# Patient Record
Sex: FEMALE | Race: WHITE | NOT HISPANIC OR LATINO | Employment: OTHER | URBAN - METROPOLITAN AREA
[De-identification: names, ages, dates, MRNs, and addresses within clinical notes are randomized per-mention and may not be internally consistent; named-entity substitution may affect disease eponyms.]

---

## 2020-09-14 ENCOUNTER — OFFICE VISIT (OUTPATIENT)
Dept: FAMILY MEDICINE | Facility: CLINIC | Age: 50
End: 2020-09-14
Payer: COMMERCIAL

## 2020-09-14 VITALS
HEIGHT: 65 IN | BODY MASS INDEX: 29.34 KG/M2 | DIASTOLIC BLOOD PRESSURE: 84 MMHG | WEIGHT: 176.1 LBS | HEART RATE: 111 BPM | OXYGEN SATURATION: 94 % | SYSTOLIC BLOOD PRESSURE: 117 MMHG | TEMPERATURE: 97.5 F

## 2020-09-14 DIAGNOSIS — Z12.11 SPECIAL SCREENING FOR MALIGNANT NEOPLASMS, COLON: ICD-10-CM

## 2020-09-14 DIAGNOSIS — R14.0 ABDOMINAL BLOATING: ICD-10-CM

## 2020-09-14 DIAGNOSIS — Z00.00 ROUTINE GENERAL MEDICAL EXAMINATION AT A HEALTH CARE FACILITY: Primary | ICD-10-CM

## 2020-09-14 DIAGNOSIS — Z12.4 SCREENING FOR CERVICAL CANCER: ICD-10-CM

## 2020-09-14 DIAGNOSIS — Z12.31 ENCOUNTER FOR SCREENING MAMMOGRAM FOR BREAST CANCER: ICD-10-CM

## 2020-09-14 DIAGNOSIS — Z80.0 FAMILY HISTORY OF PANCREATIC CANCER: ICD-10-CM

## 2020-09-14 DIAGNOSIS — B97.7 HIGH RISK HPV INFECTION: ICD-10-CM

## 2020-09-14 PROCEDURE — G0145 SCR C/V CYTO,THINLAYER,RESCR: HCPCS | Performed by: INTERNAL MEDICINE

## 2020-09-14 PROCEDURE — 99386 PREV VISIT NEW AGE 40-64: CPT | Performed by: INTERNAL MEDICINE

## 2020-09-14 PROCEDURE — 99213 OFFICE O/P EST LOW 20 MIN: CPT | Mod: 25 | Performed by: INTERNAL MEDICINE

## 2020-09-14 PROCEDURE — 87624 HPV HI-RISK TYP POOLED RSLT: CPT | Performed by: INTERNAL MEDICINE

## 2020-09-14 ASSESSMENT — MIFFLIN-ST. JEOR: SCORE: 1427.78

## 2020-09-14 NOTE — PATIENT INSTRUCTIONS
(Z00.00) Routine general medical examination at a health care facility  (primary encounter diagnosis)  Comment: For routine exam, we will draw labs as ordered, cholesterol, diabetes mellitus check, liver function, renal function, pap smear and refer for colonoscopy.  We will also update vaccination history.   [facility] (also performs diagnostic mammogram, ultrasound and biopsy) [phone].   Plan: Lipid panel reflex to direct LDL Fasting,         Comprehensive metabolic panel (BMP + Alb, Alk         Phos, ALT, AST, Total. Bili, TP), CBC with         platelets, TSH with free T4 reflex            (R14.0) Abdominal bloating  Comment: [facility] phone #[phone] to schedule  Plan: CT Abdomen Pelvis w Contrast            (Z80.0) Family history of pancreatic cancer  Comment: Referral to genetics placed  Plan: CT Abdomen Pelvis w Contrast, CANCER RISK         MGMT/CANCER GENETIC COUNSELING REFERRAL            (Z12.4) Screening for cervical cancer  Comment:   Plan: Pap imaged thin layer screen with HPV -         recommended age 30 - 65 years (select HPV order        below)            (Z12.31) Encounter for screening mammogram for breast cancer  Comment:  [facility] (also performs diagnostic mammogram, ultrasound and biopsy) [phone].   Plan: *MA Screening Digital Bilateral

## 2020-09-14 NOTE — PROGRESS NOTES
SUBJECTIVE:   CC: Melita Cortes is an 50 year old woman who presents for preventive health visit.     Healthy Habits:     Getting at least 3 servings of Calcium per day:  NO    Bi-annual eye exam:  Yes    Dental care twice a year:  Yes    Sleep apnea or symptoms of sleep apnea:  None    Diet:  Other    Frequency of exercise:  None    Duration of exercise:  N/A    Taking medications regularly:  No    Barriers to taking medications:  None    Medication side effects:  None    PHQ-2 Total Score: 0    Additional concerns today:  Yes (GI issues )        Today's PHQ-2 Score:   PHQ-2 ( 1999 Pf[employer]) 9/14/2020   Q1: Little interest or pleasure in doing things 0   Q2: Feeling down, depressed or hopeless 0   PHQ-2 Score 0       Abuse: Current or Past (Physical, Sexual or Emotional) - No  Do you feel safe in your environment? Yes        Social History     Tobacco Use     Smoking status: Never Smoker     Smokeless tobacco: Never Used   Substance Use Topics     Alcohol use: Yes     If you drink alcohol do you typically have >3 drinks per day or >7 drinks per week? No    No flowsheet data found.    Reviewed orders with patient.  Reviewed health maintenance and updated orders accordingly - Yes       Abdominal bloating  Family history of pancreatic cancer  Screening for cervical cancer  Encounter for screening mammogram for breast cancer  Routine general medical examination at a health care facility  Special screening for malignant neoplasms, colon   Spoke with Fidelina today for establishment of primary care.  Main concern is that of family history of pancreatic cancer and recent symptoms of abdominal bloating.  She says it is the past month or 2 associated with bloating and abdominal distention.  She does not have any specific sites of pain, but given her family history of pancreatic cancer she is concerned about this.  She also has not had a menstrual cycle since May 2020.  She believes she may be entering into menopause.    Lab  "work is in process  Labs reviewed in [facility]    Mammogram Screening: Patient under age 50, mutual decision reflected in health maintenance.      Pertinent mammograms are reviewed under the imaging tab.  History of abnormal Pap smear: YES - updated in Problem List and Health Maintenance accordingly     Reviewed and updated as needed this visit by clinical staff  Tobacco  Allergies  Meds  Soc Hx        Reviewed and updated as needed this visit by Provider        No past medical history on file.     Review of Systems  CONSTITUTIONAL: NEGATIVE for fever, chills, change in weight  INTEGUMENTARU/SKIN: NEGATIVE for worrisome rashes, moles or lesions  EYES: NEGATIVE for vision changes or irritation  ENT: NEGATIVE for ear, mouth and throat problems  RESP: NEGATIVE for significant cough or SOB  BREAST: NEGATIVE for masses, tenderness or discharge  CV: NEGATIVE for chest pain, palpitations or peripheral edema  GI: as above   : NEGATIVE for unusual urinary or vaginal symptoms. Periods are regular.  MUSCULOSKELETAL: NEGATIVE for significant arthralgias or myalgia  NEURO: NEGATIVE for weakness, dizziness or paresthesias  PSYCHIATRIC: NEGATIVE for changes in mood or affect     OBJECTIVE:   /84   Pulse 111   Temp 97.5  F (36.4  C) (Temporal)   Ht 1.656 m (5' 5.2\")   Wt 79.9 kg (176 lb 1.6 oz)   LMP 05/15/2020 (Exact Date)   SpO2 94%   Breastfeeding No   BMI 29.13 kg/m    Physical Exam  GENERAL: healthy, alert and no distress  EYES: Eyes grossly normal to inspection, PERRL and conjunctivae and sclerae normal  HENT: ear canals and TM's normal, nose and mouth without ulcers or lesions  NECK: no adenopathy, no asymmetry, masses, or scars and thyroid normal to palpation  RESP: lungs clear to auscultation - no rales, rhonchi or wheezes  BREAST: normal without masses, tenderness or nipple discharge and no palpable axillary masses or adenopathy  CV: regular rate and rhythm, normal S1 S2, no S3 or S4, no murmur, click or " rub, no peripheral edema and peripheral pulses strong  ABDOMEN: soft, nontender, no hepatosplenomegaly, no masses and bowel sounds normal   (female): normal female external genitalia, normal urethral meatus, vaginal mucosa pink, moist, well rugated, and normal cervix/adnexa/uterus without masses or discharge  MS: no gross musculoskeletal defects noted, no edema  SKIN: no suspicious lesions or rashes  NEURO: Normal strength and tone, mentation intact and speech normal  PSYCH: mentation appears normal, affect normal/bright    Diagnostic Test Results:  Labs reviewed in Epic    ASSESSMENT/PLAN:     Patient Instructions   (Z00.00) Routine general medical examination at a health care facility  (primary encounter diagnosis)  Comment: For routine exam, we will draw labs as ordered, cholesterol, diabetes mellitus check, liver function, renal function, pap smear and refer for colonoscopy.  We will also update vaccination history.   [facility] (also performs diagnostic mammogram, ultrasound and biopsy) [phone].   Plan: Lipid panel reflex to direct LDL Fasting,         Comprehensive metabolic panel (BMP + Alb, Alk         Phos, ALT, AST, Total. Bili, TP), CBC with         platelets, TSH with free T4 reflex            (R14.0) Abdominal bloating  Comment: [facility] phone #[phone] to schedule  Plan: CT Abdomen Pelvis w Contrast            (Z80.0) Family history of pancreatic cancer  Comment: Referral to genetics placed  Plan: CT Abdomen Pelvis w Contrast, CANCER RISK         MGMT/CANCER GENETIC COUNSELING REFERRAL            (Z12.4) Screening for cervical cancer  Comment:   Plan: Pap imaged thin layer screen with HPV -         recommended age 30 - 65 years (select HPV order        below)            (Z12.31) Encounter for screening mammogram for breast cancer  Comment:  [facility] (also performs diagnostic mammogram, ultrasound and biopsy) [phone].   Plan: *MA  "Screening Digital Bilateral                  COUNSELING:  Reviewed preventive health counseling, as reflected in patient instructions    Estimated body mass index is 29.13 kg/m  as calculated from the following:    Height as of this encounter: 1.656 m (5' 5.2\").    Weight as of this encounter: 79.9 kg (176 lb 1.6 oz).        She reports that she has never smoked. She has never used smokeless tobacco.      Counseling Resources:  ATP IV Guidelines  Pooled Cohorts Equation Calculator  Breast Cancer Risk Calculator  BRCA-Related Cancer Risk Assessment: FHS-7 Tool  FRAX Risk Assessment  ICSI Preventive Guidelines  Dietary Guidelines for Americans, 2010  USDA's MyPlate  ASA Prophylaxis  Lung CA Screening    Issa Monk MD, MD  [facility]  "

## 2020-09-16 ENCOUNTER — TELEPHONE (OUTPATIENT)
Dept: FAMILY MEDICINE | Facility: CLINIC | Age: 50
End: 2020-09-16

## 2020-09-16 ENCOUNTER — DOCUMENTATION ONLY (OUTPATIENT)
Dept: FAMILY MEDICINE | Facility: CLINIC | Age: 50
End: 2020-09-16

## 2020-09-16 DIAGNOSIS — Z00.00 ROUTINE GENERAL MEDICAL EXAMINATION AT A HEALTH CARE FACILITY: Primary | ICD-10-CM

## 2020-09-16 LAB
COPATH REPORT: NORMAL
PAP: NORMAL

## 2020-09-16 NOTE — TELEPHONE ENCOUNTER
Oncology/Surgical Oncology Referral Request:     Specialty Requested: Medical Oncology     Referring Provider: Issa Monk MD    Referring Clinic/Organization: [facility]    Records location: [facility]     Requested Provider (if specified): Not Specified

## 2020-09-16 NOTE — LETTER
"    September 16, 2020       TO: Melita Cortes  [id number] [address] [place] [id number]         Dear Ms. Cortes,    Our records indicate that you have not scheduled an appointment for a consult, as recommended by Dr. Issa Monk. If you wish to schedule within [facility]alth, we have several options to help you schedule your appointment:      Call [phone]    Visit our website at [email or web address] and click \"I Want To\" (in upper right) and select Request an Appointment    Request an appointment via [facility] at [email or web address]     If you have chosen to schedule elsewhere or if you have already made an appointment, please disregard this letter.    If you have any questions or concerns regarding the information above, please contact the [facility] at [phone].      Sincerely,      [facility]                  "

## 2020-09-18 DIAGNOSIS — Z00.00 ROUTINE GENERAL MEDICAL EXAMINATION AT A HEALTH CARE FACILITY: ICD-10-CM

## 2020-09-18 LAB
ALBUMIN SERPL-MCNC: 2.8 G/DL (ref 3.4–5)
ALP SERPL-CCNC: 261 U/L (ref 40–150)
ALT SERPL W P-5'-P-CCNC: 57 U/L (ref 0–50)
ANION GAP SERPL CALCULATED.3IONS-SCNC: 8 MMOL/L (ref 3–14)
AST SERPL W P-5'-P-CCNC: 221 U/L (ref 0–45)
BILIRUB SERPL-MCNC: 1.1 MG/DL (ref 0.2–1.3)
BUN SERPL-MCNC: 2 MG/DL (ref 7–30)
CALCIUM SERPL-MCNC: 8.6 MG/DL (ref 8.5–10.1)
CHLORIDE SERPL-SCNC: 106 MMOL/L (ref 94–109)
CHOLEST SERPL-MCNC: 143 MG/DL
CO2 SERPL-SCNC: 25 MMOL/L (ref 20–32)
CREAT SERPL-MCNC: 0.56 MG/DL (ref 0.52–1.04)
ERYTHROCYTE [DISTWIDTH] IN BLOOD BY AUTOMATED COUNT: 16.3 % (ref 10–15)
FINAL DIAGNOSIS: ABNORMAL
GFR SERPL CREATININE-BSD FRML MDRD: >90 ML/MIN/{1.73_M2}
GLUCOSE SERPL-MCNC: 106 MG/DL (ref 70–99)
HCT VFR BLD AUTO: 36.4 % (ref 35–47)
HDLC SERPL-MCNC: 20 MG/DL
HGB BLD-MCNC: 12.3 G/DL (ref 11.7–15.7)
HPV HR 12 DNA CVX QL NAA+PROBE: NEGATIVE
HPV16 DNA SPEC QL NAA+PROBE: NEGATIVE
HPV18 DNA SPEC QL NAA+PROBE: POSITIVE
LDLC SERPL CALC-MCNC: 83 MG/DL
MCH RBC QN AUTO: 35.7 PG (ref 26.5–33)
MCHC RBC AUTO-ENTMCNC: 33.8 G/DL (ref 31.5–36.5)
MCV RBC AUTO: 106 FL (ref 78–100)
NONHDLC SERPL-MCNC: 123 MG/DL
PLATELET # BLD AUTO: 189 10E9/L (ref 150–450)
POTASSIUM SERPL-SCNC: 3.1 MMOL/L (ref 3.4–5.3)
PROT SERPL-MCNC: 7.2 G/DL (ref 6.8–8.8)
RBC # BLD AUTO: 3.45 10E12/L (ref 3.8–5.2)
SODIUM SERPL-SCNC: 139 MMOL/L (ref 133–144)
SPECIMEN DESCRIPTION: ABNORMAL
SPECIMEN SOURCE CVX/VAG CYTO: ABNORMAL
TRIGL SERPL-MCNC: 201 MG/DL
WBC # BLD AUTO: 12.8 10E9/L (ref 4–11)

## 2020-09-18 PROCEDURE — 85027 COMPLETE CBC AUTOMATED: CPT | Performed by: INTERNAL MEDICINE

## 2020-09-18 PROCEDURE — 80053 COMPREHEN METABOLIC PANEL: CPT | Performed by: INTERNAL MEDICINE

## 2020-09-18 PROCEDURE — 80061 LIPID PANEL: CPT | Performed by: INTERNAL MEDICINE

## 2020-09-18 PROCEDURE — 36415 COLL VENOUS BLD VENIPUNCTURE: CPT | Performed by: INTERNAL MEDICINE

## 2020-09-20 ENCOUNTER — TELEPHONE (OUTPATIENT)
Dept: FAMILY MEDICINE | Facility: CLINIC | Age: 50
End: 2020-09-20

## 2020-09-20 DIAGNOSIS — R74.01 TRANSAMINITIS: ICD-10-CM

## 2020-09-20 DIAGNOSIS — D75.89 MACROCYTOSIS: Primary | ICD-10-CM

## 2020-09-20 NOTE — TELEPHONE ENCOUNTER
Can we call Melita Sebastian and let her know that     I had the opportunity to review your recent labs and a summary of your labs reads as follows:    Your complete blood counts show no a finding of elevated white blood cell count and an elevated MCV level with stable hemoglobin and platelets - We should follow up to recheck your vitamin b12 and folate levels  Your fasting lipid panel show  - low HDL (good) cholesterol -as your goal is greater than 40  - low LDL (bad) cholesterol as your goal is less than 130  - elevated triglyceride levels    The 10-year ASCVD risk score (Fiorella SANTANA Jr., et al., 2013) is: 2.2%    Values used to calculate the score:      Age: 50 years      Sex: Female      Is Non- : No      Diabetic: No      Tobacco smoker: No      Systolic Blood Pressure: 117 mmHg      Is BP treated: No      HDL Cholesterol: 20 mg/dL      Total Cholesterol: 143 mg/dL     Notably your liver function tests are quite elevated.  Looking back at your labs from 2018 and 2019, it appears that this is a long standing problem.  I would recommend we schedule a virtual visit on Friday to discuss this issue and consider further workup with a liver specialist

## 2020-09-21 ENCOUNTER — PATIENT OUTREACH (OUTPATIENT)
Dept: FAMILY MEDICINE | Facility: CLINIC | Age: 50
End: 2020-09-21

## 2020-09-21 NOTE — TELEPHONE ENCOUNTER
1/2019 NIL, +HPV 18  3/2019 [place]- Neg  9/14/20 NIL, +HPV 18. Plan [place] bef 12/14/20

## 2020-09-21 NOTE — TELEPHONE ENCOUNTER
Pt called back   Discussed with her   Scheduled video visit follow up for Friday     Pt had been drinking alcohol so thinks liver enzymes could be related to that, since has discontinued drinking     Lupe BARRAZA RN

## 2020-09-22 NOTE — TELEPHONE ENCOUNTER
"Spoke with Pt:     She is concerned about her labs- specifically her elevated WBCs-   Discussed that there are many reasons WBCs are elevated, viral infection, r/t liver enzymes, etc. Her levels do not warrant immediate attention or treatment (she inquired about antibiotics or \"if I am dying\")- PCP recommends to f/u on Friday for further evaluation of her abnormal (\"long standing problem\") labs and discuss a plan and therefore no emergent actions are necessary    Pt is tearful and notes that she drank more in the last year since her mom's health started to decline, but has since stopped drinking and quite smoking     Advised to avoid Tylenol and alcohol to help protect her liver.     Routing as FYI to PCP to notify of Pt's concerns/anxiety over her lab values for upcoming visit on Friday       "

## 2020-09-22 NOTE — TELEPHONE ENCOUNTER
Pt called stating she had additional questions about her labs. Please reach out to Pt    Pt ph: [phone]  ok

## 2020-09-25 ENCOUNTER — HOSPITAL ENCOUNTER (OUTPATIENT)
Dept: CT IMAGING | Facility: CLINIC | Age: 50
Discharge: HOME | End: 2020-09-25
Attending: INTERNAL MEDICINE | Admitting: INTERNAL MEDICINE
Payer: COMMERCIAL

## 2020-09-25 ENCOUNTER — VIRTUAL VISIT (OUTPATIENT)
Dept: FAMILY MEDICINE | Facility: CLINIC | Age: 50
End: 2020-09-25
Payer: COMMERCIAL

## 2020-09-25 DIAGNOSIS — Z80.0 FAMILY HISTORY OF PANCREATIC CANCER: ICD-10-CM

## 2020-09-25 DIAGNOSIS — R14.0 ABDOMINAL BLOATING: ICD-10-CM

## 2020-09-25 DIAGNOSIS — K70.9 LIVER DISEASE, CHRONIC, DUE TO ALCOHOL (H): Primary | ICD-10-CM

## 2020-09-25 PROCEDURE — 25000125 ZZHC RX 250: Performed by: INTERNAL MEDICINE

## 2020-09-25 PROCEDURE — 25000128 H RX IP 250 OP 636: Performed by: INTERNAL MEDICINE

## 2020-09-25 PROCEDURE — 99213 OFFICE O/P EST LOW 20 MIN: CPT | Mod: 95 | Performed by: INTERNAL MEDICINE

## 2020-09-25 PROCEDURE — 74177 CT ABD & PELVIS W/CONTRAST: CPT

## 2020-09-25 RX ORDER — IOPAMIDOL 755 MG/ML
86 INJECTION, SOLUTION INTRAVASCULAR ONCE
Status: COMPLETED | OUTPATIENT
Start: 2020-09-25 | End: 2020-09-25

## 2020-09-25 RX ADMIN — SODIUM CHLORIDE 65 ML: 9 INJECTION, SOLUTION INTRAVENOUS at 13:38

## 2020-09-25 RX ADMIN — IOPAMIDOL 86 ML: 755 INJECTION, SOLUTION INTRAVENOUS at 13:37

## 2020-09-25 NOTE — PROGRESS NOTES
"Melita Cortes is a 50 year old female who is being evaluated via a billable telephone visit.      The patient has been notified of following:     \"This video visit will be conducted via a call between you and your physician/provider. We have found that certain health care needs can be provided without the need for an in-person physical exam.  This service lets us provide the care you need with a video conversation.  If a prescription is necessary we can send it directly to your pharmacy.  If lab work is needed we can place an order for that and you can then stop by our lab to have the test done at a later time.    Video visits are billed at different rates depending on your insurance coverage.  Please reach out to your insurance provider with any questions.    If during the course of the call the physician/provider feels a video visit is not appropriate, you will not be charged for this service.\"    Patient has given verbal consent for Telephone visit? Yes  How would you like to obtain your AVS? MyChart  If you are dropped from the video visit, the video invite should be resent to: Text to cell phone: [phone]  Will anyone else be joining your video visit? No    Subjective     Melita Cortes is a 50 year old female who presents today via video visit for the following health issues:    HPI    Follow up on labs   I spoke to Melita over the phone today with regards to her recent lab review.  We did discuss the fact that her liver function tests are quite elevated and her albumin was low.  Her CT scan did show evidence of ascites.  On her own accord, prior to making this video appointment she did stop drinking alcohol about a week ago.  She already feels a bit better.  She notes that she does have still abdominal bloating, but she is not having any decreased appetite and her diet is stable.  She admits to drinking significant amounts of alcohol over the past few years.  This is been an ongoing problem for " her, and she feels that she is able to manage this on her own.  She was informed that her CT scan showed normal abdominal organs other than liver showing the increased echogenicity concerning for alcoholic liver disease.  She also is notified of her positive Pap smear with a positive HPV and she is referred for colposcopy.    Video Start Time: 3:59 PM        Review of Systems   Constitutional, HEENT, cardiovascular, pulmonary, GI, , musculoskeletal, neuro, skin, endocrine and psych systems are negative, except as otherwise noted.      Objective           Vitals:  No vitals were obtained today due to virtual visit.    Physical Exam     GENERAL: Healthy, alert and no distress  RESP: No audible wheeze, cough, or visible cyanosis.  No visible retractions or increased work of breathing.    NEURO: Cranial nerves grossly intact.  Mentation and speech appropriate for age.  PSYCH: Mentation appears normal, affect normal/bright, judgement and insight intact, normal speech and appearance well-groomed.      Results for orders placed or performed during the hospital encounter of 09/25/20 (from the past 24 hour(s))   CT Abdomen Pelvis w Contrast    Narrative    CT ABDOMEN AND PELVIS WITH CONTRAST 9/25/2020 1:49 PM    CLINICAL HISTORY: Abdominal bloating. Family history of pancreatic  cancer.    TECHNIQUE: CT scan of the abdomen and pelvis was performed following  injection of IV contrast. Multiplanar reformats were obtained. Dose  reduction techniques were used.    CONTRAST: 86 mL Isovue-370    COMPARISON: None.    FINDINGS:   LOWER CHEST: No infiltrates or effusions.    HEPATOBILIARY: Heterogeneous appearance of the liver possibly related  to heterogeneous fatty infiltration. Ultimately this is indeterminant.  No biliary dilatation. Gallbladder unremarkable.    PANCREAS: No significant mass, duct dilatation, or inflammatory  change.    SPLEEN: Normal size.    ADRENAL GLANDS: No significant nodules.    KIDNEYS/BLADDER: No  significant mass, stones, or hydronephrosis.    BOWEL: No obstruction or inflammatory change.    PELVIC ORGANS: No pelvic masses.    ADDITIONAL FINDINGS: Small amount of ascites. No free air. No  adenopathy.    MUSCULOSKELETAL: Unremarkable.      Impression    IMPRESSION:   1.  Heterogeneous appearance of the liver, possibly related to  heterogeneous fatty infiltration. Clinical correlation with liver  function tests recommended for further evaluation.  2.  Small amount of pelvic free fluid which is nonspecific. This could  be related to ovarian cyst rupture, but the etiology is unclear.    PRIMITIVO GARIBAY MD           Assessment & Plan     Liver disease, chronic, due to alcohol (H)  I recommend we recheck labs in about 2 to 3 weeks.  I also recommended she schedule with a hepatologist.  She is agreeable to this plan.  She also has abstained from alcohol and avoid any hepatotoxic medication such as acetaminophen.  - **CBC with platelets FUTURE anytime; Future  - **Hepatic panel FUTURE 2mo; Future  - GASTROENTEROLOGY ADULT REF CONSULT ONLY; Future           No follow-ups on file.    Issa Monk MD, MD  [facility]      Telephone -Visit Details    Type of service:  Video Visit    Telephone End Time:4:12 PM    Originating Location (pt. Location): Home    Distant Location (provider location):  [facility]     Platform used for Video Visit: [employer]

## 2020-09-25 NOTE — RESULT ENCOUNTER NOTE
Julio Hua,    I have had the opportunity to review your recent results and an interpretation is as follows:  As we discussed, there was evidence of heterogeneity within the liver related to alcohol liver disease.  Also there is small amount of ascites which is likely the cause of some of the bloating of experiencing.  We will plan to continue to abstain from alcohol and recheck your labs in about 2 to 3 weeks.  Follow up also in hepatology with [facility] [phone]     Sincerely,  Issa Monk MD

## 2020-09-30 ENCOUNTER — HOSPITAL ENCOUNTER (OUTPATIENT)
Dept: MAMMOGRAPHY | Facility: CLINIC | Age: 50
Discharge: HOME | End: 2020-09-30
Attending: INTERNAL MEDICINE | Admitting: INTERNAL MEDICINE
Payer: COMMERCIAL

## 2020-09-30 DIAGNOSIS — Z12.31 ENCOUNTER FOR SCREENING MAMMOGRAM FOR BREAST CANCER: ICD-10-CM

## 2020-09-30 PROCEDURE — 77063 BREAST TOMOSYNTHESIS BI: CPT

## 2020-10-02 ENCOUNTER — HOSPITAL ENCOUNTER (OUTPATIENT)
Facility: CLINIC | Age: 50
End: 2020-10-02
Attending: COLON & RECTAL SURGERY | Admitting: COLON & RECTAL SURGERY
Payer: COMMERCIAL

## 2020-10-06 DIAGNOSIS — Z11.59 ENCOUNTER FOR SCREENING FOR OTHER VIRAL DISEASES: Primary | ICD-10-CM

## 2020-10-09 DIAGNOSIS — Z11.59 ENCOUNTER FOR SCREENING FOR OTHER VIRAL DISEASES: Primary | ICD-10-CM

## 2020-11-13 ENCOUNTER — PATIENT OUTREACH (OUTPATIENT)
Dept: FAMILY MEDICINE | Facility: CLINIC | Age: 50
End: 2020-11-13
Payer: COMMERCIAL

## 2020-11-13 DIAGNOSIS — R87.810 CERVICAL HIGH RISK HPV (HUMAN PAPILLOMAVIRUS) TEST POSITIVE: ICD-10-CM

## 2020-12-14 NOTE — TELEPHONE ENCOUNTER
1/2019 NIL, +HPV 18  3/2019 [place]- Neg  9/14/20 NIL, +HPV 18. Plan [place] bef 12/14/20 9/21/20 Pt informed  11/13/20 MyChart reminder sent and read by patient  12/29/20 [place]

## 2020-12-29 ENCOUNTER — OFFICE VISIT (OUTPATIENT)
Dept: OBGYN | Facility: CLINIC | Age: 50
End: 2020-12-29
Payer: COMMERCIAL

## 2020-12-29 DIAGNOSIS — B97.7 HIGH RISK HPV INFECTION: Primary | ICD-10-CM

## 2020-12-29 PROCEDURE — 57452 EXAM OF CERVIX W/SCOPE: CPT | Performed by: OBSTETRICS & GYNECOLOGY

## 2020-12-29 NOTE — PROGRESS NOTES
INDICATIONS:                                                      Melita Cortes, is a 50 year old female, who had a recent negative pap, HPV 18 positive Patient has prior  history of abnormal pap. Here today for colposcopy. Discussed indication, risks of infection and bleeding.    Her last pap was   Lab Results   Component Value Date    PAP NIL, +HR-HPV 09/14/2020     Overview:   1/2019 NIL, +HPV 18  3/2019 [place]- Neg  9/14/20 NIL, +HPV 18    Is a pregnancy test required: No.  Was a consent obtained?  Yes      PROCEDURE:                                                      Cervix is stained with acetic acid and viewed colposcopically. Squamocolumnar junction is visualized in it's entirety. no visible lesions . Biopsy done No. Endocervical curretage Not Done         POST PROCEDURE:                                                      IMPRESSION: Patient tolerated procedure well    PLAN : Await the results of the biopsies.  She tolerated the procedure well. There were no complications. Patient was discharged in stable condition.    Patient advised to call the clinic if excessive bleeding, pelvic pain, or fever.     Follow-up with repeat co-testing in 12 months.    Colposcopy was normal today.  Discussed pap smear in a year.  Could consider LEEP if persistent HPV exists.  If she tests positive again would certainly do an ECC.  She had biopsy last colposcopy that was normal.  Kavita Browne MD

## 2021-01-04 ENCOUNTER — HEALTH MAINTENANCE LETTER (OUTPATIENT)
Age: 51
End: 2021-01-04

## 2021-07-20 ENCOUNTER — TELEPHONE (OUTPATIENT)
Dept: GASTROENTEROLOGY | Facility: OUTPATIENT CENTER | Age: 51
End: 2021-07-20

## 2021-07-20 ENCOUNTER — HOSPITAL ENCOUNTER (OUTPATIENT)
Facility: CLINIC | Age: 51
End: 2021-07-20
Attending: COLON & RECTAL SURGERY | Admitting: COLON & RECTAL SURGERY

## 2021-07-20 ENCOUNTER — TELEPHONE (OUTPATIENT)
Dept: GASTROENTEROLOGY | Facility: CLINIC | Age: 51
End: 2021-07-20

## 2021-07-20 DIAGNOSIS — Z11.59 ENCOUNTER FOR SCREENING FOR OTHER VIRAL DISEASES: ICD-10-CM

## 2021-07-20 NOTE — TELEPHONE ENCOUNTER
Caller:PATIENT    Procedure: COLON    Date of Procedure Cancelled: 8/9    Ordering Provider:    Reason for cancel: Scheduled incorrectly; dates given had conflict with her work schedule; pt calling back to confirm diff date    Rescheduled: y     If rescheduled    Date:8/23/2021    Location:[facility]    Note any change or update to original order/sedation: pt prefers [facility]

## 2021-07-20 NOTE — TELEPHONE ENCOUNTER
Screening Questions  1. Are you active on mychart? Yes    2. What insurance is in the chart? On chart     2.  Ordering/Referring Provider: Issa Monk MD in  F[facility]ILY [facility]/IM      3. BMI 26.6    4. Are you on daily home oxygen? no    5. Do you have a history of difficult airway? no    6. Have you had a heart, lung, or liver transplant? no    7. Are you currently on dialysis? no    8. Have you had a stroke or Transient ischemic atttack (TIA) within 6 months? no    9. In the past 6 months, have you had any heart related issues including cardiomyopathy or heart attack?         If yes, did it require cardiac stenting or other implantable device?no    10. Do you have any implantable devices in your body (pacemaker, defib, LVAD)? no    11. Do you take nitroglycerin? If yes, how often? no    12. Are you currently taking any blood thinners?no    13. Are you a diabetic? no    14. (Females) Are you currently pregnant? no  If yes, how many weeks?    15. Have you had a procedure in the past that was difficult to tolerate with conscious sedation? Any allergies to Fentanyl or Versed no    16. Are you taking any scheduled prescription narcotics more than once daily? no    17. Do you have any chemical dependencies such as alcohol, street drugs, or methadone? no    18. Do you have any history of post-traumatic stress syndrome or mental health issues? no    19. Do you transfer independently? yes    20.  Do you have any issues with constipation? no    21. Preferred Pharmacy for Pre Prescription On chart    Scheduling Details    Colonoscopy Prep Sent?: Miralax prep   Procedure Scheduled: Colonoscopy  Provider/Surgeon: Seema  Date of Procedure: 8/11/21  Location: [facility]  Caller (Please ask for phone number if not scheduled by patient): Fidelina      Sedation Type: Order didn't specify so patient was scheduled under MAC  Conscious Sedation- Needs  for 6 hours after the procedure  MAC/General-Needs  for 24  hours after procedure    Pre-op Required at [facility], [place], [place] and OR for MAC sedation:   (if yes advise patient they will need a pre-op prior to procedure)      Is patient on blood thinners? -no (If yes- inform patient to follow up with PCP or provider for follow up instructions)     Informed patient they will need an adult  yes  Cannot take any type of public or medical transportation alone    Informed Patient of COVID Test Requirement yes    Confirmed Nurse will call to complete assessment yes    Additional comments: n/a

## 2021-07-20 NOTE — TELEPHONE ENCOUNTER
Caller: Fidelina    Procedure: Colonoscopy    Date of Procedure Cancelled: 8/11/21    Ordering Provider:Issa Monk MD in  FAMILY [facility]    Reason for cancel: Schedule conflict    Rescheduled: Yes     If rescheduled    Date: 8/9/21    Location: [facility]    Note any change or update to original order/sedation: N/A

## 2021-09-24 ENCOUNTER — APPOINTMENT (OUTPATIENT)
Dept: ULTRASOUND IMAGING | Facility: CLINIC | Age: 51
DRG: 441 | End: 2021-09-24
Attending: EMERGENCY MEDICINE
Payer: COMMERCIAL

## 2021-09-24 ENCOUNTER — HOSPITAL ENCOUNTER (INPATIENT)
Facility: CLINIC | Age: 51
LOS: 7 days | Discharge: HOME | DRG: 441 | End: 2021-10-01
Attending: EMERGENCY MEDICINE | Admitting: INTERNAL MEDICINE
Payer: COMMERCIAL

## 2021-09-24 ENCOUNTER — NURSE TRIAGE (OUTPATIENT)
Dept: FAMILY MEDICINE | Facility: CLINIC | Age: 51
End: 2021-09-24

## 2021-09-24 DIAGNOSIS — K70.9 ALCOHOLIC LIVER DISEASE (H): Primary | ICD-10-CM

## 2021-09-24 DIAGNOSIS — D64.9 ANEMIA, UNSPECIFIED TYPE: ICD-10-CM

## 2021-09-24 DIAGNOSIS — K72.00 ACUTE LIVER FAILURE WITHOUT HEPATIC COMA: ICD-10-CM

## 2021-09-24 DIAGNOSIS — R18.8 OTHER ASCITES: ICD-10-CM

## 2021-09-24 LAB
% LINING CELLS, BODY FLUID: 22 %
ABO/RH(D): NORMAL
ALBUMIN FLD-MCNC: 0.4 G/DL
ALBUMIN SERPL-MCNC: 1.8 G/DL (ref 3.4–5)
ALP SERPL-CCNC: 58 U/L (ref 40–150)
ALT SERPL W P-5'-P-CCNC: 26 U/L (ref 0–50)
AMMONIA PLAS-SCNC: 78 UMOL/L (ref 10–50)
ANION GAP SERPL CALCULATED.3IONS-SCNC: 20 MMOL/L (ref 3–14)
ANTIBODY SCREEN: NEGATIVE
APPEARANCE FLD: ABNORMAL
AST SERPL W P-5'-P-CCNC: 44 U/L (ref 0–45)
BASOPHILS # BLD AUTO: 0.1 10E3/UL (ref 0–0.2)
BASOPHILS # BLD AUTO: 0.1 10E3/UL (ref 0–0.2)
BASOPHILS NFR BLD AUTO: 1 %
BASOPHILS NFR BLD AUTO: 1 %
BILIRUB DIRECT SERPL-MCNC: 23.2 MG/DL (ref 0–0.2)
BILIRUB SERPL-MCNC: 30.2 MG/DL (ref 0.2–1.3)
BILIRUB SERPL-MCNC: 33.6 MG/DL (ref 0.2–1.3)
BLD PROD TYP BPU: NORMAL
BLD PROD TYP BPU: NORMAL
BLOOD COMPONENT TYPE: NORMAL
BLOOD COMPONENT TYPE: NORMAL
BUN SERPL-MCNC: 60 MG/DL (ref 7–30)
CALCIUM SERPL-MCNC: 8.1 MG/DL (ref 8.5–10.1)
CHLORIDE BLD-SCNC: 98 MMOL/L (ref 94–109)
CO2 SERPL-SCNC: 14 MMOL/L (ref 20–32)
CODING SYSTEM: NORMAL
CODING SYSTEM: NORMAL
COLOR FLD: ABNORMAL
CREAT SERPL-MCNC: 4.1 MG/DL (ref 0.52–1.04)
CROSSMATCH: NORMAL
CROSSMATCH: NORMAL
EOSINOPHIL # BLD AUTO: 0.2 10E3/UL (ref 0–0.7)
EOSINOPHIL # BLD AUTO: 0.3 10E3/UL (ref 0–0.7)
EOSINOPHIL NFR BLD AUTO: 1 %
EOSINOPHIL NFR BLD AUTO: 2 %
ERYTHROCYTE [DISTWIDTH] IN BLOOD BY AUTOMATED COUNT: 19.2 % (ref 10–15)
ERYTHROCYTE [DISTWIDTH] IN BLOOD BY AUTOMATED COUNT: 19.2 % (ref 10–15)
GFR SERPL CREATININE-BSD FRML MDRD: 12 ML/MIN/1.73M2
GLUCOSE BLD-MCNC: 121 MG/DL (ref 70–99)
GRAM STAIN RESULT: NORMAL
GRAM STAIN RESULT: NORMAL
HCT VFR BLD AUTO: 14.7 % (ref 35–47)
HCT VFR BLD AUTO: 16.5 % (ref 35–47)
HGB BLD-MCNC: 4.7 G/DL (ref 11.7–15.7)
HGB BLD-MCNC: 5.2 G/DL (ref 11.7–15.7)
IMM GRANULOCYTES # BLD: 0.6 10E3/UL
IMM GRANULOCYTES # BLD: 0.6 10E3/UL
IMM GRANULOCYTES NFR BLD: 3 %
IMM GRANULOCYTES NFR BLD: 4 %
INR PPP: 3.3 (ref 0.85–1.15)
ISSUE DATE AND TIME: NORMAL
ISSUE DATE AND TIME: NORMAL
LDH SERPL L TO P-CCNC: 337 U/L (ref 81–234)
LYMPHOCYTES # BLD AUTO: 1.4 10E3/UL (ref 0.8–5.3)
LYMPHOCYTES # BLD AUTO: 1.8 10E3/UL (ref 0.8–5.3)
LYMPHOCYTES NFR BLD AUTO: 13 %
LYMPHOCYTES NFR BLD AUTO: 8 %
LYMPHOCYTES NFR FLD MANUAL: 17 %
MCH RBC QN AUTO: 43 PG (ref 26.5–33)
MCH RBC QN AUTO: 44.3 PG (ref 26.5–33)
MCHC RBC AUTO-ENTMCNC: 31.5 G/DL (ref 31.5–36.5)
MCHC RBC AUTO-ENTMCNC: 32 G/DL (ref 31.5–36.5)
MCV RBC AUTO: 136 FL (ref 78–100)
MCV RBC AUTO: 139 FL (ref 78–100)
MONOCYTES # BLD AUTO: 1.7 10E3/UL (ref 0–1.3)
MONOCYTES # BLD AUTO: 2.5 10E3/UL (ref 0–1.3)
MONOCYTES NFR BLD AUTO: 12 %
MONOCYTES NFR BLD AUTO: 15 %
MONOS+MACROS NFR FLD MANUAL: 39 %
NEUTROPHILS # BLD AUTO: 12.1 10E3/UL (ref 1.6–8.3)
NEUTROPHILS # BLD AUTO: 9.8 10E3/UL (ref 1.6–8.3)
NEUTROPHILS NFR BLD AUTO: 68 %
NEUTROPHILS NFR BLD AUTO: 72 %
NEUTS BAND NFR FLD MANUAL: 22 %
NRBC # BLD AUTO: 0.1 10E3/UL
NRBC # BLD AUTO: 0.1 10E3/UL
NRBC BLD AUTO-RTO: 0 /100
NRBC BLD AUTO-RTO: 1 /100
PLATELET # BLD AUTO: 102 10E3/UL (ref 150–450)
PLATELET # BLD AUTO: 143 10E3/UL (ref 150–450)
POTASSIUM BLD-SCNC: 3.6 MMOL/L (ref 3.4–5.3)
PROT FLD-MCNC: 1.7 G/DL
PROT SERPL-MCNC: 6.5 G/DL (ref 6.8–8.8)
RBC # BLD AUTO: 1.06 10E6/UL (ref 3.8–5.2)
RBC # BLD AUTO: 1.21 10E6/UL (ref 3.8–5.2)
RETICS # AUTO: 0.26 10E6/UL (ref 0.03–0.1)
RETICS/RBC NFR AUTO: 24.2 % (ref 0.5–2)
SARS-COV-2 RNA RESP QL NAA+PROBE: NEGATIVE
SODIUM SERPL-SCNC: 132 MMOL/L (ref 133–144)
SPECIMEN EXPIRATION DATE: NORMAL
UNIT ABO/RH: NORMAL
UNIT ABO/RH: NORMAL
UNIT NUMBER: NORMAL
UNIT NUMBER: NORMAL
UNIT STATUS: NORMAL
UNIT STATUS: NORMAL
UNIT TYPE ISBT: 9500
UNIT TYPE ISBT: 9500
WBC # BLD AUTO: 14.2 10E3/UL (ref 4–11)
WBC # BLD AUTO: 16.8 10E3/UL (ref 4–11)
WBC # FLD AUTO: 196 /UL

## 2021-09-24 PROCEDURE — 36415 COLL VENOUS BLD VENIPUNCTURE: CPT | Performed by: EMERGENCY MEDICINE

## 2021-09-24 PROCEDURE — 85045 AUTOMATED RETICULOCYTE COUNT: CPT | Performed by: INTERNAL MEDICINE

## 2021-09-24 PROCEDURE — 210N000002 HC R&B HEART CARE

## 2021-09-24 PROCEDURE — 86901 BLOOD TYPING SEROLOGIC RH(D): CPT | Performed by: EMERGENCY MEDICINE

## 2021-09-24 PROCEDURE — 93975 VASCULAR STUDY: CPT

## 2021-09-24 PROCEDURE — 999N000154 HC STATISTIC RADIOLOGY XRAY, US, CT, MAR, NM

## 2021-09-24 PROCEDURE — 82784 ASSAY IGA/IGD/IGG/IGM EACH: CPT | Performed by: INTERNAL MEDICINE

## 2021-09-24 PROCEDURE — 86803 HEPATITIS C AB TEST: CPT | Performed by: INTERNAL MEDICINE

## 2021-09-24 PROCEDURE — C9803 HOPD COVID-19 SPEC COLLECT: HCPCS

## 2021-09-24 PROCEDURE — 86709 HEPATITIS A IGM ANTIBODY: CPT | Performed by: INTERNAL MEDICINE

## 2021-09-24 PROCEDURE — 0W9G3ZX DRAINAGE OF PERITONEAL CAVITY, PERCUTANEOUS APPROACH, DIAGNOSTIC: ICD-10-PCS | Performed by: RADIOLOGY

## 2021-09-24 PROCEDURE — 36430 TRANSFUSION BLD/BLD COMPNT: CPT

## 2021-09-24 PROCEDURE — 258N000003 HC RX IP 258 OP 636: Performed by: INTERNAL MEDICINE

## 2021-09-24 PROCEDURE — 250N000013 HC RX MED GY IP 250 OP 250 PS 637: Performed by: INTERNAL MEDICINE

## 2021-09-24 PROCEDURE — 82390 ASSAY OF CERULOPLASMIN: CPT | Performed by: INTERNAL MEDICINE

## 2021-09-24 PROCEDURE — 87635 SARS-COV-2 COVID-19 AMP PRB: CPT | Performed by: EMERGENCY MEDICINE

## 2021-09-24 PROCEDURE — 83615 LACTATE (LD) (LDH) ENZYME: CPT | Performed by: INTERNAL MEDICINE

## 2021-09-24 PROCEDURE — P9016 RBC LEUKOCYTES REDUCED: HCPCS | Performed by: INTERNAL MEDICINE

## 2021-09-24 PROCEDURE — 87070 CULTURE OTHR SPECIMN AEROBIC: CPT | Performed by: INTERNAL MEDICINE

## 2021-09-24 PROCEDURE — P9016 RBC LEUKOCYTES REDUCED: HCPCS | Performed by: EMERGENCY MEDICINE

## 2021-09-24 PROCEDURE — 87205 SMEAR GRAM STAIN: CPT | Performed by: INTERNAL MEDICINE

## 2021-09-24 PROCEDURE — 87340 HEPATITIS B SURFACE AG IA: CPT | Performed by: INTERNAL MEDICINE

## 2021-09-24 PROCEDURE — 49083 ABD PARACENTESIS W/IMAGING: CPT

## 2021-09-24 PROCEDURE — 82248 BILIRUBIN DIRECT: CPT | Performed by: INTERNAL MEDICINE

## 2021-09-24 PROCEDURE — 85025 COMPLETE CBC W/AUTO DIFF WBC: CPT | Performed by: INTERNAL MEDICINE

## 2021-09-24 PROCEDURE — 36415 COLL VENOUS BLD VENIPUNCTURE: CPT | Performed by: INTERNAL MEDICINE

## 2021-09-24 PROCEDURE — 89050 BODY FLUID CELL COUNT: CPT | Performed by: EMERGENCY MEDICINE

## 2021-09-24 PROCEDURE — 88305 TISSUE EXAM BY PATHOLOGIST: CPT | Mod: TC | Performed by: INTERNAL MEDICINE

## 2021-09-24 PROCEDURE — 83010 ASSAY OF HAPTOGLOBIN QUANT: CPT | Performed by: INTERNAL MEDICINE

## 2021-09-24 PROCEDURE — 82040 ASSAY OF SERUM ALBUMIN: CPT | Performed by: EMERGENCY MEDICINE

## 2021-09-24 PROCEDURE — 82746 ASSAY OF FOLIC ACID SERUM: CPT | Performed by: INTERNAL MEDICINE

## 2021-09-24 PROCEDURE — 80143 DRUG ASSAY ACETAMINOPHEN: CPT | Performed by: INTERNAL MEDICINE

## 2021-09-24 PROCEDURE — 250N000011 HC RX IP 250 OP 636: Performed by: INTERNAL MEDICINE

## 2021-09-24 PROCEDURE — 86923 COMPATIBILITY TEST ELECTRIC: CPT | Performed by: EMERGENCY MEDICINE

## 2021-09-24 PROCEDURE — 99285 EMERGENCY DEPT VISIT HI MDM: CPT | Mod: 25

## 2021-09-24 PROCEDURE — 82607 VITAMIN B-12: CPT | Performed by: INTERNAL MEDICINE

## 2021-09-24 PROCEDURE — 85610 PROTHROMBIN TIME: CPT | Performed by: EMERGENCY MEDICINE

## 2021-09-24 PROCEDURE — 82140 ASSAY OF AMMONIA: CPT | Performed by: INTERNAL MEDICINE

## 2021-09-24 PROCEDURE — 84157 ASSAY OF PROTEIN OTHER: CPT | Performed by: EMERGENCY MEDICINE

## 2021-09-24 PROCEDURE — 99223 1ST HOSP IP/OBS HIGH 75: CPT | Mod: AI | Performed by: INTERNAL MEDICINE

## 2021-09-24 PROCEDURE — 85025 COMPLETE CBC W/AUTO DIFF WBC: CPT | Performed by: EMERGENCY MEDICINE

## 2021-09-24 PROCEDURE — 82042 OTHER SOURCE ALBUMIN QUAN EA: CPT | Performed by: EMERGENCY MEDICINE

## 2021-09-24 PROCEDURE — 96374 THER/PROPH/DIAG INJ IV PUSH: CPT

## 2021-09-24 PROCEDURE — 86706 HEP B SURFACE ANTIBODY: CPT | Performed by: INTERNAL MEDICINE

## 2021-09-24 PROCEDURE — 89051 BODY FLUID CELL COUNT: CPT | Performed by: EMERGENCY MEDICINE

## 2021-09-24 PROCEDURE — 83516 IMMUNOASSAY NONANTIBODY: CPT | Performed by: INTERNAL MEDICINE

## 2021-09-24 PROCEDURE — 86923 COMPATIBILITY TEST ELECTRIC: CPT | Performed by: INTERNAL MEDICINE

## 2021-09-24 RX ORDER — ALBUMIN (HUMAN) 12.5 G/50ML
12.5 SOLUTION INTRAVENOUS EVERY 8 HOURS
Status: DISCONTINUED | OUTPATIENT
Start: 2021-09-24 | End: 2021-09-26

## 2021-09-24 RX ORDER — ONDANSETRON 4 MG/1
4 TABLET, ORALLY DISINTEGRATING ORAL EVERY 6 HOURS PRN
Status: DISCONTINUED | OUTPATIENT
Start: 2021-09-24 | End: 2021-10-01 | Stop reason: HOSPADM

## 2021-09-24 RX ORDER — LIDOCAINE HYDROCHLORIDE 10 MG/ML
10 INJECTION, SOLUTION EPIDURAL; INFILTRATION; INTRACAUDAL; PERINEURAL ONCE
Status: COMPLETED | OUTPATIENT
Start: 2021-09-24 | End: 2021-09-24

## 2021-09-24 RX ORDER — LACTULOSE 10 G/15ML
20 SOLUTION ORAL 3 TIMES DAILY
Status: DISCONTINUED | OUTPATIENT
Start: 2021-09-24 | End: 2021-09-28

## 2021-09-24 RX ORDER — SODIUM CHLORIDE 9 MG/ML
INJECTION, SOLUTION INTRAVENOUS CONTINUOUS
Status: ACTIVE | OUTPATIENT
Start: 2021-09-24 | End: 2021-09-25

## 2021-09-24 RX ORDER — DIPHENHYDRAMINE HYDROCHLORIDE 50 MG/ML
50 INJECTION INTRAMUSCULAR; INTRAVENOUS
Status: ACTIVE | OUTPATIENT
Start: 2021-09-24 | End: 2021-09-25

## 2021-09-24 RX ORDER — NITROGLYCERIN 0.4 MG/1
0.4 TABLET SUBLINGUAL EVERY 5 MIN PRN
Status: DISCONTINUED | OUTPATIENT
Start: 2021-09-24 | End: 2021-09-29

## 2021-09-24 RX ORDER — SODIUM CHLORIDE 9 MG/ML
INJECTION, SOLUTION INTRAVENOUS CONTINUOUS
Status: DISCONTINUED | OUTPATIENT
Start: 2021-09-24 | End: 2021-09-24 | Stop reason: DRUGHIGH

## 2021-09-24 RX ORDER — LIDOCAINE 40 MG/G
CREAM TOPICAL
Status: DISCONTINUED | OUTPATIENT
Start: 2021-09-24 | End: 2021-10-01 | Stop reason: HOSPADM

## 2021-09-24 RX ORDER — ONDANSETRON 2 MG/ML
4 INJECTION INTRAMUSCULAR; INTRAVENOUS EVERY 6 HOURS PRN
Status: DISCONTINUED | OUTPATIENT
Start: 2021-09-24 | End: 2021-10-01 | Stop reason: HOSPADM

## 2021-09-24 RX ORDER — METHYLPREDNISOLONE SODIUM SUCCINATE 125 MG/2ML
125 INJECTION, POWDER, LYOPHILIZED, FOR SOLUTION INTRAMUSCULAR; INTRAVENOUS
Status: ACTIVE | OUTPATIENT
Start: 2021-09-24 | End: 2021-09-25

## 2021-09-24 RX ORDER — LIDOCAINE 40 MG/G
CREAM TOPICAL
Status: DISCONTINUED | OUTPATIENT
Start: 2021-09-24 | End: 2021-09-25

## 2021-09-24 RX ADMIN — PHYTONADIONE 2 MG: 2 INJECTION, EMULSION INTRAMUSCULAR; INTRAVENOUS; SUBCUTANEOUS at 17:46

## 2021-09-24 RX ADMIN — SODIUM CHLORIDE: 9 INJECTION, SOLUTION INTRAVENOUS at 21:24

## 2021-09-24 RX ADMIN — LIDOCAINE HYDROCHLORIDE 10 ML: 10 INJECTION, SOLUTION EPIDURAL; INFILTRATION; INTRACAUDAL; PERINEURAL at 16:19

## 2021-09-24 RX ADMIN — LACTULOSE 20 G: 20 SOLUTION ORAL at 18:18

## 2021-09-24 ASSESSMENT — ENCOUNTER SYMPTOMS
BLOOD IN STOOL: 0
ABDOMINAL DISTENTION: 1
WEAKNESS: 1
COLOR CHANGE: 1
SHORTNESS OF BREATH: 1

## 2021-09-24 ASSESSMENT — MIFFLIN-ST. JEOR: SCORE: 1353.43

## 2021-09-24 NOTE — ED PROVIDER NOTES
"  History   Chief Complaint:  Abdominal Pain and Shortness of Breath       The history is provided by the patient.      Melita Cortes is a 51 year old female with history of liver disease who presents for evaluation of abdominal distension and jaundice. Melita has had abdominal distension gradually worsening for the past year with associated shortness of breath and weakness. She developed jaundice one month ago that has been worsening. She also reports bilateral leg swelling for the past few months that is worse today. She was diagnosed with liver disease one year ago but does not recall this diagnosis. She cut back significantly on drinking one year ago and has not had any alcohol in the past month. No dark or bloody stool.     Review of Systems   Respiratory: Positive for shortness of breath.    Cardiovascular: Positive for leg swelling.   Gastrointestinal: Positive for abdominal distention. Negative for blood in stool.   Skin: Positive for color change.   Neurological: Positive for weakness.   All other systems reviewed and are negative.        Allergies:  No Known Allergies    Medications:  Prozac    Past Medical History:     Anxiety   Asthma   Esophageal reflux   GERD   High risk HPV infection  Hyperlipidemia   Liver disease   Macrocytosis  Obesity    Transaminitis     Past Surgical History:    Appendectomy  Breast biopsy    Family History:    Coronary artery disease   Dementia   Pancreatic cancer    Social History:  Presents with her Doug  PCP: Issa Monk MD   Alcohol use: Positive, decreased in the past year    Physical Exam     Patient Vitals for the past 24 hrs:   BP Temp Temp src Pulse Resp SpO2 Height Weight   09/24/21 1138 (!) 99/30 97.5  F (36.4  C) Oral 86 20 98 % 1.676 m (5' 6\") 71.7 kg (158 lb)       Physical Exam  Vitals: reviewed by me  General: Pt seen on [facility], pleasant, cooperative, and alert to conversation.  She has however very sickly appearing, and " severely jaundiced.  Eyes: Tracking well, icteric  ENT: MMM, midline trachea.   Lungs: Slightly tachypneic, slight accessory muscle use.  Speaking in full sentences.  CV: Rate as above  Abd: Soft, appears to be fluid-filled, nontender, no guarding.  Distended.  Rectal exam done with an MD chaperone.  Soft brown stool, no bleeding noted.  MSK: Significant swelling noted throughout her lower and upper extremities.  Anasarca noted.  Skin: Severely jaundiced  Neuro: Clear speech and no facial droop.  Moving her extremities normally.  Psych: Not RIS, no e/o AH/VH      Emergency Department Course     Imaging:  US Abd Hepatic & Portal Vasculature Cmplt:  p.  Per radiology.     US Paracentesis:  p.  Per radiology.      Laboratory:   CBC: WBC 16.8(H), HGB 5.2(L), (L)   CMP: Sodium 132(L), Carbon Dioxide 14(L), Anion Gap 20(H), Urea Nitrogen 60(H), Calcium 8.1(L), Glucose 121(H), Protein Total 6.5(L), Albumin 1.8(L), Bilirubin total 30.2(H), GFR 12(L), o/w WNL (Creatinine 4.10(H))     INR: 3.30(H)    ABO/Rh type and screen: O neg, Antibody Screen: Negative     Occult blood stool: Told by lab the sample is inadequate since the card that was used in the room had already .    Asymptomatic COVID19 Virus PCR by nasopharyngeal swab: Negative       Emergency Department Course:    Reviewed:  I reviewed nursing notes, vitals, past medical history and care everywhere    Assessments:  1150 I obtained history and examined the patient as noted above.   1500 I rechecked the patient and explained findings.     Consults:   1432 I consulted with Dr. Flores and Dr. Finney, [facility], regarding the patient's history and presentation here in the emergency department. They politely and intelligently decline to accept the patient for transfer.   1441 I consulted with Dr. Fatima of the hospitalist services who is in agreement to accept the patient for admission.     Interventions:  Pending Transfuse red blood cells, 1 unit,  IV    Disposition:  The patient was admitted to the hospital under the care of Dr. Fatima.     MELD-Na score: 45 at 9/24/2021 12:47 PM  MELD score: 46 at 9/24/2021 12:47 PM  Calculated from:  Serum Creatinine: 4.10 mg/dL (Using max of 4 mg/dL) at 9/24/2021 12:47 PM  Serum Sodium: 132 mmol/L at 9/24/2021 12:47 PM  Total Bilirubin: 30.2 mg/dL at 9/24/2021 12:47 PM  INR(ratio): 3.30 at 9/24/2021 12:47 PM  Age: 51 years        Impression & Plan     Medical Decision Making:  This is a 51-year-old female who presents emergency room with appears to be fulminant liver failure.  She has had some degree of alcoholic liver disease for at least a year, and has been intermittently drinking alcohol until about a month ago when she noticed significant jaundice and worsening swelling that culminated in her presentation here.  On my exam, she looks to be extremely unwell.  She is very jaundiced, has significant anasarca and ascites, and her labs are very worrisome for liver failure.  She has no vomiting or dark or maroon stools, and my rectal exam shows soft brown and yellow stool only.  I appreciated hemoglobin of 5, this was transfused, it is macrocytic.  I think her shortness of breath is from her ascites.  I did my best to transfer her to the [facility] as I think that she would absolutely benefit from liver transplant, though I was told by Maycol Clement and Reg over the phone, that there was a 0.0% chance of her receiving a liver since she was advised to stop drinking alcohol a year ago, and this is documented in the medical record.  Therefore we will be admitting her here to the care of Dr Fatima who has kindly agreed accept care of the patient.  The patient is chronically ill appearing, although I cannot think of any immediate therapies that would be beneficial apart from a transfusion, and ordering a paracentesis to be done.  I do appreciate her soft blood pressure, but she is been watched for significant a time  and I see no evidence of GI hemorrhage.  Her abdomen is nontender.  She has a very high meld score, and we will do our best here in the ER, and admit her once there is a bed available upstairs.    Critical Care Time: was 35 minutes for this patient excluding procedures    Covid-19  Melita Cortes was evaluated during a global COVID-19 pandemic, which necessitated consideration that the patient might be at risk for infection with the SARS-CoV-2 virus that causes COVID-19.   Applicable protocols for evaluation were followed during the patient's care.   COVID-19 was considered as part of the patient's evaluation. The plan for testing is:  a test was obtained during this visit.    Diagnosis:    ICD-10-CM    1. Acute liver failure without hepatic coma  K72.00    2. Other ascites  R18.8    3. Anemia, unspecified type  D64.9        Scribe Disclosure:  Ena FORD, am serving as a scribe at 11:48 AM on 9/24/2021 to document services personally performed by Fabian Oliver MD based on my observations and the provider's statements to me.            Fabian Oliver MD  09/24/21 7222

## 2021-09-24 NOTE — H&P
[facility]    History and Physical  Hospitalist       Date of Admission:  9/24/2021    Assessment & Plan   Melita Cortes is a 51 year old female who presents with worsening jaundice for the past 2 weeks, worsening abdominal distention, lower extremity edema and increased dyspnea.    Severe alcoholic liver disease  Ascites most likely due to #1  Suspected hepatic encephalopathy  -Patient presents with progressive symptoms which has been ongoing for about a year and appears like got worse in the past month or so  -Her constellation of symptoms and lab findings suggestive of severe alcoholic liver disease  -Meld score is 40, Dr. Oliver from the emergency room attempted transfer to [facility] for potential transplant however she was turned down due to her history of ongoing alcohol usage  -We will admit for further evaluation of first alcoholic liver disease  -Get liver ultrasound with Doppler of hepatic and portal vein  -Diagnostic and therapeutic paracentesis  -[facility] consult  -Await ammonia level patient likely has some element of hepatic encephalopathy, will start lactulose  -If concern for GI bleed, antibiotic coverage with Rocephin    Severe anemia-etiology unclear at this time  Mild thrombocytopenia  -Hemoglobin at presentation 5.2, last hemoglobin in the system is from September 2020 and it was 12.3 at that time  -Patient denies hematochezia or melena  -Await stool for occult blood  -Transfuse 2 units of PRBC, monitor hemoglobin every 6 hours  -As above [facility] consulted, likely will need endoscopy at some point, she is hemodynamically stable for now.  -Admit to [facility] for the next 24 hours out of caution given the severe liver disease and severe anemia.    Acute kidney injury, prerenal versus hepatorenal syndrome  -Creatinine at presentation today is 4.10, last creatinine in the chart is from September 2020 when it was normal at 0.56  -This is most  likely prerenal versus hepatorenal syndrome  -Along with 2 units of PRBC will give her albumin 12.5 g every 8 hours  -Gentle volume with normal saline at 50 mL/h  -Check renal ultrasound and FeNa  -Nephrology consult    Coagulopathy due to alcoholic liver disease  -INR is more than 3, coagulopathy most likely due to severe alcoholic liver disease.  Will give vitamin K 2 mg IV x1 dose    Mild hyponatremia  -This again is most likely due to severe alcoholic liver disease, recheck levels tomorrow morning.    Melita is a LOW SUSPICION PUI.  Follow these instructions:    If COVID test positive -> continue isolation precautions    If COVID test negative -> discontinue COVID-specific isolation precautions     DVT Prophylaxis: Pneumatic Compression Devices  Code Status: Full Code    Disposition: Expected discharge in 2+ days    Dayron Fatima MD    Primary Care Physician   Issa Monk MD    Chief Complaint   Worsening abdominal distention, jaundice, dyspnea    History is obtained from the patient    History of Present Illness   Melita Cortes is a 51 year old female who was initially diagnosed with possible alcoholic liver disease back in September 2020 who has not had any follow-up since then now presents to the emergency room for evaluation of worsening lower extremity edema, abdominal distention, progressive shortness of breath and jaundice.  History was obtained both with her and her  Doug at the bedside.  Patient has had some lower extremity edema for the past year or so which is progressively gotten worse.  She also started having abdominal distention dating back few months ago.  The  also noticed that her abdomen was getting distended but her muscle mass was decreasing.  For the past month or so she has been having progressive dyspnea and jaundice which is continued to get worse with severe discoloration of her entire body.  The  today was really worried about the extent  of her jaundice and brought her to the emergency room.  She denies any abdominal pain.  No black tarry stool.  No nausea or vomiting but her appetite has been poor.  No hematemesis.  She is constipated for the most part.  She has mild dry cough.  No reported fever or chills.  Apparently her urine is very dark and concentrated.  Patient was evaluated by Dr. Monk, her primary care provider last September when she had abnormal LFTs and had a CT abdomen on 9/25/2020 which revealed heterogenous appearance of the liver.  She was recommended a follow-up with hepatologist but apparently that never happened.  It appears like she has not followed up closely since then.    As far as other pertinent review of system is concerned, patient denies chest pain.  Denies dysuria, urinary urgency or frequency.  No headache or visual changes.  No hematochezia or melena or bleeding from any source.  The  does report that for the past 1 week he has seen subtle evidence of confusion/disorientation.    In the emergency room the patient was seen by Dr. Oliver.  Patient was found to have severe alcoholic liver disease with bilirubin of 30 and a meld score of 40.  He initially discussed the case with Dr. Flores and Dr. Finney at [facility] regarding liver transplant and was apparently refused given her patient history of ongoing alcohol use is until a month ago.  Patient is admitted for further evaluation and treatment of her severe anemia, alcoholic liver disease.    Past Medical History    I have reviewed this patient's medical history and updated it with pertinent information if needed.   Past Medical History:   Diagnosis Date     Cervical high risk HPV (human papillomavirus) test positive 2019, 2020    See problem list       Past Surgical History   I have reviewed this patient's surgical history and updated it with pertinent information if needed.  No past surgical history on file.    Prior to Admission Medications   Prior to  Admission Medications   Prescriptions Last Dose Informant Patient Reported? Taking?   FLUoxetine (PROZAC) 20 MG capsule   Yes No      Facility-Administered Medications: None     Allergies   No Known Allergies    Social History   I have reviewed this patient's social history and updated it with pertinent information if needed. Melita Cortes  reports that she has never smoked. She has never used smokeless tobacco. She reports current alcohol use. She reports that she does not use drugs.    Family History   I have reviewed this patient's family history and updated it with pertinent information if needed.   No family history on file.    Review of Systems   The 10 point Review of Systems is negative other than noted in the HPI or here.     Physical Exam   Temp: 97.5  F (36.4  C) Temp src: Oral BP: (!) 99/30 Pulse: 86   Resp: 20 SpO2: 98 %      Vital Signs with Ranges  Temp:  [97.5  F (36.4  C)] 97.5  F (36.4  C)  Pulse:  [86] 86  Resp:  [20] 20  BP: (99)/(30) 99/30  SpO2:  [98 %] 98 %  158 lbs 0 oz    Gen: AAOX3, NAD, comfortable  HEENT: Dry oral mucosa, severe scleral icterus  Neck: Supple neck, good ROM, no stridor  Resp: CTA B/L, normal WOB; no crackles; no wheezes  CVS- RRR, no murmur, no edema  Abd/GI: Soft, distended with what appears like obvious ascites, no focal tenderness  Skin-severe yellowish discoloration throughout the entire skin  MSK: Asterexis+/-, 2-3+ bilateral pedal edema  Neuro- CN- intact. Moving X 4; No focal deficits.     Data   Data reviewed today:  I personally reviewed no images or EKG's today.  Recent Labs   Lab 09/24/21  1247   WBC 16.8*   HGB 5.2*   *   *   INR 3.30*   *   POTASSIUM 3.6   CHLORIDE 98   CO2 14*   BUN 60*   CR 4.10*   ANIONGAP 20*   SHAI 8.1*   *   ALBUMIN 1.8*   PROTTOTAL 6.5*   BILITOTAL 30.2*   ALKPHOS 58   ALT 26   AST 44       No results found for this or any previous visit (from the past 24 hour(s)).

## 2021-09-24 NOTE — CONSULTS
GASTROENTEROLOGY CONSULTATION      Melita Martinez Sebastian  [address]  [place] [address]  51 year old female     Admission Date/Time: 9/24/2021  Primary Care Provider: Issa Monk     We were asked to see the patient in consultation by Dr. Fatima for evaluation of acute liver failure.    ASSESSMENT:    1. Acute liver failure - severe MELD/MELD Na 40, possibly alcohol - acute on chronic alcoholic liver disease. However, patient endorses decreased intake over this last year, still 1-2 drinks - though patient states not daily and last drink was 30 days ago - unclear if history accurate, given some asterixis and hepatic encephalopathy. She did report significant drinking the last few years at her 9/2020 appt. She did have evidence of alcohol-induced liver disease - mild (TB 1.1, ,  and ALT 57) at that appointment and advised to stop drinking. The ED did review case with [facility] hepatology per notes and discussion with Dr. Fatima and she is not a candidate currently due to active drinking a month ago.   -- DDx - acute on chronic alcoholic liver disease, autoimmune hepatitis, viral hepatitis (A/B/C, less likely D/E/CMV/EBV - given normal AST/ALT), Budd-chiari, Fly disease, patient denies drugs or toxin exposure or acetaminophen use, etc.   - One unusual aspect is that her AST/ALT/AP are normal now. This is unusual for alcoholic liver disease and most etiologies of acute liver failure, discussed with Dr. Fatima - will consider hemolysis syndromes. Though, if she truly quit alcohol 1 month ago, her AST/ALT may have normalized by now.  - DF is 150, quite high, hold on steroids to make sure there is no active infection, but also question the benefit if AST/ALT are normal.  Complications:  A. Ascites - agree with paracentesis, await cell count and diff  B. Varices - no reports of overt GI bleeding and brown stool in rectal vault on ED rectal exam.  C. Hepatic  encephalopathy - positive asterixis, will start lactulose.  2. Anemia - no overt GI bleeding, Dr. Fatima will pursue hemolysis work-up. If melena, start octreotide, given Abx (ceftriaxone) and call GI. [facility] - may ETOH, will check B12/folate  3. Renal failure - mgmt per primary service, low threshold to consult nephrology.    RECOMMENDATIONS:  - Hepatitis A IgM, HBsAg, HBsAb, HBcAb, HCV  - CARTER, ASMA, IgG  - Agree with ultrasound and doppler, currently pending  - Ceruloplasmin  - Acetaminophen level - if positive start NAC protocol  - Await direct bilirubin  - Monitor MELD labs  - Monitor for withdrawal  - Monitor for GI bleeding   - Agree with transfusions and monitor hemoglobin, transfuse as necessary  - Agree with plan to evaluate for hemolysis  - Lactulose TID      Loyd Ardon MD   [facility]  [phone]      ________________________________________________________________________        HPI:  Melita Cortes is a 51 year old female who was diagnosed with alcoholic liver disease in 9/2020, advise to stop drinking and follow-up with hepatologist (it does not appear that this happened). TB 1.1 9/2020 and now 30. She reports that she decreased her alcohol use and was now 1-2 drinks not even daily. Uncertain if history accurate, given some hepatic encephalopathy. She quit alcohol 30 days ago. She reports progressive jaundice and shortness of breath over the last month. Also, abdominal distention and lower extremity edema. Patient denies nausea and vomiting. No melena or hematochezia. Denies toxins or drugs. Patient denies any acetaminophen. No fevers or chills.    A/P CT with IV contrast 9/25/20   IMPRESSION:   1.  Heterogeneous appearance of the liver, possibly related to  heterogeneous fatty infiltration. Clinical correlation with liver  function tests recommended for further evaluation.  2.  Small amount of pelvic free fluid which is nonspecific. This could  be related to ovarian cyst  "rupture, but the etiology is unclear.    Note, patient was evaluated in ultrasound, since she was not in the ED when I went to see her, just prior to ultrasound.     ROS: A comprehensive ten point review of systems was negative aside from those in mentioned in the HPI. Though question reliability due to hepatic encephalopathy.     PAST MEDICAL HISTORY:  Past Medical History:   Diagnosis Date     Cervical high risk HPV (human papillomavirus) test positive 2019, 2020    See problem list     SOCIAL HISTORY:  Social History     Tobacco Use     Smoking status: Never Smoker     Smokeless tobacco: Never Used   Substance Use Topics     Alcohol use: Yes     Drug use: Never     FAMILY HISTORY:  No family history on file.  ALLERGIES: No Known Allergies  MEDICATIONS:   Prior to Admission medications    Not on File     PHYSICAL EXAM:   BP 90/51   Pulse 88   Temp 97.5  F (36.4  C) (Oral)   Resp 20   Ht 1.676 m (5' 6\")   Wt 71.7 kg (158 lb)   SpO2 91%   BMI 25.50 kg/m     GEN: Awake, ill-appearing.  SOFIA: AT, icteric, OP without erythema, exudate, or ulcers.    NECK: Supple.    LYMPH: No LAD noted.  HRT: RRR, no JP  LUNGS: CTA  ABD: +BS, non-tender, distended, no rebound or guarding.  SKIN: No rash, positive jaundice   NEURO: ? Mild hepatic encephalopathy, Positive asterixis       ADDITIONAL DATA:   I reviewed the patient's new clinical lab test results.   Recent Labs   Lab Test 09/24/21  1247 09/18/20  0837   WBC 16.8* 12.8*   HGB 5.2* 12.3   * 106*   * 189   INR 3.30*  --      Recent Labs   Lab Test 09/24/21  1247 09/18/20  0837   * 139   POTASSIUM 3.6 3.1*   CHLORIDE 98 106   CO2 14* 25   BUN 60* 2*   CR 4.10* 0.56   ANIONGAP 20* 8   SHAI 8.1* 8.6   * 106*     Recent Labs   Lab Test 09/24/21  1247 09/18/20  0837   ALBUMIN 1.8* 2.8*   BILITOTAL 30.2* 1.1   ALT 26 57*   AST 44 221*        I reviewed the patient's new imaging results - abdominal ultrasound pending.                  "

## 2021-09-24 NOTE — TELEPHONE ENCOUNTER
"FYI PCP: (they wanted to make sure an FYI was sent to you)     Doug called with patient sitting next to him     They are very concerned about her    Not walking straight - wobbly   Pt's eyes and skin are very jaundiced  Has been sick for a while but getting worse   He brought her into work today and very ill  Jaundice ongoing for several weeks   Breathing is \"different\" - short, not able to deep breath easily   Not thinking straight - seems confused   Stomach is very distended over several months   Losing weight on arms and legs   Usually would gain weight in arms and legs but is reversed   No hx of liver problem   Abdominal pain? No   Drinking water, but can't much. No alcohol is past month. No APAP recently     Recommended go to ED now. FYI PCP  is driving pt to [facility] ED now.     Reason for Disposition    Patient sounds very sick or weak to the triager    Additional Information    Negative: Unconscious or difficult to awaken    Negative: Acting confused (e.g., disoriented, slurred speech)    Negative: Seizure has occurred    Negative: Has fainted (passed out)    Negative: Very weak (e.g., can't stand)    Negative: Acetaminophen overdose or poisoning suspected    Negative: Sounds like a life-threatening emergency to the triager    Negative: [1] Abdominal pain AND [2] severe    Negative: [1] Constant abdominal pain AND [2] present > 2 hours    Negative: [1] Vomiting AND [2] contains red blood or black (\"coffee ground\") material    Negative: [1] Vomiting AND [2] signs of dehydration (e.g., very dry mouth, lightheaded)    Negative: Fever    Negative: Shaking chills    Negative: [1] Drinking very little AND [2] dehydration suspected (e.g., no urine > 12 hours, very dry mouth, very lightheaded)    Protocols used: [id number]      "

## 2021-09-24 NOTE — PROGRESS NOTES
Paracentesis  3950 mL [place] fluid removed from abdominal space  Patient tolerated procedure well.  Dressing CDI, no swelling or hematoma. Dermabond used.

## 2021-09-24 NOTE — ED NOTES
"[facility]  ED Nurse Handoff Report    ED Chief complaint: Abdominal Pain and Shortness of Breath      ED Diagnosis:   Final diagnoses:   None       Code Status: Full    Allergies: No Known Allergies    Patient Story: Abdominal pain and shortness of breath. Has a history of liver disease, started getting jaundice about a month ago  Focused Assessment: Vital signs, Iv insertion 20 gauge on left anti cubital, Covid swab , blood to lab  Labs INR 3.3 Hemoglobin 5.2 Bilirubin 30.2 . Type and screen sent to lab, waiting for results.  Orders to give blood transfusion  Treatments and/or interventions provided:    Patient's response to treatments and/or interventions:Responded well    To be done/followed up on inpatient unit:    Does this patient have any cognitive concerns?: None    Activity level - Baseline/Home:  Independent  Activity Level - Current:   Stand with Assist    Patient's Preferred language: English   Needed?: No    Isolation: None  Infection: Not Applicable  Patient tested for COVID 19 prior to admission: YES  Bariatric?: No    Vital Signs:   Vitals:    09/24/21 1138   BP: (!) 99/30   Pulse: 86   Resp: 20   Temp: 97.5  F (36.4  C)   TempSrc: Oral   SpO2: 98%   Weight: 71.7 kg (158 lb)   Height: 1.676 m (5' 6\")       Cardiac Rhythm:     Was the PSS-3 completed:   Yes  What interventions are required if any?               Family Comments: is here  OBS brochure/video discussed/provided to patient/family: No              Name of person given brochure if not patient:               Relationship to patient    For the majority of the shift this patient's behavior was Green  Behavioral interventions performed were None    ED NURSE PHONE NUMBER:[phone]       "

## 2021-09-24 NOTE — PROGRESS NOTES
RADIOLOGY PROCEDURE NOTE  Patient name: Melita Cortes  MRN: [id number]  : 1970    Pre-procedure diagnosis: Ascites  Post-procedure diagnosis: Same    Procedure Date/Time: 2021  4:22 PM  Procedure: Paracentesis  Estimated blood loss: None  Specimen(s) collected with description: Ascites.  The patient tolerated the procedure well with no immediate complications.    See imaging dictation for procedural details and findings.    Provider name: Miguel Ángel Pollock MD  Assistant(s):None

## 2021-09-24 NOTE — PHARMACY-ADMISSION MEDICATION HISTORY
Pharmacy Medication History  Admission medication history interview status for the 9/24/2021  admission is complete. See EPIC admission navigator for prior to admission medications     Location of Interview: Patient room  Medication history sources: Patient and Patient's family/friend (spouse at bedside), [employer]    Significant changes made to the medication list:  Removed fluoxetine - patient verified not currently taking, no fill history in last 12 months    In the past week, patient estimated taking medication this percent of the time: n/a     Additional medication history information:   Patient/spouse states taking some vitamins occasionally, but did not specify which ones. Patient verified not taking any medications/supplements in the past 3 days.     Medication reconciliation completed by provider prior to medication history? No    Time spent in this activity: 10 minutes    Prior to Admission medications    Not on File       The information provided in this note is only as accurate as the sources available at the time of update(s)   Leti Castellano, ShakaD

## 2021-09-24 NOTE — ED TRIAGE NOTES
abd distention for the past year, no vomiting, pt noticed jaundice for past 2wks, increased sob and swollen legs

## 2021-09-25 LAB
ALBUMIN SERPL-MCNC: 1.7 G/DL (ref 3.4–5)
ALBUMIN UR-MCNC: NEGATIVE MG/DL
ALP SERPL-CCNC: 47 U/L (ref 40–150)
ALT SERPL W P-5'-P-CCNC: 22 U/L (ref 0–50)
ANION GAP SERPL CALCULATED.3IONS-SCNC: 15 MMOL/L (ref 3–14)
APPEARANCE UR: ABNORMAL
AST SERPL W P-5'-P-CCNC: 38 U/L (ref 0–45)
BACTERIA #/AREA URNS HPF: ABNORMAL /HPF
BILIRUB SERPL-MCNC: 28.3 MG/DL (ref 0.2–1.3)
BILIRUB UR QL STRIP: ABNORMAL
BLD PROD TYP BPU: NORMAL
BLOOD COMPONENT TYPE: NORMAL
BUN SERPL-MCNC: 60 MG/DL (ref 7–30)
CALCIUM SERPL-MCNC: 7.8 MG/DL (ref 8.5–10.1)
CHLORIDE BLD-SCNC: 103 MMOL/L (ref 94–109)
CO2 SERPL-SCNC: 18 MMOL/L (ref 20–32)
CODING SYSTEM: NORMAL
COLOR UR AUTO: ABNORMAL
CREAT SERPL-MCNC: 3.73 MG/DL (ref 0.52–1.04)
CREAT SERPL-MCNC: 3.73 MG/DL (ref 0.52–1.04)
CREAT UR-MCNC: 135 MG/DL
CROSSMATCH: NORMAL
ERYTHROCYTE [DISTWIDTH] IN BLOOD BY AUTOMATED COUNT: 29.2 % (ref 10–15)
FOLATE SERPL-MCNC: 4.6 NG/ML
FRACT EXCRET NA UR+SERPL-RTO: 0.3 %
GFR SERPL CREATININE-BSD FRML MDRD: 13 ML/MIN/1.73M2
GLUCOSE BLD-MCNC: 91 MG/DL (ref 70–99)
GLUCOSE UR STRIP-MCNC: NEGATIVE MG/DL
GRANULAR CAST: 7 /LPF
HCT VFR BLD AUTO: 25.8 % (ref 35–47)
HEMOCCULT STL QL: POSITIVE
HGB BLD-MCNC: 6.6 G/DL (ref 11.7–15.7)
HGB BLD-MCNC: 8.1 G/DL (ref 11.7–15.7)
HGB BLD-MCNC: 8.5 G/DL (ref 11.7–15.7)
HGB BLD-MCNC: 8.9 G/DL (ref 11.7–15.7)
HGB UR QL STRIP: NEGATIVE
HYALINE CASTS: 1 /LPF
INR PPP: 2.91 (ref 0.85–1.15)
INR PPP: 2.96 (ref 0.85–1.15)
ISSUE DATE AND TIME: NORMAL
KETONES UR STRIP-MCNC: NEGATIVE MG/DL
LEUKOCYTE ESTERASE UR QL STRIP: NEGATIVE
MCH RBC QN AUTO: 36.2 PG (ref 26.5–33)
MCHC RBC AUTO-ENTMCNC: 34.5 G/DL (ref 31.5–36.5)
MCV RBC AUTO: 105 FL (ref 78–100)
MUCOUS THREADS #/AREA URNS LPF: PRESENT /LPF
NITRATE UR QL: NEGATIVE
PH UR STRIP: 5.5 [PH] (ref 5–7)
PHOSPHATE SERPL-MCNC: 5.5 MG/DL (ref 2.5–4.5)
PLATELET # BLD AUTO: 92 10E3/UL (ref 150–450)
POTASSIUM BLD-SCNC: 3.3 MMOL/L (ref 3.4–5.3)
PROT SERPL-MCNC: 5.9 G/DL (ref 6.8–8.8)
RBC # BLD AUTO: 2.46 10E6/UL (ref 3.8–5.2)
RBC URINE: 1 /HPF
SODIUM SERPL-SCNC: 136 MMOL/L (ref 133–144)
SODIUM SERPL-SCNC: 137 MMOL/L (ref 133–144)
SODIUM UR-SCNC: 14 MMOL/L
SP GR UR STRIP: 1.01 (ref 1–1.03)
SQUAMOUS EPITHELIAL: 14 /HPF
UNIT ABO/RH: NORMAL
UNIT NUMBER: NORMAL
UNIT STATUS: NORMAL
UNIT TYPE ISBT: 9500
UROBILINOGEN UR STRIP-MCNC: 2 MG/DL
VIT B12 SERPL-MCNC: 2027 PG/ML (ref 193–986)
WBC # BLD AUTO: 12.1 10E3/UL (ref 4–11)
WBC URINE: 2 /HPF

## 2021-09-25 PROCEDURE — 36415 COLL VENOUS BLD VENIPUNCTURE: CPT | Performed by: INTERNAL MEDICINE

## 2021-09-25 PROCEDURE — 250N000011 HC RX IP 250 OP 636: Performed by: INTERNAL MEDICINE

## 2021-09-25 PROCEDURE — 81001 URINALYSIS AUTO W/SCOPE: CPT | Performed by: INTERNAL MEDICINE

## 2021-09-25 PROCEDURE — 84295 ASSAY OF SERUM SODIUM: CPT | Performed by: INTERNAL MEDICINE

## 2021-09-25 PROCEDURE — 250N000013 HC RX MED GY IP 250 OP 250 PS 637: Performed by: INTERNAL MEDICINE

## 2021-09-25 PROCEDURE — 85610 PROTHROMBIN TIME: CPT | Performed by: INTERNAL MEDICINE

## 2021-09-25 PROCEDURE — 86038 ANTINUCLEAR ANTIBODIES: CPT | Performed by: INTERNAL MEDICINE

## 2021-09-25 PROCEDURE — 210N000002 HC R&B HEART CARE

## 2021-09-25 PROCEDURE — 86923 COMPATIBILITY TEST ELECTRIC: CPT | Performed by: INTERNAL MEDICINE

## 2021-09-25 PROCEDURE — P9016 RBC LEUKOCYTES REDUCED: HCPCS | Performed by: INTERNAL MEDICINE

## 2021-09-25 PROCEDURE — 99223 1ST HOSP IP/OBS HIGH 75: CPT | Performed by: INTERNAL MEDICINE

## 2021-09-25 PROCEDURE — 82570 ASSAY OF URINE CREATININE: CPT | Performed by: INTERNAL MEDICINE

## 2021-09-25 PROCEDURE — 82565 ASSAY OF CREATININE: CPT | Performed by: INTERNAL MEDICINE

## 2021-09-25 PROCEDURE — 258N000003 HC RX IP 258 OP 636: Performed by: INTERNAL MEDICINE

## 2021-09-25 PROCEDURE — 85018 HEMOGLOBIN: CPT | Performed by: INTERNAL MEDICINE

## 2021-09-25 PROCEDURE — P9047 ALBUMIN (HUMAN), 25%, 50ML: HCPCS | Performed by: INTERNAL MEDICINE

## 2021-09-25 PROCEDURE — 84300 ASSAY OF URINE SODIUM: CPT | Performed by: INTERNAL MEDICINE

## 2021-09-25 PROCEDURE — 82040 ASSAY OF SERUM ALBUMIN: CPT | Performed by: INTERNAL MEDICINE

## 2021-09-25 PROCEDURE — 82272 OCCULT BLD FECES 1-3 TESTS: CPT | Performed by: INTERNAL MEDICINE

## 2021-09-25 PROCEDURE — 85027 COMPLETE CBC AUTOMATED: CPT | Performed by: INTERNAL MEDICINE

## 2021-09-25 PROCEDURE — C9113 INJ PANTOPRAZOLE SODIUM, VIA: HCPCS | Performed by: INTERNAL MEDICINE

## 2021-09-25 PROCEDURE — 99233 SBSQ HOSP IP/OBS HIGH 50: CPT | Performed by: INTERNAL MEDICINE

## 2021-09-25 PROCEDURE — 84100 ASSAY OF PHOSPHORUS: CPT | Performed by: INTERNAL MEDICINE

## 2021-09-25 RX ORDER — POTASSIUM CHLORIDE 1500 MG/1
40 TABLET, EXTENDED RELEASE ORAL ONCE
Status: COMPLETED | OUTPATIENT
Start: 2021-09-25 | End: 2021-09-25

## 2021-09-25 RX ORDER — SODIUM CHLORIDE 9 MG/ML
INJECTION, SOLUTION INTRAVENOUS CONTINUOUS
Status: DISCONTINUED | OUTPATIENT
Start: 2021-09-25 | End: 2021-09-25

## 2021-09-25 RX ORDER — SODIUM CHLORIDE 9 MG/ML
INJECTION, SOLUTION INTRAVENOUS CONTINUOUS
Status: DISCONTINUED | OUTPATIENT
Start: 2021-09-25 | End: 2021-09-26

## 2021-09-25 RX ORDER — CEFTRIAXONE 2 G/1
2 INJECTION, POWDER, FOR SOLUTION INTRAMUSCULAR; INTRAVENOUS EVERY 24 HOURS
Status: DISCONTINUED | OUTPATIENT
Start: 2021-09-25 | End: 2021-09-26

## 2021-09-25 RX ORDER — SODIUM CHLORIDE 9 MG/ML
INJECTION, SOLUTION INTRAVENOUS CONTINUOUS
Status: DISCONTINUED | OUTPATIENT
Start: 2021-09-26 | End: 2021-09-25

## 2021-09-25 RX ORDER — POTASSIUM CHLORIDE 7.45 MG/ML
10 INJECTION INTRAVENOUS
Status: COMPLETED | OUTPATIENT
Start: 2021-09-25 | End: 2021-09-25

## 2021-09-25 RX ORDER — POTASSIUM CHLORIDE 29.8 MG/ML
20 INJECTION INTRAVENOUS ONCE
Status: DISCONTINUED | OUTPATIENT
Start: 2021-09-25 | End: 2021-09-25

## 2021-09-25 RX ADMIN — ALBUMIN HUMAN 12.5 G: 0.25 SOLUTION INTRAVENOUS at 00:01

## 2021-09-25 RX ADMIN — LACTULOSE 20 G: 20 SOLUTION ORAL at 15:56

## 2021-09-25 RX ADMIN — SODIUM CHLORIDE: 9 INJECTION, SOLUTION INTRAVENOUS at 16:11

## 2021-09-25 RX ADMIN — CEFTRIAXONE SODIUM 2 G: 2 INJECTION, POWDER, FOR SOLUTION INTRAMUSCULAR; INTRAVENOUS at 11:14

## 2021-09-25 RX ADMIN — ALBUMIN HUMAN 12.5 G: 0.25 SOLUTION INTRAVENOUS at 09:33

## 2021-09-25 RX ADMIN — LACTULOSE 20 G: 20 SOLUTION ORAL at 21:38

## 2021-09-25 RX ADMIN — ALBUMIN HUMAN 12.5 G: 0.25 SOLUTION INTRAVENOUS at 18:03

## 2021-09-25 RX ADMIN — PANTOPRAZOLE SODIUM 40 MG: 40 INJECTION, POWDER, FOR SOLUTION INTRAVENOUS at 11:14

## 2021-09-25 RX ADMIN — IRON SUCROSE 200 MG: 20 INJECTION, SOLUTION INTRAVENOUS at 00:56

## 2021-09-25 RX ADMIN — POTASSIUM CHLORIDE 10 MEQ: 7.46 INJECTION, SOLUTION INTRAVENOUS at 14:54

## 2021-09-25 RX ADMIN — POTASSIUM CHLORIDE 40 MEQ: 1500 TABLET, EXTENDED RELEASE ORAL at 11:12

## 2021-09-25 RX ADMIN — POTASSIUM CHLORIDE 10 MEQ: 7.46 INJECTION, SOLUTION INTRAVENOUS at 15:57

## 2021-09-25 RX ADMIN — LACTULOSE 20 G: 20 SOLUTION ORAL at 09:37

## 2021-09-25 ASSESSMENT — MIFFLIN-ST. JEOR: SCORE: 1307.17

## 2021-09-25 NOTE — CONSULTS
[facility]    Nephrology Consultation     Date of Admission:  9/24/2021    Assessment & Plan     Melita Cortes is a 51 year old female who was admitted on 9/24/2021.     1) Baseline normal kidney function: Cr 0.56 as of 9/182020.      2) Advanced Liver Disease with portal HTN and ascites. All acute ? Considering ETOH liver disease but other causes as well.  Studies in process.     3) ROSSY: Low FENa and some better with restoration of volume.  This is at least partly prerenal.  Some HRS type 2 still could be present.  She might even have some ATN given severity of anemia.     4) Hepatic encephalopathy    5) Severe anemia: Some blood loss by history.     6) Hypokalemia - replacement ordered.      Plan:    Same Rx now - 25% albumin + saline  Following.      Srinivas Carrero MD  [facility] - Nephrology  [phone]    Reason for Consult     I was asked to see the patient for ROSSY.      Primary Care Physician     Issa Monk MD    Chief Complaint     ROSSY    History is obtained from the patient and chart review.      History of Present Illness     Melita Cortes is a 51 year old female who presents with ROSSY.    She was admitted 9/24/21 via ED after presenting with abdominal pain and SOB.    She also had jaundice, LE edema, and weakness.  She had some rectal bleeding as well.      Findings included:    HGB 5.2  Cr 4.1  BUN 60  CO2 14  Bili 30.2  INR 3.3    Abd US:    IMPRESSION:  1.  Evidence for hepatic cirrhosis with portal venous hypertension.  2.  Sludge in gallbladder neck with wall thickening. Wall thickening nonspecific in the setting of portal venous hypertension.  3.  Retrograde flow within main portal vein, left and right portal veins.  4.  Bilateral pleural effusions. Small volume ascites.    Normal kidneys.    Paracentesis of 3.95 L fluid.      Hospital course notable for:    Transfusion to HGB of 8.9  FENA 0.3%    Cr to 3.73 with RBCs, 25% albumin and  normal saline.     She still feels weak but much better than yesterday.  Some N/V.      Past Medical History   I have reviewed this patient's medical history and updated it with pertinent information if needed.   Past Medical History:   Diagnosis Date     Cervical high risk HPV (human papillomavirus) test positive 2019, 2020    See problem list     Medical History Date Comments   Asthma ([facility])   Severe as a child.   GERD (gastroesophageal reflux disease)       UTI (lower urinary tract infection)   Pyelonephritis once, otherwise no known kidney disease.   Varicella       Hypertrophy of breast   lt breast   Anxiety ([facility]) 9/12/2014 start Zoloft 9/2014   Hyperlipidemia ([facility])       Elevated liver function tests       Macrocytosis       Obesity (BMI 30-39.9) ([facility])       Cervical high risk HPV (human papillomavirus) test positive 02/2019 COLPOSCOPY   STD (sexually transmitted disease) ([facility])   HPV + 18         Past Surgical History   I have reviewed this patient's surgical history and updated it with pertinent information if needed.  No past surgical history on file.  HX APPENDECTOMY     LW Problem: Appendectomy S/p LW Modifier: April 2010     BREAST BIOPSY     lt 2013         Prior to Admission Medications   None     Allergies   No Known Allergies    Social History   I have reviewed this patient's social history and updated it with pertinent information if needed. Melita Cortes  reports that she has never smoked. She has never used smokeless tobacco. She reports current alcohol use. She reports that she does not use drugs.    Family History   I have reviewed this patient's family history and updated it with pertinent information if needed.   Coronary artery disease   Dementia   Pancreatic cancer    Review of Systems   The 10 point Review of Systems is negative other than noted in the HPI.       Physical Exam   Temp: 98.5  F (36.9  C) Temp src: Oral BP: 108/59 Pulse: 79   Resp: 20 SpO2: 93 % O2 Device: None (Room air)     Vital Signs with Ranges  Temp:  [97.5  F (36.4  C)-98.7  F (37.1  C)] 98.5  F (36.9  C)  Pulse:  [73-94] 79  Resp:  [12-36] 20  BP: ()/(30-77) 108/59  SpO2:  [90 %-98 %] 93 %  147 lbs 12.8 oz    GENERAL: jaundiced, alert, NAD  HEENT:  Normocephalic. No gross abnormalities.  Pupils equal.  MMM.  Dentition is ok.  CV: RRR, systole flow murmur, no clicks, gallops, or rubs, 1-2+ BLE edema, no carotid bruits  RESP: Clear bilaterally with good efforts  GI: Abdomen soft/nt. Mild distension.  BS normal. No masses, organomegaly  MUSCULOSKELETAL: extremities nl - no gross deformities noted  SKIN: no suspicious lesions seen.    NEURO:  Strength normal and symmetric.   PSYCH: mood & affect appropriate  LYMPH: No palpable ant/post cervical and supraclavicular adenopathy    Data   BMP  Recent Labs   Lab 09/25/21  0829 09/24/21  1247    132*   POTASSIUM 3.3* 3.6   CHLORIDE 103 98   SHAI 7.8* 8.1*   CO2 18* 14*   BUN 60* 60*   CR 3.73* 4.10*   GLC 91 121*     Phos@LABRCNTIPR(phos:4)  CBC)  Recent Labs   Lab 09/25/21  0829 09/25/21  0150 09/24/21  1800 09/24/21  1247   WBC 12.1*  --  14.2* 16.8*   HGB 8.9* 6.6* 4.7* 5.2*   HCT 25.8*  --  14.7* 16.5*   *  --  139* 136*   PLT 92*  --  102* 143*     Recent Labs   Lab 09/25/21  0829 09/24/21  1759 09/24/21  1247   AST 38  --    < >   ALT 22  --    < >   ALKPHOS 47  --    < >   BILITOTAL 28.3*  --    < >   JENNIFFER  --  78*  --     < > = values in this interval not displayed.     Recent Labs   Lab 09/25/21  0829   INR 2.96*

## 2021-09-25 NOTE — PROGRESS NOTES
Gastroenterology Progress Note     Summary: 51-year-old female admitted with alcoholic hepatitis and concern for cirrhosis.  On presentation she had severe anemia, thrombocytopenia, acute kidney injury, and significant ascites.     Impression & Plan:   1. Alcoholic hepatitis. .7 (called lab, PT value 29.7 from this am).  She qualifies for steroids, but would wait until tomorrow to look for infection in culture of ascites.    If no evidence of infection by tomorrow, start steroids for alcoholic hepatitis.  2. Cirrhosis MELD 43 (90 day mortality 65%).  Likely due to alcohol, labs ordered yesterday to look for any other cause.    We will follow up labs to look for other causes of cirrhosis.  3. Alcohol use disorder.  She has never undergone chem dep.     Importance of complete alcohol cessation discussed.  4. Anemia.  Likely multifactorial.  No evidence of overt bleeding.    Monitor for evidence of overt bleeding.    She will eventually need EGD to screen for varices and colonoscopy for screening.  5. Elevated INR.    Vitamin K 5 mg x 3 doses IV  6. Hepatic encephalopathy.    Lactulose, titrate to 3-4 soft bowel movements per day.    Discussed importance of not driving  7. Acute kidney injury.  Prerenal?  Albumin quite low. IV albumin may be helpful if does not improve.   8. Ascites. SAAG 1.3 consistent with portal hypertension as cause.  No evidence of infection on cell count.  Culture negative to date.  9. Prognosis.  By meld, 90-day mortality is 65%.  She will need to establish with hepatologist as an outpatient with plans for establishing care with transplant hepatologist.  10. Surveillance.  Eventually will need EGD, ultrasounds every 6 months.      Yulia Wells MD MS....................  9/25/2021   2:37 PM   Cell [phone]  After 5 PM call [phone]    [facility]          Subjective/24 hour events:    Feels slightly better today.  Feels weak.     Objective:    /58   Pulse 75    "Temp 98.5  F (36.9  C) (Oral)   Resp 17   Ht 1.676 m (5' 6\")   Wt 67 kg (147 lb 12.8 oz)   SpO2 93%   BMI 23.86 kg/m    Temp (24hrs), Av.2  F (36.8  C), Min:97.5  F (36.4  C), Max:98.7  F (37.1  C)    Patient Vitals for the past 72 hrs:   Weight   21 0512 67 kg (147 lb 12.8 oz)   21 1138 71.7 kg (158 lb)       Medications (Scheduled)    albumin human  12.5 g Intravenous Q8H     cefTRIAXone  2 g Intravenous Q24H     lactulose  20 g Oral TID     pantoprazole (PROTONIX) IV  40 mg Intravenous Daily with breakfast     potassium chloride  10 mEq Intravenous Q1H     sodium chloride (PF)  3 mL Intracatheter Q8H       PRN Medications  diphenhydrAMINE, EPINEPHrine, famotidine, lidocaine 4%, lidocaine (buffered or not buffered), - MEDICATION INSTRUCTIONS -, methylPREDNISolone, nitroGLYcerin, ondansetron **OR** ondansetron, sodium chloride (PF)    Physical exam:  General Appearance: Alert, conversant  Eyes: scleral icterus  Respiratory: bilateral  CV: red  Gastrointestinal: ascites  Jaundiced     Data:  All relevant data has been reviewed. Pertinent information:     Labs:   Recent Labs   Lab Test 21  0821  0150 21  1800 21  1247 21  1247   WBC 12.1*  --  14.2*  --  16.8*   HGB 8.9* 6.6* 4.7*   < > 5.2*   *  --  139*  --  136*   PLT 92*  --  102*  --  143*   INR 2.96*  --   --   --  3.30*    < > = values in this interval not displayed.     Recent Labs   Lab Test 21  0821  1247 20  0837   POTASSIUM 3.3* 3.6 3.1*   CHLORIDE 103 98 106   CO2 18* 14* 25   BUN 60* 60* 2*   ANIONGAP 15* 20* 8     Recent Labs   Lab Test 21  0829 21  0427 21  1247 20  0837   ALBUMIN 1.7*  --  1.8* 2.8*   BILITOTAL 28.3*  --  33.6*  30.2* 1.1   ALT 22  --  26 57*   AST 38  --  44 221*   PROTEIN  --  Negative  --   --               "

## 2021-09-25 NOTE — PLAN OF CARE
Tele: SR. BP soft but stable (90s/50s). Reporting some SOB which is unchanged this shift. Lung sounds diminished. Afebrile. Abdomen distented, nontenter. No bowel movement this shift. Pt has received one unit of PRBCs, second unit infusing.

## 2021-09-25 NOTE — PLAN OF CARE
VSS on RA.  Tele: SR.  Up with SBA.  Denies pain or shortness of breath.  Hemoglobin stable.  Tolerating low salt diet.  NS at 75.  , Doug, present at bedside and updated.  Call light within reach.  Will continue to monitor.   continue diana meds  add ACEI / ARB as BP allows

## 2021-09-25 NOTE — PLAN OF CARE
Pt stable over night with no change in her current condition.  She received 1 unit of PRBC for a HGB of 6.6.  up with assist to the BR twice.  Tele: SR.  BP's have been soft in the 90/'s.  No c/o pain or SOB.  Next HGB at 0800.

## 2021-09-25 NOTE — PROGRESS NOTES
[facility]    Medicine Progress Note - Hospitalist Service        Date of Admission:  9/24/2021 11:41 AM    Assessment & Plan:   Melita Cortes is a 51 year old female who presents with worsening jaundice for the past 2 weeks, worsening abdominal distention, lower extremity edema and increased dyspnea.     Acute Liver failure-most likely due to acute on chronic alcoholic liver disease  Severe alcoholic liver disease  Ascites most likely due to #1  Hepatic encephalopathy  -Patient presents with progressive symptoms which has been ongoing for about a year and appears to have gotten worse in the past month or so  -Her constellation of symptoms and lab findings suggestive of severe alcoholic liver disease.  Bilirubin extremely elevated at 30.2.  -Meld score is 40, Dr. Oliver from the emergency room attempted transfer to [facility] for potential transplant however she was turned down due to her history of ongoing alcohol usage  -Ultrasonogram of the abdomen showed evidence of hepatic cirrhosis with portal hypertension.  -She is s/p diagnostic and therapeutic paracentesis with removal of 3.95 L of ascites fluid on 9/24  -Preliminary results from ascites fluid consistent with portal hypertension related ascites most likely due to alcoholic liver disease  -Continue lactulose 20 g 3 times daily, and for 2-3 bowel movements daily  -Given positive occult blood, will start ceftriaxone 2 g IV daily.  -[facility] following    Severe anemia-likely multifactorial  Thrombocytopenia  -Hemoglobin at presentation 5.2, with a sarah of 4.7.  Last hemoglobin in the system is from September 2020 and it was 12.3 at that time  -Patient denies hematochezia or melena  -She is s/p 3 units of PRBC overnight, hemoglobin is now stable at 8.9  -Stool occult blood is positive but no overt evidence of GI bleed  -We will discuss with [facility] if EGD would be indicated  -Transfuse 2 units of PRBC, monitor  hemoglobin every 6 hours  -Continue to monitor hemoglobin closely     Acute kidney injury, prerenal versus hepatorenal syndrome  -Creatinine at presentation today is 4.10, last creatinine in the chart is from September 2020 when it was normal at 0.56  -This is most likely prerenal versus hepatorenal syndrome  -FENA of 0.3  -Albumin 12.5 g every 8 hour for now along with normal saline at 75 mL/h  -Creatinine slightly better with multiple blood transfusion and gentle hydration  -Await nephrology opinion this morning     Coagulopathy due to alcoholic liver disease  -INR more than 3 at presentation, coagulopathy most likely due to severe alcoholic liver disease.    -Received 1 dose of vitamin K 2 mg at presentation     Mild hyponatremia  -This again is most likely due to severe alcoholic liver disease, -improved with hydration    Diet: NPO for Medical/Clinical Reasons Except for: Meds, Ice Chips     DVT Prophylaxis: Pneumatic Compression Devices   Gunter Catheter: Not present  Code Status: Full Code     Disposition Plan    Expected discharge:2-4 days, TBD  Entered: Dayron Fatima MD 09/25/2021, 8:40 AM        The patient's care was discussed with the Bedside Nurse and Patient.    Dayron Fatima MD  Hospitalist Service  [facility]  Text Page 7AM-6PM  Securely message with the Vocera Web Console (learn more here)  Text page via [employer] Paging/Directory    ______________________________________________________________________    Interval History   Hemoglobin now stable at 8.9 after 3 units of packed red blood cell.  Patient denies hematochezia or melena.  She is very anxious and nervous about the diagnosis and outcome.  Denies any nausea or vomiting.  Underwent paracentesis yesterday with removal of almost 4 L ascites fluid.    Data reviewed today: I reviewed all medications, new labs and imaging results over the last 24 hours. I personally reviewed the EKG tracing showing Normal sinus  "rhythm..    Physical Exam   Vital signs:  Temp: 97.7  F (36.5  C) Temp src: Axillary BP: 92/65 Pulse: 78   Resp: 15 SpO2: 94 % O2 Device: None (Room air)   Height: 167.6 cm (5' 6\") Weight: 67 kg (147 lb 12.8 oz)  Estimated body mass index is 23.86 kg/m  as calculated from the following:    Height as of this encounter: 1.676 m (5' 6\").    Weight as of this encounter: 67 kg (147 lb 12.8 oz).      Wt Readings from Last 2 Encounters:   09/25/21 67 kg (147 lb 12.8 oz)   09/14/20 79.9 kg (176 lb 1.6 oz)       Gen: AAOX3, NAD  HEENT: Severe scleral icterus, raw area on the border of soft/hard palate in the oral cavity no active bleeding.  Resp: Diminished air entry at both the bases, normal effort of breathing  CVS: RRR, no murmur  Abd/GI: Soft, mildly distended, nontender  Skin: Warm, dry no rashes  MSK: 2-3+ pedal edema  Neuro- CN- intact. No focal deficits.  Spontaneously moving all 4 extremities      Data   Recent Labs   Lab 09/25/21  0150 09/24/21  1800 09/24/21  1247   WBC  --  14.2* 16.8*   HGB 6.6* 4.7* 5.2*   MCV  --  139* 136*   PLT  --  102* 143*   INR  --   --  3.30*   NA  --   --  132*   POTASSIUM  --   --  3.6   CHLORIDE  --   --  98   CO2  --   --  14*   BUN  --   --  60*   CR  --   --  4.10*   ANIONGAP  --   --  20*   SHAI  --   --  8.1*   GLC  --   --  121*   ALBUMIN  --   --  1.8*   PROTTOTAL  --   --  6.5*   BILITOTAL  --   --  33.6*  30.2*   ALKPHOS  --   --  58   ALT  --   --  26   AST  --   --  44       Recent Results (from the past 24 hour(s))   US Paracentesis    Narrative    US PARACENTESIS 9/24/2021 4:53 PM     HISTORY: HIGH VOLUME paracentesis with or without diagnostic fluid  analysis with labs to be drawn if ordered. Total paracentesis volume  as much as possible.    FINDINGS: Ultrasound was used to evaluate for the presence and best  approach for paracentesis. Written and oral informed consent was  obtained. A pause for the cause procedure to verify the correct  patient and correct procedure. " The skin overlying the left lower  quadrant was prepped and draped in the usual sterile fashion. The  subcutaneous tissues were anesthetized with 10 mL of 1% lidocaine. A  catheter was advanced into the peritoneal space and 3.95 L of  straw  colored fluid was drained. There were no immediate complications.  Ultrasound images were permanently stored.  Patient left the  ultrasound suite in satisfactory condition.      Impression    IMPRESSION: Technically successful paracentesis without immediate  complications.    PRIMITIVO GARIBAY MD         SYSTEM ID:  [id number]   US Abdomen Complete w Doppler Complete    Narrative    EXAM: US ABDOMEN COMPLETE WITH DOPPLER COMPLETE  LOCATION: [facility]  DATE/TIME: 9/24/2021 3:40 PM    INDICATION: Liver disease, abdominal distention, jaundice  COMPARISON: CT 09/25/2020  TECHNIQUE: Complete abdominal ultrasound. Color flow with spectral Doppler and waveform analysis performed.     FINDINGS:    GALLBLADDER: Sludge in gallbladder neck. No stones. Wall thickened to 8 mm. Negative sonographic Ricci's.     BILE DUCTS: No biliary dilatation. The common duct measures 3 mm.    LIVER: Coarsened echotexture, suggesting underlying fibrosis. Nodular contour. No focal mass.     RIGHT KIDNEY: Normal size. Normal echogenicity with no hydronephrosis or mass.     LEFT KIDNEY: Not visualized possibly due to bowel gas.    SPLEEN: Mildly prominent 13.4 cm in length.    PANCREAS: The visualized portions are normal.    AORTA: Normal in caliber.    IVC: Normal where visualized.    Small volume ascites. Bilateral pleural effusions.    ABDOMINAL DUPLEX: The hepatic artery, hepatic veins, IVC, and splenic vein are patent with flow in the normal direction. Retrograde flow noted in main portal vein, left and right portal vein.      Impression    IMPRESSION:  1.  Evidence for hepatic cirrhosis with portal venous hypertension.  2.  Sludge in gallbladder neck with wall thickening. Wall  thickening nonspecific in the setting of portal venous hypertension.  3.  Retrograde flow within main portal vein, left and right portal veins.  4.  Bilateral pleural effusions. Small volume ascites.         Medications     - MEDICATION INSTRUCTIONS -         albumin human  12.5 g Intravenous Q8H     lactulose  20 g Oral TID     sodium chloride (PF)  3 mL Intracatheter Q8H     sodium chloride (PF)  3 mL Intracatheter Q8H

## 2021-09-26 ENCOUNTER — APPOINTMENT (OUTPATIENT)
Dept: PHYSICAL THERAPY | Facility: CLINIC | Age: 51
DRG: 441 | End: 2021-09-26
Attending: INTERNAL MEDICINE
Payer: COMMERCIAL

## 2021-09-26 ENCOUNTER — APPOINTMENT (OUTPATIENT)
Dept: GENERAL RADIOLOGY | Facility: CLINIC | Age: 51
DRG: 441 | End: 2021-09-26
Attending: HOSPITALIST
Payer: COMMERCIAL

## 2021-09-26 ENCOUNTER — APPOINTMENT (OUTPATIENT)
Dept: GENERAL RADIOLOGY | Facility: CLINIC | Age: 51
DRG: 441 | End: 2021-09-26
Attending: INTERNAL MEDICINE
Payer: COMMERCIAL

## 2021-09-26 LAB
ALBUMIN SERPL-MCNC: 2 G/DL (ref 3.4–5)
ALP SERPL-CCNC: 44 U/L (ref 40–150)
ALT SERPL W P-5'-P-CCNC: 20 U/L (ref 0–50)
ANION GAP SERPL CALCULATED.3IONS-SCNC: 12 MMOL/L (ref 3–14)
APAP SERPL-MCNC: <5 UG/ML
AST SERPL W P-5'-P-CCNC: 34 U/L (ref 0–45)
BILIRUB DIRECT SERPL-MCNC: 23.3 MG/DL (ref 0–0.2)
BILIRUB SERPL-MCNC: 27.4 MG/DL (ref 0.2–1.3)
BUN SERPL-MCNC: 58 MG/DL (ref 7–30)
CALCIUM SERPL-MCNC: 8.1 MG/DL (ref 8.5–10.1)
CHLORIDE BLD-SCNC: 108 MMOL/L (ref 94–109)
CO2 SERPL-SCNC: 17 MMOL/L (ref 20–32)
CREAT SERPL-MCNC: 3.55 MG/DL (ref 0.52–1.04)
ERYTHROCYTE [DISTWIDTH] IN BLOOD BY AUTOMATED COUNT: ABNORMAL %
GFR SERPL CREATININE-BSD FRML MDRD: 14 ML/MIN/1.73M2
GLUCOSE BLD-MCNC: 100 MG/DL (ref 70–99)
HAV IGM SERPL QL IA: NONREACTIVE
HBV SURFACE AB SERPL IA-ACNC: 0 M[IU]/ML
HBV SURFACE AG SERPL QL IA: NONREACTIVE
HCT VFR BLD AUTO: 24.7 % (ref 35–47)
HCV AB SERPL QL IA: NONREACTIVE
HGB BLD-MCNC: 8.2 G/DL (ref 11.7–15.7)
MCH RBC QN AUTO: 35.7 PG (ref 26.5–33)
MCHC RBC AUTO-ENTMCNC: 33.2 G/DL (ref 31.5–36.5)
MCV RBC AUTO: 107 FL (ref 78–100)
PLATELET # BLD AUTO: 85 10E3/UL (ref 150–450)
POTASSIUM BLD-SCNC: 3.4 MMOL/L (ref 3.4–5.3)
PROT SERPL-MCNC: 5.7 G/DL (ref 6.8–8.8)
RBC # BLD AUTO: 2.3 10E6/UL (ref 3.8–5.2)
SMA IGG SER-ACNC: 12 UNITS
SODIUM SERPL-SCNC: 137 MMOL/L (ref 133–144)
WBC # BLD AUTO: 13.2 10E3/UL (ref 4–11)

## 2021-09-26 PROCEDURE — 250N000013 HC RX MED GY IP 250 OP 250 PS 637: Performed by: INTERNAL MEDICINE

## 2021-09-26 PROCEDURE — 99233 SBSQ HOSP IP/OBS HIGH 50: CPT | Performed by: INTERNAL MEDICINE

## 2021-09-26 PROCEDURE — 999N000065 XR CHEST PORT 1 VIEW

## 2021-09-26 PROCEDURE — 80053 COMPREHEN METABOLIC PANEL: CPT | Performed by: INTERNAL MEDICINE

## 2021-09-26 PROCEDURE — 250N000009 HC RX 250: Performed by: HOSPITALIST

## 2021-09-26 PROCEDURE — 86376 MICROSOMAL ANTIBODY EACH: CPT | Performed by: INTERNAL MEDICINE

## 2021-09-26 PROCEDURE — 210N000002 HC R&B HEART CARE

## 2021-09-26 PROCEDURE — 250N000011 HC RX IP 250 OP 636: Mod: JA | Performed by: INTERNAL MEDICINE

## 2021-09-26 PROCEDURE — 258N000003 HC RX IP 258 OP 636: Performed by: INTERNAL MEDICINE

## 2021-09-26 PROCEDURE — 82248 BILIRUBIN DIRECT: CPT | Performed by: INTERNAL MEDICINE

## 2021-09-26 PROCEDURE — 272N000451 HC KIT SHRLOCK 5FR POWER PICC DOUBLE LUMEN

## 2021-09-26 PROCEDURE — 36415 COLL VENOUS BLD VENIPUNCTURE: CPT | Performed by: INTERNAL MEDICINE

## 2021-09-26 PROCEDURE — 999N000040 HC STATISTIC CONSULT NO CHARGE VASC ACCESS

## 2021-09-26 PROCEDURE — 83010 ASSAY OF HAPTOGLOBIN QUANT: CPT | Performed by: INTERNAL MEDICINE

## 2021-09-26 PROCEDURE — 97530 THERAPEUTIC ACTIVITIES: CPT | Mod: GP | Performed by: PHYSICAL THERAPIST

## 2021-09-26 PROCEDURE — 97116 GAIT TRAINING THERAPY: CPT | Mod: GP | Performed by: PHYSICAL THERAPIST

## 2021-09-26 PROCEDURE — 82390 ASSAY OF CERULOPLASMIN: CPT | Performed by: INTERNAL MEDICINE

## 2021-09-26 PROCEDURE — 83516 IMMUNOASSAY NONANTIBODY: CPT | Performed by: INTERNAL MEDICINE

## 2021-09-26 PROCEDURE — P9047 ALBUMIN (HUMAN), 25%, 50ML: HCPCS | Performed by: INTERNAL MEDICINE

## 2021-09-26 PROCEDURE — 82103 ALPHA-1-ANTITRYPSIN TOTAL: CPT | Performed by: INTERNAL MEDICINE

## 2021-09-26 PROCEDURE — 86038 ANTINUCLEAR ANTIBODIES: CPT | Performed by: INTERNAL MEDICINE

## 2021-09-26 PROCEDURE — 82787 IGG 1 2 3 OR 4 EACH: CPT | Performed by: INTERNAL MEDICINE

## 2021-09-26 PROCEDURE — 250N000011 HC RX IP 250 OP 636: Performed by: INTERNAL MEDICINE

## 2021-09-26 PROCEDURE — 250N000012 HC RX MED GY IP 250 OP 636 PS 637: Performed by: INTERNAL MEDICINE

## 2021-09-26 PROCEDURE — 71046 X-RAY EXAM CHEST 2 VIEWS: CPT

## 2021-09-26 PROCEDURE — 87340 HEPATITIS B SURFACE AG IA: CPT | Performed by: INTERNAL MEDICINE

## 2021-09-26 PROCEDURE — 86709 HEPATITIS A IGM ANTIBODY: CPT | Performed by: INTERNAL MEDICINE

## 2021-09-26 PROCEDURE — 82784 ASSAY IGA/IGD/IGG/IGM EACH: CPT | Performed by: INTERNAL MEDICINE

## 2021-09-26 PROCEDURE — 97161 PT EVAL LOW COMPLEX 20 MIN: CPT | Mod: GP | Performed by: PHYSICAL THERAPIST

## 2021-09-26 PROCEDURE — 86706 HEP B SURFACE ANTIBODY: CPT | Performed by: INTERNAL MEDICINE

## 2021-09-26 PROCEDURE — 86803 HEPATITIS C AB TEST: CPT | Performed by: INTERNAL MEDICINE

## 2021-09-26 PROCEDURE — 85027 COMPLETE CBC AUTOMATED: CPT | Performed by: INTERNAL MEDICINE

## 2021-09-26 PROCEDURE — C9113 INJ PANTOPRAZOLE SODIUM, VIA: HCPCS | Performed by: INTERNAL MEDICINE

## 2021-09-26 RX ORDER — PREDNISOLONE SODIUM PHOSPHATE 10 MG/1
40 TABLET, ORALLY DISINTEGRATING ORAL DAILY
Status: DISCONTINUED | OUTPATIENT
Start: 2021-09-26 | End: 2021-09-26 | Stop reason: CLARIF

## 2021-09-26 RX ORDER — MIDODRINE HYDROCHLORIDE 5 MG/1
10 TABLET ORAL
Status: DISCONTINUED | OUTPATIENT
Start: 2021-09-26 | End: 2021-10-01 | Stop reason: HOSPADM

## 2021-09-26 RX ORDER — ALBUMIN (HUMAN) 12.5 G/50ML
25 SOLUTION INTRAVENOUS EVERY 8 HOURS
Status: DISCONTINUED | OUTPATIENT
Start: 2021-09-26 | End: 2021-09-29

## 2021-09-26 RX ORDER — PREDNISOLONE SODIUM PHOSPHATE 15 MG/5ML
40 SOLUTION ORAL DAILY
Status: DISCONTINUED | OUTPATIENT
Start: 2021-09-26 | End: 2021-10-01 | Stop reason: HOSPADM

## 2021-09-26 RX ADMIN — ALBUMIN HUMAN 12.5 G: 0.25 SOLUTION INTRAVENOUS at 10:29

## 2021-09-26 RX ADMIN — MIDODRINE HYDROCHLORIDE 10 MG: 5 TABLET ORAL at 18:19

## 2021-09-26 RX ADMIN — OCTREOTIDE ACETATE 50 MCG/HR: 200 INJECTION, SOLUTION INTRAVENOUS; SUBCUTANEOUS at 14:06

## 2021-09-26 RX ADMIN — ALBUMIN HUMAN 25 G: 0.25 SOLUTION INTRAVENOUS at 18:20

## 2021-09-26 RX ADMIN — ALBUMIN HUMAN 12.5 G: 0.25 SOLUTION INTRAVENOUS at 02:32

## 2021-09-26 RX ADMIN — SODIUM CHLORIDE: 9 INJECTION, SOLUTION INTRAVENOUS at 05:13

## 2021-09-26 RX ADMIN — LACTULOSE 20 G: 20 SOLUTION ORAL at 13:33

## 2021-09-26 RX ADMIN — LIDOCAINE HYDROCHLORIDE ANHYDROUS 2 ML: 10 INJECTION, SOLUTION INFILTRATION at 21:58

## 2021-09-26 RX ADMIN — PHYTONADIONE 5 MG: 10 INJECTION, EMULSION INTRAMUSCULAR; INTRAVENOUS; SUBCUTANEOUS at 13:24

## 2021-09-26 RX ADMIN — PANTOPRAZOLE SODIUM 40 MG: 40 INJECTION, POWDER, FOR SOLUTION INTRAVENOUS at 09:26

## 2021-09-26 RX ADMIN — MIDODRINE HYDROCHLORIDE 10 MG: 5 TABLET ORAL at 12:35

## 2021-09-26 RX ADMIN — CEFTRIAXONE SODIUM 2 G: 2 INJECTION, POWDER, FOR SOLUTION INTRAMUSCULAR; INTRAVENOUS at 09:26

## 2021-09-26 RX ADMIN — LACTULOSE 20 G: 20 SOLUTION ORAL at 09:24

## 2021-09-26 RX ADMIN — PREDNISOLONE SODIUM PHOSPHATE 40 MG: 15 SOLUTION ORAL at 13:27

## 2021-09-26 NOTE — PROGRESS NOTES
[facility]    Nephrology Progress Note     Assessment & Plan       Melita Cortes is a 51 year old female who was admitted on 9/24/2021.      1) Baseline normal kidney function: Cr 0.56 as of 9/182020.       2) Advanced Liver Disease with portal HTN and ascites. All acute ? Considering ETOH liver disease but other causes as well.  Studies in process.      3) ROSSY: Low FENa and some better with restoration of volume.  This is at least partly prerenal.  Some HRS type 2 still could be present.  She might even have some ATN given severity of anemia. Response to volume has been sluggish at best.  I wonder if she may hgave some HRS.       4) Hepatic encephalopathy - on lactulose     5) Severe anemia: Some blood loss by history.      6) Hypokalemia - replacement ordered.       Plan:     Stop saline  Continue 25% albumin  Add midodrine and octreotide.       Srinivas Carrero MD  [facility] - Nephrology  [phone]    Interval History     Feels OK.  Cr down slightly again.      Physical Exam   Temp: 98.6  F (37  C) Temp src: Oral BP: 91/65 Pulse: 83   Resp: 18 SpO2: 94 % O2 Device: None (Room air)    Vitals:    09/24/21 1138 09/25/21 0512   Weight: 71.7 kg (158 lb) 67 kg (147 lb 12.8 oz)     Vital Signs with Ranges  Temp:  [98  F (36.7  C)-98.7  F (37.1  C)] 98.6  F (37  C)  Pulse:  [75-86] 83  Resp:  [16-19] 18  BP: ()/(57-72) 91/65  SpO2:  [93 %-94 %] 94 %  I/O last 3 completed shifts:  In: 2867.5 [P.O.:720; I.V.:1777.5]  Out: -     GENERAL APPEARANCE: pleasant, NAD, a & o  HEENT:  Eyes/ears/nose/neck grossly normal  RESP: lungs cta b c good efforts, no crackles, rhonchi or wheezes  CV: RRR, nl S1/S2, no m/r/g   ABDOMEN: o/s/nt/nd, bs present  EXTREMITIES/SKIN: no rashes/lesions; 2+ ble edema    Medications     - MEDICATION INSTRUCTIONS -       octreotide (sandoSTATIN) infusion ADULT         albumin human  25 g Intravenous Q8H     cefTRIAXone  2 g Intravenous Q24H      lactulose  20 g Oral TID     midodrine  10 mg Oral TID w/meals     pantoprazole (PROTONIX) IV  40 mg Intravenous Daily with breakfast     phytonadione  5 mg Intravenous Once     prednisoLONE  40 mg Oral Daily     sodium chloride (PF)  3 mL Intracatheter Q8H       Data   BMP  Recent Labs   Lab 09/26/21 0611 09/25/21  1912 09/25/21  0829 09/24/21  1247    137 136 132*   POTASSIUM 3.4  --  3.3* 3.6   CHLORIDE 108  --  103 98   SHAI 8.1*  --  7.8* 8.1*   CO2 17*  --  18* 14*   BUN 58*  --  60* 60*   CR 3.55* 3.73* 3.73* 4.10*   *  --  91 121*     [email or web address]  CBC)  Recent Labs   Lab 09/26/21 0611 09/25/21  1901 09/25/21  1435 09/25/21  0829 09/25/21  0150 09/24/21  1800 09/24/21  1247 09/24/21  1247   WBC 13.2*  --   --  12.1*  --  14.2*  --  16.8*   HGB 8.2* 8.5* 8.1* 8.9*   < > 4.7*   < > 5.2*   HCT 24.7*  --   --  25.8*  --  14.7*  --  16.5*   *  --   --  105*  --  139*  --  136*   PLT 85*  --   --  92*  --  102*  --  143*    < > = values in this interval not displayed.     Recent Labs   Lab 09/26/21 0611 09/25/21  0829 09/24/21  1759   AST 34   < >  --    ALT 20   < >  --    ALKPHOS 44   < >  --    BILITOTAL 27.4*   < >  --    JENNIFFER  --   --  78*    < > = values in this interval not displayed.     Recent Labs   Lab 09/25/21 1901   INR 2.91*       Attestation:   I have reviewed today's relevant vital signs, notes, medications, labs and imaging.

## 2021-09-26 NOTE — PLAN OF CARE
VSS on RA.  Tele: SR.  Up SBA.  Denies pain.  Slight shortness of breath- IVF discontinued.  Octreotide started today.   present at bedside and updated.  Call light within reach.  Will continue to monitor.

## 2021-09-26 NOTE — PROGRESS NOTES
Gastroenterology Progress Note     Summary: 51-year-old female admitted with alcoholic hepatitis and concern for cirrhosis.  On presentation she had severe anemia, thrombocytopenia, acute kidney injury, and significant ascites.  No evidence of infection at this point.     Impression & Plan:   1. Alcoholic hepatitis. MDF greater than 32, no evidence of infection or bleed.     Started prednisolone 40 mg daily po, check lille score on day 7 to see if qualifies for 28 days of steroids.   2. Cirrhosis MELD 43 (90 day mortality 65%).  Likely due to alcohol, labs ordered to look for any other cause.    Ordered hav, hbv, hcv, ferritin, ceruloplasmin, alpha 1 antitripsin, autoimmune markers,  3. Alcohol use disorder.  She has never undergone chem dep.     Importance of complete alcohol cessation discussed.  4. Anemia.  Likely multifactorial.  No evidence of overt bleeding. Elevated lactate.    Ordered haptoglobin, direct bili    Monitor for evidence of overt bleeding.    She will eventually need EGD to screen for varices and colonoscopy for screening.  5. Elevated INR.    Vitamin K 5 mg x 3 doses IV ordered  6. Hepatic encephalopathy.    Lactulose, titrate to 3-4 soft bowel movements per day.    Discussed importance of not driving  7. Acute kidney injury.  Prerenal?  Albumin quite low.     IV albumin 25 g q 8 hour   8. Ascites. SAAG 1.3 consistent with portal hypertension as cause.  No evidence of infection on cell count.  Culture negative to date.  9. Prognosis.  By meld, 90-day mortality is 65%.  She will need to establish with hepatologist as an outpatient with plans for establishing care with transplant hepatologist.  10. Surveillance.  Eventually will need EGD, ultrasounds every 6 months.  11. Shortness of breath.  Likely due to pleural effusions on x-ray.     Monitor volume status.  Diuretics will need to be instituted at some point    Stopping antibiotics       Yulia Wells MD MS....................  9/26/2021    "11:11 AM   Cell [phone]  After 5 PM call [phone]    [facility]          Subjective/24 hour events: Eating, doing physical therapy.  Is feeling short of breath.    Objective:    /72   Pulse 86   Temp 98.7  F (37.1  C) (Oral)   Resp 18   Ht 1.676 m (5' 6\")   Wt 67 kg (147 lb 12.8 oz)   SpO2 93%   BMI 23.86 kg/m    Temp (24hrs), Av.3  F (36.8  C), Min:98  F (36.7  C), Max:98.7  F (37.1  C)    Patient Vitals for the past 72 hrs:   Weight   21 0512 67 kg (147 lb 12.8 oz)   21 1138 71.7 kg (158 lb)       Medications (Scheduled)    albumin human  25 g Intravenous Q8H     cefTRIAXone  2 g Intravenous Q24H     lactulose  20 g Oral TID     pantoprazole (PROTONIX) IV  40 mg Intravenous Daily with breakfast     prednisoLONE  40 mg Oral Daily     sodium chloride (PF)  3 mL Intracatheter Q8H       PRN Medications  lidocaine 4%, lidocaine (buffered or not buffered), - MEDICATION INSTRUCTIONS -, nitroGLYcerin, ondansetron **OR** ondansetron, sodium chloride (PF)    Physical exam:  General Appearance: Alert  Eyes: Scleral icterus  Respiratory: Bilateral, congested  CV: Regular  Gastrointestinal: Ascites    Data:  All relevant data has been reviewed. Pertinent information:     Labs:   Recent Labs   Lab Test 21  0611 21  1901 21  1435 21  0829 21  0829 21  0150 21  1800 21  1247 21  1247   WBC 13.2*  --   --   --  12.1*  --  14.2*   < > 16.8*   HGB 8.2* 8.5* 8.1*   < > 8.9*   < > 4.7*   < > 5.2*   *  --   --   --  105*  --  139*   < > 136*   PLT 85*  --   --   --  92*  --  102*   < > 143*   INR  --  2.91*  --   --  2.96*  --   --   --  3.30*    < > = values in this interval not displayed.     Recent Labs   Lab Test 21  0611 21  0829 21  1247   POTASSIUM 3.4 3.3* 3.6   CHLORIDE 108 103 98   CO2 17* 18* 14*   BUN 58* 60* 60*   ANIONGAP 12 15* 20*     Recent Labs   Lab Test 21  0611 21  0829 " 09/25/21  0427 09/24/21  1247   ALBUMIN 2.0* 1.7*  --  1.8*   BILITOTAL 27.4* 28.3*  --  33.6*  30.2*   ALT 20 22  --  26   AST 34 38  --  44   PROTEIN  --   --  Negative  --

## 2021-09-26 NOTE — CONSULTS
"CLINICAL NUTRITION SERVICES  -  ASSESSMENT NOTE      RECOMMENDATIONS FOR MD/PROVIDER TO ORDER:   Recommend consider MVI-M, Thiamine, Folic acid supplementation.  (Pt did not want me to order oral MVI-M at this time as unsure how she will tolerate it).   Recommendations Ordered by Registered Dietitian (RD):   Ensure Enlive BID btw meals (Chocolate and Strawberry).   Malnutrition:   % Weight Loss:  Up to 10% in 6 months (non-severe malnutrition)  % Intake:  </= 50% for >/= 5 days (severe malnutrition)  Subcutaneous Fat Loss:  Orbital region mild depletion and Upper arm region mild depletion  Muscle Loss:  Temporal region moderate depletion, Clavicle bone region moderate depletion and Acromion bone region moderate depletion  Fluid Retention:  Moderate     Malnutrition Diagnosis: Severe malnutrition  In Context of:  Acute illness or injury  Chronic illness or disease  Environmental or social circumstances        REASON FOR ASSESSMENT  Melita Cortes is a 51 year old female seen by Registered Dietitian for Provider Order - severe alcoholic liver disease    DX:   Severe alcoholic liver disease   Ascites    Suspected hepatic encephalopathy   Severe anemia   ROSSY, prerenal versus hepatorenal syndrome     NUTRITION HISTORY  - Information obtained from patient and Epic records.  - Patient is on a Regular diet at home.  - Typical food/fluid intake is poor.  - Diet history:  Pt has had struggles with eating over the past year due to abdominal distension and more so over the past month.  She doesn't eat breakfast in the morning.  Later in the day she might have some fruit such as pineapple chunks or an apple.  Her appetite has been poor and she fills up quickly.  She doesn't restrict sodium at home.    - Supplements:  None, but she is receptive to trying them while here.   - No food allergies noted.  - Per Provider note \"Pt endorses decreased (ETOH) intake over this last year, still 1-2 drinks - though patient states not " "daily and last drink was 30 days ago.\"  Pt is weak and deconditioned.  Patient's best friend Tatiana at bedside and stated patient appears much thinner than usual with both fat and muscle loss.    CURRENT NUTRITION ORDERS  Diet Order:     2000 mg Sodium     Current Intake/Tolerance:  Pt consumed 50% dinner last night and 75% breakfast this morning.  She was going to order a turkey sandwich for lunch.    9/24:  Paracentesis = 3950 mL     NUTRITION FOCUSED PHYSICAL ASSESSMENT FOR DIAGNOSING MALNUTRITION)  Yes         Observed:    Muscle wasting (refer to documentation in Malnutrition section), Subcutaneous fat loss (refer to documentation in Malnutrition section) and Jaundiced    Obtained from Chart/Interdisciplinary Team:  Ascites   Edema - 2+ generalized, 3+ BLE  Jaundiced  Chronically ill appearing    ANTHROPOMETRICS  Height: 5' 6\"  Weight: 67 kg (147 lbs 12.8 oz)  Body mass index is 23.86 kg/m .  Weight Status:  Normal BMI  IBW:  59.1 kg  % IBW: 113%  Weight History:  Weight loss 13 kg (16%) over the past year.    Wt Readings from Last 10 Encounters:   09/25/21 67 kg (147 lb 12.8 oz)   09/14/20 79.9 kg (176 lb 1.6 oz)       LABS  Labs reviewed  NH3 yesterday 78 (H)   BUN 58 (H), Cr 3.55 (H)  Total Bili 27.4 (H)    MEDICATIONS  Medications reviewed  IV albumin  Lactulose  Octreotide    ASSESSED NUTRITION NEEDS PER APPROVED PRACTICE GUIDELINES:    Dosing Weight:  67 kg (current wt)  Estimated Energy Needs:  4503-1080 kcals (30-35 Kcal/Kg)  Justification: repletion  Estimated Protein Needs:   grams protein (1.2-1.5 g pro/Kg)  Justification: Repletion and hypercatabolism with acute illness  Estimated Fluid Needs:  2815-0143 mL (20-25 mL/kg)  Justification: or per provider pending fluid status    MALNUTRITION:  % Weight Loss:  Up to 10% in 6 months (non-severe malnutrition)  % Intake:  </= 50% for >/= 5 days (severe malnutrition)  Subcutaneous Fat Loss:  Orbital region mild depletion and Upper arm region mild " depletion  Muscle Loss:  Temporal region moderate depletion, Clavicle bone region moderate depletion and Acromion bone region moderate depletion  Fluid Retention:  Moderate     Malnutrition Diagnosis: Severe malnutrition  In Context of:  Acute illness or injury  Chronic illness or disease  Environmental or social circumstances    NUTRITION DIAGNOSIS:  Inadequate oral intake related to decreased appetite and early sateity as evidenced by poor oral intake over past year (and 16% weight loss) and especially over past month with loss of subcutaneous fat and muscle mass.    NUTRITION INTERVENTIONS  Recommendations / Nutrition Prescription  Ensure Enlive BID btw meals (Chocolate and Strawberry).    Recommend consider MVI-M, Thiamine, Folic acid supplementation.  (Pt did not want me to order oral MVI-M at this time as unsure how she will tolerate it).    Implementation  Nutrition education: Provided education on introduction to 2 gm Na, the use of nutritional supplements to optimize intake, and strategy of small frequent meals.  Medical Food Supplement - Ordered Ensure in Epic as above      Nutrition Goals  Pt will consume > 75% meals and supplements in 3-5 days    MONITORING AND EVALUATION:  Progress towards goals will be monitored and evaluated per protocol and Practice Guidelines    Tanya Ramos, URMILA, LD, [facility]

## 2021-09-26 NOTE — PLAN OF CARE
"IMC status. A/Ox3-4, forgetful, slow to respond, slow speech, pleasant. VSS ex. T max 98.0 Ax, SBP 90s-110s. CMS ex +2 generalized edema, +3 edema to BLEs. Tele SR. Abd distended, nontender, pt  stated \" her stomach looks like it's getting larger again\". Denies pain.  Ambulating SBA to BR, adequate UOP. Tolerated 2g sodium diet, pt stated \" I didn't want to over do it. I'm not sure what I can have/tolerate\", appetite fair. IVF NS 75, Albumin given x1. Morning labs pending. Plan for possible Delio Feed today. Discharge pending.   "

## 2021-09-26 NOTE — PROGRESS NOTES
09/26/21 1628   Quick Adds   Type of Visit Initial PT Evaluation   Living Environment   People in home spouse   Current Living Arrangements house   Home Accessibility stairs to enter home;stairs within home   Number of Stairs, Main Entrance 2   Stair Railings, Main Entrance none   Number of Stairs, Within Home, Primary 7   Stair Railings, Within Home, Primary railings safe and in good condition   Transportation Anticipated family or friend will provide   Living Environment Comments lives in 2 story home with bedroom on upper level, kitchen and living area on main level   Self-Care   Usual Activity Tolerance good   Current Activity Tolerance fair   Regular Exercise No   Equipment Currently Used at Home none   Activity/Exercise/Self-Care Comment Independent with all ADL's and independent community ambulator.  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    Disability/Function   Change in Functional Status Since Onset of Current Illness/Injury yes   General Information   Onset of Illness/Injury or Date of Surgery 09/24/21   Referring Physician Dayron Fatima MD   Patient/Family Therapy Goals Statement (PT) Wants to get stronger   Pertinent History of Current Problem (include personal factors and/or comorbidities that impact the POC) Melita Cortes is a 51 year old female who presents with worsening jaundice for the past 2 weeks, worsening abdominal distention, lower extremity edema and increased dyspnea. Found to have severe alcholic liver disease; acute on chronic.   Existing Precautions/Restrictions fall   General Observations Lying in bed in CICU; very jaundice looking.   present.    Cognition   Orientation Status (Cognition) oriented x 4   Affect/Mental Status (Cognition) WFL   Follows Commands (Cognition) WNL   Pain Assessment   Patient Currently in Pain No   Integumentary/Edema   Integumentary/Edema Comments Mildly noted edema in L/E's and abdomen notably distended.    Posture     Posture Not impaired   Range of Motion (ROM)   ROM Quick Adds ROM WFL   Strength   Strength Comments L/E's generally 4/5   Bed Mobility   Comment (Bed Mobility) Sit to and from supine with SBA without use of rails and bed flat.    Transfers   Transfer Safety Comments Sit to and from stand with CGA.   Gait/Stairs (Locomotion)   Comment (Gait/Stairs) Patient ambulated 25' for eval with IV half of time and without half of the time. See daily doc for further mobility; able to decrease IV throughout session.    Balance   Balance Comments Mildly widened TUCKER and occasionally wanting to use IV pole but decreased throughout session.    Sensory Examination   Sensory Perception patient reports no sensory changes   Coordination   Coordination no deficits were identified   Muscle Tone   Muscle Tone no deficits were identified   Clinical Impression   Criteria for Skilled Therapeutic Intervention yes, treatment indicated   PT Diagnosis (PT) impaired functional mobility   Influenced by the following impairments generalized weakness and decondtioning, being SOB   Functional limitations due to impairments decreased independence in functional mobility   Clinical Presentation Evolving/Changing   Clinical Presentation Rationale per clinical judgment   Clinical Decision Making (Complexity) low complexity   Therapy Frequency (PT) Daily   Predicted Duration of Therapy Intervention (days/wks) 5 days   Planned Therapy Interventions (PT) balance training;gait training;home exercise program;patient/family education;stair training;strengthening;transfer training;progressive activity/exercise;home program guidelines   Risk & Benefits of therapy have been explained evaluation/treatment results reviewed;care plan/treatment goals reviewed;risks/benefits reviewed;current/potential barriers reviewed;participants voiced agreement with care plan;participants included;patient;spouse/significant other   PT Discharge Planning    PT Discharge  Recommendation (DC Rec) home with assist;home with outpatient physical therapy   PT Rationale for DC Rec Patient is mobilizing with CGA for the most part.  Is having some SOB with O2 sats at 93% on RA.  Anticipate patient will be able to manage at home but recommend someone with her at this time secondary to fatigue.   and patient are not sure who could stay with her but will be thinking through that scenerio (while  works during the day).    Total Evaluation Time   Total Evaluation Time (Minutes) 10

## 2021-09-26 NOTE — PROGRESS NOTES
[facility]    Medicine Progress Note - Hospitalist Service        Date of Admission:  9/24/2021 11:41 AM    Assessment & Plan:   Melita Cortes is a 51 year old female who presents with worsening jaundice for the past 2 weeks, worsening abdominal distention, lower extremity edema and increased dyspnea.     Acute Liver failure-most likely due to acute on chronic alcoholic liver disease  Severe alcoholic liver disease with portal hypertension  Ascites  ?  Alcoholic hepatitis  Hepatic encephalopathy  -Patient presents with progressive symptoms which has been ongoing for about a year and appears to have gotten worse in the past month or so  -Her constellation of symptoms and lab findings suggestive of severe alcoholic liver disease.  Bilirubin extremely elevated at 30.2.  -Meld score is 40, Dr. Oliver from the emergency room attempted transfer to [facility] for potential transplant however she was turned down due to her history of ongoing alcohol usage  -Ultrasonogram of the abdomen showed evidence of hepatic cirrhosis with portal hypertension.  -She is s/p diagnostic and therapeutic paracentesis with removal of 3.95 L of ascites fluid on 9/24  -Preliminary results from ascites fluid consistent with portal hypertension related ascites most likely due to alcoholic liver disease  -Continue lactulose 20 g 3 times daily, aim for 2-3 bowel movements daily  -Given positive occult blood, started on ceftriaxone 2 g IV daily.  No plans for immediate endoscopy at this time, will discuss with GI if we need to continue antibiotics.  -Started on prednisolone today for alcoholic hepatitis  -[facility] following    Severe anemia-likely multifactorial  Thrombocytopenia  -Hemoglobin at presentation 5.2, with a sarah of 4.7.  Last hemoglobin in the system is from September 2020 and it was 12.3 at that time  -Patient denies hematochezia or melena  -She is s/p 3 units of PRBC on 9/24, hemoglobin is  now stable around ~8  -Stool occult blood is positive but no overt evidence of GI bleed  -[facility] following, eventual endoscopy, timing per GI  -Platelets lower now at 85, but stable recheck CBC tomorrow morning     Acute kidney injury, prerenal versus hepatorenal syndrome  -Creatinine at presentation today is 4.10, last creatinine in the chart is from September 2020 when it was normal at 0.56  -This is most likely prerenal versus hepatorenal syndrome  -FENA of 0.3  -Creatinine slightly better at 3.55 with multiple blood transfusion, albumin and gentle hydration with normal saline  -Nephrology following, likely this is a combination of prerenal cause and hepatorenal syndrome  -Off normal saline  -Continue albumin per nephrology  -Midodrine and octreotide added today.     Coagulopathy due to alcoholic liver disease  -INR more than 3 at presentation, coagulopathy most likely due to severe alcoholic liver disease.    -Has received few doses of vitamin K, recheck INR tomorrow morning     Mild hyponatremia  -This again is most likely due to severe alcoholic liver disease,   -improved with hydration    Diet: 2 Gram Sodium Diet     DVT Prophylaxis: Pneumatic Compression Devices   Gunter Catheter: Not present  Code Status: Full Code     Disposition Plan    Expected discharge:2-4 days, TBD  Entered: Dayron Fatima MD 09/26/2021, 10:22 AM        The patient's care was discussed with the Bedside Nurse and Patient.    Dayron Fatima MD  Hospitalist Service  [facility]  Text Page 7AM-6PM  Securely message with the Vocera Web Console (learn more here)  Text page via haystagg Paging/Directory    ______________________________________________________________________    Interval History   Hemoglobin is stable.  No clinical evidence of GI bleed.  Overall feels slightly better.  Abdomen slightly more distended today.  Renal function marginally better.  Started on octreotide today.  No nausea or  "vomiting.    Data reviewed today: I reviewed all medications, new labs and imaging results over the last 24 hours. I personally reviewed the EKG tracing showing Normal sinus rhythm..    Physical Exam   Vital signs:  Temp: 98.7  F (37.1  C) Temp src: Oral BP: 99/62 Pulse: 83   Resp: 18 SpO2: 93 % O2 Device: None (Room air)   Height: 167.6 cm (5' 6\") Weight: 67 kg (147 lb 12.8 oz)  Estimated body mass index is 23.86 kg/m  as calculated from the following:    Height as of this encounter: 1.676 m (5' 6\").    Weight as of this encounter: 67 kg (147 lb 12.8 oz).      Wt Readings from Last 2 Encounters:   09/25/21 67 kg (147 lb 12.8 oz)   09/14/20 79.9 kg (176 lb 1.6 oz)       Gen: AAOX3, NAD  HEENT: Severe scleral icterus, raw area on the border of soft/hard palate in the oral cavity no active bleeding.  Resp: Diminished air entry at both the bases, normal effort of breathing  CVS: RRR, no murmur  Abd/GI: Soft, mildly distended, nontender  Skin: Warm, dry no rashes  MSK: 2-3+ pedal edema  Neuro- CN- intact. No focal deficits.  Spontaneously moving all 4 extremities      Data   Recent Labs   Lab 09/26/21  0611 09/25/21  1912 09/25/21  1901 09/25/21  1435 09/25/21  0829 09/25/21  0829 09/25/21  0150 09/24/21  1800 09/24/21  1247 09/24/21  1247   WBC 13.2*  --   --   --   --  12.1*  --  14.2*   < > 16.8*   HGB 8.2*  --  8.5* 8.1*   < > 8.9*   < > 4.7*   < > 5.2*   *  --   --   --   --  105*  --  139*   < > 136*   PLT 85*  --   --   --   --  92*  --  102*   < > 143*   INR  --   --  2.91*  --   --  2.96*  --   --   --  3.30*    137  --   --   --  136  --   --    < > 132*   POTASSIUM 3.4  --   --   --   --  3.3*  --   --   --  3.6   CHLORIDE 108  --   --   --   --  103  --   --   --  98   CO2 17*  --   --   --   --  18*  --   --   --  14*   BUN 58*  --   --   --   --  60*  --   --   --  60*   CR 3.55* 3.73*  --   --   --  3.73*  --   --    < > 4.10*   ANIONGAP 12  --   --   --   --  15*  --   --   --  20*   SHAI " 8.1*  --   --   --   --  7.8*  --   --   --  8.1*   *  --   --   --   --  91  --   --   --  121*   ALBUMIN 2.0*  --   --   --   --  1.7*  --   --    < > 1.8*   PROTTOTAL 5.7*  --   --   --   --  5.9*  --   --    < > 6.5*   BILITOTAL 27.4*  --   --   --   --  28.3*  --   --    < > 33.6*  30.2*   ALKPHOS 44  --   --   --   --  47  --   --    < > 58   ALT 20  --   --   --   --  22  --   --    < > 26   AST 34  --   --   --   --  38  --   --    < > 44    < > = values in this interval not displayed.       No results found for this or any previous visit (from the past 24 hour(s)).  Medications     - MEDICATION INSTRUCTIONS -       sodium chloride 75 mL/hr at 09/26/21 0810       albumin human  12.5 g Intravenous Q8H     cefTRIAXone  2 g Intravenous Q24H     lactulose  20 g Oral TID     pantoprazole (PROTONIX) IV  40 mg Intravenous Daily with breakfast     sodium chloride (PF)  3 mL Intracatheter Q8H

## 2021-09-27 ENCOUNTER — APPOINTMENT (OUTPATIENT)
Dept: PHYSICAL THERAPY | Facility: CLINIC | Age: 51
DRG: 441 | End: 2021-09-27
Payer: COMMERCIAL

## 2021-09-27 LAB
ALBUMIN SERPL-MCNC: 2.3 G/DL (ref 3.4–5)
ALP SERPL-CCNC: 43 U/L (ref 40–150)
ALT SERPL W P-5'-P-CCNC: 16 U/L (ref 0–50)
ANA PAT SER IF-IMP: ABNORMAL
ANA SER QL IF: ABNORMAL
ANA TITR SER IF: ABNORMAL {TITER}
ANION GAP SERPL CALCULATED.3IONS-SCNC: 12 MMOL/L (ref 3–14)
AST SERPL W P-5'-P-CCNC: 23 U/L (ref 0–45)
BILIRUB SERPL-MCNC: 25.4 MG/DL (ref 0.2–1.3)
BLD PROD TYP BPU: NORMAL
BLOOD COMPONENT TYPE: NORMAL
BUN SERPL-MCNC: 56 MG/DL (ref 7–30)
CALCIUM SERPL-MCNC: 8.1 MG/DL (ref 8.5–10.1)
CERULOPLASMIN SERPL-MCNC: 14 MG/DL (ref 20–60)
CERULOPLASMIN SERPL-MCNC: 17 MG/DL (ref 20–60)
CHLORIDE BLD-SCNC: 109 MMOL/L (ref 94–109)
CO2 SERPL-SCNC: 18 MMOL/L (ref 20–32)
CODING SYSTEM: NORMAL
CREAT SERPL-MCNC: 3.27 MG/DL (ref 0.52–1.04)
CROSSMATCH: NORMAL
ERYTHROCYTE [DISTWIDTH] IN BLOOD BY AUTOMATED COUNT: ABNORMAL %
GFR SERPL CREATININE-BSD FRML MDRD: 16 ML/MIN/1.73M2
GLUCOSE BLD-MCNC: 153 MG/DL (ref 70–99)
HAPTOGLOB SERPL-MCNC: <3 MG/DL (ref 32–197)
HAPTOGLOB SERPL-MCNC: <3 MG/DL (ref 32–197)
HAV IGM SERPL QL IA: NONREACTIVE
HBV SURFACE AB SERPL IA-ACNC: 0 M[IU]/ML
HBV SURFACE AG SERPL QL IA: NONREACTIVE
HCT VFR BLD AUTO: 20.5 % (ref 35–47)
HCV AB SERPL QL IA: NONREACTIVE
HGB BLD-MCNC: 7 G/DL (ref 11.7–15.7)
HGB BLD-MCNC: 8.6 G/DL (ref 11.7–15.7)
IGG SERPL-MCNC: 1973 MG/DL (ref 610–1616)
IGG SERPL-MCNC: 2255 MG/DL (ref 610–1616)
INR PPP: 3.54 (ref 0.85–1.15)
ISSUE DATE AND TIME: NORMAL
MCH RBC QN AUTO: 37.4 PG (ref 26.5–33)
MCHC RBC AUTO-ENTMCNC: 34.1 G/DL (ref 31.5–36.5)
MCV RBC AUTO: 110 FL (ref 78–100)
PLATELET # BLD AUTO: 86 10E3/UL (ref 150–450)
POTASSIUM BLD-SCNC: 3.7 MMOL/L (ref 3.4–5.3)
PROT SERPL-MCNC: 5.4 G/DL (ref 6.8–8.8)
RBC # BLD AUTO: 1.87 10E6/UL (ref 3.8–5.2)
SMA IGG SER-ACNC: 11 UNITS
SODIUM SERPL-SCNC: 139 MMOL/L (ref 133–144)
UNIT ABO/RH: NORMAL
UNIT NUMBER: NORMAL
UNIT STATUS: NORMAL
UNIT TYPE ISBT: 9500
WBC # BLD AUTO: 13.3 10E3/UL (ref 4–11)

## 2021-09-27 PROCEDURE — 210N000002 HC R&B HEART CARE

## 2021-09-27 PROCEDURE — 250N000011 HC RX IP 250 OP 636: Performed by: INTERNAL MEDICINE

## 2021-09-27 PROCEDURE — 97530 THERAPEUTIC ACTIVITIES: CPT | Mod: GP | Performed by: PHYSICAL THERAPIST

## 2021-09-27 PROCEDURE — 85027 COMPLETE CBC AUTOMATED: CPT | Performed by: INTERNAL MEDICINE

## 2021-09-27 PROCEDURE — 250N000013 HC RX MED GY IP 250 OP 250 PS 637: Performed by: INTERNAL MEDICINE

## 2021-09-27 PROCEDURE — 99233 SBSQ HOSP IP/OBS HIGH 50: CPT | Performed by: INTERNAL MEDICINE

## 2021-09-27 PROCEDURE — 99233 SBSQ HOSP IP/OBS HIGH 50: CPT | Performed by: HOSPITALIST

## 2021-09-27 PROCEDURE — 250N000011 HC RX IP 250 OP 636: Mod: JA | Performed by: INTERNAL MEDICINE

## 2021-09-27 PROCEDURE — 82040 ASSAY OF SERUM ALBUMIN: CPT | Performed by: INTERNAL MEDICINE

## 2021-09-27 PROCEDURE — P9047 ALBUMIN (HUMAN), 25%, 50ML: HCPCS | Performed by: INTERNAL MEDICINE

## 2021-09-27 PROCEDURE — C9113 INJ PANTOPRAZOLE SODIUM, VIA: HCPCS | Performed by: INTERNAL MEDICINE

## 2021-09-27 PROCEDURE — P9016 RBC LEUKOCYTES REDUCED: HCPCS | Performed by: INTERNAL MEDICINE

## 2021-09-27 PROCEDURE — 97116 GAIT TRAINING THERAPY: CPT | Mod: GP | Performed by: PHYSICAL THERAPIST

## 2021-09-27 PROCEDURE — 258N000003 HC RX IP 258 OP 636: Performed by: INTERNAL MEDICINE

## 2021-09-27 PROCEDURE — 85018 HEMOGLOBIN: CPT | Performed by: HOSPITALIST

## 2021-09-27 PROCEDURE — 86923 COMPATIBILITY TEST ELECTRIC: CPT | Performed by: INTERNAL MEDICINE

## 2021-09-27 PROCEDURE — 250N000012 HC RX MED GY IP 250 OP 636 PS 637: Performed by: INTERNAL MEDICINE

## 2021-09-27 PROCEDURE — 85610 PROTHROMBIN TIME: CPT | Performed by: INTERNAL MEDICINE

## 2021-09-27 PROCEDURE — 99207 PR CDG-MDM COMPONENT: MEETS HIGH - UP CODED: CPT | Performed by: HOSPITALIST

## 2021-09-27 RX ADMIN — PANTOPRAZOLE SODIUM 40 MG: 40 INJECTION, POWDER, FOR SOLUTION INTRAVENOUS at 09:04

## 2021-09-27 RX ADMIN — PREDNISOLONE SODIUM PHOSPHATE 40 MG: 15 SOLUTION ORAL at 09:05

## 2021-09-27 RX ADMIN — OCTREOTIDE ACETATE 50 MCG/HR: 200 INJECTION, SOLUTION INTRAVENOUS; SUBCUTANEOUS at 18:37

## 2021-09-27 RX ADMIN — ALBUMIN HUMAN 25 G: 0.25 SOLUTION INTRAVENOUS at 14:25

## 2021-09-27 RX ADMIN — LACTULOSE 20 G: 20 SOLUTION ORAL at 09:05

## 2021-09-27 RX ADMIN — ALBUMIN HUMAN 25 G: 0.25 SOLUTION INTRAVENOUS at 01:08

## 2021-09-27 RX ADMIN — MIDODRINE HYDROCHLORIDE 10 MG: 5 TABLET ORAL at 09:05

## 2021-09-27 RX ADMIN — ALBUMIN HUMAN 25 G: 0.25 SOLUTION INTRAVENOUS at 21:16

## 2021-09-27 RX ADMIN — MIDODRINE HYDROCHLORIDE 10 MG: 5 TABLET ORAL at 18:36

## 2021-09-27 ASSESSMENT — MIFFLIN-ST. JEOR: SCORE: 1327.12

## 2021-09-27 NOTE — PROGRESS NOTES
Discuss PICC position with radiologist, Dr. Juan Heck, radiologist states if pull back 2cm, likely well-placed in lower SVC.  To pull back 2cm, redress, and have additional pcxr taken.

## 2021-09-27 NOTE — PROGRESS NOTES
Cross cover note     Page for vascular access order addend.    Vascular access consult for midline requested.

## 2021-09-27 NOTE — PROGRESS NOTES
Gastroenterology Progress Note     Summary: 51-year-old female admitted with alcoholic hepatitis and concern for cirrhosis.  On presentation she had severe anemia, thrombocytopenia, acute kidney injury, and significant ascites.  No evidence of infection.    Impression & Plan:   1. Alcoholic hepatitis. MDF greater than 32, no evidence of infection or bleed. Bilirubin trending down today. INR trending up 2.91-->3.54.    Prednisolone 40 mg daily po (day 2/7), check lille score on day 7 (10/2) to see if qualifies for 28 days of steroids.   2. Cirrhosis MELD 43 (90 day mortality 65%).  Likely due to alcohol, labs ordered to look for any other cause.    Ordered hav, hbv, hcv, ferritin,  alpha 1 antitripsin, autoimmune markers - pending.     Ceruloplasmin low but no overt Kayser-Flascher rings but would need slit lamp testing. Can be low without evidence of Fly's disease.      Follow LFTs/INR.    Complete ETOH cessation discussed. Recommend chem dep consult when appropriate.    Outpatient hepatology follow up. Will need screening EGD and US every 6 months.    3. Anemia.  Likely multifactorial.  HGB did decline to 7  From 8.2 yesterday. No overt bleeding. Direct bili elevated. Haptoglobin pending . Getting one unit of blood now.     Monitor for evidence of overt bleeding. No need for EGD at this time.     Monitor HGB and transfuse prn.     She will eventually need EGD to screen for varices and colonoscopy for screening.  4. Hepatic encephalopathy. Improving.    Lactulose, titrate to 3-4 soft bowel movements per day.    Discussed importance of not driving  5. Acute kidney injury.   Nephrology following. Pre-renal vs HRS. Diuretics on hold. Getting IV albumin, midodrine and octreotide added yesterday.    6. Ascites. SAAG 1.3 consistent with portal hypertension as cause.  No evidence of infection on cell count.  Culture negative to date.                   --US paracentesis prn. No urgent need at this time.     Ruchi  "Syd, KAMAR  [facility] ([facility])               Subjective/24 hour events: Has had 3 stools today, watery. No blood or melena. Mild abdominal distension. Mentally clear for me today. Nurse reports intermittent confusion.    Objective:    /79   Pulse 81   Temp 98  F (36.7  C) (Oral)   Resp 16   Ht 1.676 m (5' 6\")   Wt 69 kg (152 lb 3.2 oz)   SpO2 93%   BMI 24.57 kg/m    Temp (24hrs), Av.3  F (36.8  C), Min:98  F (36.7  C), Max:98.7  F (37.1  C)    Patient Vitals for the past 72 hrs:   Weight   21 0548 69 kg (152 lb 3.2 oz)   21 0512 67 kg (147 lb 12.8 oz)   21 1138 71.7 kg (158 lb)       Medications (Scheduled)    albumin human  25 g Intravenous Q8H     lactulose  20 g Oral TID     midodrine  10 mg Oral TID w/meals     pantoprazole (PROTONIX) IV  40 mg Intravenous Daily with breakfast     prednisoLONE  40 mg Oral Daily     sodium chloride (PF)  10-40 mL Intracatheter Q7 Days     sodium chloride (PF)  3 mL Intracatheter Q8H       PRN Medications  sodium chloride 0.9%, lidocaine 4%, lidocaine (buffered or not buffered), lidocaine (buffered or not buffered), - MEDICATION INSTRUCTIONS -, nitroGLYcerin, ondansetron **OR** ondansetron, sodium chloride (PF), sodium chloride (PF), sodium chloride (PF), sodium chloride (PF)    Physical exam:  General Appearance: Alert  Eyes: Scleral icterus  CV: Regular  Gastrointestinal: mild abd distension, NT, +BS    Data:  All relevant data has been reviewed. Pertinent information:     Labs:   Recent Labs   Lab Test 21  0537 21  0536 21  0611 21  1901 21  1435 21  0829   WBC 13.3*  --  13.2*  --   --  12.1*   HGB 7.0*  --  8.2* 8.5*   < > 8.9*   *  --  107*  --   --  105*   PLT 86*  --  85*  --   --  92*   INR  --  3.54*  --  2.91*  --  2.96*    < > = values in this interval not displayed.     Recent Labs   Lab Test 21  0537 21  0611 21  0829   POTASSIUM 3.7 3.4 3.3*   CHLORIDE " 109 108 103   CO2 18* 17* 18*   BUN 56* 58* 60*   ANIONGAP 12 12 15*     Recent Labs   Lab Test 09/27/21  0537 09/26/21  0611 09/25/21  0829 09/25/21  0427 09/24/21  1247   ALBUMIN 2.3* 2.0* 1.7*  --    < >   BILITOTAL 25.4* 27.4* 28.3*  --    < >   ALT 16 20 22  --    < >   AST 23 34 38  --    < >   PROTEIN  --   --   --  Negative  --     < > = values in this interval not displayed.

## 2021-09-27 NOTE — PROGRESS NOTES
Primary RN states ordering physician stated PICC, but appears ordered midline.  PICC necessary over midline, patient has multiple medications and lab draws with poorly visualized vasculature.  Primary RN to have MD update order.

## 2021-09-27 NOTE — PLAN OF CARE
VSS on RA. BP soft but stable (90s- low 100s/70's). Tele: SR. Denies pain. Mild SOB. PICC line placed last evening. Up SBA, gait steady. IMC status.

## 2021-09-27 NOTE — PROGRESS NOTES
[facility]     Renal Progress Note       SHORTHAND KEY FOR MY NOTES:  c = with, s = without, p = after, a = before, x = except, asx = asymptomatic, tx = transplant or treatment, sx = symptoms or symptomatic, cx = canceled or culture, rxn = reaction, yday = yesterday, nl = normal, abx = antibiotics, fxn = function, dx = diagnosis, dz = disease, m/h = melena/hematochezia, c/d/l/ha = cramping/dizziness/lightheadedness/headache, d/c = discharge or diarrhea/constipation, f/c/n/v = fevers/chills/nausea/vomiting, cp/sob = chest pain/shortness of breath, tbv = total body volume, rxn = reaction, tdc = tunneled dialysis catheter, pta = prior to admission, hd = hemodialysis, pd = peritoneal dialysis, hhd = home hemodialysis, edw = estimated dry wt         Assessment/Plan:     1.  ROSSY.  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  Cr is slowly trending lower.  Her baseline cr is 0.5.  No uremic sx.  A.  Follow labs, uo, sx daily.  B.  Avoid nephrotoxics.  C.  Continue IV alb/midodrine/octreotide combo for now.  Once cr has stabilized, we will stop and see how she does.    2.  Alcoholic hepatitis c encephalopathy.  Pt is being followed by GI.  She is on lactulose and prednisolone.  A.  Further plans per GI.    3.  Anemia.  Pt's hb was down to 7 this AM, so she was transfused.  No overt GIB sx at this time.  A.  Follow hb and transfuse prn.    4.  FEN.  Electrolytes are ok.  She is on a low salt diet.        Interval History:     Pt feels ok today.  No f/c/n/v.  Good uo per pt, but not recorded.  She is not having any cp/sob/abd pain.  She is eating ok.          Medications and Allergies:       albumin human  25 g Intravenous Q8H     lactulose  20 g Oral TID     midodrine  10 mg Oral TID w/meals     pantoprazole (PROTONIX) IV  40 mg Intravenous Daily with breakfast     prednisoLONE  40 mg Oral Daily     sodium chloride (PF)  10-40 mL Intracatheter Q7 Days     sodium chloride (PF)  3 mL Intracatheter Q8H  "    No Known Allergies       Physical Exam:     Vitals were reviewed     , Blood pressure 114/79, pulse 81, temperature 98  F (36.7  C), temperature source Oral, resp. rate 16, height 1.676 m (5' 6\"), weight 69 kg (152 lb 3.2 oz), SpO2 93 %, not currently breastfeeding.  Wt Readings from Last 3 Encounters:   09/27/21 69 kg (152 lb 3.2 oz)   09/14/20 79.9 kg (176 lb 1.6 oz)     Intake/Output Summary (Last 24 hours) at 9/27/2021 1458  Last data filed at 9/27/2021 1330  Gross per 24 hour   Intake 570 ml   Output --   Net 570 ml     GENERAL APPEARANCE: pleasant, NAD, alert  HEENT:  eyes/ears/nose/neck grossly nl  RESP: clear anteriorly, diminished post bases; no crackles  CV: RRR, nl S1/S2   ABDOMEN: s/nt, + distended  EXTREMITIES/SKIN: + jaundice; 2+ ble edema         Data:     CBC RESULTS:     Recent Labs   Lab 09/27/21  0537 09/26/21  0611 09/25/21  1901 09/25/21  1435 09/25/21  0829 09/25/21  0150 09/24/21  1800 09/24/21  1800 09/24/21  1247 09/24/21  1247   WBC 13.3* 13.2*  --   --  12.1*  --   --  14.2*  --  16.8*   RBC 1.87* 2.30*  --   --  2.46*  --   --  1.06*  --  1.21*   HGB 7.0* 8.2* 8.5* 8.1* 8.9* 6.6*   < > 4.7*   < > 5.2*   HCT 20.5* 24.7*  --   --  25.8*  --   --  14.7*  --  16.5*   PLT 86* 85*  --   --  92*  --   --  102*  --  143*    < > = values in this interval not displayed.     Basic Metabolic Panel:  Recent Labs   Lab 09/27/21  0537 09/26/21  0611 09/25/21  1912 09/25/21  0829 09/24/21  1247    137 137 136 132*   POTASSIUM 3.7 3.4  --  3.3* 3.6   CHLORIDE 109 108  --  103 98   CO2 18* 17*  --  18* 14*   BUN 56* 58*  --  60* 60*   CR 3.27* 3.55* 3.73* 3.73* 4.10*   * 100*  --  91 121*   SHAI 8.1* 8.1*  --  7.8* 8.1*     INR  Recent Labs   Lab 09/27/21  0536 09/25/21  1901 09/25/21  0829 09/24/21  1247   INR 3.54* 2.91* 2.96* 3.30*      Attestation:   I have reviewed today's relevant vital signs, notes, medications, labs and imaging.    Guero Hammond MD  [facility] - " Nephrology  [phone]

## 2021-09-27 NOTE — PROVIDER NOTIFICATION
Paged Dr. Temple 09/27/21 6:48 AM FYI hemoglobin down to 7.0 this am (was 8.2 yesterday).     Received callback and conditional transfusion orders

## 2021-09-27 NOTE — PROCEDURES
[facility]    Double Lumen PICC Placement    Date/Time: 9/26/2021 10:08 PM  Performed by: Dayron Fatima MD  Authorized by: Dayron Fatima MD   Indications: vascular access    UNIVERSAL PROTOCOL   Site Marked: Yes  Prior Images Obtained and Reviewed:  Yes  Required items: Required blood products, implants, devices and special equipment available    Patient identity confirmed:  Verbally with patient and arm band  NA - No sedation, light sedation, or local anesthesia  Confirmation Checklist:  Patient's identity using two indicators, relevant allergies, procedure was appropriate and matched the consent or emergent situation and correct equipment/implants were available  Time out: Immediately prior to the procedure a time out was called    Universal Protocol: the Joint Commission Universal Protocol was followed    Preparation: Patient was prepped and draped in usual sterile fashion           ANESTHESIA    Anesthesia: Local infiltration  Local Anesthetic:  Lidocaine 1% without epinephrine  Anesthetic Total (mL):  2      SEDATION    Patient Sedated: No        Preparation: skin prepped with ChloraPrep  Skin prep agent: skin prep agent completely dried prior to procedure  Sterile barriers: maximum sterile barriers were used: cap, mask, sterile gown, sterile gloves, and large sterile sheet  Hand hygiene: hand hygiene performed prior to central venous catheter insertion  Type of line used: PICC  Catheter type: double lumen  Lumen type: valved  Catheter size: 5 Fr  Brand: Bard  Lot number: [id number]  Placement method: MST and tip confirmation system (3CG)  Number of attempts: 1  Orientation: right  Location: basilic vein  Arm circumference: adults 10 cm  Extremity circumference: 22.5  Visible catheter length: 3  Internal length: 42 cm  Total catheter length: 45  Dressing and securement: securement device and sterile dressing applied  Post procedure assessment: blood return through all ports and  placement verified by x-ray  PROCEDURE   Patient Tolerance:  Patient tolerated the procedure well with no immediate complications

## 2021-09-27 NOTE — PLAN OF CARE
Pt received 1 unit of blood, up independently, has had 4 loose stools today and lactulose was held. /90 and midodrine held, Tele SR

## 2021-09-27 NOTE — PROVIDER NOTIFICATION
Brief update:    Paged regarding hemoglobin 7.0, prior 8.2    Patient with cirrhosis, portal hypertension    Conditional orders placed for transfusion for hemoglobin 7 or less.  Discussed with nursing staff.  No overt bleeding or melenic stool has been noted thus far.    Ermias Temple MD  6:58 AM

## 2021-09-27 NOTE — PROGRESS NOTES
[facility]    Medicine Progress Note - Hospitalist Service       Date of Admission:  9/24/2021    Assessment & Plan         Melita Cortes is a 51 year old female who presents with worsening jaundice for the past 2 weeks, worsening abdominal distention, lower extremity edema and increased dyspnea.     Acute Liver failure-most likely due to acute on chronic alcoholic liver disease  Severe alcoholic liver disease with portal hypertension  Ascites  Alcoholic hepatitis  Hepatic encephalopathy  Patient presented with progressive symptoms which has been ongoing for about a year and appears to have gotten worse in the past month or so. Her constellation of symptoms and lab findings were suggestive of severe alcoholic liver disease.  Bilirubin extremely elevated at 30.2. Meld score was 40 in the ER, Dr. Oliver from the emergency room attempted transfer to [facility] for potential transplant however she was turned down due to her history of ongoing alcohol usage.    Admitted to inpatient.    [facility] consulted, appreciate their assistance.    Ultrasonogram of the abdomen showed evidence of hepatic cirrhosis with portal hypertension.    S/p diagnostic and therapeutic paracentesis with removal of 3.95 L of ascites fluid on 9/24.    Preliminary results from ascites fluid consistent with portal hypertension related ascites most likely due to alcoholic liver disease. Culture negative.    Continue lactulose 20 g 3 times daily, titrate for 3-4 bowel movements daily.    Given positive occult blood, started on ceftriaxone 2 g IV daily.  No plans for immediate endoscopy at this time. Antibiotics subsequently discontinued by GI.    Started on prednisolone for alcoholic hepatitis on 9/26/21. Plan to recheck labs on 10/2/21 to see if she qualifies for 28 day course of prednisolone.    Bilirubin and creatinine slowly trending down.     Severe anemia-likely  multifactorial  Thrombocytopenia  Hemoglobin at presentation 5.2, with a sarah of 4.7.  Last hemoglobin in the system was from September 2020 and it was 12.3 at that time. Patient denies hematochezia or melena.    S/p 3 units of PRBC on 9/24, hemoglobin then stable around 8, but then down to 7.0 on 9/27/21.    Transfusing one more unit of PRBCs today.    Recheck hemoglobin this afternoon.    Stool occult blood is positive but no overt evidence of GI bleed.    [facility] following, planning eventual endoscopy, timing per GI.    Platelets stable at 86 on 9/27/21.    Recheck CBC in AM.     Acute kidney injury, likely multifactorial with both prerenal and hepatorenal syndrome  Creatinine at presentation was 4.10. Last creatinine in the chart was from September 2020 when it was normal at 0.56. FENA of 0.3.    This is most likely multifactorial with components of both prerenal and hepatorenal syndrome.    Nephrology consulted, appreciate their assistance.    Creatinine slowly improving with gentle hydration, IV albumin, and PRBC transfusion.    Midodrine and octreotide added by nephrology on 9/26/21.    Recheck labs in AM.     Coagulopathy due to alcoholic liver disease  INR more than 3 at presentation, coagulopathy most likely due to severe alcoholic liver disease.      No improvement with vitamin K.    Recheck in AM.     Mild hyponatremia  Most likely due to severe alcoholic liver disease.    Improved with hydration.    Sodium within normal limits this morning.    COVID-19 PCR negative    Routine admission COVID-19 PCR testing was negative on 9/24/21.       Diet: 2 Gram Sodium Diet  Snacks/Supplements Adult: Ensure Enlive; Between Meals    DVT Prophylaxis: Pneumatic Compression Devices  Gunter Catheter: Not present  Central Lines: None  Code Status: Full Code      Disposition Plan   Expected discharge: pending improvement in symptoms and labs, consultant recommendations     The patient's care was discussed with the  Patient.    Samuel Lopez MD  Hospitalist Service  [facility]  Securely message with the [employer] Web Console (learn more here)  Text page via [employer] Paging/Directory      Clinically Significant Risk Factors Present on Admission           # Severe Malnutrition, POA: based on Weight loss;Reduced intake;Subcutaneous fat loss;Muscle loss;Fluid retention (09/26/21 1336)     ______________________________________________________________________    Interval History   Melita Cortes was seen today. Feels a little tired. A little unsteady when she got up with therapy earlier. Otherwise no new complaints/concerns. Denies fevers, chest pain, shortness of breath, nausea, abdominal pain.    Data reviewed today: I reviewed all medications, new labs and imaging results over the last 24 hours. I personally reviewed no images or EKG's today.    Physical Exam   Vital Signs: Temp: 98  F (36.7  C) Temp src: Oral BP: 114/79 Pulse: 81   Resp: 16 SpO2: 93 % O2 Device: None (Room air)    Weight: 152 lbs 3.2 oz  Constitutional: awake, alert, cooperative, no apparent distress, laying in the hospital bed  Respiratory: diminished at the bases  Cardiovascular: regular rate and rhythm, normal S1 and S2, no murmur noted  GI: normal bowel sounds, soft, mild distention, non-tender  Skin: warm, dry  Musculoskeletal: 2+ bilateral lower extremity pitting edema present  Neurologic: awake, alert, oriented to name, place and time, motor is 5 out of 5 bilaterally, sensory is intact    Data   Recent Labs   Lab 09/27/21  0537 09/27/21  0536 09/26/21  0611 09/25/21  1912 09/25/21  1901 09/25/21  1435 09/25/21  0829 09/25/21  0829   WBC 13.3*  --  13.2*  --   --   --   --  12.1*   HGB 7.0*  --  8.2*  --  8.5*   < >  --  8.9*   *  --  107*  --   --   --   --  105*   PLT 86*  --  85*  --   --   --   --  92*   INR  --  3.54*  --   --  2.91*  --   --  2.96*     --  137 137  --   --    < > 136   POTASSIUM 3.7  --   3.4  --   --   --   --  3.3*   CHLORIDE 109  --  108  --   --   --   --  103   CO2 18*  --  17*  --   --   --   --  18*   BUN 56*  --  58*  --   --   --   --  60*   CR 3.27*  --  3.55* 3.73*  --   --    < > 3.73*   ANIONGAP 12  --  12  --   --   --   --  15*   SHAI 8.1*  --  8.1*  --   --   --   --  7.8*   *  --  100*  --   --   --   --  91   ALBUMIN 2.3*  --  2.0*  --   --   --    < > 1.7*   PROTTOTAL 5.4*  --  5.7*  --   --   --    < > 5.9*   BILITOTAL 25.4*  --  27.4*  --   --   --    < > 28.3*   ALKPHOS 43  --  44  --   --   --    < > 47   ALT 16  --  20  --   --   --    < > 22   AST 23  --  34  --   --   --    < > 38    < > = values in this interval not displayed.     Medications     - MEDICATION INSTRUCTIONS -       octreotide (sandoSTATIN) infusion ADULT 50 mcg/hr (09/27/21 0934)       albumin human  25 g Intravenous Q8H     lactulose  20 g Oral TID     midodrine  10 mg Oral TID w/meals     pantoprazole (PROTONIX) IV  40 mg Intravenous Daily with breakfast     prednisoLONE  40 mg Oral Daily     sodium chloride (PF)  10-40 mL Intracatheter Q7 Days     sodium chloride (PF)  3 mL Intracatheter Q8H

## 2021-09-28 ENCOUNTER — APPOINTMENT (OUTPATIENT)
Dept: PHYSICAL THERAPY | Facility: CLINIC | Age: 51
DRG: 441 | End: 2021-09-28
Payer: COMMERCIAL

## 2021-09-28 LAB
A1AT PHENOTYP SERPL-IMP: NORMAL
A1AT SERPL-MCNC: 158 MG/DL
ALBUMIN SERPL-MCNC: 2.7 G/DL (ref 3.4–5)
ALP SERPL-CCNC: 63 U/L (ref 40–150)
ALT SERPL W P-5'-P-CCNC: 20 U/L (ref 0–50)
ANA SER QL IF: NEGATIVE
ANION GAP SERPL CALCULATED.3IONS-SCNC: 12 MMOL/L (ref 3–14)
AST SERPL W P-5'-P-CCNC: 32 U/L (ref 0–45)
BILIRUB SERPL-MCNC: 24.9 MG/DL (ref 0.2–1.3)
BUN SERPL-MCNC: 57 MG/DL (ref 7–30)
CALCIUM SERPL-MCNC: 8.3 MG/DL (ref 8.5–10.1)
CHLORIDE BLD-SCNC: 107 MMOL/L (ref 94–109)
CO2 SERPL-SCNC: 17 MMOL/L (ref 20–32)
CREAT SERPL-MCNC: 3.38 MG/DL (ref 0.52–1.04)
ERYTHROCYTE [DISTWIDTH] IN BLOOD BY AUTOMATED COUNT: 29.1 % (ref 10–15)
GFR SERPL CREATININE-BSD FRML MDRD: 15 ML/MIN/1.73M2
GLUCOSE BLD-MCNC: 138 MG/DL (ref 70–99)
HCT VFR BLD AUTO: 25.8 % (ref 35–47)
HGB BLD-MCNC: 8.8 G/DL (ref 11.7–15.7)
IGG SERPL-MCNC: 2005 MG/DL (ref 610–1616)
IGG1 SER-MCNC: 1233 MG/DL (ref 382–929)
IGG2 SER-MCNC: 489 MG/DL (ref 242–700)
IGG3 SER-MCNC: 78 MG/DL (ref 22–176)
IGG4 SER-MCNC: 108 MG/DL (ref 4–86)
INR PPP: 3.27 (ref 0.85–1.15)
LKM AB TITR SER IF: NORMAL {TITER}
MCH RBC QN AUTO: 35.8 PG (ref 26.5–33)
MCHC RBC AUTO-ENTMCNC: 34.1 G/DL (ref 31.5–36.5)
MCV RBC AUTO: 105 FL (ref 78–100)
PATH REPORT.COMMENTS IMP SPEC: NORMAL
PATH REPORT.FINAL DX SPEC: NORMAL
PATH REPORT.FINAL DX SPEC: NORMAL
PATH REPORT.GROSS SPEC: NORMAL
PATH REPORT.MICROSCOPIC SPEC OTHER STN: NORMAL
PATH REPORT.RELEVANT HX SPEC: NORMAL
PATH REPORT.RELEVANT HX SPEC: NORMAL
PLATELET # BLD AUTO: 95 10E3/UL (ref 150–450)
POTASSIUM BLD-SCNC: 3.5 MMOL/L (ref 3.4–5.3)
PROT SERPL-MCNC: 5.6 G/DL (ref 6.8–8.8)
RBC # BLD AUTO: 2.46 10E6/UL (ref 3.8–5.2)
SODIUM SERPL-SCNC: 136 MMOL/L (ref 133–144)
SUBCLASSES, PERCENT: 95 %
WBC # BLD AUTO: 16.2 10E3/UL (ref 4–11)

## 2021-09-28 PROCEDURE — 88112 CYTOPATH CELL ENHANCE TECH: CPT | Mod: 26 | Performed by: PATHOLOGY

## 2021-09-28 PROCEDURE — P9047 ALBUMIN (HUMAN), 25%, 50ML: HCPCS | Performed by: INTERNAL MEDICINE

## 2021-09-28 PROCEDURE — 250N000013 HC RX MED GY IP 250 OP 250 PS 637: Performed by: HOSPITALIST

## 2021-09-28 PROCEDURE — 97110 THERAPEUTIC EXERCISES: CPT | Mod: GP

## 2021-09-28 PROCEDURE — 250N000011 HC RX IP 250 OP 636: Performed by: INTERNAL MEDICINE

## 2021-09-28 PROCEDURE — 99233 SBSQ HOSP IP/OBS HIGH 50: CPT | Performed by: HOSPITALIST

## 2021-09-28 PROCEDURE — 99233 SBSQ HOSP IP/OBS HIGH 50: CPT | Performed by: INTERNAL MEDICINE

## 2021-09-28 PROCEDURE — 82040 ASSAY OF SERUM ALBUMIN: CPT | Performed by: HOSPITALIST

## 2021-09-28 PROCEDURE — 97530 THERAPEUTIC ACTIVITIES: CPT | Mod: GP

## 2021-09-28 PROCEDURE — 250N000011 HC RX IP 250 OP 636: Mod: JA | Performed by: INTERNAL MEDICINE

## 2021-09-28 PROCEDURE — 250N000013 HC RX MED GY IP 250 OP 250 PS 637: Performed by: INTERNAL MEDICINE

## 2021-09-28 PROCEDURE — 250N000012 HC RX MED GY IP 250 OP 636 PS 637: Performed by: INTERNAL MEDICINE

## 2021-09-28 PROCEDURE — 85060 BLOOD SMEAR INTERPRETATION: CPT | Performed by: PATHOLOGY

## 2021-09-28 PROCEDURE — 85610 PROTHROMBIN TIME: CPT | Performed by: HOSPITALIST

## 2021-09-28 PROCEDURE — 88305 TISSUE EXAM BY PATHOLOGIST: CPT | Mod: 26 | Performed by: PATHOLOGY

## 2021-09-28 PROCEDURE — 258N000003 HC RX IP 258 OP 636: Performed by: INTERNAL MEDICINE

## 2021-09-28 PROCEDURE — 999N000040 HC STATISTIC CONSULT NO CHARGE VASC ACCESS

## 2021-09-28 PROCEDURE — 85027 COMPLETE CBC AUTOMATED: CPT | Performed by: HOSPITALIST

## 2021-09-28 PROCEDURE — 210N000002 HC R&B HEART CARE

## 2021-09-28 PROCEDURE — C9113 INJ PANTOPRAZOLE SODIUM, VIA: HCPCS | Performed by: INTERNAL MEDICINE

## 2021-09-28 RX ORDER — LACTULOSE 10 G/15ML
10 SOLUTION ORAL 3 TIMES DAILY
Status: DISCONTINUED | OUTPATIENT
Start: 2021-09-28 | End: 2021-10-01 | Stop reason: HOSPADM

## 2021-09-28 RX ADMIN — OCTREOTIDE ACETATE 50 MCG/HR: 200 INJECTION, SOLUTION INTRAVENOUS; SUBCUTANEOUS at 19:57

## 2021-09-28 RX ADMIN — ALBUMIN HUMAN 25 G: 0.25 SOLUTION INTRAVENOUS at 05:19

## 2021-09-28 RX ADMIN — PANTOPRAZOLE SODIUM 40 MG: 40 INJECTION, POWDER, FOR SOLUTION INTRAVENOUS at 08:06

## 2021-09-28 RX ADMIN — MIDODRINE HYDROCHLORIDE 10 MG: 5 TABLET ORAL at 10:42

## 2021-09-28 RX ADMIN — LACTULOSE 20 G: 20 SOLUTION ORAL at 08:05

## 2021-09-28 RX ADMIN — PREDNISOLONE SODIUM PHOSPHATE 40 MG: 15 SOLUTION ORAL at 08:05

## 2021-09-28 RX ADMIN — ALBUMIN HUMAN 25 G: 0.25 SOLUTION INTRAVENOUS at 13:54

## 2021-09-28 RX ADMIN — MIDODRINE HYDROCHLORIDE 10 MG: 5 TABLET ORAL at 17:38

## 2021-09-28 RX ADMIN — ALBUMIN HUMAN 25 G: 0.25 SOLUTION INTRAVENOUS at 21:57

## 2021-09-28 ASSESSMENT — MIFFLIN-ST. JEOR: SCORE: 1336.2

## 2021-09-28 NOTE — PLAN OF CARE
[facility] Nursing [facility]    Patient Information  Name: Melita Cortes  Age: 51 year old    Assessment  Orientation/Neuro: A&O x 4, but forgetful at times  Cardiac/Tele: SR  Resp: dyspnea on exertion  GI/: GI - WDL,  - has had 4 stools today, held lactulose per goal  Mobility: Ind  Pain: denies  Diet: Orders Placed This Encounter      2 Gram Sodium Diet    Vital Signs  B/P: 115/81, T: 97.9, P: 70, R: 16, O2: 94% on RA    Plan  Plan/Other: Continue nephro and GI consults, continue IV albumin/midodrine/octreotide to improve renal status, titrate lactulose for a goal of 3-4 stools per day    Kasey Nur RN

## 2021-09-28 NOTE — PROGRESS NOTES
"[facility] GASTROENTEROLOGY PROGRESS NOTE    SUBJECTIVE:  Feeling ok, no pain, swelling improving.  Overall scared about her prognosis.     OBJECTIVE:    /87   Pulse 76   Temp 98  F (36.7  C) (Oral)   Resp 18   Ht 1.676 m (5' 6\")   Wt 69.9 kg (154 lb 3.2 oz)   SpO2 93%   BMI 24.89 kg/m    Temp (24hrs), Av.1  F (36.7  C), Min:97.9  F (36.6  C), Max:98.4  F (36.9  C)    Patient Vitals for the past 72 hrs:   Weight   21 0643 69.9 kg (154 lb 3.2 oz)   21 0548 69 kg (152 lb 3.2 oz)       Intake/Output Summary (Last 24 hours) at 2021 0849  Last data filed at 2021 2300  Gross per 24 hour   Intake 690 ml   Output 200 ml   Net 490 ml         PHYSICAL EXAM    Constitutional: NAD, comfortable  Cardiovascular: RRR, trace edema in legs  Respiratory: CTAB  Abdomen: soft, non-tender, nondistended        Additional Comments:  ROS, FH, SH: See initial GI consult for details.    I have reviewed the patient's new clinical lab results:    Recent Labs   Lab Test 21  0512 21  1833 21  0537 21  0536 21  0611 21  0611 21  1901 21  0829   WBC 16.2*  --  13.3*  --   --  13.2*  --    < >   HGB 8.8* 8.6* 7.0*  --    < > 8.2* 8.5*   < >   *  --  110*  --   --  107*  --    < >   PLT 95*  --  86*  --   --  85*  --    < >   INR 3.27*  --   --  3.54*  --   --  2.91*  --     < > = values in this interval not displayed.     Recent Labs   Lab Test 21  0512 21  0537 21  0611    139 137   POTASSIUM 3.5 3.7 3.4   CHLORIDE 107 109 108   CO2 17* 18* 17*   BUN 57* 56* 58*   CR 3.38* 3.27* 3.55*   ANIONGAP 12 12 12   SHAI 8.3* 8.1* 8.1*     Recent Labs   Lab Test 21  0512 21  0537 21  0611 21  0829 21  0427   ALBUMIN 2.7* 2.3* 2.0*   < >  --    BILITOTAL 24.9* 25.4* 27.4*   < >  --    ALT 20 16 20   < >  --    AST 32 23 34   < >  --    ALKPHOS 63 43 44   < >  --    PROTEIN  --   --   --   --  Negative    < > = values in " this interval not displayed.       Summary: 51-year-old female admitted with alcoholic hepatitis and concern for cirrhosis.  On presentation she had severe anemia, thrombocytopenia, acute kidney injury, and significant ascites.  No evidence of infection.     Impression & Plan:     Alcoholic hepatitis:  Assessment:  Last drink about 30 days ago.  Prednisolone started 9/26.   Bilirubin improving with prednisolone so far. INR elevated but stable today.  Plan:  - check lille score on day 7 (10/2) to see if qualifies for 28 days of steroids.     Cirrhosis  Assessment:  MELD 43 (90 day mortality 65%).  Likely due to alcohol, labs ordered to look for any other cause.  CARTER borderline. IgG up.  Normal transaminases argue against autoimmune disease.  Hep A/B/C negative.    Ceruloplasmin low but no overt Kayser-Flascher rings but would need slit lamp testing. Can be low without evidence of Fly's disease.    Paracentesis on admission with SAAG 1.3 consistent with portal hypertension as cause.  Plan:  - Outpatient hepatology follow up. Will need screening EGD and US every 6 months.  - no diuretics at this time given renal issues     Anemia  Assessment: Likely multifactorial.  Hgb low but stable.  No overt bleeding. Direct bili elevated. Haptoglobin low.   Plan:  -Monitor for evidence of overt bleeding. No need for EGD at this time but will eventually need to screen for varices   - Monitor HGB and transfuse prn.     Acute kidney injury.   Nephrology following. Pre-renal vs HRS. Diuretics on hold. Getting IV albumin, midodrine and octreotide added yesterday.         Bhumi Milton  [facility]  Office:  [phone]  Cell/pager: [phone]

## 2021-09-28 NOTE — PROGRESS NOTES
[facility]     Renal Progress Note       SHORTHAND KEY FOR MY NOTES:  c = with, s = without, p = after, a = before, x = except, asx = asymptomatic, tx = transplant or treatment, sx = symptoms or symptomatic, cx = canceled or culture, rxn = reaction, yday = yesterday, nl = normal, abx = antibiotics, fxn = function, dx = diagnosis, dz = disease, m/h = melena/hematochezia, c/d/l/ha = cramping/dizziness/lightheadedness/headache, d/c = discharge or diarrhea/constipation, f/c/n/v = fevers/chills/nausea/vomiting, cp/sob = chest pain/shortness of breath, tbv = total body volume, rxn = reaction, tdc = tunneled dialysis catheter, pta = prior to admission, hd = hemodialysis, pd = peritoneal dialysis, hhd = home hemodialysis, edw = estimated dry wt         Assessment/Plan:     1.  ROSSY.  Pt's cr is a little higher than yday.  It appears the pre-renal component has been addressed.  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  A.  Follow labs, uo, sx daily.  B.  Avoid nephrotoxics.  C.  Continue IV alb/octreotide + oral midodrine combo for at least another day.  Once cr has stabilized, we will stop and see how she does.    2.  Alcoholic hepatitis c encephalopathy.  Pt is being followed by GI.  She is on lactulose and prednisolone.  At present, she is not a liver tx candidate.  A.  Continue lactulose + prednisolone per GI.    3.  Anemia.  Pt's hb is in the 8s today p blood tranfusion yday.  No clear signs of GIB at this time.  A.  Follow hb and transfuse prn.    4.  Metabolic acidosis.  This is due to an RTA from the renal dysfxn + diarrhea.  K is ok.  A.  Follow labs closely.    5.  FEN.  Electrolytes are ok.  She is on a low salt diet.        Interval History:     Pt feels ok and is eating/drinking fine.  No issues c urination.  Denies any f/c/n/v/itching.  Breathing ok, no cp/abd pain.    She states that she wants to live and will do anything for life.  She has  "been sober \"for at least 30 days.\"          Medications and Allergies:       albumin human  25 g Intravenous Q8H     lactulose  10 g Oral TID     midodrine  10 mg Oral TID w/meals     pantoprazole (PROTONIX) IV  40 mg Intravenous Daily with breakfast     prednisoLONE  40 mg Oral Daily     sodium chloride (PF)  10-40 mL Intracatheter Q7 Days     No Known Allergies       Physical Exam:     Vitals were reviewed     , Blood pressure (!) 127/92, pulse 67, temperature 97.8  F (36.6  C), temperature source Oral, resp. rate 20, height 1.676 m (5' 6\"), weight 69.9 kg (154 lb 3.2 oz), SpO2 94 %, not currently breastfeeding.  Wt Readings from Last 3 Encounters:   09/28/21 69.9 kg (154 lb 3.2 oz)   09/14/20 79.9 kg (176 lb 1.6 oz)     Intake/Output Summary (Last 24 hours) at 9/28/2021 1247  Last data filed at 9/28/2021 1200  Gross per 24 hour   Intake 940 ml   Output 200 ml   Net 740 ml     GENERAL APPEARANCE: tearful, scared, NAD, alert  HEENT:  eyes/ears/nose/neck grossly nl x + scleral icterus  RESP: clear anteriorly, diminished post bases; no crackles  CV: RRR, nl S1/S2   ABDOMEN: s/nt, + distended  EXTREMITIES/SKIN: + jaundice; 2+ ble edema         Data:     CBC RESULTS:     Recent Labs   Lab 09/28/21  0512 09/27/21  1833 09/27/21  0537 09/26/21  0611 09/25/21  1901 09/25/21  1435 09/25/21  0829 09/25/21  0829 09/25/21  0150 09/24/21  1800 09/24/21  1247 09/24/21  1247   WBC 16.2*  --  13.3* 13.2*  --   --   --  12.1*  --  14.2*  --  16.8*   RBC 2.46*  --  1.87* 2.30*  --   --   --  2.46*  --  1.06*  --  1.21*   HGB 8.8* 8.6* 7.0* 8.2* 8.5* 8.1*   < > 8.9*   < > 4.7*   < > 5.2*   HCT 25.8*  --  20.5* 24.7*  --   --   --  25.8*  --  14.7*  --  16.5*   PLT 95*  --  86* 85*  --   --   --  92*  --  102*  --  143*    < > = values in this interval not displayed.     Basic Metabolic Panel:  Recent Labs   Lab 09/28/21  0512 09/27/21  0537 09/26/21  0611 09/25/21  1912 09/25/21  0829 09/24/21  1247    139 137 137 136 132* "   POTASSIUM 3.5 3.7 3.4  --  3.3* 3.6   CHLORIDE 107 109 108  --  103 98   CO2 17* 18* 17*  --  18* 14*   BUN 57* 56* 58*  --  60* 60*   CR 3.38* 3.27* 3.55* 3.73* 3.73* 4.10*   * 153* 100*  --  91 121*   SHAI 8.3* 8.1* 8.1*  --  7.8* 8.1*     INR  Recent Labs   Lab 09/28/21  0512 09/27/21  0536 09/25/21  1901 09/25/21  0829   INR 3.27* 3.54* 2.91* 2.96*      Attestation:   I have reviewed today's relevant vital signs, notes, medications, labs and imaging.    Guero Hammond MD  [facility] - Nephrology  [phone]

## 2021-09-28 NOTE — PROGRESS NOTES
[facility]    Medicine Progress Note - Hospitalist Service       Date of Admission:  9/24/2021    Assessment & Plan            Melita Cortes is a 51 year old female who presents with worsening jaundice for the past 2 weeks, worsening abdominal distention, lower extremity edema and increased dyspnea.     Acute Liver failure-most likely due to acute on chronic alcoholic liver disease  Severe alcoholic liver disease with portal hypertension  Ascites  Alcoholic hepatitis  Hepatic encephalopathy  Patient presented with progressive symptoms which has been ongoing for about a year and appears to have gotten worse in the past month or so. Her constellation of symptoms and lab findings were suggestive of severe alcoholic liver disease.  Bilirubin extremely elevated at 30.2. Meld score was 40 in the ER, Dr. Oliver from the emergency room attempted transfer to [facility] for potential transplant however she was turned down due to her history of ongoing alcohol usage.    Admitted to inpatient.    [facility] consulted, appreciate their assistance.    Ultrasonogram of the abdomen showed evidence of hepatic cirrhosis with portal hypertension.    S/p diagnostic and therapeutic paracentesis with removal of 3.95 L of ascites fluid on 9/24.    Preliminary results from ascites fluid consistent with portal hypertension related ascites most likely due to alcoholic liver disease. Culture negative.    Decrease lactulose to 10 g 3 times daily (has already had 3 loose stools today), titrate for 3-4 bowel movements daily.    Given positive occult blood, started on ceftriaxone 2 g IV daily.  No plans for immediate endoscopy at this time. Antibiotics subsequently discontinued by GI.    Started on prednisolone for alcoholic hepatitis on 9/26/21. Plan to recheck labs on 10/2/21 to see if she qualifies for 28 day course of prednisolone.    Bilirubin and INR slowly trending down.    Recheck labs  in AM.     Severe anemia-likely multifactorial  Thrombocytopenia  Hemoglobin at presentation 5.2, with a sarah of 4.7.  Last hemoglobin in the system was from September 2020 and it was 12.3 at that time. Patient denies hematochezia or melena.    S/p 3 units of PRBC on 9/24 and one more unit on 9/27.    Hemoglobin improved to 8.8 on 9/28/21.    Stool occult blood is positive but no overt evidence of GI bleed.    [facility] following, planning eventual endoscopy, timing per GI.    Platelets stable at 95 on 9/27/21.    Recheck CBC in AM.     Acute kidney injury, likely multifactorial with both prerenal and hepatorenal syndrome  Creatinine at presentation was 4.10. Last creatinine in the chart was from September 2020 when it was normal at 0.56. FENA of 0.3.    This is most likely multifactorial with components of both prerenal and hepatorenal syndrome.    Nephrology consulted, appreciate their assistance.    Creatinine slowly improving overall, but up slightly this morning at 3.38.    IV albumin, midodrine and octreotide added by nephrology on 9/26/21.    Recheck labs in AM.     Coagulopathy due to alcoholic liver disease  INR more than 3 at presentation, coagulopathy most likely due to severe alcoholic liver disease.      No improvement with vitamin K.    Recheck in AM.     Mild hyponatremia  Most likely due to severe alcoholic liver disease.    Improved with hydration.    Sodium within normal limits this morning.    COVID-19 PCR negative    Routine admission COVID-19 PCR testing was negative on 9/24/21.       Diet: 2 Gram Sodium Diet  Snacks/Supplements Adult: Ensure Enlive; Between Meals    DVT Prophylaxis: Pneumatic Compression Devices  Gunter Catheter: Not present  Central Lines: None  Code Status: Full Code      Disposition Plan   Expected discharge: continue inpatient care pending improvement in renal and hepatic function, consultant recommendations     The patient's care was discussed with the Bedside Nurse, Patient  and Nephrology Consultant.    Samuel Lopez MD  Hospitalist Service  [facility]  Securely message with the [employer] Web Console (learn more here)  Text page via [email or web address] Paging/Directory      Clinically Significant Risk Factors Present on Admission           # Severe Malnutrition, POA: based on Weight loss;Reduced intake;Subcutaneous fat loss;Muscle loss;Fluid retention (09/26/21 1336)     ______________________________________________________________________    Interval History   Melita Cortes was seen this morning. She feels OK. No new complaints/concerns. Denies fevers, chest pain, shortness of breath, nausea, abdominal pain. Has already had 3 loose bowel movements this morning. No melena or hematochezia.    Data reviewed today: I reviewed all medications, new labs and imaging results over the last 24 hours. I personally reviewed no images or EKG's today.    Physical Exam   Vital Signs: Temp: 98  F (36.7  C) Temp src: Oral BP: 127/87 Pulse: 70   Resp: 18 SpO2: 95 % O2 Device: None (Room air) Oxygen Delivery: 2 LPM  Weight: 154 lbs 3.2 oz  Constitutional: awake, alert, cooperative, no apparent distress, laying in the hospital bed  Respiratory: clear to auscultation bilaterally, no crackles or wheezing  Cardiovascular: regular rate and rhythm, normal S1 and S2, no murmur noted  GI: normal bowel sounds, soft, mild distention, non-tender  Skin: warm, dry, jaundiced  Musculoskeletal: 2+ bilateral lower extremity pitting edema present  Neurologic: awake, alert, oriented to name, place and time, motor is 5 out of 5 bilaterally, sensory is intact    Data   Recent Labs   Lab 09/28/21  0512 09/27/21  1833 09/27/21  0537 09/27/21  0536 09/26/21  0611 09/26/21  0611 09/25/21  1912 09/25/21  1901   WBC 16.2*  --  13.3*  --   --  13.2*  --   --    HGB 8.8* 8.6* 7.0*  --    < > 8.2*   < > 8.5*   *  --  110*  --   --  107*  --   --    PLT 95*  --  86*  --   --  85*  --   --    INR 3.27*   --   --  3.54*  --   --   --  2.91*     --  139  --   --  137   < >  --    POTASSIUM 3.5  --  3.7  --   --  3.4  --   --    CHLORIDE 107  --  109  --   --  108  --   --    CO2 17*  --  18*  --   --  17*  --   --    BUN 57*  --  56*  --   --  58*  --   --    CR 3.38*  --  3.27*  --   --  3.55*   < >  --    ANIONGAP 12  --  12  --   --  12  --   --    SHAI 8.3*  --  8.1*  --   --  8.1*  --   --    *  --  153*  --   --  100*  --   --    ALBUMIN 2.7*  --  2.3*  --    < > 2.0*  --   --    PROTTOTAL 5.6*  --  5.4*  --    < > 5.7*  --   --    BILITOTAL 24.9*  --  25.4*  --    < > 27.4*  --   --    ALKPHOS 63  --  43  --    < > 44  --   --    ALT 20  --  16  --    < > 20  --   --    AST 32  --  23  --    < > 34  --   --     < > = values in this interval not displayed.     Medications     - MEDICATION INSTRUCTIONS -       octreotide (sandoSTATIN) infusion ADULT 50 mcg/hr (09/28/21 0720)       albumin human  25 g Intravenous Q8H     lactulose  10 g Oral TID     midodrine  10 mg Oral TID w/meals     pantoprazole (PROTONIX) IV  40 mg Intravenous Daily with breakfast     prednisoLONE  40 mg Oral Daily     sodium chloride (PF)  10-40 mL Intracatheter Q7 Days

## 2021-09-28 NOTE — PLAN OF CARE
VSS on RA.  Tele:  SR.  Up ind/SBA.  Denies pain.  Some GARCIA.  Pt states that abdomen feels bloated.  PICC in place.  Octreotide running.  Call light within reach.  Will continue to monitor.

## 2021-09-28 NOTE — PLAN OF CARE
"VSS. Desat <90% while sleeping and with activity, placed on 2L O2. Denies pain. Alert and oriented x 4, able to express needs. Compliant with cares. Tele Sinus Rhythm. PICC line intact and with blood return. Will continue to monitor.    /83   Pulse 70   Temp 98.1  F (36.7  C)   Resp 18   Ht 1.676 m (5' 6\")   Wt 69 kg (152 lb 3.2 oz)   SpO2 94%   BMI 24.57 kg/m      "

## 2021-09-28 NOTE — PROGRESS NOTES
"SPIRITUAL HEALTH SERVICES  SPIRITUAL ASSESSMENT Progress Note  [facility]     PRIMARY FOCUS:     Goals of care    Emotional/spiritual/Nondenominational distress    Support for coping    REFERRAL SOURCE: Length of Stay    ILLNESS CIRCUMSTANCES:   Reviewed documentation. Reflective conversation shared with Fidelina and her , Doug, which integrated elements of illness and family narratives.     Resources for Support - , kriss    DISTRESS:     Emotional/Spiritual/Existential Distress - Fidelina shared life stressors in the last 18 months including her mother's death and supporting her father who lives in [place] and has dementia.    Anabaptist Distress - Not named    Social/Cultural/Economic Distress - Fidelina shared regarding the isolation she has felt due to Covid. \"I've had a harder time taking care of myself with the stress.\"    SPIRITUAL/L[employer] day COPING:     Congregational/Kriss - Not named    Spiritual Practice(s) - prayer - Fidelina asked me to pray for her when I left the room and \"maybe tomorrow you can pray with me.\"    Emotional/Relational/Existential Connections - Fidelina shared that she doesn't \"like change,\" but names that she needs \" to make life changes and take care of myself.\"    GOALS OF CARE:    Goals of Care - \"I want to get better, get healthier.\"    Meaning/Sense-Making - focusing on taking care and responsibility for her health      I offered spiritual and emotional support through reflective listening, grief education that affirmed emotions, experience, and meaning. Offered resources for support regarding self-awareness and self-care.    PLAN: Will follow up in 1-2 days.    Gertrudis Smith  Associate   Pager [phone]  Phone [phone]    "

## 2021-09-29 ENCOUNTER — APPOINTMENT (OUTPATIENT)
Dept: PHYSICAL THERAPY | Facility: CLINIC | Age: 51
DRG: 441 | End: 2021-09-29
Payer: COMMERCIAL

## 2021-09-29 LAB
ALBUMIN SERPL-MCNC: 3 G/DL (ref 3.4–5)
ALP SERPL-CCNC: 56 U/L (ref 40–150)
ALT SERPL W P-5'-P-CCNC: 27 U/L (ref 0–50)
ANION GAP SERPL CALCULATED.3IONS-SCNC: 10 MMOL/L (ref 3–14)
AST SERPL W P-5'-P-CCNC: 43 U/L (ref 0–45)
BACTERIA FLD CULT: NO GROWTH
BILIRUB SERPL-MCNC: 23.3 MG/DL (ref 0.2–1.3)
BUN SERPL-MCNC: 62 MG/DL (ref 7–30)
CALCIUM SERPL-MCNC: 8.1 MG/DL (ref 8.5–10.1)
CHLORIDE BLD-SCNC: 108 MMOL/L (ref 94–109)
CO2 SERPL-SCNC: 19 MMOL/L (ref 20–32)
CREAT SERPL-MCNC: 3.14 MG/DL (ref 0.52–1.04)
ERYTHROCYTE [DISTWIDTH] IN BLOOD BY AUTOMATED COUNT: 28.2 % (ref 10–15)
GFR SERPL CREATININE-BSD FRML MDRD: 17 ML/MIN/1.73M2
GLUCOSE BLD-MCNC: 131 MG/DL (ref 70–99)
HCT VFR BLD AUTO: 25.2 % (ref 35–47)
HGB BLD-MCNC: 8.5 G/DL (ref 11.7–15.7)
INR PPP: 3.65 (ref 0.85–1.15)
MCH RBC QN AUTO: 35 PG (ref 26.5–33)
MCHC RBC AUTO-ENTMCNC: 33.7 G/DL (ref 31.5–36.5)
MCV RBC AUTO: 104 FL (ref 78–100)
PLATELET # BLD AUTO: 83 10E3/UL (ref 150–450)
POTASSIUM BLD-SCNC: 3.6 MMOL/L (ref 3.4–5.3)
PROT SERPL-MCNC: 5.5 G/DL (ref 6.8–8.8)
RBC # BLD AUTO: 2.43 10E6/UL (ref 3.8–5.2)
SODIUM SERPL-SCNC: 137 MMOL/L (ref 133–144)
WBC # BLD AUTO: 13 10E3/UL (ref 4–11)

## 2021-09-29 PROCEDURE — 250N000011 HC RX IP 250 OP 636: Performed by: INTERNAL MEDICINE

## 2021-09-29 PROCEDURE — 250N000013 HC RX MED GY IP 250 OP 250 PS 637: Performed by: HOSPITALIST

## 2021-09-29 PROCEDURE — 999N000040 HC STATISTIC CONSULT NO CHARGE VASC ACCESS

## 2021-09-29 PROCEDURE — 210N000002 HC R&B HEART CARE

## 2021-09-29 PROCEDURE — 99233 SBSQ HOSP IP/OBS HIGH 50: CPT | Performed by: INTERNAL MEDICINE

## 2021-09-29 PROCEDURE — 99233 SBSQ HOSP IP/OBS HIGH 50: CPT | Performed by: HOSPITALIST

## 2021-09-29 PROCEDURE — 97530 THERAPEUTIC ACTIVITIES: CPT | Mod: GP | Performed by: PHYSICAL THERAPIST

## 2021-09-29 PROCEDURE — 85027 COMPLETE CBC AUTOMATED: CPT | Performed by: HOSPITALIST

## 2021-09-29 PROCEDURE — 80053 COMPREHEN METABOLIC PANEL: CPT | Performed by: HOSPITALIST

## 2021-09-29 PROCEDURE — 250N000012 HC RX MED GY IP 250 OP 636 PS 637: Performed by: INTERNAL MEDICINE

## 2021-09-29 PROCEDURE — 85610 PROTHROMBIN TIME: CPT | Performed by: HOSPITALIST

## 2021-09-29 PROCEDURE — C9113 INJ PANTOPRAZOLE SODIUM, VIA: HCPCS | Performed by: INTERNAL MEDICINE

## 2021-09-29 PROCEDURE — 97116 GAIT TRAINING THERAPY: CPT | Mod: GP | Performed by: PHYSICAL THERAPIST

## 2021-09-29 PROCEDURE — P9047 ALBUMIN (HUMAN), 25%, 50ML: HCPCS | Performed by: INTERNAL MEDICINE

## 2021-09-29 RX ADMIN — MIDODRINE HYDROCHLORIDE 10 MG: 5 TABLET ORAL at 12:15

## 2021-09-29 RX ADMIN — MIDODRINE HYDROCHLORIDE 10 MG: 5 TABLET ORAL at 17:25

## 2021-09-29 RX ADMIN — LACTULOSE 10 G: 10 SOLUTION ORAL at 09:20

## 2021-09-29 RX ADMIN — PREDNISOLONE SODIUM PHOSPHATE 40 MG: 15 SOLUTION ORAL at 09:13

## 2021-09-29 RX ADMIN — MIDODRINE HYDROCHLORIDE 10 MG: 5 TABLET ORAL at 09:14

## 2021-09-29 RX ADMIN — LACTULOSE 10 G: 10 SOLUTION ORAL at 14:04

## 2021-09-29 RX ADMIN — ALBUMIN HUMAN 25 G: 0.25 SOLUTION INTRAVENOUS at 05:33

## 2021-09-29 RX ADMIN — PANTOPRAZOLE SODIUM 40 MG: 40 INJECTION, POWDER, FOR SOLUTION INTRAVENOUS at 09:15

## 2021-09-29 ASSESSMENT — MIFFLIN-ST. JEOR: SCORE: 1344.36

## 2021-09-29 NOTE — PROGRESS NOTES
"[facility]     Renal Progress Note       SHORTHAND KEY FOR MY NOTES:  c = with, s = without, p = after, a = before, x = except, asx = asymptomatic, tx = transplant or treatment, sx = symptoms or symptomatic, cx = canceled or culture, rxn = reaction, yday = yesterday, nl = normal, abx = antibiotics, fxn = function, dx = diagnosis, dz = disease, m/h = melena/hematochezia, c/d/l/ha = cramping/dizziness/lightheadedness/headache, d/c = discharge or diarrhea/constipation, f/c/n/v = fevers/chills/nausea/vomiting, cp/sob = chest pain/shortness of breath, tbv = total body volume, rxn = reaction, tdc = tunneled dialysis catheter, pta = prior to admission, hd = hemodialysis, pd = peritoneal dialysis, hhd = home hemodialysis, edw = estimated dry wt         Assessment/Plan:     1.  ROSSY.  Pt's cr is a bit better today.  She has been on the IV alb/octreotide + oral midodrine combo, but given stability of renal fxn for a few days, we will stop it and see how she does s it.  No uremic sx.  Remains TBV up, in part from third spacing.  A.  Follow labs, uo, sx daily.  B.  Avoid nephrotoxics.  C.  Continue midodrine for SBP < 120.    D.  Stop IV alb/octretoide.    2.  Alcoholic hepatitis c encephalopathy.  Pt is being followed by GI.  She is on lactulose and prednisolone.  At present, she is not a liver tx candidate.  A.  Continue lactulose + prednisolone per GI.    3.  Anemia.  Pt's hb is still in the 8s.  No obv GIB signs/sx right now.    A.  Follow hb and transfuse prn.    4.  Metabolic acidosis.  This is due to an RTA from the renal dysfxn + diarrhea.  K is ok.  A.  Follow labs closely.    5.  FEN.  Electrolytes are ok.  She is on a low salt diet.        Interval History:     Pt feels fine and has no complaints x \"my belly has a pouch p the paracentesis.\"  No f/c/n/v.  Urinating ok.  Eating fine.  No cp/sob/abd pain.          Medications and Allergies:       [START ON 9/30/2021] influenza recomb " "quadrivalent PF  0.5 mL Intramuscular Prior to discharge     lactulose  10 g Oral TID     midodrine  10 mg Oral TID w/meals     pantoprazole (PROTONIX) IV  40 mg Intravenous Daily with breakfast     prednisoLONE  40 mg Oral Daily     sodium chloride (PF)  10-40 mL Intracatheter Q7 Days     No Known Allergies       Physical Exam:     Vitals were reviewed     , Blood pressure (!) 124/94, pulse 75, temperature 97.8  F (36.6  C), temperature source Oral, resp. rate 16, height 1.676 m (5' 6\"), weight 70.8 kg (156 lb), SpO2 93 %, not currently breastfeeding.  Wt Readings from Last 3 Encounters:   09/29/21 70.8 kg (156 lb)   09/14/20 79.9 kg (176 lb 1.6 oz)     Intake/Output Summary (Last 24 hours) at 9/29/2021 1556  Last data filed at 9/29/2021 1536  Gross per 24 hour   Intake 840 ml   Output --   Net 840 ml     GENERAL APPEARANCE: pleasant, NAD, alert  HEENT:  eyes/ears/nose/neck grossly nl x + scleral icterus  RESP: CTA B c good efforts; no crackles  CV: RRR, nl S1/S2   ABDOMEN: s/nt, + distended  EXTREMITIES/SKIN: + jaundice; 2+ ble edema         Data:     CBC RESULTS:     Recent Labs   Lab 09/29/21  0532 09/28/21  0512 09/27/21  1833 09/27/21  0537 09/26/21  0611 09/25/21  1901 09/25/21  1435 09/25/21  0829 09/25/21  0150 09/24/21  1800   WBC 13.0* 16.2*  --  13.3* 13.2*  --   --  12.1*  --  14.2*   RBC 2.43* 2.46*  --  1.87* 2.30*  --   --  2.46*  --  1.06*   HGB 8.5* 8.8* 8.6* 7.0* 8.2* 8.5*   < > 8.9*   < > 4.7*   HCT 25.2* 25.8*  --  20.5* 24.7*  --   --  25.8*  --  14.7*   PLT 83* 95*  --  86* 85*  --   --  92*  --  102*    < > = values in this interval not displayed.     Basic Metabolic Panel:  Recent Labs   Lab 09/29/21  0532 09/28/21  0512 09/27/21  0537 09/26/21  0611 09/25/21  1912 09/25/21  0829 09/24/21  1247 09/24/21  1247    136 139 137 137 136   < > 132*   POTASSIUM 3.6 3.5 3.7 3.4  --  3.3*  --  3.6   CHLORIDE 108 107 109 108  --  103  --  98   CO2 19* 17* 18* 17*  --  18*  --  14*   BUN 62* 57* " 56* 58*  --  60*  --  60*   CR 3.14* 3.38* 3.27* 3.55* 3.73* 3.73*   < > 4.10*   * 138* 153* 100*  --  91  --  121*   SHAI 8.1* 8.3* 8.1* 8.1*  --  7.8*  --  8.1*    < > = values in this interval not displayed.     INR  Recent Labs   Lab 09/29/21  0532 09/28/21  0512 09/27/21  0536 09/25/21  1901   INR 3.65* 3.27* 3.54* 2.91*      Attestation:   I have reviewed today's relevant vital signs, notes, medications, labs and imaging.    Guero Hammond MD  [facility] - Nephrology  [phone]

## 2021-09-29 NOTE — PLAN OF CARE
VSS on RA.  Tele: SR.  Up ind.  Octreotide and albumin stopped today.  Ambulated in halls x2.  Call light within reach.  Will continue to monitor.

## 2021-09-29 NOTE — PROGRESS NOTES
"[facility] GASTROENTEROLOGY PROGRESS NOTE    SUBJECTIVE:   Increased sensation of abdominal bloating. Noting loss of muscle mass in her arms.  Feeling down at times about her situation.     OBJECTIVE:    BP (!) 127/90 (BP Location: Left arm)   Pulse 75   Temp 97.8  F (36.6  C) (Oral)   Resp 16   Ht 1.676 m (5' 6\")   Wt 70.8 kg (156 lb)   SpO2 93%   BMI 25.18 kg/m    Temp (24hrs), Av.1  F (36.7  C), Min:97.9  F (36.6  C), Max:98.4  F (36.9  C)    Patient Vitals for the past 72 hrs:   Weight   21 0627 70.8 kg (156 lb)   21 0643 69.9 kg (154 lb 3.2 oz)   21 0548 69 kg (152 lb 3.2 oz)       Intake/Output Summary (Last 24 hours) at 2021 0849  Last data filed at 2021 2300  Gross per 24 hour   Intake 690 ml   Output 200 ml   Net 490 ml         PHYSICAL EXAM    Constitutional: NAD, comfortable  Cardiovascular: RRR, trace edema in legs  Neuro: normal, no asterixis        Additional Comments:  ROS, FH, SH: See initial GI consult for details.    I have reviewed the patient's new clinical lab results:    Recent Labs   Lab Test 21  0532 21  0512 21  1833 21  0537 21  0537 21  0536 21  0611   WBC 13.0* 16.2*  --   --  13.3*  --    < >   HGB 8.5* 8.8* 8.6*   < > 7.0*  --    < >   * 105*  --   --  110*  --    < >   PLT 83* 95*  --   --  86*  --    < >   INR 3.65* 3.27*  --   --   --  3.54*  --     < > = values in this interval not displayed.     Recent Labs   Lab Test 21  0532 21  0512 21  0537    136 139   POTASSIUM 3.6 3.5 3.7   CHLORIDE 108 107 109   CO2 19* 17* 18*   BUN 62* 57* 56*   CR 3.14* 3.38* 3.27*   ANIONGAP 10 12 12   SHAI 8.1* 8.3* 8.1*     Recent Labs   Lab Test 21  0532 21  0512 21  0537 21  0829 21  0427   ALBUMIN 3.0* 2.7* 2.3*   < >  --    BILITOTAL 23.3* 24.9* 25.4*   < >  --    ALT 27 20 16   < >  --    AST 43 32 23   < >  --    ALKPHOS 56 63 43   < >  --    PROTEIN  --   --   " --   --  Negative    < > = values in this interval not displayed.       Summary: 51-year-old female admitted with alcoholic hepatitis and concern for cirrhosis.  On presentation she had severe anemia, thrombocytopenia, acute kidney injury, and significant ascites.  No evidence of infection.     Impression & Plan:     Alcoholic hepatitis:  Assessment:  Last drink about 30 days ago.  Prednisolone started 9/26.   Bilirubin improving with prednisolone so far. INR elevated but stable today.  Plan:  - Given improvement in bilirubin would continue prednisolone through 10/23 at 40mg, then 20mg for 3 days then 10mg for 3 days then stop.     Cirrhosis   Likely due to alcohol, labs ordered to look for any other cause.  CARTER borderline. IgG up.  Normal transaminases argue against autoimmune disease.  Hep A/B/C negative.    Ceruloplasmin low but no overt Kayser-Flascher rings but would need slit lamp testing. Can be low without evidence of Fly's disease.    Paracentesis on admission with SAAG 1.3 consistent with portal hypertension as cause.  Plan:  - Outpatient hepatology follow up. Will need screening EGD and US every 6 months.  - no diuretics at this time given renal issues     Anemia  Assessment: Likely multifactorial.  Hgb low but stable.  No overt bleeding. Direct bili elevated. Haptoglobin low.   Plan:  -Monitor for evidence of overt bleeding. No need for EGD at this time but will eventually need to screen for varices       Acute kidney injury.   Nephrology following. Pre-renal vs HRS. Diuretics on hold.          Bhumi Milton  [facility]  Office:  [phone]  Cell/pager: [phone]

## 2021-09-29 NOTE — PROGRESS NOTES
[facility]    Medicine Progress Note - Hospitalist Service       Date of Admission:  9/24/2021    Assessment & Plan         Melita Cortes is a 51 year old female who presents with worsening jaundice for the past 2 weeks, worsening abdominal distention, lower extremity edema and increased dyspnea.     Acute Liver failure-most likely due to acute on chronic alcoholic liver disease  Severe alcoholic liver disease with portal hypertension  Ascites  Alcoholic hepatitis  Hepatic encephalopathy  Patient presented with progressive symptoms which has been ongoing for about a year and appears to have gotten worse in the past month or so. Her constellation of symptoms and lab findings were suggestive of severe alcoholic liver disease.  Bilirubin extremely elevated at 30.2. Meld score was 40 in the ER, Dr. Oliver from the emergency room attempted transfer to [facility] for potential transplant however she was turned down due to her history of ongoing alcohol usage.    Admitted to inpatient.    [facility] consulted, appreciate their assistance.    Ultrasonogram of the abdomen showed evidence of hepatic cirrhosis with portal hypertension.    S/p diagnostic and therapeutic paracentesis with removal of 3.95 L of ascites fluid on 9/24.    Preliminary results from ascites fluid consistent with portal hypertension related ascites most likely due to alcoholic liver disease. Culture negative.    Decreased lactulose to 10 g 3 times daily on 9/28/21, titrate as needed with goal of 3-4 bowel movements daily.    Given positive occult blood, started on ceftriaxone 2 g IV daily.  No plans for immediate endoscopy at this time. Antibiotics subsequently discontinued by GI.    Started on prednisolone for alcoholic hepatitis on 9/26/21. GI recommending to continue current dose of prednisolone through 10/23/21, then short taper off (20 mg daily for 3 days, then 10 mg daily for 3 days, then  off).    Bilirubin slowly trending down.    Recheck labs in AM.     Severe anemia-likely multifactorial  Thrombocytopenia  Hemoglobin at presentation 5.2, with a sarah of 4.7.  Last hemoglobin in the system was from September 2020 and it was 12.3 at that time. Patient denies hematochezia or melena.    S/p 3 units of PRBC on 9/24 and one more unit on 9/27.    Hemoglobin improved and stable at 8.5 on 9/29/21.    Stool occult blood is positive but no overt evidence of GI bleed.    [facility] following, planning eventual endoscopy, timing per GI.    Platelets stable at 83 on 9/29/21.    Recheck CBC in AM.     Acute kidney injury, likely multifactorial with both prerenal and hepatorenal syndrome  Creatinine at presentation was 4.10. Last creatinine in the chart was from September 2020 when it was normal at 0.56. FENA of 0.3.    This is most likely multifactorial with components of both prerenal and hepatorenal syndrome.    Nephrology consulted, appreciate their assistance.    Creatinine slowly improving, down to 3.14 this morning.    Discontinue IV albumin and octreotide today per discussion with nephrology this morning.    Continue midodrine.    Recheck labs in AM.     Coagulopathy due to alcoholic liver disease  INR more than 3 at presentation, coagulopathy is due to severe alcoholic liver disease.      No improvement with vitamin K.    Monitoring with daily labs.     Mild hyponatremia  Most likely due to severe alcoholic liver disease.    Improved with hydration.    Sodium within normal limits this morning.    COVID-19 PCR negative    Routine admission COVID-19 PCR testing was negative on 9/24/21.       Diet: 2 Gram Sodium Diet  Snacks/Supplements Adult: Ensure Enlive; Between Meals    DVT Prophylaxis: Pneumatic Compression Devices  Gunter Catheter: Not present  Central Lines: None  Code Status: Full Code      Disposition Plan   Expected discharge: possible discharge in the next few days pending continued improvement and  consultant recommendations     The patient's care was discussed with the Bedside Nurse, Patient and Nephrology Consultant.    Samuel Lopez MD  Hospitalist Service  [facility]  Securely message with the Vocera Web Console (learn more here)  Text page via [employer] Paging/Directory      Clinically Significant Risk Factors Present on Admission                ______________________________________________________________________    Interval History   Melita Cortes feels OK this morning. No new complaints/concerns. Denies fevers, chest pain, shortness of breath, nausea, abdominal pain. Mild abdominal distention/bloating. Discussed with nephrology this morning.    Data reviewed today: I reviewed all medications, new labs and imaging results over the last 24 hours. I personally reviewed no images or EKG's today.    Physical Exam   Vital Signs: Temp: 97.8  F (36.6  C) Temp src: Oral BP: (!) 127/90 Pulse: 75   Resp: 16 SpO2: 93 % O2 Device: None (Room air) Oxygen Delivery: 2 LPM  Weight: 156 lbs 0 oz  Constitutional: awake, alert, cooperative, no apparent distress, sitting up in a chair  Respiratory: clear to auscultation bilaterally, no crackles or wheezing  Cardiovascular: regular rate and rhythm, normal S1 and S2, no murmur noted  GI: normal bowel sounds, soft, mild distention, non-tender  Skin: warm, dry  Musculoskeletal: no lower extremity pitting edema present  Neurologic: awake, alert, oriented to name, place and time, motor is 5 out of 5 bilaterally, sensory is intact    Data   Recent Labs   Lab 09/29/21  0532 09/28/21  0512 09/27/21  1833 09/27/21  0537 09/27/21  0537 09/27/21  0536 09/26/21  0611   WBC 13.0* 16.2*  --   --  13.3*  --    < >   HGB 8.5* 8.8* 8.6*   < > 7.0*  --    < >   * 105*  --   --  110*  --    < >   PLT 83* 95*  --   --  86*  --    < >   INR 3.65* 3.27*  --   --   --  3.54*  --     136  --   --  139  --    < >   POTASSIUM 3.6 3.5  --   --  3.7  --     < >   CHLORIDE 108 107  --   --  109  --    < >   CO2 19* 17*  --   --  18*  --    < >   BUN 62* 57*  --   --  56*  --    < >   CR 3.14* 3.38*  --   --  3.27*  --    < >   ANIONGAP 10 12  --   --  12  --    < >   SHAI 8.1* 8.3*  --   --  8.1*  --    < >   * 138*  --   --  153*  --    < >   ALBUMIN 3.0* 2.7*  --    < > 2.3*  --    < >   PROTTOTAL 5.5* 5.6*  --    < > 5.4*  --    < >   BILITOTAL 23.3* 24.9*  --    < > 25.4*  --    < >   ALKPHOS 56 63  --    < > 43  --    < >   ALT 27 20  --    < > 16  --    < >   AST 43 32  --    < > 23  --    < >    < > = values in this interval not displayed.     Medications     - MEDICATION INSTRUCTIONS -         lactulose  10 g Oral TID     midodrine  10 mg Oral TID w/meals     pantoprazole (PROTONIX) IV  40 mg Intravenous Daily with breakfast     prednisoLONE  40 mg Oral Daily     sodium chloride (PF)  10-40 mL Intracatheter Q7 Days

## 2021-09-29 NOTE — PLAN OF CARE
"VSS on 2L O2 on overnight. Alert/ oriented x 4, able to express needs. Cooperative with cares. No complaints of pain or discomfort.Tele Sinus Rhythm. Octreotide infusing. No signs of bleeding. Will continue to monitor.     /82   Pulse 67   Temp 98.1  F (36.7  C)   Resp 18   Ht 1.676 m (5' 6\")   Wt 70.8 kg (156 lb)   SpO2 93%   BMI 25.18 kg/m      "

## 2021-09-30 LAB
ALBUMIN SERPL-MCNC: 2.7 G/DL (ref 3.4–5)
ALP SERPL-CCNC: 88 U/L (ref 40–150)
ALT SERPL W P-5'-P-CCNC: 35 U/L (ref 0–50)
ANION GAP SERPL CALCULATED.3IONS-SCNC: 12 MMOL/L (ref 3–14)
AST SERPL W P-5'-P-CCNC: 47 U/L (ref 0–45)
BILIRUB SERPL-MCNC: 22.8 MG/DL (ref 0.2–1.3)
BUN SERPL-MCNC: 70 MG/DL (ref 7–30)
CALCIUM SERPL-MCNC: 8.5 MG/DL (ref 8.5–10.1)
CHLORIDE BLD-SCNC: 108 MMOL/L (ref 94–109)
CO2 SERPL-SCNC: 19 MMOL/L (ref 20–32)
CREAT SERPL-MCNC: 2.86 MG/DL (ref 0.52–1.04)
ERYTHROCYTE [DISTWIDTH] IN BLOOD BY AUTOMATED COUNT: 28.1 % (ref 10–15)
GFR SERPL CREATININE-BSD FRML MDRD: 18 ML/MIN/1.73M2
GLUCOSE BLD-MCNC: 146 MG/DL (ref 70–99)
HCT VFR BLD AUTO: 25.3 % (ref 35–47)
HGB BLD-MCNC: 8.7 G/DL (ref 11.7–15.7)
INR PPP: 3.66 (ref 0.85–1.15)
MCH RBC QN AUTO: 35.5 PG (ref 26.5–33)
MCHC RBC AUTO-ENTMCNC: 34.4 G/DL (ref 31.5–36.5)
MCV RBC AUTO: 103 FL (ref 78–100)
PLATELET # BLD AUTO: 81 10E3/UL (ref 150–450)
POTASSIUM BLD-SCNC: 3.8 MMOL/L (ref 3.4–5.3)
PROT SERPL-MCNC: 5.5 G/DL (ref 6.8–8.8)
RBC # BLD AUTO: 2.45 10E6/UL (ref 3.8–5.2)
SODIUM SERPL-SCNC: 139 MMOL/L (ref 133–144)
WBC # BLD AUTO: 12.6 10E3/UL (ref 4–11)

## 2021-09-30 PROCEDURE — 250N000012 HC RX MED GY IP 250 OP 636 PS 637: Performed by: INTERNAL MEDICINE

## 2021-09-30 PROCEDURE — 210N000002 HC R&B HEART CARE

## 2021-09-30 PROCEDURE — 99233 SBSQ HOSP IP/OBS HIGH 50: CPT | Performed by: INTERNAL MEDICINE

## 2021-09-30 PROCEDURE — 85041 AUTOMATED RBC COUNT: CPT | Performed by: HOSPITALIST

## 2021-09-30 PROCEDURE — 250N000011 HC RX IP 250 OP 636: Performed by: INTERNAL MEDICINE

## 2021-09-30 PROCEDURE — 250N000013 HC RX MED GY IP 250 OP 250 PS 637: Performed by: HOSPITALIST

## 2021-09-30 PROCEDURE — 80053 COMPREHEN METABOLIC PANEL: CPT | Performed by: HOSPITALIST

## 2021-09-30 PROCEDURE — 82310 ASSAY OF CALCIUM: CPT | Performed by: HOSPITALIST

## 2021-09-30 PROCEDURE — C9113 INJ PANTOPRAZOLE SODIUM, VIA: HCPCS | Performed by: INTERNAL MEDICINE

## 2021-09-30 PROCEDURE — 99232 SBSQ HOSP IP/OBS MODERATE 35: CPT | Performed by: STUDENT IN AN ORGANIZED HEALTH CARE EDUCATION/TRAINING PROGRAM

## 2021-09-30 PROCEDURE — 85610 PROTHROMBIN TIME: CPT | Performed by: HOSPITALIST

## 2021-09-30 RX ADMIN — PREDNISOLONE SODIUM PHOSPHATE 40 MG: 15 SOLUTION ORAL at 08:36

## 2021-09-30 RX ADMIN — MIDODRINE HYDROCHLORIDE 10 MG: 5 TABLET ORAL at 12:14

## 2021-09-30 RX ADMIN — MIDODRINE HYDROCHLORIDE 10 MG: 5 TABLET ORAL at 17:24

## 2021-09-30 RX ADMIN — MIDODRINE HYDROCHLORIDE 10 MG: 5 TABLET ORAL at 08:35

## 2021-09-30 RX ADMIN — PANTOPRAZOLE SODIUM 40 MG: 40 INJECTION, POWDER, FOR SOLUTION INTRAVENOUS at 08:35

## 2021-09-30 RX ADMIN — LACTULOSE 10 G: 10 SOLUTION ORAL at 08:35

## 2021-09-30 ASSESSMENT — MIFFLIN-ST. JEOR: SCORE: 1343.91

## 2021-09-30 ASSESSMENT — ACTIVITIES OF DAILY LIVING (ADL)
ADLS_ACUITY_SCORE: 7

## 2021-09-30 NOTE — PROGRESS NOTES
"[facility] GASTROENTEROLOGY PROGRESS NOTE    SUBJECTIVE:  In better spirits today, hoping to discharge soon.     OBJECTIVE:    /86 (BP Location: Left arm)   Pulse 72   Temp 97.8  F (36.6  C) (Oral)   Resp 18   Ht 1.676 m (5' 6\")   Wt 70.7 kg (155 lb 14.4 oz)   SpO2 92%   BMI 25.16 kg/m    Temp (24hrs), Av.1  F (36.7  C), Min:97.9  F (36.6  C), Max:98.4  F (36.9  C)    Patient Vitals for the past 72 hrs:   Weight   21 0500 70.7 kg (155 lb 14.4 oz)   21 0627 70.8 kg (156 lb)   21 0643 69.9 kg (154 lb 3.2 oz)       Intake/Output Summary (Last 24 hours) at 2021 0849  Last data filed at 2021 2300  Gross per 24 hour   Intake 690 ml   Output 200 ml   Net 490 ml         PHYSICAL EXAM    Constitutional: NAD, comfortable  Cardiovascular: RRR, trace edema in legs  Neuro: normal, no asterixis        Additional Comments:  ROS, FH, SH: See initial GI consult for details.    I have reviewed the patient's new clinical lab results:    Recent Labs   Lab Test 2132 21  0512   WBC 12.6*  --  13.0* 16.2*   HGB 8.7*  --  8.5* 8.8*   *  --  104* 105*   PLT 81*  --  83* 95*   INR  --  3.66* 3.65* 3.27*     Recent Labs   Lab Test 21  0532 21  0512    137 136   POTASSIUM 3.8 3.6 3.5   CHLORIDE 108 108 107   CO2 19* 19* 17*   BUN 70* 62* 57*   CR 2.86* 3.14* 3.38*   ANIONGAP 12 10 12   SHAI 8.5 8.1* 8.3*     Recent Labs   Lab Test 21  0532 21  0512 21  0829 21  0427   ALBUMIN 2.7* 3.0* 2.7*   < >  --    BILITOTAL 22.8* 23.3* 24.9*   < >  --    ALT 35 27 20   < >  --    AST 47* 43 32   < >  --    ALKPHOS 88 56 63   < >  --    PROTEIN  --   --   --   --  Negative    < > = values in this interval not displayed.       Summary: 51-year-old female admitted with alcoholic hepatitis and concern for cirrhosis.  On presentation she had severe anemia, thrombocytopenia, acute kidney injury, and " significant ascites.  No evidence of infection.     Impression & Plan:     Alcoholic hepatitis:  Assessment:  Last drink around 9/1.   Prednisolone started 9/26.   Bilirubin improving with prednisolone so far. INR elevated.  Plan:  - Given improvement in bilirubin would continue prednisolone through 10/23 at 40mg, then 20mg for 3 days then 10mg for 3 days then stop.   - we will arrange liver clinic outpatient follow up  - she will need a repeat lab from liver perspective about 1 week from discharge which we can arrange    Cirrhosis   Likely due to alcohol, labs ordered to look for any other cause.  CARTER borderline. IgG up.  Normal transaminases argue against autoimmune disease.  Hep A/B/C negative.    Ceruloplasmin low but no overt Kayser-Flascher rings but would need slit lamp testing. Can be low without evidence of Fly's disease.    Paracentesis on admission with SAAG 1.3 consistent with portal hypertension as cause.  Plan:  - Outpatient hepatology follow up. Will need screening EGD and US every 6 months.  - no diuretics at this time given renal issues     Anemia  Assessment: Likely multifactorial.  Hgb low but stable.  No overt bleeding. Direct bili elevated. Haptoglobin low.   Plan:  -Monitor for evidence of overt bleeding. No need for EGD at this time but will eventually need to screen for varices       Acute kidney injury.   Nephrology following. Creatinine improving slowly. Pre-renal vs HRS. Diuretics on hold.          Bhumi Milton  [facility]  Office:  [phone]  Cell/pager: [phone]

## 2021-09-30 NOTE — PLAN OF CARE
Alert and oriented X 4, up Ind in room/hallway. Abd soft, mildly bloated. Denies nausea, abd pain. Tolerating diet. Loose stool X 4 today, lactulose held. Trace edema in BLE. PICC in place RUE. Midodrine continued. Future dietary changes reviewed with pt/ re: low sodium diet. Pt adamantly denied drinking currently at home. POC reviewed. Continue to monitor.

## 2021-09-30 NOTE — PLAN OF CARE
Transferred from CCU at 4:30am. A&Ox4. VSS. RA. On 2L NC overnight for comfort. No tele orders. GARCIA. LS diminished. BLE +2 edema. R) PICC line, double lumen, good blood return/saline locked. PCU collect. Up independently. Will continue w/plan of care.

## 2021-09-30 NOTE — PLAN OF CARE
[id number]: A&Ox4. VSS on RA. Wore 2L NC overnight for comfort. No tele orders. GARCIA. LS diminished. BLE +2 edema. R PICC line, double lumen, good blood return. PCU collect. Up independently.

## 2021-09-30 NOTE — PROGRESS NOTES
[facility]    Medicine Progress Note - Hospitalist Service       Date of Admission:  9/24/2021    Assessment & Plan         Melita Cortes is a 51 year old female who presents with worsening jaundice for the past 2 weeks, worsening abdominal distention, lower extremity edema and increased dyspnea.     Acute decompensated cirrhosis, most likely etoh induced  Alcoholic hepatitis  Patient presented with progressive symptoms which has been ongoing for about a year and appears to have gotten worse in the past month or so. Her constellation of symptoms and lab findings were suggestive of severe alcoholic liver disease.  Bilirubin extremely elevated at 30.2. Meld score was 40 in the ER, Dr. Oliver from the emergency room attempted transfer to [facility] for potential transplant however she was turned down due to her history of ongoing alcohol usage.  -S/p diagnostic and therapeutic paracentesis with removal of 3.95 L of ascites fluid on 9/24.  -Started on prednisolone for alcoholic hepatitis on 9/26/21. GI recommending to continue current dose of prednisolone through 10/23/21, then short taper off (20 mg daily for 3 days, then 10 mg daily for 3 days, then off).    >[facility] following, recommend patient has outpatient follow up with screening scheduled on discharge  >remains on lactulose and steroid taper as above  >holding diuretics due to ROSSY, defer to nephrology for initiation     Severe anemia-likely multifactorial  Thrombocytopenia  Hemoglobin at presentation 5.2, with a sarah of 4.7.  Last hemoglobin in the system was from September 2020 and it was 12.3 at that time. Patient denies hematochezia or melena.  -S/p 3 units of PRBC on 9/24 and one more unit on 9/27  -Hb stable and suspect multifactorial     Acute kidney injury, likely multifactorial with both prerenal and hepatorenal syndrome  Creatinine at presentation was 4.10. Last creatinine in the chart was from September  2020 when it was normal at 0.56. FENA of 0.3.  -This is most likely multifactorial with components of both prerenal and hepatorenal syndrome.  -Nephrology consulted and discontinue IV albumin and octreotide yesterday   -Cr improved to 2.86 today, will continue to follow tomorrow     Coagulopathy due to alcoholic liver disease  INR more than 3 at presentation, coagulopathy is due to severe alcoholic liver disease.   -stop monitoring     Mild hyponatremia-resolved  -Most likely due to severe alcoholic liver disease.    COVID-19 PCR negative  -Routine admission COVID-19 PCR testing was negative on 9/24/21.       Diet: 2 Gram Sodium Diet  Snacks/Supplements Adult: Ensure Enlive; Between Meals    DVT Prophylaxis: Pneumatic Compression Devices  Gunter Catheter: Not present  Central Lines: None  Code Status: Full Code      Disposition Plan   Expected discharge: possible discharge in the next few days pending continued improvement and consultant recommendations     The patient's care was discussed with the nurse and patient.    Andie Limon DO  Hospitalist Service  [facility]  Securely message with the Vocera Web Console (learn more here)  Text page via RichRelevance Paging/Directory      Clinically Significant Risk Factors Present on Admission                ______________________________________________________________________    Interval History   Patient feeling good this morning. She reports her abdomen is full but better than it has been. No nausea. No pain. No cough. Edema in her legs has slightly improved. She is hopeful to get another para before she leaves the hospital.    Data reviewed today: I reviewed all medications, new labs and imaging results over the last 24 hours. I personally reviewed no images or EKG's today.    Physical Exam   Vital Signs: Temp: 97.8  F (36.6  C) Temp src: Oral BP: 122/86 Pulse: 72   Resp: 18 SpO2: 92 % O2 Device: None (Room air) Oxygen Delivery: 2 LPM  Weight: 155  lbs 14.4 oz  Constitutional: awake, alert, cooperative, no apparent distress  HEENT: scleral icterus  Respiratory: clear to auscultation bilaterally, no crackles or wheezing  Cardiovascular: regular rate and rhythm, normal S1 and S2, no murmur noted, trace edema in LE  GI: normal bowel sounds, soft, mild distention, non-tender  Skin: warm, dry  Musculoskeletal: no joint swelling  Neurologic: awake, alert, oriented to name, place and time    Data   Recent Labs   Lab 09/30/21  0455 09/30/21  0454 09/29/21  0532 09/28/21  0512 09/28/21  0512   WBC 12.6*  --  13.0*  --  16.2*   HGB 8.7*  --  8.5*  --  8.8*   *  --  104*  --  105*   PLT 81*  --  83*  --  95*   INR  --  3.66* 3.65*  --  3.27*     --  137  --  136   POTASSIUM 3.8  --  3.6  --  3.5   CHLORIDE 108  --  108  --  107   CO2 19*  --  19*  --  17*   BUN 70*  --  62*  --  57*   CR 2.86*  --  3.14*  --  3.38*   ANIONGAP 12  --  10  --  12   SHAI 8.5  --  8.1*  --  8.3*   *  --  131*  --  138*   ALBUMIN 2.7*  --  3.0*   < > 2.7*   PROTTOTAL 5.5*  --  5.5*   < > 5.6*   BILITOTAL 22.8*  --  23.3*   < > 24.9*   ALKPHOS 88  --  56   < > 63   ALT 35  --  27   < > 20   AST 47*  --  43   < > 32    < > = values in this interval not displayed.     Medications     - MEDICATION INSTRUCTIONS -         influenza recomb quadrivalent PF  0.5 mL Intramuscular Prior to discharge     lactulose  10 g Oral TID     midodrine  10 mg Oral TID w/meals     pantoprazole (PROTONIX) IV  40 mg Intravenous Daily with breakfast     prednisoLONE  40 mg Oral Daily     sodium chloride (PF)  10-40 mL Intracatheter Q7 Days

## 2021-09-30 NOTE — PROGRESS NOTES
"SPIRITUAL HEALTH SERVICES  SPIRITUAL ASSESSMENT Progress Note  [facility]     REFERRAL SOURCE: Follow up visit    Reflective conversation shared with Fidelina which integrated elements of illness and family narratives.     Fidelina was tearful as she shared that she's \"scared about needing to have a liver transplant.\"  She named her intention to \"make lifestyle changes\" focusing on \"staying sober\" and finding support resources, such as AA. \"I've done it before.\"  She reflected on her desire to focus on \"gratitude\" as a form of self-care.     I offered spiritual and emotional support through reflective listening that affirmed emotions, experience, and meaning.     PLAN: [facility] remains available for support.    Gertrudis Smith  Associate   Pager [phone]  Phone [phone]    "

## 2021-09-30 NOTE — PROGRESS NOTES
" Renal Medicine Progress Note            Assessment/Plan:     # Severe acute kidney injury in the setting of decompensated liver disease and alcoholic hepatitis. Scr is trending down slowly.  # Alcoholic hepatitis: On prednisone  # Alcoholic liver cirrhosis:   # Anemia and thrombocytopenia: Stable.   # Acidosis: No need for bicarb.    Plan:  # Continue midodrine  # Add iron study        Interval History:     Afebrile. VSS. She is in good sprits and smiling. No cardiopulmonary complaints. Feel bloated but no abdominal pain. No N/V.           Medications and Allergies:       influenza recomb quadrivalent PF  0.5 mL Intramuscular Prior to discharge     lactulose  10 g Oral TID     midodrine  10 mg Oral TID w/meals     pantoprazole (PROTONIX) IV  40 mg Intravenous Daily with breakfast     prednisoLONE  40 mg Oral Daily     sodium chloride (PF)  10-40 mL Intracatheter Q7 Days      No Known Allergies         Physical Exam:   Vitals were reviewed   , Blood pressure 122/86, pulse 72, temperature 97.8  F (36.6  C), temperature source Oral, resp. rate 18, height 1.676 m (5' 6\"), weight 70.7 kg (155 lb 14.4 oz), SpO2 92 %, not currently breastfeeding.    Wt Readings from Last 3 Encounters:   09/30/21 70.7 kg (155 lb 14.4 oz)   09/14/20 79.9 kg (176 lb 1.6 oz)       Intake/Output Summary (Last 24 hours) at 9/30/2021 1252  Last data filed at 9/29/2021 2000  Gross per 24 hour   Intake 360 ml   Output --   Net 360 ml       GENERAL APPEARANCE: pleasant, NAD, alert  HEENT:  EOMI. Sclera icterus   RESP: lungs cta b c good efforts, no crackles, rhonchi or wheezes  CV: RRR, nl S1/S2  ABDOMEN: Soft, NT.   EXTREMITIES/SKIN: jaundice. No edema  NEURO: Awake and answering questions  PSYCH: pleasant.          Data:     CBC RESULTS:     Recent Labs   Lab 09/30/21  0455 09/29/21  0532 09/28/21  0512 09/27/21  1833 09/27/21  0537 09/26/21  0611 09/25/21  1435 09/25/21  0829   WBC 12.6* 13.0* 16.2*  --  13.3* 13.2*  --  12.1*   RBC 2.45* 2.43* " 2.46*  --  1.87* 2.30*  --  2.46*   HGB 8.7* 8.5* 8.8* 8.6* 7.0* 8.2*   < > 8.9*   HCT 25.3* 25.2* 25.8*  --  20.5* 24.7*  --  25.8*   PLT 81* 83* 95*  --  86* 85*  --  92*    < > = values in this interval not displayed.       Basic Metabolic Panel:  Recent Labs   Lab 09/30/21  0455 09/29/21  0532 09/28/21  0512 09/27/21  0537 09/26/21  0611 09/25/21  1912 09/25/21  0829 09/25/21  0829    137 136 139 137 137   < > 136   POTASSIUM 3.8 3.6 3.5 3.7 3.4  --   --  3.3*   CHLORIDE 108 108 107 109 108  --   --  103   CO2 19* 19* 17* 18* 17*  --   --  18*   BUN 70* 62* 57* 56* 58*  --   --  60*   CR 2.86* 3.14* 3.38* 3.27* 3.55* 3.73*   < > 3.73*   * 131* 138* 153* 100*  --   --  91   SHAI 8.5 8.1* 8.3* 8.1* 8.1*  --   --  7.8*    < > = values in this interval not displayed.       INR  Recent Labs   Lab 09/30/21 0454 09/29/21  0532 09/28/21  0512 09/27/21  0536   INR 3.66* 3.65* 3.27* 3.54*      Attestation:   I have reviewed today's relevant vital signs, notes, medications, labs and imaging.    Anmol Gallagher MD  [facility] - Nephrology  Office phone :[phone]  Pager: [phone]

## 2021-09-30 NOTE — PLAN OF CARE
"Pt here with  Acute liver failure without hepatic coma, ascites, anemia.      RN: 09/30/21 [id number].  Pt alert/oriented x4, cooperative.  VSS on RA, midrodine given as scheduled due to SBP <130. Up independently in the room, denies pain.  2gm sodium diet, very poor appetite, bites of meals. Continent of bowel/bladder, 2pm dose lactulose held as pt has already had 4-6 stools today. Labs: see Epic, Creat 2.86 improving, electrolytes OK, liver & blood counts abnormal.  PICC SL.  No tele. Skin jaundice. Plan to discharge \"in the next few days\" per hospitalist pending ability to do another paracentesis if OK'd by nephrology.     "

## 2021-10-01 ENCOUNTER — APPOINTMENT (OUTPATIENT)
Dept: ULTRASOUND IMAGING | Facility: CLINIC | Age: 51
DRG: 441 | End: 2021-10-01
Attending: STUDENT IN AN ORGANIZED HEALTH CARE EDUCATION/TRAINING PROGRAM
Payer: COMMERCIAL

## 2021-10-01 VITALS
WEIGHT: 155.2 LBS | BODY MASS INDEX: 24.94 KG/M2 | TEMPERATURE: 98.6 F | RESPIRATION RATE: 16 BRPM | HEART RATE: 68 BPM | OXYGEN SATURATION: 95 % | SYSTOLIC BLOOD PRESSURE: 126 MMHG | HEIGHT: 66 IN | DIASTOLIC BLOOD PRESSURE: 87 MMHG

## 2021-10-01 LAB
ALBUMIN SERPL-MCNC: 2 G/DL (ref 3.4–5)
ALP SERPL-CCNC: 75 U/L (ref 40–150)
ALT SERPL W P-5'-P-CCNC: 37 U/L (ref 0–50)
ANION GAP SERPL CALCULATED.3IONS-SCNC: 10 MMOL/L (ref 3–14)
AST SERPL W P-5'-P-CCNC: 47 U/L (ref 0–45)
BILIRUB DIRECT SERPL-MCNC: 10.1 MG/DL (ref 0–0.2)
BILIRUB SERPL-MCNC: 17.6 MG/DL (ref 0.2–1.3)
BUN SERPL-MCNC: 62 MG/DL (ref 7–30)
CALCIUM SERPL-MCNC: 7.2 MG/DL (ref 8.5–10.1)
CHLORIDE BLD-SCNC: 115 MMOL/L (ref 94–109)
CO2 SERPL-SCNC: 18 MMOL/L (ref 20–32)
CREAT SERPL-MCNC: 1.82 MG/DL (ref 0.52–1.04)
GFR SERPL CREATININE-BSD FRML MDRD: 32 ML/MIN/1.73M2
GLUCOSE BLD-MCNC: 114 MG/DL (ref 70–99)
POTASSIUM BLD-SCNC: 3.4 MMOL/L (ref 3.4–5.3)
PROT SERPL-MCNC: 4.7 G/DL (ref 6.8–8.8)
SODIUM SERPL-SCNC: 143 MMOL/L (ref 133–144)

## 2021-10-01 PROCEDURE — P9047 ALBUMIN (HUMAN), 25%, 50ML: HCPCS | Performed by: STUDENT IN AN ORGANIZED HEALTH CARE EDUCATION/TRAINING PROGRAM

## 2021-10-01 PROCEDURE — 49083 ABD PARACENTESIS W/IMAGING: CPT

## 2021-10-01 PROCEDURE — 99232 SBSQ HOSP IP/OBS MODERATE 35: CPT | Performed by: INTERNAL MEDICINE

## 2021-10-01 PROCEDURE — 250N000012 HC RX MED GY IP 250 OP 636 PS 637: Performed by: INTERNAL MEDICINE

## 2021-10-01 PROCEDURE — 250N000011 HC RX IP 250 OP 636: Performed by: HOSPITALIST

## 2021-10-01 PROCEDURE — 82248 BILIRUBIN DIRECT: CPT | Performed by: INTERNAL MEDICINE

## 2021-10-01 PROCEDURE — 250N000013 HC RX MED GY IP 250 OP 250 PS 637: Performed by: HOSPITALIST

## 2021-10-01 PROCEDURE — 36592 COLLECT BLOOD FROM PICC: CPT | Performed by: STUDENT IN AN ORGANIZED HEALTH CARE EDUCATION/TRAINING PROGRAM

## 2021-10-01 PROCEDURE — 250N000011 HC RX IP 250 OP 636: Performed by: INTERNAL MEDICINE

## 2021-10-01 PROCEDURE — 999N000154 HC STATISTIC RADIOLOGY XRAY, US, CT, MAR, NM

## 2021-10-01 PROCEDURE — 99239 HOSP IP/OBS DSCHRG MGMT >30: CPT | Performed by: STUDENT IN AN ORGANIZED HEALTH CARE EDUCATION/TRAINING PROGRAM

## 2021-10-01 PROCEDURE — 250N000011 HC RX IP 250 OP 636: Performed by: STUDENT IN AN ORGANIZED HEALTH CARE EDUCATION/TRAINING PROGRAM

## 2021-10-01 PROCEDURE — 80048 BASIC METABOLIC PNL TOTAL CA: CPT | Performed by: STUDENT IN AN ORGANIZED HEALTH CARE EDUCATION/TRAINING PROGRAM

## 2021-10-01 PROCEDURE — C9113 INJ PANTOPRAZOLE SODIUM, VIA: HCPCS | Performed by: INTERNAL MEDICINE

## 2021-10-01 PROCEDURE — 90682 RIV4 VACC RECOMBINANT DNA IM: CPT | Performed by: HOSPITALIST

## 2021-10-01 PROCEDURE — G0008 ADMIN INFLUENZA VIRUS VAC: HCPCS | Performed by: HOSPITALIST

## 2021-10-01 RX ORDER — PANTOPRAZOLE SODIUM 40 MG/1
40 TABLET, DELAYED RELEASE ORAL DAILY
Qty: 30 TABLET | Refills: 0 | Status: SHIPPED | OUTPATIENT
Start: 2021-10-01 | End: 2021-10-20

## 2021-10-01 RX ORDER — LIDOCAINE HYDROCHLORIDE 10 MG/ML
10 INJECTION, SOLUTION EPIDURAL; INFILTRATION; INTRACAUDAL; PERINEURAL ONCE
Status: COMPLETED | OUTPATIENT
Start: 2021-10-01 | End: 2021-10-01

## 2021-10-01 RX ORDER — PREDNISOLONE SODIUM PHOSPHATE 15 MG/5ML
40 SOLUTION ORAL DAILY
Qty: 292.6 ML | Refills: 0 | Status: SHIPPED | OUTPATIENT
Start: 2021-10-01 | End: 2021-10-19

## 2021-10-01 RX ORDER — MIDODRINE HYDROCHLORIDE 10 MG/1
10 TABLET ORAL
Qty: 90 TABLET | Refills: 0 | Status: SHIPPED | OUTPATIENT
Start: 2021-10-01 | End: 2021-10-20

## 2021-10-01 RX ORDER — ALBUMIN (HUMAN) 12.5 G/50ML
12.5 SOLUTION INTRAVENOUS ONCE
Status: COMPLETED | OUTPATIENT
Start: 2021-10-01 | End: 2021-10-01

## 2021-10-01 RX ORDER — LACTULOSE 10 G/15ML
SOLUTION ORAL
Qty: 1350 ML | Refills: 0 | Status: SHIPPED | OUTPATIENT
Start: 2021-10-01 | End: 2021-10-20

## 2021-10-01 RX ADMIN — LACTULOSE 10 G: 10 SOLUTION ORAL at 09:52

## 2021-10-01 RX ADMIN — INFLUENZA A VIRUS A/WISCONSIN/588/2019 (H1N1) RECOMBINANT HEMAGGLUTININ ANTIGEN, INFLUENZA A VIRUS A/TASMANIA/503/2020 (H3N2) RECOMBINANT HEMAGGLUTININ ANTIGEN, INFLUENZA B VIRUS B/WASHINGTON/02/2019 RECOMBINANT HEMAGGLUTININ ANTIGEN, AND INFLUENZA B VIRUS B/PHUKET/3073/2013 RECOMBINANT HEMAGGLUTININ ANTIGEN 0.5 ML: 45; 45; 45; 45 INJECTION INTRAMUSCULAR at 15:48

## 2021-10-01 RX ADMIN — LIDOCAINE HYDROCHLORIDE 10 ML: 10 INJECTION, SOLUTION EPIDURAL; INFILTRATION; INTRACAUDAL; PERINEURAL at 10:45

## 2021-10-01 RX ADMIN — PANTOPRAZOLE SODIUM 40 MG: 40 INJECTION, POWDER, FOR SOLUTION INTRAVENOUS at 09:52

## 2021-10-01 RX ADMIN — ALBUMIN HUMAN 12.5 G: 0.25 SOLUTION INTRAVENOUS at 12:56

## 2021-10-01 RX ADMIN — MIDODRINE HYDROCHLORIDE 10 MG: 5 TABLET ORAL at 09:53

## 2021-10-01 RX ADMIN — ALBUMIN HUMAN 12.5 G: 0.25 SOLUTION INTRAVENOUS at 12:20

## 2021-10-01 RX ADMIN — PREDNISOLONE SODIUM PHOSPHATE 40 MG: 15 SOLUTION ORAL at 09:53

## 2021-10-01 ASSESSMENT — ACTIVITIES OF DAILY LIVING (ADL)
ADLS_ACUITY_SCORE: 7
ADLS_ACUITY_SCORE: 9
ADLS_ACUITY_SCORE: 7
ADLS_ACUITY_SCORE: 9
ADLS_ACUITY_SCORE: 7

## 2021-10-01 ASSESSMENT — MIFFLIN-ST. JEOR: SCORE: 1340.73

## 2021-10-01 NOTE — PROGRESS NOTES
CMP and CBC scheduled for Monday, Oct. 4th and results to be faxed to Dr. Hammond's office at:  [phone].    Lorena Hadley RN  Care Coordinator  [facility]  [phone] (text or call)

## 2021-10-01 NOTE — PROGRESS NOTES
Admission Note:  Reason for admission:Paracentisis  Time of arrival: 1030 pt arrived from floor      1042 Dr Waggoner in.  Time Out done.    Paracentesis:   Patient tolerated well. VSS. 1900 cc Dark Catalina fluid removed from abdomen w/o difficulty. Dermabond and Bandaid applied to site - CDI.   Albumin given per protocol / standing orders.          Discharge Note:  Discharge criteria met and vital signs stable: Yes.  See flowsheets    Pt will be going back to inpatient room 248-1.  Pt report called to Madai HARLEY at 1100.  Transport called

## 2021-10-01 NOTE — PROGRESS NOTES
"[facility]  [facility]  Gastroenterology Progress note    Interval History:      Patient reports feeling well.  Looking forward to discharge.      Vital Signs:      /86 (BP Location: Left arm)   Pulse 69   Temp 98  F (36.7  C) (Oral)   Resp 18   Ht 1.676 m (5' 6\")   Wt 70.4 kg (155 lb 3.2 oz)   SpO2 92%   BMI 25.05 kg/m    Temp (24hrs), Av.1  F (36.7  C), Min:97.8  F (36.6  C), Max:98.4  F (36.9  C)    Patient Vitals for the past 72 hrs:   Weight   10/01/21 0627 70.4 kg (155 lb 3.2 oz)   21 0500 70.7 kg (155 lb 14.4 oz)   21 70.8 kg (156 lb)     No intake or output data in the 24 hours ending 10/01/21 0915      Constitutional: NAD, comfortable  Cardiovascular: RRR, normal S1, S2   Respiratory: CTAB  Abdomen: soft, non-tender, mildly distended    Additional Comments:  ROS, FH, SH: See initial GI consult for details.    Laboratory Data:  Recent Labs   Lab Test 21  04521  0532 21  0512   WBC 12.6*  --  13.0* 16.2*   HGB 8.7*  --  8.5* 8.8*   *  --  104* 105*   PLT 81*  --  83* 95*   INR  --  3.66* 3.65* 3.27*     Recent Labs   Lab Test 10/01/21  0557 21  0455 21  0532    139 137   POTASSIUM 3.4 3.8 3.6   CHLORIDE 115* 108 108   CO2 18* 19* 19*   BUN 62* 70* 62*   CR 1.82* 2.86* 3.14*   ANIONGAP 10 12 10   SHAI 7.2* 8.5 8.1*     Recent Labs   Lab Test 21  0455 21  0532 21  0512 21  0537 21  1214 21  0829 21  0427 21  1247   ALBUMIN 2.7* 3.0* 2.7*   < >  --    < >  --  1.8*   BILITOTAL 22.8* 23.3* 24.9*   < >  --    < >  --  33.6*  30.2*   DBIL  --   --   --   --  23.3*  --   --  23.2*   ALT 35 27 20   < >  --    < >  --  26   AST 47* 43 32   < >  --    < >  --  44   ALKPHOS 88 56 63   < >  --    < >  --  58   PROTEIN  --   --   --   --   --   --  Negative  --     < > = values in this interval not displayed.         Assessment:  52 yo female " New Prescription: ofloxacin (ofloxacin): drops: 0.3% 1 drop four times a day into right eye 12- admitted with alcoholic hepatitis and possible cirrhosis.  Patient found to have severe anemia, thrombocytopenia, acute kidney injury, and significant ascites on admission.    Last drink 9/1/21.  Prednisolone started 9/26.  Bilirubin improving with prednisolone, 22.8 today.    Serologic workup for cirrhosis revealed borderline CARTER.  IgG elevated.  Hepatitis A/B/C negative.  Ceruloplasmin low but no overt Kayser-Brenna rings.  Paracentesis on admission with SAAG 1.3 consistent with portal hypertension.  Anemia likely multifactorial.  No overt GI bleeding.    Nephrology following for acute kidney injury.  Creatinine is improving, 1.82 today.      Plan:  -  Continue prednisolone through 10/23 at 40 mg, then 20 mg x 3 days, then 10 mg x 3 days, then stop.  -  Hepatology follow up as outpatient.  [facility] will call to arrange.  Labs one week after discharge.  -  Will need hepatoma protocol with US and blood work every 6 months; will be arranged as outpatient.  -  EGD for variceal screening as outpatient.  -  Diuretics on hold for acute kidney injury.  -  Appreciate nephrology input.  -  Complete alcohol cessation.      KAMAR Neil  [facility]  Office:  [phone] call if needed after 12PM  Cell:  [phone], not available after 12PM at this number

## 2021-10-01 NOTE — PROGRESS NOTES
[facility]     Renal Progress Note       SHORTHAND KEY FOR MY NOTES:  c = with, s = without, p = after, a = before, x = except, asx = asymptomatic, tx = transplant or treatment, sx = symptoms or symptomatic, cx = canceled or culture, rxn = reaction, yday = yesterday, nl = normal, abx = antibiotics, fxn = function, dx = diagnosis, dz = disease, m/h = melena/hematochezia, c/d/l/ha = cramping/dizziness/lightheadedness/headache, d/c = discharge or diarrhea/constipation, f/c/n/v = fevers/chills/nausea/vomiting, cp/sob = chest pain/shortness of breath, tbv = total body volume, rxn = reaction, tdc = tunneled dialysis catheter, pta = prior to admission, hd = hemodialysis, pd = peritoneal dialysis, hhd = home hemodialysis, edw = estimated dry wt         Assessment/Plan:     1.  ROSSY.  Pt's cr continues to improve steadily.  She is on midodrine only right now.  No uremic sx.  A.  Check BMP on Monday c results faxed to our office - [phone].  B.  F/u c [facility] (Neph) in 2-4 wks.  We will call her to schedule an appt.  C.  Continue midodrine for SBP < 120.      2.  Alcoholic hepatitis c encephalopathy.  Pt is being followed by GI.  She is on lactulose and prednisolone.  At present, she is not a liver tx candidate.  A.  Continue lactulose + prednisolone per GI.    3.  Anemia.  Pt's hb is stable in the mid-8s.  No obv GIB signs/sx right now.    A.  Follow hb and transfuse prn.    4.  FEN.  Electrolytes are ok.  She is on a low salt diet.        Interval History:     Pt feels ok.  She just had a 2L para and rec'd some IV alb afterwards.  Breathing ok.  Good uo.  She is going to go home today.  She knows that if she has a drink, it will be the first step to potential death.          Medications and Allergies:       influenza recomb quadrivalent PF  0.5 mL Intramuscular Prior to discharge     lactulose  10 g Oral TID     midodrine  10 mg Oral TID w/meals     pantoprazole (PROTONIX) IV  40 mg  "Intravenous Daily with breakfast     prednisoLONE  40 mg Oral Daily     sodium chloride (PF)  10-40 mL Intracatheter Q7 Days     No Known Allergies       Physical Exam:     Vitals were reviewed     , Blood pressure 126/87, pulse 68, temperature 98.6  F (37  C), temperature source Oral, resp. rate 16, height 1.676 m (5' 6\"), weight 70.4 kg (155 lb 3.2 oz), SpO2 95 %, not currently breastfeeding.  Wt Readings from Last 3 Encounters:   10/01/21 70.4 kg (155 lb 3.2 oz)   09/14/20 79.9 kg (176 lb 1.6 oz)     No intake or output data in the 24 hours ending 10/01/21 1438    GENERAL APPEARANCE: pleasant, NAD, alert  HEENT:  eyes/ears/nose/neck grossly nl x + scleral icterus  RESP: CTA B c good efforts; no crackles  CV: RRR, nl S1/S2   ABDOMEN: s/nt/nd  EXTREMITIES/SKIN: + jaundice; 1+ ble edema         Data:     CBC RESULTS:     Recent Labs   Lab 09/30/21  0455 09/29/21  0532 09/28/21  0512 09/27/21  1833 09/27/21  0537 09/26/21  0611 09/25/21  1435 09/25/21  0829   WBC 12.6* 13.0* 16.2*  --  13.3* 13.2*  --  12.1*   RBC 2.45* 2.43* 2.46*  --  1.87* 2.30*  --  2.46*   HGB 8.7* 8.5* 8.8* 8.6* 7.0* 8.2*   < > 8.9*   HCT 25.3* 25.2* 25.8*  --  20.5* 24.7*  --  25.8*   PLT 81* 83* 95*  --  86* 85*  --  92*    < > = values in this interval not displayed.     Basic Metabolic Panel:  Recent Labs   Lab 10/01/21  0557 09/30/21  0455 09/29/21  0532 09/28/21  0512 09/27/21  0537 09/26/21  0611    139 137 136 139 137   POTASSIUM 3.4 3.8 3.6 3.5 3.7 3.4   CHLORIDE 115* 108 108 107 109 108   CO2 18* 19* 19* 17* 18* 17*   BUN 62* 70* 62* 57* 56* 58*   CR 1.82* 2.86* 3.14* 3.38* 3.27* 3.55*   * 146* 131* 138* 153* 100*   SHAI 7.2* 8.5 8.1* 8.3* 8.1* 8.1*     INR  Recent Labs   Lab 09/30/21  0454 09/29/21  0532 09/28/21  0512 09/27/21  0536   INR 3.66* 3.65* 3.27* 3.54*      Attestation:   I have reviewed today's relevant vital signs, notes, medications, labs and imaging.    Guero Hammond MD  [facility] - " Nephrology  [phone]

## 2021-10-01 NOTE — PLAN OF CARE
A&Ox4. VSS on RA. Up ad nabil. RUE PICC patent, needs new dressing placed. Continent of B/B. Jaundiced. Denies pain. Tolerating 2g sodium diet. [facility] and Nephrology following. Discharge pending, continue to monitor.

## 2021-10-01 NOTE — DISCHARGE SUMMARY
[facility]  Hospitalist Discharge Summary      Date of Admission:  9/24/2021  Date of Discharge:  10/1/2021  Discharging Provider: Andie Limon,       Discharge Diagnoses   See below    Follow-ups Needed After Discharge       Unresulted Labs Ordered in the Past 30 Days of this Admission     Date and Time Order Name Status Description    9/26/2021 11:25 AM Alpha 1 antitryp elisabeth reflex to pheno In process       These results will be followed up by GI    Discharge Disposition   Discharged to home  Condition at discharge: Stable    Hospital Course   Melita Cortes is a 51 year old female who presents with worsening jaundice for the past 2 weeks, worsening abdominal distention, lower extremity edema and increased dyspnea. She remained hospitalized to treat acute decompensated alcoholic liver disease.       Acute decompensated cirrhosis, most likely etoh induced  Alcoholic hepatitis  Patient presented with progressive symptoms which has been ongoing for about a year and appears to have gotten worse in the past month or so. Her constellation of symptoms and lab findings were suggestive of severe alcoholic liver disease.  Bilirubin extremely elevated at 30.2. Meld score was 40 in the ER, Dr. Oliver from the emergency room attempted transfer to [facility] for potential transplant however she was turned down due to her history of ongoing alcohol usage.  -S/p diagnostic and therapeutic paracentesis with removal of 3.95 L of ascites fluid on 9/24.  -Started on prednisolone for alcoholic hepatitis on 9/26/21. GI recommending to continue current dose of prednisolone through 10/23/21, then short taper off (20 mg daily for 3 days, then 10 mg daily for 3 days, then off).     >[facility] following, recommend patient has outpatient follow up with screening scheduled on discharge  >remains on lactulose and steroid taper as above  >holding diuretics due to ROSSY, defer to nephrology for  initiation   >will discharge on current management and OP follow up     Severe anemia-likely multifactorial  Thrombocytopenia  Hemoglobin at presentation 5.2, with a sarah of 4.7.  Last hemoglobin in the system was from September 2020 and it was 12.3 at that time. Patient denies hematochezia or melena.  -S/p 3 units of PRBC on 9/24 and one more unit on 9/27  -Hb stable and suspect multifactorial     Acute kidney injury, likely multifactorial with both prerenal and hepatorenal syndrome  Creatinine at presentation was 4.10. Last creatinine in the chart was from September 2020 when it was normal at 0.56. FENA of 0.3.  -This is most likely multifactorial with components of both prerenal and hepatorenal syndrome.  -Cr improved to 1.8 on day of discharge     Coagulopathy due to alcoholic liver disease  INR more than 3 at presentation, coagulopathy is due to severe alcoholic liver disease.   -stop monitoring     Mild hyponatremia-resolved  -Most likely due to severe alcoholic liver disease.    Consultations This Hospital Stay   GASTROENTEROLOGY IP CONSULT  NEPHROLOGY IP CONSULT  PHYSICAL THERAPY ADULT IP CONSULT  NUTRITION SERVICES ADULT IP CONSULT  VASCULAR ACCESS ADULT IP CONSULT  VASCULAR ACCESS ADULT IP CONSULT  CHEMICAL DEPENDENCY IP CONSULT    Code Status   Full Code    Time Spent on this Encounter   IAndie DO, personally saw the patient today and spent greater than 30 minutes discharging this patient.       Andie Limon DO  [place] [address], [address]  [facility] [address]  Phone: [phone]  ______________________________________________________________________    Physical Exam   Vital Signs: Temp: 98.6  F (37  C) Temp src: Oral BP: 126/87 Pulse: 68   Resp: 16 SpO2: 95 % O2 Device: None (Room air)    Weight: 155 lbs 3.2 oz     Constitutional: awake, alert, cooperative, no apparent distress  HEENT: scleral icterus  Respiratory: clear to auscultation  bilaterally, no crackles or wheezing  Cardiovascular: regular rate and rhythm, normal S1 and S2, no murmur noted, trace edema in LE  GI: normal bowel sounds, soft, mild distention, non-tender  Skin: warm, dry  Musculoskeletal: no joint swelling  Neurologic: awake, alert, oriented to name, place and time       Primary Care Physician   Issa Monk MD    Discharge Orders   No discharge procedures on file.    Significant Results and Procedures   Most Recent 3 CBC's:Recent Labs   Lab Test 09/30/21  0455 09/29/21  0532 09/28/21  0512   WBC 12.6* 13.0* 16.2*   HGB 8.7* 8.5* 8.8*   * 104* 105*   PLT 81* 83* 95*     Most Recent 3 BMP's:Recent Labs   Lab Test 10/01/21  0557 09/30/21  0455 09/29/21  0532    139 137   POTASSIUM 3.4 3.8 3.6   CHLORIDE 115* 108 108   CO2 18* 19* 19*   BUN 62* 70* 62*   CR 1.82* 2.86* 3.14*   ANIONGAP 10 12 10   SHAI 7.2* 8.5 8.1*   * 146* 131*     Most Recent 2 LFT's:Recent Labs   Lab Test 10/01/21  0557 09/30/21  0455   AST 47* 47*   ALT 37 35   ALKPHOS 75 88   BILITOTAL 17.6* 22.8*     Most Recent 3 INR's:Recent Labs   Lab Test 09/30/21 0454 09/29/21  0532 09/28/21  0512   INR 3.66* 3.65* 3.27*   ,   Results for orders placed or performed during the hospital encounter of 09/24/21   US Paracentesis    Narrative    US PARACENTESIS 9/24/2021 4:53 PM     HISTORY: HIGH VOLUME paracentesis with or without diagnostic fluid  analysis with labs to be drawn if ordered. Total paracentesis volume  as much as possible.    FINDINGS: Ultrasound was used to evaluate for the presence and best  approach for paracentesis. Written and oral informed consent was  obtained. A pause for the cause procedure to verify the correct  patient and correct procedure. The skin overlying the left lower  quadrant was prepped and draped in the usual sterile fashion. The  subcutaneous tissues were anesthetized with 10 mL of 1% lidocaine. A  catheter was advanced into the peritoneal space and 3.95 L  of  straw  colored fluid was drained. There were no immediate complications.  Ultrasound images were permanently stored.  Patient left the  ultrasound suite in satisfactory condition.      Impression    IMPRESSION: Technically successful paracentesis without immediate  complications.    PRIMITIVO GARIBAY MD         SYSTEM ID:  [id number]   US Abdomen Complete w Doppler Complete    Narrative    EXAM: US ABDOMEN COMPLETE WITH DOPPLER COMPLETE  LOCATION: [facility]  DATE/TIME: 9/24/2021 3:40 PM    INDICATION: Liver disease, abdominal distention, jaundice  COMPARISON: CT 09/25/2020  TECHNIQUE: Complete abdominal ultrasound. Color flow with spectral Doppler and waveform analysis performed.     FINDINGS:    GALLBLADDER: Sludge in gallbladder neck. No stones. Wall thickened to 8 mm. Negative sonographic Ricci's.     BILE DUCTS: No biliary dilatation. The common duct measures 3 mm.    LIVER: Coarsened echotexture, suggesting underlying fibrosis. Nodular contour. No focal mass.     RIGHT KIDNEY: Normal size. Normal echogenicity with no hydronephrosis or mass.     LEFT KIDNEY: Not visualized possibly due to bowel gas.    SPLEEN: Mildly prominent 13.4 cm in length.    PANCREAS: The visualized portions are normal.    AORTA: Normal in caliber.    IVC: Normal where visualized.    Small volume ascites. Bilateral pleural effusions.    ABDOMINAL DUPLEX: The hepatic artery, hepatic veins, IVC, and splenic vein are patent with flow in the normal direction. Retrograde flow noted in main portal vein, left and right portal vein.      Impression    IMPRESSION:  1.  Evidence for hepatic cirrhosis with portal venous hypertension.  2.  Sludge in gallbladder neck with wall thickening. Wall thickening nonspecific in the setting of portal venous hypertension.  3.  Retrograde flow within main portal vein, left and right portal veins.  4.  Bilateral pleural effusions. Small volume ascites.       XR Chest 2 Views    Narrative     EXAM: XR CHEST 2 VW  LOCATION: [facility]  DATE/TIME: 9/26/2021 4:31 PM    INDICATION: short of breath  COMPARISON: None.      Impression    IMPRESSION: Moderate bilateral pleural effusions with associated lower lobe atelectasis. No pneumothorax. Normal heart size. Aerated lungs are clear.   XR Chest Port 1 View    Narrative    EXAM: XR CHEST PORT 1 VIEW  LOCATION: [facility]  DATE/TIME: 9/26/2021 9:56 PM    INDICATION: RN placed PICC - verify tip placement  COMPARISON: 09/26/2021      Impression    IMPRESSION: The visualized cardiomediastinal contours are stable though partially obscured by adjacent airspace disease and layering bilateral pleural effusions, slightly increased on the left. New right upper extremity PICC tip in region of cavoatrial   junction. No pneumothorax.   XR Chest Port 1 View    Narrative    EXAM: XR CHEST PORT 1 VIEW  LOCATION: [facility]  DATE/TIME: 9/26/2021 10:57 PM    INDICATION: RN placed PICC - verify tip placement  COMPARISON: Radiograph from earlier same date.      Impression    IMPRESSION: Visualized cardiomediastinal contours are stable though again partially obscured by adjacent airspace infiltrates and pleural effusions which are stable. A right upper extremity PICC is in place with the tip overlying the low SVC near the   cavoatrial junction. No pneumothorax.   US Paracentesis    Narrative    US PARACENTESIS 10/1/2021 11:01 AM    CLINICAL HISTORY: HIGH VOLUME paracentesis with or without diagnostic  fluid analysis with labs to be drawn if ordered. Total paracentesis  volume as much as possible.    PROCEDURE: Informed consent obtained. Time out performed. The abdomen  was prepped and draped in a sterile fashion. 10 mL of 1% lidocaine was  infused into local soft tissues. A 5 Latvian catheter system was  introduced into the abdominal ascites under ultrasound guidance.    1.9 liters of yellow, clear  fluid were removed and sent to lab if  requested.    Patient tolerated procedure well.    Ultrasound imaging was obtained and placed in the patient's permanent  medical record.      Impression    IMPRESSION:  1.  Status post ultrasound-guided paracentesis.    ANNA HUGHES MD         SYSTEM ID:  [id number]       Discharge Medications   Current Discharge Medication List      START taking these medications    Details   lactulose (CHRONULAC) 10 GM/15ML solution Take 3 times daily. May titrate to achieve 3-4 loose stools per day.  Qty: 1350 mL, Refills: 0    Associated Diagnoses: Alcoholic liver disease (H)      midodrine (PROAMATINE) 10 MG tablet Take 1 tablet (10 mg) by mouth 3 times daily (with meals)  Qty: 90 tablet, Refills: 0    Associated Diagnoses: Alcoholic liver disease (H)      pantoprazole (PROTONIX) 40 MG EC tablet Take 1 tablet (40 mg) by mouth daily  Qty: 30 tablet, Refills: 0    Associated Diagnoses: Alcoholic liver disease (H)      prednisoLONE (ORAPRED) 15 MG/5 ML solution Take 13.3 mLs (40 mg) by mouth daily for 22 days  Qty: 292.6 mL, Refills: 0    Associated Diagnoses: Alcoholic liver disease (H)      !! prednisoLONE (PRELONE) 15 MG/5ML syrup Take 6.7 mLs (20 mg) by mouth daily for 3 days  Qty: 20.1 mL, Refills: 0    Associated Diagnoses: Alcoholic liver disease (H)      !! prednisoLONE (PRELONE) 15 MG/5ML syrup Take 3.3 mLs (9.9 mg) by mouth daily for 3 days  Qty: 9.9 mL, Refills: 0    Associated Diagnoses: Alcoholic liver disease (H)       !! - Potential duplicate medications found. Please discuss with provider.        Allergies   No Known Allergies

## 2021-10-01 NOTE — PLAN OF CARE
A/Ox4. VSS. Denies pain. C/o SOB. Paracentesis completed, site CDI. Up independently. Lung sounds are clear and diminished in bases. Flu shot given. Education given on new medications and on liver diseases. Discharge instructions discussed with patient and spouse. All questions and concerns are addressed. Patient discharged home with nursing staff via wheelchair.

## 2021-10-01 NOTE — PLAN OF CARE
Physical Therapy Discharge Summary    Reason for therapy discharge:    Discharged to home.    Progress towards therapy goal(s). See goals on Care Plan in [facility] electronic health record for goal details.  Goals met    Therapy recommendation(s):    No further therapy is recommended.

## 2021-10-01 NOTE — PLAN OF CARE
Shift [id number]    Pt A&Ox4. VSS on Ra. Denies pain. Tolerating 2g sodium diet. CMS intact. LS cler. Continent of B/B, having loose stools. PICC in RUE w/some noted dried blood. BS active. Skin jaundiced. Independent in room. [facility] and Nephrology following. Discharge pending. Continue to monitor.

## 2021-10-01 NOTE — PROGRESS NOTES
CLINICAL NUTRITION SERVICES BRIEF NOTE  Refer to RD note dated 9/26 for full assessment    New Findings  - Pt seen in room for scheduled reassessment. She is tolerating PO, this morning had taken in two Ensure but no solids. Having menu fatigue.  - She is being discharged today and feeling motivated to get started on a low sodium diet at home.     Intervention  - Provided patient with the following handouts  1) Tips for Low Na Diet  2) Low Na Foods/Drinks  3) Seasoning Your Food Without Salt  4) Low Na Recipe Booklet      Lilian Prakash RD, LD  [facility] [place], [address] 56, [place]   Pager: [phone]  Weekend Pager: [phone]

## 2021-10-01 NOTE — PROGRESS NOTES
Pt's PICC line removed and petroleum dressing applied. Dressing became saturated with blood, Pressure held for 5 minutes. Pt continues to bleed from site. Thrombix applied and pressure held. Site stopped bleeding and tegaderm applied. Pt instructed to contact RN if bleeding restarts.

## 2021-10-02 LAB
A1AT PHENOTYP SERPL-IMP: NORMAL
A1AT SERPL-MCNC: 158 MG/DL
A1AT SS SERPL-MCNC: NEGATIVE G/L
A1AT SZ SERPL-MCNC: NORMAL G/L
A1AT ZZ SERPL-MCNC: NEGATIVE G/L
SPECIMEN SOURCE: NORMAL

## 2021-10-04 ENCOUNTER — LAB (OUTPATIENT)
Dept: LAB | Facility: CLINIC | Age: 51
End: 2021-10-04
Payer: COMMERCIAL

## 2021-10-04 DIAGNOSIS — K70.9 ALCOHOLIC LIVER DISEASE (H): ICD-10-CM

## 2021-10-04 LAB
ALBUMIN SERPL-MCNC: 3.4 G/DL (ref 3.4–5)
ALP SERPL-CCNC: 149 U/L (ref 40–150)
ALT SERPL W P-5'-P-CCNC: 111 U/L (ref 0–50)
ANION GAP SERPL CALCULATED.3IONS-SCNC: 9 MMOL/L (ref 3–14)
AST SERPL W P-5'-P-CCNC: 96 U/L (ref 0–45)
BILIRUB SERPL-MCNC: 20.8 MG/DL (ref 0.2–1.3)
BUN SERPL-MCNC: 74 MG/DL (ref 7–30)
CALCIUM SERPL-MCNC: 9.6 MG/DL (ref 8.5–10.1)
CHLORIDE BLD-SCNC: 108 MMOL/L (ref 94–109)
CO2 SERPL-SCNC: 25 MMOL/L (ref 20–32)
CREAT SERPL-MCNC: 1.86 MG/DL (ref 0.52–1.04)
ERYTHROCYTE [DISTWIDTH] IN BLOOD BY AUTOMATED COUNT: 27.9 % (ref 10–15)
GFR SERPL CREATININE-BSD FRML MDRD: 31 ML/MIN/1.73M2
GLUCOSE BLD-MCNC: 121 MG/DL (ref 70–99)
HCT VFR BLD AUTO: 26.1 % (ref 35–47)
HGB BLD-MCNC: 9 G/DL (ref 11.7–15.7)
MCH RBC QN AUTO: 35.7 PG (ref 26.5–33)
MCHC RBC AUTO-ENTMCNC: 34.5 G/DL (ref 31.5–36.5)
MCV RBC AUTO: 104 FL (ref 78–100)
PLATELET # BLD AUTO: 78 10E3/UL (ref 150–450)
POTASSIUM BLD-SCNC: 4.3 MMOL/L (ref 3.4–5.3)
PROT SERPL-MCNC: 6.6 G/DL (ref 6.8–8.8)
RBC # BLD AUTO: 2.52 10E6/UL (ref 3.8–5.2)
SODIUM SERPL-SCNC: 142 MMOL/L (ref 133–144)
WBC # BLD AUTO: 17.4 10E3/UL (ref 4–11)

## 2021-10-04 PROCEDURE — 36415 COLL VENOUS BLD VENIPUNCTURE: CPT

## 2021-10-04 PROCEDURE — 85027 COMPLETE CBC AUTOMATED: CPT

## 2021-10-04 PROCEDURE — 80053 COMPREHEN METABOLIC PANEL: CPT

## 2021-10-07 LAB
ALT SERPL-CCNC: 115 IU/L (ref 0–32)
AST SERPL-CCNC: 99 IU/L (ref 0–40)
CREATININE (EXTERNAL): 1.87 MG/DL (ref 0.57–1)
GFR ESTIMATED (EXTERNAL): 31 ML/MIN/1.73M2
GFR ESTIMATED (IF AFRICAN AMERICAN) (EXTERNAL): 36 ML/MIN/1.73M2
GLUCOSE (EXTERNAL): 126 MG/DL (ref 65–99)
INR (EXTERNAL): 1.9 (ref 0.9–1.2)
POTASSIUM (EXTERNAL): 4.7 MMOL/L (ref 3.5–5.2)

## 2021-10-10 NOTE — PROGRESS NOTES
"    Assessment & Plan     Alcoholic cirrhosis of liver with ascites (H)  Recently admitted to the hospital and discharged with acute alcoholic hepatitis  She was found to be cirrhotic  Bilirubin was as high as 25 at one-point  Currently it is trending down  She has some transaminitis  However she is mentally clear  She is currently on a prednisone taper  Meld score was close to 40 in the hospital  She was initially referred for liver transplant but because of her alcohol history she has to be sober for some time  Continue current dose of lactulose  I feel she will benefit from some  Lasix and spironolactone however they are concerned about her kidney function and patient and  would like to check this with nephrology and if that is okay with nephrologist then we can start it  She is also already on midodrine  I do not know if she has any varices as she did not have an EGD and apparently this is planned for sometime in the future  She needs close follow-up with GI   She might need repeat paracentesis  She had paracentesis on 2 occasions in the hospital  She is due for repeat blood work tomorrow  I told her to fluid restrict 1.5 L daily  - Adult Gastro Ref - Consult Only; Future  - Nutrition Referral; Future    Macrocytosis  From alcohol abuse    Anemia, unspecified type  She did not have any overt GI bleeding  Because of the anemia remains unclear  She received transfusion  Hemoglobin seems to be stable around 9    Elevated serum creatinine  They thought this was from hepatorenal syndrome  It was as high as 3 at one-point  Currently it is stabilized around 1.8  Due to see a nephrologist soon        30 minutes spent on the date of the encounter doing chart review, history and exam, documentation and further activities per the note       BMI:   Estimated body mass index is 24.79 kg/m  as calculated from the following:    Height as of 9/24/21: 1.676 m (5' 6\").    Weight as of this encounter: 69.7 kg (153 lb 9 oz). "           Return in about 3 weeks (around 11/3/2021).    Navneet Johnson MD  [employer] ALMA ROSA Kitchen is a 51 year old who presents for the following health issues     History of Present Illness       CKD: She is not using over the counter pain medicine.     She eats 0-1 servings of fruits and vegetables daily.She consumes 0 sweetened beverage(s) daily.She exercises with enough effort to increase her heart rate 9 or less minutes per day.  She exercises with enough effort to increase her heart rate 3 or less days per week.   She is taking medications regularly.  She is here for a hospital follow-up  Recently was admitted to the hospital with alcohol hepatitis  Reviewed records from there          Review of Systems   Constitutional, HEENT, cardiovascular, pulmonary, gi and gu systems are negative, except as otherwise noted.      Objective    /78 (BP Location: Right arm, Patient Position: Sitting, Cuff Size: Adult Regular)   Pulse 76   Temp 97.5  F (36.4  C) (Oral)   Resp 20   Wt 69.7 kg (153 lb 9 oz)   SpO2 97%   BMI 24.79 kg/m    Body mass index is 24.79 kg/m .  Physical Exam   GENERAL: healthy, alert and no distress  EYES: Deep icterus  NECK: no adenopathy, no asymmetry, masses, or scars and thyroid normal to palpation  RESP: lungs clear to auscultation - no rales, rhonchi or wheezes  CV: regular rate and rhythm, normal S1 S2, no S3 or S4, no murmur, click or rub, no peripheral edema and peripheral pulses strong  ABDOMEN: Distended  Ascites present  Shifting dullness positive  SKIN: Deep icterus

## 2021-10-13 ENCOUNTER — OFFICE VISIT (OUTPATIENT)
Dept: FAMILY MEDICINE | Facility: CLINIC | Age: 51
End: 2021-10-13
Payer: COMMERCIAL

## 2021-10-13 VITALS
OXYGEN SATURATION: 97 % | HEART RATE: 76 BPM | TEMPERATURE: 97.5 F | DIASTOLIC BLOOD PRESSURE: 78 MMHG | BODY MASS INDEX: 24.79 KG/M2 | WEIGHT: 153.56 LBS | SYSTOLIC BLOOD PRESSURE: 121 MMHG | RESPIRATION RATE: 20 BRPM

## 2021-10-13 DIAGNOSIS — K70.31 ALCOHOLIC CIRRHOSIS OF LIVER WITH ASCITES (H): Primary | ICD-10-CM

## 2021-10-13 DIAGNOSIS — D64.9 ANEMIA, UNSPECIFIED TYPE: ICD-10-CM

## 2021-10-13 DIAGNOSIS — R79.89 ELEVATED SERUM CREATININE: ICD-10-CM

## 2021-10-13 DIAGNOSIS — D75.89 MACROCYTOSIS: ICD-10-CM

## 2021-10-13 PROCEDURE — 99214 OFFICE O/P EST MOD 30 MIN: CPT | Performed by: INTERNAL MEDICINE

## 2021-10-13 NOTE — PATIENT INSTRUCTIONS
Patient Education     Cirrhosis    The liver is found on the right side of your belly (abdomen). It's just below the rib cage. The liver has many important jobs. It removes toxins from the blood. It also helps your blood clot to stop bleeding. Cirrhosis happens when the liver is scarred or injured. This damage is permanent. It can cause your liver to stop working (liver failure).    The most common causes of cirrhosis are long-term heavy alcohol use and having hepatitis B or C. Other causes include nonalcoholic steatohepatitis (CARTWRIGHT or fatty liver disease), autoimmune disease, hemochromatosis, toxins, certain medicines, and certain viruses.   Common symptoms of cirrhosis include:    Tiredness or weakness    Loss of appetite    Nausea and vomiting    Easy bleeding and bruising    Swelling of the belly (abdomen)    Weight loss    Yellowing of the eyes or skin (jaundice)    Itching    Confusion  Treatment helps ease symptoms and prevent more liver damage. You may also get treatment to fight  or cure the hepatitis virus. Quitting alcohol will help slow down the disease getting worse. It may also prevent more complications. If cirrhosis gets worse and becomes life threatening, you may need a liver transplant.    Home care    Don't take medicines that can make liver damage worse. Your healthcare provider will tell you if any of the medicines you now take need to be changed. Talk with your provider or pharmacist before taking any medicine not prescribed. These include dietary supplements and herbs. Some of these may make liver damage worse.    Talk with your healthcare provider about medicines that have acetaminophen or NSAIDs such as ibuprofen and naproxen. These can also harm your liver.     Stop drinking alcohol. If you find it hard to stop drinking, seek professional help. Consider joining Alcoholics Anonymous or another type of treatment program for support.    If you use IV drugs, you are at high risk for hepatitis  B and C. Seek help to stop.     Be sure to ask your healthcare provider about recommended vaccines. These include vaccines for viruses that can cause liver disease.    Follow-up care  Follow up with your healthcare provider, or as advised. If you have cirrhosis, you may need more testing to look for complications, including liver cancer.   For more information and to learn about support groups for people with liver disease, contact:     [employer], www.liverfoundation.org, 216.872.8952    [employer], , www.hepatitisfoundation.org, 515.838.6286  When to seek medical advice  Call your healthcare provider right away if you have any of the following:    Rapid weight gain with increased size of your belly (abdomen) or leg swelling    Yellow color of your skin or eyes (jaundice) gets worse    Excess bleeding from cuts or injuries  Cheli last reviewed this educational content on 2/1/2020 2000-2021 [employer]. All rights reserved. This information is not intended as a substitute for professional medical care. Always follow your healthcare professional's instructions.

## 2021-10-14 ENCOUNTER — TRANSFERRED RECORDS (OUTPATIENT)
Dept: HEALTH INFORMATION MANAGEMENT | Facility: CLINIC | Age: 51
End: 2021-10-14
Payer: COMMERCIAL

## 2021-10-14 LAB
ALT SERPL-CCNC: 139 IU/L (ref 0–32)
AST SERPL-CCNC: 93 IU/L (ref 0–40)
CREATININE (EXTERNAL): 1.39 MG/DL (ref 0.57–1)
GFR ESTIMATED (EXTERNAL): 44 ML/MIN/1.73M2
GFR ESTIMATED (IF AFRICAN AMERICAN) (EXTERNAL): 51 ML/MIN/1.73M2
GLUCOSE (EXTERNAL): 102 MG/DL (ref 65–99)
POTASSIUM (EXTERNAL): 3.6 MMOL/L (ref 3.5–5.2)

## 2021-10-15 ENCOUNTER — PRE VISIT (OUTPATIENT)
Dept: GASTROENTEROLOGY | Facility: CLINIC | Age: 51
End: 2021-10-15

## 2021-10-18 ENCOUNTER — MEDICAL CORRESPONDENCE (OUTPATIENT)
Dept: HEALTH INFORMATION MANAGEMENT | Facility: CLINIC | Age: 51
End: 2021-10-18
Payer: COMMERCIAL

## 2021-10-18 DIAGNOSIS — Z11.59 ENCOUNTER FOR SCREENING FOR OTHER VIRAL DISEASES: ICD-10-CM

## 2021-10-19 ENCOUNTER — TELEPHONE (OUTPATIENT)
Dept: GASTROENTEROLOGY | Facility: CLINIC | Age: 51
End: 2021-10-19

## 2021-10-19 ENCOUNTER — HOSPITAL ENCOUNTER (OUTPATIENT)
Facility: AMBULATORY SURGERY CENTER | Age: 51
End: 2021-10-19
Attending: INTERNAL MEDICINE
Payer: COMMERCIAL

## 2021-10-19 ENCOUNTER — LAB (OUTPATIENT)
Dept: LAB | Facility: CLINIC | Age: 51
End: 2021-10-19
Payer: COMMERCIAL

## 2021-10-19 ENCOUNTER — OFFICE VISIT (OUTPATIENT)
Dept: GASTROENTEROLOGY | Facility: CLINIC | Age: 51
End: 2021-10-19
Attending: INTERNAL MEDICINE
Payer: COMMERCIAL

## 2021-10-19 VITALS
HEIGHT: 66 IN | DIASTOLIC BLOOD PRESSURE: 92 MMHG | OXYGEN SATURATION: 98 % | WEIGHT: 153.3 LBS | RESPIRATION RATE: 18 BRPM | SYSTOLIC BLOOD PRESSURE: 145 MMHG | BODY MASS INDEX: 24.64 KG/M2 | TEMPERATURE: 98 F | HEART RATE: 102 BPM

## 2021-10-19 DIAGNOSIS — Z11.59 ENCOUNTER FOR SCREENING FOR OTHER VIRAL DISEASES: ICD-10-CM

## 2021-10-19 DIAGNOSIS — K70.11 ALCOHOLIC HEPATITIS WITH ASCITES (H): ICD-10-CM

## 2021-10-19 DIAGNOSIS — K83.1 CHOLESTATIC LIVER DISEASE (H): ICD-10-CM

## 2021-10-19 DIAGNOSIS — K83.1 CHOLESTATIC LIVER DISEASE (H): Primary | ICD-10-CM

## 2021-10-19 LAB
ALBUMIN SERPL-MCNC: 2.9 G/DL (ref 3.4–5)
ALP SERPL-CCNC: 148 U/L (ref 40–150)
ALT SERPL W P-5'-P-CCNC: 146 U/L (ref 0–50)
ANION GAP SERPL CALCULATED.3IONS-SCNC: 10 MMOL/L (ref 3–14)
AST SERPL W P-5'-P-CCNC: 88 U/L (ref 0–45)
BILIRUB DIRECT SERPL-MCNC: 10.6 MG/DL (ref 0–0.2)
BILIRUB SERPL-MCNC: 22.2 MG/DL (ref 0.2–1.3)
BUN SERPL-MCNC: 38 MG/DL (ref 7–30)
CALCIUM SERPL-MCNC: 9 MG/DL (ref 8.5–10.1)
CHLORIDE BLD-SCNC: 101 MMOL/L (ref 94–109)
CO2 SERPL-SCNC: 28 MMOL/L (ref 20–32)
CREAT SERPL-MCNC: 1.32 MG/DL (ref 0.52–1.04)
ERYTHROCYTE [DISTWIDTH] IN BLOOD BY AUTOMATED COUNT: ABNORMAL %
FERRITIN SERPL-MCNC: NORMAL NG/ML
GFR SERPL CREATININE-BSD FRML MDRD: 47 ML/MIN/1.73M2
GLUCOSE BLD-MCNC: 130 MG/DL (ref 70–99)
HCT VFR BLD AUTO: 24.6 % (ref 35–47)
HGB BLD-MCNC: 8.2 G/DL (ref 11.7–15.7)
INR PPP: 2.48 (ref 0.85–1.15)
IRON SATN MFR SERPL: 82 % (ref 15–46)
IRON SERPL-MCNC: 162 UG/DL (ref 35–180)
LAB DIRECTOR COMMENTS: NORMAL
LAB DIRECTOR DISCLAIMER: NORMAL
LAB DIRECTOR INTERPRETATION: NORMAL
LAB DIRECTOR METHODOLOGY: NORMAL
LAB DIRECTOR RESULTS: NORMAL
MCH RBC QN AUTO: 38.5 PG (ref 26.5–33)
MCHC RBC AUTO-ENTMCNC: 33.3 G/DL (ref 31.5–36.5)
MCV RBC AUTO: 116 FL (ref 78–100)
PLATELET # BLD AUTO: 81 10E3/UL (ref 150–450)
POTASSIUM BLD-SCNC: 3.5 MMOL/L (ref 3.4–5.3)
PROT SERPL-MCNC: 6.3 G/DL (ref 6.8–8.8)
RBC # BLD AUTO: 2.13 10E6/UL (ref 3.8–5.2)
SODIUM SERPL-SCNC: 139 MMOL/L (ref 133–144)
SPECIMEN DESCRIPTION: NORMAL
TIBC SERPL-MCNC: 198 UG/DL (ref 240–430)
WBC # BLD AUTO: 14.1 10E3/UL (ref 4–11)

## 2021-10-19 PROCEDURE — G0452 MOLECULAR PATHOLOGY INTERPR: HCPCS | Mod: 26 | Performed by: STUDENT IN AN ORGANIZED HEALTH CARE EDUCATION/TRAINING PROGRAM

## 2021-10-19 PROCEDURE — 85027 COMPLETE CBC AUTOMATED: CPT | Performed by: PATHOLOGY

## 2021-10-19 PROCEDURE — 82248 BILIRUBIN DIRECT: CPT | Performed by: PATHOLOGY

## 2021-10-19 PROCEDURE — 82728 ASSAY OF FERRITIN: CPT | Performed by: PATHOLOGY

## 2021-10-19 PROCEDURE — 99205 OFFICE O/P NEW HI 60 MIN: CPT | Performed by: PHYSICIAN ASSISTANT

## 2021-10-19 PROCEDURE — 81256 HFE GENE: CPT | Performed by: PHYSICIAN ASSISTANT

## 2021-10-19 PROCEDURE — 85610 PROTHROMBIN TIME: CPT | Performed by: PATHOLOGY

## 2021-10-19 PROCEDURE — 80053 COMPREHEN METABOLIC PANEL: CPT | Performed by: PATHOLOGY

## 2021-10-19 PROCEDURE — 36415 COLL VENOUS BLD VENIPUNCTURE: CPT | Performed by: PATHOLOGY

## 2021-10-19 PROCEDURE — G0463 HOSPITAL OUTPT CLINIC VISIT: HCPCS

## 2021-10-19 PROCEDURE — 83550 IRON BINDING TEST: CPT | Performed by: PATHOLOGY

## 2021-10-19 RX ORDER — LANOLIN ALCOHOL/MO/W.PET/CERES
100 CREAM (GRAM) TOPICAL DAILY
Qty: 30 TABLET | Refills: 1 | Status: SHIPPED | OUTPATIENT
Start: 2021-10-19 | End: 2021-12-13

## 2021-10-19 RX ORDER — MULTIVIT WITH MINERALS/LUTEIN
1 TABLET ORAL DAILY
COMMUNITY

## 2021-10-19 RX ORDER — FOLIC ACID 1 MG/1
1 TABLET ORAL DAILY
Qty: 30 TABLET | Refills: 1 | Status: ON HOLD | OUTPATIENT
Start: 2021-10-19 | End: 2022-01-14

## 2021-10-19 RX ORDER — CIPROFLOXACIN 500 MG/1
500 TABLET, FILM COATED ORAL DAILY
Qty: 30 TABLET | Refills: 5 | Status: ON HOLD | OUTPATIENT
Start: 2021-10-19 | End: 2021-11-08

## 2021-10-19 ASSESSMENT — MIFFLIN-ST. JEOR: SCORE: 1331.86

## 2021-10-19 ASSESSMENT — PAIN SCALES - GENERAL: PAINLEVEL: NO PAIN (0)

## 2021-10-19 NOTE — LETTER
10/19/2021         RE: Melita Cortes  [id number] [facility] [address]  [place] [address]      Hepatology Clinic note  Melita Cortes   Date of Birth 1970  Date of Service 10/19/2021    REASON FOR CONSULTATION: Recent hospitalization   REFERRING PROVIDER: Navneet Johnson MD          Assessment/plan:   Melita Cortes is a 51 year old female with history of recent hospitalization for alcohol liver disease with a cholestatic picture on admission and likely hemolysis. Liver function not improved with prednisolone, so will discontinue. MELD-Na 31 (Tbili 22.2, Dbili 10.6). Interestingly, transaminases have been relatively normal.   Alcohol use would account for liver disease, but overall, pattern not consistent with alcohol hepatitis so will also be important to evaluate for any obstruction and consider liver biopsy if Bilirubin remains stable. Also discussed indications for variceal screening.     # Discontinue prednisolone     # BMP, hepatic panel, CBC, INR in one week     # Ascites:   - Will hold on diuretics due to Cr.   - Reassess ability to restart diuretics next week   - Start SBP prophylaxis with Cipro 500 mg daily     # MRI/MRCP to evaluate hepatobiliary anatomy     # Anemia, multi-factorial : (high LHD, high haptoglobin, low folate)   - Follow CBC  - Start folate   - Start Thiamin     # Low ceruloplasmiin, likely due to low proteins   - 24 hour urine     # Variceal screening:   - EGD within 1-2 months    # Follow-up in clinic in 3 weeks (okay for video visit)       Eduardo Parry PA-C   [facility]     Total time involved with patient was 65 minutes with >50% of time involved with counseling and coordination of care as noted above.     -----------------------------------------------------       HPI:   Melita Cortes is a 51 year old female  presenting for the evaluation of elevated LFT's.     Patient was recently hospitalized for acute decompensated liver disease,  thought to be likely alcohol hepatitis and ROSSY w/ prerenal/hepatorenal syndrome (peaking at 4.1). She was started on prednisolone x 30 days, through 10/23 and rapid taper. MELD-Na 40 in the ER. Discharged bilirubin was 20.8. 4 Units PRPC.     Appetite is good. Following a low sodium diet. Has had needed two paracentesis thus far over past few weeks.     Lost of a lot of muscle mass. Previously 180 lbs, now 140 lbs.  Is working out on trendmill and resistance bands. Has a paracentesis this week. Fluid in legs comes and ago.     Recent outside labs reviewed from [facility].   Tbil 22.6  Dbilirubin - not done  AST 93    Alk Phos 99  Cr 1.39  BUN 41  Sodium 142  INR 1.9    Jaundice for a few months,  states it got worse over the past few weeks.  No problems with confusion since hospitalization. Having regular bowel movements, having 3-4 bowel movements a day with lactulose.     Patient also denies melena, hematochezia or hematochezia. No recent fevers, sweats or chills.     PMH: GERD (improved), sleep disturbance, asthma (no problems)   [facility]: appendectomy     Medications:   None previously     No previous tobacco use. No alcohol in 30 days. Drinking wine every day (about a bottle +/- Fireball. Per chart review in 2020, patient was told to avoid alcohol at that time. Currently on short term disability, currently working in insurance. Live with husbands and two cats. Mother  of pancreatic cancer.     Previous work-up:  HCV antibody nonreactive  HAV Ab IgM nonreactive  HBV SAb: 0.0  HBV SAg: nonreactive  HBV CAb:   CARTER: negative   F-actin: 11   IGG - 2005  IgG4 - 108 elevated  AMA:   Iron panel:   Ferritin: pending  Iron Sats:   TTG   Alpha-1-antripsin: MM - 158  Ceruloplasmin: 14    TSH   Cholesterol Total   HDL  LDL  Triglycerides  Hemoglobin A1c    Medical hx Surgical hx   Past Medical History:   Diagnosis Date     Cervical high risk HPV (human papillomavirus) test positive 2020    No past  surgical history on file.              Medications:     Current Outpatient Medications   Medication     lactulose (CHRONULAC) 10 GM/15ML solution     midodrine (PROAMATINE) 10 MG tablet     pantoprazole (PROTONIX) 40 MG EC tablet     prednisoLONE (ORAPRED) 15 MG/5 ML solution     [START ON 10/24/2021] prednisoLONE (PRELONE) 15 MG/5ML syrup     [START ON 10/27/2021] prednisoLONE (PRELONE) 15 MG/5ML syrup     No current facility-administered medications for this visit.            Allergies:   No Known Allergies         Social History:     Social History     Socioeconomic History     Marital status:      Spouse name: Not on file     Number of children: Not on file     Years of education: Not on file     Highest education level: Not on file   Occupational History     Not on file   Tobacco Use     Smoking status: Never Smoker     Smokeless tobacco: Never Used   Substance and Sexual Activity     Alcohol use: Yes     Drug use: Never     Sexual activity: Not Currently   Other Topics Concern     Not on file   Social History Narrative     Not on file     Social Determinants of Health     Financial Resource Strain:      Difficulty of Paying Living Expenses:    Food Insecurity:      Worried About Running Out of Food in the Last Year:      Ran Out of Food in the Last Year:    Transportation Needs:      Lack of Transportation (Medical):      Lack of Transportation (Non-Medical):    Physical Activity:      Days of Exercise per Week:      Minutes of Exercise per Session:    Stress:      Feeling of Stress :    Social Connections:      Frequency of Communication with Friends and Family:      Frequency of Social Gatherings with Friends and Family:      Attends Mandaeism Services:      Active Member of Clubs or Organizations:      Attends Club or Organization Meetings:      Marital Status:    Intimate Partner Violence:      Fear of Current or Ex-Partner:      Emotionally Abused:      Physically Abused:      Sexually Abused:   "           Family History:   No family history on file.           Review of Systems:   Gen: See HPI     HEENT: No change in vision or hearing, mouth sores, dysphagia, lymph nodes  Resp: No shortness of breath, coughing, hx of asthma  CV: No chest pain, palpitations, syncope   GI: See HPI  : No dysuria, history of stones, urine color    Skin: No rash; no pruritus or psoriasis  MS: No arthralgias, myalgias, joint swelling  Neuro: No memory changes, confusion, numbness    Heme: No difficulty clotting, bruising, bleeding  Psych:  No anxiety, depression, agitation          Physical Exam:   VS: BP (!) 145/92 (BP Location: Left arm, Patient Position: Sitting, Cuff Size: Adult Small)   Pulse 102   Temp 98  F (36.7  C) (Oral)   Resp 18   Ht 1.676 m (5' 5.98\")   Wt 69.5 kg (153 lb 4.8 oz)   SpO2 98%   BMI 24.76 kg/m        Gen: A&Ox3, NAD, well developed, jaundiced   HEENT: icteric sclera, puffy face  Lung: CTA Bilatererally, no wheezing or crackles.   Lym- no palpable lymphadenopathy  Abd: soft, NT, ND, moderate ascites   Ext: ni ntact pulses. mild non pitting edema in lower extremities   Skin: no palmar erythema, telangiectasias or jaundice,   Neuro: grossly intact, no asterixis   Psych: appropriate mood and affects         Data:   Reviewed in person and significant for:    Lab Results   Component Value Date     10/04/2021     09/18/2020      Lab Results   Component Value Date    POTASSIUM 4.3 10/04/2021    POTASSIUM 3.1 09/18/2020     Lab Results   Component Value Date    CHLORIDE 108 10/04/2021    CHLORIDE 106 09/18/2020     Lab Results   Component Value Date    CO2 25 10/04/2021    CO2 25 09/18/2020     Lab Results   Component Value Date    BUN 74 10/04/2021    BUN 2 09/18/2020     Lab Results   Component Value Date    CR 1.86 10/04/2021    CR 0.56 09/18/2020       Lab Results   Component Value Date    WBC 17.4 10/04/2021    WBC 12.8 09/18/2020     Lab Results   Component Value Date    HGB 9.0 " 10/04/2021    HGB 12.3 09/18/2020     Lab Results   Component Value Date    HCT 26.1 10/04/2021    HCT 36.4 09/18/2020     Lab Results   Component Value Date     10/04/2021     09/18/2020     Lab Results   Component Value Date    PLT 78 10/04/2021     09/18/2020       Lab Results   Component Value Date    AST 96 10/04/2021     09/18/2020     Lab Results   Component Value Date     10/04/2021    ALT 57 09/18/2020     No results found for: BILICONJ   Lab Results   Component Value Date    BILITOTAL 20.8 10/04/2021    BILITOTAL 1.1 09/18/2020       Lab Results   Component Value Date    ALBUMIN 3.4 10/04/2021    ALBUMIN 2.8 09/18/2020     Lab Results   Component Value Date    PROTTOTAL 6.6 10/04/2021    PROTTOTAL 7.2 09/18/2020      Lab Results   Component Value Date    ALKPHOS 149 10/04/2021    ALKPHOS 261 09/18/2020       Lab Results   Component Value Date    INR 3.66 09/30/2021         Imaging:      US ABDOMEN COMPLETE:  9/24/2021:  FINDINGS:     GALLBLADDER: Sludge in gallbladder neck. No stones. Wall thickened to 8 mm. Negative sonographic Ricci's.      BILE DUCTS: No biliary dilatation. The common duct measures 3 mm.     LIVER: Coarsened echotexture, suggesting underlying fibrosis. Nodular contour. No focal mass.      RIGHT KIDNEY: Normal size. Normal echogenicity with no hydronephrosis or mass.      LEFT KIDNEY: Not visualized possibly due to bowel gas.     SPLEEN: Mildly prominent 13.4 cm in length.     PANCREAS: The visualized portions are normal.     AORTA: Normal in caliber.     IVC: Normal where visualized.     Small volume ascites. Bilateral pleural effusions.     ABDOMINAL DUPLEX: The hepatic artery, hepatic veins, IVC, and splenic vein are patent with flow in the normal direction. Retrograde flow noted in main portal vein, left and right portal vein.                                                                      IMPRESSION:  1.  Evidence for hepatic cirrhosis with  portal venous hypertension.  2.  Sludge in gallbladder neck with wall thickening. Wall thickening nonspecific in the setting of portal venous hypertension.  3.  Retrograde flow within main portal vein, left and right portal veins.  4.  Bilateral pleural effusions. Small volume ascites.        Eduardo Parry PA-C

## 2021-10-19 NOTE — TELEPHONE ENCOUNTER
Screening Questions  1. Are you active on mychart?Y     2. What insurance is in the chart? [facility]    2.  Ordering/Referring Provider: Eduardo Parry PA-C    3. BMI 25.6    4. Do you have any Lung issues?  N   If yes continue:   Do you use daily home oxygen?    Do you have Pulmonary Hypertension?    Do you have SEVERE asthma? **    5. Have you had a heart, lung, or liver transplant? N    6. Are you currently on dialysis or have chronic kidney disease? Y CKD    7. Have you had a stroke or Transient ischemic attack (TIA) within 6 months? N    8. In the past 6 months, have you had any heart related issues including cardiomyopathy or heart attack? N      If yes, did it require cardiac stenting or other implantable device?      9. Do you have any implantable devices in your body (pacemaker, defib, LVAD)? N    10. Do you take nitroglycerin? If yes, how often? N    11. Are you currently taking any blood thinners?N    12. Are you a diabetic? N    13. (Females) Are you currently pregnant? N  If yes, how many weeks?      15. Are you taking any prescription pain medications on a routine schedule? N If yes, MAC sedation.    16. Do you have any chemical dependencies such as alcohol, street drugs, or methadone? NIf yes, MAC sedation.    17. Do you have any history of post-traumatic stress syndrome, severe anxiety or history of psychosis? N    18. Do you transfer independently? Y    19.  Do you have any issues with constipation?     20. Preferred Pharmacy for Pre Prescription [facility]     Scheduling Details    Which Colonoscopy Prep was Sent?:   Procedure Scheduled: EGD  Provider/Surgeon: SHAQUILLE AVALOS  Date of Procedure: 11/02  Location: [facility]  Caller (Please ask for phone number if not scheduled by patient): JACKELYN      Sedation Type: CS  Conscious Sedation- Needs  for 6 hours after the procedure  MAC/General-Needs  for 24 hours after procedure    Pre-op Required at [facility] [place], [place] and OR for  MAC sedation:   (if yes advise patient they will need a pre-op prior to procedure)      Is patient on blood thinners? -N (If yes- inform patient to follow up with PCP or provider for follow up instructions)     Informed patient they will need an adult  Y  Cannot take any type of public or medical transportation alone    Pre-Procedure Covid test to be completed at [facility] or Externally: 10/26 MG    Confirmed Nurse will call to complete assessment Y    Additional comments:

## 2021-10-19 NOTE — PROGRESS NOTES
Hepatology Clinic note  Melita Cortes   Date of Birth 1970  Date of Service 10/19/2021    REASON FOR CONSULTATION: Recent hospitalization   REFERRING PROVIDER: Navneet Johnson MD          Assessment/plan:   Melita Cortes is a 51 year old female with history of recent hospitalization for alcohol liver disease with a cholestatic picture on admission and likely hemolysis. Liver function not improved with prednisolone, so will discontinue. MELD-Na 31 (Tbili 22.2, Dbili 10.6). Interestingly, transaminases have been relatively normal.   Alcohol use would account for liver disease, but overall, pattern not consistent with alcohol hepatitis so will also be important to evaluate for any obstruction and consider liver biopsy if Bilirubin remains stable. Also discussed indications for variceal screening.     # Discontinue prednisolone     # BMP, hepatic panel, CBC, INR in one week     # Ascites:   - Will hold on diuretics due to Cr.   - Reassess ability to restart diuretics next week   - Start SBP prophylaxis with Cipro 500 mg daily     # MRI/MRCP to evaluate hepatobiliary anatomy     # Anemia, multi-factorial : (high LHD, high haptoglobin, low folate)   - Follow CBC  - Start folate   - Start Thiamin     # Low ceruloplasmiin, likely due to low proteins   - 24 hour urine     # Variceal screening:   - EGD within 1-2 months    # Follow-up in clinic in 3 weeks (rosette for video visit)       Eduardo Parry PA-C   [facility] Hepatology     Total time involved with patient was 65 minutes with >50% of time involved with counseling and coordination of care as noted above.     -----------------------------------------------------       HPI:   Melita Cortes is a 51 year old female  presenting for the evaluation of elevated LFT's.     Patient was recently hospitalized for acute decompensated liver disease, thought to be likely alcohol hepatitis and ROSSY w/ prerenal/hepatorenal syndrome (peaking at 4.1).  She was started on prednisolone x 30 days, through 10/23 and rapid taper. MELD-Na 40 in the ER. Discharged bilirubin was 20.8. 4 Units PRPC.     Appetite is good. Following a low sodium diet. Has had needed two paracentesis thus far over past few weeks.     Lost of a lot of muscle mass. Previously 180 lbs, now 140 lbs.  Is working out on trendmill and resistance bands. Has a paracentesis this week. Fluid in legs comes and ago.     Recent outside labs reviewed from [facility].   Tbil 22.6  Dbilirubin - not done  AST 93    Alk Phos 99  Cr 1.39  BUN 41  Sodium 142  INR 1.9    Jaundice for a few months,  states it got worse over the past few weeks.  No problems with confusion since hospitalization. Having regular bowel movements, having 3-4 bowel movements a day with lactulose.     Patient also denies melena, hematochezia or hematochezia. No recent fevers, sweats or chills.     PMH: GERD (improved), sleep disturbance, asthma (no problems)   [facility]: appendectomy     Medications:   None previously     No previous tobacco use. No alcohol in 30 days. Drinking wine every day (about a bottle +/- Fireball. Per chart review in 2020, patient was told to avoid alcohol at that time. Currently on short term disability, currently working in insurance. Live with husbands and two cats. Mother  of pancreatic cancer.     Previous work-up:  HCV antibody nonreactive  HAV Ab IgM nonreactive  HBV SAb: 0.0  HBV SAg: nonreactive  HBV CAb:   CARTER: negative   F-actin: 11   IGG - 2005  IgG4 - 108 elevated  AMA:   Iron panel:   Ferritin: pending  Iron Sats:   TTG   Alpha-1-antripsin: MM - 158  Ceruloplasmin: 14    TSH   Cholesterol Total   HDL  LDL  Triglycerides  Hemoglobin A1c    Medical hx Surgical hx   Past Medical History:   Diagnosis Date     Cervical high risk HPV (human papillomavirus) test positive 2020    No past surgical history on file.              Medications:     Current Outpatient Medications   Medication      lactulose (CHRONULAC) 10 GM/15ML solution     midodrine (PROAMATINE) 10 MG tablet     pantoprazole (PROTONIX) 40 MG EC tablet     prednisoLONE (ORAPRED) 15 MG/5 ML solution     [START ON 10/24/2021] prednisoLONE (PRELONE) 15 MG/5ML syrup     [START ON 10/27/2021] prednisoLONE (PRELONE) 15 MG/5ML syrup     No current facility-administered medications for this visit.            Allergies:   No Known Allergies         Social History:     Social History     Socioeconomic History     Marital status:      Spouse name: Not on file     Number of children: Not on file     Years of education: Not on file     Highest education level: Not on file   Occupational History     Not on file   Tobacco Use     Smoking status: Never Smoker     Smokeless tobacco: Never Used   Substance and Sexual Activity     Alcohol use: Yes     Drug use: Never     Sexual activity: Not Currently   Other Topics Concern     Not on file   Social History Narrative     Not on file     Social Determinants of Health     Financial Resource Strain:      Difficulty of Paying Living Expenses:    Food Insecurity:      Worried About Running Out of Food in the Last Year:      Ran Out of Food in the Last Year:    Transportation Needs:      Lack of Transportation (Medical):      Lack of Transportation (Non-Medical):    Physical Activity:      Days of Exercise per Week:      Minutes of Exercise per Session:    Stress:      Feeling of Stress :    Social Connections:      Frequency of Communication with Friends and Family:      Frequency of Social Gatherings with Friends and Family:      Attends Gnosticist Services:      Active Member of Clubs or Organizations:      Attends Club or Organization Meetings:      Marital Status:    Intimate Partner Violence:      Fear of Current or Ex-Partner:      Emotionally Abused:      Physically Abused:      Sexually Abused:             Family History:   No family history on file.           Review of Systems:   Gen: See HPI  "    HEENT: No change in vision or hearing, mouth sores, dysphagia, lymph nodes  Resp: No shortness of breath, coughing, hx of asthma  CV: No chest pain, palpitations, syncope   GI: See HPI  : No dysuria, history of stones, urine color    Skin: No rash; no pruritus or psoriasis  MS: No arthralgias, myalgias, joint swelling  Neuro: No memory changes, confusion, numbness    Heme: No difficulty clotting, bruising, bleeding  Psych:  No anxiety, depression, agitation          Physical Exam:   VS: BP (!) 145/92 (BP Location: Left arm, Patient Position: Sitting, Cuff Size: Adult Small)   Pulse 102   Temp 98  F (36.7  C) (Oral)   Resp 18   Ht 1.676 m (5' 5.98\")   Wt 69.5 kg (153 lb 4.8 oz)   SpO2 98%   BMI 24.76 kg/m        Gen: A&Ox3, NAD, well developed, jaundiced   HEENT: icteric sclera, puffy face  Lung: CTA Bilatererally, no wheezing or crackles.   Lym- no palpable lymphadenopathy  Abd: soft, NT, ND, moderate ascites   Ext: ni ntact pulses. mild non pitting edema in lower extremities   Skin: no palmar erythema, telangiectasias or jaundice,   Neuro: grossly intact, no asterixis   Psych: appropriate mood and affects         Data:   Reviewed in person and significant for:    Lab Results   Component Value Date     10/04/2021     09/18/2020      Lab Results   Component Value Date    POTASSIUM 4.3 10/04/2021    POTASSIUM 3.1 09/18/2020     Lab Results   Component Value Date    CHLORIDE 108 10/04/2021    CHLORIDE 106 09/18/2020     Lab Results   Component Value Date    CO2 25 10/04/2021    CO2 25 09/18/2020     Lab Results   Component Value Date    BUN 74 10/04/2021    BUN 2 09/18/2020     Lab Results   Component Value Date    CR 1.86 10/04/2021    CR 0.56 09/18/2020       Lab Results   Component Value Date    WBC 17.4 10/04/2021    WBC 12.8 09/18/2020     Lab Results   Component Value Date    HGB 9.0 10/04/2021    HGB 12.3 09/18/2020     Lab Results   Component Value Date    HCT 26.1 10/04/2021    HCT " 36.4 09/18/2020     Lab Results   Component Value Date     10/04/2021     09/18/2020     Lab Results   Component Value Date    PLT 78 10/04/2021     09/18/2020       Lab Results   Component Value Date    AST 96 10/04/2021     09/18/2020     Lab Results   Component Value Date     10/04/2021    ALT 57 09/18/2020     No results found for: BILICONJ   Lab Results   Component Value Date    BILITOTAL 20.8 10/04/2021    BILITOTAL 1.1 09/18/2020       Lab Results   Component Value Date    ALBUMIN 3.4 10/04/2021    ALBUMIN 2.8 09/18/2020     Lab Results   Component Value Date    PROTTOTAL 6.6 10/04/2021    PROTTOTAL 7.2 09/18/2020      Lab Results   Component Value Date    ALKPHOS 149 10/04/2021    ALKPHOS 261 09/18/2020       Lab Results   Component Value Date    INR 3.66 09/30/2021         Imaging:      US ABDOMEN COMPLETE:  9/24/2021:  FINDINGS:     GALLBLADDER: Sludge in gallbladder neck. No stones. Wall thickened to 8 mm. Negative sonographic Ricci's.      BILE DUCTS: No biliary dilatation. The common duct measures 3 mm.     LIVER: Coarsened echotexture, suggesting underlying fibrosis. Nodular contour. No focal mass.      RIGHT KIDNEY: Normal size. Normal echogenicity with no hydronephrosis or mass.      LEFT KIDNEY: Not visualized possibly due to bowel gas.     SPLEEN: Mildly prominent 13.4 cm in length.     PANCREAS: The visualized portions are normal.     AORTA: Normal in caliber.     IVC: Normal where visualized.     Small volume ascites. Bilateral pleural effusions.     ABDOMINAL DUPLEX: The hepatic artery, hepatic veins, IVC, and splenic vein are patent with flow in the normal direction. Retrograde flow noted in main portal vein, left and right portal vein.                                                                      IMPRESSION:  1.  Evidence for hepatic cirrhosis with portal venous hypertension.  2.  Sludge in gallbladder neck with wall thickening. Wall thickening  nonspecific in the setting of portal venous hypertension.  3.  Retrograde flow within main portal vein, left and right portal veins.  4.  Bilateral pleural effusions. Small volume ascites.

## 2021-10-19 NOTE — TELEPHONE ENCOUNTER
Caller: JACKELYN    Procedure: EGD    Date, Location, and Surgeon of Procedure Cancelled: 10/26/2021 AT [facility] AND DR. AVALOS.    Ordering Provider:DR. CAMP    Reason for cancel (please be detailed, any staff messages or encounters to note?): PT NEEDED TO BE SEEN AT [facility] PER PREVIOUS NOTE        Rescheduled: YES     If rescheduled:    Date: 10/29/2021   Location: [facility]   Note any change or update to original order/sedation: NO

## 2021-10-19 NOTE — LETTER
10/19/2021         RE: Melita Cortes  [address]  [facility] [address]        Dear Colleague,    Thank you for referring your patient, Melita Cortes, to the [facility] [place]. Please see a copy of my visit note below.    Hepatology Clinic note  Melita Cortes   Date of Birth 1970  Date of Service 10/19/2021    REASON FOR CONSULTATION: Recent hospitalization   REFERRING PROVIDER: Navneet Johnson MD          Assessment/plan:   Melita Cortes is a 51 year old female with history of recent hospitalization for alcohol liver disease with a cholestatic picture on admission and likely hemolysis. Liver function not improved with prednisolone, so will discontinue. MELD-Na 31 (Tbili 22.2, Dbili 10.6). Interestingly, transaminases have been relatively normal.   Alcohol use would account for liver disease, but overall, pattern not consistent with alcohol hepatitis so will also be important to evaluate for any obstruction and consider liver biopsy if Bilirubin remains stable. Also discussed indications for variceal screening.     # Discontinue prednisolone     # BMP, hepatic panel, CBC, INR in one week     # Ascites:   - Will hold on diuretics due to Cr.   - Reassess ability to restart diuretics next week   - Start SBP prophylaxis with Cipro 500 mg daily     # MRI/MRCP to evaluate hepatobiliary anatomy     # Anemia, multi-factorial : (high LHD, high haptoglobin, low folate)   - Follow CBC  - Start folate   - Start Thiamin     # Low ceruloplasmiin, likely due to low proteins   - 24 hour urine     # Variceal screening:   - EGD within 1-2 months    # Follow-up in clinic in 3 weeks (rosette for video visit)       Eduardo Parry PA-C   [facility]     Total time involved with patient was 65 minutes with >50% of time involved with counseling and coordination of care as noted above.     -----------------------------------------------------        HPI:   Melita Cortes is a 51 year old female  presenting for the evaluation of elevated LFT's.     Patient was recently hospitalized for acute decompensated liver disease, thought to be likely alcohol hepatitis and ROSSY w/ prerenal/hepatorenal syndrome (peaking at 4.1). She was started on prednisolone x 30 days, through 10/23 and rapid taper. MELD-Na 40 in the ER. Discharged bilirubin was 20.8. 4 Units PRPC.     Appetite is good. Following a low sodium diet. Has had needed two paracentesis thus far over past few weeks.     Lost of a lot of muscle mass. Previously 180 lbs, now 140 lbs.  Is working out on trendmill and resistance bands. Has a paracentesis this week. Fluid in legs comes and ago.     Recent outside labs reviewed from [facility].   Tbil 22.6  Dbilirubin - not done  AST 93    Alk Phos 99  Cr 1.39  BUN 41  Sodium 142  INR 1.9    Jaundice for a few months,  states it got worse over the past few weeks.  No problems with confusion since hospitalization. Having regular bowel movements, having 3-4 bowel movements a day with lactulose.     Patient also denies melena, hematochezia or hematochezia. No recent fevers, sweats or chills.     PMH: GERD (improved), sleep disturbance, asthma (no problems)   [facility]: appendectomy     Medications:   None previously     No previous tobacco use. No alcohol in 30 days. Drinking wine every day (about a bottle +/- Fireball. Per chart review in 2020, patient was told to avoid alcohol at that time. Currently on short term disability, currently working in insurance. Live with husbands and two cats. Mother  of pancreatic cancer.     Previous work-up:  HCV antibody nonreactive  HAV Ab IgM nonreactive  HBV SAb: 0.0  HBV SAg: nonreactive  HBV CAb:   CARTER: negative   F-actin: 11   IGG - 2005  IgG4 - 108 elevated  AMA:   Iron panel:   Ferritin: pending  Iron Sats:   TTG   Alpha-1-antripsin: MM - 158  Ceruloplasmin: 14    TSH   Cholesterol Total    HDL  LDL  Triglycerides  Hemoglobin A1c    Medical hx Surgical hx   Past Medical History:   Diagnosis Date     Cervical high risk HPV (human papillomavirus) test positive 2019, 2020    No past surgical history on file.              Medications:     Current Outpatient Medications   Medication     lactulose (CHRONULAC) 10 GM/15ML solution     midodrine (PROAMATINE) 10 MG tablet     pantoprazole (PROTONIX) 40 MG EC tablet     prednisoLONE (ORAPRED) 15 MG/5 ML solution     [START ON 10/24/2021] prednisoLONE (PRELONE) 15 MG/5ML syrup     [START ON 10/27/2021] prednisoLONE (PRELONE) 15 MG/5ML syrup     No current facility-administered medications for this visit.            Allergies:   No Known Allergies         Social History:     Social History     Socioeconomic History     Marital status:      Spouse name: Not on file     Number of children: Not on file     Years of education: Not on file     Highest education level: Not on file   Occupational History     Not on file   Tobacco Use     Smoking status: Never Smoker     Smokeless tobacco: Never Used   Substance and Sexual Activity     Alcohol use: Yes     Drug use: Never     Sexual activity: Not Currently   Other Topics Concern     Not on file   Social History Narrative     Not on file     Social Determinants of Health     Financial Resource Strain:      Difficulty of Paying Living Expenses:    Food Insecurity:      Worried About Running Out of Food in the Last Year:      Ran Out of Food in the Last Year:    Transportation Needs:      Lack of Transportation (Medical):      Lack of Transportation (Non-Medical):    Physical Activity:      Days of Exercise per Week:      Minutes of Exercise per Session:    Stress:      Feeling of Stress :    Social Connections:      Frequency of Communication with Friends and Family:      Frequency of Social Gatherings with Friends and Family:      Attends Religion Services:      Active Member of Clubs or Organizations:       "Attends Club or Organization Meetings:      Marital Status:    Intimate Partner Violence:      Fear of Current or Ex-Partner:      Emotionally Abused:      Physically Abused:      Sexually Abused:             Family History:   No family history on file.           Review of Systems:   Gen: See HPI     HEENT: No change in vision or hearing, mouth sores, dysphagia, lymph nodes  Resp: No shortness of breath, coughing, hx of asthma  CV: No chest pain, palpitations, syncope   GI: See HPI  : No dysuria, history of stones, urine color    Skin: No rash; no pruritus or psoriasis  MS: No arthralgias, myalgias, joint swelling  Neuro: No memory changes, confusion, numbness    Heme: No difficulty clotting, bruising, bleeding  Psych:  No anxiety, depression, agitation          Physical Exam:   VS: BP (!) 145/92 (BP Location: Left arm, Patient Position: Sitting, Cuff Size: Adult Small)   Pulse 102   Temp 98  F (36.7  C) (Oral)   Resp 18   Ht 1.676 m (5' 5.98\")   Wt 69.5 kg (153 lb 4.8 oz)   SpO2 98%   BMI 24.76 kg/m        Gen: A&Ox3, NAD, well developed, jaundiced   HEENT: icteric sclera, puffy face  Lung: CTA Bilatererally, no wheezing or crackles.   Lym- no palpable lymphadenopathy  Abd: soft, NT, ND, moderate ascites   Ext: ni ntact pulses. mild non pitting edema in lower extremities   Skin: no palmar erythema, telangiectasias or jaundice,   Neuro: grossly intact, no asterixis   Psych: appropriate mood and affects         Data:   Reviewed in person and significant for:    Lab Results   Component Value Date     10/04/2021     09/18/2020      Lab Results   Component Value Date    POTASSIUM 4.3 10/04/2021    POTASSIUM 3.1 09/18/2020     Lab Results   Component Value Date    CHLORIDE 108 10/04/2021    CHLORIDE 106 09/18/2020     Lab Results   Component Value Date    CO2 25 10/04/2021    CO2 25 09/18/2020     Lab Results   Component Value Date    BUN 74 10/04/2021    BUN 2 09/18/2020     Lab Results   Component " Value Date    CR 1.86 10/04/2021    CR 0.56 09/18/2020       Lab Results   Component Value Date    WBC 17.4 10/04/2021    WBC 12.8 09/18/2020     Lab Results   Component Value Date    HGB 9.0 10/04/2021    HGB 12.3 09/18/2020     Lab Results   Component Value Date    HCT 26.1 10/04/2021    HCT 36.4 09/18/2020     Lab Results   Component Value Date     10/04/2021     09/18/2020     Lab Results   Component Value Date    PLT 78 10/04/2021     09/18/2020       Lab Results   Component Value Date    AST 96 10/04/2021     09/18/2020     Lab Results   Component Value Date     10/04/2021    ALT 57 09/18/2020     No results found for: BILICONJ   Lab Results   Component Value Date    BILITOTAL 20.8 10/04/2021    BILITOTAL 1.1 09/18/2020       Lab Results   Component Value Date    ALBUMIN 3.4 10/04/2021    ALBUMIN 2.8 09/18/2020     Lab Results   Component Value Date    PROTTOTAL 6.6 10/04/2021    PROTTOTAL 7.2 09/18/2020      Lab Results   Component Value Date    ALKPHOS 149 10/04/2021    ALKPHOS 261 09/18/2020       Lab Results   Component Value Date    INR 3.66 09/30/2021         Imaging:      US ABDOMEN COMPLETE:  9/24/2021:  FINDINGS:     GALLBLADDER: Sludge in gallbladder neck. No stones. Wall thickened to 8 mm. Negative sonographic Ricci's.      BILE DUCTS: No biliary dilatation. The common duct measures 3 mm.     LIVER: Coarsened echotexture, suggesting underlying fibrosis. Nodular contour. No focal mass.      RIGHT KIDNEY: Normal size. Normal echogenicity with no hydronephrosis or mass.      LEFT KIDNEY: Not visualized possibly due to bowel gas.     SPLEEN: Mildly prominent 13.4 cm in length.     PANCREAS: The visualized portions are normal.     AORTA: Normal in caliber.     IVC: Normal where visualized.     Small volume ascites. Bilateral pleural effusions.     ABDOMINAL DUPLEX: The hepatic artery, hepatic veins, IVC, and splenic vein are patent with flow in the normal direction.  Retrograde flow noted in main portal vein, left and right portal vein.                                                                      IMPRESSION:  1.  Evidence for hepatic cirrhosis with portal venous hypertension.  2.  Sludge in gallbladder neck with wall thickening. Wall thickening nonspecific in the setting of portal venous hypertension.  3.  Retrograde flow within main portal vein, left and right portal veins.  4.  Bilateral pleural effusions. Small volume ascites.      Again, thank you for allowing me to participate in the care of your patient.        Sincerely,        Eduardo Parry PA-C

## 2021-10-19 NOTE — TELEPHONE ENCOUNTER
Pt needs to be scheduled at [facility], I called and left a message that there is 11/11 with regino or 11/18 with martha and that she could schedule with anyone that answers

## 2021-10-19 NOTE — NURSING NOTE
"Chief Complaint   Patient presents with     Consult     Cirrhosis     Vital signs:  Temp: 98  F (36.7  C) Temp src: Oral BP: (!) 145/92 Pulse: 102   Resp: 18 SpO2: 98 %     Height: 167.6 cm (5' 5.98\") Weight: 69.5 kg (153 lb 4.8 oz)  Estimated body mass index is 24.76 kg/m  as calculated from the following:    Height as of this encounter: 1.676 m (5' 5.98\").    Weight as of this encounter: 69.5 kg (153 lb 4.8 oz).      Millie Mcdonnell, [facility]  10/19/2021 9:11 AM      "

## 2021-10-20 ENCOUNTER — LAB (OUTPATIENT)
Dept: LAB | Facility: CLINIC | Age: 51
End: 2021-10-20

## 2021-10-20 ENCOUNTER — TELEPHONE (OUTPATIENT)
Dept: GASTROENTEROLOGY | Facility: CLINIC | Age: 51
End: 2021-10-20

## 2021-10-20 DIAGNOSIS — Z11.59 ENCOUNTER FOR SCREENING FOR OTHER VIRAL DISEASES: ICD-10-CM

## 2021-10-20 DIAGNOSIS — K70.9 ALCOHOLIC LIVER DISEASE (H): ICD-10-CM

## 2021-10-20 PROCEDURE — U0003 INFECTIOUS AGENT DETECTION BY NUCLEIC ACID (DNA OR RNA); SEVERE ACUTE RESPIRATORY SYNDROME CORONAVIRUS 2 (SARS-COV-2) (CORONAVIRUS DISEASE [COVID-19]), AMPLIFIED PROBE TECHNIQUE, MAKING USE OF HIGH THROUGHPUT TECHNOLOGIES AS DESCRIBED BY CMS-2020-01-R: HCPCS

## 2021-10-20 PROCEDURE — U0005 INFEC AGEN DETEC AMPLI PROBE: HCPCS

## 2021-10-20 RX ORDER — LACTULOSE 10 G/15ML
SOLUTION ORAL
Qty: 1892 ML | Refills: 3 | Status: SHIPPED | OUTPATIENT
Start: 2021-10-20 | End: 2021-12-05

## 2021-10-20 RX ORDER — PANTOPRAZOLE SODIUM 40 MG/1
40 TABLET, DELAYED RELEASE ORAL DAILY
Qty: 60 TABLET | Refills: 2 | Status: SHIPPED | OUTPATIENT
Start: 2021-10-20 | End: 2021-11-12

## 2021-10-20 NOTE — TELEPHONE ENCOUNTER
Pre assessment questions completed for upcoming EGD procedure scheduled on 10/29/21    COVID test scheduled 10/25/21    Procedural arrival time and facility location reviewed.    Designated  policy reviewed. Instructed to have someone stay 6 hours post procedure.     Reviewed EGD prep instructions with patient. NPO six hours prior to procedure.     Anticoagulation/blood thinners? no    Electronic implanted devices? no    Patient verbalized understanding and had no questions or concerns at this time.    Aimee Milton RN

## 2021-10-21 ENCOUNTER — TELEPHONE (OUTPATIENT)
Dept: MEDSURG UNIT | Facility: CLINIC | Age: 51
End: 2021-10-21

## 2021-10-21 LAB — SARS-COV-2 RNA RESP QL NAA+PROBE: NEGATIVE

## 2021-10-21 NOTE — TELEPHONE ENCOUNTER
Pre-Procedure Negative COVID Test Results    Results Reviewed  The patient has a negative COVID test result within the required timeframe for the scheduled procedure.     No COVID pre-call needed.     Christine Mcarthur RN

## 2021-10-22 ENCOUNTER — HOSPITAL ENCOUNTER (OUTPATIENT)
Facility: CLINIC | Age: 51
Discharge: HOME | End: 2021-10-22
Admitting: PHYSICIAN ASSISTANT
Payer: COMMERCIAL

## 2021-10-22 ENCOUNTER — HOSPITAL ENCOUNTER (OUTPATIENT)
Dept: ULTRASOUND IMAGING | Facility: CLINIC | Age: 51
End: 2021-10-22
Attending: INTERNAL MEDICINE
Payer: COMMERCIAL

## 2021-10-22 VITALS
RESPIRATION RATE: 16 BRPM | SYSTOLIC BLOOD PRESSURE: 105 MMHG | HEART RATE: 88 BPM | TEMPERATURE: 98.2 F | DIASTOLIC BLOOD PRESSURE: 62 MMHG | OXYGEN SATURATION: 99 %

## 2021-10-22 DIAGNOSIS — K70.11 ALCOHOLIC HEPATITIS WITH ASCITES (H): ICD-10-CM

## 2021-10-22 PROCEDURE — P9047 ALBUMIN (HUMAN), 25%, 50ML: HCPCS | Performed by: INTERNAL MEDICINE

## 2021-10-22 PROCEDURE — 49083 ABD PARACENTESIS W/IMAGING: CPT

## 2021-10-22 PROCEDURE — 999N000154 HC STATISTIC RADIOLOGY XRAY, US, CT, MAR, NM

## 2021-10-22 PROCEDURE — 250N000011 HC RX IP 250 OP 636: Performed by: INTERNAL MEDICINE

## 2021-10-22 RX ORDER — LIDOCAINE HYDROCHLORIDE 10 MG/ML
10 INJECTION, SOLUTION EPIDURAL; INFILTRATION; INTRACAUDAL; PERINEURAL ONCE
Status: COMPLETED | OUTPATIENT
Start: 2021-10-22 | End: 2021-10-22

## 2021-10-22 RX ORDER — ALBUMIN (HUMAN) 12.5 G/50ML
25 SOLUTION INTRAVENOUS ONCE
Status: COMPLETED | OUTPATIENT
Start: 2021-10-22 | End: 2021-10-22

## 2021-10-22 RX ADMIN — LIDOCAINE HYDROCHLORIDE 10 ML: 10 INJECTION, SOLUTION EPIDURAL; INFILTRATION; INTRACAUDAL; PERINEURAL at 10:44

## 2021-10-22 RX ADMIN — ALBUMIN HUMAN 25 G: 0.25 SOLUTION INTRAVENOUS at 10:59

## 2021-10-22 NOTE — PROCEDURES
[facility]    Procedure: Paracentesis    Date/Time: 10/22/2021 11:00 AM  Performed by: Wandy Lott PA-C  Authorized by: Wandy Lott PA-C     UNIVERSAL PROTOCOL   Site Marked: Yes  Prior Images Obtained and Reviewed:  Yes  Required items: Required blood products, implants, devices and special equipment available    Patient identity confirmed:  Verbally with patient  Patient was reevaluated immediately before administering moderate or deep sedation or anesthesia  Confirmation Checklist:  Patient's identity using two indicators, relevant allergies, procedure was appropriate and matched the consent or emergent situation and correct equipment/implants were available  Time out: Immediately prior to the procedure a time out was called    Universal Protocol: the Joint Commission Universal Protocol was followed    Preparation: Patient was prepped and draped in usual sterile fashion           ANESTHESIA    Anesthesia: Local infiltration  Local Anesthetic:  Lidocaine 1% without epinephrine      SEDATION    Patient Sedated: No    See dictated procedure note for full details.  PROCEDURE   Patient Tolerance:  Patient tolerated the procedure well with no immediate complications    Length of time physician/provider present for 1:1 monitoring during sedation: 0

## 2021-10-22 NOTE — DISCHARGE INSTRUCTIONS
Paracentesis Discharge Instructions     After you go home:      You may resume your normal diet.    Care of Puncture Sites:      For the first 48 hrs, check your puncture sites every couple hours while you are awake     If there is a bandaid - you may remove it tomorrow morning    You may shower tomorrow    No tub baths, whirlpools or swimming until your puncture sites have fully healed    Fluid may leak from the abdominal site. Change the bandaid as needed - keep the site dry    If the fluid leaks for more than 48 hours, call your ordering provider     Activity:      You may go back to normal activity in 24 hours     Wait 48 hours before lifting, straining, exercise or other strenuous activity    Medicines:      You may resume all your medications    For minor pain, you may take Acetaminophen (Tylenol) or Ibuprofen (Advil)            Call the provider who ordered this procedure if:      The site is red, swollen, hot or tender    Blood or fluid is draining from the site    Chills or a fever greater than 101 F (38 C)    Shortness of breath    Pain that is getting worse    Leaking from the site that does not stop    Any questions or concerns      Call  911 or go to the Emergency Room if:      Severe chest pain or trouble breathing    Increased blood in your sputum (phlegm)    Bleeding that you cannot control      If you have questions call:        [facility] Radiology Dept @ [phone]      The provider who performed your procedure was _________________.

## 2021-10-22 NOTE — PROGRESS NOTES
Patient Wellness Screening  1. In the last month, have you been in contact with someone who was confirmed or suspected to have Coronavirus/COVID-19? No    2. Do you have the following symptoms?  Fever/Chills? No   Cough? No   Shortness of breath? No   New loss of taste or smell? No  Sore throat? No  Muscle or body aches? No  Headaches? No  Fatigue? No  Vomiting or diarrhea? No    Admission Note:  Reason for admission:Paracentisis  Time of arrival:1015  Accompanied by:  in Wait RM  Name/phone of discontinue :    Pre-procedure assessment complete: Yes  If abnormal assessment/labs, provider notified: N/A    Medications held per instructions/orders: Yes  Procedure explained. All questions & concerns addressed: Yes  Consent: deferred    Discharge instructions reviewed: Yes  Patient verbalizes understanding: Yes    Paracentesis: 1042 Time out done and consent signed.Patient tolerated well. VSS.5400 cc dark haroldo fluid removed from abdomen w/o difficulty. Dermabond and  Bandaid applied to site - CDI.   Albumin given per protocol / standing orders.  25g given per order    Discharge Note:  Discharge criteria met and vital signs stable: Yes  Vitals stable.  Site CDI.  Pt has no complaints.     1130 Patient discharged per ambulatory to private vehicle. All personal belongings taken with patient.

## 2021-10-24 LAB
COPPER 24H UR-MRATE: 28.5 UG/D
COPPER UR-MCNC: 3 UG/DL
COPPER/CREAT UR: 40.5 UG/G CRT
CREAT 24H UR-MRATE: 703 MG/D
CREAT UR-MCNC: 74 MG/DL

## 2021-10-25 ENCOUNTER — LAB (OUTPATIENT)
Dept: URGENT CARE | Facility: URGENT CARE | Age: 51
End: 2021-10-25
Attending: INTERNAL MEDICINE
Payer: COMMERCIAL

## 2021-10-25 ENCOUNTER — LAB (OUTPATIENT)
Dept: LAB | Facility: CLINIC | Age: 51
End: 2021-10-25
Payer: COMMERCIAL

## 2021-10-25 DIAGNOSIS — Z11.59 ENCOUNTER FOR SCREENING FOR OTHER VIRAL DISEASES: ICD-10-CM

## 2021-10-25 DIAGNOSIS — K70.9 ALCOHOLIC LIVER DISEASE (H): Primary | ICD-10-CM

## 2021-10-25 DIAGNOSIS — K70.9 ALCOHOLIC LIVER DISEASE (H): ICD-10-CM

## 2021-10-25 LAB
ALBUMIN SERPL-MCNC: 3 G/DL (ref 3.4–5)
ALP SERPL-CCNC: 145 U/L (ref 40–150)
ALT SERPL W P-5'-P-CCNC: 111 U/L (ref 0–50)
ANION GAP SERPL CALCULATED.3IONS-SCNC: 13 MMOL/L (ref 3–14)
AST SERPL W P-5'-P-CCNC: 88 U/L (ref 0–45)
BILIRUB DIRECT SERPL-MCNC: 10 MG/DL (ref 0–0.2)
BILIRUB SERPL-MCNC: 19.3 MG/DL (ref 0.2–1.3)
BUN SERPL-MCNC: 39 MG/DL (ref 7–30)
CALCIUM SERPL-MCNC: 8.9 MG/DL (ref 8.5–10.1)
CHLORIDE BLD-SCNC: 92 MMOL/L (ref 94–109)
CO2 SERPL-SCNC: 25 MMOL/L (ref 20–32)
CREAT SERPL-MCNC: 1.63 MG/DL (ref 0.52–1.04)
GFR SERPL CREATININE-BSD FRML MDRD: 36 ML/MIN/1.73M2
GLUCOSE BLD-MCNC: 178 MG/DL (ref 70–99)
POTASSIUM BLD-SCNC: 2.8 MMOL/L (ref 3.4–5.3)
PROT SERPL-MCNC: 6 G/DL (ref 6.8–8.8)
SODIUM SERPL-SCNC: 130 MMOL/L (ref 133–144)

## 2021-10-25 PROCEDURE — U0005 INFEC AGEN DETEC AMPLI PROBE: HCPCS

## 2021-10-25 PROCEDURE — 80053 COMPREHEN METABOLIC PANEL: CPT

## 2021-10-25 PROCEDURE — U0003 INFECTIOUS AGENT DETECTION BY NUCLEIC ACID (DNA OR RNA); SEVERE ACUTE RESPIRATORY SYNDROME CORONAVIRUS 2 (SARS-COV-2) (CORONAVIRUS DISEASE [COVID-19]), AMPLIFIED PROBE TECHNIQUE, MAKING USE OF HIGH THROUGHPUT TECHNOLOGIES AS DESCRIBED BY CMS-2020-01-R: HCPCS

## 2021-10-25 PROCEDURE — 82248 BILIRUBIN DIRECT: CPT

## 2021-10-25 PROCEDURE — 36415 COLL VENOUS BLD VENIPUNCTURE: CPT

## 2021-10-26 LAB — SARS-COV-2 RNA RESP QL NAA+PROBE: NEGATIVE

## 2021-10-27 ENCOUNTER — ANCILLARY PROCEDURE (OUTPATIENT)
Dept: MRI IMAGING | Facility: CLINIC | Age: 51
End: 2021-10-27
Attending: PHYSICIAN ASSISTANT
Payer: COMMERCIAL

## 2021-10-27 ENCOUNTER — MEDICAL CORRESPONDENCE (OUTPATIENT)
Dept: HEALTH INFORMATION MANAGEMENT | Facility: CLINIC | Age: 51
End: 2021-10-27

## 2021-10-27 PROCEDURE — 74183 MRI ABD W/O CNTR FLWD CNTR: CPT | Mod: TC | Performed by: RADIOLOGY

## 2021-10-27 PROCEDURE — A9585 GADOBUTROL INJECTION: HCPCS | Performed by: RADIOLOGY

## 2021-10-27 RX ORDER — GADOBUTROL 604.72 MG/ML
7.5 INJECTION INTRAVENOUS ONCE
Status: COMPLETED | OUTPATIENT
Start: 2021-10-27 | End: 2021-10-27

## 2021-10-27 RX ADMIN — GADOBUTROL 7 ML: 604.72 INJECTION INTRAVENOUS at 08:25

## 2021-10-29 ENCOUNTER — TELEPHONE (OUTPATIENT)
Dept: FAMILY MEDICINE | Facility: CLINIC | Age: 51
End: 2021-10-29

## 2021-10-29 ENCOUNTER — HOSPITAL ENCOUNTER (OUTPATIENT)
Facility: CLINIC | Age: 51
Discharge: HOME | End: 2021-10-29
Attending: INTERNAL MEDICINE | Admitting: INTERNAL MEDICINE
Payer: COMMERCIAL

## 2021-10-29 VITALS
BODY MASS INDEX: 25.16 KG/M2 | OXYGEN SATURATION: 99 % | HEART RATE: 94 BPM | SYSTOLIC BLOOD PRESSURE: 104 MMHG | HEIGHT: 65 IN | DIASTOLIC BLOOD PRESSURE: 66 MMHG | RESPIRATION RATE: 17 BRPM | TEMPERATURE: 98.5 F | WEIGHT: 151.01 LBS

## 2021-10-29 DIAGNOSIS — K70.31 ALCOHOLIC CIRRHOSIS OF LIVER WITH ASCITES (H): ICD-10-CM

## 2021-10-29 DIAGNOSIS — B37.9 CANDIDA INFECTION: Primary | ICD-10-CM

## 2021-10-29 DIAGNOSIS — E87.1 HYPONATREMIA: ICD-10-CM

## 2021-10-29 LAB
ANION GAP SERPL CALCULATED.3IONS-SCNC: 12 MMOL/L (ref 3–14)
BASOPHILS # BLD AUTO: 0.1 10E3/UL (ref 0–0.2)
BASOPHILS NFR BLD AUTO: 1 %
BUN SERPL-MCNC: 60 MG/DL (ref 7–30)
CALCIUM SERPL-MCNC: 9 MG/DL (ref 8.5–10.1)
CHLORIDE BLD-SCNC: 88 MMOL/L (ref 94–109)
CO2 SERPL-SCNC: 24 MMOL/L (ref 20–32)
CREAT SERPL-MCNC: 2.16 MG/DL (ref 0.52–1.04)
EOSINOPHIL # BLD AUTO: 0.2 10E3/UL (ref 0–0.7)
EOSINOPHIL NFR BLD AUTO: 2 %
ERYTHROCYTE [DISTWIDTH] IN BLOOD BY AUTOMATED COUNT: 18.5 % (ref 10–15)
GFR SERPL CREATININE-BSD FRML MDRD: 26 ML/MIN/1.73M2
GLUCOSE BLD-MCNC: 114 MG/DL (ref 70–99)
HCT VFR BLD AUTO: 23.5 % (ref 35–47)
HGB BLD-MCNC: 7.8 G/DL (ref 11.7–15.7)
IMM GRANULOCYTES # BLD: 0.2 10E3/UL
IMM GRANULOCYTES NFR BLD: 2 %
INR PPP: 2.34 (ref 0.85–1.15)
LYMPHOCYTES # BLD AUTO: 1.6 10E3/UL (ref 0.8–5.3)
LYMPHOCYTES NFR BLD AUTO: 12 %
MCH RBC QN AUTO: 39.8 PG (ref 26.5–33)
MCHC RBC AUTO-ENTMCNC: 33.2 G/DL (ref 31.5–36.5)
MCV RBC AUTO: 120 FL (ref 78–100)
MONOCYTES # BLD AUTO: 2 10E3/UL (ref 0–1.3)
MONOCYTES NFR BLD AUTO: 16 %
NEUTROPHILS # BLD AUTO: 8.5 10E3/UL (ref 1.6–8.3)
NEUTROPHILS NFR BLD AUTO: 67 %
NRBC # BLD AUTO: 0 10E3/UL
NRBC BLD AUTO-RTO: 0 /100
PLATELET # BLD AUTO: 96 10E3/UL (ref 150–450)
POTASSIUM BLD-SCNC: 3.4 MMOL/L (ref 3.4–5.3)
RBC # BLD AUTO: 1.96 10E6/UL (ref 3.8–5.2)
SODIUM SERPL-SCNC: 124 MMOL/L (ref 133–144)
UPPER GI ENDOSCOPY: NORMAL
WBC # BLD AUTO: 12.6 10E3/UL (ref 4–11)

## 2021-10-29 PROCEDURE — 85025 COMPLETE CBC W/AUTO DIFF WBC: CPT | Performed by: INTERNAL MEDICINE

## 2021-10-29 PROCEDURE — 83036 HEMOGLOBIN GLYCOSYLATED A1C: CPT | Performed by: PHYSICIAN ASSISTANT

## 2021-10-29 PROCEDURE — 999N000099 HC STATISTIC MODERATE SEDATION < 10 MIN: Performed by: INTERNAL MEDICINE

## 2021-10-29 PROCEDURE — 82374 ASSAY BLOOD CARBON DIOXIDE: CPT | Performed by: INTERNAL MEDICINE

## 2021-10-29 PROCEDURE — 85610 PROTHROMBIN TIME: CPT | Performed by: INTERNAL MEDICINE

## 2021-10-29 PROCEDURE — 250N000011 HC RX IP 250 OP 636: Performed by: INTERNAL MEDICINE

## 2021-10-29 PROCEDURE — 82248 BILIRUBIN DIRECT: CPT | Performed by: PHYSICIAN ASSISTANT

## 2021-10-29 PROCEDURE — 43235 EGD DIAGNOSTIC BRUSH WASH: CPT | Performed by: INTERNAL MEDICINE

## 2021-10-29 PROCEDURE — G0500 MOD SEDAT ENDO SERVICE >5YRS: HCPCS | Performed by: INTERNAL MEDICINE

## 2021-10-29 PROCEDURE — 87106 FUNGI IDENTIFICATION YEAST: CPT | Performed by: INTERNAL MEDICINE

## 2021-10-29 PROCEDURE — 250N000009 HC RX 250: Performed by: INTERNAL MEDICINE

## 2021-10-29 PROCEDURE — 36415 COLL VENOUS BLD VENIPUNCTURE: CPT | Performed by: INTERNAL MEDICINE

## 2021-10-29 PROCEDURE — 87102 FUNGUS ISOLATION CULTURE: CPT | Performed by: INTERNAL MEDICINE

## 2021-10-29 RX ORDER — ONDANSETRON 4 MG/1
4 TABLET, ORALLY DISINTEGRATING ORAL EVERY 6 HOURS PRN
Status: DISCONTINUED | OUTPATIENT
Start: 2021-10-29 | End: 2021-10-29 | Stop reason: HOSPADM

## 2021-10-29 RX ORDER — FLUMAZENIL 0.1 MG/ML
0.2 INJECTION, SOLUTION INTRAVENOUS
Status: DISCONTINUED | OUTPATIENT
Start: 2021-10-29 | End: 2021-10-29 | Stop reason: HOSPADM

## 2021-10-29 RX ORDER — ONDANSETRON 2 MG/ML
4 INJECTION INTRAMUSCULAR; INTRAVENOUS
Status: DISCONTINUED | OUTPATIENT
Start: 2021-10-29 | End: 2021-10-29 | Stop reason: HOSPADM

## 2021-10-29 RX ORDER — SPIRONOLACTONE 25 MG/1
25 TABLET ORAL
Status: ON HOLD | COMMUNITY
Start: 2021-10-26 | End: 2021-11-08

## 2021-10-29 RX ORDER — NALOXONE HYDROCHLORIDE 0.4 MG/ML
0.2 INJECTION, SOLUTION INTRAMUSCULAR; INTRAVENOUS; SUBCUTANEOUS
Status: DISCONTINUED | OUTPATIENT
Start: 2021-10-29 | End: 2021-10-29 | Stop reason: HOSPADM

## 2021-10-29 RX ORDER — LIDOCAINE 40 MG/G
CREAM TOPICAL
Status: DISCONTINUED | OUTPATIENT
Start: 2021-10-29 | End: 2021-10-29 | Stop reason: HOSPADM

## 2021-10-29 RX ORDER — NALOXONE HYDROCHLORIDE 0.4 MG/ML
0.4 INJECTION, SOLUTION INTRAMUSCULAR; INTRAVENOUS; SUBCUTANEOUS
Status: DISCONTINUED | OUTPATIENT
Start: 2021-10-29 | End: 2021-10-29 | Stop reason: HOSPADM

## 2021-10-29 RX ORDER — FLUCONAZOLE 200 MG/1
TABLET ORAL
Qty: 15 TABLET | Refills: 0 | Status: SHIPPED | OUTPATIENT
Start: 2021-10-29 | End: 2021-11-23

## 2021-10-29 RX ORDER — ONDANSETRON 2 MG/ML
4 INJECTION INTRAMUSCULAR; INTRAVENOUS EVERY 6 HOURS PRN
Status: DISCONTINUED | OUTPATIENT
Start: 2021-10-29 | End: 2021-10-29 | Stop reason: HOSPADM

## 2021-10-29 RX ORDER — FENTANYL CITRATE 50 UG/ML
INJECTION, SOLUTION INTRAMUSCULAR; INTRAVENOUS PRN
Status: COMPLETED | OUTPATIENT
Start: 2021-10-29 | End: 2021-10-29

## 2021-10-29 RX ORDER — PROCHLORPERAZINE MALEATE 10 MG
10 TABLET ORAL EVERY 6 HOURS PRN
Status: DISCONTINUED | OUTPATIENT
Start: 2021-10-29 | End: 2021-10-29 | Stop reason: HOSPADM

## 2021-10-29 RX ADMIN — MIDAZOLAM 2 MG: 1 INJECTION INTRAMUSCULAR; INTRAVENOUS at 14:30

## 2021-10-29 RX ADMIN — TOPICAL ANESTHETIC 1 SPRAY: 200 SPRAY DENTAL; PERIODONTAL at 14:30

## 2021-10-29 RX ADMIN — MIDAZOLAM 1 MG: 1 INJECTION INTRAMUSCULAR; INTRAVENOUS at 14:32

## 2021-10-29 RX ADMIN — FENTANYL CITRATE 25 MCG: 50 INJECTION, SOLUTION INTRAMUSCULAR; INTRAVENOUS at 14:32

## 2021-10-29 RX ADMIN — FENTANYL CITRATE 100 MCG: 50 INJECTION, SOLUTION INTRAMUSCULAR; INTRAVENOUS at 14:30

## 2021-10-29 ASSESSMENT — MIFFLIN-ST. JEOR: SCORE: 1305.88

## 2021-10-29 NOTE — LETTER
October 31, 2021      Melita Martinez Sebastian  [address]  [place] [address]        Dear Ms Sebastian,     The 'brushings' returned positive for yeast. It is good you are taking the antifungal medication. The yeast infection may be related to the steroids you were taking for your liver disorder.     Sincerely,     Torey Ruiz MD    [facility]  Division of Gastroenterology    Resulted Orders   Fungal or Yeast Culture Routine   Result Value Ref Range    Culture Yeast (A)

## 2021-10-29 NOTE — DISCHARGE INSTRUCTIONS
Same-Day Surgery   Adult Discharge Orders & Instructions     For 24 hours after surgery:  1. Get plenty of rest.  A responsible adult must stay with you for at least 24 hours after you leave the hospital.   2. Pain medication can slow your reflexes. Do not drive or use heavy equipment.  If you have weakness or tingling, don't drive or use heavy equipment until this feeling goes away.  3. Mixing alcohol and pain medication can cause dizziness and slow your breathing. It can even be fatal. Do not drink alcohol while taking pain medication.  4. Avoid strenuous or risky activities.  Ask for help when climbing stairs.   5. You may feel lightheaded.  If so, sit for a few minutes before standing.  Have someone help you get up.   6. If you have nausea (feel sick to your stomach), drink only clear liquids such as apple juice, ginger ale, broth or 7-Up.  Rest may also help.  Be sure to drink enough fluids.  Move to a regular diet as you feel able. Take pain medications with a small amount of solid food, such as toast or crackers, to avoid nausea.   7. A slight fever is normal. Call the doctor if your fever is over 100 F (37.7 C) (taken under the tongue) or lasts longer than 24 hours.  8. You may have a dry mouth, muscle aches, trouble sleeping or a sore throat.  These symptoms should go away after 24 hours.  9. Do not make important or legal decisions.   Pain Management:      1. Take pain medication (if prescribed) for pain as directed by your physician.        2. WARNING: If the pain medication you have been prescribed contains Tylenol  (acetaminophen), DO NOT take additional doses of Tylenol (acetaminophen).     Call your doctor for any of the followin.  Signs of infection (fever, growing tenderness at the surgery site, severe pain, a large amount of drainage or bleeding, foul-smelling drainage, redness, swelling).    2.  It has been over 8 to 10 hours since surgery and you are still not able to urinate (pee).    3.   Headache for over 24 hours.    4.  Numbness, tingling or weakness the day after surgery (if you had spinal anesthesia).  To contact a doctor, call _____________________________________ or:      [phone] and ask for the Resident On Call for:          __________________________________________ (answered 24 hours a day)      Emergency Department:  [place] [facility]: [phone]  [place] [facility]: [phone]               Rev. 10/2014

## 2021-10-29 NOTE — OR NURSING
Pt underwent EGD with brushings under conscious sedation. Specimen sent to lab. Pt transferred to recovery and report given to 3C RN.       Suzan Hanley RN

## 2021-10-31 DIAGNOSIS — K70.31 ALCOHOLIC CIRRHOSIS OF LIVER WITH ASCITES (H): Primary | ICD-10-CM

## 2021-10-31 LAB
ALBUMIN SERPL-MCNC: 2.7 G/DL (ref 3.4–5)
ALP SERPL-CCNC: 173 U/L (ref 40–150)
ALT SERPL W P-5'-P-CCNC: 101 U/L (ref 0–50)
AST SERPL W P-5'-P-CCNC: 92 U/L (ref 0–45)
BILIRUB DIRECT SERPL-MCNC: 9.4 MG/DL (ref 0–0.2)
BILIRUB SERPL-MCNC: 17.9 MG/DL (ref 0.2–1.3)
HBA1C MFR BLD: NORMAL %
PROT SERPL-MCNC: 6.4 G/DL (ref 6.8–8.8)

## 2021-11-01 ENCOUNTER — PATIENT OUTREACH (OUTPATIENT)
Dept: GASTROENTEROLOGY | Facility: CLINIC | Age: 51
End: 2021-11-01

## 2021-11-01 NOTE — TELEPHONE ENCOUNTER
Spoke with pt to answer question regarding labs.  Writer confirmed with lab that pt does not need to fast for BMP.

## 2021-11-01 NOTE — TELEPHONE ENCOUNTER
Forms completed . Please fax along with a copy of my notes from 10/13/2021 and ED records and labs from 09/24/2021 to till date

## 2021-11-02 ENCOUNTER — TELEPHONE (OUTPATIENT)
Dept: GASTROENTEROLOGY | Facility: CLINIC | Age: 51
End: 2021-11-02

## 2021-11-02 ENCOUNTER — LAB (OUTPATIENT)
Dept: LAB | Facility: CLINIC | Age: 51
End: 2021-11-02

## 2021-11-02 DIAGNOSIS — K83.1 CHOLESTATIC LIVER DISEASE (H): ICD-10-CM

## 2021-11-02 DIAGNOSIS — K70.31 ALCOHOLIC CIRRHOSIS OF LIVER WITH ASCITES (H): ICD-10-CM

## 2021-11-02 DIAGNOSIS — K70.9 ALCOHOLIC LIVER DISEASE (H): Primary | ICD-10-CM

## 2021-11-02 DIAGNOSIS — N17.9 ACUTE KIDNEY INJURY (H): ICD-10-CM

## 2021-11-02 DIAGNOSIS — K70.9 ALCOHOLIC LIVER DISEASE (H): ICD-10-CM

## 2021-11-02 DIAGNOSIS — E87.1 HYPONATREMIA: ICD-10-CM

## 2021-11-02 DIAGNOSIS — K70.31 ALCOHOLIC CIRRHOSIS OF LIVER WITH ASCITES (H): Primary | ICD-10-CM

## 2021-11-02 LAB
ALBUMIN SERPL-MCNC: 2.7 G/DL (ref 3.4–5)
ANION GAP SERPL CALCULATED.3IONS-SCNC: 11 MMOL/L (ref 3–14)
BUN SERPL-MCNC: 54 MG/DL (ref 7–30)
CALCIUM SERPL-MCNC: 9.2 MG/DL (ref 8.5–10.1)
CHLORIDE BLD-SCNC: 87 MMOL/L (ref 94–109)
CO2 SERPL-SCNC: 26 MMOL/L (ref 20–32)
CREAT SERPL-MCNC: 1.81 MG/DL (ref 0.52–1.04)
ERYTHROCYTE [DISTWIDTH] IN BLOOD BY AUTOMATED COUNT: 17.7 % (ref 10–15)
GFR SERPL CREATININE-BSD FRML MDRD: 32 ML/MIN/1.73M2
GLUCOSE BLD-MCNC: 142 MG/DL (ref 70–99)
HCT VFR BLD AUTO: 22.6 % (ref 35–47)
HGB BLD-MCNC: 7.3 G/DL (ref 11.7–15.7)
INR PPP: 2.27 (ref 0.85–1.15)
MCH RBC QN AUTO: 38.8 PG (ref 26.5–33)
MCHC RBC AUTO-ENTMCNC: 32.3 G/DL (ref 31.5–36.5)
MCV RBC AUTO: 120 FL (ref 78–100)
PHOSPHATE SERPL-MCNC: 3 MG/DL (ref 2.5–4.5)
PLATELET # BLD AUTO: 136 10E3/UL (ref 150–450)
POTASSIUM BLD-SCNC: 3.9 MMOL/L (ref 3.4–5.3)
RBC # BLD AUTO: 1.88 10E6/UL (ref 3.8–5.2)
SODIUM SERPL-SCNC: 124 MMOL/L (ref 133–144)
WBC # BLD AUTO: 15.6 10E3/UL (ref 4–11)

## 2021-11-02 PROCEDURE — 85027 COMPLETE CBC AUTOMATED: CPT

## 2021-11-02 PROCEDURE — 80053 COMPREHEN METABOLIC PANEL: CPT

## 2021-11-02 PROCEDURE — 36415 COLL VENOUS BLD VENIPUNCTURE: CPT

## 2021-11-02 PROCEDURE — 82248 BILIRUBIN DIRECT: CPT

## 2021-11-02 PROCEDURE — 84100 ASSAY OF PHOSPHORUS: CPT

## 2021-11-02 PROCEDURE — 85610 PROTHROMBIN TIME: CPT

## 2021-11-02 NOTE — TELEPHONE ENCOUNTER
[facility] Call Center    Phone Message    May a detailed message be left on voicemail: yes     Reason for Call: Other: Fidelina has a critical Hemoglobin result. Please contact lab to discuss. Thanks    Action Taken: Message routed to:  [facility] Hepatology    Travel Screening: Not Applicable

## 2021-11-03 ENCOUNTER — APPOINTMENT (OUTPATIENT)
Dept: GENERAL RADIOLOGY | Facility: CLINIC | Age: 51
DRG: 871 | End: 2021-11-03
Attending: STUDENT IN AN ORGANIZED HEALTH CARE EDUCATION/TRAINING PROGRAM
Payer: COMMERCIAL

## 2021-11-03 ENCOUNTER — APPOINTMENT (OUTPATIENT)
Dept: CT IMAGING | Facility: CLINIC | Age: 51
DRG: 871 | End: 2021-11-03
Attending: NURSE PRACTITIONER
Payer: COMMERCIAL

## 2021-11-03 ENCOUNTER — HOSPITAL ENCOUNTER (INPATIENT)
Facility: CLINIC | Age: 51
LOS: 5 days | Discharge: HOME | DRG: 871 | End: 2021-11-08
Attending: STUDENT IN AN ORGANIZED HEALTH CARE EDUCATION/TRAINING PROGRAM | Admitting: STUDENT IN AN ORGANIZED HEALTH CARE EDUCATION/TRAINING PROGRAM
Payer: COMMERCIAL

## 2021-11-03 DIAGNOSIS — D64.9 SYMPTOMATIC ANEMIA: ICD-10-CM

## 2021-11-03 DIAGNOSIS — Z91.89 AT HIGH RISK FOR SEVERE SEPSIS: Primary | ICD-10-CM

## 2021-11-03 DIAGNOSIS — Z20.822 COVID-19 RULED OUT BY LABORATORY TESTING: ICD-10-CM

## 2021-11-03 DIAGNOSIS — J18.9 COMMUNITY ACQUIRED PNEUMONIA OF LEFT LOWER LOBE OF LUNG: ICD-10-CM

## 2021-11-03 DIAGNOSIS — E87.1 HYPONATREMIA: ICD-10-CM

## 2021-11-03 DIAGNOSIS — R53.81 MALAISE AND FATIGUE: ICD-10-CM

## 2021-11-03 DIAGNOSIS — R53.83 MALAISE AND FATIGUE: ICD-10-CM

## 2021-11-03 PROBLEM — K72.90 DECOMPENSATED LIVER DISEASE (H): Status: ACTIVE | Noted: 2021-11-03

## 2021-11-03 PROBLEM — D75.89 MACROCYTOSIS: Chronic | Status: ACTIVE | Noted: 2020-09-20

## 2021-11-03 PROBLEM — D72.825 BANDEMIA: Status: ACTIVE | Noted: 2021-11-03

## 2021-11-03 PROBLEM — E80.6 HYPERBILIRUBINEMIA: Status: ACTIVE | Noted: 2021-11-03

## 2021-11-03 PROBLEM — K74.60 DECOMPENSATED LIVER DISEASE (H): Status: ACTIVE | Noted: 2021-11-03

## 2021-11-03 PROBLEM — K74.69 DECOMPENSATED LIVER DISEASE (H): Status: ACTIVE | Noted: 2021-11-03

## 2021-11-03 LAB
ABO/RH(D): NORMAL
ACANTHOCYTES BLD QL SMEAR: ABNORMAL
ALBUMIN SERPL-MCNC: 2.2 G/DL (ref 3.4–5)
ALBUMIN SERPL-MCNC: 2.4 G/DL (ref 3.4–5)
ALBUMIN SERPL-MCNC: 2.6 G/DL (ref 3.4–5)
ALBUMIN UR-MCNC: NEGATIVE MG/DL
ALP SERPL-CCNC: 155 U/L (ref 40–150)
ALP SERPL-CCNC: 165 U/L (ref 40–150)
ALP SERPL-CCNC: 176 U/L (ref 40–150)
ALT SERPL W P-5'-P-CCNC: 77 U/L (ref 0–50)
ALT SERPL W P-5'-P-CCNC: 82 U/L (ref 0–50)
ALT SERPL W P-5'-P-CCNC: 84 U/L (ref 0–50)
AMMONIA PLAS-SCNC: <10 UMOL/L (ref 10–50)
ANION GAP SERPL CALCULATED.3IONS-SCNC: 12 MMOL/L (ref 3–14)
ANION GAP SERPL CALCULATED.3IONS-SCNC: 13 MMOL/L (ref 3–14)
ANTIBODY SCREEN: NEGATIVE
APPEARANCE UR: CLEAR
AST SERPL W P-5'-P-CCNC: 79 U/L (ref 0–45)
AST SERPL W P-5'-P-CCNC: 83 U/L (ref 0–45)
AST SERPL W P-5'-P-CCNC: 90 U/L (ref 0–45)
BASOPHILS # BLD AUTO: 0.2 10E3/UL (ref 0–0.2)
BASOPHILS # BLD MANUAL: 0.2 10E3/UL (ref 0–0.2)
BASOPHILS NFR BLD AUTO: 1 %
BASOPHILS NFR BLD MANUAL: 1 %
BILIRUB DIRECT SERPL-MCNC: 8.8 MG/DL (ref 0–0.2)
BILIRUB SERPL-MCNC: 14 MG/DL (ref 0.2–1.3)
BILIRUB SERPL-MCNC: 14.9 MG/DL (ref 0.2–1.3)
BILIRUB SERPL-MCNC: 16.4 MG/DL (ref 0.2–1.3)
BILIRUB UR QL STRIP: ABNORMAL
BLD PROD TYP BPU: NORMAL
BLOOD COMPONENT TYPE: NORMAL
BUN SERPL-MCNC: 52 MG/DL (ref 7–30)
BUN SERPL-MCNC: 52 MG/DL (ref 7–30)
BURR CELLS BLD QL SMEAR: ABNORMAL
CALCIUM SERPL-MCNC: 8.9 MG/DL (ref 8.5–10.1)
CALCIUM SERPL-MCNC: 9.2 MG/DL (ref 8.5–10.1)
CHLORIDE BLD-SCNC: 87 MMOL/L (ref 94–109)
CHLORIDE BLD-SCNC: 87 MMOL/L (ref 94–109)
CO2 SERPL-SCNC: 25 MMOL/L (ref 20–32)
CO2 SERPL-SCNC: 25 MMOL/L (ref 20–32)
CODING SYSTEM: NORMAL
COLOR UR AUTO: YELLOW
CREAT SERPL-MCNC: 1.82 MG/DL (ref 0.52–1.04)
CREAT SERPL-MCNC: 1.85 MG/DL (ref 0.52–1.04)
CROSSMATCH: NORMAL
EOSINOPHIL # BLD AUTO: 0.2 10E3/UL (ref 0–0.7)
EOSINOPHIL # BLD MANUAL: 0 10E3/UL (ref 0–0.7)
EOSINOPHIL NFR BLD AUTO: 1 %
EOSINOPHIL NFR BLD MANUAL: 0 %
ERYTHROCYTE [DISTWIDTH] IN BLOOD BY AUTOMATED COUNT: 17.6 % (ref 10–15)
ERYTHROCYTE [DISTWIDTH] IN BLOOD BY AUTOMATED COUNT: 17.7 % (ref 10–15)
GFR SERPL CREATININE-BSD FRML MDRD: 31 ML/MIN/1.73M2
GFR SERPL CREATININE-BSD FRML MDRD: 32 ML/MIN/1.73M2
GLUCOSE BLD-MCNC: 120 MG/DL (ref 70–99)
GLUCOSE BLD-MCNC: 126 MG/DL (ref 70–99)
GLUCOSE UR STRIP-MCNC: NEGATIVE MG/DL
HCO3 BLDV-SCNC: 25 MMOL/L (ref 21–28)
HCT VFR BLD AUTO: 21 % (ref 35–47)
HCT VFR BLD AUTO: 22.1 % (ref 35–47)
HGB BLD-MCNC: 6.9 G/DL (ref 11.7–15.7)
HGB BLD-MCNC: 7.2 G/DL (ref 11.7–15.7)
HGB UR QL STRIP: NEGATIVE
IMM GRANULOCYTES # BLD: 0.4 10E3/UL
IMM GRANULOCYTES NFR BLD: 2 %
INR PPP: 2.64 (ref 0.85–1.15)
ISSUE DATE AND TIME: NORMAL
KETONES UR STRIP-MCNC: NEGATIVE MG/DL
LACTATE BLD-SCNC: 4 MMOL/L
LACTATE SERPL-SCNC: 3.7 MMOL/L (ref 0.7–2)
LACTATE SERPL-SCNC: 3.8 MMOL/L (ref 0.7–2)
LACTATE SERPL-SCNC: 4.1 MMOL/L (ref 0.7–2)
LEUKOCYTE ESTERASE UR QL STRIP: NEGATIVE
LYMPHOCYTES # BLD AUTO: 1.9 10E3/UL (ref 0.8–5.3)
LYMPHOCYTES # BLD MANUAL: 0.7 10E3/UL (ref 0.8–5.3)
LYMPHOCYTES NFR BLD AUTO: 11 %
LYMPHOCYTES NFR BLD MANUAL: 4 %
MCH RBC QN AUTO: 38.1 PG (ref 26.5–33)
MCH RBC QN AUTO: 39 PG (ref 26.5–33)
MCHC RBC AUTO-ENTMCNC: 32.6 G/DL (ref 31.5–36.5)
MCHC RBC AUTO-ENTMCNC: 32.9 G/DL (ref 31.5–36.5)
MCV RBC AUTO: 117 FL (ref 78–100)
MCV RBC AUTO: 119 FL (ref 78–100)
MONOCYTES # BLD AUTO: 3.7 10E3/UL (ref 0–1.3)
MONOCYTES # BLD MANUAL: 1.9 10E3/UL (ref 0–1.3)
MONOCYTES NFR BLD AUTO: 21 %
MONOCYTES NFR BLD MANUAL: 11 %
MUCOUS THREADS #/AREA URNS LPF: PRESENT /LPF
NEUTROPHILS # BLD AUTO: 11.2 10E3/UL (ref 1.6–8.3)
NEUTROPHILS # BLD MANUAL: 13.9 10E3/UL (ref 1.6–8.3)
NEUTROPHILS NFR BLD AUTO: 64 %
NEUTROPHILS NFR BLD MANUAL: 82 %
NITRATE UR QL: NEGATIVE
NRBC # BLD AUTO: 0.1 10E3/UL
NRBC # BLD AUTO: 0.3 10E3/UL
NRBC BLD AUTO-RTO: 0 /100
NRBC BLD MANUAL-RTO: 2 %
PCO2 BLDV: 29 MM HG (ref 40–50)
PH BLDV: 7.54 [PH] (ref 7.32–7.43)
PH UR STRIP: 5 [PH] (ref 5–7)
PLAT MORPH BLD: ABNORMAL
PLAT MORPH BLD: ABNORMAL
PLATELET # BLD AUTO: 147 10E3/UL (ref 150–450)
PLATELET # BLD AUTO: 152 10E3/UL (ref 150–450)
PO2 BLDV: 31 MM HG (ref 25–47)
POLYCHROMASIA BLD QL SMEAR: SLIGHT
POLYCHROMASIA BLD QL SMEAR: SLIGHT
POTASSIUM BLD-SCNC: 3.9 MMOL/L (ref 3.4–5.3)
POTASSIUM BLD-SCNC: 4 MMOL/L (ref 3.4–5.3)
PROMYELOCYTES # BLD MANUAL: 0.3 10E3/UL
PROMYELOCYTES NFR BLD MANUAL: 2 %
PROT SERPL-MCNC: 6.1 G/DL (ref 6.8–8.8)
PROT SERPL-MCNC: 6.2 G/DL (ref 6.8–8.8)
PROT SERPL-MCNC: 6.4 G/DL (ref 6.8–8.8)
RBC # BLD AUTO: 1.77 10E6/UL (ref 3.8–5.2)
RBC # BLD AUTO: 1.89 10E6/UL (ref 3.8–5.2)
RBC MORPH BLD: ABNORMAL
RBC MORPH BLD: ABNORMAL
RBC URINE: 1 /HPF
SAO2 % BLDV: 70 % (ref 94–100)
SARS-COV-2 RNA RESP QL NAA+PROBE: NEGATIVE
SODIUM SERPL-SCNC: 124 MMOL/L (ref 133–144)
SODIUM SERPL-SCNC: 125 MMOL/L (ref 133–144)
SP GR UR STRIP: 1.02 (ref 1–1.03)
SPECIMEN EXPIRATION DATE: NORMAL
SQUAMOUS EPITHELIAL: 4 /HPF
TRANSITIONAL EPI: <1 /HPF
TROPONIN I SERPL-MCNC: <0.015 UG/L (ref 0–0.04)
UNIT ABO/RH: NORMAL
UNIT NUMBER: NORMAL
UNIT STATUS: NORMAL
UNIT TYPE ISBT: 9500
UROBILINOGEN UR STRIP-MCNC: NORMAL MG/DL
WBC # BLD AUTO: 17 10E3/UL (ref 4–11)
WBC # BLD AUTO: 17.6 10E3/UL (ref 4–11)
WBC URINE: 2 /HPF

## 2021-11-03 PROCEDURE — 36430 TRANSFUSION BLD/BLD COMPNT: CPT | Performed by: STUDENT IN AN ORGANIZED HEALTH CARE EDUCATION/TRAINING PROGRAM

## 2021-11-03 PROCEDURE — 71046 X-RAY EXAM CHEST 2 VIEWS: CPT | Mod: 26 | Performed by: RADIOLOGY

## 2021-11-03 PROCEDURE — 96365 THER/PROPH/DIAG IV INF INIT: CPT | Performed by: STUDENT IN AN ORGANIZED HEALTH CARE EDUCATION/TRAINING PROGRAM

## 2021-11-03 PROCEDURE — C9803 HOPD COVID-19 SPEC COLLECT: HCPCS | Performed by: STUDENT IN AN ORGANIZED HEALTH CARE EDUCATION/TRAINING PROGRAM

## 2021-11-03 PROCEDURE — 36415 COLL VENOUS BLD VENIPUNCTURE: CPT | Performed by: STUDENT IN AN ORGANIZED HEALTH CARE EDUCATION/TRAINING PROGRAM

## 2021-11-03 PROCEDURE — 250N000011 HC RX IP 250 OP 636: Performed by: STUDENT IN AN ORGANIZED HEALTH CARE EDUCATION/TRAINING PROGRAM

## 2021-11-03 PROCEDURE — 250N000013 HC RX MED GY IP 250 OP 250 PS 637: Performed by: NURSE PRACTITIONER

## 2021-11-03 PROCEDURE — 83605 ASSAY OF LACTIC ACID: CPT | Performed by: STUDENT IN AN ORGANIZED HEALTH CARE EDUCATION/TRAINING PROGRAM

## 2021-11-03 PROCEDURE — 96366 THER/PROPH/DIAG IV INF ADDON: CPT | Performed by: STUDENT IN AN ORGANIZED HEALTH CARE EDUCATION/TRAINING PROGRAM

## 2021-11-03 PROCEDURE — 0W9G3ZZ DRAINAGE OF PERITONEAL CAVITY, PERCUTANEOUS APPROACH: ICD-10-PCS | Performed by: INTERNAL MEDICINE

## 2021-11-03 PROCEDURE — 86900 BLOOD TYPING SEROLOGIC ABO: CPT | Performed by: STUDENT IN AN ORGANIZED HEALTH CARE EDUCATION/TRAINING PROGRAM

## 2021-11-03 PROCEDURE — 85610 PROTHROMBIN TIME: CPT | Performed by: NURSE PRACTITIONER

## 2021-11-03 PROCEDURE — 87040 BLOOD CULTURE FOR BACTERIA: CPT | Performed by: STUDENT IN AN ORGANIZED HEALTH CARE EDUCATION/TRAINING PROGRAM

## 2021-11-03 PROCEDURE — 85025 COMPLETE CBC W/AUTO DIFF WBC: CPT | Performed by: STUDENT IN AN ORGANIZED HEALTH CARE EDUCATION/TRAINING PROGRAM

## 2021-11-03 PROCEDURE — 87086 URINE CULTURE/COLONY COUNT: CPT | Performed by: STUDENT IN AN ORGANIZED HEALTH CARE EDUCATION/TRAINING PROGRAM

## 2021-11-03 PROCEDURE — 84450 TRANSFERASE (AST) (SGOT): CPT | Performed by: STUDENT IN AN ORGANIZED HEALTH CARE EDUCATION/TRAINING PROGRAM

## 2021-11-03 PROCEDURE — 258N000003 HC RX IP 258 OP 636: Performed by: STUDENT IN AN ORGANIZED HEALTH CARE EDUCATION/TRAINING PROGRAM

## 2021-11-03 PROCEDURE — 99285 EMERGENCY DEPT VISIT HI MDM: CPT | Performed by: STUDENT IN AN ORGANIZED HEALTH CARE EDUCATION/TRAINING PROGRAM

## 2021-11-03 PROCEDURE — 83605 ASSAY OF LACTIC ACID: CPT | Performed by: NURSE PRACTITIONER

## 2021-11-03 PROCEDURE — 99223 1ST HOSP IP/OBS HIGH 75: CPT | Mod: AI | Performed by: STUDENT IN AN ORGANIZED HEALTH CARE EDUCATION/TRAINING PROGRAM

## 2021-11-03 PROCEDURE — 74176 CT ABD & PELVIS W/O CONTRAST: CPT | Mod: 26 | Performed by: RADIOLOGY

## 2021-11-03 PROCEDURE — 81001 URINALYSIS AUTO W/SCOPE: CPT | Performed by: STUDENT IN AN ORGANIZED HEALTH CARE EDUCATION/TRAINING PROGRAM

## 2021-11-03 PROCEDURE — P9016 RBC LEUKOCYTES REDUCED: HCPCS

## 2021-11-03 PROCEDURE — U0005 INFEC AGEN DETEC AMPLI PROBE: HCPCS | Performed by: STUDENT IN AN ORGANIZED HEALTH CARE EDUCATION/TRAINING PROGRAM

## 2021-11-03 PROCEDURE — 36415 COLL VENOUS BLD VENIPUNCTURE: CPT | Performed by: NURSE PRACTITIONER

## 2021-11-03 PROCEDURE — 99285 EMERGENCY DEPT VISIT HI MDM: CPT | Mod: 25 | Performed by: STUDENT IN AN ORGANIZED HEALTH CARE EDUCATION/TRAINING PROGRAM

## 2021-11-03 PROCEDURE — 74176 CT ABD & PELVIS W/O CONTRAST: CPT

## 2021-11-03 PROCEDURE — 82803 BLOOD GASES ANY COMBINATION: CPT

## 2021-11-03 PROCEDURE — 120N000002 HC R&B MED SURG/OB UMMC

## 2021-11-03 PROCEDURE — 84155 ASSAY OF PROTEIN SERUM: CPT | Performed by: STUDENT IN AN ORGANIZED HEALTH CARE EDUCATION/TRAINING PROGRAM

## 2021-11-03 PROCEDURE — 84484 ASSAY OF TROPONIN QUANT: CPT | Performed by: STUDENT IN AN ORGANIZED HEALTH CARE EDUCATION/TRAINING PROGRAM

## 2021-11-03 PROCEDURE — 86923 COMPATIBILITY TEST ELECTRIC: CPT | Performed by: STUDENT IN AN ORGANIZED HEALTH CARE EDUCATION/TRAINING PROGRAM

## 2021-11-03 PROCEDURE — 82140 ASSAY OF AMMONIA: CPT | Performed by: STUDENT IN AN ORGANIZED HEALTH CARE EDUCATION/TRAINING PROGRAM

## 2021-11-03 PROCEDURE — 71046 X-RAY EXAM CHEST 2 VIEWS: CPT

## 2021-11-03 RX ORDER — CEFTRIAXONE 1 G/1
1 INJECTION, POWDER, FOR SOLUTION INTRAMUSCULAR; INTRAVENOUS ONCE
Status: COMPLETED | OUTPATIENT
Start: 2021-11-03 | End: 2021-11-03

## 2021-11-03 RX ORDER — LACTULOSE 10 G/15ML
10 SOLUTION ORAL
Status: DISCONTINUED | OUTPATIENT
Start: 2021-11-03 | End: 2021-11-08 | Stop reason: HOSPADM

## 2021-11-03 RX ORDER — LACTULOSE 10 G/15ML
10 SOLUTION ORAL
Status: DISCONTINUED | OUTPATIENT
Start: 2021-11-04 | End: 2021-11-03

## 2021-11-03 RX ORDER — CEFTRIAXONE 1 G/1
1 INJECTION, POWDER, FOR SOLUTION INTRAMUSCULAR; INTRAVENOUS EVERY 24 HOURS
Status: DISCONTINUED | OUTPATIENT
Start: 2021-11-04 | End: 2021-11-05

## 2021-11-03 RX ORDER — CEFTRIAXONE 1 G/1
1 INJECTION, POWDER, FOR SOLUTION INTRAMUSCULAR; INTRAVENOUS EVERY 24 HOURS
Status: DISCONTINUED | OUTPATIENT
Start: 2021-11-03 | End: 2021-11-03

## 2021-11-03 RX ADMIN — LACTULOSE 10 G: 20 SOLUTION ORAL at 23:47

## 2021-11-03 RX ADMIN — CEFTRIAXONE SODIUM 1 G: 1 INJECTION, POWDER, FOR SOLUTION INTRAMUSCULAR; INTRAVENOUS at 17:56

## 2021-11-03 RX ADMIN — SODIUM CHLORIDE 2000 ML: 9 INJECTION, SOLUTION INTRAVENOUS at 17:55

## 2021-11-03 ASSESSMENT — ENCOUNTER SYMPTOMS
ARTHRALGIAS: 0
HEADACHES: 0
FATIGUE: 1
WHEEZING: 0
ABDOMINAL DISTENTION: 1
VOMITING: 0
DYSURIA: 0
NAUSEA: 0
ROS SKIN COMMENTS: JAUNDICE
DIARRHEA: 0
FREQUENCY: 0
COLOR CHANGE: 1
TROUBLE SWALLOWING: 0
MYALGIAS: 0
BRUISES/BLEEDS EASILY: 1
PALPITATIONS: 0
CONFUSION: 0
DIFFICULTY URINATING: 0
ABDOMINAL PAIN: 0
FEVER: 0
COUGH: 0
EYE REDNESS: 0
SHORTNESS OF BREATH: 1
CHILLS: 0
CONSTIPATION: 0
WEAKNESS: 1
NECK STIFFNESS: 0
APPETITE CHANGE: 1
SHORTNESS OF BREATH: 0

## 2021-11-03 ASSESSMENT — ACTIVITIES OF DAILY LIVING (ADL)
ADLS_ACUITY_SCORE: 7

## 2021-11-03 ASSESSMENT — MIFFLIN-ST. JEOR: SCORE: 1313.07

## 2021-11-03 NOTE — TELEPHONE ENCOUNTER
Form and notes from 10/13/21 and 9/24/21 faxed to [phone] and placed in abstraction bin for scanning into patients chart

## 2021-11-03 NOTE — ED PROVIDER NOTES
ED Provider Note  [facility]      History     Chief Complaint   Patient presents with     Dehydration     Abnormal Labs     anemia     The history is provided by the patient, medical records and the spouse.     Melita Cortes is a 51 year old female with history of alcoholic cirrhosis w/ ascites c/b anemia and macrocytosis recently admitted 9/24-10/1 at [facility] for acute decompensated alcoholic liver disease, anemia, and ROSSY presenting to the ED as advised by hepatology for hyponatremia, anemia, and to rule out infection. Patient had EGD w/ brushings on 10/29 to r/o candida infection, which was positive so she was started on fluconazole. Patient had labs drawn yesterday and was called today which revealed Hgb 7.3 and sodium 124. Patient reports she is increasingly distended with associated shortness of breath. She is uncomfortable, but denies chest pain and abdominal pain. She is noticeably jaundiced, but states that this is actually improved since her recent admission. She feels very fatigued and dehydrated, has not been eating or drinking well. She has been medication compliant. She took her 2nd lactulose dose PTA. She denies urinary symptoms. She states she is no longer drinking alcohol.     Previous paracentesis:  9/24 - 3.95 L removed   10/1 - 1.9 L removed   10/22 - 5.4 L removed     Past Medical History  Past Medical History:   Diagnosis Date     Alcoholic hepatitis      Cervical high risk HPV (human papillomavirus) test positive 2019, 2020    See problem list     Past Surgical History:   Procedure Laterality Date     APPENDECTOMY       ESOPHAGOGASTRODUODENOSCOPY, WITH BRUSHINGS N/A 10/29/2021    Procedure: ESOPHAGOGASTRODUODENOSCOPY, WITH BRUSHINGS;  Surgeon: Torey Ruiz MD;  Location:  GI     fluconazole (DIFLUCAN) 200 MG tablet  folic acid (FOLVITE) 1 MG tablet  lactulose (CHRONULAC) 10 GM/15ML solution  multivitamin (CENTRUM SILVER) tablet  pantoprazole  "(PROTONIX) 40 MG EC tablet  thiamine (B-1) 100 MG tablet  ciprofloxacin (CIPRO) 500 MG tablet  FLUoxetine (PROZAC) 20 MG capsule  spironolactone (ALDACTONE) 25 MG tablet      No Known Allergies  Family History  History reviewed. No pertinent family history.  Social History   Social History     Tobacco Use     Smoking status: Former Smoker     Quit date: 2021     Years since quittin.5     Smokeless tobacco: Never Used   Substance Use Topics     Alcohol use: Not Currently     Comment: Last drink 2021     Drug use: Never      Past medical history, past surgical history, medications, allergies, family history, and social history were reviewed with the patient. No additional pertinent items.       Review of Systems   Constitutional: Positive for appetite change (decreased) and fatigue. Negative for fever.   HENT: Negative for congestion.    Eyes: Negative for redness.   Respiratory: Positive for shortness of breath.    Cardiovascular: Negative for chest pain.   Gastrointestinal: Positive for abdominal distention. Negative for abdominal pain.   Genitourinary: Negative for difficulty urinating.   Musculoskeletal: Negative for arthralgias and neck stiffness.   Skin: Positive for color change (jaundiced).   Neurological: Negative for headaches.   Psychiatric/Behavioral: Negative for confusion.   All other systems reviewed and are negative.    A complete review of systems was performed with pertinent positives and negatives noted in the HPI, and all other systems negative.    Physical Exam   BP: 112/63  Pulse: 114  Temp: 98.4  F (36.9  C)  Resp: 17  Height: 165.1 cm (5' 5\")  Weight: 69.2 kg (152 lb 9.6 oz)  SpO2: 99 %  Physical Exam  Vitals and nursing note reviewed.   Constitutional:       General: She is not in acute distress.     Appearance: She is ill-appearing. She is not diaphoretic.      Comments: Chronically ill-appearing, jaundiced female, no respiratory distress   HENT:      Head: Normocephalic and " atraumatic.      Right Ear: External ear normal.      Left Ear: External ear normal.      Mouth/Throat:      Pharynx: No oropharyngeal exudate.   Eyes:      General: Scleral icterus present.      Pupils: Pupils are equal, round, and reactive to light.   Cardiovascular:      Rate and Rhythm: Normal rate and regular rhythm.      Heart sounds: Normal heart sounds.   Pulmonary:      Effort: No respiratory distress.      Breath sounds: Normal breath sounds.   Abdominal:      General: Bowel sounds are normal. There is distension.      Palpations: Abdomen is soft.      Tenderness: There is no abdominal tenderness.      Comments: Softly distended abdomen, nontender   Musculoskeletal:         General: No tenderness.      Cervical back: Normal range of motion. No rigidity.   Skin:     General: Skin is warm.      Coloration: Skin is jaundiced.   Neurological:      General: No focal deficit present.      Mental Status: She is alert. Mental status is at baseline.   Psychiatric:         Mood and Affect: Mood normal.         Behavior: Behavior normal.         ED Course      Procedures       The Lactic acid level is elevated due to Significant liver cirrhosis, at this time there is no sign of severe sepsis or septic shock.     Results for orders placed or performed during the hospital encounter of 11/03/21   Chest XR,  PA & LAT     Status: None (Preliminary result)    Impression    RESIDENT PRELIMINARY INTERPRETATION  IMPRESSION:   Interstitial prominence which may be seen with pulmonary vascular  congestion. No acute airspace opacity.   Comprehensive metabolic panel     Status: Abnormal   Result Value Ref Range    Sodium 124 (L) 133 - 144 mmol/L    Potassium 4.0 3.4 - 5.3 mmol/L    Chloride 87 (L) 94 - 109 mmol/L    Carbon Dioxide (CO2) 25 20 - 32 mmol/L    Anion Gap 12 3 - 14 mmol/L    Urea Nitrogen 52 (H) 7 - 30 mg/dL    Creatinine 1.82 (H) 0.52 - 1.04 mg/dL    Calcium 9.2 8.5 - 10.1 mg/dL    Glucose 126 (H) 70 - 99 mg/dL     Alkaline Phosphatase 176 (H) 40 - 150 U/L    AST 90 (H) 0 - 45 U/L    ALT 84 (H) 0 - 50 U/L    Protein Total 6.4 (L) 6.8 - 8.8 g/dL    Albumin 2.4 (L) 3.4 - 5.0 g/dL    Bilirubin Total 14.9 (H) 0.2 - 1.3 mg/dL    GFR Estimate 32 (L) >60 mL/min/1.73m2   Lactic acid whole blood STAT     Status: Abnormal   Result Value Ref Range    Lactic Acid 4.1 (HH) 0.7 - 2.0 mmol/L   CBC with platelets and differential     Status: Abnormal (Preliminary result)   Result Value Ref Range    WBC Count 17.6 (H) 4.0 - 11.0 10e3/uL    RBC Count 1.89 (L) 3.80 - 5.20 10e6/uL    Hemoglobin 7.2 (L) 11.7 - 15.7 g/dL    Hematocrit 22.1 (L) 35.0 - 47.0 %     (H) 78 - 100 fL    MCH 38.1 (H) 26.5 - 33.0 pg    MCHC 32.6 31.5 - 36.5 g/dL    RDW 17.7 (H) 10.0 - 15.0 %    Platelet Count 152 150 - 450 10e3/uL   iStat Gases (lactate) venous, POCT     Status: Abnormal   Result Value Ref Range    Lactic Acid POCT 4.0 (HH) <=2.0 mmol/L    Bicarbonate Venous POCT 25 21 - 28 mmol/L    O2 Sat, Venous POCT 70 (L) 94 - 100 %    pCO2V Venous POCT 29 (L) 40 - 50 mm Hg    pH Venous POCT 7.54 (H) 7.32 - 7.43    pO2 Venous POCT 31 25 - 47 mm Hg   Comprehensive metabolic panel     Status: Abnormal   Result Value Ref Range    Sodium 125 (L) 133 - 144 mmol/L    Potassium 3.9 3.4 - 5.3 mmol/L    Chloride 87 (L) 94 - 109 mmol/L    Carbon Dioxide (CO2) 25 20 - 32 mmol/L    Anion Gap 13 3 - 14 mmol/L    Urea Nitrogen 52 (H) 7 - 30 mg/dL    Creatinine 1.85 (H) 0.52 - 1.04 mg/dL    Calcium 8.9 8.5 - 10.1 mg/dL    Glucose 120 (H) 70 - 99 mg/dL    Alkaline Phosphatase 155 (H) 40 - 150 U/L    AST 83 (H) 0 - 45 U/L    ALT 77 (H) 0 - 50 U/L    Protein Total 6.1 (L) 6.8 - 8.8 g/dL    Albumin 2.2 (L) 3.4 - 5.0 g/dL    Bilirubin Total 14.0 (H) 0.2 - 1.3 mg/dL    GFR Estimate 31 (L) >60 mL/min/1.73m2   Lactic acid whole blood     Status: Abnormal   Result Value Ref Range    Lactic Acid 3.8 (H) 0.7 - 2.0 mmol/L   Ammonia (on ice)     Status: Abnormal   Result Value Ref Range     "Ammonia <10 (L) 10 - 50 umol/L   CBC with platelets and differential     Status: Abnormal   Result Value Ref Range    WBC Count 17.0 (H) 4.0 - 11.0 10e3/uL    RBC Count 1.77 (L) 3.80 - 5.20 10e6/uL    Hemoglobin 6.9 (LL) 11.7 - 15.7 g/dL    Hematocrit 21.0 (L) 35.0 - 47.0 %     (H) 78 - 100 fL    MCH 39.0 (H) 26.5 - 33.0 pg    MCHC 32.9 31.5 - 36.5 g/dL    RDW 17.6 (H) 10.0 - 15.0 %    Platelet Count 147 (L) 150 - 450 10e3/uL    Narrative    Previously reported component [ NRBCs ] is no longer reported.\"  Previously reported component [ NRBCs Absolute ] is no longer reported.\"   Troponin I     Status: Normal   Result Value Ref Range    Troponin I <0.015 0.000 - 0.045 ug/L   Manual Differential     Status: Abnormal   Result Value Ref Range    % Neutrophils 82 %    % Lymphocytes 4 %    % Monocytes 11 %    % Eosinophils 0 %    % Basophils 1 %    % Promyelocytes 2 %    NRBCs per 100 WBC 2 (H) <=0 %    Absolute Neutrophils 13.9 (H) 1.6 - 8.3 10e3/uL    Absolute Lymphocytes 0.7 (L) 0.8 - 5.3 10e3/uL    Absolute Monocytes 1.9 (H) 0.0 - 1.3 10e3/uL    Absolute Eosinophils 0.0 0.0 - 0.7 10e3/uL    Absolute Basophils 0.2 0.0 - 0.2 10e3/uL    Absolute Promyelocytes 0.3 (H) <=0.0 10e3/uL    Absolute NRBCs 0.3 (H) <=0.0 10e3/uL    RBC Morphology Confirmed RBC Indices     Platelet Assessment  Automated Count Confirmed. Platelet morphology is normal.     Automated Count Confirmed. Platelet morphology is normal.    Acanthocytes Moderate (A) None Seen    Polychromasia Slight (A) None Seen   CBC with platelets differential     Status: Abnormal (In process)    Narrative    The following orders were created for panel order CBC with platelets differential.  Procedure                               Abnormality         Status                     ---------                               -----------         ------                     CBC with platelets and d...[[id number]]  Abnormal            Preliminary result           Please view " results for these tests on the individual orders.   CBC with platelets differential     Status: Abnormal    Narrative    The following orders were created for panel order CBC with platelets differential.  Procedure                               Abnormality         Status                     ---------                               -----------         ------                     CBC with platelets and d...[[id number]]  Abnormal            Final result               Manual Differential[[id number]]          Abnormal            Final result                 Please view results for these tests on the individual orders.   ABO/Rh type and screen *Canceled*     Status: None ()    Narrative    The following orders were created for panel order ABO/Rh type and screen.  Procedure                               Abnormality         Status                     ---------                               -----------         ------                       Please view results for these tests on the individual orders.   ABO/Rh type and screen     Status: None (In process)    Narrative    The following orders were created for panel order ABO/Rh type and screen.  Procedure                               Abnormality         Status                     ---------                               -----------         ------                     Adult Type and Screen[[id number]]                            In process                   Please view results for these tests on the individual orders.     Medications   sodium chloride (PF) 0.9% PF flush 3 mL (has no administration in time range)   sodium chloride (PF) 0.9% PF flush 3 mL (has no administration in time range)   0.9% sodium chloride BOLUS (2,000 mLs Intravenous New Bag 11/3/21 2768)   cefTRIAXone (ROCEPHIN) 1 g vial to attach to  mL bag for ADULTS or NS 50 mL bag for PEDS (1 g Intravenous New Bag 11/3/21 9039)        Assessments & Plan (with Medical Decision Making)   MDM:    51 year old F with  history of cirrhosis, believed to be secondary to alcohol abuse who presents emergency department today for malaise, fatigue, abnormal labs.  Labs show critical hemoglobin of 6.9, stable hyponatremia, stable creatinine, stable liver dysfunction.  Consent obtained and 1 unit of blood was ordered.  Chest x-ray is no evidence of acute infection, did consider SBP but no abdominal tenderness, altered mental status, has been previously on SBP prophylaxis, so believe that this is an unlikely diagnosis, urine pending at the time of note.  Given 1 g of IV Rocephin empirically just in case there is any urinary tract infection.  Will admit for IV rehydration, blood transfusion.    Reevaluation she is clinically unchanged.  I discussed all test results and the plan of care with the patient, who is agreeable for admission.  Will be admitted to medicine obs.    I have reviewed the nursing notes. I have reviewed the findings, diagnosis, plan and need for follow up with the patient.    New Prescriptions    No medications on file       Final diagnoses:   Symptomatic anemia   At high risk for severe sepsis   Malaise and fatigue   Hyponatremia   I, Marcia Mancini, am serving as a trained medical scribe to document services personally performed by Yulia Johnson MD, based on the provider's statements to me.     I, Yulia Johnson MD, was physically present and have reviewed and verified the accuracy of this note documented by Marcia Mancini.      --  Yulia Johnson MD  [facility] EMERGENCY DEPARTMENT  11/3/2021

## 2021-11-03 NOTE — ED TRIAGE NOTES
Presents with anemia (hgb 7.3 on 11/2) & dehydration. Patient instructed to come in to evaluate for infection. Patient states she also hasn't been drinking/eating as much as she should. Patient c/o mild SOB d/t increased ascites in her abdomen.

## 2021-11-03 NOTE — TELEPHONE ENCOUNTER
Spoke to patient on phone x 17 min.     ? Concern for infection vs alcohol hepatitis due Increased WBC. Prednisolone discontinued 2 weeks ago.   - Started on fluconazole for yeast infection    -No overt abdominal pain. Feels more hunger pains, which are improved with eating. Is developing ascites again. Is on SBP prophylaxis. Feels some difficulty taking a deep breath due to ascites No coughing. No fever/sweats/chils.Overall, no overt signs of infection.     Hyponatremia, stable over last few days   Low sodium has been difficult, but feels like she is doing a pretty good job <2000 mg. Staying less than 8 cups/2 L.Spironolactone discontinued two days ago.     ROSSY:   Creatine improving again, but would like still likely benefit from IV hydration.     Anemia:   No SOB at rest or GARCIA or lightheadedness. No chest pain. Feels fatigued, some days worse than others.     Discussed recommendations to go to the [facility] ER (for evaluation of possible infection, hyponatremia and anemia) vs outpatient management/evaluation of co-morbidities. Discussed that she should to ER if symptoms worsen.     She prefers to see her PCP this afternoon and then possible go to ER tomorrow.     - Will set up outpatient liver biopsy.  - If she doesn't go to ER, will need labs to trend on Friday.     Eduardo Parry PA-C  Hepatology

## 2021-11-04 ENCOUNTER — ANCILLARY PROCEDURE (OUTPATIENT)
Dept: ULTRASOUND IMAGING | Facility: CLINIC | Age: 51
End: 2021-11-04
Attending: INTERNAL MEDICINE
Payer: COMMERCIAL

## 2021-11-04 ENCOUNTER — APPOINTMENT (OUTPATIENT)
Dept: GENERAL RADIOLOGY | Facility: CLINIC | Age: 51
DRG: 871 | End: 2021-11-04
Attending: PHYSICIAN ASSISTANT
Payer: COMMERCIAL

## 2021-11-04 LAB
ALBUMIN SERPL-MCNC: 2 G/DL (ref 3.4–5)
ALP SERPL-CCNC: 131 U/L (ref 40–150)
ALT SERPL W P-5'-P-CCNC: 74 U/L (ref 0–50)
ANION GAP SERPL CALCULATED.3IONS-SCNC: 10 MMOL/L (ref 3–14)
APPEARANCE FLD: CLEAR
AST SERPL W P-5'-P-CCNC: 85 U/L (ref 0–45)
BASOPHILS # BLD AUTO: 0.1 10E3/UL (ref 0–0.2)
BASOPHILS NFR BLD AUTO: 1 %
BASOPHILS NFR FLD MANUAL: 1 %
BILIRUB SERPL-MCNC: 14.5 MG/DL (ref 0.2–1.3)
BUN SERPL-MCNC: 48 MG/DL (ref 7–30)
CALCIUM SERPL-MCNC: 8.4 MG/DL (ref 8.5–10.1)
CHLORIDE BLD-SCNC: 93 MMOL/L (ref 94–109)
CO2 SERPL-SCNC: 24 MMOL/L (ref 20–32)
COLOR FLD: YELLOW
CREAT SERPL-MCNC: 1.7 MG/DL (ref 0.52–1.04)
EOSINOPHIL # BLD AUTO: 0.2 10E3/UL (ref 0–0.7)
EOSINOPHIL NFR BLD AUTO: 2 %
EOSINOPHIL NFR FLD MANUAL: 1 %
ERYTHROCYTE [DISTWIDTH] IN BLOOD BY AUTOMATED COUNT: 21.9 % (ref 10–15)
FIBRINOGEN PPP-MCNC: 150 MG/DL (ref 170–490)
GFR SERPL CREATININE-BSD FRML MDRD: 35 ML/MIN/1.73M2
GLUCOSE BLD-MCNC: 97 MG/DL (ref 70–99)
HCT VFR BLD AUTO: 23.2 % (ref 35–47)
HGB BLD-MCNC: 7.7 G/DL (ref 11.7–15.7)
HOLD SPECIMEN: NORMAL
IMM GRANULOCYTES # BLD: 0.4 10E3/UL
IMM GRANULOCYTES NFR BLD: 3 %
INR PPP: 2.42 (ref 0.85–1.15)
LACTATE SERPL-SCNC: 3.8 MMOL/L (ref 0.7–2)
LYMPHOCYTES # BLD AUTO: 1.1 10E3/UL (ref 0.8–5.3)
LYMPHOCYTES NFR BLD AUTO: 7 %
LYMPHOCYTES NFR FLD MANUAL: 19 %
MCH RBC QN AUTO: 36.7 PG (ref 26.5–33)
MCHC RBC AUTO-ENTMCNC: 33.2 G/DL (ref 31.5–36.5)
MCV RBC AUTO: 111 FL (ref 78–100)
MONOCYTES # BLD AUTO: 2.9 10E3/UL (ref 0–1.3)
MONOCYTES NFR BLD AUTO: 18 %
MONOS+MACROS NFR FLD MANUAL: NORMAL %
NEUTROPHILS # BLD AUTO: 10.9 10E3/UL (ref 1.6–8.3)
NEUTROPHILS NFR BLD AUTO: 69 %
NEUTS BAND NFR FLD MANUAL: 5 %
NRBC # BLD AUTO: 0.1 10E3/UL
NRBC BLD AUTO-RTO: 0 /100
OTHER CELLS FLD MANUAL: 76 %
PLATELET # BLD AUTO: 114 10E3/UL (ref 150–450)
POTASSIUM BLD-SCNC: 4.2 MMOL/L (ref 3.4–5.3)
PROT SERPL-MCNC: 5.6 G/DL (ref 6.8–8.8)
RBC # BLD AUTO: 2.1 10E6/UL (ref 3.8–5.2)
SODIUM SERPL-SCNC: 127 MMOL/L (ref 133–144)
WBC # BLD AUTO: 15.6 10E3/UL (ref 4–11)
WBC # FLD AUTO: 33 /UL

## 2021-11-04 PROCEDURE — 87205 SMEAR GRAM STAIN: CPT | Performed by: NURSE PRACTITIONER

## 2021-11-04 PROCEDURE — 250N000011 HC RX IP 250 OP 636: Performed by: INTERNAL MEDICINE

## 2021-11-04 PROCEDURE — 82040 ASSAY OF SERUM ALBUMIN: CPT | Performed by: NURSE PRACTITIONER

## 2021-11-04 PROCEDURE — 85025 COMPLETE CBC W/AUTO DIFF WBC: CPT | Performed by: NURSE PRACTITIONER

## 2021-11-04 PROCEDURE — 120N000002 HC R&B MED SURG/OB UMMC

## 2021-11-04 PROCEDURE — P9047 ALBUMIN (HUMAN), 25%, 50ML: HCPCS | Performed by: INTERNAL MEDICINE

## 2021-11-04 PROCEDURE — 99207 PR APP CREDIT; MD BILLING SHARED VISIT: CPT | Performed by: PHYSICIAN ASSISTANT

## 2021-11-04 PROCEDURE — 99223 1ST HOSP IP/OBS HIGH 75: CPT | Mod: GC | Performed by: INTERNAL MEDICINE

## 2021-11-04 PROCEDURE — 250N000013 HC RX MED GY IP 250 OP 250 PS 637: Performed by: NURSE PRACTITIONER

## 2021-11-04 PROCEDURE — 36415 COLL VENOUS BLD VENIPUNCTURE: CPT | Performed by: INTERNAL MEDICINE

## 2021-11-04 PROCEDURE — 71045 X-RAY EXAM CHEST 1 VIEW: CPT | Mod: 26 | Performed by: RADIOLOGY

## 2021-11-04 PROCEDURE — 250N000011 HC RX IP 250 OP 636: Performed by: NURSE PRACTITIONER

## 2021-11-04 PROCEDURE — 83605 ASSAY OF LACTIC ACID: CPT | Performed by: INTERNAL MEDICINE

## 2021-11-04 PROCEDURE — 89051 BODY FLUID CELL COUNT: CPT | Performed by: NURSE PRACTITIONER

## 2021-11-04 PROCEDURE — 36415 COLL VENOUS BLD VENIPUNCTURE: CPT | Performed by: NURSE PRACTITIONER

## 2021-11-04 PROCEDURE — 82247 BILIRUBIN TOTAL: CPT | Performed by: NURSE PRACTITIONER

## 2021-11-04 PROCEDURE — P9047 ALBUMIN (HUMAN), 25%, 50ML: HCPCS | Performed by: PHYSICIAN ASSISTANT

## 2021-11-04 PROCEDURE — 99233 SBSQ HOSP IP/OBS HIGH 50: CPT | Mod: 25 | Performed by: INTERNAL MEDICINE

## 2021-11-04 PROCEDURE — 96376 TX/PRO/DX INJ SAME DRUG ADON: CPT | Performed by: STUDENT IN AN ORGANIZED HEALTH CARE EDUCATION/TRAINING PROGRAM

## 2021-11-04 PROCEDURE — 89050 BODY FLUID CELL COUNT: CPT | Performed by: NURSE PRACTITIONER

## 2021-11-04 PROCEDURE — 49083 ABD PARACENTESIS W/IMAGING: CPT | Performed by: INTERNAL MEDICINE

## 2021-11-04 PROCEDURE — 96367 TX/PROPH/DG ADDL SEQ IV INF: CPT | Performed by: STUDENT IN AN ORGANIZED HEALTH CARE EDUCATION/TRAINING PROGRAM

## 2021-11-04 PROCEDURE — 71045 X-RAY EXAM CHEST 1 VIEW: CPT

## 2021-11-04 PROCEDURE — 250N000011 HC RX IP 250 OP 636: Performed by: PHYSICIAN ASSISTANT

## 2021-11-04 PROCEDURE — 85610 PROTHROMBIN TIME: CPT | Performed by: NURSE PRACTITIONER

## 2021-11-04 PROCEDURE — 85384 FIBRINOGEN ACTIVITY: CPT | Performed by: INTERNAL MEDICINE

## 2021-11-04 RX ORDER — ALBUMIN (HUMAN) 12.5 G/50ML
25 SOLUTION INTRAVENOUS ONCE
Status: COMPLETED | OUTPATIENT
Start: 2021-11-04 | End: 2021-11-04

## 2021-11-04 RX ORDER — ALBUMIN (HUMAN) 12.5 G/50ML
75 SOLUTION INTRAVENOUS ONCE
Status: COMPLETED | OUTPATIENT
Start: 2021-11-04 | End: 2021-11-04

## 2021-11-04 RX ORDER — FOLIC ACID 1 MG/1
1 TABLET ORAL DAILY
Status: DISCONTINUED | OUTPATIENT
Start: 2021-11-04 | End: 2021-11-08 | Stop reason: HOSPADM

## 2021-11-04 RX ORDER — FLUCONAZOLE 200 MG/1
200 TABLET ORAL DAILY
Status: DISCONTINUED | OUTPATIENT
Start: 2021-11-04 | End: 2021-11-08 | Stop reason: HOSPADM

## 2021-11-04 RX ORDER — ALBUMIN (HUMAN) 12.5 G/50ML
50 SOLUTION INTRAVENOUS ONCE
Status: COMPLETED | OUTPATIENT
Start: 2021-11-04 | End: 2021-11-04

## 2021-11-04 RX ORDER — LANOLIN ALCOHOL/MO/W.PET/CERES
100 CREAM (GRAM) TOPICAL DAILY
Status: DISCONTINUED | OUTPATIENT
Start: 2021-11-04 | End: 2021-11-08 | Stop reason: HOSPADM

## 2021-11-04 RX ORDER — PANTOPRAZOLE SODIUM 40 MG/1
40 TABLET, DELAYED RELEASE ORAL DAILY
Status: DISCONTINUED | OUTPATIENT
Start: 2021-11-04 | End: 2021-11-08 | Stop reason: HOSPADM

## 2021-11-04 RX ORDER — ONDANSETRON 2 MG/ML
4 INJECTION INTRAMUSCULAR; INTRAVENOUS EVERY 6 HOURS PRN
Status: DISCONTINUED | OUTPATIENT
Start: 2021-11-04 | End: 2021-11-08 | Stop reason: HOSPADM

## 2021-11-04 RX ORDER — ALBUMIN (HUMAN) 12.5 G/50ML
50 SOLUTION INTRAVENOUS ONCE
Status: DISCONTINUED | OUTPATIENT
Start: 2021-11-04 | End: 2021-11-04

## 2021-11-04 RX ORDER — ONDANSETRON 4 MG/1
4 TABLET, ORALLY DISINTEGRATING ORAL EVERY 6 HOURS PRN
Status: DISCONTINUED | OUTPATIENT
Start: 2021-11-04 | End: 2021-11-08 | Stop reason: HOSPADM

## 2021-11-04 RX ADMIN — ALBUMIN HUMAN 75 G: 0.25 SOLUTION INTRAVENOUS at 09:52

## 2021-11-04 RX ADMIN — LACTULOSE 10 G: 20 SOLUTION ORAL at 14:08

## 2021-11-04 RX ADMIN — ALBUMIN HUMAN 25 G: 0.25 SOLUTION INTRAVENOUS at 21:37

## 2021-11-04 RX ADMIN — LACTULOSE 10 G: 20 SOLUTION ORAL at 10:12

## 2021-11-04 RX ADMIN — PANTOPRAZOLE SODIUM 40 MG: 40 TABLET, DELAYED RELEASE ORAL at 10:12

## 2021-11-04 RX ADMIN — LACTULOSE 10 G: 20 SOLUTION ORAL at 18:30

## 2021-11-04 RX ADMIN — CEFTRIAXONE SODIUM 1 G: 1 INJECTION, POWDER, FOR SOLUTION INTRAMUSCULAR; INTRAVENOUS at 08:01

## 2021-11-04 RX ADMIN — FLUCONAZOLE 200 MG: 200 TABLET ORAL at 11:45

## 2021-11-04 RX ADMIN — FOLIC ACID 1 MG: 1 TABLET ORAL at 10:12

## 2021-11-04 RX ADMIN — ALBUMIN HUMAN 50 G: 0.25 SOLUTION INTRAVENOUS at 17:43

## 2021-11-04 ASSESSMENT — ACTIVITIES OF DAILY LIVING (ADL)
ADLS_ACUITY_SCORE: 9
ADLS_ACUITY_SCORE: 7
WALKING_OR_CLIMBING_STAIRS_DIFFICULTY: YES
CONCENTRATING,_REMEMBERING_OR_MAKING_DECISIONS_DIFFICULTY: NO
DEPENDENT_IADLS:: INDEPENDENT
HEARING_DIFFICULTY_OR_DEAF: NO
ADLS_ACUITY_SCORE: 9
ADLS_ACUITY_SCORE: 9
ADLS_ACUITY_SCORE: 7
ADLS_ACUITY_SCORE: 7
ADLS_ACUITY_SCORE: 9
ADLS_ACUITY_SCORE: 7
DIFFICULTY_COMMUNICATING: NO
WALKING_OR_CLIMBING_STAIRS: STAIR CLIMBING DIFFICULTY, REQUIRES EQUIPMENT
ADLS_ACUITY_SCORE: 7
ADLS_ACUITY_SCORE: 7
TOILETING_ISSUES: NO
DIFFICULTY_EATING/SWALLOWING: NO
ADLS_ACUITY_SCORE: 9
ADLS_ACUITY_SCORE: 7
WEAR_GLASSES_OR_BLIND: NO
ADLS_ACUITY_SCORE: 9
ADLS_ACUITY_SCORE: 7
DRESSING/BATHING: BATHING DIFFICULTY, ASSISTANCE 1 PERSON
DRESSING/BATHING_DIFFICULTY: OTHER (SEE COMMENTS)
ADLS_ACUITY_SCORE: 9
ADLS_ACUITY_SCORE: 7
PATIENT_/_FAMILY_COMMUNICATION_STYLE: SPOKEN LANGUAGE (ENGLISH OR BILINGUAL)

## 2021-11-04 NOTE — CONSULTS
Consult and Procedure Service - Procedure Note    Attending:.Sofía Urias MD   Procedure: Diagnostic and therapeutic paracentesis  Indication: uncomfortable and ? sbp  Risk Assessment: med  Pre-procedure diagnosis: ascites  Post-procedure diagnosis: ascites    The risks and benefits of the procedure were explained to Fidelina who expressed understanding and opted to proceed.  Consent was obtained and placed in the chart.  A time out was performed.  An area of ascites was located and marked using ultrasound guidance in the rt lower quadrant; the area was prepped and draped in the usual sterile fashion.  8 ml of 1% lidocaine was instilled and ascites located.  The  paracentesis catheter and needle were inserted under real-time guidance until ascites obtained then the needle removed and the catheter advanced.  The apparatus was connected to vacuum bottles and a total of 5000 ml of yellow clear colored fluid removed.   A specimen was  sent for analysis. The catheter was withdrawn and the area dressed.  Patient tolerated the procedure well with no immediate complications.  Please contact the Consult and Procedure Service if any complications or concerns arise.     Sofía Urias MD   DOS:  11/4/2021

## 2021-11-04 NOTE — PLAN OF CARE
A&O x4. Occasionally hypotensive; albumin given, 94/55 this shift; OVSS, up w SBA; pt calling appropriately. Low appetite. Walked to the bathroom independently. Paracentesis scheduled for today. On ceftriaxone. L arm PIV

## 2021-11-04 NOTE — PROGRESS NOTES
Nursing Focus: Admission  D: Arrived at 10;45 from ED via pt transport Patient accompanied by transport personnel. Admitted for hyponatremia, anemia, and infection r/o.  No complaints.      I: Admission process began.  Patient oriented to room, enviroment, call light.  MD notified of patient's arrival on unit.     A: Vital signs stable, afebrile.  Patient stable at this time.      P: Implement plan of care when available. Continue to monitor patient. Nursing interventions as appropriate. Notify MD with changes in pt status.     None

## 2021-11-04 NOTE — CONSULTS
Care Management Initial Consult    General Information  Assessment completed with: Care Team MemberLENNOX-chart review    Type of CM/SW Visit: Initial Assessment    Primary Care Provider verified and updated as needed: Yes   Readmission within the last 30 days: Previous discharge plan unsuccessful      Reason for Consult: Elevated Risk Score   Advance Care Planning: Reviewed: No concerns identified        Communication Assessment  Patient's communication style: Spoken language (English or Bilingual)    Hearing Difficulty or Deaf: no   Wear Glasses or Blind: no    Cognitive  Cognitive/Neuro/Behavioral: WDL                      Living Environment:   People in home: Spouse     Current living Arrangements: House      Able to return to prior arrangements: Yes    Family/Social Support:  Care provided by: Self  Provides care for: No one             Description of Support System: Supportive, Involved       Current Resources:   Patient receiving home care services: No  Community Resources: None  Equipment currently used at home: None  Supplies currently used at home: None    Employment/Financial:  Employment Status: Employed full-time        Financial Concerns: No concerns identified     Lifestyle & Psychosocial Needs:  Social Determinants of Health     Tobacco Use: Medium Risk     Smoking Tobacco Use: Former Smoker     Smokeless Tobacco Use: Never Used   Alcohol Use:      Frequency of Alcohol Consumption:      Average Number of Drinks:      Frequency of Binge Drinking:    Financial Resource Strain:      Difficulty of Paying Living Expenses:    Food Insecurity:      Worried About Running Out of Food in the Last Year:      Ran Out of Food in the Last Year:    Transportation Needs:      Lack of Transportation (Medical):      Lack of Transportation (Non-Medical):    Physical Activity:      Days of Exercise per Week:      Minutes of Exercise per Session:    Stress:      Feeling of Stress :    Social Connections:      Frequency of  Communication with Friends and Family:      Frequency of Social Gatherings with Friends and Family:      Attends Taoism Services:      Active Member of Clubs or Organizations:      Attends Club or Organization Meetings:      Marital Status:    Intimate Partner Violence:      Fear of Current or Ex-Partner:      Emotionally Abused:      Physically Abused:      Sexually Abused:    Depression: Not at risk     PHQ-2 Score: 2   Housing Stability:      Unable to Pay for Housing in the Last Year:      Number of Places Lived in the Last Year:      Unstable Housing in the Last Year:        Functional Status:  Prior to admission patient needed assistance:   Dependent ADLs: Independent  Dependent IADLs: Independent  Assesssment of Functional Status: Not at functional baseline    Mental Health Status:  Mental Health Status: No Current Concerns       Chemical Dependency Status:  Chemical Dependency Status: Current Concern (Dx of alcoholic cirrhosis)           Values/Beliefs:  Spiritual, Cultural Beliefs, Taoism Practices, Values that affect care: No               Additional Information:    Patient was admitted for hyponatremia, anemia, and infection work-up.     CM assessment being completed due to elevated unplanned readmission risk score.     Per team, patient is independent at baseline and does not currently receive any in-home supports or services. No current RNCC/SW needs identified at this time.    Care Management will follow and assist with discharge planning as needed.     Wendy Lagunas, RN, BSN, PHN  Care Coordinator   P: [phone], [employer]

## 2021-11-04 NOTE — CONSULTS
[facility]    Hepatology consult note  Consult requested by Ms. 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  Admitted for infection rule-out.    Chief complaint: leukocytosis    Assessment  Ms. 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  GI is consulted for an infection rule-out.    She has persistent biochemical abnormalities including elevated bilirubin and leukocytosis, though overall appears mildly improved from recent levels.  It is unclear if this is an infection or just slowly resolving alcohol associated hepatitis.    We agree with obtaining a therapeutic/diagnostic paracentesis to exclude SBP and following Blood cultures / Urine cultures.     She has good insight and denies alcohol use since August.  It would be helpful to obtain PETH as well as hepatitis B core antibody (to look for any past exposure).    #1 Decompensated cirrhosis due to ETOH (ascites)  #2 Hyponatremia  #3 Candida esophagitis  #4 Macrocytic anemia    Plan:  - Agree with diagnostic/therapeutic paracentesis with fluid studies, gram stain, cell count, differential, cultures, albumin, total protein, concurrent serum albumin  - Follow-up blood cultures / urine cultures  - Please send PETH (phosphatidylethanol) and Hepatitis B core antibody (to look for prior exposure)  - Continue fluconazole for candidal esophagitis  - Continue home lactulose 10g TID to achieve 3-4 BM per day  - At discharge may need diuretics for home (was on spirolactone 25 mg po previously but reported not taking)  - Low sodium diet (< 2g) to reduce ascites and fluid  - Monitor weights qdaily, monitor for fluid accumulation  - Recommend high protein calorie intake for malnutrition (1.1-1.5g/kg per  day)  - Hepatology will follow  - Ok to discharge when primary team deems appropriate      Noam Murrieta MD  Gastroenterology Fellow, PGY-5    Staffed with attending, Dr. Steele.  ATTENDING NOTE, GASTROENTEROLOGY/HEPATOLOGY    I saw and discussed this patient with the fellow and participated in the decision making. I agree with the fellow's note. Zita Steele MD      HPI:  Ms. 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  GI is consulted for an infection rule-out.    She has a recent history including admission to [facility] and was discharged 10/1/2021 after developing a bilirubin of 30.2, MELD of 40, and 3.95 L of ascites removed on 9/24/2021.  She was started on steroids, however these were discontinued in clinic due to a lack of response.    Lab work-up back then notable for negative Hep A / B SAg / B Rachael / HCV,  CARTER borderline positive 9/25 then negative 9/26,  ASMA (F actin) normal.  IgG was 2005 (mild elevation) and IgG4 108 (mild elevation).     Was felt to be due to alcohol disease overall.  EGD 10/29 with gastric erosions and started on fluconazole for candida esophagitis.    In clinic, labs found to be WBC 15.6 (persistently elevated), AST 83, ALT 77, Bilirubin 14.0.  The bilirubin had been 1.1 on 9/18, then 30.2 on 9/24, 19.3 on 10/25.  INR 2.64 down from 3.30 prior. Hemoglobin was 7.7, but has recently had MCV as high as 130s.   B12 was elevated.     UCX/BCX pending, UA was bland CXR was left opacity, atelectasis vs infection.    Ct abdomen pelvis suggestive of cirrhosis, ascites, portal collaterals.    She states her last ETOH was in August she believes.  She follows a low sodium diet at home.    Previous negative HFE evaluation.      Medical hx Surgical hx   Past Medical History:   Diagnosis Date     Alcoholic hepatitis      Cervical high risk HPV (human papillomavirus) test  positive 2020      Past Surgical History:   Procedure Laterality Date     APPENDECTOMY       ESOPHAGOGASTRODUODENOSCOPY, WITH BRUSHINGS N/A 10/29/2021    Procedure: ESOPHAGOGASTRODUODENOSCOPY, WITH BRUSHINGS;  Surgeon: Torey Ruiz MD;  Location:  GI          Medications  Prior to Admission medications    Medication Sig Start Date End Date Taking? Authorizing Provider   fluconazole (DIFLUCAN) 200 MG tablet Take 400mg (2 tabs) on day 1 and then take 200mg (1 tab) for days 2 through 14. 10/29/21  Yes Torey Ruiz MD   folic acid (FOLVITE) 1 MG tablet Take 1 tablet (1 mg) by mouth daily 10/19/21  Yes Eduardo Parry PA-C   lactulose (CHRONULAC) 10 GM/15ML solution Take 3 times daily. May titrate to achieve 3-4 loose stools per day. 10/20/21  Yes Eduardo Parry PA-C   multivitamin (CENTRUM SILVER) tablet Take 1 tablet by mouth daily   Yes Reported, Patient   pantoprazole (PROTONIX) 40 MG EC tablet Take 1 tablet (40 mg) by mouth daily 10/20/21  Yes Eduardo Parry PA-C   thiamine (B-1) 100 MG tablet Take 1 tablet (100 mg) by mouth daily 10/19/21  Yes Eduardo Parry PA-C   ciprofloxacin (CIPRO) 500 MG tablet Take 1 tablet (500 mg) by mouth daily  Patient not taking: Reported on 11/3/2021 10/19/21   dEuardo Parry PA-C   FLUoxetine (PROZAC) 20 MG capsule TAKE 1 CAPSULE BY MOUTH EVERY DAY  Patient not taking: Reported on 11/3/2021 6/4/20   Reported, Patient   spironolactone (ALDACTONE) 25 MG tablet Take 25 mg by mouth  Patient not taking: Reported on 11/3/2021 10/26/21   Reported, Patient       Allergies  No Known Allergies    Family hx Social hx   Family History   Problem Relation Age of Onset     Cancer Mother      Colon Cancer Paternal Aunt      Colon Cancer Maternal Uncle       Social History     Tobacco Use     Smoking status: Former Smoker     Quit date: 2021     Years since quittin.5     Smokeless tobacco: Never Used   Substance Use Topics      "Alcohol use: Not Currently     Comment: Last drink 8/2021     Drug use: Never          Review of systems  A 10-point review of systems was negative.    Examination  BP (!) 94/33 (BP Location: Left arm)   Pulse 100   Temp 98.7  F (37.1  C) (Oral)   Resp 18   Ht 1.651 m (5' 5\")   Wt 69.2 kg (152 lb 9.6 oz)   SpO2 98%   BMI 25.39 kg/m    Body mass index is 25.39 kg/m .    Constitutional: woman, lying in bed, NAD  HEENT: conjunctival icterus  CV:  No edema.  Respiratory: comfortable on room air  Abdomen:   Distended abdomen  Umbilical hernia, reducible  Dilated abdominal veins  Skin: Jaundiced   Neuro: Alert, moves all extremities, no asterixis.  Psych: Normal affect, answers questions appropriately.    Laboratory  WBC 15.6  AST 83   ALT 77  Bilirubin 14.0.  The bilirubin had been 1.1 on 9/18, then 30.2 on 9/24, 19.3 on 10/25.    INR 2.64 down from 3.30 prior.   Hemoglobin was 7.7, but has recently had MCV as high as 130s.     B12 was elevated.     Radiology  CT abdomen pelvis 11/3/2021  IMPRESSION:   1.  Hepatic cirrhosis with splenomegaly, portosystemic collaterals, and large volume ascites.  2.  Suspected gastric wall thickening which may represent gastritis.    "

## 2021-11-04 NOTE — ED NOTES
Spoke with blood bank, orders for transfusion released, PRBC will be sent to ED for administration

## 2021-11-04 NOTE — PROGRESS NOTES
[facility]    Medicine Progress Note - Hospitalist Service, Gold 9       Date of Admission:  11/3/2021    Assessment & Plan         Melita Cortes is a 51 year old female with a history of alcoholic cirrhosis and cholestatic liver disease, ascites, anemia, and esophageal candidiasis who presented to the ED on 11/3 as advised by hepatology for hyponatremia, SIRS, anemia, and infection r/o. She is admitted to the hospital medicine service with hepatology consulting.    Acute decompensated liver cirrhosis  Alcoholic Cirrhosis w/ Ascites   Grade I Esophageal Varices  Portal hypertension  Portal hypertensive gastropathy   Hyperbilirubinemia   Following outpatient with hepatology. Recently admitted 9/24-10/1 for acute decompensated cirrhosis. Started on Cipro for SBP prophylaxis, and subsequently discontinued on 9/25 2/2 SOB. Completed prednisone taper 10/19. EGD completed on 10/29 showing grade I esophageal varices and gastric erosions without evidence of bleeding.  - GI hepatology consulted, appreciate recommendations   - CAPS consult for diagnostic/therapeutic paracentesis  - Continue PTA Lactulose 10 g TID, titrate to 3 loose BM daily  - Continue PTA Pantoprazole 40 mg PO BID   - CMP, INR in AM  - 2g Na diet    Hypotension with lactic acidosis: This morning with hypotension; due to sepsis vs liver disease. S/P NS bolus 2L. Gave albumin this morning.     Leukocytosis with neutrophilia  SIRS, ongoing: based on HR > 90 bpm and WBC > 12, without known infection.  WBC 17, increasing from 12.6 on 10/29. Lactic Acid 3.8, unchanged after 2L fluid. CXR and UA both without evidence of acute infection. SBP possible though no abdominal pain or AMS. Pt remains tachycardic and soft BP. Received 1g Ceftriaxone in ED. CT without intraabdominal infection  - Blood cultures pending   - Continue Ceftriaxone 1g q24h     Elevated creatinine  Follows with nephrology outpatient. Creatinine  up to 4.1 during last hospital admission and 1.8 at discharge. At that time, ddx included ROSSY vs hepatorenal syndrome vs ATN. Creatinine 1.85 today 11/3. eGFR 31.   - Renally dose medications as appropriate   - Avoid nephrotoxic agents       Macrocytic Anemia  Baseline Hgb 7.8-9. Hgb 6.9 and  on 11/3. Ongoing anemia is multifactorial: alcohol use vs esophageal varices/gastric erosions vs cirrhosis. Received 3 units PRBC on 9/24, 1 unit 9/27, and 1 unit in ED today 11/3.  No acute bleeding.  - Continue PTA Folate, thiamine   - CBC in recheck post-tranfusion     Hyponatremia   Na 125 11/3. Trending down from 130 on 10/25. Hyponatremia likely 2/2 cirrhosis. Possible component of dehydration as well.  Received 2L NS in the ED.   - CBC in AM      Esophageal Candidiasis   Recent EGD with brushings on 10/29/21 positive for candida. Started on Fluconazole.   - Continue PTA Fluconazole 200 mg        Diet: Combination Diet 2 gm NA Diet  Snacks/Supplements Adult: Ensure Enlive; With Meals    DVT Prophylaxis: No anticoagulation given elevated INR and low platelets  Gunter Catheter: Not present  Central Lines: None  Code Status: Full Code      Disposition Plan   Expected discharge: 11/06/2021   recommended to prior living arrangement once blood pressure greater than 100 systolic and SIRS/Sepsis treated.     The patient's care was discussed with the Bedside Nurse and Patient.    Randal More MD  Hospitalist Service, [address]  Securely message with the Vocera Web Console (learn more here)  Text page via [employer] Paging/Directory    Please see sign in/sign out for up to date coverage information    Clinically Significant Risk Factors Present on Admission         # Hyponatremia: Na = 127 mmol/L (Ref range: 133 - 144 mmol/L) on admission, will monitor as appropriate     # Coagulation Defect: INR = 2.42 (Ref range: 0.85 - 1.15) and/or PTT = N/A on admission, will  monitor for bleeding  # Thrombocytopenia: Plts = 114 10e3/uL (Ref range: 150 - 450 10e3/uL) on admission, will monitor for bleeding      ______________________________________________________________________    Interval History   Reports she has been feeling fatigued overnight but this morning has slightly more energy. No abdominal pain. No fever/chills. No change in urine.    Data reviewed today: I reviewed all medications, new labs and imaging results over the last 24 hours. I personally reviewed the chest x-ray image(s) showing no infiltrate.    Physical Exam   Vital Signs: Temp: 98.4  F (36.9  C) Temp src: Oral BP: 94/55 Pulse: 100   Resp: 18 SpO2: 91 % O2 Device: None (Room air)    Weight: 152 lbs 9.6 oz  Constitutional: awake, alert, cooperative, no apparent distress, and appears stated age  Respiratory: No increased work of breathing, good air exchange, clear to auscultation bilaterally, no crackles or wheezing  GI: Distended, normal bowel sounds, nontender

## 2021-11-04 NOTE — H&P
[facility]    History and Physical - Hospitalist Service, Gold Night  Date of Admission:  11/3/2021    Assessment & Plan   Melita Cortes is a 51 year old female with a history of alcoholic cirrhosis and cholestatic liver disease, ascites, anemia, and esophageal candidiasis who presented to the ED on 11/3 as advised by hepatology for hyponatremia, anemia, and infection r/o. She is admitted to the hospital medicine service with hepatology consulting.    Acute decompensated liver cirrhosis  Alcoholic Cirrhosis w/ Ascites   Grade I Esophageal Varices  Portal hypertension  Portal hypertensive gastropathy   Hyperbilirubinemia   Following outpatient with hepatology. Recently admitted 9/24-10/1 for acute decompensated cirrhosis. Started on Cipro for SBP prophylaxis, and subsequently discontinued on 9/25 2/2 SOB. Completed prednisone taper 10/19. EGD completed on 10/29 showing grade I esophageal varices and gastric erosions without evidence of bleeding. Question in GI note dated 10/19/21 whether there was an additional reason for liver disease in addition to known alcohol disease. Bilirubin 14, Alk Phos 155, ALT 77, AST 83. INR 2.27. MELD-Na score 36.   - GI hepatology consulted, appreciate recommendations   - CAPS consult for diagnostic/therapeutic paracentesis  - Continue PTA Lactulose 10 g TID, titrate to 3 loose BM daily  - Continue PTA Pantoprazole 40 mg PO BID   - CMP, INR in AM  - 2g Na diet    Leukocytosis with neutrophilia  WBC 17, increasing from 12.6 on 10/29. Lactic Acid 3.8, unchanged after 2L fluid. CXR and UA both without evidence of acute infection. SBP possible though no abdominal pain or AMS. Pt remains tachycardic and soft BP . Received 1g Ceftriaxone in ED.   - Blood cultures pending   - Obtain CT abdomen - r/o intraabdominal infection  - Continue Ceftriaxone 1g q24h    Elevated creatinine  Follows with nephrology outpatient. Creatinine up to 4.1 during  last hospital admission and 1.8 at discharge. At that time, ddx included ROSSY vs hepatorenal syndrome vs ATN. Creatinine 1.85 today 11/3. eGFR 31.   - Renally dose medications as appropriate   - Avoid nephrotoxic agents      Macrocytic Anemia  Baseline Hgb 7.8-9. Hgb 6.9 and  on 11/3. Ongoing anemia is multifactorial: alcohol use vs esophageal varices/gastric erosions vs cirrhosis. Received 3 units PRBC on 9/24, 1 unit 9/27, and 1 unit in ED today 11/3.  No acute bleeding.  - Continue PTA Folate, thiamine   - CBC in recheck post-tranfusion    Hyponatremia   Na 125 11/3. Trending down from 130 on 10/25. Hyponatremia likely 2/2 cirrhosis. Possible component of dehydration as well.  Received 2L NS in the ED.   - CBC in AM     Esophageal Candidiasis   Recent EGD with brushings on 10/29/21 positive for candida. Started on Fluconazole.   - Continue PTA Fluconazole 200 mg      Diet: 2gm Na Restricted Diet   DVT Prophylaxis: Not indicated: thrombocytopenia and elevated INR  Gunter Catheter: Not present  Central Lines: None  Code Status:   Full    Clinically Significant Risk Factors Present on Admission   # Coagulation Defect: INR = 2.27 (Ref range: 0.85 - 1.15) and/or PTT = N/A on admission, will monitor for bleeding       Disposition Plan   Expected discharge: 11/08/2021 recommended to prior living arrangement once hemoglobin stable, antibiotic plan established.     The patient's care was discussed with the Patient, Attending Physician.    HAKAN Sparks [facility]  Securely message with the Vocera Web Console (learn more here)  Text page via [employer] Paging/Directory    Please see sign in/sign out for up to date coverage information    ______________________________________________________________________    Chief Complaint   Dehydration, abnormal labs (anemia, hyponatremia)    History is obtained from the patient, chart review    History of Present Illness    Melita Cortes is a 51 year old female with a history of alcoholic cirrhosis and cholestatic liver disease, ascites, anemia, and esophageal candidiasis who presented to the ED on 11/3 as advised by hepatology for hyponatremia, anemia, and infection r/o.     She has been experiencing progressive fatigue over the past few days, but otherwise feeling ok. Hepatology was concerned for infection given leukocytosis, however pt denies any symptoms of acute infection, including fevers, myalgias, body chills, sweats, cough, rhinorrhea, nausea/vomiting, abdominal pain, dysuria, or urinary frequency/urgency. Of note, she is not on steroids at this time.    Reports slight dyspnea 2/2 ascites. Last paracentesis 12 days ago, during past hospital admission. No abdominal pain or change in mental status. She was started on prophylactic Cipro during her last admission, but was quickly discontinued due to patient reporting shortness of breath which resolved with discontinuation of the med.     Review of Systems    Review of Systems   Constitutional: Positive for appetite change and fatigue. Negative for chills and fever.   HENT: Negative for congestion and trouble swallowing.    Eyes: Negative for visual disturbance.   Respiratory: Negative for cough, shortness of breath and wheezing.    Cardiovascular: Negative for chest pain, palpitations and leg swelling.   Gastrointestinal: Positive for abdominal distention. Negative for abdominal pain, constipation, diarrhea, nausea and vomiting.   Genitourinary: Negative for dysuria, frequency and urgency.   Musculoskeletal: Negative for arthralgias and myalgias.   Skin: Positive for color change.        Jaundice   Neurological: Positive for weakness. Negative for syncope.   Hematological: Bruises/bleeds easily.   Psychiatric/Behavioral: Negative for confusion.     Past Medical History    I have reviewed this patient's medical history and updated it with pertinent information if needed.    Past Medical History:   Diagnosis Date     Alcoholic hepatitis      Cervical high risk HPV (human papillomavirus) test positive ,     See problem list       Past Surgical History   I have reviewed this patient's surgical history and updated it with pertinent information if needed.  Past Surgical History:   Procedure Laterality Date     APPENDECTOMY       ESOPHAGOGASTRODUODENOSCOPY, WITH BRUSHINGS N/A 10/29/2021    Procedure: ESOPHAGOGASTRODUODENOSCOPY, WITH BRUSHINGS;  Surgeon: Torey Ruiz MD;  Location: [facility]       Social History   I have reviewed this patient's social history and updated it with pertinent information if needed.  Social History     Tobacco Use     Smoking status: Former Smoker     Quit date: 2021     Years since quittin.5     Smokeless tobacco: Never Used   Substance Use Topics     Alcohol use: Not Currently     Comment: Last drink 2021     Drug use: Never       Family History   I have reviewed this patient's family history and updated it with pertinent information if needed.  Family History   Problem Relation Age of Onset     Cancer Mother      Colon Cancer Paternal Aunt      Colon Cancer Maternal Uncle      Prior to Admission Medications   Prior to Admission Medications   Prescriptions Last Dose Informant Patient Reported? Taking?   FLUoxetine (PROZAC) 20 MG capsule Not Taking at Unknown time Self Yes No   Sig: TAKE 1 CAPSULE BY MOUTH EVERY DAY   Patient not taking: Reported on 11/3/2021   ciprofloxacin (CIPRO) 500 MG tablet Not Taking at Unknown time Self No No   Sig: Take 1 tablet (500 mg) by mouth daily   Patient not taking: Reported on 11/3/2021   fluconazole (DIFLUCAN) 200 MG tablet 2021 at am Self No Yes   Sig: Take 400mg (2 tabs) on day 1 and then take 200mg (1 tab) for days 2 through 14.   folic acid (FOLVITE) 1 MG tablet 2021 at am Self No Yes   Sig: Take 1 tablet (1 mg) by mouth daily   lactulose (CHRONULAC) 10 GM/15ML solution 11/3/2021 at pm Self  No Yes   Sig: Take 3 times daily. May titrate to achieve 3-4 loose stools per day.   multivitamin (CENTRUM SILVER) tablet 11/2/2021 at pm Self Yes Yes   Sig: Take 1 tablet by mouth daily   pantoprazole (PROTONIX) 40 MG EC tablet 11/2/2021 at am Self No Yes   Sig: Take 1 tablet (40 mg) by mouth daily   spironolactone (ALDACTONE) 25 MG tablet Not Taking at Unknown time Self Yes No   Sig: Take 25 mg by mouth   Patient not taking: Reported on 11/3/2021   thiamine (B-1) 100 MG tablet 11/2/2021 at pm Self No Yes   Sig: Take 1 tablet (100 mg) by mouth daily      Facility-Administered Medications: None     Allergies   No Known Allergies    Physical Exam   Vital Signs: Temp: 98.7  F (37.1  C) Temp src: Oral BP: 97/60 Pulse: 104   Resp: 17 SpO2: 96 % O2 Device: None (Room air)    Weight: 152 lbs 9.6 oz    General Appearance: Alert and oriented, laying in bed. No acute distress.   HEENT: Mildly icteric sclera, pupils round and reactive to light. Moist mucous membranes.   Respiratory: Normal respiratory effort on room air. Lungs CTAB without wheezing, rhonchi, or rales.   Cardiovascular: Tachycardic rate, normal rhythm. Normal S1, S2. No murmurs appreciated.   GI: Abdomen notably distended without tenderness to palpation. Hyperactive bowel sounds.   Skin: Noticeable jaundice, skin warm and dry.   Extremities: Trace edema bilaterally in extremities.   Neurologic: No focal neuro deficits. Moves all extremities.       Data   Data reviewed today: I reviewed all medications, new labs and imaging results over the last 24 hours.     Recent Labs   Lab 11/03/21 2026 11/03/21  1728 11/03/21  1642 11/02/21  1319 11/02/21  1319 10/29/21  1337 10/29/21  1337   WBC  --  17.0* 17.6*  --  15.6*   < > 12.6*   HGB  --  6.9* 7.2*  --  7.3*   < > 7.8*   MCV  --  119* 117*  --  120*   < > 120*   PLT  --  147* 152  --  136*   < > 96*   INR 2.64*  --   --   --  2.27*  --  2.34*   NA  --  125* 124*  --  124*   < > 124*   POTASSIUM  --  3.9 4.0  --   3.9   < > 3.4   CHLORIDE  --  87* 87*  --  87*   < > 88*   CO2  --  25 25  --  26   < > 24   BUN  --  52* 52*  --  54*   < > 60*   CR  --  1.85* 1.82*  --  1.81*   < > 2.16*   ANIONGAP  --  13 12  --  11   < > 12   SHAI  --  8.9 9.2  --  9.2   < > 9.0   GLC  --  120* 126*  --  142*   < > 114*   ALBUMIN  --  2.2* 2.4*   < > 2.6*  2.7*   < > 2.7*   PROTTOTAL  --  6.1* 6.4*   < > 6.2*   < > 6.4*   BILITOTAL  --  14.0* 14.9*   < > 16.4*   < > 17.9*   ALKPHOS  --  155* 176*   < > 165*   < > 173*   ALT  --  77* 84*   < > 82*   < > 101*   AST  --  83* 90*   < > 79*   < > 92*   TROPONIN  --  <0.015  --   --   --   --   --     < > = values in this interval not displayed.

## 2021-11-05 LAB
ALBUMIN SERPL-MCNC: 3.6 G/DL (ref 3.4–5)
ALP SERPL-CCNC: 94 U/L (ref 40–150)
ALT SERPL W P-5'-P-CCNC: 48 U/L (ref 0–50)
ANION GAP SERPL CALCULATED.3IONS-SCNC: 12 MMOL/L (ref 3–14)
AST SERPL W P-5'-P-CCNC: 46 U/L (ref 0–45)
BACTERIA UR CULT: NORMAL
BILIRUB SERPL-MCNC: 15.8 MG/DL (ref 0.2–1.3)
BUN SERPL-MCNC: 39 MG/DL (ref 7–30)
CALCIUM SERPL-MCNC: 9 MG/DL (ref 8.5–10.1)
CHLORIDE BLD-SCNC: 95 MMOL/L (ref 94–109)
CO2 SERPL-SCNC: 23 MMOL/L (ref 20–32)
CREAT SERPL-MCNC: 1.43 MG/DL (ref 0.52–1.04)
ERYTHROCYTE [DISTWIDTH] IN BLOOD BY AUTOMATED COUNT: 23.6 % (ref 10–15)
FLUAV RNA SPEC QL NAA+PROBE: NEGATIVE
FLUBV RNA RESP QL NAA+PROBE: NEGATIVE
GFR SERPL CREATININE-BSD FRML MDRD: 43 ML/MIN/1.73M2
GLUCOSE BLD-MCNC: 98 MG/DL (ref 70–99)
HBV CORE AB SERPL QL IA: NONREACTIVE
HCT VFR BLD AUTO: 21.5 % (ref 35–47)
HGB BLD-MCNC: 7.2 G/DL (ref 11.7–15.7)
INR PPP: 2.83 (ref 0.85–1.15)
LACTATE SERPL-SCNC: 2.8 MMOL/L (ref 0.7–2)
MCH RBC QN AUTO: 37.7 PG (ref 26.5–33)
MCHC RBC AUTO-ENTMCNC: 33.5 G/DL (ref 31.5–36.5)
MCV RBC AUTO: 113 FL (ref 78–100)
PLATELET # BLD AUTO: 123 10E3/UL (ref 150–450)
POTASSIUM BLD-SCNC: 3.8 MMOL/L (ref 3.4–5.3)
PROT SERPL-MCNC: 6.2 G/DL (ref 6.8–8.8)
RBC # BLD AUTO: 1.91 10E6/UL (ref 3.8–5.2)
RSV RNA SPEC NAA+PROBE: NEGATIVE
SARS-COV-2 RNA RESP QL NAA+PROBE: NEGATIVE
SODIUM SERPL-SCNC: 130 MMOL/L (ref 133–144)
WBC # BLD AUTO: 15.7 10E3/UL (ref 4–11)

## 2021-11-05 PROCEDURE — 83605 ASSAY OF LACTIC ACID: CPT | Performed by: PHYSICIAN ASSISTANT

## 2021-11-05 PROCEDURE — 36415 COLL VENOUS BLD VENIPUNCTURE: CPT | Performed by: INTERNAL MEDICINE

## 2021-11-05 PROCEDURE — 93005 ELECTROCARDIOGRAM TRACING: CPT

## 2021-11-05 PROCEDURE — 250N000011 HC RX IP 250 OP 636: Performed by: INTERNAL MEDICINE

## 2021-11-05 PROCEDURE — 250N000013 HC RX MED GY IP 250 OP 250 PS 637: Performed by: INTERNAL MEDICINE

## 2021-11-05 PROCEDURE — 250N000013 HC RX MED GY IP 250 OP 250 PS 637: Performed by: NURSE PRACTITIONER

## 2021-11-05 PROCEDURE — 120N000002 HC R&B MED SURG/OB UMMC

## 2021-11-05 PROCEDURE — 85610 PROTHROMBIN TIME: CPT | Performed by: INTERNAL MEDICINE

## 2021-11-05 PROCEDURE — 99233 SBSQ HOSP IP/OBS HIGH 50: CPT | Performed by: INTERNAL MEDICINE

## 2021-11-05 PROCEDURE — 85027 COMPLETE CBC AUTOMATED: CPT | Performed by: INTERNAL MEDICINE

## 2021-11-05 PROCEDURE — 93010 ELECTROCARDIOGRAM REPORT: CPT | Performed by: INTERNAL MEDICINE

## 2021-11-05 PROCEDURE — 82040 ASSAY OF SERUM ALBUMIN: CPT | Performed by: INTERNAL MEDICINE

## 2021-11-05 PROCEDURE — 87040 BLOOD CULTURE FOR BACTERIA: CPT | Performed by: INTERNAL MEDICINE

## 2021-11-05 PROCEDURE — 87637 SARSCOV2&INF A&B&RSV AMP PRB: CPT | Performed by: INTERNAL MEDICINE

## 2021-11-05 PROCEDURE — 80321 ALCOHOLS BIOMARKERS 1OR 2: CPT | Performed by: INTERNAL MEDICINE

## 2021-11-05 PROCEDURE — 86704 HEP B CORE ANTIBODY TOTAL: CPT | Performed by: INTERNAL MEDICINE

## 2021-11-05 RX ORDER — ACETAMINOPHEN 325 MG/1
325 TABLET ORAL EVERY 6 HOURS PRN
Status: DISCONTINUED | OUTPATIENT
Start: 2021-11-05 | End: 2021-11-08 | Stop reason: HOSPADM

## 2021-11-05 RX ORDER — ALBUTEROL SULFATE 90 UG/1
2 AEROSOL, METERED RESPIRATORY (INHALATION) ONCE
Status: COMPLETED | OUTPATIENT
Start: 2021-11-05 | End: 2021-11-05

## 2021-11-05 RX ORDER — ALBUTEROL SULFATE 90 UG/1
2 AEROSOL, METERED RESPIRATORY (INHALATION) EVERY 4 HOURS PRN
Status: DISCONTINUED | OUTPATIENT
Start: 2021-11-05 | End: 2021-11-08 | Stop reason: HOSPADM

## 2021-11-05 RX ORDER — LEVOFLOXACIN 500 MG/1
500 TABLET, FILM COATED ORAL DAILY
Status: DISCONTINUED | OUTPATIENT
Start: 2021-11-05 | End: 2021-11-05

## 2021-11-05 RX ORDER — AZITHROMYCIN 250 MG/1
500 TABLET, FILM COATED ORAL ONCE
Status: COMPLETED | OUTPATIENT
Start: 2021-11-05 | End: 2021-11-05

## 2021-11-05 RX ORDER — CEFTRIAXONE 1 G/1
1 INJECTION, POWDER, FOR SOLUTION INTRAMUSCULAR; INTRAVENOUS EVERY 24 HOURS
Status: DISCONTINUED | OUTPATIENT
Start: 2021-11-05 | End: 2021-11-08 | Stop reason: HOSPADM

## 2021-11-05 RX ORDER — AZITHROMYCIN 250 MG/1
250 TABLET, FILM COATED ORAL DAILY
Status: DISCONTINUED | OUTPATIENT
Start: 2021-11-06 | End: 2021-11-08 | Stop reason: HOSPADM

## 2021-11-05 RX ADMIN — AZITHROMYCIN 500 MG: 250 TABLET, FILM COATED ORAL at 09:31

## 2021-11-05 RX ADMIN — PANTOPRAZOLE SODIUM 40 MG: 40 TABLET, DELAYED RELEASE ORAL at 09:32

## 2021-11-05 RX ADMIN — ALBUTEROL SULFATE 2 PUFF: 90 AEROSOL, METERED RESPIRATORY (INHALATION) at 17:04

## 2021-11-05 RX ADMIN — FLUCONAZOLE 200 MG: 200 TABLET ORAL at 09:32

## 2021-11-05 RX ADMIN — LACTULOSE 10 G: 20 SOLUTION ORAL at 18:16

## 2021-11-05 RX ADMIN — LACTULOSE 10 G: 20 SOLUTION ORAL at 09:33

## 2021-11-05 RX ADMIN — ALBUTEROL SULFATE 2 PUFF: 90 AEROSOL, METERED RESPIRATORY (INHALATION) at 20:19

## 2021-11-05 RX ADMIN — ACETAMINOPHEN 325 MG: 325 TABLET, FILM COATED ORAL at 09:32

## 2021-11-05 RX ADMIN — CEFTRIAXONE 1 G: 1 INJECTION, POWDER, FOR SOLUTION INTRAMUSCULAR; INTRAVENOUS at 09:33

## 2021-11-05 RX ADMIN — THIAMINE HCL TAB 100 MG 100 MG: 100 TAB at 09:32

## 2021-11-05 RX ADMIN — LACTULOSE 10 G: 20 SOLUTION ORAL at 12:18

## 2021-11-05 RX ADMIN — FOLIC ACID 1 MG: 1 TABLET ORAL at 09:32

## 2021-11-05 ASSESSMENT — ACTIVITIES OF DAILY LIVING (ADL)
ADLS_ACUITY_SCORE: 9

## 2021-11-05 ASSESSMENT — MIFFLIN-ST. JEOR: SCORE: 1263.17

## 2021-11-05 NOTE — PROGRESS NOTES
"CLINICAL NUTRITION SERVICES - ASSESSMENT NOTE     Nutrition Prescription    RECOMMENDATIONS FOR MDs/PROVIDERS TO ORDER:  -  Recommend starting pt on a daily MVI with Mineral supplement d/t low po intakes  - Would consider trial of appetite to help increase po   - If pt to remain hospitalized > 72 hrs, recommend obtaining calorie counts to help quantify po adequacy    Malnutrition Status:    - Moderate malnutrition in the context of acute illness    Recommendations already ordered by Registered Dietitian (RD):  - None at this time    Future/Additional Recommendations:  - Encourage po with small frequent meals, including ONS  - Monitor po tolerance and adequacy of intakes   - Monitor wt trends     REASON FOR ASSESSMENT  Melita Cortes is a/an 51 year old female assessed by the dietitian for positive Admission Nutrition Risk Screen for wt loss (2 - 13 pounds) and eating poorly because of a decreased appetite.    NUTRITION HISTORY  Pt reports eating fairly well (chicken, steak), a week ago, but now with decreased po d/t not feeling well.    CURRENT NUTRITION ORDERS  Diet: 2 g Sodium with Ensure Enlive supplement (prefers chocolate) with meals.  - Noted pt had 3 bottles of Ensue Enlive at bedside which she reports that she is intending to drink.   Intake/Tolerance: Consuming small amounts of comfort food at present (yogurt, broth, grapes). States she is tired of the hospital food d/t frequent hospitalization    LABS  NA++ 130 (low)  BUN/Cr 39/1.43 (high)  Bili Total 15.8 (high)  INR 2.83 (high)  (-) Ketones on 11/3    MEDICATIONS  Folic Acid daily  Lactulose TID  Thiamine daily    ANTHROPOMETRICS  Height: 165.1 cm (5' 5\")  Most Recent Weight: 64.2 kg (141 lb 9.6 oz) - today's (lowest wt this admission), Admit wt = 69.2 kg (152 lbs) on 11/3  IBW: 56.8 kg  BMI: Normal BMI  Weight History: Per H&P, reported that pt has a paracentesis preformed 12 days PTA. Since admit, paracentesis preformed again on 11/4 with 5000 ml " removed.   Wt Readings from Last 10 Encounters:   11/05/21 64.2 kg (141 lb 9.6 oz)   10/29/21 68.5 kg (151 lb 0.2 oz)   10/19/21 69.5 kg (153 lb 4.8 oz)   10/13/21 69.7 kg (153 lb 9 oz)   10/01/21 70.4 kg (155 lb 3.2 oz)   09/14/20 79.9 kg (176 lb 1.6 oz)     Dosing Weight: 64 kg (based on lowest wt of 64.2 kg on 11/5)    ASSESSED NUTRITION NEEDS  Estimated Energy Needs: 3061-9440 kcals/day (25 - 30 kcals/kg)  Justification: Maintenance  Estimated Protein Needs: 64-77 grams protein/day (1 - 1.2 grams of pro/kg)  Justification: Maintenance with decompensated cirrhosis   Estimated Fluid Needs: (1 mL/kcal)   Justification: Maintenance    PHYSICAL FINDINGS  See malnutrition section below.  Distended abd   Jaundiced     MALNUTRITION (LImited d/t pt covered with multiple blankets and sleepy)  % Intake: </= 50% for >/= 5 days (severe)  % Weight Loss: Unable to assess d/t pt having paracentesis performed   Subcutaneous Fat Loss: Facial region and Upper arm: Mild  Muscle Loss: Temporal: Moderate  Fluid Accumulation/Edema: Unable to assess  Malnutrition Diagnosis: Moderate malnutrition in the context of acute illness    NUTRITION DIAGNOSIS  Inadequate oral intake related to decreased appetite, abd discomfort and menu fatigue as evidenced by pt report of consuming < 50% of meal intakes over the past week and evidence of muscle and fat wasting       INTERVENTIONS  Implementation  Nutrition Education: Patient declined. Acknowledged importance of consuming increased amounts of protein.  Modify composition of meals/snacks - Encourage pt to have her  bring in food from home for her to help minimize menu fatigue    Goals  1. Patient to consume % of nutritionally adequate meal trays TID, or the equivalent with supplements/snacks.    Monitoring/Evaluation  Progress toward goals will be monitored and evaluated per protocol.    Melia Harp RD,LD  7D pager [phone]

## 2021-11-05 NOTE — PROGRESS NOTES
"[facility]    Hepatology Follow-up    CC: Leukocytosis    Dx: Fever   Decompensated cirrhosis due to ETOH (ascites)   Hyponatremia   Macrocytic anemia    24 hour events: She developed fever over the last 24 hours, on CTX / azithromycin now.  5,000 ml removed via paracentesis yesterday.    Subjective: feels tired    Patient denies sweats or chills.    Medications  Current Facility-Administered Medications   Medication Dose Route Frequency    azithromycin  500 mg Oral Once    Followed by    [START ON 11/6/2021] azithromycin  250 mg Oral Daily    cefTRIAXone  1 g Intravenous Q24H    fluconazole  200 mg Oral Daily    folic acid  1 mg Oral Daily    lactulose  10 g Oral TID    pantoprazole  40 mg Oral Daily    sodium chloride (PF)  3 mL Intracatheter Q8H    thiamine  100 mg Oral Daily       Review of systems  A 10-point review of systems was negative.    Examination  /55   Pulse 109   Temp (!) 101.1  F (38.4  C) (Oral)   Resp 20   Ht 1.651 m (5' 5\")   Wt 69.2 kg (152 lb 9.6 oz)   SpO2 96%   BMI 25.39 kg/m      Intake/Output Summary (Last 24 hours) at 11/5/2021 0757  Last data filed at 11/4/2021 2137  Gross per 24 hour   Intake 300 ml   Output --   Net 300 ml       Gen- woman, lying in bed, appears fatigued  RS- comfortable on room air  Abd- soft, non-tender, less distended than yesterday after paracentesis  Extr- no edema  Neuro- moves all extremities  Skin- jaundiced  Psych- normal mood    Laboratory  Lab Results   Component Value Date     11/04/2021     09/18/2020    POTASSIUM 4.2 11/04/2021    POTASSIUM 3.1 09/18/2020    CHLORIDE 93 11/04/2021    CHLORIDE 106 09/18/2020    CO2 24 11/04/2021    CO2 25 09/18/2020    BUN 48 11/04/2021    BUN 2 09/18/2020    CR 1.70 11/04/2021    CR 0.56 09/18/2020       Lab Results   Component Value Date    BILITOTAL 14.5 11/04/2021    BILITOTAL 1.1 09/18/2020    ALT 74 11/04/2021    ALT 57 09/18/2020    AST 85 11/04/2021     " 09/18/2020    ALKPHOS 131 11/04/2021    ALKPHOS 261 09/18/2020       Lab Results   Component Value Date    WBC 15.7 11/05/2021    WBC 12.8 09/18/2020    HGB 7.2 11/05/2021    HGB 12.3 09/18/2020     11/05/2021     09/18/2020     11/05/2021     09/18/2020       Lab Results   Component Value Date    INR 2.42 11/04/2021     MELD-Na score: 34 at 11/5/2021  7:16 AM  MELD score: 32 at 11/5/2021  7:16 AM  Calculated from:  Serum Creatinine: 1.43 mg/dL at 11/5/2021  7:16 AM  Serum Sodium: 130 mmol/L at 11/5/2021  7:16 AM  Total Bilirubin: 15.8 mg/dL at 11/5/2021  7:16 AM  INR(ratio): 2.83 at 11/5/2021  7:16 AM  Age: 51 years    Assessment  Ms. 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  GI is consulted for an infection rule-out.    Overnight, she developed a persistent fever.  Started on CTX/Azithromycin for suspected CAP and a left base focal pulmonary opacity.  Paracentesis yesterday with 5000 ml removed, she feels improved and minimal cell count, not consistent with SBP.    #1 Decompensated cirrhosis due to ETOH (ascites)  #2 Hyponatremia  #3 Candida esophagitis  #4 Macrocytic anemia  #5 Suspected community acquired pneumonia    Recommendations  -- Agree with antibiotics per primary team for suspected CAP  -- Follow blood cultures  -- Please send serum Phosphatidyl ethanol (PETH) and Hepatitis B Core Antibody (to look for past exposure)  -- Continue fluconazole for candidal esophagitis  -- Continue home lactulose 10g TID to achieve 3-4 BM per day  -- At discharge may need diuretics for home (was on spirolactone 25 mg po previously but reported not taking)  -- Low sodium diet (< 2g) to reduce ascites and fluid  -- Monitor weights qdaily, monitor for fluid accumulation  -- Recommend high protein calorie intake for malnutrition (1.1-1.5g/kg per day)    Noam Murrieta,  MD  Gastroenterology Fellow, PGY-5    Case staffed with attending, Dr. Steele.    Hepatology will continue to follow.    ATTENDING NOTE, GASTROENTEROLOGY/HEPATOLOGY    I discussed this patient with the fellow and participated in the decision making. I agree with the fellow's note. Zita Steele MD

## 2021-11-05 NOTE — PROVIDER NOTIFICATION
11/04/21 2000   Call Information   Date of Call 11/04/21   Time of Call 2047   Name of person requesting the team Nayeli   Title of person requesting team RN   RRT Arrival time 2050   Time RRT ended 2059   Reason for call   Type of RRT Adult   Primary reason for call Sepsis suspected   Sepsis Suspected Elevated Lactate level;WBC <4 or >12   Was patient transferred from the ED, ICU, or PACU within last 24 hours prior to RRT call? No   SBAR   Situation LA 3.8   Background 51 year old female with a history of alcoholic cirrhosis and cholestatic liver disease, ascites, anemia, and esophageal candidiasis who presented to the ED on 11/3 as advised by hepatology for hyponatremia, anemia, and infection r/o   Notable History/Conditions End-Stage disease;Organ failure   Assessment A+Ox3.  T 100.4, OVSS   Interventions IV fluids   Patient Outcome   Patient Outcome Stabilized on unit   RRT Team   Attending/Primary/Covering Physician Gold 9   Date Attending Physician notified 11/04/21   Time Attending Physician notified 2047   Physician(s) Madelaine POPE   Lead RN Yaya Morocho   Post RRT Intervention Assessment   Post RRT Assessment Stable/Improved   Date Follow Up Done 11/05/21   Time Follow Up Done 0052   Comments VSS, T101.4

## 2021-11-05 NOTE — PLAN OF CARE
A&O x4, AVSS. Patient O2 saturation dropped to 87-88, now on 3L with O2 sat 94%. Denied SOB or dizziness. Patient had bedside paracentesis with 5L off. Denies pain or N/V. Triggered sepsis, LA 3.8. Code sepsis called and rapid team ordered dose of albumin with LA recheck in the morning. Chest X-ray also completed. SBA up to bathroom, on scheduled lactulose had 3 bowel movements the whole day.

## 2021-11-05 NOTE — PLAN OF CARE
[id number]  VSS, temp max 101.1 MD notified, given PRN tylenol x 1 per MD order. Temp recheck is 100.5  Did covid swap today due to fevers, result is negative.  C/o generalized pain/whheezy. denies pain meds,PIV is intact and saline locked.  Up with sba, voided once, 2 BM, loose for the shift.  Continue to monitor care.

## 2021-11-05 NOTE — PROGRESS NOTES
[facility]    Medicine Progress Note - Hospitalist Service, Gold 9       Date of Admission:  11/3/2021    Assessment & Plan           Melita Cortes is a 51 year old female with a history of alcoholic cirrhosis and cholestatic liver disease, ascites, anemia, and esophageal candidiasis who presented to the ED on 11/3 as advised by hepatology for hyponatremia, SIRS, anemia, and infection r/o. She is admitted to the hospital medicine service with hepatology consulting.    Acute decompensated liver cirrhosis  Alcoholic Cirrhosis w/ Ascites   Grade I Esophageal Varices  Portal hypertension  Portal hypertensive gastropathy   Hyperbilirubinemia   Following outpatient with hepatology. Recently admitted 9/24-10/1 for acute decompensated cirrhosis. Started on Cipro for SBP prophylaxis, and subsequently discontinued on 9/25 2/2 SOB. Completed prednisone taper 10/19. EGD completed on 10/29 showing grade I esophageal varices and gastric erosions without evidence of bleeding. Paracentesis without evidence of SBP.  - GI hepatology consulted, appreciate recommendations   - Continue PTA Lactulose 10 g TID, titrate to 3 loose BM daily  - Continue PTA Pantoprazole 40 mg PO BID   - CMP, INR in AM  - 2g Na diet  - May need diuresis at discharge    Hypotension with lactic acidosis: This morning with hypotension; due to sepsis vs liver disease. S/P NS bolus 2L. Gave albumin this morning.     Leukocytosis with neutrophilia  Sepsis due to community acquired pneumonia, ongoing: Diagnosis based on Temp > 38 C and WBC > 12 due to infection. COVID/flu negative  - Blood cultures pending   - Continue Ceftriaxone 1g q24h and azithromycin x 5 days     Elevated creatinine  Follows with nephrology outpatient. Creatinine up to 4.1 during last hospital admission and 1.8 at discharge. At that time, ddx included ROSSY vs hepatorenal syndrome vs ATN. Creatinine 1.85 today 11/3. eGFR 31.   - Renally dose  "medications as appropriate   - Avoid nephrotoxic agents       Macrocytic Anemia  Baseline Hgb 7.8-9. Hgb 6.9 and  on 11/3. Ongoing anemia is multifactorial: alcohol use vs esophageal varices/gastric erosions vs cirrhosis. Received 3 units PRBC on 9/24, 1 unit 9/27, and 1 unit in ED today 11/3.  No acute bleeding.  - Continue PTA Folate, thiamine   - CBC in recheck post-tranfusion     Hyponatremia   Na 125 11/3. Trending down from 130 on 10/25. Hyponatremia likely 2/2 cirrhosis. Possible component of dehydration as well.  Received 2L NS in the ED.   - CBC in AM      Esophageal Candidiasis   Recent EGD with brushings on 10/29/21 positive for candida. Started on Fluconazole.   - Continue PTA Fluconazole 200 mg        Diet: Combination Diet 2 gm NA Diet  Snacks/Supplements Adult: Ensure Enlive; With Meals    DVT Prophylaxis: No anticoagulation given elevated INR and low platelets  Gunter Catheter: Not present  Central Lines: None  Code Status: Full Code      Disposition Plan   Expected discharge: 11/07/2021   recommended to prior living arrangement once blood pressure greater than 100 systolic and SIRS/Sepsis treated.     The patient's care was discussed with the Bedside Nurse and Patient.    Randal More MD  Hospitalist Service, [address]  Securely message with the Vocera Web Console (learn more here)  Text page via [employer] Paging/Directory    Please see sign in/sign out for up to date coverage information    Clinically Significant Risk Factors Present on Admission                ______________________________________________________________________    Interval History   Had fever overnight. Started having \"wheezing\". No current difficulty breathing, abdominal pain, or change in urine. Felt very tired this morning but more energy this afternoon    Data reviewed today: I reviewed all medications, new labs and imaging results over the last 24 " hours.    Physical Exam   Vital Signs: Temp: 99.5  F (37.5  C) Temp src: Oral BP: 93/49 Pulse: 107   Resp: 20 SpO2: 94 % O2 Device: Nasal cannula Oxygen Delivery: 2 LPM  Weight: 141 lbs 9.6 oz  Constitutional: awake, alert, cooperative, no apparent distress, and appears stated age  Respiratory: No increased work of breathing, good air exchange, clear to auscultation bilaterally, no crackles or wheezing  GI: Distended, normal bowel sounds, nontender

## 2021-11-05 NOTE — PLAN OF CARE
[id number]:     Temp: 100.6 (oral) -- Tmax: 101.4 oral (provider notified - bcx ordered and collected)   BP: 98/55  Pulse: 111  Resp Rate: 18  SpO2: 97% on 4.5LPM via NC     NEURO: Intact, A&O x4.      RESPIRATORY:  Pt on supplemental oxygen at 4.5 LPM via NC. Pt denies having a cough or SOB. Chest x-ray done on evening shift r/t increased oxygen needs. Pt removed oxygen to walk to the bathroom and O2 sats dropped to low 80's, pt recovered quickly when oxygen was reapplied.     CARDIO: Pt tachycardic, but reports that is her baseline.      GI/:  WDL. Last BM: 11/4/21. Pt voiding spontaneously with AUOP. Denies having any N/V.      SKIN: Pt continues to be jaundice.       ACTIVITY: Pt is stand by assistance with ambulation.      PAIN: Pt denies having any pain this shift.      LDA: PIV (left forearm) - saline locked

## 2021-11-05 NOTE — CODE/RAPID RESPONSE
Rapid Response Team Note    Assessment   In assessment a rapid response was called on Melita Cortes due to SIRS/Sepsis trigger and lactic acidosis. This presentation is likely due to decompensated EtOH cirrhosis and worsened by alcoholic cirrhosis w/ ascites, esophageal varices, portal HTN, portal hypertensive gastropathy, ROSSY vs HRS.     Plan   -  Will give additional 25gm albumin 25%, recheck lactic acid in AM.  -  The Internal Medicine primary team was able to be reached and they are in agreement with the above plan.  -  Disposition: The patient will remain on the current unit. We will continue to monitor this patient closely.  -  Reassessment and plan follow-up will be performed by the primary team      MARIYA LEGGETT PA  [facility] RRT AMCOM Job Code Contact #[phone]    Hospital Course   Brief Summary of events leading to rapid response:   RRT called for lactic acid 3.8 (3.8, 4.0). She reports feeling better now than she did earlier today. Had para w/ ~5L removed this afternoon. Has not been eating or drinking much. Was given albumin this AM but none since para. No fevers. No pain. Breathing is normal.    Admission Diagnosis:   Hyponatremia [E87.1]  Malaise and fatigue [R53.81, R53.83]  At high risk for severe sepsis [Z91.89]  Symptomatic anemia [D64.9]     Physical Exam   Temp: 100.4  F (38  C) Temp  Min: 98.3  F (36.8  C)  Max: 100.4  F (38  C)  Resp: 18 Resp  Min: 14  Max: 18  SpO2: 93 % SpO2  Min: 88 %  Max: 100 %  Pulse: 114 Pulse  Min: 97  Max: 114    No data recorded  BP: 99/52 Systolic (24hrs), Av , Min:80 , Max:111   Diastolic (24hrs), Av, Min:33, Max:80     I/Os: No intake/output data recorded.     Exam:   General: chronically ill appearing  Mental Status: AAOx4.  CV: tachycardic, regular  Resp: Nonlabored, CTAB  GI: Soft, distended, nontender    Significant Results and Procedures   Lactic Acid:   Recent Labs   Lab Test 21  1728   LACT 3.8*  3.7* 3.8*     CBC:   Recent Labs   Lab Test 11/04/21  0247 11/03/21  1728 11/03/21  1642   WBC 15.6* 17.0* 17.6*   HGB 7.7* 6.9* 7.2*   HCT 23.2* 21.0* 22.1*   * 147* 152        Sepsis Evaluation   The patient is not known to have an infection.  NO EVIDENCE OF SEPSIS at this time.  Vital sign, physical exam, and lab findings are due to decompensated cirrhosis.

## 2021-11-06 LAB
ALBUMIN SERPL-MCNC: 3 G/DL (ref 3.4–5)
ALP SERPL-CCNC: 77 U/L (ref 40–150)
ALT SERPL W P-5'-P-CCNC: 46 U/L (ref 0–50)
ANION GAP SERPL CALCULATED.3IONS-SCNC: 10 MMOL/L (ref 3–14)
AST SERPL W P-5'-P-CCNC: 44 U/L (ref 0–45)
BILIRUB SERPL-MCNC: 16.9 MG/DL (ref 0.2–1.3)
BLD PROD TYP BPU: NORMAL
BLOOD COMPONENT TYPE: NORMAL
BUN SERPL-MCNC: 34 MG/DL (ref 7–30)
BURR CELLS BLD QL SMEAR: ABNORMAL
CALCIUM SERPL-MCNC: 8.6 MG/DL (ref 8.5–10.1)
CHLORIDE BLD-SCNC: 98 MMOL/L (ref 94–109)
CO2 SERPL-SCNC: 26 MMOL/L (ref 20–32)
CODING SYSTEM: NORMAL
CREAT SERPL-MCNC: 1.43 MG/DL (ref 0.52–1.04)
CROSSMATCH: NORMAL
ERYTHROCYTE [DISTWIDTH] IN BLOOD BY AUTOMATED COUNT: 23.2 % (ref 10–15)
ERYTHROCYTE [DISTWIDTH] IN BLOOD BY AUTOMATED COUNT: 25.2 % (ref 10–15)
GFR SERPL CREATININE-BSD FRML MDRD: 43 ML/MIN/1.73M2
GLUCOSE BLD-MCNC: 114 MG/DL (ref 70–99)
HCT VFR BLD AUTO: 19.2 % (ref 35–47)
HCT VFR BLD AUTO: 27.1 % (ref 35–47)
HGB BLD-MCNC: 6.3 G/DL (ref 11.7–15.7)
HGB BLD-MCNC: 9.1 G/DL (ref 11.7–15.7)
INR PPP: 3.89 (ref 0.85–1.15)
ISSUE DATE AND TIME: NORMAL
LACTATE SERPL-SCNC: 2.5 MMOL/L (ref 0.7–2)
MCH RBC QN AUTO: 36.1 PG (ref 26.5–33)
MCH RBC QN AUTO: 37.3 PG (ref 26.5–33)
MCHC RBC AUTO-ENTMCNC: 32.8 G/DL (ref 31.5–36.5)
MCHC RBC AUTO-ENTMCNC: 33.6 G/DL (ref 31.5–36.5)
MCV RBC AUTO: 108 FL (ref 78–100)
MCV RBC AUTO: 114 FL (ref 78–100)
PLAT MORPH BLD: ABNORMAL
PLATELET # BLD AUTO: 82 10E3/UL (ref 150–450)
PLATELET # BLD AUTO: 94 10E3/UL (ref 150–450)
POLYCHROMASIA BLD QL SMEAR: SLIGHT
POTASSIUM BLD-SCNC: 3.4 MMOL/L (ref 3.4–5.3)
PROT SERPL-MCNC: 5.4 G/DL (ref 6.8–8.8)
RBC # BLD AUTO: 1.69 10E6/UL (ref 3.8–5.2)
RBC # BLD AUTO: 2.52 10E6/UL (ref 3.8–5.2)
RBC MORPH BLD: ABNORMAL
SODIUM SERPL-SCNC: 134 MMOL/L (ref 133–144)
UNIT ABO/RH: NORMAL
UNIT NUMBER: NORMAL
UNIT STATUS: NORMAL
UNIT TYPE ISBT: 9500
WBC # BLD AUTO: 13.9 10E3/UL (ref 4–11)
WBC # BLD AUTO: 16 10E3/UL (ref 4–11)

## 2021-11-06 PROCEDURE — 120N000002 HC R&B MED SURG/OB UMMC

## 2021-11-06 PROCEDURE — 36415 COLL VENOUS BLD VENIPUNCTURE: CPT | Performed by: INTERNAL MEDICINE

## 2021-11-06 PROCEDURE — P9016 RBC LEUKOCYTES REDUCED: HCPCS | Performed by: INTERNAL MEDICINE

## 2021-11-06 PROCEDURE — 87040 BLOOD CULTURE FOR BACTERIA: CPT | Performed by: INTERNAL MEDICINE

## 2021-11-06 PROCEDURE — 99233 SBSQ HOSP IP/OBS HIGH 50: CPT | Performed by: INTERNAL MEDICINE

## 2021-11-06 PROCEDURE — 250N000011 HC RX IP 250 OP 636: Performed by: INTERNAL MEDICINE

## 2021-11-06 PROCEDURE — 85048 AUTOMATED LEUKOCYTE COUNT: CPT | Performed by: INTERNAL MEDICINE

## 2021-11-06 PROCEDURE — 83605 ASSAY OF LACTIC ACID: CPT | Performed by: INTERNAL MEDICINE

## 2021-11-06 PROCEDURE — 82247 BILIRUBIN TOTAL: CPT | Performed by: INTERNAL MEDICINE

## 2021-11-06 PROCEDURE — 250N000013 HC RX MED GY IP 250 OP 250 PS 637: Performed by: INTERNAL MEDICINE

## 2021-11-06 PROCEDURE — P9047 ALBUMIN (HUMAN), 25%, 50ML: HCPCS | Performed by: INTERNAL MEDICINE

## 2021-11-06 PROCEDURE — 86923 COMPATIBILITY TEST ELECTRIC: CPT | Performed by: INTERNAL MEDICINE

## 2021-11-06 PROCEDURE — 85027 COMPLETE CBC AUTOMATED: CPT | Performed by: INTERNAL MEDICINE

## 2021-11-06 PROCEDURE — 84155 ASSAY OF PROTEIN SERUM: CPT | Performed by: INTERNAL MEDICINE

## 2021-11-06 PROCEDURE — 85610 PROTHROMBIN TIME: CPT | Performed by: INTERNAL MEDICINE

## 2021-11-06 PROCEDURE — 250N000013 HC RX MED GY IP 250 OP 250 PS 637: Performed by: NURSE PRACTITIONER

## 2021-11-06 RX ORDER — ALBUMIN (HUMAN) 12.5 G/50ML
50 SOLUTION INTRAVENOUS ONCE
Status: COMPLETED | OUTPATIENT
Start: 2021-11-06 | End: 2021-11-06

## 2021-11-06 RX ADMIN — ACETAMINOPHEN 325 MG: 325 TABLET, FILM COATED ORAL at 00:24

## 2021-11-06 RX ADMIN — CEFTRIAXONE 1 G: 1 INJECTION, POWDER, FOR SOLUTION INTRAMUSCULAR; INTRAVENOUS at 08:53

## 2021-11-06 RX ADMIN — AZITHROMYCIN DIHYDRATE 250 MG: 250 TABLET, FILM COATED ORAL at 08:57

## 2021-11-06 RX ADMIN — FOLIC ACID 1 MG: 1 TABLET ORAL at 08:58

## 2021-11-06 RX ADMIN — ALBUTEROL SULFATE 2 PUFF: 90 AEROSOL, METERED RESPIRATORY (INHALATION) at 00:26

## 2021-11-06 RX ADMIN — LACTULOSE 10 G: 20 SOLUTION ORAL at 12:04

## 2021-11-06 RX ADMIN — THIAMINE HCL TAB 100 MG 100 MG: 100 TAB at 08:57

## 2021-11-06 RX ADMIN — ALBUTEROL SULFATE 2 PUFF: 90 AEROSOL, METERED RESPIRATORY (INHALATION) at 16:54

## 2021-11-06 RX ADMIN — PANTOPRAZOLE SODIUM 40 MG: 40 TABLET, DELAYED RELEASE ORAL at 08:57

## 2021-11-06 RX ADMIN — FLUCONAZOLE 200 MG: 200 TABLET ORAL at 08:57

## 2021-11-06 RX ADMIN — LACTULOSE 10 G: 20 SOLUTION ORAL at 17:53

## 2021-11-06 RX ADMIN — LACTULOSE 10 G: 20 SOLUTION ORAL at 08:57

## 2021-11-06 RX ADMIN — ALBUMIN HUMAN 25 G: 0.25 SOLUTION INTRAVENOUS at 00:51

## 2021-11-06 RX ADMIN — ACETAMINOPHEN 325 MG: 325 TABLET, FILM COATED ORAL at 16:47

## 2021-11-06 ASSESSMENT — ACTIVITIES OF DAILY LIVING (ADL)
ADLS_ACUITY_SCORE: 9

## 2021-11-06 ASSESSMENT — MIFFLIN-ST. JEOR: SCORE: 1251.38

## 2021-11-06 NOTE — PROVIDER NOTIFICATION
11/06/21 0100   Call Information   Date of Call 11/06/21   Time of Call 0150   Name of person requesting the team Estela   Title of person requesting team RN   RRT Arrival time 0152   Time RRT ended 0205   Reason for call   Type of RRT Adult   Primary reason for call Sepsis suspected   Sepsis Suspected Elevated Lactate level;WBC <4 or >12;SPB <90 or MAP 40mmHG;Temperature <95 or >101   Was patient transferred from the ED, ICU, or PACU within last 24 hours prior to RRT call? No   SBAR   Situation LA = 2.5   Background 51 year old female with a history of alcoholic cirrhosis and cholestatic liver disease, ascites, anemia, and esophageal candidiasis who presented to the ED on 11/3 as advised by hepatology for hyponatremia, anemia, and infection r/o   Notable History/Conditions End-Stage disease;Organ failure   Assessment AOx3, VSS except slightly tachy and fevers, denies pain or feeling SOB   Interventions No interventions;Other (describe)  (repeat LA in am)   Patient Outcome   Patient Outcome Stabilized on unit   RRT Team   Attending/Primary/Covering Physician [facility] Arpita   Physician(s) Ashley Woodall, CARLOS   Lead RN Yamileth Palmer   RT NA   Post RRT Intervention Assessment   Post RRT Assessment Stable/Improved   Date Follow Up Done 11/06/21   Time Follow Up Done 0423   Comments Pt sleeping

## 2021-11-06 NOTE — PROGRESS NOTES
Called regarding fever and soft blood pressures. Bolused albumin in the setting of cirrhosis and renal dysfunction. Repeat blood cultures. No other localizing signs of infection. Recent diagnostic paracentesis on 11/4 was negative for SBP. We will continue current antibiotic regiment as it appears it has been initiated less than 24 hours from current fever. We will continue to aggressively monitor hemodynamics and need for broadening antibiotics

## 2021-11-06 NOTE — PROVIDER NOTIFICATION
Pt's temp 101.1, writer informed Dr. Argenis Tee and no new orders at this time. Will continue to monitor.

## 2021-11-06 NOTE — PROGRESS NOTES
Called with hemoglobin level less than 6.3. appears to be associated with Infection/epistaxis/dilution. Transfusion order placed for 1 unit PRBC

## 2021-11-06 NOTE — PLAN OF CARE
"/64   Pulse 102   Temp 98.3  F (36.8  C) (Oral)   Resp 20   Ht 1.651 m (5' 5\")   Wt 63 kg (139 lb)   SpO2 92%   BMI 23.13 kg/m        0730 -1530    Neuro: A&Ox4.   Cardiac: SR. VSS.   Respiratory: Sating 92% on RA.  GI/: No bm. Adequate urine output.   Diet/appetite: Tolerating current diet. Eating well.  Activity: Stand by assist up to chair and in halls.  Pain: At acceptable level on current regimen.   Skin: No new deficits noted.  LDA's:PIV saline locked.  New this shift: Received one unit of PRBC for hgb of 6.3.  Plan: Continue with POC. Notify primary team with changes.  "

## 2021-11-06 NOTE — PLAN OF CARE
[id number]    VS now stable on 3-4L NC. Can wean to 2L during day. Afebrile at 98.7, BP 97/56. Tmax overnight 101.8, with BP of 89/37, MD notified, 25g albumin given with good response, low pressures may have been partially due to too big of a bp cuff, now using a small cuff as arm is 22-23cm. Sepsis response called for lactic of 2.5, no additional interventions ordered, recheck in the AM. Continues on Rocephin and Azithromycin.   Pt had 1 loose stool. Has a mild episode of epistaxis this morning, added humidification to Nasal canula.   Hgb 6.3 this morning, MD notified and one time transfusion ordered.   Continue with POC.

## 2021-11-06 NOTE — PROVIDER NOTIFICATION
"Gold 9 hospitalist paged at *[phone]    \"Pt hgb critical at 6.3. There are no conditional transfusion orders. Had a small episode of epistaxis this AM. Plt at 82. Any intervention? Thanks!\"    Edit: Transfusion order placed by Dr. Wu  "

## 2021-11-06 NOTE — PROGRESS NOTES
[facility]    Medicine Progress Note - Hospitalist Service, Gold 9       Date of Admission:  11/3/2021    Assessment & Plan           Melita Cortes is a 51 year old female with a history of alcoholic cirrhosis and cholestatic liver disease, ascites, anemia, and esophageal candidiasis who presented to the ED on 11/3, admitted for hyponatremia, sepsis 2/2 pneumonia, and anemia.    Acute decompensated liver cirrhosis  Alcoholic Cirrhosis w/ Ascites   Grade I Esophageal Varices  Portal hypertension  Portal hypertensive gastropathy   Hyperbilirubinemia   Following outpatient with hepatology. Recently admitted 9/24-10/1 for acute decompensated cirrhosis. Started on Cipro for SBP prophylaxis, and subsequently discontinued on 9/25 2/2 SOB. Completed prednisone taper 10/19. EGD completed on 10/29 showing grade I esophageal varices and gastric erosions without evidence of bleeding. Paracentesis without evidence of SBP.  - GI hepatology consulted, appreciate recommendations   - Continue PTA Lactulose 10 g TID, titrate to 3 loose BM daily  - Continue PTA Pantoprazole 40 mg PO BID   - CMP, INR in AM  - 2g Na diet  - May need diuresis at discharge    Hypotension with lactic acidosis: Likely due to combination of infection + anemia + liver disease. Bolus with albumin PRN.     Leukocytosis with neutrophilia  Sepsis due to community acquired pneumonia, ongoing: Diagnosis based on Temp > 38 C and WBC > 12 due to infection. COVID/flu negative  - Blood cultures pending   - Continue Ceftriaxone 1g q24h and azithromycin x 5 days     Elevated creatinine  Follows with nephrology outpatient. Creatinine up to 4.1 during last hospital admission and 1.8 at discharge. At that time, ddx included ROSSY vs hepatorenal syndrome vs ATN.  - Renally dose medications as appropriate   - Avoid nephrotoxic agents       Macrocytic Anemia  Baseline Hgb 7.8-9. Hgb 6.9 and  on 11/3. Ongoing anemia is  multifactorial: esophageal varices/gastric erosions vs epistaxis. Received 3 units PRBC on 9/24, 1 unit 9/27, and 1 unit in ED 11/3, and 1 unit 11/6. Having epistaxis. No evidence of GI bleed or heavy menstrual bleeding.  - Continue PTA Folate, thiamine   - CBC in recheck post-tranfusion     Hyponatremia   Hyponatremia likely 2/2 cirrhosis with addition of hypovolemia. Improving with albumin.  - CMP in AM     Esophageal Candidiasis   Recent EGD with brushings on 10/29/21 positive for candida. Started on Fluconazole.   - Continue PTA Fluconazole 200 mg        Diet: Combination Diet 2 gm NA Diet  Snacks/Supplements Adult: Ensure Enlive; With Meals    DVT Prophylaxis: No anticoagulation given elevated INR and low platelets  Gunter Catheter: Not present  Central Lines: None  Code Status: Full Code      Disposition Plan   Expected discharge: 11/07/2021   recommended to prior living arrangement once blood pressure greater than 100 systolic and SIRS/Sepsis treated.     The patient's care was discussed with the Bedside Nurse and Patient.    Randal More MD  Hospitalist Service, [address]  Securely message with the Vocera Web Console (learn more here)  Text page via [facility] Paging/Directory    Please see sign in/sign out for up to date coverage information    Clinically Significant Risk Factors Present on Admission                ______________________________________________________________________    Interval History   Had fever overnight. Reports she still is fatigued. No abdominal pain. Breathing is improving; coughing less. No blood in stools. Stools are dark but not black. Had bloody nose overnight.    Data reviewed today: I reviewed all medications, new labs and imaging results over the last 24 hours.    Physical Exam   Vital Signs: Temp: 98.3  F (36.8  C) Temp src: Oral BP: 118/64 Pulse: 102   Resp: 20 SpO2: 92 % O2 Device: None (Room air) Oxygen Delivery: 2  LPM  Weight: 139 lbs 0 oz  Constitutional: awake, alert, cooperative, no apparent distress, and appears stated age  Respiratory: No increased work of breathing, good air exchange, clear to auscultation bilaterally. Crackles in left lung base.  GI: Distended, normal bowel sounds, nontender

## 2021-11-06 NOTE — PROGRESS NOTES
Rapid Response Team Note    Assessment   A rapid response was called on Melita Cortes due to SIRS/Sepsis trigger and lactic acidosis. This presentation is likely due to pneumonia, decompensated cirrhosis vs sepsis. She was recently started on azithromycin and ceftriaxone. Blood cultures drawn an hour ago and 25g albumin given. She has had on-going fevers and leukocytosis. No organism seen in ascites fluid 2021.       Plan   -  Recheck lactic acid with AM labs    -  The Internal Medicine primary team was able to be reached and they are in agreement with the above plan.  -  Disposition: The patient will remain on the current unit. We will continue to monitor this patient closely.  -  Reassessment and plan follow-up will be performed by the primary team      HAKAN Call CNP  [facility] RRT [facility] Job Code Contact #[phone]    Hospital Course   Brief Summary of events leading to rapid response:   BPA for sepsis eval    Admission Diagnosis:   Hyponatremia [E87.1]  Malaise and fatigue [R53.81, R53.83]  At high risk for severe sepsis [Z91.89]  Symptomatic anemia [D64.9]     Physical Exam   Temp: (!) 100.5  F (38.1  C) Temp  Min: 99.5  F (37.5  C)  Max: 101.8  F (38.8  C)  Resp: 20 Resp  Min: 18  Max: 20  SpO2: 93 % SpO2  Min: 93 %  Max: 98 %  Pulse: 108 Pulse  Min: 106  Max: 111    No data recorded  BP: 112/49 Systolic (24hrs), Av , Min:89 , Max:112   Diastolic (24hrs), Av, Min:37, Max:58     I/Os: I/O last 3 completed shifts:  In: 720 [P.O.:720]  Out: -      Exam:   General: chronically ill appearing  Mental Status: baseline mental status.      Significant Results and Procedures   Lactic Acid:   Recent Labs   Lab Test 21  0102 21  0716 21  2031   LACT 2.5* 2.8* 3.8*     CBC:   Recent Labs   Lab Test 21  0716 21  0247 21  1728   WBC 15.7* 15.6* 17.0*   HGB 7.2* 7.7* 6.9*   HCT 21.5* 23.2* 21.0*   * 114* 147*        Sepsis Evaluation   The  patient is known to have an infection.      Anti-infectives (From now, onward)    Start     Dose/Rate Route Frequency Ordered Stop    11/06/21 0000  azithromycin (ZITHROMAX) tablet 250 mg      250 mg Oral DAILY 11/05/21 0748 11/10/21 0759    11/05/21 0800  cefTRIAXone (ROCEPHIN) 1 g vial to attach to  mL bag for ADULTS or NS 50 mL bag for PEDS      1 g  over 15-30 Minutes Intravenous EVERY 24 HOURS 11/05/21 0748      11/04/21 0800  fluconazole (DIFLUCAN) tablet 200 mg      200 mg Oral DAILY 11/04/21 0209          Current antibiotic coverage is appropriate for source of infection.

## 2021-11-07 LAB
ALBUMIN SERPL-MCNC: 2.8 G/DL (ref 3.4–5)
ALP SERPL-CCNC: 99 U/L (ref 40–150)
ALT SERPL W P-5'-P-CCNC: 40 U/L (ref 0–50)
ANION GAP SERPL CALCULATED.3IONS-SCNC: 9 MMOL/L (ref 3–14)
AST SERPL W P-5'-P-CCNC: 44 U/L (ref 0–45)
BILIRUB SERPL-MCNC: 15.7 MG/DL (ref 0.2–1.3)
BUN SERPL-MCNC: 29 MG/DL (ref 7–30)
CALCIUM SERPL-MCNC: 8.2 MG/DL (ref 8.5–10.1)
CHLORIDE BLD-SCNC: 100 MMOL/L (ref 94–109)
CO2 SERPL-SCNC: 25 MMOL/L (ref 20–32)
CREAT SERPL-MCNC: 1.12 MG/DL (ref 0.52–1.04)
ERYTHROCYTE [DISTWIDTH] IN BLOOD BY AUTOMATED COUNT: ABNORMAL %
GFR SERPL CREATININE-BSD FRML MDRD: 57 ML/MIN/1.73M2
GLUCOSE BLD-MCNC: 145 MG/DL (ref 70–99)
HCT VFR BLD AUTO: 24 % (ref 35–47)
HGB BLD-MCNC: 7.9 G/DL (ref 11.7–15.7)
HOLD SPECIMEN: NORMAL
INR PPP: 3.21 (ref 0.85–1.15)
LACTATE SERPL-SCNC: 2.9 MMOL/L (ref 0.7–2)
LACTATE SERPL-SCNC: 3.2 MMOL/L (ref 0.7–2)
MCH RBC QN AUTO: 35 PG (ref 26.5–33)
MCHC RBC AUTO-ENTMCNC: 32.9 G/DL (ref 31.5–36.5)
MCV RBC AUTO: 106 FL (ref 78–100)
PLATELET # BLD AUTO: 87 10E3/UL (ref 150–450)
POTASSIUM BLD-SCNC: 3.5 MMOL/L (ref 3.4–5.3)
PROT SERPL-MCNC: 5.5 G/DL (ref 6.8–8.8)
RBC # BLD AUTO: 2.26 10E6/UL (ref 3.8–5.2)
SODIUM SERPL-SCNC: 134 MMOL/L (ref 133–144)
WBC # BLD AUTO: 14.6 10E3/UL (ref 4–11)

## 2021-11-07 PROCEDURE — 83605 ASSAY OF LACTIC ACID: CPT | Performed by: INTERNAL MEDICINE

## 2021-11-07 PROCEDURE — 36415 COLL VENOUS BLD VENIPUNCTURE: CPT | Performed by: INTERNAL MEDICINE

## 2021-11-07 PROCEDURE — 250N000013 HC RX MED GY IP 250 OP 250 PS 637: Performed by: INTERNAL MEDICINE

## 2021-11-07 PROCEDURE — 99232 SBSQ HOSP IP/OBS MODERATE 35: CPT | Performed by: INTERNAL MEDICINE

## 2021-11-07 PROCEDURE — P9041 ALBUMIN (HUMAN),5%, 50ML: HCPCS | Performed by: NURSE PRACTITIONER

## 2021-11-07 PROCEDURE — 120N000002 HC R&B MED SURG/OB UMMC

## 2021-11-07 PROCEDURE — 250N000013 HC RX MED GY IP 250 OP 250 PS 637: Performed by: NURSE PRACTITIONER

## 2021-11-07 PROCEDURE — 85610 PROTHROMBIN TIME: CPT | Performed by: INTERNAL MEDICINE

## 2021-11-07 PROCEDURE — 99207 PR CDG-MDM COMPONENT: MEETS MODERATE - DOWN CODED: CPT | Performed by: INTERNAL MEDICINE

## 2021-11-07 PROCEDURE — 85027 COMPLETE CBC AUTOMATED: CPT | Performed by: INTERNAL MEDICINE

## 2021-11-07 PROCEDURE — 82040 ASSAY OF SERUM ALBUMIN: CPT | Performed by: INTERNAL MEDICINE

## 2021-11-07 PROCEDURE — 250N000011 HC RX IP 250 OP 636: Performed by: NURSE PRACTITIONER

## 2021-11-07 PROCEDURE — 250N000011 HC RX IP 250 OP 636: Performed by: INTERNAL MEDICINE

## 2021-11-07 RX ORDER — ALBUMIN, HUMAN INJ 5% 5 %
12.5 SOLUTION INTRAVENOUS ONCE
Status: COMPLETED | OUTPATIENT
Start: 2021-11-07 | End: 2021-11-07

## 2021-11-07 RX ADMIN — AZITHROMYCIN DIHYDRATE 250 MG: 250 TABLET, FILM COATED ORAL at 07:48

## 2021-11-07 RX ADMIN — FLUCONAZOLE 200 MG: 200 TABLET ORAL at 07:48

## 2021-11-07 RX ADMIN — LACTULOSE 10 G: 20 SOLUTION ORAL at 12:54

## 2021-11-07 RX ADMIN — FOLIC ACID 1 MG: 1 TABLET ORAL at 07:48

## 2021-11-07 RX ADMIN — LACTULOSE 10 G: 20 SOLUTION ORAL at 07:48

## 2021-11-07 RX ADMIN — LACTULOSE 10 G: 20 SOLUTION ORAL at 18:33

## 2021-11-07 RX ADMIN — ALBUMIN HUMAN 12.5 G: 0.05 INJECTION, SOLUTION INTRAVENOUS at 06:55

## 2021-11-07 RX ADMIN — CEFTRIAXONE 1 G: 1 INJECTION, POWDER, FOR SOLUTION INTRAMUSCULAR; INTRAVENOUS at 07:48

## 2021-11-07 RX ADMIN — PANTOPRAZOLE SODIUM 40 MG: 40 TABLET, DELAYED RELEASE ORAL at 07:48

## 2021-11-07 RX ADMIN — THIAMINE HCL TAB 100 MG 100 MG: 100 TAB at 07:48

## 2021-11-07 ASSESSMENT — ACTIVITIES OF DAILY LIVING (ADL)
ADLS_ACUITY_SCORE: 9

## 2021-11-07 ASSESSMENT — MIFFLIN-ST. JEOR: SCORE: 1263.88

## 2021-11-07 NOTE — PROGRESS NOTES
[facility]    Medicine Progress Note - Hospitalist Service, Gold 9       Date of Admission:  11/3/2021    Assessment & Plan           Melita Cortes is a 51 year old female with a history of alcoholic cirrhosis and cholestatic liver disease, ascites, anemia, and esophageal candidiasis who presented to the ED on 11/3, admitted for hyponatremia, sepsis 2/2 pneumonia, and anemia.    Acute decompensated liver cirrhosis  Alcoholic Cirrhosis w/ Ascites   Grade I Esophageal Varices  Portal hypertension  Portal hypertensive gastropathy   Hyperbilirubinemia   Following outpatient with hepatology. Recently admitted 9/24-10/1 for acute decompensated cirrhosis. Started on Cipro for SBP prophylaxis, and subsequently discontinued on 9/25 2/2 SOB. Completed prednisone taper 10/19. EGD completed on 10/29 showing grade I esophageal varices and gastric erosions without evidence of bleeding. Paracentesis without evidence of SBP.  - GI hepatology consulted, appreciate recommendations   - Continue PTA Lactulose 10 g TID, titrate to 3 loose BM daily  - Continue PTA Pantoprazole 40 mg PO BID   - CMP, INR in AM  - 2g Na diet  - May need diuresis at discharge    Hypotension with lactic acidosis: Likely due to combination of infection + anemia + liver disease. Bolus with albumin PRN.     Leukocytosis with neutrophilia  Sepsis due to community acquired pneumonia, ongoing: Diagnosis based on Temp > 38 C and WBC > 12 due to infection. COVID/flu negative  - Blood cultures pending   - Continue Ceftriaxone 1g q24h and azithromycin x 5 days     Elevated creatinine  Follows with nephrology outpatient. Creatinine up to 4.1 during last hospital admission and 1.8 at discharge. At that time, ddx included ROSSY vs hepatorenal syndrome vs ATN.  - Renally dose medications as appropriate   - Avoid nephrotoxic agents       Macrocytic Anemia  Baseline Hgb 7.8-9. Hgb 6.9 and  on 11/3. Ongoing anemia is  multifactorial: esophageal varices/gastric erosions vs epistaxis. Received 3 units PRBC on 9/24, 1 unit 9/27, and 1 unit in ED 11/3, and 1 unit 11/6. Having epistaxis. No evidence of GI bleed or heavy menstrual bleeding.  - Continue PTA Folate, thiamine   - CBC in recheck post-tranfusion     Hyponatremia   Hyponatremia likely 2/2 cirrhosis with addition of hypovolemia. Improving with albumin.  - CMP in AM     Esophageal Candidiasis   Recent EGD with brushings on 10/29/21 positive for candida. Started on Fluconazole.   - Continue PTA Fluconazole 200 mg        Diet: Combination Diet 2 gm NA Diet  Snacks/Supplements Adult: Ensure Enlive; With Meals    DVT Prophylaxis: No anticoagulation given elevated INR and low platelets  Gunter Catheter: Not present  Central Lines: None  Code Status: Full Code      Disposition Plan   Expected discharge: 11/07/2021   recommended to prior living arrangement once blood pressure greater than 100 systolic and SIRS/Sepsis treated.     The patient's care was discussed with the Bedside Nurse and Patient.    Randal More MD  Hospitalist Service, [address]  Securely message with the Vocera Web Console (learn more here)  Text page via [facility] Paging/Directory    Please see sign in/sign out for up to date coverage information    Clinically Significant Risk Factors Present on Admission                ______________________________________________________________________    Interval History   Reports she feels better today from a breathing standpoint. No fever this morning. Energy is still low. No blood in stool. No black stools. Urinating normally.    Data reviewed today: I reviewed all medications, new labs and imaging results over the last 24 hours.    Physical Exam   Vital Signs: Temp: 99.6  F (37.6  C) Temp src: Oral BP: 114/63 Pulse: 112   Resp: 20 SpO2: 93 % O2 Device: Nasal cannula Oxygen Delivery: 2 LPM  Weight: 141 lbs 12.09  oz  Constitutional: awake, alert, cooperative, no apparent distress, and appears stated age  Respiratory: No increased work of breathing, good air exchange, clear to auscultation bilaterally. Crackles in left lung base.  GI: Distended, normal bowel sounds, nontender. No asterixis

## 2021-11-07 NOTE — PROGRESS NOTES
Rapid Response Team Note    Assessment   A rapid response was called on Melita Cortes due to SIRS/Sepsis trigger and lactic acidosis. This presentation is likely due to pneumonia, decompensated cirrhosis vs sepsis. She is on azithromycin and ceftriaxone. Blood cultures negative after 1 day. Fevers have been improving. No organism seen in ascites fluid 2021.     Plan   -  Albumin 12.5g  - repeat lactic acid 1030    -  The Internal Medicine primary team was able to be reached and they are in agreement with the above plan.  -  Disposition: The patient will remain on the current unit. We will continue to monitor this patient closely.  -  Reassessment and plan follow-up will be performed by the primary team      HAKAN Call CNP  [facility] RRT [facility] Job Code Contact #[phone]    Hospital Course   Brief Summary of events leading to rapid response:   BPA for sepsis eval due to HR of 105 and leukocytosis    Admission Diagnosis:   Hyponatremia [E87.1]  Malaise and fatigue [R53.81, R53.83]  At high risk for severe sepsis [Z91.89]  Symptomatic anemia [D64.9]     Physical Exam   Temp: 100.1  F (37.8  C) Temp  Min: 97.4  F (36.3  C)  Max: 101.6  F (38.7  C)  Resp: 20 Resp  Min: 18  Max: 20  SpO2: 98 % SpO2  Min: 92 %  Max: 100 %  Pulse: 105 Pulse  Min: 100  Max: 110    No data recorded  BP: 102/56 Systolic (24hrs), Av , Min:94 , Max:118   Diastolic (24hrs), Av, Min:46, Max:65     I/Os: I/O last 3 completed shifts:  In: 830 [P.O.:480]  Out: -      Exam:   General: chronically ill appearing  Mental Status: baseline mental status.      Significant Results and Procedures   Lactic Acid:   Recent Labs   Lab Test 21  0530 21  0102 21  0716   LACT 3.2* 2.5* 2.8*     CBC:   Recent Labs   Lab Test 21  1502 21  0552 21  0716   WBC 16.0* 13.9* 15.7*   HGB 9.1* 6.3* 7.2*   HCT 27.1* 19.2* 21.5*   PLT 94* 82* 123*        Sepsis Evaluation   The patient is known to have  "an infection.    Anti-infectives (From now, onward)    Start     Dose/Rate Route Frequency Ordered Stop    11/06/21 0000  azithromycin (ZITHROMAX) tablet 250 mg        \"Followed by\" Linked Group Details    250 mg Oral DAILY 11/05/21 0748 11/10/21 0759    11/05/21 0800  cefTRIAXone (ROCEPHIN) 1 g vial to attach to  mL bag for ADULTS or NS 50 mL bag for PEDS         1 g  over 15-30 Minutes Intravenous EVERY 24 HOURS 11/05/21 0748      11/04/21 0800  fluconazole (DIFLUCAN) tablet 200 mg         200 mg Oral DAILY 11/04/21 0209          Current antibiotic coverage is appropriate for source of infection.     "

## 2021-11-07 NOTE — PROGRESS NOTES
"Denies pain. A&Ox4. Lactic acid recheck 2.9. MD aware. Ms. Cortes up with standby assist to bathroom. Void x1, BM loose stool x2. Appetite is fair, drank whole Boost, few bites of oatmeal. Up in chair for 2 hours this afternoon. SpO2 97% on room air while sitting up, 90% when laying in bed. On 2L nc while in bed. /63 (BP Location: Right arm)   Pulse 112   Temp 99.6  F (37.6  C) (Oral)   Resp 20   Ht 1.651 m (5' 5\")   Wt 64.3 kg (141 lb 12.1 oz)   SpO2 93%   BMI 23.59 kg/m    Continue with monitoring.   "

## 2021-11-07 NOTE — PLAN OF CARE
The EMR was down for 3 hours on 11/7/2021.    Estela Gaspar RN was responsible for completing the paper charting during this time period.     The following information was re-entered into the system by Estela Gaspar RN: Flowsheet data     The following information will remain in the paper chart: N/A    Estela Gaspar RN  11/7/2021

## 2021-11-07 NOTE — PROVIDER NOTIFICATION
11/07/21 0600   Call Information   Date of Call 11/07/21   Time of Call 0600   Name of person requesting the team Felecia   Title of person requesting team RN   RRT Arrival time 0602   Time RRT ended 0620   Reason for call   Type of RRT Adult   Primary reason for call Sepsis suspected   Sepsis Suspected Elevated Lactate level;Heart Rate > 100;WBC <4 or >12   Was patient transferred from the ED, ICU, or PACU within last 24 hours prior to RRT call? No   SBAR   Situation LA = 3.2   Background 51 year old female with a history of alcoholic cirrhosis and cholestatic liver disease, ascites, anemia, and esophageal candidiasis who presented to the ED on 11/3 as advised by hepatology for hyponatremia, anemia, and infection r/o   Notable History/Conditions End-Stage disease;Organ failure   Assessment AOx3, low grade fever, tachy in 100s, other VSS, had a paracentesis yesterday so abd soft and non-painful, denies SOB, quite jaundiced  (taking lactulose so lots of loose stools, not drinking alot )   Interventions Labs;Meds  (Will order albumin and recheck LA )   Patient Outcome   Patient Outcome Stabilized on unit   RRT Team   Attending/Primary/Covering Physician Levar 9   Physician(s) Ashley Woodall NP   Lead RN Yamileth Palmer   RT NA   Post RRT Intervention Assessment   Post RRT Assessment Stable/Improved   Date Follow Up Done 11/07/21   Time Follow Up Done 0849   Comments repeat LA at 1030

## 2021-11-07 NOTE — PLAN OF CARE
[id number]    AVSS on 2L NC. Pt did not require increased oxygen as with previous nights.Reports feeling imporvment in breathing. Tmax 100.1. Triggered sepsis due to mild tachycardia, lactic came back as 3.2 and code sepsis called at 0600. Will give albumin and have a recheck lactic in 4 hours.     Skin tear noted on R chest with old dressing in place. Pt reports it happened in the ED when an electrode was removed. New foam dressing applied.     Continue with POC.

## 2021-11-07 NOTE — PLAN OF CARE
HPI:       Shireen Vazquez is a 37 year old female who presents for the following concern:    Left Knee Pain   Right Foot Pain   - Injury occurred at work   - Table fell onto left knee and then onto right foot after she picked up a box of tomatoes   - Diagnosed with contusion of her left knee and right foot   - Pain has been improving since the injury 2 weeks ago   - Has been working 6 hours without restriction since the injury and feels she is able to increase the amount of time she is working.   - Pain is exacerbated by standing for long periods of time and improves when she is able to sit down every 2 hours for breaks     An [email or web address]  was used for this visit.          PMHX:     Past medical history reviewed     Mediations reviewed     Social history reviewed     No known allergies     Family history reviewed           Review of Systems:       10 point review of systems negative except for noted in HPI             Physical Exam:     Vitals:    05/21/20 1326   BP: 93/60   Pulse: 86   Resp: 18     Exam:   Constitutional: healthy, alert and no distress  Musculoskeletal: Full ROM of bilateral knees. Very minimal tenderness to very deep palpation over medial tibia, just distal to tibial plateau. Negative stressing of ACL, PCL, MCL and LCL with no laxity. Negative McMurrys. Sensation intact, no overlying erythema or appreciable effusion. Right ankle with full active and passive ROM and normal strength. Sensation intact, no overlying erythema, ecchymoses or edema. Very minimal pain with deep palpation to dorsal proximal 4th metatarsal. No other bony tenderness. Normal gait.   Psychiatric: mentation appears normal and affect normal/bright      Assessment and Plan     1. Contusion of left knee, initial encounter  2. Contusion of right foot, initial encounter  Appropriate interval improvement in pain, but having pain when standing for long periods of time. Does better when she is able to sit for every 2 hours.  A&O x4, AVSS on 2L. Tmax at beginning of shift 101.6, PRN tylenol given and temp recheck 99.9. If patient spikes fever overnight, new blood cultures due after 0100. Albuterol inhaler used x1 for intermittent wheezing. Appetite slightly better today, ordered food from kitchen. SBA to bathroom with adequate urine output, LBM 11/6 at goal for 3 bowel movements per day.    Therefore will be on full duties for a maximum duration of 10 hours daily and needs breaks every 2 hours to sit for at least 15 minutes. Also lifting restriction for <50lbs.   - Follow up in 2 weeks to re-evaluate restrictions     Options for treatment and follow-up care were reviewed with the patient and/or guardian. Shireen George and/or guardian engaged in the decision making process and verbalized understanding of the options discussed and agreed with the final plan.    Mona Kraus DO (PGY3)   Pager #[phone]    Precepted today with: Stephanie Christianson MD

## 2021-11-08 VITALS
BODY MASS INDEX: 23.63 KG/M2 | SYSTOLIC BLOOD PRESSURE: 92 MMHG | WEIGHT: 141.8 LBS | HEART RATE: 102 BPM | OXYGEN SATURATION: 93 % | HEIGHT: 65 IN | TEMPERATURE: 100.3 F | RESPIRATION RATE: 20 BRPM | DIASTOLIC BLOOD PRESSURE: 52 MMHG

## 2021-11-08 LAB
ALBUMIN SERPL-MCNC: 2.7 G/DL (ref 3.4–5)
ALP SERPL-CCNC: 88 U/L (ref 40–150)
ALT SERPL W P-5'-P-CCNC: 42 U/L (ref 0–50)
ANION GAP SERPL CALCULATED.3IONS-SCNC: 9 MMOL/L (ref 3–14)
AST SERPL W P-5'-P-CCNC: 87 U/L (ref 0–45)
BILIRUB SERPL-MCNC: 15.6 MG/DL (ref 0.2–1.3)
BUN SERPL-MCNC: 28 MG/DL (ref 7–30)
CALCIUM SERPL-MCNC: 8.5 MG/DL (ref 8.5–10.1)
CHLORIDE BLD-SCNC: 98 MMOL/L (ref 94–109)
CO2 SERPL-SCNC: 25 MMOL/L (ref 20–32)
CREAT SERPL-MCNC: 1.13 MG/DL (ref 0.52–1.04)
ERYTHROCYTE [DISTWIDTH] IN BLOOD BY AUTOMATED COUNT: 24.7 % (ref 10–15)
GFR SERPL CREATININE-BSD FRML MDRD: 57 ML/MIN/1.73M2
GLUCOSE BLD-MCNC: 83 MG/DL (ref 70–99)
HCT VFR BLD AUTO: 24.4 % (ref 35–47)
HGB BLD-MCNC: 8.4 G/DL (ref 11.7–15.7)
INR PPP: 2.87 (ref 0.85–1.15)
LACTATE SERPL-SCNC: 3.4 MMOL/L (ref 0.7–2)
MCH RBC QN AUTO: 35.9 PG (ref 26.5–33)
MCHC RBC AUTO-ENTMCNC: 34.4 G/DL (ref 31.5–36.5)
MCV RBC AUTO: 104 FL (ref 78–100)
PLATELET # BLD AUTO: 115 10E3/UL (ref 150–450)
POTASSIUM BLD-SCNC: 3.6 MMOL/L (ref 3.4–5.3)
PROT SERPL-MCNC: 5.3 G/DL (ref 6.8–8.8)
RBC # BLD AUTO: 2.34 10E6/UL (ref 3.8–5.2)
SODIUM SERPL-SCNC: 132 MMOL/L (ref 133–144)
WBC # BLD AUTO: 15.3 10E3/UL (ref 4–11)

## 2021-11-08 PROCEDURE — 85014 HEMATOCRIT: CPT | Performed by: INTERNAL MEDICINE

## 2021-11-08 PROCEDURE — 99207 PR APP CREDIT; MD BILLING SHARED VISIT: CPT | Performed by: PHYSICIAN ASSISTANT

## 2021-11-08 PROCEDURE — 250N000011 HC RX IP 250 OP 636: Performed by: INTERNAL MEDICINE

## 2021-11-08 PROCEDURE — 99239 HOSP IP/OBS DSCHRG MGMT >30: CPT | Performed by: INTERNAL MEDICINE

## 2021-11-08 PROCEDURE — 250N000013 HC RX MED GY IP 250 OP 250 PS 637: Performed by: NURSE PRACTITIONER

## 2021-11-08 PROCEDURE — 83605 ASSAY OF LACTIC ACID: CPT | Performed by: INTERNAL MEDICINE

## 2021-11-08 PROCEDURE — 80053 COMPREHEN METABOLIC PANEL: CPT | Performed by: INTERNAL MEDICINE

## 2021-11-08 PROCEDURE — 36415 COLL VENOUS BLD VENIPUNCTURE: CPT | Performed by: INTERNAL MEDICINE

## 2021-11-08 PROCEDURE — 250N000013 HC RX MED GY IP 250 OP 250 PS 637: Performed by: INTERNAL MEDICINE

## 2021-11-08 PROCEDURE — 85610 PROTHROMBIN TIME: CPT | Performed by: INTERNAL MEDICINE

## 2021-11-08 RX ORDER — CEFDINIR 300 MG/1
300 CAPSULE ORAL 2 TIMES DAILY
Qty: 2 CAPSULE | Refills: 0 | Status: SHIPPED | OUTPATIENT
Start: 2021-11-09 | End: 2021-11-10

## 2021-11-08 RX ORDER — ALBUTEROL SULFATE 90 UG/1
2 AEROSOL, METERED RESPIRATORY (INHALATION) EVERY 4 HOURS PRN
Qty: 18 G | Refills: 0 | Status: ON HOLD | OUTPATIENT
Start: 2021-11-08 | End: 2022-02-07

## 2021-11-08 RX ORDER — AZITHROMYCIN 250 MG/1
250 TABLET, FILM COATED ORAL DAILY
Qty: 1 TABLET | Refills: 0 | Status: SHIPPED | OUTPATIENT
Start: 2021-11-09 | End: 2021-11-10

## 2021-11-08 RX ADMIN — CEFTRIAXONE 1 G: 1 INJECTION, POWDER, FOR SOLUTION INTRAMUSCULAR; INTRAVENOUS at 09:45

## 2021-11-08 RX ADMIN — AZITHROMYCIN DIHYDRATE 250 MG: 250 TABLET, FILM COATED ORAL at 09:49

## 2021-11-08 RX ADMIN — PANTOPRAZOLE SODIUM 40 MG: 40 TABLET, DELAYED RELEASE ORAL at 09:50

## 2021-11-08 RX ADMIN — THIAMINE HCL TAB 100 MG 100 MG: 100 TAB at 09:49

## 2021-11-08 RX ADMIN — FLUCONAZOLE 200 MG: 200 TABLET ORAL at 09:49

## 2021-11-08 RX ADMIN — FOLIC ACID 1 MG: 1 TABLET ORAL at 09:49

## 2021-11-08 RX ADMIN — LACTULOSE 10 G: 20 SOLUTION ORAL at 09:57

## 2021-11-08 ASSESSMENT — ACTIVITIES OF DAILY LIVING (ADL)
ADLS_ACUITY_SCORE: 9

## 2021-11-08 ASSESSMENT — MIFFLIN-ST. JEOR: SCORE: 1264.08

## 2021-11-08 NOTE — PROVIDER NOTIFICATION
11/08/21 1400   Call Information   Date of Call 11/08/21   Time of Call 1433   Name of person requesting the team Jayleen   Title of person requesting team RN   RRT Arrival time 1438   Time RRT ended 1450   Reason for call   Type of RRT Adult   Primary reason for call Sepsis suspected   Sepsis Suspected Elevated Lactate level   Was patient transferred from the ED, ICU, or PACU within last 24 hours prior to RRT call? No   SBAR   Situation LA 3.4   Background 51 year old female with a history of alcoholic cirrhosis and cholestatic liver disease, ascites, anemia, and esophageal candidiasis who presented to the ED on 11/3 as advised by hepatology for hyponatremia, anemia, and infection r/o   Notable History/Conditions End-Stage disease;Organ failure   Assessment A+Ox3, NAD, AVSS.    Interventions No interventions   Patient Outcome   Patient Outcome Stabilized on unit   RRT Team   Attending/Primary/Covering Physician Gold 9   Date Attending Physician notified 11/08/21   Time Attending Physician notified 1433   Physician(s) KAMAR Abernathy   Lead RN Kirstin Clemens   RT NA   Post RRT Intervention Assessment   Post RRT Assessment Other (see comment)  (pt is set to discharge to home this evening)

## 2021-11-08 NOTE — PROVIDER NOTIFICATION
Pager [phone]       RM 7515-02    Coded  sepsis for Lactis acid level 3.4  PA already met with pt.    D/charge planning undergoing.    Thank Jayleen.

## 2021-11-08 NOTE — PLAN OF CARE
A&O x4, tmax 99.9. Blood pressures soft but within parameters. HR in 100s, per patient baseline. Currently on 1L O2. Denies any pain or SOB. Intermittent nausea relieved with drinking a christopher mist. SBA to bathroom with adequate urine output, met goal of 3 bowel movements per day.

## 2021-11-08 NOTE — PROVIDER NOTIFICATION
Pager [phone]   RM 7515-02    Pt triggered sepsis at  1400.VSS stable, . Lactic pending. Tmax 99.5. Okay to discharge?  Need Rx for PO abx and albuterol inhaler.    Thanks   Jayleen.

## 2021-11-08 NOTE — PROGRESS NOTES
"[facility]    Hepatology Follow-up    CC: Leukocytosis    Dx: Fever   Community Acquired Pneumonia   Decompensated cirrhosis due to ETOH (ascites)   Hyponatremia   Macrocytic anemia    24 hour events: Feels improved and afebrile     Subjective: Continues to improve    Patient denies fevers, sweats or chills.    Medications  Current Facility-Administered Medications   Medication Dose Route Frequency    azithromycin  250 mg Oral Daily    cefTRIAXone  1 g Intravenous Q24H    fluconazole  200 mg Oral Daily    folic acid  1 mg Oral Daily    lactulose  10 g Oral TID    pantoprazole  40 mg Oral Daily    sodium chloride (PF)  3 mL Intracatheter Q8H    thiamine  100 mg Oral Daily       Review of systems  A 10-point review of systems was negative.    Examination  /69 (BP Location: Right arm)   Pulse 98   Temp 98.6  F (37  C) (Oral)   Resp 20   Ht 1.651 m (5' 5\")   Wt 64.3 kg (141 lb 12.8 oz)   SpO2 92%   BMI 23.60 kg/m      Intake/Output Summary (Last 24 hours) at 11/8/2021 0842  Last data filed at 11/7/2021 2000  Gross per 24 hour   Intake 600 ml   Output --   Net 600 ml       Gen- chronically ill woman, lying in bed  CVS- no edema  RS- comfortable on room air  Abd- mild distension  Neuro- moves all extremities  Skin- jaundiced  Psych- normal mood    Laboratory  Lab Results   Component Value Date     11/08/2021     09/18/2020    POTASSIUM 3.6 11/08/2021    POTASSIUM 3.1 09/18/2020    CHLORIDE 98 11/08/2021    CHLORIDE 106 09/18/2020    CO2 25 11/08/2021    CO2 25 09/18/2020    BUN 28 11/08/2021    BUN 2 09/18/2020    CR 1.13 11/08/2021    CR 0.56 09/18/2020       Lab Results   Component Value Date    BILITOTAL 15.6 11/08/2021    BILITOTAL 1.1 09/18/2020    ALT 42 11/08/2021    ALT 57 09/18/2020    AST 87 11/08/2021     09/18/2020    ALKPHOS 88 11/08/2021    ALKPHOS 261 09/18/2020       Lab Results   Component Value Date    WBC 15.3 11/08/2021    WBC 12.8 09/18/2020 "    HGB 8.4 11/08/2021    HGB 12.3 09/18/2020     11/08/2021     09/18/2020     11/08/2021     09/18/2020       Lab Results   Component Value Date    INR 2.87 11/08/2021     MELD-Na score: 32 at 11/8/2021  6:14 AM  MELD score: 30 at 11/8/2021  6:14 AM  Calculated from:  Serum Creatinine: 1.13 mg/dL at 11/8/2021  6:14 AM  Serum Sodium: 132 mmol/L at 11/8/2021  6:14 AM  Total Bilirubin: 15.6 mg/dL at 11/8/2021  6:14 AM  INR(ratio): 2.87 at 11/8/2021  6:14 AM  Age: 51 years    Assessment  Ms. 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  GI is consulted for an infection rule-out.  Found to have CAP.    She continues to improve on Ceftriaxone (day 6) and azithromycin (day 4).  She looks remarkably brighter and awake today and feels her breathing is much improved.    We are ok with discharge from a hepatology point of view, whenever felt appropriate by primary team.  We agree with continuing a course of antibiotics for her community acquired pneumonia.  She will need follow-up in hepatology clinic in 1-2 weeks and we will arrange for this.    #1 Decompensated cirrhosis due to ETOH (ascites)  #2 Community acquired pneumonia  #3 Hyponatremia  #4 Candida esophagitis  #5 Macrocytic anemia    Recommendations  -- OK with discharge from hepatology perspective when felt appropriate by primary team.  -- Agree with antibiotics for CAP per primary.  -- Hep B Core negative (no past exposure) and PETH is pending  -- Continue fluconazole for candidal esophagitis  -- Continue home lactulose 10g TID to achieve 3-4 BM per day  -- Low sodium diet (< 2g) to reduce ascites and fluid  -- Monitor weights qdaily, monitor for fluid accumulation  -- Recommend high protein calorie intake for malnutrition (1.1-1.5g/kg per day)    Noam Murrieta MD  Gastroenterology Fellow, PGY-5    Case staffed with  attending, Dr. Steele.    Hepatology will continue to follow.    ATTENDING NOTE, GASTROENTEROLOGY/HEPATOLOGY    I saw and discussed this patient with the fellow and participated in the decision making. I agree with the fellow's note. Zita Steele MD

## 2021-11-08 NOTE — PLAN OF CARE
".BP 92/52 (BP Location: Right arm)   Pulse 102   Temp 99.5  F (37.5  C) (Oral)   Resp 20   Ht 1.651 m (5' 5\")   Wt 64.3 kg (141 lb 12.8 oz)   SpO2 93%   BMI 23.60 kg/m       A&O x4, tmax 99.5 on RA. Denies nausea and pain.Blood pressures 95/52, soft but WDL.HR in 100s, per patient baseline.BA to bathroom with adequate urine output, 2 BM's so far today.Pt declined lactulose for 2 pm anticipating d/charge.  "

## 2021-11-08 NOTE — DISCHARGE SUMMARY
[facility]  Hospitalist Discharge Summary      Date of Admission:  11/3/2021  Date of Discharge:  11/8/2021  Discharging Provider: Randal More MD  Discharge Team: Hospitalist Service, Gold     Discharge Diagnoses   Community acquired pneumonia  Sepsis 2/2 community acquired pneumonia  Acute respiratory failure with hypoxia  Leukocytosis  Acute decompensated liver cirrhosis  Alcoholic cirrhosis with ascites  Portal hypertensive gastropathy  Portal hypertension  Hyperbilirubinemia  Hypotension with lactic acidosis  Elevated creatinine  Macrocytic anemia  Hyponatremia  Esophageal candidiasis  Coagulopathy 2/2 liver disease    Follow-ups Needed After Discharge   Follow-up Appointments     Adult [facility] Follow-up and recommended labs and tests      Follow up with hepatology as scheduled on 11/10 for a virtual visit. You   will make sure your symptoms continue to improve and can discuss starting   diuretics to help prevent abdominal swelling.    Appointments on [place] and/or [facility] (with [facility] or [facility]   provider or service). Call [phone] if you haven't heard regarding   these appointments within 7 days of discharge.           Unresulted Labs Ordered in the Past 30 Days of this Admission     Date and Time Order Name Status Description    11/6/2021 12:19 AM Blood Culture Arm, Left Preliminary     11/6/2021 12:19 AM Blood Culture Arm, Right Preliminary     11/5/2021  4:28 PM Phosphatidylethanol (PEth) In process     11/5/2021 12:52 AM Blood Culture Hand, Right Preliminary     11/5/2021 12:52 AM Blood Culture Arm, Right Preliminary     11/4/2021  2:09 AM Ascites Fluid Aerobic Bacterial Culture Routine with Gram Stain Preliminary     11/3/2021  5:09 PM Blood Culture Peripheral Blood Preliminary     11/3/2021  5:09 PM Blood Culture Peripheral Blood Preliminary     10/29/2021  2:43 PM Fungal or Yeast Culture Routine Preliminary       These results will be  followed up by Dr. More and hepatology team    Discharge Disposition   Discharged to home  Condition at discharge: Stable    Hospital Course   Melita Cortes is a 51 year old female with a history of alcoholic cirrhosis and cholestatic liver disease, ascites, anemia, and esophageal candidiasis who presented to the ED on 11/3, admitted for fatigue secondary to hyponatremia, sepsis 2/2 pneumonia, and anemia.     Sepsis, community acquired pneumonia  Was found to have elevated WBC to 17 along with lactate of 4.1. Was started on ceftriaxone given concern for sepsis. Blood cultures and urine cultures did not grow bacteria. Paracentesis showed no evidence of SBP. Had fever during admission, and X-ray chest showed increased interstitial opacities. Was hypoxic and required supplemental oxygen. Azithromycin was added for community acquired pneumonia. Her respiratory status improved, fevers resolved, and oxygen levels normalized off oxygen. Will discharge home to complete 7 day course of antibiotics.     Hypotension with lactic acidosis:   Likely due to combination of infection + anemia + liver disease. Improved with albumin and PRBCs.    Elevated creatinine  Follows with nephrology outpatient. Creatinine up to 4.1 during last hospital admission and 1.8 on admission this hospital stay. Received multiple doses of albumin, and creatinine at discharge improved to 1.1     Macrocytic Anemia  Baseline Hgb 7.8-9. Hgb 6.9 and  on 11/3. Ongoing anemia is multifactorial: portal hypertensive gastropathy + epistaxis. Received 1 unit in ED 11/3, and 1 unit 11/6. No evidence of GI bleed or heavy menstrual bleeding. Should repeat CBC as outpatient in 1-2 weeks     Hyponatremia   Sodium was 124 on admission. Hyponatremia likely 2/2 cirrhosis with addition of hypovolemia. Improved with albumin and on discharge was 132.    Cirrhosis: Will have follow up with GI on 11/10 after discharge; should discuss diuretics at that time to  help with ascites. Could also monitor liver labs and anemia.    Consultations This Hospital Stay   GI HEPATOLOGY ADULT IP CONSULT  INTERNAL MEDICINE PROCEDURE TEAM ADULT IP CONSULT EAST BANK - PARACENTESIS  CARE MANAGEMENT / SOCIAL WORK IP CONSULT    Code Status   Full Code    Time Spent on this Encounter   I, Randal More MD, personally saw the patient today and spent greater than 30 minutes discharging this patient.       Randal More MD  [facility] [address] [place]  [address]  [place] [address]  Phone: [phone]  ______________________________________________________________________    Physical Exam   Vital Signs: Temp: 98.6  F (37  C) Temp src: Oral BP: 111/69 Pulse: 98   Resp: 20 SpO2: 92 % O2 Device: None (Room air) Oxygen Delivery: 1 LPM  Weight: 141 lbs 12.8 oz  Constitutional: awake, alert, cooperative, no apparent distress, and appears stated age  Respiratory: No increased work of breathing, good air exchange, clear to auscultation bilaterally. Crackles in left lung base.  GI: Distended, normal bowel sounds, nontender. No asterixis       Primary Care Physician   Navneet Johnson    Discharge Orders      Reason for your hospital stay    You were admitted for weakness, likely secondary to combination of pneumonia and anemia. You received blood transfusions and your hemoglobin has been stable above 8. You were treated with antibiotics with improvement in your fever, and you will need to take 1 more day of azithromycin and cefdinir (take tomorrow).     Activity    Your activity upon discharge: activity as tolerated     Adult [facility] Follow-up and recommended labs and tests    Follow up with hepatology as scheduled on 11/10 for a virtual visit. You will make sure your symptoms continue to improve and can discuss starting diuretics to help prevent abdominal swelling.    Appointments on [place] and/or [facility] (with [facility] or [facility] provider or service). Call  [phone] if you haven't heard regarding these appointments within 7 days of discharge.     When to contact your care team    Call your primary doctor if you have any of the following: continued fever greater than 100.4, more difficulty breathing, abdominal pain/swelling, increasing weakness/fatigue, or any other concerning symptoms.     Diet    Follow this diet upon discharge: Combination Diet 2 gm NA Diet       Significant Results and Procedures   Most Recent 3 CBC's:Recent Labs   Lab Test 11/08/21  0614 11/07/21  1116 11/06/21  1502   WBC 15.3* 14.6* 16.0*   HGB 8.4* 7.9* 9.1*   * 106* 108*   * 87* 94*     Most Recent 3 BMP's:Recent Labs   Lab Test 11/08/21  0614 11/07/21  1116 11/06/21  0552   * 134 134   POTASSIUM 3.6 3.5 3.4   CHLORIDE 98 100 98   CO2 25 25 26   BUN 28 29 34*   CR 1.13* 1.12* 1.43*   ANIONGAP 9 9 10   SHAI 8.5 8.2* 8.6   GLC 83 145* 114*     Most Recent 2 LFT's:Recent Labs   Lab Test 11/08/21  0614 11/07/21  1116   AST 87* 44   ALT 42 40   ALKPHOS 88 99   BILITOTAL 15.6* 15.7*     Most Recent 3 INR's:Recent Labs   Lab Test 11/08/21  0614 11/07/21  1116 11/06/21  0552   INR 2.87* 3.21* 3.89*   ,   Results for orders placed or performed during the hospital encounter of 11/03/21   Chest XR,  PA & LAT    Narrative    EXAMINATION:  XR CHEST 2 VW 11/3/2021 6:20 PM.    COMPARISON: 9/26/21    HISTORY:  shortness of breath]    FINDINGS: AP and lateral views of the chest. Midline trachea. Cardiac  and mediastinal silhouettes within normal limits. Pulmonary  vasculature is distinct. No pleural effusion or pneumothorax. Low lung  volumes with interstitial prominence throughout bilateral lung fields.  Left basilar streaky opacity opacity. The visualized upper abdomen is  unremarkable. Osseous structures are stable.      Impression    IMPRESSION:   Left basilar streaky opacity may represent atelectasis or infection.    I have personally reviewed the examination and initial  interpretation  and I agree with the findings.    ROLLY KC MD         SYSTEM ID:  [id number]   CT Abdomen Pelvis w/o Contrast    Narrative    EXAM: CT ABDOMEN PELVIS W/O CONTRAST  LOCATION: [facility]  DATE/TIME: 11/3/2021 11:04 PM    INDICATION: Abdominal abscess/infection suspected Ascites -- 51 year old female?with a history of alcoholic cirrhosis and cholestatic liver disease, ascites, anemia, and esophageal candidiasis who presented to the ED on 11/3 as advised by hepatology for   hyponatremia, anemia, and infection r/o.   COMPARISON: None.  TECHNIQUE: CT scan of the abdomen and pelvis was performed without IV contrast. Multiplanar reformats were obtained. Dose reduction techniques were used.  CONTRAST: None.    FINDINGS:   LOWER CHEST: Moderate subsegmental atelectasis at the lung bases bilaterally. Small hiatal hernia.    HEPATOBILIARY: Cirrhotic hepatic morphology. Normal size.    PANCREAS: Normal.    SPLEEN: Mildly enlarged.    ADRENAL GLANDS: Normal.    KIDNEYS/BLADDER: Normal.    BOWEL: Diffuse gastric wall thickening, even allowing for nondistention. No evidence for bowel obstruction. No free air. Large volume abdominopelvic ascites.    LYMPH NODES: Normal.    VASCULATURE: Portosystemic collaterals are present although not well evaluated without intravenous contrast material. Abdominal aorta normal in caliber.    PELVIC ORGANS: Normal.    MUSCULOSKELETAL: Severe degenerative change in the left hip.      Impression    IMPRESSION:   1.  Hepatic cirrhosis with splenomegaly, portosystemic collaterals, and large volume ascites.  2.  Suspected gastric wall thickening which may represent gastritis.     POC US Guide for Paracentesis    Impression    RLQ with pocket of ascites prior to paracentesis   XR Chest Port 1 View    Narrative    Exam: XR CHEST PORT 1 VIEW, 11/4/2021 8:55 PM    Comparison: Chest x-ray 11/3/2021    History: hypoxia    Findings:  A single  portable AP semiupright view of the chest is obtained.    Trachea is midline. Mediastinum is within normal limits.  Cardiopulmonary silhouette is within normal limits. Low lung volumes.  Similar appearance of left basilar opacity diffusely increased  interstitial markings. There is no pneumothorax or pleural effusion.  The upper abdomen is unremarkable.      Impression    Impression:   1. Similar appearance of left base focal pulmonary opacity and  diffusely increased interstitial pulmonary opacities which are  concerning for atypical infection versus edema.  2. Low lung volumes.,    I have personally reviewed the examination and initial interpretation  and I agree with the findings.    SANDRO CASPER MD         SYSTEM ID:  [id number]       Discharge Medications   Current Discharge Medication List      START taking these medications    Details   albuterol (PROAIR HFA/PROVENTIL HFA/VENTOLIN HFA) 108 (90 Base) MCG/ACT inhaler Inhale 2 puffs into the lungs every 4 hours as needed for wheezing  Qty: 18 g, Refills: 0    Comments: Pharmacy may dispense brand covered by insurance (Proair, or proventil or ventolin or generic albuterol inhaler)  Associated Diagnoses: Community acquired pneumonia of left lower lobe of lung      azithromycin (ZITHROMAX) 250 MG tablet Take 1 tablet (250 mg) by mouth daily for 1 day  Qty: 1 tablet, Refills: 0    Associated Diagnoses: Community acquired pneumonia of left lower lobe of lung      cefdinir (OMNICEF) 300 MG capsule Take 1 capsule (300 mg) by mouth 2 times daily for 1 day  Qty: 2 capsule, Refills: 0    Associated Diagnoses: Community acquired pneumonia of left lower lobe of lung         CONTINUE these medications which have NOT CHANGED    Details   fluconazole (DIFLUCAN) 200 MG tablet Take 400mg (2 tabs) on day 1 and then take 200mg (1 tab) for days 2 through 14.  Qty: 15 tablet, Refills: 0    Associated Diagnoses: Candida infection      folic acid (FOLVITE) 1 MG tablet Take 1  tablet (1 mg) by mouth daily  Qty: 30 tablet, Refills: 1    Associated Diagnoses: Cholestatic liver disease; Alcoholic hepatitis with ascites      lactulose (CHRONULAC) 10 GM/15ML solution Take 3 times daily. May titrate to achieve 3-4 loose stools per day.  Qty: 1892 mL, Refills: 3    Associated Diagnoses: Alcoholic liver disease (H)      multivitamin (CENTRUM SILVER) tablet Take 1 tablet by mouth daily      pantoprazole (PROTONIX) 40 MG EC tablet Take 1 tablet (40 mg) by mouth daily  Qty: 60 tablet, Refills: 2    Associated Diagnoses: Alcoholic liver disease (H)      thiamine (B-1) 100 MG tablet Take 1 tablet (100 mg) by mouth daily  Qty: 30 tablet, Refills: 1           Allergies   No Known Allergies

## 2021-11-08 NOTE — CODE/RAPID RESPONSE
Rapid Response Team Note    Assessment   In assessment a rapid response was called on Melita Cortes due to SIRS/Sepsis trigger and lactic acidosis. This presentation is likely due to CAP and worsened by ESLD.     Plan   -  No medical intervention indicated at this time.  Pt to continue oral antibiotics after discharge to complete  7 day course.   -  The Internal Medicine primary team was able to be reached and they are in agreement with the above plan.  -  Disposition: The patient will remain on the current unit. We will continue to monitor this patient closely.  -  Reassessment and plan follow-up will be performed by the primary team      Donovan Zabala PA-C  [facility] [place] R[facility] AMCOM Job Code Contact #[phone]    Hospital Course   Brief Summary of events leading to rapid response:   Pt triggered sepsis BPA due to leukocytosis and transient tachycardia of which subsequently drawn LA 3.4.     Admission Diagnosis:   Hyponatremia [E87.1]  Malaise and fatigue [R53.81, R53.83]  At high risk for severe sepsis [Z91.89]  Symptomatic anemia [D64.9]     Physical Exam   Temp: 99.5  F (37.5  C) Temp  Min: 98.6  F (37  C)  Max: 99.9  F (37.7  C)  Resp: 20 Resp  Min: 18  Max: 20  SpO2: 93 % SpO2  Min: 92 %  Max: 98 %  Pulse: 102 Pulse  Min: 98  Max: 108    No data recorded  BP: 92/52 Systolic (24hrs), Av , Min:90 , Max:111   Diastolic (24hrs), Av, Min:51, Max:69     I/Os: I/O last 3 completed shifts:  In: 600 [P.O.:600]  Out: -      Exam:   General: chronically ill appearing  Mental Status: baseline mental status.      Significant Results and Procedures   Lactic Acid:   Recent Labs   Lab Test 21  1411 21  1116 21  0530   LACT 3.4* 2.9* 3.2*     CBC:   Recent Labs   Lab Test 21  0614 21  1116 21  1502   WBC 15.3* 14.6* 16.0*   HGB 8.4* 7.9* 9.1*   HCT 24.4* 24.0* 27.1*   * 87* 94*        Sepsis Evaluation   The patient is known to have an infection.  NO EVIDENCE OF  SEPSIS at this time.  Vital sign, physical exam, and lab findings are due to CAP and ESLD.

## 2021-11-08 NOTE — PROGRESS NOTES
Nursing Focus: Discharge    D: Fidelina discharged to previous living arrangement at [facility]. Patient accompanied by partner.    I: Discharge prescriptions sent to discharge pharmacy to be filled. All discharge medications and instructions reviewed with patient and partner. Patient instructed to call clinic triage nurse experiencing a fever >100.4, uncontrolled nausea, vomiting, diarrhea, or pain; or experiences any signs or symptoms of bleeding. Other phone numbers to call with questions or concerns after discharge reviewed. Peripheral IV removed. Education completed.    A: Patient and partner verbalized understanding of discharge medications and instructions. Questions were answered. Patient will  medications at discharge pharmacy. Belongings sent with patient.    P: Patient has telephone visit scheduled for tomorrow 11/9.

## 2021-11-08 NOTE — PLAN OF CARE
Pt is alert and oriented x4, tachy max 108, T max was 99.9 recheck was 99.5.  O2 sat around 96% on 1L of oxygen,  Denies Shortness of breath. OVSS. denied pain, nausea, vomiting as well as abd discomfort. Up with stand by assist. Continue POC

## 2021-11-09 LAB
BACTERIA BLD CULT: NO GROWTH
BACTERIA BLD CULT: NO GROWTH
BACTERIA FLD CULT: NO GROWTH
GRAM STAIN RESULT: NORMAL
GRAM STAIN RESULT: NORMAL

## 2021-11-10 ENCOUNTER — VIRTUAL VISIT (OUTPATIENT)
Dept: GASTROENTEROLOGY | Facility: CLINIC | Age: 51
End: 2021-11-10
Attending: PHYSICIAN ASSISTANT
Payer: COMMERCIAL

## 2021-11-10 DIAGNOSIS — K70.31 ALCOHOLIC CIRRHOSIS OF LIVER WITH ASCITES (H): Primary | ICD-10-CM

## 2021-11-10 DIAGNOSIS — K70.9 ALCOHOLIC LIVER DISEASE (H): ICD-10-CM

## 2021-11-10 DIAGNOSIS — E87.1 HYPONATREMIA: ICD-10-CM

## 2021-11-10 LAB
ATRIAL RATE - MUSE: 111 BPM
BACTERIA BLD CULT: NO GROWTH
BACTERIA BLD CULT: NO GROWTH
DIASTOLIC BLOOD PRESSURE - MUSE: NORMAL MMHG
INTERPRETATION ECG - MUSE: NORMAL
P AXIS - MUSE: 48 DEGREES
PETH BLD-MCNC: NEGATIVE NG/ML
PR INTERVAL - MUSE: 132 MS
QRS DURATION - MUSE: 72 MS
QT - MUSE: 344 MS
QTC - MUSE: 467 MS
R AXIS - MUSE: -14 DEGREES
SYSTOLIC BLOOD PRESSURE - MUSE: NORMAL MMHG
T AXIS - MUSE: 8 DEGREES
VENTRICULAR RATE- MUSE: 111 BPM

## 2021-11-10 PROCEDURE — 99215 OFFICE O/P EST HI 40 MIN: CPT | Mod: GT | Performed by: PHYSICIAN ASSISTANT

## 2021-11-10 ASSESSMENT — PAIN SCALES - GENERAL: PAINLEVEL: NO PAIN (0)

## 2021-11-10 NOTE — PROGRESS NOTES
Fidelina is a 51 year old who is being evaluated via a billable video visit.      How would you like to obtain your AVS? FITiSThart  If the video visit is dropped, the invitation should be resent by: Text to cell phone: [phone]  Will anyone else be joining your video visit? No      Video Start Time: 9:45 am   Video-Visit Details    Type of service:  Video Visit    Video End Time: 10:21 am     Originating Location (pt. Location): Home    Distant Location (provider location):  [facility] [place]     Platform used for Video Visit: BoardBookit

## 2021-11-10 NOTE — LETTER
11/10/2021     RE: Melita Cortes  [address]  [facility] [address]    Dear Colleague,    Thank you for referring your patient, Melita Cortes, to the [facility]PAT[facility] [place]. Please see a copy of my visit note below.  Hepatology Follow-up Clinic note  Melita Cortes   Date of Birth 1970  Date of Service 11/9/2021    Reason for follow-up: ETOH hepatitis          Assessment/plan:   Melita Cortes is a 51 year old female with ETOH hepatitis complicated by history of ascites requiring paracentesis. and esophageal varices/PHG. Last ETOH intake was mid-September. She has good insight and support. MELD-Na 32 (previously 31), driven by Tbili 15 and INR 2.87 . Bilirubin is primarily indirect. We reviewed and discussed the importance of good nutrition as she recovers as she has not been eating much. She is up to date with HCC and variceal screening.     # Trend labs: hepatic panel, INR next week     # Ascites:   - Start 20 mg furosemide  - Start 25 mg sprionolactone  - BMP in one week   - Paracentesis PRN    - Continue 2000 mg sodium / high protein     # Esophageal candidiasi:   - Complete Fluconoazole x 14 days    # Continue pantoprazole 40 twice daily x 2 months     # Anemia:  (high LHD, high haptoglobin, low folate)   : Will trend CBC  - Continue folate and thiamine x 2 months total    # History of alcohol abuse   - Complete abstinence from alcohol:   - Recommend Rule 25 / CD eval     # Follow-up in clinic with me in one month  # Follow up with Dr. Steele in 6-8 weeks     Eduardo Parry PA-C   [facility]    -----------------------------------------------------       HPI:   Melita Cortes is a 51 year old female presenting for follow-up. She is joined by her .     ETOH Hepatitis   Complicated by:  - Ascites  - Mild HE  - No history of GI bleed  EGD: 10/29/21: candida plaques, Grade I EV, moderate PHG, localized  erosions in gastric antrum,   HCC: 10/27/21:     Patient was last seen by me on 10/19/2021. She was hospitalized last week due to worsening hyponatremia (122 on admission) ,She was found to have CAP, sepsis and increasing Cr improving with albumin and macrocytic anemia s/p 1 unit PRBC. Finished antibiotics for community acquired pneumonia,    Still feeling tired. Appetite is still not good. Drinks smoothie and Ensure (1 day). Doesn't eat a lot of solids. Had a paracentesis (5 L) on 10/22 and diagnostic during recent hospitalization, No fluid in legs. Lost some weight 7 lbs.     Feeling more alert. She is taking lactulose.     Patient denies confusion.  Patient also denies melena, hematochezia or hematemesis. Patient denies fevers, sweats or chills.    Last drank in mid September. Denies any problems avoiding alcohol.     Medical hx Surgical hx   Past Medical History:   Diagnosis Date     Alcoholic hepatitis      Cervical high risk HPV (human papillomavirus) test positive 2019, 2020    Past Surgical History:   Procedure Laterality Date     APPENDECTOMY       ESOPHAGOGASTRODUODENOSCOPY, WITH BRUSHINGS N/A 10/29/2021    Procedure: ESOPHAGOGASTRODUODENOSCOPY, WITH BRUSHINGS;  Surgeon: Torey Ruiz MD;  Location:  GI               Medications:     Current Outpatient Medications   Medication     albuterol (PROAIR HFA/PROVENTIL HFA/VENTOLIN HFA) 108 (90 Base) MCG/ACT inhaler     azithromycin (ZITHROMAX) 250 MG tablet     cefdinir (OMNICEF) 300 MG capsule     fluconazole (DIFLUCAN) 200 MG tablet     folic acid (FOLVITE) 1 MG tablet     lactulose (CHRONULAC) 10 GM/15ML solution     multivitamin (CENTRUM SILVER) tablet     pantoprazole (PROTONIX) 40 MG EC tablet     thiamine (B-1) 100 MG tablet     No current facility-administered medications for this visit.            Allergies:   No Known Allergies         Review of Systems:   10 points ROS was obtained and highlighted in the HPI, otherwise negative.           Physical Exam:   GENERAL: healthy, alert, jaundiced   EYES: Eyes grossly normal to inspection, conjunctivae and sclerae normal  RESP: no audible wheeze, cough, or visible cyanosis.  No visible retractions or increased work of breathing.  Able to speak fully in complete sentences  NEURO: Cranial nerves grossly intact, mentation intact and speech normal  PSYCH: mentation appears normal, affect normal/bright, judgement and insight intact, normal speech and appearance well-groomed         Data:   Reviewed in person and significant for:    Lab Results   Component Value Date     11/08/2021     09/18/2020      Lab Results   Component Value Date    POTASSIUM 3.6 11/08/2021    POTASSIUM 3.1 09/18/2020     Lab Results   Component Value Date    CHLORIDE 98 11/08/2021    CHLORIDE 106 09/18/2020     Lab Results   Component Value Date    CO2 25 11/08/2021    CO2 25 09/18/2020     Lab Results   Component Value Date    BUN 28 11/08/2021    BUN 2 09/18/2020     Lab Results   Component Value Date    CR 1.13 11/08/2021    CR 0.56 09/18/2020       Lab Results   Component Value Date    WBC 15.3 11/08/2021    WBC 12.8 09/18/2020     Lab Results   Component Value Date    HGB 8.4 11/08/2021    HGB 12.3 09/18/2020     Lab Results   Component Value Date    HCT 24.4 11/08/2021    HCT 36.4 09/18/2020     Lab Results   Component Value Date     11/08/2021     09/18/2020     Lab Results   Component Value Date     11/08/2021     09/18/2020       Lab Results   Component Value Date    AST 87 11/08/2021     09/18/2020     Lab Results   Component Value Date    ALT 42 11/08/2021    ALT 57 09/18/2020     No results found for: BILICONJ   Lab Results   Component Value Date    BILITOTAL 15.6 11/08/2021    BILITOTAL 1.1 09/18/2020       Lab Results   Component Value Date    ALBUMIN 2.7 11/08/2021    ALBUMIN 2.8 09/18/2020     Lab Results   Component Value Date    PROTTOTAL 5.3 11/08/2021    PROTTOTAL 7.2  09/18/2020      Lab Results   Component Value Date    ALKPHOS 88 11/08/2021    ALKPHOS 261 09/18/2020       Lab Results   Component Value Date    INR 2.87 11/08/2021     MELD-Na score: 32 at 11/8/2021  6:14 AM  MELD score: 30 at 11/8/2021  6:14 AM  Calculated from:  Serum Creatinine: 1.13 mg/dL at 11/8/2021  6:14 AM  Serum Sodium: 132 mmol/L at 11/8/2021  6:14 AM  Total Bilirubin: 15.6 mg/dL at 11/8/2021  6:14 AM  INR(ratio): 2.87 at 11/8/2021  6:14 AM  Age: 51 years    MRI/MRCP:   10/27/21:   MAGNETIC RESONANCE CHOLANGIOPANCREATOGRAPHY  10/27/2021 8:57 AM      HISTORY: Cirrhosis; cholestolestasis (Tbili 20's), mildly elevated  transaminases. No fitting picture with alcohol hepatitis.     COMPARISON: CT scan from 9/25/2020     TECHNIQUE: Multiplanar, multisequence images of the abdomen acquired  before and after administration of 7 mL Gadavist intravenous contrast.  MRCP images and coronal 3-D thin section T2-weighted MRCP images  through the biliary tree. 3D image reformatting was performed on the  acquisition scanner.     FINDINGS: There is no cholelithiasis. There is gallbladder wall  thickening and distention which is nonspecific in the presence of  ascites. There is no intra or extrahepatic biliary dilatation. There  is no choledocholithiasis. No biliary strictures. The pancreatic duct  is not dilated. No pancreatic duct strictures. No significant  sidebranch visualization. No cystic lesions within the pancreas. The  signal intensity of the liver demonstrates no hepatic steatosis. The  signal intensity of the pancreas within normal limits without evidence  for pancreatitis. Visualized kidneys demonstrate unremarkable signal  intensity without hydronephrosis.  Adrenal glands are unremarkable.  Spleen is enlarged measuring 14.9 cm. Visualized osseous structures  unremarkable. After administration of intravenous contrast, solid  organs demonstrate no enhancing focal lesions. There is diffuse  heterogeneous  enhancement of the liver. Recanalized periumbilical vein  and probable gastroesophageal varices compatible with portal  hypertension. Moderate to large amount of ascites.                                                                    IMPRESSION:   1. No evidence for choledocholithiasis or biliary obstruction.  2. Moderate to large amount of ascites.  3. Heterogeneously enhancing liver, question acute hepatitis.  4. Splenomegaly and portal systemic collaterals compatible with portal  hypertension.    Fidelina is a 51 year old who is being evaluated via a billable video visit.      How would you like to obtain your AVS? MyChart  If the video visit is dropped, the invitation should be resent by: Text to cell phone: [phone]  Will anyone else be joining your video visit? No      Video Start Time: 9:45 am   Video-Visit Details  Type of service:  Video Visit  Video End Time: 10:21 am   Originating Location (pt. Location): Home  Distant Location (provider location):  [facility] [place]   Platform used for Video Visit: Deeplink     Again, thank you for allowing me to participate in the care of your patient.      Sincerely,    Eduardo Parry PA-C

## 2021-11-11 LAB
BACTERIA BLD CULT: NO GROWTH
BACTERIA BLD CULT: NO GROWTH

## 2021-11-12 RX ORDER — PANTOPRAZOLE SODIUM 40 MG/1
40 TABLET, DELAYED RELEASE ORAL 2 TIMES DAILY
Qty: 60 TABLET | Refills: 1 | Status: ON HOLD | OUTPATIENT
Start: 2021-11-12 | End: 2022-01-14

## 2021-11-12 RX ORDER — SPIRONOLACTONE 25 MG/1
25 TABLET ORAL DAILY
Qty: 30 TABLET | Refills: 5 | Status: SHIPPED | OUTPATIENT
Start: 2021-11-12 | End: 2021-11-18

## 2021-11-12 RX ORDER — FUROSEMIDE 20 MG
20 TABLET ORAL DAILY
Qty: 30 TABLET | Refills: 5 | Status: SHIPPED | OUTPATIENT
Start: 2021-11-12 | End: 2021-11-18

## 2021-11-17 ENCOUNTER — LAB (OUTPATIENT)
Dept: LAB | Facility: CLINIC | Age: 51
End: 2021-11-17

## 2021-11-17 ENCOUNTER — TELEPHONE (OUTPATIENT)
Dept: GASTROENTEROLOGY | Facility: CLINIC | Age: 51
End: 2021-11-17

## 2021-11-17 DIAGNOSIS — K70.31 ALCOHOLIC CIRRHOSIS OF LIVER WITH ASCITES (H): ICD-10-CM

## 2021-11-17 LAB
ALBUMIN SERPL-MCNC: 3 G/DL (ref 3.4–5)
ALP SERPL-CCNC: 138 U/L (ref 40–150)
ALT SERPL W P-5'-P-CCNC: 54 U/L (ref 0–50)
ANION GAP SERPL CALCULATED.3IONS-SCNC: 9 MMOL/L (ref 3–14)
AST SERPL W P-5'-P-CCNC: 86 U/L (ref 0–45)
BILIRUB DIRECT SERPL-MCNC: 7.4 MG/DL (ref 0–0.2)
BILIRUB SERPL-MCNC: 14.8 MG/DL (ref 0.2–1.3)
BUN SERPL-MCNC: 27 MG/DL (ref 7–30)
CALCIUM SERPL-MCNC: 8.9 MG/DL (ref 8.5–10.1)
CHLORIDE BLD-SCNC: 97 MMOL/L (ref 94–109)
CO2 SERPL-SCNC: 27 MMOL/L (ref 20–32)
CREAT SERPL-MCNC: 1.09 MG/DL (ref 0.52–1.04)
ERYTHROCYTE [DISTWIDTH] IN BLOOD BY AUTOMATED COUNT: 23.3 % (ref 10–15)
GFR SERPL CREATININE-BSD FRML MDRD: 59 ML/MIN/1.73M2
GLUCOSE BLD-MCNC: 126 MG/DL (ref 70–99)
HCT VFR BLD AUTO: 23.8 % (ref 35–47)
HGB BLD-MCNC: 7.9 G/DL (ref 11.7–15.7)
INR PPP: 2.52 (ref 0.85–1.15)
MCH RBC QN AUTO: 35.4 PG (ref 26.5–33)
MCHC RBC AUTO-ENTMCNC: 33.2 G/DL (ref 31.5–36.5)
MCV RBC AUTO: 107 FL (ref 78–100)
PLATELET # BLD AUTO: 131 10E3/UL (ref 150–450)
POTASSIUM BLD-SCNC: 4.3 MMOL/L (ref 3.4–5.3)
PROT SERPL-MCNC: 6.3 G/DL (ref 6.8–8.8)
RBC # BLD AUTO: 2.23 10E6/UL (ref 3.8–5.2)
SODIUM SERPL-SCNC: 133 MMOL/L (ref 133–144)
WBC # BLD AUTO: 15.5 10E3/UL (ref 4–11)

## 2021-11-17 PROCEDURE — 82248 BILIRUBIN DIRECT: CPT

## 2021-11-17 PROCEDURE — 85610 PROTHROMBIN TIME: CPT

## 2021-11-17 PROCEDURE — 85027 COMPLETE CBC AUTOMATED: CPT

## 2021-11-17 PROCEDURE — 36415 COLL VENOUS BLD VENIPUNCTURE: CPT

## 2021-11-17 PROCEDURE — 80053 COMPREHEN METABOLIC PANEL: CPT

## 2021-11-17 NOTE — TELEPHONE ENCOUNTER
DATE:  11/17/2021   TIME OF RECEIPT FROM LAB:  3:37  LAB TEST:  Hemoglobin  LAB VALUE:  7.7  RESULTS GIVEN WITH READ-BACK TO (PROVIDER):  Eduardo Parry PA-C  TIME LAB VALUE REPORTED TO PROVIDER:   3:49

## 2021-11-18 ENCOUNTER — PATIENT OUTREACH (OUTPATIENT)
Dept: GASTROENTEROLOGY | Facility: CLINIC | Age: 51
End: 2021-11-18
Payer: COMMERCIAL

## 2021-11-18 DIAGNOSIS — D64.9 ANEMIA, UNSPECIFIED TYPE: ICD-10-CM

## 2021-11-18 DIAGNOSIS — K70.31 ALCOHOLIC CIRRHOSIS OF LIVER WITH ASCITES (H): Primary | ICD-10-CM

## 2021-11-18 RX ORDER — EPINEPHRINE 1 MG/ML
0.3 INJECTION, SOLUTION, CONCENTRATE INTRAVENOUS EVERY 5 MIN PRN
Status: CANCELLED | OUTPATIENT
Start: 2021-11-18

## 2021-11-18 RX ORDER — ALBUTEROL SULFATE 0.83 MG/ML
2.5 SOLUTION RESPIRATORY (INHALATION)
Status: CANCELLED | OUTPATIENT
Start: 2021-11-18

## 2021-11-18 RX ORDER — METHYLPREDNISOLONE SODIUM SUCCINATE 125 MG/2ML
125 INJECTION, POWDER, LYOPHILIZED, FOR SOLUTION INTRAMUSCULAR; INTRAVENOUS
Status: CANCELLED
Start: 2021-11-18

## 2021-11-18 RX ORDER — NALOXONE HYDROCHLORIDE 0.4 MG/ML
0.2 INJECTION, SOLUTION INTRAMUSCULAR; INTRAVENOUS; SUBCUTANEOUS
Status: CANCELLED | OUTPATIENT
Start: 2021-11-18

## 2021-11-18 RX ORDER — SPIRONOLACTONE 25 MG/1
50 TABLET ORAL DAILY
Qty: 30 TABLET | Refills: 5 | COMMUNITY
Start: 2021-11-18 | End: 2021-11-24

## 2021-11-18 RX ORDER — HEPARIN SODIUM,PORCINE 10 UNIT/ML
5 VIAL (ML) INTRAVENOUS
Status: CANCELLED | OUTPATIENT
Start: 2021-11-18

## 2021-11-18 RX ORDER — ALBUMIN (HUMAN) 12.5 G/50ML
12.5 SOLUTION INTRAVENOUS
Status: CANCELLED | OUTPATIENT
Start: 2021-11-18

## 2021-11-18 RX ORDER — HEPARIN SODIUM (PORCINE) LOCK FLUSH IV SOLN 100 UNIT/ML 100 UNIT/ML
5 SOLUTION INTRAVENOUS
Status: CANCELLED | OUTPATIENT
Start: 2021-11-18

## 2021-11-18 RX ORDER — FUROSEMIDE 20 MG
40 TABLET ORAL DAILY
Qty: 30 TABLET | Refills: 5 | COMMUNITY
Start: 2021-11-18 | End: 2021-11-24

## 2021-11-18 RX ORDER — LIDOCAINE HYDROCHLORIDE 10 MG/ML
20 INJECTION, SOLUTION EPIDURAL; INFILTRATION; INTRACAUDAL; PERINEURAL ONCE
Status: CANCELLED | OUTPATIENT
Start: 2021-11-18 | End: 2021-11-18

## 2021-11-18 RX ORDER — DIPHENHYDRAMINE HYDROCHLORIDE 50 MG/ML
50 INJECTION INTRAMUSCULAR; INTRAVENOUS
Status: CANCELLED
Start: 2021-11-18

## 2021-11-18 RX ORDER — ALBUTEROL SULFATE 90 UG/1
1-2 AEROSOL, METERED RESPIRATORY (INHALATION)
Status: CANCELLED
Start: 2021-11-18

## 2021-11-18 RX ORDER — MEPERIDINE HYDROCHLORIDE 25 MG/ML
25 INJECTION INTRAMUSCULAR; INTRAVENOUS; SUBCUTANEOUS EVERY 30 MIN PRN
Status: CANCELLED | OUTPATIENT
Start: 2021-11-18

## 2021-11-18 NOTE — PROGRESS NOTES
Per Eduardo Parry PA-C, pt would benefit from enrollment in care coordination. Pt is a 51 year old female with PMH significant for ETOH hepatitis c/b ascites requiring paracentesis and esophageal varices/PHG. Pt's last alcohol intake was 9/2021. Pt had last hepatology follow up on 11/10. Plan established at visit:    1) Trend labs- Pt will get repeat hepatic panel and BMP on 11/22. Pt also has therapy plan for blood transfusion as needed.  2) Start furosemide 20 mg daily and spironolactone 25 mg daily- On 11/18, furosemide increased to 40 mg daily and spironolactone increased to 50 mg daily   3) Paracentesis as needed- Pt has standing orders for paracentesis and will go to [facility] for appointments  4) Low Na/high protein diet  5) Complete fluconazole x 14 days for esophageal candidiasis  6) Continue pantoprazole 40 mg 2 times daily x 2 months  7) Continue folate and thiamine x 2 months total  8) Complete abstinence from alcohol  9) Recommend Rule 25/CD eval  10) Follow up with Eduardo in 1 month- Needs appointment scheduled  11) Follow up with Dr. Steele in 6-8 weeks- Needs appointment scheduled    Called pt to introduce self and RN care coordinator role. Reviewed plan of care and pt verbalized understanding. Plan to check in with pt in 1 week.       ASCITES: Yes  - History of SBP: No  - Meets criteria for SBP ppx: No  [History of SBP and/or ascites total protein < 1.5g/dL AND either SCr  >1.2 , Na <130 or TB>3]

## 2021-11-19 DIAGNOSIS — Z11.59 ENCOUNTER FOR SCREENING FOR OTHER VIRAL DISEASES: ICD-10-CM

## 2021-11-19 NOTE — PROGRESS NOTES
Pt evaluated via billable telephone visit d/t COVID-19 restrictions. Time spent: 30 min    [facility]  Outpatient MNT     Time Spent: 30 minutes  Visit Type: Initial  Referring Physician: Brinda  Reason for RD Visit: MNT for liver dz  Pt accompanied by: her      Nutrition Assessment  Challenging to get enough protein while following low sodium  Overall reports savory proteins (eggs, meat, poultry) are less appealing to her now and she tends toward sweeter foods. eGFR 59    - Appetite: fair, fluctuates; appetite best in AM    - Food allergies/intolerances: no   - Meal prep & grocery shopping:  does   - Previous RD education: no   - Issues chewing or swallowing: no   - N/V/D/C: nausea and diarrhea from lactulose   - Food access concerns: not asked     Vitamins, Supplements, Pertinent Meds: folic acid, MVI, thiamin  Herbal Medicines/Supplements: none     Weight hx:   - was retaining water in ankles, but now resolved w/ diuretic; has lost weight since the hospital stay  -  thinks she has lost (dry and water weight) combination of 30 lbs x 3-4 months  - loss of muscle mass in arms and legs reported per      Diet Recall  Breakfast Greek yogurt; cereal w/ berries; toast w/ PB   Lunch Ensure Enlive since hospital stay, 20 grams pro, 250 cals   Dinner Pork tenderloin/steak/chicken (not a huge fan- bland/dry)- lost taste for these foods; turkey spaghetti; homemade soup (meat-based w/ rotisserie chix before Low Na diet); some potato   Snacks Candy, grapes   Beverages Occasional smoothie w/ frozen fruit, protein powder; water, some Sprite   Alcohol Not asked    Dining out Never      Physical Activity  Goes to the store w/ , runs errands   Has treadmill and goal to get back to walking     Malnutrition - unable to assess d/t virtual visit but at risk for malnutrition given report of muscle loss, weight loss    Anthropometrics   IBW/%IBW: 125/113  Dosing wt: 141 lbs/64 kg    Estimated  Nutrition Needs   Protein: 64-77+ grams/day (1-1.2+ g/kg) for repletion of muscle mass    Nutrition Diagnosis  Predicted suboptimal nutrient intake (protein) related to low interest in certain protein foods, increased needs w/ liver disease.    Nutrition Intervention  Reviewed tracking Na and protein intake via MFP. 2000 mg Na/day and minimum 65 g protein/day. Provided resources and recipes via email. Consider low volume protein supplement (ie Prostat), gave protein bar brands, protein smoothie recipes. Focus on proteins she does like and consider HS snack with protein and explained why.     Patient Understanding: Pt verbalized understanding of education provided.  Expected Engagement: Good  Follow-Up Plans: PRN     Nutrition Goals  Aim for minimum of 64 g protein/day     Lucila Winter, URMILA, LD, CCTD

## 2021-11-22 ENCOUNTER — TELEPHONE (OUTPATIENT)
Dept: GASTROENTEROLOGY | Facility: CLINIC | Age: 51
End: 2021-11-22
Payer: COMMERCIAL

## 2021-11-22 DIAGNOSIS — B37.31 CANDIDIASIS OF VULVA AND VAGINA: Primary | ICD-10-CM

## 2021-11-22 RX ORDER — FLUCONAZOLE 150 MG/1
150 TABLET ORAL ONCE
Qty: 1 TABLET | Refills: 0 | Status: SHIPPED | OUTPATIENT
Start: 2021-11-22 | End: 2021-11-22

## 2021-11-22 NOTE — TELEPHONE ENCOUNTER
[facility] Call Center    Phone Message    May a detailed message be left on voicemail: yes     Reason for Call: Other: Pt called in requesting a call back because she believes that the new medication gave her a yeast infection and she is requesting a prescription. Please reach out. Thank you.     Action Taken: Message routed to:  [facility] [place] [facility]    Travel Screening: Not Applicable

## 2021-11-22 NOTE — TELEPHONE ENCOUNTER
Connected with pt to discuss symptoms. Pt reported experiencing abdominal pain while trying to sleep the last 2-3 nights and is wondering if this is related to the recent increase in diuretics. Denied nausea/ vomiting and blood in stool. Discussed that abdominal discomfort is not likely due to diuretics and could be related to ascites. Pt has a paracentesis scheduled on 11/26. Instructed pt to continue current doses of diuretics. Pt has repeat labs scheduled for 11/23.    Pt reported vaginal itching and is concerned that she has a vaginal yeast infection. Pt stated that she tried vagisil with no improvement in symptoms. Pt requested that Grete prescribe her something. Discussed that pt should follow up PCP regarding this and pt stated that she did not have one. Reviewed pt's symptoms with Grete and fluconazole 150 mg tablet x 1 prescribed. Per Eduardo, it is essential that pt establish care with a PCP. Notified pt of prescription and need for a primary care provider.

## 2021-11-23 ENCOUNTER — LAB (OUTPATIENT)
Dept: LAB | Facility: CLINIC | Age: 51
End: 2021-11-23
Attending: PHYSICIAN ASSISTANT
Payer: COMMERCIAL

## 2021-11-23 ENCOUNTER — TELEPHONE (OUTPATIENT)
Dept: MULTI SPECIALTY CLINIC | Facility: CLINIC | Age: 51
End: 2021-11-23

## 2021-11-23 ENCOUNTER — NURSE TRIAGE (OUTPATIENT)
Dept: FAMILY MEDICINE | Facility: CLINIC | Age: 51
End: 2021-11-23

## 2021-11-23 ENCOUNTER — HOSPITAL ENCOUNTER (EMERGENCY)
Facility: CLINIC | Age: 51
Discharge: HOME | End: 2021-11-23
Attending: NURSE PRACTITIONER | Admitting: NURSE PRACTITIONER
Payer: COMMERCIAL

## 2021-11-23 ENCOUNTER — VIRTUAL VISIT (OUTPATIENT)
Dept: TRANSPLANT | Facility: CLINIC | Age: 51
End: 2021-11-23
Attending: PHYSICIAN ASSISTANT
Payer: COMMERCIAL

## 2021-11-23 ENCOUNTER — VIRTUAL VISIT (OUTPATIENT)
Dept: FAMILY MEDICINE | Facility: CLINIC | Age: 51
End: 2021-11-23
Payer: COMMERCIAL

## 2021-11-23 VITALS
TEMPERATURE: 98.3 F | OXYGEN SATURATION: 100 % | DIASTOLIC BLOOD PRESSURE: 70 MMHG | HEART RATE: 110 BPM | SYSTOLIC BLOOD PRESSURE: 117 MMHG | RESPIRATION RATE: 18 BRPM

## 2021-11-23 DIAGNOSIS — K83.1 CHOLESTATIC LIVER DISEASE (H): ICD-10-CM

## 2021-11-23 DIAGNOSIS — N94.89 VAGINAL BURNING: Primary | ICD-10-CM

## 2021-11-23 DIAGNOSIS — K70.11 ALCOHOLIC HEPATITIS WITH ASCITES (H): ICD-10-CM

## 2021-11-23 DIAGNOSIS — K70.31 ALCOHOLIC CIRRHOSIS OF LIVER WITH ASCITES (H): ICD-10-CM

## 2021-11-23 DIAGNOSIS — T14.8XXA BLEEDING FROM WOUND: ICD-10-CM

## 2021-11-23 LAB
ALBUMIN SERPL-MCNC: 2.8 G/DL (ref 3.4–5)
ALP SERPL-CCNC: 121 U/L (ref 40–150)
ALT SERPL W P-5'-P-CCNC: 44 U/L (ref 0–50)
ANION GAP SERPL CALCULATED.3IONS-SCNC: 6 MMOL/L (ref 3–14)
AST SERPL W P-5'-P-CCNC: 82 U/L (ref 0–45)
BILIRUB DIRECT SERPL-MCNC: 7.3 MG/DL (ref 0–0.2)
BILIRUB SERPL-MCNC: 15.6 MG/DL (ref 0.2–1.3)
BUN SERPL-MCNC: 12 MG/DL (ref 7–30)
CALCIUM SERPL-MCNC: 8.9 MG/DL (ref 8.5–10.1)
CHLORIDE BLD-SCNC: 102 MMOL/L (ref 94–109)
CO2 SERPL-SCNC: 27 MMOL/L (ref 20–32)
CREAT SERPL-MCNC: 1.02 MG/DL (ref 0.52–1.04)
GFR SERPL CREATININE-BSD FRML MDRD: 64 ML/MIN/1.73M2
GLUCOSE BLD-MCNC: 129 MG/DL (ref 70–99)
POTASSIUM BLD-SCNC: 3.5 MMOL/L (ref 3.4–5.3)
PROT SERPL-MCNC: 6.7 G/DL (ref 6.8–8.8)
SARS-COV-2 RNA RESP QL NAA+PROBE: NEGATIVE
SODIUM SERPL-SCNC: 135 MMOL/L (ref 133–144)

## 2021-11-23 PROCEDURE — 87635 SARS-COV-2 COVID-19 AMP PRB: CPT | Performed by: NURSE PRACTITIONER

## 2021-11-23 PROCEDURE — 82248 BILIRUBIN DIRECT: CPT

## 2021-11-23 PROCEDURE — 97802 MEDICAL NUTRITION INDIV IN: CPT | Mod: TEL,95 | Performed by: DIETITIAN, REGISTERED

## 2021-11-23 PROCEDURE — 80048 BASIC METABOLIC PNL TOTAL CA: CPT

## 2021-11-23 PROCEDURE — 12001 RPR S/N/AX/GEN/TRNK 2.5CM/<: CPT

## 2021-11-23 PROCEDURE — 99283 EMERGENCY DEPT VISIT LOW MDM: CPT

## 2021-11-23 PROCEDURE — 99213 OFFICE O/P EST LOW 20 MIN: CPT | Mod: TEL | Performed by: FAMILY MEDICINE

## 2021-11-23 PROCEDURE — 36415 COLL VENOUS BLD VENIPUNCTURE: CPT

## 2021-11-23 ASSESSMENT — ENCOUNTER SYMPTOMS
WOUND: 1
DIZZINESS: 0
WEAKNESS: 0
FATIGUE: 0
BRUISES/BLEEDS EASILY: 1
LIGHT-HEADEDNESS: 0
FEVER: 0
SHORTNESS OF BREATH: 0

## 2021-11-23 NOTE — ED TRIAGE NOTES
Pt has elevated INR, has a wart on hand that started to squirt blood, having trouble getting it to stop.

## 2021-11-23 NOTE — LETTER
11/23/2021     RE: Melita Cortes  [address]  [facility] [address]    Dear Colleague,    Thank you for referring your patient, Melita Cortes, to the [facility]. Please see a copy of my visit note below.    Pt evaluated via billable telephone visit d/t COVID-19 restrictions. Time spent: 30 min    [facility]  Outpatient MNT     Time Spent: 30 minutes  Visit Type: Initial  Referring Physician: Brinda  Reason for RD Visit: MNT for liver dz  Pt accompanied by: her      Nutrition Assessment  Challenging to get enough protein while following low sodium  Overall reports savory proteins (eggs, meat, poultry) are less appealing to her now and she tends toward sweeter foods. eGFR 59    - Appetite: fair, fluctuates; appetite best in AM    - Food allergies/intolerances: no   - Meal prep & grocery shopping:  does   - Previous RD education: no   - Issues chewing or swallowing: no   - N/V/D/C: nausea and diarrhea from lactulose   - Food access concerns: not asked     Vitamins, Supplements, Pertinent Meds: folic acid, MVI, thiamin  Herbal Medicines/Supplements: none     Weight hx:   - was retaining water in ankles, but now resolved w/ diuretic; has lost weight since the hospital stay  -  thinks she has lost (dry and water weight) combination of 30 lbs x 3-4 months  - loss of muscle mass in arms and legs reported per      Diet Recall  Breakfast Greek yogurt; cereal w/ berries; toast w/ PB   Lunch Ensure Enlive since hospital stay, 20 grams pro, 250 cals   Dinner Pork tenderloin/steak/chicken (not a huge fan- bland/dry)- lost taste for these foods; turkey spaghetti; homemade soup (meat-based w/ rotisserie chix before Low Na diet); some potato   Snacks Candy, grapes   Beverages Occasional smoothie w/ frozen fruit, protein powder; water, some Sprite   Alcohol Not asked    Dining out Never      Physical Activity  Goes to the store w/ , runs  errands   Has treadmill and goal to get back to walking     Malnutrition - unable to assess d/t virtual visit but at risk for malnutrition given report of muscle loss, weight loss    Anthropometrics   IBW/%IBW: 125/113  Dosing wt: 141 lbs/64 kg    Estimated Nutrition Needs   Protein: 64-77+ grams/day (1-1.2+ g/kg) for repletion of muscle mass    Nutrition Diagnosis  Predicted suboptimal nutrient intake (protein) related to low interest in certain protein foods, increased needs w/ liver disease.    Nutrition Intervention  Reviewed tracking Na and protein intake via MFP. 2000 mg Na/day and minimum 65 g protein/day. Provided resources and recipes via email. Consider low volume protein supplement (ie Prostat), gave protein bar brands, protein smoothie recipes. Focus on proteins she does like and consider HS snack with protein and explained why.     Patient Understanding: Pt verbalized understanding of education provided.  Expected Engagement: Good  Follow-Up Plans: PRN     Nutrition Goals  Aim for minimum of 64 g protein/day     Lucila Winter, RD, LD, CCTD

## 2021-11-23 NOTE — PROGRESS NOTES
"Fidelina is a 51 year old who is being evaluated via a billable telephone visit.      What phone number would you like to be contacted at? [phone]    How would you like to obtain your AVS? MyChart    Assessment & Plan     Vaginal burning  ? Yeast vs bv vs irritant. She has lab appt this afternoon at clinic already so will have her complete wet prep and direct tx after that. Reviewed barrier ointments/cream such as aquafor or desitin to help with frequent voiding/stooling protection.   Reviewed red flag symptoms that would precipitate the need for routine, urgent or emergent f/u   - Wet prep - lab collect; Future         BMI:   Estimated body mass index is 23.6 kg/m  as calculated from the following:    Height as of 11/3/21: 1.651 m (5' 5\").    Weight as of 11/8/21: 64.3 kg (141 lb 12.8 oz).       Patient Instructions   Complete wet prep testing at lab visit.   I'll update you via mychart with results and plan for treatment then  Use barrier ointments/creams such as Aquaphor or Desitin to help prevent irritation in the vaginal and rectal areas with your frequent stooling and urinating       Return in about 1 week (around 11/30/2021), or if symptoms worsen or fail to improve.    Cheryl Boo MD  [facility]   Fidelina is a 51 year old who presents for the following health issues     HPI   Melita Cortes is a 51 year old female with ETOH hepatitis complicated by history of ascites requiring paracentesis. and esophageal varices/PHG.  Was recently in hospital and had egd- esophageal candidiasis. Was placed on anti-fungal diflucan but then few days later got PNA and not sure if she got med in hospital.  Finished the 15 day course of diflucan when she got home (not really sure how long ago stopped med)    ? If yeast infx present. Burns and itches in vaginal area. Tried otc vagisil and no help. On lactulose so stooling a lot. Wiping front to back.   Sx's started few days " ago. No discharge  Menses are quite irregular, no cycle for quite a while (perimenopausal). Not sexually active recently so denies risk of pg. Monogamous with  of 20 years and denies risks for sti.   Did have some menstrual cramping at the beginning of the sx's which have now resolved.   No dysuria. + frequency due to diuretic. Wonders if irritated from the wiping.   No f/c            Objective           Vitals:  No vitals were obtained today due to virtual visit.    Physical Exam   healthy, alert and no distress  PSYCH: Alert and oriented times 3; coherent speech, normal   rate and volume, able to articulate logical thoughts, able   to abstract reason, no tangential thoughts, no hallucinations   or delusions  Her affect is normal and pleasant  RESP: No cough, no audible wheezing, able to talk in full sentences  Remainder of exam unable to be completed due to telephone visits            Phone call duration: 15 minutes

## 2021-11-23 NOTE — PATIENT INSTRUCTIONS
Complete wet prep testing at lab visit.   I'll update you via [facility] with results and plan for treatment then  Use barrier ointments/creams such as Aquaphor or Desitin to help prevent irritation in the vaginal and rectal areas with your frequent stooling and urinating

## 2021-11-23 NOTE — TELEPHONE ENCOUNTER
"PT's  calling.  Pt is on coumadin and a liver condition.  INR is typically around 2.  Hole on hand is squirting blood.    Discussed options of UC or ER.   will take pt to local ER.  Without pressure, there is the squirting blood from the hole on the base of her hand.  CRICKET Henry    Reason for Disposition    Bleeding won't stop after 10 minutes of direct pressure (using correct technique)    Additional Information    Negative: Shock suspected (e.g., cold/pale/clammy skin, too weak to stand, low BP, rapid pulse)    Negative: Cut on the neck, chest, back, or abdomen that may go deep (e.g., stab wound or other suspicious penetrating injury)    Negative: Major bleeding (actively dripping or spurting) that can't be stopped    Negative: Amputation    Negative: Sounds like a life-threatening emergency to the triager    Negative: Animal bite and broken skin    Negative: Injury is a puncture wound    Negative: Splinter in the skin    Negative: Wound looks infected    Negative: Burn    Negative: High pressure injection injury (e.g., from paint gun, usually work-related)    Negative: Skin loss involves more than 10% of surface area (Note: the palm of the hand = 1%)    Answer Assessment - Initial Assessment Questions  1. APPEARANCE of INJURY: \"What does the injury look like?\"       Cut on base of thumb. Picked on wart?  2. SIZE: \"How large is the cut?\"       Hole is squirting blood.  3. BLEEDING: \"Is it bleeding now?\" If so, ask: \"Is it difficult to stop?\"       Putting pressure. Hole is squirting blood.  4. LOCATION: \"Where is the injury located?\"       Hand.  5. ONSET: \"How long ago did the injury occur?\"       Started today.  6. MECHANISM: \"Tell me how it happened.\"       Picked at a wart.  7. TETANUS: \"When was the last tetanus booster?\"      n/a  8. PREGNANCY: \"Is there any chance you are pregnant?\" \"When was your last menstrual period?\"     Not asked.    Protocols used: SKIN INJURY-A-OH      "

## 2021-11-24 ENCOUNTER — LAB (OUTPATIENT)
Dept: LAB | Facility: CLINIC | Age: 51
End: 2021-11-24

## 2021-11-24 ENCOUNTER — TELEPHONE (OUTPATIENT)
Dept: MEDSURG UNIT | Facility: CLINIC | Age: 51
End: 2021-11-24

## 2021-11-24 DIAGNOSIS — N94.89 VAGINAL BURNING: ICD-10-CM

## 2021-11-24 DIAGNOSIS — N94.89 VAGINAL BURNING: Primary | ICD-10-CM

## 2021-11-24 LAB
CLUE CELLS: ABNORMAL
TRICHOMONAS, WET PREP: ABNORMAL
WBC'S/HIGH POWER FIELD, WET PREP: ABNORMAL
YEAST, WET PREP: ABNORMAL

## 2021-11-24 PROCEDURE — 87210 SMEAR WET MOUNT SALINE/INK: CPT

## 2021-11-24 RX ORDER — FUROSEMIDE 40 MG
80 TABLET ORAL DAILY
Qty: 60 TABLET | Refills: 3 | Status: ON HOLD | OUTPATIENT
Start: 2021-11-24 | End: 2021-12-25

## 2021-11-24 RX ORDER — FLUCONAZOLE 150 MG/1
150 TABLET ORAL ONCE
Qty: 2 TABLET | Refills: 0 | Status: SHIPPED | OUTPATIENT
Start: 2021-11-24 | End: 2021-11-24

## 2021-11-24 RX ORDER — SPIRONOLACTONE 50 MG/1
100 TABLET, FILM COATED ORAL DAILY
Qty: 60 TABLET | Refills: 3 | Status: ON HOLD | OUTPATIENT
Start: 2021-11-24 | End: 2021-12-25

## 2021-11-24 NOTE — TELEPHONE ENCOUNTER
Received call form [facility] as GI fellow on call re: elevated bilirubin level of 15.6. This is within patient's recent baseline, and has had evaluation for biliary obstruction. Likely a result of underlying alcoholic hepatitis. Will alert Eduardo Parry who she follows with outpatient re: this call for any changes to her plan from their visit on 11/10.

## 2021-11-24 NOTE — PROGRESS NOTES
Pt presented to the emergency room on 11/23 for uncontrolled bleeding from a wound. ER physician applied sutures and had good hemostasis. Pt had labs drawn during ER visit and results reviewed by Eduardo Parry PA-C. Per Eduardo, pt can increase furosemide to 80 mg daily and spironolactone 100 mg daily. Pt will need to repeat labs in 1 week.     Pt has not had any bleeding from wound since sutures placed. Pt reported improved lower extremity edema and ascites but would like to try increasing diuretics. Updated prescriptions sent to pt's pharmacy. Pt will get repeat labs in 1 week at primary care clinic. Paracentesis appointment scheduled for 11/26.

## 2021-11-24 NOTE — TELEPHONE ENCOUNTER
Pre-Procedure Negative COVID Test Results on 11/23/21    Results Reviewed  The patient has a negative COVID test result within the required timeframe for the scheduled procedure.     No COVID pre-call needed.     Dirk Baldwin RN

## 2021-11-25 NOTE — RESULT ENCOUNTER NOTE
Julio Kitchen,   Your vaginal wet prep actually showed no yeast or signs of bacterial infection.  Given the symptoms though, I will send in a refill of the Diflucan medication for a few doses.  Continue with the plan to use a barrier cream or ointment also to help with irritation.  Certainly be seen in the office if ongoing symptoms despite these treatments.  Kind regards,  Cheryl Boo MD

## 2021-11-26 ENCOUNTER — HOSPITAL ENCOUNTER (OUTPATIENT)
Facility: CLINIC | Age: 51
Discharge: HOME | End: 2021-11-26
Payer: COMMERCIAL

## 2021-11-26 ENCOUNTER — HOSPITAL ENCOUNTER (OUTPATIENT)
Dept: ULTRASOUND IMAGING | Facility: CLINIC | Age: 51
Discharge: HOME | End: 2021-11-26
Attending: PHYSICIAN ASSISTANT | Admitting: PHYSICIAN ASSISTANT
Payer: COMMERCIAL

## 2021-11-26 VITALS
TEMPERATURE: 98.6 F | SYSTOLIC BLOOD PRESSURE: 102 MMHG | RESPIRATION RATE: 18 BRPM | HEART RATE: 86 BPM | DIASTOLIC BLOOD PRESSURE: 59 MMHG

## 2021-11-26 DIAGNOSIS — K70.31 ALCOHOLIC CIRRHOSIS OF LIVER WITH ASCITES (H): Primary | ICD-10-CM

## 2021-11-26 DIAGNOSIS — K70.31 ALCOHOLIC CIRRHOSIS OF LIVER WITH ASCITES (H): ICD-10-CM

## 2021-11-26 LAB — BACTERIA BRO BRUSH AEROBE CULT: ABNORMAL

## 2021-11-26 PROCEDURE — 49083 ABD PARACENTESIS W/IMAGING: CPT

## 2021-11-26 PROCEDURE — 250N000009 HC RX 250: Performed by: RADIOLOGY

## 2021-11-26 PROCEDURE — 999N000154 HC STATISTIC RADIOLOGY XRAY, US, CT, MAR, NM

## 2021-11-26 RX ORDER — ALBUTEROL SULFATE 90 UG/1
1-2 AEROSOL, METERED RESPIRATORY (INHALATION)
Status: CANCELLED
Start: 2021-11-26

## 2021-11-26 RX ORDER — LIDOCAINE HYDROCHLORIDE 10 MG/ML
20 INJECTION, SOLUTION EPIDURAL; INFILTRATION; INTRACAUDAL; PERINEURAL ONCE
Status: CANCELLED | OUTPATIENT
Start: 2021-11-26 | End: 2021-11-26

## 2021-11-26 RX ORDER — NALOXONE HYDROCHLORIDE 0.4 MG/ML
0.2 INJECTION, SOLUTION INTRAMUSCULAR; INTRAVENOUS; SUBCUTANEOUS
Status: CANCELLED | OUTPATIENT
Start: 2021-11-26

## 2021-11-26 RX ORDER — HEPARIN SODIUM,PORCINE 10 UNIT/ML
5 VIAL (ML) INTRAVENOUS
Status: CANCELLED | OUTPATIENT
Start: 2021-11-26

## 2021-11-26 RX ORDER — NICOTINE POLACRILEX 4 MG
15-30 LOZENGE BUCCAL
Status: CANCELLED | OUTPATIENT
Start: 2021-11-26

## 2021-11-26 RX ORDER — METHYLPREDNISOLONE SODIUM SUCCINATE 125 MG/2ML
125 INJECTION, POWDER, LYOPHILIZED, FOR SOLUTION INTRAMUSCULAR; INTRAVENOUS
Status: CANCELLED
Start: 2021-11-26

## 2021-11-26 RX ORDER — NICOTINE POLACRILEX 4 MG
15-30 LOZENGE BUCCAL
Status: DISCONTINUED | OUTPATIENT
Start: 2021-11-26 | End: 2021-11-26 | Stop reason: HOSPADM

## 2021-11-26 RX ORDER — ALBUTEROL SULFATE 0.83 MG/ML
2.5 SOLUTION RESPIRATORY (INHALATION)
Status: CANCELLED | OUTPATIENT
Start: 2021-11-26

## 2021-11-26 RX ORDER — LIDOCAINE HYDROCHLORIDE 10 MG/ML
20 INJECTION, SOLUTION EPIDURAL; INFILTRATION; INTRACAUDAL; PERINEURAL ONCE
Status: DISCONTINUED | OUTPATIENT
Start: 2021-11-26 | End: 2021-11-26 | Stop reason: HOSPADM

## 2021-11-26 RX ORDER — HEPARIN SODIUM,PORCINE 10 UNIT/ML
5 VIAL (ML) INTRAVENOUS
Status: DISCONTINUED | OUTPATIENT
Start: 2021-11-26 | End: 2021-11-26 | Stop reason: HOSPADM

## 2021-11-26 RX ORDER — DEXTROSE MONOHYDRATE 25 G/50ML
25-50 INJECTION, SOLUTION INTRAVENOUS
Status: DISCONTINUED | OUTPATIENT
Start: 2021-11-26 | End: 2021-11-26 | Stop reason: HOSPADM

## 2021-11-26 RX ORDER — ALBUMIN (HUMAN) 12.5 G/50ML
12.5 SOLUTION INTRAVENOUS
Status: DISCONTINUED | OUTPATIENT
Start: 2021-11-26 | End: 2021-11-26 | Stop reason: HOSPADM

## 2021-11-26 RX ORDER — HEPARIN SODIUM (PORCINE) LOCK FLUSH IV SOLN 100 UNIT/ML 100 UNIT/ML
5 SOLUTION INTRAVENOUS
Status: DISCONTINUED | OUTPATIENT
Start: 2021-11-26 | End: 2021-11-26 | Stop reason: HOSPADM

## 2021-11-26 RX ORDER — ALBUMIN (HUMAN) 12.5 G/50ML
12.5 SOLUTION INTRAVENOUS ONCE
Status: DISCONTINUED | OUTPATIENT
Start: 2021-11-26 | End: 2021-11-26 | Stop reason: HOSPADM

## 2021-11-26 RX ORDER — ALBUMIN (HUMAN) 12.5 G/50ML
12.5 SOLUTION INTRAVENOUS ONCE
Status: CANCELLED | OUTPATIENT
Start: 2021-11-26 | End: 2021-11-26

## 2021-11-26 RX ORDER — LIDOCAINE 40 MG/G
CREAM TOPICAL
Status: CANCELLED | OUTPATIENT
Start: 2021-11-26

## 2021-11-26 RX ORDER — EPINEPHRINE 1 MG/ML
0.3 INJECTION, SOLUTION, CONCENTRATE INTRAVENOUS EVERY 5 MIN PRN
Status: CANCELLED | OUTPATIENT
Start: 2021-11-26

## 2021-11-26 RX ORDER — MEPERIDINE HYDROCHLORIDE 25 MG/ML
25 INJECTION INTRAMUSCULAR; INTRAVENOUS; SUBCUTANEOUS EVERY 30 MIN PRN
Status: CANCELLED | OUTPATIENT
Start: 2021-11-26

## 2021-11-26 RX ORDER — LIDOCAINE 40 MG/G
CREAM TOPICAL
Status: DISCONTINUED | OUTPATIENT
Start: 2021-11-26 | End: 2021-11-26 | Stop reason: HOSPADM

## 2021-11-26 RX ORDER — DIPHENHYDRAMINE HYDROCHLORIDE 50 MG/ML
50 INJECTION INTRAMUSCULAR; INTRAVENOUS
Status: CANCELLED
Start: 2021-11-26

## 2021-11-26 RX ORDER — HEPARIN SODIUM (PORCINE) LOCK FLUSH IV SOLN 100 UNIT/ML 100 UNIT/ML
5 SOLUTION INTRAVENOUS
Status: CANCELLED | OUTPATIENT
Start: 2021-11-26

## 2021-11-26 RX ORDER — DEXTROSE MONOHYDRATE 25 G/50ML
25-50 INJECTION, SOLUTION INTRAVENOUS
Status: CANCELLED | OUTPATIENT
Start: 2021-11-26

## 2021-11-26 RX ORDER — ALBUMIN (HUMAN) 12.5 G/50ML
12.5 SOLUTION INTRAVENOUS
Status: CANCELLED | OUTPATIENT
Start: 2021-11-26

## 2021-11-26 RX ORDER — LIDOCAINE HYDROCHLORIDE 10 MG/ML
10 INJECTION, SOLUTION EPIDURAL; INFILTRATION; INTRACAUDAL; PERINEURAL ONCE
Status: COMPLETED | OUTPATIENT
Start: 2021-11-26 | End: 2021-11-26

## 2021-11-26 RX ADMIN — LIDOCAINE HYDROCHLORIDE 10 ML: 10 INJECTION, SOLUTION EPIDURAL; INFILTRATION; INTRACAUDAL; PERINEURAL at 12:56

## 2021-11-26 NOTE — DISCHARGE INSTRUCTIONS

## 2021-11-26 NOTE — PROGRESS NOTES
Patient Wellness Screening  1. In the last month, have you been in contact with someone who was confirmed or suspected to have Coronavirus/COVID-19? No    2. Do you have the following symptoms?  Fever/Chills? No   Cough? No   Shortness of breath? No   New loss of taste or smell? No  Sore throat? No  Muscle or body aches? No  Headaches? No  Fatigue? No  Vomiting or diarrhea? No    Admission Note:  Reason for admission: paracentesis  Time of arrival:1220    Accompanied by: in lobby  Name/phone of discontinue :Doug [phone]    Pre-procedure assessment complete: Yes  If abnormal assessment/labs, provider notified: N/A    Medications held per instructions/orders: N/A  Procedure explained. All questions & concerns addressed: Yes  Consent: obtained    Discharge instructions reviewed: Yes  Patient verbalizes understanding: Yes    Paracentesis:   Patient tolerated well. VSS. 3400 cc haroldo fluid removed from abdomen w/o difficulty. Bandaid applied to site - CDI with liquid bandage and bandaid.  Albumin given per protocol / standing orders. No albumin given as per stand order therapy plan.          Discharge Note:  Discharge criteria met and vital signs stable: Yes    1318 Patient discharged per ambulatory to private vehicle. All personal belongings taken with patient.

## 2021-12-01 ENCOUNTER — TELEPHONE (OUTPATIENT)
Dept: GASTROENTEROLOGY | Facility: CLINIC | Age: 51
End: 2021-12-01

## 2021-12-01 ENCOUNTER — LAB (OUTPATIENT)
Dept: LAB | Facility: CLINIC | Age: 51
End: 2021-12-01
Payer: COMMERCIAL

## 2021-12-01 DIAGNOSIS — K70.31 ALCOHOLIC CIRRHOSIS OF LIVER WITH ASCITES (H): ICD-10-CM

## 2021-12-01 LAB
ALBUMIN SERPL-MCNC: 3.1 G/DL (ref 3.4–5)
ALP SERPL-CCNC: 127 U/L (ref 40–150)
ALT SERPL W P-5'-P-CCNC: 60 U/L (ref 0–50)
ANION GAP SERPL CALCULATED.3IONS-SCNC: 9 MMOL/L (ref 3–14)
AST SERPL W P-5'-P-CCNC: 125 U/L (ref 0–45)
BILIRUB DIRECT SERPL-MCNC: 7.3 MG/DL (ref 0–0.2)
BILIRUB SERPL-MCNC: 14.7 MG/DL (ref 0.2–1.3)
BUN SERPL-MCNC: 16 MG/DL (ref 7–30)
CALCIUM SERPL-MCNC: 9.3 MG/DL (ref 8.5–10.1)
CHLORIDE BLD-SCNC: 95 MMOL/L (ref 94–109)
CO2 SERPL-SCNC: 27 MMOL/L (ref 20–32)
CREAT SERPL-MCNC: 0.86 MG/DL (ref 0.52–1.04)
ERYTHROCYTE [DISTWIDTH] IN BLOOD BY AUTOMATED COUNT: 23.2 % (ref 10–15)
GFR SERPL CREATININE-BSD FRML MDRD: 79 ML/MIN/1.73M2
GLUCOSE BLD-MCNC: 162 MG/DL (ref 70–99)
HCT VFR BLD AUTO: 23.9 % (ref 35–47)
HGB BLD-MCNC: 7.9 G/DL (ref 11.7–15.7)
MCH RBC QN AUTO: 37.3 PG (ref 26.5–33)
MCHC RBC AUTO-ENTMCNC: 33.1 G/DL (ref 31.5–36.5)
MCV RBC AUTO: 113 FL (ref 78–100)
PLATELET # BLD AUTO: 142 10E3/UL (ref 150–450)
POTASSIUM BLD-SCNC: 4 MMOL/L (ref 3.4–5.3)
PROT SERPL-MCNC: 6.9 G/DL (ref 6.8–8.8)
RBC # BLD AUTO: 2.12 10E6/UL (ref 3.8–5.2)
SODIUM SERPL-SCNC: 131 MMOL/L (ref 133–144)
WBC # BLD AUTO: 12.9 10E3/UL (ref 4–11)

## 2021-12-01 PROCEDURE — 85027 COMPLETE CBC AUTOMATED: CPT

## 2021-12-01 PROCEDURE — 80053 COMPREHEN METABOLIC PANEL: CPT

## 2021-12-01 PROCEDURE — 36415 COLL VENOUS BLD VENIPUNCTURE: CPT

## 2021-12-01 PROCEDURE — 82248 BILIRUBIN DIRECT: CPT

## 2021-12-01 NOTE — TELEPHONE ENCOUNTER
DATE:  12/1/2021   TIME OF RECEIPT FROM LAB:  3:08 pm  LAB TEST:  Hemoglobin  LAB VALUE:  7.7  RESULTS GIVEN WITH READ-BACK TO (PROVIDER):  Eduardo Parry PA-C    TIME LAB VALUE REPORTED TO PROVIDER:   3:08 pm    Results consistent with previous labs. Patient has a therapy plan for blood transfusion as needed. Parameters for transfusion not met with critical value.

## 2021-12-02 ENCOUNTER — TELEPHONE (OUTPATIENT)
Dept: FAMILY MEDICINE | Facility: CLINIC | Age: 51
End: 2021-12-02

## 2021-12-02 ENCOUNTER — ALLIED HEALTH/NURSE VISIT (OUTPATIENT)
Dept: NURSING | Facility: CLINIC | Age: 51
End: 2021-12-02
Payer: COMMERCIAL

## 2021-12-02 DIAGNOSIS — Z48.02 VISIT FOR SUTURE REMOVAL: Primary | ICD-10-CM

## 2021-12-02 PROCEDURE — 99207 PR NO CHARGE NURSE ONLY: CPT

## 2021-12-02 NOTE — TELEPHONE ENCOUNTER
Writer contacted patient regarding questions on suture removal.    Patient has questions of when to have her stitches removed. Per the ED provider note on 11/23/21. The stitches were to be removed in 7 days. So the date of removal should have been on the 11/30/21.     Patient had concerns with INR being high and having stitches in longer? Most recent INR was on 11/17/21 and was 2.52.    Provider, is it safe for the patient to have the sutures removed based on the INR?    Routing to provider and partner to review and advise.    Nayeli Atkinson RN, BSN  [facility]

## 2021-12-02 NOTE — TELEPHONE ENCOUNTER
Yes it is safe to remove the stitches  Since we are not doing any incisions and just removing the stitches we should be okay  Her INR is going to be high all the time because of her underlying liver problem

## 2021-12-02 NOTE — PROGRESS NOTES
Pt had paracentesis done on 11/26 and 3.4 liters of fluid was removed. Pt also had repeat labs on 12/1. Per Eduardo Parry PA-C, pt should continue current doses of diuretics (furosemide 80 mg daily and spironolactone 100 mg daily). Pt reported decreased accumulation of ascites and lower extremity edema since increase in diuretics. Pt denied experiencing any lightheadedness or muscle cramps with dose increase. Encouraged pt to stay hydrated. Pt denied confusion and is taking lactulose 15 ml 3 times daily. Pt had been having at least 3 BMs/day but did not have any bowel movements on 12/1. Reviewed lactulose titration to achieve 3-5 BMs/day and/or to confusion.     Pt has hepatology follow up scheduled with Dr. Steele on 12/27. Plan to check in with pt in 1-2 weeks. Pt verbalized understanding and is agreeable to plan of care.

## 2021-12-02 NOTE — TELEPHONE ENCOUNTER
Patient called regarding getting her stitches out this afternoon. She states that she is on blood thinners and her INR is high currently so worries about it bleeding when we take the stiches out. I assured her that we would check the wound to make sure that it is closed before we would take out any of the stiches. She states that it is a loop stitch. Gave her plenty of reassurance. And she is more comfortable coming in this afternoon for the stitch removal.    Analy Murphy RN [facility]

## 2021-12-02 NOTE — TELEPHONE ENCOUNTER
Writer called patient back to relay provider message below.     Patient verbalized understanding and had no further questions or concerns at this time.     Nayeli Atkinson RN, BSN  [facility]

## 2021-12-02 NOTE — TELEPHONE ENCOUNTER
Patient calling needing advisement on whether she should be seen sooner for her follow up visit. She is scheduled on 12/13/2021 with Dr. Johnson.She was seen in the ER for a hand laceration. It was bleeding profusely due to her having clotting issues as she states.She will need the stitches removed (she says maybe 3) as the reason for the appointment.Her concern is the hospital did not give her a clear time frame to get the stitches removed because of her clotting issues. She wants to know if she should be seen sooner or?.Also, typically it would just be on the RN schedule to be done. Can someone please call her to discuss.     Greg Garcia

## 2021-12-02 NOTE — PROGRESS NOTES
S: Melita presents to the clinic today for  removal of sutures.  Sutures were placed on 11/23/21 for laceration on her palm   The patient has had the sutures in place for 9 days.    There has been no history of infection or drainage.    O: 1 sutures are seen located on the left palm.  The wound is healing well with no signs of infection and margins are well approximated.    One suture noted to be out already    A: Suture removed.      P:  All sutures were easily removed today.    Routine wound care discussed with patient.   Contact provider if any redness, warmth, swelling or pus like drainage which may indicate infection. Ok to remove the dressing/bandaid daily and can leave off if scabbed over and healed.  Ok to shower 1 to 2 days after suture removal if the incision is dry and heals well.  Informed patient the redness surrounding the incision should gradually disappear and that only a thin line should be visible after a few weeks. The patient will follow up as needed.      Aimee Marin RN RN,   [facility]

## 2021-12-03 ENCOUNTER — HOSPITAL ENCOUNTER (OUTPATIENT)
Facility: CLINIC | Age: 51
End: 2021-12-03
Payer: COMMERCIAL

## 2021-12-05 ENCOUNTER — HEALTH MAINTENANCE LETTER (OUTPATIENT)
Age: 51
End: 2021-12-05

## 2021-12-06 ENCOUNTER — TELEPHONE (OUTPATIENT)
Dept: GASTROENTEROLOGY | Facility: CLINIC | Age: 51
End: 2021-12-06
Payer: COMMERCIAL

## 2021-12-06 NOTE — TELEPHONE ENCOUNTER
[facility] Call Center    Phone Message    May a detailed message be left on voicemail: yes     Reason for Call: Other: Helena from the [place] calling as she received a form dated 11/17/2021 regarding the telehealth visit on 11/10.  She is wanting to confirm that Fidelina still has Jaundice and ascites.  She is requesting office notes be sent to her.  Please call her back to discuss    Action Taken: Message routed to:  [facility]    Travel Screening: Not Applicable

## 2021-12-08 NOTE — TELEPHONE ENCOUNTER
Office note from pt's clinic visit on 11/10 faxed to the [facility] as requested (pt gave verbal consent).

## 2021-12-09 DIAGNOSIS — Z11.59 ENCOUNTER FOR SCREENING FOR OTHER VIRAL DISEASES: Primary | ICD-10-CM

## 2021-12-10 ENCOUNTER — OFFICE VISIT (OUTPATIENT)
Dept: DERMATOLOGY | Facility: CLINIC | Age: 51
End: 2021-12-10
Payer: COMMERCIAL

## 2021-12-10 VITALS — SYSTOLIC BLOOD PRESSURE: 108 MMHG | DIASTOLIC BLOOD PRESSURE: 67 MMHG | HEART RATE: 110 BPM

## 2021-12-10 DIAGNOSIS — L98.8 FACIAL RHYTIDS: Primary | ICD-10-CM

## 2021-12-10 DIAGNOSIS — D48.5 NEOPLASM OF UNCERTAIN BEHAVIOR OF SKIN: ICD-10-CM

## 2021-12-10 PROCEDURE — 99213 OFFICE O/P EST LOW 20 MIN: CPT | Mod: 25 | Performed by: PHYSICIAN ASSISTANT

## 2021-12-10 PROCEDURE — 88305 TISSUE EXAM BY PATHOLOGIST: CPT | Performed by: PATHOLOGY

## 2021-12-10 PROCEDURE — 11102 TANGNTL BX SKIN SINGLE LES: CPT | Performed by: PHYSICIAN ASSISTANT

## 2021-12-10 RX ORDER — TRETINOIN 0.25 MG/G
CREAM TOPICAL
Qty: 45 G | Refills: 11 | Status: SHIPPED | OUTPATIENT
Start: 2021-12-10 | End: 2022-07-01

## 2021-12-10 NOTE — PATIENT INSTRUCTIONS
Wound Care Instructions     FOR SUPERFICIAL WOUNDS     [facility] [phone]                 AFTER 24 HOURS YOU SHOULD REMOVE THE BANDAGE AND BEGIN DAILY DRESSING CHANGES AS FOLLOWS:     1) Remove Dressing.     2) Clean and dry the area with tap water using a Q-tip or sterile gauze pad.     3) Apply Vaseline, Aquaphor, Polysporin ointment or Bacitracin ointment over entire wound.  Do NOT use Neosporin ointment.     4) Cover the wound with a band-aid, or a sterile non-stick gauze pad and micropore paper tape    REPEAT THESE INSTRUCTIONS AT LEAST ONCE A DAY UNTIL THE WOUND HAS COMPLETELY HEALED.    It is an old wives tale that a wound heals better when it is exposed to air and allowed to dry out. The wound will heal faster with a better cosmetic result if it is kept moist with ointment and covered with a bandage.    **Do not let the wound dry out.**    Supplies Needed:      *Cotton tipped applicators (Q-tips)    *Vaseline, Aquaphor, Polysporin or Bacitracin Ointment (NOT NEOSPORIN)    *Band-aids or non-stick gauze pads and micropore paper tape.      PATIENT INFORMATION:    During the healing process you will notice a number of changes. All wounds develop a small halo of redness surrounding the wound.  This means healing is occurring. Severe itching with extensive redness usually indicates sensitivity to the ointment or bandage tape used to dress the wound.  You should call our office if this develops.      Swelling  and/or discoloration around your surgical site is common, particularly when performed around the eye.    All wounds normally drain.  The larger the wound the more drainage there will be.  After 7-10 days, you will notice the wound beginning to shrink and new skin will begin to grow.  The wound is healed when you can see skin has formed over the entire area.  A healed wound has a healthy, shiny look to the surface and is red to dark pink in color to normalize.  Wounds may take approximately  4-6 weeks to heal.  Larger wounds may take 6-8 weeks.  After the wound is healed you may discontinue dressing changes.    You may experience a sensation of tightness as your wound heals. This is normal and will gradually subside.    Your healed wound may be sensitive to temperature changes. This sensitivity improves with time, but if you re having a lot of discomfort, try to avoid temperature extremes.    Patients frequently experience itching after their wound appears to have healed because of the continue healing under the skin.  Plain Vaseline will help relieve the itching.      POSSIBLE COMPLICATIONS    BLEEDIN. Leave the bandage in place.  2. Use tightly rolled up gauze or a cloth to apply direct pressure over the bandage for 30  minutes.  3. Reapply pressure for an additional 30 minutes if necessary  4. Use additional gauze and tape to maintain pressure once the bleeding has stopped.

## 2021-12-10 NOTE — LETTER
12/10/2021         RE: Melita Cortes  [id number] [address]  [place] [address]        Dear Colleague,    Thank you for referring your patient, Melita Cortes, to the [facility]. Please see a copy of my visit note below.    HPI:   Chief complaints: Melita Cortes is a pleasant 51 year old female who presents for evaluation of a bleeding spot on the left finger. She has a growth that will bleed easily that has been present for weeks. She has had two other similar areas in the past.       PHYSICAL EXAM:    /67   Pulse 110   Skin exam performed as follows: Type 2 skin. Mood appropriate  Alert and Oriented X 3. Well developed, well nourished in no distress.  General appearance: Normal  Head including face: Normal  Eyes: conjunctiva and lids: Normal  Mouth: Lips, teeth, gums: Normal  Neck: Normal  Cardiovascular: Exam of peripheral vascular system by observation for swelling, varicosities, edema: Normal  Right upper extremity: Normal  Left upper extremity: Normal  Right lower extremity: Normal  Left lower extremity: Normal  Skin: Scalp and body hair: See below    1. 3 mm vascular papule on the left 4th digit    ASSESSMENT/PLAN:     1. Pyogenic granuloma r/o other on the left 4th digit. Shave biopsy performed.  Area cleaned and anesthetized with 1% lidocaine with epinephrine.  Dermablade used to remove the lesion and sent to pathology. Bleeding was cauterized. Pt tolerated procedure well with no complications.   2. Facial rhytids - she uses tretinoin 0.05% cream for this but has been getting irritated from her current strength.   --Start tretinoin 0.025% cream every day         Follow-up: PRN  CC:   Scribed By: Bronwyn Mott, MS, NARENDRA          Again, thank you for allowing me to participate in the care of your patient.        Sincerely,        Bronwyn Mott PA-C

## 2021-12-10 NOTE — PROGRESS NOTES
HPI:   Chief complaints: Melita Cortes is a pleasant 51 year old female who presents for evaluation of a bleeding spot on the left finger. She has a growth that will bleed easily that has been present for weeks. She has had two other similar areas in the past.       PHYSICAL EXAM:    /67   Pulse 110   Skin exam performed as follows: Type 2 skin. Mood appropriate  Alert and Oriented X 3. Well developed, well nourished in no distress.  General appearance: Normal  Head including face: Normal  Eyes: conjunctiva and lids: Normal  Mouth: Lips, teeth, gums: Normal  Neck: Normal  Cardiovascular: Exam of peripheral vascular system by observation for swelling, varicosities, edema: Normal  Right upper extremity: Normal  Left upper extremity: Normal  Right lower extremity: Normal  Left lower extremity: Normal  Skin: Scalp and body hair: See below    1. 3 mm vascular papule on the left 4th digit    ASSESSMENT/PLAN:     1. Pyogenic granuloma r/o other on the left 4th digit. Shave biopsy performed.  Area cleaned and anesthetized with 1% lidocaine with epinephrine.  Dermablade used to remove the lesion and sent to pathology. Bleeding was cauterized. Pt tolerated procedure well with no complications.   2. Facial rhytids - she uses tretinoin 0.05% cream for this but has been getting irritated from her current strength.   --Start tretinoin 0.025% cream every day         Follow-up: PRN  CC:   Scribed By: Bronwyn Mott, MS, PA-C

## 2021-12-12 NOTE — PROGRESS NOTES
"  Assessment & Plan     Alcoholic cirrhosis of liver with ascites (H)  She is being followed up with hepatology  She is also due to see a transplant specialist  She remains on Lasix 80 mg daily and also spironolactone 100 mg daily  I asked her to weigh her self every day and if the weight goes up by more than 3 KGS take an extra dose of Lasix that day and the next day as well if it still stays elevated  She has a open order for as needed paracentesis  She certainly has evidence of portal hypertension from the recent EGD  Recent EGD also showed candidal infection for which she was treated  Not clear why she is on nonselective beta-blockers like propranolol or nadolol  I am wondering if this is because she has got refractive ascites  She still remains decompensated with elevated bilirubin  Her meld score is 30 today  INR is still at 2.52  Discussed with patient and  about the prognosis  They are going to meet her transplant physician virtualLY SOON  She is on a low sodium and high protein diet  On lactulose and having at least 3 bowel movements a day    Hyponatremia  Sodium is 131  This is most probably from a combination of being on Lasix and dilutional hyponatremia    Hyperbilirubinemia  Bilirubin coming down and is hovering at around 15  It was as high as 30 in September    Encounter for screening mammogram for breast cancer    - MA SCREENING DIGITAL BILAT - Future  (s+30); Future      30 minutes spent on the date of the encounter doing chart review, history and exam, documentation and further activities per the note       BMI:   Estimated body mass index is 22.02 kg/m  as calculated from the following:    Height as of 11/3/21: 1.651 m (5' 5\").    Weight as of this encounter: 60 kg (132 lb 4.8 oz).           Return in about 3 months (around 3/13/2022).    Navneet Johnson MD  [facility] ALMA ROSA Kitchen is a 51 year old who presents for the following health issues     History of " Present Illness       CKD: She is not using over the counter pain medicine.     She eats 2-3 servings of fruits and vegetables daily.She consumes 1 sweetened beverage(s) daily.She exercises with enough effort to increase her heart rate 9 or less minutes per day.  She exercises with enough effort to increase her heart rate 3 or less days per week.   She is taking medications regularly.  Here today to discuss about her ongoing liver issues  Trying to eat low-sodium high-protein diet  Going to see transplant physician soon  Very weak  Continues to be on short-term disability          Review of Systems   Constitutional, HEENT, cardiovascular, pulmonary, gi and gu systems are negative, except as otherwise noted.      Objective    /64 (BP Location: Right arm, Patient Position: Sitting, Cuff Size: Adult Regular)   Pulse 106   Temp 97.8  F (36.6  C) (Oral)   Resp 18   Wt 60 kg (132 lb 4.8 oz)   SpO2 100%   BMI 22.02 kg/m    Body mass index is 22.02 kg/m .  Physical Exam   GENERAL: healthy, alert and no distress  EYES: Deep icterus  RESP: lungs clear to auscultation - no rales, rhonchi or wheezes  CV: regular rates and rhythm, normal S1 S2, no S3 or S4 and systolic murmur  ABDOMEN: Ascites present  MS: no gross musculoskeletal defects noted, no edema  SKIN: jaundice and spider nevi

## 2021-12-13 ENCOUNTER — OFFICE VISIT (OUTPATIENT)
Dept: FAMILY MEDICINE | Facility: CLINIC | Age: 51
End: 2021-12-13
Payer: COMMERCIAL

## 2021-12-13 ENCOUNTER — MYC MEDICAL ADVICE (OUTPATIENT)
Dept: FAMILY MEDICINE | Facility: CLINIC | Age: 51
End: 2021-12-13

## 2021-12-13 ENCOUNTER — TELEPHONE (OUTPATIENT)
Dept: DERMATOLOGY | Facility: CLINIC | Age: 51
End: 2021-12-13

## 2021-12-13 VITALS
HEART RATE: 106 BPM | SYSTOLIC BLOOD PRESSURE: 110 MMHG | BODY MASS INDEX: 22.02 KG/M2 | WEIGHT: 132.3 LBS | DIASTOLIC BLOOD PRESSURE: 64 MMHG | OXYGEN SATURATION: 100 % | RESPIRATION RATE: 18 BRPM | TEMPERATURE: 97.8 F

## 2021-12-13 DIAGNOSIS — E87.1 HYPONATREMIA: ICD-10-CM

## 2021-12-13 DIAGNOSIS — Z12.31 ENCOUNTER FOR SCREENING MAMMOGRAM FOR BREAST CANCER: ICD-10-CM

## 2021-12-13 DIAGNOSIS — E80.6 HYPERBILIRUBINEMIA: ICD-10-CM

## 2021-12-13 DIAGNOSIS — K70.31 ALCOHOLIC CIRRHOSIS OF LIVER WITH ASCITES (H): Primary | ICD-10-CM

## 2021-12-13 PROCEDURE — 99214 OFFICE O/P EST MOD 30 MIN: CPT | Performed by: INTERNAL MEDICINE

## 2021-12-13 ASSESSMENT — PAIN SCALES - GENERAL: PAINLEVEL: NO PAIN (0)

## 2021-12-13 NOTE — TELEPHONE ENCOUNTER
Prior Authorization Retail Medication Request    Medication/Dose: tretinoin (RETIN-A) 0.025 % external cream  ICD code (if different than what is on RX):  Facial rhytids [L98.8]  - Primary   Previously Tried and Failed:    Rationale:      Insurance Name:  [employer]  Insurance ID:  [id number] or [id number]       Pharmacy Information (if different than what is on RX)  Name:    Phone:

## 2021-12-13 NOTE — LETTER
[address] [address]  Phone: [phone]    December 20, 2021        Melita Cortes  [id number] [address] [place] [address] [address]  [place] [id number]          To whom it may concern:    RE: Melita Cortes    The above-named patient has been following up with me for the last few months  I am her primary care provider  She has end-stage liver disease with cirrhosis  She is currently being evaluated for possible liver transplant  Her liver function tests are still very abnormal  She has had multiple hospitalizations in the past few months  She is at high risk of bleeding and also infections  Her functional status is very poor  She can hardly walk a few yards without getting short of breath and tired  When her liver function tests get worse sometimes she can also get confused  Because of all this she is in no shape or form to do any job    Please contact me for questions or concerns.      Sincerely,        Navneet Johnson MD

## 2021-12-13 NOTE — PATIENT INSTRUCTIONS
Patient Education     Cirrhosis    The liver is found on the right side of your belly (abdomen). It's just below the rib cage. The liver has many important jobs. It removes toxins from the blood. It also helps your blood clot to stop bleeding. Cirrhosis happens when the liver is scarred or injured. This damage is permanent. It can cause your liver to stop working (liver failure).    The most common causes of cirrhosis are long-term heavy alcohol use and having hepatitis B or C. Other causes include nonalcoholic steatohepatitis (CARTWRIGHT or fatty liver disease), autoimmune disease, hemochromatosis, toxins, certain medicines, and certain viruses.   Common symptoms of cirrhosis include:    Tiredness or weakness    Loss of appetite    Nausea and vomiting    Easy bleeding and bruising    Swelling of the belly (abdomen)    Weight loss    Yellowing of the eyes or skin (jaundice)    Itching    Confusion  Treatment helps ease symptoms and prevent more liver damage. You may also get treatment to fight  or cure the hepatitis virus. Quitting alcohol will help slow down the disease getting worse. It may also prevent more complications. If cirrhosis gets worse and becomes life threatening, you may need a liver transplant.    Home care    Don't take medicines that can make liver damage worse. Your healthcare provider will tell you if any of the medicines you now take need to be changed. Talk with your provider or pharmacist before taking any medicine not prescribed. These include dietary supplements and herbs. Some of these may make liver damage worse.    Talk with your healthcare provider about medicines that have acetaminophen or NSAIDs such as ibuprofen and naproxen. These can also harm your liver.     Stop drinking alcohol. If you find it hard to stop drinking, seek professional help. Consider joining Alcoholics Anonymous or another type of treatment program for support.    If you use IV drugs, you are at high risk for hepatitis  B and C. Seek help to stop.     Be sure to ask your healthcare provider about recommended vaccines. These include vaccines for viruses that can cause liver disease.    Follow-up care  Follow up with your healthcare provider, or as advised. If you have cirrhosis, you may need more testing to look for complications, including liver cancer.   For more information and to learn about support groups for people with liver disease, contact:     American [employer] Foundation, www.liverfoundation.org, 549.627.4637    Hepatitis [employer] International, , www.hepatitisfoundation.org, 407.596.9525  When to seek medical advice  Call your healthcare provider right away if you have any of the following:    Rapid weight gain with increased size of your belly (abdomen) or leg swelling    Yellow color of your skin or eyes (jaundice) gets worse    Excess bleeding from cuts or injuries  Planday last reviewed this educational content on 2/1/2020 2000-2021 [employer]. All rights reserved. This information is not intended as a substitute for professional medical care. Always follow your healthcare professional's instructions.           Patient Education   Protein and Liver Disease  If you have liver disease, your body may not   process protein the way it should. Too much   protein can lead to a condition called hepatic encephalopathy (HE).* Symptoms include:    Short-term memory loss    Finding it hard to focus or concentrate    Feeling tired or irritable.  If HE is a problem, you may find that plant proteins are easier to handle than animal proteins. Try to include plant proteins in your daily diet.  Limit your protein to _____ grams each day. Don't stop eating protein, as this can be harmful. Follow your dietitian's guidelines.  Sources of plant protein   1 cup tempeh = 30 grams protein     cup tofu or  = 5 to 10 grams protein soy protein  (depends on product)  1 cup (8 ounces)  = 9 grams protein soymilk  2 Tbsp peanut  butter = 8 grams protein     cup nuts = 6 grams protein   1/3 cup dried beans,  = 3 to 5 grams protein peas or lentils   *HE is not always related to protein intake. You should work with your dietitian and doctor to find out how much protein your body can handle.  Sources of animal protein   1 cup (8 ounces) milk = 8 grams protein   1 cup yogurt = 8 grams protein     cup cottage cheese = 7 grams protein   1 ounce cheese = 7 grams protein     cup ice cream = 4 grams protein   1 ounce cooked beef, lamb, pork or poultry = 7 grams protein     cup seafood or canned fish = 7 grams protein   5 small oysters or shrimp = 7 grams protein   1 large egg = 6 grams protein   Tips  2 tablespoons of peanut butter is about the size of a Ping-Pong ball.     3- to 4-ounce piece of meat is about the size of a deck of playing cards.     1 ounce of cheese is about the size of a anne.     Calories and Liver Disease  The energy you get from food is measured in calories. Your body needs calories to function and to stay at a healthy weight.   Since you are eating less protein, you may also be eating fewer calories. Not eating enough calories may cause your body to break down its own muscles for energy. This also puts stress on the liver.   The following foods may help you stay at a healthy weight while eating less protein.    Condiments such as honey, sugar, syrup, jam, jelly and non-dairy creamer    Fats like salad dressing, sour cream, cream cheese, butter and margarine    Fruits and vegetables    Candies and desserts (no milk products)    Starches such as bread, pasta and rice  Remember    Follow the recommendations for how much protein you should eat.    Choose plant proteins.    Eat small meals more often to help increase your calorie intake.    Spread your protein throughout the day to decrease your risk of HE.    Add plenty of fats and condiments to   your foods.  References   The benefits of nutrition therapy in the liver  "transplant patient. Available at: www.centerspan.org/pubs/liver/hasse1.html. Accessed April 2, 2004.  Bonnie F, Sean ER, Nnamdi T, et al. Impact of nutritional status on outcomes after liver transplantation. Transplantation. 1994;57:469-472.  Bryce CORBIN, Leila CORBIN, Mahesh FRANKEL. A prospective study on the effect of recipient nutritional status on outcome in liver transplantation. Transpl Int. 1997;10:369-374.  Radha CORBIN, Terrei B, Jose Antonio PM, et al. Nutrition therapy for end-stage liver disease: a practical approach. Support Line. 1997;10(4):8-15.  Radha FRANKEL. Nutrition assessment and support of organ transplant recipients. JPEN. 2001;25(3):120-131.  Radha FRANKEL. \"Solid Organ Transplantation.\" In: Contemporary Nutrition Support Practice: A Clinical Guide. 2nd edition. J Carlos RUIZ, Trevor MM, eds. [place]:  [employer], 2003:560-573.  Radha FRANKEL, J Carlos RUIZ. \"Nutritional Care in Diseases of the Liver, Biliary System and Exocrine Pancrease.\" In: Rodriguez's Food, Nutrition, and Diet Therapy. 11th edition. Sarah NICHOLAS, Hoang S, eds. [place]:  [employer], 2004:738-317.  Desi O, Anne-Marie J, Tusch G, et al. Identification of high- and low-risk patients before liver transplantation: a prospective cohort study of nutritional and metabolic parameters in 150 patients. Hepatology. 1997;25:652-657.  Rebecca GR, Jayde EW, El-Moalem H, et al. Malnutrition in liver transplant patients. Transplantation. 2001;72(4):666-670.  [place] Department of Agriculture (USDA). Nutrient Data Laboratory Home and [employer] Bulletin No. 72. Revised October 2002. Available at: www.nal.usda.gov/fnic/foodcomp. Accessed February 23, 2005.  Joby LUIS, Юлия PRESCOTT, Sarthak J. \"Nutrition Management of the Adult with Liver Disease.\" In: Dietitian's Handbook of Enteral and Parenteral Nutrition. 2nd edition. Naseem BOCANEGRA ed. [place]: [employer], 1998:209-238.  For information only. Not to replace the " advice of your health care provider.   Copyright   2005 [facility]. All rights reserved. [employer] 378926 - REV 09/15.       Patient Education     Low-Salt Diet  This diet removes foods that are high in salt. It also limits the amount of salt you use when cooking. It is most often used for people with high blood pressure, fluid retention (edema), and kidney, liver, or heart disease.   Table salt has the mineral sodium. Your body needs sodium to work normally. But too much sodium can make your health problems worse. Your healthcare provider advises a low-salt (low-sodium) diet for you. Your total daily allowed amount of salt is 1,500 to 2,300 milligrams (mg). This is less than 1 teaspoon of table salt. This means you can have only about 500 to 700 mg of sodium at each meal. People with certain health problems should limit salt intake to the lower end of the advised range.     When you cook, don t add much salt. If you can cook without using salt, even better. Don t add salt to your food at the table.   When shopping, read food labels. Salt is often called sodium on the label. Choose foods that are salt-free, low salt, or very low salt. Note that foods with reduced salt may not lower your salt intake enough.     Beans, potatoes, and pasta  OK: Dry beans, split peas, lentils, potatoes, rice, macaroni, pasta, spaghetti with no added salt, canned beans with no added salt   Avoid: Salted potato chips; regular (salt added) canned beans   Breads and grains  OK: Low-sodium breads, rolls, cereals, and cakes; low-salt crackers, matzo crackers   Avoid: Salted crackers, pretzels, tortilla chips, popcorn, and other salty snacks; Ukrainian toast, pancakes, muffins, regular bread   Dairy  OK: Milk, chocolate milk, hot chocolate mix, low-salt cheeses, yogurt   Avoid: Processed cheese and cheese spreads; Roquefort, Camembert, and cottage cheese; buttermilk, instant breakfast drink   Desserts  OK: Ice cream, frozen yogurt,  juice bars, gelatin, cookies and pies, sugar, honey, jelly, hard candy   Avoid: Most pies, cakes and cookies made with salt; instant pudding   Drinks  OK: Tea, coffee, fizzy (carbonated) drinks, juices   Avoid: Flavored coffees, electrolyte replacement drinks, sports drinks   Meats  OK: All fresh meat, fish, poultry, low-salt tuna, eggs, egg substitute   Avoid: Smoked, pickled, brine-cured, or salted meats and fish, and processed poultry injected with salt or marinade. This includes aguirre, chipped beef, corned beef, hot dogs, deli meats, ham, kosher meats, salt pork, sausage, canned tuna, salted codfish, smoked salmon, herring, sardines, and anchovies.   Seasonings  OK: Most seasonings are okay. Good substitutes for salt include: fresh herb blends, hot sauce, lemon, garlic, vee, vinegar, dry mustard, parsley, cilantro, horseradish, tomato paste, margarine, mayonnaise, unsalted butter, cream cheese, vegetable and olive oil, cream, low-salt salad dressing and gravy.   Avoid: Regular ketchup, relishes, pickles, soy sauce, teriyaki sauce, Worcestershire sauce, BBQ sauce, tartar sauce, meat tenderizer, chili sauce, regular gravy, regular salad dressing, salted butter   Soups  OK: Low-salt soups and broths made with allowed foods   Avoid: Bouillon cubes, soups with smoked or salted meats, regular soup and broth   Vegetables  OK: Most vegetables are okay, including canned vegetables with no salt added, and plain frozen vegetables; low-salt tomato and vegetable juices   Avoid: Sauerkraut and other brined vegetables; pickles and pickled vegetables; tomato juice, olives, canned vegetables with salt, frozen vegetables with sauces   Cheli last reviewed this educational content on 2/1/2020 2000-2021 [employer]. All rights reserved. This information is not intended as a substitute for professional medical care. Always follow your healthcare professional's instructions.           Patient Education      Discharge Instructions: Eating a Low-Salt Diet   Your healthcare provider has prescribed a low-salt diet for you. Most people with heart problems need to eat less salt, which is full of sodium. Too much sodium is linked to high blood pressure, which is linked to a greater risk of heart disease, stroke, blindness, and kidney problems.   Home care  Learn ways to cut back on salt (sodium):     Eat fewer frozen, canned, dried, packaged, and fast foods. These often contain high amounts of sodium.    Season foods with herbs instead of salt when you cook.    Season with flavorings such as pepper, lemon, christin, garlic, and onion.    Don t add salt to your food at the table.    Sprinkle salt-free herbal blends on meats and vegetables.  Learn to read food labels carefully:     Look for the total amount of sodium per serving.    Look for foods labeled low sodium, reduced sodium, or no added salt.    Beware: Salt goes by many names. Cut down on foods with these words (all forms of salt) listed as ingredients:  ? Salt  ? Sodium  ? Soy sauce  ? Baking soda  ? Baking powder  ? MSG (monosodium glutamate)  ? Monosodium  ? Na (the chemical symbol for sodium)  Other ideas:    Use more fresh food. Buy more fruits and vegetables.    Select lean meats, fish, and poultry.    Find a cookbook with low-salt recipes. You ll find ideas for tasty meals that are healthy for your heart.    When eating out, ask questions about the menu. Tell the  you're on a low-salt diet.  ? If you order fish, chicken, beef, or pork, ask to have it broiled, baked, poached, or grilled without salt, butter, or breading.  ? Choose plain steamed rice, boiled noodles, and baked or boiled potatoes. Top potatoes with chives and a little sour cream instead of butter.    Don't take antacids that are high in salt. Check the label before you buy.  Follow-up  Make a follow-up appointment with your healthcare provider, or as advised. Your provider may refer you to  a dietitian.    Cheli last reviewed this educational content on 5/1/2020 2000-2021 [employer]. All rights reserved. This information is not intended as a substitute for professional medical care. Always follow your healthcare professional's instructions.

## 2021-12-14 LAB
PATH REPORT.COMMENTS IMP SPEC: NORMAL
PATH REPORT.COMMENTS IMP SPEC: NORMAL
PATH REPORT.FINAL DX SPEC: NORMAL
PATH REPORT.GROSS SPEC: NORMAL
PATH REPORT.MICROSCOPIC SPEC OTHER STN: NORMAL
PATH REPORT.RELEVANT HX SPEC: NORMAL

## 2021-12-14 NOTE — TELEPHONE ENCOUNTER
Please print out the short-term disability form that the patient sent VIA an attachment through [employer] message  Create a patient call encounter and leave the paperwork in my tray

## 2021-12-14 NOTE — TELEPHONE ENCOUNTER
Central Prior Authorization Team   Phone: [phone]      PA Initiation    Medication: Tretinoin 0.025%  Insurance Company: [employer] - Phone [phone] Fax [phone]  Pharmacy Filling the Rx: [id number] [place] [address] AT [facility] [address]   Filling Pharmacy Phone: [phone]  Filling Pharmacy Fax:    Start Date: 12/14/2021

## 2021-12-15 ENCOUNTER — MYC MEDICAL ADVICE (OUTPATIENT)
Dept: DERMATOLOGY | Facility: CLINIC | Age: 51
End: 2021-12-15
Payer: COMMERCIAL

## 2021-12-15 ENCOUNTER — PATIENT OUTREACH (OUTPATIENT)
Dept: GASTROENTEROLOGY | Facility: CLINIC | Age: 51
End: 2021-12-15
Payer: COMMERCIAL

## 2021-12-15 NOTE — PROGRESS NOTES
Called pt to check in and remind of upcoming appointment with Dr. Steele on 12/27. Pt reported slight increase in abdominal distension and has a paracentesis scheduled on 12/30. Pt has minimal discomfort from distension and feels that she can wait until 12/30 appointment. Denied lower extremity edema and has been compliant with diuretics (lasix 80 mg daily and spironolactone 100 mg daily). Pt denied confusion and averages 3 BMs/day. Pt stated that bowel movements do not start until later in the day and she is having to go in the middle of the night. Encouraged pt to increase first dose of lactulose to 30 ml (currently taking 15 ml 3 times daily) to see if this helps moves her bowels earlier.     Pt reported improved appetite and is eating a high protein, low sodium diet. Pt feels less fatigued and is working on increasing muscle mass. Pt endorsed racing thoughts about health at night and feeling depressed. Emotional support provided and discussed behavioral health referral for additional support. Pt plans to reach out to primary care for this but will notify writer if further resources are needed.     Pt has labs on 12/22 and will see Dr. Steele on 12/27. Will check in with pt in 2 weeks. Pt verbalized understanding and is agreeable to plan of care.

## 2021-12-15 NOTE — TELEPHONE ENCOUNTER
[employer] message sent:    Julio Kitchen-    Your insurance did not cover the tretinoin cream and the prior authorization was denied.    You can either pay out-of-pocket for this or use a goodrx coupon.    Let me know if you have any questions,    CRICKET Brandon-BSN-N  [facility]  [phone]

## 2021-12-15 NOTE — TELEPHONE ENCOUNTER
PRIOR AUTHORIZATION DENIED    Medication: Tretinoin 0.025%-DENIED    Denial Date: 12/14/2021    Denial Rational:         Appeal Information:

## 2021-12-20 NOTE — TELEPHONE ENCOUNTER
Routing to the team coordinators at  to help facilitate this request.     TC's please see the below information from the Provider    Chen Duong RN BSN

## 2021-12-20 NOTE — TELEPHONE ENCOUNTER
Please fax my recent office note along with the letter I did today and also the hospital admission and discharge summary dated on 26 November  To fax #[phone]

## 2021-12-22 ENCOUNTER — TELEPHONE (OUTPATIENT)
Dept: GASTROENTEROLOGY | Facility: CLINIC | Age: 51
End: 2021-12-22

## 2021-12-22 ENCOUNTER — LAB (OUTPATIENT)
Dept: LAB | Facility: CLINIC | Age: 51
End: 2021-12-22
Attending: PHYSICIAN ASSISTANT
Payer: COMMERCIAL

## 2021-12-22 ENCOUNTER — LAB (OUTPATIENT)
Dept: LAB | Facility: CLINIC | Age: 51
End: 2021-12-22
Payer: COMMERCIAL

## 2021-12-22 DIAGNOSIS — D64.9 ANEMIA, UNSPECIFIED TYPE: ICD-10-CM

## 2021-12-22 DIAGNOSIS — K70.31 ALCOHOLIC CIRRHOSIS OF LIVER WITH ASCITES (H): ICD-10-CM

## 2021-12-22 DIAGNOSIS — D64.9 ANEMIA, UNSPECIFIED TYPE: Primary | ICD-10-CM

## 2021-12-22 LAB
ABO/RH(D): NORMAL
ACANTHOCYTES BLD QL SMEAR: SLIGHT
ALBUMIN SERPL-MCNC: 2.6 G/DL (ref 3.4–5)
ALP SERPL-CCNC: 158 U/L (ref 40–150)
ALT SERPL W P-5'-P-CCNC: 60 U/L (ref 0–50)
ANION GAP SERPL CALCULATED.3IONS-SCNC: 11 MMOL/L (ref 3–14)
ANTIBODY SCREEN: NEGATIVE
AST SERPL W P-5'-P-CCNC: 101 U/L (ref 0–45)
BASOPHILS # BLD MANUAL: 0 10E3/UL (ref 0–0.2)
BASOPHILS NFR BLD MANUAL: 0 %
BILIRUB DIRECT SERPL-MCNC: 13.6 MG/DL (ref 0–0.2)
BILIRUB SERPL-MCNC: 23.9 MG/DL (ref 0.2–1.3)
BLD PROD TYP BPU: NORMAL
BLOOD COMPONENT TYPE: NORMAL
BUN SERPL-MCNC: 24 MG/DL (ref 7–30)
CALCIUM SERPL-MCNC: 8.9 MG/DL (ref 8.5–10.1)
CHLORIDE BLD-SCNC: 86 MMOL/L (ref 94–109)
CO2 SERPL-SCNC: 25 MMOL/L (ref 20–32)
CODING SYSTEM: NORMAL
CREAT SERPL-MCNC: 0.88 MG/DL (ref 0.52–1.04)
CROSSMATCH: NORMAL
EOSINOPHIL # BLD MANUAL: 0.6 10E3/UL (ref 0–0.7)
EOSINOPHIL NFR BLD MANUAL: 3 %
ERYTHROCYTE [DISTWIDTH] IN BLOOD BY AUTOMATED COUNT: 19.1 % (ref 10–15)
GFR SERPL CREATININE-BSD FRML MDRD: 80 ML/MIN/1.73M2
GLUCOSE BLD-MCNC: 158 MG/DL (ref 70–99)
HCT VFR BLD AUTO: 20.9 % (ref 35–47)
HGB BLD-MCNC: 6.7 G/DL (ref 11.7–15.7)
INR PPP: 2.61 (ref 0.85–1.15)
ISSUE DATE AND TIME: NORMAL
LYMPHOCYTES # BLD MANUAL: 1.2 10E3/UL (ref 0.8–5.3)
LYMPHOCYTES NFR BLD MANUAL: 6 %
MCH RBC QN AUTO: 38.7 PG (ref 26.5–33)
MCHC RBC AUTO-ENTMCNC: 32.1 G/DL (ref 31.5–36.5)
MCV RBC AUTO: 121 FL (ref 78–100)
MONOCYTES # BLD MANUAL: 2 10E3/UL (ref 0–1.3)
MONOCYTES NFR BLD MANUAL: 10 %
MYELOCYTES # BLD MANUAL: 0.2 10E3/UL
MYELOCYTES NFR BLD MANUAL: 1 %
NEUTROPHILS # BLD MANUAL: 15.7 10E3/UL (ref 1.6–8.3)
NEUTROPHILS NFR BLD MANUAL: 80 %
PLAT MORPH BLD: ABNORMAL
PLATELET # BLD AUTO: 116 10E3/UL (ref 150–450)
POTASSIUM BLD-SCNC: 4.8 MMOL/L (ref 3.4–5.3)
PROT SERPL-MCNC: 5.8 G/DL (ref 6.8–8.8)
RBC # BLD AUTO: 1.73 10E6/UL (ref 3.8–5.2)
RBC MORPH BLD: ABNORMAL
SODIUM SERPL-SCNC: 122 MMOL/L (ref 133–144)
SPECIMEN EXPIRATION DATE: NORMAL
UNIT ABO/RH: NORMAL
UNIT NUMBER: NORMAL
UNIT STATUS: NORMAL
UNIT TYPE ISBT: 9500
WBC # BLD AUTO: 19.1 10E3/UL (ref 4–11)
WBC # BLD AUTO: 19.6 10E3/UL (ref 4–11)

## 2021-12-22 PROCEDURE — 85048 AUTOMATED LEUKOCYTE COUNT: CPT

## 2021-12-22 PROCEDURE — 36415 COLL VENOUS BLD VENIPUNCTURE: CPT

## 2021-12-22 PROCEDURE — 80053 COMPREHEN METABOLIC PANEL: CPT

## 2021-12-22 PROCEDURE — 86923 COMPATIBILITY TEST ELECTRIC: CPT | Performed by: INTERNAL MEDICINE

## 2021-12-22 PROCEDURE — 86901 BLOOD TYPING SEROLOGIC RH(D): CPT

## 2021-12-22 PROCEDURE — 82248 BILIRUBIN DIRECT: CPT

## 2021-12-22 PROCEDURE — 85610 PROTHROMBIN TIME: CPT

## 2021-12-22 PROCEDURE — 85027 COMPLETE CBC AUTOMATED: CPT

## 2021-12-22 RX ORDER — HEPARIN SODIUM (PORCINE) LOCK FLUSH IV SOLN 100 UNIT/ML 100 UNIT/ML
5 SOLUTION INTRAVENOUS
Status: CANCELLED | OUTPATIENT
Start: 2021-12-22

## 2021-12-22 RX ORDER — HEPARIN SODIUM,PORCINE 10 UNIT/ML
5 VIAL (ML) INTRAVENOUS
Status: CANCELLED | OUTPATIENT
Start: 2021-12-22

## 2021-12-22 NOTE — TELEPHONE ENCOUNTER
DATE:  12/22/2021   TIME OF RECEIPT FROM LAB:  1:12 PM   LAB TEST:  Hemoglobin  LAB VALUE:  6.7  RESULTS GIVEN WITH READ-BACK TO (PROVIDER):  Dr. Steele/ Adriana Lowry RN  TIME LAB VALUE REPORTED TO PROVIDER:   1:13

## 2021-12-23 ENCOUNTER — HOSPITAL ENCOUNTER (INPATIENT)
Facility: CLINIC | Age: 51
LOS: 2 days | Discharge: HOME | DRG: 432 | End: 2021-12-25
Attending: EMERGENCY MEDICINE | Admitting: STUDENT IN AN ORGANIZED HEALTH CARE EDUCATION/TRAINING PROGRAM
Payer: COMMERCIAL

## 2021-12-23 ENCOUNTER — HOSPITAL ENCOUNTER (OUTPATIENT)
Facility: CLINIC | Age: 51
End: 2021-12-23
Payer: COMMERCIAL

## 2021-12-23 ENCOUNTER — PATIENT OUTREACH (OUTPATIENT)
Dept: GASTROENTEROLOGY | Facility: CLINIC | Age: 51
End: 2021-12-23

## 2021-12-23 ENCOUNTER — HOSPITAL ENCOUNTER (OUTPATIENT)
Age: 51
End: 2021-12-23
Attending: PHYSICIAN ASSISTANT
Payer: COMMERCIAL

## 2021-12-23 ENCOUNTER — INFUSION THERAPY VISIT (OUTPATIENT)
Dept: INFUSION THERAPY | Facility: CLINIC | Age: 51
End: 2021-12-23
Attending: PHYSICIAN ASSISTANT
Payer: COMMERCIAL

## 2021-12-23 ENCOUNTER — APPOINTMENT (OUTPATIENT)
Dept: GENERAL RADIOLOGY | Facility: CLINIC | Age: 51
DRG: 432 | End: 2021-12-23
Attending: EMERGENCY MEDICINE
Payer: COMMERCIAL

## 2021-12-23 VITALS
RESPIRATION RATE: 16 BRPM | HEART RATE: 83 BPM | OXYGEN SATURATION: 100 % | SYSTOLIC BLOOD PRESSURE: 99 MMHG | DIASTOLIC BLOOD PRESSURE: 59 MMHG | TEMPERATURE: 97.9 F

## 2021-12-23 DIAGNOSIS — K74.69 DECOMPENSATED LIVER DISEASE (H): Primary | ICD-10-CM

## 2021-12-23 DIAGNOSIS — K72.10 END-STAGE LIVER DISEASE (H): ICD-10-CM

## 2021-12-23 DIAGNOSIS — K70.31 ALCOHOLIC CIRRHOSIS OF LIVER WITH ASCITES (H): ICD-10-CM

## 2021-12-23 DIAGNOSIS — K70.31 ALCOHOLIC CIRRHOSIS OF LIVER WITH ASCITES (H): Primary | ICD-10-CM

## 2021-12-23 DIAGNOSIS — D64.9 ANEMIA, UNSPECIFIED TYPE: ICD-10-CM

## 2021-12-23 DIAGNOSIS — E87.1 HYPONATREMIA: ICD-10-CM

## 2021-12-23 LAB
ACANTHOCYTES BLD QL SMEAR: ABNORMAL
ALBUMIN SERPL-MCNC: 2.6 G/DL (ref 3.4–5)
ALBUMIN SERPL-MCNC: 2.8 G/DL (ref 3.4–5)
ALBUMIN UR-MCNC: NEGATIVE MG/DL
ALP SERPL-CCNC: 161 U/L (ref 40–150)
ALT SERPL W P-5'-P-CCNC: 58 U/L (ref 0–50)
AMMONIA PLAS-SCNC: <10 UMOL/L (ref 10–50)
ANION GAP SERPL CALCULATED.3IONS-SCNC: 10 MMOL/L (ref 3–14)
APPEARANCE UR: CLEAR
AST SERPL W P-5'-P-CCNC: 107 U/L (ref 0–45)
ATRIAL RATE - MUSE: 89 BPM
BACTERIA #/AREA URNS HPF: ABNORMAL /HPF
BASOPHILS # BLD MANUAL: 0.2 10E3/UL (ref 0–0.2)
BASOPHILS NFR BLD MANUAL: 1 %
BILIRUB SERPL-MCNC: 23.7 MG/DL (ref 0.2–1.3)
BILIRUB UR QL STRIP: ABNORMAL
BUN SERPL-MCNC: 26 MG/DL (ref 7–30)
CALCIUM SERPL-MCNC: 9.1 MG/DL (ref 8.5–10.1)
CHLORIDE BLD-SCNC: 87 MMOL/L (ref 94–109)
CO2 SERPL-SCNC: 24 MMOL/L (ref 20–32)
COLOR UR AUTO: ABNORMAL
CREAT SERPL-MCNC: 0.89 MG/DL (ref 0.52–1.04)
DIASTOLIC BLOOD PRESSURE - MUSE: NORMAL MMHG
EOSINOPHIL # BLD MANUAL: 0.6 10E3/UL (ref 0–0.7)
EOSINOPHIL NFR BLD MANUAL: 3 %
ERYTHROCYTE [DISTWIDTH] IN BLOOD BY AUTOMATED COUNT: 21.6 % (ref 10–15)
FOLATE SERPL-MCNC: 22.8 NG/ML
FRAGMENTS BLD QL SMEAR: SLIGHT
GFR SERPL CREATININE-BSD FRML MDRD: 79 ML/MIN/1.73M2
GLUCOSE BLD-MCNC: 81 MG/DL (ref 70–99)
GLUCOSE UR STRIP-MCNC: NEGATIVE MG/DL
HCT VFR BLD AUTO: 24 % (ref 35–47)
HGB BLD-MCNC: 8 G/DL (ref 11.7–15.7)
HGB UR QL STRIP: NEGATIVE
HOLD SPECIMEN: NORMAL
HYALINE CASTS: 4 /LPF
INTERPRETATION ECG - MUSE: NORMAL
IRON SATN MFR SERPL: 94 % (ref 15–46)
IRON SERPL-MCNC: 185 UG/DL (ref 35–180)
KETONES UR STRIP-MCNC: NEGATIVE MG/DL
LEUKOCYTE ESTERASE UR QL STRIP: NEGATIVE
LIPASE SERPL-CCNC: 310 U/L (ref 73–393)
LYMPHOCYTES # BLD MANUAL: 2.1 10E3/UL (ref 0.8–5.3)
LYMPHOCYTES NFR BLD MANUAL: 11 %
MCH RBC QN AUTO: 37.6 PG (ref 26.5–33)
MCHC RBC AUTO-ENTMCNC: 33.3 G/DL (ref 31.5–36.5)
MCV RBC AUTO: 113 FL (ref 78–100)
METAMYELOCYTES # BLD MANUAL: 0.2 10E3/UL
METAMYELOCYTES NFR BLD MANUAL: 1 %
MONOCYTES # BLD MANUAL: 1.5 10E3/UL (ref 0–1.3)
MONOCYTES NFR BLD MANUAL: 8 %
MUCOUS THREADS #/AREA URNS LPF: PRESENT /LPF
MYELOCYTES # BLD MANUAL: 0.4 10E3/UL
MYELOCYTES NFR BLD MANUAL: 2 %
NEUTROPHILS # BLD MANUAL: 14.2 10E3/UL (ref 1.6–8.3)
NEUTROPHILS NFR BLD MANUAL: 74 %
NITRATE UR QL: NEGATIVE
NRBC # BLD AUTO: 0.2 10E3/UL
NRBC BLD MANUAL-RTO: 1 %
OSMOLALITY UR: 530 MMOL/KG (ref 100–1200)
P AXIS - MUSE: 56 DEGREES
PH UR STRIP: 5.5 [PH] (ref 5–7)
PLAT MORPH BLD: ABNORMAL
PLATELET # BLD AUTO: 105 10E3/UL (ref 150–450)
POLYCHROMASIA BLD QL SMEAR: SLIGHT
POTASSIUM BLD-SCNC: 4.8 MMOL/L (ref 3.4–5.3)
PR INTERVAL - MUSE: 138 MS
PROT SERPL-MCNC: 6.2 G/DL (ref 6.8–8.8)
QRS DURATION - MUSE: 72 MS
QT - MUSE: 380 MS
QTC - MUSE: 462 MS
R AXIS - MUSE: 55 DEGREES
RBC # BLD AUTO: 2.13 10E6/UL (ref 3.8–5.2)
RBC MORPH BLD: ABNORMAL
RBC URINE: 1 /HPF
SARS-COV-2 RNA RESP QL NAA+PROBE: NEGATIVE
SODIUM SERPL-SCNC: 121 MMOL/L (ref 133–144)
SODIUM SERPL-SCNC: 124 MMOL/L (ref 133–144)
SODIUM UR-SCNC: 10 MMOL/L
SP GR UR STRIP: 1.02 (ref 1–1.03)
SQUAMOUS EPITHELIAL: 1 /HPF
SYSTOLIC BLOOD PRESSURE - MUSE: NORMAL MMHG
T AXIS - MUSE: 3 DEGREES
TIBC SERPL-MCNC: 196 UG/DL (ref 240–430)
UROBILINOGEN UR STRIP-MCNC: NORMAL MG/DL
VENTRICULAR RATE- MUSE: 89 BPM
WBC # BLD AUTO: 19.2 10E3/UL (ref 4–11)
WBC URINE: 2 /HPF

## 2021-12-23 PROCEDURE — 99285 EMERGENCY DEPT VISIT HI MDM: CPT | Mod: 25

## 2021-12-23 PROCEDURE — P9016 RBC LEUKOCYTES REDUCED: HCPCS | Performed by: INTERNAL MEDICINE

## 2021-12-23 PROCEDURE — 76705 ECHO EXAM OF ABDOMEN: CPT

## 2021-12-23 PROCEDURE — 120N000001 HC R&B MED SURG/OB

## 2021-12-23 PROCEDURE — 81001 URINALYSIS AUTO W/SCOPE: CPT | Performed by: EMERGENCY MEDICINE

## 2021-12-23 PROCEDURE — 71046 X-RAY EXAM CHEST 2 VIEWS: CPT

## 2021-12-23 PROCEDURE — 82746 ASSAY OF FOLIC ACID SERUM: CPT | Performed by: INTERNAL MEDICINE

## 2021-12-23 PROCEDURE — 36430 TRANSFUSION BLD/BLD COMPNT: CPT

## 2021-12-23 PROCEDURE — 258N000003 HC RX IP 258 OP 636: Performed by: STUDENT IN AN ORGANIZED HEALTH CARE EDUCATION/TRAINING PROGRAM

## 2021-12-23 PROCEDURE — 93005 ELECTROCARDIOGRAM TRACING: CPT

## 2021-12-23 PROCEDURE — 85018 HEMOGLOBIN: CPT | Performed by: EMERGENCY MEDICINE

## 2021-12-23 PROCEDURE — 36415 COLL VENOUS BLD VENIPUNCTURE: CPT | Performed by: EMERGENCY MEDICINE

## 2021-12-23 PROCEDURE — 250N000013 HC RX MED GY IP 250 OP 250 PS 637: Performed by: STUDENT IN AN ORGANIZED HEALTH CARE EDUCATION/TRAINING PROGRAM

## 2021-12-23 PROCEDURE — 80053 COMPREHEN METABOLIC PANEL: CPT | Performed by: EMERGENCY MEDICINE

## 2021-12-23 PROCEDURE — 82306 VITAMIN D 25 HYDROXY: CPT | Performed by: INTERNAL MEDICINE

## 2021-12-23 PROCEDURE — 87040 BLOOD CULTURE FOR BACTERIA: CPT | Performed by: EMERGENCY MEDICINE

## 2021-12-23 PROCEDURE — 99221 1ST HOSP IP/OBS SF/LOW 40: CPT | Mod: AI | Performed by: STUDENT IN AN ORGANIZED HEALTH CARE EDUCATION/TRAINING PROGRAM

## 2021-12-23 PROCEDURE — 83550 IRON BINDING TEST: CPT | Performed by: INTERNAL MEDICINE

## 2021-12-23 PROCEDURE — 84300 ASSAY OF URINE SODIUM: CPT | Performed by: STUDENT IN AN ORGANIZED HEALTH CARE EDUCATION/TRAINING PROGRAM

## 2021-12-23 PROCEDURE — 82140 ASSAY OF AMMONIA: CPT | Performed by: EMERGENCY MEDICINE

## 2021-12-23 PROCEDURE — 83690 ASSAY OF LIPASE: CPT | Performed by: EMERGENCY MEDICINE

## 2021-12-23 PROCEDURE — 87635 SARS-COV-2 COVID-19 AMP PRB: CPT | Performed by: EMERGENCY MEDICINE

## 2021-12-23 PROCEDURE — 36415 COLL VENOUS BLD VENIPUNCTURE: CPT | Performed by: STUDENT IN AN ORGANIZED HEALTH CARE EDUCATION/TRAINING PROGRAM

## 2021-12-23 PROCEDURE — 84295 ASSAY OF SERUM SODIUM: CPT | Performed by: STUDENT IN AN ORGANIZED HEALTH CARE EDUCATION/TRAINING PROGRAM

## 2021-12-23 PROCEDURE — 83935 ASSAY OF URINE OSMOLALITY: CPT | Performed by: STUDENT IN AN ORGANIZED HEALTH CARE EDUCATION/TRAINING PROGRAM

## 2021-12-23 PROCEDURE — C9803 HOPD COVID-19 SPEC COLLECT: HCPCS

## 2021-12-23 PROCEDURE — 83921 ORGANIC ACID SINGLE QUANT: CPT | Performed by: INTERNAL MEDICINE

## 2021-12-23 RX ORDER — ALBUMIN (HUMAN) 12.5 G/50ML
12.5 SOLUTION INTRAVENOUS ONCE
Status: CANCELLED | OUTPATIENT
Start: 2021-12-23 | End: 2021-12-23

## 2021-12-23 RX ORDER — ONDANSETRON 2 MG/ML
4 INJECTION INTRAMUSCULAR; INTRAVENOUS EVERY 6 HOURS PRN
Status: DISCONTINUED | OUTPATIENT
Start: 2021-12-23 | End: 2021-12-25 | Stop reason: HOSPADM

## 2021-12-23 RX ORDER — FUROSEMIDE 20 MG
20 TABLET ORAL DAILY
Status: ON HOLD | COMMUNITY
End: 2021-12-25

## 2021-12-23 RX ORDER — ONDANSETRON 4 MG/1
4 TABLET, ORALLY DISINTEGRATING ORAL EVERY 6 HOURS PRN
Status: DISCONTINUED | OUTPATIENT
Start: 2021-12-23 | End: 2021-12-25 | Stop reason: HOSPADM

## 2021-12-23 RX ORDER — LIDOCAINE 40 MG/G
CREAM TOPICAL
Status: DISCONTINUED | OUTPATIENT
Start: 2021-12-23 | End: 2021-12-25 | Stop reason: HOSPADM

## 2021-12-23 RX ORDER — SODIUM CHLORIDE 9 MG/ML
INJECTION, SOLUTION INTRAVENOUS CONTINUOUS
Status: DISCONTINUED | OUTPATIENT
Start: 2021-12-24 | End: 2021-12-25

## 2021-12-23 RX ORDER — ALBUTEROL SULFATE 90 UG/1
2 AEROSOL, METERED RESPIRATORY (INHALATION) EVERY 4 HOURS PRN
Status: DISCONTINUED | OUTPATIENT
Start: 2021-12-23 | End: 2021-12-25 | Stop reason: HOSPADM

## 2021-12-23 RX ORDER — LACTULOSE 10 G/15ML
10 SOLUTION ORAL 3 TIMES DAILY
Status: DISCONTINUED | OUTPATIENT
Start: 2021-12-23 | End: 2021-12-25 | Stop reason: HOSPADM

## 2021-12-23 RX ORDER — PANTOPRAZOLE SODIUM 40 MG/1
40 TABLET, DELAYED RELEASE ORAL 2 TIMES DAILY
Status: DISCONTINUED | OUTPATIENT
Start: 2021-12-23 | End: 2021-12-25 | Stop reason: HOSPADM

## 2021-12-23 RX ORDER — SODIUM CHLORIDE 9 MG/ML
INJECTION, SOLUTION INTRAVENOUS CONTINUOUS
Status: ACTIVE | OUTPATIENT
Start: 2021-12-23 | End: 2021-12-23

## 2021-12-23 RX ADMIN — LACTULOSE 10 G: 10 SOLUTION ORAL at 21:30

## 2021-12-23 RX ADMIN — SODIUM CHLORIDE: 9 INJECTION, SOLUTION INTRAVENOUS at 19:10

## 2021-12-23 RX ADMIN — PANTOPRAZOLE SODIUM 40 MG: 40 TABLET, DELAYED RELEASE ORAL at 18:25

## 2021-12-23 RX ADMIN — SODIUM CHLORIDE 50 ML/HR: 9 INJECTION, SOLUTION INTRAVENOUS at 23:53

## 2021-12-23 ASSESSMENT — ACTIVITIES OF DAILY LIVING (ADL)
ADLS_ACUITY_SCORE: 6
ADLS_ACUITY_SCORE: 4
ADLS_ACUITY_SCORE: 6
ADLS_ACUITY_SCORE: 6
ADLS_ACUITY_SCORE: 4
ADLS_ACUITY_SCORE: 4

## 2021-12-23 ASSESSMENT — MIFFLIN-ST. JEOR: SCORE: 1235.5

## 2021-12-23 ASSESSMENT — PAIN SCALES - GENERAL: PAINLEVEL: NO PAIN (0)

## 2021-12-23 NOTE — ED NOTES
"[facility]  ED Nurse Handoff Report    ED Chief complaint: Abnormal Labs      ED Diagnosis:   Final diagnoses:   Hyponatremia   End-stage liver disease (H)   Anemia, unspecified type       Code Status: Full Code    Allergies: No Known Allergies    Patient Story: Patient was sent from infusion clinic because her labs were off. Patient has end stage liver disease.  Focused Assessment: thin, jaundice female with multiple stages of bruises and petechiae, States she feels normal for her baseline.      Treatments and/or interventions provided: see epic  Patient's response to treatments and/or interventions: see epic    To be done/followed up on inpatient unit:  continue plan of care    Does this patient have any cognitive concerns?: na    Activity level - Baseline/Home:  Independent  Activity Level - Current:   Independent    Patient's Preferred language: English   Needed?: No    Isolation: None  Infection: Not Applicable  Patient tested for COVID 19 prior to admission: YES  Bariatric?: No    Vital Signs:   Vitals:    12/23/21 1045 12/23/21 1130 12/23/21 1200 12/23/21 1415   BP: 117/63 110/61 108/60 97/50   Pulse: 96 92 88 102   Resp: 16  13 16   Temp: 97.9  F (36.6  C)      TempSrc: Temporal      SpO2: 100% 100% 99% 96%   Weight: 59.9 kg (132 lb)      Height: 1.676 m (5' 6\")          Cardiac Rhythm:     Was the PSS-3 completed:   Yes  What interventions are required if any?               Family Comments:  at beside  OBS brochure/video discussed/provided to patient/family: N/A              Name of person given brochure if not patient: na              Relationship to patient: na    For the majority of the shift this patient's behavior was Green.   Behavioral interventions performed were na.    ED NURSE PHONE NUMBER: [phone]         "

## 2021-12-23 NOTE — H&P
[facility]    History and Physical - Hospitalist Service       Date of Admission:  12/23/2021    Assessment & Plan      Melita Cortes is a 51 year old female with past medical history significant for alcoholic liver cirrhosis  admitted on 12/23/2021 presents at the request of her primary car doctor for evaluation of abnormal labs.     Acute decompensated liver cirrhosis   Alcoholic cirrhosis with ascites   Grade I esophageal varices   Portal Hypertension and portal hypertensive gastropathy   The patient was hospitalized late September for decompensated cirrhosis, then again in early November. Her LFTs are markedly abnormal here with alk phos of 161, TT bili of 23.7, ALT of 58 and AST of 107. Bili and alk phos are a bit higher than they were during her previous hospitalization. Ammonia is wnl.   Pt denies alcohol use since August 2021. She is due to see a transplant hepatologist.   MELD-Na score: 33 at 12/23/2021 11:31 AM  MELD score: 29 at 12/23/2021 11:31 AM  Calculated from:  Serum Creatinine: 0.89 mg/dL (Using min of 1 mg/dL) at 12/23/2021 11:31 AM  Serum Sodium: 121 mmol/L (Using min of 125 mmol/L) at 12/23/2021 11:31 AM  Total Bilirubin: 23.7 mg/dL at 12/23/2021 11:31 AM  INR(ratio): 2.61 at 12/22/2021 12:06 PM  Age: 51 years  - Diagnostic/therapeutic paracentesis for evaluation of SBP   - Will consult GI here   - Continue lactulose 10g TID  - Hold spironolactone and lasix for now as this may be contributing to hyponatremia.     Hyponatremia, possibly hypovolemic  Sodium is 121. Baseline has been around 130-135. She does have some ascites and some ankle edema, but she reports that this is not as bad as it has been. She has had a recent increase in her diuretic dose to 80mg daily and is additionally taking spironolactone daily. She is on lactulose, but denies significant watery stools. She has been eating and drinking normally.   - Check Urine osms and urine sodium  - Gentle  fluid resuscitation with normal saline + albumin  - hold PTA diuretics for now  - Monitor sodium Q6H  - Consider Nephrology consult.     ADDENDUM  Sodium improved to 124 after about 500cc of NS   - Will continue with NS 50cc/hr maintenance fluids.     Leukocytosis   WBC is elevated to 19.2. She has a chronically elevated WBC count, but 19.2 is higher than it has been recently. She has no fever, hemodynamics are stable. CXR is unremarkable.   - Diagnostic/therapeutic paracentesis for evaluation of SBP   - Follow-up UA  - Follow blood and urine cultures   - COVID negative    Macrocytic anemia, acute on chronic   Hemoglobin in clinic yesterday was 6.7. She Received a PRBC transfusion this morning. Hbg is currently 8.0. She has no evidence of active or acute bleeding.   - Monitor hemoglobin.   - Checking folate and MMA per GI     Diet: Regular   DVT Prophylaxis: Pneumatic Compression Devices  Gunter Catheter: Not present  Central Lines: None  Code Status: Full Code     Disposition Plan   Expected Discharge: TBD  Anticipated discharge location:  Awaiting care coordination huddle      The patient's care was discussed with the Patient.    Deb Almeida  [facility]  Securely message with the Vocera Web Console (learn more here)  Text page via [employer] Paging/Directory        ______________________________________________________________________    Chief Complaint   Abnormal labs    History is obtained from the patient    History of Present Illness   Melita Cortes is a 51 year old female who has a past medical history significant for alcoholic cirrhosis. She was told to come to the ED today for evaluation of abnormal labs. She was found to have a hemoglobin of  6.7 so underwent outpatient transfusion this morning. She also was found to have worsening T bili to 23.8, a WBC count of 19.1 and a sodium of 122.     Here her labs are similar. Hemoglobin has improved to 8.0 following her  transfusion.     She reports feeling ok. She has been very tired and weak, but attributes this to her low hemoglobin. She has no other focal symptoms. She denies fevers, chills, chest pain, shortness of breath or abdominal pain. She notes that her stomach is somewhat distended with fluid, but not as bad as it has been in the past. She got her last paracentesis 4 weeks ago. She also has some mild lower extremity swelling but again, this is not as bad as it has been.     She takes lactulose TID and reports soft stools with this, but no watery diarrhea and no bloody stools.     She did have a recent increase in her diuretic dose but hasn't really noted any significant increase in urination.     She has been eating and drinking normally.     Review of Systems    The 10 point Review of Systems is negative other than noted in the HPI or here.     Past Medical History    I have reviewed this patient's medical history and updated it with pertinent information if needed.   Past Medical History:   Diagnosis Date     Alcoholic hepatitis      Cervical high risk HPV (human papillomavirus) test positive 2020    See problem list       Past Surgical History   I have reviewed this patient's surgical history and updated it with pertinent information if needed.  Past Surgical History:   Procedure Laterality Date     APPENDECTOMY       ESOPHAGOGASTRODUODENOSCOPY, WITH BRUSHINGS N/A 10/29/2021    Procedure: ESOPHAGOGASTRODUODENOSCOPY, WITH BRUSHINGS;  Surgeon: Torey Ruiz MD;  Location:  GI       Social History   I have reviewed this patient's social history and updated it with pertinent information if needed.  Social History     Tobacco Use     Smoking status: Former Smoker     Quit date: 2021     Years since quittin.6     Smokeless tobacco: Never Used   Substance Use Topics     Alcohol use: Not Currently     Comment: Last drink 2021     Drug use: Never       Family History   I have reviewed this patient's  family history and updated it with pertinent information if needed.  Family History   Problem Relation Age of Onset     Cancer Mother      Colon Cancer Paternal Aunt      Colon Cancer Maternal Uncle        Prior to Admission Medications   Prior to Admission Medications   Prescriptions Last Dose Informant Patient Reported? Taking?   albuterol (PROAIR HFA/PROVENTIL HFA/VENTOLIN HFA) 108 (90 Base) MCG/ACT inhaler prn at prn Self No Yes   Sig: Inhale 2 puffs into the lungs every 4 hours as needed for wheezing   folic acid (FOLVITE) 1 MG tablet 12/22/2021 at Unknown time Self No Yes   Sig: Take 1 tablet (1 mg) by mouth daily   furosemide (LASIX) 20 MG tablet 12/22/2021 at Unknown time Self Yes Yes   Sig: Take 20 mg by mouth daily   furosemide (LASIX) 40 MG tablet Not Taking at Unknown time Self No No   Sig: Take 2 tablets (80 mg) by mouth daily   Patient not taking: Reported on 12/23/2021   lactulose (CHRONULAC) 10 GM/15ML solution 12/23/2021 at Unknown time Self No Yes   Sig: Take 3 times daily. May titrate to achieve 3-4 loose stools per day.   multivitamin (CENTRUM SILVER) tablet Past Week at Unknown time Self Yes Yes   Sig: Take 1 tablet by mouth daily   pantoprazole (PROTONIX) 40 MG EC tablet 12/22/2021 at Unknown time Self No Yes   Sig: Take 1 tablet (40 mg) by mouth 2 times daily   spironolactone (ALDACTONE) 50 MG tablet 12/22/2021 at Unknown time Self No Yes   Sig: Take 2 tablets (100 mg) by mouth daily   tretinoin (RETIN-A) 0.025 % external cream 12/22/2021 at Unknown time Self No Yes   Sig: Apply a pea size to entire face QD      Facility-Administered Medications: None     Allergies   No Known Allergies    Physical Exam   Vital Signs: Temp: 97.9  F (36.6  C) Temp src: Temporal BP: 108/60 Pulse: 88   Resp: 13 SpO2: 99 % O2 Device: None (Room air)    Weight: 132 lbs 0 oz    Constitutional: Awake, alert, cooperative, no apparent distress.  Eyes: Scleral icterus  HEENT: Moist mucous membranes, normal  dentition.  Respiratory: Clear to auscultation bilaterally, no crackles or wheezing.  Cardiovascular: Regular rate and rhythm, normal S1 and S2, and no murmur noted.  GI: Soft, mildly distended, non-tender, normal bowel sounds.  Skin: jaundiced, spider nevi, scattered bruises   Musculoskeletal: No joint swelling, muscle wasting  Neurologic: Cranial nerves 2-12 intact, normal strength and sensation.  Psychiatric: Alert, oriented to person, place and time, anxious, tearful at times.        Data   Data reviewed today: I reviewed all medications, new labs and imaging results over the last 24 hours.     Recent Labs   Lab 12/23/21  1131 12/22/21  1415 12/22/21  1206   WBC 19.2* 19.6* 19.1*   HGB 8.0*  --  6.7*   *  --  121*   *  --  116*   INR  --   --  2.61*   *  --  122*   POTASSIUM 4.8  --  4.8   CHLORIDE 87*  --  86*   CO2 24  --  25   BUN 26  --  24   CR 0.89  --  0.88   ANIONGAP 10  --  11   SHAI 9.1  --  8.9   GLC 81  --  158*   ALBUMIN 2.8*  --  2.6*   PROTTOTAL 6.2*  --  5.8*   BILITOTAL 23.7*  --  23.9*   ALKPHOS 161*  --  158*   ALT 58*  --  60*   *  --  101*   LIPASE 310  --   --      Recent Results (from the past 24 hour(s))   XR Chest 2 Views    Narrative    XR CHEST 2 VW 12/23/2021 12:17 PM    HISTORY: weakness, end stage liver disease, leukocytosis, no  fever/cough    COMPARISON: 11/4/2021      Impression    IMPRESSION: No focal infiltrate, pleural effusion or pneumothorax. The  cardiac and mediastinal silhouettes are normal.    JACQUELINE HERNANDEZ MD         SYSTEM ID:  [id number]

## 2021-12-23 NOTE — CONSULTS
Consult Date: 12/23/2021    REASON FOR CONSULTATION:  Decompensated alcoholic cirrhosis.    REQUESTING PHYSICIAN:  Dr. Almeida.    HISTORY OF PRESENT ILLNESS:  Melita is a pleasant 51-year-old female with history of alcoholic cirrhosis with portal hypertension, manifested by ascites, hypersplenism small esophageal varices and portal hypertensive gastropathy.  She follows with Dr. Milton with [facility].  She has not consumed any alcohol since approximately Labor Day.  She was hospitalized from 09/24/2021-10/01/2021 with acute alcoholic hepatitis and hepatorenal syndrome.  She was started on steroids and did respond to treatment.  She was again hospitalized from 11/03/2021-11/08/2021 with community-acquired pneumonia.    She had labs done today through her primary care provider.  These were significantly abnormal and she was directed to [facility] Emergency Room for evaluation.    Today, her sodium level was 121.  Creatinine normal at 0.89, total bilirubin 23.7, alkaline phosphatase 161, ALT 58, .  Ammonia was normal.  Lipase 310, which was normal.  White count was 19.2, hemoglobin yesterday was 6.7, today 8.0.  Today, platelets 105.  MCV elevated at 113.  INR 2.6.    She complains of fatigue.  She has been nauseous, but no vomiting.  She attributes the nausea to the lactulose.  With her lactulose, she has 3 bowel movements a day.  She denies any abdominal pain.  Her  thinks her abdomen is slightly more distended than in the recent past.  She has felt cold, but no fevers or chills.  Again, she denies any alcohol use since early September.    She had an EGD done on 10/29/2021.  Small esophageal plaques consistent with Candida were noted.  She had portal hypertensive gastropathy and gastric erosions.  She had a normal alpha fetoprotein level in 10/2021.  Today, she had a chest x-ray that was unremarkable.    PAST MEDICAL HISTORY:    1.  Alcoholic cirrhosis with portal hypertension.  2.   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  3.  Appendectomy.    SOCIAL HISTORY:  Former smoker, quit in 05/20/2001.  Last alcohol 09/2021.  No illicit drug use.    FAMILY HISTORY:  Significant for colon cancer in her aunt and uncle and mother had unknown type of cancer.    REVIEW OF SYSTEMS:  A 10-point review of systems was done and was negative other than those mentioned in HPI.    PHYSICAL EXAMINATION:    VITAL SIGNS:  Temperature 97.9, BP is 97/50, pulse 102, respiratory rate 16, pulse ox 96% on room air.  GENERAL:  The patient is pleasant, awake, alert and oriented, in no acute distress.  She was jaundiced.  HEENT:  Normocephalic, atraumatic.  Pupils equal, round, reactive to light.  Extraocular eye muscles are intact.  Sclerae are anicteric.  Oropharynx is clear.  Mucous membranes are moist.  NECK:  Supple, without lymphadenopathy.  DERMATOLOGIC:  Exam reveals spider angiomas on her chest.  She has bruises on her left upper extremity.  LUNGS:  Clear to auscultation bilaterally.  HEART:  Regular rate and rhythm with a 2/6 systolic ejection murmur.  ABDOMEN:  Soft, mildly distended, nontender with normal bowel sounds.  EXTREMITIES:  Warm without lower extremity edema.  There is no clubbing or cyanosis.  PSYCHIATRIC:  Exam reveals no obvious anxiety or depression.  NEUROLOGIC:  Cranial nerves 2-12 grossly intact.    ASSESSMENT:  This is a 51-year-old female with decompensated alcoholic cirrhosis with evidence of portal hypertension.  Her MELD score is 33 today.  Her total bilirubin is more elevated than earlier in the month.  Unclear reason for this.  It could be due to further decompensation of her liver disease.  If she does have an underlying infection, that could be the cause as well.  Of course, surreptitious alcohol use could be contributing; however, she denies any recent alcohol use.  She was admitted with significant lab abnormalities including hyponatremia, leukocytosis and worsening  bilirubin.    PLAN:  1.  Leukocytosis.  Concerning for infection.  Agree with plans for paracentesis to evaluate for SBP.  We will get a urinalysis.  2.  Hepatic encephalopathy.  She does not appear encephalopathic.  She has a normal ammonia level and his stooling properly with the current dose of lactulose.  I recommend continuing baseline doses of lactulose.  3.  Hyponatremia.  This is being managed by the hospitalist service.  They plan to contact [facility].  Holding diuretics for now.  4.  Ascites.  Agree with paracentesis.  Holding diuretics for now.  5.  Hepatocellular carcinoma screening.  She is up to date on HCC screening.  She has had a recent normal alpha fetoprotein.  Recent imaging studies of the liver with ultrasound and CT scans have not shown any signs of hepatocellular carcinoma.  6.  Variceal screening.  She is up to date on variceal screening.  Her last EGD was on 10/29/2021 and does not need to be repeated urgently.  7.  Anemia.  This is likely due to anemia of chronic liver disease and bone marrow toxicity.  No sign of active gastrointestinal bleeding.  Her folate level was low in September.  We will recheck this and a methylmalonic acid.    Thank you for allowing me to participate in the care of this patient.  Please contact me with any questions or concerns.    Heidy Owens MD        D: 2021   T: 2021   MT: [id number]    Name:     YAA RAHMAN  MRN:      [id number]        Account:      [id number]   :      1970           Consult Date: 2021     Document: [id number]    cc:  Navneet Johnson MD

## 2021-12-23 NOTE — ED PROVIDER NOTES
History   Chief Complaint:  Abnormal Labs       The history is provided by the patient and the spouse.   History supplemented by electronic chart review     Melita Cortes is a 51 year old female with history of hyponatremia, hyperbilirubinemia, and alcoholic cirrhosis of the liver who presents for evaluation of abnormal labs. Melita was sent from her PCP due to abnormal lab results. Per her labs yesterday she has a sodium level of 122, elevated bilirubin level of 23.9, and elevated white blood cell count of 19.6. The patient was receiving her fifth transfusion in the past couple of months, her last one being on the eighth of November while she was hospitalized with pneumonia. She acknowledges having a slight headache and some fatigue, but otherwise feels fine. She denies confusion, but her  reports her seeming slower in thought processes. Her  reports increased abdominal distension for the past week and a half. No fever or abdominal pain. No hematuria, burning while urinating, or urination frequency. She denies any chest pain. She has been taking lactulose daily since the end of September. She hasn't changed her lactulose use or missed a dose, and has three to four loose bowel movements a day. She reports that sometimes it can make her feel nauseous, but she has not vomited. Her last paracentesis was in late November and they removed about 3.5L.     Review of Systems   All other systems reviewed and are negative.    Allergies:  The patient has no known allergies.     Medications:  Albuterol inhaler  Furosemide  Lactulose  Pantoprazole  Spironolactone    Past Medical History:     Alcoholic hepatitis  Cervical high risk HPV test positive  Transaminitis   Macrocytosis  Acute liver failure without hepatic coma  Symptomatic anemia  Elevated serum creatinine  Alcoholic cirrhosis of liver with ascites  Hyponatremia  High risk for severe sepsis  Decompensated liver  "disease  Bandemia  Hyperbilirubinemia     Past Surgical History:    Appendectomy  Esophagogastroduodenoscopy, with brushings    Family History:    Cancer  Coronary Artery Disease  Dementia    Social History:  Presents with .  History of alcohol use, none currently.  PCP: Navneet Johnson    Physical Exam     Patient Vitals for the past 24 hrs:   BP Temp Temp src Pulse Resp SpO2 Height Weight   12/23/21 1908 94/53 -- -- -- -- -- -- --   12/23/21 1809 (!) 87/48 -- Oral 94 16 98 % -- --   12/23/21 1415 97/50 -- -- 102 16 96 % -- --   12/23/21 1200 108/60 -- -- 88 13 99 % -- --   12/23/21 1130 110/61 -- -- 92 -- 100 % -- --   12/23/21 1045 117/63 97.9  F (36.6  C) Temporal 96 16 100 % 1.676 m (5' 6\") 59.9 kg (132 lb)     Physical Exam  General: Chronically ill but nontoxic-appearing woman sitting upright in room 22,  at bedside  HENT: mucous membranes moist   CV: rate as above, moderate symmetric lower extremity edema  Resp: normal effort, speaks in full phrases, no stridor, no cough observed  GI: Abdomen soft, slightly distended with palpable fluid wave but not tense, nontender  MSK: no bony tenderness, no CVAT  Skin: appropriately warm and dry, obvious jaundice  Neuro: alert, clear speech, oriented, no tremor, no nuchal rigidity  Psych: cooperative, no evidence of hallucinations, pleasant    Emergency Department Course   ECG  ECG obtained at 1140, ECG read at 1144  Normal sinus rhythm. Cannot rule out Anterior infarct, age undetermined.    No significant changes as compared to prior, dated 11/05/2021.  Rate 89 bpm. OK interval 138 ms. QRS duration 72 ms. QT/QTc 380/462 ms. P-R-T axes 56 55 3.     Imaging:  XR Chest 2 Views   Final Result   IMPRESSION: No focal infiltrate, pleural effusion or pneumothorax. The   cardiac and mediastinal silhouettes are normal.      JACQUELINE HERNANDEZ MD            SYSTEM ID:  [id number]      POC US ABDOMEN LIMITED   Final Result   ED Bedside Limited Ultrasound   Body area scanned: " RLQ limited   Performed by: Sunny Fleming MD   Indication: alcoholic cirrhosis, abnormal labs   Findings/Interpretation: +fluid pocket in RLQ, would be amenable to US-guided paracentesis   Key images were digitally archived in the [facility] radiology system.        Report per radiology    Laboratory:  Labs Ordered and Resulted from Time of ED Arrival to Time of ED Departure   COMPREHENSIVE METABOLIC PANEL - Abnormal       Result Value    Sodium 121 (*)     Potassium 4.8      Chloride 87 (*)     Carbon Dioxide (CO2) 24      Anion Gap 10      Urea Nitrogen 26      Creatinine 0.89      Calcium 9.1      Glucose 81      Alkaline Phosphatase 161 (*)      (*)     ALT 58 (*)     Protein Total 6.2 (*)     Albumin 2.8 (*)     Bilirubin Total 23.7 (*)     GFR Estimate 79     CBC WITH PLATELETS AND DIFFERENTIAL - Abnormal    WBC Count 19.2 (*)     RBC Count 2.13 (*)     Hemoglobin 8.0 (*)     Hematocrit 24.0 (*)      (*)     MCH 37.6 (*)     MCHC 33.3      RDW 21.6 (*)     Platelet Count 105 (*)    AMMONIA - Abnormal    Ammonia <10 (*)    DIFFERENTIAL - Abnormal    % Neutrophils 74      % Lymphocytes 11      % Monocytes 8      % Eosinophils 3      % Basophils 1      % Metamyelocytes 1      % Myelocytes 2      NRBCs per 100 WBC 1 (*)     Absolute Neutrophils 14.2 (*)     Absolute Lymphocytes 2.1      Absolute Monocytes 1.5 (*)     Absolute Eosinophils 0.6      Absolute Basophils 0.2      Absolute Metamyelocytes 0.2 (*)     Absolute Myelocytes 0.4 (*)     Absolute NRBCs 0.2 (*)     RBC Morphology Confirmed RBC Indices      Platelet Assessment        Value: Automated Count Confirmed. Platelet morphology is normal.    Acanthocytes Moderate (*)     RBC Fragments Slight (*)     Polychromasia Slight (*)    LIPASE - Normal    Lipase 310     COVID-19 VIRUS (CORONAVIRUS) BY PCR - Normal    SARS CoV2 PCR Negative     FOLATE   METHYLMALONIC ACID   VITAMIN D DEFICIENCY SCREENING   BLOOD CULTURE   BLOOD CULTURE        Emergency  Department Course:    Reviewed:  I reviewed nursing notes, vitals, past medical history and Care Everywhere    Assessments:  1118 I obtained history and examined the patient as noted above.   1345 I rechecked the patient and explained findings.     Consults:  1407 I spoke with Dr. Almeida of the hospitalist services who is in agreement to accept the patient for admission.     Disposition:  The patient was admitted to the hospital under the care of Dr. Almeida.     Impression & Plan   Medical Decision Making:  Unfortunately, she presents with multiple worrisome laboratory abnormalities, notable primarily for significant decrease in her sodium level from last check.  Today's value confirms yesterday's abnormality, in light of her generalized malaise I do think she requires hospitalization for close monitoring and further attention to this metabolic abnormality, noting that she also has significant worsening of her pre-existing hyperbilirubinemia attributable to end-stage liver disease from alcoholic cirrhosis.  She reports multiple months of sobriety from alcohol.  She has evidence of ascites but no abdominal pain or tenderness to suggest SBP, and while paracentesis may be beneficial in the near future and is not indicated emergently.  Antibiotics were withheld.  Her hemoglobin responded appropriately to the single unit of packed red blood cell she received earlier today.  The rationale for hospitalization was explained to her and agreed upon, and I have arranged for admission to the hospital service.    Covid-19  Melita Cortes was evaluated during a global COVID-19 pandemic, which necessitated consideration that the patient might be at risk for infection with the SARS-CoV-2 virus that causes COVID-19.   Applicable protocols for evaluation were followed during the patient's care.   COVID-19 was considered as part of the patient's evaluation. The plan for testing is:  a test was obtained during this  visit.      Diagnosis:    ICD-10-CM    1. Hyponatremia  E87.1    2. End-stage liver disease (H)  K72.10    3. Anemia, unspecified type  D64.9        Scribe Disclosure:  I, Gertrudis Guerrero (trainee) and Ena Read (), am serving as a scribe at 11:18 AM on 12/23/2021 to document services personally performed by Sunny Fleming MD based on my observations and the provider's statements to me.     This note was completed in part using Dragon voice recognition software. Although reviewed after completion, some word and grammatical errors may occur.           Sunny Fleming MD  12/23/21 0007

## 2021-12-23 NOTE — PROGRESS NOTES
Pt had labs drawn on 12/22. Per Dr. Steele and Eduardo Parry PA-C, pt needs to present to the emergency room for further evaluation of abnormal labs. Reviewed lab results with pt and recommendation to go to the ER. Pt just finished blood transfusion and is agreeable to recommendation. Will continue to follow plan of care.

## 2021-12-23 NOTE — PHARMACY-ADMISSION MEDICATION HISTORY
Pharmacy Medication History  Admission medication history interview status for the 12/23/2021  admission is complete. See EPIC admission navigator for prior to admission medications     Location of Interview: Patient room  Medication history sources: Patient, Patient's family/friend () and Surescripts    Significant changes made to the medication list:  Flagged for removal: Lasix 80 mg daily, changed to 20 mg daily    In the past week, patient estimated taking medication this percent of the time: greater than 90%    Additional medication history information:   none    Medication reconciliation completed by provider prior to medication history? No    Time spent in this activity: 15 min    Prior to Admission medications    Medication Sig Last Dose Taking? Auth Provider   albuterol (PROAIR HFA/PROVENTIL HFA/VENTOLIN HFA) 108 (90 Base) MCG/ACT inhaler Inhale 2 puffs into the lungs every 4 hours as needed for wheezing prn at prn Yes Randal More MD   folic acid (FOLVITE) 1 MG tablet Take 1 tablet (1 mg) by mouth daily 12/22/2021 at Unknown time Yes Eduardo Parry PA-C   furosemide (LASIX) 20 MG tablet Take 20 mg by mouth daily 12/22/2021 at Unknown time Yes Unknown, Entered By History   lactulose (CHRONULAC) 10 GM/15ML solution Take 3 times daily. May titrate to achieve 3-4 loose stools per day. 12/23/2021 at Unknown time Yes Eduardo Parry PA-C   multivitamin (CENTRUM SILVER) tablet Take 1 tablet by mouth daily Past Week at Unknown time Yes Reported, Patient   pantoprazole (PROTONIX) 40 MG EC tablet Take 1 tablet (40 mg) by mouth 2 times daily 12/22/2021 at Unknown time Yes Eduardo Parry PA-C   spironolactone (ALDACTONE) 50 MG tablet Take 2 tablets (100 mg) by mouth daily 12/22/2021 at Unknown time Yes Eduardo Parry PA-C   tretinoin (RETIN-A) 0.025 % external cream Apply a pea size to entire face QD 12/22/2021 at Unknown time Yes Bronwyn Mott PA-C   furosemide  (LASIX) 40 MG tablet Take 2 tablets (80 mg) by mouth daily  Patient not taking: Reported on 12/23/2021 Not Taking at Unknown time  Eduardo Parry PA-C       The information provided in this note is only as accurate as the sources available at the time of update(s)

## 2021-12-23 NOTE — TELEPHONE ENCOUNTER
Reviewed critical hemoglobin result with Eduardo Parry PA-C. Per Eduardo, pt should receive 1 unit of PRBCs. Pt has a recurring therapy plan for blood.     Discussed recommendations for blood transfusion with pt and pt agreeable. Pt scheduled for blood transfusion at [facility] on 12/23 and will have type and screen draw at [facility] lab today,12/22.

## 2021-12-23 NOTE — ED TRIAGE NOTES
Pt sent to ED from PCP and infusion clinic. Pt had red blood cells infused today for low hemoglobin. Pt sent to ED for concern for elevated bilirubin and elevated WBCs. Patient jaundice in color. Denies n/v, reports she takes lactulose daily. Patient has liver cirrhosis.

## 2021-12-23 NOTE — PROGRESS NOTES
RECEIVING UNIT ED HANDOFF REVIEW    ED Nurse Handoff Report was reviewed by: Anju Martinez RN on December 23, 2021 at 5:49 PM

## 2021-12-23 NOTE — PROGRESS NOTES
Infusion Nursing Note:  Melita Cortes presents today for Blood transfusion.    Patient seen by provider today: No   present during visit today: Not Applicable.    Note: N/A.      Intravenous Access:  Peripheral IV placed.    Treatment Conditions:  Lab Results   Component Value Date    HGB 6.7 (LL) 12/22/2021    WBC 19.6 (H) 12/22/2021    ANEU 15.7 (H) 12/22/2021    ANEUTAUTO 10.9 (H) 11/04/2021     (L) 12/22/2021      Results reviewed, labs MET treatment parameters, ok to proceed with treatment.  Blood transfusion consent signed 12/22/21.      Post Infusion Assessment:  Patient tolerated transfusion without incident.  Site patent and intact, free from redness, edema or discomfort.  Access discontinued per protocol.       Discharge Plan:   Discharge instructions reviewed with: Patient.  Patient and/or family verbalized understanding of discharge instructions and all questions answered.  Patient discharged in stable condition accompanied by: self.  Departure Mode: Ambulatory.      Gertrudis Chowdhury RN

## 2021-12-24 ENCOUNTER — APPOINTMENT (OUTPATIENT)
Dept: ULTRASOUND IMAGING | Facility: CLINIC | Age: 51
DRG: 432 | End: 2021-12-24
Attending: STUDENT IN AN ORGANIZED HEALTH CARE EDUCATION/TRAINING PROGRAM
Payer: COMMERCIAL

## 2021-12-24 LAB
% LINING CELLS, BODY FLUID: 7 %
ABO/RH(D): NORMAL
ALBUMIN FLD-MCNC: 0.4 G/DL
ALBUMIN SERPL-MCNC: 1.9 G/DL (ref 3.4–5)
ALP SERPL-CCNC: 108 U/L (ref 40–150)
ALT SERPL W P-5'-P-CCNC: 48 U/L (ref 0–50)
ANION GAP SERPL CALCULATED.3IONS-SCNC: 9 MMOL/L (ref 3–14)
ANTIBODY SCREEN: NEGATIVE
APPEARANCE FLD: CLEAR
AST SERPL W P-5'-P-CCNC: 89 U/L (ref 0–45)
BILIRUB DIRECT SERPL-MCNC: 11.8 MG/DL (ref 0–0.2)
BILIRUB SERPL-MCNC: 20.6 MG/DL (ref 0.2–1.3)
BLD PROD TYP BPU: NORMAL
BLOOD COMPONENT TYPE: NORMAL
BUN SERPL-MCNC: 24 MG/DL (ref 7–30)
CALCIUM SERPL-MCNC: 8.5 MG/DL (ref 8.5–10.1)
CHLORIDE BLD-SCNC: 93 MMOL/L (ref 94–109)
CO2 SERPL-SCNC: 22 MMOL/L (ref 20–32)
CODING SYSTEM: NORMAL
COLOR FLD: YELLOW
CREAT SERPL-MCNC: 0.86 MG/DL (ref 0.52–1.04)
CROSSMATCH: NORMAL
DEPRECATED CALCIDIOL+CALCIFEROL SERPL-MC: 20 UG/L (ref 20–75)
ERYTHROCYTE [DISTWIDTH] IN BLOOD BY AUTOMATED COUNT: 24.4 % (ref 10–15)
GFR SERPL CREATININE-BSD FRML MDRD: 82 ML/MIN/1.73M2
GLUCOSE BLD-MCNC: 92 MG/DL (ref 70–99)
HCT VFR BLD AUTO: 20.7 % (ref 35–47)
HGB BLD-MCNC: 6.8 G/DL (ref 11.7–15.7)
INR PPP: 2.64 (ref 0.85–1.15)
ISSUE DATE AND TIME: NORMAL
LYMPHOCYTES NFR FLD MANUAL: 23 %
MCH RBC QN AUTO: 36.8 PG (ref 26.5–33)
MCHC RBC AUTO-ENTMCNC: 32.9 G/DL (ref 31.5–36.5)
MCV RBC AUTO: 112 FL (ref 78–100)
MONOS+MACROS NFR FLD MANUAL: 66 %
NEUTS BAND NFR FLD MANUAL: 4 %
PLATELET # BLD AUTO: 68 10E3/UL (ref 150–450)
POTASSIUM BLD-SCNC: 4.9 MMOL/L (ref 3.4–5.3)
PROT FLD-MCNC: 0.8 G/DL
PROT SERPL-MCNC: 5.1 G/DL (ref 6.8–8.8)
RBC # BLD AUTO: 1.85 10E6/UL (ref 3.8–5.2)
SODIUM SERPL-SCNC: 124 MMOL/L (ref 133–144)
SPECIMEN EXPIRATION DATE: NORMAL
UNIT ABO/RH: NORMAL
UNIT NUMBER: NORMAL
UNIT STATUS: NORMAL
UNIT TYPE ISBT: 9500
WBC # BLD AUTO: 14.6 10E3/UL (ref 4–11)
WBC # FLD AUTO: 83 /UL

## 2021-12-24 PROCEDURE — 80053 COMPREHEN METABOLIC PANEL: CPT | Performed by: STUDENT IN AN ORGANIZED HEALTH CARE EDUCATION/TRAINING PROGRAM

## 2021-12-24 PROCEDURE — 85027 COMPLETE CBC AUTOMATED: CPT | Performed by: STUDENT IN AN ORGANIZED HEALTH CARE EDUCATION/TRAINING PROGRAM

## 2021-12-24 PROCEDURE — 84157 ASSAY OF PROTEIN OTHER: CPT | Performed by: STUDENT IN AN ORGANIZED HEALTH CARE EDUCATION/TRAINING PROGRAM

## 2021-12-24 PROCEDURE — 258N000003 HC RX IP 258 OP 636: Performed by: STUDENT IN AN ORGANIZED HEALTH CARE EDUCATION/TRAINING PROGRAM

## 2021-12-24 PROCEDURE — 85610 PROTHROMBIN TIME: CPT | Performed by: INTERNAL MEDICINE

## 2021-12-24 PROCEDURE — 99232 SBSQ HOSP IP/OBS MODERATE 35: CPT | Performed by: INTERNAL MEDICINE

## 2021-12-24 PROCEDURE — 86850 RBC ANTIBODY SCREEN: CPT | Performed by: INTERNAL MEDICINE

## 2021-12-24 PROCEDURE — 272N000706 US PARACENTESIS

## 2021-12-24 PROCEDURE — 0W9G3ZZ DRAINAGE OF PERITONEAL CAVITY, PERCUTANEOUS APPROACH: ICD-10-PCS | Performed by: RADIOLOGY

## 2021-12-24 PROCEDURE — 86901 BLOOD TYPING SEROLOGIC RH(D): CPT | Performed by: INTERNAL MEDICINE

## 2021-12-24 PROCEDURE — P9016 RBC LEUKOCYTES REDUCED: HCPCS | Performed by: INTERNAL MEDICINE

## 2021-12-24 PROCEDURE — 250N000013 HC RX MED GY IP 250 OP 250 PS 637: Performed by: STUDENT IN AN ORGANIZED HEALTH CARE EDUCATION/TRAINING PROGRAM

## 2021-12-24 PROCEDURE — 120N000001 HC R&B MED SURG/OB

## 2021-12-24 PROCEDURE — 250N000013 HC RX MED GY IP 250 OP 250 PS 637: Performed by: INTERNAL MEDICINE

## 2021-12-24 PROCEDURE — 89051 BODY FLUID CELL COUNT: CPT | Performed by: STUDENT IN AN ORGANIZED HEALTH CARE EDUCATION/TRAINING PROGRAM

## 2021-12-24 PROCEDURE — 82042 OTHER SOURCE ALBUMIN QUAN EA: CPT | Performed by: STUDENT IN AN ORGANIZED HEALTH CARE EDUCATION/TRAINING PROGRAM

## 2021-12-24 PROCEDURE — 36415 COLL VENOUS BLD VENIPUNCTURE: CPT | Performed by: STUDENT IN AN ORGANIZED HEALTH CARE EDUCATION/TRAINING PROGRAM

## 2021-12-24 PROCEDURE — 82248 BILIRUBIN DIRECT: CPT | Performed by: INTERNAL MEDICINE

## 2021-12-24 RX ORDER — FUROSEMIDE 20 MG
20 TABLET ORAL
Status: DISCONTINUED | OUTPATIENT
Start: 2021-12-24 | End: 2021-12-25 | Stop reason: HOSPADM

## 2021-12-24 RX ORDER — LIDOCAINE 40 MG/G
CREAM TOPICAL
Status: DISCONTINUED | OUTPATIENT
Start: 2021-12-24 | End: 2021-12-25

## 2021-12-24 RX ORDER — LIDOCAINE HYDROCHLORIDE 10 MG/ML
10 INJECTION, SOLUTION EPIDURAL; INFILTRATION; INTRACAUDAL; PERINEURAL ONCE
Status: COMPLETED | OUTPATIENT
Start: 2021-12-24 | End: 2021-12-24

## 2021-12-24 RX ORDER — ALBUMIN (HUMAN) 12.5 G/50ML
12.5 SOLUTION INTRAVENOUS ONCE
Status: CANCELLED | OUTPATIENT
Start: 2021-12-24 | End: 2021-12-24

## 2021-12-24 RX ADMIN — LACTULOSE 10 G: 10 SOLUTION ORAL at 09:51

## 2021-12-24 RX ADMIN — LACTULOSE 10 G: 10 SOLUTION ORAL at 21:55

## 2021-12-24 RX ADMIN — PANTOPRAZOLE SODIUM 40 MG: 40 TABLET, DELAYED RELEASE ORAL at 17:35

## 2021-12-24 RX ADMIN — LIDOCAINE HYDROCHLORIDE 10 ML: 10 INJECTION, SOLUTION EPIDURAL; INFILTRATION; INTRACAUDAL; PERINEURAL at 08:36

## 2021-12-24 RX ADMIN — PANTOPRAZOLE SODIUM 40 MG: 40 TABLET, DELAYED RELEASE ORAL at 09:51

## 2021-12-24 RX ADMIN — LACTULOSE 10 G: 10 SOLUTION ORAL at 17:35

## 2021-12-24 RX ADMIN — FUROSEMIDE 20 MG: 20 TABLET ORAL at 17:35

## 2021-12-24 RX ADMIN — SODIUM CHLORIDE: 9 INJECTION, SOLUTION INTRAVENOUS at 09:51

## 2021-12-24 RX ADMIN — FUROSEMIDE 20 MG: 20 TABLET ORAL at 11:48

## 2021-12-24 ASSESSMENT — ACTIVITIES OF DAILY LIVING (ADL)
ADLS_ACUITY_SCORE: 6
ADLS_ACUITY_SCORE: 8
ADLS_ACUITY_SCORE: 6
ADLS_ACUITY_SCORE: 8
ADLS_ACUITY_SCORE: 6
ADLS_ACUITY_SCORE: 8
ADLS_ACUITY_SCORE: 6

## 2021-12-24 ASSESSMENT — MIFFLIN-ST. JEOR: SCORE: 1259.54

## 2021-12-24 NOTE — PROGRESS NOTES
Patient Wellness Screening  1. In the last month, have you been in contact with someone who was confirmed or suspected to have Coronavirus/COVID-19? No    2. Do you have the following symptoms?  Fever/Chills? No   Cough? No   Shortness of breath? No   New loss of taste or smell? No  Sore throat? No  Muscle or body aches? No  Headaches? No  Fatigue? No  Vomiting or diarrhea? No    Admission Note:  Reason for admission:  Inpatient Paracentisis  Time of arrival:0823        Pre-procedure assessment complete: Yes  If abnormal assessment/labs, provider notified: N/A    Medications held per instructions/orders: Yes  Procedure explained. All questions & concerns addressed: Yes  Consent: obtained    Discharge instructions reviewed: Yes.  Inpatient  Patient verbalizes understanding: Yes    Paracentesis: DR Pollock at bedside. Consent signed and T.O done.  Patient tolerated well. VSS. 3850 cc Catalina colored fluid removed from abdomen w/o difficulty. Bandaid and Dermabond applied to Rt Abd - CDI.       0902 Pt back to 88. Detailed report given to Patricia HARLEY    Discharge Note:  Inpatient  Discharge criteria met and vital signs stable: Yes.  Vitals stable.  Magali HERNANDEZ.  Pt has no complaints

## 2021-12-24 NOTE — PLAN OF CARE
4321-1304. Pt is A and O X 4. Very pleasant and cooperative. VSS, ex hypotensive. Denies pain and nausea. Pt settle in bed. Full head to toes assessments not competed due to pt's time of arrival. Pt is SBA. Adequate urine output, urine dark orange/haroldo.  Bilirubin 23.7.  WBC 19.2. Hgb 8.0. NS at 100 ml/hr. UA , and  Osmolality collected,  results pending.  Discharge pending.

## 2021-12-24 NOTE — PROGRESS NOTES
MD Notification    Notified Person: MD    Notified Person Name:  Lo    Notification Date/Time: 12/23/21 at 2357     Notification Interaction: Amcom    Purpose of Notification: Soft BP- best reading 90/45     Orders Received: No response at this time.     Comments: BP WNL upon recheck, no further action taken.

## 2021-12-24 NOTE — PROGRESS NOTES
"[facility] Progress Note     IMPRESSION:  -MELD 28 - Acute on chronic liver disease, decompensated etoh cirrhosis, sober since early September.  Of note, was given course of steroids for etoh hepatitis in September, responded.  -No encephalopathy currently  -Protein malnutrition  -Leukocytosis, uncertain etiology - no SBP on today's paracentesis, blood cx neg   -Normocytic anemia without GI bleeding, multifactorial    Very appropriate for transplant work up to begin and is scheduled for transplant hepatology appmt with Dr. Steele on the .     RECOMMENDATIONS:  -Continue lactulose, lasix 20mg BID, given elevated K++ start aldactone 100mg daily tomorrow  -2gm Na diet, and nutrition consultation  -Ambulation encouraged  -COVID vaccine - appears she only got dose #1 in July  -No indication for EGD presently    Dale Bill, DO   [facility]  Cell [phone]    ________________________________________________________________________      SUBJECTIVE:  Denies abd pain.  Tearful today.  Discussed her care in detail, reassured her that she's done everything she can and GFR looks good, no infection thus far identified.  Has a remote visit scheduled with Dr. Zita Steele on Tuesday ([facility] transplant hepatology).      Though she's not sure of her outpt diuretic doses, states she recent had the \"lasix dose doubled (to 80mgs?), no change in the spironolactone dose.\"    Paracentesis this AM.  No BM since admit.  Has been having loose, but brown stool as outpt.      OBJECTIVE:  BP 93/54 (BP Location: Left arm)   Pulse 83   Temp 98.3  F (36.8  C) (Oral)   Resp 15   Ht 1.676 m (5' 6\")   Wt 62.3 kg (137 lb 4.8 oz)   SpO2 99%   BMI 22.16 kg/m    Temp (24hrs), Av.1  F (36.7  C), Min:97.9  F (36.6  C), Max:98.3  F (36.8  C)    Patient Vitals for the past 72 hrs:   Weight   21 0644 62.3 kg (137 lb 4.8 oz)   21 1045 59.9 kg (132 lb)     No intake or output data in the 24 hours ending " 12/24/21 1024     PHYSICAL EXAM  GEN: Icteric, oriented x3, communicative and in NAD.    LYMPH: No LAD noted.  HRT: RRR  LUNGS: CTA  ABD: ND, +BS, no guarding or pain to palpation, no rebound, no HSM.  SKIN: No rash, jaundice or spider angiomata      Additional Data:  I have reviewed the patient's new clinical lab results:     Recent Labs   Lab Test 12/24/21  0619 12/23/21  1131 12/22/21  1415 12/22/21  1206 12/01/21  1404 11/17/21  1428   WBC 14.6* 19.2* 19.6* 19.1*   < > 15.5*   HGB 6.8* 8.0*  --  6.7*   < > 7.9*   * 113*  --  121*   < > 107*   PLT 68* 105*  --  116*   < > 131*   INR 2.64*  --   --  2.61*  --  2.52*    < > = values in this interval not displayed.     Recent Labs   Lab Test 12/24/21  0619 12/23/21  1131 12/22/21  1206   POTASSIUM 4.9 4.8 4.8   CHLORIDE 93* 87* 86*   CO2 22 24 25   BUN 24 26 24   ANIONGAP 9 10 11     Recent Labs   Lab Test 12/24/21  0619 12/23/21  1836 12/23/21  1824 12/23/21  1131 12/22/21  1206 11/04/21  0721 11/03/21  2013 09/25/21  0829 09/25/21  0427   ALBUMIN 1.9* 2.6*  --  2.8* 2.6*   < >  --    < >  --    BILITOTAL 20.6*  --   --  23.7* 23.9*   < >  --    < >  --    ALT 48  --   --  58* 60*   < >  --    < >  --    AST 89*  --   --  107* 101*   < >  --    < >  --    PROTEIN  --   --  Negative  --   --   --  Negative  --  Negative   LIPASE  --   --   --  310  --   --   --   --   --     < > = values in this interval not displayed.

## 2021-12-24 NOTE — PLAN OF CARE
Summary:   abnormal labs  Primary Diagnosis:   Alcoholic liver Cirrhosis. End stage liver disease, hyponatremia, Anemia  Orientation:  A and O X4  Aggression Stop Light:  GREEN  Mobility: SBA  Pain Management: Denies  Diet: Reg  Bowel/Bladder: continent  Abnormal Lab/Assessments:  HGB 8.0, WBC 19.2, sodium 124  Drain/Device/Wound: none  Consults: GI  D/C Day/Goals/Place:     Shift Note:  [id number]    Pt A&Ox4, no complaints of pain or nausea. Very pleasant and cooperative. Has been hypotensive, all other VSS. Didn't sleep well overnight. Pt is up SBA. PIV running NS at 50 ml/hr. Discharge pending. Will continue to monitor.

## 2021-12-24 NOTE — CONSULTS
"CLINICAL NUTRITION SERVICES  -  ASSESSMENT NOTE      Recommendations Ordered by Registered Dietitian (RD):     Reviewed current diet order.  Emphasized importance of limiting Na and getting adequate protein.  Praised pt's efforts and encouraged her to continue making positive nutrition choices.    Will send 2 chocolate Ensure Enlive supplements at 10am (each = 350 cals, 20 gm pro, 260 mg Na) - pt to consume during the day       Malnutrition:   % Weight Loss:  > 20% in 1 year (severe malnutrition)  % Intake:  <75% for >/= 3 months (moderate malnutrition) - improving  Subcutaneous Fat Loss:  Orbital region mild depletion and Upper arm region mild depletion  Muscle Loss:  Temporal region moderate depletion, Clavicle bone region moderate depletion and Patellar region mild depletion  Fluid Retention: ++ ascites     Malnutrition Diagnosis: Severe malnutrition  In Context of:  Chronic illness or disease        REASON FOR ASSESSMENT  Melita Cortes is a 51 year old female seen by Registered Dietitian for Provider Order - \"cirrhosis, protein malnutrition, needs to stay on low Na diet\"      NUTRITION HISTORY  Chart reviewed  Pt has been seen by Nutrition in the past few months:  10/1 - 2gm Na diet education in hospital  11/23 - virtual visit for liver dz (adequate protein, low Na)    Visited with pt this afternoon  Notes that she has been following the 2000mg Na diet restriction  - \"it has been hard sometimes, but we have been religiously following it\"  Admits to occasionally using \"a few salt granules\" on her food  She is also making sure she consumes protein  \"If I don't have enough at a meal, I have been drinking 2 Ensure Enlive supplements\" (orders them from [employer])  She has also made smoothies using protein powder    Pt reports that over the past few months her appetite has been decreased - \"because of my liver disease\"  She states that it is much better now and she has started eating more  Pt weighs herself daily " "and at home she has been ~132#  She confirms that her wt is down from 2 months ago  \"I have lost muscle - from wt loss and also not having the energy to exercise\"  She admits that she has been feeling very depressed, which also impacts her motivation to exercise      CURRENT NUTRITION ORDERS  Diet Order:     2gm Na    Breakfast today - omelet, fruit, brownie, soda    Pt requesting 2 cans Ensure per day for added protein      NUTRITION FOCUSED PHYSICAL ASSESSMENT FOR DIAGNOSING MALNUTRITION)  Yes              Observed:    Muscle wasting (refer to documentation in Malnutrition section), Subcutaneous fat loss (refer to documentation in Malnutrition section) and Jaundice    Obtained from Chart/Interdisciplinary Team:  Alcoholic cirrhosis of the liver   12/24: paracentesis - 3850 cc Catalina colored fluid removed from abdomen     ANTHROPOMETRICS  Height: 5' 6\"  Weight:(12/24) 62.3kg / 137 lbs 4.8 oz  Body mass index is 22.16 kg/m .  Weight Status:  Normal BMI  IBW: 59.1 kg  % IBW: 105%  Weight History:   Records reflect wt is down ~14# (9%) over the past 2 months  Overall down ~45# (25%) from a year ago     Wt Readings from Last 10 Encounters:   12/24/21 62.3 kg (137 lb 4.8 oz)   12/13/21 60 kg (132 lb 4.8 oz)   11/08/21 64.3 kg (141 lb 12.8 oz)   10/29/21 68.5 kg (151 lb 0.2 oz)   10/19/21 69.5 kg (153 lb 4.8 oz)   10/13/21 69.7 kg (153 lb 9 oz)   10/01/21 70.4 kg (155 lb 3.2 oz)   09/14/20 79.9 kg (176 lb 1.6 oz)         LABS  Labs reviewed    MEDICATIONS  Lasix  Lactulose       ASSESSED NUTRITION NEEDS PER APPROVED PRACTICE GUIDELINES:    Dosing Weight 62.3 kg  Estimated Energy Needs: 1286-8094 kcals (25-30 Kcal/Kg)  Justification: maintenance  Estimated Protein Needs: 60-75 grams protein (1-1.2 g pro/Kg)  Justification: liver dz  Estimated Fluid Needs: 3217-9194  mL (1 mL/Kcal)  Justification: maintenance    MALNUTRITION:  % Weight Loss:  > 20% in 1 year (severe malnutrition)  % Intake:  <75% for >/= 3 months (moderate " malnutrition) - improving  Subcutaneous Fat Loss:  Orbital region mild depletion and Upper arm region mild depletion  Muscle Loss:  Temporal region moderate depletion, Clavicle bone region moderate depletion and Patellar region mild depletion  Fluid Retention: ++ ascites     Malnutrition Diagnosis: Severe malnutrition  In Context of:  Chronic illness or disease    NUTRITION DIAGNOSIS:  Unintended weight loss related to decreased appetite with liver disease as evidenced by pt with 9% wt loss over the past 2 months      NUTRITION INTERVENTIONS  Recommendations / Nutrition Prescription  2gm Na diet  Nutrition supplements  .      Implementation  Nutrition education ---> Reviewed current diet order.  Emphasized importance of limiting Na and getting adequate protein.  Praised pt's efforts and encouraged her to continue making positive nutrition choices.  Will send 2 chocolate Ensure Enlive supplements at 10am (each = 350 cals, 20 gm pro, 260 mg Na) - pt to consume during the day  .      Nutrition Goals  Pt to consume >50% meals and 1-2 Ensure per day  .      MONITORING AND EVALUATION:  Progress towards goals will be monitored and evaluated per protocol and Practice Guidelines

## 2021-12-24 NOTE — PROGRESS NOTES
[facility]    Medicine Progress Note - Hospitalist Service       Date of Admission:  12/23/2021  Assessment & Plan   Melita Cortes is a 51 year old female with past medical history significant for alcoholic liver cirrhosis with ascites, varices, portal hypertension, hepatic encephalopathy, chronic macrocytic anemia who presents from clinic with abnormal labs. Admitted on 12/23/2021.     Acute decompensated liver cirrhosis   Alcoholic cirrhosis with ascites   Grade I esophageal varices   Portal Hypertension and portal hypertensive gastropathy   *Presents with abnormal labs:  Alk phos of 161, TT bili of 23.7, ALT of 58 and AST of 107. Bili and alk phos are a bit higher than they were during her previous hospitalization. Ammonia is wnl.   *Hospitalized late September for decompensated cirrhosis, then again in early November.  MELD-Na score: 33 at 12/24/2021  6:19 AM  MELD score: 29 at 12/24/2021  6:19 AM  Calculated from:  Serum Creatinine: 0.86 mg/dL (Using min of 1 mg/dL) at 12/24/2021  6:19 AM  Serum Sodium: 124 mmol/L (Using min of 125 mmol/L) at 12/24/2021  6:19 AM  Total Bilirubin: 20.6 mg/dL at 12/24/2021  6:19 AM  INR(ratio): 2.64 at 12/24/2021  6:19 AM  Age: 51 years  - Diagnostic/therapeutic paracentesis completed 12/24: 3.85L removed. Labs not consistent with SBP  - [facility] consulted, following  - Continue lactulose 10g TID  - Repeat CMP and INR in AM   - Furosemide resumed with plan to restart spironolactone 12/25/21 per GI.    - Continue PPI     Hyponatremia, suspected hypovolemic  Sodium is 121. Baseline has been around 130-135. She does have some ascites and some ankle edema, but her ankle edema in particular has decreased when her diuretic dose was recently increased.  This may consequently, be contributing to her low sodium since.   - Urine osm 530, urine sodium 10  - Continue gentle IVF with normal saline at 50 ml/hr  - Sodium stable ~124  - Diuretics managed as above   -  Repeat labs in AM      Leukocytosis   WBC is elevated to 19.2. She has a chronically elevated WBC count, but 19.2 is higher than it has been recently. She has no fever, hemodynamics are stable. CXR is unremarkable.   - SBP and UA not indicative of infection  - Blood cultures NGTD  - WBC improving      Macrocytic anemia, acute on chronic   Chronic thrombocytopenia secondary to liver disease  *Hemoglobin in clinic was 6.7 g/dl 12/22, improved with pRBC transfusion.   *Platelets typically >80k  - Hemoglobin 6.8 g/dl. No signs of bleeding.   - Transfuse 1 unit pRBC. Type and screen ordered, consent obtained.   - Repeat hemoglobin in AM  - No immediate plans for EGD, may need to reassess if continues to require transfusion   - Monitor for clinical signs of bleeding  - Iron studies consistent with anemia of chronic disease  - Folate normal. MMA pending.  - Continue PPI    Clinically Significant Risk Factors Present on Admission         # Hyponatremia: Na = 124 mmol/L (Ref range: 133 - 144 mmol/L) on admission, will monitor as appropriate     # Coagulation Defect: INR = 2.64 (Ref range: 0.85 - 1.15) and/or PTT = N/A on admission, will monitor for bleeding  # Thrombocytopenia: Plts = 68 10e3/uL (Ref range: 150 - 450 10e3/uL) on admission, will monitor for bleeding          Diet: Combination Diet 2 gm NA Diet    DVT Prophylaxis: Pneumatic Compression Devices  Gunter Catheter: Not present  Code Status: Full Code      Disposition Plan   Expected Discharge: 12/26/2021     Anticipated discharge location:  Awaiting care coordination huddle  Delays:        Entered: Long Crespo MD 12/24/2021, 10:16 AM       The patient's care was discussed with the Patient and bedside RN    Long Crespo MD  Hospitalist Service  [facility]    ______________________________________________________________________    Interval History   No acute events overnight. Reports her abdomen feels less distended following  paracentesis.  Denies any black stools, bloody stools, tarry stools. Tolerating diet, denies any nausea/vomiting.     Data reviewed today: I reviewed all medications, new labs and imaging results over the last 24 hours. I personally reviewed no images or EKG's today.    Physical Exam   Vital Signs: Temp: 98.3  F (36.8  C) Temp src: Oral BP: 93/54 Pulse: 83   Resp: 15 SpO2: 99 % O2 Device: None (Room air)    Weight: 137 lbs 4.8 oz    Constitutional:  NAD.  Respiratory: Clear to auscultation bilaterally, good air movement bilaterally  Cardiovascular: RRR. Trace bilateral lower extremity edema.  GI: Soft, non-tender.    Skin/Integumen: Warm, dry. Jaundiced.   Neuro: Alert. Responding to questions appropriately. Following commands.     Data   Recent Labs   Lab 12/24/21  0619 12/23/21  1836 12/23/21  1131 12/22/21  1415 12/22/21  1206   WBC 14.6*  --  19.2* 19.6* 19.1*   HGB 6.8*  --  8.0*  --  6.7*   *  --  113*  --  121*   PLT 68*  --  105*  --  116*   INR 2.64*  --   --   --  2.61*   * 124* 121*  --  122*   POTASSIUM 4.9  --  4.8  --  4.8   CHLORIDE 93*  --  87*  --  86*   CO2 22  --  24  --  25   BUN 24  --  26  --  24   CR 0.86  --  0.89  --  0.88   ANIONGAP 9  --  10  --  11   SHAI 8.5  --  9.1  --  8.9   GLC 92  --  81  --  158*   ALBUMIN 1.9* 2.6* 2.8*  --  2.6*   PROTTOTAL 5.1*  --  6.2*  --  5.8*   BILITOTAL 20.6*  --  23.7*  --  23.9*   ALKPHOS 108  --  161*  --  158*   ALT 48  --  58*  --  60*   AST 89*  --  107*  --  101*   LIPASE  --   --  310  --   --        Recent Results (from the past 24 hour(s))   POC US ABDOMEN LIMITED    Impression    ED Bedside Limited Ultrasound  Body area scanned: RLQ limited  Performed by: Sunny Fleming MD  Indication: alcoholic cirrhosis, abnormal labs  Findings/Interpretation: +fluid pocket in RLQ, would be amenable to US-guided paracentesis  Key images were digitally archived in the [email or web address] radiology system.       XR Chest 2 Views    Narrative    XR CHEST 2 VW  12/23/2021 12:17 PM    HISTORY: weakness, end stage liver disease, leukocytosis, no  fever/cough    COMPARISON: 11/4/2021      Impression    IMPRESSION: No focal infiltrate, pleural effusion or pneumothorax. The  cardiac and mediastinal silhouettes are normal.    JACQUELINE HERNANDEZ MD         SYSTEM ID:  [id number]   US Paracentesis    Narrative    US PARACENTESIS  12/24/2021 9:14 AM     HISTORY: High volume paracentesis with or without diagnostic fluid  analysis with labs to be drawn if ordered. Total paracentesis volume  as much as possible.    FINDINGS: Ultrasound was used to evaluate for the presence and best  approach for paracentesis. Written and oral informed consent was  obtained. A pause for the cause procedure to verify the correct  patient and correct procedure. The skin overlying the right lower  quadrant was prepped and draped in the usual sterile fashion. The  subcutaneous tissues were anesthetized with 10 mL of 1% lidocaine. A  catheter was advanced into the peritoneal space and 3.85 L of  straw-colored fluid was drained. There were no immediate  complications. Ultrasound images were permanently stored. Patient left  the ultrasound suite in satisfactory condition.      Impression    IMPRESSION: Technically successful paracentesis without immediate  complications.    PRIMITIVO GARIBAY MD         SYSTEM ID:  [id number]     Medications     - MEDICATION INSTRUCTIONS -       sodium chloride 50 mL/hr at 12/24/21 0951       lactulose  10 g Oral TID     pantoprazole  40 mg Oral BID     sodium chloride (PF)  3 mL Intracatheter Q8H

## 2021-12-24 NOTE — PROGRESS NOTES
RADIOLOGY PROCEDURE NOTE  Patient name: Melita Cortes  MRN: [id number]  : 1970    Pre-procedure diagnosis: Ascites  Post-procedure diagnosis: Same    Procedure Date/Time: 2021  9:30 AM  Procedure: Paracentesis  Estimated blood loss: None  Specimen(s) collected with description: Ascites.  The patient tolerated the procedure well with no immediate complications.    See imaging dictation for procedural details and findings.    Provider name: Miguel Ángel Pollock MD  Assistant(s):None

## 2021-12-25 VITALS
OXYGEN SATURATION: 92 % | RESPIRATION RATE: 16 BRPM | DIASTOLIC BLOOD PRESSURE: 50 MMHG | HEIGHT: 66 IN | WEIGHT: 129.9 LBS | BODY MASS INDEX: 20.88 KG/M2 | HEART RATE: 108 BPM | SYSTOLIC BLOOD PRESSURE: 101 MMHG | TEMPERATURE: 95 F

## 2021-12-25 LAB
ALBUMIN SERPL-MCNC: 1.9 G/DL (ref 3.4–5)
ALP SERPL-CCNC: 105 U/L (ref 40–150)
ALT SERPL W P-5'-P-CCNC: 46 U/L (ref 0–50)
ANION GAP SERPL CALCULATED.3IONS-SCNC: 10 MMOL/L (ref 3–14)
AST SERPL W P-5'-P-CCNC: 84 U/L (ref 0–45)
BILIRUB SERPL-MCNC: 22.7 MG/DL (ref 0.2–1.3)
BUN SERPL-MCNC: 26 MG/DL (ref 7–30)
CALCIUM SERPL-MCNC: 8.5 MG/DL (ref 8.5–10.1)
CHLORIDE BLD-SCNC: 95 MMOL/L (ref 94–109)
CO2 SERPL-SCNC: 22 MMOL/L (ref 20–32)
CREAT SERPL-MCNC: 0.99 MG/DL (ref 0.52–1.04)
ERYTHROCYTE [DISTWIDTH] IN BLOOD BY AUTOMATED COUNT: 24.4 % (ref 10–15)
GFR SERPL CREATININE-BSD FRML MDRD: 69 ML/MIN/1.73M2
GLUCOSE BLD-MCNC: 74 MG/DL (ref 70–99)
HCT VFR BLD AUTO: 25.7 % (ref 35–47)
HGB BLD-MCNC: 8.7 G/DL (ref 11.7–15.7)
INR PPP: 2.46 (ref 0.85–1.15)
MCH RBC QN AUTO: 36.6 PG (ref 26.5–33)
MCHC RBC AUTO-ENTMCNC: 33.9 G/DL (ref 31.5–36.5)
MCV RBC AUTO: 108 FL (ref 78–100)
PLATELET # BLD AUTO: 80 10E3/UL (ref 150–450)
POTASSIUM BLD-SCNC: 4.6 MMOL/L (ref 3.4–5.3)
PROT SERPL-MCNC: 5 G/DL (ref 6.8–8.8)
RBC # BLD AUTO: 2.38 10E6/UL (ref 3.8–5.2)
SODIUM SERPL-SCNC: 127 MMOL/L (ref 133–144)
WBC # BLD AUTO: 14 10E3/UL (ref 4–11)

## 2021-12-25 PROCEDURE — 250N000013 HC RX MED GY IP 250 OP 250 PS 637: Performed by: INTERNAL MEDICINE

## 2021-12-25 PROCEDURE — 80053 COMPREHEN METABOLIC PANEL: CPT | Performed by: INTERNAL MEDICINE

## 2021-12-25 PROCEDURE — 99239 HOSP IP/OBS DSCHRG MGMT >30: CPT | Performed by: INTERNAL MEDICINE

## 2021-12-25 PROCEDURE — 258N000003 HC RX IP 258 OP 636: Performed by: STUDENT IN AN ORGANIZED HEALTH CARE EDUCATION/TRAINING PROGRAM

## 2021-12-25 PROCEDURE — 250N000013 HC RX MED GY IP 250 OP 250 PS 637: Performed by: STUDENT IN AN ORGANIZED HEALTH CARE EDUCATION/TRAINING PROGRAM

## 2021-12-25 PROCEDURE — 999N000154 HC STATISTIC RADIOLOGY XRAY, US, CT, MAR, NM

## 2021-12-25 PROCEDURE — 85018 HEMOGLOBIN: CPT | Performed by: INTERNAL MEDICINE

## 2021-12-25 PROCEDURE — 85610 PROTHROMBIN TIME: CPT | Performed by: INTERNAL MEDICINE

## 2021-12-25 PROCEDURE — 36415 COLL VENOUS BLD VENIPUNCTURE: CPT | Performed by: INTERNAL MEDICINE

## 2021-12-25 RX ORDER — SPIRONOLACTONE 50 MG/1
50 TABLET, FILM COATED ORAL DAILY
Qty: 60 TABLET | Refills: 3 | Status: ON HOLD
Start: 2021-12-25 | End: 2022-01-05

## 2021-12-25 RX ORDER — FUROSEMIDE 20 MG
20 TABLET ORAL 2 TIMES DAILY
Qty: 60 TABLET | Refills: 0 | Status: ON HOLD | OUTPATIENT
Start: 2021-12-25 | End: 2022-01-05

## 2021-12-25 RX ADMIN — FUROSEMIDE 20 MG: 20 TABLET ORAL at 09:02

## 2021-12-25 RX ADMIN — SODIUM CHLORIDE: 9 INJECTION, SOLUTION INTRAVENOUS at 07:01

## 2021-12-25 RX ADMIN — PANTOPRAZOLE SODIUM 40 MG: 40 TABLET, DELAYED RELEASE ORAL at 09:02

## 2021-12-25 RX ADMIN — LACTULOSE 10 G: 10 SOLUTION ORAL at 09:02

## 2021-12-25 ASSESSMENT — ACTIVITIES OF DAILY LIVING (ADL)
ADLS_ACUITY_SCORE: 6

## 2021-12-25 ASSESSMENT — MIFFLIN-ST. JEOR: SCORE: 1225.97

## 2021-12-25 NOTE — PROGRESS NOTES
"[facility] Progress Note     IMPRESSION:  Awaiting AM labs     -Acute on chronic liver disease, decompensated etoh cirrhosis, sober since early September.  Of note, was given course of steroids for etoh hepatitis in September, responded.  -Excellent renal function, lasix restarted at 20mg BID yesterday, aldactone held given elevated K++  -No encephalopathy currently  -Protein malnutrition, appreciated dietician recs  -Leukocytosis, uncertain etiology - no SBP on today's paracentesis, blood cx neg   -Normocytic anemia without GI bleeding, multifactorial    RECOMMENDATIONS:  No new recs.   Nearing discharge, pending review of am labs?  Please call questions.    Dale Fly, DO   [facility]  Cell [phone]    ________________________________________________________________________      SUBJECTIVE:  Slept poorly, otherwise feeling well this AM.  Denies abd pain.       OBJECTIVE:  /50 (BP Location: Left arm)   Pulse 108   Temp (!) 95  F (35  C) (Axillary)   Resp 16   Ht 1.676 m (5' 6\")   Wt 58.9 kg (129 lb 14.4 oz)   SpO2 92%   BMI 20.97 kg/m    Temp (24hrs), Av  F (36.1  C), Min:95  F (35  C), Max:98  F (36.7  C)    Patient Vitals for the past 72 hrs:   Weight   21 0703 58.9 kg (129 lb 14.4 oz)   21 0644 62.3 kg (137 lb 4.8 oz)   21 1045 59.9 kg (132 lb)       Intake/Output Summary (Last 24 hours) at 2021 0937  Last data filed at 2021 0344  Gross per 24 hour   Intake 2068 ml   Output --   Net 2068 ml        PHYSICAL EXAM  GEN: Icteric, sarcopenia, alert, oriented x3, communicative and in NAD.    ABD: Soft, non-distended, +BS, no guarding or pain to palpation, no rebound, no HSM.    Additional Data:  I have reviewed the patient's new clinical lab results:     Recent Labs   Lab Test 21  0619 21  1131 21  1415 21  1206 21  1404 21  1428   WBC 14.6* 19.2* 19.6* 19.1*   < > 15.5*   HGB 6.8* 8.0*  --  6.7*   < > " 7.9*   * 113*  --  121*   < > 107*   PLT 68* 105*  --  116*   < > 131*   INR 2.64*  --   --  2.61*  --  2.52*    < > = values in this interval not displayed.     Recent Labs   Lab Test 12/24/21  0619 12/23/21  1131 12/22/21  1206   POTASSIUM 4.9 4.8 4.8   CHLORIDE 93* 87* 86*   CO2 22 24 25   BUN 24 26 24   ANIONGAP 9 10 11     Recent Labs   Lab Test 12/24/21  0619 12/23/21  1836 12/23/21  1824 12/23/21  1131 12/22/21  1206 11/04/21  0721 11/03/21 2013 09/25/21  0829 09/25/21  0427   ALBUMIN 1.9* 2.6*  --  2.8* 2.6*   < >  --    < >  --    BILITOTAL 20.6*  --   --  23.7* 23.9*   < >  --    < >  --    ALT 48  --   --  58* 60*   < >  --    < >  --    AST 89*  --   --  107* 101*   < >  --    < >  --    PROTEIN  --   --  Negative  --   --   --  Negative  --  Negative   LIPASE  --   --   --  310  --   --   --   --   --     < > = values in this interval not displayed.

## 2021-12-25 NOTE — PROGRESS NOTES
Discharge Note    Patient discharged to home via private vehicle  accompanied by significant other .  IV: Discontinued  Prescriptions e-prescribed to pharmacy.   Belongings reviewed and sent with patient.   Home medications returned to patient: NA  Equipment sent with: patient, N/A.   patient verbalizes understanding of discharge instructions. AVS given to patient.

## 2021-12-25 NOTE — PLAN OF CARE
Soft BPs, all other VSS on room air. Denies pain. Jaundiced. Abdomen rounded. Paracentesis this AM. 3850mL removed. Band-aid to R abdomen CDI. Hgb 6.8. 1 unit PRBCs given without incident. Recheck labs tomorrow AM. [facility] following.

## 2021-12-25 NOTE — DISCHARGE SUMMARY
[facility]  Hospitalist Discharge Summary       Date of Admission:  12/23/2021  Date of Discharge:  12/26/2021  Discharging Provider: Long Crespo MD      Discharge Diagnoses   Decompensated alcoholic cirrhosis with ascites   Grade I esophageal varices   Portal Hypertension and portal hypertensive gastropathy   Hyponatremia, suspected hypovolemic  Leukocytosis   Macrocytic anemia, acute on chronic   Chronic thrombocytopenia secondary to liver disease  Severe malnutrition     Follow-ups Needed After Discharge   Follow-up Appointments     Follow-up and recommended labs and tests       Follow up with Dr. Steele (liver doctor) as scheduled. Recommend you   have your labs drawn as early as possible on 12/27/21 so they are ready to   review by Dr. Steele. If unable to do this, have them drawn as early as   possible next week and your primary care provider can follow-up on them,   or Dr. Steele can review them at a later time.             Unresulted Labs Ordered in the Past 30 Days of this Admission     Date and Time Order Name Status Description    12/23/2021  5:05 PM Methylmalonic Acid In process     12/23/2021 11:43 AM Blood Culture Line, venous Preliminary     12/23/2021 11:43 AM Blood Culture Peripheral Blood Preliminary       These results will be followed up by PCP    Hospital Course   Melita Cortes is a 51 year old female with past medical history significant for alcoholic liver cirrhosis with ascites, varices, portal hypertension, hepatic encephalopathy, chronic macrocytic anemia who presents from clinic with abnormal labs. Admitted on 12/23/2021.     Decompensated alcoholic cirrhosis with ascites   Grade I esophageal varices   Portal hypertension and portal hypertensive gastropathy   *Presents with abnormal labs:  Alk phos of 161, TT bili of 23.7, ALT of 58 and AST of 107. Bili and alk phos are a bit higher than they were during her previous hospitalization. Ammonia is  wnl.   *Hospitalized late September for decompensated cirrhosis, then again in early November.  MELD-Na score: 32 at 12/25/2021  9:33 AM  MELD score: 28 at 12/25/2021  9:33 AM  Calculated from:  Serum Creatinine: 0.99 mg/dL (Using min of 1 mg/dL) at 12/25/2021  9:33 AM  Serum Sodium: 127 mmol/L at 12/25/2021  9:33 AM  Total Bilirubin: 22.7 mg/dL at 12/25/2021  9:33 AM  INR(ratio): 2.46 at 12/25/2021  9:33 AM  Age: 51 years   - [facility] consulted and followed during admission   - Diagnostic/therapeutic paracentesis completed 12/24: 3.85L removed. Labs not consistent with SBP  - LFTs stable/improving, no signs of hepatic encephalopathy, infectious work-up negative  - Diuretics initially held; subsequent resumed furosemide 20 mg BID and continued on discharge. Spironolactone resumed at 50 mg daily on discharge. Ordered BMP prior to Hepatology appointment 12/27/21 with adjustment of diuretics by hepatology as needed.   - Continue lactulose 10g TID  - Continue PPI     Hyponatremia, suspected hypovolemic  Sodium is 121. Baseline has been around 130-135. She does have some ascites and some ankle edema, but her ankle edema in particular has decreased when her diuretic dose was recently increased.  This may consequently, be contributing to her low sodium since.   *Urine osm 530, urine sodium 10  - Improved initially with holding of diuretics and IVF, further improved after furosemide resumed  - Diuretics managed as above   - Monitor BMP closely as outpatient     Leukocytosis   WBC is elevated to 19.2. She has a chronically elevated WBC count, but 19.2 is higher than it has been recently. She has no fever, hemodynamics are stable. CXR is unremarkable.   - SBP and UA not indicative of infection  - Blood cultures NGTD  - WBC improving to baseline      Macrocytic anemia, acute on chronic   Chronic thrombocytopenia secondary to liver disease  *Hemoglobin in clinic was 6.7 g/dl 12/22, improved with pRBC transfusion.   *Platelets  typically >80k  - Hemoglobin decreased again to 6.8 g/dl. No signs of bleeding.   - Hemoglobin increased with additional pRBC  - Likely multifactorial, no signs of bleeding and no plans for EGD per GI  - Iron studies consistent with anemia of chronic disease  - Folate normal. MMA pending.  - Continue PPI  - Monitor CBC as outpatient     Severe malnutrition  -% Weight loss: > 20% in 1 year  -% Intake: <75% for >/= 3 months  -Subcutaneous Fat Loss:  Mild depletion in orbital and upper arm regions  -Muscle Loss: Moderate depletion in temporal and clavicle bone regions; mild depletion in patellar region  -Fluid Retention: ++ ascites  -RD Recommendation: Nutrition supplements, education      Consultations This Hospital Stay   GASTROENTEROLOGY IP CONSULT  NUTRITION SERVICES ADULT IP CONSULT    Code Status   Full Code    Time Spent on this Encounter   I, Long Crespo MD, personally saw the patient today and spent greater than 30 minutes discharging this patient.       Long Crespo MD  [facility]  ______________________________________________________________________    Physical Exam   Vital Signs: Temp: (!) 95  F (35  C) Temp src: Axillary BP: 101/50 Pulse: 108   Resp: 16 SpO2: 92 % O2 Device: None (Room air)    Weight: 129 lbs 14.4 oz    Constitutional:  NAD.  Respiratory:     Clear to auscultation bilaterally, good air movement bilaterally  Cardiovascular: RRR. Trace bilateral lower extremity edema.  GI: Soft, non-tender.    Skin/Integumen: Warm, dry. Jaundiced.   Neuro: Alert. Responding to questions appropriately. Following commands.        Primary Care Physician   Navneet Johnson    Discharge Disposition   Discharged to home  Condition at discharge: Stable      Discharge Orders      Basic metabolic panel     CBC with platelets     Reason for your hospital stay    You were hospitalized for abnormal labs.     Follow-up and recommended labs and tests     Follow up with Dr. Steele (liver  doctor) as scheduled. Recommend you have your labs drawn as early as possible on 12/27/21 so they are ready to review by Dr. Steele. If unable to do this, have them drawn as early as possible next week and your primary care provider can follow-up on them, or Dr. Steele can review them at a later time.     Activity    Your activity upon discharge: activity as tolerated     Diet    Follow this diet upon discharge:  2 grams sodium restricted diet     Discharge Medications   Current Discharge Medication List      CONTINUE these medications which have CHANGED    Details   furosemide (LASIX) 20 MG tablet Take 1 tablet (20 mg) by mouth 2 times daily  Qty: 60 tablet, Refills: 0    Associated Diagnoses: Alcoholic cirrhosis of liver with ascites (H)      spironolactone (ALDACTONE) 50 MG tablet Take 1 tablet (50 mg) by mouth daily  Qty: 60 tablet, Refills: 3    Associated Diagnoses: Alcoholic cirrhosis of liver with ascites (H)         CONTINUE these medications which have NOT CHANGED    Details   albuterol (PROAIR HFA/PROVENTIL HFA/VENTOLIN HFA) 108 (90 Base) MCG/ACT inhaler Inhale 2 puffs into the lungs every 4 hours as needed for wheezing  Qty: 18 g, Refills: 0    Comments: Pharmacy may dispense brand covered by insurance (Proair, or proventil or ventolin or generic albuterol inhaler)  Associated Diagnoses: Community acquired pneumonia of left lower lobe of lung      folic acid (FOLVITE) 1 MG tablet Take 1 tablet (1 mg) by mouth daily  Qty: 30 tablet, Refills: 1    Associated Diagnoses: Cholestatic liver disease; Alcoholic hepatitis with ascites      lactulose (CHRONULAC) 10 GM/15ML solution Take 3 times daily. May titrate to achieve 3-4 loose stools per day.  Qty: 1892 mL, Refills: 3    Associated Diagnoses: Alcoholic liver disease (H); Hepatic encephalopathy (H)      multivitamin (CENTRUM SILVER) tablet Take 1 tablet by mouth daily      pantoprazole (PROTONIX) 40 MG EC tablet Take 1 tablet (40 mg) by mouth 2 times  daily  Qty: 60 tablet, Refills: 1    Associated Diagnoses: Alcoholic liver disease (H)      tretinoin (RETIN-A) 0.025 % external cream Apply a pea size to entire face QD  Qty: 45 g, Refills: 11    Associated Diagnoses: Facial rhytids             Significant Results and Procedures   Most Recent 3 CBC's:  Recent Labs   Lab Test 12/25/21  0933 12/24/21  0619 12/23/21  1131   WBC 14.0* 14.6* 19.2*   HGB 8.7* 6.8* 8.0*   * 112* 113*   PLT 80* 68* 105*     Most Recent 3 BMP's:  Recent Labs   Lab Test 12/25/21  0933 12/24/21  0619 12/23/21  1836 12/23/21  1131   * 124* 124* 121*   POTASSIUM 4.6 4.9  --  4.8   CHLORIDE 95 93*  --  87*   CO2 22 22  --  24   BUN 26 24  --  26   CR 0.99 0.86  --  0.89   ANIONGAP 10 9  --  10   SHAI 8.5 8.5  --  9.1   GLC 74 92  --  81     Most Recent 2 LFT's:  Recent Labs   Lab Test 12/25/21  0933 12/24/21  0619   AST 84* 89*   ALT 46 48   ALKPHOS 105 108   BILITOTAL 22.7* 20.6*     Most Recent 3 INR's:  Recent Labs   Lab Test 12/25/21  0933 12/24/21  0619 12/22/21  1206   INR 2.46* 2.64* 2.61*     Most Recent 3 Troponin's:  Recent Labs   Lab Test 11/03/21  1728   TROPONIN <0.015     Most Recent 3 BNP's:No lab results found.  Most Recent Cholesterol Panel:  Recent Labs   Lab Test 09/18/20  0837   CHOL 143   LDL 83   HDL 20*   TRIG 201*     Most Recent 6 Bacteria Isolates From Any Culture (See EPIC Reports for Culture Details):No lab results found.  Most Recent TSH and T4:No lab results found.  Most Recent Hemoglobin A1c:No lab results found.  Most Recent Urinalysis:  Recent Labs   Lab Test 12/23/21  1824   COLOR Dark Yellow*   APPEARANCE Clear   URINEGLC Negative   URINEBILI Moderate*   URINEKETONE Negative   SG 1.022   UBLD Negative   URINEPH 5.5   PROTEIN Negative   NITRITE Negative   LEUKEST Negative   RBCU 1   WBCU 2     Most Recent ABG:  Recent Labs   Lab Test 11/03/21  1645   PH 7.54*     Most Recent ESR & CRP:No lab results found.,   Results for orders placed or performed  during the hospital encounter of 12/23/21   POC US ABDOMEN LIMITED    Impression    ED Bedside Limited Ultrasound  Body area scanned: RLQ limited  Performed by: Sunny Fleming MD  Indication: alcoholic cirrhosis, abnormal labs  Findings/Interpretation: +fluid pocket in RLQ, would be amenable to US-guided paracentesis  Key images were digitally archived in the [employer] radiology system.       XR Chest 2 Views    Narrative    XR CHEST 2 VW 12/23/2021 12:17 PM    HISTORY: weakness, end stage liver disease, leukocytosis, no  fever/cough    COMPARISON: 11/4/2021      Impression    IMPRESSION: No focal infiltrate, pleural effusion or pneumothorax. The  cardiac and mediastinal silhouettes are normal.    JACQUELINE HERNANDEZ MD         SYSTEM ID:  [id number]   US Paracentesis    Narrative    US PARACENTESIS  12/24/2021 9:14 AM     HISTORY: High volume paracentesis with or without diagnostic fluid  analysis with labs to be drawn if ordered. Total paracentesis volume  as much as possible.    FINDINGS: Ultrasound was used to evaluate for the presence and best  approach for paracentesis. Written and oral informed consent was  obtained. A pause for the cause procedure to verify the correct  patient and correct procedure. The skin overlying the right lower  quadrant was prepped and draped in the usual sterile fashion. The  subcutaneous tissues were anesthetized with 10 mL of 1% lidocaine. A  catheter was advanced into the peritoneal space and 3.85 L of  straw-colored fluid was drained. There were no immediate  complications. Ultrasound images were permanently stored. Patient left  the ultrasound suite in satisfactory condition.      Impression    IMPRESSION: Technically successful paracentesis without immediate  complications.    PRIMITIVO GARIBAY MD         SYSTEM ID:  [id number]       Allergies   No Known Allergies

## 2021-12-25 NOTE — PLAN OF CARE
VSS on RA with slightly soft BP.  Denies pain. Standby assist, A&Ox4. Tolerating diet, continent of bowel and bladder. HGB 6.8, RBCs given by day shift, recheck in morning. Na 124, recheck in morning. Jaundiced. L PIV infusing NS@50mL/hr.

## 2021-12-25 NOTE — DISCHARGE INSTRUCTIONS
[phone]   24/7 Scheduling Line    Call tomorrow to set up appointment for labs. Otherwise, call primary clinic on Monday during normal business hours to schedule lab draw.

## 2021-12-26 ENCOUNTER — PATIENT OUTREACH (OUTPATIENT)
Dept: CARE COORDINATION | Facility: CLINIC | Age: 51
End: 2021-12-26

## 2021-12-26 ENCOUNTER — LAB (OUTPATIENT)
Dept: LAB | Facility: CLINIC | Age: 51
End: 2021-12-26
Payer: COMMERCIAL

## 2021-12-26 DIAGNOSIS — Z11.4 SCREENING FOR HIV (HUMAN IMMUNODEFICIENCY VIRUS): Primary | ICD-10-CM

## 2021-12-26 DIAGNOSIS — Z71.89 OTHER SPECIFIED COUNSELING: ICD-10-CM

## 2021-12-26 DIAGNOSIS — K74.69 DECOMPENSATED LIVER DISEASE (H): ICD-10-CM

## 2021-12-26 LAB
ANION GAP SERPL CALCULATED.3IONS-SCNC: 10 MMOL/L (ref 3–14)
BUN SERPL-MCNC: 48 MG/DL (ref 7–30)
CALCIUM SERPL-MCNC: 8.7 MG/DL (ref 8.5–10.1)
CHLORIDE BLD-SCNC: 92 MMOL/L (ref 94–109)
CO2 SERPL-SCNC: 22 MMOL/L (ref 20–32)
CREAT SERPL-MCNC: 1.66 MG/DL (ref 0.52–1.04)
GFR SERPL CREATININE-BSD FRML MDRD: 37 ML/MIN/1.73M2
GLUCOSE BLD-MCNC: 134 MG/DL (ref 70–99)
POTASSIUM BLD-SCNC: 4.3 MMOL/L (ref 3.4–5.3)
SODIUM SERPL-SCNC: 124 MMOL/L (ref 133–144)

## 2021-12-26 PROCEDURE — 80048 BASIC METABOLIC PNL TOTAL CA: CPT

## 2021-12-26 PROCEDURE — 85027 COMPLETE CBC AUTOMATED: CPT

## 2021-12-26 PROCEDURE — 36415 COLL VENOUS BLD VENIPUNCTURE: CPT

## 2021-12-26 NOTE — PROGRESS NOTES
Clinic Care Coordination Contact  [facility]/Voicemail       Clinical Data: Care Coordinator Outreach  Outreach attempted x 1.  Left message on patient's voicemail with call back information and requested return call.  Plan:Care Coordinator will try to reach patient again in 1-2 business days.    Nolvia Burroughs, [facility]  [phone]  [facility]

## 2021-12-26 NOTE — PROGRESS NOTES
[facility]  LIVER CLINIC FOLLOW UP  VIDEO VISIT      A/P  Ms. Cortes is a 51 Y F with AH c/b ascites requiring paracentesis, EV/PHG and receny worsened liver function. Last ETOH 9/2021. PETH 11/5/21 negative.  MELD 36 ABO O    CIRRHOSIS 2/2 ETOH with episode of AH. Had some improvment, then decline in labs for unclear reasons. Possible that Hgb and TB were impact by spur cedell anemia, but the degree of TB increase and relative indirect bili contribution don't fully support this. Need to complete eval for worsening with US with doppler and PETH. Ordered    ASCITES Under control currently with F20 BID and Sp 50.   HYPONA 2/2 cirrhosis, diuretics. Na is not stable. Was 121 4 days ago, up to 127, now 124. Dicussed Na restriction as well as tracking fluid intake. Stay under 2L. Labs today or tomorrow  ROSSY Had improved, creat now back up. WIll repeat labs today or tomorrow and assess for need to cut back on diuretics.    ANEMIA Hgb 7-9. 2/2 ETOH, cirrhosis, folate def (on folate), hemolysis/spur cell anemia. Following hgb closely, with labs today/tomorrow. No evidence of overt bleeding but may have some chronic blood loss with PHG, gastric erosions.  THROMBOCYTOPENIA Plt .2/2 ETOH and cirrhosis.     PORTAL HTN Manifest by varices, ascites, thrombocytopenia    AUD severe, in early remission. She has good insight and support. Will refer for CD eval, but I am not sure she would be able to participate in treatment at this time. Will get PETH  MALNUTRITION severe malnutrition with weight loss 30 pounds, muscle mass loss. Alb 1.9. Discussed protein intake at length.  HCC SCREENING UTD   EV EGD 10/29/21 with grade 1 EV, PHG, gastric erosions    SOCIAL SUPPORT  present for visit  FUNCTIONAL STATUS Good but declining  LT CANDIDACY She meets criteria for LT for AH. I have referred her for urgent evaluation. She will be seen in the next week for this.    Time today in min 120  Chart review 30 min  Face to  face in video visit 55 min  Documentation 25 min  Discussion with team members 10 min    -----------------------------------------------------  Subjective Ms. Cortes is a 51 Y F with AH and ETOH related cirrhosis. Last ETOH 2021.    First diagnosed with liver disease on admission -10/1/21 for AH ROSSY. Prednisolone non-responder. TB peak 34, creat 4.1, INR 3.7.    Seen by Eduardo POPE (LV 11/10/21) and I met her while she was hospitalized 11/3-21 for CAP, sepsis, ROSSY. She had some improvement clinically and labs--creat normalized, INR came down some to mid-2s, bili down to mid-teens. Then last week her labs worsened.    Admitted -21 for abnormal labs:  TB 23.7 (had been 14.7)  Na 121 (130-135)  WBC 19.2 (12-15)  Hgb 6.7 (8).  No infection was identified (paracentesis, blood cx, CXR, UA/UCX). She was transfused 1 unit pRBC and discharged to home 2 days ago.    She is feeling tired, but better from when she went in. She states she is scared.     Nutrition She is eating some, but not much appetite. She is taking Ensure daily. Asks about protein intake and sugar.    EV screening EGD 10/29/21 candida plaques, Grade I EV, moderate PHG, localized erosions in gastric antrum,   HCC screening MRI10/27/21     Ascites Had para in hospital  (3.8L) and right now things are ok. Ankles are a little puffy but not at their worst. She did have decrease in diuretics to furosemide 20 bid and cristal 50. Following low Na diet. She doesn't know what her fluid intake is. Weight is stable at 133 or so.    AUD  Last ETOH first week of 2021. Had cut back prior to that because she wasn't feeling well. She then stopped because she didn't feel well.  She was drinking about 1 bottle of wine or more per day. She increased her alcohol use when her mom  () and she had to put her dad in to a care home  First was told she had liver disease at 21 admission. Never had any elevated liver tests or been told  she had any issues with liver disease or abnormal liver tests. No treatment, CD evaluation prior to this or during this.         Lab Test 21  0933   PROTTOTAL 5.0*   ALBUMIN 1.9*   BILITOTAL 22.7*   ALKPHOS 105   AST 84*   ALT 46     CBC RESULTS: Recent Labs   Lab Test 21  0933   WBC 14.0*   RBC 2.38*   HGB 8.7*   HCT 25.7*   *   MCH 36.6*   MCHC 33.9   RDW 24.4*   PLT 80*     MELD-Na score: 36 at 2021  3:39 PM  MELD score: 34 at 2021  3:39 PM  Calculated from:  Serum Creatinine: 1.66 mg/dL at 2021  3:39 PM  Serum Sodium: 124 mmol/L (Using min of 125 mmol/L) at 2021  3:39 PM  Total Bilirubin: 22.7 mg/dL at 2021  9:33 AM  INR(ratio): 2.46 at 2021  9:33 AM  Age: 51 years    SHX  She is . Last worked day PTA 21. She was working as an  (working mostly from home). She lives with her  at home with 2 cats.    FHX  Mom  from pancreatic cancer   Dad CAD  Cousin  from ETOH cirrhosis.     Medical hx Surgical hx   Past Medical History:   Diagnosis Date     Alcoholic hepatitis      Cervical high risk HPV (human papillomavirus) test positive 2020    Past Surgical History:   Procedure Laterality Date     APPENDECTOMY       ESOPHAGOGASTRODUODENOSCOPY, WITH BRUSHINGS N/A 10/29/2021    Procedure: ESOPHAGOGASTRODUODENOSCOPY, WITH BRUSHINGS;  Surgeon: Torey Ruiz MD;  Location:  GI          ROS 10 points ROS was obtained and highlighted in the HPI, otherwise negative.     Exam  Gen Alert pleasant NAD.   Resp No difficulty breathing. No cough  Skin Jaundiced  Eyes No icterus  Neuro RESENDEZ  MSK mod muscle wasting  Psyche Pleasant, appropriate. Well groomed.    Melita Cortes is a 51 year old female who is being evaluated via a billable video visit.    Video-Visit Details  Type of service:  Video Visit  Video Start Time: 947  Video End Time:1042  Originating Location (pt. Location):home  Distant Location  (provider location):  [facility] [place]      Platform used for Video Visit: [email or web address] or [employer]

## 2021-12-27 ENCOUNTER — TELEPHONE (OUTPATIENT)
Dept: TRANSPLANT | Facility: CLINIC | Age: 51
End: 2021-12-27

## 2021-12-27 ENCOUNTER — REFERRAL (OUTPATIENT)
Dept: TRANSPLANT | Facility: CLINIC | Age: 51
End: 2021-12-27

## 2021-12-27 ENCOUNTER — VIRTUAL VISIT (OUTPATIENT)
Dept: GASTROENTEROLOGY | Facility: CLINIC | Age: 51
End: 2021-12-27
Attending: INTERNAL MEDICINE
Payer: COMMERCIAL

## 2021-12-27 ENCOUNTER — PATIENT OUTREACH (OUTPATIENT)
Dept: GASTROENTEROLOGY | Facility: CLINIC | Age: 51
End: 2021-12-27

## 2021-12-27 DIAGNOSIS — K70.30 CIRRHOSIS, ALCOHOLIC (H): Primary | ICD-10-CM

## 2021-12-27 DIAGNOSIS — K70.31 ALCOHOLIC CIRRHOSIS OF LIVER WITH ASCITES (H): Primary | ICD-10-CM

## 2021-12-27 DIAGNOSIS — K74.60 CIRRHOSIS (H): Primary | ICD-10-CM

## 2021-12-27 DIAGNOSIS — K70.30 ALCOHOLIC CIRRHOSIS (H): Primary | ICD-10-CM

## 2021-12-27 LAB
ERYTHROCYTE [DISTWIDTH] IN BLOOD BY AUTOMATED COUNT: 24.8 % (ref 10–15)
HCT VFR BLD AUTO: 26.3 % (ref 35–47)
HGB BLD-MCNC: 8.9 G/DL (ref 11.7–15.7)
MCH RBC QN AUTO: 37.2 PG (ref 26.5–33)
MCHC RBC AUTO-ENTMCNC: 33.8 G/DL (ref 31.5–36.5)
MCV RBC AUTO: 110 FL (ref 78–100)
PLATELET # BLD AUTO: 88 10E3/UL (ref 150–450)
RBC # BLD AUTO: 2.39 10E6/UL (ref 3.8–5.2)
WBC # BLD AUTO: 25.3 10E3/UL (ref 4–11)

## 2021-12-27 PROCEDURE — 99215 OFFICE O/P EST HI 40 MIN: CPT | Mod: 95 | Performed by: INTERNAL MEDICINE

## 2021-12-27 PROCEDURE — 99417 PROLNG OP E/M EACH 15 MIN: CPT | Mod: 95 | Performed by: INTERNAL MEDICINE

## 2021-12-27 ASSESSMENT — PAIN SCALES - GENERAL: PAINLEVEL: NO PAIN (0)

## 2021-12-27 NOTE — PROGRESS NOTES
"Fidelina is a 51 year old who is being evaluated via a billable video visit.      How would you like to obtain your AVS? MyChart  If the video visit is dropped, the invitation should be resent by: Send to e-mail at: [email or web address]  Will anyone else be joining your video visit? No  {If patient encounters technical issues they should call [phone] :152543}    Video Start Time: {video visit start/end time for provider to select:152948}  Video-Visit Details    Type of service:  Video Visit    Video End Time:{video visit start/end time for provider to select:152948}    Originating Location (pt. Location): {video visit patient location:443446::\"Home\"}    Distant Location (provider location):  [facility] [place]     Platform used for Video Visit: {Virtual Visit Platforms:077074::\"[employer]\"}  "

## 2021-12-27 NOTE — TELEPHONE ENCOUNTER
Per Dr. Steele, will open urgent referral/eval for this patient, higher MELD    Patient will be getting labs now, transplant eval lab orders placed.      lvm for patient to call me to get process started

## 2021-12-27 NOTE — PROGRESS NOTES
Clinic Care Coordination Contact    Background: Care Coordination referral placed from \[facility]\"" discharge report for reason of patient meeting criteria for a TCM outreach call by [facility] team.    Assessment: Upon chart review, [facility] Team member will cancel/close the referral for TCM outreach due to reason below:    Patient has a follow up appointment with an appropriate provider today for hospital discharge    Plan: Care Coordination referral for TCM outreach canceled.    Jesica Belcher  Community Health Worker  [facility]  [phone]

## 2021-12-27 NOTE — LETTER
12/27/2021     RE: Melita Cortes  [id number]  [facility] [address]    Dear Colleague,    Thank you for referring your patient, Melita Cortes, to the [facility] [place]. Please see a copy of my visit note below.    [facility]  LIVER CLINIC FOLLOW UP  VIDEO VISIT      A/P  Ms. Cortes is a 51 Y F with AH c/b ascites requiring paracentesis, EV/PHG and recnet worsened liver function. Last ETOH 9/2021. PETH 11/5/21 negative.  MELD 36    CIRRHOSIS 2/2 ETOH with episode of AH. Has some improvment, then decline in labs for unclear reasons. Possible that Hgb and TB were impact by spur cell anemia, but the degree of TB increase and relative indirect bili contribution. Need to complete eval for worsening with US with doppler and PETH. Ordered    ASCITES Under control currently with F20 BID and Sp 50.   HYPONA 2/2 cirrhosis, diuretics.Na is not stable. Was 121 4 days ago, up to 127, now 124. Dicussed Na restriction as well as tracking fluid intake. Stay under 2L. Labs today or tomorrow  ROSSY Had improved, creat now back up WIll repeat labs today or tomorrow and assess for need to cut back on diuretics.    ANEMIA Hgb 7-9. 2/2 ETOH, cirrhosis, folate def (on folate), hemolysis/spur cell anemia. Following hgb closely, with labs today/tomorrow. No evidence of overt bleeding but may have some chronic blood loss with PHG, gastric erosions.  THROMBOCYTOPENIA Plt .2/2 ETOH and cirrhosis.     PORTAL HTN Manifest by varices, ascites, thrombocytopenia    AUD severe, in early remission. She has good insight and support. Will refer for CD eval, but I am not sure she would be able to participate in treatment at this time. Will get PETH  MALNUTRITION severe malnutrition with weight loss 30 pounds, muscle mass loss. Alb 1.9. Discussed protein intake at length.  HCC SCREENING UTD   EV EGD 10/29/21 with grade 1 EV, PHG, gastric erosions    SOCIAL SUPPORT  present for  visit  FUNCTIONAL STATUS Good but declining  LT CANDIDACY She meets criteria for LT for AH. I have referred her for urgent evaluation. She will be seen in the next week for this.    Time today in min 120  Chart review 30 min  Face to face in video visit 55 min  Documentation 25 min  Discussion with team members 10 min    -----------------------------------------------------  Subjective Ms. Cortse is a 51 Y F with AH and ETOH related cirrhosis. Last ETOH 9/2021.    First diagnosed with liver disease on admission 9/24-10/1/21 for AH ROSSY. Prednisolone non-responder. TB peak 34, creat 4.1, INR 3.7.    Seen by Eduadro POPE ( 11/10/21) and I met her while she was hospitalized 11/3-11/8/21 for CAP, sepsis, ROSSY. She had some improvement clinically and labs--creat normalized, INR came down some to mid-2s, bili down to mid-teens. Then last week her labs worsened.    Admitted 12/23-12/26/21 for abnormal labs:  TB 23.7 (had been 14.7)  Na 121 (130-135)  WBC 19.2 (12-15)  Hgb 6.7 (8).  No infection was identified (paracentesis, blood cx, CXR, UA/UCX). She was transfused 1 unit pRBC and discharged to home 2 days ago.    She is feeling tired, but better from when she went in. She states she is scared.     Nutrition She is eating some, but not much appetite. She is taking Ensure daily. Asks about protein intake and sugar.    EV screening EGD 10/29/21 candida plaques, Grade I EV, moderate PHG, localized erosions in gastric antrum,   HCC screening MRI10/27/21     Ascites Had para in hospital 12/24 (3.8L) and right now things are ok. Ankles are a little puffy but not at their worst. She did have decrease in diuretics to furosemide 20 bid and cristal 50. Following low Na diet. She doesn't know what her fluid intake is. Weight is stable at 133 or so.    AUD  Last ETOH first week of September 2021. Had cut back prior to that because she wasn't feeling well. She then stopped because she didn't feel well.  She was drinking about 1  bottle of wine or more per day. She increased her alcohol use when her mom  () and she had to put her dad in to a care home  First was told she had liver disease at 22 admission. Never had any elevated liver tests or been told she had any issues with liver disease or abnormal liver tests. No treatment, CD evaluation prior to this or during this.         Lab Test 21  0933   PROTTOTAL 5.0*   ALBUMIN 1.9*   BILITOTAL 22.7*   ALKPHOS 105   AST 84*   ALT 46     CBC RESULTS: Recent Labs   Lab Test 21  0933   WBC 14.0*   RBC 2.38*   HGB 8.7*   HCT 25.7*   *   MCH 36.6*   MCHC 33.9   RDW 24.4*   PLT 80*     MELD-Na score: 36 at 2021  3:39 PM  MELD score: 34 at 2021  3:39 PM  Calculated from:  Serum Creatinine: 1.66 mg/dL at 2021  3:39 PM  Serum Sodium: 124 mmol/L (Using min of 125 mmol/L) at 2021  3:39 PM  Total Bilirubin: 22.7 mg/dL at 2021  9:33 AM  INR(ratio): 2.46 at 2021  9:33 AM  Age: 51 years    SHX  She is . Last worked day PTA 21. She was working as an  (working mostly from home). She lives with her  at home with 2 cats.    FHX  Mom  from pancreatic cancer   Dad CAD  Cousin  from ETOH cirrhosis.     Medical hx Surgical hx   Past Medical History:   Diagnosis Date     Alcoholic hepatitis      Cervical high risk HPV (human papillomavirus) test positive 2020    Past Surgical History:   Procedure Laterality Date     APPENDECTOMY       ESOPHAGOGASTRODUODENOSCOPY, WITH BRUSHINGS N/A 10/29/2021    Procedure: ESOPHAGOGASTRODUODENOSCOPY, WITH BRUSHINGS;  Surgeon: Torey Ruiz MD;  Location: [facility]          ROS 10 points ROS was obtained and highlighted in the HPI, otherwise negative.     Exam  Gen Alert pleasant NAD.   Resp No difficulty breathing. No cough  Skin Jaundiced  Eyes No icterus  Neuro RESENDEZ  MSK mod muscle wasting  Psyche Pleasant, appropriate. Well groomed.    Melita Michelle  Sebastian is a 51 year old female who is being evaluated via a billable video visit.    Video-Visit Details  Type of service:  Video Visit  Video Start Time: 947  Video End Time:1042  Originating Location (pt. Location):home  Distant Location (provider location):  [facility] [place]      Platform used for Video Visit: [email or web address] or [employer]    Again, thank you for allowing me to participate in the care of your patient.      Sincerely,    Zita Steele MD

## 2021-12-27 NOTE — TELEPHONE ENCOUNTER
LIVER DISEASE ETOH Hepatitis   REFERRING PROVIDER Dr. Steele   -----------------------------------------------------------------------------------------------------------------------------  MELD 36  ABO O   September noticed bloating/distention, jaundice, says that etoh wasn't sitting well, quit drinking on own.   noticed that she couldn't even walk straight,  brought to ED- received paracentesis, transfusion    ETOH- was drinking every day prior to Sept, mainly wine- 1+ bottles a day- more with COVID and death of mom;  Never told had issues with liver prior to Sept. or counseled to stop drinking, no etoh since that time.         Ascites  Adrian Furosemide- just started on both- repeat labs tomorrow as patient hypoNa+, slight creat elevation;   Delroy  - every 3-4 weeks  TIPS no  HE at first dx, not since; 'move slow and talk slow' On lactulose   Variceal screening Last EGD here this   HCC Screening October   Alcohol use  Per above      ---------------------------------------------------------------------------------------------------------------------------------  PMH  Neuro- negative  Cardiac- negative  Pulm- non smoker now, quit over 1 year ago, used to smoke a few a day  Kidneys- some creat elevation, diuretic adjustments, no prior hx of issues  Surgical hx- lap appy 10 years ago;     SHX  Mom  of pancreatic cancer 1 yr ago, states that drinking was coping mechanism dealing with this.        , lives with  and 2 cats, no kids;  Sisters and brother/sister in law help with appointments; Currently not working, used to work as ;  Currently on short term disability;    ---------------------------------------------------------------------------------------------------------------------------------  LDLT Discussed no high MELD    PLAN    - eval ASAP on non eval day,     - very emotional, check with SW on referral for Dr. Ochoa

## 2021-12-28 ENCOUNTER — LAB (OUTPATIENT)
Dept: LAB | Facility: CLINIC | Age: 51
End: 2021-12-28
Payer: COMMERCIAL

## 2021-12-28 ENCOUNTER — TELEPHONE (OUTPATIENT)
Dept: GASTROENTEROLOGY | Facility: CLINIC | Age: 51
End: 2021-12-28

## 2021-12-28 DIAGNOSIS — K70.31 ALCOHOLIC CIRRHOSIS OF LIVER WITH ASCITES (H): ICD-10-CM

## 2021-12-28 DIAGNOSIS — K70.30 CIRRHOSIS, ALCOHOLIC (H): ICD-10-CM

## 2021-12-28 PROBLEM — E66.9 OBESITY (BMI 30-39.9): Status: ACTIVE | Noted: 2021-12-28

## 2021-12-28 PROBLEM — E78.5 HYPERLIPIDEMIA: Status: ACTIVE | Noted: 2021-12-28

## 2021-12-28 LAB
ABO/RH(D): NORMAL
AFP SERPL-MCNC: 5.4 UG/L (ref 0–8)
ALBUMIN SERPL-MCNC: 2.3 G/DL (ref 3.4–5)
ALBUMIN UR-MCNC: NEGATIVE MG/DL
ALP SERPL-CCNC: 107 U/L (ref 40–150)
ALT SERPL W P-5'-P-CCNC: 50 U/L (ref 0–50)
ANION GAP SERPL CALCULATED.3IONS-SCNC: 13 MMOL/L (ref 3–14)
APPEARANCE UR: CLEAR
AST SERPL W P-5'-P-CCNC: 80 U/L (ref 0–45)
BACTERIA #/AREA URNS HPF: ABNORMAL /HPF
BACTERIA BLD CULT: NO GROWTH
BACTERIA BLD CULT: NO GROWTH
BILIRUB DIRECT SERPL-MCNC: 24.1 MG/DL (ref 0–0.2)
BILIRUB SERPL-MCNC: 31.5 MG/DL (ref 0.2–1.3)
BILIRUB UR QL STRIP: ABNORMAL
BUN SERPL-MCNC: 62 MG/DL (ref 7–30)
CALCIUM SERPL-MCNC: 8.2 MG/DL (ref 8.5–10.1)
CHLORIDE BLD-SCNC: 87 MMOL/L (ref 94–109)
CHOLEST SERPL-MCNC: <50 MG/DL
CO2 SERPL-SCNC: 20 MMOL/L (ref 20–32)
COLOR UR AUTO: ABNORMAL
CREAT SERPL-MCNC: 1.84 MG/DL (ref 0.52–1.04)
CREAT UR-MCNC: 94 MG/DL
DEPRECATED CALCIDIOL+CALCIFEROL SERPL-MC: 26 UG/L (ref 20–75)
ERYTHROCYTE [DISTWIDTH] IN BLOOD BY AUTOMATED COUNT: 23.2 % (ref 10–15)
FASTING STATUS PATIENT QL REPORTED: NO
FERRITIN SERPL-MCNC: 1359 NG/ML (ref 8–252)
GFR SERPL CREATININE-BSD FRML MDRD: 33 ML/MIN/1.73M2
GLUCOSE BLD-MCNC: 133 MG/DL (ref 70–99)
GLUCOSE UR STRIP-MCNC: 100 MG/DL
GRAN CASTS #/AREA URNS LPF: ABNORMAL /LPF
HCG SERPL QL: NEGATIVE
HCT VFR BLD AUTO: 23 % (ref 35–47)
HDLC SERPL-MCNC: 67 MG/DL
HGB BLD-MCNC: 8 G/DL (ref 11.7–15.7)
HGB UR QL STRIP: NEGATIVE
HIV 1+2 AB+HIV1 P24 AG SERPL QL IA: NONREACTIVE
INR PPP: 2.48 (ref 0.85–1.15)
IRON SATN MFR SERPL: 76 % (ref 15–46)
IRON SERPL-MCNC: 170 UG/DL (ref 35–180)
KETONES UR STRIP-MCNC: ABNORMAL MG/DL
LDLC SERPL CALC-MCNC: <5 MG/DL
LEUKOCYTE ESTERASE UR QL STRIP: NEGATIVE
MCH RBC QN AUTO: 36.7 PG (ref 26.5–33)
MCHC RBC AUTO-ENTMCNC: 34.8 G/DL (ref 31.5–36.5)
MCV RBC AUTO: 106 FL (ref 78–100)
NITRATE UR QL: NEGATIVE
NONHDLC SERPL-MCNC: NORMAL MG/DL
PH UR STRIP: 5 [PH] (ref 5–7)
PHOSPHATE SERPL-MCNC: 5.1 MG/DL (ref 2.5–4.5)
PLATELET # BLD AUTO: 92 10E3/UL (ref 150–450)
POTASSIUM BLD-SCNC: 4.2 MMOL/L (ref 3.4–5.3)
PROT SERPL-MCNC: 5.2 G/DL (ref 6.8–8.8)
PROT UR-MCNC: 0.18 G/L
PROT/CREAT 24H UR: 0.19 G/G CR (ref 0–0.2)
RBC # BLD AUTO: 2.18 10E6/UL (ref 3.8–5.2)
RBC #/AREA URNS AUTO: ABNORMAL /HPF
SODIUM SERPL-SCNC: 120 MMOL/L (ref 133–144)
SP GR UR STRIP: 1.01 (ref 1–1.03)
SPECIMEN EXPIRATION DATE: NORMAL
SQUAMOUS #/AREA URNS AUTO: ABNORMAL /LPF
T PALLIDUM AB SER QL: NONREACTIVE
TIBC SERPL-MCNC: 223 UG/DL (ref 240–430)
TRANSFERRIN SERPL-MCNC: 129 MG/DL (ref 210–360)
TRIGL SERPL-MCNC: 83 MG/DL
TSH SERPL DL<=0.005 MIU/L-ACNC: 0.66 MU/L (ref 0.4–4)
UROBILINOGEN UR STRIP-ACNC: 0.2 E.U./DL
WBC # BLD AUTO: 19.1 10E3/UL (ref 4–11)
WBC #/AREA URNS AUTO: ABNORMAL /HPF

## 2021-12-28 PROCEDURE — 80061 LIPID PANEL: CPT

## 2021-12-28 PROCEDURE — 85610 PROTHROMBIN TIME: CPT

## 2021-12-28 PROCEDURE — 86901 BLOOD TYPING SEROLOGIC RH(D): CPT

## 2021-12-28 PROCEDURE — 86644 CMV ANTIBODY: CPT

## 2021-12-28 PROCEDURE — 36415 COLL VENOUS BLD VENIPUNCTURE: CPT

## 2021-12-28 PROCEDURE — 99000 SPECIMEN HANDLING OFFICE-LAB: CPT

## 2021-12-28 PROCEDURE — 86665 EPSTEIN-BARR CAPSID VCA: CPT

## 2021-12-28 PROCEDURE — 84466 ASSAY OF TRANSFERRIN: CPT

## 2021-12-28 PROCEDURE — 82728 ASSAY OF FERRITIN: CPT

## 2021-12-28 PROCEDURE — 81001 URINALYSIS AUTO W/SCOPE: CPT

## 2021-12-28 PROCEDURE — 82105 ALPHA-FETOPROTEIN SERUM: CPT

## 2021-12-28 PROCEDURE — 85027 COMPLETE CBC AUTOMATED: CPT

## 2021-12-28 PROCEDURE — 82248 BILIRUBIN DIRECT: CPT

## 2021-12-28 PROCEDURE — 86900 BLOOD TYPING SEROLOGIC ABO: CPT

## 2021-12-28 PROCEDURE — 84100 ASSAY OF PHOSPHORUS: CPT

## 2021-12-28 PROCEDURE — 82306 VITAMIN D 25 HYDROXY: CPT

## 2021-12-28 PROCEDURE — 80053 COMPREHEN METABOLIC PANEL: CPT

## 2021-12-28 PROCEDURE — 80321 ALCOHOLS BIOMARKERS 1OR 2: CPT | Mod: 90

## 2021-12-28 PROCEDURE — 87086 URINE CULTURE/COLONY COUNT: CPT

## 2021-12-28 PROCEDURE — 84703 CHORIONIC GONADOTROPIN ASSAY: CPT

## 2021-12-28 PROCEDURE — 84443 ASSAY THYROID STIM HORMONE: CPT

## 2021-12-28 PROCEDURE — 84156 ASSAY OF PROTEIN URINE: CPT

## 2021-12-28 PROCEDURE — 86780 TREPONEMA PALLIDUM: CPT

## 2021-12-28 PROCEDURE — 86481 TB AG RESPONSE T-CELL SUSP: CPT

## 2021-12-28 NOTE — TELEPHONE ENCOUNTER
DATE:  12/28/2021   TIME OF RECEIPT FROM LAB:  2:40 pm  LAB TEST:  hemoglobin  LAB VALUE:  7.9  RESULTS GIVEN WITH READ-BACK TO (PROVIDER):  Dr. Zita Steele    TIME LAB VALUE REPORTED TO PROVIDER:   2:41 pm    Result routed to Dr. Zita Steele. Results consistent with previous results and pt has a blood therapy plan in place to transfuse if hemoglobin < 7.

## 2021-12-29 ENCOUNTER — TELEPHONE (OUTPATIENT)
Dept: TRANSPLANT | Facility: CLINIC | Age: 51
End: 2021-12-29

## 2021-12-29 DIAGNOSIS — K74.60 CIRRHOSIS OF LIVER (H): Primary | ICD-10-CM

## 2021-12-29 LAB
CMV IGG SERPL IA-ACNC: 0.72 U/ML
CMV IGG SERPL IA-ACNC: ABNORMAL
EBV VCA IGG SER IA-ACNC: 407 U/ML
EBV VCA IGG SER IA-ACNC: POSITIVE
METHYLMALONATE SERPL-SCNC: 0.28 UMOL/L (ref 0–0.4)

## 2021-12-29 RX ORDER — CIPROFLOXACIN 500 MG/1
TABLET, FILM COATED ORAL
Qty: 97 TABLET | Refills: 0 | Status: ON HOLD | OUTPATIENT
Start: 2021-12-29 | End: 2022-01-05

## 2021-12-29 NOTE — TELEPHONE ENCOUNTER
"Psychosocial Assessment  Melita\"Fidelina\" Michelle Cortes participate in a telephone visit as part of her evaluation as a potential liver transplant recipient. During phone call, Fidelina was not feeling well and unable to have a meaningful conversation.  Information was obtained from her spouse, Doug.   Living Situation: Fidelina lives in a side by side home in [place], [place] with her spouse Doug. They current rent the home and have resided there for the last 10 years and are planning to purchase the home. No concerns related to the living environment.   Education/Employment:  Fidelina completed high school and some college classes. 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 She is not a .   Financial /Income: Fidelina received short term disability. Her spouse works full time. They do not have any financial concerns and a reasonable savings.   Health Insurance:  Fidelina has [employer] through her employer. Her out of pocket max is $2,500. Spouse reports that they will likely pay a [employer] if needed to keep her insurance.   This writer talked with Melita about the financial risks of transplant, particularly about the high cost of transplant related medications and the importance of maintaining adequate health insurance coverage.  Family/Social Support: Fidelina has been  for 17 years. She does not have any children or siblings. Her mother passed away about two years ago and her father Srinivas lives in a nursing facility in [place]. Doug voices that he has a large family with seven siblings. Fidelina has additional friends and family members who would be able to provide support.   Doug would be Fidelina's primary caregiver post transplant.   This writer stressed the importance of having a stable and involved support network before and after transplant.  Provided Melita  with education about the relationship between a stable support system and " better surgical and post-transplant outcomes compared to patients with a limited support system.    Functional Status: Prior to her health decline, she was mobile and did not require any assistance ambulating. She was driving until about three months ago. She is no longer driving due to her health.   Chemical Dependency:  Fidelina has a history of misuse/overuse of alcohol, with his last use early September 2021. Prior to that she was mainly social drinking in 2021 (a drink out with friends.). During and prior to 2020 she was at least a bottle of wine daily. Spouse reports the he monitored a month of consumption and she consumed 45 bottles of wine and 2-3 bottles of liquor in one month. Fidelina's increase in consumption started about 10 years ago. She has a history of tobacco use and quit about a year ago. At her heaviest use, Fidelina was smoking a 2-3 packs a week, and sometimes a pack would last him 2-3 weeks. No history of misuse/overuse of pain medication and no other illicit substance use.   Fidelina does not have a history of treatment and has one DUI about 10 years ago. Spouse does not recall Fidelina being advised to abstain by a medical provider in the past.   Mental Health: Spouse reports she has a history of depression. She was previously prescribed medication, but he reports she has not been taking that medication. She has not participate in psychotherapy, had psychiatric hospitalizations or past/current suicidal thoughts.   Adjustment to Illness:  This writer provided Doug with adjustment to illness counseling during our conversation.   Impression/Recommendations:   Spouse verbalizes understanding the psychosocial risks of transplant and teaching provided during this evaluation.  Melita is about 3.5 months abstinent from alcohol. I spoke with spouse regarding recommendation to complete a chemical dependency assessment as part of her transplant evaluation. He is in agreement with assessment and believes she will  participate.  I was unable to speak with patient via phone to obtain meaningful information regarding willingness to engage in treatment and insight into prior substance use. 11/5/2021 PETH negative. 12/28/2021 PEth pending.   I called on 12/29/2021 and 12/30/2021. On 12/30/2021, spouse reported that he is bringing her to [facility]. I will attempt to meet with patient in person while admitted to the hospital   Melita has adequate finances and health insurance for transplant, and an intact support system. This writer will remain available to assist patient throughout the evaluation process and will follow patient through transplant if she is listed.  It was a pleasure to evaluate this patient for liver transplant.   Teaching completed during assessment:  1.     Housing and relocation needs post transplant.  2.     Caregiver needs post transplant.  3.     Financial issues related to transplant.  4.     Risks of alcohol use post transplant.  5.     Common psychosocial stressors pre/post transplant.        6.     Liver Transplant support group availability.        7.     [employer] Directive - She does not have a health care directive. I sent a copy Belkys's email ([email or web address])              Psychosocial Risks of Transplant Reviewed:  1.     Increased stress related to your emotional, family, social, employment, or   financial situation.  2.     Affect on work and/or disability benefits.  3.     Affect on future health and life insurance.  4.     Transplant outcome expectations may not be met.          5.     Mental Health risks: anxiety, depression, PTSD, guilt,                  grief and chronic fatigue.     Magalis Lebron, LEMO, LICSW

## 2021-12-29 NOTE — TELEPHONE ENCOUNTER
Spoke with patient    Starting Cipro 500 BID x 7 days, then q day ongoing, patient verbalizes understanding, rx sent.    Plan:  Appointments to be scheduled ASAP, orders previously sent to scheduling- will complete eval on non-eval days, just as quickly as we can get appts in over next week or so due to high MELD/acute decompensation;  Other labs from yesterday pending.    Patient aware I am out remainder of week and how to call other coordinators if needed, advised to come to [facility] if emergency care needed.

## 2021-12-29 NOTE — TELEPHONE ENCOUNTER
I received urgent referral to complete psychosocial assessment for liver transplant candidacy.     I called patient and left a voice message introducing self, and inquired if patient/family member would be available to complete assessment via phone. I requested a call back and provided my direct phone number    Plan: Waiting for return call

## 2021-12-29 NOTE — TELEPHONE ENCOUNTER
This RN called and spoke with patient and  Doug.    Informed both that per Dr. Steele, Fidelina should stop taking her diuretics for now. Both Fidelina & Doug acknowledged.    Informed both that we need Fidelina to get her labs drawn tomorrow, order has already been put in. Both acknowledged.    Also reinforced to both that if Fidelina were to feel worse and need to go to an ER to please go to the ER at the [facility]. Both acknowledged.     This RN also gave patient his direct call back number and encouraged to call with any questions/concerns/updates.    December 29, 2021 10:19 AM - Tito Mcgrath RN:

## 2021-12-30 ENCOUNTER — APPOINTMENT (OUTPATIENT)
Dept: ULTRASOUND IMAGING | Facility: CLINIC | Age: 51
DRG: 432 | End: 2021-12-30
Attending: EMERGENCY MEDICINE
Payer: COMMERCIAL

## 2021-12-30 ENCOUNTER — APPOINTMENT (OUTPATIENT)
Dept: GENERAL RADIOLOGY | Facility: CLINIC | Age: 51
DRG: 432 | End: 2021-12-30
Attending: EMERGENCY MEDICINE
Payer: COMMERCIAL

## 2021-12-30 ENCOUNTER — TELEPHONE (OUTPATIENT)
Dept: BEHAVIORAL HEALTH | Facility: CLINIC | Age: 51
End: 2021-12-30

## 2021-12-30 ENCOUNTER — HOSPITAL ENCOUNTER (INPATIENT)
Facility: CLINIC | Age: 51
LOS: 6 days | Discharge: HOME HEALTH | DRG: 432 | End: 2022-01-05
Attending: EMERGENCY MEDICINE | Admitting: INTERNAL MEDICINE
Payer: COMMERCIAL

## 2021-12-30 VITALS — HEIGHT: 65 IN | BODY MASS INDEX: 21.49 KG/M2 | WEIGHT: 129 LBS

## 2021-12-30 DIAGNOSIS — K76.82 HEPATIC ENCEPHALOPATHY (H): Primary | ICD-10-CM

## 2021-12-30 DIAGNOSIS — K70.31 ALCOHOLIC CIRRHOSIS OF LIVER WITH ASCITES (H): ICD-10-CM

## 2021-12-30 DIAGNOSIS — Z20.822 CONTACT WITH AND (SUSPECTED) EXPOSURE TO COVID-19: ICD-10-CM

## 2021-12-30 DIAGNOSIS — R53.81 PHYSICAL DECONDITIONING: ICD-10-CM

## 2021-12-30 DIAGNOSIS — A42.9 ACTINOMYCES INFECTION: ICD-10-CM

## 2021-12-30 DIAGNOSIS — R11.2 NON-INTRACTABLE VOMITING WITH NAUSEA, UNSPECIFIED VOMITING TYPE: ICD-10-CM

## 2021-12-30 DIAGNOSIS — R11.2 NAUSEA AND VOMITING, INTRACTABILITY OF VOMITING NOT SPECIFIED, UNSPECIFIED VOMITING TYPE: ICD-10-CM

## 2021-12-30 LAB
ACANTHOCYTES BLD QL SMEAR: ABNORMAL
ALBUMIN SERPL-MCNC: 2 G/DL (ref 3.4–5)
ALP SERPL-CCNC: 97 U/L (ref 40–150)
ALT SERPL W P-5'-P-CCNC: 48 U/L (ref 0–50)
AMMONIA PLAS-SCNC: 34 UMOL/L (ref 10–50)
ANION GAP SERPL CALCULATED.3IONS-SCNC: 13 MMOL/L (ref 3–14)
ANION GAP SERPL CALCULATED.3IONS-SCNC: 14 MMOL/L (ref 3–14)
ANION GAP SERPL CALCULATED.3IONS-SCNC: 16 MMOL/L (ref 3–14)
AST SERPL W P-5'-P-CCNC: 87 U/L (ref 0–45)
BACTERIA UR CULT: NORMAL
BASOPHILS # BLD MANUAL: 0.3 10E3/UL (ref 0–0.2)
BASOPHILS NFR BLD MANUAL: 2 %
BILIRUB SERPL-MCNC: 31.5 MG/DL (ref 0.2–1.3)
BUN SERPL-MCNC: 66 MG/DL (ref 7–30)
BUN SERPL-MCNC: 69 MG/DL (ref 7–30)
BUN SERPL-MCNC: 72 MG/DL (ref 7–30)
CALCIUM SERPL-MCNC: 8.1 MG/DL (ref 8.5–10.1)
CALCIUM SERPL-MCNC: 8.4 MG/DL (ref 8.5–10.1)
CALCIUM SERPL-MCNC: 9 MG/DL (ref 8.5–10.1)
CHLORIDE BLD-SCNC: 91 MMOL/L (ref 94–109)
CHLORIDE BLD-SCNC: 93 MMOL/L (ref 94–109)
CHLORIDE BLD-SCNC: 94 MMOL/L (ref 94–109)
CO2 SERPL-SCNC: 18 MMOL/L (ref 20–32)
CO2 SERPL-SCNC: 20 MMOL/L (ref 20–32)
CO2 SERPL-SCNC: 20 MMOL/L (ref 20–32)
CREAT SERPL-MCNC: 1.63 MG/DL (ref 0.52–1.04)
CREAT SERPL-MCNC: 1.77 MG/DL (ref 0.52–1.04)
CREAT SERPL-MCNC: 1.8 MG/DL (ref 0.52–1.04)
EOSINOPHIL # BLD MANUAL: 0.3 10E3/UL (ref 0–0.7)
EOSINOPHIL NFR BLD MANUAL: 2 %
ERYTHROCYTE [DISTWIDTH] IN BLOOD BY AUTOMATED COUNT: 22.9 % (ref 10–15)
GAMMA INTERFERON BACKGROUND BLD IA-ACNC: 0.04 IU/ML
GFR SERPL CREATININE-BSD FRML MDRD: 34 ML/MIN/1.73M2
GFR SERPL CREATININE-BSD FRML MDRD: 34 ML/MIN/1.73M2
GFR SERPL CREATININE-BSD FRML MDRD: 38 ML/MIN/1.73M2
GLUCOSE BLD-MCNC: 122 MG/DL (ref 70–99)
GLUCOSE BLD-MCNC: 123 MG/DL (ref 70–99)
GLUCOSE BLD-MCNC: 176 MG/DL (ref 70–99)
HCT VFR BLD AUTO: 23.3 % (ref 35–47)
HGB BLD-MCNC: 8.2 G/DL (ref 11.7–15.7)
INR PPP: 2.31 (ref 0.85–1.15)
LIPASE SERPL-CCNC: 366 U/L (ref 73–393)
LYMPHOCYTES # BLD MANUAL: 0.6 10E3/UL (ref 0.8–5.3)
LYMPHOCYTES NFR BLD MANUAL: 4 %
M TB IFN-G BLD-IMP: ABNORMAL
M TB IFN-G CD4+ BCKGRND COR BLD-ACNC: 0.03 IU/ML
MCH RBC QN AUTO: 36.8 PG (ref 26.5–33)
MCHC RBC AUTO-ENTMCNC: 35.2 G/DL (ref 31.5–36.5)
MCV RBC AUTO: 105 FL (ref 78–100)
MITOGEN IGNF BCKGRD COR BLD-ACNC: -0.02 IU/ML
MITOGEN IGNF BCKGRD COR BLD-ACNC: -0.03 IU/ML
MONOCYTES # BLD MANUAL: 1.7 10E3/UL (ref 0–1.3)
MONOCYTES NFR BLD MANUAL: 11 %
MYELOCYTES # BLD MANUAL: 0.3 10E3/UL
MYELOCYTES NFR BLD MANUAL: 2 %
NEUTROPHILS # BLD MANUAL: 12.2 10E3/UL (ref 1.6–8.3)
NEUTROPHILS NFR BLD MANUAL: 79 %
OSMOLALITY SERPL: 284 MMOL/KG (ref 275–295)
PLAT MORPH BLD: ABNORMAL
PLATELET # BLD AUTO: 89 10E3/UL (ref 150–450)
POTASSIUM BLD-SCNC: 4.2 MMOL/L (ref 3.4–5.3)
POTASSIUM BLD-SCNC: 4.3 MMOL/L (ref 3.4–5.3)
POTASSIUM BLD-SCNC: 5 MMOL/L (ref 3.4–5.3)
PROCALCITONIN SERPL-MCNC: 2.71 NG/ML
PROT SERPL-MCNC: 5.2 G/DL (ref 6.8–8.8)
QUANTIFERON MITOGEN: 0.07 IU/ML
QUANTIFERON NIL TUBE: 0.04 IU/ML
QUANTIFERON TB1 TUBE: 0.01 IU/ML
QUANTIFERON TB2 TUBE: 0.02
RBC # BLD AUTO: 2.23 10E6/UL (ref 3.8–5.2)
RBC MORPH BLD: ABNORMAL
SARS-COV-2 RNA RESP QL NAA+PROBE: NEGATIVE
SODIUM SERPL-SCNC: 124 MMOL/L (ref 133–144)
SODIUM SERPL-SCNC: 127 MMOL/L (ref 133–144)
SODIUM SERPL-SCNC: 128 MMOL/L (ref 133–144)
WBC # BLD AUTO: 15.5 10E3/UL (ref 4–11)

## 2021-12-30 PROCEDURE — 71045 X-RAY EXAM CHEST 1 VIEW: CPT

## 2021-12-30 PROCEDURE — 36415 COLL VENOUS BLD VENIPUNCTURE: CPT | Performed by: EMERGENCY MEDICINE

## 2021-12-30 PROCEDURE — 36415 COLL VENOUS BLD VENIPUNCTURE: CPT | Performed by: STUDENT IN AN ORGANIZED HEALTH CARE EDUCATION/TRAINING PROGRAM

## 2021-12-30 PROCEDURE — 99285 EMERGENCY DEPT VISIT HI MDM: CPT | Performed by: EMERGENCY MEDICINE

## 2021-12-30 PROCEDURE — 82310 ASSAY OF CALCIUM: CPT | Performed by: STUDENT IN AN ORGANIZED HEALTH CARE EDUCATION/TRAINING PROGRAM

## 2021-12-30 PROCEDURE — 71045 X-RAY EXAM CHEST 1 VIEW: CPT | Mod: 26 | Performed by: RADIOLOGY

## 2021-12-30 PROCEDURE — 258N000003 HC RX IP 258 OP 636: Performed by: INTERNAL MEDICINE

## 2021-12-30 PROCEDURE — 120N000002 HC R&B MED SURG/OB UMMC

## 2021-12-30 PROCEDURE — 82140 ASSAY OF AMMONIA: CPT | Performed by: EMERGENCY MEDICINE

## 2021-12-30 PROCEDURE — 250N000011 HC RX IP 250 OP 636: Performed by: INTERNAL MEDICINE

## 2021-12-30 PROCEDURE — 87040 BLOOD CULTURE FOR BACTERIA: CPT | Performed by: EMERGENCY MEDICINE

## 2021-12-30 PROCEDURE — 85027 COMPLETE CBC AUTOMATED: CPT | Performed by: EMERGENCY MEDICINE

## 2021-12-30 PROCEDURE — 99285 EMERGENCY DEPT VISIT HI MDM: CPT | Mod: 25 | Performed by: EMERGENCY MEDICINE

## 2021-12-30 PROCEDURE — U0005 INFEC AGEN DETEC AMPLI PROBE: HCPCS | Performed by: EMERGENCY MEDICINE

## 2021-12-30 PROCEDURE — 96360 HYDRATION IV INFUSION INIT: CPT | Mod: 59 | Performed by: EMERGENCY MEDICINE

## 2021-12-30 PROCEDURE — 96361 HYDRATE IV INFUSION ADD-ON: CPT | Performed by: EMERGENCY MEDICINE

## 2021-12-30 PROCEDURE — 87181 SC STD AGAR DILUTION PER AGT: CPT | Performed by: EMERGENCY MEDICINE

## 2021-12-30 PROCEDURE — 76700 US EXAM ABDOM COMPLETE: CPT

## 2021-12-30 PROCEDURE — 87149 DNA/RNA DIRECT PROBE: CPT | Performed by: EMERGENCY MEDICINE

## 2021-12-30 PROCEDURE — C9803 HOPD COVID-19 SPEC COLLECT: HCPCS | Performed by: EMERGENCY MEDICINE

## 2021-12-30 PROCEDURE — 93975 VASCULAR STUDY: CPT | Mod: 26 | Performed by: RADIOLOGY

## 2021-12-30 PROCEDURE — 80053 COMPREHEN METABOLIC PANEL: CPT | Performed by: EMERGENCY MEDICINE

## 2021-12-30 PROCEDURE — 99223 1ST HOSP IP/OBS HIGH 75: CPT | Mod: AI | Performed by: INTERNAL MEDICINE

## 2021-12-30 PROCEDURE — 84145 PROCALCITONIN (PCT): CPT | Performed by: EMERGENCY MEDICINE

## 2021-12-30 PROCEDURE — 83690 ASSAY OF LIPASE: CPT | Performed by: EMERGENCY MEDICINE

## 2021-12-30 PROCEDURE — 250N000013 HC RX MED GY IP 250 OP 250 PS 637: Performed by: EMERGENCY MEDICINE

## 2021-12-30 PROCEDURE — P9041 ALBUMIN (HUMAN),5%, 50ML: HCPCS | Performed by: INTERNAL MEDICINE

## 2021-12-30 PROCEDURE — 83930 ASSAY OF BLOOD OSMOLALITY: CPT | Performed by: EMERGENCY MEDICINE

## 2021-12-30 PROCEDURE — 36415 COLL VENOUS BLD VENIPUNCTURE: CPT | Performed by: INTERNAL MEDICINE

## 2021-12-30 PROCEDURE — 85045 AUTOMATED RETICULOCYTE COUNT: CPT | Performed by: INTERNAL MEDICINE

## 2021-12-30 PROCEDURE — 258N000003 HC RX IP 258 OP 636: Performed by: EMERGENCY MEDICINE

## 2021-12-30 PROCEDURE — 85610 PROTHROMBIN TIME: CPT | Performed by: EMERGENCY MEDICINE

## 2021-12-30 PROCEDURE — 99222 1ST HOSP IP/OBS MODERATE 55: CPT | Mod: GC | Performed by: TRANSPLANT SURGERY

## 2021-12-30 RX ORDER — ALBUMIN (HUMAN) 12.5 G/50ML
12.5 SOLUTION INTRAVENOUS ONCE
Status: DISCONTINUED | OUTPATIENT
Start: 2021-12-30 | End: 2022-01-01

## 2021-12-30 RX ORDER — PANTOPRAZOLE SODIUM 40 MG/1
40 TABLET, DELAYED RELEASE ORAL 2 TIMES DAILY
Status: DISCONTINUED | OUTPATIENT
Start: 2021-12-30 | End: 2022-01-05 | Stop reason: HOSPADM

## 2021-12-30 RX ORDER — ALBUTEROL SULFATE 90 UG/1
2 AEROSOL, METERED RESPIRATORY (INHALATION) EVERY 4 HOURS PRN
Status: DISCONTINUED | OUTPATIENT
Start: 2021-12-30 | End: 2022-01-05 | Stop reason: HOSPADM

## 2021-12-30 RX ORDER — MULTIVITAMIN,THERAPEUTIC
1 TABLET ORAL DAILY
Status: DISCONTINUED | OUTPATIENT
Start: 2021-12-31 | End: 2022-01-05 | Stop reason: HOSPADM

## 2021-12-30 RX ORDER — LACTULOSE 10 G/15ML
20 SOLUTION ORAL
Status: DISCONTINUED | OUTPATIENT
Start: 2021-12-30 | End: 2022-01-05 | Stop reason: HOSPADM

## 2021-12-30 RX ORDER — CEFTRIAXONE 2 G/1
2 INJECTION, POWDER, FOR SOLUTION INTRAMUSCULAR; INTRAVENOUS EVERY 24 HOURS
Status: DISCONTINUED | OUTPATIENT
Start: 2021-12-30 | End: 2022-01-05 | Stop reason: HOSPADM

## 2021-12-30 RX ORDER — AMOXICILLIN 250 MG
1 CAPSULE ORAL 2 TIMES DAILY PRN
Status: DISCONTINUED | OUTPATIENT
Start: 2021-12-30 | End: 2022-01-05 | Stop reason: HOSPADM

## 2021-12-30 RX ORDER — SODIUM CHLORIDE, SODIUM LACTATE, POTASSIUM CHLORIDE, CALCIUM CHLORIDE 600; 310; 30; 20 MG/100ML; MG/100ML; MG/100ML; MG/100ML
INJECTION, SOLUTION INTRAVENOUS CONTINUOUS
Status: DISCONTINUED | OUTPATIENT
Start: 2021-12-30 | End: 2021-12-31

## 2021-12-30 RX ORDER — LIDOCAINE 40 MG/G
CREAM TOPICAL
Status: DISCONTINUED | OUTPATIENT
Start: 2021-12-30 | End: 2022-01-05 | Stop reason: HOSPADM

## 2021-12-30 RX ORDER — LACTULOSE 10 G/15ML
100 SOLUTION ORAL
Status: DISCONTINUED | OUTPATIENT
Start: 2021-12-30 | End: 2022-01-05 | Stop reason: HOSPADM

## 2021-12-30 RX ORDER — LACTULOSE 10 G/15ML
10 SOLUTION ORAL 3 TIMES DAILY
Status: DISCONTINUED | OUTPATIENT
Start: 2021-12-30 | End: 2022-01-05 | Stop reason: HOSPADM

## 2021-12-30 RX ORDER — POLYETHYLENE GLYCOL 3350 17 G/17G
17 POWDER, FOR SOLUTION ORAL DAILY PRN
Status: DISCONTINUED | OUTPATIENT
Start: 2021-12-30 | End: 2022-01-05 | Stop reason: HOSPADM

## 2021-12-30 RX ORDER — AMOXICILLIN 250 MG
2 CAPSULE ORAL 2 TIMES DAILY PRN
Status: DISCONTINUED | OUTPATIENT
Start: 2021-12-30 | End: 2022-01-05 | Stop reason: HOSPADM

## 2021-12-30 RX ORDER — ALBUMIN, HUMAN INJ 5% 5 %
25 SOLUTION INTRAVENOUS ONCE
Status: COMPLETED | OUTPATIENT
Start: 2021-12-30 | End: 2021-12-30

## 2021-12-30 RX ADMIN — CEFTRIAXONE SODIUM 2 G: 2 INJECTION, POWDER, FOR SOLUTION INTRAMUSCULAR; INTRAVENOUS at 20:31

## 2021-12-30 RX ADMIN — SODIUM CHLORIDE, POTASSIUM CHLORIDE, SODIUM LACTATE AND CALCIUM CHLORIDE: 600; 310; 30; 20 INJECTION, SOLUTION INTRAVENOUS at 15:05

## 2021-12-30 RX ADMIN — LACTULOSE 10 G: 20 SOLUTION ORAL at 20:36

## 2021-12-30 RX ADMIN — PANTOPRAZOLE SODIUM 40 MG: 40 TABLET, DELAYED RELEASE ORAL at 20:38

## 2021-12-30 RX ADMIN — AZITHROMYCIN MONOHYDRATE 500 MG: 500 INJECTION, POWDER, LYOPHILIZED, FOR SOLUTION INTRAVENOUS at 21:07

## 2021-12-30 RX ADMIN — ALBUMIN (HUMAN) 25 G: 12.5 INJECTION, SOLUTION INTRAVENOUS at 22:51

## 2021-12-30 ASSESSMENT — ACTIVITIES OF DAILY LIVING (ADL)
CONCENTRATING,_REMEMBERING_OR_MAKING_DECISIONS_DIFFICULTY: NO
TOILETING_ISSUES: YES
ADLS_ACUITY_SCORE: 8
WHICH_OF_THE_ABOVE_FUNCTIONAL_RISKS_HAD_A_RECENT_ONSET_OR_CHANGE?: AMBULATION
WALKING_OR_CLIMBING_STAIRS_DIFFICULTY: YES
DIFFICULTY_EATING/SWALLOWING: NO
DIFFICULTY_COMMUNICATING: NO
ADLS_ACUITY_SCORE: 10
ADLS_ACUITY_SCORE: 10
DOING_ERRANDS_INDEPENDENTLY_DIFFICULTY: NO
DRESSING/BATHING_DIFFICULTY: YES
ADLS_ACUITY_SCORE: 10
WEAR_GLASSES_OR_BLIND: NO
ADLS_ACUITY_SCORE: 8
ADLS_ACUITY_SCORE: 8
HEARING_DIFFICULTY_OR_DEAF: NO
TOILETING_ASSISTANCE: TOILETING DIFFICULTY, ASSISTANCE 1 PERSON
ADLS_ACUITY_SCORE: 8
ADLS_ACUITY_SCORE: 10
DRESSING/BATHING: BATHING DIFFICULTY, ASSISTANCE 1 PERSON
WALKING_OR_CLIMBING_STAIRS: AMBULATION DIFFICULTY, ASSISTANCE 1 PERSON

## 2021-12-30 ASSESSMENT — ENCOUNTER SYMPTOMS
WEAKNESS: 1
RESPIRATORY NEGATIVE: 1
APPETITE CHANGE: 1
COLOR CHANGE: 1
ACTIVITY CHANGE: 1
CARDIOVASCULAR NEGATIVE: 1
MUSCULOSKELETAL NEGATIVE: 1
GASTROINTESTINAL NEGATIVE: 1

## 2021-12-30 ASSESSMENT — MIFFLIN-ST. JEOR
SCORE: 1206.02
SCORE: 1206.02

## 2021-12-30 NOTE — ED PROVIDER NOTES
[place] EMERGENCY DEPARTMENT ([facility])  12/30/21  History     Chief Complaint   Patient presents with     Altered Mental Status     liver pt     Abnormal Labs     Generalized Weakness     Nausea, Vomiting, & Diarrhea     HPI  Melita Cortes is a 51 year old female with a past medical history significant for alcoholic liver cirrhosis with ascites resulting in end stage liver disease, esophageal varices, portal hypertension, hepatic encephalopathy, chronic macrocytic anemia secondary to liver disease, and asthma who presents to the Emergency Department for evaluation of end stage liver disease.        Per review of the patient's medical record, they were recently hospitalized from 12/23/21-12/25/21 for evaluation of decompensated liver disease. Prior to admission, they were noted to have presented with abnormal labs (Alk phos of 161, TT bili of 23.7, ALT of 58 and AST of 107, WBC 19.2, HBC 6.7, Na 121). Patient underwent diagnostic/therapeutic paracentesis and had 3.85L removed, pRBC transfusion, and held diuretics to improve sodium. Labs were notable for improved LFTs and sodium, no sign of hepatic encephalopathy.  Patient was subsequently treated with furosemide 20 mg BID. Patient was subsequently discharged home with Spironolactone resumed at 50 mg daily and continued 20 mg of furosemide BID, lactulose 10 g, and PPI.    US PARACENTESIS - [facility] (12/24/2021)   IMPRESSION:   Technically successful paracentesis without immediate  complications    XR CHEST 2 VW - [facility] (12/23/2021)  IMPRESSION:   No focal infiltrate, pleural effusion or pneumothorax. The  cardiac and mediastinal silhouettes are normal.    ED BEDSIDE LIMITED ULTRASOUND - [facility] (12/23/2021)  FINDINGS/INTERPRETATIONS:   +fluid pocket in RLQ, would be amenable to US-guided paracentesis    According to the patient and her  the jaundice has been getting worse which  corresponds to worsening of her labs she also is not able to care for herself at home.  She has had nausea vomiting and diarrhea.  She reports that her physicians including the GI attending Dr. Steele is aware that she is coming in and support her admission to the hospital.    Past Medical History  Past Medical History:   Diagnosis Date     Alcoholic hepatitis      Asthma 3/10/2006     Cervical high risk HPV (human papillomavirus) test positive 2020    See problem list     Past Surgical History:   Procedure Laterality Date     APPENDECTOMY       ESOPHAGOGASTRODUODENOSCOPY, WITH BRUSHINGS N/A 10/29/2021    Procedure: ESOPHAGOGASTRODUODENOSCOPY, WITH BRUSHINGS;  Surgeon: Torey Ruiz MD;  Location:  GI     albuterol (PROAIR HFA/PROVENTIL HFA/VENTOLIN HFA) 108 (90 Base) MCG/ACT inhaler  ciprofloxacin (CIPRO) 500 MG tablet  folic acid (FOLVITE) 1 MG tablet  furosemide (LASIX) 20 MG tablet  lactulose (CHRONULAC) 10 GM/15ML solution  multivitamin (CENTRUM SILVER) tablet  pantoprazole (PROTONIX) 40 MG EC tablet  spironolactone (ALDACTONE) 50 MG tablet  tretinoin (RETIN-A) 0.025 % external cream      No Known Allergies  Family History  Family History   Problem Relation Age of Onset     Cancer Mother      Colon Cancer Paternal Aunt      Colon Cancer Maternal Uncle      Social History   Social History     Tobacco Use     Smoking status: Former Smoker     Quit date: 2020     Years since quittin.6     Smokeless tobacco: Never Used   Substance Use Topics     Alcohol use: Not Currently     Comment: Last drink 2021     Drug use: Never      Past medical history, past surgical history, medications, allergies, family history, and social history were reviewed with the patient. No additional pertinent items.     I have reviewed the Medications, Allergies, Past Medical and Surgical History, and Social History in the Epic system.    Review of Systems   Constitutional: Positive for activity change and appetite  "change.   HENT: Negative.    Respiratory: Negative.    Cardiovascular: Negative.    Gastrointestinal: Negative.    Genitourinary: Negative.    Musculoskeletal: Negative.    Skin: Positive for color change.   Neurological: Positive for weakness.   All other systems reviewed and are negative.    A complete review of systems was performed with pertinent positives and negatives noted in the HPI, and all other systems negative.    Physical Exam   BP: 94/50  Pulse: 92  Temp: 97.4  F (36.3  C)  Resp: 16  Height: 165.1 cm (5' 5\")  Weight: 58.5 kg (129 lb)  SpO2: 100 %      Physical Exam  Vitals and nursing note reviewed.   Constitutional:       General: She is awake. She is not in acute distress.     Appearance: She is well-developed and underweight. She is ill-appearing.   HENT:      Head: Normocephalic and atraumatic.      Mouth/Throat:      Mouth: Mucous membranes are dry.   Eyes:      General: Scleral icterus present.      Conjunctiva/sclera: Conjunctivae normal.      Pupils: Pupils are equal, round, and reactive to light.   Cardiovascular:      Rate and Rhythm: Normal rate and regular rhythm.      Heart sounds: Normal heart sounds.   Pulmonary:      Effort: Pulmonary effort is normal. No respiratory distress.      Breath sounds: Normal breath sounds. No wheezing.   Abdominal:      General: Abdomen is flat. There is distension.      Palpations: Abdomen is soft.      Comments: Mild ascites no tenderness   Musculoskeletal:      Cervical back: Neck supple.   Skin:     General: Skin is warm and dry.      Coloration: Skin is jaundiced.   Neurological:      General: No focal deficit present.      Mental Status: She is oriented to person, place, and time.      Cranial Nerves: No cranial nerve deficit.   Psychiatric:         Mood and Affect: Mood normal.         Behavior: Behavior normal.         ED Course     At 12:34 PM the patient was seen and examined by , MD in Room ED 12.        Procedures       Current MELD score " 36      Results for orders placed or performed during the hospital encounter of 12/30/21 (from the past 24 hour(s))   CBC with platelets differential    Narrative    The following orders were created for panel order CBC with platelets differential.  Procedure                               Abnormality         Status                     ---------                               -----------         ------                     CBC with platelets and d...[[id number]]                                                   Please view results for these tests on the individual orders.     Medications - No data to display          Assessments & Plan (with Medical Decision Making)   51-year-old female with alcohol induced cirrhosis now with worsening labs and becoming more symptomatic.  She has a high meld score at 36.  GI is aware of her and feel she needs to be admitted she is undergoing work-up for possible transplant.  I do not see any signs of infection her abdomen is not distended and not tender.  She is not currently drinking.  Her bilirubin is up to 31 she is not encephalopathic clinically or by ammonia level.  She has been accepted to the medicine service for admission and GI consult.    I have reviewed the nursing notes.    I have reviewed the findings, diagnosis, plan and need for follow up with the patient.    New Prescriptions    No medications on file       Final diagnoses:   Alcoholic cirrhosis of liver with ascites (H)   Non-intractable vomiting with nausea, unspecified vomiting type       I, Marcia Hernandez am serving as a trained medical scribe to document services personally performed by , MD, based on the provider's statements to me.      I, , MD, was physically present and have reviewed and verified the accuracy of this note documented by Marcia Hernandez.     , MD  12/30/2021   [facility] EMERGENCY DEPARTMENT     Henry Barcenas MD  12/30/21 [id number]

## 2021-12-30 NOTE — TELEPHONE ENCOUNTER
Patient was asked the following questions during liver intake call.     Referring Provider: Dr. Steele  Referring Diagnosis:   PCP: Dr. Johnson    Intake was completed with patient's spouse Doug Cortes while waiting to be called @ ER at [facility]  1)Do you know why you have liver disease:        If Alcoholic Cirrhosis is present when was your last drink: August 2021       Have you ever been through treatment for alcohol: no  2) Presence of Ascites: yes Paracentesis: yes  3) Presence of Hepatic Encephalopathy: yes Medications:   4) History of GI Bleeding: no  5) Oxygen Use: no  6) EGD: Upper GI [facility] 10/29/21  7) Colonoscopy: no  8) MELD Score: 36   9) Labs available for review from PCP/GI: yes chart  10) HCC Diagnosis: no                      Referral intake process completed.  Patient is aware that after financial approval is received, medical records will be requested.   Patient confirmed for a callback from transplant coordinator on not scheduled- patient's name called @ ER, had to disconnect .  Tentative evaluation date TBD.    Confirmed coordinator will discuss evaluation process in more detail at the time of their call.   Patient is aware of the need to arrange age appropriate cancer screening, vaccinations, and dental care.  Reminded patient to complete questionnaire, complete medical records release, and review packet prior to evaluation visit .  Assessed patient for special needs (ie--wheelchair, assistance, guardian, and ):  no   Patient instructed to call [phone] with questions.     Patient gave verbal consent during intake call to obtain medical records and documents outside of [facility]:  yes

## 2021-12-30 NOTE — ED TRIAGE NOTES
"Triage Assessment & Note:    BP 94/50   Pulse 92   Temp 97.4  F (36.3  C) (Temporal)   Resp 16   Ht 1.651 m (5' 5\")   Wt 58.5 kg (129 lb)   SpO2 100%   BMI 21.47 kg/m        Patient presents with: Liver pt comes to triage with reports of abnormal labs and altered mental status. No reports of fever, cough, SOB, CP, or travel.     Home Treatments/Remedies: home medications    Febrile / Afebrile: afebrile    Duration of C/o: ongoing issues    Lupe Hernandez RN  December 30, 2021        "

## 2021-12-30 NOTE — H&P
[facility]    History and Physical - [facility] Service        Date of Admission:  12/30/2021    Assessment & Plan      Melita Cortes is a 51 year old female who has a history of alcoholic liver cirrhosis with ascites, esophageal varices, portal hypertension, hepatic encephalopathy, chronic macrocytic anemia and is admitted for acute encephalopathy, c/f hepatic encephalopathy and/or infection.     #Acute metabolic encephalopathy   #Weakness  Normally weak at baseline, but reports increased weakness since last night along with an episode of emesis and stool incontinence. Differential includes hepatic encephalopathy (she is slow on exam with history of cirrhosis), infection (procal notably elevated to 2.71, she has a leukocytosis to 15.5 and it was as high as 25.3 on 12/26/2021 and seems to be chronically elevated to the mid-teens since at least 9/2021, but she is otherwise afebrile without overt symptoms of infection; however c/f for SBP vs possible pneumonia on CXR), deconditioning. COVID negative. Hemodynamically stable, on room air. Will start empiric antibiotics and treat for hepatic encephalopathy.  - ceftriaxone 2g daily (12/30 - P)  - azithromycin 500mg daily (12/30 - P)  - UA/UC  - blood cultures x2 in process  - Paracentesis, diagnostic, with labs (consult placed)  - PT/OT  - pta hepatic encephalopathy treatment as below + [facility] protocol  - low threshold to start antibiotics     #Decompensated alcoholic cirrhosis with ascites  #History of hepatic encephalopathy   #Grade I esophageal varices (last EGD 10/29/2021)  #Portal hypertension and portal hypertensive gastropathy  #Chronic thrombocytopenia, stable  MELD-Na 36 on admission. Has been in the liver transplant process. Hepatology aware.   - Hepatology consult  - [facility] 12/28/2021 in process  - US abdomen with doppler ordered  - Cardiology consult for cardiac pre-operative evaluation for liver  transplant  - continue pta lactulose 10g tid  - continue pta pantoprazole 40mg bid     MELD-Na score: 36 at 12/30/2021  2:22 PM  MELD score: 34 at 12/30/2021  2:22 PM  Calculated from:  Serum Creatinine: 1.80 mg/dL at 12/30/2021  2:22 PM  Serum Sodium: 124 mmol/L (Using min of 125 mmol/L) at 12/30/2021  2:22 PM  Total Bilirubin: 31.5 mg/dL at 12/30/2021  2:22 PM  INR(ratio): 2.31 at 12/30/2021  2:22 PM  Age: 51 years    #Hyponatremia, chronic  Na 124 on admission. Recent Na baseline appears to be 130-134. Differential includes hypovolemia, cirrhosis, SIADH. During most recent admission (12/23-12/26/2021) she had hyponatremia to 121 and it improved with holding diuretics and administration of IV fluids per the discharge summary. She reports that she was told to stop taking her home diuretics because of the hyponatremia.   - LR 75 ml/hr  - Ur Na  - BMP at 0000  - Ur osm  - holding home diuretics (spironolactone 50mg daily and furosemide 20mg bid)    #ROSSY  Creatinine on admission 1.80, was low as 0.86 on 12/24/2021. Differential includes prerenal in the setting of low po intake, ATN, hepatorenal. Postrenal less likely as she has been urinating without difficulty.   - LR 75 ml/hr as above  - albumin 5% 25g ordered   - Ur Na  - UA  - BMP at 0000    #Leukocytosis  Empirically treating for infection. Neutrophilic predominance. Does have a mild elevation in absolute basophils.    - CTM    #Macrocytic anemia, chronic, stable  Likely multifactorial in the setting of liver disease and anemia of chronic disease. No obvious bleeding  - CTM    #Malnutrition  Low po intake. Drinks a few Ensure shakes per day.   - Nutrition consult       Diet:  as tolerated, NPO at midnight  DVT Prophylaxis: Pneumatic Compression Devices  Gunter Catheter: Not present  Fluids: 75 ml/hr LR  Central Lines: None  Code Status:  full code    Clinically Significant Risk Factors Present on Admission         # Hyponatremia: Na = 124 mmol/L (Ref range: 133 -  144 mmol/L) on admission, will monitor as appropriate     # Coagulation Defect: INR = 2.31 (Ref range: 0.85 - 1.15) and/or PTT = N/A on admission, will monitor for bleeding  # Thrombocytopenia: Plts = 89 10e3/uL (Ref range: 150 - 450 10e3/uL) on admission, will monitor for bleeding       Disposition Plan   Expected Discharge: TBD  Anticipated discharge location:  Awaiting care coordination huddle  Delays:  workup of weakness, liver transplant workup       The patient's care was discussed with the Attending Physician, Dr. Miramontes.    Ermias White MD  [address]  Securely message with the Vocera Web Console (learn more here)  Text page via [facility] Paging/Directory    Please see sign in/sign out for up to date coverage information  ______________________________________________________________________    Chief Complaint   weakness    History is obtained from the patient    History of Present Illness   Melita Cortes is a 51 year old female who has a history of alcoholic liver cirrhosis with ascites, esophageal varices, portal hypertension, hepatic encephalopathy, chronic macrocytic anemia and is admitted for weakness, AMS. Brought into hospital by .    She vomited once last night while in bed, had diarrhea in bed, was weak and tired, and she reports that she can't really function well since last night. Earlier in the day she was feeling okay aside from tiredness. She reports that she is usually weak, but it has gotten worse. No blood in vomit, looked like a chocolate Ensure drink that she had recently.  said that she wasn't able to get around the house as well. He had to help her get dressed which is something that he hasn't had to help her do today.  says it was more his decision than hers.  also mentions that he was concerned because of her worsening lab results. As for her appetite, she drinks pretty much only Ensure  shakes. She has about 3 bowel movements per day, and she normally takes lactulose 3 times per day. She has been taking her medications regularly.     No fevers, chills, headaches, sore throat, vision changes, recent infections, falls, loss of consciousness, dizziness, lightheadedness, chest pain, difficulty breathing. Has a light non-productive cough for a few days and it has been getting better. No abdominal pain, Currently, no nausea or vomiting. No blood in stool or black/tarry stools. No hemoptysis. No problems with urination, no dysuria. No muscle aches or pains. No new skin changes.     She is in the early stages of the liver transplant process. Last drink of alcohol of alcohol 9/2021.     Per review of the patient's medical record, they were recently hospitalized from 12/23/21-12/25/21 for evaluation of decompensated liver disease. Prior to admission, they were noted to have presented with abnormal labs (Alk phos of 161, TT bili of 23.7, ALT of 58 and AST of 107, WBC 19.2, HBC 6.7, Na 121). Patient underwent diagnostic/therapeutic paracentesis and had 3.85L removed, pRBC transfusion, and held diuretics to improve sodium. Labs were notable for improved LFTs and sodium, no sign of hepatic encephalopathy.  Patient was subsequently treated with furosemide 20 mg BID. Patient was subsequently discharged home with Spironolactone resumed at 50 mg daily and continued 20 mg of furosemide BID, lactulose 10 g, and PPI.    Review of Systems    The 10 point Review of Systems is negative other than noted in the HPI or here.     Past Medical History    I have reviewed this patient's medical history and updated it with pertinent information if needed.   Past Medical History:   Diagnosis Date     Alcoholic hepatitis      Asthma 3/10/2006     Cervical high risk HPV (human papillomavirus) test positive 2019, 2020    See problem list        Past Surgical History   I have reviewed this patient's surgical history and updated it  with pertinent information if needed.  Past Surgical History:   Procedure Laterality Date     APPENDECTOMY       ESOPHAGOGASTRODUODENOSCOPY, WITH BRUSHINGS N/A 10/29/2021    Procedure: ESOPHAGOGASTRODUODENOSCOPY, WITH BRUSHINGS;  Surgeon: Torey Ruiz MD;  Location:  GI        Social History   - tobacco- no  - alcohol- no alcohol since September 2021   - drugs- no, and never used IV drugs  - living- lives in a town home with her  and 2 cats  - code status- full code    - TRINA Camacho (, number in chart)    Family History   I have reviewed this patient's family history and updated it with pertinent information if needed.  Family History   Problem Relation Age of Onset     Cancer Mother      Colon Cancer Paternal Aunt      Colon Cancer Maternal Uncle        Prior to Admission Medications   Prior to Admission Medications   Prescriptions Last Dose Informant Patient Reported? Taking?   albuterol (PROAIR HFA/PROVENTIL HFA/VENTOLIN HFA) 108 (90 Base) MCG/ACT inhaler  Self No No   Sig: Inhale 2 puffs into the lungs every 4 hours as needed for wheezing   ciprofloxacin (CIPRO) 500 MG tablet   No No   Sig: Take 1 tablet (500 mg) by mouth 2 times daily for 7 days, THEN 1 tablet (500 mg) daily.   folic acid (FOLVITE) 1 MG tablet  Self No No   Sig: Take 1 tablet (1 mg) by mouth daily   furosemide (LASIX) 20 MG tablet   No No   Sig: Take 1 tablet (20 mg) by mouth 2 times daily   lactulose (CHRONULAC) 10 GM/15ML solution  Self No No   Sig: Take 3 times daily. May titrate to achieve 3-4 loose stools per day.   multivitamin (CENTRUM SILVER) tablet  Self Yes No   Sig: Take 1 tablet by mouth daily   pantoprazole (PROTONIX) 40 MG EC tablet  Self No No   Sig: Take 1 tablet (40 mg) by mouth 2 times daily   spironolactone (ALDACTONE) 50 MG tablet   No No   Sig: Take 1 tablet (50 mg) by mouth daily   tretinoin (RETIN-A) 0.025 % external cream  Self No No   Sig: Apply a pea size to entire face QD       Facility-Administered Medications: None     Allergies   No Known Allergies    Physical Exam   Vital Signs: Temp: 97.4  F (36.3  C) Temp src: Temporal BP: 94/50 Pulse: 92   Resp: 16 SpO2: 100 % O2 Device: None (Room air)    Weight: 129 lbs 0 oz    General: lying in bed, NAD, tired, tremulous in upper extremities.  HEENT: scleral icterus, no conjunctival injection, oropharynx clear  Resp: clear to auscultation bilaterally, no crackles or wheezes  Cardiac: regular rate and rhythm, systolic murmur  Abdomen: soft, non-tender, non-distended, no rebound or guarding  Extremities: warm, no lower extremity edema  MSK: some reduced muscle bulk  Skin: jaundiced  Neuro: alert, oriented x4. CN II-XII intact. 4/5 strength in upper extremities bilaterally. No asterixis. Normal finger-nose bilaterally  Psych: overall slow, somewhat flat affect    Data   Data reviewed today: I reviewed all medications, new labs and imaging results over the last 24 hours.

## 2021-12-30 NOTE — TELEPHONE ENCOUNTER
Addendum 12/30/2021 at 1545: I spoke with Melita and Doug via phone to complete assessment. Melita confirmed all information obtained from spouse and was able to engage in a meaningful conversation with this writer.   Additional information gathered    Mental health: Melita has a history of anxiety and was prescribed medication by her PCP. She reports that she has not taken this medication for years and feels like her anxiety is manageable.     Impressions/Recommendations:     Melita verbalized understanding the psychosocial risks of transplant and teaching provided during this evaluation.     Melita is about 3.5 months abstinent from alcohol. I discussed with her recommendation to complete a chemical dependency assessment. She is open to completing a chemical dependency assessment and treatment if recommended. I discussed with her importance of engagement in treatment, and she is in agreement. 11/5/2021 PETH negative. 12/28/2021 PEth pending. Melita is open to completing chemical dependency assessment during her hospitalization.     Melita demonstrated good insight into her prior substance use. She reported stressors such as her previous job, health condition and her mother passing as contributors. She spoke with this writer specifically about her mother passing and her being the only child.     Melita has adequate finances and health insurance for transplant, and an intact support system that is able to participate pre/post transplant.      Melita meets our guidelines for early consideration for transplantation due to her good insight into her substance use, willingness/motivation to engage in treatment, strong support system. She does not have any psychiatric concerns. This is the first instance that Melita has encountered any medical or chemical dependency care for alcohol use and she has ever been instructed to cut back/stop alcohol or told that alcohol is causing an adverse effect on their  health.    This writer will remain available to assist patient throughout the evaluation process and will follow patient through transplant if she is listed.  It was a pleasure to evaluate this patient for liver transplant.

## 2021-12-30 NOTE — PROGRESS NOTES
Brief Hepatology Note:    This patient was seen with Dr. Steele, attending prior to consult order, note will be forth coming tomorrow.      In brief, Fidelina Cortes is a 51-year-old woman with recent alcoholic hepatitis complicated by ascites who last drank on 9/2021 with a negative PETH on 11/5/2021.  MELD is 36 and ABO O.    Her other medical history is notable for ETOH decompensated cirrhosis, ascites controlled with diuretics, hyponatremia, acute kidney injury, anemia and thrombocytopenia.      She is accompanied by her .  She has been undergoing liver transplant evaluation.  She came to the ED when she developed confusion and her labs were elevated.      She is undergoing urgent LT evaluation - currently we are awaiting on social work clearance, cardiac clearance, transplant surgery evaluation, and PETH.    Recommendations:  - Please place order for hepatology consult and consult note to follow  - Please order US abdomen with doppler  - PETH 12/28/2021 in process  - Please send CXR, diagnostic paracentesis, Blood cultures, urine cultures  - Will discuss with transplant coordinators as well  - Please order cardiology consult (can be seen tomorrow 12/31) for cardiac pre-operative evaluation for liver transplant    Discussed and see in person with Dr. Cedric Murrieta MD  Gastroenterology Fellow, PGY5  December 30, 2021

## 2021-12-31 ENCOUNTER — APPOINTMENT (OUTPATIENT)
Dept: OCCUPATIONAL THERAPY | Facility: CLINIC | Age: 51
DRG: 432 | End: 2021-12-31
Attending: EMERGENCY MEDICINE
Payer: COMMERCIAL

## 2021-12-31 ENCOUNTER — APPOINTMENT (OUTPATIENT)
Dept: CT IMAGING | Facility: CLINIC | Age: 51
DRG: 432 | End: 2021-12-31
Attending: STUDENT IN AN ORGANIZED HEALTH CARE EDUCATION/TRAINING PROGRAM
Payer: COMMERCIAL

## 2021-12-31 LAB
ABO/RH(D): NORMAL
ALBUMIN FLD-MCNC: 0.2 G/DL
ALBUMIN SERPL-MCNC: 2.1 G/DL (ref 3.4–5)
ALBUMIN UR-MCNC: NEGATIVE MG/DL
ALP SERPL-CCNC: 77 U/L (ref 40–150)
ALT SERPL W P-5'-P-CCNC: 40 U/L (ref 0–50)
ANION GAP SERPL CALCULATED.3IONS-SCNC: 13 MMOL/L (ref 3–14)
ANTIBODY SCREEN: NEGATIVE
APPEARANCE FLD: CLEAR
APPEARANCE UR: ABNORMAL
AST SERPL W P-5'-P-CCNC: 71 U/L (ref 0–45)
BACTERIA #/AREA URNS HPF: ABNORMAL /HPF
BASOPHILS # BLD AUTO: 0.2 10E3/UL (ref 0–0.2)
BASOPHILS NFR BLD AUTO: 1 %
BASOPHILS NFR FLD MANUAL: 1 %
BILIRUB SERPL-MCNC: 27.1 MG/DL (ref 0.2–1.3)
BILIRUB UR QL STRIP: ABNORMAL
BLD PROD TYP BPU: NORMAL
BLOOD COMPONENT TYPE: NORMAL
BUN SERPL-MCNC: 62 MG/DL (ref 7–30)
CALCIUM SERPL-MCNC: 8.6 MG/DL (ref 8.5–10.1)
CHLORIDE BLD-SCNC: 95 MMOL/L (ref 94–109)
CO2 SERPL-SCNC: 21 MMOL/L (ref 20–32)
CODING SYSTEM: NORMAL
COLOR FLD: YELLOW
COLOR UR AUTO: ABNORMAL
CREAT SERPL-MCNC: 1.71 MG/DL (ref 0.52–1.04)
CROSSMATCH: NORMAL
EOSINOPHIL # BLD AUTO: 0.9 10E3/UL (ref 0–0.7)
EOSINOPHIL NFR BLD AUTO: 6 %
ERYTHROCYTE [DISTWIDTH] IN BLOOD BY AUTOMATED COUNT: 23 % (ref 10–15)
GFR SERPL CREATININE-BSD FRML MDRD: 36 ML/MIN/1.73M2
GLUCOSE BLD-MCNC: 90 MG/DL (ref 70–99)
GLUCOSE UR STRIP-MCNC: NEGATIVE MG/DL
GRAM STAIN RESULT: NORMAL
HCT VFR BLD AUTO: 18.9 % (ref 35–47)
HGB BLD-MCNC: 6.1 G/DL (ref 11.7–15.7)
HGB BLD-MCNC: 6.7 G/DL (ref 11.7–15.7)
HGB BLD-MCNC: 7.7 G/DL (ref 11.7–15.7)
HGB UR QL STRIP: NEGATIVE
HOLD SPECIMEN: NORMAL
IMM GRANULOCYTES # BLD: 0.6 10E3/UL
IMM GRANULOCYTES NFR BLD: 4 %
INR PPP: 2.57 (ref 0.85–1.15)
ISSUE DATE AND TIME: NORMAL
KETONES UR STRIP-MCNC: NEGATIVE MG/DL
LACTATE SERPL-SCNC: 1.9 MMOL/L (ref 0.7–2)
LDH FLD L TO P-CCNC: 81 U/L
LEUKOCYTE ESTERASE UR QL STRIP: ABNORMAL
LYMPHOCYTES # BLD AUTO: 1.2 10E3/UL (ref 0.8–5.3)
LYMPHOCYTES NFR BLD AUTO: 8 %
LYMPHOCYTES NFR FLD MANUAL: 10 %
MCH RBC QN AUTO: 36.6 PG (ref 26.5–33)
MCHC RBC AUTO-ENTMCNC: 35.4 G/DL (ref 31.5–36.5)
MCV RBC AUTO: 103 FL (ref 78–100)
MONOCYTES # BLD AUTO: 2.1 10E3/UL (ref 0–1.3)
MONOCYTES NFR BLD AUTO: 14 %
MONOS+MACROS NFR FLD MANUAL: NORMAL %
MUCOUS THREADS #/AREA URNS LPF: PRESENT /LPF
NEUTROPHILS # BLD AUTO: 9.8 10E3/UL (ref 1.6–8.3)
NEUTROPHILS NFR BLD AUTO: 67 %
NEUTS BAND NFR FLD MANUAL: 8 %
NITRATE UR QL: NEGATIVE
NRBC # BLD AUTO: 0 10E3/UL
NRBC BLD AUTO-RTO: 0 /100
OSMOLALITY UR: 412 MMOL/KG (ref 100–1200)
OTHER CELLS FLD MANUAL: 81 %
PH UR STRIP: 5.5 [PH] (ref 5–7)
PLATELET # BLD AUTO: 58 10E3/UL (ref 150–450)
POTASSIUM BLD-SCNC: 4.1 MMOL/L (ref 3.4–5.3)
PROT FLD-MCNC: 0.9 G/DL
PROT SERPL-MCNC: 4.7 G/DL (ref 6.8–8.8)
RBC # BLD AUTO: 1.83 10E6/UL (ref 3.8–5.2)
RBC URINE: 1 /HPF
RETICS # AUTO: 0.29 10E6/UL (ref 0.03–0.1)
RETICS/RBC NFR AUTO: 12.9 % (ref 0.5–2)
SODIUM SERPL-SCNC: 129 MMOL/L (ref 133–144)
SODIUM UR-SCNC: 14 MMOL/L
SP GR UR STRIP: 1.02 (ref 1–1.03)
SPECIMEN EXPIRATION DATE: NORMAL
SQUAMOUS EPITHELIAL: 4 /HPF
TRANSITIONAL EPI: 1 /HPF
UNIT ABO/RH: NORMAL
UNIT NUMBER: NORMAL
UNIT STATUS: NORMAL
UNIT TYPE ISBT: 9500
UROBILINOGEN UR STRIP-MCNC: NORMAL MG/DL
WBC # BLD AUTO: 14.8 10E3/UL (ref 4–11)
WBC # FLD AUTO: 180 /UL
WBC URINE: 10 /HPF

## 2021-12-31 PROCEDURE — 250N000013 HC RX MED GY IP 250 OP 250 PS 637: Performed by: INTERNAL MEDICINE

## 2021-12-31 PROCEDURE — 80053 COMPREHEN METABOLIC PANEL: CPT | Performed by: EMERGENCY MEDICINE

## 2021-12-31 PROCEDURE — 250N000011 HC RX IP 250 OP 636: Performed by: INTERNAL MEDICINE

## 2021-12-31 PROCEDURE — 250N000011 HC RX IP 250 OP 636: Performed by: STUDENT IN AN ORGANIZED HEALTH CARE EDUCATION/TRAINING PROGRAM

## 2021-12-31 PROCEDURE — 85018 HEMOGLOBIN: CPT

## 2021-12-31 PROCEDURE — 49083 ABD PARACENTESIS W/IMAGING: CPT | Performed by: PEDIATRICS

## 2021-12-31 PROCEDURE — 82042 OTHER SOURCE ALBUMIN QUAN EA: CPT | Performed by: EMERGENCY MEDICINE

## 2021-12-31 PROCEDURE — 97530 THERAPEUTIC ACTIVITIES: CPT | Mod: GO

## 2021-12-31 PROCEDURE — 87070 CULTURE OTHR SPECIMN AEROBIC: CPT | Performed by: EMERGENCY MEDICINE

## 2021-12-31 PROCEDURE — 999N000128 HC STATISTIC PERIPHERAL IV START W/O US GUIDANCE

## 2021-12-31 PROCEDURE — 36415 COLL VENOUS BLD VENIPUNCTURE: CPT | Performed by: INTERNAL MEDICINE

## 2021-12-31 PROCEDURE — 0W9G3ZZ DRAINAGE OF PERITONEAL CAVITY, PERCUTANEOUS APPROACH: ICD-10-PCS | Performed by: PEDIATRICS

## 2021-12-31 PROCEDURE — 82040 ASSAY OF SERUM ALBUMIN: CPT | Performed by: EMERGENCY MEDICINE

## 2021-12-31 PROCEDURE — 97165 OT EVAL LOW COMPLEX 30 MIN: CPT | Mod: GO

## 2021-12-31 PROCEDURE — 250N000013 HC RX MED GY IP 250 OP 250 PS 637: Performed by: EMERGENCY MEDICINE

## 2021-12-31 PROCEDURE — 120N000002 HC R&B MED SURG/OB UMMC

## 2021-12-31 PROCEDURE — 83935 ASSAY OF URINE OSMOLALITY: CPT | Performed by: EMERGENCY MEDICINE

## 2021-12-31 PROCEDURE — 84157 ASSAY OF PROTEIN OTHER: CPT | Performed by: EMERGENCY MEDICINE

## 2021-12-31 PROCEDURE — 85610 PROTHROMBIN TIME: CPT | Performed by: EMERGENCY MEDICINE

## 2021-12-31 PROCEDURE — 81001 URINALYSIS AUTO W/SCOPE: CPT | Performed by: EMERGENCY MEDICINE

## 2021-12-31 PROCEDURE — 87205 SMEAR GRAM STAIN: CPT | Performed by: EMERGENCY MEDICINE

## 2021-12-31 PROCEDURE — 99222 1ST HOSP IP/OBS MODERATE 55: CPT | Mod: 25 | Performed by: STUDENT IN AN ORGANIZED HEALTH CARE EDUCATION/TRAINING PROGRAM

## 2021-12-31 PROCEDURE — 86901 BLOOD TYPING SEROLOGIC RH(D): CPT | Performed by: INTERNAL MEDICINE

## 2021-12-31 PROCEDURE — 86923 COMPATIBILITY TEST ELECTRIC: CPT

## 2021-12-31 PROCEDURE — 86850 RBC ANTIBODY SCREEN: CPT | Performed by: INTERNAL MEDICINE

## 2021-12-31 PROCEDURE — 85018 HEMOGLOBIN: CPT | Performed by: INTERNAL MEDICINE

## 2021-12-31 PROCEDURE — 75574 CT ANGIO HRT W/3D IMAGE: CPT | Mod: 26 | Performed by: STUDENT IN AN ORGANIZED HEALTH CARE EDUCATION/TRAINING PROGRAM

## 2021-12-31 PROCEDURE — 99233 SBSQ HOSP IP/OBS HIGH 50: CPT | Mod: 25 | Performed by: INTERNAL MEDICINE

## 2021-12-31 PROCEDURE — P9016 RBC LEUKOCYTES REDUCED: HCPCS

## 2021-12-31 PROCEDURE — 258N000003 HC RX IP 258 OP 636: Performed by: INTERNAL MEDICINE

## 2021-12-31 PROCEDURE — 250N000011 HC RX IP 250 OP 636

## 2021-12-31 PROCEDURE — 89051 BODY FLUID CELL COUNT: CPT | Performed by: EMERGENCY MEDICINE

## 2021-12-31 PROCEDURE — 258N000003 HC RX IP 258 OP 636: Performed by: EMERGENCY MEDICINE

## 2021-12-31 PROCEDURE — 83605 ASSAY OF LACTIC ACID: CPT | Performed by: INTERNAL MEDICINE

## 2021-12-31 PROCEDURE — 75574 CT ANGIO HRT W/3D IMAGE: CPT

## 2021-12-31 PROCEDURE — 84300 ASSAY OF URINE SODIUM: CPT | Performed by: EMERGENCY MEDICINE

## 2021-12-31 PROCEDURE — 85004 AUTOMATED DIFF WBC COUNT: CPT | Performed by: EMERGENCY MEDICINE

## 2021-12-31 PROCEDURE — 97535 SELF CARE MNGMENT TRAINING: CPT | Mod: GO

## 2021-12-31 PROCEDURE — 36415 COLL VENOUS BLD VENIPUNCTURE: CPT | Performed by: EMERGENCY MEDICINE

## 2021-12-31 PROCEDURE — H0001 ALCOHOL AND/OR DRUG ASSESS: HCPCS

## 2021-12-31 PROCEDURE — P9047 ALBUMIN (HUMAN), 25%, 50ML: HCPCS

## 2021-12-31 PROCEDURE — 87015 SPECIMEN INFECT AGNT CONCNTJ: CPT | Performed by: EMERGENCY MEDICINE

## 2021-12-31 PROCEDURE — 83615 LACTATE (LD) (LDH) ENZYME: CPT | Performed by: EMERGENCY MEDICINE

## 2021-12-31 RX ORDER — IOPAMIDOL 755 MG/ML
100 INJECTION, SOLUTION INTRAVASCULAR ONCE
Status: COMPLETED | OUTPATIENT
Start: 2021-12-31 | End: 2021-12-31

## 2021-12-31 RX ORDER — METOPROLOL TARTRATE 1 MG/ML
10 INJECTION, SOLUTION INTRAVENOUS ONCE
Status: DISCONTINUED | OUTPATIENT
Start: 2021-12-31 | End: 2022-01-01

## 2021-12-31 RX ORDER — NITROGLYCERIN 0.4 MG/1
0.4 TABLET SUBLINGUAL ONCE
Status: DISCONTINUED | OUTPATIENT
Start: 2021-12-31 | End: 2022-01-01

## 2021-12-31 RX ORDER — ALBUMIN (HUMAN) 12.5 G/50ML
60 SOLUTION INTRAVENOUS EVERY 24 HOURS
Status: COMPLETED | OUTPATIENT
Start: 2021-12-31 | End: 2022-01-02

## 2021-12-31 RX ADMIN — LACTULOSE 10 G: 20 SOLUTION ORAL at 14:28

## 2021-12-31 RX ADMIN — SODIUM CHLORIDE, POTASSIUM CHLORIDE, SODIUM LACTATE AND CALCIUM CHLORIDE: 600; 310; 30; 20 INJECTION, SOLUTION INTRAVENOUS at 07:44

## 2021-12-31 RX ADMIN — ALBUMIN HUMAN 60 G: 0.25 SOLUTION INTRAVENOUS at 11:39

## 2021-12-31 RX ADMIN — PANTOPRAZOLE SODIUM 40 MG: 40 TABLET, DELAYED RELEASE ORAL at 20:06

## 2021-12-31 RX ADMIN — LACTULOSE 10 G: 20 SOLUTION ORAL at 20:07

## 2021-12-31 RX ADMIN — RIFAXIMIN 550 MG: 550 TABLET ORAL at 20:06

## 2021-12-31 RX ADMIN — RIFAXIMIN 550 MG: 550 TABLET ORAL at 16:51

## 2021-12-31 RX ADMIN — AZITHROMYCIN MONOHYDRATE 500 MG: 500 INJECTION, POWDER, LYOPHILIZED, FOR SOLUTION INTRAVENOUS at 21:29

## 2021-12-31 RX ADMIN — IOPAMIDOL 100 ML: 755 INJECTION, SOLUTION INTRAVENOUS at 14:44

## 2021-12-31 RX ADMIN — PANTOPRAZOLE SODIUM 40 MG: 40 TABLET, DELAYED RELEASE ORAL at 08:37

## 2021-12-31 RX ADMIN — LACTULOSE 10 G: 20 SOLUTION ORAL at 08:37

## 2021-12-31 RX ADMIN — THERA TABS 1 TABLET: TAB at 08:37

## 2021-12-31 RX ADMIN — CEFTRIAXONE SODIUM 2 G: 2 INJECTION, POWDER, FOR SOLUTION INTRAMUSCULAR; INTRAVENOUS at 20:00

## 2021-12-31 ASSESSMENT — ACTIVITIES OF DAILY LIVING (ADL)
ADLS_ACUITY_SCORE: 14
ADLS_ACUITY_SCORE: 14
ADLS_ACUITY_SCORE: 12
ADLS_ACUITY_SCORE: 14
ADLS_ACUITY_SCORE: 12
PREVIOUS_RESPONSIBILITIES: LAUNDRY;HOUSEKEEPING;MEAL PREP
ADLS_ACUITY_SCORE: 12
ADLS_ACUITY_SCORE: 12
ADLS_ACUITY_SCORE: 14
ADLS_ACUITY_SCORE: 12
ADLS_ACUITY_SCORE: 14
ADLS_ACUITY_SCORE: 12
ADLS_ACUITY_SCORE: 14
ADLS_ACUITY_SCORE: 12
ADLS_ACUITY_SCORE: 14

## 2021-12-31 NOTE — PROGRESS NOTES
Admitted/transferred from: ED  2 RN full   skin assessment completed by João Pruitt, RN and Steph Rios RN.  Skin assessment finding: Generalized bruises, right arm bruise, small right abdmonial puncture site with bruises around it.  Interventions/actions: None at this time    Will continue to monitor.

## 2021-12-31 NOTE — CONSULTS
Physician Attestation   I, Pradeep Browne MD, saw this patient with the resident and agree with the resident/fellow's findings and plan of care as documented in the note.      I personally reviewed vital signs, medications, labs, and imaging.    Key findings: as noted below    Pradeep Browne MD  Date of Service (when I saw the patient): 1/2/22  Assessment and Plan:  1. liver transplant evaluation - patient is a potential candidate overall. Benefits and surgical risks of a liver transplantation were discussed.  2. 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 Portal Vein: patent on most recent MRI  2. Hepatic Artery: open  3. TIPS: no  4. Previous Abdominal Surgery: lap appy  5. Hepatocellular Carcinoma: no evidence  6. Ascites: present, large volume  7. Costal Angle: adequate  8. Portopulmonary Hypertension: n/a  9. Hepatopulmonary Syndrome: n/a  10. Cardiac Evaluation: pending  11. Nutritional Status: under eval  12. Tobacco:  no  13. Diabetes:no  14. HTN:no  15. LD candidate: no    Recommendations:   Under work up for cause of acute decompensation, infectious work up negative to date but pending. Will work on cardiac clearance and pre-transplant requirements while in house to expedite listing.      Patients overall evaluation will be discussed at the Liver Transplant selection committee meeting with a final recommendation on the patients suitability for transplant to be made at that time.    Consult Full Details:  Mrs Cortes was seen in consultation at the request of Dr. Steele for evaluation as a potential liver transplant recipient.    Reason for Visit:  Mrs Cortes is a 51 year old year old female with cirrohsis of the liver due to EtOH, who presents for liver transplant evaluation.    HPI:  Presenting complaint: worsening labs and fatigue  This is a 50 yo F with ESLD 2/2 EtOH presenting to ED with worsening labs and fatigue and concern for acute  decompensation. Her cirrhosis is secondary to alcohol with last reported drink in September of this year. Renal function had been adequate up until most recent hospitalization. No history of dialysis, heart or lung disease. Had been at reported baseline until liver dysfunction diagnosed in July.  MELD-Na score: 35 at 12/31/2021  8:05 AM  MELD score: 33 at 12/30/2021 10:27 PM  Calculated from:  Serum Creatinine: 1.63 mg/dL at 12/30/2021 10:27 PM  Serum Sodium: 129 mmol/L at 12/31/2021  8:05 AM  Total Bilirubin: 31.5 mg/dL at 12/30/2021  2:22 PM  INR(ratio): 2.31 at 12/30/2021  2:22 PM  Age: 51 years      Previous Transplant Hx: no    Cardiovascular Hx:  h/o Cardiac Issues: no history, needs completed eval  Exercise Tolerance: to be assessed, was adequate until most recent hospitalization    Potential Donor(s): n/a currently    ROS:   REVIEW OF SYSTEMS (check box if normal)  Positive for Fatigue predominantly  [X]   GENERAL [X]   PULMONARY [X]   GENITOURINARY  [X]    CNS [X]   CARDIAC [X]    ENDOCRINE  [X]   EARS,NOSE,THROAT [X]    GASTROINTESTINAL [X]   NEUROLOGIC   [X]   MUSCLOSKELTAL [X]    HEMATOLOGY    Examination:     Vitals:  There were no vitals taken for this visit.    GENERAL APPEARANCE: alert and no distress  EYES: PERRL, icteric sclerae  HENT: mouth without ulcers or lesions  NECK: supple, no adenopathy  RESP: lungs clear to auscultation - no rales, rhonchi or wheezes  CV: regular rhythm, normal rate, no rub   ABDOMEN: protuberant with ascites, non-tender, umbilical hernia present  MS: extremities normal- no gross deformities noted, no evidence of inflammation in joints, no muscle tenderness  SKIN: no rash, Jaundiced  NEURO: Normal strength and tone, sensory exam grossly normal, mentation intact and speech normal  PSYCH: mentation appears normal. and affect normal/bright      Results:             I had a long discussion with the patient regarding liver transplantation which included but was not limited to   the following points:  Liver transplant selection committee process.  The federal rules for cadaveric waiting list, the size and blood type matching of the organ. The availability of living-related donor transplantation.  The types of donors: brain death donors, non-heart beating donors, partial liver grafts: splits and living donor grafts  Extended criteria Donors (older age, steasosis) and the increased risk of primary non-function using the extended criteria donors  [facility] high risk donors, Risk of donor transmitted infections and donor transmitted malignancy  The liver transplant operation and the associated risks and technical complications which can include intraoperative death, post operative death, Primary non-function, bleeding requiring re-operations, arterial and biliary complications, bowel perforations, and intra abdominal abscess. Some of these complicaitons may require a second operation.  The postoperative course, the ICU stay and risk of postoperative complications which can include sepsis, MI, stroke, brain injury, pneumonia, pleural effusions, and renal dysfunction.  The current 1 year and 5 year graft and patient survivals.  The need for life long immunosuppressive therapy and the side effects of these medications, including the possibility of toxicity, opportunistic infections, risk of cancer including lymphoma, and the possibility of rejection even if the patient is taking the medication exactly as prescribed.  The need for compliance with medications and follow-up visits in the clinic and thereafter.  The patient and family understand these risks and wish to proceed to transplantation    I spent 60 minutes with the patient and more than 50% of the time was spend in direct face to face counseling.

## 2021-12-31 NOTE — PLAN OF CARE
"Cared for pt from [id number].  Pt had multiple consults and tests throughout the day due to unknown origin  of abnormal labs and AMS.  She was A&O x4 all day, but still seemed a little \"fuzzy.\"  For example provider asked 4 questions about time, what day, month, season and time of day.  She answered all correctly except saying it was July.  She denied dizziness when standing but was slightly unsteady on her feet, SBA was appropriate.  Paracentesis done beside before pt was sent for angio.  Writer started unit of blood w/ oncoming nurse.     "

## 2021-12-31 NOTE — CONSULTS
Cardiology Inpatient Consultation  December 31, 2021    Reason for Consult:  A cardiology consult was requested by the Medicine service to provide clinical guidance regarding Pre-transplant cardiac work up.    Assessment and Recommendation:  Melita Cortes is a 51 year old female who has a history of alcoholic liver cirrhosis with ascites, esophageal varices, portal hypertension, hepatic encephalopathy, chronic macrocytic anemia and is admitted for weakness, AMS. Brought into hospital by . Last drink on 9/2021 with a negative PETH on 11/5/2021.     #Pre-transplant Cardiac Evaluation  Ms Cortes has a Revised Cardiac Risk Index score of 1 for an intraperitoneal surgery putting her at 6.0% 30-day risk of death, MI, or cardiac arrest. We obtained a coronary CTA (after clearance from Hepatology team given elevated creatinine) which showed minimal non-obstructive CAD.   -No further cardiac testing required prior to transplant    Patient seen and discussed with Dr. Mckay, who agrees with above plan.      Thank you for consulting the cardiovascular services at the [facility]. Cardiology will sign off. Please do not hesitate to contact us with any questions.      Palmer Hale MD  Cardiology Fellow  Pager: [phone]        HPI:   Melita Cortes is a 51 year old female who has a history of alcoholic liver cirrhosis with ascites, esophageal varices, portal hypertension, hepatic encephalopathy, chronic macrocytic anemia and is admitted for weakness, AMS. Brought into hospital by . Last drink on 9/2021 with a negative PETH on 11/5/2021.    Review of Systems:    Complete review of systems was performed and negative except per HPI.    PMH:  Past Medical History:   Diagnosis Date    Alcoholic hepatitis     Asthma 3/10/2006    Cervical high risk HPV (human papillomavirus) test positive 2019, 2020    See problem list     Active Problems:  Patient Active Problem List     Diagnosis Date Noted    Non-intractable vomiting with nausea, unspecified vomiting type 12/30/2021     Priority: Medium    Obesity (BMI 30-39.9) 12/28/2021     Priority: Medium    Hyperlipidemia 12/28/2021     Priority: Medium    End-stage liver disease (H) 12/23/2021     Priority: Medium    Hyponatremia 11/03/2021     Priority: Medium    Malaise and fatigue 11/03/2021     Priority: Medium    At high risk for severe sepsis 11/03/2021     Priority: Medium    Symptomatic anemia 11/03/2021     Priority: Medium    Decompensated liver disease (H) 11/03/2021     Priority: Medium    Bandemia 11/03/2021     Priority: Medium    Hyperbilirubinemia 11/03/2021     Priority: Medium    Elevated serum creatinine 10/13/2021     Priority: Medium    Alcoholic cirrhosis of liver with ascites (H) 10/13/2021     Priority: Medium    Other ascites 09/24/2021     Priority: Medium    Acute liver failure without hepatic coma 09/24/2021     Priority: Medium    Anemia, unspecified type 09/24/2021     Priority: Medium    Alcoholic liver disease (H) 09/24/2021     Priority: Medium    Cervical high risk HPV (human papillomavirus) test positive      Priority: Medium     1/2019 NIL, +HPV 18  3/2019 [place]- Neg  9/14/20 NIL, +HPV 18. Plan [place] bef 12/14/20 12/29/20 [place]- no visible lesions, no Bx done. Plan 1 yr co-test  1/12/22 FP appt -- added to visit notes pap due      Transaminitis 09/20/2020     Priority: Medium    Macrocytosis 09/20/2020     Priority: Medium    Abdominal bloating 09/14/2020     Priority: Medium    Family history of pancreatic cancer 09/14/2020     Priority: Medium    High risk HPV infection 09/14/2020     Priority: Medium    Anxiety 09/12/2014     Priority: Medium     Formatting of this note might be different from the original.  did not end up starting Zoloft      Asthma 03/10/2006     Priority: Medium     Formatting of this note might be different from the original.  LW Modifier:  mild  ; Asthma Chronic       Social  History:  Social History     Tobacco Use    Smoking status: Former Smoker     Quit date: 2020     Years since quittin.6    Smokeless tobacco: Never Used   Substance Use Topics    Alcohol use: Not Currently     Comment: Last drink 2021    Drug use: Never     Family History:  Family History   Problem Relation Age of Onset    Cancer Mother     Colon Cancer Paternal Aunt     Colon Cancer Maternal Uncle        Medications:   albumin human  12.5 g Intravenous Once    azithromycin  500 mg Intravenous Q24H    cefTRIAXone  2 g Intravenous Q24H    lactulose  10 g Oral TID    multivitamin, therapeutic  1 tablet Oral Daily    pantoprazole  40 mg Oral BID    sodium chloride (PF)  3 mL Intracatheter Q8H        - MEDICATION INSTRUCTIONS -      lactated ringers 75 mL/hr at 21 0744       Physical Exam:  Temp:  [97.4  F (36.3  C)-99  F (37.2  C)] 98.4  F (36.9  C)  Pulse:  [85-92] 85  Resp:  [16-18] 18  BP: ()/(50-72) 97/58  SpO2:  [96 %-100 %] 99 %    Intake/Output Summary (Last 24 hours) at 2021 0815  Last data filed at 2021 0605  Gross per 24 hour   Intake 1125 ml   Output 220 ml   Net 905 ml     GEN: pleasant, no acute distress  HEENT: no icterus  CV: RRR, normal s1/s2, systolic murmur+, no rubs/s3/s4, no heave.  CHEST: clear to ausculation bilaterally, no rales or wheezing  ABD: Distended abdomen  EXTR:  No clubbing, cyanosis. 1+edema.   NEURO: alert oriented, speech fluent/appropriate, motor grossly nonfocal      Diagnostics:  All labs and imaging were reviewed, of note:    CMP  Recent Labs   Lab 21  2227 21  2124 21  1422 21  1322 21  1539 21  0933   * 128* 124* 120*   < > 127*   POTASSIUM 4.3 5.0 4.2 4.2   < > 4.6   CHLORIDE 93* 94 91* 87*   < > 95   CO2 20 18* 20 20   < > 22   ANIONGAP 14 16* 13 13   < > 10   * 123* 176* 133*   < > 74   BUN 66* 72* 69* 62*   < > 26   CR 1.63* 1.77* 1.80* 1.84*   < > 0.99   GFRESTIMATED 38* 34* 34* 33*   < > 69    SHAI 8.1* 8.4* 9.0 8.2*   < > 8.5   PHOS  --   --   --  5.1*  --   --    PROTTOTAL  --   --  5.2* 5.2*  --  5.0*   ALBUMIN  --   --  2.0* 2.3*  --  1.9*   BILITOTAL  --   --  31.5* 31.5*  --  22.7*   ALKPHOS  --   --  97 107  --  105   AST  --   --  87* 80*  --  84*   ALT  --   --  48 50  --  46    < > = values in this interval not displayed.     CBC  Recent Labs   Lab 12/30/21  1422 12/28/21  1322 12/26/21  1539 12/25/21  0933   WBC 15.5* 19.1* 25.3* 14.0*   RBC 2.23* 2.18* 2.39* 2.38*   HGB 8.2* 8.0* 8.9* 8.7*   HCT 23.3* 23.0* 26.3* 25.7*   * 106* 110* 108*   MCH 36.8* 36.7* 37.2* 36.6*   MCHC 35.2 34.8 33.8 33.9   RDW 22.9* 23.2* 24.8* 24.4*   PLT 89* 92* 88* 80*     INR  Recent Labs   Lab 12/30/21  1422 12/28/21  1322 12/25/21  0933   INR 2.31* 2.48* 2.46*     Arterial Blood GasNo lab results found in last 7 days.    Lab Results   Component Value Date    TROPONIN <0.015 11/03/2021       EKG:    Transthoracic echocardiogram:     Nuclear stress test:

## 2021-12-31 NOTE — PLAN OF CARE
"BP 99/57 (BP Location: Right arm)   Pulse 90   Temp 98  F (36.7  C) (Oral)   Resp 16   Ht 1.651 m (5' 5\")   Wt 58.5 kg (129 lb)   SpO2 96%   BMI 21.47 kg/m      Assumed care from 4183-8297. Hgb 6.1; administered 1 unit of RBC and Hgb came back 7.7.   VSS on RA.   Neuro: A&Ox4.   GI/: Denies nausea and pain. +BS/Flatus; No BM this shift. Voiding.  Diet/appetite: NPO at midnight.   Activity: Up with SBA.  Skin/drains/Lines: Left and right PIV saline locked. Pt skin appears jaundice. Right flank/abdominal area bruising. Paracentesis done this morning. Continue POC.     "

## 2021-12-31 NOTE — PROVIDER NOTIFICATION
Paged team about critical lab value of Hgb at 6.1 Staff in the process of giving Pt 1 unit of blood.   Pt is NPO and she is wondering if her diet could be advanced. Provider called back and ordered Hgb redraw @ 7 pm and to keep the pt NPO.

## 2021-12-31 NOTE — CONSULTS
12/31/2021    CD consult completed.   Please see evaluation for further information - at this time no referrals are being made.     Recommendations:   1.) It was recommended that the patient continue to abstain from alcohol and from all other non-prescribed mood altering chemicals.   2.) Follow all of the recommendations of her medical and mental health providers.  3.) Follow all of the terms and conditions of working with the [facility] Liver Transplant Team.  4.) Participate in random alcohol and drug testing to ensure compliance with abstinence from alcohol and from all other non-prescribed mood altering chemicals.  5.) Set up 1:1 counseling sessions with an in-network 1:1 mental health therapist to help her address her transition from drinking alcohol to sobriety, to address the stressors of having end-stage liver disease and to continue to develop additional sober coping skills.  6.) Attend liver transplant support group meetings or other support group meetings as she is physically able.  7.) Patient is recommended to obtain an updated evaluation when she is in a better state medically. Her current medical conditions disqualify her from participating an intensive outpatient program at this time.      Clinical Substantiation: Patient lacks sober coping skills and long-term sober maintenance skills. Patient currently lacks a sober peer support network and has medical issues which are exacerbated by substance abuse. Due to her medical issues, no referrals will be made for treatment at this time.      Referrals/Alternatives:  There are no referrals being sent out at this time.      JESSE consult completed by: TYRA Gu.  Phone Number: [phone]  E-mail Address: [email or web address] Evaluation Department  [address] [place] [address]     *Due to regulation of Title 42 of the Code of Federal  Regulations (CFR) Part 2: Confidentiality laws apply to this note and the information wherein.  Thus, this note cannot be copy and pasted into any other health care staff's note nor can it be included in general medical records sent to ANY outside agency without the patient's written consent.

## 2021-12-31 NOTE — CONSULTS
[facility]    Hepatology consult note    Consult requested by Dr. White   Primary hepatologist: Dr. Steele    51 year old woman with recent alcoholic hepatitis complicated by ascites who last drank on 9/2021 with a negative PETH on 11/5/2021.  MELD is 36 and ABO O. Undergoing evaluation for liver transplantation.    Chief complaint: Confusion    HPI:  She has a past medical history as above, including a recent hospitalization for alcoholic hepatitis.    She follows with Dr. Steele in clinic.  Her last drink was 9/2021 and her PETH on 11/5/2021 was negative.      She was undergoing liver transplant evaluation for MELD of 36 with ABO O blood type.    She underwent laboratory evaluation on 12/28/2021 with a rising bilirubin and creatinine.  Then, yesterday she developed confusion and was recommended to go the ED.     She has a UA with moderate bacteria and WBC on 12/28/2021 and was prescribed ciprofloxacin but was unable to get this.    Currently, she feels well.  A bit foggy and confused.  Her  was with her in the ED.    WBC 14 (down from 25.3 on 12/26) Hgb 6.7, PLT 58, . Folate normal 22.8. B12 elevated 2027. Blood cultures 12/30/2021 NGTD.    Chest x-ray with mild left basilar interstitial opacities, likely  atelectasis versus atypical infection.    US abdomen with moderate ascites.  No paracentesis yet.      Medical hx Surgical hx   Past Medical History:   Diagnosis Date    Alcoholic hepatitis     Asthma 3/10/2006    Cervical high risk HPV (human papillomavirus) test positive 2019, 2020      Past Surgical History:   Procedure Laterality Date    APPENDECTOMY      ESOPHAGOGASTRODUODENOSCOPY, WITH BRUSHINGS N/A 10/29/2021    Procedure: ESOPHAGOGASTRODUODENOSCOPY, WITH BRUSHINGS;  Surgeon: Torey Ruiz MD;  Location: [facility]          Medications  Prior to Admission medications    Medication Sig Start Date End Date Taking? Authorizing Provider   albuterol (PROAIR  "HFA/PROVENTIL HFA/VENTOLIN HFA) 108 (90 Base) MCG/ACT inhaler Inhale 2 puffs into the lungs every 4 hours as needed for wheezing 21   Randal More MD   ciprofloxacin (CIPRO) 500 MG tablet Take 1 tablet (500 mg) by mouth 2 times daily for 7 days, THEN 1 tablet (500 mg) daily. 21  Zita Steele MD   folic acid (FOLVITE) 1 MG tablet Take 1 tablet (1 mg) by mouth daily 10/19/21   Eduardo Parry PA-C   furosemide (LASIX) 20 MG tablet Take 1 tablet (20 mg) by mouth 2 times daily 21   Long Crespo MD   lactulose (CHRONULAC) 10 GM/15ML solution Take 3 times daily. May titrate to achieve 3-4 loose stools per day. 21   Eduardo Parry PA-C   multivitamin (CENTRUM SILVER) tablet Take 1 tablet by mouth daily    Reported, Patient   pantoprazole (PROTONIX) 40 MG EC tablet Take 1 tablet (40 mg) by mouth 2 times daily 21   Eduardo Parry PA-C   spironolactone (ALDACTONE) 50 MG tablet Take 1 tablet (50 mg) by mouth daily 21   Long Crespo MD   tretinoin (RETIN-A) 0.025 % external cream Apply a pea size to entire face QD 12/10/21   Bronwyn Mott PA-C       Allergies  No Known Allergies    Family hx Social hx   Family History   Problem Relation Age of Onset    Cancer Mother     Colon Cancer Paternal Aunt     Colon Cancer Maternal Uncle       Social History     Tobacco Use    Smoking status: Former Smoker     Quit date: 2020     Years since quittin.6    Smokeless tobacco: Never Used   Substance Use Topics    Alcohol use: Not Currently     Comment: Last drink 2021    Drug use: Never          Review of systems  A 10-point review of systems was negative.    Examination  BP 97/58 (BP Location: Right arm)   Pulse 85   Temp 98.4  F (36.9  C) (Temporal)   Resp 18   Ht 1.651 m (5' 5\")   Wt 58.5 kg (129 lb)   SpO2 99%   BMI 21.47 kg/m    Body mass index is 21.47 kg/m .    Constitutional: Jaundiced.  HEENT: " Conjunctival icterus.  CV: No edema.  Respiratory: Unlabored breathing  Abdomen:    Non-distended   Non-tender  Skin: No jaundice, warm  Neuro: asterixis.  Psych: Normal affect, answers questions appropriately.    Laboratory  WBC 14 (down from 25.3 on 12/26) Hgb 6.7, PLT 58, . Folate normal 22.8. B12 elevated 2027. Blood cultures 12/30/2021 NGTD.    MELD-Na score: 36 at 12/31/2021  8:05 AM  MELD score: 35 at 12/31/2021  8:05 AM  Calculated from:  Serum Creatinine: 1.71 mg/dL at 12/31/2021  8:05 AM  Serum Sodium: 129 mmol/L at 12/31/2021  8:05 AM  Total Bilirubin: 27.1 mg/dL at 12/31/2021  8:05 AM  INR(ratio): 2.57 at 12/31/2021  8:05 AM  Age: 51 years      Radiology  US abdomen complete 12/30/2021  Impression:   1. Cirrhotic morphology of liver with sequela of portal hypertension  including splenomegaly, ascites and to and fro flow in the main portal  and right portal vein.  2. Gallbladder sludge and stones. Gallbladder wall thickening  nonspecific in the setting of cirrhosis/ascites.    Chest x-ray 12/30/2021  IMPRESSION: Mild left basilar interstitial opacities, likely  atelectasis versus atypical infection.    Assessment  Fidelina Cortes is a 51 year old woman with alcoholic hepatitis decompensated by ascites with a MELD of 36 undergoing liver transplant evaluation and hepatology is consulted in the context of encephalopathy.    She presents with worsening labs including bilirubin and a MELD of 36 in the setting of recent alcoholic hepatitis.  She is undergoing liver transplantation evaluation for rapid transplantation after recent alcoholic hepatitis.    At this point we need to exclude infection (UA, UCx, Paracentesis) and she is on antimicrobials per primary team which would cover for both UTI and SBP.    We appreciate chemical dependency seeing her at the recommendation of our social work team for evaluation prior to liver transplantation.    She will also undergo pre-operative risk stratification per  cardiology prior to LT and we appreciate our cardiology colleagues for seeing her.    Lastly, she has some asterixis and grade I encephalopathy, would add on rifaximin to the lactulose and ensure 3-4 BM per day.    #1 Recent alcoholic hepatitis complicated by ascites, last drink 9/2021 with negative PETH 11/2021  #2 Encephalopathy, suspect hepatic  #3 MELD=35, MELD-Na=36  #4 Acute on chronic macrocytic anemia  #5 Possible UTI  #6 Undergoing liver transplantation evaluation    Plan:  -- Please obtain chemical dependency consultation per social work prior to her liver transplant listing  -- Repeat UA and Urine culture, diagnostic paracentesis  -- Peripheral smear for anemia and work-up per primary team, do not suspect GI source, may be due to fluids  -- Currently on CTX and azithromycin per primary team  -- Recommend continuing Lactulose 10g TID but titrate to 3-4 BM per day  -- Would start rifaximin 550 mg po BID  -- Hold diuretics for now    Noam Murrieta MD  Gastroenterology Fellow, PGY-5    Staffed with attending, Dr. Steele.  ATTENDING NOTE, GASTROENTEROLOGY/HEPATOLOGY    I saw and discussed this patient with the fellow and participated in the decision making. I agree with the fellow's note. Zita Steele MD

## 2021-12-31 NOTE — PROGRESS NOTES
"   12/31/21 1234   Quick Adds   Type of Visit Initial Occupational Therapy Evaluation   Living Environment   People in home spouse   Current Living Arrangements house  ([facility])   Home Accessibility stairs within home;stairs to enter home   Number of Stairs, Main Entrance   (\"a couple\")   Number of Stairs, Within Home, Primary greater than 10 stairs   Transportation Anticipated family or friend will provide   Living Environment Comments supportive spouse. pt notes inc fatigue and weakness over last few weeks and has been  needing inc A at home with ADL/IADLs. Pt reports she typically is IND with ADLs and spouse and her share IADLs. does not drive. tub shower.    Self-Care   Usual Activity Tolerance good   Current Activity Tolerance fair   Regular Exercise No   Equipment Currently Used at Home none   Activity/Exercise/Self-Care Comment IND at baseline. notes inc weakness and fatigue over last few weeks    Instrumental Activities of Daily Living (IADL)   Previous Responsibilities laundry;housekeeping;meal prep   IADL Comments typically spouse and pt share IADLs however spouse has been assisting more    Disability/Function   Hearing Difficulty or Deaf no   Wear Glasses or Blind no   Concentrating, Remembering or Making Decisions Difficulty no   Difficulty Communicating no   Difficulty Eating/Swallowing no   Walking or Climbing Stairs Difficulty no   Dressing/Bathing Difficulty no   Toileting issues no   Doing Errands Independently Difficulty (such as shopping) yes   Errands Management spouse A    Fall history within last six months no   Change in Functional Status Since Onset of Current Illness/Injury yes   General Information   Onset of Illness/Injury or Date of Surgery 12/30/21   Referring Physician Fe   Patient/Family Therapy Goal Statement (OT) wants to get stronger to go home    Additional Occupational Profile Info/Pertinent History of Current Problem Fidelina Cortes is a 51 year old woman with alcoholic " hepatitis decompensated by ascites with a MELD of 36 undergoing liver transplant evaluation and hepatology is consulted in the context of encephalopathy.   Existing Precautions/Restrictions fall   Cognitive Status Examination   Orientation Status orientation to person, place and time   Affect/Mental Status (Cognitive) WNL   Follows Commands WNL   Cognitive Status Comments pt reports some fogginess    Visual Perception   Visual Impairment/Limitations WFL   Sensory   Sensory Quick Adds No deficits were identified   Pain Assessment   Patient Currently in Pain No   Integumentary/Edema   Integumentary/Edema no deficits were identifed   Posture   Posture kyphosis   Range of Motion Comprehensive   General Range of Motion no range of motion deficits identified   Strength Comprehensive (MMT)   General Manual Muscle Testing (MMT) Assessment no strength deficits identified   Comment, General Manual Muscle Testing (MMT) Assessment generalized weakness but still WFL    Muscle Tone Assessment   Muscle Tone Quick Adds No deficits were identified   Coordination   Upper Extremity Coordination No deficits were identified   Clinical Impression   Criteria for Skilled Therapeutic Interventions Met (OT) yes   OT Diagnosis dec IND with ADL/IADLs and transfers   OT Problem List-Impairments impacting ADL activity tolerance impaired;balance;cognition;strength   Assessment of Occupational Performance 3-5 Performance Deficits   Identified Performance Deficits LE dressing, toileting, bathing, standing g/h    Planned Therapy Interventions (OT) ADL retraining;cognition;ROM;strengthening;transfer training   Clinical Decision Making Complexity (OT) low complexity   Therapy Frequency (OT) 5x/week   Predicted Duration of Therapy 7 days   Risk & Benefits of therapy have been explained risks/benefits reviewed;care plan/treatment goals reviewed;evaluation/treatment results reviewed;current/potential barriers reviewed;participants voiced agreement with  care plan;patient   OT Discharge Planning    OT Discharge Recommendation (DC Rec) Transitional Care Facility;home with home care occupational therapy;Home with assist   OT Rationale for DC Rec pending LOS, pt may progress to safe d/c to home with spouse and HHOT however at this time pt is quite deconditioned and presenting with weakness and poor ax tolerance. pt would benefit from TCU stay to progress IND with aDL/IADLS prior to d/c to home    OT Brief overview of current status  A x 1    Total Evaluation Time (Minutes)   Total Evaluation Time (Minutes) 4
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
## 2021-12-31 NOTE — PROGRESS NOTES
[facility]    Progress Note - [place] 2 Service        Date of Admission:  12/30/2021    Assessment & Plan             Melita Cortes is a 51 year old female who has a history of alcoholic liver cirrhosis with ascites, esophageal varices, portal hypertension, hepatic encephalopathy, chronic macrocytic anemia and is admitted for acute encephalopathy, c/f hepatic encephalopathy and/or infection. In the evaluation process for urgent liver transplantation.    Today:  - 1 U pRBC ordered  - albumin 25% 1mg/kg (60g) q24h for 3 doses (12/31 - P)  - discontinued LR 75 ml/hr  - continue ceftriaxone and azithromycin (?pneumonia vs. ?SBP)    #Acute-on-chronic anemia  Hgb 6.7 12/31 from 8.2. No obvious bleeding. Likely dilutional in the setting of fluids. Otherwise hemodynamically stable.  - 1 U pRBC ordered  - monitor for bleeding     #Acute metabolic encephalopathy   #Weakness  Normally weak at baseline, but reports increased weakness since last night along with an episode of emesis and stool incontinence. Differential includes hepatic encephalopathy, infection (procal notably elevated to 2.71, c/f for SBP vs possible pneumonia on CXR). COVID negative. UA not suggestive of infection. Hemodynamically stable, on room air. Will continue empiric antibiotics and treat for hepatic encephalopathy.  - ceftriaxone 2g daily (12/30 - P)  - azithromycin 500mg daily (12/30 - P)  - blood cultures x2 12/30- NGTD  - Paracentesis, diagnostic, with labs (consult placed)  - PT/OT  - pta hepatic encephalopathy treatment as below + [place] protocol     #Decompensated alcoholic cirrhosis with ascites  #History of hepatic encephalopathy   #Grade I esophageal varices (last EGD 10/29/2021)  #Portal hypertension and portal hypertensive gastropathy  #Chronic thrombocytopenia, stable  MELD-Na 36 on admission. Has been in the liver transplant process. Decompensation likely secondary to infection  (pneumonia vs. SBP). Bleed possible but less likely (had hemoglobin drop but likely dilutional). US showing moderate ascites, no evidence of portal vein thrombosis. Hepatology working on accelerating the liver transplant process.   - Hepatology consult  - Chemical dependency consult  - [facility] 12/28/2021 in process  - Cardiology consult for cardiac pre-operative evaluation for liver transplant  - continue pta lactulose 10g tid  - continue pta pantoprazole 40mg bid      MELD-Na score: 36 at 12/31/2021  8:05 AM  MELD score: 35 at 12/31/2021  8:05 AM  Calculated from:  Serum Creatinine: 1.71 mg/dL at 12/31/2021  8:05 AM  Serum Sodium: 129 mmol/L at 12/31/2021  8:05 AM  Total Bilirubin: 27.1 mg/dL at 12/31/2021  8:05 AM  INR(ratio): 2.57 at 12/31/2021  8:05 AM  Age: 51 years     #Hyponatremia, chronic, improving   Na 124 on admission. Recent Na baseline appears to be 130-134. Differential includes hypovolemia, cirrhosis, SIADH. During most recent admission (12/23-12/26/2021) she had hyponatremia to 121 and it improved with holding diuretics and administration of IV fluids per the discharge summary. She reports that she was told to stop taking her home diuretics because of the hyponatremia.   - continue to monitor  - holding home diuretics (spironolactone 50mg daily and furosemide 20mg bid)     #ROSSY  Creatinine on admission 1.80, was low as 0.86 on 12/24/2021. Differential includes prerenal in the setting of low po intake, ATN, hepatorenal. Postrenal less likely as she has been urinating without difficulty. UA not suggestive of infection, and urine sodium 14 despite receiving some fluids, making a prerenal vs. hepatorenal etiology more likely.   - albumin 25% 1mg/kg (60g) q24h for 3 doses (12/31 - P)  - discontinued LR 75 ml/hr     #Malnutrition  Low po intake. Drinks a few Ensure shakes per day.   - Nutrition consult        Diet: NPO per Anesthesia Guidelines for Procedure/Surgery Except for: Meds    DVT Prophylaxis:  Pneumatic Compression Devices  Gunter Catheter: Not present  Fluids: no continuous fluids  Central Lines: None  Code Status: Full Code      Disposition Plan   Expected Discharge: 01/03/2022     Anticipated discharge location:  Awaiting care coordination huddle  Delays: liver transplant eval, infectious workup, renal management       The patient's care was discussed with the Attending Physician, Dr. Miramontes.    Ermias White MD  [address]  Securely message with the Vocera Web Console (learn more here)  Text page via [employer] Paging/Directory    Please see sign in/sign out for up to date coverage information    Clinically Significant Risk Factors Present on Admission         # Hyponatremia: Na = 129 mmol/L (Ref range: 133 - 144 mmol/L) on admission, will monitor as appropriate     # Coagulation Defect: INR = 2.57 (Ref range: 0.85 - 1.15) and/or PTT = N/A on admission, will monitor for bleeding  # Thrombocytopenia: Plts = 58 10e3/uL (Ref range: 150 - 450 10e3/uL) on admission, will monitor for bleeding   # Severe Malnutrition, POA: based on nutrition assessment     ______________________________________________________________________    Interval History   No acute events overnight. Hgb 6.7 this morning, but no signs of active bleeding. Reports feeling tired, but otherwise no symptoms. No chest pain, shortness of breath, abdominal pain, nausea, vomiting.    Data reviewed today: I reviewed all medications, new labs and imaging results over the last 24 hours.    Physical Exam   Vital Signs: Temp: 98.4  F (36.9  C) Temp src: Temporal BP: 97/58 Pulse: 85   Resp: 18 SpO2: 99 % O2 Device: None (Room air)    Weight: 129 lbs 0 oz     General: lying in bed, NAD, tired, tremulous in upper extremities.  HEENT: scleral icterus, no conjunctival injection, oropharynx clear  Resp: mild bibasilar inspiratory crackles, otherwise clear without wheezing  Cardiac: regular rate  and rhythm, systolic murmur  Abdomen: soft, non-tender, non-distended, no rebound or guarding  Extremities: warm, no lower extremity edema  MSK: some reduced muscle bulk  Skin: jaundiced  Neuro: alert, answers questions appropriately. No asterixis.   Psych: overall slow, somewhat flat affect    Data

## 2021-12-31 NOTE — ED NOTES
[facility]   ED Nurse to Floor Handoff     Melita Cortes is a 51 year old female who speaks English and lives with a spouse,  in a home  They arrived in the ED by car from home    ED Chief Complaint: Altered Mental Status (liver pt); Abnormal Labs; Generalized Weakness; and Nausea, Vomiting, & Diarrhea    ED Dx;   Final diagnoses:   Alcoholic cirrhosis of liver with ascites (H)   Non-intractable vomiting with nausea, unspecified vomiting type         Needed?: No    Allergies: No Known Allergies.  Past Medical Hx:   Past Medical History:   Diagnosis Date     Alcoholic hepatitis      Asthma 3/10/2006     Cervical high risk HPV (human papillomavirus) test positive 2019, 2020    See problem list      Baseline Mental status: WDL  Current Mental Status changes: at basesline    Infection present or suspected this encounter: no  Sepsis suspected: No  Isolation type: No active isolations  Patient tested for COVID 19 prior to admission: YES     Activity level - Baseline/Home:  Independent  Activity Level - Current:   Stand with assist x2    Bariatric equipment needed?: No    In the ED these meds were given:   Medications   lactated ringers infusion ( Intravenous New Bag 12/30/21 1505)       Drips running?  No    Home pump  No    Current LDAs  Peripheral IV 12/30/21 Left Upper arm (Active)   Site Assessment WDL 12/30/21 1425   Line Status Saline locked 12/30/21 1425   Dressing Intervention New dressing  12/30/21 1425   Phlebitis Scale 0-->no symptoms 12/30/21 1425   Infiltration Scale 0 12/30/21 1425   Number of days: 0       Wound Chest Skin tear (Active)   Number of days: 53       Incision/Surgical Site 09/24/21 Left;Outer Abdomen (Active)   Number of days: 97       Incision/Surgical Site 10/01/21 Right;Upper Abdomen (Active)   Number of days: 90       Incision/Surgical Site 12/24/21 Right;Upper Flank (Active)   Number of days: 6       Labs results:   Labs  Ordered and Resulted from Time of ED Arrival to Time of ED Departure   COMPREHENSIVE METABOLIC PANEL - Abnormal       Result Value    Sodium 124 (*)     Potassium 4.2      Chloride 91 (*)     Carbon Dioxide (CO2) 20      Anion Gap 13      Urea Nitrogen 69 (*)     Creatinine 1.80 (*)     Calcium 9.0      Glucose 176 (*)     Alkaline Phosphatase 97      AST 87 (*)     ALT 48      Protein Total 5.2 (*)     Albumin 2.0 (*)     Bilirubin Total 31.5 (*)     GFR Estimate 34 (*)    INR - Abnormal    INR 2.31 (*)    CBC WITH PLATELETS AND DIFFERENTIAL - Abnormal    WBC Count 15.5 (*)     RBC Count 2.23 (*)     Hemoglobin 8.2 (*)     Hematocrit 23.3 (*)      (*)     MCH 36.8 (*)     MCHC 35.2      RDW 22.9 (*)     Platelet Count 89 (*)    DIFFERENTIAL - Abnormal    % Neutrophils 79      % Lymphocytes 4      % Monocytes 11      % Eosinophils 2      % Basophils 2      % Myelocytes 2      Absolute Neutrophils 12.2 (*)     Absolute Lymphocytes 0.6 (*)     Absolute Monocytes 1.7 (*)     Absolute Eosinophils 0.3      Absolute Basophils 0.3 (*)     Absolute Myelocytes 0.3 (*)     RBC Morphology Confirmed RBC Indices      Platelet Assessment        Value: Automated Count Confirmed. Platelet morphology is normal.    Acanthocytes Marked (*)    PROCALCITONIN - Abnormal    Procalcitonin 2.71 (*)    LIPASE - Normal    Lipase 366     AMMONIA - Normal    Ammonia 34     COVID-19 VIRUS (CORONAVIRUS) BY PCR - Normal    SARS CoV2 PCR Negative     OSMOLALITY - Normal    Osmolality Blood 284     ROUTINE UA WITH MICROSCOPIC REFLEX TO CULTURE   SODIUM RANDOM URINE   ALBUMIN FLUID   PROTEIN FLUID   LACTATE DEHYDROGENASE FLUID   OSMOLALITY, RANDOM URINE   BLOOD CULTURE   BLOOD CULTURE   GRAM STAIN   AEROBIC BACTERIAL CULTURE ROUTINE   CELL COUNT WITH DIFFERENTIAL FLUID       Imaging Studies:   Recent Results (from the past 24 hour(s))   XR Chest Port 1 View    Impression    RESIDENT PRELIMINARY INTERPRETATION  IMPRESSION: Mild left basilar  "interstitial opacities, likely  atelectasis versus atypical infection.       Recent vital signs:   /72   Pulse 92   Temp 97.4  F (36.3  C) (Temporal)   Resp 16   Ht 1.651 m (5' 5\")   Wt 58.5 kg (129 lb)   SpO2 96%   BMI 21.47 kg/m      [place] Coma Scale Score: 15 (12/30/21 1745)       Cardiac Rhythm: Other  Pt needs tele? No  Skin/wound Issues: None    Code Status: Full Code    Pain control: pt had none    Nausea control: good    Abnormal labs/tests/findings requiring intervention:     Family present during ED course? Yes   Family Comments/Social Situation comments:     Tasks needing completion: None    Nisha Mitchell, RN  [phone] [facility]      "

## 2021-12-31 NOTE — TELEPHONE ENCOUNTER
RECORDS RECEIVED FROM:   DATE RECEIVED:   NOTES STATUS DETAILS   OFFICE NOTE from referring provider    Internal SOT   OFFICE NOTE from other cardiologist    N/A    DISCHARGE SUMMARY from hospital    N/A    DISCHARGE REPORT from the ER   N/A    OPERATIVE REPORT    N/A    MEDICATION LIST   Internal    LABS     BMP   Internal 12-30-21   CBC   Internal 12-31-21   CMP   Internal 12-31-21   Lipids   Internal 12-28-21   TSH   Internal 12-28-21   DIAGNOSTIC PROCEDURES     EKG   Internal 12-23-21   Monitor Reports   N/A    IMAGING (DISC & REPORT)      Echo   N/A    Stress Tests   N/A    Cath   N/A    MRI/MRA   N/A    CT/CTA   In process 1-5-22 scheduled

## 2021-12-31 NOTE — PROGRESS NOTES
"CLINICAL NUTRITION SERVICES - ASSESSMENT NOTE     Nutrition Prescription    RECOMMENDATIONS FOR MDs/PROVIDERS TO ORDER:  Ensure Enlive TID once diet advances to at least full liquids    Malnutrition Status:    Severe malnutrition in the context of acute on chronic illness    Recommendations already ordered by Registered Dietitian (RD):  None at this time     Future/Additional Recommendations:  Encourage patient to consume at least 75% of meals TID or the equivalent with snacks/supplements.  If consuming less than this offer different supplements, scheduled snacks, and/or consider ordering calorie counts to assess PO intake adequacy.       REASON FOR ASSESSMENT  Melita Cortes is a/an 51 year old female assessed by the dietitian for Provider Order - \"low po intake lately in patient with cirrhosis\" and MST >/=2    NUTRITION HISTORY  PMH includes alcoholic liver cirrhosis with ascites, esophageal varices, portal hypertension, hepatic encephalopathy, chronic macrocytic anemia; pt admitted for acute encephalopathy (c/f hepatic encephalopathy and/or infection).  Pt with ROSSY.  Per MD note, \"Low po intake. Drinks a few Ensure shakes per day. \" --> pt's  reports on admission pt mostly just consumes Ensure shakes.     Pt just recently assessed by RD at [facility] on 12/24/21, RD obtained the following nutrition hx:    Visited with pt this afternoon  Notes that she has been following the 2000mg Na diet restriction  - \"it has been hard sometimes, but we have been religiously following it\"  Admits to occasionally using \"a few salt granules\" on her food  She is also making sure she consumes protein  \"If I don't have enough at a meal, I have been drinking 2 Ensure Enlive supplements\" (orders them from [employer])  She has also made smoothies using protein powder     Pt reports that over the past few months her appetite has been decreased - \"because of my liver disease\"  She states that it is much better " "now and she has started eating more  Pt weighs herself daily and at home she has been ~132#  She confirms that her wt is down from 2 months ago  \"I have lost muscle - from wt loss and also not having the energy to exercise\"  She admits that she has been feeling very depressed, which also impacts her motivation to exercise    RD emphased low sodium/adequate protein intakes that visit.    CURRENT NUTRITION ORDERS  Diet: NPO  Intake/Tolerance: NPO since midnight.  Diet was clear liquids ADAT to 2g Na+ diet last night    LABS  Labs reviewed  - Na+ 129 (L)  - BUN 62 (L), trending down; Cr 1.71 (H), trending down; GFR 36 (L), trending up  - Tbili 27.1 (H)    MEDICATIONS  Medications reviewed    ANTHROPOMETRICS  Height: 165.1 cm (5' 5\")  Most Recent Weight: 58.5 kg (129 lb)    IBW: 56.8 kg   BMI: Normal BMI  Weight History: 26 lb wt loss in the past 3 months (17% wt loss)  Wt Readings from Last 10 Encounters:   12/30/21 58.5 kg (129 lb)   12/30/21 58.5 kg (129 lb)   12/25/21 58.9 kg (129 lb 14.4 oz)   12/13/21 60 kg (132 lb 4.8 oz)   11/08/21 64.3 kg (141 lb 12.8 oz)   10/29/21 68.5 kg (151 lb 0.2 oz)   10/19/21 69.5 kg (153 lb 4.8 oz)   10/13/21 69.7 kg (153 lb 9 oz)   10/01/21 70.4 kg (155 lb 3.2 oz)   09/14/20 79.9 kg (176 lb 1.6 oz)      Dosing Weight: 59 kg (actual)    ASSESSED NUTRITION NEEDS  Estimated Energy Needs: 1125-3930+ kcals/day (30 - 35+ kcals/kg)  Justification: Increased needs with decompensated liver cirrhosis with ascites, repletion  Estimated Protein Needs:  grams protein/day (1.5 - 2 grams of pro/kg)  Justification: Increased needs with decompensated liver cirrhosis with ascites, repletion--> pending ongoing renal function (pt with ROSSY)  Estimated Fluid Needs: 1 mL/kcal (maintenance), or per provider pending fluid status    PHYSICAL FINDINGS  See malnutrition section below.    MALNUTRITION  % Intake: </=75% for >/= 1 month (severe)  % Weight Loss: > 7.5% in 3 months (severe)  Subcutaneous Fat " Loss: Unable to assess  Muscle Loss: Unable to assess  Fluid Accumulation/Edema: None noted  Malnutrition Diagnosis: Severe malnutrition in the context of acute on chronic illness    NUTRITION DIAGNOSIS  Inadequate oral intake related to decreased appetite as evidenced by reported decreased appetite PTA and overall 17% wt loss in the past 3 months    INTERVENTIONS  Implementation  Nutrition Education: Unable to complete due to pt with other cares/providers     Goals  Once diet advances, patient to consume % of nutritionally adequate meal trays TID, or the equivalent with supplements/snacks.     Monitoring/Evaluation  Progress toward goals will be monitored and evaluated per protocol.     Bria Pacheco, RD, , LD  Weekday Pager: [phone]  Weekday Units covered: 7C (all beds) and 5A (beds 5201 through 5211-2)  Weekend/Holiday RD Pager: [phone]

## 2021-12-31 NOTE — PROGRESS NOTES
"BP 96/53 (BP Location: Right arm)   Pulse 89   Temp 98.7  F (37.1  C) (Temporal)   Resp 18   Ht 1.651 m (5' 5\")   Wt 58.5 kg (129 lb)   SpO2 100%   BMI 21.47 kg/m       Patient admitted from the ED and arrived on the floor around 1920. Denies pain, nausea/vomiting. Patient was more sleepy in the evening but more awake after 0100. 2 RN skin assessment completed. LR infusing at 75 mL per. Was up to use the bathroom once with one assist and voided 220 mL. UA is needed. Patient reported her last alcohol intake was in Sept this year. Continue with plan of care.    "

## 2021-12-31 NOTE — PROGRESS NOTES
Consult and Procedure Service - Paracentesis Note  n/aAttending: Bala Baum MD  Resident: n/a   Procedure: Ultrasound Guided Diagnostic and Therapeutic Paracentesis  Indication: ascites  Requested by: Dr dillard  Diagnosis: ascites  Bleeding Risk: Low-Moderate:, no anticoagulation on board, Platelets > 50 000  INR   Date Value Ref Range Status   12/31/2021 2.57 (H) 0.85 - 1.15 Final     INR (External)   Date Value Ref Range Status   10/07/2021 1.9 (H) 0.9 - 1.2 Final      Platelet Count   Date Value Ref Range Status   12/31/2021 58 (L) 150 - 450 10e3/uL Final   09/18/2020 189 150 - 450 10e9/L Final     POCUS:  PRE   RLQ with large free flowing fluid, no loculations    POST   RLQ with large free flowing fluid, no loculations    The risks and benefits of the procedure were explained to patient and partner Tito manuel and Salomón HARLEY bedside as well, pt expressed understanding and opted to proceed. Consent was obtained and placed in the chart. A time out was performed. An area of ascites was located with ultrasound and marked in the RIGHT MID LOWER quadrant; the area was prepped and draped in the usual sterile fashion. A pocklet of ascites was located using ultrasound and  3ml of 1% lidocaine was instilled then a 25 g needle guided to fluid pocket and  was inserted under real time guidance which yeilded 40 ml straw-colored fluid obtained on the 1st attempt.     The fluid was removed and sent for analysis per CAPS routine, and the area dressed.    Specimen(s) labeled and left with bedside RN   Patient tolerated the procedure well without obvious complication    Please contact the Consult and Procedure Service if any complications or concerns arise.     Bala Baum MD  DOS:  12/31/2021

## 2022-01-01 ENCOUNTER — APPOINTMENT (OUTPATIENT)
Dept: PHYSICAL THERAPY | Facility: CLINIC | Age: 52
DRG: 432 | End: 2022-01-01
Attending: EMERGENCY MEDICINE
Payer: COMMERCIAL

## 2022-01-01 LAB
ACANTHOCYTES BLD QL SMEAR: ABNORMAL
ALBUMIN SERPL-MCNC: 2.6 G/DL (ref 3.4–5)
ALP SERPL-CCNC: 58 U/L (ref 40–150)
ALT SERPL W P-5'-P-CCNC: 33 U/L (ref 0–50)
ANION GAP SERPL CALCULATED.3IONS-SCNC: 14 MMOL/L (ref 3–14)
AST SERPL W P-5'-P-CCNC: 60 U/L (ref 0–45)
BASOPHILS # BLD MANUAL: 0.2 10E3/UL (ref 0–0.2)
BASOPHILS NFR BLD MANUAL: 2 %
BILIRUB SERPL-MCNC: 24.6 MG/DL (ref 0.2–1.3)
BUN SERPL-MCNC: 58 MG/DL (ref 7–30)
CALCIUM SERPL-MCNC: 9.4 MG/DL (ref 8.5–10.1)
CHLORIDE BLD-SCNC: 98 MMOL/L (ref 94–109)
CO2 SERPL-SCNC: 20 MMOL/L (ref 20–32)
CREAT SERPL-MCNC: 1.71 MG/DL (ref 0.52–1.04)
ENTEROCOCCUS FAECALIS: NOT DETECTED
ENTEROCOCCUS FAECIUM: NOT DETECTED
EOSINOPHIL # BLD MANUAL: 0.7 10E3/UL (ref 0–0.7)
EOSINOPHIL NFR BLD MANUAL: 6 %
ERYTHROCYTE [DISTWIDTH] IN BLOOD BY AUTOMATED COUNT: 23.2 % (ref 10–15)
GFR SERPL CREATININE-BSD FRML MDRD: 36 ML/MIN/1.73M2
GLUCOSE BLD-MCNC: 86 MG/DL (ref 70–99)
HCT VFR BLD AUTO: 22 % (ref 35–47)
HGB BLD-MCNC: 7.8 G/DL (ref 11.7–15.7)
INR PPP: 2.51 (ref 0.85–1.15)
LISTERIA SPECIES (DETECTED/NOT DETECTED): NOT DETECTED
LYMPHOCYTES # BLD MANUAL: 2.1 10E3/UL (ref 0.8–5.3)
LYMPHOCYTES NFR BLD MANUAL: 19 %
MCH RBC QN AUTO: 35.8 PG (ref 26.5–33)
MCHC RBC AUTO-ENTMCNC: 35.5 G/DL (ref 31.5–36.5)
MCV RBC AUTO: 101 FL (ref 78–100)
METAMYELOCYTES # BLD MANUAL: 0.1 10E3/UL
METAMYELOCYTES NFR BLD MANUAL: 1 %
MONOCYTES # BLD MANUAL: 1 10E3/UL (ref 0–1.3)
MONOCYTES NFR BLD MANUAL: 9 %
MYELOCYTES # BLD MANUAL: 0.1 10E3/UL
MYELOCYTES NFR BLD MANUAL: 1 %
NEUTROPHILS # BLD MANUAL: 7 10E3/UL (ref 1.6–8.3)
NEUTROPHILS NFR BLD MANUAL: 62 %
PLAT MORPH BLD: ABNORMAL
PLATELET # BLD AUTO: 63 10E3/UL (ref 150–450)
POTASSIUM BLD-SCNC: 3.9 MMOL/L (ref 3.4–5.3)
PROT SERPL-MCNC: 4.9 G/DL (ref 6.8–8.8)
RBC # BLD AUTO: 2.18 10E6/UL (ref 3.8–5.2)
RBC MORPH BLD: ABNORMAL
SODIUM SERPL-SCNC: 132 MMOL/L (ref 133–144)
STAPHYLOCOCCUS AUREUS: NOT DETECTED
STAPHYLOCOCCUS EPIDERMIDIS: NOT DETECTED
STAPHYLOCOCCUS LUGDUNENSIS: NOT DETECTED
STAPHYLOCOCCUS SPECIES: NOT DETECTED
STREPTOCOCCUS AGALACTIAE: NOT DETECTED
STREPTOCOCCUS ANGINOSUS GROUP: NOT DETECTED
STREPTOCOCCUS PNEUMONIAE: NOT DETECTED
STREPTOCOCCUS PYOGENES: NOT DETECTED
STREPTOCOCCUS SPECIES: NOT DETECTED
TARGETS BLD QL SMEAR: SLIGHT
WBC # BLD AUTO: 11.3 10E3/UL (ref 4–11)

## 2022-01-01 PROCEDURE — 87040 BLOOD CULTURE FOR BACTERIA: CPT

## 2022-01-01 PROCEDURE — 97530 THERAPEUTIC ACTIVITIES: CPT | Mod: GP | Performed by: REHABILITATION PRACTITIONER

## 2022-01-01 PROCEDURE — 36415 COLL VENOUS BLD VENIPUNCTURE: CPT

## 2022-01-01 PROCEDURE — 80053 COMPREHEN METABOLIC PANEL: CPT | Performed by: INTERNAL MEDICINE

## 2022-01-01 PROCEDURE — 85610 PROTHROMBIN TIME: CPT | Performed by: INTERNAL MEDICINE

## 2022-01-01 PROCEDURE — 250N000011 HC RX IP 250 OP 636

## 2022-01-01 PROCEDURE — 99233 SBSQ HOSP IP/OBS HIGH 50: CPT | Mod: GC | Performed by: INTERNAL MEDICINE

## 2022-01-01 PROCEDURE — 250N000013 HC RX MED GY IP 250 OP 250 PS 637: Performed by: INTERNAL MEDICINE

## 2022-01-01 PROCEDURE — 250N000011 HC RX IP 250 OP 636: Performed by: INTERNAL MEDICINE

## 2022-01-01 PROCEDURE — P9047 ALBUMIN (HUMAN), 25%, 50ML: HCPCS

## 2022-01-01 PROCEDURE — 250N000013 HC RX MED GY IP 250 OP 250 PS 637: Performed by: EMERGENCY MEDICINE

## 2022-01-01 PROCEDURE — 120N000002 HC R&B MED SURG/OB UMMC

## 2022-01-01 PROCEDURE — 97161 PT EVAL LOW COMPLEX 20 MIN: CPT | Mod: GP | Performed by: REHABILITATION PRACTITIONER

## 2022-01-01 PROCEDURE — 36415 COLL VENOUS BLD VENIPUNCTURE: CPT | Performed by: INTERNAL MEDICINE

## 2022-01-01 PROCEDURE — 85027 COMPLETE CBC AUTOMATED: CPT | Performed by: INTERNAL MEDICINE

## 2022-01-01 PROCEDURE — 258N000003 HC RX IP 258 OP 636: Performed by: INTERNAL MEDICINE

## 2022-01-01 PROCEDURE — 97116 GAIT TRAINING THERAPY: CPT | Mod: GP | Performed by: REHABILITATION PRACTITIONER

## 2022-01-01 RX ADMIN — LACTULOSE 10 G: 20 SOLUTION ORAL at 20:05

## 2022-01-01 RX ADMIN — AZITHROMYCIN MONOHYDRATE 500 MG: 500 INJECTION, POWDER, LYOPHILIZED, FOR SOLUTION INTRAVENOUS at 20:44

## 2022-01-01 RX ADMIN — LACTULOSE 10 G: 20 SOLUTION ORAL at 14:43

## 2022-01-01 RX ADMIN — RIFAXIMIN 550 MG: 550 TABLET ORAL at 20:05

## 2022-01-01 RX ADMIN — RIFAXIMIN 550 MG: 550 TABLET ORAL at 08:28

## 2022-01-01 RX ADMIN — PANTOPRAZOLE SODIUM 40 MG: 40 TABLET, DELAYED RELEASE ORAL at 20:05

## 2022-01-01 RX ADMIN — PANTOPRAZOLE SODIUM 40 MG: 40 TABLET, DELAYED RELEASE ORAL at 08:28

## 2022-01-01 RX ADMIN — LACTULOSE 10 G: 20 SOLUTION ORAL at 08:27

## 2022-01-01 RX ADMIN — THERA TABS 1 TABLET: TAB at 08:28

## 2022-01-01 RX ADMIN — ALBUMIN HUMAN 60 G: 0.25 SOLUTION INTRAVENOUS at 10:58

## 2022-01-01 RX ADMIN — CEFTRIAXONE SODIUM 2 G: 2 INJECTION, POWDER, FOR SOLUTION INTRAMUSCULAR; INTRAVENOUS at 20:05

## 2022-01-01 ASSESSMENT — ACTIVITIES OF DAILY LIVING (ADL)
ADLS_ACUITY_SCORE: 13
ADLS_ACUITY_SCORE: 12
ADLS_ACUITY_SCORE: 13
ADLS_ACUITY_SCORE: 12
ADLS_ACUITY_SCORE: 13
ADLS_ACUITY_SCORE: 12
ADLS_ACUITY_SCORE: 12
ADLS_ACUITY_SCORE: 13
ADLS_ACUITY_SCORE: 12
ADLS_ACUITY_SCORE: 13
ADLS_ACUITY_SCORE: 12
ADLS_ACUITY_SCORE: 12
ADLS_ACUITY_SCORE: 13
ADLS_ACUITY_SCORE: 12
ADLS_ACUITY_SCORE: 13
ADLS_ACUITY_SCORE: 13

## 2022-01-01 NOTE — PROGRESS NOTES
[facility]    Progress Note - [place] 2 Service        Date of Admission:  12/30/2021    Assessment & Plan   Melita Cortes is a 51 year old female with history of alcoholic liver cirrhosis with ascites, esophageal varices, portal hypertension, and chronic macrocytic anemia who was admitted on 12/30/22 for decompensated cirrhosis and acute encephalopathy, as well as expedited liver transplant evaluation.    Changes Today:  - Continue albumin challenge (day 2 of 3)  - Obtain repeat blood cultures  - Continue ceftriaxone/azithromycin    #Decompensated alcoholic cirrhosis with ascites  #History of hepatic encephalopathy   #Grade I esophageal varices (last EGD 10/29/2021)  #Portal hypertension and portal hypertensive gastropathy  #Chronic thrombocytopenia, stable  Recent worsening in MELD labs leading up to admission (MELD-Na 36 on admission), overall concerning for acute decompensated cirrhosis. Patient has been in the transplant evaluation process, admitted in part for expedited transplant evaluation. Regarding current decompensation, presume that most likely source is infectious, with potential sources of infection including bacteremia (BCx 12/30 + for diphterhoids in 1/2 bottle, unclear whether infectious or contaminant) and/or pneumonia (per below). Additional concern for hepatic encephalopathy, now improving on lactulose. No findings concerning for GIB or PVT. MELD labs slightly improving over the past 48-hours with empiric treatment of ROSSY, possible infection, and hepatic encephalopathy.   - Hepatology consulting  - Continue pta lactulose 10g tid, titrated to 3-4 BM's per day  - Continue pta pantoprazole 40mg bid   - Rifaximin 550mg PO BID  - Abx management, per below  - Trend MELD labs qday  - Chemical dependency consult  - [facility] 12/28/2021 in process  - Cardiology consulted for cardiac pre-operative evaluation for liver transplant; CTA angiogram coronary  "arteries (12/31) notable for \"minimal nonobstructive coronary with minimal luminal irregularities)     MELD-Na score: 35 at 1/1/2022  6:46 AM  MELD score: 34 at 1/1/2022  6:46 AM  Calculated from:  Serum Creatinine: 1.71 mg/dL at 1/1/2022  6:46 AM  Serum Sodium: 132 mmol/L at 1/1/2022  6:46 AM  Total Bilirubin: 24.6 mg/dL at 1/1/2022  6:46 AM  INR(ratio): 2.51 at 1/1/2022  6:46 AM  Age: 51 years    Leukocytosis + elevated procalcitonin, presumed 2/2 to pneumonia vs possible bacteremia  Initial concern for infection on admission based upon recent history of leukocytosis of unclear etiology and  elevated procalcitonin in setting of acute decompensated cirrhosis. No associated findings concerning for sepsis on admission. Inflammatory markers and clinical status now improving on abx, overall further suggestive of initial presence of an infection. Regarding potential sources, presume that most likely sources include bacteremia (BCx 12/30 + for diphterhoids in 1/2 bottle, unclear whether infectious or contaminant) and/or pneumonia. Low suspicion for SBP, UTI, and/or intraabdominal source based upon evaluation to date.  -- Continue ceftriaxone IV q24h (12/30-current)  -- Complete azithromycin IV q24h x3-day course (12/30-1/1)  -- Repeat blood cx x2 today  -- Continue to follow admission blood cx results  -- Obtain repeat UCx, per hepatology recs    #Acute metabolic encephalopathy, presumed multifactorial from HE and septic encephalopathy - improving  Patient admitted with recent presence of gradually worsening somnolence and weakness, overall concerning for encephalopathy. Presume that most likely source was toxic/metabolic encephalopathy, likely multifactorial from mild hepatic encephalopathy (mild asterixis present on admission) and septic encephalopathy. Patient now improving on lactulose and abx  - PTA lactulose and [place] protocol  - Abx, per below  - PT/OT consult    #Acute-on-chronic anemia, presumed " dilutional  Admission Hgb 8.2, down to 6.1 on 12/31; now increased back to 7.7-7.8 after 1U pRBC, since which it has remained stable. Given no clinical evidence of bleed, presume that most likely source is dilution after initial IVF resuscitation, with low suspicion for GIB  - Trend CBC qday  - monitor for bleeding     #ROSSY, unclear etiology  Creatinine on admission 1.80, up from 0.86 on 12/24/2021. Urine Na 14 on 12/31. Patient has been managed on albumin challenge 1g/kg q24h x3 days (12/31-current), from which patient has demonstrated stable Cr with good UOP. Based upon collective clinical picture, have greatest suspicion for prerenal ROSSY vs hepatorenal syndrome. Unlikely ATN based upon urine Na. Furthermore, unlikely postrenal given no clinical evidence of urinary obstruction/retention.  - albumin 25% 1g/kg (60g) q24h for 3 doses (12/31 - P)  - Trend BMP qday  - Strict I/O's     #Malnutrition  Low po intake. Drinks a few Ensure shakes per day.   - Nutrition consult     Diet: 2 gram diet  DVT Prophylaxis: Pneumatic Compression Devices  Gunter Catheter: Not present  Fluids: no continuous fluids  Central Lines: None  Code Status: Full Code      Disposition Plan   Expected Discharge: 01/03/2022     Anticipated discharge location:  Awaiting care coordination huddle    The patient's care was discussed with the Attending Physician, Dr. Miramontes.    Keith Foster MD  [address]  Securely message with the Vocera Web Console (learn more here)  Text page via [employer] Paging/Directory    Please see sign in/sign out for up to date coverage information    Clinically Significant Risk Factors Present on Admission             # Severe Malnutrition, POA: based on nutrition assessment     ______________________________________________________________________    Interval History   Nursing notes reviewed. No acute events overnight. Patient overall feeling better since  admission, now nearly back to baseline. Notably, patient endorses significant improvement in confusion/somnolence. Patient only passed 1 BM yesterday on lactulose, which was non-bloody and not melanic. Patient denies recent presence of fevers/chills, CP, SOB, abdominal pain, or LE edema.    Data reviewed today: I reviewed all medications, new labs and imaging results over the last 24 hours.    Physical Exam   Vital Signs: Temp: 99.7  F (37.6  C) Temp src: Temporal BP: 107/58 Pulse: 91   Resp: 18 SpO2: 97 % O2 Device: None (Room air)    Weight: 129 lbs 0 oz  General Appearance: Resting comfortably in bed, NAD  Respiratory: Breathing comfortably on RA. Auscultation notable for bibasilar crackles, though otherwise lungs are CTAB  Cardiovascular: RRR, normal S1 and S2. Presence of 2-3/6 systolic murmur best heard along LSB. No other M/R/G  GI: Soft, moderately distended, non-tender, active BS's  Skin: Jaundice  Extremities: WWPx4, no LE edema  Neuro: A&Ox3, no asterixis     Data   Recent Labs   Lab 01/01/22  0646 12/31/21 2011 12/31/21  1548 12/31/21  0805 12/30/21  2227 12/30/21  2124 12/30/21  1422   WBC 11.3*  --   --  14.8*  --   --  15.5*   HGB 7.8* 7.7* 6.1* 6.7*  --   --  8.2*   *  --   --  103*  --   --  105*   PLT 63*  --   --  58*  --   --  89*   INR 2.51*  --   --  2.57*  --   --  2.31*   *  --   --  129* 127*   < > 124*   POTASSIUM 3.9  --   --  4.1 4.3   < > 4.2   CHLORIDE 98  --   --  95 93*   < > 91*   CO2 20  --   --  21 20   < > 20   BUN 58*  --   --  62* 66*   < > 69*   CR 1.71*  --   --  1.71* 1.63*   < > 1.80*   ANIONGAP 14  --   --  13 14   < > 13   SHAI 9.4  --   --  8.6 8.1*   < > 9.0   GLC 86  --   --  90 122*   < > 176*   ALBUMIN 2.6*  --   --  2.1*  --   --  2.0*   PROTTOTAL 4.9*  --   --  4.7*  --   --  5.2*   BILITOTAL 24.6*  --   --  27.1*  --   --  31.5*   ALKPHOS 58  --   --  77  --   --  97   ALT 33  --   --  40  --   --  48   AST 60*  --   --  71*  --   --  87*   LIPASE  --    --   --   --   --   --  366    < > = values in this interval not displayed.

## 2022-01-01 NOTE — PROGRESS NOTES
"[facility]    Hepatology Follow-up    CC: Confusion    Dx: Alcoholic hepatitis complicated by ascites                  Encephalopathy, suspect hepatic                  Acute on chronic macrocytic anemia                  Possible UTI    24 hour events:   Paracentesis yesterday, slight drop in Hgb    Subjective:  Pt denied abd pain, stated her mentation is improving    Medications  Current Facility-Administered Medications   Medication Dose Route Frequency    albumin human  12.5 g Intravenous Once    albumin human  60 g Intravenous Q24H    azithromycin  500 mg Intravenous Q24H    cefTRIAXone  2 g Intravenous Q24H    lactulose  10 g Oral TID    metoprolol  10 mg Intravenous Once    multivitamin, therapeutic  1 tablet Oral Daily    nitroGLYcerin  0.4 mg Sublingual Once    pantoprazole  40 mg Oral BID    rifaximin  550 mg Oral BID    sodium chloride (PF)  3 mL Intracatheter Q8H       Review of systems  A 10-point review of systems was negative.    Examination  /50 (BP Location: Right arm)   Pulse 84   Temp 98.3  F (36.8  C) (Temporal)   Resp 16   Ht 1.651 m (5' 5\")   Wt 58.5 kg (129 lb)   SpO2 96%   BMI 21.47 kg/m      Intake/Output Summary (Last 24 hours) at 1/1/2022 0612  Last data filed at 1/1/2022 0521  Gross per 24 hour   Intake 1120 ml   Output 1090 ml   Net 30 ml       Constitutional: cooperative, pleasant  Eyes: Sclera icteric  Ears/nose/mouth/throat: mucus membranes moist  Neck: supple  CV: No edema  Respiratory: Unlabored breathing  Abd: distended, +bs, nontender, fluid thrill +  Skin: warm, perfused, jaundiced  Neuro: AAO x 3, + asterixis  MSK: No gross deformities    Laboratory  Lab Results   Component Value Date     12/31/2021     09/18/2020    POTASSIUM 4.1 12/31/2021    POTASSIUM 3.1 09/18/2020    CHLORIDE 95 12/31/2021    CHLORIDE 106 09/18/2020    CO2 21 12/31/2021    CO2 25 09/18/2020    BUN 62 12/31/2021    BUN 2 09/18/2020    CR 1.71 12/31/2021    " CR 0.56 09/18/2020       Lab Results   Component Value Date    BILITOTAL 27.1 12/31/2021    BILITOTAL 1.1 09/18/2020    ALT 40 12/31/2021    ALT 57 09/18/2020    AST 71 12/31/2021     09/18/2020    ALKPHOS 77 12/31/2021    ALKPHOS 261 09/18/2020       Lab Results   Component Value Date    WBC 14.8 12/31/2021    WBC 12.8 09/18/2020    HGB 7.7 12/31/2021    HGB 12.3 09/18/2020     12/31/2021     09/18/2020    PLT 58 12/31/2021     09/18/2020       Lab Results   Component Value Date    INR 2.57 12/31/2021    INR 1.9 10/07/2021       MELD-Na score: 35 at 1/1/2022  6:46 AM  MELD score: 34 at 1/1/2022  6:46 AM  Calculated from:  Serum Creatinine: 1.71 mg/dL at 1/1/2022  6:46 AM  Serum Sodium: 132 mmol/L at 1/1/2022  6:46 AM  Total Bilirubin: 24.6 mg/dL at 1/1/2022  6:46 AM  INR(ratio): 2.51 at 1/1/2022  6:46 AM  Age: 51 years    Radiology  US ABD 12/20/21:  Impression:   1. Cirrhotic morphology of liver with sequela of portal hypertension  including splenomegaly, ascites and to and fro flow in the main portal  and right portal vein.  2. Gallbladder sludge and stones. Gallbladder wall thickening  nonspecific in the setting of cirrhosis/ascites.      CTA 12/31/21:  IMPRESSION:   1. No acute or suspicious findings in the chest.   2. Incidental findings include anemic blood pool, small hiatal hernia,  and known cirrhosis with sequela of portal hypertension (ascites).  3. Please see cardiology report for detailed assessment of the cardiac  Findings.      Assessment  51 year old with recent alcoholic hepatitis complicated by ascites, last alcohol intake in September 2021, meld 36 undergoing liver transplantation.    Recent alcoholic hepatitis, in LT eval  Her meld is 35 mainly due to bilirubin and INR.  Patient is undergoing transplant evaluation.  Her hepatic encephalopathy is slightly improving.  Would recommend panculture and rule out infectious etiology, paracentesis done yesterday negative for  SBP.    Patient has CTA chest done yesterday with minimal nonobstructive coronary artery disease and as per cardiology recommendation would not require further work-up before transplantation.    Acute on chronic macrocytic anemia  Patient had slight drop in her hemoglobin with no signs of bleeding her recent endoscopy was in October 2021 with small esophageal varices, portal hypertensive gastropathy and gastric erosions without bleeding.  At this point, would recommend to continue monitoring.    Hyponatremia  Pt has fluctuating Na levels in past, would recommend sodium restriction to 2gm per day. Diuretics can be restarted once infection has been ruled out.    ROSSY  Pt has slightly worsened Cr from her normal baseline of 0.86, likely component of acute kidney injury, creatinine slightly down trended in the last few days. Patient is receiving albumin challenge.    Recommendations  -- Recommend to repeat UCX and BCX  -- Recommend to get ECHO  -- Continue Abx as per primary team  -- Rifaximin 550 mg BID  -- Lactulose PO, titrate to 3-4 BMs per day  -- Monitor neurologic status, high risk of progression  -- Monitor transaminases, bilirubin, INR  -- Peth test pending  -- Ensure sodium restriction to 2000 mg per day  -- Hold diuretics given concern for infection  -- Avoid antiHTN meds given soft BP  -- Albumin challenge for 3 days  -- Adequate protein diet (1.2 - 1.5g/kg/day)   - Recommend multiple meals during the day, Snacks and shakes during the day/night   - Can use Ensure/Boost drinks TID                  - Dietician consultation    Thank you for involving us in this patient's care. Please do not hesitate to contact the GI service with any questions or concerns.      Pt care plan discussed with Dr. Steele, Hepatology staff physician.    This note was created with voice recognition software, and while reviewed for accuracy, typos may remain.    Niecy Dumas MD  Advance and Transplant Hepatology Fellow  Pager:  [phone]    ATTENDING NOTE, GASTROENTEROLOGY/HEPATOLOGY    I discussed this patient with the fellow and participated in the decision making. I agree with the fellow's note. Zita Steele MD

## 2022-01-01 NOTE — PLAN OF CARE
PMHx of alcoholic liver cirrhosis with ascites, esophageal varices, portal hypertension, hepatic encephalopathy, chronic macrocytic anemia and is admitted for acute encephalopathy, c/f hepatic encephalopathy and/or infection. In the evaluation process for urgent liver transplantation.    AVSS.  96% RA.    Pt tired and exhausted from evening (father-in-law just passed from Covid19).  HE=0.  Pt sleeping well between cares.  Denies pain, denies nausea.  PIV SL.  Up with SBA to BR.  Void 550, BM x1.  Skin is jaundiced.  R paracentesis site covered, bruising at site.    NPO overnight - MDs to address in AM rounds.  Cont with POC.

## 2022-01-01 NOTE — PLAN OF CARE
Cared for pt from [id number].  Pt reports feeling a lot better, generalized weakness and mental fuzziness are no longer notable per writer.  Her Hgb improved overnight after receiving a unit last claudia.  Epsom salt foot soak ordered for hang nails on bilateral great toes.  Positive blood culture returned today.  Continue to monitor for S&S of infection.

## 2022-01-01 NOTE — PROGRESS NOTES
"   01/01/22 0904   Quick Adds   Type of Visit Initial PT Evaluation   Living Environment   People in home spouse   Current Living Arrangements house   Home Accessibility stairs within home;stairs to enter home   Number of Stairs, Main Entrance other (see comments)  (\"a couple\")   Number of Stairs, Within Home, Primary greater than 10 stairs   Transportation Anticipated family or friend will provide   Living Environment Comments Pt reports she has assistance of her spouse at home.   Self-Care   Usual Activity Tolerance moderate   Current Activity Tolerance fair   Regular Exercise No   Equipment Currently Used at Home none   Activity/Exercise/Self-Care Comment Pt reports IND with functional mobility at baseline, noted increased fatigue and weakness.   Disability/Function   Hearing Difficulty or Deaf no   Wear Glasses or Blind yes   Vision Management glasses   Concentrating, Remembering or Making Decisions Difficulty no   Difficulty Communicating no   Difficulty Eating/Swallowing no   Walking or Climbing Stairs Difficulty no   Mobility Management Pt reports she has needed more help lately, since feeling more weak.   Dressing/Bathing Difficulty no   Toileting issues no   Doing Errands Independently Difficulty (such as shopping) yes   Errands Management Pt does not drive,  assists with errands.   Fall history within last six months no   Change in Functional Status Since Onset of Current Illness/Injury yes   General Information   Onset of Illness/Injury or Date of Surgery 12/30/21   Referring Physician Henry Barcenas MD   Pertinent History of Current Problem (include personal factors and/or comorbidities that impact the POC) Pt is a 51 year old female who has a history of alcoholic liver cirrhosis with ascites, esophageal varices, portal hypertension, hepatic encephalopathy, chronic macrocytic anemia and is admitted for acute encephalopathy, c/f hepatic encephalopathy and/or infection.    Existing " Precautions/Restrictions fall   Cognition   Orientation Status (Cognition) oriented x 3   Affect/Mental Status (Cognition) sad/depressed affect  (reports sudden unexpected death of father in law 12/31)   Follows Commands (Cognition) delayed response/completion;increased processing time needed;initiation impaired;physical/tactile prompts required;repetition of directions required;verbal cues/prompting required;75-90% accuracy;follows one-step commands   Safety Deficit (Cognition) other (see comments)  (bed alarm)   Pain Assessment   Patient Currently in Pain No   Integumentary/Edema   Integumentary/Edema other (describe)   Integumentary/Edema Comments abdominal ascites, jaundice appearance to skin   Posture    Posture Forward head position;Protracted shoulders   Range of Motion (ROM)   ROM Quick Adds ROM WFL   Strength Comprehensive (MMT)   Comment, General Manual Muscle Testing (MMT) Assessment BLE 3/5 throughout   Bed Mobility   Comment (Bed Mobility) Pt tx supine->sit with SBA.   Transfers   Transfer Safety Comments Pt tx sit->stand with CGA.    Gait/Stairs (Locomotion)   Comment (Gait/Stairs) Pt amb ~ 50 feet with WW and CGA.    Balance   Balance other (describe)   Balance Comments Minimal sway in static standing   Sensory Examination   Sensory Perception patient reports no sensory changes   Clinical Impression   Criteria for Skilled Therapeutic Intervention yes, treatment indicated   PT Diagnosis (PT) Impaired Functional Mobility    Influenced by the following impairments decreased strength, activity intolerance, impaired balance   Functional limitations due to impairments bed mob, transfers, gait   Clinical Presentation Evolving/Changing   Clinical Presentation Rationale PMHx, clinical judgement, current presentation   Clinical Decision Making (Complexity) low complexity   Therapy Frequency (PT) 5x/week   Predicted Duration of Therapy Intervention (days/wks) 1/8/22   Planned Therapy Interventions (PT) bed  mobility training;gait training;neuromuscular re-education;stair training;strengthening;transfer training   Anticipated Equipment Needs at Discharge (PT)   (to be determined)   Risk & Benefits of therapy have been explained care plan/treatment goals reviewed   PT Discharge Planning    PT Discharge Recommendation (DC Rec) Transitional Care Facility;home with home care physical therapy   PT Rationale for DC Rec Pt is below baseline level of function, decreased strength, activity intolerance. Pt would benefit from additional skilled PT to address strength, endurance and balance. Pending LOS, pt may be able to safely discharge home with family assistance.   PT Brief overview of current status  Nursing Staff: up with A x 1 + WW + gait belt   Total Evaluation Time   Total Evaluation Time (Minutes) 7

## 2022-01-02 ENCOUNTER — APPOINTMENT (OUTPATIENT)
Dept: CARDIOLOGY | Facility: CLINIC | Age: 52
DRG: 432 | End: 2022-01-02
Payer: COMMERCIAL

## 2022-01-02 ENCOUNTER — APPOINTMENT (OUTPATIENT)
Dept: OCCUPATIONAL THERAPY | Facility: CLINIC | Age: 52
DRG: 432 | End: 2022-01-02
Payer: COMMERCIAL

## 2022-01-02 LAB
ACANTHOCYTES BLD QL SMEAR: ABNORMAL
ALBUMIN SERPL-MCNC: 3 G/DL (ref 3.4–5)
ALP SERPL-CCNC: 70 U/L (ref 40–150)
ALT SERPL W P-5'-P-CCNC: 32 U/L (ref 0–50)
ANION GAP SERPL CALCULATED.3IONS-SCNC: 12 MMOL/L (ref 3–14)
AST SERPL W P-5'-P-CCNC: 60 U/L (ref 0–45)
BASOPHILS # BLD MANUAL: 0.5 10E3/UL (ref 0–0.2)
BASOPHILS NFR BLD MANUAL: 4 %
BILIRUB SERPL-MCNC: 22.1 MG/DL (ref 0.2–1.3)
BUN SERPL-MCNC: 46 MG/DL (ref 7–30)
CALCIUM SERPL-MCNC: 9.4 MG/DL (ref 8.5–10.1)
CHLORIDE BLD-SCNC: 103 MMOL/L (ref 94–109)
CO2 SERPL-SCNC: 20 MMOL/L (ref 20–32)
CREAT SERPL-MCNC: 1.49 MG/DL (ref 0.52–1.04)
EOSINOPHIL # BLD MANUAL: 1 10E3/UL (ref 0–0.7)
EOSINOPHIL NFR BLD MANUAL: 9 %
ERYTHROCYTE [DISTWIDTH] IN BLOOD BY AUTOMATED COUNT: 22.7 % (ref 10–15)
GFR SERPL CREATININE-BSD FRML MDRD: 42 ML/MIN/1.73M2
GLUCOSE BLD-MCNC: 119 MG/DL (ref 70–99)
HCT VFR BLD AUTO: 22.4 % (ref 35–47)
HGB BLD-MCNC: 7.7 G/DL (ref 11.7–15.7)
HOLD SPECIMEN: NORMAL
INR PPP: 2.74 (ref 0.85–1.15)
LVEF ECHO: NORMAL
LYMPHOCYTES # BLD MANUAL: 0.8 10E3/UL (ref 0.8–5.3)
LYMPHOCYTES NFR BLD MANUAL: 7 %
MCH RBC QN AUTO: 36.3 PG (ref 26.5–33)
MCHC RBC AUTO-ENTMCNC: 34.4 G/DL (ref 31.5–36.5)
MCV RBC AUTO: 106 FL (ref 78–100)
MONOCYTES # BLD MANUAL: 1.4 10E3/UL (ref 0–1.3)
MONOCYTES NFR BLD MANUAL: 12 %
MYELOCYTES # BLD MANUAL: 0.2 10E3/UL
MYELOCYTES NFR BLD MANUAL: 2 %
NEUTROPHILS # BLD MANUAL: 7.5 10E3/UL (ref 1.6–8.3)
NEUTROPHILS NFR BLD MANUAL: 66 %
PETH BLD-MCNC: NEGATIVE NG/ML
PLAT MORPH BLD: ABNORMAL
PLATELET # BLD AUTO: 60 10E3/UL (ref 150–450)
POTASSIUM BLD-SCNC: 3.4 MMOL/L (ref 3.4–5.3)
PROT SERPL-MCNC: 5.3 G/DL (ref 6.8–8.8)
RBC # BLD AUTO: 2.12 10E6/UL (ref 3.8–5.2)
RBC MORPH BLD: ABNORMAL
SODIUM SERPL-SCNC: 135 MMOL/L (ref 133–144)
WBC # BLD AUTO: 11.3 10E3/UL (ref 4–11)

## 2022-01-02 PROCEDURE — 85610 PROTHROMBIN TIME: CPT

## 2022-01-02 PROCEDURE — 250N000013 HC RX MED GY IP 250 OP 250 PS 637: Performed by: EMERGENCY MEDICINE

## 2022-01-02 PROCEDURE — P9047 ALBUMIN (HUMAN), 25%, 50ML: HCPCS

## 2022-01-02 PROCEDURE — 97535 SELF CARE MNGMENT TRAINING: CPT | Mod: GO

## 2022-01-02 PROCEDURE — 85027 COMPLETE CBC AUTOMATED: CPT

## 2022-01-02 PROCEDURE — 36415 COLL VENOUS BLD VENIPUNCTURE: CPT

## 2022-01-02 PROCEDURE — 250N000011 HC RX IP 250 OP 636: Performed by: INTERNAL MEDICINE

## 2022-01-02 PROCEDURE — 99232 SBSQ HOSP IP/OBS MODERATE 35: CPT | Mod: GC | Performed by: INTERNAL MEDICINE

## 2022-01-02 PROCEDURE — 93306 TTE W/DOPPLER COMPLETE: CPT | Mod: 26 | Performed by: STUDENT IN AN ORGANIZED HEALTH CARE EDUCATION/TRAINING PROGRAM

## 2022-01-02 PROCEDURE — 120N000002 HC R&B MED SURG/OB UMMC

## 2022-01-02 PROCEDURE — 99207 PR CDG-MDM COMPONENT: MEETS MODERATE - DOWN CODED: CPT | Performed by: INTERNAL MEDICINE

## 2022-01-02 PROCEDURE — 80053 COMPREHEN METABOLIC PANEL: CPT

## 2022-01-02 PROCEDURE — 93306 TTE W/DOPPLER COMPLETE: CPT

## 2022-01-02 PROCEDURE — 250N000013 HC RX MED GY IP 250 OP 250 PS 637: Performed by: INTERNAL MEDICINE

## 2022-01-02 PROCEDURE — 99233 SBSQ HOSP IP/OBS HIGH 50: CPT | Mod: GC | Performed by: INTERNAL MEDICINE

## 2022-01-02 PROCEDURE — 87086 URINE CULTURE/COLONY COUNT: CPT | Performed by: INTERNAL MEDICINE

## 2022-01-02 PROCEDURE — 250N000011 HC RX IP 250 OP 636

## 2022-01-02 RX ADMIN — THERA TABS 1 TABLET: TAB at 08:04

## 2022-01-02 RX ADMIN — LACTULOSE 10 G: 20 SOLUTION ORAL at 20:02

## 2022-01-02 RX ADMIN — LACTULOSE 10 G: 20 SOLUTION ORAL at 08:04

## 2022-01-02 RX ADMIN — PANTOPRAZOLE SODIUM 40 MG: 40 TABLET, DELAYED RELEASE ORAL at 08:04

## 2022-01-02 RX ADMIN — ALBUMIN HUMAN 60 G: 0.25 SOLUTION INTRAVENOUS at 11:09

## 2022-01-02 RX ADMIN — CEFTRIAXONE SODIUM 2 G: 2 INJECTION, POWDER, FOR SOLUTION INTRAMUSCULAR; INTRAVENOUS at 20:02

## 2022-01-02 RX ADMIN — RIFAXIMIN 550 MG: 550 TABLET ORAL at 20:01

## 2022-01-02 RX ADMIN — PANTOPRAZOLE SODIUM 40 MG: 40 TABLET, DELAYED RELEASE ORAL at 20:02

## 2022-01-02 RX ADMIN — LACTULOSE 10 G: 20 SOLUTION ORAL at 13:24

## 2022-01-02 RX ADMIN — RIFAXIMIN 550 MG: 550 TABLET ORAL at 08:04

## 2022-01-02 ASSESSMENT — ACTIVITIES OF DAILY LIVING (ADL)
ADLS_ACUITY_SCORE: 13
ADLS_ACUITY_SCORE: 13
ADLS_ACUITY_SCORE: 11
ADLS_ACUITY_SCORE: 11
ADLS_ACUITY_SCORE: 13
ADLS_ACUITY_SCORE: 13
ADLS_ACUITY_SCORE: 11
ADLS_ACUITY_SCORE: 13
ADLS_ACUITY_SCORE: 11
ADLS_ACUITY_SCORE: 11
ADLS_ACUITY_SCORE: 13
ADLS_ACUITY_SCORE: 11
ADLS_ACUITY_SCORE: 13
ADLS_ACUITY_SCORE: 13

## 2022-01-02 ASSESSMENT — MIFFLIN-ST. JEOR: SCORE: 1295.88

## 2022-01-02 NOTE — PLAN OF CARE
Cared for pt from [id number].  Echo completed bedside this AM.  Showered w/ OT w/o incident.  Pt no longer seems steady or weak, and should be safe independent in room. PIV bilat both flushed and patent.  Hgb stayed steady from yesterday to today.  Continue POC

## 2022-01-02 NOTE — PLAN OF CARE
PMHx of alcoholic liver cirrhosis with ascites, esophageal varices, portal hypertension, hepatic encephalopathy, chronic macrocytic anemia and is admitted for acute encephalopathy, c/f hepatic encephalopathy and/or infection. In the evaluation process for urgent liver transplantation.     AVSS.  97% RA.    HE=0.  Pt sleeping well between cares.  Denies pain, denies nausea.  PIV SL.  Up with SBA to BR.  Void spont, BM x1.  Skin is jaundiced.  R paracentesis site covered, bruising at site.    Plan for echocardiogram this morning.  Cont with POC.

## 2022-01-02 NOTE — PLAN OF CARE
"/57 (BP Location: Left arm)   Pulse 91   Temp 97.6  F (36.4  C) (Oral)   Resp 17   Ht 1.651 m (5' 5\")   Wt 58.5 kg (129 lb)   SpO2 100%   BMI 21.47 kg/m      Assumed care from 8414-1431. VSS on RA.   Neuro: A&Ox4. Scoring [place] 0  GI/: Denies nausea and pain. +BS/Flatus; Loose BM today. Voiding spontaneously and waiting to collect sterile urine sample.  Diet/appetite: Tolerating 2 g Na+ diet.  Activity: Up with SBA to the bathroom, general weakness.  Skin/drains/Lines: Left and right PIV saline locked. Pt skin appears jaundice. Right paracentesis site covered and bruising around it. Epsom salt foot soak done for ingrown nails on bilateral great toes. Positive blood culture returned today. Heart ultrasound will be done tomorrow pt aware of it. Pt resting between cares. Continue POC.   "

## 2022-01-02 NOTE — PROGRESS NOTES
"[facility]    Hepatology Follow-up    CC: Confusion    Dx: Alcoholic hepatitis complicated by ascites                  Encephalopathy, suspect hepatic                  Acute on chronic macrocytic anemia                  Possible UTI    24 hour events:   No acute events    Subjective:  Denied abd pain    Medications  Current Facility-Administered Medications   Medication Dose Route Frequency    albumin human  60 g Intravenous Q24H    cefTRIAXone  2 g Intravenous Q24H    lactulose  10 g Oral TID    multivitamin, therapeutic  1 tablet Oral Daily    pantoprazole  40 mg Oral BID    rifaximin  550 mg Oral BID    sodium chloride (PF)  3 mL Intracatheter Q8H       Review of systems  A 10-point review of systems was negative.    Examination  /61 (BP Location: Right arm)   Pulse 91   Temp 98.2  F (36.8  C) (Oral)   Resp 16   Ht 1.651 m (5' 5\")   Wt 58.5 kg (129 lb)   SpO2 97%   BMI 21.47 kg/m      Intake/Output Summary (Last 24 hours) at 1/1/2022 0612  Last data filed at 1/1/2022 0521  Gross per 24 hour   Intake 1120 ml   Output 1090 ml   Net 30 ml       Constitutional: cooperative, pleasant  Eyes: Sclera icteric  Ears/nose/mouth/throat: mucus membranes moist  Neck: supple  CV: No edema  Respiratory: Unlabored breathing  Abd: distended, +bs, nontender, fluid thrill +  Skin: warm, perfused, jaundiced  Neuro: AAO x 3, + asterixis  MSK: No gross deformities    Laboratory  Lab Results   Component Value Date     12/31/2021     09/18/2020    POTASSIUM 4.1 12/31/2021    POTASSIUM 3.1 09/18/2020    CHLORIDE 95 12/31/2021    CHLORIDE 106 09/18/2020    CO2 21 12/31/2021    CO2 25 09/18/2020    BUN 62 12/31/2021    BUN 2 09/18/2020    CR 1.71 12/31/2021    CR 0.56 09/18/2020       Lab Results   Component Value Date    BILITOTAL 27.1 12/31/2021    BILITOTAL 1.1 09/18/2020    ALT 40 12/31/2021    ALT 57 09/18/2020    AST 71 12/31/2021     09/18/2020    ALKPHOS 77 12/31/2021    " ALKPHOS 261 09/18/2020       Lab Results   Component Value Date    WBC 14.8 12/31/2021    WBC 12.8 09/18/2020    HGB 7.7 12/31/2021    HGB 12.3 09/18/2020     12/31/2021     09/18/2020    PLT 58 12/31/2021     09/18/2020       Lab Results   Component Value Date    INR 2.57 12/31/2021    INR 1.9 10/07/2021       MELD-Na score: 33 at 1/2/2022  8:44 AM  MELD score: 33 at 1/2/2022  8:44 AM  Calculated from:  Serum Creatinine: 1.49 mg/dL at 1/2/2022  8:44 AM  Serum Sodium: 135 mmol/L at 1/2/2022  8:44 AM  Total Bilirubin: 22.1 mg/dL at 1/2/2022  8:44 AM  INR(ratio): 2.74 at 1/2/2022  8:44 AM  Age: 51 years    Radiology  US ABD 12/20/21:  Impression:   1. Cirrhotic morphology of liver with sequela of portal hypertension  including splenomegaly, ascites and to and fro flow in the main portal  and right portal vein.  2. Gallbladder sludge and stones. Gallbladder wall thickening  nonspecific in the setting of cirrhosis/ascites.      CTA 12/31/21:  IMPRESSION:   1. No acute or suspicious findings in the chest.   2. Incidental findings include anemic blood pool, small hiatal hernia,  and known cirrhosis with sequela of portal hypertension (ascites).  3. Please see cardiology report for detailed assessment of the cardiac  Findings.      Assessment  51 year old with recent alcoholic hepatitis complicated by ascites, last alcohol intake in September 2021, meld 33 undergoing liver transplantation.    Recent alcoholic hepatitis, in LT eval  Her meld is 35 mainly due to bilirubin and INR.  Patient is undergoing transplant evaluation.  Her hepatic encephalopathy is slightly improving.  Would recommend panculture and rule out infectious etiology, paracentesis done yesterday negative for SBP.    Patient has CTA chest done yesterday with minimal nonobstructive coronary artery disease and as per cardiology recommendation would not require further work-up before transplantation.    Acute on chronic macrocytic  anemia  Patient had slight drop in her hemoglobin with no signs of bleeding her recent endoscopy was in October 2021 with small esophageal varices, portal hypertensive gastropathy and gastric erosions without bleeding.  At this point, would recommend to continue monitoring.    Hyponatremia  Pt has fluctuating Na levels in past, would recommend sodium restriction to 2gm per day. Diuretics can be restarted once infection has been ruled out.    ROSSY  Pt has slightly worsened Cr from her normal baseline of 0.86, likely component of acute kidney injury, creatinine slightly down trended in the last few days after getting the albumin challenge consistent with pre-renal ROSSY.    Recommendations  -- ECHO today  -- Continue Abx as per primary team  -- Rifaximin 550 mg BID  -- Lactulose PO, titrate to 3-4 BMs per day  -- Monitor neurologic status, high risk of progression  -- Monitor transaminases, bilirubin, INR  -- Peth test pending  -- Ensure sodium restriction to 2000 mg per day  -- Hold diuretics given concern for infection  -- Avoid antiHTN meds given soft BP  -- Albumin challenge for 3 days  -- Adequate protein diet (1.2 - 1.5g/kg/day)   - Recommend multiple meals during the day, Snacks and shakes during the day/night   - Can use Ensure/Boost drinks TID                  - Dietician consultation    Thank you for involving us in this patient's care. Please do not hesitate to contact the GI service with any questions or concerns.      Pt care plan discussed with Dr. Steele, Hepatology staff physician.    This note was created with voice recognition software, and while reviewed for accuracy, typos may remain.    Niecy Dumas MD  Advance and Transplant Hepatology Fellow  Pager: [phone]  ATTENDING NOTE, GASTROENTEROLOGY/HEPATOLOGY    I saw and discussed this patient with the fellow and participated in the decision making. I agree with the fellow's note. Zita Steele MD

## 2022-01-02 NOTE — PROGRESS NOTES
[facility]    Progress Note - [facility] 2 Service        Date of Admission:  12/30/2021    Assessment & Plan   Melita Cortes is a 51 year old female with history of alcoholic liver cirrhosis with ascites, esophageal varices, portal hypertension, and chronic macrocytic anemia who was admitted on 12/30/22 for decompensated cirrhosis and acute encephalopathy, as well as expedited liver transplant evaluation.    Changes Today:  - Continue albumin challenge (day 3 of 3)- Cr improving  - Continue ceftriaxone (completed course of azithromycin)  - TTE today- EF 60-65%, normal RV and LV size and function, no valvular abnormalities aside from trace tricuspid insufficiency    #Decompensated alcoholic cirrhosis with ascites  #History of hepatic encephalopathy   #Grade I esophageal varices (last EGD 10/29/2021)  #Portal hypertension and portal hypertensive gastropathy  #Chronic thrombocytopenia, stable  Recent worsening in MELD labs leading up to admission (MELD-Na 36 on admission), overall concerning for acute decompensated cirrhosis. Patient has been in the transplant evaluation process, admitted in part for expedited transplant evaluation. Regarding current decompensation, presume that most likely source is infectious, with potential sources of infection including bacteremia (BCx 12/30 + for diphterhoids in 1/2 bottle, unclear whether infectious or contaminant) and/or pneumonia (per below). Additional concern for hepatic encephalopathy, now improving on lactulose. No findings concerning for GIB or PVT. MELD labs slightly improving over the past 48-hours with empiric treatment of ROSSY, possible infection, and hepatic encephalopathy.   - Hepatology consulting  - Continue pta lactulose 10g tid, titrated to 3-4 BM's per day  - Continue pta pantoprazole 40mg bid   - Rifaximin 550mg PO BID  - Abx management, per below  - Trend MELD labs qday  - Chemical dependency consult  - PET  "12/28/2021 in process  - Cardiology consulted for cardiac pre-operative evaluation for liver transplant; CTA angiogram coronary arteries (12/31) notable for \"minimal nonobstructive coronary with minimal luminal irregularities)     MELD-Na score: 33 at 1/2/2022  8:44 AM  MELD score: 33 at 1/2/2022  8:44 AM  Calculated from:  Serum Creatinine: 1.49 mg/dL at 1/2/2022  8:44 AM  Serum Sodium: 135 mmol/L at 1/2/2022  8:44 AM  Total Bilirubin: 22.1 mg/dL at 1/2/2022  8:44 AM  INR(ratio): 2.74 at 1/2/2022  8:44 AM  Age: 51 years    Leukocytosis + elevated procalcitonin, presumed 2/2 to pneumonia vs possible bacteremia  Initial concern for infection on admission based upon recent history of leukocytosis of unclear etiology and  elevated procalcitonin in setting of acute decompensated cirrhosis. No associated findings concerning for sepsis on admission. Inflammatory markers and clinical status now improving on abx, overall further suggestive of initial presence of an infection. Regarding potential sources, presume that most likely sources include bacteremia (BCx 12/30 + for diphterhoids in 1/2 bottle, unclear whether infectious or contaminant) and/or pneumonia. Low suspicion for SBP, UTI, and/or intraabdominal source based upon evaluation to date.  - Continue ceftriaxone IV q24h (12/30-current)  - Completed azithromycin IV q24h x3-day course (12/30-1/1)  - follow BCx 12/30 and 1/1  - follow UCx 1/2  - TTE today- EF 60-65%, normal RV and LV size and function, no valvular abnormalities aside from trace tricuspid insufficiency    #Acute metabolic encephalopathy, presumed multifactorial from HE and septic encephalopathy - improving  Patient admitted with recent presence of gradually worsening somnolence and weakness, overall concerning for encephalopathy. Presume that most likely source was toxic/metabolic encephalopathy, likely multifactorial from mild hepatic encephalopathy (mild asterixis present on admission) and septic " encephalopathy. Patient now improving on lactulose and abx  - PTA lactulose and [place] protocol  - Abx, per above  - PT/OT consult    #Acute-on-chronic anemia, presumed dilutional  Admission Hgb 8.2, down to 6.1 on 12/31; now increased back to 7.7-7.8 after 1U pRBC, since which it has remained stable. Given no clinical evidence of bleed, presume that most likely source is dilution after initial IVF resuscitation, with low suspicion for GIB  - Trend CBC qday  - monitor for bleeding     #ROSSY, unclear etiology  Creatinine on admission 1.80, up from 0.86 on 12/24/2021. Urine Na 14 on 12/31. Patient has been managed on albumin challenge 1g/kg q24h x3 days (12/31-1/2), from which patient has demonstrated stable Cr with good UOP. Based upon collective clinical picture, have greatest suspicion for prerenal ROSSY vs hepatorenal syndrome. Unlikely ATN based upon urine Na. Furthermore, unlikely postrenal given no clinical evidence of urinary obstruction/retention.  - albumin 25% 1g/kg (60g) q24h for 3 doses (12/31 - 1/2)  - Trend BMP qday  - Strict I/O's     #Malnutrition  Low po intake. Drinks a few Ensure shakes per day.   - Nutrition consult     Diet: 2 gram Na diet  DVT Prophylaxis: Pneumatic Compression Devices  Gunter Catheter: Not present  Fluids: no continuous fluids  Central Lines: None  Code Status: Full Code      Disposition Plan   Expected Discharge: 01/03/2022     Anticipated discharge location:  Awaiting care coordination huddle    The patient's care was discussed with the Attending Physician, Dr. Miramontes.    Ermias White MD  [address]  Securely message with the Vocera Web Console (learn more here)  Text page via [facility] Paging/Directory    Please see sign in/sign out for up to date coverage information    Clinically Significant Risk Factors Present on Admission             # Severe Malnutrition, POA: based on nutrition assessment      ______________________________________________________________________    Interval History   Nursing notes reviewed. No acute events overnight. Patient overall feeling better since admission, now nearly back to baseline. No fevers, chills, shortness of breath, chest pain, abdominal pain (aside from right side where paracentesis was performed), nausea, vomiting. Has had 2 BMs this AM.    Data reviewed today: I reviewed all medications, new labs and imaging results over the last 24 hours.    Physical Exam   Vital Signs: Temp: 98.2  F (36.8  C) Temp src: Oral BP: 105/61 Pulse: 91   Resp: 16 SpO2: 97 % O2 Device: None (Room air)    Weight: 129 lbs 0 oz  General: lying in bed, NAD  HEENT: scleral icterus, no conjunctival injection  Resp: clear to auscultation bilaterally, no crackles or wheezes  Cardiac: regular rate and rhythm, systolic murmur  Abdomen: soft, non-tender, non-distended, no rebound or guarding. Right abdomen with ecchymosis where paracentesis was down.  Extremities: warm, no lower extremity edema  MSK: some reduced muscle bulk  Skin: jaundiced  Neuro: alert, answers questions appropriately. No asterixis.   Psych: appropriate mood and affect    Data   Recent Labs   Lab 01/02/22  0844 01/01/22  0646 12/31/21 2011 12/31/21  1548 12/31/21  0805 12/30/21  2124 12/30/21  1422   WBC 11.3* 11.3*  --   --  14.8*  --  15.5*   HGB 7.7* 7.8* 7.7*   < > 6.7*  --  8.2*   * 101*  --   --  103*  --  105*   PLT 60* 63*  --   --  58*  --  89*   INR 2.74* 2.51*  --   --  2.57*  --  2.31*    132*  --   --  129*   < > 124*   POTASSIUM 3.4 3.9  --   --  4.1   < > 4.2   CHLORIDE 103 98  --   --  95   < > 91*   CO2 20 20  --   --  21   < > 20   BUN 46* 58*  --   --  62*   < > 69*   CR 1.49* 1.71*  --   --  1.71*   < > 1.80*   ANIONGAP 12 14  --   --  13   < > 13   SHAI 9.4 9.4  --   --  8.6   < > 9.0   * 86  --   --  90   < > 176*   ALBUMIN 3.0* 2.6*  --   --  2.1*  --  2.0*   PROTTOTAL 5.3* 4.9*  --   --   4.7*  --  5.2*   BILITOTAL 22.1* 24.6*  --   --  27.1*  --  31.5*   ALKPHOS 70 58  --   --  77  --  97   ALT 32 33  --   --  40  --  48   AST 60* 60*  --   --  71*  --  87*   LIPASE  --   --   --   --   --   --  366    < > = values in this interval not displayed.

## 2022-01-03 ENCOUNTER — APPOINTMENT (OUTPATIENT)
Dept: PHYSICAL THERAPY | Facility: CLINIC | Age: 52
DRG: 432 | End: 2022-01-03
Payer: COMMERCIAL

## 2022-01-03 ENCOUNTER — APPOINTMENT (OUTPATIENT)
Dept: GENERAL RADIOLOGY | Facility: CLINIC | Age: 52
DRG: 432 | End: 2022-01-03
Payer: COMMERCIAL

## 2022-01-03 ENCOUNTER — DOCUMENTATION ONLY (OUTPATIENT)
Dept: TRANSPLANT | Facility: CLINIC | Age: 52
End: 2022-01-03
Payer: COMMERCIAL

## 2022-01-03 LAB
ACANTHOCYTES BLD QL SMEAR: ABNORMAL
ALBUMIN SERPL-MCNC: 3 G/DL (ref 3.4–5)
ALP SERPL-CCNC: 75 U/L (ref 40–150)
ALT SERPL W P-5'-P-CCNC: 29 U/L (ref 0–50)
ANION GAP SERPL CALCULATED.3IONS-SCNC: 10 MMOL/L (ref 3–14)
AST SERPL W P-5'-P-CCNC: 55 U/L (ref 0–45)
BACTERIA UR CULT: NO GROWTH
BASOPHILS # BLD MANUAL: 0.2 10E3/UL (ref 0–0.2)
BASOPHILS NFR BLD MANUAL: 2 %
BILIRUB SERPL-MCNC: 18.6 MG/DL (ref 0.2–1.3)
BLD PROD TYP BPU: NORMAL
BLOOD COMPONENT TYPE: NORMAL
BUN SERPL-MCNC: 38 MG/DL (ref 7–30)
CALCIUM SERPL-MCNC: 9 MG/DL (ref 8.5–10.1)
CHLORIDE BLD-SCNC: 104 MMOL/L (ref 94–109)
CO2 SERPL-SCNC: 21 MMOL/L (ref 20–32)
CODING SYSTEM: NORMAL
CREAT SERPL-MCNC: 1.16 MG/DL (ref 0.52–1.04)
CROSSMATCH: NORMAL
EOSINOPHIL # BLD MANUAL: 0.5 10E3/UL (ref 0–0.7)
EOSINOPHIL NFR BLD MANUAL: 4 %
ERYTHROCYTE [DISTWIDTH] IN BLOOD BY AUTOMATED COUNT: 22.1 % (ref 10–15)
FRAGMENTS BLD QL SMEAR: SLIGHT
GFR SERPL CREATININE-BSD FRML MDRD: 57 ML/MIN/1.73M2
GLUCOSE BLD-MCNC: 119 MG/DL (ref 70–99)
HCT VFR BLD AUTO: 20.1 % (ref 35–47)
HGB BLD-MCNC: 6.9 G/DL (ref 11.7–15.7)
HGB BLD-MCNC: 8.5 G/DL (ref 11.7–15.7)
INR PPP: 3.16 (ref 0.85–1.15)
ISSUE DATE AND TIME: NORMAL
LYMPHOCYTES # BLD MANUAL: 1.4 10E3/UL (ref 0.8–5.3)
LYMPHOCYTES NFR BLD MANUAL: 11 %
MCH RBC QN AUTO: 35.8 PG (ref 26.5–33)
MCHC RBC AUTO-ENTMCNC: 34.3 G/DL (ref 31.5–36.5)
MCV RBC AUTO: 104 FL (ref 78–100)
MONOCYTES # BLD MANUAL: 0.6 10E3/UL (ref 0–1.3)
MONOCYTES NFR BLD MANUAL: 5 %
NEUTROPHILS # BLD MANUAL: 9.6 10E3/UL (ref 1.6–8.3)
NEUTROPHILS NFR BLD MANUAL: 78 %
PLAT MORPH BLD: ABNORMAL
PLATELET # BLD AUTO: 62 10E3/UL (ref 150–450)
POTASSIUM BLD-SCNC: 3.6 MMOL/L (ref 3.4–5.3)
PROT SERPL-MCNC: 5.1 G/DL (ref 6.8–8.8)
RBC # BLD AUTO: 1.93 10E6/UL (ref 3.8–5.2)
RBC MORPH BLD: ABNORMAL
SODIUM SERPL-SCNC: 135 MMOL/L (ref 133–144)
UNIT ABO/RH: NORMAL
UNIT NUMBER: NORMAL
UNIT STATUS: NORMAL
UNIT TYPE ISBT: 9500
WBC # BLD AUTO: 12.3 10E3/UL (ref 4–11)

## 2022-01-03 PROCEDURE — 250N000013 HC RX MED GY IP 250 OP 250 PS 637: Performed by: EMERGENCY MEDICINE

## 2022-01-03 PROCEDURE — 99233 SBSQ HOSP IP/OBS HIGH 50: CPT | Mod: GC | Performed by: INTERNAL MEDICINE

## 2022-01-03 PROCEDURE — 250N000011 HC RX IP 250 OP 636: Performed by: INTERNAL MEDICINE

## 2022-01-03 PROCEDURE — 87040 BLOOD CULTURE FOR BACTERIA: CPT | Performed by: INTERNAL MEDICINE

## 2022-01-03 PROCEDURE — 99223 1ST HOSP IP/OBS HIGH 75: CPT | Mod: FS | Performed by: STUDENT IN AN ORGANIZED HEALTH CARE EDUCATION/TRAINING PROGRAM

## 2022-01-03 PROCEDURE — 71045 X-RAY EXAM CHEST 1 VIEW: CPT | Mod: 26 | Performed by: RADIOLOGY

## 2022-01-03 PROCEDURE — 250N000013 HC RX MED GY IP 250 OP 250 PS 637: Performed by: INTERNAL MEDICINE

## 2022-01-03 PROCEDURE — 85018 HEMOGLOBIN: CPT | Performed by: INTERNAL MEDICINE

## 2022-01-03 PROCEDURE — 85610 PROTHROMBIN TIME: CPT

## 2022-01-03 PROCEDURE — 36415 COLL VENOUS BLD VENIPUNCTURE: CPT

## 2022-01-03 PROCEDURE — 97116 GAIT TRAINING THERAPY: CPT | Mod: GP

## 2022-01-03 PROCEDURE — P9016 RBC LEUKOCYTES REDUCED: HCPCS

## 2022-01-03 PROCEDURE — 36415 COLL VENOUS BLD VENIPUNCTURE: CPT | Performed by: INTERNAL MEDICINE

## 2022-01-03 PROCEDURE — 120N000002 HC R&B MED SURG/OB UMMC

## 2022-01-03 PROCEDURE — 80053 COMPREHEN METABOLIC PANEL: CPT

## 2022-01-03 PROCEDURE — 97530 THERAPEUTIC ACTIVITIES: CPT | Mod: GP

## 2022-01-03 PROCEDURE — 85041 AUTOMATED RBC COUNT: CPT

## 2022-01-03 PROCEDURE — 71045 X-RAY EXAM CHEST 1 VIEW: CPT

## 2022-01-03 RX ADMIN — LACTULOSE 10 G: 20 SOLUTION ORAL at 14:01

## 2022-01-03 RX ADMIN — RIFAXIMIN 550 MG: 550 TABLET ORAL at 19:52

## 2022-01-03 RX ADMIN — THERA TABS 1 TABLET: TAB at 07:41

## 2022-01-03 RX ADMIN — PANTOPRAZOLE SODIUM 40 MG: 40 TABLET, DELAYED RELEASE ORAL at 07:41

## 2022-01-03 RX ADMIN — CEFTRIAXONE SODIUM 2 G: 2 INJECTION, POWDER, FOR SOLUTION INTRAMUSCULAR; INTRAVENOUS at 19:52

## 2022-01-03 RX ADMIN — LACTULOSE 10 G: 20 SOLUTION ORAL at 19:52

## 2022-01-03 RX ADMIN — LACTULOSE 10 G: 20 SOLUTION ORAL at 07:41

## 2022-01-03 RX ADMIN — POLYETHYLENE GLYCOL 3350, SODIUM SULFATE ANHYDROUS, SODIUM BICARBONATE, SODIUM CHLORIDE, POTASSIUM CHLORIDE 4000 ML: 236; 22.74; 6.74; 5.86; 2.97 POWDER, FOR SOLUTION ORAL at 17:00

## 2022-01-03 RX ADMIN — RIFAXIMIN 550 MG: 550 TABLET ORAL at 07:41

## 2022-01-03 RX ADMIN — PANTOPRAZOLE SODIUM 40 MG: 40 TABLET, DELAYED RELEASE ORAL at 19:52

## 2022-01-03 ASSESSMENT — ACTIVITIES OF DAILY LIVING (ADL)
ADLS_ACUITY_SCORE: 11

## 2022-01-03 ASSESSMENT — MIFFLIN-ST. JEOR: SCORE: 1251.83

## 2022-01-03 NOTE — PLAN OF CARE
PMHx of alcoholic liver cirrhosis with ascites, esophageal varices, portal hypertension, hepatic encephalopathy, chronic macrocytic anemia and is admitted for acute encephalopathy, c/f hepatic encephalopathy and/or infection. In the evaluation process for urgent liver transplantation.     AVSS.  99% RA.    HE=0.  Pt sleeping well between cares.  Denies pain, denies nausea.  PIV SL.  Up with SBA to BR.  Void spont.  Skin is jaundiced.  R paracentesis site with bruising at site.    Pt states she feels more distended/abd discomfort - wondering if she needs another paracentesis soon.    Cont with POC.

## 2022-01-03 NOTE — PROGRESS NOTES
This RN met with patient and  Doug on 12/31/2021:    Liver Transplant Evaluation/Teaching     TEACHING TOPICS: Evaluation Process, Evaluation Items, Diagnostic Studies, Consultation, Chemical Dependency Policy (physical copy of consent signed), CD Eval, Donor Source, Liver Allocation, MELD System, UNOS, Waiting List, Follow up while on transplant list, Follow up after transplantation, Infection and Rejection, Immunosuppression , Medication Teaching, Lab Recording after transplant, Laboratory Frequency after transplant , Consent for evaluation and One year survival rates (physical copies of consent signed)     INSTRUCTIONAL MATERIAL USED/GIVEN:  Liver Transplant Handbook, MELD Booklet, Donor Booklet, Web Sites Options, Verbal instructions, Multiple Listing Brochure     Consent for evaluation signed 12/31/2021, One year survival rates and SRTR (Scientific Registry) Data reviewed & signed 12/31/2021     Person(s) involved in teaching: patient Fidelina,  Doug, sister-in-law Afsaneh  Asks Questions: YES  Eager to Learn: YES  Cooperative: YES  Receptive (willing/able to accept information): YES  Reason for the appointment, diagnosis and treatment plan: YES  Knowledge of proper use of medications and conditions for which they are ordered (with special attention to potential side effects or drug interactions): NA  Which situations necessitate calling provider and whom to contact: YES     Teaching Concerns Addressed: RN instructed persons to organize any questions they have during transplant process and to write them down.   Comments: MD, med, and lab compliance. Proper exercise and nutrition. No alcohol or non-prescribed meds. Reinforced to call/email with any questions or concerns. Educated/reinforced to sign up for MyChart. Educated/reinforced to continue to abstain from alcohol/drugs/smoking/herbal medication/remedies.  Proper use and care of (medical equip, care aids, etc.): NA at this time  Nutritional needs and  diet plan: YES  Pain management techniques: NA at this time  Wound Care: NA at this time  How and/when to access community resources: NA at this time  Patient is aware of and agrees to required commitment to post-op care and long term follow-up: YES     Patient has name and phone number of transplant coordinator.   Time Spent face-to-face teachin minutes.      Physical copies of: Consent to Consider Increased Risk Liver Donors with all boxes checked and signed by patient and this RN on 21, Guidelines: Alcohol/Substance Use and Abuse signed by patient and this RN on 21, and Receipt of Information for Organ Transplant Recipient signed by patient and this RN on 2021.     Tito Mcgrath RN  Pre-Liver Transplant Coordinator  [phone] (direct)  [phone] (fax)

## 2022-01-03 NOTE — PROGRESS NOTES
[facility]    Progress Note - Ivy 2 Service        Date of Admission:  12/30/2021    Assessment & Plan   Melita Cortes is a 51 year old female with history of alcoholic liver cirrhosis with ascites, esophageal varices, portal hypertension, and chronic macrocytic anemia who was admitted on 12/30/22 for decompensated cirrhosis and acute encephalopathy, as well as expedited liver transplant evaluation. Overall improving clinically. Found to grow Actinomyces odontolyticus in one blood culture, consulting ID for further recs.     Changes Today:  - s/p 1 U pRBC 1/3/2021 for hgb of 6.9  - Continue ceftriaxone (completed course of azithromycin)  - ID consulted for assistance in evaluation and management of Actinomyces odontolyticus bacteremia vs contaminant   - case will be discussed at liver transplant conference 1/4  - plan for colonoscopy 1/4 for liver transplant workup  - clear liquid diet, NPO at midnight  - GoLYTELY ordered    #Decompensated alcoholic cirrhosis with ascites  #History of hepatic encephalopathy   #Grade I esophageal varices (last EGD 10/29/2021)  #Portal hypertension and portal hypertensive gastropathy  #Chronic thrombocytopenia, stable  Recent worsening in MELD labs leading up to admission (MELD-Na 36 on admission), overall concerning for acute decompensated cirrhosis. Patient has been in the transplant evaluation process, admitted in part for expedited transplant evaluation. Regarding current decompensation, presume that most likely source is infectious, with potential sources of infection including bacteremia (BCx 12/30 + for Actinomyces odontolyticus) and/or pneumonia (per below). Additional concern for hepatic encephalopathy, now improving on lactulose. No findings concerning for GIB or PVT.    - Hepatology consulting- case will be discussed at liver transplant conference 1/4. Plan will be to keep her inpatient for now.   - plan for  "colonoscopy 1/4 for liver transplant workup  - clear liquid diet, NPO at midnight  - GoLYTELY ordered  - Continue pta lactulose 10g tid, titrated to 3-4 BM's per day  - Continue pta pantoprazole 40mg bid   - Rifaximin 550mg PO BID  - Abx management, per below  - Trend MELD labs qday  - Chemical dependency consult  - Cardiology consulted for cardiac pre-operative evaluation for liver transplant; CTA angiogram coronary arteries (12/31) notable for \"minimal nonobstructive coronary with minimal luminal irregularities)     MELD-Na score: 33 at 1/3/2022  7:41 AM  MELD score: 32 at 1/3/2022  7:41 AM  Calculated from:  Serum Creatinine: 1.16 mg/dL at 1/3/2022  7:41 AM  Serum Sodium: 135 mmol/L at 1/3/2022  7:41 AM  Total Bilirubin: 18.6 mg/dL at 1/3/2022  7:41 AM  INR(ratio): 3.16 at 1/3/2022  7:33 AM  Age: 51 years    #Actinomyces odontolyticus bacteremia   Initial concern for infection on admission based upon recent history of leukocytosis of unclear etiology and elevated procalcitonin in setting of acute decompensated cirrhosis. No associated findings concerning for sepsis on admission. Inflammatory markers and clinical status now improving on abx, overall further suggestive of initial presence of an infection. Regarding potential sources, presume that most likely sources include Actinomyces odontolyticus bacteremia (in 1 of 2 BCx 12/30) and/or pneumonia. Low suspicion for SBP, UTI, and/or intraabdominal source based upon evaluation to date. TTE 1/3 EF 60-65%, normal RV and LV size and function, no valvular abnormalities aside from trace tricuspid insufficiency- no evidence of endocarditis.   - ID consulted for assistance in evaluation and management of Actinomyces odontolyticus bacteremia vs contaminant   - Continue ceftriaxone IV q24h (12/30-current)  - Completed azithromycin IV q24h x3-day course (12/30-1/1)  - BCx 12/30- 1 of 2 bottles growing Actinomyces odontolyticus  - BCx 1/1- NGTD  - BCx 1/4- NGTD  - UCx 1/3- no " growth    #Acute metabolic encephalopathy, presumed multifactorial from HE and septic encephalopathy - improving  Patient admitted with recent presence of gradually worsening somnolence and weakness, overall concerning for encephalopathy. Presume that most likely source was toxic/metabolic encephalopathy, likely multifactorial from mild hepatic encephalopathy (mild asterixis present on admission) and septic encephalopathy. Patient now improving on lactulose and abx  - PTA lactulose and [place] protocol  - Abx, per above  - PT/OT consult    #Acute-on-chronic anemia, presumed dilutional  Admission Hgb 8.2, down to 6.1 on 12/31; now increased back to 7.7-7.8 after 1U pRBC, since which it has remained stable. Given no clinical evidence of bleed, presume that most likely source is dilution after initial IVF resuscitation, with low suspicion for GIB  - s/p 1 U pRBC 1/3/2021 for hgb of 6.9  - Trend CBC qday  - monitor for bleeding     #ROSSY, likely prerenal, improving  Creatinine on admission 1.80, up from 0.86 on 12/24/2021. Urine Na 14 on 12/31. Patient has been managed on albumin challenge 1g/kg q24h x3 days (12/31-1/2), from which patient has demonstrated stable Cr with good UOP. Based upon collective clinical picture, have greatest suspicion for prerenal ROSSY vs hepatorenal syndrome. Unlikely ATN based upon urine Na. Furthermore, unlikely postrenal given no clinical evidence of urinary obstruction/retention.  - s/p albumin 25% 1g/kg (60g) q24h for 3 doses (12/31 - 1/2)  - Trend BMP qday  - Strict I/O's     #Malnutrition  Low po intake. Drinks a few Ensure shakes per day.   - Nutrition consult     Diet: clear liquid diet, NPO at midnight for colonoscopy  DVT Prophylaxis: Pneumatic Compression Devices  Gunter Catheter: Not present  Fluids: no continuous fluids  Central Lines: None  Code Status: Full Code      Disposition Plan   Expected Discharge: 01/05/2022     Anticipated discharge location:  Awaiting care coordination  roberto    The patient's care was discussed with the Attending Physician, Dr. Miramontes.    Ermias White MD  [address]  Securely message with the Vocera Web Console (learn more here)  Text page via [facility] Paging/Directory    Please see sign in/sign out for up to date coverage information    Clinically Significant Risk Factors Present on Admission             # Severe Malnutrition, POA: based on nutrition assessment     ______________________________________________________________________    Interval History   Nursing notes reviewed. No acute events overnight. She is feeling well today. No fevers, chills, chest pain, shortness of breath, abdominal pain, nausea, vomiting. Has been having regular bowel movements with lactulose. Reports that she has not seen a dentist recently because of her high INR.     Data reviewed today: I reviewed all medications, new labs and imaging results over the last 24 hours.    Physical Exam   Vital Signs: Temp: 97.3  F (36.3  C) Temp src: Oral BP: 105/60 Pulse: 85   Resp: 17 SpO2: 100 % O2 Device: None (Room air)    Weight: 139 lbs 1.6 oz  General: lying in bed, NAD  HEENT: scleral icterus, no conjunctival injection  Resp: clear to auscultation bilaterally, no crackles or wheezes  Cardiac: regular rate and rhythm, systolic murmur  Abdomen: soft, non-tender, some distention  Extremities: warm, 1-2+ bilateral lower extremity edema  MSK: some reduced muscle bulk  Skin: jaundiced  Neuro: alert, answers questions appropriately.  Psych: appropriate mood and affect    Data   Recent Labs   Lab 01/03/22  0741 01/03/22  0733 01/02/22  0844 01/01/22  0646 12/30/21  2124 12/30/21  1422   WBC  --  12.3* 11.3* 11.3*   < > 15.5*   HGB  --  6.9* 7.7* 7.8*   < > 8.2*   MCV  --  104* 106* 101*   < > 105*   PLT  --  62* 60* 63*   < > 89*   INR  --  3.16* 2.74* 2.51*   < > 2.31*     --  135 132*   < > 124*   POTASSIUM 3.6  --  3.4 3.9   <  > 4.2   CHLORIDE 104  --  103 98   < > 91*   CO2 21  --  20 20   < > 20   BUN 38*  --  46* 58*   < > 69*   CR 1.16*  --  1.49* 1.71*   < > 1.80*   ANIONGAP 10  --  12 14   < > 13   SHAI 9.0  --  9.4 9.4   < > 9.0   *  --  119* 86   < > 176*   ALBUMIN 3.0*  --  3.0* 2.6*   < > 2.0*   PROTTOTAL 5.1*  --  5.3* 4.9*   < > 5.2*   BILITOTAL 18.6*  --  22.1* 24.6*   < > 31.5*   ALKPHOS 75  --  70 58   < > 97   ALT 29  --  32 33   < > 48   AST 55*  --  60* 60*   < > 87*   LIPASE  --   --   --   --   --  366    < > = values in this interval not displayed.

## 2022-01-03 NOTE — PLAN OF CARE
Addendum        [id number]  PMHx of alcoholic liver cirrhosis with ascites, esophageal varices, portal hypertension, hepatic encephalopathy, chronic macrocytic anemia and is admitted for acute encephalopathy, c/f hepatic encephalopathy and/or infection. In the evaluation process for urgent liver transplantation.    AVSS RA 96%  UP ad nabil to BR. Resting between cares.  Jaundiced skin. PIV x 2 SL.  Denies pain. No nausea but c/o increased fluid in abdominal area.  IV antibiotics.   Makes needs known. Will continue with POC.

## 2022-01-03 NOTE — CONSULTS
[facility] CONSULT NOTE     Patient:  Melita Cortes   Date of birth 1970, Medical record number [id number]  Date of Visit:  01/03/2022  Date of Admission: 12/30/2021  Consult Requester:Sabrina Miramontes MD          Assessment and Recommendations:   ID Problem List   1. Actinomyces odontolyticus bacteremia    -First negative Cx 1/1  2. ESLD with acute decompensated cirrhosis   -C/b esophageal varices, ascites, HE on lactulose  3. Acute ROSSY- improving     RECOMMENDATION:  1. Continue IV Ceftriaxone 2g q24hrs x 7 days from first negative BCx (through 1/7) then switch to PO Amoxicillin 750 mg TID to complete additional 7 days of antibiotics (through 1/14) for actinomyces bacteremia.   2. Will follow pending BCx from 1/1 and 1/2, currently NGTD.   3. Infection is not a barrier to transplantation; okay from an ID standpoint to continue proceeding with transplant work up.     ASSESSMENT:  Melita Cortes is a 51 year old female with PMHx significant for alcoholic liver cirrhosis c/b ascites, ESLD, esophageal varices, hepatic encephalopathy, chronic macrocytic anemia, and asthma who presented to the ED for weakness and AMS on 12/30/21.     Afebrile throughout admission. HDS. Elevated WBC at 15K on admission with improvement to 12 over past few days. Lactate has been wnl. PCT 2/71 on 12/30. UA with small LE and WBC 10 with few bacteria and 4 squamous cells. COVID19 swab negative. 12/30 BCx (1/2) positive on D2 with Actinomyces odontolyticus. Ascites fluid from 12/31 with 180 WBC/8% neutrophils/81% other cells, cultures NGTD. Repeat BCx 1/1 and 1/3 NGTD. UCx 1/2/22 No growth. CXR on admission with mild L basilar interstitial opacities likely atelectasis. Repeat 1/3 with sm L pleural effusion w/ associated atelectasis vs consolidation.     Started on Azithromycin 500 mg x3 days and Ceftriaxone 2g q24hrs to cover for infection given concern of elevated WBC and PCT per medicine note.      Thank you for this consult. ID will continue to follow.     Patient was discussed with Dr. Kovacs.     Cheli Singleton PA-C  Infectious Diseases  Pager #[phone]          History of Present Illness:   Melita Cortes is a 51 year old female with PMHx significant for alcoholic liver cirrhosis c/b ascites, ESLD, esophageal varices, hepatic encephalopathy, chronic macrocytic anemia, and asthma who presented to the ED for weakness and AMS on 12/30/21.     Per chart review, the patient had an emesis and incontinent diarrheal episode in bed at home. She felt worsening weakness and tiredness as well outside of her baseline.  became concerned for change in physical functioning due to decreased ability to perform ADLs ( dressing herself) and worsening overall laboratory testing. On presentation, ROS negative for fevers, chills, headaches, URI sx, falls, dizziness, chest pain, SOB, abdominal pain, continued nausea/vomiting. Denies tooth pain, broken teeth or unfilled cavities. She does note some bleeding from gums when brushing or flossing as of late. Found to have decompensated liver cirrhosis and initiated expedited liver transplant evaluation.     There was concern for infection on admission due to leukocytosis and elevated PCT 2.71. No vital signs supportive of sepsis. Initiated on Ceftriaxone + Azithromycin and blood cultures drawn.     Lives in [place] with  and 2 cats. No dogs, rodents, reptiles or bird exposures. No exposures to large animals. Has traveled to [place] and [place] in the past 5 years, mostly all inclusive resorts. No recent travel within the past year. Has not seen the dentist within the last year that she recalls (otherwise sees dentist regularly every 6 months). No recent hiking through the woods.          Review of Systems:   CONSTITUTIONAL:  No fevers, chills or night sweats.   EYES: negative for icterus, redness, or purulent drainage.   ENT:  negative for nasal  congestion, rhinorrhea, or sore throat.  RESPIRATORY:  negative for cough with sputum and dyspnea.  CARDIOVASCULAR:  negative for chest pain or palpitations.  GASTROINTESTINAL:  negative for nausea, vomiting, diarrhea and constipation.  GENITOURINARY:  negative for dysuria, frequency, or urgency.   HEME:  No easy bruising or bleeding. Positive for gum bleeding with flossing/brushing.  INTEGUMENT:  negative for rash and pruritus.  NEURO:  Negative for headache, vision changes, or numbness/tingling in extremities.         Past Medical History:     Past Medical History:   Diagnosis Date     Alcoholic hepatitis      Asthma 3/10/2006     Cervical high risk HPV (human papillomavirus) test positive 2020    See problem list            Past Surgical History:     Past Surgical History:   Procedure Laterality Date     APPENDECTOMY       ESOPHAGOGASTRODUODENOSCOPY, WITH BRUSHINGS N/A 10/29/2021    Procedure: ESOPHAGOGASTRODUODENOSCOPY, WITH BRUSHINGS;  Surgeon: Torey Ruiz MD;  Location:  GI            Family History:     Family History   Problem Relation Age of Onset     Cancer Mother      Colon Cancer Paternal Aunt      Colon Cancer Maternal Uncle             Social History:     Social History     Tobacco Use     Smoking status: Former Smoker     Quit date: 2020     Years since quittin.6     Smokeless tobacco: Never Used   Substance Use Topics     Alcohol use: Not Currently     Comment: Last drink 2021     History   Sexual Activity     Sexual activity: Not Currently            Current Medications:       cefTRIAXone  2 g Intravenous Q24H     lactulose  10 g Oral TID     multivitamin, therapeutic  1 tablet Oral Daily     pantoprazole  40 mg Oral BID     rifaximin  550 mg Oral BID     sodium chloride (PF)  3 mL Intracatheter Q8H            Allergies:   No Known Allergies         Physical Exam:   Vitals were reviewed  Patient Vitals for the past 24 hrs:   BP Temp Temp src Pulse Resp SpO2 Weight    01/03/22 1151 -- -- -- -- -- -- 63.1 kg (139 lb 1.6 oz)   01/03/22 0744 92/49 97.9  F (36.6  C) Oral 91 15 93 % --   01/02/22 2218 99/55 99  F (37.2  C) Oral 91 16 99 % --   01/02/22 1900 -- -- -- -- -- -- 67.5 kg (148 lb 13 oz)   01/02/22 1547 118/63 98.9  F (37.2  C) Temporal 90 16 99 % --       Physical Examination:  Constitutional: Pleasant female seen sitting up in bed, in NAD. Alert and interactive.   HEENT: NCAT, Sclerae icteric, conjunctiva clear. Moist mucous membranes.  Respiratory: Non-labored breathing on RA. Lungs are CTAB.  Cardiovascular: Regular rate and rhythm with soft systolic murmur.   GI: Normoactive BS. Abdomen is soft, moderately distended but nontender.   Skin: Warm and dry. Jaundiced. No rashes or lesions on exposed surfaces.  Musculoskeletal: Extremities grossly normal. No tenderness or edema present.   Neurologic: A &O x3, speech normal, answering questions appropriately. Moves all extremities spontaneously. Grossly non-focal.  Neuropsychiatric: Mentation and affect normal/appropriate.  VAD: PIV         Laboratory Data:     Inflammatory Markers  No lab results found.    Hematology Studies    Recent Labs   Lab Test 01/03/22  0733 01/02/22  0844 01/01/22  0646 12/31/21 2011 12/31/21  1548 12/31/21  0805 12/30/21  1422 12/28/21  1322 12/24/21  0619 12/23/21  1131 12/22/21  1415   WBC 12.3* 11.3* 11.3*  --   --  14.8* 15.5* 19.1*   < > 19.2* 19.6*   ANEU 9.6* 7.5 7.0  --   --   --  12.2*  --   --  14.2* 15.7*   AEOS 0.5 1.0* 0.7  --   --   --  0.3  --   --  0.6 0.6   HGB 6.9* 7.7* 7.8* 7.7* 6.1* 6.7* 8.2* 8.0*   < > 8.0*  --    * 106* 101*  --   --  103* 105* 106*   < > 113*  --    PLT 62* 60* 63*  --   --  58* 89* 92*   < > 105*  --     < > = values in this interval not displayed.       Metabolic Studies     Recent Labs   Lab Test 01/03/22  0741 01/02/22  0844 01/01/22  0646 12/31/21  0805 12/30/21  2227    135 132* 129* 127*   POTASSIUM 3.6 3.4 3.9 4.1 4.3   CHLORIDE 104 103  98 95 93*   CO2 21 20 20 21 20   BUN 38* 46* 58* 62* 66*   CR 1.16* 1.49* 1.71* 1.71* 1.63*   GFRESTIMATED 57* 42* 36* 36* 38*       Hepatic Studies    Recent Labs   Lab Test 01/03/22  0741 01/02/22  0844 01/01/22  0646 12/31/21  0805 12/30/21  1422 12/28/21  1322   BILITOTAL 18.6* 22.1* 24.6* 27.1* 31.5* 31.5*   ALKPHOS 75 70 58 77 97 107   ALBUMIN 3.0* 3.0* 2.6* 2.1* 2.0* 2.3*   AST 55* 60* 60* 71* 87* 80*   ALT 29 32 33 40 48 50       Microbiology:  Culture   Date Value Ref Range Status   01/02/2022 No Growth  Final   01/01/2022 No growth after 1 day  Preliminary   01/01/2022 No growth after 1 day  Preliminary   12/31/2021 No growth after 2 days  Preliminary   12/30/2021 No growth after 3 days  Preliminary   12/30/2021 Positive on the 2nd day of incubation (A)  Preliminary   12/30/2021 Actinomyces odontolyticus (AA)  Preliminary     Comment:     1 of 2 bottles   12/28/2021 50,000-100,000 CFU/mL Mixture of urogenital edil  Final   12/23/2021 No Growth  Final   12/23/2021 No Growth  Final   11/06/2021 No Growth  Final   11/06/2021 No Growth  Final   11/05/2021 No Growth  Final   11/05/2021 No Growth  Final   11/04/2021 No Growth  Final   11/03/2021 10,000-50,000 CFU/mL Mixture of urogenital edil  Final   11/03/2021 No Growth  Final   11/03/2021 No Growth  Final   10/29/2021 Candida albicans (A)  Final   09/24/2021 No Growth  Final       Urine Studies    Recent Labs   Lab Test 12/31/21  1114 12/28/21  1333 12/23/21  1824 11/03/21 2013 09/25/21  0427   LEUKEST Small* Negative Negative Negative Negative   WBCU 10* 10-25* 2 2 2       Vancomycin Levels  No lab results found.    Invalid input(s): VANCO    Hepatitis B Testing   Recent Labs   Lab Test 11/05/21  0716 09/26/21  1214 09/24/21  1759   HBCAB Nonreactive  --   --    HEPBANG  --  Nonreactive Nonreactive     Hepatitis C Testing     Hepatitis C Antibody   Date Value Ref Range Status   09/26/2021 Nonreactive Nonreactive Final   09/24/2021 Nonreactive Nonreactive  Final     Respiratory Virus Testing    No results found for: RS, FLUAG    IMAGING    CXR (1/3/22)   IMPRESSION: New small left pleural effusion with associated  atelectasis or consolidation, recommend follow-up to clearing to  exclude infection.    CTA angio (12/31/21)   IMPRESSION:   1. No acute or suspicious findings in the chest.   2. Incidental findings include anemic blood pool, small hiatal hernia,  and known cirrhosis with sequela of portal hypertension (ascites).  3. Please see cardiology report for detailed assessment of the cardiac  Findings.    ADDITIONAL FINDINGS:      The proximal ascending aorta is normal in size.   Normal pulmonary venous anatomy with all four pulmonary veins draining  into the left atrium.    The left atrial appendage is free of thrombus.  There is no left ventricular mass or thrombus.   Normal pericardial thickness. There is no pericardial effusion.  Please review Radiology report for incidental noncardiac findings that  will follow separately.    US abdomen complete (12/30/21)  Impression:   1. Cirrhotic morphology of liver with sequela of portal hypertension  including splenomegaly, ascites and to and fro flow in the main portal  and right portal vein.  2. Gallbladder sludge and stones. Gallbladder wall thickening  nonspecific in the setting of cirrhosis/ascites.    CXR (12/20/21)  IMPRESSION: Mild left basilar interstitial opacities, likely  atelectasis versus atypical infection.

## 2022-01-03 NOTE — PLAN OF CARE
A&Ox4, VSS on RA. No pain or nausea reported. Tolerating 2g Na diet. Jaundiced skin. Lactulose given x2. Passing gas, BM x2. Voiding spontaneously. Up SBA in room and halls. Critical hemoglobin of 6.9 this a.m. Blood transfusion started at 1345. Abdomen distended, providers aware. Possible paracentesis tomorrow. Continue to monitor and follow POC.

## 2022-01-04 ENCOUNTER — COMMITTEE REVIEW (OUTPATIENT)
Dept: TRANSPLANT | Facility: CLINIC | Age: 52
End: 2022-01-04
Payer: COMMERCIAL

## 2022-01-04 ENCOUNTER — DOCUMENTATION ONLY (OUTPATIENT)
Dept: TRANSPLANT | Facility: CLINIC | Age: 52
End: 2022-01-04
Payer: COMMERCIAL

## 2022-01-04 LAB
ALBUMIN FLD-MCNC: 0.9 G/DL
ALBUMIN SERPL-MCNC: 2.8 G/DL (ref 3.4–5)
ALP SERPL-CCNC: 75 U/L (ref 40–150)
ALT SERPL W P-5'-P-CCNC: 30 U/L (ref 0–50)
ANION GAP SERPL CALCULATED.3IONS-SCNC: 13 MMOL/L (ref 3–14)
APPEARANCE FLD: CLEAR
AST SERPL W P-5'-P-CCNC: 57 U/L (ref 0–45)
BACTERIA BLD CULT: ABNORMAL
BACTERIA BLD CULT: ABNORMAL
BACTERIA BLD CULT: NO GROWTH
BASOPHILS # BLD MANUAL: 0.4 10E3/UL (ref 0–0.2)
BASOPHILS NFR BLD MANUAL: 3 %
BASOPHILS NFR FLD MANUAL: 4 %
BILIRUB SERPL-MCNC: 19.9 MG/DL (ref 0.2–1.3)
BUN SERPL-MCNC: 30 MG/DL (ref 7–30)
BURR CELLS BLD QL SMEAR: ABNORMAL
CALCIUM SERPL-MCNC: 8.9 MG/DL (ref 8.5–10.1)
CHLORIDE BLD-SCNC: 102 MMOL/L (ref 94–109)
CO2 SERPL-SCNC: 22 MMOL/L (ref 20–32)
COLOR FLD: YELLOW
CREAT SERPL-MCNC: 1.03 MG/DL (ref 0.52–1.04)
EOSINOPHIL # BLD MANUAL: 0.5 10E3/UL (ref 0–0.7)
EOSINOPHIL NFR BLD MANUAL: 4 %
EOSINOPHIL NFR FLD MANUAL: 1 %
ERYTHROCYTE [DISTWIDTH] IN BLOOD BY AUTOMATED COUNT: 21.7 % (ref 10–15)
GFR SERPL CREATININE-BSD FRML MDRD: 66 ML/MIN/1.73M2
GLUCOSE BLD-MCNC: 113 MG/DL (ref 70–99)
HCT VFR BLD AUTO: 23.3 % (ref 35–47)
HGB BLD-MCNC: 8.1 G/DL (ref 11.7–15.7)
INR PPP: 3.01 (ref 0.85–1.15)
LACTATE SERPL-SCNC: 2.1 MMOL/L (ref 0.7–2)
LYMPHOCYTES # BLD MANUAL: 0.5 10E3/UL (ref 0.8–5.3)
LYMPHOCYTES NFR BLD MANUAL: 4 %
LYMPHOCYTES NFR FLD MANUAL: 24 %
MCH RBC QN AUTO: 34.8 PG (ref 26.5–33)
MCHC RBC AUTO-ENTMCNC: 34.8 G/DL (ref 31.5–36.5)
MCV RBC AUTO: 100 FL (ref 78–100)
MONOCYTES # BLD MANUAL: 0.5 10E3/UL (ref 0–1.3)
MONOCYTES NFR BLD MANUAL: 4 %
MONOS+MACROS NFR FLD MANUAL: NORMAL %
MYELOCYTES # BLD MANUAL: 0.2 10E3/UL
MYELOCYTES NFR BLD MANUAL: 2 %
NEUTROPHILS # BLD MANUAL: 10.3 10E3/UL (ref 1.6–8.3)
NEUTROPHILS NFR BLD MANUAL: 83 %
NEUTS BAND NFR FLD MANUAL: 9 %
OTHER CELLS FLD MANUAL: 63 %
PLAT MORPH BLD: ABNORMAL
PLATELET # BLD AUTO: 68 10E3/UL (ref 150–450)
POLYCHROMASIA BLD QL SMEAR: SLIGHT
POTASSIUM BLD-SCNC: 3.1 MMOL/L (ref 3.4–5.3)
PROT FLD-MCNC: 1.9 G/DL
PROT SERPL-MCNC: 5.2 G/DL (ref 6.8–8.8)
RBC # BLD AUTO: 2.33 10E6/UL (ref 3.8–5.2)
RBC MORPH BLD: ABNORMAL
SODIUM SERPL-SCNC: 137 MMOL/L (ref 133–144)
TARGETS BLD QL SMEAR: SLIGHT
WBC # BLD AUTO: 12.4 10E3/UL (ref 4–11)
WBC # FLD AUTO: 105 /UL

## 2022-01-04 PROCEDURE — 99233 SBSQ HOSP IP/OBS HIGH 50: CPT | Mod: GC | Performed by: INTERNAL MEDICINE

## 2022-01-04 PROCEDURE — 82042 OTHER SOURCE ALBUMIN QUAN EA: CPT | Performed by: INTERNAL MEDICINE

## 2022-01-04 PROCEDURE — 89051 BODY FLUID CELL COUNT: CPT | Performed by: INTERNAL MEDICINE

## 2022-01-04 PROCEDURE — 0W9G3ZZ DRAINAGE OF PERITONEAL CAVITY, PERCUTANEOUS APPROACH: ICD-10-PCS | Performed by: ORTHOPAEDIC SURGERY

## 2022-01-04 PROCEDURE — 49083 ABD PARACENTESIS W/IMAGING: CPT | Performed by: ORTHOPAEDIC SURGERY

## 2022-01-04 PROCEDURE — 80053 COMPREHEN METABOLIC PANEL: CPT

## 2022-01-04 PROCEDURE — 84157 ASSAY OF PROTEIN OTHER: CPT | Performed by: INTERNAL MEDICINE

## 2022-01-04 PROCEDURE — 36415 COLL VENOUS BLD VENIPUNCTURE: CPT | Performed by: INTERNAL MEDICINE

## 2022-01-04 PROCEDURE — 250N000011 HC RX IP 250 OP 636: Performed by: INTERNAL MEDICINE

## 2022-01-04 PROCEDURE — 0DJD8ZZ INSPECTION OF LOWER INTESTINAL TRACT, VIA NATURAL OR ARTIFICIAL OPENING ENDOSCOPIC: ICD-10-PCS | Performed by: INTERNAL MEDICINE

## 2022-01-04 PROCEDURE — 85027 COMPLETE CBC AUTOMATED: CPT

## 2022-01-04 PROCEDURE — 120N000002 HC R&B MED SURG/OB UMMC

## 2022-01-04 PROCEDURE — 45378 DIAGNOSTIC COLONOSCOPY: CPT | Performed by: INTERNAL MEDICINE

## 2022-01-04 PROCEDURE — 83605 ASSAY OF LACTIC ACID: CPT | Performed by: INTERNAL MEDICINE

## 2022-01-04 PROCEDURE — 99233 SBSQ HOSP IP/OBS HIGH 50: CPT | Mod: 25 | Performed by: INTERNAL MEDICINE

## 2022-01-04 PROCEDURE — 99233 SBSQ HOSP IP/OBS HIGH 50: CPT | Performed by: STUDENT IN AN ORGANIZED HEALTH CARE EDUCATION/TRAINING PROGRAM

## 2022-01-04 PROCEDURE — 250N000013 HC RX MED GY IP 250 OP 250 PS 637: Performed by: INTERNAL MEDICINE

## 2022-01-04 PROCEDURE — 250N000013 HC RX MED GY IP 250 OP 250 PS 637: Performed by: EMERGENCY MEDICINE

## 2022-01-04 PROCEDURE — 36415 COLL VENOUS BLD VENIPUNCTURE: CPT

## 2022-01-04 PROCEDURE — 85610 PROTHROMBIN TIME: CPT

## 2022-01-04 RX ORDER — FENTANYL CITRATE 50 UG/ML
INJECTION, SOLUTION INTRAMUSCULAR; INTRAVENOUS PRN
Status: COMPLETED | OUTPATIENT
Start: 2022-01-04 | End: 2022-01-04

## 2022-01-04 RX ORDER — POTASSIUM CHLORIDE 1.5 G/1.58G
40 POWDER, FOR SOLUTION ORAL ONCE
Status: COMPLETED | OUTPATIENT
Start: 2022-01-04 | End: 2022-01-04

## 2022-01-04 RX ADMIN — PANTOPRAZOLE SODIUM 40 MG: 40 TABLET, DELAYED RELEASE ORAL at 08:32

## 2022-01-04 RX ADMIN — LACTULOSE 10 G: 20 SOLUTION ORAL at 20:15

## 2022-01-04 RX ADMIN — CEFTRIAXONE SODIUM 2 G: 2 INJECTION, POWDER, FOR SOLUTION INTRAMUSCULAR; INTRAVENOUS at 20:15

## 2022-01-04 RX ADMIN — MIDAZOLAM 0.5 MG: 1 INJECTION INTRAMUSCULAR; INTRAVENOUS at 13:37

## 2022-01-04 RX ADMIN — MIDAZOLAM 0.5 MG: 1 INJECTION INTRAMUSCULAR; INTRAVENOUS at 13:27

## 2022-01-04 RX ADMIN — MIDAZOLAM 2 MG: 1 INJECTION INTRAMUSCULAR; INTRAVENOUS at 13:13

## 2022-01-04 RX ADMIN — PANTOPRAZOLE SODIUM 40 MG: 40 TABLET, DELAYED RELEASE ORAL at 20:15

## 2022-01-04 RX ADMIN — RIFAXIMIN 550 MG: 550 TABLET ORAL at 20:15

## 2022-01-04 RX ADMIN — MIDAZOLAM 0.5 MG: 1 INJECTION INTRAMUSCULAR; INTRAVENOUS at 13:31

## 2022-01-04 RX ADMIN — THERA TABS 1 TABLET: TAB at 08:32

## 2022-01-04 RX ADMIN — LACTULOSE 10 G: 20 SOLUTION ORAL at 08:32

## 2022-01-04 RX ADMIN — FENTANYL CITRATE 50 MCG: 50 INJECTION, SOLUTION INTRAMUSCULAR; INTRAVENOUS at 13:14

## 2022-01-04 RX ADMIN — MIDAZOLAM 0.5 MG: 1 INJECTION INTRAMUSCULAR; INTRAVENOUS at 13:41

## 2022-01-04 RX ADMIN — POTASSIUM CHLORIDE 40 MEQ: 1.5 POWDER, FOR SOLUTION ORAL at 08:45

## 2022-01-04 RX ADMIN — RIFAXIMIN 550 MG: 550 TABLET ORAL at 08:32

## 2022-01-04 ASSESSMENT — ACTIVITIES OF DAILY LIVING (ADL)
ADLS_ACUITY_SCORE: 11

## 2022-01-04 NOTE — PROGRESS NOTES
[facility]    Progress Note - [place] 2 Service        Date of Admission:  12/30/2021    Assessment & Plan   Melita Cotres is a 51 year old female with history of alcoholic liver cirrhosis with ascites, esophageal varices, portal hypertension, and chronic macrocytic anemia who was admitted on 12/30/22 for decompensated cirrhosis and acute encephalopathy, as well as expedited liver transplant evaluation. Found to grow Actinomyces odontolyticus in one blood culture, treating with antibiotics. Overall improving clinically.     Changes Today:  - therapeutic paracentesis 1/4 (3L removed) prior to colonoscopy  - colonoscopy 1/4 for liver transplant workup  - NPO for procedures  - case will be discussed at liver transplant conference 1/4  - 40 mEq K  - compression socks for lower extremity edema    #Decompensated alcoholic cirrhosis with ascites  #History of hepatic encephalopathy   #Grade I esophageal varices (last EGD 10/29/2021)  #Portal hypertension and portal hypertensive gastropathy  #Chronic thrombocytopenia, stable  Recent worsening in MELD labs leading up to admission (MELD-Na 36 on admission), overall concerning for acute decompensated cirrhosis. Patient has been in the transplant evaluation process, admitted in part for expedited transplant evaluation. Regarding current decompensation, presume that most likely source is infectious, with potential sources of infection including bacteremia (BCx 12/30 + for Actinomyces odontolyticus) and/or pneumonia (per below). Additional concern for hepatic encephalopathy, now improving on lactulose. No findings concerning for GIB or PVT.   - Hepatology consulting- case will be discussed at liver transplant conference 1/4. Plan will be to keep her inpatient for now.   - therapeutic paracentesis 1/4 (3L removed) prior to colonoscopy  - colonoscopy 1/4 for liver transplant workup  - NPO for procedures  - Continue pta lactulose  "10g tid, titrated to 3-4 BM's per day  - Continue pta pantoprazole 40mg bid   - Rifaximin 550mg PO BID  - Abx management, per below  - Trend MELD labs qday  - Chemical dependency consult  - Cardiology consulted for cardiac pre-operative evaluation for liver transplant; CTA angiogram coronary arteries (12/31) notable for \"minimal nonobstructive coronary with minimal luminal irregularities)  - compression socks for lower extremity edema     MELD-Na score: 30 at 1/4/2022  7:06 AM  MELD score: 30 at 1/4/2022  7:06 AM  Calculated from:  Serum Creatinine: 1.03 mg/dL at 1/4/2022  7:06 AM  Serum Sodium: 137 mmol/L at 1/4/2022  7:06 AM  Total Bilirubin: 19.9 mg/dL at 1/4/2022  7:06 AM  INR(ratio): 3.01 at 1/4/2022  7:06 AM  Age: 51 years    #Actinomyces odontolyticus bacteremia   Initial concern for infection on admission based upon recent history of leukocytosis of unclear etiology and elevated procalcitonin in setting of acute decompensated cirrhosis. No associated findings concerning for sepsis on admission. Inflammatory markers and clinical status now improving on abx, overall further suggestive of initial presence of an infection. Regarding potential sources, presume that most likely sources include Actinomyces odontolyticus bacteremia (in 1 of 2 BCx 12/30) and/or pneumonia. Low suspicion for SBP, UTI, and/or intraabdominal source based upon evaluation to date. TTE 1/2 EF 60-65%, normal RV and LV size and function, no valvular abnormalities aside from trace tricuspid insufficiency- no evidence of endocarditis. Per ID, the infection should not delay the liver transplant process.   - ID consulted  - Per ID: Continue IV Ceftriaxone 2g q24hrs x 7 days from first negative BCx (through 1/7) then switch to PO Amoxicillin 750 mg TID to complete additional 7 days of antibiotics (through 1/14) for actinomyces bacteremia.   - Continue ceftriaxone IV q24h (12/30-current)  - Completed azithromycin IV q24h x3-day course " (12/30-1/1)  - BCx 12/30- 1 of 2 bottles growing Actinomyces odontolyticus  - BCx 1/1- NGTD  - BCx 1/3- NGTD  - UCx 1/3- no growth    #Acute-on-chronic anemia, presumed dilutional  Admission Hgb 8.2. Has received a total of 2 U pRBC this admission. Given no clinical evidence of bleed, presume that most likely source is dilution after initial IVF resuscitation, with low suspicion for GIB  - Trend CBC qday  - monitor for bleeding     #ROSSY, likely prerenal, improving  Creatinine on admission 1.80, up from 0.86 on 12/24/2021. Urine Na 14 on 12/31. Patient has been managed on albumin challenge 1g/kg q24h x3 days (12/31-1/2), from which patient has demonstrated stable Cr with good UOP. Based upon collective clinical picture, have greatest suspicion for prerenal ROSSY vs hepatorenal syndrome. Unlikely ATN based upon urine Na. Furthermore, unlikely postrenal given no clinical evidence of urinary obstruction/retention.  - s/p albumin 25% 1g/kg (60g) q24h for 3 doses (12/31 - 1/2)  - Trend BMP qday  - Strict I/O's    #Acute metabolic encephalopathy, presumed multifactorial from HE and septic encephalopathy, resolved  Patient admitted with recent presence of gradually worsening somnolence and weakness, overall concerning for encephalopathy. Presume that most likely source was toxic/metabolic encephalopathy, likely multifactorial from mild hepatic encephalopathy (mild asterixis present on admission) and septic encephalopathy. Patient now improving on lactulose and abx  - PTA lactulose and [place] protocol  - Abx, per above  - PT/OT consult     #Malnutrition  Low po intake. Drinks a few Ensure shakes per day.   - Nutrition consult     Diet: NPO for paracentesis, colonoscopy 1/4  DVT Prophylaxis: Pneumatic Compression Devices  Gunter Catheter: Not present  Fluids: no continuous fluids  Central Lines: None  Code Status: Full Code      Disposition Plan   Expected Discharge: 01/05/2022     Anticipated discharge location:  Awaiting  care coordination huddle    The patient's care was discussed with the Attending Physician, Dr. Lou.    Ermias White MD  [address]  Securely message with the Vocera Web Console (learn more here)  Text page via [facility] Paging/Directory    Please see sign in/sign out for up to date coverage information    Clinically Significant Risk Factors Present on Admission                # Severe Malnutrition, POA: based on nutrition assessment     ______________________________________________________________________    Interval History   Nursing notes reviewed. No acute events overnight. She is feeling well today. No fevers, chills, chest pain, shortness of breath, abdominal pain, vomiting. Feels a little nauseous because of the colonoscopy prep.     Data reviewed today: I reviewed all medications, new labs and imaging results over the last 24 hours.    Physical Exam   Vital Signs: Temp: 98.2  F (36.8  C) Temp src: Oral BP: 108/59 Pulse: 89   Resp: 14 SpO2: 97 % O2 Device: None (Room air)    Weight: 139 lbs 1.6 oz  General: lying in bed, NAD  HEENT: scleral icterus, no conjunctival injection  Resp: clear to auscultation bilaterally, no crackles or wheezes  Cardiac: regular rate and rhythm, systolic murmur  Abdomen: soft, non-tender, some distended  Extremities: warm, trace lower extremity edema bilaterally  Skin: jaundiced  Neuro: alert, answers questions appropriately.  Psych: appropriate mood and affect    Data   Recent Labs   Lab 01/04/22  0706 01/03/22  1732 01/03/22  0741 01/03/22  0733 01/02/22  0844 12/30/21  2124 12/30/21  1422   WBC 12.4*  --   --  12.3* 11.3*   < > 15.5*   HGB 8.1* 8.5*  --  6.9* 7.7*   < > 8.2*     --   --  104* 106*   < > 105*   PLT 68*  --   --  62* 60*   < > 89*   INR 3.01*  --   --  3.16* 2.74*   < > 2.31*     --  135  --  135   < > 124*   POTASSIUM 3.1*  --  3.6  --  3.4   < > 4.2   CHLORIDE 102  --  104  --  103    < > 91*   CO2 22  --  21  --  20   < > 20   BUN 30  --  38*  --  46*   < > 69*   CR 1.03  --  1.16*  --  1.49*   < > 1.80*   ANIONGAP 13  --  10  --  12   < > 13   SHAI 8.9  --  9.0  --  9.4   < > 9.0   *  --  119*  --  119*   < > 176*   ALBUMIN 2.8*  --  3.0*  --  3.0*   < > 2.0*   PROTTOTAL 5.2*  --  5.1*  --  5.3*   < > 5.2*   BILITOTAL 19.9*  --  18.6*  --  22.1*   < > 31.5*   ALKPHOS 75  --  75  --  70   < > 97   ALT 30  --  29  --  32   < > 48   AST 57*  --  55*  --  60*   < > 87*   LIPASE  --   --   --   --   --   --  366    < > = values in this interval not displayed.

## 2022-01-04 NOTE — LETTER
January 5, 2022    Melita Russellrisa  [address]  [place] [address]      Dear Ms. Cortes,    This letter is sent to confirm that you have completed your transplant work-up and you are a candidate in the liver transplant program at the [facility].  You were placed on the liver active waitlist on 1/4/22.      When you are active on the waitlist and an organ becomes available, a coordinator will need to speak to you immediately.  You could be contacted at any time during the day or night as an organ could become available at any time.  Please make certain our office always has your current telephone numbers and address.      Items we will need from you:      We have received approval from your insurance company for the transplant procedure.  It is critical that you notify us if there is any change in your insurance.  It is also important that you familiarize yourself with the details of your specific insurance policy.  Our patient  is available to assist you if you should have any questions regarding your coverage.      You will need to have labs drawn frequently while you are listed. The frequency is based on your MELD score. Your current MELD score is 30, so please get labs every week based on current MELD.  Below is a chart to help you understand how often to have labs drawn, which will vary depending on your current MELD score:                 MELD greater than 25 - Weekly labs                          MELD 18-24 - Monthly labs              MELD 11-18 - Every 3 month labs                Please call with any questions, or if you would like to have the lab location changed from a [place] lab.          Please inform us immediately if you go to an ER or are admitted to a hospital other than the [facility]       During this waiting period, we may request additional periodic laboratory tests with your primary physician.  It will be your  responsibility to remind your physician to forward your results to the Transplant Office.       We need to be kept informed of any changes in your medical condition such as:     ? changes in your medications,   ? significant changes in your health  ? significant infections (such as pneumonia or abscesses)  ? blood transfusions  ? any condition which requires hospitalization  ? any surgery     Remember to complete any routine cancer screening tests required before your transplant. This includes colonoscopy; prostate screening for men, and mammogram and gynecologic testing for women, as well as dental work.  Your primary care clinic can assist you with arranging for these exams.  Remind your caregivers to forward copies of the records and final reports.    We want you to know that our program has physician and surgeon coverage 24 hours a day, 365 days a year. If this coverage changes or there are substantial program changes, you will be notified in writing by letter.     Attached is a letter from the [employer] ([employer]). It describes the services and information offered to patients by UN[employer] and the Organ Procurement and Transplantation Network.    We appreciate having had the opportunity to participate in your care.  If you have questions, please feel free to call the Transplant Office at [phone] or [phone].    Sincerely,        Nurys White, RN, BSN  Pre-Liver Transplant Coordinator  Phone: [phone]    Solid Organ Transplant  [facility]      Enclosures: OS Letter  cc: Care Team          The Organ Procurement and Transplantation Network  Toll-free patient services line:     Your resource for organ transplant information    If you have a question regarding your own medical care, you always should call your transplant hospital first. However, for general organ transplant-related  information, you can call the Organ Procurement and Transplantation Network (OPTN) toll-free patient services line at [phone]. Anyone, including potential transplant candidates, candidates, recipients, family members, friends, living donors, and donor family members, can call this number to:          Talk about organ donation, living donation, the transplant process, the donation process, and transplant policies.    Get a free patient information kit with helpful booklets, waiting list and transplant information, and a list of all transplant hospitals.    Ask questions about the OPTN website (https://optn.transplant.hrsa.gov/), the [employer] Organ [employer] s (UNOS) website (https://unos.org/), or the [employer]OS website for living donors and transplant recipients. (https://www.transplantliving.org/).    Learn how the OPTN can help you.    Talk about any concerns that you may have with a transplant hospital.    The [facility] transplant system, the OPTN, is managed under federal contract by the [employer] (UN[employer]), which is a non-profit charitable organization. The OPTN helps create and define organ sharing policies that make the best use of donated organs. This process continuously evaluating new advances and discoveries so policies can be adapted to best serve patients waiting for transplants. To do so, the OPTN works closely with transplant professionals, transplant patients, transplant candidates, donor families, living donors, and the public. All transplant programs and organ procurement organizations throughout the country are OPTN members and are obligated to follow the policies the OPTN creates for allocating organs.    The OPTN also is responsible for:      Providing educational material for patients, the public, and professionals.    Raising awareness of the need for donated organs and tissue.    Coordinating organ procurement, matching, and placement.    Collecting information  about every organ transplant and donation that occurs in the [place].    Remember, you should contact your transplant hospital directly if you have questions or concerns about your own medical care including medical records, work-up progress, and test results.    We are not your transplant hospital, and our staff will not be able to answer questions about your case, so please keep your transplant hospital s phone number handy.    However, while you research your transplant needs and learn as much as you can about transplantation and donation, we welcome your call to our toll-free patient services line at [phone].          Updated 4/1/2019

## 2022-01-04 NOTE — PROGRESS NOTES
New liver listing  ETOH  Currently admitted  MELD 30   ABO O    Open for extended criteria - except for DCD

## 2022-01-04 NOTE — PROGRESS NOTES
[facility] PROGRESS NOTE     Patient:  Melita Cortes   Date of birth 1970, Medical record number [id number]  Date of Visit:  01/04/2022  Date of Admission: 12/30/2021  Consult Requester:Cora Lou MD          Assessment and Plan:   ID Problem List   1. Actinomyces odontolyticus bacteremia                -First negative Cx 1/1  2. ESLD with acute decompensated cirrhosis               -C/b esophageal varices, ascites, HE on lactulose  3. Acute ROSSY- improving      RECOMMENDATION:  1. Continue IV Ceftriaxone 2g q24hrs x 7 days from first negative BCx (through 1/7) then switch to PO Amoxicillin 750 mg TID to complete additional 7 days of antibiotics (through 1/14) for actinomyces bacteremia.    -If discharges before completion of IV course, okay to switch to PO course a few days early, but would have pt complete a total 14 days (through 1/14/22).   2. Current infection is not a barrier to transplant. Patient can proceed with transplantation, if eligible, anytime within antibiotic treatment course. Does not need to complete antibiotic regimen prior to transplantation.     Thank you for the consult. Infectious disease will sign off at this time. Do not hesitate to contact us with additional questions or change in patient's clinical status.       ASSESSMENT:  Melita Cortes is a 51 year old female with PMHx significant for alcoholic liver cirrhosis c/b ascites, ESLD, esophageal varices, hepatic encephalopathy, chronic macrocytic anemia, and asthma who presented to the ED for weakness and AMS on 12/30/21.      Afebrile throughout admission. HDS. Elevated WBC at 15K on admission with improvement to 12 over past few days. Lactate has been wnl. PCT 2/71 on 12/30. UA with small LE and WBC 10 with few bacteria and 4 squamous cells. COVID19 swab negative. 12/30 BCx (1/2) positive on D2 with Actinomyces odontolyticus. Ascites fluid from 12/31 with 180 WBC/8% neutrophils/81% other  cells, cultures NGTD. Repeat BCx 1/1 and 1/3 NGTD. UCx 1/2/22 No growth. CXR on admission with mild L basilar interstitial opacities likely atelectasis. Repeat 1/3 with sm L pleural effusion w/ associated atelectasis vs consolidation.      Started on Azithromycin 500 mg x3 days and Ceftriaxone 2g q24hrs to cover for infection given concern of elevated WBC and PCT per medicine note.      Thank you for this consult. ID will continue to follow.      Patient was discussed with Dr. Kovacs.      Cheli Singleton PA-C  Infectious Diseases  Pager #[phone]          Interim History and Events:   Fidelina is feeling well overall. Tired and hungry today. S/p bowel prep for colonoscopy. She stated her belly felt less full due to paracentesis. No cough or new complaints. Afebrile.          HPI:   Melita Cortes is a 51 year old female with PMHx significant for alcoholic liver cirrhosis c/b ascites, ESLD, esophageal varices, hepatic encephalopathy, chronic macrocytic anemia, and asthma who presented to the ED for weakness and AMS on 12/30/21.      Per chart review, the patient had an emesis and incontinent diarrheal episode in bed at home. She felt worsening weakness and tiredness as well outside of her baseline.  became concerned for change in physical functioning due to decreased ability to perform ADLs ( dressing herself) and worsening overall laboratory testing. On presentation, ROS negative for fevers, chills, headaches, URI sx, falls, dizziness, chest pain, SOB, abdominal pain, continued nausea/vomiting. Denies tooth pain, broken teeth or unfilled cavities. She does note some bleeding from gums when brushing or flossing as of late. Found to have decompensated liver cirrhosis and initiated expedited liver transplant evaluation.      There was concern for infection on admission due to leukocytosis and elevated PCT 2.71. No vital signs supportive of sepsis. Initiated on Ceftriaxone + Azithromycin and blood cultures  drawn.      Lives in [place] with  and 2 cats. No dogs, rodents, reptiles or bird exposures. No exposures to large animals. Has traveled to [place] and [place] in the past 5 years, mostly all inclusive resorts. No recent travel within the past year. Has not seen the dentist within the last year that she recalls (otherwise sees dentist regularly every 6 months). No recent hiking through the woods.            ROS:   -Focused 5 point ROS completed, pertinent positives and negatives listed above.      Physical Examination:  Temp: (!) 96.3  F (35.7  C) Temp src: Axillary BP: 94/49 Pulse: 87   Resp: 16 SpO2: 98 % O2 Device: None (Room air) Oxygen Delivery: 2 LPM    Vitals:    12/30/21 1336 01/02/22 1900 01/03/22 1151   Weight: 58.5 kg (129 lb) 67.5 kg (148 lb 13 oz) 63.1 kg (139 lb 1.6 oz)       Physical Examination:  Constitutional: Pleasant female seen lying in bed, in NAD. Alert and interactive.   HEENT: NCAT, Sclerae icteric, conjunctiva clear. Moist mucous membranes.  Respiratory: Non-labored breathing on RA. Lungs are CTAB.  Cardiovascular: Regular rate and rhythm with soft systolic murmur.   GI: Normoactive BS. Abdomen is soft, mildly distended, and nontender.   Skin: Warm and dry. Jaundiced. No rashes or lesions on exposed surfaces.  Musculoskeletal: Extremities grossly normal. No tenderness.  Neurologic: A &O x3, speech normal, answering questions appropriately. Moves all extremities spontaneously. Grossly non-focal.  Neuropsychiatric: Mentation and affect normal/appropriate.  VAD: PIV    Medications:    cefTRIAXone  2 g Intravenous Q24H     lactulose  10 g Oral TID     multivitamin, therapeutic  1 tablet Oral Daily     pantoprazole  40 mg Oral BID     rifaximin  550 mg Oral BID     sodium chloride (PF)  3 mL Intracatheter Q8H       Infusions/Drips:    - MEDICATION INSTRUCTIONS -         Laboratory Data:   No results found for: ACD4    Inflammatory Markers    No lab results found.    Metabolic Studies        Recent Labs   Lab Test 01/04/22  0706 01/03/22  0741 01/02/22  0844 01/01/22  0646 12/31/21 2011 12/31/21  0805 12/30/21  2227 12/30/21  1422 12/28/21  1322 11/17/21  1428 11/08/21  1411 11/03/21  1642 11/02/21  1319    135 135 132*  --  129* 127*   < > 120*   < >  --    < > 124*   POTASSIUM 3.1* 3.6 3.4 3.9  --  4.1 4.3   < > 4.2   < >  --    < > 3.9   CHLORIDE 102 104 103 98  --  95 93*   < > 87*   < >  --    < > 87*   CO2 22 21 20 20  --  21 20   < > 20   < >  --    < > 26   ANIONGAP 13 10 12 14  --  13 14   < > 13   < >  --    < > 11   BUN 30 38* 46* 58*  --  62* 66*   < > 62*   < >  --    < > 54*   CR 1.03 1.16* 1.49* 1.71*  --  1.71* 1.63*   < > 1.84*   < >  --    < > 1.81*   GFRESTIMATED 66 57* 42* 36*  --  36* 38*   < > 33*   < >  --    < > 32*   * 119* 119* 86  --  90 122*   < > 133*   < >  --    < > 142*   SHAI 8.9 9.0 9.4 9.4  --  8.6 8.1*   < > 8.2*   < >  --    < > 9.2   PHOS  --   --   --   --   --   --   --   --  5.1*  --   --   --  3.0   LACT  --   --   --   --  1.9  --   --   --   --   --  3.4*   < >  --     < > = values in this interval not displayed.       Hepatic Studies    Recent Labs   Lab Test 01/04/22  0706 01/03/22  0741 01/02/22  0844 01/01/22  0646 12/31/21  0805 12/30/21  1422 09/25/21  0829 09/24/21  1757   BILITOTAL 19.9* 18.6* 22.1* 24.6* 27.1* 31.5*   < >  --    ALKPHOS 75 75 70 58 77 97   < >  --    ALBUMIN 2.8* 3.0* 3.0* 2.6* 2.1* 2.0*   < >  --    AST 57* 55* 60* 60* 71* 87*   < >  --    ALT 30 29 32 33 40 48   < >  --    LDH  --   --   --   --   --   --   --  337*    < > = values in this interval not displayed.       Pancreatitis testing    Recent Labs   Lab Test 12/30/21  1422 12/28/21  1322 12/23/21  1131 09/18/20  0837   LIPASE 366  --  310  --    TRIG  --  83  --  201*       Hematology Studies      Recent Labs   Lab Test 01/04/22  0706 01/03/22  1732 01/03/22  0733 01/02/22  0844 01/01/22  0646 12/31/21 2011 12/31/21  1548 12/31/21  0805 12/30/21  1422  12/24/21  0619 12/23/21  1131   WBC 12.4*  --  12.3* 11.3* 11.3*  --   --  14.8* 15.5*   < > 19.2*   ANEU 10.3*  --  9.6* 7.5 7.0  --   --   --  12.2*  --  14.2*   ALYM 0.5*  --  1.4 0.8 2.1  --   --   --  0.6*  --  2.1   HEATHER 0.5  --  0.6 1.4* 1.0  --   --   --  1.7*  --  1.5*   AEOS 0.5  --  0.5 1.0* 0.7  --   --   --  0.3  --  0.6   HGB 8.1* 8.5* 6.9* 7.7* 7.8* 7.7*   < > 6.7* 8.2*   < > 8.0*   HCT 23.3*  --  20.1* 22.4* 22.0*  --   --  18.9* 23.3*   < > 24.0*   PLT 68*  --  62* 60* 63*  --   --  58* 89*   < > 105*    < > = values in this interval not displayed.       Arterial Blood Gas Testing    Recent Labs   Lab Test 11/03/21  1645   PH 7.54*        Urine Studies     Recent Labs   Lab Test 12/31/21  1114 12/28/21  1333 12/23/21  1824 11/03/21 2013 09/25/21  0427   URINEPH 5.5 5.0 5.5 5.0 5.5   NITRITE Negative Negative Negative Negative Negative   LEUKEST Small* Negative Negative Negative Negative   WBCU 10* 10-25* 2 2 2       Microbiology:  Culture   Date Value Ref Range Status   01/03/2022 No growth after 1 day  Preliminary   01/02/2022 No Growth  Final   01/01/2022 No growth after 3 days  Preliminary   01/01/2022 No growth after 3 days  Preliminary   12/31/2021 No growth after 3 days  Preliminary   12/30/2021 No growth after 4 days  Preliminary   12/30/2021 Positive on the 2nd day of incubation (A)  Final   12/30/2021 Actinomyces odontolyticus (AA)  Final     Comment:     1 of 2 bottles   12/28/2021 50,000-100,000 CFU/mL Mixture of urogenital edil  Final   12/23/2021 No Growth  Final   12/23/2021 No Growth  Final   11/06/2021 No Growth  Final   11/06/2021 No Growth  Final   11/05/2021 No Growth  Final   11/05/2021 No Growth  Final   11/04/2021 No Growth  Final   11/03/2021 10,000-50,000 CFU/mL Mixture of urogenital edil  Final   11/03/2021 No Growth  Final   11/03/2021 No Growth  Final   10/29/2021 Candida albicans (A)  Final   09/24/2021 No Growth  Final       Last check of C difficile  No results found  for: CDBPCT    Imaging:     CXR (1/3/22)   IMPRESSION: New small left pleural effusion with associated  atelectasis or consolidation, recommend follow-up to clearing to  exclude infection.     CTA angio (12/31/21)   IMPRESSION:   1. No acute or suspicious findings in the chest.   2. Incidental findings include anemic blood pool, small hiatal hernia,  and known cirrhosis with sequela of portal hypertension (ascites).  3. Please see cardiology report for detailed assessment of the cardiac  Findings.     ADDITIONAL FINDINGS:      The proximal ascending aorta is normal in size.   Normal pulmonary venous anatomy with all four pulmonary veins draining  into the left atrium.    The left atrial appendage is free of thrombus.  There is no left ventricular mass or thrombus.   Normal pericardial thickness. There is no pericardial effusion.  Please review Radiology report for incidental noncardiac findings that  will follow separately.     US abdomen complete (12/30/21)  Impression:   1. Cirrhotic morphology of liver with sequela of portal hypertension  including splenomegaly, ascites and to and fro flow in the main portal  and right portal vein.  2. Gallbladder sludge and stones. Gallbladder wall thickening  nonspecific in the setting of cirrhosis/ascites.     CXR (12/20/21)  IMPRESSION: Mild left basilar interstitial opacities, likely  atelectasis versus atypical infection.

## 2022-01-04 NOTE — CONSULTS
Consult and Procedure Service - Procedure Note    Attending:Carson Gupta MD  Procedure: Diagnostic and therapeutic paracentesis  Indication: distension, procedural optimization  Risk Assessment: low  Pre-procedure diagnosis: ascites  Post-procedure diagnosis: ascites    The risks and benefits of the procedure were explained to Melita Cortes who expressed understanding and opted to proceed.  Consent was obtained and placed in the chart.  A time out was performed.  An area of ascites was located and marked using ultrasound guidance in the LEFT lower quadrant; the area was prepped and draped in the usual sterile fashion.  8 ml of 1% lidocaine was instilled and ascites located.  The 5 fr [employer] paracentesis catheter and needle were inserted under real-time guidance until ascites obtained then the needle removed and the catheter advanced.  The apparatus was connected to vacuum bottles and a total of 3000 ml of haroldo colored fluid removed.   A specimen was collected for analysis. The catheter was withdrawn and the area dressed.  Patient tolerated the procedure well without immediate complications.  Please contact the Consult and Procedure Service if any complications or concerns arise.     Carson Gupta MD  DOS:  1/4/2022

## 2022-01-04 NOTE — OR NURSING
Colonoscopy performed under moderate sedation with no interventions. Patient tolerated well. Report called to [facility], transported to .

## 2022-01-04 NOTE — PROGRESS NOTES
"[facility]    Hepatology Follow-up    CC: Confusion    Dx: Alcoholic hepatitis complicated by ascites                  Encephalopathy, suspect hepatic-resolving                  Acute on chronic macrocytic anemia                      24 hour events:   Colonoscopy today    Subjective:  Denied abd pain, no other concerns    Medications  Current Facility-Administered Medications   Medication Dose Route Frequency    [Auto Hold] cefTRIAXone  2 g Intravenous Q24H    [Auto Hold] lactulose  10 g Oral TID    [Auto Hold] multivitamin, therapeutic  1 tablet Oral Daily    [Auto Hold] pantoprazole  40 mg Oral BID    [Auto Hold] rifaximin  550 mg Oral BID    [Auto Hold] sodium chloride (PF)  3 mL Intracatheter Q8H       Review of systems  A 10-point review of systems was negative.    Examination  BP 98/51   Pulse 91   Temp 98.2  F (36.8  C) (Oral)   Resp 15   Ht 1.651 m (5' 5\")   Wt 63.1 kg (139 lb 1.6 oz)   SpO2 100%   BMI 23.15 kg/m      Intake/Output Summary (Last 24 hours) at 1/1/2022 0612  Last data filed at 1/1/2022 0521  Gross per 24 hour   Intake 1120 ml   Output 1090 ml   Net 30 ml       Constitutional: cooperative, pleasant  Eyes: Sclera icteric  Ears/nose/mouth/throat: mucus membranes moist  Neck: supple  CV: No edema  Respiratory: Unlabored breathing  Abd: distended, +bs, nontender, fluid thrill +  Skin: warm, perfused, jaundiced  Neuro: AAO x 3, + asterixis  MSK: No gross deformities    Laboratory  Lab Results   Component Value Date     12/31/2021     09/18/2020    POTASSIUM 4.1 12/31/2021    POTASSIUM 3.1 09/18/2020    CHLORIDE 95 12/31/2021    CHLORIDE 106 09/18/2020    CO2 21 12/31/2021    CO2 25 09/18/2020    BUN 62 12/31/2021    BUN 2 09/18/2020    CR 1.71 12/31/2021    CR 0.56 09/18/2020       Lab Results   Component Value Date    BILITOTAL 27.1 12/31/2021    BILITOTAL 1.1 09/18/2020    ALT 40 12/31/2021    ALT 57 09/18/2020    AST 71 12/31/2021     " 09/18/2020    ALKPHOS 77 12/31/2021    ALKPHOS 261 09/18/2020       Lab Results   Component Value Date    WBC 14.8 12/31/2021    WBC 12.8 09/18/2020    HGB 7.7 12/31/2021    HGB 12.3 09/18/2020     12/31/2021     09/18/2020    PLT 58 12/31/2021     09/18/2020       Lab Results   Component Value Date    INR 2.57 12/31/2021    INR 1.9 10/07/2021       MELD-Na score: 30 at 1/4/2022  7:06 AM  MELD score: 30 at 1/4/2022  7:06 AM  Calculated from:  Serum Creatinine: 1.03 mg/dL at 1/4/2022  7:06 AM  Serum Sodium: 137 mmol/L at 1/4/2022  7:06 AM  Total Bilirubin: 19.9 mg/dL at 1/4/2022  7:06 AM  INR(ratio): 3.01 at 1/4/2022  7:06 AM  Age: 51 years    Radiology  US ABD 12/20/21:  Impression:   1. Cirrhotic morphology of liver with sequela of portal hypertension  including splenomegaly, ascites and to and fro flow in the main portal  and right portal vein.  2. Gallbladder sludge and stones. Gallbladder wall thickening  nonspecific in the setting of cirrhosis/ascites.      CTA 12/31/21:  IMPRESSION:   1. No acute or suspicious findings in the chest.   2. Incidental findings include anemic blood pool, small hiatal hernia,  and known cirrhosis with sequela of portal hypertension (ascites).  3. Please see cardiology report for detailed assessment of the cardiac  Findings.      ECHO 1/2/22:  Interpretation Summary  Left ventricular size, wall motion and function are normal. The ejection  fraction is 60-65%.  Right ventricular function, chamber size, wall motion, and thickness are  normal.  Right ventricular systolic pressure is 27mmHg above the right atrial pressure.  Pulmonary artery systolic pressure is normal.  IVC diameter <2.1 cm collapsing >50% with sniff suggests a normal RA pressure  of 3 mmHg.  No pericardial effusion is present.      Assessment  51 year old with recent alcoholic hepatitis complicated by ascites, last alcohol intake in September 2021, meld 30 undergoing liver  transplantation.    Recent alcoholic hepatitis, in LT eval  Her meld is high mainly due to bilirubin and INR.  Patient is undergoing transplant evaluation.  ECHO was noted to be normal.  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 ID recommended total of 14 days of Abx but should not preclude her from LT.    Pt underwent colonoscopy which was normal.  Mammogram and colposcopy can be performed as outpatient.  Pt was cleared for LT listing.    Acute on chronic macrocytic anemia  Patient had slight drop in her hemoglobin with no signs of bleeding her recent endoscopy was in October 2021 with small esophageal varices, portal hypertensive gastropathy and gastric erosions without bleeding.  At this point, would recommend to continue monitoring.    ROSSY-resolving  Pt has slightly worsened Cr from her normal baseline of 0.86, likely component of acute kidney injury, creatinine down trended in the last few days after getting the albumin challenge consistent with pre-renal ROSSY.    Recommendations  -- Will be listed for LT later this afternoon  -- Continue Abx as per ID for total of 14 days  -- Rifaximin 550 mg BID  -- Lactulose PO, titrate to 3-4 BMs per day  -- Monitor transaminases, bilirubin, INR  -- Ensure sodium restriction to 2000 mg per day  -- Physical therapy/OT  -- Adequate protein diet (1.2 - 1.5g/kg/day)      Thank you for involving us in this patient's care. Please do not hesitate to contact the GI service with any questions or concerns.      Pt care plan discussed with Dr. Steele, Hepatology staff physician.    This note was created with voice recognition software, and while reviewed for accuracy, typos may remain.    Niecy Dumas MD  Advance and Transplant Hepatology Fellow  Pager: [phone]    ATTENDING NOTE, GASTROENTEROLOGY/HEPATOLOGY    I saw and discussed this patient with the fellow and participated in the decision making. I agree with the fellow's note. Zita Steele  MD

## 2022-01-04 NOTE — PLAN OF CARE
Pt afebrile, vital signs stable. A/O x4. Pt has been NPO since 0000. Pt tolerated over 1/2 Golytely. Pt had 2 watery yellow stools with brown flecks seen in bottom of toilet at 0030. Pt abd dist, hyper bs, +gas. Lungs clear. PIV  x1 saline locked. Pt sleeping at long intervals tonight. Calling for asst to BR. Gait steady. Cont to maintain NPO status.

## 2022-01-04 NOTE — CODE/RAPID RESPONSE
Rapid Response Team Note    Assessment   A rapid response was called on Melita Cortes due to lactic acidosis. This presentation is likely due to liver disease and known infection.    This is a 51 year old woman with cirrhosis and known bacteremia on appropriate antibiotic therapy.  Our system generated a LA draw secondary to tachycardia and leukocytosis.    The patient has no complaints whatsoever.  Her BP is on the lower side of normal and she had some transient hypotension earlier today but this seems to have resolved.  No fevers.    - No indication for fluids or changes to antibiotics at this time.  No intervention per rapid response team.      Plan   -  As above  -  The Internal Medicine primary team was paged.  -  Disposition: The patient will remain on the current unit. We will continue to monitor this patient closely.  -  Reassessment and plan follow-up will be performed by the primary team    HAKAN [facility] [place] RRT [facility] Job Code Contact #[phone]    Hospital Course   Brief Summary of events leading to rapid response:   As above    Admission Diagnosis:   Alcoholic cirrhosis of liver with ascites (H) [K70.31]  Non-intractable vomiting with nausea, unspecified vomiting type [R11.2]     Physical Exam   Temp: 98.6  F (37  C) Temp  Min: 96.3  F (35.7  C)  Max: 98.7  F (37.1  C)  Resp: 18 Resp  Min: 12  Max: 28  SpO2: 97 % SpO2  Min: 92 %  Max: 100 %  Pulse: 84 Pulse  Min: 84  Max: 99    No data recorded  BP: 96/58 Systolic (24hrs), Av , Min:82 , Max:112   Diastolic (24hrs), Av, Min:41, Max:65     I/Os: I/O last 3 completed shifts:  In: 0   Out: 300 [Urine:300]     Exam:   General: chronically ill appearing  Mental Status: Drowsy but oriented.  HR regular w/murmur, lung sounds clear, abdomen nontender    Significant Results and Procedures   Lactic Acid:   Recent Labs   Lab Test 22  1637 21  1411   LACT 2.1* 1.9 3.4*     CBC:   Recent Labs   Lab Test  01/04/22  0706 01/03/22  1732 01/03/22  0733 01/02/22  0844   WBC 12.4*  --  12.3* 11.3*   HGB 8.1* 8.5* 6.9* 7.7*   HCT 23.3*  --  20.1* 22.4*   PLT 68*  --  62* 60*        Sepsis Evaluation   The patient is known to have an infection.  NO EVIDENCE OF SEPSIS at this time.  Vital sign, physical exam, and lab findings are due to liver disease and bacteremia.

## 2022-01-04 NOTE — LETTER
January 4, 2022    Melita Raglandgrayson  [address] [facility] [address]  [place] [facility] [address]    Dear Ms. Cortes,    This letter is sent to confirm that you have completed your transplant work-up and you are a candidate in the liver transplant program at the [facility].  You were placed on the liver active waitlist on 1/4/22.      When you are active on the waitlist and an organ becomes available, a coordinator will need to speak to you immediately.  You could be contacted at any time during the day or night as an organ could become available at any time.  Please make certain our office always has your current telephone numbers and address.      Items we will need from you:      We have received approval from your insurance company for the transplant procedure.  It is critical that you notify us if there is any change in your insurance.  It is also important that you familiarize yourself with the details of your specific insurance policy.  Our patient  is available to assist you if you should have any questions regarding your coverage.      During this waiting period, we may request additional periodic laboratory tests with your primary physician.  It will be your responsibility to remind your physician to forward your results to the Transplant Office.      We need to be kept informed of any changes in your medical condition such as:    o changes in your medications,   o significant changes in your health  o significant infections (such as pneumonia or abscesses)  o blood transfusions  o any condition which requires hospitalization  o any surgery      Remember to complete any routine cancer screening tests required before your transplant.  This includes colonoscopy; prostate screening for men, and mammogram and gynecologic testing for women, as well as dental work.  Your primary care clinic can assist you with arranging for these exams.  Remind your  caregivers to forward copies of the records and final reports.    We want you to know that our program has physician and surgeon coverage 24 hours a day, 365 days a year. If this coverage changes or there are substantial program changes, you will be notified in writing by letter.     Attached is a letter from the [employer] for Organ Sharing (UNOS). It describes the services and information offered to patients by UNOS and the Organ Procurement and Transplantation Network.    We appreciate having had the opportunity to participate in your care.  If you have questions, please feel free to call the Transplant Office at [phone] or [phone].    Sincerely,    Otilio Fisher Jr., BSN, RN  Liver Transplant Coordinator  [phone]    Enclosures: OS Letter  cc: Care Team                                      The Organ Procurement and Transplantation Network  Toll-free patient services line:     Your resource for organ transplant information    If you have a question regarding your own medical care, you always should call your transplant hospital first. However, for general organ transplant-related information, you can call the Organ Procurement and Transplantation Network (OPTN) toll-free patient services line at [phone]. Anyone, including potential transplant candidates, candidates, recipients, family members, friends, living donors, and donor family members, can call this number to:          Talk about organ donation, living donation, the transplant process, the donation process, and transplant policies.    Get a free patient information kit with helpful booklets, waiting list and transplant information, and a list of all transplant hospitals.    Ask questions about the OPTN website (https://optn.transplant.hrsa.gov/), the [employer] Network for Organ Sharing s (UNOS) website (https://unos.org/), or the UNOS website for living donors and transplant recipients.  (https://www.transplantliving.org/).    Learn how the OPTN can help you.    Talk about any concerns that you may have with a transplant hospital.    The nation s transplant system, the OPTN, is managed under federal contract by the [employer] Organ Sharing (UNOS), which is a non-profit charitable organization. The OPTN helps create and define organ sharing policies that make the best use of donated organs. This process continuously evaluating new advances and discoveries so policies can be adapted to best serve patients waiting for transplants. To do so, the OPTN works closely with transplant professionals, transplant patients, transplant candidates, donor families, living donors, and the public. All transplant programs and organ procurement organizations throughout the country are OPTN members and are obligated to follow the policies the OPTN creates for allocating organs.    The OPTN also is responsible for:      Providing educational material for patients, the public, and professionals.    Raising awareness of the need for donated organs and tissue.    Coordinating organ procurement, matching, and placement.    Collecting information about every organ transplant and donation that occurs in the [place].    Remember, you should contact your transplant hospital directly if you have questions or concerns about your own medical care including medical records, work-up progress, and test results.    We are not your transplant hospital, and our staff will not be able to answer questions about your case, so please keep your transplant hospital s phone number handy.    However, while you research your transplant needs and learn as much as you can about transplantation and donation, we welcome your call to our toll-free patient services line at [phone].          Updated 4/1/2019

## 2022-01-04 NOTE — PLAN OF CARE
"BP 98/51   Pulse 91   Temp 98.2  F (36.8  C) (Oral)   Resp 15   Ht 1.651 m (5' 5\")   Wt 63.1 kg (139 lb 1.6 oz)   SpO2 100%   BMI 23.15 kg/m      Had paracentesis this morning- 3000 ml of haroldo colored fluid removed. Went for colonoscopy this afternoon.     Neuro: A&Ox4.   Cardiac: VSS. Denies CP   Respiratory: Sating upper 90's on RA.  GI/: Adequate urine output. BM X1  Diet/appetite: NPO for colonoscopy  Activity: Up with SBA  Pain: Denies   Skin: Jaundiced. Compression stocking on BLE  LDA's: R PIV SL    Potassium 3.1- gave 40meq of oral KCL packet x1    Plan: Continue with POC. Notify primary team with changes.      "

## 2022-01-04 NOTE — PLAN OF CARE
VSS. Voids spontaneously, multiple loose stools. Drinking golytly for colonoscopy tomorrow. NPO @ midnight. Denies pain and nausea. Abdomen distended, possible paracentesis tomorrow. PIV infusing TKO between antibiotics. Had hemoglobin at 6.9, infused 1 unit of RBCs with hemoglobin recheck of 8.5. Up independently in room to bathroom, SBA if going for long walk in [place]. Continue plan of care.

## 2022-01-04 NOTE — COMMITTEE REVIEW
Abdominal Committee Review Note     Evaluation Date: 12/31/2021  Committee Review Date: 1/4/2022    Organ being evaluated for: Liver    Transplant Phase: Evaluation  Transplant Status: Active    Transplant Coordinator: Anupama White  Transplant Surgeon:       Referring Physician: Zita Steele    Primary Diagnosis: Alcoholic Cirrhosis  Secondary Diagnosis:     Committee Review Members:  Nutrition Lucila Winter, RD   Pharmacist Anival Nelson, [facility]    - Clinical Albin Stoddard, MSSONAM, Magalis Lebron, [facility]   Transplant Renetta Dale, RN, Anupama White, RN, Shalini Hawkins, RN, Bria Briscoe, RN, Jr Otilio Fisher, CRICKET, Zita Steele MD, Wei Mares [facility], Savannah Diego, APRN CNP, Noam Ring MD, Tito Mcgrath RN   Transplant Hepatology  Miguel Ángel Finney MD, Tanya Singh MD, Addison Chairez MD, Niecy Dumas MD   Transplant Surgery Jorge Morgan MD, Pradeep rBowne MD, Randal Carmona MD, Harman Nayak MD       Transplant Eligibility:     Committee Review Decision: approved    Relative Contraindications:     Absolute Contraindications:     Committee Chair Zita Steele MD verbally attested to the committee's decision.    Committee Discussion Details:       - approved to list

## 2022-01-05 ENCOUNTER — TELEPHONE (OUTPATIENT)
Dept: TRANSPLANT | Facility: CLINIC | Age: 52
End: 2022-01-05

## 2022-01-05 ENCOUNTER — APPOINTMENT (OUTPATIENT)
Dept: PHYSICAL THERAPY | Facility: CLINIC | Age: 52
DRG: 432 | End: 2022-01-05
Payer: COMMERCIAL

## 2022-01-05 ENCOUNTER — DOCUMENTATION ONLY (OUTPATIENT)
Dept: PEDIATRICS | Facility: CLINIC | Age: 52
End: 2022-01-05

## 2022-01-05 VITALS
HEIGHT: 65 IN | RESPIRATION RATE: 16 BRPM | DIASTOLIC BLOOD PRESSURE: 53 MMHG | OXYGEN SATURATION: 98 % | SYSTOLIC BLOOD PRESSURE: 89 MMHG | WEIGHT: 139.1 LBS | TEMPERATURE: 98.5 F | HEART RATE: 88 BPM | BODY MASS INDEX: 23.17 KG/M2

## 2022-01-05 DIAGNOSIS — K70.10 ALCOHOLIC HEPATITIS (H): Primary | ICD-10-CM

## 2022-01-05 LAB
ALBUMIN SERPL-MCNC: 2.4 G/DL (ref 3.4–5)
ALP SERPL-CCNC: 72 U/L (ref 40–150)
ALT SERPL W P-5'-P-CCNC: 27 U/L (ref 0–50)
ANION GAP SERPL CALCULATED.3IONS-SCNC: 11 MMOL/L (ref 3–14)
AST SERPL W P-5'-P-CCNC: 53 U/L (ref 0–45)
BACTERIA FLD CULT: NO GROWTH
BASOPHILS # BLD MANUAL: 0.3 10E3/UL (ref 0–0.2)
BASOPHILS NFR BLD MANUAL: 2 %
BILIRUB SERPL-MCNC: 17.3 MG/DL (ref 0.2–1.3)
BUN SERPL-MCNC: 30 MG/DL (ref 7–30)
BURR CELLS BLD QL SMEAR: ABNORMAL
CALCIUM SERPL-MCNC: 8.5 MG/DL (ref 8.5–10.1)
CHLORIDE BLD-SCNC: 100 MMOL/L (ref 94–109)
CO2 SERPL-SCNC: 23 MMOL/L (ref 20–32)
CREAT SERPL-MCNC: 0.98 MG/DL (ref 0.52–1.04)
EOSINOPHIL # BLD MANUAL: 0 10E3/UL (ref 0–0.7)
EOSINOPHIL NFR BLD MANUAL: 0 %
ERYTHROCYTE [DISTWIDTH] IN BLOOD BY AUTOMATED COUNT: 21.8 % (ref 10–15)
FRAGMENTS BLD QL SMEAR: SLIGHT
GFR SERPL CREATININE-BSD FRML MDRD: 70 ML/MIN/1.73M2
GLUCOSE BLD-MCNC: 101 MG/DL (ref 70–99)
HCT VFR BLD AUTO: 21.4 % (ref 35–47)
HGB BLD-MCNC: 7.5 G/DL (ref 11.7–15.7)
INR PPP: 2.87 (ref 0.85–1.15)
LYMPHOCYTES # BLD MANUAL: 1.3 10E3/UL (ref 0.8–5.3)
LYMPHOCYTES NFR BLD MANUAL: 10 %
MCH RBC QN AUTO: 35.4 PG (ref 26.5–33)
MCHC RBC AUTO-ENTMCNC: 35 G/DL (ref 31.5–36.5)
MCV RBC AUTO: 101 FL (ref 78–100)
MONOCYTES # BLD MANUAL: 0.6 10E3/UL (ref 0–1.3)
MONOCYTES NFR BLD MANUAL: 5 %
NEUTROPHILS # BLD MANUAL: 10.5 10E3/UL (ref 1.6–8.3)
NEUTROPHILS NFR BLD MANUAL: 83 %
PLAT MORPH BLD: ABNORMAL
PLATELET # BLD AUTO: 83 10E3/UL (ref 150–450)
POTASSIUM BLD-SCNC: 3.3 MMOL/L (ref 3.4–5.3)
PROT SERPL-MCNC: 4.7 G/DL (ref 6.8–8.8)
RBC # BLD AUTO: 2.12 10E6/UL (ref 3.8–5.2)
RBC MORPH BLD: ABNORMAL
SODIUM SERPL-SCNC: 134 MMOL/L (ref 133–144)
WBC # BLD AUTO: 12.6 10E3/UL (ref 4–11)

## 2022-01-05 PROCEDURE — 85027 COMPLETE CBC AUTOMATED: CPT

## 2022-01-05 PROCEDURE — 97116 GAIT TRAINING THERAPY: CPT | Mod: GP

## 2022-01-05 PROCEDURE — 85610 PROTHROMBIN TIME: CPT

## 2022-01-05 PROCEDURE — 250N000013 HC RX MED GY IP 250 OP 250 PS 637: Performed by: INTERNAL MEDICINE

## 2022-01-05 PROCEDURE — 97110 THERAPEUTIC EXERCISES: CPT | Mod: GP

## 2022-01-05 PROCEDURE — 36415 COLL VENOUS BLD VENIPUNCTURE: CPT

## 2022-01-05 PROCEDURE — 99239 HOSP IP/OBS DSCHRG MGMT >30: CPT | Mod: GC | Performed by: INTERNAL MEDICINE

## 2022-01-05 PROCEDURE — 82435 ASSAY OF BLOOD CHLORIDE: CPT

## 2022-01-05 PROCEDURE — 250N000013 HC RX MED GY IP 250 OP 250 PS 637: Performed by: EMERGENCY MEDICINE

## 2022-01-05 RX ORDER — POTASSIUM CHLORIDE 750 MG/1
40 TABLET, EXTENDED RELEASE ORAL ONCE
Status: COMPLETED | OUTPATIENT
Start: 2022-01-05 | End: 2022-01-05

## 2022-01-05 RX ORDER — AMOXICILLIN 250 MG/1
750 CAPSULE ORAL 3 TIMES DAILY
Qty: 81 CAPSULE | Refills: 0 | Status: ON HOLD | OUTPATIENT
Start: 2022-01-05 | End: 2022-01-14

## 2022-01-05 RX ORDER — POTASSIUM CHLORIDE 1.5 G/1.58G
40 POWDER, FOR SOLUTION ORAL ONCE
Status: DISCONTINUED | OUTPATIENT
Start: 2022-01-05 | End: 2022-01-05

## 2022-01-05 RX ADMIN — LACTULOSE 10 G: 20 SOLUTION ORAL at 13:40

## 2022-01-05 RX ADMIN — LACTULOSE 10 G: 20 SOLUTION ORAL at 08:35

## 2022-01-05 RX ADMIN — RIFAXIMIN 550 MG: 550 TABLET ORAL at 08:34

## 2022-01-05 RX ADMIN — POTASSIUM CHLORIDE 40 MEQ: 750 TABLET, EXTENDED RELEASE ORAL at 08:51

## 2022-01-05 RX ADMIN — THERA TABS 1 TABLET: TAB at 08:34

## 2022-01-05 RX ADMIN — PANTOPRAZOLE SODIUM 40 MG: 40 TABLET, DELAYED RELEASE ORAL at 08:35

## 2022-01-05 ASSESSMENT — ACTIVITIES OF DAILY LIVING (ADL)
ADLS_ACUITY_SCORE: 11

## 2022-01-05 ASSESSMENT — MIFFLIN-ST. JEOR: SCORE: 1251.83

## 2022-01-05 NOTE — PROGRESS NOTES
"[facility]    Hepatology Follow-up    CC: Confusion    Dx: LT waitlisted for alcoholic hepatitis                   Encephalopathy, resolved                  Acute on chronic macrocytic anemia                      24 hour events:   Waitlisted for LT    Subjective:  Denied abd pain, no other concerns    Medications  Current Facility-Administered Medications   Medication Dose Route Frequency    cefTRIAXone  2 g Intravenous Q24H    lactulose  10 g Oral TID    multivitamin, therapeutic  1 tablet Oral Daily    pantoprazole  40 mg Oral BID    potassium chloride  40 mEq Oral Once    rifaximin  550 mg Oral BID    sodium chloride (PF)  3 mL Intracatheter Q8H       Review of systems  A 10-point review of systems was negative.    Examination  BP 93/48 (BP Location: Left arm)   Pulse 90   Temp 98.3  F (36.8  C) (Oral)   Resp 16   Ht 1.651 m (5' 5\")   Wt 63.1 kg (139 lb 1.6 oz)   SpO2 98%   BMI 23.15 kg/m      Intake/Output Summary (Last 24 hours) at 1/1/2022 0612  Last data filed at 1/1/2022 0521  Gross per 24 hour   Intake 1120 ml   Output 1090 ml   Net 30 ml       Constitutional: cooperative, pleasant  Eyes: Sclera icteric  Ears/nose/mouth/throat: mucus membranes moist  Neck: supple  CV: No edema  Respiratory: Unlabored breathing  Abd: distended, +bs, nontender, fluid thrill +  Skin: warm, perfused, jaundiced  Neuro: AAO x 3, + asterixis  MSK: No gross deformities    Laboratory  Lab Results   Component Value Date     12/31/2021     09/18/2020    POTASSIUM 4.1 12/31/2021    POTASSIUM 3.1 09/18/2020    CHLORIDE 95 12/31/2021    CHLORIDE 106 09/18/2020    CO2 21 12/31/2021    CO2 25 09/18/2020    BUN 62 12/31/2021    BUN 2 09/18/2020    CR 1.71 12/31/2021    CR 0.56 09/18/2020       Lab Results   Component Value Date    BILITOTAL 27.1 12/31/2021    BILITOTAL 1.1 09/18/2020    ALT 40 12/31/2021    ALT 57 09/18/2020    AST 71 12/31/2021     09/18/2020    ALKPHOS 77 12/31/2021    " ALKPHOS 261 09/18/2020       Lab Results   Component Value Date    WBC 14.8 12/31/2021    WBC 12.8 09/18/2020    HGB 7.7 12/31/2021    HGB 12.3 09/18/2020     12/31/2021     09/18/2020    PLT 58 12/31/2021     09/18/2020       Lab Results   Component Value Date    INR 2.57 12/31/2021    INR 1.9 10/07/2021       MELD-Na score: 30 at 1/5/2022  6:29 AM  MELD score: 29 at 1/5/2022  6:29 AM  Calculated from:  Serum Creatinine: 0.98 mg/dL (Using min of 1 mg/dL) at 1/5/2022  6:29 AM  Serum Sodium: 134 mmol/L at 1/5/2022  6:29 AM  Total Bilirubin: 17.3 mg/dL at 1/5/2022  6:29 AM  INR(ratio): 2.87 at 1/5/2022  6:29 AM  Age: 51 years    Radiology  US ABD 12/20/21:  Impression:   1. Cirrhotic morphology of liver with sequela of portal hypertension  including splenomegaly, ascites and to and fro flow in the main portal  and right portal vein.  2. Gallbladder sludge and stones. Gallbladder wall thickening  nonspecific in the setting of cirrhosis/ascites.      CTA 12/31/21:  IMPRESSION:   1. No acute or suspicious findings in the chest.   2. Incidental findings include anemic blood pool, small hiatal hernia,  and known cirrhosis with sequela of portal hypertension (ascites).  3. Please see cardiology report for detailed assessment of the cardiac  Findings.      ECHO 1/2/22:  Interpretation Summary  Left ventricular size, wall motion and function are normal. The ejection  fraction is 60-65%.  Right ventricular function, chamber size, wall motion, and thickness are  normal.  Right ventricular systolic pressure is 27mmHg above the right atrial pressure.  Pulmonary artery systolic pressure is normal.  IVC diameter <2.1 cm collapsing >50% with sniff suggests a normal RA pressure  of 3 mmHg.  No pericardial effusion is present.      Assessment  51 year old with recent alcoholic hepatitis complicated by ascites, last alcohol intake in September 2021, meld 30 undergoing liver transplantation.    Recent alcoholic  hepatitis, waitlisted for LT  Her meld is high mainly due to bilirubin and INR.  Patient is undergoing transplant evaluation.  ECHO was noted to be normal.  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 ID recommended total of 14 days of Abx but should not preclude her from LT.    Pt underwent colonoscopy which was normal.  Mammogram and colposcopy can be performed as outpatient.  Pt was cleared for LT listing.    Acute on chronic macrocytic anemia  Patient had slight drop in her hemoglobin with no signs of bleeding her recent endoscopy was in October 2021 with small esophageal varices, portal hypertensive gastropathy and gastric erosions without bleeding.  At this point, would recommend to continue monitoring.    ROSSY-resolving  Pt has slightly worsened Cr from her normal baseline of 0.86, likely component of acute kidney injury, creatinine down trended in the last few days after getting the albumin challenge consistent with pre-renal ROSSY.    Recommendations  --Pt can be discharged from hepatology perspective  --Follow up with Hepatology in 1 week  --Follow up with transplant coordinator  -- Continue Abx as per ID for total of 14 days  -- Rifaximin 550 mg BID  -- Lactulose PO, titrate to 3-4 BMs per day  -- Monitor transaminases, bilirubin, INR  -- Ensure sodium restriction to 2000 mg per day  -- Physical therapy/OT  -- Adequate protein diet (1.2 - 1.5g/kg/day)      Thank you for involving us in this patient's care. Please do not hesitate to contact the GI service with any questions or concerns.      Pt care plan discussed with Dr. Steele, Hepatology staff physician.    This note was created with voice recognition software, and while reviewed for accuracy, typos may remain.    Niecy Dumas MD  Advance and Transplant Hepatology Fellow  Pager: [phone]    ATTENDING NOTE, GASTROENTEROLOGY/HEPATOLOGY    I discussed this patient with the fellow and participated in the decision making. I  agree with the fellow's note. Zita Steele MD

## 2022-01-05 NOTE — PLAN OF CARE
"Patient is alert and oriented, up ad nabil. Continues on lactulose, no BM this shift but had a colonoscopy prep yesterday and feels \"cleaned out\" . Potassium replaced today, on a 2gm sodium diet and fluid restriction. Was notified today she is an eligible liver transplant candidate. Continues on an IV antibiotics, is jaundiced.  "

## 2022-01-05 NOTE — PLAN OF CARE
VSS. Had low BPs (82/43), which triggered SIRS protocol, with lactic acid at 2.1, rapid response called, no interventions needed. BP back to normal range for rest of shift. Voids spontaneously, had BM x1 today. Had paracentesis in AM with 2000 ml drained, had colonoscopy in afternoon, pt has been sleepy since. Up SBA to bathroom. Denies pain/nausea. Skin is jaundiced. R PIV infusing TKO between antibiotics. Tolerating low salt diet. Continue plan of care.

## 2022-01-05 NOTE — PROGRESS NOTES
01/04/22 1700   Call Information   Date of Call 01/04/22   Time of Call 1714   Name of person requesting the team Kasey LEVY   Title of person requesting team RN   RRT Arrival time 1718   Time RRT ended 1725   Reason for call   Type of RRT Adult   Primary reason for call Sepsis suspected   Sepsis Suspected Elevated Lactate level;WBC <4 or >12   Was patient transferred from the ED, ICU, or PACU within last 24 hours prior to RRT call? No   SBAR   Situation LA 2.1   Background per MD note, history of alcoholic liver cirrhosis with ascites, esophageal varices, portal hypertension, and chronic macrocytic anemia who was admitted on 12/30/22 for decompensated cirrhosis and acute encephalopathy, as well as expedited liver transplant evaluation. Found to grow Actinomyces odontolyticus in one blood culture, treating with antibiotics.   Notable History/Conditions End-Stage disease;Organ failure   Assessment VSS. pt is sleepy, arouses to voice and touch. oriented. denies pain.    Interventions No interventions   Patient Outcome   Patient Outcome Stabilized on unit   RRT Team   Date Attending Physician notified 01/04/22   Physician(s) Ugo Brown NP   Lead RN Adrianna Parks   Post RRT Intervention Assessment   Post RRT Assessment Stable/Improved   Date Follow Up Done 01/04/22   Time Follow Up Done 1902

## 2022-01-05 NOTE — PROGRESS NOTES
Prior Authorization Approval    xifaxan 550mg tabs  Date Initiated: 1/5/2022  Date Completed: 1/5/2022  Prior Auth Type: Clinical                Status: Approved    Effective Date: 01/05/2022 - 01/05/2023  Copay: 30.00 (reduced to $0 at [place] pharmacy with automatic copay savings card applied)     Filling Pharmacy: [facility] [address]    Insurance: [employer] - Phone [phone] Fax [phone]  ID: [id number]  Case Number: [id number] / [id number]   Submitted Via: Ara Ch  [facility] Liaison  Ph: [phone] Pager: [phone]

## 2022-01-05 NOTE — PLAN OF CARE
Assumed cares at [id number]     Status: Admitted on 12/30/22 for decompensated cirrhosis and acute encephalopathy, as well as expedited liver transplant evaluation. With Actinomyces odontolyticus in one blood culture, treating with antibiotics.  Neuro:  Alert and oriented x 4, able to make needs known  Vitals: VSS on RA  GI: Tolerating regular diet, 2 grams Na diet, denies nausea  : Voids spontaneously  Resp: Denies difficulty of breathing  Mobility: Up independent  Cardiac: Denies chest pain  Lines/Drains: R PIV infusing TKO between antibiotics  Pain: Denies  Skin: Jaundiced  Labs:  WDL     Plan of Care: Continue with plan of care.

## 2022-01-06 ENCOUNTER — PATIENT OUTREACH (OUTPATIENT)
Dept: GASTROENTEROLOGY | Facility: CLINIC | Age: 52
End: 2022-01-06
Payer: COMMERCIAL

## 2022-01-06 ENCOUNTER — ORGAN (OUTPATIENT)
Dept: TRANSPLANT | Facility: CLINIC | Age: 52
End: 2022-01-06
Payer: COMMERCIAL

## 2022-01-06 LAB
BACTERIA BLD CULT: NO GROWTH
BACTERIA BLD CULT: NO GROWTH

## 2022-01-06 NOTE — PLAN OF CARE
Physical Therapy Discharge Summary    Reason for therapy discharge:    Discharged to home with outpatient therapy.    Progress towards therapy goal(s). See goals on Care Plan in [facility] electronic health record for goal details.  Goals partially met.  Barriers to achieving goals:   discharge from facility.    Therapy recommendation(s):    Continued therapy is recommended.  Rationale/Recommendations:  reduce fall risk, increase strength/endurance.

## 2022-01-06 NOTE — DISCHARGE SUMMARY
[facility]  Discharge Summary - Medicine & Pediatrics       Date of Admission:  12/30/2021  Date of Discharge:  1/5/2022  5:16 PM  Discharging Provider: Dr. Cora Lou  Discharge Service: Ivy Cassidy    Discharge Diagnoses   - Decompensated alcoholic cirrhosis with ascites  - Hepatic encephalopathy, resolved  - Grade I esophageal varices (last EGD 10/29/2021)  - Portal hypertension and ceasar hypertensive gastropathy  - Chronic thrombocytopenia, stable  - Actinomyces odontolyticus bacteremia- treating with antibiotics   - Acute-on-chronic anemia, presumed dilutional  - ROSSY, likely prerenal- improved with fluids  - Hyponatremia- improved with fluids   - Malnutrition    Follow-ups Needed After Discharge   Follow-up Appointments     Adult [facility] Follow-up and recommended labs and tests      - get labs on Friday, 1/7/2022- CMP, INR, CBC w/ diff, to check MELD,   check for anemia/need for transfusion, let transplant coordinator know   results  - follow-up with Hepatology WITHIN 1 WEEK with labs- CMP, INR, CBC w/ diff   so that we can calculate MELD score, check hgb (required 2 U pRBC during   hospital stay), and kidney function (improved during hospital stay, ROSSY   was likely prerenal)  - follow-up with any calls from your transplant coordinator     Appointments on [place] and/or [facility] (with [facility] or [facility]   provider or service). Call [phone] if you haven't heard regarding   these appointments within 7 days of discharge.          Patient will follow-up with PCP as needed.    Unresulted Labs Ordered in the Past 30 Days of this Admission     Date and Time Order Name Status Description    1/3/2022 11:07 AM Blood Culture Hand, Left Preliminary     1/1/2022  1:41 PM Blood Culture Arm, Left Preliminary     1/1/2022  1:41 PM Blood Culture Hand, Left Preliminary       These results will be followed up by Hepatology team    Discharge Disposition   Discharged to  home  Condition at discharge: Fair    Hospital Course   Melita Cortes was admitted on 12/30/2021 for acute metabolic encephalopathy and urgent liver transplant evaluation for decompensated alcoholic cirrhosis.  The following problems were addressed during her hospitalization:    #Decompensated alcoholic cirrhosis with ascites  #Hepatic encephalopathy, resolved    #Grade I esophageal varices (last EGD 10/29/2021)  #Portal hypertension and portal hypertensive gastropathy  #Chronic thrombocytopenia, stable  Recent worsening in MELD labs leading up to admission (MELD-Na 36 on admission), overall concerning for acute decompensated cirrhosis. Patient had been in the transplant evaluation process, admitted in part for expedited transplant evaluation. Last drink of alcohol ~9/2021. She was evaluated by Cardiology (had coronary CTA) and Chemical dependency, underwent a colonoscopy as part of the transplant evaluation which was negative, and was ultimately approved for liver transplant and added to the list. In addition to liver transplant workup, she was also treated for hepatic encephalopathy which improved with lactulose and the addition of rifaximin. She also had a therapeutic paracentesis (3L removed).   - she will continue rifaximin 550mg PO BID upon discharge  - she will follow-up with Hepatology within 1 week of discharge with labs (CMP, CBC w/ diff, INR)  - transplant coordinator will be in touch with her    MELD upon discharge:  MELD-Na score: 30 at 1/5/2022  6:29 AM  MELD score: 29 at 1/5/2022  6:29 AM  Calculated from:  Serum Creatinine: 0.98 mg/dL (Using min of 1 mg/dL) at 1/5/2022  6:29 AM  Serum Sodium: 134 mmol/L at 1/5/2022  6:29 AM  Total Bilirubin: 17.3 mg/dL at 1/5/2022  6:29 AM  INR(ratio): 2.87 at 1/5/2022  6:29 AM  Age: 51 years     #Actinomyces odontolyticus bacteremia   Initial concern for infection on admission based upon recent history of leukocytosis of unclear etiology and elevated  procalcitonin in setting of acute decompensated cirrhosis. No associated findings concerning for sepsis on admission. Inflammatory markers and clinical status improved throughout the admission on antibiotics, overall suggestive of initial presence of an infection. Regarding potential sources, presume that most likely source is Actinomyces odontolyticus bacteremia (in 1 of 2 BCx 12/30). Low suspicion for SBP, UTI, and/or intraabdominal source based upon evaluation. TTE 1/2/2022 EF 60-65%, normal RV and LV size and function, no valvular abnormalities aside from trace tricuspid insufficiency- no evidence of endocarditis. She was evaluated by ID who stated that the infection should not delay the liver transplant process.   - she will continue PO Amoxicillin 750 mg TID through 1/14/2022 for actinomyces bacteremia     #Acute-on-chronic anemia, presumed dilutional  Admission Hgb 8.2. Received a total of 2 U pRBC this admission. Given no clinical evidence of bleed, presume that most likely source is dilution after initial IVF resuscitation, with low suspicion for GI bleed.  - follow-up CBC outpatient with Hepatology within 1 week     #ROSSY, likely prerenal, resolved  Creatinine on admission 1.80, up from 0.86 on 12/24/2021. Urine Na 14 on 12/31, suggestive of prerenal etiology. Patient was managed on albumin challenge 1g/kg q24h x3 days (12/31-1/2), from which patient has demonstrated stable Cr with good UOP. ROSSY resolved throughout the admission.   - follow-up BMP outpatient with Hepatology within 1 week    #Hyponatremia, resolved  Presented with hyponatremia that improved throughout the admission with fluids.  - follow-up BMP outpatient with Hepatology within 1 week  - discontinued home diuretics (furosemide and spironolactone)     #Malnutrition  Nutrition was consulted during the hospital stay.  - advised to maintain a 2g sodium diet    Consultations This Hospital Stay   GI HEPATOLOGY ADULT IP CONSULT  CARDIOLOGY GENERAL  ADULT IP CONSULT  INTERNAL MEDICINE PROCEDURE TEAM ADULT IP CONSULT [facility] - [facility]  PHYSICAL THERAPY ADULT IP CONSULT  OCCUPATIONAL THERAPY ADULT IP CONSULT  NUTRITION SERVICES ADULT IP CONSULT  CHEMICAL DEPENDENCY IP CONSULT  VASCULAR ACCESS CARE ADULT IP CONSULT  INFECTIOUS DISEASE GENERAL ADULT IP CONSULT  INTERNAL MEDICINE PROCEDURE TEAM ADULT IP CONSULT [facility] - [facility]    Code Status   Full Code       The patient was discussed with MD Ivy Richard 2 Service  [facility] [address] [facility]  [address]  [place] [address]  Phone: [phone]  ______________________________________________________________________    Physical Exam   Vital Signs: Temp: 98.5  F (36.9  C) Temp src: Oral BP: (!) 89/53 Pulse: 88   Resp: 16 SpO2: 98 % O2 Device: None (Room air)    Weight: 139 lbs 1.6 oz  General: lying in bed, NAD  HEENT: scleral icterus, no conjunctival injection  Resp: clear to auscultation bilaterally, no crackles or wheezes  Cardiac: regular rate and rhythm, systolic murmur  Abdomen: soft, non-tender, mildly distended  Extremities: warm, trace lower extremity edema bilaterally  Skin: jaundiced  Neuro: alert, answers questions appropriately.  Psych: appropriate mood and affect      Primary Care Physician   Navneet Johnson    Discharge Orders      Home Care PT Referral for Hospital Discharge      Home Care OT Referral for Hospital Discharge      Activity    Your activity upon discharge: activity as tolerated     Adult [facility] Follow-up and recommended labs and tests    - get labs on Friday, 1/7/2022- CMP, INR, CBC w/ diff, to check MELD, check for anemia/need for transfusion, let transplant coordinator know results  - follow-up with Hepatology WITHIN 1 WEEK with labs- CMP, INR, CBC w/ diff so that we can calculate MELD score, check hgb (required 2 U pRBC during hospital stay), and kidney function (improved during hospital stay, ROSSY was likely prerenal)  -  follow-up with any calls from your transplant coordinator     Appointments on [place] and/or [facility] (with [facility] or [facility] provider or service). Call [phone] if you haven't heard regarding these appointments within 7 days of discharge.     MD face to face encounter    Documentation of Face to Face and Certification for Home Health Services    I certify that patient: Melita Cortes is under my care and that I, or a nurse practitioner or physician's assistant working with me, had a face-to-face encounter that meets the physician face-to-face encounter requirements with this patient on: 1/5/2022.    This encounter with the patient was in whole, or in part, for the following medical condition, which is the primary reason for home health care: deconditioning amidst underlying history of cirrhosis.    I certify that, based on my findings, the following services are medically necessary home health services: Occupational Therapy and Physical Therapy.    My clinical findings support the need for the above services because: Occupational Therapy Services are needed to assess and treat cognitive ability and address ADL safety due to impairment in deconditioning. and Physical Therapy Services are needed to assess and treat the following functional impairments: deconditioning.    Further, I certify that my clinical findings support that this patient is homebound (i.e. absences from home require considerable and taxing effort and are for medical reasons or [facility] services or infrequently or of short duration when for other reasons) because: Leaving home is medically contraindicated for the following reason(s): Infection risk / immunocompromised state where it is safer for them to receive services in the home...    Based on the above findings. I certify that this patient is confined to the home and needs intermittent skilled nursing care, physical therapy and/or speech therapy.  The patient is under my care,  and I have initiated the establishment of the plan of care.  This patient will be followed by a physician who will periodically review the plan of care.  Physician/Provider to provide follow up care: Navneet Johnson    Attending hospital physician (the Medicare certified [place] provider): Cora Lou MD  Physician Signature: See electronic signature associated with these discharge orders.  Date: 1/5/2022     Reason for your hospital stay    You were in the hospital for liver transplant evaluation. You were also treated for hepatic encephalopathy as well as an infection with antibiotics. The following medications were added:  - rifaximin (this is for hepatic encephalopathy, you will take this in addition to lactulose)  - amoxicillin (this is an antibiotic for the infection that was found in your blood, you will take this medication through 1/14/2022)  - please follow-up closely with the liver team and seek evaluation for any concerns that you have them (follow up includes labs Friday, 1/7/2022 as well as labs and follow-up with Hepatology within 1 week.   - please answer any calls you receive from your transplant coordinator who will be in touch.  - referral made for home PT and OT     Diet    Follow this diet upon discharge: 2g salt diet (low salt diet)       Significant Results and Procedures   1/4/2022 colonoscopy  Impression:            - Congested mucosa in the rectum, in the descending                          colon, in the transverse colon and in the ascending                          colon due to portal colopathy.                          - The distal rectum and anal verge are normal on                          retroflexion view.                          - No specimens collected.   Recommendation:        - Return patient to hospital [facility] for ongoing care.                          - Resume previous diet.                          - Repeat colonoscopy in 10 years for screening                           purposes.     12/31/2021 CTA angiogram   -minimal nonobstructive coronary with minimal luminal irregularities    Discharge Medications   Discharge Medication List as of 1/5/2022  4:29 PM      START taking these medications    Details   amoxicillin (AMOXIL) 250 MG capsule Take 3 capsules (750 mg) by mouth 3 times daily for 9 days, Disp-81 capsule, R-0, E-Prescribe      rifaximin (XIFAXAN) 550 MG TABS tablet Take 1 tablet (550 mg) by mouth 2 times daily, Disp-90 tablet, R-3, E-Prescribe         CONTINUE these medications which have NOT CHANGED    Details   albuterol (PROAIR HFA/PROVENTIL HFA/VENTOLIN HFA) 108 (90 Base) MCG/ACT inhaler Inhale 2 puffs into the lungs every 4 hours as needed for wheezing, Disp-18 g, R-0, E-PrescribePharmacy may dispense brand covered by insurance (Proair, or proventil or ventolin or generic albuterol inhaler)      folic acid (FOLVITE) 1 MG tablet Take 1 tablet (1 mg) by mouth daily, Disp-30 tablet, R-1, E-Prescribe      lactulose (CHRONULAC) 10 GM/15ML solution Take 3 times daily. May titrate to achieve 3-4 loose stools per day., Disp-1892 mL, R-3, E-Prescribe      multivitamin (CENTRUM SILVER) tablet Take 1 tablet by mouth daily, Historical      pantoprazole (PROTONIX) 40 MG EC tablet Take 1 tablet (40 mg) by mouth 2 times daily, Disp-60 tablet, R-1, E-Prescribe      tretinoin (RETIN-A) 0.025 % external cream Apply a pea size to entire face QDDisp-45 g, [id number]         STOP taking these medications       ciprofloxacin (CIPRO) 500 MG tablet Comments:   Reason for Stopping:         furosemide (LASIX) 20 MG tablet Comments:   Reason for Stopping:         spironolactone (ALDACTONE) 50 MG tablet Comments:   Reason for Stopping:             Allergies   No Known Allergies

## 2022-01-06 NOTE — PROGRESS NOTES
Hepatology post hospital discharge RNCC assessment    Post hospital discharge follow up:      1. Admission diagnosis:  Altered mental status, generalized weakness, abnormal labs, nausea/vomiting/diarrhea   2. Discharge diagnosis:    - Decompensated alcoholic cirrhosis with ascites  - Hepatic encephalopathy, resolved  - Grade I esophageal varices (last EGD 10/29/2021)  - Portal hypertension and ceasar hypertensive gastropathy  - Chronic thrombocytopenia, stable  - Actinomyces odontolyticus bacteremia- treating with antibiotics   - Acute-on-chronic anemia, presumed dilutional  - ROSSY, likely prerenal- improved with fluids  - Hyponatremia- improved with fluids   - Malnutrition     3. Admitted on:  12/30/21  4. Discharge on: 1/5/2022    Medication Review    1. Medication review completed.    2. Discharge medication changes reviewed.   3. Patient did have new medications prescribed in hospital.  - Amoxicillin 250 mg capsule, Take 3 capsules by mouth 3 times daily x 9 days   - Rifaximin 550 mg tablet, Take 1 tablet by mouth 2 times daily   - Pt was instructed to stop these PTA medications: Ciprofloxacin, Spironolactone, and Furosemide     Follow Up Post Discharge    1. Reviewed discharge instructions with patient. Follow up appointments reviewed and transportation to appointments confirmed.   2. Patient able to verbalized understanding of discharge instructions including medications and follow up.      3. Care coordinator role and contact information reviewed, and pt encouraged to call with questions or concerns.     Symptom Review    1.  Ascites:  Pt reported small amount of abdominal distension. Reviewed that pt will likely accumulate fluid more quickly with discontinuation of diuretics. Encouraged pt to proactively schedule paracentesis.   2.  Edema: Pt reported bilateral feet swelling. Instructed pt to elevate lower extremities and adhere to 2 gram sodium diet.   3.  Encephalopathy: Denied confusion. Pt confirmed that  she is taking xifaxan as prescribed. Pt will titrate lactulose to achieve 3-4 soft BMs/day.  4.  Generalized weakness:  Pt is working on increasing strength post discharge. Pt is performing exercises that PT gave her to do while hospitalized.       Plan    1. Pt has lab appointment on 1/7 for repeat MELD labs.  2. Pt has hepatology follow up on 1/11 with Eduardo Parry. Writer will check in with pt at this time.      Patient was given an opportunity to ask questions and have those questions answered to her satisfaction.  Patient verbalized understanding of instructions provided and agreed to plan of care.

## 2022-01-06 NOTE — PLAN OF CARE
Occupational Therapy Discharge Summary    Reason for therapy discharge:    Discharged to home with outpatient therapy.    Progress towards therapy goal(s). See goals on Care Plan in [facility] electronic health record for goal details.  Goals partially met.  Barriers to achieving goals:   discharge from facility.    Therapy recommendation(s):    Continued therapy is recommended.  Rationale/Recommendations:  To progress endurance and strength for functional mobility and ADL performance..

## 2022-01-06 NOTE — TELEPHONE ENCOUNTER
RECORDS RECEIVED FROM: [facility]   Appt Date: 01.11.2022   NOTES STATUS DETAILS   OFFICE NOTE from referring provider N/A    OFFICE NOTES from other specialists     DISCHARGE SUMMARY from hospital Internal 12.30.2021 Cora Lou MD    12.23.2021 Deb Almeida   MEDICATION LIST Internal    LIVER BIOSPY (IF APPLICABLE)      PATHOLOGY REPORTS  N/A    IMAGING     ENDOSCOPY (IF AVAILABLE) Internal 10.29.2021   COLONOSCOPY (IF AVAILABLE) Internal 01.04.2022   ULTRASOUND LIVER Internal 12.30.2021 US ABDOMEN COMPLETE WITH    CT OF ABDOMEN N/A    MRI OF LIVER N/A    FIBROSCAN, US ELASTOGRAPHY, FIBROSIS SCAN, MR ELASTOGRAPHY N/A    LABS     HEPATIC PANEL (LIVER PANEL) Internal 01.05.2022   BASIC METABOLIC PANEL Internal 01.05.2022   COMPLETE METABOLIC PANEL Internal 01.05.2022   COMPLETE BLOOD COUNT (CBC) Internal 01.05.2022   INTERNATIONAL NORMALIZED RATIO (INR) Internal 01.05.2022   HEPATITIS C ANTIBODY Internal 09.24.2021   HEPATITIS C VIRAL LOAD/PCR N/A    HEPATITIS C GENOTYPE N/A    HEPATITIS B SURFACE ANTIGEN Internal 09.24.2021   HEPATITIS B SURFACE ANTIBODY Internal 09.24.2021   HEPATITIS B DNA QUANT LEVEL N/A    HEPATITIS B CORE ANTIBODY N/A

## 2022-01-06 NOTE — TELEPHONE ENCOUNTER
Spoke with patient/spouse, they understand the following:    Appt. Scheduled with Grete 1/11/21.  Will set up lab appt before.  Also plans to get labs this Friday at [facility].    Has appt with PCP and will get se tup for pelvic/colposcopy with that appt on 1/12.     Will call to schedule mammogram at [facility]    Standing lab orders placed.    Spent approx 30 minutes reviewing process of what to expect with receiving organ offers, admission for transplant, etc.  Reviewed extended criteria options again, and patient and spouse consent to all extended criteria, previously had not consented to DCD organ offers.  Will change this in UNOS/Epic based on verbal consent and obtain consent form.

## 2022-01-07 ENCOUNTER — ANESTHESIA (OUTPATIENT)
Dept: SURGERY | Facility: CLINIC | Age: 52
DRG: 005 | End: 2022-01-07
Payer: COMMERCIAL

## 2022-01-07 ENCOUNTER — DOCUMENTATION ONLY (OUTPATIENT)
Dept: OTHER | Facility: CLINIC | Age: 52
End: 2022-01-07

## 2022-01-07 ENCOUNTER — APPOINTMENT (OUTPATIENT)
Dept: GENERAL RADIOLOGY | Facility: CLINIC | Age: 52
DRG: 005 | End: 2022-01-07
Attending: TRANSPLANT SURGERY
Payer: COMMERCIAL

## 2022-01-07 ENCOUNTER — HOSPITAL ENCOUNTER (INPATIENT)
Facility: CLINIC | Age: 52
LOS: 6 days | Discharge: HOME HEALTH | DRG: 005 | End: 2022-01-14
Attending: TRANSPLANT SURGERY | Admitting: SURGERY
Payer: COMMERCIAL

## 2022-01-07 ENCOUNTER — ANESTHESIA EVENT (OUTPATIENT)
Dept: SURGERY | Facility: CLINIC | Age: 52
DRG: 005 | End: 2022-01-07
Payer: COMMERCIAL

## 2022-01-07 DIAGNOSIS — Z94.4 S/P LIVER TRANSPLANT (H): Primary | ICD-10-CM

## 2022-01-07 DIAGNOSIS — Z94.4 STATUS POST LIVER TRANSPLANTATION (H): ICD-10-CM

## 2022-01-07 DIAGNOSIS — K70.9 ALCOHOLIC LIVER DISEASE (H): ICD-10-CM

## 2022-01-07 DIAGNOSIS — Z76.82 LIVER TRANSPLANT CANDIDATE: ICD-10-CM

## 2022-01-07 LAB
ABO/RH(D): NORMAL
ALBUMIN SERPL-MCNC: 2.7 G/DL (ref 3.4–5)
ALP SERPL-CCNC: 123 U/L (ref 40–150)
ALT SERPL W P-5'-P-CCNC: 30 U/L (ref 0–50)
AMYLASE SERPL-CCNC: 90 U/L (ref 30–110)
ANION GAP SERPL CALCULATED.3IONS-SCNC: 11 MMOL/L (ref 3–14)
ANTIBODY SCREEN: NEGATIVE
APTT PPP: 143 SECONDS (ref 22–38)
APTT PPP: 149 SECONDS (ref 22–38)
APTT PPP: 50 SECONDS (ref 22–38)
AST SERPL W P-5'-P-CCNC: 54 U/L (ref 0–45)
ATRIAL RATE - MUSE: 82 BPM
B-HCG SERPL-ACNC: <1 IU/L (ref 0–5)
BASE EXCESS BLDA CALC-SCNC: -3.6 MMOL/L (ref -9–1.8)
BASE EXCESS BLDA CALC-SCNC: -5 MMOL/L (ref -9–1.8)
BASE EXCESS BLDA CALC-SCNC: -6.8 MMOL/L (ref -9–1.8)
BASE EXCESS BLDA CALC-SCNC: -8.7 MMOL/L (ref -9.6–2)
BASE EXCESS BLDA CALC-SCNC: 0.4 MMOL/L (ref -9–1.8)
BASOPHILS # BLD MANUAL: 0.4 10E3/UL (ref 0–0.2)
BASOPHILS NFR BLD MANUAL: 3 %
BILIRUB SERPL-MCNC: 15.6 MG/DL (ref 0.2–1.3)
BLD PROD TYP BPU: NORMAL
BLOOD COMPONENT TYPE: NORMAL
BUN SERPL-MCNC: 36 MG/DL (ref 7–30)
BURR CELLS BLD QL SMEAR: ABNORMAL
CA-I BLD-MCNC: 3.5 MG/DL (ref 4.4–5.2)
CA-I BLD-MCNC: 4.4 MG/DL (ref 4.4–5.2)
CA-I BLD-MCNC: 4.9 MG/DL (ref 4.4–5.2)
CA-I BLD-MCNC: 5.3 MG/DL (ref 4.4–5.2)
CA-I BLD-MCNC: 5.3 MG/DL (ref 4.4–5.2)
CALCIUM SERPL-MCNC: 8.6 MG/DL (ref 8.5–10.1)
CF REDUC 60M P MA LENFR BLD TEG: 0 % (ref 0–15)
CF REDUC 60M P MA LENFR BLD TEG: 25.5 % (ref 0–15)
CF REDUC 60M P MA LENFR BLD TEG: 8.5 % (ref 0–15)
CFT BLD TEG: 1.1 MINUTE (ref 1–3)
CFT BLD TEG: 1.6 MINUTE (ref 1–3)
CFT BLD TEG: 1.7 MINUTE (ref 1–3)
CHLORIDE BLD-SCNC: 100 MMOL/L (ref 94–109)
CI (COAGULATION INDEX): -0.2 (ref -3–3)
CI (COAGULATION INDEX): -2 (ref -3–3)
CI (COAGULATION INDEX): 2.7 (ref -3–3)
CLOT ANGLE BLD TEG: 67.4 DEGREES (ref 59–74)
CLOT ANGLE BLD TEG: 67.8 DEGREES (ref 59–74)
CLOT ANGLE BLD TEG: 75.6 DEGREES (ref 59–74)
CLOT INIT BLD TEG: 3.3 MINUTE (ref 4–9)
CLOT INIT BLD TEG: 4.6 MINUTE (ref 4–9)
CLOT INIT BLD TEG: 7.9 MINUTE (ref 4–9)
CLOT LYSIS 30M P MA LENFR BLD TEG: 0 % (ref 0–8)
CLOT LYSIS 30M P MA LENFR BLD TEG: 2.6 % (ref 0–8)
CLOT LYSIS 30M P MA LENFR BLD TEG: 3.1 % (ref 0–8)
CLOT STRENGTH BLD TEG: 4.9 KD/SC (ref 5.3–13.2)
CLOT STRENGTH BLD TEG: 5.6 KD/SC (ref 5.3–13.2)
CLOT STRENGTH BLD TEG: 7.5 KD/SC (ref 5.3–13.2)
CMV IGG SERPL IA-ACNC: 0.51 U/ML
CMV IGG SERPL IA-ACNC: NORMAL
CMV IGM SERPL IA-ACNC: <8 AU/ML
CMV IGM SERPL IA-ACNC: NEGATIVE
CO2 SERPL-SCNC: 24 MMOL/L (ref 20–32)
CODING SYSTEM: NORMAL
CREAT SERPL-MCNC: 1.18 MG/DL (ref 0.52–1.04)
CROSSMATCH: NORMAL
DIASTOLIC BLOOD PRESSURE - MUSE: NORMAL MMHG
EBV VCA IGG SER IA-ACNC: 268 U/ML
EBV VCA IGG SER IA-ACNC: POSITIVE
EBV VCA IGM SER IA-ACNC: 25.2 U/ML
EBV VCA IGM SER IA-ACNC: NORMAL
EOSINOPHIL # BLD MANUAL: 0.5 10E3/UL (ref 0–0.7)
EOSINOPHIL NFR BLD MANUAL: 4 %
ERYTHROCYTE [DISTWIDTH] IN BLOOD BY AUTOMATED COUNT: 18.5 % (ref 10–15)
ERYTHROCYTE [DISTWIDTH] IN BLOOD BY AUTOMATED COUNT: 21.9 % (ref 10–15)
FIBRINOGEN PPP-MCNC: 119 MG/DL (ref 170–490)
FIBRINOGEN PPP-MCNC: 201 MG/DL (ref 170–490)
FIBRINOGEN PPP-MCNC: 93 MG/DL (ref 170–490)
FRAGMENTS BLD QL SMEAR: SLIGHT
GFR SERPL CREATININE-BSD FRML MDRD: 56 ML/MIN/1.73M2
GLUCOSE BLD-MCNC: 104 MG/DL (ref 70–99)
GLUCOSE BLD-MCNC: 109 MG/DL (ref 70–99)
GLUCOSE BLD-MCNC: 136 MG/DL (ref 70–99)
GLUCOSE BLD-MCNC: 202 MG/DL (ref 70–99)
GLUCOSE BLD-MCNC: 219 MG/DL (ref 70–99)
GLUCOSE BLD-MCNC: 222 MG/DL (ref 70–99)
HBV CORE AB SERPL QL IA: NONREACTIVE
HBV SURFACE AB SERPL IA-ACNC: 0.08 M[IU]/ML
HBV SURFACE AG SERPL QL IA: NONREACTIVE
HCO3 BLD-SCNC: 16 MMOL/L (ref 21–28)
HCO3 BLD-SCNC: 18 MMOL/L (ref 21–28)
HCO3 BLD-SCNC: 19 MMOL/L (ref 21–28)
HCO3 BLD-SCNC: 23 MMOL/L (ref 21–28)
HCO3 BLDA-SCNC: 16 MMOL/L (ref 21–28)
HCT VFR BLD AUTO: 20.8 % (ref 35–47)
HCT VFR BLD AUTO: 25.9 % (ref 35–47)
HCV AB SERPL QL IA: NONREACTIVE
HGB BLD-MCNC: 7.1 G/DL (ref 11.7–15.7)
HGB BLD-MCNC: 7.4 G/DL (ref 11.7–15.7)
HGB BLD-MCNC: 8.8 G/DL (ref 11.7–15.7)
HGB BLD-MCNC: 8.8 G/DL (ref 11.7–15.7)
HGB BLD-MCNC: 8.9 G/DL (ref 11.7–15.7)
HIV 1+2 AB+HIV1 P24 AG SERPL QL IA: NONREACTIVE
HOLD SPECIMEN: NORMAL
INR PPP: 2.54 (ref 0.85–1.15)
INR PPP: 3.45 (ref 0.85–1.15)
INR PPP: 4.98 (ref 0.85–1.15)
INTERPRETATION ECG - MUSE: NORMAL
ISSUE DATE AND TIME: NORMAL
LACTATE BLD-SCNC: 5.5 MMOL/L
LACTATE SERPL-SCNC: 1.8 MMOL/L (ref 0.7–2)
LACTATE SERPL-SCNC: 2.3 MMOL/L (ref 0.7–2)
LACTATE SERPL-SCNC: 4.9 MMOL/L (ref 0.7–2)
LACTATE SERPL-SCNC: 6.1 MMOL/L (ref 0.7–2)
LYMPHOCYTES # BLD MANUAL: 1.7 10E3/UL (ref 0.8–5.3)
LYMPHOCYTES NFR BLD MANUAL: 13 %
MAGNESIUM SERPL-MCNC: 1.3 MG/DL (ref 1.6–2.3)
MAGNESIUM SERPL-MCNC: 1.4 MG/DL (ref 1.6–2.3)
MCF BLD TEG: 49.6 MM (ref 55–74)
MCF BLD TEG: 52.6 MM (ref 55–74)
MCF BLD TEG: 59.9 MM (ref 55–74)
MCH RBC QN AUTO: 31.9 PG (ref 26.5–33)
MCH RBC QN AUTO: 35.1 PG (ref 26.5–33)
MCHC RBC AUTO-ENTMCNC: 34.1 G/DL (ref 31.5–36.5)
MCHC RBC AUTO-ENTMCNC: 34.4 G/DL (ref 31.5–36.5)
MCV RBC AUTO: 103 FL (ref 78–100)
MCV RBC AUTO: 93 FL (ref 78–100)
MONOCYTES # BLD MANUAL: 0.8 10E3/UL (ref 0–1.3)
MONOCYTES NFR BLD MANUAL: 6 %
NEUTROPHILS # BLD MANUAL: 9.6 10E3/UL (ref 1.6–8.3)
NEUTROPHILS NFR BLD MANUAL: 72 %
O2/TOTAL GAS SETTING VFR VENT: 100 %
O2/TOTAL GAS SETTING VFR VENT: 50 %
OXYHGB MFR BLDA: 97 % (ref 92–100)
P AXIS - MUSE: 65 DEGREES
PCO2 BLD: 20 MM HG (ref 35–45)
PCO2 BLD: 22 MM HG (ref 35–45)
PCO2 BLD: 29 MM HG (ref 35–45)
PCO2 BLD: 29 MM HG (ref 35–45)
PCO2 BLDA: 27 MM HG (ref 35–45)
PH BLD: 7.42 [PH] (ref 7.35–7.45)
PH BLD: 7.5 [PH] (ref 7.35–7.45)
PH BLD: 7.52 [PH] (ref 7.35–7.45)
PH BLD: 7.53 [PH] (ref 7.35–7.45)
PH BLDA: 7.37 [PH] (ref 7.35–7.45)
PHOSPHATE SERPL-MCNC: 3 MG/DL (ref 2.5–4.5)
PLASMA CELLS # BLD MANUAL: 0.3 10E3/UL
PLASMA CELLS NFR BLD MANUAL: 2 %
PLAT MORPH BLD: ABNORMAL
PLATELET # BLD AUTO: 106 10E3/UL (ref 150–450)
PLATELET # BLD AUTO: 144 10E3/UL (ref 150–450)
PLATELET # BLD AUTO: 81 10E3/UL (ref 150–450)
PO2 BLD: 130 MM HG (ref 80–105)
PO2 BLD: 143 MM HG (ref 80–105)
PO2 BLD: 170 MM HG (ref 80–105)
PO2 BLD: 359 MM HG (ref 80–105)
PO2 BLDA: 135 MM HG (ref 80–105)
POTASSIUM BLD-SCNC: 2.9 MMOL/L (ref 3.4–5.3)
POTASSIUM BLD-SCNC: 3.1 MMOL/L (ref 3.5–5)
POTASSIUM BLD-SCNC: 3.3 MMOL/L (ref 3.4–5.3)
POTASSIUM BLD-SCNC: 3.4 MMOL/L (ref 3.4–5.3)
POTASSIUM BLD-SCNC: 3.4 MMOL/L (ref 3.4–5.3)
POTASSIUM BLD-SCNC: 3.5 MMOL/L (ref 3.4–5.3)
PR INTERVAL - MUSE: 146 MS
PROT SERPL-MCNC: 5.2 G/DL (ref 6.8–8.8)
QRS DURATION - MUSE: 76 MS
QT - MUSE: 410 MS
QTC - MUSE: 479 MS
R AXIS - MUSE: -9 DEGREES
RBC # BLD AUTO: 2.02 10E6/UL (ref 3.8–5.2)
RBC # BLD AUTO: 2.79 10E6/UL (ref 3.8–5.2)
RBC MORPH BLD: ABNORMAL
SARS-COV-2 RNA RESP QL NAA+PROBE: NEGATIVE
SODIUM BLD-SCNC: 132 MMOL/L (ref 133–144)
SODIUM SERPL-SCNC: 130 MMOL/L (ref 133–144)
SODIUM SERPL-SCNC: 133 MMOL/L (ref 133–144)
SODIUM SERPL-SCNC: 133 MMOL/L (ref 133–144)
SODIUM SERPL-SCNC: 134 MMOL/L (ref 133–144)
SODIUM SERPL-SCNC: 135 MMOL/L (ref 133–144)
SPECIMEN EXPIRATION DATE: NORMAL
SYSTOLIC BLOOD PRESSURE - MUSE: NORMAL MMHG
T AXIS - MUSE: 25 DEGREES
UNIT ABO/RH: NORMAL
UNIT NUMBER: NORMAL
UNIT STATUS: NORMAL
UNIT TYPE ISBT: 1700
UNIT TYPE ISBT: 5100
UNIT TYPE ISBT: 6200
UNIT TYPE ISBT: 7300
UNIT TYPE ISBT: 9500
VENTRICULAR RATE- MUSE: 82 BPM
WBC # BLD AUTO: 13.4 10E3/UL (ref 4–11)
WBC # BLD AUTO: 9.7 10E3/UL (ref 4–11)

## 2022-01-07 PROCEDURE — 86803 HEPATITIS C AB TEST: CPT

## 2022-01-07 PROCEDURE — 82803 BLOOD GASES ANY COMBINATION: CPT | Performed by: TRANSPLANT SURGERY

## 2022-01-07 PROCEDURE — 86704 HEP B CORE ANTIBODY TOTAL: CPT

## 2022-01-07 PROCEDURE — 36415 COLL VENOUS BLD VENIPUNCTURE: CPT | Performed by: TRANSPLANT SURGERY

## 2022-01-07 PROCEDURE — 250N000011 HC RX IP 250 OP 636: Performed by: TRANSPLANT SURGERY

## 2022-01-07 PROCEDURE — 250N000011 HC RX IP 250 OP 636

## 2022-01-07 PROCEDURE — 82803 BLOOD GASES ANY COMBINATION: CPT

## 2022-01-07 PROCEDURE — 88313 SPECIAL STAINS GROUP 2: CPT | Mod: 26 | Performed by: PATHOLOGY

## 2022-01-07 PROCEDURE — 250N000009 HC RX 250: Performed by: STUDENT IN AN ORGANIZED HEALTH CARE EDUCATION/TRAINING PROGRAM

## 2022-01-07 PROCEDURE — 6ABF0BZ PERFUSION OF HEPATOBILIARY SYSTEM AND PANCREAS DONOR ORGAN, SINGLE: ICD-10-PCS | Performed by: TRANSPLANT SURGERY

## 2022-01-07 PROCEDURE — U0003 INFECTIOUS AGENT DETECTION BY NUCLEIC ACID (DNA OR RNA); SEVERE ACUTE RESPIRATORY SYNDROME CORONAVIRUS 2 (SARS-COV-2) (CORONAVIRUS DISEASE [COVID-19]), AMPLIFIED PROBE TECHNIQUE, MAKING USE OF HIGH THROUGHPUT TECHNOLOGIES AS DESCRIBED BY CMS-2020-01-R: HCPCS

## 2022-01-07 PROCEDURE — 85610 PROTHROMBIN TIME: CPT

## 2022-01-07 PROCEDURE — 87081 CULTURE SCREEN ONLY: CPT

## 2022-01-07 PROCEDURE — 86923 COMPATIBILITY TEST ELECTRIC: CPT | Performed by: STUDENT IN AN ORGANIZED HEALTH CARE EDUCATION/TRAINING PROGRAM

## 2022-01-07 PROCEDURE — 250N000009 HC RX 250: Performed by: ANESTHESIOLOGY

## 2022-01-07 PROCEDURE — 250N000012 HC RX MED GY IP 250 OP 636 PS 637: Performed by: TRANSPLANT SURGERY

## 2022-01-07 PROCEDURE — 88309 TISSUE EXAM BY PATHOLOGIST: CPT | Mod: 26 | Performed by: PATHOLOGY

## 2022-01-07 PROCEDURE — 87070 CULTURE OTHR SPECIMN AEROBIC: CPT | Performed by: TRANSPLANT SURGERY

## 2022-01-07 PROCEDURE — 85049 AUTOMATED PLATELET COUNT: CPT | Performed by: TRANSPLANT SURGERY

## 2022-01-07 PROCEDURE — 82803 BLOOD GASES ANY COMBINATION: CPT | Performed by: STUDENT IN AN ORGANIZED HEALTH CARE EDUCATION/TRAINING PROGRAM

## 2022-01-07 PROCEDURE — 258N000001 HC RX 258: Performed by: STUDENT IN AN ORGANIZED HEALTH CARE EDUCATION/TRAINING PROGRAM

## 2022-01-07 PROCEDURE — 82810 BLOOD GASES O2 SAT ONLY: CPT

## 2022-01-07 PROCEDURE — 0WQF0ZZ REPAIR ABDOMINAL WALL, OPEN APPROACH: ICD-10-PCS | Performed by: TRANSPLANT SURGERY

## 2022-01-07 PROCEDURE — 258N000003 HC RX IP 258 OP 636: Performed by: STUDENT IN AN ORGANIZED HEALTH CARE EDUCATION/TRAINING PROGRAM

## 2022-01-07 PROCEDURE — 88304 TISSUE EXAM BY PATHOLOGIST: CPT | Mod: 26 | Performed by: PATHOLOGY

## 2022-01-07 PROCEDURE — 80053 COMPREHEN METABOLIC PANEL: CPT

## 2022-01-07 PROCEDURE — P9037 PLATE PHERES LEUKOREDU IRRAD: HCPCS | Performed by: TRANSPLANT SURGERY

## 2022-01-07 PROCEDURE — 86644 CMV ANTIBODY: CPT

## 2022-01-07 PROCEDURE — 82330 ASSAY OF CALCIUM: CPT | Performed by: TRANSPLANT SURGERY

## 2022-01-07 PROCEDURE — 85610 PROTHROMBIN TIME: CPT | Performed by: TRANSPLANT SURGERY

## 2022-01-07 PROCEDURE — 87075 CULTR BACTERIA EXCEPT BLOOD: CPT | Performed by: TRANSPLANT SURGERY

## 2022-01-07 PROCEDURE — 88309 TISSUE EXAM BY PATHOLOGIST: CPT | Mod: TC | Performed by: TRANSPLANT SURGERY

## 2022-01-07 PROCEDURE — 250N000013 HC RX MED GY IP 250 OP 250 PS 637: Performed by: NURSE PRACTITIONER

## 2022-01-07 PROCEDURE — 812N000006 HC ACQUISITION LIVER CADAVER DONOR

## 2022-01-07 PROCEDURE — 250N000025 HC SEVOFLURANE, PER MIN: Performed by: TRANSPLANT SURGERY

## 2022-01-07 PROCEDURE — C1769 GUIDE WIRE: HCPCS | Performed by: TRANSPLANT SURGERY

## 2022-01-07 PROCEDURE — 0FY00Z0 TRANSPLANTATION OF LIVER, ALLOGENEIC, OPEN APPROACH: ICD-10-PCS | Performed by: TRANSPLANT SURGERY

## 2022-01-07 PROCEDURE — 74018 RADEX ABDOMEN 1 VIEW: CPT | Mod: 26 | Performed by: RADIOLOGY

## 2022-01-07 PROCEDURE — 87340 HEPATITIS B SURFACE AG IA: CPT

## 2022-01-07 PROCEDURE — 85730 THROMBOPLASTIN TIME PARTIAL: CPT

## 2022-01-07 PROCEDURE — 71046 X-RAY EXAM CHEST 2 VIEWS: CPT | Mod: 26 | Performed by: RADIOLOGY

## 2022-01-07 PROCEDURE — 86645 CMV ANTIBODY IGM: CPT

## 2022-01-07 PROCEDURE — 272N000086 HC PACK RI-RAPID INFUSION: Performed by: TRANSPLANT SURGERY

## 2022-01-07 PROCEDURE — 250N000011 HC RX IP 250 OP 636: Performed by: STUDENT IN AN ORGANIZED HEALTH CARE EDUCATION/TRAINING PROGRAM

## 2022-01-07 PROCEDURE — 87389 HIV-1 AG W/HIV-1&-2 AB AG IA: CPT

## 2022-01-07 PROCEDURE — 71046 X-RAY EXAM CHEST 2 VIEWS: CPT

## 2022-01-07 PROCEDURE — 999N000045 HC STATISTIC DAILY SWAN MONITORING

## 2022-01-07 PROCEDURE — 370N000017 HC ANESTHESIA TECHNICAL FEE, PER MIN: Performed by: TRANSPLANT SURGERY

## 2022-01-07 PROCEDURE — 83605 ASSAY OF LACTIC ACID: CPT | Performed by: TRANSPLANT SURGERY

## 2022-01-07 PROCEDURE — 85730 THROMBOPLASTIN TIME PARTIAL: CPT | Performed by: TRANSPLANT SURGERY

## 2022-01-07 PROCEDURE — 410N000004: Performed by: TRANSPLANT SURGERY

## 2022-01-07 PROCEDURE — 87521 HEPATITIS C PROBE&RVRS TRNSC: CPT

## 2022-01-07 PROCEDURE — 250N000013 HC RX MED GY IP 250 OP 250 PS 637

## 2022-01-07 PROCEDURE — 999N000063 XR ABDOMEN PORT 1 VIEWS

## 2022-01-07 PROCEDURE — 258N000003 HC RX IP 258 OP 636: Performed by: TRANSPLANT SURGERY

## 2022-01-07 PROCEDURE — 83735 ASSAY OF MAGNESIUM: CPT

## 2022-01-07 PROCEDURE — 83605 ASSAY OF LACTIC ACID: CPT | Performed by: STUDENT IN AN ORGANIZED HEALTH CARE EDUCATION/TRAINING PROGRAM

## 2022-01-07 PROCEDURE — 86901 BLOOD TYPING SEROLOGIC RH(D): CPT

## 2022-01-07 PROCEDURE — 86706 HEP B SURFACE ANTIBODY: CPT

## 2022-01-07 PROCEDURE — 82330 ASSAY OF CALCIUM: CPT

## 2022-01-07 PROCEDURE — 84100 ASSAY OF PHOSPHORUS: CPT

## 2022-01-07 PROCEDURE — 250N000009 HC RX 250: Performed by: TRANSPLANT SURGERY

## 2022-01-07 PROCEDURE — 87102 FUNGUS ISOLATION CULTURE: CPT | Performed by: TRANSPLANT SURGERY

## 2022-01-07 PROCEDURE — 85384 FIBRINOGEN ACTIVITY: CPT | Performed by: TRANSPLANT SURGERY

## 2022-01-07 PROCEDURE — 88313 SPECIAL STAINS GROUP 2: CPT | Mod: TC | Performed by: TRANSPLANT SURGERY

## 2022-01-07 PROCEDURE — 84702 CHORIONIC GONADOTROPIN TEST: CPT | Performed by: TRANSPLANT SURGERY

## 2022-01-07 PROCEDURE — 999N000015 HC STATISTIC ARTERIAL MONITORING DAILY

## 2022-01-07 PROCEDURE — 47135 TRANSPLANTATION OF LIVER: CPT | Mod: GC | Performed by: TRANSPLANT SURGERY

## 2022-01-07 PROCEDURE — 85396 CLOTTING ASSAY WHOLE BLOOD: CPT | Performed by: STUDENT IN AN ORGANIZED HEALTH CARE EDUCATION/TRAINING PROGRAM

## 2022-01-07 PROCEDURE — 85384 FIBRINOGEN ACTIVITY: CPT

## 2022-01-07 PROCEDURE — 85027 COMPLETE CBC AUTOMATED: CPT

## 2022-01-07 PROCEDURE — 86665 EPSTEIN-BARR CAPSID VCA: CPT

## 2022-01-07 PROCEDURE — 87205 SMEAR GRAM STAIN: CPT | Performed by: TRANSPLANT SURGERY

## 2022-01-07 PROCEDURE — 85396 CLOTTING ASSAY WHOLE BLOOD: CPT | Performed by: TRANSPLANT SURGERY

## 2022-01-07 PROCEDURE — 93005 ELECTROCARDIOGRAM TRACING: CPT

## 2022-01-07 PROCEDURE — 272N000001 HC OR GENERAL SUPPLY STERILE: Performed by: TRANSPLANT SURGERY

## 2022-01-07 PROCEDURE — 86923 COMPATIBILITY TEST ELECTRIC: CPT | Performed by: TRANSPLANT SURGERY

## 2022-01-07 PROCEDURE — P9016 RBC LEUKOCYTES REDUCED: HCPCS | Performed by: TRANSPLANT SURGERY

## 2022-01-07 PROCEDURE — 258N000003 HC RX IP 258 OP 636: Performed by: ANESTHESIOLOGY

## 2022-01-07 PROCEDURE — 82040 ASSAY OF SERUM ALBUMIN: CPT

## 2022-01-07 PROCEDURE — 82150 ASSAY OF AMYLASE: CPT

## 2022-01-07 PROCEDURE — 999N000157 HC STATISTIC RCP TIME EA 10 MIN

## 2022-01-07 PROCEDURE — 82330 ASSAY OF CALCIUM: CPT | Performed by: STUDENT IN AN ORGANIZED HEALTH CARE EDUCATION/TRAINING PROGRAM

## 2022-01-07 PROCEDURE — 410N000003 HC PER-PERFUSION 1ST 30 MIN: Performed by: TRANSPLANT SURGERY

## 2022-01-07 PROCEDURE — 999N000127 HC STATISTIC PERIPHERAL IV START W US GUIDANCE

## 2022-01-07 PROCEDURE — 999N000155 HC STATISTIC RAPCV CVP MONITORING

## 2022-01-07 PROCEDURE — 93010 ELECTROCARDIOGRAM REPORT: CPT | Mod: 59 | Performed by: INTERNAL MEDICINE

## 2022-01-07 PROCEDURE — P9059 PLASMA, FRZ BETWEEN 8-24HOUR: HCPCS | Performed by: TRANSPLANT SURGERY

## 2022-01-07 PROCEDURE — 36415 COLL VENOUS BLD VENIPUNCTURE: CPT

## 2022-01-07 PROCEDURE — 360N000078 HC SURGERY LEVEL 5, PER MIN: Performed by: TRANSPLANT SURGERY

## 2022-01-07 PROCEDURE — 272N000085 HC PACK CELL SAVER CSP: Performed by: TRANSPLANT SURGERY

## 2022-01-07 RX ORDER — FLUCONAZOLE 2 MG/ML
400 INJECTION, SOLUTION INTRAVENOUS ONCE
Status: COMPLETED | OUTPATIENT
Start: 2022-01-07 | End: 2022-01-07

## 2022-01-07 RX ORDER — NOREPINEPHRINE BITARTRATE 0.06 MG/ML
.01-.6 INJECTION, SOLUTION INTRAVENOUS CONTINUOUS
Status: DISCONTINUED | OUTPATIENT
Start: 2022-01-07 | End: 2022-01-08 | Stop reason: HOSPADM

## 2022-01-07 RX ORDER — DEXTROSE MONOHYDRATE 50 MG/ML
INJECTION, SOLUTION INTRAVENOUS CONTINUOUS PRN
Status: DISCONTINUED | OUTPATIENT
Start: 2022-01-07 | End: 2022-01-08

## 2022-01-07 RX ORDER — MAGNESIUM SULFATE HEPTAHYDRATE 40 MG/ML
2 INJECTION, SOLUTION INTRAVENOUS ONCE
Status: DISCONTINUED | OUTPATIENT
Start: 2022-01-07 | End: 2022-01-07

## 2022-01-07 RX ORDER — SODIUM CHLORIDE, SODIUM GLUCONATE, SODIUM ACETATE, POTASSIUM CHLORIDE AND MAGNESIUM CHLORIDE 526; 502; 368; 37; 30 MG/100ML; MG/100ML; MG/100ML; MG/100ML; MG/100ML
INJECTION, SOLUTION INTRAVENOUS CONTINUOUS PRN
Status: DISCONTINUED | OUTPATIENT
Start: 2022-01-07 | End: 2022-01-08

## 2022-01-07 RX ORDER — AMINOCAPROIC ACID 250 MG/ML
INJECTION, SOLUTION INTRAVENOUS PRN
Status: DISCONTINUED | OUTPATIENT
Start: 2022-01-07 | End: 2022-01-08

## 2022-01-07 RX ORDER — PAPAVERINE HYDROCHLORIDE 30 MG/ML
INJECTION INTRAMUSCULAR; INTRAVENOUS PRN
Status: DISCONTINUED | OUTPATIENT
Start: 2022-01-07 | End: 2022-01-08 | Stop reason: HOSPADM

## 2022-01-07 RX ORDER — CALCIUM CHLORIDE 100 MG/ML
INJECTION INTRAVENOUS; INTRAVENTRICULAR PRN
Status: DISCONTINUED | OUTPATIENT
Start: 2022-01-07 | End: 2022-01-08

## 2022-01-07 RX ORDER — DEXTROSE MONOHYDRATE 25 G/50ML
INJECTION, SOLUTION INTRAVENOUS PRN
Status: DISCONTINUED | OUTPATIENT
Start: 2022-01-07 | End: 2022-01-08

## 2022-01-07 RX ORDER — LIDOCAINE 40 MG/G
CREAM TOPICAL
Status: DISCONTINUED | OUTPATIENT
Start: 2022-01-07 | End: 2022-01-08 | Stop reason: HOSPADM

## 2022-01-07 RX ORDER — PIPERACILLIN SODIUM, TAZOBACTAM SODIUM 3; .375 G/15ML; G/15ML
3.38 INJECTION, POWDER, LYOPHILIZED, FOR SOLUTION INTRAVENOUS ONCE
Status: COMPLETED | OUTPATIENT
Start: 2022-01-07 | End: 2022-01-07

## 2022-01-07 RX ORDER — MAGNESIUM SULFATE 1 G/100ML
INJECTION INTRAVENOUS PRN
Status: DISCONTINUED | OUTPATIENT
Start: 2022-01-07 | End: 2022-01-08

## 2022-01-07 RX ORDER — ALBUTEROL SULFATE 90 UG/1
2 AEROSOL, METERED RESPIRATORY (INHALATION) EVERY 4 HOURS PRN
Status: DISCONTINUED | OUTPATIENT
Start: 2022-01-07 | End: 2022-01-14 | Stop reason: HOSPADM

## 2022-01-07 RX ORDER — PROPOFOL 10 MG/ML
INJECTION, EMULSION INTRAVENOUS PRN
Status: DISCONTINUED | OUTPATIENT
Start: 2022-01-07 | End: 2022-01-08

## 2022-01-07 RX ORDER — FIBRINOGEN (HUMAN) 700-1300MG
5500 KIT INTRAVENOUS ONCE
Status: DISCONTINUED | OUTPATIENT
Start: 2022-01-07 | End: 2022-01-09

## 2022-01-07 RX ORDER — LIDOCAINE HYDROCHLORIDE 20 MG/ML
INJECTION, SOLUTION INFILTRATION; PERINEURAL PRN
Status: DISCONTINUED | OUTPATIENT
Start: 2022-01-07 | End: 2022-01-08

## 2022-01-07 RX ORDER — MAGNESIUM SULFATE HEPTAHYDRATE 40 MG/ML
2 INJECTION, SOLUTION INTRAVENOUS ONCE
Status: COMPLETED | OUTPATIENT
Start: 2022-01-07 | End: 2022-01-07

## 2022-01-07 RX ORDER — FENTANYL CITRATE 50 UG/ML
INJECTION, SOLUTION INTRAMUSCULAR; INTRAVENOUS PRN
Status: DISCONTINUED | OUTPATIENT
Start: 2022-01-07 | End: 2022-01-08

## 2022-01-07 RX ORDER — FIBRINOGEN (HUMAN) 700-1300MG
1100 KIT INTRAVENOUS ONCE
Status: COMPLETED | OUTPATIENT
Start: 2022-01-07 | End: 2022-01-08

## 2022-01-07 RX ORDER — LACTULOSE 10 G/15ML
10 SOLUTION ORAL 3 TIMES DAILY
Status: DISCONTINUED | OUTPATIENT
Start: 2022-01-07 | End: 2022-01-08

## 2022-01-07 RX ORDER — FUROSEMIDE 10 MG/ML
INJECTION INTRAMUSCULAR; INTRAVENOUS PRN
Status: DISCONTINUED | OUTPATIENT
Start: 2022-01-07 | End: 2022-01-08

## 2022-01-07 RX ORDER — SODIUM CHLORIDE, SODIUM LACTATE, POTASSIUM CHLORIDE, CALCIUM CHLORIDE 600; 310; 30; 20 MG/100ML; MG/100ML; MG/100ML; MG/100ML
INJECTION, SOLUTION INTRAVENOUS CONTINUOUS PRN
Status: DISCONTINUED | OUTPATIENT
Start: 2022-01-07 | End: 2022-01-08

## 2022-01-07 RX ORDER — POTASSIUM CHLORIDE 29.8 MG/ML
INJECTION INTRAVENOUS PRN
Status: DISCONTINUED | OUTPATIENT
Start: 2022-01-07 | End: 2022-01-08

## 2022-01-07 RX ORDER — PANTOPRAZOLE SODIUM 40 MG/1
40 TABLET, DELAYED RELEASE ORAL 2 TIMES DAILY
Status: DISCONTINUED | OUTPATIENT
Start: 2022-01-07 | End: 2022-01-08

## 2022-01-07 RX ORDER — PIPERACILLIN SODIUM, TAZOBACTAM SODIUM 2; .25 G/10ML; G/10ML
2.25 INJECTION, POWDER, LYOPHILIZED, FOR SOLUTION INTRAVENOUS
Status: DISCONTINUED | OUTPATIENT
Start: 2022-01-07 | End: 2022-01-08 | Stop reason: HOSPADM

## 2022-01-07 RX ORDER — MANNITOL 20 G/100ML
INJECTION, SOLUTION INTRAVENOUS PRN
Status: DISCONTINUED | OUTPATIENT
Start: 2022-01-07 | End: 2022-01-08

## 2022-01-07 RX ADMIN — AMOXICILLIN 750 MG: 250 CAPSULE ORAL at 09:47

## 2022-01-07 RX ADMIN — POTASSIUM CHLORIDE 20 MEQ: 29.8 INJECTION, SOLUTION INTRAVENOUS at 23:47

## 2022-01-07 RX ADMIN — ALBUTEROL SULFATE 10 PUFF: 90 AEROSOL, METERED RESPIRATORY (INHALATION) at 21:32

## 2022-01-07 RX ADMIN — PHENYLEPHRINE HYDROCHLORIDE 100 MCG: 10 INJECTION INTRAVENOUS at 17:29

## 2022-01-07 RX ADMIN — SUFENTANIL CITRATE 0.2 MCG/KG/HR: 50 INJECTION EPIDURAL; INTRAVENOUS at 19:44

## 2022-01-07 RX ADMIN — EPINEPHRINE 0.03 MCG/KG/MIN: 1 INJECTION PARENTERAL at 21:25

## 2022-01-07 RX ADMIN — PIPERACILLIN AND TAZOBACTAM 2.25 G: 3; .375 INJECTION, POWDER, FOR SOLUTION INTRAVENOUS at 20:03

## 2022-01-07 RX ADMIN — DEXTROSE 50 % IN WATER (D50W) INTRAVENOUS SYRINGE 50 ML: at 21:06

## 2022-01-07 RX ADMIN — ROCURONIUM BROMIDE 20 MG: 50 INJECTION, SOLUTION INTRAVENOUS at 19:01

## 2022-01-07 RX ADMIN — HUMAN INSULIN 5 UNITS/HR: 100 INJECTION, SOLUTION SUBCUTANEOUS at 21:06

## 2022-01-07 RX ADMIN — LIDOCAINE HYDROCHLORIDE 100 MG: 20 INJECTION, SOLUTION INFILTRATION; PERINEURAL at 17:20

## 2022-01-07 RX ADMIN — FENTANYL CITRATE 250 MCG: 50 INJECTION, SOLUTION INTRAMUSCULAR; INTRAVENOUS at 17:20

## 2022-01-07 RX ADMIN — CALCIUM CHLORIDE 1 G: 100 INJECTION INTRAVENOUS; INTRAVENTRICULAR at 17:39

## 2022-01-07 RX ADMIN — CALCIUM CHLORIDE 1 G: 100 INJECTION INTRAVENOUS; INTRAVENTRICULAR at 20:26

## 2022-01-07 RX ADMIN — SODIUM CHLORIDE, SODIUM GLUCONATE, SODIUM ACETATE, POTASSIUM CHLORIDE AND MAGNESIUM CHLORIDE: 526; 502; 368; 37; 30 INJECTION, SOLUTION INTRAVENOUS at 20:11

## 2022-01-07 RX ADMIN — FUROSEMIDE 10 MG: 10 INJECTION, SOLUTION INTRAVENOUS at 19:41

## 2022-01-07 RX ADMIN — MIDAZOLAM 2 MG: 1 INJECTION INTRAMUSCULAR; INTRAVENOUS at 17:20

## 2022-01-07 RX ADMIN — PHENYLEPHRINE HYDROCHLORIDE 200 MCG: 10 INJECTION INTRAVENOUS at 17:37

## 2022-01-07 RX ADMIN — CALCIUM CHLORIDE 2 G: 100 INJECTION INTRAVENOUS; INTRAVENTRICULAR at 21:09

## 2022-01-07 RX ADMIN — AMINOCAPROIC ACID 1 G: 250 INJECTION, SOLUTION INTRAVENOUS at 21:55

## 2022-01-07 RX ADMIN — SODIUM CHLORIDE, POTASSIUM CHLORIDE, SODIUM LACTATE AND CALCIUM CHLORIDE: 600; 310; 30; 20 INJECTION, SOLUTION INTRAVENOUS at 17:40

## 2022-01-07 RX ADMIN — SODIUM CHLORIDE, SODIUM GLUCONATE, SODIUM ACETATE, POTASSIUM CHLORIDE AND MAGNESIUM CHLORIDE: 526; 502; 368; 37; 30 INJECTION, SOLUTION INTRAVENOUS at 19:12

## 2022-01-07 RX ADMIN — LACTULOSE 10 G: 20 SOLUTION ORAL at 13:13

## 2022-01-07 RX ADMIN — ROCURONIUM BROMIDE 80 MG: 50 INJECTION, SOLUTION INTRAVENOUS at 17:20

## 2022-01-07 RX ADMIN — AMOXICILLIN 750 MG: 250 CAPSULE ORAL at 13:13

## 2022-01-07 RX ADMIN — PIPERACILLIN AND TAZOBACTAM 3.38 G: 3; .375 INJECTION, POWDER, FOR SOLUTION INTRAVENOUS at 17:40

## 2022-01-07 RX ADMIN — MANNITOL 30 G: 20 INJECTION, SOLUTION INTRAVENOUS at 20:51

## 2022-01-07 RX ADMIN — SODIUM CHLORIDE, SODIUM GLUCONATE, SODIUM ACETATE, POTASSIUM CHLORIDE AND MAGNESIUM CHLORIDE: 526; 502; 368; 37; 30 INJECTION, SOLUTION INTRAVENOUS at 17:14

## 2022-01-07 RX ADMIN — PIPERACILLIN AND TAZOBACTAM 2.25 G: 3; .375 INJECTION, POWDER, FOR SOLUTION INTRAVENOUS at 21:45

## 2022-01-07 RX ADMIN — FENTANYL CITRATE 100 MCG: 50 INJECTION, SOLUTION INTRAMUSCULAR; INTRAVENOUS at 19:01

## 2022-01-07 RX ADMIN — RIFAXIMIN 550 MG: 550 TABLET ORAL at 08:02

## 2022-01-07 RX ADMIN — LACTULOSE 10 G: 20 SOLUTION ORAL at 08:01

## 2022-01-07 RX ADMIN — NOREPINEPHRINE BITARTRATE 0.08 MCG/KG/MIN: 1 INJECTION, SOLUTION, CONCENTRATE INTRAVENOUS at 17:40

## 2022-01-07 RX ADMIN — PROPOFOL 50 MG: 10 INJECTION, EMULSION INTRAVENOUS at 17:20

## 2022-01-07 RX ADMIN — CALCIUM CHLORIDE 1 G: 100 INJECTION INTRAVENOUS; INTRAVENTRICULAR at 20:39

## 2022-01-07 RX ADMIN — CALCIUM CHLORIDE 2 G: 100 INJECTION INTRAVENOUS; INTRAVENTRICULAR at 22:48

## 2022-01-07 RX ADMIN — PANTOPRAZOLE SODIUM 40 MG: 40 TABLET, DELAYED RELEASE ORAL at 08:02

## 2022-01-07 RX ADMIN — ROCURONIUM BROMIDE 50 MG: 50 INJECTION, SOLUTION INTRAVENOUS at 19:47

## 2022-01-07 RX ADMIN — MAGNESIUM SULFATE HEPTAHYDRATE 1000 MG: 1 INJECTION, SOLUTION INTRAVENOUS at 20:08

## 2022-01-07 RX ADMIN — DEXTROSE MONOHYDRATE: 50 INJECTION, SOLUTION INTRAVENOUS at 18:50

## 2022-01-07 RX ADMIN — KETAMINE HYDROCHLORIDE 10 MG/HR: 10 INJECTION, SOLUTION INTRAMUSCULAR; INTRAVENOUS at 19:44

## 2022-01-07 RX ADMIN — FLUCONAZOLE IN SODIUM CHLORIDE 400 MG: 2 INJECTION, SOLUTION INTRAVENOUS at 17:40

## 2022-01-07 RX ADMIN — MAGNESIUM SULFATE HEPTAHYDRATE 2 G: 40 INJECTION, SOLUTION INTRAVENOUS at 09:47

## 2022-01-07 RX ADMIN — VASOPRESSIN 2 UNITS/HR: 20 INJECTION INTRAVENOUS at 19:13

## 2022-01-07 RX ADMIN — AMINOCAPROIC ACID 1 G: 250 INJECTION, SOLUTION INTRAVENOUS at 21:21

## 2022-01-07 ASSESSMENT — MIFFLIN-ST. JEOR: SCORE: 1268.16

## 2022-01-07 NOTE — PROGRESS NOTES
Admitted/transferred from: Home  Time of arrival on unit 0230  2 RN skin assessment completed by Peyton QUINONES And Zita BLANC  Skin assessment finding: Puncture sites on right and left abdomen from recent paracenteses.   Interventions/actions: None.    Will continue to monitor.

## 2022-01-07 NOTE — TELEPHONE ENCOUNTER
"TRANSPLANT OR REPORT    Organ: liver, local  Laterality (if known): N/A  Organ Location: local    UNOS ID: [id number]  Donor OR Time: 1300  Expected/Actual [place] Time: 1400  Expected Organ Arrival Time: 1600    Surgeon: Dr. Browne  Time in OR: 1600  Time in 3C (N/A for LI): N/A    Recipient Details  Admission ETA: 0100  Unit: 7a  Isolation: none  Latex Allergy: none  : no  Diagnosis: end stage liver disease    Liver Transplants  Bypass: yes per Dr. Frazier at 0444  Hemodialysis: no  ~ \"RENAL STAFF TEACHING SERVICE MEDICINE\" : Remind LI Fellow to discuss with nephrology on call.  ~ CRRT Resource Nurse: p  (Telephone Number for CRRT [phone])    Kidney/Panc Transplants  XM Status (Need to wait for XM?): N/A    Liver or KP/PA Recipients - Vessel Banking:  Donor has positive serologies for HIV/HCV/HBV: no  Donor has risk criteria for HIV/HCV/HBV: no      Transplant Coordinator Contact Info: Steph [phone]      Vessel Bank Information  Transplant hospitals must not store a donor s extra vessels if the donor has tested positive for any of the following:   - HIV by antibody, antigen, or nucleic acid test (JALEEL)   - Hepatitis B surface antigen (HBsAg)   - Hepatitis B (HBV) by JALEEL   - Hepatitis C (HCV) by antibody or JALEEL     Extra vessels from donors that do not test positive for HIV, HBV, or HCV as above may be stored      "

## 2022-01-07 NOTE — PHARMACY-ADMISSION MEDICATION HISTORY
Admission Medication History Completed by Pharmacy    See [facility] Admission Navigator for allergy information, preferred outpatient pharmacy, prior to admission medications and immunization status.     Medication History Sources:     Patient    Dispense History    Changes made to PTA medication list (reason):    Added: None    Deleted: None    Changed: None    Additional Information:    Prior to Admission medications    Medication Sig Last Dose Taking? Auth Provider   albuterol (PROAIR HFA/PROVENTIL HFA/VENTOLIN HFA) 108 (90 Base) MCG/ACT inhaler Inhale 2 puffs into the lungs every 4 hours as needed for wheezing More than a month at Unknown time Yes Randal More MD   amoxicillin (AMOXIL) 250 MG capsule Take 3 capsules (750 mg) by mouth 3 times daily for 9 days 1/6/2022 at Unknown time Yes Cora Lou MD   lactulose (CHRONULAC) 10 GM/15ML solution Take 3 times daily. May titrate to achieve 3-4 loose stools per day. 1/7/2022 at Unknown time Yes Eduardo Parry PA-C   multivitamin (CENTRUM SILVER) tablet Take 1 tablet by mouth daily 1/6/2022 at Unknown time Yes Reported, Patient   pantoprazole (PROTONIX) 40 MG EC tablet Take 1 tablet (40 mg) by mouth 2 times daily 1/7/2022 at Unknown time Yes Eduardo Parry PA-C   rifaximin (XIFAXAN) 550 MG TABS tablet Take 1 tablet (550 mg) by mouth 2 times daily 1/7/2022 at Unknown time Yes Cora Lou MD   tretinoin (RETIN-A) 0.025 % external cream Apply a pea size to entire face QD Past Month at Unknown time Yes Bronwyn Mott PA-C   folic acid (FOLVITE) 1 MG tablet Take 1 tablet (1 mg) by mouth daily  Patient not taking: Reported on 1/7/2022 Not Taking at Unknown time  Eduardo Parry PA-C       Date completed: 01/07/22    Medication history completed by: CESAR AU [facility]

## 2022-01-07 NOTE — ANESTHESIA PREPROCEDURE EVALUATION
Anesthesia Pre-Procedure Evaluation  51yF. Hx alcohol related liver disease presents for DBD OLT. Comorbid Hx chronic nausea and vomiting, hyponatremia, asthma, anemia, and coagulopathy of liver disease. We will plan for GETA with rapid sequence induction using high-dose rocuronium. PIVx2 with NO fluid warmer (for dead space minimization with PLT and cryo). CVCx2, PAC, arterial line, JENNIFER (Adult 2D probe), resuscitation with PlasmaLyte via [place] with dual patient line, under+upper+lower body Geeta Huggers, cerebral oximetry, hourly TEG and ABG to monitor the following goals and interventions: Insulin gtt (on a dextrose-containing carrier) for goal K < 3.5 at reperfusion, calcium gtt for goal iCa > 5.0 at reperfusion, Hyperventilation for goal PaCO2 25-30 until after reperfusion (then normal). Blood in room (5+5+2 at all times, refreshed hourly upon return of lab results). Sufenta and ketamine gtt's for analgesia, hypnosis w/ SEVOFLURANE titrated to BIS (40-60), Rocuronium for NMB with likely Sugammadex reversal upon transfer to ICU sedated on propofol and likely remaining intubated.  ___________________   Joe Hernandez MD          Pager: [phone]      MELD-Na score: 30 at 2022  3:07 AM  MELD score: 29 at 2022  3:07 AM  Calculated from:  Serum Creatinine: 1.18 mg/dL at 2022  3:07 AM  Serum Sodium: 135 mmol/L at 2022  3:07 AM  Total Bilirubin: 15.6 mg/dL at 2022  3:07 AM  INR(ratio): 2.54 at 2022  3:07 AM  Age: 51 years    Patient: Melita Cortes   MRN: [id number] : 1970        Preoperative Diagnosis: End stage liver disease (H) [K72.10]    Procedure : Procedure(s):  TRANSPLANT, LIVER, RECIPIENT,  DONOR          Past Medical History:   Diagnosis Date     Alcoholic hepatitis      Asthma 3/10/2006     Cervical high risk HPV (human papillomavirus) test positive 2020    See problem list      Past Surgical History:   Procedure Laterality Date     APPENDECTOMY        COLONOSCOPY N/A 2022    Procedure: COLONOSCOPY;  Surgeon: Zita Steele MD;  Location: [facility]     ESOPHAGOGASTRODUODENOSCOPY, WITH BRUSHINGS N/A 10/29/2021    Procedure: ESOPHAGOGASTRODUODENOSCOPY, WITH BRUSHINGS;  Surgeon: Torey Ruiz MD;  Location: [facility]      No Known Allergies   Social History     Tobacco Use     Smoking status: Former Smoker     Quit date: 2020     Years since quittin.6     Smokeless tobacco: Never Used   Substance Use Topics     Alcohol use: Not Currently     Comment: Last drink 2021      Wt Readings from Last 1 Encounters:   22 64.7 kg (142 lb 11.2 oz)        Anesthesia Evaluation            ROS/MED HX  ENT/Pulmonary:     (+) asthma     Neurologic: Comment: Hepatic encephalopathy      Cardiovascular:       METS/Exercise Tolerance:     Hematologic: Comments: Coagulopathy of liver disease    (+) anemia,     Musculoskeletal:       GI/Hepatic:     (+) hepatitis type Alcoholic, liver disease,     Renal/Genitourinary:     (+) renal disease, Pt does not require dialysis,     Endo:     (+) Obesity,     Psychiatric/Substance Use:       Infectious Disease:       Malignancy:       Other:               OUTSIDE LABS:  CBC:   Lab Results   Component Value Date    WBC 13.4 (H) 2022    WBC 12.6 (H) 2022    HGB 7.1 (L) 2022    HGB 7.5 (L) 2022    HCT 20.8 (L) 2022    HCT 21.4 (L) 2022    PLT 81 (L) 2022    PLT 83 (L) 2022     BMP:   Lab Results   Component Value Date     2022     2022    POTASSIUM 3.4 2022    POTASSIUM 3.3 (L) 2022    CHLORIDE 100 2022    CHLORIDE 100 2022    CO2 24 2022    CO2 23 2022    BUN 36 (H) 2022    BUN 30 2022    CR 1.18 (H) 2022    CR 0.98 2022     (H) 2022     (H) 2022     COAGS:   Lab Results   Component Value Date    PTT 50 (H) 2022    INR 2.54 (H) 2022    FIBR 201  01/07/2022     POC:   Lab Results   Component Value Date    HCGS Negative 12/28/2021     HEPATIC:   Lab Results   Component Value Date    ALBUMIN 2.7 (L) 01/07/2022    PROTTOTAL 5.2 (L) 01/07/2022    ALT 30 01/07/2022    AST 54 (H) 01/07/2022    ALKPHOS 123 01/07/2022    BILITOTAL 15.6 (HH) 01/07/2022    JENNIFFER 34 12/30/2021     OTHER:   Lab Results   Component Value Date    PH 7.54 (H) 11/03/2021    LACT 2.1 (H) 01/04/2022    A1C  10/29/2021      Comment:      Unable to calculate % HBA1C due to low HGB and/or Low HGBA1C.  DTF.  Normal <5.7%   Prediabetes 5.7-6.4%    Diabetes 6.5% or higher     Note: Adopted from ADA consensus guidelines.    SHAI 8.6 01/07/2022    PHOS 3.0 01/07/2022    MAG 1.4 (L) 01/07/2022    LIPASE 366 12/30/2021    AMYLASE 90 01/07/2022    TSH 0.66 12/28/2021       Anesthesia Plan    ASA Status:  4, emergent       Anesthesia Type: General.     - Airway: ETT   Induction: Intravenous, RSI.   Maintenance: Balanced.   Techniques and Equipment:     - Lines/Monitors: 2nd IV, Arterial Line, Central Line, PAC, CVP, BIS, NIRS, JENNIFER     - Blood: Blood in Room, PRBC, Cell Saver, FFP, PLT     - Drips/Meds: Ketamine, Sufentanil, Norepi, Vasopressin, Epinephrine     Consents    Anesthesia Plan(s) and associated risks, benefits, and realistic alternatives discussed. Questions answered and patient/representative(s) expressed understanding.    - Discussed:     - Discussed with:  Implied consent/emergency      - Extended Intubation/Ventilatory Support Discussed: Yes.    Use of blood products discussed: Yes.     - Discussed with: Implied consent/Emergency.     Postoperative Care    Pain management: IV analgesics, Multi-modal analgesia.   PONV prophylaxis: Ondansetron (or other 5HT-3), Dexamethasone or Solumedrol, Background Propofol Infusion     Comments:                Sunny Howell MD

## 2022-01-07 NOTE — PROGRESS NOTES
"CLINICAL NUTRITION SERVICES - ASSESSMENT NOTE     Nutrition Prescription    RECOMMENDATIONS FOR MDs/PROVIDERS TO ORDER:  Recommend FT placement with TF if unable to advance diet within 1-2 days. Patient with severe malnutrition     Malnutrition Status:    Severe malnutrition in the context of chronic illness    Recommendations already ordered by Registered Dietitian (RD):  None at this time     Future/Additional Recommendations:  -- when diet is advanced > FLD recommend ensure enlive TID (chocolate flavor per patient request) and calorie counts  -- If tf warranted this admission recommend: Osmolite 1.5 Ozzy @ goal of  55ml/hr  (1320ml/day)  will provide: 1980 kcals, 83 g PRO, 1005 ml free H20, 268 g CHO, and 0 g fiber daily.  - 1 packet Prosource BID (80 kcals, 22 gram PRO)  - start TF at 15 ml/hr for 8 hrs and increase 10 ml q 8 hrs until goal rate  - 30 ml H2O flush q 4 hrs for tube patency  - Certavite 15 ml/daily  - monitor K+, Mg++ and phos with TF advancement - patient is at risk for refeeding syndrome  - Total Kcal/PRO = 2060 kcals (33 kcals/kg) and 105 g protein (1.7 g/kg)     REASON FOR ASSESSMENT  Melita Cortes is a/an 51 year old female assessed by the dietitian for Provider Order - Pre Op Liver Transplant     Per chart review patient with a past medical Hx of end stage liver disease 2/2 EtOH cirrhosis    NUTRITION HISTORY  Fidelina and Fidelina's spouse report patient was recently hospitalized and she was not eating well during this time. She was mostly NPO and on liquids. Upon discharge, Fidelina reports she was eating greek yogurt and 2 ensure enlive daily. She reports weight loss recently. She follows a low Na diet at home.     Per chart review patient recently assessed by RD at [facility] on 12/24/21, RD obtained the following nutrition hx:     Visited with pt this afternoon  Notes that she has been following the 2000mg Na diet restriction  - \"it has been hard sometimes, but we have been " "religiously following it\"  Admits to occasionally using \"a few salt granules\" on her food  She is also making sure she consumes protein  \"If I don't have enough at a meal, I have been drinking 2 Ensure Enlive supplements\" (orders them from [employer])  She has also made smoothies using protein powder     Pt reports that over the past few months her appetite has been decreased - \"because of my liver disease\"  She states that it is much better now and she has started eating more  Pt weighs herself daily and at home she has been ~132#  She confirms that her wt is down from 2 months ago  \"I have lost muscle - from wt loss and also not having the energy to exercise\"  She admits that she has been feeling very depressed, which also impacts her motivation to exercise    CURRENT NUTRITION ORDERS  Diet: NPO    LABS  Labs reviewed  (1/7): Mg++ 1.4 mg/dL (L), BUN 36 mg/dL (H), Cr 1.18 mg/dL (H) , T. Bili 15.6 mg/dL(H), phos 3 mg/dL, K+ 3.4 mmol/L     MEDICATIONS  Medications reviewed  Lactulose    ANTHROPOMETRICS  Height: 165.1 cm (5' 5\")  Most Recent Weight: 64.7 kg (142 lb 11.2 oz)    IBW: 57 kg  BMI: Normal BMI  Weight History:   Wt Readings from Last 10 Encounters:   01/07/22 64.7 kg (142 lb 11.2 oz)   01/05/22 63.1 kg (139 lb 1.6 oz)   12/30/21 58.5 kg (129 lb)   12/25/21 58.9 kg (129 lb 14.4 oz)   12/13/21 60 kg (132 lb 4.8 oz)   11/08/21 64.3 kg (141 lb 12.8 oz)   10/29/21 68.5 kg (151 lb 0.2 oz)   10/19/21 69.5 kg (153 lb 4.8 oz)   10/13/21 69.7 kg (153 lb 9 oz)   10/01/21 70.4 kg (155 lb 3.2 oz)   8.1% weight loss x 3 months  Dosing Weight: 63 kg (weight 1/5)    ASSESSED NUTRITION NEEDS  Estimated Energy Needs: 1752-5369 kcals/day (30 - 35 kcals/kg )  Justification: Post-op - SOT course  Estimated Protein Needs:  grams protein/day (1.5 - 2 grams of pro/kg)  Justification: Post-op - SOT course  Estimated Fluid Needs: (1 mL/kcal)   Justification: Maintenance    PHYSICAL FINDINGS  See malnutrition section below.  Per  " Chart review patient with mild ascites      MALNUTRITION  % Intake: </=75% for >/= 1 month (severe)  % Weight Loss: > 7.5% in 3 months (severe)  Subcutaneous Fat Loss: Facial region:  mild and Upper arm: moderate  Muscle Loss: Temporal:  moderate, Facial & jaw region:  moderate, Thoracic region (clavicle, acromium bone, deltoid, trapezius, pectoral):  mild and Upper arm (bicep, tricep): moderate  Fluid Accumulation/Edema: Does not meet criteria  Malnutrition Diagnosis: Severe malnutrition in the context of chronic illness    NUTRITION DIAGNOSIS  Inadequate oral intake related to decreased/no appetite as evidenced by patient self report and recent weight loss      INTERVENTIONS  Implementation  Nutrition Education: See education flowsheet     Goals  Diet adv v nutrition support within 2-3 days.     Monitoring/Evaluation  Progress toward goals will be monitored and evaluated per protocol.    Roopa Verma, MS/RD/MARCELINO  7A RD Pager: [phone]

## 2022-01-07 NOTE — PROVIDER NOTIFICATION
Lab called w/ lactic acid of 2.3. Notified Yumi Miguel NP. Instructed not to call RRT. Slightly hypotensive in 90s/60s. OVSS. Will continue to monitor.

## 2022-01-07 NOTE — H&P
[facility]    SICU Admission History & Physical    Primary Team: Transplant  Reason for Critical Care Admission: Status post DDLT  Admitting Physician: Pradeep Browne MD  Date of Admission: 1/7/2022    Assessment: Critical Care   Melita Cortes is a 51 year old female admitted on 1/7/2022. She has end stage liver disease secondary to alcoholic cirrhosis (ETOH remission since 09/2021, MELD 30), now status post DDLT and admitted to the SICU service for postoperative cares.    Surgery significant for EBL 2L, 1L cell-saver, 5 pRBC, 3u FFP, 1L albumin, 13.4L total fluid given       Plan: Critical Care   Neurological/Pain/Sedation:  # Acute postoperative pain  # Postoperative sedation in setting of mechanical ventilation  - Monitor neurological status and notify provider for any acute changes.  - Pain regimen:               -- Scheduled Tylenol solution               -- PRN Dilaudid; continue Fentanyl GTT, wean as tolerated    - Sedation regimen: RASS goal -1 to -2   -- Propofol GTT, wean as tolerated     Pulmonary:   # Postoperative ventilatory support  # Hx of asthma  - Mechanical ventilation:  Ventilation Mode: CMV/AC  (Continuous Mandatory Ventilation/ Assist Control)  Rate Set (breaths/minute): 16 breaths/min  Tidal Volume Set (mL): 470 mL  PEEP (cm H2O): 5 cmH2O  Oxygen Concentration (%): 50 %  Resp: 16    - Post-op ABG Stat, post-op CXR pending  - Daily SBT, SAT  - HOB>30 degress  - Vent weaning; maintain O2 > 90%    Cardiovascular:    # Postoperative hemodynamic support   - CVP goal > 8, < 12   -- CVP 6  on arrival to SICU  - MAP goal > 60, < 85  - SBP goal > 110, <160  - Pressor requirements:   -- Pressor as needed, on 0.03 NE, wean as tolerated    Gastroenterology/Nutrition:  # Alcoholic liver cirrhosis  # Hx of ascites   # Hx of grade I esophageal varices (last EGD 10/29/2021)  # Status post DDLT     MELD-Na score: 30 at 1/7/2022  3:07 AM  MELD score:  29 at 1/7/2022  3:07 AM  Calculated from:  Serum Creatinine: 1.18 mg/dL at 1/7/2022  3:07 AM  Serum Sodium: 135 mmol/L at 1/7/2022  3:07 AM  Total Bilirubin: 15.6 mg/dL at 1/7/2022  3:07 AM  INR(ratio): 2.54 at 1/7/2022  3:07 AM  Age: 51 years     - OG in place  - Nutrition consult for tube feeds  - Protonix 40 mg IV for prophylaxis  - Tacrolimus, mycophenolate, basiliximab per transplant surgery  - Liver US postoperatively      Fluids/Electrolytes/Renal:  # Monitor fluid balances  - Fluid PRN Albumin bolus as needed, D5W + 1/2NS at 125cc/hr,   - Baseline creatinine ~1.0. Q4H labs  - Monitor I/O  - Gunter in place     Endocrine:  # Steroids post-transplant  # Stress hyperglycemia   - Steroids: Per transplant surgery  - Insulin gtt today, consider switch to medium sliding scale in AM as tolerated     ID:  # Perioperative antibiotics  # Prophylactic antibiotics  # Immunosuppressive therapy  # Hx Actinomyces odontolyticus bacteremia   - Intraoperative vancomycin and zosyn  - Tacrolimus, mycophenolate, basiliximab per transplant surgery  - Steroids: Per transplant surgery  - Postoperative Zosyn Q6H  - Bactrim ppx 400-80 mg for immunosuppression    - Valganciclovir ppx   - Actinomyces odontolyticus bacteremia (+1 of 2 BCx 12/30)   -- Evaluated by ID -> TTE 1/02 normal -> PO amoxicillin 750 TID through 1/14/22     Heme:     # Acute blood loss anemia  # Coagulopathy 2/2 liver disease   - Monitor Hgb: transfuse if Hgb <8 or if signs/symptoms of hypoperfusion               -- Preoperative Hgb 7.1  - Monitor fibrinogen: goal >200  - Monitor platelets: transfuse if <50  - INR goal <2  - Q4H CBC, lactate, BMP,  INR    -- Space to Q12h as able      - Intraoperative resuscitation:               - 1L cell-saver, 5 pRBC, 3u FFP, 1L albumin, 13.4L total fluid given     Musculoskeletal:  # Weakness and deconditioning of critical illness   - Physical and occupational therapy consults, evaluate and treat when able      General  Cares/Prophylaxis:    DVT Prophylaxis: SCDs. Switch to pharmacologic ppx per transplant.   GI Prophylaxis: Pantoprazole 40 mg IV  Restraints: soft     Lines/tubes/drains:  - ETT (19 at teeth), MAC x 2, R radial art, AYSHA drain x 2, PA catheter, Gunter     Disposition:  - SICU     Code Status: Full Code     Patient seen and plan developed with SICU staff, Dr. Quezada  ______________________________________________________________________    Chief Complaint   Status post liver transplant    History of Present Illness   Melita Cortes is a 51 year old female with a history of asthma and end stage liver disease secondary to alcoholic cirrhosis who presents for DDLT. She was recently hospitalized 12/30/21-01/05/22 for acute metabolic encephalopathy and urgent liver txp eval for decompensated alcoholic cirrhosis. During the admission she had therapeutic paracentesis (3L removed). Treated for Actinomyces bacteremia and currently taking po amoxicillin 750 tid through 1/14/22.     She has end stage liver disease secondary to alcoholic cirrhosis (ETOH remission since 09/2021, MELD 30), now status post DDLT and admitted to the SICU service for postoperative cares.    Surgery significant for EBL 2L, 1L cell-saver, 5 pRBC, 3u FFP, 1L albumin, 13.4L total fluid given    Review of Systems    The 10 point Review of Systems is negative other than noted in the HPI or here.      Past Medical History    I have reviewed this patient's medical history and updated it with pertinent information if needed.   Past Medical History:   Diagnosis Date     Alcoholic hepatitis      Asthma 3/10/2006     Cervical high risk HPV (human papillomavirus) test positive 2019, 2020    See problem list     Past Surgical History   I have reviewed this patient's surgical history and updated it with pertinent information if needed.  Past Surgical History:   Procedure Laterality Date     APPENDECTOMY       COLONOSCOPY N/A 1/4/2022    Procedure: COLONOSCOPY;   Surgeon: Zita Steele MD;  Location:  GI     ESOPHAGOGASTRODUODENOSCOPY, WITH BRUSHINGS N/A 10/29/2021    Procedure: ESOPHAGOGASTRODUODENOSCOPY, WITH BRUSHINGS;  Surgeon: Torey Ruiz MD;  Location:  GI     Social History   I have reviewed this patient's social history and updated it with pertinent information if needed.  Social History     Tobacco Use     Smoking status: Former Smoker     Quit date: 2020     Years since quittin.6     Smokeless tobacco: Never Used   Substance Use Topics     Alcohol use: Not Currently     Comment: Last drink 2021     Drug use: Never     Family History   I have reviewed this patient's family history and updated it with pertinent information if needed.  Family History   Problem Relation Age of Onset     Cancer Mother      Colon Cancer Paternal Aunt      Colon Cancer Maternal Uncle      Prior to Admission Medications   Prior to Admission Medications   Prescriptions Last Dose Informant Patient Reported? Taking?   albuterol (PROAIR HFA/PROVENTIL HFA/VENTOLIN HFA) 108 (90 Base) MCG/ACT inhaler  Self No No   Sig: Inhale 2 puffs into the lungs every 4 hours as needed for wheezing   amoxicillin (AMOXIL) 250 MG capsule   No No   Sig: Take 3 capsules (750 mg) by mouth 3 times daily for 9 days   folic acid (FOLVITE) 1 MG tablet  Self No No   Sig: Take 1 tablet (1 mg) by mouth daily   lactulose (CHRONULAC) 10 GM/15ML solution  Self No No   Sig: Take 3 times daily. May titrate to achieve 3-4 loose stools per day.   multivitamin (CENTRUM SILVER) tablet  Self Yes No   Sig: Take 1 tablet by mouth daily   pantoprazole (PROTONIX) 40 MG EC tablet  Self No No   Sig: Take 1 tablet (40 mg) by mouth 2 times daily   rifaximin (XIFAXAN) 550 MG TABS tablet   No No   Sig: Take 1 tablet (550 mg) by mouth 2 times daily   tretinoin (RETIN-A) 0.025 % external cream  Self No No   Sig: Apply a pea size to entire face QD      Facility-Administered Medications: None     Allergies    No Known Allergies    Physical Exam   Vital Signs: Temp: 98.3  F (36.8  C) Temp src: Oral BP: 96/59 Pulse: 87   Resp: 18 SpO2: 98 % O2 Device: None (Room air)    Weight: 142 lbs 11.2 oz    General: Sedated, intubated  HEENT: ETT in place, OG   Neuro: Sedated  CV: Mildly tachycardic, normal rhythm on telemetry  Pulm: Vented, bilateral breath sounds  Abd: Soft, non-distended, Chevron incision bilaterally, with AYSHA drain x2 with serosanginous output  : Gunter in place  Extremities: Warm, well perfused, bilateral LE pulses intact  Skin: Warm well perfused,   Incisions: Clean dry intact with strikethrough on bandages    Data     Arterial Blood Gases   Recent Labs   Lab 01/07/22 2330 01/07/22 2238 01/07/22 2126 01/07/22 2006   PH 7.42 7.37 7.50* 7.53*   PCO2 29* 27* 20* 22*   PO2 130* 135* 143* 170*   HCO3 19* 16* 16* 18*       Complete Blood Count   Recent Labs   Lab 01/07/22 2330 01/07/22 2325 01/07/22 2238 01/07/22 2230 01/07/22 2126 01/07/22 1855 01/07/22 0307 01/05/22 0629 01/04/22  0706   WBC  --  9.7  --   --   --   --  13.4* 12.6* 12.4*   HGB 8.8* 8.9* 8.8*  --  7.4*   < > 7.1* 7.5* 8.1*   PLT  --  144*  --  106*  --   --  81* 83* 68*    < > = values in this interval not displayed.     Basic Metabolic Panel  Recent Labs   Lab 01/08/22  0115 01/07/22 2330 01/07/22 2238 01/07/22 2126 01/07/22 2006 01/07/22 1855 01/07/22 0307 01/05/22  0629 01/04/22  0706 01/03/22  0741   NA  --  133 132* 130* 133   < > 135 134 137 135   POTASSIUM  --  2.9* 3.1* 3.4 3.5   < > 3.4 3.3* 3.1* 3.6   CHLORIDE  --   --   --   --   --   --  100 100 102 104   CO2  --   --   --   --   --   --  24 23 22 21   BUN  --   --   --   --   --   --  36* 30 30 38*   CR  --   --   --   --   --   --  1.18* 0.98 1.03 1.16*   * 222* 219* 202* 109*   < > 136* 101* 113* 119*    < > = values in this interval not displayed.     Liver Function Tests  Recent Labs   Lab 01/07/22  2325 01/07/22  2230 01/07/22  0307 01/05/22  0629  01/04/22  0706 01/03/22  0741   AST  --   --  54* 53* 57* 55*   ALT  --   --  30 27 30 29   ALKPHOS  --   --  123 72 75 75   BILITOTAL  --   --  15.6* 17.3* 19.9* 18.6*   ALBUMIN  --   --  2.7* 2.4* 2.8* 3.0*   INR 3.45* 4.98* 2.54* 2.87* 3.01*  --      Pancreatic Enzymes  Recent Labs   Lab 01/07/22  0307   AMYLASE 90     Coagulation Profile  Recent Labs   Lab 01/07/22  2325 01/07/22  2230 01/07/22  0307 01/05/22  0629   INR 3.45* 4.98* 2.54* 2.87*   * 149* 50*  --      IMAGING:  Recent Results (from the past 24 hour(s))   XR Chest 2 Views    Narrative    EXAM: XR CHEST 2 VW  1/7/2022 2:42 AM     HISTORY:  Preop liver txp       COMPARISON:  Radiograph 1/3/2029 2    FINDINGS:   PA and lateral views of the chest. Midline trachea. Cardiac silhouette  is within normal limits. Decreased trace left pleural effusion with  persistent streaky opacity, likely atelectasis. No new focal pulmonary  opacity. Visualized upper abdomen is unremarkable. No acute osseous  abnormality.      Impression    IMPRESSION:   1. Decreased trace left pleural effusion with atelectasis.  2. No new focal pulmonary opacity.    I have personally reviewed the examination and initial interpretation  and I agree with the findings.    ROLLY KC MD         SYSTEM ID:  [id number]   XR Abdomen Port 1 View    Impression    RESIDENT PRELIMINARY INTERPRETATION  Impression:   1. No unexpected foreign body is visualized within the abdomen.  2. Postoperative changes with right upper quadrant surgical clips and  a right upper quadrant drain.  3. Air distention of the colon with air visualized in the rectum,  findings may represent adynamic ileus.    Findings discussed with OR personnel Aimee Yepez by Dr. Rangel  at 12:40 AM on 1/8/2022.

## 2022-01-07 NOTE — LETTER
[facility] [address] [address]  [phone]    FACSIMILE TRANSMITTAL SHEET    TO: [facility]    _____URGENT _____REVIEW ONLY _____PLEASE COMMENT____PLEASE REPLY    NOTES/COMMENTS: Attached please find clinical information for Melita Cortes.  Patient is a new transplant patient that will require twice weekly transplant labs.  Requesting home RN, PT, OT.   Team anticipates discharge 1/14 or 1/15.      Thanks    Yesica Denton, RN BSN, PHN, ACM-RN  7A RN Care Coordinator  Phone: [phone]  Pager [phone]  To contact the weekend RNCC, Page: [phone]    1/13/2022 4:17 PM                                        IF YOU DID NOT RECEIVE THE CORRECT NUMBER OF PAGES OR THE FAX DID NOT COME THROUGH CLEARLY, PLEASE CALL THE SENDER     CONFIDENTIALITY STATEMENT: Confidential information that may accompany this transmission contains protected health information under state and federal law and is legally privileged. This information is intended only for the use of the individual or entity named above and may be used only for carrying out treatment, payment or other healthcare operations. The recipient or person responsible for delivering this information is prohibited by law from disclosing this information without proper authorization to any other party, unless required to do so by law or regulation. If you are not the intended recipient, you are hereby notified that any review, dissemination, distribution, or copying of this message is strictly prohibited. If you have received this communication in error, please destroy the materials and contact us immediately by calling the number listed above. No response indicates that the information was received by the appropriate authorized party

## 2022-01-07 NOTE — LETTER
[facility] [address] [address]  [phone]    FACSIMILE TRANSMITTAL SHEET    TO: [facility] [phone], fax [phone]    _____URGENT _____REVIEW ONLY _____PLEASE COMMENT____PLEASE REPLY    NOTES/COMMENTS: Please see attached clinical and demographic information for Melita Miller.  Patient s/p liver transplant and will require transplant labs on Monday's and Thursdays.  Anticipate discharge home on Saturday 1/15/2022.  Requesting home RN, PT, OT.  Thanks    Yesica Denton RN BSN, PHN, ACM-RN  7A RN Care Coordinator  Phone: [phone]  Pager [phone]  To contact the weekend RNCC, Page: [phone]    1/13/2022 3:53 PM                                      IF YOU DID NOT RECEIVE THE CORRECT NUMBER OF PAGES OR THE FAX DID NOT COME THROUGH CLEARLY, PLEASE CALL THE SENDER     CONFIDENTIALITY STATEMENT: Confidential information that may accompany this transmission contains protected health information under state and federal law and is legally privileged. This information is intended only for the use of the individual or entity named above and may be used only for carrying out treatment, payment or other healthcare operations. The recipient or person responsible for delivering this information is prohibited by law from disclosing this information without proper authorization to any other party, unless required to do so by law or regulation. If you are not the intended recipient, you are hereby notified that any review, dissemination, distribution, or copying of this message is strictly prohibited. If you have received this communication in error, please destroy the materials and contact us immediately by calling the number listed above. No response indicates that the information was received by the appropriate authorized party

## 2022-01-07 NOTE — LETTER
Transition Communication Hand-off for Care Transitions to Next Level of Care Provider    Name: Melita Cortes  : 1970  MRN #: [id number]  Primary Care Provider: Navneet Johnson     Primary Clinic: [address]  [place] [facility] [address]     Reason for Hospitalization:  Liver transplant candidate [Z76.82]  Admit Date/Time: 2022  2:06 AM  Discharge Date: 2022  Payor Source: Payor: [employer] / Plan: [employer]UM TRANSPLANT / Product Type: Indemnity /          Reason for Communication Hand-off Referral: Other continuity of care    Discharge Plan: Home with home care and outpatient management       Concern for non-adherence with plan of care:  No  Discharge Needs Assessment:  Needs      Most Recent Value   Equipment Currently Used at Home none   # of Referrals Placed by [facility] Care Coordination, Homecare          Follow-up specialty is recommended: Yes    Follow-up plan:    Future Appointments   Date Time Provider Department Center   1/15/2022 10:45 AM Suzi Hinojosa, PT [facility]   1/15/2022  7:00 PM [facility] OT WAITLIST [facility]       Any outstanding tests or procedures:        Referrals     Future Labs/Procedures    Home care nursing referral     Comments:    [facility]  Phone: [phone]  Fax: [phone]      Skilled nursing visits to monitor cardiac and resp status   Monitor hydration, nutrition, urinary and bowel status   Monitor healing of incision     Instruct in medications and eval effects    Assist / teach patient to obtain and record lab results in handbook     Lab draws(mornings) per orders, report results to Post Transplant Coordinator: Shalini Hawkins #[phone]  Fax # [phone]    Transplant labs on  and .  Home Care start of care on 2022    Your provider has ordered home care nursing services. If you have not been contacted within 2 days of your discharge please call the inpatient department phone  number at [phone] .  Please call to schedule your appointment      Home Care OT Referral for Hospital Discharge     Comments:    OT to eval and treat    Your provider has ordered home care - occupational therapy. If you have not been contacted within 2 days of your discharge please call the department phone number listed on the top of this document.    Home Care PT Referral for Hospital Discharge     Comments:    PT to eval and treat    Your provider has ordered home care - physical therapy. If you have not been contacted within 2 days of your discharge please call the department phone number listed on the top of this document.            Key Recommendations:  Please see attached discharge orders. [facility] will open patient to services on Tuesday 1/18/2022.  Plan for home health RN, Pt, OT.     Aimee Denton RN    AVS/Discharge Summary is the source of truth; this is a helpful guide for improved communication of patient story

## 2022-01-07 NOTE — H&P
[facility]    History and Physical  Transplant Surgery     Date of Admission:  (Not on file)    Assessment & Plan   Melita Cortes is a 51 year old female with end stage liver disease 2/2 EtOH cirrhosis.  Patient was notified as an acceptable  donor organ became available and presented for further pre-operative work-up.  Patient was informed of the risks and benefits regarding  donor organ transplantation, and has elected to proceed with possible liver transplant.      MELD-Na score: 30 at 2022  6:29 AM  MELD score: 29 at 2022  6:29 AM  Calculated from:  Serum Creatinine: 0.98 mg/dL (Using min of 1 mg/dL) at 2022  6:29 AM  Serum Sodium: 134 mmol/L at 2022  6:29 AM  Total Bilirubin: 17.3 mg/dL at 2022  6:29 AM  INR(ratio): 2.87 at 2022  6:29 AM  Age: 51 years    Plan:   -Pre-op labs  -CXR  -EKG  -NPO status  -STAT COVID-19 PCR  -Surgeon to obtain formal consent    Pema Aragon MD  [employer]-cover PGY-1  (Job code [id number] 5pm-6am)      Code Status   Full Code    Primary Care Physician   Navneet Johnson    Chief Complaint   Liver transplant candidate    History is obtained from the patient    History of Present Illness   Melita Cortes is a 51 year old female who presents with with end stage liver disease 2/2 EtOH cirrhosis. She presents for DDLT. She was recently hospitalized 21-22 for acute metabolic encephalopathy and urgent liver txp eval for decompensated alcoholic cirrhosis. During the admission she had therapeutic paracentesis (3L removed). Treated for Actinomyces bacteremia and currently taking po amoxicillin 750 tid through 22.     She has no URI symptoms. No chest pain or SOB. No abdominal pain. Her stools have been more loose since discharging. No blood in stool, no hematemesis.     Past Medical History    I have reviewed this patient's medical history and updated it with pertinent information  if needed.   Past Medical History:   Diagnosis Date     Alcoholic hepatitis      Asthma 3/10/2006     Cervical high risk HPV (human papillomavirus) test positive 2019, 2020    See problem list       Past Surgical History   I have reviewed this patient's surgical history and updated it with pertinent information if needed.  Past Surgical History:   Procedure Laterality Date     APPENDECTOMY       COLONOSCOPY N/A 1/4/2022    Procedure: COLONOSCOPY;  Surgeon: Zita Steele MD;  Location: [facility]     ESOPHAGOGASTRODUODENOSCOPY, WITH BRUSHINGS N/A 10/29/2021    Procedure: ESOPHAGOGASTRODUODENOSCOPY, WITH BRUSHINGS;  Surgeon: Torey Ruiz MD;  Location: [facility]       Prior to Admission Medications   Prior to Admission Medications   Prescriptions Last Dose Informant Patient Reported? Taking?   albuterol (PROAIR HFA/PROVENTIL HFA/VENTOLIN HFA) 108 (90 Base) MCG/ACT inhaler  Self No No   Sig: Inhale 2 puffs into the lungs every 4 hours as needed for wheezing   amoxicillin (AMOXIL) 250 MG capsule   No No   Sig: Take 3 capsules (750 mg) by mouth 3 times daily for 9 days   folic acid (FOLVITE) 1 MG tablet  Self No No   Sig: Take 1 tablet (1 mg) by mouth daily   lactulose (CHRONULAC) 10 GM/15ML solution  Self No No   Sig: Take 3 times daily. May titrate to achieve 3-4 loose stools per day.   multivitamin (CENTRUM SILVER) tablet  Self Yes No   Sig: Take 1 tablet by mouth daily   pantoprazole (PROTONIX) 40 MG EC tablet  Self No No   Sig: Take 1 tablet (40 mg) by mouth 2 times daily   rifaximin (XIFAXAN) 550 MG TABS tablet   No No   Sig: Take 1 tablet (550 mg) by mouth 2 times daily   tretinoin (RETIN-A) 0.025 % external cream  Self No No   Sig: Apply a pea size to entire face QD      Facility-Administered Medications: None     Allergies   No Known Allergies    Social History   I have reviewed this patient's social history and updated it with pertinent information if needed. Melita Cortes  reports that she  quit smoking about 20 months ago. She has never used smokeless tobacco. She reports previous alcohol use. She reports that she does not use drugs.    Family History   I have reviewed this patient's family history and updated it with pertinent information if needed.   Family History   Problem Relation Age of Onset     Cancer Mother      Colon Cancer Paternal Aunt      Colon Cancer Maternal Uncle        Review of Systems   The 10 point Review of Systems is negative other than noted in the HPI.    Physical Exam   Temp: 98.4  F (36.9  C) Temp src: Oral BP: 100/65 Pulse: 95   Resp: 18 SpO2: 98 % O2 Device: None (Room air)    Vital Signs with Ranges  Temp:  [98.4  F (36.9  C)] 98.4  F (36.9  C)  Pulse:  [95] 95  Resp:  [18] 18  BP: (100)/(65) 100/65  SpO2:  [98 %] 98 %  142 lbs 11.2 oz    Constitutional: awake, alert, sitting up in bed, NAD  HEENT: EOMI, MMM, no cervical lymphadenopathy  Respiratory: nonlabored breathing on room air, CTABL  Cardiovascular: RRR, no murmur  GI: abdomen soft, nontender, mild ascites, paracentesis sites on right  Skin: Jaundice, No rash  Musculoskeletal: moving all extremities  Neurologic: AOx3, no focal deficits  Neuropsychiatric: appropriate mood and affect    Data   Results for orders placed or performed during the hospital encounter of 01/07/22 (from the past 24 hour(s))   EKG 12-lead, tracing only   Result Value Ref Range    Systolic Blood Pressure  mmHg    Diastolic Blood Pressure  mmHg    Ventricular Rate 82 BPM    Atrial Rate 82 BPM    OK Interval 146 ms    QRS Duration 76 ms     ms    QTc 479 ms    P Axis 65 degrees    R AXIS -9 degrees    T Axis 25 degrees    Interpretation ECG       Sinus rhythm  Normal ECG  When compared with ECG of 23-DEC-2021 11:40,  Minimal criteria for Anterior infarct are no longer Present  Nonspecific T wave abnormality has replaced inverted T waves in Inferior leads  T wave amplitude has decreased in Lateral leads     I have reviewed history, examined  patient and discussed plan with the fellow/resident/JULIA.    I concur with the findings in this note.    Time spent on admission activities: 45 minutes.

## 2022-01-07 NOTE — PLAN OF CARE
"BP 95/48 (BP Location: Left arm)   Pulse 83   Temp 98.8  F (37.1  C) (Oral)   Resp 18   Ht 1.651 m (5' 5\")   Wt 64.7 kg (142 lb 11.2 oz)   SpO2 97%   BMI 23.75 kg/m      VS: VSS  Neuros: A&0 x4  Pain/Nausea/PRN: Denies pain and nausea  Labs: Mag 1.3 Nurse managed protocol.  Diet: NPO  LDA: PIV, saline locked  GI: Last BM 1/7  : Voiding  Skin: Skin check complete. Jaundiced. Paracentesis puncture sites on right and left abdomen.  Mobility: UAL  Plan: Liver transplant at 1600 today.  "

## 2022-01-07 NOTE — CONSULTS
Transplant Admission Psychosocial Assessment    Patient Name: Melita Cortes  : 1970  Age: 51 year old  MRN: [id number]  Date of Initial Social Work Evaluation: 2021    Patient is admitted to [facility] for a potential liver transplant.  Met with Fidelina to update psychosocial assessment and provide education about SW role while inpatient, and to begin discussion of expectations/requirements, caregiver needs and follow up needs post-transplant.     Presenting Information   Living Situation: Fidelina lives in a side by side home in [place], [place] with her spouse Doug  If not local, plans for short term stay:  Fidelina lives local  Previous Functional Status: No functional concerns related to transplantation   Cultural/Language/Spiritual Considerations: She identifies as Denominational and her primary language is English    Support System  Primary Support Person Spouse  Other support:  Brother/sister in laws and friends  Plan for support in immediate post-transplant period: Spouse     Health Care Directive  Decision Maker: Pt when able  Alternate Decision Maker: Per health care directive - Ramesh   Health Care Directive: Copy in Chart    Mental Health/Coping:   History of Mental Health: Some history of depression.She reports it is manageable and does not receive treatment or medication management.   History of Chemical Health: Fidelina has a history of misuse/overuse of alcohol, with her last use in 2021. Melita completed a chemical dependency assessment and is recommended for 1:1 therapy. Melita is open to engaging in recommended relapse prevention post transplant.   Current status: Stable  Coping: Fair  Services Needed/Recommended: Melita will need follow up regarding her relapse prevention due to due limited sobriety and recommendation to engage in treatment     Financial   Income: Spouse works full time and she is currently employed fulltime and utilizing short term disability   Impact of transplant on  income: Patient is unable to work   Insurance and medication coverage: [employer] through her employer   Financial concerns: none at this time   Resources needed: none at this time     Education provided by SW: Social Work role inpatient setting, availability of support groups, parking information and liver transplant support group    Assessment and recommendations and plan:  I met with Fidelina at bedside to update psychosocial assessment. I provided general education regarding surgery and recovery. She was tearful during some of the conversation and acknowledged she is scared and excited. I assisted with notarizing her health care directive through out honoring choices department. This will be scanned into her EMR and original copy sent to her home. She did not have any other questions/concerns at this time.     ELMO Valencia, [facility]  Liver Transplant   [facility]  Phone: [phone]  Pager: [phone]

## 2022-01-08 ENCOUNTER — APPOINTMENT (OUTPATIENT)
Dept: PHYSICAL THERAPY | Facility: CLINIC | Age: 52
DRG: 005 | End: 2022-01-08
Attending: STUDENT IN AN ORGANIZED HEALTH CARE EDUCATION/TRAINING PROGRAM
Payer: COMMERCIAL

## 2022-01-08 ENCOUNTER — APPOINTMENT (OUTPATIENT)
Dept: GENERAL RADIOLOGY | Facility: CLINIC | Age: 52
DRG: 005 | End: 2022-01-08
Attending: TRANSPLANT SURGERY
Payer: COMMERCIAL

## 2022-01-08 ENCOUNTER — DOCUMENTATION ONLY (OUTPATIENT)
Dept: TRANSPLANT | Facility: CLINIC | Age: 52
End: 2022-01-08
Payer: COMMERCIAL

## 2022-01-08 ENCOUNTER — APPOINTMENT (OUTPATIENT)
Dept: GENERAL RADIOLOGY | Facility: CLINIC | Age: 52
DRG: 005 | End: 2022-01-08
Attending: NURSE PRACTITIONER
Payer: COMMERCIAL

## 2022-01-08 ENCOUNTER — APPOINTMENT (OUTPATIENT)
Dept: ULTRASOUND IMAGING | Facility: CLINIC | Age: 52
DRG: 005 | End: 2022-01-08
Attending: STUDENT IN AN ORGANIZED HEALTH CARE EDUCATION/TRAINING PROGRAM
Payer: COMMERCIAL

## 2022-01-08 ENCOUNTER — APPOINTMENT (OUTPATIENT)
Dept: GENERAL RADIOLOGY | Facility: CLINIC | Age: 52
DRG: 005 | End: 2022-01-08
Attending: STUDENT IN AN ORGANIZED HEALTH CARE EDUCATION/TRAINING PROGRAM
Payer: COMMERCIAL

## 2022-01-08 LAB
ACANTHOCYTES BLD QL SMEAR: ABNORMAL
ALBUMIN SERPL-MCNC: 1.4 G/DL (ref 3.4–5)
ALBUMIN SERPL-MCNC: 1.6 G/DL (ref 3.4–5)
ALBUMIN SERPL-MCNC: 1.7 G/DL (ref 3.4–5)
ALBUMIN SERPL-MCNC: 1.9 G/DL (ref 3.4–5)
ALBUMIN SERPL-MCNC: 2 G/DL (ref 3.4–5)
ALBUMIN SERPL-MCNC: 2.1 G/DL (ref 3.4–5)
ALP SERPL-CCNC: 34 U/L (ref 40–150)
ALP SERPL-CCNC: 35 U/L (ref 40–150)
ALP SERPL-CCNC: 38 U/L (ref 40–150)
ALP SERPL-CCNC: 45 U/L (ref 40–150)
ALP SERPL-CCNC: 47 U/L (ref 40–150)
ALP SERPL-CCNC: 47 U/L (ref 40–150)
ALT SERPL W P-5'-P-CCNC: 1039 U/L (ref 0–50)
ALT SERPL W P-5'-P-CCNC: 500 U/L (ref 0–50)
ALT SERPL W P-5'-P-CCNC: 575 U/L (ref 0–50)
ALT SERPL W P-5'-P-CCNC: 637 U/L (ref 0–50)
ALT SERPL W P-5'-P-CCNC: 820 U/L (ref 0–50)
ALT SERPL W P-5'-P-CCNC: 980 U/L (ref 0–50)
AMYLASE SERPL-CCNC: 36 U/L (ref 30–110)
ANION GAP SERPL CALCULATED.3IONS-SCNC: 11 MMOL/L (ref 3–14)
ANION GAP SERPL CALCULATED.3IONS-SCNC: 13 MMOL/L (ref 3–14)
ANION GAP SERPL CALCULATED.3IONS-SCNC: 14 MMOL/L (ref 3–14)
APTT PPP: 59 SECONDS (ref 22–38)
AST SERPL W P-5'-P-CCNC: 1065 U/L (ref 0–45)
AST SERPL W P-5'-P-CCNC: 1254 U/L (ref 0–45)
AST SERPL W P-5'-P-CCNC: 256 U/L (ref 0–45)
AST SERPL W P-5'-P-CCNC: 317 U/L (ref 0–45)
AST SERPL W P-5'-P-CCNC: 390 U/L (ref 0–45)
AST SERPL W P-5'-P-CCNC: 621 U/L (ref 0–45)
BACTERIA BLD CULT: NO GROWTH
BASE EXCESS BLDA CALC-SCNC: -3.6 MMOL/L (ref -9–1.8)
BASE EXCESS BLDA CALC-SCNC: -4.9 MMOL/L (ref -9–1.8)
BASE EXCESS BLDV CALC-SCNC: -3.5 MMOL/L (ref -7.7–1.9)
BASOPHILS # BLD AUTO: 0 10E3/UL (ref 0–0.2)
BASOPHILS # BLD AUTO: 0.1 10E3/UL (ref 0–0.2)
BASOPHILS NFR BLD AUTO: 0 %
BILIRUB DIRECT SERPL-MCNC: 2.2 MG/DL (ref 0–0.2)
BILIRUB DIRECT SERPL-MCNC: 2.4 MG/DL (ref 0–0.2)
BILIRUB DIRECT SERPL-MCNC: 3.2 MG/DL (ref 0–0.2)
BILIRUB DIRECT SERPL-MCNC: 4.5 MG/DL (ref 0–0.2)
BILIRUB DIRECT SERPL-MCNC: 7.3 MG/DL (ref 0–0.2)
BILIRUB DIRECT SERPL-MCNC: 7.3 MG/DL (ref 0–0.2)
BILIRUB SERPL-MCNC: 2.9 MG/DL (ref 0.2–1.3)
BILIRUB SERPL-MCNC: 3 MG/DL (ref 0.2–1.3)
BILIRUB SERPL-MCNC: 4 MG/DL (ref 0.2–1.3)
BILIRUB SERPL-MCNC: 5.4 MG/DL (ref 0.2–1.3)
BILIRUB SERPL-MCNC: 9.2 MG/DL (ref 0.2–1.3)
BILIRUB SERPL-MCNC: 9.6 MG/DL (ref 0.2–1.3)
BLD PROD TYP BPU: NORMAL
BLD PROD TYP BPU: NORMAL
BLOOD COMPONENT TYPE: NORMAL
BLOOD COMPONENT TYPE: NORMAL
BUN SERPL-MCNC: 24 MG/DL (ref 7–30)
BUN SERPL-MCNC: 26 MG/DL (ref 7–30)
BUN SERPL-MCNC: 28 MG/DL (ref 7–30)
CALCIUM SERPL-MCNC: 9.4 MG/DL (ref 8.5–10.1)
CALCIUM SERPL-MCNC: 9.4 MG/DL (ref 8.5–10.1)
CALCIUM SERPL-MCNC: 9.5 MG/DL (ref 8.5–10.1)
CF REDUC 60M P MA LENFR BLD TEG: 0 % (ref 0–15)
CF REDUC 60M P MA LENFR BLD TEG: 0 % (ref 0–15)
CFT BLD TEG: 2.2 MINUTE (ref 1–3)
CFT BLD TEG: 2.2 MINUTE (ref 1–3)
CHLORIDE BLD-SCNC: 105 MMOL/L (ref 94–109)
CHLORIDE BLD-SCNC: 105 MMOL/L (ref 94–109)
CHLORIDE BLD-SCNC: 106 MMOL/L (ref 94–109)
CI (COAGULATION INDEX): -2 (ref -3–3)
CI (COAGULATION INDEX): -3.2 (ref -3–3)
CLOT ANGLE BLD TEG: 60 DEGREES (ref 59–74)
CLOT ANGLE BLD TEG: 61 DEGREES (ref 59–74)
CLOT INIT BLD TEG: 6.6 MINUTE (ref 4–9)
CLOT INIT BLD TEG: 9.3 MINUTE (ref 4–9)
CLOT LYSIS 30M P MA LENFR BLD TEG: 0 % (ref 0–8)
CLOT LYSIS 30M P MA LENFR BLD TEG: 0 % (ref 0–8)
CLOT STRENGTH BLD TEG: 5.3 KD/SC (ref 5.3–13.2)
CLOT STRENGTH BLD TEG: 6.5 KD/SC (ref 5.3–13.2)
CO2 SERPL-SCNC: 20 MMOL/L (ref 20–32)
CODING SYSTEM: NORMAL
CODING SYSTEM: NORMAL
CREAT SERPL-MCNC: 0.71 MG/DL (ref 0.52–1.04)
CREAT SERPL-MCNC: 0.76 MG/DL (ref 0.52–1.04)
CREAT SERPL-MCNC: 0.86 MG/DL (ref 0.52–1.04)
CROSSMATCH: NORMAL
EOSINOPHIL # BLD AUTO: 0 10E3/UL (ref 0–0.7)
EOSINOPHIL # BLD AUTO: 0.1 10E3/UL (ref 0–0.7)
EOSINOPHIL NFR BLD AUTO: 0 %
ERYTHROCYTE [DISTWIDTH] IN BLOOD BY AUTOMATED COUNT: 19.2 % (ref 10–15)
ERYTHROCYTE [DISTWIDTH] IN BLOOD BY AUTOMATED COUNT: 19.2 % (ref 10–15)
ERYTHROCYTE [DISTWIDTH] IN BLOOD BY AUTOMATED COUNT: 19.4 % (ref 10–15)
ERYTHROCYTE [DISTWIDTH] IN BLOOD BY AUTOMATED COUNT: 19.6 % (ref 10–15)
ERYTHROCYTE [DISTWIDTH] IN BLOOD BY AUTOMATED COUNT: 19.7 % (ref 10–15)
ERYTHROCYTE [DISTWIDTH] IN BLOOD BY AUTOMATED COUNT: 19.9 % (ref 10–15)
FIBRINOGEN PPP-MCNC: 134 MG/DL (ref 170–490)
FIBRINOGEN PPP-MCNC: 153 MG/DL (ref 170–490)
FIBRINOGEN PPP-MCNC: 159 MG/DL (ref 170–490)
FIBRINOGEN PPP-MCNC: 161 MG/DL (ref 170–490)
FIBRINOGEN PPP-MCNC: 174 MG/DL (ref 170–490)
FIBRINOGEN PPP-MCNC: 184 MG/DL (ref 170–490)
GFR SERPL CREATININE-BSD FRML MDRD: 82 ML/MIN/1.73M2
GFR SERPL CREATININE-BSD FRML MDRD: >90 ML/MIN/1.73M2
GFR SERPL CREATININE-BSD FRML MDRD: >90 ML/MIN/1.73M2
GLUCOSE BLD-MCNC: 270 MG/DL (ref 70–99)
GLUCOSE BLD-MCNC: 271 MG/DL (ref 70–99)
GLUCOSE BLD-MCNC: 306 MG/DL (ref 70–99)
GLUCOSE BLDC GLUCOMTR-MCNC: 100 MG/DL (ref 70–99)
GLUCOSE BLDC GLUCOMTR-MCNC: 104 MG/DL (ref 70–99)
GLUCOSE BLDC GLUCOMTR-MCNC: 110 MG/DL (ref 70–99)
GLUCOSE BLDC GLUCOMTR-MCNC: 111 MG/DL (ref 70–99)
GLUCOSE BLDC GLUCOMTR-MCNC: 121 MG/DL (ref 70–99)
GLUCOSE BLDC GLUCOMTR-MCNC: 138 MG/DL (ref 70–99)
GLUCOSE BLDC GLUCOMTR-MCNC: 162 MG/DL (ref 70–99)
GLUCOSE BLDC GLUCOMTR-MCNC: 252 MG/DL (ref 70–99)
GLUCOSE BLDC GLUCOMTR-MCNC: 262 MG/DL (ref 70–99)
GLUCOSE BLDC GLUCOMTR-MCNC: 270 MG/DL (ref 70–99)
GLUCOSE BLDC GLUCOMTR-MCNC: 299 MG/DL (ref 70–99)
GLUCOSE BLDC GLUCOMTR-MCNC: 301 MG/DL (ref 70–99)
GLUCOSE BLDC GLUCOMTR-MCNC: 312 MG/DL (ref 70–99)
GLUCOSE BLDC GLUCOMTR-MCNC: 312 MG/DL (ref 70–99)
GLUCOSE BLDC GLUCOMTR-MCNC: 58 MG/DL (ref 70–99)
GLUCOSE BLDC GLUCOMTR-MCNC: 75 MG/DL (ref 70–99)
GRAM STAIN RESULT: NORMAL
GRAM STAIN RESULT: NORMAL
HBA1C MFR BLD: 5.2 % (ref 0–5.6)
HCO3 BLD-SCNC: 20 MMOL/L (ref 21–28)
HCO3 BLD-SCNC: 21 MMOL/L (ref 21–28)
HCO3 BLDV-SCNC: 21 MMOL/L (ref 21–28)
HCT VFR BLD AUTO: 21.4 % (ref 35–47)
HCT VFR BLD AUTO: 22.5 % (ref 35–47)
HCT VFR BLD AUTO: 22.9 % (ref 35–47)
HCT VFR BLD AUTO: 26.9 % (ref 35–47)
HCT VFR BLD AUTO: 30.1 % (ref 35–47)
HCT VFR BLD AUTO: 31.3 % (ref 35–47)
HGB BLD-MCNC: 10.5 G/DL (ref 11.7–15.7)
HGB BLD-MCNC: 10.9 G/DL (ref 11.7–15.7)
HGB BLD-MCNC: 7.4 G/DL (ref 11.7–15.7)
HGB BLD-MCNC: 7.9 G/DL (ref 11.7–15.7)
HGB BLD-MCNC: 8 G/DL (ref 11.7–15.7)
HGB BLD-MCNC: 9.6 G/DL (ref 11.7–15.7)
IMM GRANULOCYTES # BLD: 0.1 10E3/UL
IMM GRANULOCYTES NFR BLD: 1 %
INR PPP: 1.58 (ref 0.85–1.15)
INR PPP: 1.68 (ref 0.85–1.15)
INR PPP: 1.68 (ref 0.85–1.15)
INR PPP: 1.82 (ref 0.85–1.15)
INR PPP: 2.07 (ref 0.85–1.15)
ISSUE DATE AND TIME: NORMAL
ISSUE DATE AND TIME: NORMAL
LACTATE SERPL-SCNC: 0.9 MMOL/L (ref 0.7–2)
LACTATE SERPL-SCNC: 0.9 MMOL/L (ref 0.7–2)
LACTATE SERPL-SCNC: 1.9 MMOL/L (ref 0.7–2)
LACTATE SERPL-SCNC: 2.9 MMOL/L (ref 0.7–2)
LACTATE SERPL-SCNC: 3.5 MMOL/L (ref 0.7–2)
LACTATE SERPL-SCNC: 3.9 MMOL/L (ref 0.7–2)
LIPASE SERPL-CCNC: 120 U/L (ref 73–393)
LYMPHOCYTES # BLD AUTO: 0.4 10E3/UL (ref 0.8–5.3)
LYMPHOCYTES # BLD AUTO: 0.5 10E3/UL (ref 0.8–5.3)
LYMPHOCYTES # BLD AUTO: 0.5 10E3/UL (ref 0.8–5.3)
LYMPHOCYTES # BLD AUTO: 0.6 10E3/UL (ref 0.8–5.3)
LYMPHOCYTES # BLD AUTO: 0.6 10E3/UL (ref 0.8–5.3)
LYMPHOCYTES # BLD AUTO: 0.7 10E3/UL (ref 0.8–5.3)
LYMPHOCYTES NFR BLD AUTO: 3 %
LYMPHOCYTES NFR BLD AUTO: 4 %
LYMPHOCYTES NFR BLD AUTO: 4 %
MAGNESIUM SERPL-MCNC: 2 MG/DL (ref 1.6–2.3)
MAGNESIUM SERPL-MCNC: 2.2 MG/DL (ref 1.6–2.3)
MCF BLD TEG: 51.3 MM (ref 55–74)
MCF BLD TEG: 56.7 MM (ref 55–74)
MCH RBC QN AUTO: 31.8 PG (ref 26.5–33)
MCH RBC QN AUTO: 31.8 PG (ref 26.5–33)
MCH RBC QN AUTO: 32 PG (ref 26.5–33)
MCH RBC QN AUTO: 32.1 PG (ref 26.5–33)
MCH RBC QN AUTO: 32.2 PG (ref 26.5–33)
MCH RBC QN AUTO: 32.2 PG (ref 26.5–33)
MCHC RBC AUTO-ENTMCNC: 34.6 G/DL (ref 31.5–36.5)
MCHC RBC AUTO-ENTMCNC: 34.8 G/DL (ref 31.5–36.5)
MCHC RBC AUTO-ENTMCNC: 34.9 G/DL (ref 31.5–36.5)
MCHC RBC AUTO-ENTMCNC: 34.9 G/DL (ref 31.5–36.5)
MCHC RBC AUTO-ENTMCNC: 35.1 G/DL (ref 31.5–36.5)
MCHC RBC AUTO-ENTMCNC: 35.7 G/DL (ref 31.5–36.5)
MCV RBC AUTO: 90 FL (ref 78–100)
MCV RBC AUTO: 91 FL (ref 78–100)
MCV RBC AUTO: 92 FL (ref 78–100)
MONOCYTES # BLD AUTO: 0.7 10E3/UL (ref 0–1.3)
MONOCYTES # BLD AUTO: 0.7 10E3/UL (ref 0–1.3)
MONOCYTES # BLD AUTO: 1 10E3/UL (ref 0–1.3)
MONOCYTES # BLD AUTO: 1.1 10E3/UL (ref 0–1.3)
MONOCYTES # BLD AUTO: 1.1 10E3/UL (ref 0–1.3)
MONOCYTES # BLD AUTO: 1.4 10E3/UL (ref 0–1.3)
MONOCYTES NFR BLD AUTO: 3 %
MONOCYTES NFR BLD AUTO: 4 %
MONOCYTES NFR BLD AUTO: 7 %
MONOCYTES NFR BLD AUTO: 8 %
MRSA DNA SPEC QL NAA+PROBE: NEGATIVE
NEUTROPHILS # BLD AUTO: 12.4 10E3/UL (ref 1.6–8.3)
NEUTROPHILS # BLD AUTO: 12.9 10E3/UL (ref 1.6–8.3)
NEUTROPHILS # BLD AUTO: 13.5 10E3/UL (ref 1.6–8.3)
NEUTROPHILS # BLD AUTO: 14 10E3/UL (ref 1.6–8.3)
NEUTROPHILS # BLD AUTO: 17 10E3/UL (ref 1.6–8.3)
NEUTROPHILS # BLD AUTO: 18.6 10E3/UL (ref 1.6–8.3)
NEUTROPHILS NFR BLD AUTO: 88 %
NEUTROPHILS NFR BLD AUTO: 89 %
NEUTROPHILS NFR BLD AUTO: 92 %
NEUTROPHILS NFR BLD AUTO: 93 %
NRBC # BLD AUTO: 0 10E3/UL
NRBC BLD AUTO-RTO: 0 /100
O2/TOTAL GAS SETTING VFR VENT: 30 %
O2/TOTAL GAS SETTING VFR VENT: 50 %
O2/TOTAL GAS SETTING VFR VENT: 50 %
OXYHGB MFR BLD: 96 % (ref 92–100)
OXYHGB MFR BLDV: 78 % (ref 70–75)
PCO2 BLD: 29 MM HG (ref 35–45)
PCO2 BLD: 39 MM HG (ref 35–45)
PCO2 BLDV: 34 MM HG (ref 40–50)
PH BLD: 7.33 [PH] (ref 7.35–7.45)
PH BLD: 7.44 [PH] (ref 7.35–7.45)
PH BLDV: 7.39 [PH] (ref 7.32–7.43)
PHOSPHATE SERPL-MCNC: 3.8 MG/DL (ref 2.5–4.5)
PLAT MORPH BLD: ABNORMAL
PLATELET # BLD AUTO: 122 10E3/UL (ref 150–450)
PLATELET # BLD AUTO: 151 10E3/UL (ref 150–450)
PLATELET # BLD AUTO: 183 10E3/UL (ref 150–450)
PLATELET # BLD AUTO: 56 10E3/UL (ref 150–450)
PLATELET # BLD AUTO: 61 10E3/UL (ref 150–450)
PLATELET # BLD AUTO: 77 10E3/UL (ref 150–450)
PO2 BLD: 76 MM HG (ref 80–105)
PO2 BLD: 98 MM HG (ref 80–105)
PO2 BLDV: 43 MM HG (ref 25–47)
POLYCHROMASIA BLD QL SMEAR: SLIGHT
POTASSIUM BLD-SCNC: 3.2 MMOL/L (ref 3.4–5.3)
POTASSIUM BLD-SCNC: 3.5 MMOL/L (ref 3.4–5.3)
POTASSIUM BLD-SCNC: 3.5 MMOL/L (ref 3.4–5.3)
POTASSIUM BLD-SCNC: 3.7 MMOL/L (ref 3.4–5.3)
PROT SERPL-MCNC: 3.1 G/DL (ref 6.8–8.8)
PROT SERPL-MCNC: 3.5 G/DL (ref 6.8–8.8)
PROT SERPL-MCNC: 3.6 G/DL (ref 6.8–8.8)
PROT SERPL-MCNC: 3.8 G/DL (ref 6.8–8.8)
RBC # BLD AUTO: 2.33 10E6/UL (ref 3.8–5.2)
RBC # BLD AUTO: 2.45 10E6/UL (ref 3.8–5.2)
RBC # BLD AUTO: 2.49 10E6/UL (ref 3.8–5.2)
RBC # BLD AUTO: 2.98 10E6/UL (ref 3.8–5.2)
RBC # BLD AUTO: 3.3 10E6/UL (ref 3.8–5.2)
RBC # BLD AUTO: 3.41 10E6/UL (ref 3.8–5.2)
RBC MORPH BLD: ABNORMAL
SA TARGET DNA: NEGATIVE
SODIUM SERPL-SCNC: 137 MMOL/L (ref 133–144)
SODIUM SERPL-SCNC: 138 MMOL/L (ref 133–144)
SODIUM SERPL-SCNC: 139 MMOL/L (ref 133–144)
UNIT ABO/RH: NORMAL
UNIT ABO/RH: NORMAL
UNIT NUMBER: NORMAL
UNIT NUMBER: NORMAL
UNIT STATUS: NORMAL
UNIT STATUS: NORMAL
UNIT TYPE ISBT: 5100
UNIT TYPE ISBT: 9500
WBC # BLD AUTO: 14 10E3/UL (ref 4–11)
WBC # BLD AUTO: 14.7 10E3/UL (ref 4–11)
WBC # BLD AUTO: 15.2 10E3/UL (ref 4–11)
WBC # BLD AUTO: 15.3 10E3/UL (ref 4–11)
WBC # BLD AUTO: 19.2 10E3/UL (ref 4–11)
WBC # BLD AUTO: 19.9 10E3/UL (ref 4–11)

## 2022-01-08 PROCEDURE — 93975 VASCULAR STUDY: CPT | Mod: 26 | Performed by: RADIOLOGY

## 2022-01-08 PROCEDURE — 84132 ASSAY OF SERUM POTASSIUM: CPT | Performed by: STUDENT IN AN ORGANIZED HEALTH CARE EDUCATION/TRAINING PROGRAM

## 2022-01-08 PROCEDURE — 250N000013 HC RX MED GY IP 250 OP 250 PS 637: Performed by: TRANSPLANT SURGERY

## 2022-01-08 PROCEDURE — 71045 X-RAY EXAM CHEST 1 VIEW: CPT

## 2022-01-08 PROCEDURE — 82805 BLOOD GASES W/O2 SATURATION: CPT | Performed by: TRANSPLANT SURGERY

## 2022-01-08 PROCEDURE — 83605 ASSAY OF LACTIC ACID: CPT | Performed by: STUDENT IN AN ORGANIZED HEALTH CARE EDUCATION/TRAINING PROGRAM

## 2022-01-08 PROCEDURE — 83036 HEMOGLOBIN GLYCOSYLATED A1C: CPT | Performed by: STUDENT IN AN ORGANIZED HEALTH CARE EDUCATION/TRAINING PROGRAM

## 2022-01-08 PROCEDURE — 84155 ASSAY OF PROTEIN SERUM: CPT | Performed by: STUDENT IN AN ORGANIZED HEALTH CARE EDUCATION/TRAINING PROGRAM

## 2022-01-08 PROCEDURE — P9041 ALBUMIN (HUMAN),5%, 50ML: HCPCS

## 2022-01-08 PROCEDURE — 258N000003 HC RX IP 258 OP 636: Performed by: TRANSPLANT SURGERY

## 2022-01-08 PROCEDURE — 71045 X-RAY EXAM CHEST 1 VIEW: CPT | Mod: 77

## 2022-01-08 PROCEDURE — 97161 PT EVAL LOW COMPLEX 20 MIN: CPT | Mod: GP | Performed by: PHYSICAL THERAPIST

## 2022-01-08 PROCEDURE — 250N000011 HC RX IP 250 OP 636

## 2022-01-08 PROCEDURE — 99291 CRITICAL CARE FIRST HOUR: CPT | Performed by: ANESTHESIOLOGY

## 2022-01-08 PROCEDURE — 84100 ASSAY OF PHOSPHORUS: CPT | Performed by: TRANSPLANT SURGERY

## 2022-01-08 PROCEDURE — 87641 MR-STAPH DNA AMP PROBE: CPT | Performed by: STUDENT IN AN ORGANIZED HEALTH CARE EDUCATION/TRAINING PROGRAM

## 2022-01-08 PROCEDURE — P9041 ALBUMIN (HUMAN),5%, 50ML: HCPCS | Performed by: STUDENT IN AN ORGANIZED HEALTH CARE EDUCATION/TRAINING PROGRAM

## 2022-01-08 PROCEDURE — 71045 X-RAY EXAM CHEST 1 VIEW: CPT | Mod: 26 | Performed by: RADIOLOGY

## 2022-01-08 PROCEDURE — 97530 THERAPEUTIC ACTIVITIES: CPT | Mod: GP | Performed by: PHYSICAL THERAPIST

## 2022-01-08 PROCEDURE — 85610 PROTHROMBIN TIME: CPT | Performed by: STUDENT IN AN ORGANIZED HEALTH CARE EDUCATION/TRAINING PROGRAM

## 2022-01-08 PROCEDURE — 85730 THROMBOPLASTIN TIME PARTIAL: CPT | Performed by: STUDENT IN AN ORGANIZED HEALTH CARE EDUCATION/TRAINING PROGRAM

## 2022-01-08 PROCEDURE — 82805 BLOOD GASES W/O2 SATURATION: CPT | Performed by: STUDENT IN AN ORGANIZED HEALTH CARE EDUCATION/TRAINING PROGRAM

## 2022-01-08 PROCEDURE — P9012 CRYOPRECIPITATE EACH UNIT: HCPCS | Performed by: STUDENT IN AN ORGANIZED HEALTH CARE EDUCATION/TRAINING PROGRAM

## 2022-01-08 PROCEDURE — 250N000011 HC RX IP 250 OP 636: Performed by: NURSE PRACTITIONER

## 2022-01-08 PROCEDURE — 200N000002 HC R&B ICU UMMC

## 2022-01-08 PROCEDURE — 82803 BLOOD GASES ANY COMBINATION: CPT | Performed by: STUDENT IN AN ORGANIZED HEALTH CARE EDUCATION/TRAINING PROGRAM

## 2022-01-08 PROCEDURE — 85384 FIBRINOGEN ACTIVITY: CPT | Performed by: STUDENT IN AN ORGANIZED HEALTH CARE EDUCATION/TRAINING PROGRAM

## 2022-01-08 PROCEDURE — 250N000011 HC RX IP 250 OP 636: Performed by: STUDENT IN AN ORGANIZED HEALTH CARE EDUCATION/TRAINING PROGRAM

## 2022-01-08 PROCEDURE — 250N000012 HC RX MED GY IP 250 OP 636 PS 637: Performed by: STUDENT IN AN ORGANIZED HEALTH CARE EDUCATION/TRAINING PROGRAM

## 2022-01-08 PROCEDURE — 74018 RADEX ABDOMEN 1 VIEW: CPT

## 2022-01-08 PROCEDURE — 96372 THER/PROPH/DIAG INJ SC/IM: CPT | Performed by: STUDENT IN AN ORGANIZED HEALTH CARE EDUCATION/TRAINING PROGRAM

## 2022-01-08 PROCEDURE — P9016 RBC LEUKOCYTES REDUCED: HCPCS | Performed by: STUDENT IN AN ORGANIZED HEALTH CARE EDUCATION/TRAINING PROGRAM

## 2022-01-08 PROCEDURE — 250N000009 HC RX 250: Performed by: STUDENT IN AN ORGANIZED HEALTH CARE EDUCATION/TRAINING PROGRAM

## 2022-01-08 PROCEDURE — 36415 COLL VENOUS BLD VENIPUNCTURE: CPT | Performed by: STUDENT IN AN ORGANIZED HEALTH CARE EDUCATION/TRAINING PROGRAM

## 2022-01-08 PROCEDURE — 83690 ASSAY OF LIPASE: CPT | Performed by: NURSE PRACTITIONER

## 2022-01-08 PROCEDURE — C9113 INJ PANTOPRAZOLE SODIUM, VIA: HCPCS | Performed by: STUDENT IN AN ORGANIZED HEALTH CARE EDUCATION/TRAINING PROGRAM

## 2022-01-08 PROCEDURE — 250N000013 HC RX MED GY IP 250 OP 250 PS 637: Performed by: STUDENT IN AN ORGANIZED HEALTH CARE EDUCATION/TRAINING PROGRAM

## 2022-01-08 PROCEDURE — P9041 ALBUMIN (HUMAN),5%, 50ML: HCPCS | Performed by: NURSE PRACTITIONER

## 2022-01-08 PROCEDURE — 999N000157 HC STATISTIC RCP TIME EA 10 MIN

## 2022-01-08 PROCEDURE — 258N000003 HC RX IP 258 OP 636

## 2022-01-08 PROCEDURE — 74018 RADEX ABDOMEN 1 VIEW: CPT | Mod: 26 | Performed by: RADIOLOGY

## 2022-01-08 PROCEDURE — 82962 GLUCOSE BLOOD TEST: CPT

## 2022-01-08 PROCEDURE — 80053 COMPREHEN METABOLIC PANEL: CPT | Performed by: STUDENT IN AN ORGANIZED HEALTH CARE EDUCATION/TRAINING PROGRAM

## 2022-01-08 PROCEDURE — 83735 ASSAY OF MAGNESIUM: CPT | Performed by: TRANSPLANT SURGERY

## 2022-01-08 PROCEDURE — 82248 BILIRUBIN DIRECT: CPT | Performed by: STUDENT IN AN ORGANIZED HEALTH CARE EDUCATION/TRAINING PROGRAM

## 2022-01-08 PROCEDURE — 250N000011 HC RX IP 250 OP 636: Performed by: TRANSPLANT SURGERY

## 2022-01-08 PROCEDURE — 258N000001 HC RX 258: Performed by: STUDENT IN AN ORGANIZED HEALTH CARE EDUCATION/TRAINING PROGRAM

## 2022-01-08 PROCEDURE — 250N000013 HC RX MED GY IP 250 OP 250 PS 637

## 2022-01-08 PROCEDURE — 94002 VENT MGMT INPAT INIT DAY: CPT

## 2022-01-08 PROCEDURE — 82150 ASSAY OF AMYLASE: CPT | Performed by: NURSE PRACTITIONER

## 2022-01-08 PROCEDURE — 83735 ASSAY OF MAGNESIUM: CPT

## 2022-01-08 PROCEDURE — 85025 COMPLETE CBC W/AUTO DIFF WBC: CPT | Performed by: STUDENT IN AN ORGANIZED HEALTH CARE EDUCATION/TRAINING PROGRAM

## 2022-01-08 PROCEDURE — 93975 VASCULAR STUDY: CPT

## 2022-01-08 RX ORDER — NOREPINEPHRINE BITARTRATE 0.06 MG/ML
.01-.6 INJECTION, SOLUTION INTRAVENOUS CONTINUOUS
Status: DISCONTINUED | OUTPATIENT
Start: 2022-01-08 | End: 2022-01-09

## 2022-01-08 RX ORDER — ALBUMIN, HUMAN INJ 5% 5 %
25 SOLUTION INTRAVENOUS ONCE
Status: COMPLETED | OUTPATIENT
Start: 2022-01-08 | End: 2022-01-08

## 2022-01-08 RX ORDER — NALOXONE HYDROCHLORIDE 0.4 MG/ML
0.4 INJECTION, SOLUTION INTRAMUSCULAR; INTRAVENOUS; SUBCUTANEOUS
Status: DISCONTINUED | OUTPATIENT
Start: 2022-01-08 | End: 2022-01-14 | Stop reason: HOSPADM

## 2022-01-08 RX ORDER — METHYLPREDNISOLONE SODIUM SUCCINATE 125 MG/2ML
100 INJECTION, POWDER, LYOPHILIZED, FOR SOLUTION INTRAMUSCULAR; INTRAVENOUS ONCE
Status: COMPLETED | OUTPATIENT
Start: 2022-01-10 | End: 2022-01-10

## 2022-01-08 RX ORDER — SULFAMETHOXAZOLE AND TRIMETHOPRIM 400; 80 MG/1; MG/1
1 TABLET ORAL DAILY
Status: DISCONTINUED | OUTPATIENT
Start: 2022-01-08 | End: 2022-01-14 | Stop reason: HOSPADM

## 2022-01-08 RX ORDER — METHYLPREDNISOLONE SODIUM SUCCINATE 125 MG/2ML
50 INJECTION, POWDER, LYOPHILIZED, FOR SOLUTION INTRAMUSCULAR; INTRAVENOUS ONCE
Status: COMPLETED | OUTPATIENT
Start: 2022-01-11 | End: 2022-01-11

## 2022-01-08 RX ORDER — MYCOPHENOLATE MOFETIL 200 MG/ML
750 POWDER, FOR SUSPENSION ORAL
Status: DISCONTINUED | OUTPATIENT
Start: 2022-01-08 | End: 2022-01-10 | Stop reason: CLARIF

## 2022-01-08 RX ORDER — ALBUMIN, HUMAN INJ 5% 5 %
SOLUTION INTRAVENOUS
Status: COMPLETED
Start: 2022-01-08 | End: 2022-01-08

## 2022-01-08 RX ORDER — NALOXONE HYDROCHLORIDE 0.4 MG/ML
0.2 INJECTION, SOLUTION INTRAMUSCULAR; INTRAVENOUS; SUBCUTANEOUS
Status: DISCONTINUED | OUTPATIENT
Start: 2022-01-08 | End: 2022-01-14 | Stop reason: HOSPADM

## 2022-01-08 RX ORDER — OXYCODONE HYDROCHLORIDE 5 MG/1
5-10 TABLET ORAL EVERY 4 HOURS PRN
Status: DISCONTINUED | OUTPATIENT
Start: 2022-01-08 | End: 2022-01-11

## 2022-01-08 RX ORDER — PREDNISONE 10 MG/1
10 TABLET ORAL ONCE
Status: COMPLETED | OUTPATIENT
Start: 2022-01-13 | End: 2022-01-13

## 2022-01-08 RX ORDER — NICOTINE POLACRILEX 4 MG
15-30 LOZENGE BUCCAL
Status: DISCONTINUED | OUTPATIENT
Start: 2022-01-08 | End: 2022-01-14 | Stop reason: HOSPADM

## 2022-01-08 RX ORDER — ALBUMIN, HUMAN INJ 5% 5 %
SOLUTION INTRAVENOUS
Status: DISCONTINUED
Start: 2022-01-08 | End: 2022-01-08 | Stop reason: HOSPADM

## 2022-01-08 RX ORDER — AMOXICILLIN 250 MG/1
750 CAPSULE ORAL 3 TIMES DAILY
Status: COMPLETED | OUTPATIENT
Start: 2022-01-10 | End: 2022-01-13

## 2022-01-08 RX ORDER — VALGANCICLOVIR 450 MG/1
450 TABLET, FILM COATED ORAL DAILY
Status: DISCONTINUED | OUTPATIENT
Start: 2022-01-08 | End: 2022-01-14 | Stop reason: HOSPADM

## 2022-01-08 RX ORDER — PIPERACILLIN SODIUM, TAZOBACTAM SODIUM 3; .375 G/15ML; G/15ML
3.38 INJECTION, POWDER, LYOPHILIZED, FOR SOLUTION INTRAVENOUS EVERY 6 HOURS
Status: COMPLETED | OUTPATIENT
Start: 2022-01-08 | End: 2022-01-09

## 2022-01-08 RX ORDER — POTASSIUM CHLORIDE 750 MG/1
20 TABLET, EXTENDED RELEASE ORAL ONCE
Status: COMPLETED | OUTPATIENT
Start: 2022-01-08 | End: 2022-01-08

## 2022-01-08 RX ORDER — AMINO AC/PROTEIN HYDR/WHEY PRO 10G-100/30
1 LIQUID (ML) ORAL 2 TIMES DAILY
Status: DISCONTINUED | OUTPATIENT
Start: 2022-01-08 | End: 2022-01-10 | Stop reason: ALTCHOICE

## 2022-01-08 RX ORDER — DEXTROSE MONOHYDRATE 100 MG/ML
INJECTION, SOLUTION INTRAVENOUS CONTINUOUS PRN
Status: DISCONTINUED | OUTPATIENT
Start: 2022-01-08 | End: 2022-01-10 | Stop reason: ALTCHOICE

## 2022-01-08 RX ORDER — ALBUMIN, HUMAN INJ 5% 5 %
25 SOLUTION INTRAVENOUS EVERY 8 HOURS
Status: DISCONTINUED | OUTPATIENT
Start: 2022-01-08 | End: 2022-01-09

## 2022-01-08 RX ORDER — GUAIFENESIN 600 MG/1
15 TABLET, EXTENDED RELEASE ORAL DAILY
Status: DISCONTINUED | OUTPATIENT
Start: 2022-01-08 | End: 2022-01-09

## 2022-01-08 RX ORDER — LIDOCAINE HYDROCHLORIDE 20 MG/ML
5 SOLUTION OROPHARYNGEAL ONCE
Status: DISCONTINUED | OUTPATIENT
Start: 2022-01-08 | End: 2022-01-10 | Stop reason: CLARIF

## 2022-01-08 RX ORDER — PROPOFOL 10 MG/ML
5-75 INJECTION, EMULSION INTRAVENOUS CONTINUOUS
Status: DISCONTINUED | OUTPATIENT
Start: 2022-01-08 | End: 2022-01-08

## 2022-01-08 RX ORDER — DEXTROSE MONOHYDRATE 100 MG/ML
INJECTION, SOLUTION INTRAVENOUS CONTINUOUS PRN
Status: DISCONTINUED | OUTPATIENT
Start: 2022-01-08 | End: 2022-01-08

## 2022-01-08 RX ORDER — FLUCONAZOLE 2 MG/ML
400 INJECTION, SOLUTION INTRAVENOUS EVERY 24 HOURS
Status: DISCONTINUED | OUTPATIENT
Start: 2022-01-08 | End: 2022-01-08

## 2022-01-08 RX ORDER — DEXTROSE MONOHYDRATE 25 G/50ML
25-50 INJECTION, SOLUTION INTRAVENOUS
Status: DISCONTINUED | OUTPATIENT
Start: 2022-01-08 | End: 2022-01-14 | Stop reason: HOSPADM

## 2022-01-08 RX ORDER — TACROLIMUS 1 MG/1
2 CAPSULE ORAL
Status: DISCONTINUED | OUTPATIENT
Start: 2022-01-08 | End: 2022-01-10

## 2022-01-08 RX ORDER — HYDROMORPHONE HCL IN WATER/PF 6 MG/30 ML
.2-.5 PATIENT CONTROLLED ANALGESIA SYRINGE INTRAVENOUS
Status: DISCONTINUED | OUTPATIENT
Start: 2022-01-08 | End: 2022-01-09

## 2022-01-08 RX ORDER — VALGANCICLOVIR HYDROCHLORIDE 50 MG/ML
450 POWDER, FOR SOLUTION ORAL DAILY
Status: DISCONTINUED | OUTPATIENT
Start: 2022-01-08 | End: 2022-01-10 | Stop reason: CLARIF

## 2022-01-08 RX ORDER — DEXTROSE MONOHYDRATE 100 MG/ML
INJECTION, SOLUTION INTRAVENOUS CONTINUOUS PRN
Status: DISCONTINUED | OUTPATIENT
Start: 2022-01-08 | End: 2022-01-14 | Stop reason: HOSPADM

## 2022-01-08 RX ORDER — SODIUM CHLORIDE, SODIUM LACTATE, POTASSIUM CHLORIDE, CALCIUM CHLORIDE 600; 310; 30; 20 MG/100ML; MG/100ML; MG/100ML; MG/100ML
INJECTION, SOLUTION INTRAVENOUS CONTINUOUS
Status: DISCONTINUED | OUTPATIENT
Start: 2022-01-08 | End: 2022-01-09

## 2022-01-08 RX ORDER — ALBUMIN, HUMAN INJ 5% 5 %
250 SOLUTION INTRAVENOUS ONCE
Status: COMPLETED | OUTPATIENT
Start: 2022-01-08 | End: 2022-01-08

## 2022-01-08 RX ORDER — MYCOPHENOLATE MOFETIL 250 MG/1
750 CAPSULE ORAL
Status: DISCONTINUED | OUTPATIENT
Start: 2022-01-08 | End: 2022-01-14 | Stop reason: HOSPADM

## 2022-01-08 RX ADMIN — SULFAMETHOXAZOLE AND TRIMETHOPRIM 1 TABLET: 400; 80 TABLET ORAL at 09:54

## 2022-01-08 RX ADMIN — OXYCODONE HYDROCHLORIDE 5 MG: 5 TABLET ORAL at 14:26

## 2022-01-08 RX ADMIN — HUMAN INSULIN 10 UNITS/HR: 100 INJECTION, SOLUTION SUBCUTANEOUS at 01:31

## 2022-01-08 RX ADMIN — Medication 15 ML: at 09:53

## 2022-01-08 RX ADMIN — ALBUMIN HUMAN 250 ML: 0.05 INJECTION, SOLUTION INTRAVENOUS at 03:17

## 2022-01-08 RX ADMIN — SODIUM CHLORIDE, POTASSIUM CHLORIDE, SODIUM LACTATE AND CALCIUM CHLORIDE: 600; 310; 30; 20 INJECTION, SOLUTION INTRAVENOUS at 09:52

## 2022-01-08 RX ADMIN — PIPERACILLIN AND TAZOBACTAM 3.38 G: 3; .375 INJECTION, POWDER, LYOPHILIZED, FOR SOLUTION INTRAVENOUS at 15:39

## 2022-01-08 RX ADMIN — Medication 1 PACKET: at 11:47

## 2022-01-08 RX ADMIN — PROPOFOL 40 MCG/KG/MIN: 10 INJECTION, EMULSION INTRAVENOUS at 00:11

## 2022-01-08 RX ADMIN — PIPERACILLIN AND TAZOBACTAM 3.38 G: 3; .375 INJECTION, POWDER, LYOPHILIZED, FOR SOLUTION INTRAVENOUS at 21:25

## 2022-01-08 RX ADMIN — ALBUMIN (HUMAN): 12.5 SOLUTION INTRAVENOUS at 03:19

## 2022-01-08 RX ADMIN — HYDROMORPHONE HYDROCHLORIDE 0.2 MG: 0.2 INJECTION, SOLUTION INTRAMUSCULAR; INTRAVENOUS; SUBCUTANEOUS at 19:59

## 2022-01-08 RX ADMIN — POTASSIUM CHLORIDE 20 MEQ: 29.8 INJECTION, SOLUTION INTRAVENOUS at 00:26

## 2022-01-08 RX ADMIN — ALBUMIN (HUMAN) 25 G: 12.5 INJECTION, SOLUTION INTRAVENOUS at 23:37

## 2022-01-08 RX ADMIN — PROPOFOL 40 MCG/KG/MIN: 10 INJECTION, EMULSION INTRAVENOUS at 03:28

## 2022-01-08 RX ADMIN — VALGANCICLOVIR HYDROCHLORIDE 450 MG: 50 POWDER, FOR SOLUTION ORAL at 09:53

## 2022-01-08 RX ADMIN — FIBRINOGEN (HUMAN) 1100 MG: KIT INTRAVENOUS at 03:57

## 2022-01-08 RX ADMIN — INSULIN ASPART 5 UNITS: 100 INJECTION, SOLUTION INTRAVENOUS; SUBCUTANEOUS at 05:42

## 2022-01-08 RX ADMIN — TACROLIMUS 2 MG: 1 CAPSULE ORAL at 18:17

## 2022-01-08 RX ADMIN — Medication 0.13 MCG/KG/MIN: at 04:13

## 2022-01-08 RX ADMIN — PROPOFOL 30 MCG/KG/MIN: 10 INJECTION, EMULSION INTRAVENOUS at 05:32

## 2022-01-08 RX ADMIN — OXYCODONE HYDROCHLORIDE 5 MG: 5 TABLET ORAL at 21:24

## 2022-01-08 RX ADMIN — HUMAN INSULIN 20 UNITS/HR: 100 INJECTION, SOLUTION SUBCUTANEOUS at 08:13

## 2022-01-08 RX ADMIN — POTASSIUM CHLORIDE 20 MEQ: 750 TABLET, EXTENDED RELEASE ORAL at 14:36

## 2022-01-08 RX ADMIN — SODIUM CHLORIDE, POTASSIUM CHLORIDE, SODIUM LACTATE AND CALCIUM CHLORIDE: 600; 310; 30; 20 INJECTION, SOLUTION INTRAVENOUS at 19:58

## 2022-01-08 RX ADMIN — FENTANYL CITRATE 100 MCG/HR: 50 INJECTION INTRAVENOUS at 03:25

## 2022-01-08 RX ADMIN — ALBUMIN (HUMAN) 25 G: 12.5 INJECTION, SOLUTION INTRAVENOUS at 15:38

## 2022-01-08 RX ADMIN — HUMAN INSULIN 19 UNITS/HR: 100 INJECTION, SOLUTION SUBCUTANEOUS at 10:40

## 2022-01-08 RX ADMIN — HYDROMORPHONE HYDROCHLORIDE 0.2 MG: 0.2 INJECTION, SOLUTION INTRAMUSCULAR; INTRAVENOUS; SUBCUTANEOUS at 13:41

## 2022-01-08 RX ADMIN — PIPERACILLIN AND TAZOBACTAM 3.38 G: 3; .375 INJECTION, POWDER, LYOPHILIZED, FOR SOLUTION INTRAVENOUS at 04:00

## 2022-01-08 RX ADMIN — MYCOPHENOLATE MOFETIL 750 MG: 200 POWDER, FOR SUSPENSION ORAL at 09:53

## 2022-01-08 RX ADMIN — ALBUMIN HUMAN 25 G: 0.05 INJECTION, SOLUTION INTRAVENOUS at 08:39

## 2022-01-08 RX ADMIN — PIPERACILLIN AND TAZOBACTAM 3.38 G: 3; .375 INJECTION, POWDER, LYOPHILIZED, FOR SOLUTION INTRAVENOUS at 10:00

## 2022-01-08 RX ADMIN — TACROLIMUS 2 MG: 5 CAPSULE ORAL at 09:53

## 2022-01-08 RX ADMIN — HUMAN INSULIN 2 UNITS/HR: 100 INJECTION, SOLUTION SUBCUTANEOUS at 18:37

## 2022-01-08 RX ADMIN — DEXTROSE AND SODIUM CHLORIDE: 5; 450 INJECTION, SOLUTION INTRAVENOUS at 01:34

## 2022-01-08 RX ADMIN — PANTOPRAZOLE SODIUM 40 MG: 40 INJECTION, POWDER, FOR SOLUTION INTRAVENOUS at 08:20

## 2022-01-08 RX ADMIN — OXYCODONE HYDROCHLORIDE 5 MG: 5 TABLET ORAL at 16:18

## 2022-01-08 RX ADMIN — VASOPRESSIN 2.4 UNITS/HR: 20 INJECTION INTRAVENOUS at 10:29

## 2022-01-08 RX ADMIN — ALBUMIN (HUMAN) 250 ML: 12.5 INJECTION, SOLUTION INTRAVENOUS at 21:00

## 2022-01-08 RX ADMIN — SUGAMMADEX 200 MG: 100 INJECTION, SOLUTION INTRAVENOUS at 01:04

## 2022-01-08 RX ADMIN — MYCOPHENOLATE MOFETIL 750 MG: 250 CAPSULE ORAL at 18:17

## 2022-01-08 RX ADMIN — DEXTROSE MONOHYDRATE 25 ML: 500 INJECTION PARENTERAL at 16:18

## 2022-01-08 ASSESSMENT — ACTIVITIES OF DAILY LIVING (ADL)
ADLS_ACUITY_SCORE: 11

## 2022-01-08 ASSESSMENT — MIFFLIN-ST. JEOR: SCORE: 1265.88

## 2022-01-08 NOTE — TELEPHONE ENCOUNTER
Organ Offer Encounter Information    Organ Offer Information  Organ offer date & time: 1/6/2022 11:27 PM  Coordinator/Fellow/Attending name: Steph Peña RN   Organ(s):  Organ UNOS ID Match Run ID Comment Organ Laterality   Liver [id number]        Recent infections?: No    New medications?: Yes (Comment: amoxicillin) Recent pregnancy?: No   Angicoagulation medications?: No Recent vaccinations?: No   Recent blood transfusions?: Yes (Comment: blood given for low Hgb 2 days ago) Recent hospitalizations?: Yes (Comment: discharged 1/5, admission for low Hgb and low sodium levels)   Has your insurance changed in the last 6-12 months?: Pos (Comment: CHANGED SEPT 2021, NEW JOB)    Discussed organ offer with: Patient  Patient/Caregiver name: JACKELYN  Discussed risk category with Patient/Other: N/A  Understood donor criteria, verbalized understanding  Patient/Other asked to speak to a surgeon?: No  Discussed program-specific outcomes: Asked questions regarding SRTR, verbalized understanding  Right to decline organ offer without penalty, Patient/Other: Aware of option to decline without penalty  Organ offer decision status Patient/Other: Accepted Offer  Organ disposition: Transplanted  Additional Comments: 1/6/2022 11:28 PM  Liver: primary liver offer, local  Discussed offer details with magdalene and her . They will accept offer, no further questions.   MD: Pavan/damon  OPO Contact: Marcia  Donor/Recip HCV Status: neg/neg  (HCV+ Donors - Discuss HCV genotyping/quant testing with MD & send message to [facility] HCV POOL - Include Donor UNOS ID)  Donor Nutritional Status: on D5 continous infusion  Plan (NPO, Donor OR): Patient to be admitted immediately. ETA to hospital [id number]. Patient provided admission instructions and address.   - - -   COVID Screening  In the past month, have you had:  Any close contact with a suspect or laboratory-confirmed COVID-19 patient: NO  Travel anywhere: NO  COVID Symptoms  (Fever, Cough, Short of Breath, Loss of Taste/Smell, Rash): NO    Admissions: 12:24 AM Charo- Patient to be admitted to 7a. Patient information provided.   Unit: 7a- 12:22 AM Discussed with Zita charge nurse. They will expect Fidelina at 0100. Covid needed on admission.   Update Provider Entering Orders (XM Plan & COVID Testing):  Pema Delatorre (gneral sgx) - discussed admit and need for stat Covid, no x match  Immunology: Roseann 0039, no xmatch needed  Inpatient Lab (COVID Testing [phone], Option 2): Charge to call lab when sent.   Book OR: 12:55 AM Spoke with Aimee in OR and booked 1600 Recipient OR, patient in room at 1600  Vessel Storage Confirmation (PA/YULISA/LI): yes  Blood Bank: Abilio notified at 0058  Research: ON HOLD  TransNet/ABO Verification: done 0058  Add Organ: done 0045    Donor OR Time: 1300  Procuring MD: Dr. Adam  Contact in the OR: [facility] [phone]  Organs Being Procured: heart, liver, KP  Flush Solution: p  Biopsy: Yes  Pump: N/a  Special Requests (Special blood tubes, nodes, waivers): none  MD for Visualization: Dr. Browne  Transportation Details: Surgeon pickup in ED at 1030, [place], flight time 2 hours    Steph Peña RN    1/7/2022 9:43 AM:   Plan to have Dr. Erickson recover the liver for us and call with alondra Ny  Transplant Coordinator    Donor OR Time: 1300  Procuring MD: Dr. Erickson  Contact in the OR: [facility]  Organs Being Procured: Full Donor  Flush Solution: [facility]  Biopsy: Requested both lobes of liver  Pump: N/A  Special Requests (Special blood tubes, nodes, waivers): None  MD for Visualization: Dr. Browne  Transportation Details: Will return with  staff [facility] and will be brought right to the [facility]        Attestation I have discussed all of the above with the Patient/Legal Guardian/Caregiver regarding this organ offer.: Yes  Coordinator/Fellow/Attending name: Steph Peña, RN

## 2022-01-08 NOTE — ANESTHESIA CARE TRANSFER NOTE
Patient: Melita Cortes    Procedure: Procedure(s):  TRANSPLANT, LIVER, RECIPIENT,  DONOR       Diagnosis: End stage liver disease (H) [K72.10]  Diagnosis Additional Information: No value filed.    Anesthesia Type:   No value filed.     Note:    Oropharynx: oral gastic tube in place and ventilatory support  Level of Consciousness: iatrogenic sedation  Patient oxygen source: 10 LPM O2 via ETT.  Level of Supplemental Oxygen (L/min / FiO2): 10  Independent Airway: airway patency not satisfactory and stable  Dentition: dentition unchanged  Vital Signs Stable: post-procedure vital signs reviewed and stable  Report to RN Given: handoff report given  Patient transferred to: ICU  Comments: VSS. Transported sedated with 40 mcg/kg/min Propofol, Norepinephrine 0.3 mcg/kg/min, and Insulin 10U/hour via CVC. Manual ventilation during transport (10L/min) with regular rate with adequate tidal volumes and maintaining O2 sats. No apparent complications from anesthesia, and transport uncomplicated. Patient signed out to ICU floor team.     Bridger Navarro MD  Anesthesia CA-1    ICU Handoff: Call for PAUSE to initiate/utilize ICU HANDOFF, Identified Patient, Identified Responsible Provider, Reviewed the Pertinent Medical History, Discussed Surgical Course, Reviewed Intra-OP Anesthesia Management and Issues during Anesthesia, Set Expectations for Post Procedure Period and Allowed Opportunity for Questions and Acknowledgement of Understanding      Vitals:  Vitals Value Taken Time   /82 22 0105   Temp     Pulse 97 22 0142   Resp     SpO2 99 % 22 0142   Vitals shown include unvalidated device data.    Electronically Signed By: Bridger Navarro MD  2022  1:43 AM

## 2022-01-08 NOTE — PROGRESS NOTES
SURGICAL ICU PROGRESS NOTE  January 8, 2022      PRIMARY TEAM: Transplant  PRIMARY PHYSICIAN: Dr. Browne    REASON FOR CRITICAL CARE ADMISSION: hemodynamic monitoring   ADMITTING PHYSICIAN: Dr. Quezada      ASSESSMENT: Melita Cortes is a 51 year old female     Melita Cortes is a 51 year old female admitted on 1/7/2022. She has end stage liver disease secondary to alcoholic cirrhosis (ETOH remission since 09/2021, MELD 30), now status post DDLT and admitted to the SICU service for postoperative cares.     Surgery significant for EBL 2L, 1L cell-saver, 5 pRBC, 3u FFP, 1L albumin, 13.4L total fluid given    Today's Changes:  - wean sedation, add PRN dilaudid  - RT for PST once weaned, extubate today if able  - add vasopressin. Goal NE < 0.04   - 500 albumin per transplant  - continue Q 4 labs  - remove swan  - Nutrition consult for NJ placement once extubated  - /hr  - actinomyces bacteremia on 12/30: - hold amoxicillin while on Zosyn, resume on 1/10 to complete 14 day course  - give an additional fibrinogen unit       Neurological/Pain/Sedation:  # Acute postoperative pain  # Postoperative sedation in setting of mechanical ventilation  # Hepatic Encephalopathy    - lactulose and rifaximin   - Monitor neurological status and notify provider for any acute changes.  - Pain regimen:               -- Scheduled Tylenol solution               -- PRN Dilaudid; continue Fentanyl GTT, wean as tolerated             - Sedation regimen: RASS goal -1 to -2               -- Propofol GTT, wean as tolerated    Current RASS -2   Fentanyl 75  Propofol 30   - wean with goal to extubate today     Pulmonary:   # Postoperative ventilatory support  # Hx of asthma  - Mechanical ventilation:  Ventilation Mode: CMV/AC  (Continuous Mandatory Ventilation/ Assist Control)  Rate Set (breaths/minute): 20 breaths/min  Tidal Volume Set (mL): 480 mL  PEEP (cm H2O): 5 cmH2O  Oxygen Concentration (%): 50 % -> 30%  Resp: 20    VBG  3 am pH 7.39, pcos 34 o2 43   CXR small L pleural effusion  - RT for PST and hopeful extubation today     Cardiovascular:    # Postoperative hemodynamic support   - CVP goal > 8, < 12               -- CVP range 4 -10  - MAP goal > 60, < 85  - SBP goal > 110, <160  - Pressor requirements:               -- NE 0.14   -- add vaso 2.4         Lactate   hgb 10.5 (8.8 intra-op)   ON    Gastroenterology/Nutrition:  # Alcoholic liver cirrhosis  # Hx of ascites   # Hx of grade I esophageal varices (last EGD 10/29/2021)  # Status post DDLT     MELD-Na score: 30 at 1/7/2022  3:07 AM  MELD score: 29 at 1/7/2022  3:07 AM  Calculated from:  Serum Creatinine: 1.18 mg/dL at 1/7/2022  3:07 AM  Serum Sodium: 135 mmol/L at 1/7/2022  3:07 AM  Total Bilirubin: 15.6 mg/dL at 1/7/2022  3:07 AM  INR(ratio): 2.54 at 1/7/2022  3:07 AM  Age: 51 years     - OG in place. Repositioned and confirmed with XR. Monitor bloody output, likely from trauma with placment  - Nutrition consult for tube feeds  - Protonix 40 mg IV for prophylaxis  - discontinue lactulose and rifaximin   - Tacrolimus, mycophenolate, basiliximab per transplant surgery  - Liver US pending read  - LFTs down-trending   - continue Q 4 labs      Fluids/Electrolytes/Renal:  # Monitor fluid balances  - Fluid PRN Albumin bolus as needed  - transition to LR at 125cc/hr,   - Baseline creatinine ~1.0. Q4H labs  - Monitor I/O  - Gunter in place    Drain 807 ON       Endocrine:  # Steroids post-transplant  # Stress hyperglycemia   - Steroids: Taper per transplant. Methylpred 200 today   - Insulin gtt    - glucose 270-299   - high insuline req   - keep on gtt    - discontinue D5 1/2, transition to LR      ID:  # Perioperative antibiotics  # Prophylactic antibiotics  # Immunosuppressive therapy  # Hx Actinomyces odontolyticus bacteremia   - Intraoperative vancomycin and zosyn  - Tacrolimus, mycophenolate, basiliximab per transplant surgery  - Steroids: Per transplant  surgery  - Postoperative Zosyn Q6H x 48 hrs  - Bactrim ppx 400-80 mg for immunosuppression    - Valganciclovir ppx   - Actinomyces odontolyticus bacteremia (+1 of 2 BCx 12/30)               -- Evaluated by ID -> TTE 1/02 normal -> PO amoxicillin 750 TID through 1/14/22   - hold amoxicillin while on Zosyn, resume on 1/10 to complete 14 day course     Heme:     # Acute blood loss anemia  # Coagulopathy 2/2 liver disease   - Monitor Hgb: transfuse if Hgb <8 or if signs/symptoms of hypoperfusion               -- Preoperative Hgb 7.1  - Monitor fibrinogen: goal >200  - Monitor platelets: transfuse if <50  - INR goal <2  - Q4H CBC, lactate, BMP,  INR      - Intraoperative resuscitation:               - 1L cell-saver, 5 pRBC, 3u FFP, 1L albumin, 13.4L total fluid given     - Q 4 labs   - give 1 additional fibrinogen for value of 184     Musculoskeletal:  # Weakness and deconditioning of critical illness   - Physical and occupational therapy consults, evaluate and treat when able      General Cares/Prophylaxis:    DVT Prophylaxis: SCDs. Switch to pharmacologic ppx per transplant.   GI Prophylaxis: Pantoprazole 40 mg IV  Restraints: soft     Lines/tubes/drains:  - ETT (19 at teeth), MAC x 2, R radial art, AYSHA drain x 2, PA catheter, Gunter     Disposition:  - SICU      Patient seen and discussed with staff.    Masha Wu  General Surgery PGY2  Pager [phone]    ====================================    TODAY'S SUBJECTIVE/INTERVAL HISTORY:   Pt intubated and sedated, beginning to be more awake. Soft pressures overnight, given albumin, also received 1 unit fibrinogen this am     OBJECTIVE:     Temp:  [97.2  F (36.2  C)-99.9  F (37.7  C)] 98.8  F (37.1  C)  Pulse:  [] 100  Resp:  [12-20] 18  BP: (87-94)/(53-62) 91/56  MAP:  [57 mmHg-235 mmHg] 77 mmHg  Arterial Line BP: ()/(44-69) 110/58  FiO2 (%):  [30 %-50 %] 30 %  SpO2:  [96 %-100 %] 97 %  Ventilation Mode: CMV/AC  (Continuous Mandatory Ventilation/ Assist  Control)  FiO2 (%): 30 %  Rate Set (breaths/minute): 12 breaths/min  Tidal Volume Set (mL): 480 mL  PEEP (cm H2O): 5 cmH2O  Oxygen Concentration (%): 30 %  Resp: 18      I/O last 3 completed shifts:  In: 98054.45 [I.V.:23818.45; IV Piggyback:265]  Out: 3352 [Urine:2545; Drains:807]    General/Neuro: Intubated and sedated, awakens to gentle stimuli, purposeful movement of upper extremities   Pulm: Mechanically ventilated  Abd: soft; mildly distended, subcostal incision with some strike through and oozing. AYSHA x 2 with serosanguinous output  Extremities: no edema  Skin: warm and well-perfused.     LABS:   Arterial Blood Gases   Recent Labs   Lab 01/08/22  0628 01/08/22  0125 01/07/22  2330 01/07/22  2238   PH 7.44 7.33* 7.42 7.37   PCO2 29* 39 29* 27*   PO2 98 76* 130* 135*   HCO3 20* 21 19* 16*     Complete Blood Count   Recent Labs   Lab 01/08/22  0807 01/08/22  0336 01/08/22  0126 01/07/22  2330 01/07/22  2325   WBC 15.2* 19.9* 19.2*  --  9.7   HGB 9.6* 10.5* 10.9* 8.8* 8.9*   * 151 183  --  144*     Basic Metabolic Panel  Recent Labs   Lab 01/08/22  1004 01/08/22  0903 01/08/22  0811 01/08/22  0807 01/08/22  0507 01/08/22  0336 01/08/22  0310 01/08/22  0126 01/08/22  0115 01/07/22  2330 01/07/22  1855 01/07/22  0307   NA  --   --   --  137  --  138  --  139  --  133   < > 135   POTASSIUM  --   --   --  3.5  --  3.5  --  3.7  --  2.9*   < > 3.4   CHLORIDE  --   --   --  106  --  105  --  105  --   --   --  100   CO2  --   --   --  20  --  20  --  20  --   --   --  24   BUN  --   --   --  28  --  26  --  24  --   --   --  36*   CR  --   --   --  0.86  --  0.76  --  0.71  --   --   --  1.18*   * 312* 312* 306*   < > 271*   < > 270*   < > 222*   < > 136*    < > = values in this interval not displayed.     Liver Function Tests  Recent Labs   Lab 01/08/22  0807 01/08/22  0336 01/08/22  0126 01/07/22  2325 01/07/22  2230 01/07/22  0307   * 1,065* 1,254*  --   --  54*   * 980* 1,039*  --   --   30   ALKPHOS 45 47 47  --   --  123   BILITOTAL 5.4* 9.2* 9.6*  --   --  15.6*   ALBUMIN 1.6* 1.7* 1.4*  --   --  2.7*   INR 1.82* 2.07*  --  3.45* 4.98* 2.54*     Pancreatic Enzymes  Recent Labs   Lab 01/08/22  0807 01/07/22  0307   LIPASE 120  --    AMYLASE 36 90     Coagulation Profile  Recent Labs   Lab 01/08/22  0807 01/08/22  0336 01/08/22  0126 01/07/22  2325 01/07/22  2230 01/07/22  0307   INR 1.82* 2.07*  --  3.45* 4.98* 2.54*   PTT  --   --  59* 143* 149* 50*         IMAGING:   Recent Results (from the past 24 hour(s))   XR Abdomen Port 1 View    Narrative    Exam: XR ABDOMEN PORT 1 VIEWS, 1/8/2022 12:32 AM    Indication: X-Ray per MD request    Comparison: Abdominal CT 11/3/2021    Findings:   Frontal and lateral views of the abdomen. Surgical clips in the right  upper quadrant. Right upper quadrant drain. Air distention of the  colon with air visualized in the rectum. Linear density at the  inferior edge of the film is presumed external to the patient via  discussion with operative resident. No unexpected foreign body is  visualized in the abdomen.      Impression    Impression:   1. No unexpected foreign body is visualized within the abdomen.  2. Postoperative changes with right upper quadrant surgical clips and  a right upper quadrant drain.    Findings discussed with OR personnel Aimee Yepez by Dr. Rangel  at 12:40 AM on 1/8/2022.    I have personally reviewed the examination and initial interpretation  and I agree with the findings.    ROLLY KC MD         SYSTEM ID:  [id number]   XR Chest Port 1 View    Narrative    EXAM: XR CHEST PORT 1 VIEW  1/8/2022 2:02 AM     HISTORY:  s/p liver transplant. For position of lines       COMPARISON:  1/7/2022    FINDINGS:   Portable supine view of the chest. Endotracheal tube tip projects  approximately 4 cm above the mane. Right IJ Rule-Chao catheter tip  projects over the right pulmonary artery. Enteric tube tip projects  over the stomach, sidehole  projects near the gastroesophageal  junction. Right upper quadrant drain. Right upper quadrant surgical  clips.    Midline trachea. Cardiomediastinal silhouette is within normal limits.  Small left pleural effusion and left basilar opacity. Nonspecific  positive bowel gas in the upper abdomen.      Impression    IMPRESSION:   1. Endotracheal tube tip projects in the mid thoracic trachea  approximately 4 cm above the mane.  2. [place]-Chao catheter tip projects over the proximal right pulmonary  artery.  3.Enteric tube tip projects over the stomach, sidehole projects near  the gastroesophageal junction, recommend advancing.   4. Small left pleural effusion and adjacent opacities favored to  represent atelectasis.    I have personally reviewed the examination and initial interpretation  and I agree with the findings.    ROLLY KC MD         SYSTEM ID:  [id number]    Liver Transplant    Impression    RESIDENT PRELIMINARY INTERPRETATION  IMPRESSION:   1.  Patent transplant hepatic vasculature.  2.  Postsurgical fluid collections along the inferior left hepatic  lobe and crescentic fluid collection along the superior right hepatic  lobe.   XR Abdomen Port 1 View    Impression    RESIDENT PRELIMINARY INTERPRETATION  IMPRESSION:   1.  Orogastric tube tip and sidehole project over the stomach.  2.  Non-obstructed bowel gas pattern.

## 2022-01-08 NOTE — PROGRESS NOTES
Major Shift Events:  Pt arrived from OR @ 0120. Stable on arrival. Arouses to voice, follows simple commands. Jaundiced. HR ST low 100s, levo titrated for MAP > 65. Trace edema. CVP (6-10) and PAP (30/15) WNL. Intubated on CMV 30%/480/20/5. Minimal secretions. OG to LCS, minimal bloody output. Abdomen distended but soft/nontender. Gunter in place with adequate output. Clamshell incision with moderate shadow drainage, RLQ AYSHA drain with large bloody output, MD aware. Labs WNL, given fibrinogen. -300, insulin gtt @ 19 units, algorithm 4+, MD aware. Sedated on fentanyl and propofol. US not obtained. Multiple attempts made to notify US tech with no response. Radiology resident also notified and attempted to contact US tech. Day US tech will obtain imaging    Plan: Wean levo as able. Monitor labs and output.     For vital signs and complete assessments, please see documentation flowsheets.

## 2022-01-08 NOTE — PROGRESS NOTES
CLINICAL NUTRITION SERVICES - BRIEF NOTE     Nutrition Prescription    RECOMMENDATIONS FOR MDs/PROVIDERS TO ORDER:  Team to manage [facility].    Recommendations already ordered by Registered Dietitian (RD):  Ordered Osmolite 1.5 Ozzy @ goal of  55ml/hr  (1320ml/day)  will provide: 1980 kcals, 83 g PRO, 1005 ml free H20, 268 g CHO, and 0 g fiber daily.  - 1 packet Prosource BID (80 kcals, 22 gram PRO)  - start TF at 15 ml/hr for 8 hrs and increase 10 ml q 8 hrs until goal rate  - 30 ml H2O flush q 4 hrs for tube patency  - Certavite 15 ml/daily to help ensure micronutrient needs being met until pt consistently taking in >1L of formula daily   - Do not start or advance TF rate unless K+ > 3.0, Mg++ > 1.5, and Phos > 1.9. - patient is at risk for refeeding syndrome  - Total Kcal/PRO = 2060 kcals (33 kcals/kg) and 105 g protein (1.7 g/kg)    Future/Additional Recommendations:  Monitor lytes, tolerance to TF     EVALUATION OF THE PROGRESS TOWARD GOALS   Diet: NPO     NEW FINDINGS   Wt - 64.5 kg (142 lb 3.2 oz)    Labs -   K 3.5 (WNL)  Mg 2.2 (WNL)  Phos 3.0 - 1/7 (WNL - will order recheck for 1/8)      INTERVENTIONS  Ordered EN - Consult for Dietitian to Assess and Order TF per Medical Nutrition Therapy Protocol - Post SOT (Liver)    Monitoring/Evaluation  Progress toward goals will be monitored and evaluated per protocol.     Aimee Dugan RDN, LD  Weekend pager [phone]

## 2022-01-08 NOTE — PROGRESS NOTES
01/08/22 1500   Quick Adds   Type of Visit Initial PT Evaluation       Present no   Living Environment   People in home spouse   Current Living Arrangements house   Home Accessibility stairs to enter home;stairs within home   Number of Stairs, Main Entrance 2;other (see comments)  (to enter from garage)   Stair Railings, Main Entrance none   Number of Stairs, Within Home, Primary greater than 10 stairs;other (see comments)  (6 x 2 to get to upper level bed/bath)   Stair Railings, Within Home, Primary railings on both sides of stairs   Transportation Anticipated car, drives self;family or friend will provide   Living Environment Comments spouse can assist at home   Self-Care   Usual Activity Tolerance moderate   Current Activity Tolerance fair   Regular Exercise No   Equipment Currently Used at Home none   Activity/Exercise/Self-Care Comment IND with functional mobility and ADLs   Disability/Function   Hearing Difficulty or Deaf no   Wear Glasses or Blind no   Concentrating, Remembering or Making Decisions Difficulty no   Difficulty Communicating no   Difficulty Eating/Swallowing no   Walking or Climbing Stairs Difficulty no   Dressing/Bathing Difficulty no   Toileting issues no   Doing Errands Independently Difficulty (such as shopping) no   Fall history within last six months no   Change in Functional Status Since Onset of Current Illness/Injury yes   General Information   Onset of Illness/Injury or Date of Surgery 01/07/22   Referring Physician Enrrique Frazier MD   Patient/Family Therapy Goals Statement (PT) return home   Pertinent History of Current Problem (include personal factors and/or comorbidities that impact the POC) Melita Cortes is a 51 year old female admitted on 1/7/2022. She has end stage liver disease secondary to alcoholic cirrhosis (ETOH remission since 09/2021, MELD 30), now status post DDLT and admitted to the SICU service for postoperative cares.   Existing  Precautions/Restrictions abdominal   Cognition   Orientation Status (Cognition) oriented x 4   Follows Commands (Cognition) WNL   Pain Assessment   Patient Currently in Pain Yes, see Vital Sign flowsheet   Posture    Posture Forward head position;Protracted shoulders   Range of Motion (ROM)   ROM Comment B LE grossly WFL   Strength   Strength Comments no functional deficits noted   Bed Mobility   Comment (Bed Mobility) supine > sit min assist   Transfers   Transfer Safety Comments sit > stand min assist   Balance   Balance Comments required UE support and min assist to maintain standing balance   Sensory Examination   Sensory Perception patient reports no sensory changes   Clinical Impression   Criteria for Skilled Therapeutic Intervention yes, treatment indicated   PT Diagnosis (PT) impaired functional mobility s/p liver transplant   Influenced by the following impairments pain, impaired balance, decreased activity tolerance   Functional limitations due to impairments impaired bed mobility, impaired transfers, impaired gait   Clinical Presentation Stable/Uncomplicated   Clinical Presentation Rationale clinical judgement   Clinical Decision Making (Complexity) low complexity   Therapy Frequency (PT) 5x/week   Predicted Duration of Therapy Intervention (days/wks) 2 weeks   Planned Therapy Interventions (PT) balance training;bed mobility training;gait training;stair training;transfer training;progressive activity/exercise   Risk & Benefits of therapy have been explained evaluation/treatment results reviewed;care plan/treatment goals reviewed   PT Discharge Planning    PT Discharge Recommendation (DC Rec) home with assist   PT Rationale for DC Rec patient mobilizing well, based on PLOF anticipate she will be independent with functional mobility for household distances during expected length of acute stay.    PT Brief overview of current status  supine > sit with min assist.  sit > stand and bed > chair with min assist.     Total Evaluation Time   Total Evaluation Time (Minutes) 9

## 2022-01-08 NOTE — ANESTHESIA PROCEDURE NOTES
Central Line/PA Catheter Placement    Pre-Procedure   Staff -        Anesthesiologist:  Sunny Hernandez MD       Resident/Fellow: Bridger Navarro MD       Performed By: anesthesiologist and with residents       Procedure performed by resident/fellow/CRNA in presence of a teaching physician.         Location: OR       Pre-Anesthestic Checklist: patient identified, IV checked, monitors and equipment checked and at physician/surgeon's request  Timeout:       Correct Patient: Yes        Correct Procedure: Yes        Correct Site: Yes        Correct Position: Yes        Correct Laterality: N/A     Procedure   Procedure: central line       Laterality: right       Insertion Site: internal jugular.       Patient Position: Trendelenburg  Sterile Prep        All elements of maximal sterile barrier technique followed       Patient Prep/Sterile Barriers: draped, hand hygiene, gloves , hat , mask , draped, gown, sterile gel and probe cover       Skin prep: Chloraprep  Insertion/Injection        Technique: ultrasound guided and Seldinger Technique        1. Ultrasound was used to evaluate the access site.       2. Vein evaluated via ultrasound for patency/adequacy.       3. Using real-time ultrasound the needle/catheter was observed entering the artery/vein.       4. Permanent image was captured and entered into the patient's record.       5. The visualized structures were anatomically normal.       6. There were no apparent abnormal pathologic findings.       Introducer Type: 9 Fr, 2-lumen MAC   Narrative         Secured by: suture and anchor securement device       Tegaderm and Biopatch dressing used.       Complications: Hematoma,        All lumens flushed: Yes       Verification method: X-ray, Ultrasound and Placement to be verified post-op

## 2022-01-08 NOTE — ANESTHESIA PROCEDURE NOTES
Perioperative JENNIFER Procedure Note    Staff -        Anesthesiologist:  Sunny Hernandez MD       Performed By: anesthesiologist  Preanesthesia Checklist:  Patient identified, IV assessed, risks and benefits discussed, monitors and equipment assessed, procedure being performed at surgeon's request and anesthesia consent obtained.    JENNIFER Probe Insertion    Probe Status PRE Insertion: NO obvious damage  Probe type:  Adult 2D  Bite block used:   Soft  Insertion Technique: Easy, no oropharyngeal manipulation  Insertion complications: None obvious  Billing Report:A JENNIFER report is NOT being generated.  Probe Status POST Removal: NO obvious damage

## 2022-01-08 NOTE — PHARMACY-TRANSPLANT NOTE
Adult Liver Transplant Post Operative Note    51 year old female s/p  donor liver transplant on 22 for Alcoholic liver disease.      Planned immunosuppression regimen to include:   INDUCTION with: methylprednisolone/prednisone taper through POD #5.  MAINTENANCE to include mycophenolate and tacrolimus with initial goal trough levels of 8-12 (closer to 10) mcg/L for 0-3 months post-transplant.  Patient may continue prednisone at 5 mg PO daily until tacrolimus level is therapeutic.     Surgical prophylaxis includes: piperacillin-tazobactam IV for 48 hours and fluconazole IV/PO for 24 hours.    Opportunistic pathogen prophylaxis includes: trimethoprim/sulfamethoxazole, valganciclovir, and topical antifungal coverage to begin once systemic antifungal therapy is complete.    Patient is not enrolled in medication study.    Pharmacy will monitor for medication interactions and immunosuppression levels in conjunction with the team. Medication therapy needs for discharge planning will continue to be addressed throughout the current admission via multidisciplinary rounds and order review.  Pharmacy will make recommendations as appropriate.

## 2022-01-08 NOTE — ANESTHESIA PROCEDURE NOTES
Central Line/PA Catheter Placement    Pre-Procedure   Staff -        Anesthesiologist:  Sunny Hernandez MD       Performed By: anesthesiologist       Location: OR       Pre-Anesthestic Checklist: patient identified, IV checked, site marked, risks and benefits discussed, informed consent, monitors and equipment checked, pre-op evaluation and at physician/surgeon's request  Timeout:       Correct Patient: Yes        Correct Procedure: Yes        Correct Site: Yes        Correct Position: Yes        Correct Laterality: N/A     Procedure   Procedure: emergent       Laterality: right       Insertion Site: internal jugular.       Patient Position: supine  Sterile Prep        All elements of maximal sterile barrier technique followed       Patient Prep/Sterile Barriers: draped, hand hygiene, gloves , hat , mask , draped, gown, sterile gel and probe cover       Skin prep: Chloraprep  Insertion/Injection        Technique: ultrasound guided and Seldinger Technique        1. Ultrasound was used to evaluate the access site.       2. Vein evaluated via ultrasound for patency/adequacy.       3. Using real-time ultrasound the needle/catheter was observed entering the artery/vein.       4. Permanent image was captured and entered into the patient's record.       5. The visualized structures were anatomically normal.       6. There were no apparent abnormal pathologic findings.       PA Catheter Type: CCO         Appropriate RV, RA and PA waveforms noted:  Yes            Withdrawn and Locked at cm: 48  Narrative         Secured by: anchor securement device       Tegaderm and Biopatch dressing used.       Complications: Other (Comment) (ectopy),        All lumens flushed: Yes       Verification method: X-ray, Ultrasound, Placement to be verified post-op and JENNIFER

## 2022-01-08 NOTE — PROGRESS NOTES
"BP 91/56 (BP Location: Right arm)   Pulse 81   Temp 98.6  F (37  C) (Oral)   Resp 16   Ht 1.651 m (5' 5\")   Wt 64.7 kg (142 lb 11.2 oz)   SpO2 100%   BMI 23.75 kg/m      Shift: 7856-9770  VS: Hypotensive (80-90s/60-70s). OVSS on RA  Neuro: AOx4  Labs: Sepsis protocol triggered, lactic acid 2.3; see note  Respiratory: WDL  Pain/Nausea/PRN: Denies pain and nausea  Diet: NPO  LDA: PIV SL  GI/: BM this AM. Voids w/o difficulty, not saving  Skin: Jaundiced  Mobility: UAL  Plan: Liver transplant this PM    Handoff given to following RN.    "

## 2022-01-08 NOTE — PROGRESS NOTES
Admitted/transferred from: OR  Reason for admission/transfer: liver tx  2 RN skin assessment: completed by JANELLE Basurto, RN and HEATHER Way RN  Result of skin assessment and interventions/actions: old bruise sacrum, clamshell incision  Height, weight, drug calc weight: {DONE:  Patient belongings (see Flowsheet): jewelry in med box  MDRO education added to care plan: Na

## 2022-01-08 NOTE — PROGRESS NOTES
Patient removed from OS waitlist after  donor liver transplant. OS ID [id number].    Donor Has Risk Criteria for Transmission of HIV/HCV/HBV: No  Recipient Notified of Risk Criteria: N/A

## 2022-01-08 NOTE — BRIEF OP NOTE
[facility]    Brief Operative Note    Pre-operative diagnosis: End stage liver disease (H) [K72.10]  Post-operative diagnosis Same as pre-operative diagnosis    Procedure: Procedure(s):  TRANSPLANT, LIVER, RECIPIENT,  DONOR  Surgeon: Surgeon(s) and Role:     * Pradeep Browne MD - Primary     * Carlitos Burns MD - Resident - Assisting     * Enrrique Frazier MD - Fellow - Assisting  Anesthesia: General   Estimated Blood Loss: 2000 ml    Drains: Qasim drain  Specimens:   ID Type Source Tests Collected by Time Destination   1 : donor gallbladder Tissue Gallbladder SURGICAL PATHOLOGY EXAM Pradeep Browne MD 2022 10:48 PM    2 : native liver and gallbladder Tissue Liver SURGICAL PATHOLOGY EXAM Pradeep Browne MD 2022 10:49 PM    A : Abdominal Ascites Peritoneal Fluid Other ANAEROBIC BACTERIAL CULTURE ROUTINE, GRAM STAIN, FUNGAL OR YEAST CULTURE ROUTINE, AEROBIC BACTERIAL CULTURE ROUTINE Pradeep Browne MD 2022  7:09 PM      Findings:   See op report.  Complications: None.  Implants: * No implants in log *

## 2022-01-08 NOTE — ANESTHESIA PROCEDURE NOTES
Airway       Patient location during procedure: OR       Procedure Start/Stop Times: 1/7/2022 5:28 PM  Staff -        Anesthesiologist:  Sunny Hernandez MD       Resident/Fellow: Bridger Navarro MD       Performed By: resident  Consent for Airway        Urgency: elective  Indications and Patient Condition       Indications for airway management: keith-procedural       Induction type:intravenous       Mask difficulty assessment: 1 - vent by mask    Final Airway Details       Final airway type: endotracheal airway       Successful airway: ETT - single  Endotracheal Airway Details        ETT size (mm): 7.0       Successful intubation technique: direct laryngoscopy       DL Blade Type: MAC 1       Grade View of Cords: 1       Adjucts: stylet       Position: Right       Measured from: gums/teeth       Secured at (cm): 19       Bite block used: None    Post intubation assessment        Placement verified by: capnometry, equal breath sounds and chest rise        Number of attempts at approach: 1       Number of other approaches attempted: 0       Secured with: pink tape       Ease of procedure: easy       Dentition: Intact and Unchanged

## 2022-01-08 NOTE — PROGRESS NOTES
STAFF NOTE:  No major issues    Exam intubated but readily arousable  Technically CAM-ICU positive, but I didn't bother trying to aggressively wean propofol as she was pretty good about following commands and getting some orienting questions right  Some bloody output from OG    Labs notable for normal lytes and stable renal function  Lactate is coming down, now at 2.9  Transaminases are appropriately coming down (we still don't have a read on the liver doppler)  Quite hyperclycemic even on insulin gtt    CXR small L pleural effusion  OG appropirately positioned  Liver doppler pending    AYSHA put out ~800ml overnight    This is a 51F hx EtOH cirrhosis s/p DDLT.  We can extubate her today and replace her [place]/introducer with a 3 lumen as per our typical protocol.  Will see if we can get a post-pyloric feeding tube prior to extubation.  She needs a few more blood products today.    ENCEPHALOPATHY / DELIRIUM:  -due to hepatic encephalopathy; ongoing sedatives; monitoring with clinical exam  -lactulose can stop post-op  -nothing for sleep; propofol + fentanyl for sedation will stop after exdtubation  -using non-pharmacologic methods as much as possilble    ACUTE ON CHRONIC LIVER FAILURE S/P TRANSPLANT:  -EtOH cirrhosis as underlying cause of liver failure  -induction immunosuppression with methylprednisolone/prednisone taper through POD #5.  Will eventually continue prednisone at 5 mg PO daily until tacrolimus level is therapeutic  -maintenance immunosuppression to include mycophenolate and tacrolimus with anticipated goal trough levels of 8-12 (to be determined by surgical service) mcg/L for 0-3 months post-transplant.    -Surgical prophylaxis includes: piperacillin-tazobactam IV for 48 hours and diflucan IV for 7 days.   -Opportunistic pathogen prophylaxis includes: trimethoprim/sulfamethoxazole, valganciclovir, and topical antifungal coverage to begin once systemic antifungal therapy is complete.  -oral and IV narcotics  available    ACUTE HYPOXIC RESPIRATORY FAILURE  -with PaO2 < 120 on >40% FiO2, P/F ratio is <300.  Monitoring with continuous pulse oximetry and intermittent ABGs.  -secondary to fluid overload  -lung protective ventilator settings with Vt <8ml/kg IBW (in this case, at least <500ml)  -probably will be able to be extubated later today.    PANCYTOPENIA / ACUTE BLOOD LOSS ANEMIA ON ANEMIA OF CHRONIC DISEASE:  -monitoring with q4-6h Hgb in the immediate post-op period.    -Will use a transfusion trigger of Hgb <8, INR >2, Platelet count <20, Fibrinogen <200 in the acute post-op period  -will trnasfuse cryo for now; did get a unit of blood last night    BACTEREMIA:  -hold amoxicillin while on post-op Zosyn; it will restart on the 10th (need a total of 10 more days)    HYPERGLYCEMIA:  -insulin gtt  -check Hgb A1c  -wait to start lantus until after seeing waht her HgbA1c is and whether insulin requirements come down once we stop the D5NS    MISC:  -full code  -family updated by primary team  -SQH not necessary immediately post-op; PPI bid for steroids & intubation  -lines: [place] can come out  -dash to remain while with ROSSY  -anticipate discharge to acute rehab in >2 weeks    Billing statement: 41min of critical care time; spent in an initial review of imaging, labs, physical exam, and discussion of the patient with my own team and the extended care team including the primary service.   Based on this patient's presentation / recent intervention and my bedside assessment, I felt there was or is a reasonably high probability of imminent or life-threatening deterioration today or tonight for graft-related reasons.   My overall critical care time, as described in detail above, includes such things as coordination of care, arrhythmia and hemodynamics management with infusions of medicines, respiratory management, fluid therapy including fluid boluses, and pain and sedation therapy. This time excludes time I spent personally  performing or supervising procedures for this patient.    JOHN Birch MD  Clinical   Anesthesia / Critical Care  *[phone]

## 2022-01-08 NOTE — ANESTHESIA PROCEDURE NOTES
Central Line/PA Catheter Placement    Pre-Procedure   Staff -        Anesthesiologist:  Sunny Hernandez MD       Performed By: anesthesiologist       Location: OR       Pre-Anesthestic Checklist: patient identified, IV checked, site marked, risks and benefits discussed, informed consent, monitors and equipment checked, pre-op evaluation and at physician/surgeon's request  Timeout:       Correct Patient: Yes        Correct Procedure: Yes        Correct Site: Yes        Correct Position: Yes        Correct Laterality: N/A     Procedure   Procedure: central line       Laterality: right       Insertion Site: internal jugular.       Patient Position: Trendelenburg  Sterile Prep        All elements of maximal sterile barrier technique followed       Patient Prep/Sterile Barriers: draped, hand hygiene, gloves , hat , mask , draped, gown, sterile gel and probe cover       Skin prep: Chloraprep  Insertion/Injection        Technique: ultrasound guided and Seldinger Technique        1. Ultrasound was used to evaluate the access site.       2. Vein evaluated via ultrasound for patency/adequacy.       3. Using real-time ultrasound the needle/catheter was observed entering the artery/vein.       4. Permanent image was captured and entered into the patient's record.       5. The visualized structures were anatomically normal.       6. There were no apparent abnormal pathologic findings.       Introducer Type: 9 Fr, 2-lumen MAC   Narrative         Secured by: suture and anchor securement device       Biopatch and Tegaderm dressing used.       Complications: None apparent,        blood aspirated from all lumens,        All lumens flushed: Yes       Verification method: X-ray, Ultrasound and Placement to be verified post-op

## 2022-01-09 ENCOUNTER — APPOINTMENT (OUTPATIENT)
Dept: GENERAL RADIOLOGY | Facility: CLINIC | Age: 52
DRG: 005 | End: 2022-01-09
Attending: ANESTHESIOLOGY
Payer: COMMERCIAL

## 2022-01-09 LAB
ALBUMIN SERPL-MCNC: 2.6 G/DL (ref 3.4–5)
ALBUMIN SERPL-MCNC: 2.7 G/DL (ref 3.4–5)
ALBUMIN SERPL-MCNC: 2.8 G/DL (ref 3.4–5)
ALP SERPL-CCNC: 30 U/L (ref 40–150)
ALP SERPL-CCNC: 40 U/L (ref 40–150)
ALP SERPL-CCNC: 56 U/L (ref 40–150)
ALT SERPL W P-5'-P-CCNC: 314 U/L (ref 0–50)
ALT SERPL W P-5'-P-CCNC: 351 U/L (ref 0–50)
ALT SERPL W P-5'-P-CCNC: 383 U/L (ref 0–50)
AMYLASE SERPL-CCNC: 38 U/L (ref 30–110)
ANION GAP SERPL CALCULATED.3IONS-SCNC: 13 MMOL/L (ref 3–14)
ANION GAP SERPL CALCULATED.3IONS-SCNC: 9 MMOL/L (ref 3–14)
AST SERPL W P-5'-P-CCNC: 144 U/L (ref 0–45)
AST SERPL W P-5'-P-CCNC: 164 U/L (ref 0–45)
AST SERPL W P-5'-P-CCNC: 99 U/L (ref 0–45)
BACTERIA SPEC CULT: NORMAL
BASOPHILS # BLD AUTO: 0 10E3/UL (ref 0–0.2)
BASOPHILS NFR BLD AUTO: 0 %
BILIRUB DIRECT SERPL-MCNC: 1.5 MG/DL (ref 0–0.2)
BILIRUB DIRECT SERPL-MCNC: 1.8 MG/DL (ref 0–0.2)
BILIRUB SERPL-MCNC: 2.1 MG/DL (ref 0.2–1.3)
BILIRUB SERPL-MCNC: 2.2 MG/DL (ref 0.2–1.3)
BILIRUB SERPL-MCNC: 2.4 MG/DL (ref 0.2–1.3)
BLD PROD TYP BPU: NORMAL
BLOOD COMPONENT TYPE: NORMAL
BUN SERPL-MCNC: 37 MG/DL (ref 7–30)
BUN SERPL-MCNC: 43 MG/DL (ref 7–30)
CA-I BLD-MCNC: 4.9 MG/DL (ref 4.4–5.2)
CALCIUM SERPL-MCNC: 8.4 MG/DL (ref 8.5–10.1)
CALCIUM SERPL-MCNC: 8.7 MG/DL (ref 8.5–10.1)
CHLORIDE BLD-SCNC: 103 MMOL/L (ref 94–109)
CHLORIDE BLD-SCNC: 104 MMOL/L (ref 94–109)
CHLORIDE BLD-SCNC: 106 MMOL/L (ref 94–109)
CHLORIDE BLD-SCNC: 106 MMOL/L (ref 94–109)
CO2 SERPL-SCNC: 19 MMOL/L (ref 20–32)
CO2 SERPL-SCNC: 21 MMOL/L (ref 20–32)
CO2 SERPL-SCNC: 21 MMOL/L (ref 20–32)
CO2 SERPL-SCNC: 22 MMOL/L (ref 20–32)
CODING SYSTEM: NORMAL
CREAT SERPL-MCNC: 1.26 MG/DL (ref 0.52–1.04)
CREAT SERPL-MCNC: 1.31 MG/DL (ref 0.52–1.04)
CREAT SERPL-MCNC: 1.31 MG/DL (ref 0.52–1.04)
CREAT SERPL-MCNC: 1.54 MG/DL (ref 0.52–1.04)
CROSSMATCH: NORMAL
EOSINOPHIL # BLD AUTO: 0.1 10E3/UL (ref 0–0.7)
EOSINOPHIL NFR BLD AUTO: 1 %
ERYTHROCYTE [DISTWIDTH] IN BLOOD BY AUTOMATED COUNT: 18.8 % (ref 10–15)
ERYTHROCYTE [DISTWIDTH] IN BLOOD BY AUTOMATED COUNT: 18.9 % (ref 10–15)
ERYTHROCYTE [DISTWIDTH] IN BLOOD BY AUTOMATED COUNT: 19 % (ref 10–15)
FIBRINOGEN PPP-MCNC: 161 MG/DL (ref 170–490)
FIBRINOGEN PPP-MCNC: 167 MG/DL (ref 170–490)
GFR SERPL CREATININE-BSD FRML MDRD: 41 ML/MIN/1.73M2
GFR SERPL CREATININE-BSD FRML MDRD: 49 ML/MIN/1.73M2
GFR SERPL CREATININE-BSD FRML MDRD: 49 ML/MIN/1.73M2
GFR SERPL CREATININE-BSD FRML MDRD: 52 ML/MIN/1.73M2
GLUCOSE BLD-MCNC: 104 MG/DL (ref 70–99)
GLUCOSE BLD-MCNC: 104 MG/DL (ref 70–99)
GLUCOSE BLD-MCNC: 107 MG/DL (ref 70–99)
GLUCOSE BLD-MCNC: 171 MG/DL (ref 70–99)
GLUCOSE BLDC GLUCOMTR-MCNC: 102 MG/DL (ref 70–99)
GLUCOSE BLDC GLUCOMTR-MCNC: 104 MG/DL (ref 70–99)
GLUCOSE BLDC GLUCOMTR-MCNC: 121 MG/DL (ref 70–99)
GLUCOSE BLDC GLUCOMTR-MCNC: 126 MG/DL (ref 70–99)
GLUCOSE BLDC GLUCOMTR-MCNC: 158 MG/DL (ref 70–99)
GLUCOSE BLDC GLUCOMTR-MCNC: 96 MG/DL (ref 70–99)
GLUCOSE BLDC GLUCOMTR-MCNC: 99 MG/DL (ref 70–99)
HCT VFR BLD AUTO: 20.1 % (ref 35–47)
HCT VFR BLD AUTO: 23.4 % (ref 35–47)
HCT VFR BLD AUTO: 28.1 % (ref 35–47)
HGB BLD-MCNC: 6.8 G/DL (ref 11.7–15.7)
HGB BLD-MCNC: 7.9 G/DL (ref 11.7–15.7)
HGB BLD-MCNC: 9.4 G/DL (ref 11.7–15.7)
IMM GRANULOCYTES # BLD: 0.1 10E3/UL
IMM GRANULOCYTES NFR BLD: 1 %
INR PPP: 1.5 (ref 0.85–1.15)
INR PPP: 1.56 (ref 0.85–1.15)
INR PPP: 1.56 (ref 0.85–1.15)
ISSUE DATE AND TIME: NORMAL
LIPASE SERPL-CCNC: 67 U/L (ref 73–393)
LYMPHOCYTES # BLD AUTO: 0.5 10E3/UL (ref 0.8–5.3)
LYMPHOCYTES NFR BLD AUTO: 4 %
MAGNESIUM SERPL-MCNC: 1.9 MG/DL (ref 1.6–2.3)
MCH RBC QN AUTO: 31.1 PG (ref 26.5–33)
MCH RBC QN AUTO: 31.1 PG (ref 26.5–33)
MCH RBC QN AUTO: 31.2 PG (ref 26.5–33)
MCHC RBC AUTO-ENTMCNC: 33.5 G/DL (ref 31.5–36.5)
MCHC RBC AUTO-ENTMCNC: 33.8 G/DL (ref 31.5–36.5)
MCHC RBC AUTO-ENTMCNC: 33.8 G/DL (ref 31.5–36.5)
MCV RBC AUTO: 92 FL (ref 78–100)
MCV RBC AUTO: 92 FL (ref 78–100)
MCV RBC AUTO: 93 FL (ref 78–100)
MONOCYTES # BLD AUTO: 1.2 10E3/UL (ref 0–1.3)
MONOCYTES NFR BLD AUTO: 10 %
NEUTROPHILS # BLD AUTO: 10.6 10E3/UL (ref 1.6–8.3)
NEUTROPHILS NFR BLD AUTO: 84 %
NRBC # BLD AUTO: 0 10E3/UL
NRBC BLD AUTO-RTO: 0 /100
PHOSPHATE SERPL-MCNC: 6.4 MG/DL (ref 2.5–4.5)
PLATELET # BLD AUTO: 45 10E3/UL (ref 150–450)
PLATELET # BLD AUTO: 48 10E3/UL (ref 150–450)
PLATELET # BLD AUTO: 49 10E3/UL (ref 150–450)
POTASSIUM BLD-SCNC: 3.8 MMOL/L (ref 3.4–5.3)
POTASSIUM BLD-SCNC: 3.8 MMOL/L (ref 3.4–5.3)
POTASSIUM BLD-SCNC: 3.9 MMOL/L (ref 3.4–5.3)
POTASSIUM BLD-SCNC: 4.5 MMOL/L (ref 3.4–5.3)
PROT SERPL-MCNC: 4.3 G/DL (ref 6.8–8.8)
PROT SERPL-MCNC: 4.5 G/DL (ref 6.8–8.8)
PROT SERPL-MCNC: 5 G/DL (ref 6.8–8.8)
RBC # BLD AUTO: 2.18 10E6/UL (ref 3.8–5.2)
RBC # BLD AUTO: 2.54 10E6/UL (ref 3.8–5.2)
RBC # BLD AUTO: 3.02 10E6/UL (ref 3.8–5.2)
SODIUM SERPL-SCNC: 135 MMOL/L (ref 133–144)
SODIUM SERPL-SCNC: 135 MMOL/L (ref 133–144)
SODIUM SERPL-SCNC: 136 MMOL/L (ref 133–144)
SODIUM SERPL-SCNC: 136 MMOL/L (ref 133–144)
UNIT ABO/RH: NORMAL
UNIT NUMBER: NORMAL
UNIT STATUS: NORMAL
UNIT TYPE ISBT: 6200
UNIT TYPE ISBT: 9500
WBC # BLD AUTO: 10.6 10E3/UL (ref 4–11)
WBC # BLD AUTO: 11.5 10E3/UL (ref 4–11)
WBC # BLD AUTO: 12.5 10E3/UL (ref 4–11)

## 2022-01-09 PROCEDURE — P9016 RBC LEUKOCYTES REDUCED: HCPCS | Performed by: STUDENT IN AN ORGANIZED HEALTH CARE EDUCATION/TRAINING PROGRAM

## 2022-01-09 PROCEDURE — 85027 COMPLETE CBC AUTOMATED: CPT | Performed by: NURSE PRACTITIONER

## 2022-01-09 PROCEDURE — 82248 BILIRUBIN DIRECT: CPT | Performed by: NURSE PRACTITIONER

## 2022-01-09 PROCEDURE — 84100 ASSAY OF PHOSPHORUS: CPT | Performed by: STUDENT IN AN ORGANIZED HEALTH CARE EDUCATION/TRAINING PROGRAM

## 2022-01-09 PROCEDURE — 250N000013 HC RX MED GY IP 250 OP 250 PS 637: Performed by: NURSE PRACTITIONER

## 2022-01-09 PROCEDURE — 250N000013 HC RX MED GY IP 250 OP 250 PS 637: Performed by: STUDENT IN AN ORGANIZED HEALTH CARE EDUCATION/TRAINING PROGRAM

## 2022-01-09 PROCEDURE — 258N000003 HC RX IP 258 OP 636: Performed by: STUDENT IN AN ORGANIZED HEALTH CARE EDUCATION/TRAINING PROGRAM

## 2022-01-09 PROCEDURE — 250N000011 HC RX IP 250 OP 636: Performed by: NURSE PRACTITIONER

## 2022-01-09 PROCEDURE — C9113 INJ PANTOPRAZOLE SODIUM, VIA: HCPCS | Performed by: STUDENT IN AN ORGANIZED HEALTH CARE EDUCATION/TRAINING PROGRAM

## 2022-01-09 PROCEDURE — P9041 ALBUMIN (HUMAN),5%, 50ML: HCPCS | Performed by: STUDENT IN AN ORGANIZED HEALTH CARE EDUCATION/TRAINING PROGRAM

## 2022-01-09 PROCEDURE — P9037 PLATE PHERES LEUKOREDU IRRAD: HCPCS | Performed by: STUDENT IN AN ORGANIZED HEALTH CARE EDUCATION/TRAINING PROGRAM

## 2022-01-09 PROCEDURE — 85384 FIBRINOGEN ACTIVITY: CPT | Performed by: STUDENT IN AN ORGANIZED HEALTH CARE EDUCATION/TRAINING PROGRAM

## 2022-01-09 PROCEDURE — 85025 COMPLETE CBC W/AUTO DIFF WBC: CPT | Performed by: STUDENT IN AN ORGANIZED HEALTH CARE EDUCATION/TRAINING PROGRAM

## 2022-01-09 PROCEDURE — 82150 ASSAY OF AMYLASE: CPT | Performed by: STUDENT IN AN ORGANIZED HEALTH CARE EDUCATION/TRAINING PROGRAM

## 2022-01-09 PROCEDURE — 250N000013 HC RX MED GY IP 250 OP 250 PS 637: Performed by: TRANSPLANT SURGERY

## 2022-01-09 PROCEDURE — 250N000011 HC RX IP 250 OP 636: Performed by: STUDENT IN AN ORGANIZED HEALTH CARE EDUCATION/TRAINING PROGRAM

## 2022-01-09 PROCEDURE — 83735 ASSAY OF MAGNESIUM: CPT | Performed by: STUDENT IN AN ORGANIZED HEALTH CARE EDUCATION/TRAINING PROGRAM

## 2022-01-09 PROCEDURE — 120N000011 HC R&B TRANSPLANT UMMC

## 2022-01-09 PROCEDURE — 250N000012 HC RX MED GY IP 250 OP 636 PS 637: Performed by: STUDENT IN AN ORGANIZED HEALTH CARE EDUCATION/TRAINING PROGRAM

## 2022-01-09 PROCEDURE — 85384 FIBRINOGEN ACTIVITY: CPT | Performed by: TRANSPLANT SURGERY

## 2022-01-09 PROCEDURE — 71045 X-RAY EXAM CHEST 1 VIEW: CPT | Mod: 26 | Performed by: STUDENT IN AN ORGANIZED HEALTH CARE EDUCATION/TRAINING PROGRAM

## 2022-01-09 PROCEDURE — 999N000065 XR CHEST PORT 1 VIEW

## 2022-01-09 PROCEDURE — 85610 PROTHROMBIN TIME: CPT | Performed by: TRANSPLANT SURGERY

## 2022-01-09 PROCEDURE — 84450 TRANSFERASE (AST) (SGOT): CPT | Performed by: NURSE PRACTITIONER

## 2022-01-09 PROCEDURE — 99233 SBSQ HOSP IP/OBS HIGH 50: CPT | Mod: 25 | Performed by: ANESTHESIOLOGY

## 2022-01-09 PROCEDURE — 83690 ASSAY OF LIPASE: CPT | Performed by: STUDENT IN AN ORGANIZED HEALTH CARE EDUCATION/TRAINING PROGRAM

## 2022-01-09 PROCEDURE — 250N000013 HC RX MED GY IP 250 OP 250 PS 637

## 2022-01-09 PROCEDURE — P9041 ALBUMIN (HUMAN),5%, 50ML: HCPCS | Performed by: NURSE PRACTITIONER

## 2022-01-09 PROCEDURE — 250N000011 HC RX IP 250 OP 636

## 2022-01-09 PROCEDURE — 82330 ASSAY OF CALCIUM: CPT | Performed by: STUDENT IN AN ORGANIZED HEALTH CARE EDUCATION/TRAINING PROGRAM

## 2022-01-09 PROCEDURE — 80048 BASIC METABOLIC PNL TOTAL CA: CPT | Performed by: NURSE PRACTITIONER

## 2022-01-09 PROCEDURE — 85027 COMPLETE CBC AUTOMATED: CPT | Performed by: TRANSPLANT SURGERY

## 2022-01-09 PROCEDURE — 80053 COMPREHEN METABOLIC PANEL: CPT | Performed by: STUDENT IN AN ORGANIZED HEALTH CARE EDUCATION/TRAINING PROGRAM

## 2022-01-09 PROCEDURE — 84155 ASSAY OF PROTEIN SERUM: CPT | Performed by: NURSE PRACTITIONER

## 2022-01-09 PROCEDURE — 02HV33Z INSERTION OF INFUSION DEVICE INTO SUPERIOR VENA CAVA, PERCUTANEOUS APPROACH: ICD-10-PCS | Performed by: ANESTHESIOLOGY

## 2022-01-09 PROCEDURE — 84155 ASSAY OF PROTEIN SERUM: CPT | Performed by: TRANSPLANT SURGERY

## 2022-01-09 PROCEDURE — 82248 BILIRUBIN DIRECT: CPT | Performed by: STUDENT IN AN ORGANIZED HEALTH CARE EDUCATION/TRAINING PROGRAM

## 2022-01-09 PROCEDURE — 85610 PROTHROMBIN TIME: CPT | Performed by: STUDENT IN AN ORGANIZED HEALTH CARE EDUCATION/TRAINING PROGRAM

## 2022-01-09 PROCEDURE — 258N000003 HC RX IP 258 OP 636

## 2022-01-09 PROCEDURE — 84450 TRANSFERASE (AST) (SGOT): CPT | Performed by: TRANSPLANT SURGERY

## 2022-01-09 PROCEDURE — 36556 INSERT NON-TUNNEL CV CATH: CPT | Performed by: ANESTHESIOLOGY

## 2022-01-09 RX ORDER — LIDOCAINE 4 G/G
1 PATCH TOPICAL
Status: DISCONTINUED | OUTPATIENT
Start: 2022-01-09 | End: 2022-01-14 | Stop reason: HOSPADM

## 2022-01-09 RX ORDER — FUROSEMIDE 10 MG/ML
40 INJECTION INTRAMUSCULAR; INTRAVENOUS EVERY 6 HOURS
Status: COMPLETED | OUTPATIENT
Start: 2022-01-09 | End: 2022-01-10

## 2022-01-09 RX ORDER — DEXTROSE MONOHYDRATE 25 G/50ML
25-50 INJECTION, SOLUTION INTRAVENOUS
Status: DISCONTINUED | OUTPATIENT
Start: 2022-01-09 | End: 2022-01-09

## 2022-01-09 RX ORDER — NICOTINE POLACRILEX 4 MG
15-30 LOZENGE BUCCAL
Status: DISCONTINUED | OUTPATIENT
Start: 2022-01-09 | End: 2022-01-09

## 2022-01-09 RX ORDER — NYSTATIN 100000/ML
500000 SUSPENSION, ORAL (FINAL DOSE FORM) ORAL 4 TIMES DAILY
Status: DISCONTINUED | OUTPATIENT
Start: 2022-01-09 | End: 2022-01-14 | Stop reason: HOSPADM

## 2022-01-09 RX ORDER — ACETAMINOPHEN 325 MG/1
650 TABLET ORAL EVERY 8 HOURS
Status: DISCONTINUED | OUTPATIENT
Start: 2022-01-09 | End: 2022-01-09

## 2022-01-09 RX ORDER — HYDROXYZINE HYDROCHLORIDE 25 MG/1
25 TABLET, FILM COATED ORAL EVERY 6 HOURS PRN
Status: DISCONTINUED | OUTPATIENT
Start: 2022-01-09 | End: 2022-01-14 | Stop reason: HOSPADM

## 2022-01-09 RX ORDER — METHOCARBAMOL 500 MG/1
500 TABLET, FILM COATED ORAL 3 TIMES DAILY PRN
Status: DISCONTINUED | OUTPATIENT
Start: 2022-01-09 | End: 2022-01-14 | Stop reason: HOSPADM

## 2022-01-09 RX ORDER — ALBUMIN, HUMAN INJ 5% 5 %
25 SOLUTION INTRAVENOUS ONCE
Status: COMPLETED | OUTPATIENT
Start: 2022-01-09 | End: 2022-01-09

## 2022-01-09 RX ORDER — ALBUMIN, HUMAN INJ 5% 5 %
25 SOLUTION INTRAVENOUS EVERY 8 HOURS
Status: DISCONTINUED | OUTPATIENT
Start: 2022-01-09 | End: 2022-01-10

## 2022-01-09 RX ORDER — ALBUMIN, HUMAN INJ 5% 5 %
250 SOLUTION INTRAVENOUS ONCE
Status: COMPLETED | OUTPATIENT
Start: 2022-01-09 | End: 2022-01-09

## 2022-01-09 RX ORDER — FUROSEMIDE 10 MG/ML
40 INJECTION INTRAMUSCULAR; INTRAVENOUS EVERY 6 HOURS
Status: DISCONTINUED | OUTPATIENT
Start: 2022-01-09 | End: 2022-01-09

## 2022-01-09 RX ORDER — MULTIPLE VITAMINS W/ MINERALS TAB 9MG-400MCG
1 TAB ORAL DAILY
Status: DISCONTINUED | OUTPATIENT
Start: 2022-01-09 | End: 2022-01-14 | Stop reason: HOSPADM

## 2022-01-09 RX ADMIN — SODIUM CHLORIDE 200 MG: 9 INJECTION, SOLUTION INTRAVENOUS at 10:33

## 2022-01-09 RX ADMIN — HYDROXYZINE HYDROCHLORIDE 25 MG: 25 TABLET ORAL at 22:38

## 2022-01-09 RX ADMIN — Medication 1 TABLET: at 10:33

## 2022-01-09 RX ADMIN — MYCOPHENOLATE MOFETIL 750 MG: 250 CAPSULE ORAL at 18:22

## 2022-01-09 RX ADMIN — LIDOCAINE 1 PATCH: 560 PATCH PERCUTANEOUS; TOPICAL; TRANSDERMAL at 10:33

## 2022-01-09 RX ADMIN — SODIUM CHLORIDE 20 MG: 9 INJECTION, SOLUTION INTRAVENOUS at 18:46

## 2022-01-09 RX ADMIN — SODIUM CHLORIDE, POTASSIUM CHLORIDE, SODIUM LACTATE AND CALCIUM CHLORIDE: 600; 310; 30; 20 INJECTION, SOLUTION INTRAVENOUS at 05:45

## 2022-01-09 RX ADMIN — ALBUMIN (HUMAN) 25 G: 12.5 INJECTION, SOLUTION INTRAVENOUS at 16:39

## 2022-01-09 RX ADMIN — PIPERACILLIN AND TAZOBACTAM 3.38 G: 3; .375 INJECTION, POWDER, LYOPHILIZED, FOR SOLUTION INTRAVENOUS at 22:18

## 2022-01-09 RX ADMIN — PIPERACILLIN AND TAZOBACTAM 3.38 G: 3; .375 INJECTION, POWDER, LYOPHILIZED, FOR SOLUTION INTRAVENOUS at 11:07

## 2022-01-09 RX ADMIN — OXYCODONE HYDROCHLORIDE 10 MG: 5 TABLET ORAL at 23:36

## 2022-01-09 RX ADMIN — ALBUMIN (HUMAN) 25 G: 12.5 INJECTION, SOLUTION INTRAVENOUS at 08:25

## 2022-01-09 RX ADMIN — SODIUM CHLORIDE, POTASSIUM CHLORIDE, SODIUM LACTATE AND CALCIUM CHLORIDE: 600; 310; 30; 20 INJECTION, SOLUTION INTRAVENOUS at 13:26

## 2022-01-09 RX ADMIN — ALBUMIN (HUMAN) 250 ML: 12.5 INJECTION, SOLUTION INTRAVENOUS at 01:31

## 2022-01-09 RX ADMIN — VALGANCICLOVIR 450 MG: 450 TABLET, FILM COATED ORAL at 08:24

## 2022-01-09 RX ADMIN — OXYCODONE HYDROCHLORIDE 5 MG: 5 TABLET ORAL at 03:33

## 2022-01-09 RX ADMIN — SULFAMETHOXAZOLE AND TRIMETHOPRIM 1 TABLET: 400; 80 TABLET ORAL at 08:25

## 2022-01-09 RX ADMIN — OXYCODONE HYDROCHLORIDE 5 MG: 5 TABLET ORAL at 08:24

## 2022-01-09 RX ADMIN — PIPERACILLIN AND TAZOBACTAM 3.38 G: 3; .375 INJECTION, POWDER, LYOPHILIZED, FOR SOLUTION INTRAVENOUS at 16:54

## 2022-01-09 RX ADMIN — OXYCODONE HYDROCHLORIDE 5 MG: 5 TABLET ORAL at 16:59

## 2022-01-09 RX ADMIN — METHOCARBAMOL 500 MG: 500 TABLET ORAL at 20:43

## 2022-01-09 RX ADMIN — ALBUMIN HUMAN 25 G: 0.05 INJECTION, SOLUTION INTRAVENOUS at 23:36

## 2022-01-09 RX ADMIN — PIPERACILLIN AND TAZOBACTAM 3.38 G: 3; .375 INJECTION, POWDER, LYOPHILIZED, FOR SOLUTION INTRAVENOUS at 03:21

## 2022-01-09 RX ADMIN — TACROLIMUS 2 MG: 1 CAPSULE ORAL at 18:23

## 2022-01-09 RX ADMIN — FUROSEMIDE 40 MG: 10 INJECTION, SOLUTION INTRAVENOUS at 14:20

## 2022-01-09 RX ADMIN — PANTOPRAZOLE SODIUM 40 MG: 40 INJECTION, POWDER, FOR SOLUTION INTRAVENOUS at 08:25

## 2022-01-09 RX ADMIN — FUROSEMIDE 40 MG: 10 INJECTION, SOLUTION INTRAVENOUS at 19:43

## 2022-01-09 RX ADMIN — HYDROMORPHONE HYDROCHLORIDE 0.4 MG: 0.2 INJECTION, SOLUTION INTRAMUSCULAR; INTRAVENOUS; SUBCUTANEOUS at 14:06

## 2022-01-09 RX ADMIN — SODIUM CHLORIDE, POTASSIUM CHLORIDE, SODIUM LACTATE AND CALCIUM CHLORIDE 1000 ML: 600; 310; 30; 20 INJECTION, SOLUTION INTRAVENOUS at 05:45

## 2022-01-09 RX ADMIN — TACROLIMUS 2 MG: 1 CAPSULE ORAL at 08:24

## 2022-01-09 RX ADMIN — NYSTATIN 500000 UNITS: 100000 SUSPENSION ORAL at 19:49

## 2022-01-09 RX ADMIN — MYCOPHENOLATE MOFETIL 750 MG: 250 CAPSULE ORAL at 08:25

## 2022-01-09 RX ADMIN — HYDROXYZINE HYDROCHLORIDE 25 MG: 25 TABLET ORAL at 10:33

## 2022-01-09 RX ADMIN — OXYCODONE HYDROCHLORIDE 5 MG: 5 TABLET ORAL at 19:28

## 2022-01-09 ASSESSMENT — ACTIVITIES OF DAILY LIVING (ADL)
ADLS_ACUITY_SCORE: 7
ADLS_ACUITY_SCORE: 11
ADLS_ACUITY_SCORE: 7
ADLS_ACUITY_SCORE: 11
ADLS_ACUITY_SCORE: 7
ADLS_ACUITY_SCORE: 7
ADLS_ACUITY_SCORE: 9
ADLS_ACUITY_SCORE: 11
ADLS_ACUITY_SCORE: 11
ADLS_ACUITY_SCORE: 7
ADLS_ACUITY_SCORE: 9
ADLS_ACUITY_SCORE: 9
ADLS_ACUITY_SCORE: 11
ADLS_ACUITY_SCORE: 9
ADLS_ACUITY_SCORE: 7
ADLS_ACUITY_SCORE: 11
ADLS_ACUITY_SCORE: 7
ADLS_ACUITY_SCORE: 11
ADLS_ACUITY_SCORE: 7
ADLS_ACUITY_SCORE: 7

## 2022-01-09 ASSESSMENT — MIFFLIN-ST. JEOR: SCORE: 1322.88

## 2022-01-09 NOTE — PROGRESS NOTES
STAFF NOTE:  Extubated yesterday    Awake, cheerful, talkative  CAM-ICU negative  AYSHA fairly thin ascites, not bloody  Wound sites fine  internal jugular introducer still in situ    Creatinine spiked slightly  Transaminases continue to get better  No lactic acidosis  Lapse wnl  coags looked fine this morning  HgbA1c was 5.2    P put out ~800ml overnight    This is a 51F hx EtOH cirrhosis s/p DDLT.  She looks great.  Her introducer can be replaced with a 3 lumen today as per our typical protocol.  Will give Ensure with every meal, and advance her diet.  She needs a little more volume for her ROSSY; I don't think she's anywhere near needing dialysis at this point.  She should be ok for the floor.    ENCEPHALOPATHY / DELIRIUM:  -resolved    BACK PAIN:  -sounds like a chronic isseu related to positioning  -try robaxin / muscle relaxants and prn lidoacine patch    ACUTE ON CHRONIC LIVER FAILURE S/P TRANSPLANT:  -EtOH cirrhosis as underlying cause of liver failure  -induction immunosuppression with methylprednisolone/prednisone taper through POD #5.  Will eventually continue prednisone at 5 mg PO daily until tacrolimus level is therapeutic  -maintenance immunosuppression to include mycophenolate and tacrolimus with anticipated goal trough levels of 8-12 (to be determined by surgical service) mcg/L for 0-3 months post-transplant.    -Surgical prophylaxis includes: piperacillin-tazobactam IV for 48 hours and diflucan IV for 7 days.   -Opportunistic pathogen prophylaxis includes: trimethoprim/sulfamethoxazole, valganciclovir, and topical antifungal coverage to begin once systemic antifungal therapy is complete.  -oral and IV narcotics available    PANCYTOPENIA / ACUTE BLOOD LOSS ANEMIA ON ANEMIA OF CHRONIC DISEASE:  -can back off on serial labs to q8h, and then maybe to every day.  While with ROSSY, though, follow BMP  -transfused further blood     ACUTE KIDNEY INJURY:  -presumably related to hypoperfusion during her transplant;  Creatinine climbed to 1.26 this morning; volume resuscitating  -monitoring with strict I/Os and serial Cr checks  -I don't think we're close to needing dialysis yet, so will hold on placing a line    ACTINOMYCES BACTEREMIA:  -holding amoxicillin while on post-op Zosyn; it will restart on the 10th (need a total of 10 more days)    HYPERGLYCEMIA:  -transition to SSI  -Hgb A1c only 5.2    INADEQUATE ORAL INTAKE:  -ensure with every meal  -will get a post-pyloric tube on Monday to supplement her oral intake    MISC:  -full code  -family updated by primary team  -SQH not necessary immediately post-op; PPI for steroids (also a home med)   -lines: replace introducer for a 3 lumen  -dash to remain while with ROSSY  -anticipate discharge to acute rehab in >2 weeks    Billing statement: 31min of critical care time; spent in an initial review of imaging, labs, physical exam, and discussion of the patient with my own team and the extended care team including the primary service.   Based on this patient's presentation / recent intervention and my bedside assessment, I felt there was or is a reasonably high probability of imminent or life-threatening deterioration today or tonight for graft-related reasons.   My overall critical care time, as described in detail above, includes such things as coordination of care, arrhythmia and hemodynamics management, respiratory management, fluid therapy including fluid boluses, and pain and sedation therapy. This time excludes time I spent personally performing or supervising procedures for this patient.    JOHN Birch MD  Clinical   Anesthesia / Critical Care  *[phone]

## 2022-01-09 NOTE — PROCEDURES
Procedure: Central Venous Catheter Placement    Protocol  Consent: obtained, signed and placed in the chart. Patient Identification verified: yes  Time Out performed: yes  Full Barrier precaution: Hands washed, mask, gloves, gown, cap and eye protection used for this procedure.     Indication: Shock and hemodynamic monitoring to optimize treatment    Narrative: The existing MAC Cordis introducer was thoroughly saturated with betadyne, including the diaphragm.  A sterile towel was then wrapped around the Cordis, and a sterile drape placed.  Repeat betadyne and chlorhexidine to the entire area.  Full barrier precaution used. A wire was passed through the diaphragm of the introducer, then wiped down with betadyne.  The introducer was removed with the patient in trendelenberg position.  A 7F -3lumen CVC was then passed over the wire to 15cm.  All lumens aspirated and flushed.  A cinch stitch was placed in the skin to snug up the skin around the smaller catheter.  Chest X ray showed good position and no complications.    Complications: none    I performed the entire procedure.  A timeout was performed with nursing prior to commensing.    JOHN Birch MD  Clinical   Anesthesia / Critical Care  *[phone]

## 2022-01-09 NOTE — PROGRESS NOTES
Immunosuppression Note:    Melita Cortes is a 51 year old female who is seen today  for immunosuppression management. Complexity moderate    I, Pradeep Browne MD, I have examined the patient with the resident/PA/Fellow, discussed and agree with the note and findings.  I have reviewed today's vital signs, medications, labs and imaging. I reviewed the immunosuppression medications and levels. I spoke to the patient/family and explained below clinical details and answered all the questions    Transplant Surgery  Inpatient Daily Progress Note  01/09/2022    Assessment & Plan: Melita Cortes is a 51 year old female with a past medical history of EtOH cirrhosis c/b ascites, HE, portal hypertension, EV. She is now s/p DDLT on 1/8/22 with Dr. Browne.     Graft function: s/p DDLT 1/8/22: POD#2. LFTs trending down. LA 0.9. INR 1.5. Liver US patent.   Immunosuppression management:   -SM pulse with taper per protocol  -Simulect POD#2    - mg BID  -Tac 2 mg BID. Goal level 5-8 with renal function.   Complexity of management:High. Contributing factors:  ROSSY, Induction  Neuro:  Acute post op pain: Continue Lidoderm patches, PRN Robaxin, PRN Atarax, PRN oxycodone. Stop IV dialudid.   Hematology:   Acute blood loss anemia: Hgb 7.9. 1 unit pRBCs ordered. Monitor.  Thrombocytopenia: Plt 45, monitor.  Cardiorespiratory:   Hx Asthma: PRN albuterol.  GI/Nutrition:   Severe malnutrition in the context of chronic illness: Plan for [place] placement for TF. ADAT: Clears. Multivitamin daily.   Endocrine:   Steroid induced hyperglycemia: BG 's, insulin drip transitioned to sliding scale Novolog.   Fluid/Electrolytes/Neph: MIVF: LR 75 mL/hr- stop  ROSSY: Cr 1.3 (from 0.9), oliguric. Likely multifactorial due to hypoperfusion during liver transplant, hypotension. Transplant nephrology following. Continue albumin replacements for drain output (decreased to 0.5 mL albumin : 1 mL drain output) and lasix 40 mg Q6H.    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 Treating with PO amoxicillin 750 mg TID, hold while on Zosyn and resume 1/10 to complete 14 day course (EOT 1/14/22).  Prophylaxis: DVT, fall, GI (PPI), keith-op (Zosyn x 38 hours), fungal (fluconazolex1, Nystatin), viral (Valcyte), pneumocystis (Bactrim)  Disposition: SICU, transfer to  today    Medical Decision Making: Medium  Subsequent visit [id number] (moderate level decision making)    JULIA/Fellow/Resident Provider: Suzi Ramos, NP [id number]     Faculty: Pradeep Browne M.D.    __________________________________________________________________  Transplant History:    1/7/2022 (Liver), Postoperative day: 2     Interval History: History is obtained from the patient  Overnight events: Received 1 unit pRBC, 1 unit cryo, 1 unit PLT, 1L LR bolus, and 250 mL albumin overnight. Emotionally labile today. Tolerating clears.     ROS:   A 10-point review of systems was negative except as noted above.    Curent Meds:   albumin human  25 g Intravenous Q8H    [START ON 1/10/2022] amoxicillin  750 mg Oral TID    basiliximab (SIMULECT) infusion  20 mg Intravenous Once    furosemide  40 mg Intravenous Q6H    insulin aspart  1-7 Units Subcutaneous TID AC    insulin aspart  1-5 Units Subcutaneous At Bedtime    lidocaine  1 patch Transdermal Q24H    lidocaine (viscous)  5 mL Topical Once    lidocaine   Transdermal Q8H    [START ON 1/10/2022] methylPREDNISolone  100 mg Intravenous Once    Followed by    [START ON 1/11/2022] methylPREDNISolone  50 mg Intravenous Once    Followed by    [START ON 1/12/2022] predniSONE  25 mg Oral Once    Followed by    [START ON 1/13/2022] predniSONE  10 mg Oral Once    multivitamin w/minerals  1 tablet Oral Daily    mycophenolate  750 mg Oral BID IS    Or    mycophenolate  750 mg Oral or NG Tube BID IS    pantoprazole  40 mg Per Feeding Tube QAM AC    Or    pantoprazole (PROTONIX) IV   "40 mg Intravenous QAM AC    piperacillin-tazobactam  3.375 g Intravenous Q6H    protein modular  1 packet Per Feeding Tube BID    sodium chloride (PF)  3 mL Intravenous Q8H    sulfamethoxazole-trimethoprim  1 tablet Oral Daily    tacrolimus  2 mg Oral BID IS    Or    tacrolimus  2 mg Oral or NG Tube BID IS    valGANciclovir  450 mg Oral Daily    Or    valGANciclovir  450 mg Oral or NG Tube Daily       Physical Exam:     Admit Weight: 64.7 kg (142 lb 11.2 oz)    Current Vitals:   BP 91/56 (BP Location: Right arm)   Pulse 93   Temp (!) 96.2  F (35.7  C) (Axillary)   Resp 18   Ht 1.651 m (5' 5\")   Wt 70.2 kg (154 lb 12.2 oz)   SpO2 94%   BMI 25.75 kg/m      Vital sign ranges:    Temp:  [96.2  F (35.7  C)-98.2  F (36.8  C)] 96.2  F (35.7  C)  Pulse:  [] 93  Resp:  [14-19] 18  MAP:  [65 mmHg-93 mmHg] 91 mmHg  Arterial Line BP: ()/(46-67) 127/65  SpO2:  [90 %-98 %] 94 %    General Appearance: in no apparent distress.   Skin: normal, warm, dry, jaundice  Heart: NSR  Lungs: unlabored on RA  Abdomen: Incision covered, no drainage noted. AYSHA with serosanguinous output.   : Gunter in place  Extremities: edema: +1-2 BLE  Neurologic: A&Ox4. Tremor absent.     Frailty Scores    There is no flowsheet data to display.         Data:   CMP  Recent Labs   Lab 01/09/22  1240 01/09/22  1024 01/09/22  0328 01/09/22  0319 01/08/22  0811 01/08/22  0807 01/08/22  0115 01/07/22  2330   NA  --  136  136  --  135  --  137   < > 133   POTASSIUM  --  3.8  3.8  --  3.9   < > 3.5   < > 2.9*   CHLORIDE  --  106  106  --  104  --  106   < >  --    CO2  --  21  21  --  22  --  20   < >  --    * 104*  104*   < > 107*   < > 306*   < > 222*   BUN  --  37*  37*  --  37*  --  28   < >  --    CR  --  1.31*  1.31*  --  1.26*  --  0.86   < >  --    GFRESTIMATED  --  49*  49*  --  52*  --  82   < >  --    SHAI  --  8.4*  8.4*  --  8.7  --  9.4   < >  --    ICAW  --   --   --  4.9  --   --   --  5.3*   MAG  --   --   --  1.9  " --  2.0   < >  --    PHOS  --   --   --  6.4*  --  3.8  --   --    AMYLASE  --   --   --  38  --  36  --   --    LIPASE  --   --   --  67*  --  120  --   --    ALBUMIN  --  2.7*  --  2.6*   < > 1.6*   < >  --    BILITOTAL  --  2.1*  --  2.4*   < > 5.4*   < >  --    ALKPHOS  --  40  --  30*   < > 45   < >  --    AST  --  144*  --  164*   < > 621*   < >  --    ALT  --  351*  --  383*   < > 820*   < >  --     < > = values in this interval not displayed.     CBC  Recent Labs   Lab 01/09/22  1024 01/09/22  0319 01/08/22  1326 01/08/22  1242   HGB 7.9* 6.8*   < >  --    WBC 11.5* 12.5*   < >  --    PLT 48* 45*   < >  --    A1C  --   --   --  5.2    < > = values in this interval not displayed.

## 2022-01-09 NOTE — PLAN OF CARE
Major Shift Events:  Aox4, RA 92%<, NSR 90's. MAP goal 65<, tolerated without pressor requirements. Additional 500ml of 5% Albumin given on top of scheduled doses. 1U Cryo 1U RBC 1U Platelet given. Pt reporting abd pain bilaterally - managed by PRN's. AYSHA drain 965 ml. Full liquid diet, tolerated well. Urine output 340ml, SICU aware of low output, 1L LR bolus given. Insulin gtt stopped.   Plan: Manage pain, advance diet as tolerated, monitor hem and coags  For vital signs and complete assessments, please see documentation flowsheets.

## 2022-01-09 NOTE — PROGRESS NOTES
Major Shift Events:  A&Ox4. Sedation off at 1200 to extubate. Calm and cooperative throughout. HR SR 90s. Pressors off in evening. MAPs stable in the 70s. Afebrile. Swam removed. Extubated to RA. Tolerating well. Moderate cough with no production, using IS. Abdomen soft with + BS. Passed bedside swallow, ok to start clear and advance to full as tolerated. Had popscicle, ensure milkshake and multiple ginger ales.  Gunter in place with adequate OP. Clamshell incision WNL, dressing changed dt saturation, ok to have changed per transplant. Hgb:7.9/7.4 1 unit of RBC given. Insulin gtt continued. LR maintenance at 125mL/hr.    Plan: cont POC.     For vital signs and complete assessments, please see documentation flowsheets.

## 2022-01-09 NOTE — PROGRESS NOTES
SURGICAL ICU PROGRESS NOTE  January 8, 2022      PRIMARY TEAM: Transplant  PRIMARY PHYSICIAN: Dr. Browne    REASON FOR CRITICAL CARE ADMISSION: hemodynamic monitoring   ADMITTING PHYSICIAN: Dr. Quezada      ASSESSMENT: Melita Cortes is a 51 year old female     Melita Cortes is a 51 year old female admitted on 1/7/2022. She has end stage liver disease secondary to alcoholic cirrhosis (ETOH remission since 09/2021, MELD 30), now status post DDLT and admitted to the SICU service for postoperative cares.     Surgery significant for EBL 2L, 1L cell-saver, 5 pRBC, 3u FFP, 1L albumin, 13.4L total fluid given    Today's Changes:  - add lidocaine patch, robaxin  - no tylenol per transplant  - NJ placement today  - sliding scale insulin   - rewire MAC  - transfer to floor       Neurological/Pain/Sedation:  # Acute postoperative pain  # Postoperative sedation in setting of mechanical ventilation  # Hepatic Encephalopathy    - Monitor neurological status and notify provider for any acute changes.  - Pain regimen:               -- PRN oxycodone,  Dilaudid   -- PRN atarax, robaxin     Pulmonary:   # Postoperative ventilatory support  # Hx of asthma  - extubated 1/8  - on RA  - PRN albuterol    Cardiovascular:    # Postoperative hemodynamic support   - CVP goal > 8, < 12               -- CVP range 4 -10  - MAP goal > 60, < 85  - SBP goal > 110, <160  - off pressors, lactate normalized    MAP 65-84  HR 90s   4 am hgb 6.8 -> 1 unit PRBC   Plt 45- 1 unit   INR 1.5  Fibrinogen 167- 1 cryo      Gastroenterology/Nutrition:  # Alcoholic liver cirrhosis  # Hx of ascites   # Hx of grade I esophageal varices (last EGD 10/29/2021)  # Status post DDLT     MELD-Na score: 30 at 1/7/2022  3:07 AM  MELD score: 29 at 1/7/2022  3:07 AM  Calculated from:  Serum Creatinine: 1.18 mg/dL at 1/7/2022  3:07 AM  Serum Sodium: 135 mmol/L at 1/7/2022  3:07 AM  Total Bilirubin: 15.6 mg/dL at 1/7/2022  3:07 AM  INR(ratio): 2.54 at 1/7/2022   3:07 AM  Age: 51 years     - OG in place. Repositioned and confirmed with XR. Monitor bloody output, likely from trauma with placment  - Nutrition consult for tube feeds  - Protonix 40 mg IV for prophylaxis  - Tacrolimus, mycophenolate, basiliximab per transplant surgery  - Liver US post-op   - LFTs down-trending   - continue Q 4 labs      Fluids/Electrolytes/Renal:  # Monitor fluid balances  # Prerenal ROSSY  - Fluid PRN Albumin bolus as needed  - transition to LR at 125cc/hr,   - Baseline creatinine ~1.0. Q4H labs  - Monitor I/O  - Gunter in place    Net + 10 L/ + 3.4 ON  Drains: 2100/675   - replace 1:1 with albumin   UOP 1085/ 245 ON  (0.5 ml/kg/hr)  Creat 1.26 (0.86), BUN increased    - 1L LR, 250 albumin given      Endocrine:  # Steroids post-transplant  # Stress hyperglycemia   - Steroids: Taper per transplant. Methylpred 200 today   - Insulin gtt    - 1/8 HA1C 5.2 (normal)   - glucose    - insulin req coming way down    -  Switch to sliding scale insulin medium     ID:  # Perioperative antibiotics  # Prophylactic antibiotics  # Immunosuppressive therapy  # Hx Actinomyces odontolyticus bacteremia   - Intraoperative vancomycin and zosyn  - Tacrolimus, mycophenolate, basiliximab per transplant surgery  - Steroids: Per transplant surgery  - Postoperative Zosyn Q6H x 48 hrs  - Bactrim ppx 400-80 mg for immunosuppression    - Valganciclovir ppx   - Actinomyces odontolyticus bacteremia (+1 of 2 BCx 12/30)               -- Evaluated by ID -> TTE 1/02 normal -> PO amoxicillin 750 TID through 1/14/22   - hold amoxicillin while on Zosyn, resume on 1/10 to complete 14 day course    WBC 12.5, down trending. Afebrile     Heme:     # Acute blood loss anemia  # Coagulopathy 2/2 liver disease   - Monitor Hgb: transfuse if Hgb <8 or if signs/symptoms of hypoperfusion               -- Preoperative Hgb 7.1  - Monitor fibrinogen: goal >200  - Monitor platelets: transfuse if <50  - INR goal  <2  - Q4H CBC, lactate, BMP,  INR      - Intraoperative resuscitation:               - 1L cell-saver, 5 pRBC, 3u FFP, 1L albumin, 13.4L total fluid given     - Q 4 labs  - transfused for 4 am     Musculoskeletal:  # Weakness and deconditioning of critical illness   - Physical and occupational therapy consults, evaluate and treat when able      General Cares/Prophylaxis:    DVT Prophylaxis: SCDs. No ppx yet  GI Prophylaxis: Pantoprazole 40 mg IV  Restraints: soft     Lines/tubes/drains:   MAC x 1, R radial art, AYSHA drain, Gunter  - rewire      Disposition:  - transfer to floor today      Patient seen and discussed with staff.    Masha Wu  General Surgery PGY2  Pager [phone]    ====================================    TODAY'S SUBJECTIVE/INTERVAL HISTORY:   Patient did well overnight. Did require blood products this morning based on labs, but has been hemodynamically improved     OBJECTIVE:     Temp:  [97  F (36.1  C)-99.9  F (37.7  C)] 97.7  F (36.5  C)  Pulse:  [] 90  Resp:  [12-19] 17  MAP:  [59 mmHg-87 mmHg] 84 mmHg  Arterial Line BP: ()/(46-65) 108/59  FiO2 (%):  [30 %] 30 %  SpO2:  [93 %-99 %] 95 %  Ventilation Mode: (S) Other (see comments) (pt extubated)  FiO2 (%): 30 %  Rate Set (breaths/minute): 12 breaths/min  Tidal Volume Set (mL): 480 mL  PEEP (cm H2O): 5 cmH2O  Oxygen Concentration (%): 30 %  Resp: 17      I/O last 3 completed shifts:  In: 8704.22 [P.O.:1540; I.V.:4106.22; NG/GT:75; IV Piggyback:1000]  Out: 3014 [Urine:775; Emesis/NG output:200; Drains:2039]    General/Neuro: AO x 3, NAD   Pulm: non-labored breathing on RA  Abd: soft; mildly distended, subcostal incision with some strike through and oozing. AYSHA with serosanguinous output  Extremities: no edema  Skin: warm and well-perfused.     LABS:   Arterial Blood Gases   Recent Labs   Lab 01/08/22  0628 01/08/22  0125 01/07/22  2330 01/07/22  2238   PH 7.44 7.33* 7.42 7.37   PCO2 29* 39 29* 27*   PO2 98 76* 130* 135*   HCO3 20* 21  19* 16*     Complete Blood Count   Recent Labs   Lab 01/09/22 0319 01/08/22  1942 01/08/22  1613 01/08/22  1326   WBC 12.5* 14.7* 14.0* 15.3*   HGB 6.8* 8.0* 7.4* 7.9*   PLT 45* 56* 61* 77*     Basic Metabolic Panel  Recent Labs   Lab 01/09/22  0328 01/09/22 0319 01/09/22  0216 01/09/22  0132 01/08/22  1421 01/08/22  1241 01/08/22  0811 01/08/22  0807 01/08/22  0507 01/08/22  0336 01/08/22  0310 01/08/22  0126   NA  --  135  --   --   --   --   --  137  --  138  --  139   POTASSIUM  --  3.9  --   --   --  3.2*  --  3.5  --  3.5  --  3.7   CHLORIDE  --  104  --   --   --   --   --  106  --  105  --  105   CO2  --  22  --   --   --   --   --  20  --  20  --  20   BUN  --  37*  --   --   --   --   --  28  --  26  --  24   CR  --  1.26*  --   --   --   --   --  0.86  --  0.76  --  0.71   * 107* 96 99   < >  --    < > 306*   < > 271*   < > 270*    < > = values in this interval not displayed.     Liver Function Tests  Recent Labs   Lab 01/09/22 0319 01/09/22 0318 01/08/22  2348 01/08/22 1942 01/08/22  1613 01/08/22  1241   *  --   --  256* 317* 390*   *  --   --  500* 575* 637*   ALKPHOS 30*  --   --  35* 34* 38*   BILITOTAL 2.4*  --   --  2.9* 3.0* 4.0*   ALBUMIN 2.6*  --   --  2.1* 1.9* 2.0*   INR  --  1.50* 1.56* 1.58* 1.68* 1.68*     Pancreatic Enzymes  Recent Labs   Lab 01/09/22  0319 01/08/22  0807 01/07/22  0307   LIPASE 67* 120  --    AMYLASE 38 36 90     Coagulation Profile  Recent Labs   Lab 01/09/22  0318 01/08/22  2348 01/08/22  1942 01/08/22  1613 01/08/22  0336 01/08/22  0126 01/07/22  2325 01/07/22  2230 01/07/22  0307   INR 1.50* 1.56* 1.58* 1.68*   < >  --  3.45* 4.98* 2.54*   PTT  --   --   --   --   --  59* 143* 149* 50*    < > = values in this interval not displayed.         IMAGING:   Recent Results (from the past 24 hour(s))   US Liver Transplant    Narrative    EXAM: US Liver Transplant Complete with Doppler, 1/8/2022 8:30 AM     HISTORY: s/p liver transplant PODE0. For  hepatic vasculature patency  and flows.    COMPARISON: None.    TECHNIQUE:  Gray-scale, color Doppler and spectral flow analysis.    FINDINGS:   Fluid: Fluid collection along the inferior left hepatic lobe measuring  1.7 x 1.4 x 1.3 cm. Complex crescentic fluid collection along the  superior right hepatic lobe measuring 8.0 x 4.1 x 1.9 cm. Small amount  of free fluid along the falciform ligament.    Liver: Normal echotexture. No focal mass.     Bile Ducts: Both the intra- and extrahepatic biliary system are of  normal caliber.  The common bile duct measures 2 mm in diameter.    Gallbladder: Surgically absent.    Pancreas: Obscured.    Right Kidney: 11.0 in length. No shadowing stones, hydronephrosis or  hydroureter. No cystic lesion or mass.   Left Kidney: 10.6 cm in length. No shadowing stones, hydronephrosis or  hydroureter. No cystic lesion or mass.     Spleen: 11.5 cm in sagittal dimension.    Aorta and IVC: Visualized portions are within normal limits.    LIVER DOPPLER:  Splenic vein:  Patent continuous antegrade flow toward the liver, 42  cm/sec.  Extrahepatic portal vein:  Patent continuous antegrade flow, 88  cm/sec.  Portal vein at anastomosis: Patent continuous antegrade flow, 147  cm/sec.  Intrahepatic portal vein:  Patent continuous antegrade flow, 103  cm/sec.  Right portal vein flow is antegrade, measuring 52 cm/sec.  Left portal vein flow is antegrade, measuring 63 cm/sec.    Inferior vena cava: patent with flow toward the heart throughout..  IVC at anastomosis:  200 cm/sec.  Intrahepatic IVC:  85 cm/sec.  Extrahepatic IVC:  62 cm/sec.    Right, mid, left hepatic veins: Patent with flow towards the inferior  vena cava.    Extrahepatic hepatic artery: Low resistance waveform with flow towards  the liver. 55 cm/sec with resistive index 0.81.  Right hepatic artery: 20 cm/sec with resistive index 0. 80.  Left hepatic artery: 50 cm/sec with resistive index 0. 80.      Impression    IMPRESSION:   1.  Patent  transplant hepatic vasculature.  2.  Postsurgical fluid collections along the inferior left hepatic  lobe and crescentic fluid collection along the superior right hepatic  lobe.    I have personally reviewed the examination and initial interpretation  and I agree with the findings.    ROLLY KC MD         SYSTEM ID:  [id number]   XR Abdomen Port 1 View    Narrative    EXAMINATION:  XR ABDOMEN PORT 1 VIEWS 1/8/2022 9:26 AM     COMPARISON: Abdominal x-ray 1/8/2022.    HISTORY: OG tube advancement    TECHNIQUE: Frontal view of the abdomen.    FINDINGS: Orogastric tube tip and sidehole project over the stomach.  Postsurgical changes of the abdomen with overlying skin staples. Right  abdominal drain in stable position. Partially visualized [place]  catheter. The small intestine and colon are not distended. No abnormal  soft tissue densities.  No free air, pneumatosis or portal venous gas.  The lung bases are unremarkable.      Impression    IMPRESSION:   1.  Orogastric tube tip and sidehole project over the stomach.  2.  Non-obstructed bowel gas pattern.    I have personally reviewed the examination and initial interpretation  and I agree with the findings.    ROLLY KC MD         SYSTEM ID:  [id number]   XR Chest Port 1 View    Narrative    XR CHEST PORT 1 VIEW  1/8/2022 11:04 AM      HISTORY: s/p DDLT now with increasing O2 requirements and c/o SOB,  tachypnea    COMPARISON: Chest x-ray 1/8/2022, 1/7/2022.    FINDINGS:   Portable AP 45 degree upright chest x-ray. Endotracheal tube tip  terminates over the upper trachea. Right internal jugular [place]  catheter tip terminates over the right pulmonary artery. Gastric tube  courses below the margin of the film.    Trachea is midline. Cardiac silhouette is within normal limits.  Pulmonary vasculature is distinct. Stable retrocardiac opacities.  Interval increased interstitial opacities. No pleural effusion or  pneumothorax.    Osseous structures appear intact.  Postsurgical changes of the upper  abdomen.      Impression    IMPRESSION:   1.  Increased interstitial opacities suggestive for mild pulmonary  edema versus atelectasis.  2.  Stable retrocardiac atelectasis.  3.  Lines and tubes in stable position.    I have personally reviewed the examination and initial interpretation  and I agree with the findings.    ROLLY KC MD         SYSTEM ID:  [id number]

## 2022-01-09 NOTE — CONSULTS
[facility]  Transplant Nephrology Consult  Date of Admission:  1/7/2022  Today's Date: 01/09/2022  Requesting physician: Pradeep Browne MD    Recommendations:  - Agree with lasix challenge    Assessment & Plan   # ROSSY: Stable. Now producing some urine    - Baseline Creatinine: ~ 1.00   - Proteinuria: Normal (<0.2 grams)   - BK Viremia: No   - Kidney Tx Biopsy: No     # liver transplant: Transaminese trending down  txp surgery following    # Immunosuppression: Tacrolimus immediate release (goal 8-10) and Mycophenolate mofetil (dose 750 mg every 12 hours)   - Changes: Not at this time    # Infection Prophylaxis:   - PJP: Sulfa/TMP (Bactrim)  - CMV: Valganciclovir (Valcyte)    # Blood Pressure: Controlled;  Goal BP: < 130/80   - Volume status: Moderately hypervolemic   - Changes: Yes - add lasix gtt    # Anemia : Hgb: Trend down  Agree with giving PRBC     RAFI: No   - Iron studies: Not checked recently    # Mineral Bone Disorder:   - Vitamin D; level: Normal        On supplement: No  - Calcium; level: Low        On supplement: No  - Phosphorus; level: High        On supplement: No    # Electrolytes:   - Potassium; level: Normal        On supplement: No  - Magnesium; level: Normal        On supplement: No  - Bicarbonate; level: Normal        On supplement: No  - Sodium; level: Normal        # Transplant History:  Etiology of Kidney Failure: ROSSY  Tx: Liver Tx  Transplant: 1/7/2022 (Liver)    Significant changes in immunosuppression: None  Significant transplant-related complications: None    Recommendations were communicated to the primary team verbally.    Seen and discussed with Dr. Oralia Llanos, NP  Pager: [phone]    REASON FOR CONSULT   Liver transplant    History of Present Illness   Melita Cortes is a 51 year old female with a PMX significant for Alcoholic cirrhosis status post liver transplant on 1/7/2022.  Patient is alert. She is  fatigued, but overall feels okay.    She denies any nausea or vomiting. She states she is not short of breath and has no chest pain.     She denies any diarrhea and states she has been passing gas.      Review of Systems    The 10 point Review of Systems is negative other than noted in the HPI or here.     Past Medical History    I have reviewed this patient's medical history and updated it with pertinent information if needed.   Past Medical History:   Diagnosis Date     Alcoholic hepatitis      Asthma 3/10/2006     Cervical high risk HPV (human papillomavirus) test positive 2019, 2020    See problem list       Past Surgical History   I have reviewed this patient's surgical history and updated it with pertinent information if needed.  Past Surgical History:   Procedure Laterality Date     APPENDECTOMY       COLONOSCOPY N/A 1/4/2022    Procedure: COLONOSCOPY;  Surgeon: Zita Steele MD;  Location:  GI     ESOPHAGOGASTRODUODENOSCOPY, WITH BRUSHINGS N/A 10/29/2021    Procedure: ESOPHAGOGASTRODUODENOSCOPY, WITH BRUSHINGS;  Surgeon: Torey Ruiz MD;  Location: [facility]       Family History   I have reviewed this patient's family history and updated it with pertinent information if needed.   Family History   Problem Relation Age of Onset     Cancer Mother      Colon Cancer Paternal Aunt      Colon Cancer Maternal Uncle        Social History   I have reviewed this patient's social history and updated it with pertinent information if needed. Melita Cortes  reports that she quit smoking about 20 months ago. She has never used smokeless tobacco. She reports previous alcohol use. She reports that she does not use drugs.    Allergies   No Known Allergies  Prior to Admission Medications     albumin human  25 g Intravenous Q8H     [START ON 1/10/2022] amoxicillin  750 mg Oral TID     basiliximab (SIMULECT) infusion  20 mg Intravenous Once     furosemide  40 mg Intravenous Q6H     insulin aspart  1-7  "Units Subcutaneous TID AC     insulin aspart  1-5 Units Subcutaneous At Bedtime     lidocaine  1 patch Transdermal Q24H     lidocaine (viscous)  5 mL Topical Once     lidocaine   Transdermal Q8H     [START ON 1/10/2022] methylPREDNISolone  100 mg Intravenous Once    Followed by     [START ON 2022] methylPREDNISolone  50 mg Intravenous Once    Followed by     [START ON 2022] predniSONE  25 mg Oral Once    Followed by     [START ON 2022] predniSONE  10 mg Oral Once     multivitamin w/minerals  1 tablet Oral Daily     mycophenolate  750 mg Oral BID IS    Or     mycophenolate  750 mg Oral or NG Tube BID IS     pantoprazole  40 mg Per Feeding Tube QAM AC    Or     pantoprazole (PROTONIX) IV  40 mg Intravenous QAM AC     piperacillin-tazobactam  3.375 g Intravenous Q6H     protein modular  1 packet Per Feeding Tube BID     sodium chloride (PF)  3 mL Intravenous Q8H     sulfamethoxazole-trimethoprim  1 tablet Oral Daily     tacrolimus  2 mg Oral BID IS    Or     tacrolimus  2 mg Oral or NG Tube BID IS     valGANciclovir  450 mg Oral Daily    Or     valGANciclovir  450 mg Oral or NG Tube Daily       dextrose       dextrose       lactated ringers 75 mL/hr at 22 1400     IV fluid REPLACEMENT ONLY       norepinephrine Stopped (22 1423)     BETA BLOCKER NOT PRESCRIBED       vasopressin Stopped (22 1400)       Physical Exam   Temp  Av.3  F (36.8  C)  Min: 96.2  F (35.7  C)  Max: 99.9  F (37.7  C)  Arterial Line BP  Min: 79/44  Max: 132/67  Arterial Line MAP (mmHg)  Av mmHg  Min: 57 mmHg  Max: 235 mmHg      Pulse  Av.9  Min: 73  Max: 105 Resp  Av.7  Min: 12  Max: 28  FiO2 (%)  Av.7 %  Min: 30 %  Max: 50 %  SpO2  Av.8 %  Min: 90 %  Max: 100 %    CVP (mmHg): 274 mmHgBP 91/56 (BP Location: Right arm)   Pulse 93   Temp (!) 96.2  F (35.7  C) (Axillary)   Resp 18   Ht 1.651 m (5' 5\")   Wt 70.2 kg (154 lb 12.2 oz)   SpO2 94%   BMI 25.75 kg/m     Date 22 0700 - " 01/10/22 0659   Shift 3727-3309 1124-6162 4586-5079 24 Hour Total   INTAKE   P.O. 240   240   I.V. 1181.5   1181.5   Colloid 500   500   Shift Total(mL/kg) 1921.5(27.37)   1921.5(27.37)   OUTPUT   Urine 160   160   Drains 875   875   Shift Total(mL/kg) 1035(14.74)   1035(14.74)   Weight (kg) 70.2 70.2 70.2 70.2      Admit Weight: 64.7 kg (142 lb 11.2 oz)     GENERAL APPEARANCE: alert and no distress  HENT: mouth without ulcers or lesions  LYMPHATICS: no cervical or supraclavicular nodes  RESP: lungs clear to auscultation - no rales, rhonchi or wheezes  CV: regular rhythm, normal rate, no rub, no murmur  EDEMA: 2+ LE edema bilaterally  ABDOMEN: soft, nondistended, nontender, bowel sounds normal  MS: extremities normal - no gross deformities noted, no evidence of inflammation in joints, no muscle tenderness  SKIN: no rash    Data   CMP  Recent Labs   Lab 01/09/22  1240 01/09/22  1024 01/09/22  0843 01/09/22  0328 01/09/22  0319 01/08/22  1949 01/08/22  1942 01/08/22  1710 01/08/22  1613 01/08/22  1421 01/08/22  1241 01/08/22  0811 01/08/22  0807 01/08/22  0507 01/08/22  0336 01/07/22  1855 01/07/22  0546 01/07/22  0307   NA  --  136  136  --   --  135  --   --   --   --   --   --   --  137  --  138   < >  --  135   POTASSIUM  --  3.8  3.8  --   --  3.9  --   --   --   --   --  3.2*  --  3.5  --  3.5   < >  --  3.4   CHLORIDE  --  106  106  --   --  104  --   --   --   --   --   --   --  106  --  105   < >  --  100   CO2  --  21  21  --   --  22  --   --   --   --   --   --   --  20  --  20   < >  --  24   ANIONGAP  --  9  9  --   --  9  --   --   --   --   --   --   --  11  --  13   < >  --  11   * 104*  104* 121* 102* 107*   < >  --    < >  --    < >  --    < > 306*   < > 271*   < >  --  136*   BUN  --  37*  37*  --   --  37*  --   --   --   --   --   --   --  28  --  26   < >  --  36*   CR  --  1.31*  1.31*  --   --  1.26*  --   --   --   --   --   --   --  0.86  --  0.76   < >  --  1.18*    GFRESTIMATED  --  49*  49*  --   --  52*  --   --   --   --   --   --   --  82  --  >90   < >  --  56*   SHAI  --  8.4*  8.4*  --   --  8.7  --   --   --   --   --   --   --  9.4  --  9.5   < >  --  8.6   MAG  --   --   --   --  1.9  --   --   --   --   --   --   --  2.0  --  2.2  --  1.4* 1.3*   PHOS  --   --   --   --  6.4*  --   --   --   --   --   --   --  3.8  --   --   --   --  3.0   PROTTOTAL  --  4.5*  --   --  4.3*  --  3.8*  --  3.8*  --  3.8*  --  3.5*  --  3.6*   < >  --  5.2*   ALBUMIN  --  2.7*  --   --  2.6*  --  2.1*  --  1.9*  --  2.0*  --  1.6*  --  1.7*   < >  --  2.7*   BILITOTAL  --  2.1*  --   --  2.4*  --  2.9*  --  3.0*  --  4.0*  --  5.4*  --  9.2*   < >  --  15.6*   ALKPHOS  --  40  --   --  30*  --  35*  --  34*  --  38*  --  45  --  47   < >  --  123   AST  --  144*  --   --  164*  --  256*  --  317*  --  390*  --  621*  --  1,065*   < >  --  54*   ALT  --  351*  --   --  383*  --  500*  --  575*  --  637*  --  820*  --  980*   < >  --  30    < > = values in this interval not displayed.     CBC  Recent Labs   Lab 01/09/22  1024 01/09/22  0319 01/08/22  1942 01/08/22  1613   HGB 7.9* 6.8* 8.0* 7.4*   WBC 11.5* 12.5* 14.7* 14.0*   RBC 2.54* 2.18* 2.49* 2.33*   HCT 23.4* 20.1* 22.9* 21.4*   MCV 92 92 92 92   MCH 31.1 31.2 32.1 31.8   MCHC 33.8 33.8 34.9 34.6   RDW 18.8* 19.0* 19.2* 19.9*   PLT 48* 45* 56* 61*     INR  Recent Labs   Lab 01/09/22  1024 01/09/22  0318 01/08/22  2348 01/08/22  1942 01/08/22  0336 01/08/22  0126 01/07/22  2325 01/07/22  2230 01/07/22  0307   INR 1.56* 1.50* 1.56* 1.58*   < >  --  3.45* 4.98* 2.54*   PTT  --   --   --   --   --  59* 143* 149* 50*    < > = values in this interval not displayed.     ABG  Recent Labs   Lab 01/08/22  0628 01/08/22  0336 01/08/22  0125 01/07/22  2330 01/07/22 2238   PH 7.44  --  7.33* 7.42 7.37   PCO2 29*  --  39 29* 27*   PO2 98  --  76* 130* 135*   HCO3 20*  --  21 19* 16*   O2PER 30 50 50 50 50.0      Urine Studies  Recent Labs    Lab Test 12/31/21  1114 12/28/21  1333 12/23/21  1824 11/03/21 2013   COLOR Dark Yellow* Dark Yellow* Dark Yellow* Yellow   APPEARANCE Slightly Cloudy* Clear Clear Clear   URINEGLC Negative 100 * Negative Negative   URINEBILI Moderate* Large* Moderate* Small*   URINEKETONE Negative Trace* Negative Negative   SG 1.016 1.015 1.022 1.017   UBLD Negative Negative Negative Negative   URINEPH 5.5 5.0 5.5 5.0   PROTEIN Negative Negative Negative Negative   UROBILINOGEN  --  0.2  --   --    NITRITE Negative Negative Negative Negative   LEUKEST Small* Negative Negative Negative   RBCU 1 0-2 1 1   WBCU 10* 10-25* 2 2     Recent Labs   Lab Test 12/28/21  1333   UTPG 0.19     PTH  No lab results found.  Iron Studies  Recent Labs   Lab Test 12/28/21  1322 12/23/21  1836 10/19/21  1125   IRON 170 185* 162   * 196* 198*   IRONSAT 76* 94* 82*   SARAHY 1,359*  --   --        IMAGING:  All imaging studies reviewed by me.

## 2022-01-10 ENCOUNTER — APPOINTMENT (OUTPATIENT)
Dept: PHYSICAL THERAPY | Facility: CLINIC | Age: 52
DRG: 005 | End: 2022-01-10
Attending: TRANSPLANT SURGERY
Payer: COMMERCIAL

## 2022-01-10 ENCOUNTER — PRE VISIT (OUTPATIENT)
Dept: CARDIOLOGY | Facility: CLINIC | Age: 52
End: 2022-01-10
Payer: COMMERCIAL

## 2022-01-10 ENCOUNTER — APPOINTMENT (OUTPATIENT)
Dept: OCCUPATIONAL THERAPY | Facility: CLINIC | Age: 52
DRG: 005 | End: 2022-01-10
Attending: STUDENT IN AN ORGANIZED HEALTH CARE EDUCATION/TRAINING PROGRAM
Payer: COMMERCIAL

## 2022-01-10 ENCOUNTER — DOCUMENTATION ONLY (OUTPATIENT)
Dept: TRANSPLANT | Facility: CLINIC | Age: 52
End: 2022-01-10

## 2022-01-10 LAB
ALBUMIN SERPL-MCNC: 3 G/DL (ref 3.4–5)
ALP SERPL-CCNC: 56 U/L (ref 40–150)
ALT SERPL W P-5'-P-CCNC: 274 U/L (ref 0–50)
ANION GAP SERPL CALCULATED.3IONS-SCNC: 11 MMOL/L (ref 3–14)
AST SERPL W P-5'-P-CCNC: 67 U/L (ref 0–45)
BASOPHILS # BLD AUTO: 0.1 10E3/UL (ref 0–0.2)
BASOPHILS NFR BLD AUTO: 1 %
BILIRUB DIRECT SERPL-MCNC: 1.4 MG/DL (ref 0–0.2)
BILIRUB SERPL-MCNC: 2.1 MG/DL (ref 0.2–1.3)
BUN SERPL-MCNC: 44 MG/DL (ref 7–30)
CALCIUM SERPL-MCNC: 8.9 MG/DL (ref 8.5–10.1)
CHLORIDE BLD-SCNC: 103 MMOL/L (ref 94–109)
CO2 SERPL-SCNC: 20 MMOL/L (ref 20–32)
CREAT SERPL-MCNC: 1.65 MG/DL (ref 0.52–1.04)
EOSINOPHIL # BLD AUTO: 0.4 10E3/UL (ref 0–0.7)
EOSINOPHIL NFR BLD AUTO: 4 %
ERYTHROCYTE [DISTWIDTH] IN BLOOD BY AUTOMATED COUNT: 19.3 % (ref 10–15)
GFR SERPL CREATININE-BSD FRML MDRD: 37 ML/MIN/1.73M2
GLUCOSE BLD-MCNC: 112 MG/DL (ref 70–99)
GLUCOSE BLDC GLUCOMTR-MCNC: 112 MG/DL (ref 70–99)
GLUCOSE BLDC GLUCOMTR-MCNC: 128 MG/DL (ref 70–99)
GLUCOSE BLDC GLUCOMTR-MCNC: 129 MG/DL (ref 70–99)
GLUCOSE BLDC GLUCOMTR-MCNC: 131 MG/DL (ref 70–99)
GLUCOSE BLDC GLUCOMTR-MCNC: 160 MG/DL (ref 70–99)
HBV DNA SERPL QL NAA+PROBE: NORMAL
HCT VFR BLD AUTO: 28.9 % (ref 35–47)
HCV RNA SERPL QL NAA+PROBE: NORMAL
HGB BLD-MCNC: 9.7 G/DL (ref 11.7–15.7)
HIV1+2 RNA SERPL QL NAA+PROBE: NORMAL
IMM GRANULOCYTES # BLD: 0.1 10E3/UL
IMM GRANULOCYTES NFR BLD: 1 %
INR PPP: 1.32 (ref 0.85–1.15)
LYMPHOCYTES # BLD AUTO: 0.3 10E3/UL (ref 0.8–5.3)
LYMPHOCYTES NFR BLD AUTO: 3 %
MAGNESIUM SERPL-MCNC: 2 MG/DL (ref 1.6–2.3)
MCH RBC QN AUTO: 31.7 PG (ref 26.5–33)
MCHC RBC AUTO-ENTMCNC: 33.6 G/DL (ref 31.5–36.5)
MCV RBC AUTO: 94 FL (ref 78–100)
MONOCYTES # BLD AUTO: 0.9 10E3/UL (ref 0–1.3)
MONOCYTES NFR BLD AUTO: 8 %
NEUTROPHILS # BLD AUTO: 9 10E3/UL (ref 1.6–8.3)
NEUTROPHILS NFR BLD AUTO: 83 %
NRBC # BLD AUTO: 0 10E3/UL
NRBC BLD AUTO-RTO: 0 /100
PHOSPHATE SERPL-MCNC: 7.2 MG/DL (ref 2.5–4.5)
PLATELET # BLD AUTO: 58 10E3/UL (ref 150–450)
POTASSIUM BLD-SCNC: 4.1 MMOL/L (ref 3.4–5.3)
PROT SERPL-MCNC: 5 G/DL (ref 6.8–8.8)
RBC # BLD AUTO: 3.06 10E6/UL (ref 3.8–5.2)
SODIUM SERPL-SCNC: 134 MMOL/L (ref 133–144)
TACROLIMUS BLD-MCNC: 4.1 UG/L (ref 5–15)
TME LAST DOSE: ABNORMAL H
TME LAST DOSE: ABNORMAL H
WBC # BLD AUTO: 10.7 10E3/UL (ref 4–11)

## 2022-01-10 PROCEDURE — 250N000013 HC RX MED GY IP 250 OP 250 PS 637: Performed by: TRANSPLANT SURGERY

## 2022-01-10 PROCEDURE — 120N000011 HC R&B TRANSPLANT UMMC

## 2022-01-10 PROCEDURE — 250N000013 HC RX MED GY IP 250 OP 250 PS 637: Performed by: STUDENT IN AN ORGANIZED HEALTH CARE EDUCATION/TRAINING PROGRAM

## 2022-01-10 PROCEDURE — 250N000011 HC RX IP 250 OP 636: Performed by: STUDENT IN AN ORGANIZED HEALTH CARE EDUCATION/TRAINING PROGRAM

## 2022-01-10 PROCEDURE — 97530 THERAPEUTIC ACTIVITIES: CPT | Mod: GO

## 2022-01-10 PROCEDURE — 85610 PROTHROMBIN TIME: CPT | Performed by: NURSE PRACTITIONER

## 2022-01-10 PROCEDURE — P9047 ALBUMIN (HUMAN), 25%, 50ML: HCPCS | Performed by: NURSE PRACTITIONER

## 2022-01-10 PROCEDURE — 36592 COLLECT BLOOD FROM PICC: CPT | Performed by: NURSE PRACTITIONER

## 2022-01-10 PROCEDURE — 82248 BILIRUBIN DIRECT: CPT | Performed by: NURSE PRACTITIONER

## 2022-01-10 PROCEDURE — 85025 COMPLETE CBC W/AUTO DIFF WBC: CPT | Performed by: NURSE PRACTITIONER

## 2022-01-10 PROCEDURE — 97110 THERAPEUTIC EXERCISES: CPT | Mod: GP | Performed by: REHABILITATION PRACTITIONER

## 2022-01-10 PROCEDURE — 99233 SBSQ HOSP IP/OBS HIGH 50: CPT | Performed by: INTERNAL MEDICINE

## 2022-01-10 PROCEDURE — 250N000013 HC RX MED GY IP 250 OP 250 PS 637: Performed by: NURSE PRACTITIONER

## 2022-01-10 PROCEDURE — 84100 ASSAY OF PHOSPHORUS: CPT | Performed by: NURSE PRACTITIONER

## 2022-01-10 PROCEDURE — 250N000012 HC RX MED GY IP 250 OP 636 PS 637: Performed by: NURSE PRACTITIONER

## 2022-01-10 PROCEDURE — 250N000011 HC RX IP 250 OP 636: Performed by: NURSE PRACTITIONER

## 2022-01-10 PROCEDURE — 250N000013 HC RX MED GY IP 250 OP 250 PS 637

## 2022-01-10 PROCEDURE — 250N000012 HC RX MED GY IP 250 OP 636 PS 637: Performed by: STUDENT IN AN ORGANIZED HEALTH CARE EDUCATION/TRAINING PROGRAM

## 2022-01-10 PROCEDURE — 80197 ASSAY OF TACROLIMUS: CPT | Performed by: NURSE PRACTITIONER

## 2022-01-10 PROCEDURE — 99207 PR SATISFY VISIT NUMBER: CPT | Performed by: TRANSPLANT SURGERY

## 2022-01-10 PROCEDURE — 97165 OT EVAL LOW COMPLEX 30 MIN: CPT | Mod: GO

## 2022-01-10 PROCEDURE — 83735 ASSAY OF MAGNESIUM: CPT | Performed by: NURSE PRACTITIONER

## 2022-01-10 PROCEDURE — 97530 THERAPEUTIC ACTIVITIES: CPT | Mod: GP | Performed by: REHABILITATION PRACTITIONER

## 2022-01-10 PROCEDURE — P9041 ALBUMIN (HUMAN),5%, 50ML: HCPCS | Performed by: NURSE PRACTITIONER

## 2022-01-10 PROCEDURE — 97116 GAIT TRAINING THERAPY: CPT | Mod: GP | Performed by: REHABILITATION PRACTITIONER

## 2022-01-10 PROCEDURE — C9113 INJ PANTOPRAZOLE SODIUM, VIA: HCPCS | Performed by: STUDENT IN AN ORGANIZED HEALTH CARE EDUCATION/TRAINING PROGRAM

## 2022-01-10 RX ORDER — ALBUMIN (HUMAN) 12.5 G/50ML
25 SOLUTION INTRAVENOUS EVERY 8 HOURS PRN
Status: DISCONTINUED | OUTPATIENT
Start: 2022-01-10 | End: 2022-01-11

## 2022-01-10 RX ORDER — SENNOSIDES 8.6 MG
8.6 TABLET ORAL 2 TIMES DAILY
Status: DISCONTINUED | OUTPATIENT
Start: 2022-01-10 | End: 2022-01-12

## 2022-01-10 RX ORDER — BUMETANIDE 0.25 MG/ML
1 INJECTION INTRAMUSCULAR; INTRAVENOUS 2 TIMES DAILY
Status: DISCONTINUED | OUTPATIENT
Start: 2022-01-10 | End: 2022-01-10

## 2022-01-10 RX ORDER — PANTOPRAZOLE SODIUM 40 MG/1
40 TABLET, DELAYED RELEASE ORAL
Status: DISCONTINUED | OUTPATIENT
Start: 2022-01-11 | End: 2022-01-14 | Stop reason: HOSPADM

## 2022-01-10 RX ORDER — ALBUMIN (HUMAN) 12.5 G/50ML
25 SOLUTION INTRAVENOUS EVERY 8 HOURS PRN
Status: CANCELLED | OUTPATIENT
Start: 2022-01-10

## 2022-01-10 RX ORDER — METHYLPREDNISOLONE SODIUM SUCCINATE 1 G/16ML
INJECTION, POWDER, LYOPHILIZED, FOR SOLUTION INTRAMUSCULAR; INTRAVENOUS PRN
Status: DISCONTINUED | OUTPATIENT
Start: 2022-01-10 | End: 2022-01-10

## 2022-01-10 RX ORDER — BUMETANIDE 0.25 MG/ML
1 INJECTION INTRAMUSCULAR; INTRAVENOUS
Status: DISCONTINUED | OUTPATIENT
Start: 2022-01-10 | End: 2022-01-11

## 2022-01-10 RX ADMIN — FUROSEMIDE 40 MG: 10 INJECTION, SOLUTION INTRAVENOUS at 01:50

## 2022-01-10 RX ADMIN — AMOXICILLIN 750 MG: 500 CAPSULE ORAL at 20:14

## 2022-01-10 RX ADMIN — STANDARDIZED SENNA CONCENTRATE 8.6 MG: 8.6 TABLET ORAL at 20:14

## 2022-01-10 RX ADMIN — TACROLIMUS 2.5 MG: 1 CAPSULE ORAL at 18:09

## 2022-01-10 RX ADMIN — NYSTATIN 500000 UNITS: 100000 SUSPENSION ORAL at 07:46

## 2022-01-10 RX ADMIN — AMOXICILLIN 750 MG: 500 CAPSULE ORAL at 14:29

## 2022-01-10 RX ADMIN — METHYLPREDNISOLONE SODIUM SUCCINATE 100 MG: 125 INJECTION, POWDER, FOR SOLUTION INTRAMUSCULAR; INTRAVENOUS at 07:44

## 2022-01-10 RX ADMIN — ALBUMIN HUMAN 25 G: 0.05 INJECTION, SOLUTION INTRAVENOUS at 07:47

## 2022-01-10 RX ADMIN — OXYCODONE HYDROCHLORIDE 5 MG: 5 TABLET ORAL at 05:21

## 2022-01-10 RX ADMIN — ALBUMIN HUMAN 25 G: 0.25 SOLUTION INTRAVENOUS at 15:53

## 2022-01-10 RX ADMIN — BUMETANIDE 1 MG: 0.25 INJECTION INTRAMUSCULAR; INTRAVENOUS at 18:56

## 2022-01-10 RX ADMIN — NYSTATIN 500000 UNITS: 100000 SUSPENSION ORAL at 18:09

## 2022-01-10 RX ADMIN — AMOXICILLIN 750 MG: 500 CAPSULE ORAL at 07:46

## 2022-01-10 RX ADMIN — LIDOCAINE 1 PATCH: 560 PATCH PERCUTANEOUS; TOPICAL; TRANSDERMAL at 07:44

## 2022-01-10 RX ADMIN — MYCOPHENOLATE MOFETIL 750 MG: 250 CAPSULE ORAL at 18:09

## 2022-01-10 RX ADMIN — NYSTATIN 500000 UNITS: 100000 SUSPENSION ORAL at 11:54

## 2022-01-10 RX ADMIN — HYDROXYZINE HYDROCHLORIDE 25 MG: 25 TABLET ORAL at 01:48

## 2022-01-10 RX ADMIN — PANTOPRAZOLE SODIUM 40 MG: 40 INJECTION, POWDER, FOR SOLUTION INTRAVENOUS at 07:45

## 2022-01-10 RX ADMIN — MYCOPHENOLATE MOFETIL 750 MG: 250 CAPSULE ORAL at 07:46

## 2022-01-10 RX ADMIN — NYSTATIN 500000 UNITS: 100000 SUSPENSION ORAL at 20:14

## 2022-01-10 RX ADMIN — SULFAMETHOXAZOLE AND TRIMETHOPRIM 1 TABLET: 400; 80 TABLET ORAL at 07:46

## 2022-01-10 RX ADMIN — INSULIN ASPART 1 UNITS: 100 INJECTION, SOLUTION INTRAVENOUS; SUBCUTANEOUS at 18:15

## 2022-01-10 RX ADMIN — METHOCARBAMOL 500 MG: 500 TABLET ORAL at 05:22

## 2022-01-10 RX ADMIN — OXYCODONE HYDROCHLORIDE 5 MG: 5 TABLET ORAL at 14:29

## 2022-01-10 RX ADMIN — FUROSEMIDE 40 MG: 10 INJECTION, SOLUTION INTRAVENOUS at 07:45

## 2022-01-10 RX ADMIN — METHYLPREDNISOLONE SODIUM SUCCINATE 1000 MG: 1 INJECTION, POWDER, FOR SOLUTION INTRAMUSCULAR; INTRAVENOUS at 09:45

## 2022-01-10 RX ADMIN — Medication 1 TABLET: at 07:46

## 2022-01-10 RX ADMIN — TACROLIMUS 2 MG: 1 CAPSULE ORAL at 07:46

## 2022-01-10 RX ADMIN — HYDROXYZINE HYDROCHLORIDE 25 MG: 25 TABLET ORAL at 14:29

## 2022-01-10 RX ADMIN — VALGANCICLOVIR 450 MG: 450 TABLET, FILM COATED ORAL at 07:48

## 2022-01-10 RX ADMIN — OXYCODONE HYDROCHLORIDE 5 MG: 5 TABLET ORAL at 18:18

## 2022-01-10 ASSESSMENT — ACTIVITIES OF DAILY LIVING (ADL)
ADLS_ACUITY_SCORE: 13
ADLS_ACUITY_SCORE: 12
ADLS_ACUITY_SCORE: 9
ADLS_ACUITY_SCORE: 7
ADLS_ACUITY_SCORE: 9
ADLS_ACUITY_SCORE: 13
ADLS_ACUITY_SCORE: 9
ADLS_ACUITY_SCORE: 13
PREVIOUS_RESPONSIBILITIES: LAUNDRY;HOUSEKEEPING;MEAL PREP
ADLS_ACUITY_SCORE: 13
ADLS_ACUITY_SCORE: 7
ADLS_ACUITY_SCORE: 13
ADLS_ACUITY_SCORE: 9
ADLS_ACUITY_SCORE: 7
ADLS_ACUITY_SCORE: 12
ADLS_ACUITY_SCORE: 13
ADLS_ACUITY_SCORE: 12
ADLS_ACUITY_SCORE: 13
ADLS_ACUITY_SCORE: 7
ADLS_ACUITY_SCORE: 12

## 2022-01-10 NOTE — PROGRESS NOTES
Major Shift Events:  A&Ox4.  Calm and cooperative throughout. HR SR 90s. BP stable. Afebrile. RA -2L when sleeping. using IS. Abdomen a little distended compared to yesterday, still soft with + BS. Not passing gas yet. Tolerating full liquid diet.  Gunter in place with minimal OP, team aware, lasix scheduled, with little improvement. Clamshell incision WNL, dressing reinforced. Hgb:7.9. 1 unit of RBC given. Insulin gtt stopped. Platelets 48, no product given per SICU. AYSHA with large amount of OP, replaced with scheduled albumin. Up to chair x2 with 1ast. PRN 10mg of oxy, ativan and dilaudid for breakthrough pain.      Plan: caloric count tomorrow. Encourage walking. Monitor ROSSY, possible dialysis if UOP doesn't .     For vital signs and complete assessments, please see documentation flowsheets.

## 2022-01-10 NOTE — PROGRESS NOTES
D/I: Order to transfer to floor. Patient appropriate for transfer. Report to 7A. Patient belongings (one bag, ipad, phone) sent with patient.  Doug updated on move ands has unit's phopne number.     P: Continue cares on 7A. Monitor urine output and labs (2200 lab results pending).

## 2022-01-10 NOTE — PLAN OF CARE
"/77 (BP Location: Right arm)   Pulse 84   Temp 97.8  F (36.6  C) (Oral)   Resp 14   Ht 1.651 m (5' 5\")   Wt 70.2 kg (154 lb 12.2 oz)   SpO2 92%   BMI 25.75 kg/m     Shift: 5936-7854  VS: VSS on RA, afebrile.   Neuro: AOx4  BG: achs  Cardiopulmonary: WDL  Pain/Nausea/PRN: Endorses incisional pain, no pain meds given this shift.   Diet: Tolerating full liquid diet, poor appetite.  LDA: R IJ, L PIV, R AYSHA w/ 575cc serosanguinous output, dash.   GI/: Minimal urine output, team aware. Not passing gas, no BM.   Skin: Jaundice, generalized bruising, mild generalized edema. Abd incision covered with postop dressing.   Plan/Education: Evaluate ongoing need for tube feeds & HD.     Will continue with POC, will notify MD with changes or concerns.     "

## 2022-01-10 NOTE — PROGRESS NOTES
CLINICAL NUTRITION SERVICES - BRIEF NOTE     Nutrition Prescription    RECOMMENDATIONS FOR MDs/PROVIDERS TO ORDER:  Recommend patient consistently consume at least 1150 kcal and 60 grams protein (~60% of assessed needs) daily.  If unable to meet this goal, would recommend placement of FT for supplemental TF.     Continue to avoid diet restrictions if possible.     Recommendations already ordered by Registered Dietitian (RD):  Discontinued TF orders (no FT placed)    Calorie counts (1/10-1/12)    Ensure Enlive TID with meals       EVALUATION OF THE PROGRESS TOWARD GOALS   Diet: Full Liquid     Intake: FT not placed, TF not started.  Patient taking some full liquids.  Taking Ensure Enlive.     INTERVENTIONS  Reviewed kcal/protein needs with patient/family.  Encouraged oral supplements and high calorie dense and protein rich foods as tolerated. Encouraged small/frequent meals/snacks. Will send Ensure Enlive TID with meals.    Monitoring/Evaluation  Progress toward goals will be monitored and evaluated per protocol.     Suzi Turner MS, RD, LD, CCTD  7A/Obs units, pager [phone]

## 2022-01-10 NOTE — PLAN OF CARE
"/84 (BP Location: Right arm)   Pulse 84   Temp 97.7  F (36.5  C) (Oral)   Resp 16   Ht 1.651 m (5' 5\")   Wt 70.2 kg (154 lb 12.2 oz)   SpO2 98%   BMI 25.75 kg/m      4030-9407: VSS on 1L O2. A/Ox4; lethargic, but arouses easily to voice. Full liquid diet on calorie counts; pt not ready to advance to regular yet. Pt is very motivated to meet caloric and protein needs to avoid feeding tube placement. ACHS BG checks; no insulin required. Pain controlled with 5mg oxy x1, Atarax x1, and ice packs to incision. Denies nausea. R internal jugular with TKO and L PIV SL. R AYSHA with 855 mL serosanguinous output over 4 hours; Albumin concentration increased and ordered PRN. Next RN will plan to administer Albumin. Clamshell incision stapled; leaking on right side. Dressing changed. Gunter in place with 160mL output over 4 hours. Up with PT and patient had a medium loose BM.  at the bedside. Will continue to monitor and notify the team of any acute changes.   "

## 2022-01-10 NOTE — PROGRESS NOTES
[facility]   Transplant Nephrology Progress Note  Date of Admission:  1/7/2022  Today's Date: 01/10/2022    Recommendations:  - Would recommend bumetanide 1 mg bid.    Assessment & Plan   # ROSSY: Trend up in creatinine and now oliguric.  ROSSY likely secondary to liver transplant and previous hypotension.  Patient has had some ROSSY events in recent past.  No acute indications for dialysis, but will follow daily.   - Baseline Creatinine: ~ 0.9-1.1   - Proteinuria: Normal (<0.2 grams)    # Liver Tx: Appears to be improving with trend down in LFTs.  Followed by Transplant Surgery.    # Immunosuppression: Tacrolimus immediate release (goal 6-8), Mycophenolate mofetil (dose 750 mg every 12 hours) and Prednisone (dose taper)   - Changes: No; Management per Transplant Surgery.    # Infection Prophylaxis:   - PJP: Sulfa/TMP (Bactrim)  - CMV: Valganciclovir (Valcyte)  - Thrush: Nystatin (Mycostatin) swish and swallow    # Blood Pressure: Controlled;  Goal BP: < 140/90   - Volume status: Moderately hypervolemic   - Changes: Yes - Would consider diuresis with bumetanide 1 mg bid.    # Elevated Blood Glucose: Glucose generally running ~ 100-170s   - Management as per primary team.    # Anemia in Chronic Renal Disease: Hgb: Stable      RAFI: No   - Iron studies: High iron saturation, likely due to liver disease    # Mineral Bone Disorder:   - Vitamin D; level: Low        On supplement: No  - Calcium; level: Normal        On supplement: No  - Phosphorus; level: High        On binder: No; If worsening kidney function and higher serum phosphorus level tomorrow, would start sevelamer 800 mg tid with meals.    # Electrolytes:   - Potassium; level: Normal        On supplement: No  - Magnesium; level: Normal        On supplement: No  - Bicarbonate; level: Low normal        On supplement: No    # Asthma: Some shortness of breath.  Primary team to manage.    # Transplant History:  Etiology of  Organ Failure: Alcoholic cirrhosis  Tx: Liver Tx  Transplant: 2022 (Liver)  Significant changes in immunosuppression: None  Significant transplant-related complications: None    Recommendations were communicated to the primary team via this note.    Francis Pérez MD  Transplant Nephrology  Pager: [phone]    Interval History   Ms. Olmedo creatinine is trending up to 1.7 today.  Decreasing urine output.  She reports feeling a bit more puffy, especially in her left arm.  Some shortness of breath and feels she has some crackles in her chest when she uses the incentive spirometer.  She does cough up some phlegm.  No chest pain.  No nausea or vomiting.  No fever, sweats or chills.    Review of Systems   4 point ROS was obtained and negative except as noted in the Interval History.    MEDICATIONS:    amoxicillin  750 mg Oral TID     [START ON 2022] basiliximab (SIMULECT) infusion  20 mg Intravenous Once     insulin aspart  1-7 Units Subcutaneous TID AC     insulin aspart  1-5 Units Subcutaneous At Bedtime     lidocaine  1 patch Transdermal Q24H     lidocaine   Transdermal Q8H     [START ON 2022] methylPREDNISolone  50 mg Intravenous Once    Followed by     [START ON 2022] predniSONE  25 mg Oral Once    Followed by     [START ON 2022] predniSONE  10 mg Oral Once     multivitamin w/minerals  1 tablet Oral Daily     mycophenolate  750 mg Oral BID IS     nystatin  500,000 Units Oral 4x Daily     [START ON 2022] pantoprazole  40 mg Oral QAM AC     sennosides  8.6 mg Oral BID     sodium chloride (PF)  3 mL Intravenous Q8H     sulfamethoxazole-trimethoprim  1 tablet Oral Daily     tacrolimus  2.5 mg Oral BID IS     valGANciclovir  450 mg Oral Daily       dextrose         Physical Exam   Temp  Av.2  F (36.8  C)  Min: 96.2  F (35.7  C)  Max: 99.9  F (37.7  C)  Arterial Line BP  Min: 79/44  Max: 132/75  Arterial Line MAP (mmHg)  Av.4 mmHg  Min: 57 mmHg  Max: 235 mmHg      Pulse   "Av.5  Min: 73  Max: 105 Resp  Av.6  Min: 12  Max: 28  FiO2 (%)  Av.7 %  Min: 30 %  Max: 50 %  SpO2  Av.4 %  Min: 85 %  Max: 100 %    CVP (mmHg): 274 mmHgBP 132/84 (BP Location: Right arm)   Pulse 84   Temp 97.7  F (36.5  C) (Oral)   Resp 16   Ht 1.651 m (5' 5\")   Wt 70.2 kg (154 lb 12.2 oz)   SpO2 98%   BMI 25.75 kg/m     Date 01/10/22 0700 - 22 0659   Shift 5412-8275 8936-8594 8232-3665 24 Hour Total   INTAKE   Shift Total(mL/kg)       OUTPUT   Urine 160   160   Drains 1430 200  1630   Shift Total(mL/kg) 1590(22.65) 200(2.85)  1790(25.5)   Weight (kg) 70.2 70.2 70.2 70.2      Admit Weight: 64.7 kg (142 lb 11.2 oz)     GENERAL APPEARANCE: alert and no distress  HENT: mouth without ulcers or lesions  RESP: lungs clear to auscultation - no rales, rhonchi or wheezes  CV: regular rhythm, normal rate, no rub, no murmur  EDEMA: no LE edema bilaterally  ABDOMEN: soft, nondistended, nontender, bowel sounds normal  MS: extremities normal - no gross deformities noted, no evidence of inflammation in joints, no muscle tenderness  SKIN: no rash  DIALYSIS ACCESS: none    Data   All labs reviewed by me.  CMP  Recent Labs   Lab 01/10/22  1116 01/10/22  0841 01/10/22  0635 01/10/22  0258 22  2235 22  2223 22  1240 22  1024 22  0328 22  0319 22  0811 22  0807 22  0507 22  0336 22  0546 22  0307   NA  --   --  134  --   --  135  --  136  136  --  135  --  137  --  138   < > 135   POTASSIUM  --   --  4.1  --   --  4.5  --  3.8  3.8  --  3.9   < > 3.5  --  3.5   < > 3.4   CHLORIDE  --   --  103  --   --  103  --  106  106  --  104  --  106  --  105   < > 100   CO2  --   --  20  --   --  19*  --  21  21  --  22  --  20  --  20   < > 24   ANIONGAP  --   --  11  --   --  13  --  9  9  --  9  --  11  --  13   < > 11   * 112* 112* 131*   < > 171*   < > 104*  104*   < > 107*   < > 306*   < > 271*   < > 136*   BUN  --   --  44* "  --   --  43*  --  37*  37*  --  37*  --  28  --  26   < > 36*   CR  --   --  1.65*  --   --  1.54*  --  1.31*  1.31*  --  1.26*  --  0.86  --  0.76   < > 1.18*   GFRESTIMATED  --   --  37*  --   --  41*  --  49*  49*  --  52*  --  82  --  >90   < > 56*   SHAI  --   --  8.9  --   --  8.4*  --  8.4*  8.4*  --  8.7  --  9.4  --  9.5   < > 8.6   MAG  --   --  2.0  --   --   --   --   --   --  1.9  --  2.0  --  2.2   < > 1.3*   PHOS  --   --  7.2*  --   --   --   --   --   --  6.4*  --  3.8  --   --   --  3.0   PROTTOTAL  --   --  5.0*  --   --  5.0*  --  4.5*  --  4.3*   < > 3.5*  --  3.6*   < > 5.2*   ALBUMIN  --   --  3.0*  --   --  2.8*  --  2.7*  --  2.6*   < > 1.6*  --  1.7*   < > 2.7*   BILITOTAL  --   --  2.1*  --   --  2.2*  --  2.1*  --  2.4*   < > 5.4*  --  9.2*   < > 15.6*   ALKPHOS  --   --  56  --   --  56  --  40  --  30*   < > 45  --  47   < > 123   AST  --   --  67*  --   --  99*  --  144*  --  164*   < > 621*  --  1,065*   < > 54*   ALT  --   --  274*  --   --  314*  --  351*  --  383*   < > 820*  --  980*   < > 30    < > = values in this interval not displayed.     CBC  Recent Labs   Lab 01/10/22  0920 01/09/22  2223 01/09/22  1024 01/09/22 0319   HGB 9.7* 9.4* 7.9* 6.8*   WBC 10.7 10.6 11.5* 12.5*   RBC 3.06* 3.02* 2.54* 2.18*   HCT 28.9* 28.1* 23.4* 20.1*   MCV 94 93 92 92   MCH 31.7 31.1 31.1 31.2   MCHC 33.6 33.5 33.8 33.8   RDW 19.3* 18.9* 18.8* 19.0*   PLT 58* 49* 48* 45*     INR  Recent Labs   Lab 01/10/22  0635 01/09/22  1024 01/09/22 0318 01/08/22 2348 01/08/22 0336 01/08/22  0126 01/07/22  2325 01/07/22 2230 01/07/22  0307   INR 1.32* 1.56* 1.50* 1.56*   < >  --  3.45* 4.98* 2.54*   PTT  --   --   --   --   --  59* 143* 149* 50*    < > = values in this interval not displayed.     ABG  Recent Labs   Lab 01/08/22  0628 01/08/22  0336 01/08/22  0125 01/07/22  2330 01/07/22 2238   PH 7.44  --  7.33* 7.42 7.37   PCO2 29*  --  39 29* 27*   PO2 98  --  76* 130* 135*   HCO3 20*  --  21 19*  16*   O2PER 30 50 50 50 50.0      Urine Studies  Recent Labs   Lab Test 12/31/21  1114 12/28/21  1333 12/23/21  1824 11/03/21  2013   COLOR Dark Yellow* Dark Yellow* Dark Yellow* Yellow   APPEARANCE Slightly Cloudy* Clear Clear Clear   URINEGLC Negative 100 * Negative Negative   URINEBILI Moderate* Large* Moderate* Small*   URINEKETONE Negative Trace* Negative Negative   SG 1.016 1.015 1.022 1.017   UBLD Negative Negative Negative Negative   URINEPH 5.5 5.0 5.5 5.0   PROTEIN Negative Negative Negative Negative   UROBILINOGEN  --  0.2  --   --    NITRITE Negative Negative Negative Negative   LEUKEST Small* Negative Negative Negative   RBCU 1 0-2 1 1   WBCU 10* 10-25* 2 2     Recent Labs   Lab Test 12/28/21  1333   UTPG 0.19     PTH  No lab results found.  Iron Studies  Recent Labs   Lab Test 12/28/21  1322 12/23/21  1836 10/19/21  1125   IRON 170 185* 162   * 196* 198*   IRONSAT 76* 94* 82*   SARAHY 1,359*  --   --        IMAGING:  All imaging studies reviewed by me.

## 2022-01-10 NOTE — PROGRESS NOTES
Transplant Surgery  Inpatient Daily Progress Note  01/10/2022    Assessment & Plan: Melita Cortes is a 51 year old female with a past medical history of EtOH cirrhosis c/b ascites, HE, portal hypertension, EV. She is now s/p DDLT on 1/8/22 with Dr. Browne.     Graft function: S/p DDLT 1/8/22: POD#3. LFTs trending down. TB 2.1. INR 1.32.   -Liver US patent.   -AYSHA x1 with 2.4L   Immunosuppression management:   -SM pulse with taper per protocol  -Simulect POD#2    - mg BID  -Tac 2 mg BID. Goal level 5-8 with renal function. Level 4.1, increase to 2.5mg BID  Complexity of management:High. Contributing factors: ROSSY, Induction  Neuro:  Acute post op pain: Conitnue Lidoderm patches, PRN Robaxin, PRN Atarax, PRN oxycodone 5-10 Q4PRN.   Hematology:   Acute blood loss anemia: Hgb 9.7 stable/improved after 1 unit pRBCs. Monitor.  Thrombocytopenia: Plt 58, improving  Cardiorespiratory:   Hx Asthma: PRN albuterol.  Pleural effusions: continue to use IS/CDB  GI/Nutrition:   Severe malnutrition in the context of chronic illness: FL diet.  ADAT: Plan for NJ placement if not able to meet 60% of goal (1150 kcal and 60 grams protein). Calorie counts . Multivitamin daily.   Postop ileus: start senna BID.   Endocrine:   Steroid induced hyperglycemia: -170's, insulin drip transitioned to sliding scale Novolog PRN.   Fluid/Electrolytes/Neph:   Hypervolemia with JP: CIV. Weight +6kg from admit. Decrease albumin replacements.    ROSSY: Cr 1.65 (from 0.9), oliguric. Likely multifactorial due to hypoperfusion during liver transplant, hypotension. Transplant nephrology following. Continue albumin, change to 25% 25gm replacements for drain output. Discuss nephrology use of diuretics.    : Gunter to remain due to ROSSY for close monitoring of I/Os  Infectious disease:   Actinomyces odontolyticus bacteremia: 12/30 blood cx positive. Treating with PO amoxicillin 750 mg TID, hold while on Zosyn and resume 1/10 to complete 14  "day course (EOT 1/14/22).  Prophylaxis: DVT, fall, GI (PPI), keith-op (Zosyn x 48 hours), fungal (fluconazolex1, Nystatin), viral (Valcyte), pneumocystis (Bactrim)  Disposition: 7A, PT cleared for discharge home    Medical Decision Making: Medium  Subsequent visit 67614 (moderate level decision making)    JULIA/Fellow/Resident Provider: Yumi Miguel, NP   [id number]     Faculty: Buster Lou MD    __________________________________________________________________  Transplant History:    1/7/2022 (Liver), Postoperative day: 3     Interval History: History is obtained from the patient  Overnight events: No complaints today.  Fair appetite. encouraged to eat so NJ does not need to be placed. Tolerating FLD. No BM.     ROS:   A 10-point review of systems was negative except as noted above.    Curent Meds:    albumin human  25 g Intravenous Q8H     amoxicillin  750 mg Oral TID     insulin aspart  1-7 Units Subcutaneous TID AC     insulin aspart  1-5 Units Subcutaneous At Bedtime     lidocaine  1 patch Transdermal Q24H     lidocaine   Transdermal Q8H     [START ON 1/11/2022] methylPREDNISolone  50 mg Intravenous Once    Followed by     [START ON 1/12/2022] predniSONE  25 mg Oral Once    Followed by     [START ON 1/13/2022] predniSONE  10 mg Oral Once     multivitamin w/minerals  1 tablet Oral Daily     mycophenolate  750 mg Oral BID IS     nystatin  500,000 Units Oral 4x Daily     pantoprazole  40 mg Per Feeding Tube QAM AC    Or     pantoprazole (PROTONIX) IV  40 mg Intravenous QAM AC     protein modular  1 packet Per Feeding Tube BID     sodium chloride (PF)  3 mL Intravenous Q8H     sulfamethoxazole-trimethoprim  1 tablet Oral Daily     tacrolimus  2 mg Oral BID IS     valGANciclovir  450 mg Oral Daily       Physical Exam:     Admit Weight: 64.7 kg (142 lb 11.2 oz)    Current Vitals:   /77 (BP Location: Right arm)   Pulse 84   Temp 97.8  F (36.6  C) (Oral)   Resp 14   Ht 1.651 m (5' 5\")   Wt 70.2 kg (154 lb 12.2 " oz)   SpO2 92%   BMI 25.75 kg/m      Vital sign ranges:    Temp:  [96.2  F (35.7  C)-97.9  F (36.6  C)] 97.8  F (36.6  C)  Pulse:  [] 84  Resp:  [14-18] 14  BP: (115-127)/(76-86) 118/77  Cuff Mean (mmHg):  [104] 104  MAP:  [78 mmHg-98 mmHg] 98 mmHg  Arterial Line BP: (113-132)/(55-75) 132/75  SpO2:  [85 %-100 %] 92 %    General Appearance: in no apparent distress.   Skin: normal, warm, dry, jaundice  Heart: RRR  Lungs: unlabored on RA, crackles to upper airway, +cough, , shallow respirations  Abdomen: Soft, distended, +Ascites, Incision with staples CDI except some serous drainage to Right lateral incision . AYSHA x1 with serosanguinous output.   : Gunter in place with clear yellow output  Extremities: edema: + 2 pitting BLE and +1 BUE.  Neurologic: A&Ox4. Tremor absent.     Frailty Scores    There is no flowsheet data to display.         Data:   CMP  Recent Labs   Lab 01/10/22  0841 01/10/22  0635 01/09/22  2235 01/09/22  2223 01/09/22  0328 01/09/22  0319 01/08/22  0811 01/08/22  0807 01/08/22  0115 01/07/22  2330   NA  --  134  --  135   < > 135  --  137   < > 133   POTASSIUM  --  4.1  --  4.5   < > 3.9   < > 3.5   < > 2.9*   CHLORIDE  --  103  --  103   < > 104  --  106   < >  --    CO2  --  20  --  19*   < > 22  --  20   < >  --    * 112*   < > 171*   < > 107*   < > 306*   < > 222*   BUN  --  44*  --  43*   < > 37*  --  28   < >  --    CR  --  1.65*  --  1.54*   < > 1.26*  --  0.86   < >  --    GFRESTIMATED  --  37*  --  41*   < > 52*  --  82   < >  --    SHAI  --  8.9  --  8.4*   < > 8.7  --  9.4   < >  --    ICAW  --   --   --   --   --  4.9  --   --   --  5.3*   MAG  --  2.0  --   --   --  1.9  --  2.0   < >  --    PHOS  --  7.2*  --   --   --  6.4*  --  3.8  --   --    AMYLASE  --   --   --   --   --  38  --  36  --   --    LIPASE  --   --   --   --   --  67*  --  120  --   --    ALBUMIN  --  3.0*  --  2.8*   < > 2.6*   < > 1.6*   < >  --    BILITOTAL  --  2.1*  --  2.2*   < > 2.4*   < >  5.4*   < >  --    ALKPHOS  --  56  --  56   < > 30*   < > 45   < >  --    AST  --  67*  --  99*   < > 164*   < > 621*   < >  --    ALT  --  274*  --  314*   < > 383*   < > 820*   < >  --     < > = values in this interval not displayed.     CBC  Recent Labs   Lab 01/09/22  2223 01/09/22  1024 01/08/22  1326 01/08/22  1242   HGB 9.4* 7.9*   < >  --    WBC 10.6 11.5*   < >  --    PLT 49* 48*   < >  --    A1C  --   --   --  5.2    < > = values in this interval not displayed.

## 2022-01-10 NOTE — PROGRESS NOTES
"   01/10/22 1000   Quick Adds   Type of Visit Initial Occupational Therapy Evaluation   Living Environment   People in home spouse   Current Living Arrangements house  ([facility])   Home Accessibility stairs within home;stairs to enter home   Number of Stairs, Main Entrance 2  (\"a couple\")   Stair Railings, Main Entrance none   Number of Stairs, Within Home, Primary greater than 10 stairs   Stair Railings, Within Home, Primary railings on both sides of stairs   Transportation Anticipated family or friend will provide   Living Environment Comments Pt reporting living in a house with her  who is able to A. Tub shower, IND at baseline.    Self-Care   Usual Activity Tolerance good   Current Activity Tolerance fair   Regular Exercise No   Equipment Currently Used at Home none   Activity/Exercise/Self-Care Comment IND at baseline, spouse A with IADLs.    Instrumental Activities of Daily Living (IADL)   Previous Responsibilities laundry;housekeeping;meal prep   IADL Comments typically spouse and pt share IADLs however spouse has been assisting more    Disability/Function   Hearing Difficulty or Deaf no   Wear Glasses or Blind no   Concentrating, Remembering or Making Decisions Difficulty no   Difficulty Communicating no   Difficulty Eating/Swallowing no   Walking or Climbing Stairs Difficulty no   Dressing/Bathing Difficulty no   Toileting issues no   Doing Errands Independently Difficulty (such as shopping) yes   Errands Management spouse A    Fall history within last six months no   Change in Functional Status Since Onset of Current Illness/Injury yes   General Information   Onset of Illness/Injury or Date of Surgery 01/07/22   Referring Physician Enrrique Frazier MD   Additional Occupational Profile Info/Pertinent History of Current Problem Per chart Melita Cortes is a 51 year old female with a past medical history of EtOH cirrhosis c/b ascites, HE, portal hypertension, EV. She is now s/p DDLT on 1/8/22 with " Dr. Browne.    Existing Precautions/Restrictions fall;abdominal   Cognitive Status Examination   Orientation Status orientation to person, place and time   Affect/Mental Status (Cognitive) low arousal/lethargic   Follows Commands WFL   Cognitive Status Comments Appears intact, will continue to monitor. Pt at times drowsy/lethargic during session.     Visual Perception   Visual Impairment/Limitations WFL   Sensory   Sensory Quick Adds No deficits were identified   Integumentary/Edema   Integumentary/Edema no deficits were identifed   Posture   Posture forward head position;protracted shoulders   Range of Motion Comprehensive   General Range of Motion no range of motion deficits identified   Strength Comprehensive (MMT)   Comment, General Manual Muscle Testing (MMT) Assessment BUE WFL, not formally tested due to abdominal precautions   Muscle Tone Assessment   Muscle Tone Quick Adds No deficits were identified   Coordination   Upper Extremity Coordination No deficits were identified   Bed Mobility   Comment (Bed Mobility) Pt declined, per observation and chart review min A for log roll technique   Transfers   Transfers sit-stand transfer   Transfer Comments Pt declined, per observation and chart review min A for STS   Activities of Daily Living   BADL Assessment lower body dressing;bathing;upper body dressing;toileting   Bathing Assessment   [place] Level (Bathing) minimum assist (75% patient effort)   Assistive Devices (Bathing) shower chair   Comment (Bathing) Per clinical judgement, likely requiring min A for shower transfer and tasks due to abdominal precautions.    Upper Body Dressing Assessment   [place] Level (Upper Body Dressing) modified independence   Lower Body Dressing Assessment   [place] Level (Lower Body Dressing) modified independence   Comment (Lower Body Dressing) Per clinical judgement, likely requiring AE or modified technique due to abdominal precautions.    Toileting    [place] Level (Toileting) minimum assist (75% patient effort)   Assistive Devices (Toileting) grab bar, toilet   Comment (Toileting) Per clinical judgement, likely requiring min A for STS from toilet, A for pericares.    Clinical Impression   Criteria for Skilled Therapeutic Interventions Met (OT) yes   OT Diagnosis Pt is below baseline for ADLs.    OT Problem List-Impairments impacting ADL activity tolerance impaired;strength;post-surgical precautions;pain   Assessment of Occupational Performance 3-5 Performance Deficits   Identified Performance Deficits LE dressing, toileting, bathing, home managment    Planned Therapy Interventions (OT) ADL retraining;ROM;strengthening;transfer training;home program guidelines;progressive activity/exercise   Clinical Decision Making Complexity (OT) low complexity   Therapy Frequency (OT) 5x/week   Predicted Duration of Therapy 5 days    Risk & Benefits of therapy have been explained risks/benefits reviewed;care plan/treatment goals reviewed;evaluation/treatment results reviewed;current/potential barriers reviewed;participants voiced agreement with care plan;patient   Comment-Clinical Impression Pt presents below baseline, limited by pain, activity tolerance, and precautions. Pt will benefit from skilled OT services to progress functional mobility and IND with ADLs.    OT Discharge Planning    OT Discharge Recommendation (DC Rec) home with home care occupational therapy;Home with assist   OT Rationale for DC Rec Anticipating pt will progress to be safe to d/c home with  A for ADLs/IADLs, shower chair, and HH OT/PT to continue progressing activity tolerance and safety with ADLs.    OT Brief overview of current status  Ax1

## 2022-01-10 NOTE — PROGRESS NOTES
Transplant Social Work Services Progress Note    Date of Initial Social Work Evaluation: 12/29/2021  Collaborated with: Patient and patient's spouse at bedside    Data: This writer met with patient and patient's spouse at bedside. I provided supportive counseling during visit. They denied any concerns at this time. Fidelina was somewhat tearful during our conversation when reflecting on her journey and potential medical interventions recommended. Doug inquired about home care etc. I reported that RNCC/SW will assist with initiating any recommended services once Fidelina is closer to discharge.   Intervention: Supportive listening  Assessment: motivational interviewing   Education provided by SW: Homecare, caregiving   Plan:    Discharge Plans in Progress: PT/OT anticipate home at this time     Barriers to d/c plan: Medical stability     Follow up Plan: This writer will be available for ongoing psychosocial support and discharge planning     ELMO Valencia, [facility]  Liver Transplant   [facility]  Phone: [phone]  Pager: [phone]

## 2022-01-11 ENCOUNTER — APPOINTMENT (OUTPATIENT)
Dept: PHYSICAL THERAPY | Facility: CLINIC | Age: 52
DRG: 005 | End: 2022-01-11
Attending: TRANSPLANT SURGERY
Payer: COMMERCIAL

## 2022-01-11 ENCOUNTER — APPOINTMENT (OUTPATIENT)
Dept: OCCUPATIONAL THERAPY | Facility: CLINIC | Age: 52
DRG: 005 | End: 2022-01-11
Attending: TRANSPLANT SURGERY
Payer: COMMERCIAL

## 2022-01-11 ENCOUNTER — PRE VISIT (OUTPATIENT)
Dept: GASTROENTEROLOGY | Facility: CLINIC | Age: 52
End: 2022-01-11
Payer: COMMERCIAL

## 2022-01-11 LAB
ALBUMIN SERPL-MCNC: 2.9 G/DL (ref 3.4–5)
ALP SERPL-CCNC: 52 U/L (ref 40–150)
ALT SERPL W P-5'-P-CCNC: 153 U/L (ref 0–50)
ANION GAP SERPL CALCULATED.3IONS-SCNC: 11 MMOL/L (ref 3–14)
ANION GAP SERPL CALCULATED.3IONS-SCNC: 9 MMOL/L (ref 3–14)
AST SERPL W P-5'-P-CCNC: 30 U/L (ref 0–45)
BILIRUB DIRECT SERPL-MCNC: 1.1 MG/DL (ref 0–0.2)
BILIRUB SERPL-MCNC: 1.8 MG/DL (ref 0.2–1.3)
BUN SERPL-MCNC: 50 MG/DL (ref 7–30)
BUN SERPL-MCNC: 55 MG/DL (ref 7–30)
CALCIUM SERPL-MCNC: 8.4 MG/DL (ref 8.5–10.1)
CALCIUM SERPL-MCNC: 8.5 MG/DL (ref 8.5–10.1)
CHLORIDE BLD-SCNC: 103 MMOL/L (ref 94–109)
CHLORIDE BLD-SCNC: 105 MMOL/L (ref 94–109)
CO2 SERPL-SCNC: 22 MMOL/L (ref 20–32)
CO2 SERPL-SCNC: 25 MMOL/L (ref 20–32)
CREAT SERPL-MCNC: 1.42 MG/DL (ref 0.52–1.04)
CREAT SERPL-MCNC: 1.59 MG/DL (ref 0.52–1.04)
ERYTHROCYTE [DISTWIDTH] IN BLOOD BY AUTOMATED COUNT: 19.4 % (ref 10–15)
GFR SERPL CREATININE-BSD FRML MDRD: 39 ML/MIN/1.73M2
GFR SERPL CREATININE-BSD FRML MDRD: 45 ML/MIN/1.73M2
GLUCOSE BLD-MCNC: 103 MG/DL (ref 70–99)
GLUCOSE BLD-MCNC: 180 MG/DL (ref 70–99)
GLUCOSE BLDC GLUCOMTR-MCNC: 136 MG/DL (ref 70–99)
GLUCOSE BLDC GLUCOMTR-MCNC: 149 MG/DL (ref 70–99)
GLUCOSE BLDC GLUCOMTR-MCNC: 185 MG/DL (ref 70–99)
GLUCOSE BLDC GLUCOMTR-MCNC: 95 MG/DL (ref 70–99)
GLUCOSE BLDC GLUCOMTR-MCNC: 99 MG/DL (ref 70–99)
HCT VFR BLD AUTO: 35 % (ref 35–47)
HGB BLD-MCNC: 11.4 G/DL (ref 11.7–15.7)
MAGNESIUM SERPL-MCNC: 1.9 MG/DL (ref 1.6–2.3)
MCH RBC QN AUTO: 31.4 PG (ref 26.5–33)
MCHC RBC AUTO-ENTMCNC: 32.6 G/DL (ref 31.5–36.5)
MCV RBC AUTO: 96 FL (ref 78–100)
PATH REPORT.COMMENTS IMP SPEC: NORMAL
PATH REPORT.COMMENTS IMP SPEC: NORMAL
PATH REPORT.FINAL DX SPEC: NORMAL
PATH REPORT.GROSS SPEC: NORMAL
PATH REPORT.MICROSCOPIC SPEC OTHER STN: NORMAL
PATH REPORT.RELEVANT HX SPEC: NORMAL
PHOSPHATE SERPL-MCNC: 5.9 MG/DL (ref 2.5–4.5)
PHOTO IMAGE: NORMAL
PLATELET # BLD AUTO: 79 10E3/UL (ref 150–450)
POTASSIUM BLD-SCNC: 3.9 MMOL/L (ref 3.4–5.3)
POTASSIUM BLD-SCNC: 4.3 MMOL/L (ref 3.4–5.3)
PROT SERPL-MCNC: 4.7 G/DL (ref 6.8–8.8)
RBC # BLD AUTO: 3.63 10E6/UL (ref 3.8–5.2)
SODIUM SERPL-SCNC: 137 MMOL/L (ref 133–144)
SODIUM SERPL-SCNC: 138 MMOL/L (ref 133–144)
TACROLIMUS BLD-MCNC: 3.8 UG/L (ref 5–15)
TME LAST DOSE: ABNORMAL H
TME LAST DOSE: ABNORMAL H
WBC # BLD AUTO: 10.2 10E3/UL (ref 4–11)

## 2022-01-11 PROCEDURE — 36592 COLLECT BLOOD FROM PICC: CPT | Performed by: NURSE PRACTITIONER

## 2022-01-11 PROCEDURE — 250N000012 HC RX MED GY IP 250 OP 636 PS 637: Performed by: STUDENT IN AN ORGANIZED HEALTH CARE EDUCATION/TRAINING PROGRAM

## 2022-01-11 PROCEDURE — 99207 PR SATISFY VISIT NUMBER: CPT | Performed by: TRANSPLANT SURGERY

## 2022-01-11 PROCEDURE — 250N000013 HC RX MED GY IP 250 OP 250 PS 637: Performed by: STUDENT IN AN ORGANIZED HEALTH CARE EDUCATION/TRAINING PROGRAM

## 2022-01-11 PROCEDURE — 83735 ASSAY OF MAGNESIUM: CPT | Performed by: NURSE PRACTITIONER

## 2022-01-11 PROCEDURE — 99233 SBSQ HOSP IP/OBS HIGH 50: CPT | Performed by: INTERNAL MEDICINE

## 2022-01-11 PROCEDURE — 250N000013 HC RX MED GY IP 250 OP 250 PS 637: Performed by: TRANSPLANT SURGERY

## 2022-01-11 PROCEDURE — 84100 ASSAY OF PHOSPHORUS: CPT | Performed by: NURSE PRACTITIONER

## 2022-01-11 PROCEDURE — 250N000013 HC RX MED GY IP 250 OP 250 PS 637: Performed by: NURSE PRACTITIONER

## 2022-01-11 PROCEDURE — 80069 RENAL FUNCTION PANEL: CPT | Performed by: NURSE PRACTITIONER

## 2022-01-11 PROCEDURE — 97535 SELF CARE MNGMENT TRAINING: CPT | Mod: GO

## 2022-01-11 PROCEDURE — 120N000011 HC R&B TRANSPLANT UMMC

## 2022-01-11 PROCEDURE — 80197 ASSAY OF TACROLIMUS: CPT | Performed by: NURSE PRACTITIONER

## 2022-01-11 PROCEDURE — 97530 THERAPEUTIC ACTIVITIES: CPT | Mod: GP | Performed by: REHABILITATION PRACTITIONER

## 2022-01-11 PROCEDURE — 80053 COMPREHEN METABOLIC PANEL: CPT | Performed by: NURSE PRACTITIONER

## 2022-01-11 PROCEDURE — 82248 BILIRUBIN DIRECT: CPT | Performed by: NURSE PRACTITIONER

## 2022-01-11 PROCEDURE — 250N000011 HC RX IP 250 OP 636: Performed by: NURSE PRACTITIONER

## 2022-01-11 PROCEDURE — 250N000011 HC RX IP 250 OP 636: Performed by: STUDENT IN AN ORGANIZED HEALTH CARE EDUCATION/TRAINING PROGRAM

## 2022-01-11 PROCEDURE — 250N000013 HC RX MED GY IP 250 OP 250 PS 637

## 2022-01-11 PROCEDURE — 85027 COMPLETE CBC AUTOMATED: CPT | Performed by: NURSE PRACTITIONER

## 2022-01-11 PROCEDURE — 97116 GAIT TRAINING THERAPY: CPT | Mod: GP | Performed by: REHABILITATION PRACTITIONER

## 2022-01-11 PROCEDURE — 97530 THERAPEUTIC ACTIVITIES: CPT | Mod: GO

## 2022-01-11 PROCEDURE — 250N000012 HC RX MED GY IP 250 OP 636 PS 637: Performed by: NURSE PRACTITIONER

## 2022-01-11 PROCEDURE — P9047 ALBUMIN (HUMAN), 25%, 50ML: HCPCS | Performed by: NURSE PRACTITIONER

## 2022-01-11 RX ORDER — ALBUMIN (HUMAN) 12.5 G/50ML
25 SOLUTION INTRAVENOUS EVERY 8 HOURS
Status: COMPLETED | OUTPATIENT
Start: 2022-01-11 | End: 2022-01-11

## 2022-01-11 RX ORDER — BUMETANIDE 0.25 MG/ML
1 INJECTION INTRAMUSCULAR; INTRAVENOUS
Status: DISCONTINUED | OUTPATIENT
Start: 2022-01-12 | End: 2022-01-14 | Stop reason: HOSPADM

## 2022-01-11 RX ORDER — BUMETANIDE 0.25 MG/ML
2 INJECTION INTRAMUSCULAR; INTRAVENOUS ONCE
Status: COMPLETED | OUTPATIENT
Start: 2022-01-11 | End: 2022-01-11

## 2022-01-11 RX ORDER — OXYCODONE HYDROCHLORIDE 5 MG/1
5-10 TABLET ORAL EVERY 6 HOURS PRN
Status: DISCONTINUED | OUTPATIENT
Start: 2022-01-11 | End: 2022-01-14 | Stop reason: HOSPADM

## 2022-01-11 RX ADMIN — STANDARDIZED SENNA CONCENTRATE 8.6 MG: 8.6 TABLET ORAL at 19:38

## 2022-01-11 RX ADMIN — HYDROXYZINE HYDROCHLORIDE 25 MG: 25 TABLET ORAL at 16:59

## 2022-01-11 RX ADMIN — NYSTATIN 500000 UNITS: 100000 SUSPENSION ORAL at 17:00

## 2022-01-11 RX ADMIN — STANDARDIZED SENNA CONCENTRATE 8.6 MG: 8.6 TABLET ORAL at 08:29

## 2022-01-11 RX ADMIN — OXYCODONE HYDROCHLORIDE 10 MG: 5 TABLET ORAL at 16:58

## 2022-01-11 RX ADMIN — AMOXICILLIN 750 MG: 500 CAPSULE ORAL at 19:37

## 2022-01-11 RX ADMIN — TACROLIMUS 2.5 MG: 1 CAPSULE ORAL at 08:29

## 2022-01-11 RX ADMIN — ALBUMIN HUMAN 12.5 G: 0.25 SOLUTION INTRAVENOUS at 12:34

## 2022-01-11 RX ADMIN — ALBUMIN HUMAN 12.5 G: 0.25 SOLUTION INTRAVENOUS at 12:02

## 2022-01-11 RX ADMIN — METHOCARBAMOL 500 MG: 500 TABLET ORAL at 19:38

## 2022-01-11 RX ADMIN — TACROLIMUS 2.5 MG: 1 CAPSULE ORAL at 18:32

## 2022-01-11 RX ADMIN — Medication 1 TABLET: at 08:29

## 2022-01-11 RX ADMIN — AMOXICILLIN 750 MG: 500 CAPSULE ORAL at 14:16

## 2022-01-11 RX ADMIN — ALBUMIN HUMAN 25 G: 0.25 SOLUTION INTRAVENOUS at 19:38

## 2022-01-11 RX ADMIN — SULFAMETHOXAZOLE AND TRIMETHOPRIM 1 TABLET: 400; 80 TABLET ORAL at 08:29

## 2022-01-11 RX ADMIN — HYDROXYZINE HYDROCHLORIDE 25 MG: 25 TABLET ORAL at 22:41

## 2022-01-11 RX ADMIN — AMOXICILLIN 750 MG: 500 CAPSULE ORAL at 08:30

## 2022-01-11 RX ADMIN — OXYCODONE HYDROCHLORIDE 10 MG: 5 TABLET ORAL at 09:52

## 2022-01-11 RX ADMIN — INSULIN ASPART 1 UNITS: 100 INJECTION, SOLUTION INTRAVENOUS; SUBCUTANEOUS at 12:27

## 2022-01-11 RX ADMIN — PANTOPRAZOLE SODIUM 40 MG: 40 TABLET, DELAYED RELEASE ORAL at 08:29

## 2022-01-11 RX ADMIN — METHYLPREDNISOLONE SODIUM SUCCINATE 50 MG: 125 INJECTION, POWDER, FOR SOLUTION INTRAMUSCULAR; INTRAVENOUS at 08:44

## 2022-01-11 RX ADMIN — BUMETANIDE 1 MG: 0.25 INJECTION INTRAMUSCULAR; INTRAVENOUS at 08:36

## 2022-01-11 RX ADMIN — VALGANCICLOVIR 450 MG: 450 TABLET, FILM COATED ORAL at 08:29

## 2022-01-11 RX ADMIN — ALBUMIN HUMAN 25 G: 0.25 SOLUTION INTRAVENOUS at 02:25

## 2022-01-11 RX ADMIN — BUMETANIDE 2 MG: 0.25 INJECTION, SOLUTION INTRAMUSCULAR; INTRAVENOUS at 17:01

## 2022-01-11 RX ADMIN — NYSTATIN 500000 UNITS: 100000 SUSPENSION ORAL at 19:37

## 2022-01-11 RX ADMIN — NYSTATIN 500000 UNITS: 100000 SUSPENSION ORAL at 08:31

## 2022-01-11 RX ADMIN — MYCOPHENOLATE MOFETIL 750 MG: 250 CAPSULE ORAL at 18:32

## 2022-01-11 RX ADMIN — MYCOPHENOLATE MOFETIL 750 MG: 250 CAPSULE ORAL at 08:29

## 2022-01-11 RX ADMIN — OXYCODONE HYDROCHLORIDE 10 MG: 5 TABLET ORAL at 00:00

## 2022-01-11 RX ADMIN — INSULIN ASPART 1 UNITS: 100 INJECTION, SOLUTION INTRAVENOUS; SUBCUTANEOUS at 17:12

## 2022-01-11 ASSESSMENT — ACTIVITIES OF DAILY LIVING (ADL)
ADLS_ACUITY_SCORE: 10
ADLS_ACUITY_SCORE: 12
ADLS_ACUITY_SCORE: 10
ADLS_ACUITY_SCORE: 10
ADLS_ACUITY_SCORE: 12
ADLS_ACUITY_SCORE: 12
ADLS_ACUITY_SCORE: 10
ADLS_ACUITY_SCORE: 10
ADLS_ACUITY_SCORE: 12
ADLS_ACUITY_SCORE: 10
ADLS_ACUITY_SCORE: 12
ADLS_ACUITY_SCORE: 10
ADLS_ACUITY_SCORE: 10
ADLS_ACUITY_SCORE: 12
ADLS_ACUITY_SCORE: 10
ADLS_ACUITY_SCORE: 12
ADLS_ACUITY_SCORE: 10
ADLS_ACUITY_SCORE: 12
ADLS_ACUITY_SCORE: 12

## 2022-01-11 ASSESSMENT — MIFFLIN-ST. JEOR: SCORE: 1312.62

## 2022-01-11 NOTE — PROGRESS NOTES
Calorie Count  Intake recorded for: 1/10  Total Kcals: 435 Total Protein: 30g  Kcals from Hospital Food: 435  Protein: 30g  Kcals from Outside Food (average):0 Protein: 0g  # Meals Ordered from Kitchen: 3 meals   # Meals Recorded: 1 meal - 100% Greek yogurt  # Supplements Recorded: 100% 1 Ensure Enlive

## 2022-01-11 NOTE — PLAN OF CARE
Vitals: stable 1 L NC.    Blood glucose: achs bg 160, 128 sliding scale.   Pain/nausea: incisional pain, oxy x 1.   Diet: full liquid. Poor appetite  Lines: RIJ SL. PIVL SL.   : dash    GI: no BM this shift. Last on day x 1 today  Drains: AYSHA   Albumin x 1 given. Dressing changed. Leaky.   Skin: incision stapled LUZMA. Side dressing changed, was leaky on days.   Mobility: up with 1.     Education : med/lab book needs to be updated.

## 2022-01-11 NOTE — PROGRESS NOTES
Care Management Follow Up    Length of Stay (days): 3    Expected Discharge Date: 01/14/2022     Concerns to be Addressed: care coordination/care conferences,discharge planning     Patient plan of care discussed at interdisciplinary rounds: Yes    Anticipated Discharge Disposition: Home,Home Care     Anticipated Discharge Services: None  Anticipated Discharge DME: None    Patient/family educated on Medicare website which has current facility and service quality ratings: yes  Education Provided on the Discharge Plan:    Patient/Family in Agreement with the Plan: yes    Referrals Placed by CM/SW: Internal Clinic Care Coordination,Homecare      Additional Information:  Patient status reviewed/discussed during care team rounds.  Pt s/p liver transplant 1/8/2022.  Team anticipates possible discharge end of week.  Pt would benefit from home health RN, PT, OT services.    Met with pt.  Introduced RNCC role.  Pt confirmed PCP is Dr. jm DIEHL [facility].  Discussed/reviewed home care RN, PT, OT recommendations.   Provided pt with Medicare.gov star rating list of home care agencies that service pt home area.  Pt has no preference.   Pt open to starting with [facility] and expanding to other agencies on the list based on home star rating.  Agreed to f/u with pt when closer to discharge.      Email referral sent to [facility].     Yesica Denton RN BSN, PHN, ACM-RN  7A RN Care Coordinator  Phone: [phone]  Pager [phone]  To contact the [facility] RNCC, Page: [phone]    1/11/2022 3:30 PM

## 2022-01-11 NOTE — PROGRESS NOTES
[facility]   Transplant Nephrology Progress Note  Date of Admission:  1/7/2022  Today's Date: 01/11/2022    Recommendations:  - Would increase bumetanide to 2 mg tonight and reassess tomorrow.   - Recommend repeating BMP with risk of hypokalemia due to diuresis.      Assessment & Plan   # ROSSY: Trend up in creatinine and now oliguric.  ROSSY likely secondary to liver transplant and previous hypotension.  Patient has had some ROSSY events in recent past.  No acute indications for dialysis, but will follow daily.   - Baseline Creatinine: ~ 0.9-1.1   - Proteinuria: Normal (<0.2 grams)    # Liver Tx: Appears to be improving with trend down in LFTs.  Followed by Transplant Surgery.    # Immunosuppression: Tacrolimus immediate release (goal 6-8), Mycophenolate mofetil (dose 750 mg every 12 hours) and Prednisone (dose taper)   - Changes: No; Management per Transplant Surgery.    # Infection Prophylaxis:   - PJP: Sulfa/TMP (Bactrim)  - CMV: Valganciclovir (Valcyte)  - Thrush: Nystatin (Mycostatin) swish and swallow    # Blood Pressure: Controlled;  Goal BP: < 140/90   - Volume status: Moderately hypervolemic   - Changes: Yes - Would consider increasing diuresis with bumetanide 2 mg tonight and reassess.    # Elevated Blood Glucose: Glucose generally running ~ 100-170s   - Management as per primary team.    # Anemia in Chronic Renal Disease: Hgb: Stable      RAFI: No   - Iron studies: High iron saturation, likely due to liver disease    # Mineral Bone Disorder:   - Vitamin D; level: Low        On supplement: No  - Calcium; level: Normal        On supplement: No  - Phosphorus; level: High        On binder: No    # Electrolytes:   - Potassium; level: Normal        On supplement: No  - Magnesium; level: Normal        On supplement: No  - Bicarbonate; level: Low normal        On supplement: No    # Asthma: Stable.  Primary team to manage.    # Transplant History:  Etiology of Organ  Failure: Alcoholic cirrhosis  Tx: Liver Tx  Transplant: 2022 (Liver)  Significant changes in immunosuppression: None  Significant transplant-related complications: None    Recommendations were communicated to the primary team via this note.    Francis Pérez MD  Transplant Nephrology  Pager: [phone]    Interval History   Ms. Olmedo creatinine is slightly decreased to 1.6 today.  Improving urine output after starting diuretics with ~ 1.2 liters yesterday and already 1.5 liters today.  Patient reports feeling a bit better and was ambulating on the floor.  No chest pain or shortness of breath.  No nausea or vomiting.  No bowel movement since yesterday.  No fever, sweats or chills.  Slightly less puffiness.    Review of Systems   4 point ROS was obtained and negative except as noted in the Interval History.    MEDICATIONS:    albumin human  25 g Intravenous Q8H     amoxicillin  750 mg Oral TID     [START ON 2022] basiliximab (SIMULECT) infusion  20 mg Intravenous Once     bumetanide  1 mg Intravenous BID     insulin aspart  1-7 Units Subcutaneous TID AC     insulin aspart  1-5 Units Subcutaneous At Bedtime     lidocaine  1 patch Transdermal Q24H     lidocaine   Transdermal Q8H     multivitamin w/minerals  1 tablet Oral Daily     mycophenolate  750 mg Oral BID IS     nystatin  500,000 Units Oral 4x Daily     pantoprazole  40 mg Oral QAM AC     [START ON 2022] predniSONE  25 mg Oral Once    Followed by     [START ON 2022] predniSONE  10 mg Oral Once     sennosides  8.6 mg Oral BID     sodium chloride (PF)  3 mL Intravenous Q8H     sulfamethoxazole-trimethoprim  1 tablet Oral Daily     tacrolimus  2.5 mg Oral BID IS     valGANciclovir  450 mg Oral Daily       dextrose         Physical Exam   Temp  Av.2  F (36.8  C)  Min: 96.2  F (35.7  C)  Max: 99.9  F (37.7  C)  Arterial Line BP  Min: 79/44  Max: 132/75  Arterial Line MAP (mmHg)  Av.4 mmHg  Min: 57 mmHg  Max: 235 mmHg      Pulse  " Av.5  Min: 73  Max: 105 Resp  Av.6  Min: 12  Max: 28  FiO2 (%)  Av.7 %  Min: 30 %  Max: 50 %  SpO2  Av.4 %  Min: 85 %  Max: 100 %    CVP (mmHg): 274 mmHgBP 118/80 (BP Location: Right arm)   Pulse 92   Temp 98.3  F (36.8  C) (Oral)   Resp 18   Ht 1.651 m (5' 5\")   Wt 70.2 kg (154 lb 12.2 oz)   SpO2 94%   BMI 25.75 kg/m     Date 01/10/22 0700 - 22 0659   Shift 5529-8141 6852-8425 5956-1751 24 Hour Total   INTAKE   Shift Total(mL/kg)       OUTPUT   Urine 160   160   Drains 1430 200  1630   Shift Total(mL/kg) 1590(22.65) 200(2.85)  1790(25.5)   Weight (kg) 70.2 70.2 70.2 70.2      Admit Weight: 64.7 kg (142 lb 11.2 oz)     GENERAL APPEARANCE: alert and no distress  HENT: mouth without ulcers or lesions  RESP: fine bibasilar crackles  CV: regular rhythm, normal rate, no rub, no murmur  EDEMA: 1+ LE and dependent edema bilaterally  ABDOMEN: soft, nondistended, nontender, bowel sounds normal  MS: extremities normal - no gross deformities noted, no evidence of inflammation in joints, no muscle tenderness  SKIN: no rash  DIALYSIS ACCESS: none    Data   All labs reviewed by me.  CMP  Recent Labs   Lab 22  1206 22  0758 22  0633 22  0207 01/10/22  0841 01/10/22  0635 22  2235 22  2223 22  1240 22  1024 22  0328 22  0319 22  0811 22  0807   NA  --   --  138  --   --  134  --  135  --  136  136  --  135  --  137   POTASSIUM  --   --  3.9  --   --  4.1  --  4.5  --  3.8  3.8  --  3.9   < > 3.5   CHLORIDE  --   --  105  --   --  103  --  103  --  106  106  --  104  --  106   CO2  --   --  22  --   --  20  --  19*  --  21  21  --  22  --  20   ANIONGAP  --   --  11  --   --  11  --  13  --  9  9  --  9  --  11   * 99 103* 95   < > 112*   < > 171*   < > 104*  104*   < > 107*   < > 306*   BUN  --   --  50*  --   --  44*  --  43*  --  37*  37*  --  37*  --  28   CR  --   --  1.59*  --   --  1.65*  --  1.54*  --  " 1.31*  1.31*  --  1.26*  --  0.86   GFRESTIMATED  --   --  39*  --   --  37*  --  41*  --  49*  49*  --  52*  --  82   SHAI  --   --  8.5  --   --  8.9  --  8.4*  --  8.4*  8.4*  --  8.7  --  9.4   MAG  --   --  1.9  --   --  2.0  --   --   --   --   --  1.9  --  2.0   PHOS  --   --  5.9*  --   --  7.2*  --   --   --   --   --  6.4*  --  3.8   PROTTOTAL  --   --  4.7*  --   --  5.0*  --  5.0*  --  4.5*  --  4.3*   < > 3.5*   ALBUMIN  --   --  2.9*  --   --  3.0*  --  2.8*  --  2.7*  --  2.6*   < > 1.6*   BILITOTAL  --   --  1.8*  --   --  2.1*  --  2.2*  --  2.1*  --  2.4*   < > 5.4*   ALKPHOS  --   --  52  --   --  56  --  56  --  40  --  30*   < > 45   AST  --   --  30  --   --  67*  --  99*  --  144*  --  164*   < > 621*   ALT  --   --  153*  --   --  274*  --  314*  --  351*  --  383*   < > 820*    < > = values in this interval not displayed.     CBC  Recent Labs   Lab 01/11/22  0940 01/10/22  0920 01/09/22  2223 01/09/22  1024   HGB 11.4* 9.7* 9.4* 7.9*   WBC 10.2 10.7 10.6 11.5*   RBC 3.63* 3.06* 3.02* 2.54*   HCT 35.0 28.9* 28.1* 23.4*   MCV 96 94 93 92   MCH 31.4 31.7 31.1 31.1   MCHC 32.6 33.6 33.5 33.8   RDW 19.4* 19.3* 18.9* 18.8*   PLT 79* 58* 49* 48*     INR  Recent Labs   Lab 01/10/22  0635 01/09/22  1024 01/09/22  0318 01/08/22  2348 01/08/22  0336 01/08/22  0126 01/07/22  2325 01/07/22  2230 01/07/22  0307   INR 1.32* 1.56* 1.50* 1.56*   < >  --  3.45* 4.98* 2.54*   PTT  --   --   --   --   --  59* 143* 149* 50*    < > = values in this interval not displayed.     ABG  Recent Labs   Lab 01/08/22  0628 01/08/22  0336 01/08/22  0125 01/07/22  2330 01/07/22  2238   PH 7.44  --  7.33* 7.42 7.37   PCO2 29*  --  39 29* 27*   PO2 98  --  76* 130* 135*   HCO3 20*  --  21 19* 16*   O2PER 30 50 50 50 50.0      Urine Studies  Recent Labs   Lab Test 12/31/21  1114 12/28/21  1333 12/23/21  1824 11/03/21 2013   COLOR Dark Yellow* Dark Yellow* Dark Yellow* Yellow   APPEARANCE Slightly Cloudy* Clear Clear Clear    URINEGLC Negative 100 * Negative Negative   URINEBILI Moderate* Large* Moderate* Small*   URINEKETONE Negative Trace* Negative Negative   SG 1.016 1.015 1.022 1.017   UBLD Negative Negative Negative Negative   URINEPH 5.5 5.0 5.5 5.0   PROTEIN Negative Negative Negative Negative   UROBILINOGEN  --  0.2  --   --    NITRITE Negative Negative Negative Negative   LEUKEST Small* Negative Negative Negative   RBCU 1 0-2 1 1   WBCU 10* 10-25* 2 2     Recent Labs   Lab Test 12/28/21  1333   UTPG 0.19     PTH  No lab results found.  Iron Studies  Recent Labs   Lab Test 12/28/21  1322 12/23/21  1836 10/19/21  1125   IRON 170 185* 162   * 196* 198*   IRONSAT 76* 94* 82*   SARAHY 1,359*  --   --        IMAGING:  All imaging studies reviewed by me.

## 2022-01-11 NOTE — PLAN OF CARE
"/86 (BP Location: Right arm)   Pulse 90   Temp 97.9  F (36.6  C) (Oral)   Resp 18   Ht 1.651 m (5' 5\")   Wt 70.2 kg (154 lb 12.2 oz)   SpO2 98%   BMI 25.75 kg/m   AVSS on 1L/NC. Patient c/o incisional pain and received prn Oxycodone 10 mg with relief. Patient denies nausea. BG 95. Urine Output - 950 ml via dash. Bowel Function - had BM yesterday. No BM this shift. BS present. Nutrition - eating very little per report. Drains - AYSHA x 1 which is leaking at site with 700 ml out. Albumin replacement for every 500 ml out from AYSHA q 8 hours. Activity - did not get OOB bed this shift. Slept well in between cares.      "

## 2022-01-11 NOTE — PLAN OF CARE
Pt is POD 4 s/p DDOLT.  VS:  Afebrile, HR 80-90s, /83 and 118/80.  RESP:  Continuous pulse oximetry monitoring.  O2 sats decreased during sleep on RA, 84-88%.  Applied 1 Lpm via NC - sats up to 944-97%.  Continue to monitor.  Using incentive spirometer independently.    ENDO:  AC+HS checks, 99, 149 - correction given per orders.  LABS:  LFTs trending toward normal.  Creatining 1.59, improved.  WBC 10.2 - continues on po antibiotic.    NEURO:  Alert, oriented x4.  Using call light appropriately.  Sleeping between cares this afternoon.  PAIN:  Adbominal / incisional pain controlled with prn oxycodone x1.  Lido patch declined this AM, may want to apply at HS.   Continue to assess pain and effectiveness of interventions applied.  DIET:  Advanced to regular.  Going slowly.  Drank 2 Ensure today.  Continue to encourage adequate po fluid intake and food as she tolerates.  GI:  LBM yesterday, given Senna per fransisco  :  Dash patent, draining adequate UOP.  Given iv Bumex this AM, dose increased per Nephrology this afternoon.  Will keep dash in place until tomorrow for continued volume monitoring.  SKIN:  Jaundice with scattered areas of bruising (various stages of resolution).  R-lateral incision with moderate serosanguinous drainage - dressing changed x2. AYSHA site leaking, dressing changed x1 this AM.  Generalized edema present, iv Bumex given.  Obtained order from NP for OT/Lymphedema consult for 3+BLE edema.   DRAIN:  Large bulb AYSHA with large volume output.  Bulb to gravity now per Dr. Lou.  Albumin replacement scheduled Q8hr x24hr - given dose x1.    ACTIVITY:  Needed and received assist of 1-2 for transfers, activity.  Using walker for ambulation.  Up for therapy, walks x2.  Continue to encourage activity to promote tolerance and bowel function.  LINE:  PIV in RUE saline locked.  R-I J saline locked.   EDUCATION:  Introduced lab book to pt, reviewed purpose of tracking labs post-transplant and today's values.   Med card reviewed with Fidelina and updated.  Discussed with Fidelina the rationale for blood glucose checks and insulin administration in setting of iv steroids post-transplant.  PLAN:  Continue current plan of care.  Update Liver team prn any change in pt status or other concerns.    UPDATE:  Drain tubing fell out during therapy session this afternoon.  NP aware and bag appliance applied to site.  Explained to pt the rationale for bag to protect skin. Incision continues to have moderate drainage from R-side, dressing changed a third time, NP aware.

## 2022-01-11 NOTE — PROGRESS NOTES
Transplant Surgery  Inpatient Daily Progress Note  01/11/2022    Assessment & Plan: Melita Cortes is a 51 year old female with a past medical history of EtOH cirrhosis c/b ascites, HE, portal hypertension, EV. She is now s/p DDLT on 1/8/22 with Dr. Browne.     Graft function: S/p DDLT 1/8/22: POD#4. LFTs trending down. TB 1.8.   -Liver US 1/8 patent. Postop fluid collections.   -AYSHA x1 with 1.8L -take off suction.  Immunosuppression management:   -SM pulse with taper per protocol  -Simulect POD#2, repeat POD #6   - mg BID  -Tac 2 mg BID. Goal level 5-8 with renal function. Level 3.8, continue 2.5mg BID.  Complexity of management:High. Contributing factors: ROSSY, Induction  Neuro:  Acute post op pain: Conitnue Lidoderm patches, PRN TID Robaxin, PRN Atarax, PRN oxycodone 5-10 [id number] to Q6PRN  Hematology:   Acute blood loss anemia: Hgb 11.4, improved.  Thrombocytopenia: Plt 80, improving  Cardiorespiratory:   Hx Asthma: PRN albuterol.  Pleural effusions: continue to use IS/CDB. RA-1L, Wean oxygen as able.  GI/Nutrition:   Severe malnutrition in the context of chronic illness: FL diet.  ADAT regular+ supplements, Plan for [place] placement if not able to meet 60% of goal (1150 kcal and 60 grams protein). Calorie counts. Multivitamin daily.   Postop ileus: senna BID.   Endocrine:   Steroid induced hyperglycemia: -120's, insulin drip transitioned to sliding scale Novolog PRN.   Fluid/Electrolytes/Neph:   Hypervolemia with JP: CIV. Weight +5kg from admit. Net loss -3L yesterday. Decrease albumin replacements. Continue IV Bumex BID, give 2mg IV tonight then resume 1mg. Lymphadema wraps.   ROSSY: SCr 1.59 (1.65)  Baseline 0.9. Likely multifactorial due to hypoperfusion during liver transplant, hypotension. Transplant nephrology following. Continue albumin 25% 25gm Q8 x24 hrs, Continue Bumex  BID with improved UOP.   : Gunter due to ROSSY for close monitoring of I/Os   Infectious disease:   Actinomyces  "odontolyticus bacteremia: 12/30 blood cx positive. Treating with PO amoxicillin 750 mg TID, hold while on Zosyn and resume 1/10 to complete 14 day course (EOT 1/14/22).  Prophylaxis: DVT, fall, GI (PPI), keith-op (Zosyn x 48 hours), fungal (fluconazolex1, Nystatin), viral (Valcyte), pneumocystis (Bactrim)  Disposition: 7A, PT cleared for discharge home    Medical Decision Making: Medium  Subsequent visit 06556 (moderate level decision making)    JULIA/Fellow/Resident Provider: Yumi Miguel, NP [id number]     Faculty: Buster Lou MD    __________________________________________________________________  Transplant History:    1/7/2022 (Liver), Postoperative day: 4     Interval History: History is obtained from the patient  Overnight events: c/o swelling/JP. Encouraged to eat and ambulate today. +flatus/BM.    ROS:   A 10-point review of systems was negative except as noted above.    Curent Meds:    amoxicillin  750 mg Oral TID     [START ON 1/13/2022] basiliximab (SIMULECT) infusion  20 mg Intravenous Once     bumetanide  1 mg Intravenous BID     insulin aspart  1-7 Units Subcutaneous TID AC     insulin aspart  1-5 Units Subcutaneous At Bedtime     lidocaine  1 patch Transdermal Q24H     lidocaine   Transdermal Q8H     multivitamin w/minerals  1 tablet Oral Daily     mycophenolate  750 mg Oral BID IS     nystatin  500,000 Units Oral 4x Daily     pantoprazole  40 mg Oral QAM AC     [START ON 1/12/2022] predniSONE  25 mg Oral Once    Followed by     [START ON 1/13/2022] predniSONE  10 mg Oral Once     sennosides  8.6 mg Oral BID     sodium chloride (PF)  3 mL Intravenous Q8H     sulfamethoxazole-trimethoprim  1 tablet Oral Daily     tacrolimus  2.5 mg Oral BID IS     valGANciclovir  450 mg Oral Daily       Physical Exam:     Admit Weight: 64.7 kg (142 lb 11.2 oz)    Current Vitals:   /83 (BP Location: Right arm)   Pulse 89   Temp 98.2  F (36.8  C) (Oral)   Resp 18   Ht 1.651 m (5' 5\")   Wt 70.2 kg (154 lb 12.2 " oz)   SpO2 97%   BMI 25.75 kg/m      Vital sign ranges:    Temp:  [97.7  F (36.5  C)-98.4  F (36.9  C)] 98.2  F (36.8  C)  Pulse:  [82-91] 89  Resp:  [12-18] 18  BP: (120-132)/(82-86) 123/83  SpO2:  [94 %-99 %] 97 %    General Appearance: in no apparent distress.   Skin: normal, warm, dry, jaundice  Heart: RRR  Lungs: unlabored on RA, crackles to upper airway, +cough,   Abdomen: Soft, distended, +Ascites, Incision with staples CDI except some serous drainage to Right lateral incision . AYSHA x1 with serosanguinous output.   : Gunter in place with clear yellow output  Extremities: edema: + 2 pitting BLE and +1 BUE.  Neurologic: A&Ox4. Tremor absent.     Frailty Scores    There is no flowsheet data to display.         Data:   CMP  Recent Labs   Lab 01/11/22  0758 01/11/22  0633 01/10/22  0841 01/10/22  0635 01/09/22  0328 01/09/22  0319 01/08/22  0811 01/08/22  0807 01/08/22  0115 01/07/22  2330   NA  --  138  --  134   < > 135  --  137   < > 133   POTASSIUM  --  3.9  --  4.1   < > 3.9   < > 3.5   < > 2.9*   CHLORIDE  --  105  --  103   < > 104  --  106   < >  --    CO2  --  22  --  20   < > 22  --  20   < >  --    GLC 99 103*   < > 112*   < > 107*   < > 306*   < > 222*   BUN  --  50*  --  44*   < > 37*  --  28   < >  --    CR  --  1.59*  --  1.65*   < > 1.26*  --  0.86   < >  --    GFRESTIMATED  --  39*  --  37*   < > 52*  --  82   < >  --    SHAI  --  8.5  --  8.9   < > 8.7  --  9.4   < >  --    ICAW  --   --   --   --   --  4.9  --   --   --  5.3*   MAG  --  1.9  --  2.0  --  1.9  --  2.0   < >  --    PHOS  --  5.9*  --  7.2*  --  6.4*  --  3.8  --   --    AMYLASE  --   --   --   --   --  38  --  36  --   --    LIPASE  --   --   --   --   --  67*  --  120  --   --    ALBUMIN  --  2.9*  --  3.0*   < > 2.6*   < > 1.6*   < >  --    BILITOTAL  --  1.8*  --  2.1*   < > 2.4*   < > 5.4*   < >  --    ALKPHOS  --  52  --  56   < > 30*   < > 45   < >  --    AST  --  30  --  67*   < > 164*   < > 621*   < >  --    ALT  --   153*  --  274*   < > 383*   < > 820*   < >  --     < > = values in this interval not displayed.     CBC  Recent Labs   Lab 01/11/22  0940 01/10/22  0920 01/08/22  1326 01/08/22  1242   HGB 11.4* 9.7*   < >  --    WBC 10.2 10.7   < >  --    PLT 79* 58*   < >  --    A1C  --   --   --  5.2    < > = values in this interval not displayed.

## 2022-01-12 ENCOUNTER — APPOINTMENT (OUTPATIENT)
Dept: PHYSICAL THERAPY | Facility: CLINIC | Age: 52
DRG: 005 | End: 2022-01-12
Attending: TRANSPLANT SURGERY
Payer: COMMERCIAL

## 2022-01-12 ENCOUNTER — APPOINTMENT (OUTPATIENT)
Dept: ULTRASOUND IMAGING | Facility: CLINIC | Age: 52
DRG: 005 | End: 2022-01-12
Attending: NURSE PRACTITIONER
Payer: COMMERCIAL

## 2022-01-12 ENCOUNTER — APPOINTMENT (OUTPATIENT)
Dept: OCCUPATIONAL THERAPY | Facility: CLINIC | Age: 52
DRG: 005 | End: 2022-01-12
Attending: TRANSPLANT SURGERY
Payer: COMMERCIAL

## 2022-01-12 DIAGNOSIS — Z94.4 LIVER REPLACED BY TRANSPLANT (H): Primary | ICD-10-CM

## 2022-01-12 DIAGNOSIS — K70.31 ALCOHOLIC CIRRHOSIS OF LIVER WITH ASCITES (H): ICD-10-CM

## 2022-01-12 PROBLEM — K56.7 ILEUS, POSTOPERATIVE (H): Status: ACTIVE | Noted: 2022-01-12

## 2022-01-12 PROBLEM — R73.9 STEROID-INDUCED HYPERGLYCEMIA: Status: ACTIVE | Noted: 2022-01-12

## 2022-01-12 PROBLEM — E87.70 HYPERVOLEMIA: Status: ACTIVE | Noted: 2022-01-12

## 2022-01-12 PROBLEM — D84.9 IMMUNOSUPPRESSED STATUS (H): Status: ACTIVE | Noted: 2022-01-12

## 2022-01-12 PROBLEM — N17.9 AKI (ACUTE KIDNEY INJURY) (H): Status: ACTIVE | Noted: 2022-01-12

## 2022-01-12 PROBLEM — R78.81 BACTEREMIA: Status: ACTIVE | Noted: 2022-01-12

## 2022-01-12 PROBLEM — K91.89 ILEUS, POSTOPERATIVE (H): Status: ACTIVE | Noted: 2022-01-12

## 2022-01-12 PROBLEM — T38.0X5A STEROID-INDUCED HYPERGLYCEMIA: Status: ACTIVE | Noted: 2022-01-12

## 2022-01-12 PROBLEM — J90 PLEURAL EFFUSION: Status: ACTIVE | Noted: 2022-01-12

## 2022-01-12 LAB
ALBUMIN SERPL-MCNC: 3.1 G/DL (ref 3.4–5)
ALP SERPL-CCNC: 51 U/L (ref 40–150)
ALT SERPL W P-5'-P-CCNC: 102 U/L (ref 0–50)
ANION GAP SERPL CALCULATED.3IONS-SCNC: 8 MMOL/L (ref 3–14)
AST SERPL W P-5'-P-CCNC: 20 U/L (ref 0–45)
BACTERIA PRT CULT: NO GROWTH
BILIRUB DIRECT SERPL-MCNC: 1 MG/DL (ref 0–0.2)
BILIRUB SERPL-MCNC: 1.8 MG/DL (ref 0.2–1.3)
BUN SERPL-MCNC: 51 MG/DL (ref 7–30)
CALCIUM SERPL-MCNC: 8.7 MG/DL (ref 8.5–10.1)
CHLORIDE BLD-SCNC: 104 MMOL/L (ref 94–109)
CO2 SERPL-SCNC: 27 MMOL/L (ref 20–32)
CREAT SERPL-MCNC: 1.21 MG/DL (ref 0.52–1.04)
ERYTHROCYTE [DISTWIDTH] IN BLOOD BY AUTOMATED COUNT: 18.9 % (ref 10–15)
GFR SERPL CREATININE-BSD FRML MDRD: 54 ML/MIN/1.73M2
GLUCOSE BLD-MCNC: 106 MG/DL (ref 70–99)
GLUCOSE BLDC GLUCOMTR-MCNC: 106 MG/DL (ref 70–99)
GLUCOSE BLDC GLUCOMTR-MCNC: 132 MG/DL (ref 70–99)
GLUCOSE BLDC GLUCOMTR-MCNC: 166 MG/DL (ref 70–99)
GLUCOSE BLDC GLUCOMTR-MCNC: 185 MG/DL (ref 70–99)
GLUCOSE BLDC GLUCOMTR-MCNC: 194 MG/DL (ref 70–99)
HCT VFR BLD AUTO: 32.2 % (ref 35–47)
HGB BLD-MCNC: 10.6 G/DL (ref 11.7–15.7)
MAGNESIUM SERPL-MCNC: 1.5 MG/DL (ref 1.6–2.3)
MCH RBC QN AUTO: 31.4 PG (ref 26.5–33)
MCHC RBC AUTO-ENTMCNC: 32.9 G/DL (ref 31.5–36.5)
MCV RBC AUTO: 95 FL (ref 78–100)
PHOSPHATE SERPL-MCNC: 3.6 MG/DL (ref 2.5–4.5)
PLATELET # BLD AUTO: 78 10E3/UL (ref 150–450)
POTASSIUM BLD-SCNC: 3.6 MMOL/L (ref 3.4–5.3)
PROT SERPL-MCNC: 4.8 G/DL (ref 6.8–8.8)
RBC # BLD AUTO: 3.38 10E6/UL (ref 3.8–5.2)
SODIUM SERPL-SCNC: 139 MMOL/L (ref 133–144)
TACROLIMUS BLD-MCNC: 3.2 UG/L (ref 5–15)
TME LAST DOSE: ABNORMAL H
TME LAST DOSE: ABNORMAL H
WBC # BLD AUTO: 7.1 10E3/UL (ref 4–11)

## 2022-01-12 PROCEDURE — 82310 ASSAY OF CALCIUM: CPT | Performed by: NURSE PRACTITIONER

## 2022-01-12 PROCEDURE — 999N000128 HC STATISTIC PERIPHERAL IV START W/O US GUIDANCE

## 2022-01-12 PROCEDURE — 99207 PR SATISFY VISIT NUMBER: CPT | Performed by: TRANSPLANT SURGERY

## 2022-01-12 PROCEDURE — 250N000012 HC RX MED GY IP 250 OP 636 PS 637: Performed by: NURSE PRACTITIONER

## 2022-01-12 PROCEDURE — P9047 ALBUMIN (HUMAN), 25%, 50ML: HCPCS

## 2022-01-12 PROCEDURE — 97530 THERAPEUTIC ACTIVITIES: CPT | Mod: GP | Performed by: PHYSICAL THERAPIST

## 2022-01-12 PROCEDURE — 84100 ASSAY OF PHOSPHORUS: CPT | Performed by: NURSE PRACTITIONER

## 2022-01-12 PROCEDURE — 83735 ASSAY OF MAGNESIUM: CPT | Performed by: NURSE PRACTITIONER

## 2022-01-12 PROCEDURE — 250N000013 HC RX MED GY IP 250 OP 250 PS 637: Performed by: STUDENT IN AN ORGANIZED HEALTH CARE EDUCATION/TRAINING PROGRAM

## 2022-01-12 PROCEDURE — 80197 ASSAY OF TACROLIMUS: CPT | Performed by: NURSE PRACTITIONER

## 2022-01-12 PROCEDURE — 250N000011 HC RX IP 250 OP 636: Performed by: NURSE PRACTITIONER

## 2022-01-12 PROCEDURE — 250N000013 HC RX MED GY IP 250 OP 250 PS 637: Performed by: TRANSPLANT SURGERY

## 2022-01-12 PROCEDURE — 250N000011 HC RX IP 250 OP 636

## 2022-01-12 PROCEDURE — 97535 SELF CARE MNGMENT TRAINING: CPT | Mod: GO | Performed by: OCCUPATIONAL THERAPIST

## 2022-01-12 PROCEDURE — 97116 GAIT TRAINING THERAPY: CPT | Mod: GP | Performed by: PHYSICAL THERAPIST

## 2022-01-12 PROCEDURE — 97168 OT RE-EVAL EST PLAN CARE: CPT | Mod: GO | Performed by: OCCUPATIONAL THERAPIST

## 2022-01-12 PROCEDURE — 93971 EXTREMITY STUDY: CPT | Mod: 26 | Performed by: RADIOLOGY

## 2022-01-12 PROCEDURE — 250N000012 HC RX MED GY IP 250 OP 636 PS 637: Performed by: STUDENT IN AN ORGANIZED HEALTH CARE EDUCATION/TRAINING PROGRAM

## 2022-01-12 PROCEDURE — 36592 COLLECT BLOOD FROM PICC: CPT | Performed by: NURSE PRACTITIONER

## 2022-01-12 PROCEDURE — 87040 BLOOD CULTURE FOR BACTERIA: CPT | Performed by: NURSE PRACTITIONER

## 2022-01-12 PROCEDURE — 120N000011 HC R&B TRANSPLANT UMMC

## 2022-01-12 PROCEDURE — 93971 EXTREMITY STUDY: CPT | Mod: LT

## 2022-01-12 PROCEDURE — 250N000013 HC RX MED GY IP 250 OP 250 PS 637: Performed by: NURSE PRACTITIONER

## 2022-01-12 PROCEDURE — 85027 COMPLETE CBC AUTOMATED: CPT | Performed by: NURSE PRACTITIONER

## 2022-01-12 PROCEDURE — 99232 SBSQ HOSP IP/OBS MODERATE 35: CPT | Performed by: INTERNAL MEDICINE

## 2022-01-12 PROCEDURE — 82248 BILIRUBIN DIRECT: CPT | Performed by: NURSE PRACTITIONER

## 2022-01-12 RX ORDER — TACROLIMUS 1 MG/1
4 CAPSULE ORAL
Status: DISCONTINUED | OUTPATIENT
Start: 2022-01-12 | End: 2022-01-14 | Stop reason: HOSPADM

## 2022-01-12 RX ORDER — SENNOSIDES 8.6 MG
8.6 TABLET ORAL 2 TIMES DAILY PRN
Status: DISCONTINUED | OUTPATIENT
Start: 2022-01-12 | End: 2022-01-14 | Stop reason: HOSPADM

## 2022-01-12 RX ORDER — MAGNESIUM OXIDE 400 MG/1
400 TABLET ORAL DAILY
Status: DISCONTINUED | OUTPATIENT
Start: 2022-01-12 | End: 2022-01-13

## 2022-01-12 RX ORDER — ALBUMIN (HUMAN) 12.5 G/50ML
25 SOLUTION INTRAVENOUS EVERY 8 HOURS
Status: COMPLETED | OUTPATIENT
Start: 2022-01-12 | End: 2022-01-12

## 2022-01-12 RX ORDER — ALBUMIN (HUMAN) 12.5 G/50ML
25 SOLUTION INTRAVENOUS EVERY 8 HOURS
Status: DISCONTINUED | OUTPATIENT
Start: 2022-01-12 | End: 2022-01-12

## 2022-01-12 RX ADMIN — AMOXICILLIN 750 MG: 500 CAPSULE ORAL at 08:48

## 2022-01-12 RX ADMIN — ALBUMIN HUMAN 25 G: 0.25 SOLUTION INTRAVENOUS at 04:46

## 2022-01-12 RX ADMIN — AMOXICILLIN 750 MG: 500 CAPSULE ORAL at 13:50

## 2022-01-12 RX ADMIN — PREDNISONE 25 MG: 20 TABLET ORAL at 08:48

## 2022-01-12 RX ADMIN — OXYCODONE HYDROCHLORIDE 10 MG: 5 TABLET ORAL at 00:14

## 2022-01-12 RX ADMIN — OXYCODONE HYDROCHLORIDE 5 MG: 5 TABLET ORAL at 21:24

## 2022-01-12 RX ADMIN — SULFAMETHOXAZOLE AND TRIMETHOPRIM 1 TABLET: 400; 80 TABLET ORAL at 08:48

## 2022-01-12 RX ADMIN — MYCOPHENOLATE MOFETIL 750 MG: 250 CAPSULE ORAL at 18:07

## 2022-01-12 RX ADMIN — NYSTATIN 500000 UNITS: 100000 SUSPENSION ORAL at 15:59

## 2022-01-12 RX ADMIN — NYSTATIN 500000 UNITS: 100000 SUSPENSION ORAL at 19:22

## 2022-01-12 RX ADMIN — LIDOCAINE 1 PATCH: 560 PATCH PERCUTANEOUS; TOPICAL; TRANSDERMAL at 08:44

## 2022-01-12 RX ADMIN — TACROLIMUS 4 MG: 1 CAPSULE ORAL at 18:07

## 2022-01-12 RX ADMIN — MYCOPHENOLATE MOFETIL 750 MG: 250 CAPSULE ORAL at 08:48

## 2022-01-12 RX ADMIN — TACROLIMUS 2.5 MG: 1 CAPSULE ORAL at 08:48

## 2022-01-12 RX ADMIN — NYSTATIN 500000 UNITS: 100000 SUSPENSION ORAL at 13:52

## 2022-01-12 RX ADMIN — OXYCODONE HYDROCHLORIDE 5 MG: 5 TABLET ORAL at 11:39

## 2022-01-12 RX ADMIN — VALGANCICLOVIR 450 MG: 450 TABLET, FILM COATED ORAL at 08:47

## 2022-01-12 RX ADMIN — Medication 400 MG: at 13:42

## 2022-01-12 RX ADMIN — INSULIN ASPART 2 UNITS: 100 INJECTION, SOLUTION INTRAVENOUS; SUBCUTANEOUS at 13:47

## 2022-01-12 RX ADMIN — INSULIN ASPART 1 UNITS: 100 INJECTION, SOLUTION INTRAVENOUS; SUBCUTANEOUS at 18:28

## 2022-01-12 RX ADMIN — AMOXICILLIN 750 MG: 500 CAPSULE ORAL at 19:22

## 2022-01-12 RX ADMIN — NYSTATIN 500000 UNITS: 100000 SUSPENSION ORAL at 08:58

## 2022-01-12 RX ADMIN — BUMETANIDE 1 MG: 0.25 INJECTION INTRAMUSCULAR; INTRAVENOUS at 09:07

## 2022-01-12 RX ADMIN — BUMETANIDE 1 MG: 0.25 INJECTION INTRAMUSCULAR; INTRAVENOUS at 15:59

## 2022-01-12 RX ADMIN — OXYCODONE HYDROCHLORIDE 10 MG: 5 TABLET ORAL at 06:23

## 2022-01-12 RX ADMIN — Medication 1 TABLET: at 08:48

## 2022-01-12 RX ADMIN — PANTOPRAZOLE SODIUM 40 MG: 40 TABLET, DELAYED RELEASE ORAL at 08:48

## 2022-01-12 RX ADMIN — OXYCODONE HYDROCHLORIDE 5 MG: 5 TABLET ORAL at 13:56

## 2022-01-12 ASSESSMENT — ACTIVITIES OF DAILY LIVING (ADL)
ADLS_ACUITY_SCORE: 10

## 2022-01-12 ASSESSMENT — MIFFLIN-ST. JEOR: SCORE: 1287.88

## 2022-01-12 NOTE — PROGRESS NOTES
"   01/12/22 1145   Quick Adds   Type of Visit Occupational Therapy Re-evaluation  (Lymphedema Evaluation)   Living Environment   Living Environment Comments Pt lives with her , and is IND w/ ADL/IADLs at baseline. Please see initial OT/PT evaluations for further information.   General Information   Onset of Illness/Injury or Date of Surgery 01/07/22   Referring Physician Yumi Miguel NP   Patient/Family Therapy Goal Statement (OT) To improve mobility and comfort   Additional Occupational Profile Info/Pertinent History of Current Problem Per chart: \"Melita Cortes is a 51 year old female with a past medical history of EtOH cirrhosis c/b ascites, HE, portal hypertension, EV. She is now s/p DDLT on 1/8/22 with Dr. Browne.\"   Existing Precautions/Restrictions fall;abdominal   Left Upper Extremity (Weight-bearing Status) partial weight-bearing (PWB)  (10 lb limit per abdominal precautions)   Right Upper Extremity (Weight-bearing Status) partial weight-bearing (PWB)  (10 lb limit per abdominal precautions)   Left Lower Extremity (Weight-bearing Status) full weight-bearing (FWB)   Right Lower Extremity (Weight-bearing Status) full weight-bearing (FWB)   Edema General Information   Onset of Edema 01/09/22  (Minimal BLE edema prior to admission)   Affected Body Part(s) Left UE;Left LE;Right LE   Etiology Comments hypervolemia post DDLT, reduced muscle pump activation   Edema Precaution Comments LUE US 1/12/22 negative for DVT   General Comments/Previous Edema Treatment/Edema Equipment Pt reports she has not managed BLE edema in past, but also reports not liking compression socks.   Edema Examination/Assessment   Skin Condition Pitting;Non-pitting;Dryness;Intact   Skin Condition Comments Skin intact. 2\" bruise below R lateral knee. Redness on L shin, skin mildy dry.   Scar No   Ulcerations No   Radial Pulse Symmetrical   Pitting Assessment 2+ pitting edema in dorsum of feet, ankles mildly bulbous. 1+ " pitting around knees. LUE volume increased, pitting not assessed.    Edema Assessment Comments Donned Size F Comperm to BLE to promote fluid mobility. Educate pt in benefits of elevation and exercise to promote fluid mobility in BLE and LUE. Hold compression on LUE, will reassess in x1-2/days.    Clinical Impression   Criteria for Skilled Therapeutic Interventions Met (OT) yes;meets criteria   Edema: Patient Presentation Edema   Edema: Planned Interventions Fit for compression garment;Edema exercises;Precautions to prevent infection/exacerbation;Education;Manual therapy;ADL training   Clinical Decision Making Complexity (OT) low complexity   Therapy Frequency (OT) 4x/week   Predicted Duration of Therapy x1 week   Risk & Benefits of therapy have been explained risks/benefits reviewed;care plan/treatment goals reviewed;evaluation/treatment results reviewed;current/potential barriers reviewed;participants voiced agreement with care plan;patient   Comment-Clinical Impression Pt presents w/ BLE and LUE edema which limit functional mobility and comfort. Pt will benefit from IP lymphedema therapy to promote fluid mobility in extremities to protect skin integrity and promote functional mobility and ADL IND.   OT Discharge Planning    OT Discharge Recommendation (DC Rec) home with home care occupational therapy;Home with assist   OT Rationale for DC Rec Anticipating pt will progress to be safe to d/c home with  A for ADLs/IADLs, shower chair, and HH OT/PT to continue progressing activity tolerance and safety with ADLs.    OT Brief overview of current status  Ax1   Total Evaluation Time (Minutes)   Total Evaluation Time (Minutes) 5

## 2022-01-12 NOTE — PROGRESS NOTES
Calorie Count  Intake recorded for: 1/11  Total Kcals: 742 Total Protein: 40g  Kcals from Hospital Food: 392  Protein: 20g  Kcals from Outside Food (average):0 Protein: 0g  # Meals Ordered from Kitchen: 4 meals   # Meals Recorded: 1 meal - 50% christopher mist  # Supplements Recorded: 100% 2 Ensure Enlive

## 2022-01-12 NOTE — OP NOTE
Transplant Center   Operative Note     Procedure date:  01/07/22    Preoperative diagnosis:  End Stage Liver Disease due to Laennec's    Postoperative diagnosis:  Same    Procedure: Liver transplant- DBD donor  Primary repair of umbilical hernia    Surgeon:  Surgeon(s) and Role:     * Pradeep Browne MD - Primary     * Carlitos Burns MD - Resident - Assisting     * Enrrique Frazier MD - Fellow - Assisting    Fellow/assistant:   Enrrique Frazier, fellow assisted with all dictated portions of the procedure, Carlitos Burns,       Anesthesia:  General    Specimen:  Explant liver, donor gall bladder, ascitic fluid cultures    Drains:  19 F Qasim drain placed along the right dome of diaphragm    Urine output:  2190 mls    Estimated blood loss:  3000 ml    Fluids administered:  Crystaloid- 66854, PRBC- 1743ml, Plt- 1397 ml     Intraoperative Events: None    Complications: None    Findings:   Donor details- DBD liver from a young donor with conventional vascular anatomy. CIT ~6 hrs, Intraop biopsy done- 0% macro and microsteatosis, with no necrosis, fibrosis or inflammation.    Cirrhosis of liver with sever portal hypertension MELD of 30 at time of transplantation, Large volume ascites approximately 3 liters of fluid drained , primary repair of umbilical hernia performed using figure of 8 Prolene suture from inside.     Hepatectomy performed, multiple collaterals noted. Portal bypass used with good flow in the range of 800-1000 ml/min. Bypass time approximately 1.5 hr.   Caval replacement, end to end portal vein anastomosis, reperfusion uneventful, donor splenic and MAILE patch anastomosed to recipient RHA & LHA junction patch for arterial anastomosis. Bile duct end to end (interrupted) with no size mismatch. NO biliary stent was placed.     Indication: Melita Cortes with a history of End Stage Liver Disease due to Laennec's who presents for Donation after Brain Death Whole Liver transplant. A suitable donor offer  has become available. After discussing the risks and benefits of proceeding, the patient agreed to proceed with surgery and provided informed consent.    Final ABO/Crossmatch verification: After the donor organ arrived to the operating room and prior to anastomosis, I participated in the transplant pre-verification upon organ receipt timeout by visually verifying the donor ID, organ and laterality, donor blood type, recipient unique identifier, recipient blood type, and that the donor and recipient are blood type compatible.     Donor UNOS ID:  [id number]    Donor ABO:  O    Donor arterial clamp on:  1/7/2022  3:53 PM    Preservation fluid:  UW     Vessels with organ:  Yes    Donor organ arrival to recipient room:  1/7/2022  8:31 PM    Total ischemic time:  348    Cold ischemic time:  315    Warm ischemic time:  33    Ex-vivo:  No    Time placed on Ex-vivo perfusion:  Yes    Total time on Ex-vivo perfusion:  90 min     Back Table Preparation:   Procedure:  Bench preparation of the liver allograft for transplantation    Preoperative diagnosis:  End Stage Liver Disease due to Laennec's    Postoperative diagnosis:  Same,     Surgeon:  Surgeon(s) and Role:     * Pradeep Browne MD - Primary     * Carlitos Burns MD - Resident - Assisting     * Enrrique Frazier MD - Fellow - Assisting    Co-Surgeon:  Pradeep Browne M.D.    Fellow/Assistant:  kane Smith    Anesthesia:  None    Graft biopsy:  Yes-  0% macro and microsteatosis, with no necrosis, fibrosis or inflammation    Macroscopic steatosis:  None    Back table reconstruction:  None    Intimal flap repair:  None    # of hepatic arteries:  2    # of portal veins:  1    Accessory arteries:  None    # of hepatic veins:  3    # of bile ducts:  1    Graft weight:  Medium size- not weighed     Findings:   Liver Laceration: No  Overall quality of liver: Good quality liver    Back Table Procedure: The liver allograft was received and inspected and the  aforementioned findings were noted. It had been previously flushed with UW. The donor liver was placed in fresh ice-cold preservation solution. We identified the inferior vena cava. Two stay sutures were placed on the supra-hepatic portion of the cava. Two stay sutures were placed on the infra-hepatic portion of the cava. The fibro-fatty tissue and adrenal gland was cleared of inferior vena cava. The phrenic vein was ligated. The adrenal vein was ligated. The IVC was tested for leaks by using a bulb syringe. We suture ligated all identified leaks. The portal vein was identified. All the fibro-fatty area or tissue around the portal vein was removed and the portal vein was dissected up to its bifurcation. An 8-Chinese cannula was placed in the portal vein and fixed with a stitch. The portal vein was tested for leaks. We suture ligated all identified leaks. The cannula was left in place to be used for flushing the liver at the time of implantation. The hepatic artery anatomy was identified. The celiac axis  was traced all the way from the aortic patch to the level of the gastro-duodenal artery. Dissection was stopped at the level of the gastro-duodenal artery. All the leaks in the hepatic artery tributaries were suture ligated. The bile duct was inspected and flushed. No reconstruction was required. The liver was placed back in ice-cold preservation solution until ready for transplantation. Faculty was present for the critical portions of the procedure.    Findings: See above   Operative Procedure:   Arterial anastomosis start:  1/7/2022  9:08 PM    Recipient arterial unclamp:  1/7/2022  9:55 PM    Extra vessels used:  Yes    Extra vessels banked:  Yes    Previous upper abd surgery:  No    Previous cholecystectomy?  No    Portal vein:  Thrombus: No   Patent: Yes   Specify: -     On portal bypass:  Yes-  Bypass flow 800-1000 ml/min    Arterial flow:  Sufficient: Yes-  350 ml/min    Bile duct anastomosis:  To bowel: No    Specify: -   To duct: Yes   Specify: No size mismatch     Melita Cortes was brought to the operating room, placed in a supine position, and a time out was performed. Sequential compression devices were placed on both lower extremities and general endotracheal anesthesia was induced. The patient was given IV antibiotics, and Solumedrol. A Gunter catheter was placed. A central line was placed by Anesthesia service. The abdomen was then shaved, prepped, and draped in the usual sterile fashion.  The backtable preparation occurred prior to implantation.    We entered the abdominal cavity using Bilateral subcostal incision. The abdomen was examined.  We proceeded to mobilization of the liver first by dividing falciform ligament, next dividing the triangular ligaments and mobilizing the left lobe with further evaluation of the right lobe of the liver. Next the right lobe of the liver was mobilized. We elected to proceed with a hilar dissection. The right and left hepatic arteries were identified, ligated and divided, followed by ligation and division of the common bile duct. The portal vein was cleared from tissue and small branches were tied off and divided. The left and right portal veins were separately ligated and the portal vein was divided. At this point we went on the bypass with bypass flow of 1 liter per minute. Next, we continued mobilizing the right lobe. The adhesions between the right lobe and the right diaphragm were divided and the lobe was mobilized up to the vena cava. The hepatic veins were identified. Next, we dissected out the caudate lobe and infrahepatic IVC.     We applied Infrahepatic and suprahepatic clamps to the IVC and the liver was excised with curved Correa scissors. The recipient's liver was passed off from the operating table. Next, complete hemostasis was obtained. The donor liver was brought into the field. The suprahepatic IVC anastomosis was constructed with 3-0 Prolene followed by  the construction of infrahepatic anastomosis with 4-0 Prolene. Next, we came off the bypass and end-to-end portal anastomosis was constructed between the donor and recipient portal veins using 6-0 Prolene. During the construction of the infrahepatic IVC anastomosis, the liver was flushed with 5% albumin. Once the portal anastomosis was constructed, the liver was reperfused. Complete hemostasis was obtained and arterial anastomosis was performed between the donor celiac trunk patch using Cintron technique and the bifurcation of the right and left recipient hepatic arteries. After clamps were released, there was a good flow within the donor artery. Again, all the arterial branches that were bleeding were ligated and complete hemostasis was obtained. After the anastomosis was performed, good flow was re-established, hemostasis was obtained. Next, the biliary anastomosis was performed using a 6-0 # PDS suture over the stent.     Next again the abdomen was irrigated and hemostasis was obtained. We closed the abdomen with Prolene no 1 suture in two layers.. All needle, sponge and instrument counts were correct x 2. Faculty was present for key portions of the procedure. The patient was awakened, extubated, and transferred to the ICU for post-op monitoring.    Physician Attestation   I was present for the key portions of the procedure and I was immediately available for the entire procedure between opening and closing.  This was a complex liver transplant and Dr. Freddie Osuna MD, fellow assisted for the entire procedure.  There was no qualified resident available to assist in this procedure.    [facility]  Date of Service (when I saw the patient): 1/7/2022

## 2022-01-12 NOTE — PROGRESS NOTES
CLINICAL NUTRITION SERVICES - REASSESSMENT NOTE     Nutrition Prescription      Malnutrition Status:    Severe malnutrition in the context of acute on chronic illness    Recommendations already ordered by Registered Dietitian (RD):  Continue Ensure Enlive TID ( 2 of them chocolate + 1 Vanilla )    Future/Additional Recommendations:  Continue calorie counts if patient remains hospitalized.     Minimize diet restrictions as able d/t high calorie/protein needs post-transplant.  Oral supplements as needed to help meet nutritional needs.     High protein food choices with meals to help meet high needs post-transplant over the next 6-8 weeks.     Heart-healthy diet (low saturated fat, low sodium, high fiber) and food safety precautions long term due to immunosuppression regimen post-transplant         EVALUATION OF THE PROGRESS TOWARD GOALS   Diet: Regular     Intake: Oral intake improving. Tolerates Ensure supplements well.     Calorie Count:   3 day average intake (1/10-) =  1161 kcal (61% needs) + 62 g protein (65 needs %)      NEW FINDINGS   Wt: 66.7 kg (), down 0.7 kg (1%) from admission in the last week.   Labs: M.1 (L), PO4: 2.4 (L), Na & K (WML)                    UBN: 48 ( H), Bilirubin direct: 1.1 (H)  Last BM :     MALNUTRITION  % Intake: Over the last week, has averaged less than 50% of assessed needs (severe)  % Weight Loss: > 7.5% in 3 months (severe) - on admission   Subcutaneous Fat Loss: Facial region:  mild and Upper arm: moderate  Muscle Loss: Temporal:  moderate, Facial & jaw region:  moderate, Thoracic region (clavicle, acromium bone, deltoid, trapezius, pectoral):  mild and Upper arm (bicep, tricep): moderate  Fluid Accumulation/Edema: Does not meet criteria  Malnutrition Diagnosis: Severe malnutrition in the context of acute on chronic illness     Previous Goals   Diet adv v nutrition support within 2-3 days  Evaluation: NOT MET    Previous Nutrition Diagnosis  Inadequate oral intake  related to decreased/no appetite as evidenced by patient self report and recent weight loss    Evaluation: Improving     CURRENT NUTRITION DIAGNOSIS  Inadequate oral intake related to reduced appetite AEB patient consuming 60-65% of assessed needs.     INTERVENTIONS  Implementation  Patient encouraged to consume at least 75% of her meal trays  Post transplant diet education was given to the patient and she accepted it.    Goals  Patient to consume % of nutritionally adequate meal trays TID, or the equivalent with supplements/snacks.    Monitoring/Evaluation  Progress toward goals will be monitored and evaluated per protocol.    Myra LA[facility] have read and agree with the above re-assessment, evaluation, interventions and recommendations.    Suzi Turner MS, RD, LD, CCTD  7A/Obs units, pager [phone]

## 2022-01-12 NOTE — PROGRESS NOTES
[facility]   Transplant Nephrology Progress Note  Date of Admission:  1/7/2022  Today's Date: 01/12/2022    Recommendations:  - Would continue bumetanide 1 mg bid until she is closer to her dry weight.   - With improving kidney function, will sign off.  Call with questions.    Assessment & Plan   # ROSSY: Trend down in creatinine to ~ 1.2.  Very good urine output.  ROSSY likely secondary to liver transplant and previous hypotension.  Patient has had some ROSSY events in recent past.   - Baseline Creatinine: ~ 0.9-1.1   - Proteinuria: Normal (<0.2 grams)    # Liver Tx: Appears to be improving with trend down in LFTs.  Followed by Transplant Surgery.    # Immunosuppression: Tacrolimus immediate release (goal 6-8), Mycophenolate mofetil (dose 750 mg every 12 hours) and Prednisone (dose taper)   - Changes: No; Management per Transplant Surgery.    # Infection Prophylaxis:   - PJP: Sulfa/TMP (Bactrim)  - CMV: Valganciclovir (Valcyte)  - Thrush: Nystatin (Mycostatin) swish and swallow    # Blood Pressure: Controlled;  Goal BP: < 140/90   - Volume status: Mildly hypervolemic   - Changes: No    # Elevated Blood Glucose: Glucose generally running ~ 100-170s   - Management as per primary team.    # Anemia in Chronic Renal Disease: Hgb: Stable      RAFI: No   - Iron studies: High iron saturation, likely due to liver disease    # Mineral Bone Disorder:   - Vitamin D; level: Low        On supplement: No  - Calcium; level: Normal        On supplement: No  - Phosphorus; level: Normal        On binder: No    # Electrolytes:   - Potassium; level: Low normal        On supplement: No  - Magnesium; level: Low        On supplement: No; Agree with starting magnesium oxide.  - Bicarbonate; level: Normal        On supplement: No    # Asthma: Stable.  Primary team to manage.    # Transplant History:  Etiology of Organ Failure: Alcoholic cirrhosis  Tx: Liver Tx  Transplant: 1/7/2022 (Liver)  Significant  changes in immunosuppression: None  Significant transplant-related complications: None    Recommendations were communicated to the primary team via this note.    Francis Pérez MD  Transplant Nephrology  Pager: [phone]    Interval History   Ms. Olmedo creatinine is decreased to 1.2 today.  Good urine output with ~ 3 liters yesterday and already 2.3 liters today.  Blood pressure is good.  Patient reports no shortness of breath or chest pain.  No nausea or vomiting.  Some loose stools.  No fever, sweats or chills.  Stable leg swelling.    Review of Systems   4 point ROS was obtained and negative except as noted in the Interval History.    MEDICATIONS:    amoxicillin  750 mg Oral TID     [Held by provider] basiliximab (SIMULECT) infusion  20 mg Intravenous Once     bumetanide  1 mg Intravenous BID     insulin aspart  1-7 Units Subcutaneous TID AC     insulin aspart  1-5 Units Subcutaneous At Bedtime     lidocaine  1 patch Transdermal Q24H     lidocaine   Transdermal Q8H     magnesium oxide  400 mg Oral Daily     multivitamin w/minerals  1 tablet Oral Daily     mycophenolate  750 mg Oral BID IS     nystatin  500,000 Units Oral 4x Daily     pantoprazole  40 mg Oral QAM AC     [START ON 2022] predniSONE  10 mg Oral Once     sodium chloride (PF)  3 mL Intravenous Q8H     sulfamethoxazole-trimethoprim  1 tablet Oral Daily     tacrolimus  4 mg Oral BID IS     valGANciclovir  450 mg Oral Daily       dextrose         Physical Exam   Temp  Av.2  F (36.8  C)  Min: 96.2  F (35.7  C)  Max: 99.9  F (37.7  C)  Arterial Line BP  Min: 79/44  Max: 132/75  Arterial Line MAP (mmHg)  Av.4 mmHg  Min: 57 mmHg  Max: 235 mmHg      Pulse  Av.5  Min: 73  Max: 105 Resp  Av.6  Min: 12  Max: 28  FiO2 (%)  Av.7 %  Min: 30 %  Max: 50 %  SpO2  Av.4 %  Min: 85 %  Max: 100 %    CVP (mmHg): 274 mmHgBP 121/87 (BP Location: Right arm)   Pulse 90   Temp 98.4  F (36.9  C) (Oral)   Resp 18   Ht 1.651 m  "(5' 5\")   Wt 66.7 kg (147 lb 0.8 oz)   SpO2 94%   BMI 24.47 kg/m     Date 01/10/22 0700 - 01/11/22 0659   Shift 3825-8751 4526-6189 3657-0928 24 Hour Total   INTAKE   Shift Total(mL/kg)       OUTPUT   Urine 160   160   Drains 1430 200  1630   Shift Total(mL/kg) 1590(22.65) 200(2.85)  1790(25.5)   Weight (kg) 70.2 70.2 70.2 70.2      Admit Weight: 64.7 kg (142 lb 11.2 oz)     GENERAL APPEARANCE: alert and no distress  HENT: mouth without ulcers or lesions  RESP: lungs clear to auscultation - no rales, rhonchi or wheezes  CV: regular rhythm, normal rate, no rub, no murmur  EDEMA: trace to 1+ LE and dependent edema bilaterally  ABDOMEN: soft, nondistended, nontender, bowel sounds normal  MS: extremities normal - no gross deformities noted, no evidence of inflammation in joints, no muscle tenderness  SKIN: no rash  DIALYSIS ACCESS: none    Data   All labs reviewed by me.  CMP  Recent Labs   Lab 01/12/22  0843 01/12/22  0643 01/12/22  0150 01/11/22  2234 01/11/22  1921 01/11/22  0758 01/11/22  0633 01/10/22  0841 01/10/22  0635 01/09/22  2235 01/09/22  2223 01/09/22  0328 01/09/22  0319   NA  --  139  --   --  137  --  138  --  134  --  135   < > 135   POTASSIUM  --  3.6  --   --  4.3  --  3.9  --  4.1  --  4.5   < > 3.9   CHLORIDE  --  104  --   --  103  --  105  --  103  --  103   < > 104   CO2  --  27  --   --  25  --  22  --  20  --  19*   < > 22   ANIONGAP  --  8  --   --  9  --  11  --  11  --  13   < > 9   * 106* 106* 136* 180*   < > 103*   < > 112*   < > 171*   < > 107*   BUN  --  51*  --   --  55*  --  50*  --  44*  --  43*   < > 37*   CR  --  1.21*  --   --  1.42*  --  1.59*  --  1.65*  --  1.54*   < > 1.26*   GFRESTIMATED  --  54*  --   --  45*  --  39*  --  37*  --  41*   < > 52*   SHAI  --  8.7  --   --  8.4*  --  8.5  --  8.9  --  8.4*   < > 8.7   MAG  --  1.5*  --   --   --   --  1.9  --  2.0  --   --   --  1.9   PHOS  --  3.6  --   --   --   --  5.9*  --  7.2*  --   --   --  6.4*   PROTTOTAL  --  " 4.8*  --   --   --   --  4.7*  --  5.0*  --  5.0*   < > 4.3*   ALBUMIN  --  3.1*  --   --   --   --  2.9*  --  3.0*  --  2.8*   < > 2.6*   BILITOTAL  --  1.8*  --   --   --   --  1.8*  --  2.1*  --  2.2*   < > 2.4*   ALKPHOS  --  51  --   --   --   --  52  --  56  --  56   < > 30*   AST  --  20  --   --   --   --  30  --  67*  --  99*   < > 164*   ALT  --  102*  --   --   --   --  153*  --  274*  --  314*   < > 383*    < > = values in this interval not displayed.     CBC  Recent Labs   Lab 01/12/22  0643 01/11/22  0940 01/10/22  0920 01/09/22  2223   HGB 10.6* 11.4* 9.7* 9.4*   WBC 7.1 10.2 10.7 10.6   RBC 3.38* 3.63* 3.06* 3.02*   HCT 32.2* 35.0 28.9* 28.1*   MCV 95 96 94 93   MCH 31.4 31.4 31.7 31.1   MCHC 32.9 32.6 33.6 33.5   RDW 18.9* 19.4* 19.3* 18.9*   PLT 78* 79* 58* 49*     INR  Recent Labs   Lab 01/10/22  0635 01/09/22  1024 01/09/22  0318 01/08/22  2348 01/08/22  0336 01/08/22  0126 01/07/22  2325 01/07/22  2230 01/07/22  0307   INR 1.32* 1.56* 1.50* 1.56*   < >  --  3.45* 4.98* 2.54*   PTT  --   --   --   --   --  59* 143* 149* 50*    < > = values in this interval not displayed.     ABG  Recent Labs   Lab 01/08/22  0628 01/08/22  0336 01/08/22  0125 01/07/22  2330 01/07/22  2238   PH 7.44  --  7.33* 7.42 7.37   PCO2 29*  --  39 29* 27*   PO2 98  --  76* 130* 135*   HCO3 20*  --  21 19* 16*   O2PER 30 50 50 50 50.0      Urine Studies  Recent Labs   Lab Test 12/31/21  1114 12/28/21  1333 12/23/21  1824 11/03/21 2013   COLOR Dark Yellow* Dark Yellow* Dark Yellow* Yellow   APPEARANCE Slightly Cloudy* Clear Clear Clear   URINEGLC Negative 100 * Negative Negative   URINEBILI Moderate* Large* Moderate* Small*   URINEKETONE Negative Trace* Negative Negative   SG 1.016 1.015 1.022 1.017   UBLD Negative Negative Negative Negative   URINEPH 5.5 5.0 5.5 5.0   PROTEIN Negative Negative Negative Negative   UROBILINOGEN  --  0.2  --   --    NITRITE Negative Negative Negative Negative   LEUKEST Small* Negative Negative  Negative   RBCU 1 0-2 1 1   WBCU 10* 10-25* 2 2     Recent Labs   Lab Test 12/28/21  1333   UTPG 0.19     PTH  No lab results found.  Iron Studies  Recent Labs   Lab Test 12/28/21  1322 12/23/21  1836 10/19/21  1125   IRON 170 185* 162   * 196* 198*   IRONSAT 76* 94* 82*   SARAHY 1,359*  --   --        IMAGING:  All imaging studies reviewed by me.

## 2022-01-12 NOTE — PLAN OF CARE
"/80 (BP Location: Right arm)   Pulse 92   Temp 98.3  F (36.8  C) (Oral)   Resp 18   Ht 1.651 m (5' 5\")   Wt 69.2 kg (152 lb 8 oz)   SpO2 94%   BMI 25.38 kg/m      Shift: 9978-8157  VS: stable on RA, afebrile  Neuro: AOx4  BG: ACHS (128)   Labs: ALT (153), Billirubin (1.1)   Respiratory: non labored breathing, clear lung sounds   Pain/Nausea/PRN: denies nausea, Oxy given x1, atarax given x1   Diet: Regular   LDA: AYSHA drain out/ replacement  order placed/ Ostomy bag placed over  L PIV SL, R Triple lumen internal jugular SL   GI/: dash adequate urine output, 1 bm   Skin: stapled incision, gauze over right side of incision scant serosang drainage  Mepilex on bottom   Mobility: Assist of 1 walker   Plan: Continue plan of care. Med card updated     Will give report to oncoming nurse. Pt left in stable condition, care relinquished at this time.     "

## 2022-01-12 NOTE — PROGRESS NOTES
Transplant Surgery  Inpatient Daily Progress Note  01/12/2022    Assessment & Plan: Melita Cortes is a 51 year old female with a past medical history of EtOH cirrhosis c/b ascites, HE, portal hypertension, EV. She is now s/p DDLT on 1/7/22 with Dr. Browne.     Graft function: S/p DDLT 1/7/22: POD#5. LFTs trending down. TB 1.8, stable.   -Liver US 1/8 patent. Postop fluid collections.   -AYSHA fell out 1/11, ostomy appliance attached, s/s output . Continue to monitor I/Os  Immunosuppression management:   -SM pulse with taper per protocol  -Simulect POD#2, repeat POD #6   - mg BID  -Tac 2.5 mg BID. Goal level, increase to 8-10 with improving renal function. Level 3.2, increase to 4mg BID.  Complexity of management:High. Contributing factors: ROSSY, Induction  Neuro:  Acute post op pain: Conitnue Lidoderm patches, PRN TID Robaxin, PRN Atarax, PRN oxycodone 5-10 Q6PRN- encourage weaning.  Hematology:   Acute blood loss anemia: Hgb 10.6 (11.4), improved from post-op level.  Thrombocytopenia: Plt 78, stable  Cardiorespiratory:   Hx Asthma: PRN albuterol.  Pleural effusions: continue to use IS/CDB. RA.  GI/Nutrition:   Severe malnutrition in the context of chronic illness: Reg diet+ supplements, Plan for [place] placement if not able to meet 60% of goal (1150 kcal and 60 grams protein). Calorie counts improving with ~700cal/40gP. Multivitamin daily.   Postop ileus: Resolve, changes senna BID PRN.   Endocrine:   Steroid induced hyperglycemia: -130's, insulin drip transitioned to sliding scale Novolog PRN.   Fluid/Electrolytes/Neph:   Hypervolemia with JP: CIV. Weight 66.7kg today, up 2kg from admit. Net loss -3L yesterday, -1L thus far 1/12. Stop albumin replacements. Continue IV Bumex 1mg BID. Lymphedema wraps. US of LUE 1/12 d/t increased swelling in L arm compared to R showed no DVT.   ROSSY: SCr 1.21 (1.42)  Baseline 0.9. Likely multifactorial due to hypoperfusion during liver transplant, hypotension.  Transplant nephrology following.  Continue Bumex 1mg BID with improved UOP.   : Gunter catheter to be removed today. Still monitor I's & O's.   Infectious disease: Afebrile, WBC 7, remove CVL today  Actinomyces odontolyticus bacteremia: 12/30 blood cx positive. Treating with PO amoxicillin 750 mg TID, hold while on Zosyn and resume 1/10 to complete 14 day course (EOT 1/14/22).  Prophylaxis: DVT, fall, GI (PPI), keith-op (Zosyn x 48 hours), fungal (fluconazolex1, Nystatin), viral (Valcyte), pneumocystis (Bactrim)  Disposition: 7A,  plan to discharge home 1/14 with home care    Medical Decision Making: Medium  Subsequent visit 18676 (moderate level decision making)    JULIA/Fellow/Resident Provider: Yumi Miguel, NP [id number]     Faculty: Buster Lou MD    Medical student: Katie Coleman, MS4    __________________________________________________________________  Transplant History:    1/7/2022 (Liver), Postoperative day: 5     Interval History: History is obtained from the patient  Overnight events: slept well. Patient is stooling w/ soft BMs, got out of bed with PT yesterday. States LE edema is improving. Fair appetite, tolerating 2 supplements/day.     ROS:   A 10-point review of systems was negative except as noted above.    Curent Meds:    amoxicillin  750 mg Oral TID     [START ON 1/13/2022] basiliximab (SIMULECT) infusion  20 mg Intravenous Once     bumetanide  1 mg Intravenous BID     insulin aspart  1-7 Units Subcutaneous TID AC     insulin aspart  1-5 Units Subcutaneous At Bedtime     lidocaine  1 patch Transdermal Q24H     lidocaine   Transdermal Q8H     magnesium oxide  400 mg Oral Daily     multivitamin w/minerals  1 tablet Oral Daily     mycophenolate  750 mg Oral BID IS     nystatin  500,000 Units Oral 4x Daily     pantoprazole  40 mg Oral QAM AC     [START ON 1/13/2022] predniSONE  10 mg Oral Once     sennosides  8.6 mg Oral BID     sodium chloride (PF)  3 mL Intravenous Q8H      "sulfamethoxazole-trimethoprim  1 tablet Oral Daily     tacrolimus  2.5 mg Oral BID IS     valGANciclovir  450 mg Oral Daily       Physical Exam:     Admit Weight: 64.7 kg (142 lb 11.2 oz)    Current Vitals:   /83 (BP Location: Right arm)   Pulse 87   Temp 98.1  F (36.7  C) (Oral)   Resp 18   Ht 1.651 m (5' 5\")   Wt 69.2 kg (152 lb 8 oz)   SpO2 99%   BMI 25.38 kg/m      Vital sign ranges:    Temp:  [98.1  F (36.7  C)-98.5  F (36.9  C)] 98.1  F (36.7  C)  Pulse:  [82-92] 87  Resp:  [18] 18  BP: (118-129)/(80-93) 129/83  SpO2:  [85 %-99 %] 99 %    General Appearance: in no apparent distress.   Skin: normal, warm, dry, jaundice improving  Heart: RRR  Lungs: unlabored on RA,   Abdomen: Soft, distended, +Ascites, Incision with staples CDI with decreasing serous drainage to Right lateral incision, mild erythema. AYSHA drain replaced with collection bag, full with serosanguinous output this AM.  : Gunter in place with clear yellow output-remove today  Extremities: edema: + 2 pitting BLE and +1 BUE. L>R  Neurologic: A&Ox4. Tremor absent.     Frailty Scores    There is no flowsheet data to display.         Data:   CMP  Recent Labs   Lab 01/12/22  0843 01/12/22  0643 01/11/22  2234 01/11/22  1921 01/11/22  0758 01/11/22  0633 01/09/22  0328 01/09/22  0319 01/08/22  0811 01/08/22  0807 01/08/22  0115 01/07/22  2330   NA  --  139  --  137  --  138   < > 135  --  137   < > 133   POTASSIUM  --  3.6  --  4.3  --  3.9   < > 3.9   < > 3.5   < > 2.9*   CHLORIDE  --  104  --  103  --  105   < > 104  --  106   < >  --    CO2  --  27  --  25  --  22   < > 22  --  20   < >  --    * 106*   < > 180*   < > 103*   < > 107*   < > 306*   < > 222*   BUN  --  51*  --  55*  --  50*   < > 37*  --  28   < >  --    CR  --  1.21*  --  1.42*  --  1.59*   < > 1.26*  --  0.86   < >  --    GFRESTIMATED  --  54*  --  45*  --  39*   < > 52*  --  82   < >  --    SHAI  --  8.7  --  8.4*  --  8.5   < > 8.7  --  9.4   < >  --    ICAW  --  "  --   --   --   --   --   --  4.9  --   --   --  5.3*   MAG  --  1.5*  --   --   --  1.9   < > 1.9  --  2.0   < >  --    PHOS  --  3.6  --   --   --  5.9*   < > 6.4*  --  3.8  --   --    AMYLASE  --   --   --   --   --   --   --  38  --  36  --   --    LIPASE  --   --   --   --   --   --   --  67*  --  120  --   --    ALBUMIN  --  3.1*  --   --   --  2.9*   < > 2.6*   < > 1.6*   < >  --    BILITOTAL  --  1.8*  --   --   --  1.8*   < > 2.4*   < > 5.4*   < >  --    ALKPHOS  --  51  --   --   --  52   < > 30*   < > 45   < >  --    AST  --  20  --   --   --  30   < > 164*   < > 621*   < >  --    ALT  --  102*  --   --   --  153*   < > 383*   < > 820*   < >  --     < > = values in this interval not displayed.     CBC  Recent Labs   Lab 01/12/22  0643 01/11/22  0940 01/08/22  1326 01/08/22  1242   HGB 10.6* 11.4*   < >  --    WBC 7.1 10.2   < >  --    PLT 78* 79*   < >  --    A1C  --   --   --  5.2    < > = values in this interval not displayed.

## 2022-01-12 NOTE — DISCHARGE SUMMARY
[facility]    Discharge Summary  Transplant Surgery    Date of Admission:  2022  Date of Discharge:  2022  Discharging Provider: Sammie Anna PA-C/ Dr. Lou    Discharge Diagnoses   Principal Problem:    Status post liver transplantation (H)  Active Problems:    Immunosuppressed status (H)    Pleural effusion    Ileus, postoperative (H)    Steroid-induced hyperglycemia    Hypervolemia    ROSSY (acute kidney injury) (H)    Bacteremia    Anemia due to blood loss, acute    Severe malnutrition (H)      History of Present Illness   Melita Cortes is an 51 year old female with a past medical history of EtOH cirrhosis c/b ascites, HE, portal hypertension, esophageal varices. She is now s/p  donor liver transplant and umbilical hernia repair on 22 with Dr. Browne.     Hospital Course    Liver transplant:   LFTs trended down after transplant. Bilirubin down to 1.7 on discharge. 22 US showed post-op fluid collections.     Immunosuppression:    -Induction immunosuppression with basiliximab on , and steroid taper.     -Mycophenolate (Cellcept) 750mg BID  -Tacrolimus (Prograf) level goal 8-10 (12 hour trough).  -Prednisone 5 mg daily starting  until tacrolimus level > 8.  -Opportunistic infectious prophylaxis with bactrim (x6 months) and valganciclovir (x12 weeks).      Transplant coordinator Shalini Hawkins [phone].  Biliary stent:  NO  Donor status:  DBD  CMV D - / R -  EBV D - / R +  Anticoagulation plan: ASA 81 mg x 3 months    Neuro:  Acute post op pain: Will discharge on oxycodone, robaxin, and lidocaine patches.    Hematology:   Acute blood loss anemia: Hgb 11.5, stable on discharge.    Thrombocytopenia: Secondary to liver disease. Plt 85 on discharge.    Cardiorespiratory:   History of asthma: Continue PRN albuterol.    Pleural effusions: Required oxygen post operatively. Small bilateral effusions on last CXR on 22.  Stable on room air at discharge.     GI/Nutrition:   Severe malnutrition in the context of chronic illness: Regular diet with supplements. Calorie counts adequate.    Postop ileus: Resolved prior to discharge.     Endocrine:   Steroid induced hyperglycemia: Initially required insulin drip, transitioned to sliding scale Novolog PRN. Hgb A1c on 1/8/22. was 5.2 though could have been affected by intra-op transfusions. Will not discharge on insulin.     Fluid/Electrolytes/Neph:   Hypervolemia with edema: Managed with albumin, Bumex, and lymphadema wraps. US of left upper extremity 1/12/22 d/t edema was negative for DVT. Will discharge on a limited course of bumex 1 mg daily, can be reassessed as an outpatient.    ROSSY: Peak Cr 1.7, decreased to 0.85 on the day of discharge. Baseline Cr 0.9. Likely multifactorial due to hypoperfusion during liver transplant, hypotension. Transplant Nephrology consulted.     Infectious disease:   Actinomyces odontolyticus bacteremia: 12/30 blood culture positive. Treated with PO amoxicillin 750 mg TID/Zosyn to complete 14 day course (EOT 1/14/22).    Significant Results and Procedures       Procedure date:  01/07/22    Preoperative diagnosis:  End Stage Liver Disease due to Laennec's    Postoperative diagnosis:  Same    Procedure: Liver transplant- DBD donor  Primary repair of umbilical hernia    Surgeon:  Surgeon(s) and Role:     * Pradeep Browne MD - Primary     * Carlitos Burns MD - Resident - Assisting     * Enrrique Frazier MD - Fellow - Assisting    Fellow/assistant:   Enrrique Frazier, fellow assisted with all dictated portions of the procedure, resident Ghazala      Anesthesia:  General    Specimen:  Explant liver, donor gall bladder, ascitic fluid cultures    Drains:  19 F Qasim drain placed along the right dome of diaphragm    Urine output:  2190 mls    Estimated blood loss:  3000 ml    Fluids administered:  Crystaloid- 32022, PRBC- 1743ml, Plt- 1397 ml        Pending  Results   These results will be followed up by transplant coordinator  Unresulted Labs Ordered in the Past 30 Days of this Admission     Date and Time Order Name Status Description    1/12/2022 10:17 AM Blood Culture Red Lumen Preliminary     1/9/2022 12:45 PM Prepare red blood cells (unit) Preliminary     1/7/2022  7:10 PM Fungal or Yeast Culture Routine Preliminary     1/7/2022 10:58 AM Prepare red blood cells (unit) Preliminary     1/7/2022 10:58 AM Prepare red blood cells (unit) Preliminary     1/7/2022 10:58 AM Prepare red blood cells (unit) Preliminary     1/7/2022 10:58 AM Prepare red blood cells (unit) Preliminary     1/7/2022 10:58 AM Prepare red blood cells (unit) Preliminary     1/7/2022 10:58 AM Prepare red blood cells (unit) Preliminary     1/7/2022 10:58 AM Prepare red blood cells (unit) Preliminary     1/7/2022 10:58 AM Prepare red blood cells (unit) Preliminary           Code Status   Full    Primary Care Physician   Navneet Johnson    Physical Exam   Temp: 98.1  F (36.7  C) Temp src: Oral BP: 126/83 Pulse: 69   Resp: 16 SpO2: 97 % O2 Device: None (Room air)    Vitals:    01/11/22 1445 01/12/22 1037 01/13/22 0404   Weight: 69.2 kg (152 lb 8 oz) 66.7 kg (147 lb 0.8 oz) 64 kg (141 lb)     Vital Signs with Ranges  Temp:  [98  F (36.7  C)-98.1  F (36.7  C)] 98.1  F (36.7  C)  Pulse:  [69-77] 69  Resp:  [16] 16  BP: (117-132)/(77-95) 126/83  SpO2:  [94 %-98 %] 97 %  I/O last 3 completed shifts:  In: -   Out: 3050 [Urine:3050]    Constitutional: Awake, alert, cooperative, no apparent distress, and appears stated age.  Eyes: Lids and lashes normal, pupils equal, round and reactive to light, extra ocular muscles intact, sclera clear, conjunctiva normal.  ENT: Normocephalic, without obvious abnormality, atraumatic  Respiratory: No increased work of breathing, good air exchange  Cardiovascular: Normal apical impulse, regular rate and rhythm  GI: Soft, non-distended, non-tender, drainage from old AYSHA site, stitch  placed. Scant drainage from R lateral edge of surgical incision.  Genitourinary:  Voiding  Skin: No bruising or bleeding, normal skin color, texture  Musculoskeletal: There is no redness, warmth, or swelling of the joints.   Neurologic: Awake, alert, oriented to name, place and time.   Neuropsychiatric: Calm, normal eye contact, alert, normal affect, oriented to self, place, time and situation, memory for past and recent events intact and thought process normal.    Time Spent on this Encounter   I, Sammie Anna PA-C, personally saw the patient today and spent greater than 30 minutes discharging this patient.    Discharge Disposition   Discharged to home  Condition at discharge: Stable    Consultations This Hospital Stay   SOCIAL WORK IP CONSULT  NUTRITION SERVICES ADULT IP CONSULT  PHYSICAL THERAPY ADULT IP CONSULT  OCCUPATIONAL THERAPY ADULT IP CONSULT  SOCIAL WORK IP CONSULT  VASCULAR ACCESS CARE ADULT IP CONSULT  CARE MANAGEMENT / SOCIAL WORK IP CONSULT  NUTRITION SERVICES ADULT IP CONSULT  OCCUPATIONAL THERAPY ADULT IP CONSULT  PHYSICAL THERAPY ADULT IP CONSULT  PHARMACY IP CONSULT  SOT MEDICATION HISTORY IP PHARMACY CONSULT  PHARMACY IP CONSULT  PHARMACY IP CONSULT  NUTRITION SERVICES ADULT IP CONSULT  NUTRITION SERVICES ADULT IP CONSULT  PHARMACY IP CONSULT  OCCUPATIONAL THERAPY ADULT IP CONSULT  VASCULAR ACCESS CARE ADULT IP CONSULT    Discharge Orders      Home care nursing referral      Home Care PT Referral for Hospital Discharge      Home Care OT Referral for Hospital Discharge      MD face to face encounter    Documentation of Face to Face and Certification for Home Health Services    I certify that patient: Melita Cortes is under my care and that I, or a nurse practitioner or physician's assistant working with me, had a face-to-face encounter that meets the physician face-to-face encounter requirements with this patient on: January 12, 2022.    This encounter with the patient was in  whole, or in part, for the following medical condition, which is the primary reason for home health care: s/p liver transplant.    I certify that, based on my findings, the following services are medically necessary home health services: Nursing, Occupational Therapy and Physical Therapy.    My clinical findings support the need for the above services because: Nurse is needed: To assess medication management, assess surgical site for signs/symptoms of infection, assist with transplant labs, transplant education,  after changes in medications or other medical regimen.., Occupational Therapy Services are needed to assess and treat cognitive ability and address ADL safety due to impairment in independence with activities of daily living. and Physical Therapy Services are needed to assess and treat the following functional impairments: mobility, strength and endurance.    Further, I certify that my clinical findings support that this patient is homebound (i.e. absences from home require considerable and taxing effort and are for medical reasons or [employer] or infrequently or of short duration when for other reasons) because: Requires assistance of another person or specialized equipment to access medical services because patient: Requires supervision of another for safe transfer...    Based on the above findings. I certify that this patient is confined to the home and needs intermittent skilled nursing care, physical therapy and/or speech therapy.  The patient is under my care, and I have initiated the establishment of the plan of care.  This patient will be followed by a physician who will periodically review the plan of care.  Physician/Provider to provide follow up care: Navneet Johnson    Attending hospital physician (the Medicare certified [facility] provider): Pradeep Browne MD  Physician Signature: See electronic signature associated with these discharge orders.  Date: 1/12/2022     Reason for your  hospital stay    Patient underwent  donor liver transplant on 22 with Dr. Browne.     Activity    Your activity upon discharge: Walk at least four times a day, lift no greater than 10 pounds for 6 weeks from the time of surgery.  After 6 weeks time please return to your normal exercise routine.  No driving while taking narcotics or until 3 weeks after surgery.     [facility]ult [facility] Follow-up and recommended labs and tests    You will be seen in the clinic for follow up per protocol. DO NOT take tacrolimus (Prograf) until after your labs are drawn. Remember to bring all of your medications with you to this appointment.      LABS:  Transplant labs (CBC, BMP, hepatic panel, mag, phos, and tacrolimus levels (12 hours after administration)) to be drawn twice weekly for 4 weeks.    FOLLOW UP APPOINTMENTS:  Remember to always bring an updated medication list to all appointments.     Follow up with your transplant surgeon, Dr. Browne, in 2 weeks.  Follow up with transplant hepatology per protocol.    Follow up with primary care provider in 2-4 weeks. (Pt to schedule)  Your staples will be removed 3 weeks after your operation.  You do NOT have a biliary stent in place.  Your coordinator will follow up in 6-8 weeks.  Call scheduling at [phone] if you have not heard about your appointments within 48 hours after discharge.     When to contact your care team    Call your transplant coordinator at [phone] if you have any of the following: sustained temperature greater than 100.4 or isolated fever spike to 101.5, increased pain over graft  or difficulty obtaining or taking your transplant medications.    If it is outside of office hours, please call the hospital switchboard at [phone] and ask to have the transplant surgery fellow paged for urgent medical questions.     Wound care and dressings    Instructions to care for your wound at home: Keep wound clean and dry.  Staples may be removed 3 weeks  post-op.  Pt may shower but do not soak incision in pool, hot tub, or bath until drain site is healed.     Diet    Follow this diet upon discharge: Diet recommendations post-transplant: Heart healthy dietary habits long term (low saturated/trans fat, low sodium). High protein diet x 8 weeks. Practice food safety precautions - no fish/seafood x 3 weeks.     Discharge Medications   Current Discharge Medication List      START taking these medications    Details   aspirin (ASA) 81 MG chewable tablet Take 1 tablet (81 mg) by mouth daily  Qty: 90 tablet, Refills: 0    Associated Diagnoses: Status post liver transplantation (H)      bumetanide (BUMEX) 1 MG tablet Take 1 tablet (1 mg) by mouth daily for 3 days  Qty: 3 tablet, Refills: 0    Associated Diagnoses: Status post liver transplantation (H)      hydrOXYzine (ATARAX) 25 MG tablet Take 1 tablet (25 mg) by mouth every 6 hours as needed for other (adjuvant pain)  Qty: 15 tablet, Refills: 0    Associated Diagnoses: Status post liver transplantation (H)      Lidocaine (LIDOCARE) 4 % Patch Place 1 patch onto the skin every 24 hours To prevent lidocaine toxicity, patient should be patch free for 12 hrs daily.  Qty: 10 patch, Refills: 0    Associated Diagnoses: Status post liver transplantation (H)      magnesium oxide (MAG-OX) 400 MG tablet Take 1 tablet (400 mg) by mouth 2 times daily  Qty: 60 tablet, Refills: 3    Associated Diagnoses: Status post liver transplantation (H)      methocarbamol (ROBAXIN) 500 MG tablet Take 1 tablet (500 mg) by mouth 3 times daily as needed for muscle spasms  Qty: 15 tablet, Refills: 0    Associated Diagnoses: Status post liver transplantation (H)      mycophenolate (GENERIC EQUIVALENT) 250 MG capsule Take 3 capsules (750 mg) by mouth 2 times daily  Qty: 180 capsule, Refills: 11    Associated Diagnoses: Status post liver transplantation (H)      oxyCODONE (ROXICODONE) 5 MG tablet Take 1 tablet (5 mg) by mouth every 6 hours as needed for  moderate to severe pain  Qty: 20 tablet, Refills: 0    Associated Diagnoses: Status post liver transplantation (H)      phosphorus tablet 250 mg (PHOSPHA 250 NEUTRAL) 250 MG per tablet Take 1 tablet (250 mg) by mouth daily for 5 days  Qty: 5 tablet, Refills: 0    Associated Diagnoses: Status post liver transplantation (H)      predniSONE (DELTASONE) 5 MG tablet Take 1 tablet (5 mg) by mouth daily Until tacrolimus levels therapeutic  Qty: 30 tablet, Refills: 0    Associated Diagnoses: Status post liver transplantation (H)      sennosides (SENOKOT) 8.6 MG tablet Take 1-2 tablets by mouth 2 times daily Take while taking oxycodone, HOLD for loose stools  Qty: 60 tablet, Refills: 0    Associated Diagnoses: Status post liver transplantation (H)      sulfamethoxazole-trimethoprim (BACTRIM) 400-80 MG tablet Take 1 tablet by mouth daily  Qty: 30 tablet, Refills: 11    Associated Diagnoses: Status post liver transplantation (H)      tacrolimus (GENERIC EQUIVALENT) 0.5 MG capsule Take 1 capsule by mouth twice daily as directed by transplant team for dose titration  Qty: 60 capsule, Refills: 1    Associated Diagnoses: Status post liver transplantation (H)      tacrolimus (GENERIC EQUIVALENT) 1 MG capsule Take 4 capsules (4 mg) by mouth 2 times daily  Qty: 240 capsule, Refills: 11    Associated Diagnoses: Status post liver transplantation (H)      valGANciclovir (VALCYTE) 450 MG tablet Take 1 tablet (450 mg) by mouth daily  Qty: 30 tablet, Refills: 2    Associated Diagnoses: Status post liver transplantation (H)         CONTINUE these medications which have CHANGED    Details   pantoprazole (PROTONIX) 40 MG EC tablet Take 1 tablet (40 mg) by mouth daily  Qty: 30 tablet, Refills: 3    Associated Diagnoses: Alcoholic liver disease (H)         CONTINUE these medications which have NOT CHANGED    Details   albuterol (PROAIR HFA/PROVENTIL HFA/VENTOLIN HFA) 108 (90 Base) MCG/ACT inhaler Inhale 2 puffs into the lungs every 4 hours as  needed for wheezing  Qty: 18 g, Refills: 0    Comments: Pharmacy may dispense brand covered by insurance (Proair, or proventil or ventolin or generic albuterol inhaler)  Associated Diagnoses: Community acquired pneumonia of left lower lobe of lung      multivitamin (CENTRUM SILVER) tablet Take 1 tablet by mouth daily      tretinoin (RETIN-A) 0.025 % external cream Apply a pea size to entire face QD  Qty: 45 g, Refills: 11    Associated Diagnoses: Facial rhytids         STOP taking these medications       amoxicillin (AMOXIL) 250 MG capsule Comments:   Reason for Stopping:         folic acid (FOLVITE) 1 MG tablet Comments:   Reason for Stopping:         lactulose (CHRONULAC) 10 GM/15ML solution Comments:   Reason for Stopping:         rifaximin (XIFAXAN) 550 MG TABS tablet Comments:   Reason for Stopping:             Allergies   No Known Allergies  Data   Most Recent 2 LFT's:Recent Labs   Lab Test 01/14/22  0723 01/13/22  0605   AST 19 17   ALT 71* 84*   ALKPHOS 63 57   BILITOTAL 1.7* 1.5*     Most Recent 3 Creatinines:Recent Labs   Lab Test 01/14/22  0723 01/13/22  0605 01/12/22  0643   CR 0.85 0.96 1.21*

## 2022-01-12 NOTE — PLAN OF CARE
"BP (!) 127/93 (BP Location: Right arm)   Pulse 84   Temp 98.2  F (36.8  C) (Oral)   Resp 18   Ht 1.651 m (5' 5\")   Wt 69.2 kg (152 lb 8 oz)   SpO2 97%   BMI 25.38 kg/m      Shift: 5034-5050  VS: VSS on 1 LPM, afebrile  Neuro: AOx4  BG: ACHS, 136, 106 (on steroid taper)  Labs: K 4.3 (concerns for hypokalemia r/t diuresis)   Respiratory: 1 LPM overnight  Pain/Nausea/PRN: denies nausea; robaxin x1, atarax x1, oxy 10mg x1 for pain. Pain was controlled after 10mg oxy  Diet: regular, CC  LDA: triple internal jugular SL, PIV SL; dash and AYSHA drain 175 mL (colostomy bag since AYSHA drain fell out yesterday)  GI/: dash 2L yellow, clear output, 1 loose BM  Skin: clamshell stapled, serosang drainage from R side; ABD dressing changed twice overnight, provider notified. Mepilex on sacrum changed. Generalized bruising. L arm significantly more swollen than R. No fluids infusing in that arm overnight. Provider notified.  Mobility: asx1 with walker  Plan: potential dash removal today depending upon UO. OT consult today for lymphedema wraps    Handoff given to following RN.    "

## 2022-01-12 NOTE — PROGRESS NOTES
Care Management Follow Up    Length of Stay (days): 4    Expected Discharge Date: 01/14/2022     Concerns to be Addressed: care coordination/care conferences,discharge planning     Patient plan of care discussed at interdisciplinary rounds: Yes    Anticipated Discharge Disposition: Home,Home Care     Anticipated Discharge Services: None  Anticipated Discharge DME: None    Patient/family educated on Medicare website which has current facility and service quality ratings: yes  Education Provided on the Discharge Plan:    Patient/Family in Agreement with the Plan: yes    Referrals Placed by CM/SW: Internal Clinic Care Coordination,Homecare    Additional Information:  [facility] confirmed that they are able to accept referral for home RN, PT, OT services with start of care on 1/18/2022.  Patient would need to have transplant labs drawn on Tuesdays and Friday's.    Reviewed above with [facility].  Team anticipates possible discharge end of week/weekend.  Will plan for home care services to start on Tuesday 1/18/2021 with transplant labs being drawn by home care RN on Tuesday and Thursdays.    Discharge home care orders updated.   RNCC/Transplant SW will continue to monitor.    1300: updated patient on accepting home care agency.  Agreed to f/u with pt when closer to discharge.     Yesica Denton RN BSN, PHN, ACM-RN  7A RN Care Coordinator  Phone: [phone]  Pager [phone]  To contact the weekend RNCC, Page: [phone]    1/12/2022 12:41 PM

## 2022-01-13 ENCOUNTER — APPOINTMENT (OUTPATIENT)
Dept: OCCUPATIONAL THERAPY | Facility: CLINIC | Age: 52
DRG: 005 | End: 2022-01-13
Attending: TRANSPLANT SURGERY
Payer: COMMERCIAL

## 2022-01-13 ENCOUNTER — APPOINTMENT (OUTPATIENT)
Dept: PHYSICAL THERAPY | Facility: CLINIC | Age: 52
DRG: 005 | End: 2022-01-13
Attending: TRANSPLANT SURGERY
Payer: COMMERCIAL

## 2022-01-13 ENCOUNTER — TELEPHONE (OUTPATIENT)
Dept: TRANSPLANT | Facility: CLINIC | Age: 52
End: 2022-01-13
Payer: COMMERCIAL

## 2022-01-13 LAB
ALBUMIN SERPL-MCNC: 2.5 G/DL (ref 3.4–5)
ALP SERPL-CCNC: 57 U/L (ref 40–150)
ALT SERPL W P-5'-P-CCNC: 84 U/L (ref 0–50)
ANION GAP SERPL CALCULATED.3IONS-SCNC: 7 MMOL/L (ref 3–14)
AST SERPL W P-5'-P-CCNC: 17 U/L (ref 0–45)
BILIRUB DIRECT SERPL-MCNC: 1.1 MG/DL (ref 0–0.2)
BILIRUB SERPL-MCNC: 1.5 MG/DL (ref 0.2–1.3)
BUN SERPL-MCNC: 48 MG/DL (ref 7–30)
CALCIUM SERPL-MCNC: 8.2 MG/DL (ref 8.5–10.1)
CHLORIDE BLD-SCNC: 101 MMOL/L (ref 94–109)
CO2 SERPL-SCNC: 29 MMOL/L (ref 20–32)
CREAT SERPL-MCNC: 0.96 MG/DL (ref 0.52–1.04)
ERYTHROCYTE [DISTWIDTH] IN BLOOD BY AUTOMATED COUNT: 18 % (ref 10–15)
GFR SERPL CREATININE-BSD FRML MDRD: 72 ML/MIN/1.73M2
GLUCOSE BLD-MCNC: 105 MG/DL (ref 70–99)
GLUCOSE BLDC GLUCOMTR-MCNC: 105 MG/DL (ref 70–99)
GLUCOSE BLDC GLUCOMTR-MCNC: 149 MG/DL (ref 70–99)
GLUCOSE BLDC GLUCOMTR-MCNC: 150 MG/DL (ref 70–99)
GLUCOSE BLDC GLUCOMTR-MCNC: 173 MG/DL (ref 70–99)
GLUCOSE BLDC GLUCOMTR-MCNC: 201 MG/DL (ref 70–99)
HCT VFR BLD AUTO: 32 % (ref 35–47)
HGB BLD-MCNC: 10.7 G/DL (ref 11.7–15.7)
MAGNESIUM SERPL-MCNC: 1.1 MG/DL (ref 1.6–2.3)
MCH RBC QN AUTO: 31.6 PG (ref 26.5–33)
MCHC RBC AUTO-ENTMCNC: 33.4 G/DL (ref 31.5–36.5)
MCV RBC AUTO: 94 FL (ref 78–100)
PHOSPHATE SERPL-MCNC: 2.4 MG/DL (ref 2.5–4.5)
PLATELET # BLD AUTO: 80 10E3/UL (ref 150–450)
POTASSIUM BLD-SCNC: 3.9 MMOL/L (ref 3.4–5.3)
PROT SERPL-MCNC: 4.4 G/DL (ref 6.8–8.8)
RBC # BLD AUTO: 3.39 10E6/UL (ref 3.8–5.2)
SODIUM SERPL-SCNC: 137 MMOL/L (ref 133–144)
TACROLIMUS BLD-MCNC: 3.5 UG/L (ref 5–15)
TME LAST DOSE: ABNORMAL H
TME LAST DOSE: ABNORMAL H
WBC # BLD AUTO: 6.4 10E3/UL (ref 4–11)

## 2022-01-13 PROCEDURE — 250N000012 HC RX MED GY IP 250 OP 636 PS 637: Performed by: STUDENT IN AN ORGANIZED HEALTH CARE EDUCATION/TRAINING PROGRAM

## 2022-01-13 PROCEDURE — 250N000013 HC RX MED GY IP 250 OP 250 PS 637: Performed by: PHYSICIAN ASSISTANT

## 2022-01-13 PROCEDURE — 84100 ASSAY OF PHOSPHORUS: CPT | Performed by: NURSE PRACTITIONER

## 2022-01-13 PROCEDURE — 85027 COMPLETE CBC AUTOMATED: CPT | Performed by: NURSE PRACTITIONER

## 2022-01-13 PROCEDURE — 250N000013 HC RX MED GY IP 250 OP 250 PS 637: Performed by: TRANSPLANT SURGERY

## 2022-01-13 PROCEDURE — 250N000013 HC RX MED GY IP 250 OP 250 PS 637: Performed by: STUDENT IN AN ORGANIZED HEALTH CARE EDUCATION/TRAINING PROGRAM

## 2022-01-13 PROCEDURE — 250N000012 HC RX MED GY IP 250 OP 636 PS 637: Performed by: NURSE PRACTITIONER

## 2022-01-13 PROCEDURE — 80053 COMPREHEN METABOLIC PANEL: CPT | Performed by: NURSE PRACTITIONER

## 2022-01-13 PROCEDURE — 250N000013 HC RX MED GY IP 250 OP 250 PS 637: Performed by: NURSE PRACTITIONER

## 2022-01-13 PROCEDURE — 250N000011 HC RX IP 250 OP 636: Performed by: PHYSICIAN ASSISTANT

## 2022-01-13 PROCEDURE — 120N000011 HC R&B TRANSPLANT UMMC

## 2022-01-13 PROCEDURE — 250N000011 HC RX IP 250 OP 636: Performed by: NURSE PRACTITIONER

## 2022-01-13 PROCEDURE — 82248 BILIRUBIN DIRECT: CPT | Performed by: NURSE PRACTITIONER

## 2022-01-13 PROCEDURE — 80197 ASSAY OF TACROLIMUS: CPT | Performed by: NURSE PRACTITIONER

## 2022-01-13 PROCEDURE — 36415 COLL VENOUS BLD VENIPUNCTURE: CPT | Performed by: NURSE PRACTITIONER

## 2022-01-13 PROCEDURE — 99207 PR SATISFY VISIT NUMBER: CPT | Performed by: TRANSPLANT SURGERY

## 2022-01-13 PROCEDURE — 83735 ASSAY OF MAGNESIUM: CPT | Performed by: NURSE PRACTITIONER

## 2022-01-13 PROCEDURE — P9047 ALBUMIN (HUMAN), 25%, 50ML: HCPCS | Performed by: PHYSICIAN ASSISTANT

## 2022-01-13 PROCEDURE — 97116 GAIT TRAINING THERAPY: CPT | Mod: GP

## 2022-01-13 PROCEDURE — 97535 SELF CARE MNGMENT TRAINING: CPT | Mod: GO

## 2022-01-13 PROCEDURE — 250N000009 HC RX 250: Performed by: PHYSICIAN ASSISTANT

## 2022-01-13 RX ORDER — ALBUMIN (HUMAN) 12.5 G/50ML
25 SOLUTION INTRAVENOUS EVERY 8 HOURS
Status: DISPENSED | OUTPATIENT
Start: 2022-01-13 | End: 2022-01-14

## 2022-01-13 RX ORDER — MAGNESIUM OXIDE 400 MG/1
400 TABLET ORAL 2 TIMES DAILY
Status: DISCONTINUED | OUTPATIENT
Start: 2022-01-13 | End: 2022-01-14 | Stop reason: HOSPADM

## 2022-01-13 RX ORDER — PREDNISONE 5 MG/1
5 TABLET ORAL DAILY
Status: DISCONTINUED | OUTPATIENT
Start: 2022-01-14 | End: 2022-01-14 | Stop reason: HOSPADM

## 2022-01-13 RX ORDER — MAGNESIUM SULFATE HEPTAHYDRATE 40 MG/ML
4 INJECTION, SOLUTION INTRAVENOUS ONCE
Status: COMPLETED | OUTPATIENT
Start: 2022-01-13 | End: 2022-01-13

## 2022-01-13 RX ORDER — LIDOCAINE HYDROCHLORIDE 10 MG/ML
10 INJECTION, SOLUTION EPIDURAL; INFILTRATION; INTRACAUDAL; PERINEURAL ONCE
Status: COMPLETED | OUTPATIENT
Start: 2022-01-13 | End: 2022-01-13

## 2022-01-13 RX ADMIN — PREDNISONE 10 MG: 10 TABLET ORAL at 07:49

## 2022-01-13 RX ADMIN — TACROLIMUS 4 MG: 1 CAPSULE ORAL at 17:50

## 2022-01-13 RX ADMIN — INSULIN ASPART 2 UNITS: 100 INJECTION, SOLUTION INTRAVENOUS; SUBCUTANEOUS at 17:26

## 2022-01-13 RX ADMIN — Medication 400 MG: at 13:42

## 2022-01-13 RX ADMIN — NYSTATIN 500000 UNITS: 100000 SUSPENSION ORAL at 20:15

## 2022-01-13 RX ADMIN — LIDOCAINE 1 PATCH: 560 PATCH PERCUTANEOUS; TOPICAL; TRANSDERMAL at 07:59

## 2022-01-13 RX ADMIN — HYDROXYZINE HYDROCHLORIDE 25 MG: 25 TABLET ORAL at 01:35

## 2022-01-13 RX ADMIN — OXYCODONE HYDROCHLORIDE 10 MG: 5 TABLET ORAL at 05:14

## 2022-01-13 RX ADMIN — SULFAMETHOXAZOLE AND TRIMETHOPRIM 1 TABLET: 400; 80 TABLET ORAL at 07:48

## 2022-01-13 RX ADMIN — INSULIN ASPART 1 UNITS: 100 INJECTION, SOLUTION INTRAVENOUS; SUBCUTANEOUS at 11:34

## 2022-01-13 RX ADMIN — Medication 1 TABLET: at 07:49

## 2022-01-13 RX ADMIN — BUMETANIDE 1 MG: 0.25 INJECTION INTRAMUSCULAR; INTRAVENOUS at 16:20

## 2022-01-13 RX ADMIN — NYSTATIN 500000 UNITS: 100000 SUSPENSION ORAL at 07:50

## 2022-01-13 RX ADMIN — OXYCODONE HYDROCHLORIDE 5 MG: 5 TABLET ORAL at 20:14

## 2022-01-13 RX ADMIN — MAGNESIUM SULFATE IN WATER 4 G: 40 INJECTION, SOLUTION INTRAVENOUS at 09:36

## 2022-01-13 RX ADMIN — Medication 400 MG: at 20:13

## 2022-01-13 RX ADMIN — AMOXICILLIN 750 MG: 500 CAPSULE ORAL at 20:13

## 2022-01-13 RX ADMIN — OXYCODONE HYDROCHLORIDE 5 MG: 5 TABLET ORAL at 13:55

## 2022-01-13 RX ADMIN — MYCOPHENOLATE MOFETIL 750 MG: 250 CAPSULE ORAL at 17:50

## 2022-01-13 RX ADMIN — PANTOPRAZOLE SODIUM 40 MG: 40 TABLET, DELAYED RELEASE ORAL at 07:49

## 2022-01-13 RX ADMIN — AMOXICILLIN 750 MG: 500 CAPSULE ORAL at 07:49

## 2022-01-13 RX ADMIN — MYCOPHENOLATE MOFETIL 750 MG: 250 CAPSULE ORAL at 07:47

## 2022-01-13 RX ADMIN — AMOXICILLIN 750 MG: 500 CAPSULE ORAL at 13:42

## 2022-01-13 RX ADMIN — ALBUMIN HUMAN 25 G: 0.25 SOLUTION INTRAVENOUS at 16:19

## 2022-01-13 RX ADMIN — BUMETANIDE 1 MG: 0.25 INJECTION INTRAMUSCULAR; INTRAVENOUS at 07:52

## 2022-01-13 RX ADMIN — LIDOCAINE HYDROCHLORIDE 10 ML: 10 INJECTION, SOLUTION EPIDURAL; INFILTRATION; INTRACAUDAL; PERINEURAL at 10:34

## 2022-01-13 RX ADMIN — NYSTATIN 500000 UNITS: 100000 SUSPENSION ORAL at 16:20

## 2022-01-13 RX ADMIN — TACROLIMUS 4 MG: 1 CAPSULE ORAL at 07:46

## 2022-01-13 RX ADMIN — VALGANCICLOVIR 450 MG: 450 TABLET, FILM COATED ORAL at 07:48

## 2022-01-13 ASSESSMENT — ACTIVITIES OF DAILY LIVING (ADL)
ADLS_ACUITY_SCORE: 8
ADLS_ACUITY_SCORE: 10
ADLS_ACUITY_SCORE: 8
ADLS_ACUITY_SCORE: 10
ADLS_ACUITY_SCORE: 8

## 2022-01-13 ASSESSMENT — MIFFLIN-ST. JEOR: SCORE: 1260.45

## 2022-01-13 NOTE — TELEPHONE ENCOUNTER
A pharmacist spent 55 minutes on the phone providing medication teaching with Melita Cortes and Doug for discharge with a focus on new medications/dose changes.  The discharge medication list was reviewed with the patient/family and the following points were discussed, as applicable: Name, description, purpose, dose/strength, duration of medications, common side effects, food/medications to avoid, action to be taken if dose is missed, when to call MD and how to obtain refills.  Fidelina said that she was having a rough day due to not sleeping much the night before, and was feeling very emotional.  The patient and spouse (Doug) will be responsible for managing medications. Additionally, the following transplant related education was covered: Purpose of medication card, Medication videos, Timing of medications and day of lab draw considerations , Emesis or missed dose, Prescription Insurance  and Discharge process for receiving meds   Patient will  transplant supplies including 7 day pill organizer and BP monitor, at discharge pharmacy along with medications.  Patient chooses to receive medications from  specialty pharmacy.   Clinical Pharmacy Consult:                                                      Transplant Specific:   Date of Transplant: 1/7/2022  Type of Transplant: liver  First Transplant: yes  History of rejection: no    Immunosuppression Regimen   TAC 4mg qAM & 4mg qPM and MMF 750mg qAM & 750mg qPM  Patient specific goal: 8-10  Most recent level: 3.5, date 1/13/2022  Immunosuppressant Levels:  Subtherapeutic  Pt adherent to lab draws: yes  Scr:   Lab Results   Component Value Date    CR 0.96 01/13/2022    CR 0.56 09/18/2020     Side effects: Diarrhea    Prophylactic Medications  Antibacterial:  Bactrim 400-80mg daily  Scheduled Discontinue Date: 6 months    Antifungal: Not needed thus far  Scheduled Discontinue Date: N/A    Antiviral: Max dose in Liver transplant is Valcyte 450mg once daily    Scheduled Discontinue Date: 3 months    Acid Reducer: Protonix (pantoprazole)  Scheduled Reviewed Date: possibly long-term, was on prior to admission    Thrombosis Prevention:   Scheduled Discontinue Date:     Blood Pressure Management  Frequency of home Blood Pressure checks: twice daily  Most recent home BP: 117/77  Patient Blood pressure goal: <140/90  Patient blood pressure at goal:  yes  Hospitalizations/ER visits since last assessment: 0      Med rec/DUR performed: yes  Med Rec Discrepancies: no    Reminders:    1. Bring to first clinic appt: med box, med card, bp monitor, all medications being taken, and lab book.  2.   MTM pharmacist visit on first clinic appt and if ok, again in 3 to 4 months during follow up appt.  3.   Avoid Grapefruit and Grapefruit juice.   4.   Avoid herbal supplements. If wish to take other medications or supplements, call your coordinator.   5.   Keep lab appts.   6.   Can use apps on phone like [employer] to help manage medication lists and reminders.   7.   Make sure you are protecting your skin by wearing long sleeves and applying sunscreen to exposed skin, for any significant time in the sun.     Transplant Coordinator is Shalini Hawkins.      Bhumi Rodriguez [facility]

## 2022-01-13 NOTE — PROGRESS NOTES
Care Management Follow Up    Length of Stay (days): 5    Expected Discharge Date: 01/14/2022     Concerns to be Addressed: care coordination/care conferences,discharge planning     Patient plan of care discussed at interdisciplinary rounds: Yes    Anticipated Discharge Disposition: Home,Home Care     Anticipated Discharge Services: None  Anticipated Discharge DME: None    Patient/family educated on Medicare website which has current facility and service quality ratings: yes  Education Provided on the Discharge Plan:    Patient/Family in Agreement with the Plan: yes    Referrals Placed by CM/SW: Internal Clinic Care Coordination,Homecare    Additional Information:  Writer received call from pt spouse regarding home care referral status.  Met with pt spouse as pt was working with PT. Reviewed that [facility] had accepted pt for home RN, Pt, OT with start of care on 1/18/2022.  Reviewed Medicare.gov list of agencies that writer had left with pt for review/consideration.   Pt spouse inquired if additional home care agencies could be tried.  Agreed to look into additional home care agencies based on the Medicare.gov list - [facility] and [facility] the top rated agencies.    [facility]: - Spoke with Chuyita who agreed to review pt insurance and clinical information.   [facility] - Spoke with Carol who agreed to review pt insurance and clinical information.     Home Care agencies that have declined:  [facility] - they are only accepting straight Medicare patients for the next month.   [facility] - unable to accept d/t capacity and new transplant status.     Yesica Denton RN BSN, PHN, ACM-RN  7A RN Care Coordinator  Phone: [phone]  Pager [phone]  To contact the weekend RNCC, Page: [phone]    1/13/2022 4:05 PM

## 2022-01-13 NOTE — PROGRESS NOTES
Calorie Count  Intake recorded for: 1/12  Total Kcals: 2006 Total Protein: 104g  Kcals from Hospital Food: 2006  Protein: 104g  Kcals from Outside Food (average):0 Protein: 0g  # Meals Ordered from Kitchen: 3 meals   # Meals Recorded: 2 meals (First - 100% diet coke, Greek yogurt, 25% breakfast sandwich w/ egg, aguirre, sausage & cheese on bagel)      (Second - 100% ice cream, 50% diet ginger ale, pepsi, potato chips, 25% peanut butter & jelly sandwich)  # Supplements Recorded: 100% 4 Ensure Enlives

## 2022-01-13 NOTE — PROGRESS NOTES
Transplant Surgery  Inpatient Daily Progress Note  01/13/2022    Assessment & Plan: Melita Cortes is a 51 year old female with a past medical history of EtOH cirrhosis c/b ascites, HE, portal hypertension, EV. She is now s/p DDLT on 1/7/22 with Dr. Browne.     Graft function: S/p DDLT 1/7/22: POD#6. LFTs continue to trend down.   -Liver US 1/8 patent. Postop fluid collections.   -AYSHA fell out 1/11, ostomy appliance attached, s/s output.   -Incision site closed with suture 1/13 to decrease output   Immunosuppression management:   -SM pulse with taper per protocol  -Simulect POD#2, repeat POD #6   - mg BID  -Tac 4 mg BID. Goal level, increase to 8-10 with improving renal function.   Complexity of management:High. Contributing factors: ROSSY, Induction  Neuro:  Acute post op pain: Conitnue Lidoderm patches, PRN TID Robaxin, PRN Atarax, PRN oxycodone 5-10 Q6PRN- encourage weaning.  Hematology:   Acute blood loss anemia: Hgb 10.7 (10.6)  Thrombocytopenia: Plt 80, stable  Cardiorespiratory:   Hx Asthma: PRN albuterol.  Pleural effusions: continue to use IS/CDB. RA.  GI/Nutrition:   Severe malnutrition in the context of chronic illness: Reg diet+ supplements, Plan for [place] placement if not able to meet 60% of goal (1150 kcal and 60 grams protein). Calorie counts improving, continue to encourage PO intake.  Postop ileus: Resolved, changed senna BID to PRN.   Endocrine:   Steroid induced hyperglycemia: Mild. Sliding scale Novolog PRN.   Fluid/Electrolytes/Neph:   Hypervolemia with JP: Resolving. Continue IV Bumex 1mg BID. Lymphedema wraps. US of LUE 1/12 d/t increased swelling in L arm compared to R showed no DVT.   ROSSY: SCr 0.96 (1.21). Serum creatinine normalizing to baseline 0.9. Likely multifactorial due to hypoperfusion during liver transplant, hypotension. Transplant nephrology following.  Continue Bumex 1mg BID.   : No dash. Monitor UO.    Infectious disease: Afebrile, WBC 6.4.  Actinomyces  odontolyticus bacteremia: 12/30 blood cx positive. Treating with PO amoxicillin 750 mg TID, hold while on Zosyn and resume 1/10 to complete 14 day course (EOT 1/14/22).  Prophylaxis: DVT, fall, GI (PPI), keith-op (Zosyn x 48 hours), fungal (fluconazolex1, Nystatin), viral (Valcyte), pneumocystis (Bactrim)  Disposition: 7A,  plan to discharge home 1/14 with home care    Medical Decision Making: Medium  Subsequent visit 64193 (moderate level decision making)    JULIA/Fellow/Resident Provider: Maxine Freeman, PAC 1050     Faculty: Buster Lou MD    Medical student: Katie Coleman, MS4    __________________________________________________________________  Transplant History:    1/7/2022 (Liver), Postoperative day: 6     Interval History: History is obtained from the patient  Overnight events: Patient stated she had difficulty sleeping last night due to disruptions and noise. She got out of bed with PT yesterday and walked the stairs. States LE edema is improving. Fair appetite, tolerating approx. 3 supplements/day.     ROS:   A 10-point review of systems was negative except as noted above.    Curent Meds:    amoxicillin  750 mg Oral TID     [Held by provider] basiliximab (SIMULECT) infusion  20 mg Intravenous Once     bumetanide  1 mg Intravenous BID     insulin aspart  1-7 Units Subcutaneous TID AC     insulin aspart  1-5 Units Subcutaneous At Bedtime     lidocaine  1 patch Transdermal Q24H     lidocaine   Transdermal Q8H     magnesium oxide  400 mg Oral Daily     multivitamin w/minerals  1 tablet Oral Daily     mycophenolate  750 mg Oral BID IS     nystatin  500,000 Units Oral 4x Daily     pantoprazole  40 mg Oral QAM AC     predniSONE  10 mg Oral Once     sodium chloride (PF)  3 mL Intravenous Q8H     sulfamethoxazole-trimethoprim  1 tablet Oral Daily     tacrolimus  4 mg Oral BID IS     valGANciclovir  450 mg Oral Daily       Physical Exam:     Admit Weight: 64.7 kg (142 lb 11.2 oz)    Current Vitals:   /84 (BP  "Location: Right arm)   Pulse 73   Temp 98.4  F (36.9  C) (Oral)   Resp 16   Ht 1.651 m (5' 5\")   Wt 64 kg (141 lb)   SpO2 95%   BMI 23.46 kg/m      Vital sign ranges:    Temp:  [98  F (36.7  C)-98.5  F (36.9  C)] 98.4  F (36.9  C)  Pulse:  [73-90] 73  Resp:  [16-18] 16  BP: (106-129)/(77-87) 116/84  SpO2:  [94 %-99 %] 95 %    General Appearance: in no apparent distress.   Skin: normal, warm, dry, jaundice improving  Heart: RRR  Lungs: unlabored on RA,   Abdomen: Soft, distended, +Ascites, Incision with staples CDI with decreasing serous drainage to Right lateral incision, mild erythema. AYSHA drain replaced with collection bag, some serosanguinous output this AM.   : Gunter in place with clear yellow output-remove today  Extremities: edema: + 1 pitting BLE and +1 BUE. L>R  Neurologic: A&Ox4. Tremor absent.     Frailty Scores    There is no flowsheet data to display.         Data:   CMP  Recent Labs   Lab 01/13/22  0138 01/12/22  2126 01/12/22  0843 01/12/22  0643 01/11/22  2234 01/11/22  1921 01/11/22  0758 01/11/22  0633 01/09/22  0328 01/09/22  0319 01/08/22  0811 01/08/22  0807 01/08/22  0115 01/07/22  2330   NA  --   --   --  139  --  137  --  138   < > 135  --  137   < > 133   POTASSIUM  --   --   --  3.6  --  4.3  --  3.9   < > 3.9   < > 3.5   < > 2.9*   CHLORIDE  --   --   --  104  --  103  --  105   < > 104  --  106   < >  --    CO2  --   --   --  27  --  25  --  22   < > 22  --  20   < >  --    * 166*   < > 106*   < > 180*   < > 103*   < > 107*   < > 306*   < > 222*   BUN  --   --   --  51*  --  55*  --  50*   < > 37*  --  28   < >  --    CR  --   --   --  1.21*  --  1.42*  --  1.59*   < > 1.26*  --  0.86   < >  --    GFRESTIMATED  --   --   --  54*  --  45*  --  39*   < > 52*  --  82   < >  --    SHAI  --   --   --  8.7  --  8.4*  --  8.5   < > 8.7  --  9.4   < >  --    ICAW  --   --   --   --   --   --   --   --   --  4.9  --   --   --  5.3*   MAG  --   --   --  1.5*  --   --   --  1.9   " < > 1.9  --  2.0   < >  --    PHOS  --   --   --  3.6  --   --   --  5.9*   < > 6.4*  --  3.8  --   --    AMYLASE  --   --   --   --   --   --   --   --   --  38  --  36  --   --    LIPASE  --   --   --   --   --   --   --   --   --  67*  --  120  --   --    ALBUMIN  --   --   --  3.1*  --   --   --  2.9*   < > 2.6*   < > 1.6*   < >  --    BILITOTAL  --   --   --  1.8*  --   --   --  1.8*   < > 2.4*   < > 5.4*   < >  --    ALKPHOS  --   --   --  51  --   --   --  52   < > 30*   < > 45   < >  --    AST  --   --   --  20  --   --   --  30   < > 164*   < > 621*   < >  --    ALT  --   --   --  102*  --   --   --  153*   < > 383*   < > 820*   < >  --     < > = values in this interval not displayed.     CBC  Recent Labs   Lab 01/12/22  0643 01/11/22  0940 01/08/22  1326 01/08/22  1242   HGB 10.6* 11.4*   < >  --    WBC 7.1 10.2   < >  --    PLT 78* 79*   < >  --    A1C  --   --   --  5.2    < > = values in this interval not displayed.

## 2022-01-13 NOTE — PLAN OF CARE
"/84 (BP Location: Right arm)   Pulse 73   Temp 98.4  F (36.9  C) (Oral)   Resp 16   Ht 1.651 m (5' 5\")   Wt 64 kg (141 lb)   SpO2 95%   BMI 23.46 kg/m    Neuro: A&Ox4.  Cardiac: Afebrile, VSS.   Respiratory: RA   GI/: Voiding spontaneously. No BM this shift.   Diet/appetite: Tolerating reg diet. Good po fluid intake. BG monitored per order- 149mg/dL. Denies nausea   Activity: Up with stand by/ assist of 1   Pain: PRN atarax and oxycodone admin for pain control  Skin: No new deficits noted. Generalized jaundiced present. Dressings CDI  Lines: piv sl'd  Drains: ostomy pouch over AYSHA site CDI. Moderate serosang output  Replacement: awaiting am lab results    Rested btwn cares. Able to make needs known. Continue to monitor. Notify MD of changes/concerns.      Problem: Adult Inpatient Plan of Care  Goal: Readiness for Transition of Care  Outcome: improving  Med teaching today with pharmacy. Spouse will be present    Problem: Fluid Imbalance (Liver Transplant)  Goal: Fluid Balance  Outcome: No Change     Problem: Neurologic Impairment (Liver Transplant)  Goal: Optimal Neurologic Function  Outcome: No Change     Problem: Neurologic Impairment (Liver Transplant)  Goal: Optimal Neurologic Function  Outcome: No Change     Problem: Pain (Liver Transplant)  Goal: Acceptable Pain Control  Outcome: No Change     Problem: Postoperative Urinary Retention (Liver Transplant)  Goal: Effective Urine Elimination  Outcome: No Change          "

## 2022-01-13 NOTE — PLAN OF CARE
Vitals: stable room air.   Blood glucose: achs: 185 sliding scale. 166.   Pain/nausea: oxy x 1 for abdominal pain  Diet: regular. Poor appetite  Lines: PIV L SL  : good UOP. More than a 1 L out this shift.   GI: BM x 1 on days.   Drains: R AYSHA site ostomy pouch CDI,  ml serosang.   Skin: incision abd stapled LUZMA. R dressing CDI.   Mobility: up with x 1 x walker.     Ed: med/lab updated. SPT tomorrow. Watched MTP today.

## 2022-01-14 ENCOUNTER — APPOINTMENT (OUTPATIENT)
Dept: OCCUPATIONAL THERAPY | Facility: CLINIC | Age: 52
DRG: 005 | End: 2022-01-14
Attending: TRANSPLANT SURGERY
Payer: COMMERCIAL

## 2022-01-14 ENCOUNTER — APPOINTMENT (OUTPATIENT)
Dept: PHYSICAL THERAPY | Facility: CLINIC | Age: 52
DRG: 005 | End: 2022-01-14
Attending: TRANSPLANT SURGERY
Payer: COMMERCIAL

## 2022-01-14 VITALS
DIASTOLIC BLOOD PRESSURE: 85 MMHG | HEIGHT: 65 IN | RESPIRATION RATE: 16 BRPM | WEIGHT: 141 LBS | BODY MASS INDEX: 23.49 KG/M2 | HEART RATE: 76 BPM | SYSTOLIC BLOOD PRESSURE: 115 MMHG | TEMPERATURE: 98.3 F | OXYGEN SATURATION: 96 %

## 2022-01-14 PROBLEM — D62 ANEMIA DUE TO BLOOD LOSS, ACUTE: Status: ACTIVE | Noted: 2022-01-14

## 2022-01-14 PROBLEM — E43 SEVERE MALNUTRITION (H): Status: ACTIVE | Noted: 2022-01-14

## 2022-01-14 PROBLEM — Z76.82 LIVER TRANSPLANT CANDIDATE: Status: RESOLVED | Noted: 2022-01-07 | Resolved: 2022-01-14

## 2022-01-14 LAB
ALBUMIN SERPL-MCNC: 2.9 G/DL (ref 3.4–5)
ALP SERPL-CCNC: 63 U/L (ref 40–150)
ALT SERPL W P-5'-P-CCNC: 71 U/L (ref 0–50)
ANION GAP SERPL CALCULATED.3IONS-SCNC: 8 MMOL/L (ref 3–14)
AST SERPL W P-5'-P-CCNC: 19 U/L (ref 0–45)
BACTERIA PRT CULT: NORMAL
BILIRUB DIRECT SERPL-MCNC: 0.9 MG/DL (ref 0–0.2)
BILIRUB SERPL-MCNC: 1.7 MG/DL (ref 0.2–1.3)
BUN SERPL-MCNC: 41 MG/DL (ref 7–30)
CALCIUM SERPL-MCNC: 8.8 MG/DL (ref 8.5–10.1)
CHLORIDE BLD-SCNC: 100 MMOL/L (ref 94–109)
CO2 SERPL-SCNC: 29 MMOL/L (ref 20–32)
CREAT SERPL-MCNC: 0.85 MG/DL (ref 0.52–1.04)
ERYTHROCYTE [DISTWIDTH] IN BLOOD BY AUTOMATED COUNT: 18.3 % (ref 10–15)
GFR SERPL CREATININE-BSD FRML MDRD: 83 ML/MIN/1.73M2
GLUCOSE BLD-MCNC: 99 MG/DL (ref 70–99)
GLUCOSE BLDC GLUCOMTR-MCNC: 155 MG/DL (ref 70–99)
HCT VFR BLD AUTO: 35.3 % (ref 35–47)
HGB BLD-MCNC: 11.5 G/DL (ref 11.7–15.7)
MAGNESIUM SERPL-MCNC: 1.6 MG/DL (ref 1.6–2.3)
MCH RBC QN AUTO: 31.2 PG (ref 26.5–33)
MCHC RBC AUTO-ENTMCNC: 32.6 G/DL (ref 31.5–36.5)
MCV RBC AUTO: 96 FL (ref 78–100)
PHOSPHATE SERPL-MCNC: 2.1 MG/DL (ref 2.5–4.5)
PLATELET # BLD AUTO: 85 10E3/UL (ref 150–450)
POTASSIUM BLD-SCNC: 3.8 MMOL/L (ref 3.4–5.3)
PROT SERPL-MCNC: 5 G/DL (ref 6.8–8.8)
RBC # BLD AUTO: 3.69 10E6/UL (ref 3.8–5.2)
SODIUM SERPL-SCNC: 137 MMOL/L (ref 133–144)
TACROLIMUS BLD-MCNC: 6.2 UG/L (ref 5–15)
TME LAST DOSE: NORMAL H
TME LAST DOSE: NORMAL H
WBC # BLD AUTO: 4.7 10E3/UL (ref 4–11)

## 2022-01-14 PROCEDURE — 97116 GAIT TRAINING THERAPY: CPT | Mod: GP

## 2022-01-14 PROCEDURE — 250N000012 HC RX MED GY IP 250 OP 636 PS 637: Performed by: STUDENT IN AN ORGANIZED HEALTH CARE EDUCATION/TRAINING PROGRAM

## 2022-01-14 PROCEDURE — 250N000012 HC RX MED GY IP 250 OP 636 PS 637: Performed by: PHYSICIAN ASSISTANT

## 2022-01-14 PROCEDURE — 250N000013 HC RX MED GY IP 250 OP 250 PS 637: Performed by: NURSE PRACTITIONER

## 2022-01-14 PROCEDURE — 250N000013 HC RX MED GY IP 250 OP 250 PS 637: Performed by: STUDENT IN AN ORGANIZED HEALTH CARE EDUCATION/TRAINING PROGRAM

## 2022-01-14 PROCEDURE — P9047 ALBUMIN (HUMAN), 25%, 50ML: HCPCS | Performed by: PHYSICIAN ASSISTANT

## 2022-01-14 PROCEDURE — 97530 THERAPEUTIC ACTIVITIES: CPT | Mod: GP

## 2022-01-14 PROCEDURE — 250N000013 HC RX MED GY IP 250 OP 250 PS 637: Performed by: PHYSICIAN ASSISTANT

## 2022-01-14 PROCEDURE — 82248 BILIRUBIN DIRECT: CPT | Performed by: NURSE PRACTITIONER

## 2022-01-14 PROCEDURE — 97530 THERAPEUTIC ACTIVITIES: CPT | Mod: GO

## 2022-01-14 PROCEDURE — 36415 COLL VENOUS BLD VENIPUNCTURE: CPT | Performed by: NURSE PRACTITIONER

## 2022-01-14 PROCEDURE — 250N000011 HC RX IP 250 OP 636: Performed by: PHYSICIAN ASSISTANT

## 2022-01-14 PROCEDURE — 84100 ASSAY OF PHOSPHORUS: CPT | Performed by: NURSE PRACTITIONER

## 2022-01-14 PROCEDURE — 250N000012 HC RX MED GY IP 250 OP 636 PS 637: Performed by: NURSE PRACTITIONER

## 2022-01-14 PROCEDURE — 80197 ASSAY OF TACROLIMUS: CPT | Performed by: NURSE PRACTITIONER

## 2022-01-14 PROCEDURE — 85027 COMPLETE CBC AUTOMATED: CPT | Performed by: NURSE PRACTITIONER

## 2022-01-14 PROCEDURE — 80053 COMPREHEN METABOLIC PANEL: CPT | Performed by: NURSE PRACTITIONER

## 2022-01-14 PROCEDURE — 99024 POSTOP FOLLOW-UP VISIT: CPT | Performed by: TRANSPLANT SURGERY

## 2022-01-14 PROCEDURE — 250N000009 HC RX 250: Performed by: PHYSICIAN ASSISTANT

## 2022-01-14 PROCEDURE — 250N000013 HC RX MED GY IP 250 OP 250 PS 637: Performed by: TRANSPLANT SURGERY

## 2022-01-14 PROCEDURE — 83735 ASSAY OF MAGNESIUM: CPT | Performed by: NURSE PRACTITIONER

## 2022-01-14 RX ORDER — OXYCODONE HYDROCHLORIDE 5 MG/1
5 TABLET ORAL EVERY 6 HOURS PRN
Qty: 20 TABLET | Refills: 0 | Status: SHIPPED | OUTPATIENT
Start: 2022-01-14 | End: 2022-01-26

## 2022-01-14 RX ORDER — METHOCARBAMOL 500 MG/1
500 TABLET, FILM COATED ORAL 3 TIMES DAILY PRN
Qty: 15 TABLET | Refills: 0 | Status: SHIPPED | OUTPATIENT
Start: 2022-01-14 | End: 2022-01-26

## 2022-01-14 RX ORDER — SULFAMETHOXAZOLE AND TRIMETHOPRIM 400; 80 MG/1; MG/1
1 TABLET ORAL DAILY
Qty: 30 TABLET | Refills: 11 | Status: SHIPPED | OUTPATIENT
Start: 2022-01-15 | End: 2022-01-18

## 2022-01-14 RX ORDER — LIDOCAINE 4 G/G
1 PATCH TOPICAL EVERY 24 HOURS
Qty: 10 PATCH | Refills: 0 | Status: SHIPPED | OUTPATIENT
Start: 2022-01-14 | End: 2022-01-26

## 2022-01-14 RX ORDER — BUMETANIDE 1 MG/1
1 TABLET ORAL DAILY
Qty: 3 TABLET | Refills: 0 | Status: SHIPPED | OUTPATIENT
Start: 2022-01-14 | End: 2022-02-10

## 2022-01-14 RX ORDER — ASPIRIN 81 MG/1
81 TABLET, CHEWABLE ORAL DAILY
Qty: 90 TABLET | Refills: 0 | Status: SHIPPED | OUTPATIENT
Start: 2022-01-14 | End: 2022-06-27

## 2022-01-14 RX ORDER — MYCOPHENOLATE MOFETIL 250 MG/1
750 CAPSULE ORAL 2 TIMES DAILY
Qty: 180 CAPSULE | Refills: 11 | Status: SHIPPED | OUTPATIENT
Start: 2022-01-14 | End: 2022-01-18

## 2022-01-14 RX ORDER — TACROLIMUS 1 MG/1
4 CAPSULE ORAL 2 TIMES DAILY
Qty: 240 CAPSULE | Refills: 11 | Status: SHIPPED | OUTPATIENT
Start: 2022-01-14 | End: 2022-01-18

## 2022-01-14 RX ORDER — TACROLIMUS 0.5 MG/1
CAPSULE ORAL
Qty: 60 CAPSULE | Refills: 1 | Status: SHIPPED | OUTPATIENT
Start: 2022-01-14 | End: 2022-05-24

## 2022-01-14 RX ORDER — MAGNESIUM OXIDE 400 MG/1
400 TABLET ORAL 2 TIMES DAILY
Qty: 60 TABLET | Refills: 3 | Status: SHIPPED | OUTPATIENT
Start: 2022-01-14 | End: 2022-01-18

## 2022-01-14 RX ORDER — SENNOSIDES 8.6 MG
1-2 TABLET ORAL 2 TIMES DAILY
Qty: 60 TABLET | Refills: 0 | Status: SHIPPED | OUTPATIENT
Start: 2022-01-14 | End: 2022-04-07

## 2022-01-14 RX ORDER — PANTOPRAZOLE SODIUM 40 MG/1
40 TABLET, DELAYED RELEASE ORAL DAILY
Qty: 30 TABLET | Refills: 3 | Status: SHIPPED | OUTPATIENT
Start: 2022-01-14 | End: 2022-01-18

## 2022-01-14 RX ORDER — HYDROXYZINE HYDROCHLORIDE 25 MG/1
25 TABLET, FILM COATED ORAL EVERY 6 HOURS PRN
Qty: 15 TABLET | Refills: 0 | Status: SHIPPED | OUTPATIENT
Start: 2022-01-14 | End: 2022-01-18

## 2022-01-14 RX ORDER — PREDNISONE 5 MG/1
5 TABLET ORAL DAILY
Qty: 30 TABLET | Refills: 0 | Status: SHIPPED | OUTPATIENT
Start: 2022-01-15 | End: 2022-01-18

## 2022-01-14 RX ORDER — ASPIRIN 81 MG/1
81 TABLET, CHEWABLE ORAL DAILY
Status: DISCONTINUED | OUTPATIENT
Start: 2022-01-14 | End: 2022-01-14 | Stop reason: HOSPADM

## 2022-01-14 RX ORDER — VALGANCICLOVIR 450 MG/1
450 TABLET, FILM COATED ORAL DAILY
Qty: 30 TABLET | Refills: 2 | Status: SHIPPED | OUTPATIENT
Start: 2022-01-15 | End: 2022-01-18

## 2022-01-14 RX ADMIN — SULFAMETHOXAZOLE AND TRIMETHOPRIM 1 TABLET: 400; 80 TABLET ORAL at 07:51

## 2022-01-14 RX ADMIN — ALBUMIN HUMAN 25 G: 0.25 SOLUTION INTRAVENOUS at 00:01

## 2022-01-14 RX ADMIN — NYSTATIN 500000 UNITS: 100000 SUSPENSION ORAL at 11:59

## 2022-01-14 RX ADMIN — NYSTATIN 500000 UNITS: 100000 SUSPENSION ORAL at 07:51

## 2022-01-14 RX ADMIN — PREDNISONE 5 MG: 5 TABLET ORAL at 07:50

## 2022-01-14 RX ADMIN — VALGANCICLOVIR 450 MG: 450 TABLET, FILM COATED ORAL at 07:51

## 2022-01-14 RX ADMIN — ASPIRIN 81 MG CHEWABLE TABLET 81 MG: 81 TABLET CHEWABLE at 11:59

## 2022-01-14 RX ADMIN — BUMETANIDE 1 MG: 0.25 INJECTION INTRAMUSCULAR; INTRAVENOUS at 07:57

## 2022-01-14 RX ADMIN — LIDOCAINE 1 PATCH: 560 PATCH PERCUTANEOUS; TOPICAL; TRANSDERMAL at 07:51

## 2022-01-14 RX ADMIN — MYCOPHENOLATE MOFETIL 750 MG: 250 CAPSULE ORAL at 07:50

## 2022-01-14 RX ADMIN — SODIUM PHOSPHATE, DIBASIC, ANHYDROUS, POTASSIUM PHOSPHATE, MONOBASIC, AND SODIUM PHOSPHATE, MONOBASIC, MONOHYDRATE 500 MG: 852; 155; 130 TABLET, COATED ORAL at 11:59

## 2022-01-14 RX ADMIN — INSULIN ASPART 1 UNITS: 100 INJECTION, SOLUTION INTRAVENOUS; SUBCUTANEOUS at 13:38

## 2022-01-14 RX ADMIN — LIDOCAINE HYDROCHLORIDE 10 ML: 10 INJECTION, SOLUTION EPIDURAL; INFILTRATION; INTRACAUDAL; PERINEURAL at 12:00

## 2022-01-14 RX ADMIN — Medication 400 MG: at 11:59

## 2022-01-14 RX ADMIN — PANTOPRAZOLE SODIUM 40 MG: 40 TABLET, DELAYED RELEASE ORAL at 07:50

## 2022-01-14 RX ADMIN — NYSTATIN 500000 UNITS: 100000 SUSPENSION ORAL at 16:28

## 2022-01-14 RX ADMIN — OXYCODONE HYDROCHLORIDE 5 MG: 5 TABLET ORAL at 02:21

## 2022-01-14 RX ADMIN — OXYCODONE HYDROCHLORIDE 5 MG: 5 TABLET ORAL at 09:43

## 2022-01-14 RX ADMIN — Medication 1 TABLET: at 07:50

## 2022-01-14 RX ADMIN — TACROLIMUS 4 MG: 1 CAPSULE ORAL at 07:50

## 2022-01-14 ASSESSMENT — ACTIVITIES OF DAILY LIVING (ADL)
ADLS_ACUITY_SCORE: 8

## 2022-01-14 NOTE — DISCHARGE SUMMARY
DISCHARGE:  Patient with orders to discharge to home.     Education Provided:   Med Card up to date  Lab Book up to date  Handouts AVS, PT/OT handouts  Specialty Pharmacy Complete  LDAs PIV removed    Discharge instructions, medications & follow ups reviewed with patient. Copy of discharge summary given to patient. PIV removed. Belongings returned from security NA. [facility] notified, report given NA.    Patient in stable condition. AVSS. Patient had no further questions regarding discharge instructions and medications. Patient transferred out by wheelchair& left with .  Plan for home lab draws, home PT/OT.

## 2022-01-14 NOTE — PROGRESS NOTES
CLINICAL NUTRITION SERVICES - DISCHARGE NOTE    Patient s discharge needs assessed and discharge planning has been conducted with the multidisciplinary transplant care team including physicians, pharmacy, social work and transplant coordinator.    Follow up/Monitoring:  Once discharged, place outpatient nutrition consult via the transplant team if nutrition concerns arise.      Myra Siegel  Dietetic Intern     I have read and agree with the above nutrition note.    Susan Esparza, MS, RD, LD, CNSC  7A/obs RD Pgr: [phone]

## 2022-01-14 NOTE — PHARMACY-TRANSPLANT NOTE
Solid Organ Transplant Recipient Prior to Discharge Note    51 year old female s/p  donor liver transplant on 2022.     MAINTENANCE IMMUNOSUPPRESSION:     Mycophenolate 750mg PO BID     Tacrolimus initial goal trough levels of 8-10mcg/L for 0-3 months post-transplant     Current dose 4mg BID     Last trough 6.2mcg/L on  (13.5hr)     Patient will continue prednisone at 5 mg PO daily until tacrolimus level is therapeutic.      Opportunistic pathogen prophylaxis includes: trimethoprim/sulfamethoxazole x 6 months, valganciclovir x 12 weeks    CMV D - / R -  EBV D - / R +     Patient is not enrolled in medication study.    Pharmacy has monitored for medication interactions and immunosuppression levels in conjunction with the multidisciplinary team. In anticipation for discharge, medication therapy needs have been addressed daily throughout the current admission via multidisciplinary rounds and/or discussions, order verification, daily clinical pharmacy review, and communication with prescribers.    Brandy Carrasco, PharmD, formerly [facility]   PGY-1 Pharmacy Resident

## 2022-01-14 NOTE — PLAN OF CARE
"/85 (BP Location: Left arm)   Pulse 77   Temp 98.1  F (36.7  C) (Oral)   Resp 16   Ht 1.651 m (5' 5\")   Wt 64 kg (141 lb)   SpO2 98%   BMI 23.46 kg/m    [id number]  Patient vitally stable on RA, afebrile. Mag 1.1, 4G IV given.  BG ACHS-pt constantly grazing so unsure of how accurate BG are. PRN oxycodone 1x. Tolerating regular diet with fair appetite. PIV SL. No BM reported, voiding well. Incision stapled, stitch placed over AYSHA site this shift. Incision leaky on R side. SBA. Lab book and med card updated, specialty pharm completed this shift.   Will continue with POC and notify MD with changes or concerns.    "

## 2022-01-14 NOTE — PROGRESS NOTES
Care Management Discharge Note    Discharge Date: 01/14/2022       Discharge Disposition: Home,Home Care    Discharge Services: None    Discharge DME: None    Discharge Transportation: family or friend will provide    Patient/family educated on Medicare website which has current facility and service quality ratings: yes    Education Provided on the Discharge Plan:  Yes  Persons Notified of Discharge Plans: Patient and spouse  Patient/Family in Agreement with the Plan: yes    Handoff Referral Completed: Yes    Additional Information:  Per care team rounds anticipate discharge home today with home care RN, PT, OT. [facility] has accepted this patient with start of care on 1/18As previously noted pt spouse had inquired about checking with alternate home care agencies.       1335: Met with pt.  Reviewed status of home care referrals.  Pt spouse was not present for discussion.  Pt aware that her spouse can connect with writer should he have additional questions.  Plan for [facility] for home RN, PT, OT with start of care on 1/18.      1525: Received f/u call from pt inquiring about equipment for discharge.  Pt confirmed she spoke with therapy earlier today about the equipment but did not confirm what she wanted.  Paged Becka OT regarding pt request.     1545: Pt called writer requesting to discuss eqiupment.  Met with pt.  Pt spouse also participated via phone.  Pt and spouse would like a tub bench as well as raised toilet seat - no matter what the cost.  Paged PT/OT     1600: Pt called writer to update that she had spoken with the therapist and plans to order the equipment from [facility].   Updated Cathleen [facility] that pt is discharging today and will be going with [facility] with start of care on 1/18/2022      Home Care agencies that have declined:  [facility] - they are only accepting straight Medicare patients for the next month.   [facility] - unable to accept d/t capacity and new  transplant status.   [facility] - unable to accept patients insurance  [facility] - out of network with pt insurance  [facility] out of network with pt insurance.   [facility] - at capacity    Aimee Denton RN

## 2022-01-14 NOTE — DISCHARGE INSTRUCTIONS
Nutrition  Diet: Regular diet as tolerated, or as recommended by your team/doctors  *Post-Transplant Diet Guidelines - Follow recommendations on the Guide to Your Diet after Transplant handout (summary below).    -  Maintain a healthy weight.  -  Eat a heart-healthy diet (low sodium, low saturated and trans-fat) once your appetite improves to normal.  -  Control blood sugar.  -  Limit sodium (salt).  -  Take calcium and vitamin D supplement if your doctor or team orders.  -  Eat more protein for six to eight weeks after transplant.    -  Prevent food poisoning, store and prepare foods to the proper temperature, practice good handwashing, heat all deli meat (to 165 degrees Fahrenheit), avoid raw fish and meats (including uncooked eggs, non-pasteurized or raw milk, and non-pasteurized or raw cheeses including brie, camembert, blue-veined cheese, and Mexican queso fresco), and throw out leftovers older than two days.   -  In some cases (but not in all cases), adjust potassium intake.

## 2022-01-14 NOTE — PLAN OF CARE
"/76 (BP Location: Left arm)   Pulse 76   Temp 98.3  F (36.8  C) (Oral)   Resp 16   Ht 1.651 m (5' 5\")   Wt 64 kg (141 lb)   SpO2 97%   BMI 23.46 kg/m      Shift: 0700-discharge  Isolation Status: NA  VS: WDL on RA, afebrile  Neuro: Aox4  Behaviors: Pleasant, cooperative with cares, able to make her needs known  BG: AC/HS, 99 and 155 treated with sliding scale insulin as ordered  Respiratory: Clear lung sounds, no shortness of breath  Cardiac: Regular rhythm/rate  Pain/Nausea/PRN: Moderate surgical pain.  Scheduled lidocaine [patch.  PRN 5 mg oxycodone x1.  Quick wave of nausea following morning meds.  Declined any PRNs.  Diet: Regular diet, good appetite  LDA: LUE PIV removed  Infusion(s): NA  GI/: Voiding without difficulty.  Diarrhea.  Bowel meds held  Skin: Bilateral abdominal incision closed and approximated with stapes.  Small amount of serous drainage right lateral side.  Covered with gauze and tegaderm by provider.  RLQ old AYSHA site saturated multiple gauz/abd dressings.  Provider added suture and covered with tegaderm  Mobility: Standby to independent  Events/Education: Reviewed AVS, updated med card, demonstrated how to fill out lab book.  Plan: Discharge with follow-up appointments.    "

## 2022-01-15 NOTE — PLAN OF CARE
Physical Therapy Discharge Summary    Reason for therapy discharge:    Discharged to home with home therapy.    Progress towards therapy goal(s). See goals on Care Plan in [facility] electronic health record for goal details.  Goals partially met.  Barriers to achieving goals:   discharge from facility.    Therapy recommendation(s):    Continued therapy is recommended.  Rationale/Recommendations:  Pt would benefit from additional skilled home care PT services to address endurance, balance and strength.

## 2022-01-15 NOTE — PLAN OF CARE
Occupational Therapy Discharge Summary    Reason for therapy discharge:    Discharged to home with home therapy.    Progress towards therapy goal(s). See goals on Care Plan in [facility] electronic health record for goal details.  Goals partially met.  Barriers to achieving goals:   discharge from facility.    Therapy recommendation(s):    Continued therapy is recommended.  Rationale/Recommendations:  Recommend HH OT to progress IND w/ ADLs/IADLs.

## 2022-01-17 ENCOUNTER — TELEPHONE (OUTPATIENT)
Dept: TRANSPLANT | Facility: CLINIC | Age: 52
End: 2022-01-17
Payer: COMMERCIAL

## 2022-01-17 ENCOUNTER — TELEPHONE (OUTPATIENT)
Dept: FAMILY MEDICINE | Facility: CLINIC | Age: 52
End: 2022-01-17
Payer: COMMERCIAL

## 2022-01-17 DIAGNOSIS — Z94.4 LIVER REPLACED BY TRANSPLANT (H): Primary | ICD-10-CM

## 2022-01-17 LAB — BACTERIA BLD CULT: NO GROWTH

## 2022-01-17 NOTE — TELEPHONE ENCOUNTER
Fidelina is a post-liver transplant patient who discharged 1/14/22.    Patient chooses to receive medications from  specialty pharmacy.     Bhumi Rodriguez [facility]  [phone]

## 2022-01-17 NOTE — ANESTHESIA POSTPROCEDURE EVALUATION
Patient: Melita Cortes    Procedure: Procedure(s):  TRANSPLANT, LIVER, RECIPIENT,  DONOR       Diagnosis:End stage liver disease (H) [K72.10]  Diagnosis Additional Information: No value filed.    Anesthesia Type:  No value filed.    Note:  Disposition: ICU            ICU Sign Out: Anesthesiologist/ICU physician sign out WAS performed   Postop Pain Control: Uneventful            Sign Out: Well controlled pain   PONV: No   Neuro/Psych: Uneventful            Sign Out: PLANNED postop sedation   Airway/Respiratory: Uneventful            Sign Out: Acceptable/Baseline resp. status; AIRWAY IN SITU/Resp. Support               Airway in situ/Resp. Support: ETT; PPV                 Reason: Planned Pre-op   CV/Hemodynamics: Uneventful            Sign Out: Acceptable CV status   Other NRE: NONE   DID A NON-ROUTINE EVENT OCCUR? No           Last vitals:  Vitals Value Taken Time   /85 22 1525   Temp 36.8  C (98.3  F) 22 1525   Pulse 76 22 1525   Resp 16 22 1525   SpO2 96 % 22 1525       Electronically Signed By: Sunny Howell MD  2022  1:43 PM

## 2022-01-17 NOTE — TELEPHONE ENCOUNTER
Returned Fidelina's call. She was discharged Friday 1/14/2022. She has not yet heard from home care regarding labs. I submitted requests for all follow up clinic visits. [facility] has been set up and services are to start on Tuesday 1/18/2022.       Fidelina reports tremors that are so bad that she has difficulty with daily cares and even talking. She will discuss with surgeon at her upcoming clinic visit.     Organ specific education completed, and discharge planning has been conducted with multidisciplinary transplant care team including physicians, pharmacy, nutrition, and social work.     Received a message from inpatient JULIA that Fidelina discharged on Friday 1/14/2022.  Discussed pertinent health issues related to liver transplant and discharge planning.  Upon discharge, pt will be going to home .   Called and spoke with Fidelina via phone today. Introduced myself and explained the role of the post liver transplant coordinator and support team.    Briefly discussed the following topics:  1.) immunosuppression and the importance of taking regularly as directed.  2.) pertinent labs and their interpretation  3.) rejection signs / symptoms and treatment and 4.) Importance of long term follow-up with the transplant center and primary care physician 5.) Fidelina does not have a biliary stent placed but we did discuss the possibility of stent placement if needed.   Pt / care giver has lab book and understands responsibility of getting and recording results. Education in process.  (See inpatient education teaching flowsheet).  Pt is aware she will be seen by one of the transplant surgeons within a week of discharge and then by her specific transplant surgeon weekly for 4 weeks and then at the discretion of the surgeon.  Discharge education will be reinforced at subsequent clinic visits.  Patient is aware that she will need follow-up with the transplant team for the rest of  her life.  Initial follow-up will be with the transplant  surgeon, then move to pre-transplant hepatologist.  Patient is aware that she will need lab testing, initially every Monday and Thursday to monitor liver function on a regular basis for the rest of  her life.     Pt denies further questions at this time.  On call coordinator information provided via [facility] message.

## 2022-01-17 NOTE — ANESTHESIA PROCEDURE NOTES
Arterial Line Procedure Note    Pre-Procedure   Staff -        Anesthesiologist:  Sunny Hernandez MD       Performed By: anesthesiologist       Location: OR       Pre-Anesthestic Checklist: patient identified, IV checked, risks and benefits discussed, informed consent, monitors and equipment checked, pre-op evaluation and at physician/surgeon's request  Timeout:       Correct Patient: Yes        Correct Procedure: Yes        Correct Site: Yes        Correct Position: Yes   Procedure   Procedure: emergent and arterial line       Laterality: left       Insertion Site: radial.  Sterile Prep        Standard elements of sterile barrier followed       Skin prep: Chloraprep  Insertion/Injection        Technique: ultrasound guided and Seldinger Technique        1. Ultrasound was used to evaluate the access site.       2. Artery evaluated via ultrasound for patency/adequacy.       3. Using real-time ultrasound the needle/catheter was observed entering the artery/vein.       Catheter Type/Size: 20 G, 1.75 in/4.5 cm quick cath (integral wire)  Narrative        Tegaderm dressing used.       Complications: None apparent,      Arterial waveform: Yes

## 2022-01-17 NOTE — TELEPHONE ENCOUNTER
Patient Call: General  Route to LPN    Reason for call: Patient recently received a transplant and they are very shaky/feel like they might be getting thrush. They were also supposed to have labs drawn 2x a week and noone has contacted them for details. Admin noticed that no labs are scheduled.    Call back needed? Yes    Return Call Needed  Same as documented in contacts section  When to return call?: Greater than one day: Route standard priority

## 2022-01-18 ENCOUNTER — TELEPHONE (OUTPATIENT)
Dept: TRANSPLANT | Facility: CLINIC | Age: 52
End: 2022-01-18

## 2022-01-18 ENCOUNTER — LAB REQUISITION (OUTPATIENT)
Dept: LAB | Facility: CLINIC | Age: 52
End: 2022-01-18
Payer: COMMERCIAL

## 2022-01-18 DIAGNOSIS — K70.9 ALCOHOLIC LIVER DISEASE (H): ICD-10-CM

## 2022-01-18 DIAGNOSIS — Z94.4 STATUS POST LIVER TRANSPLANTATION (H): ICD-10-CM

## 2022-01-18 DIAGNOSIS — Z94.4 LIVER TRANSPLANT STATUS (H): ICD-10-CM

## 2022-01-18 DIAGNOSIS — N17.9 ACUTE KIDNEY FAILURE, UNSPECIFIED (H): ICD-10-CM

## 2022-01-18 LAB
ALBUMIN SERPL-MCNC: 3.1 G/DL (ref 3.4–5)
ALP SERPL-CCNC: 82 U/L (ref 40–150)
ALT SERPL W P-5'-P-CCNC: 50 U/L (ref 0–50)
ANION GAP SERPL CALCULATED.3IONS-SCNC: 5 MMOL/L (ref 3–14)
AST SERPL W P-5'-P-CCNC: 20 U/L (ref 0–45)
BILIRUB DIRECT SERPL-MCNC: 1 MG/DL (ref 0–0.2)
BILIRUB SERPL-MCNC: 1.8 MG/DL (ref 0.2–1.3)
BUN SERPL-MCNC: 24 MG/DL (ref 7–30)
CALCIUM SERPL-MCNC: 8.6 MG/DL (ref 8.5–10.1)
CHLORIDE BLD-SCNC: 97 MMOL/L (ref 94–109)
CO2 SERPL-SCNC: 34 MMOL/L (ref 20–32)
CREAT SERPL-MCNC: 0.91 MG/DL (ref 0.52–1.04)
ERYTHROCYTE [DISTWIDTH] IN BLOOD BY AUTOMATED COUNT: 18 % (ref 10–15)
GFR SERPL CREATININE-BSD FRML MDRD: 76 ML/MIN/1.73M2
GLUCOSE BLD-MCNC: 106 MG/DL (ref 70–99)
HCT VFR BLD AUTO: 38 % (ref 35–47)
HGB BLD-MCNC: 12.4 G/DL (ref 11.7–15.7)
MAGNESIUM SERPL-MCNC: 1.1 MG/DL (ref 1.6–2.3)
MCH RBC QN AUTO: 31.4 PG (ref 26.5–33)
MCHC RBC AUTO-ENTMCNC: 32.6 G/DL (ref 31.5–36.5)
MCV RBC AUTO: 96 FL (ref 78–100)
PHOSPHATE SERPL-MCNC: 3.7 MG/DL (ref 2.5–4.5)
PLATELET # BLD AUTO: 304 10E3/UL (ref 150–450)
POTASSIUM BLD-SCNC: 3.9 MMOL/L (ref 3.4–5.3)
PROT SERPL-MCNC: 5.7 G/DL (ref 6.8–8.8)
RBC # BLD AUTO: 3.95 10E6/UL (ref 3.8–5.2)
SODIUM SERPL-SCNC: 136 MMOL/L (ref 133–144)
TACROLIMUS BLD-MCNC: 7.8 UG/L (ref 5–15)
TME LAST DOSE: NORMAL H
TME LAST DOSE: NORMAL H
WBC # BLD AUTO: 4.8 10E3/UL (ref 4–11)

## 2022-01-18 PROCEDURE — 83735 ASSAY OF MAGNESIUM: CPT | Mod: ORL | Performed by: TRANSPLANT SURGERY

## 2022-01-18 PROCEDURE — 82248 BILIRUBIN DIRECT: CPT | Mod: ORL | Performed by: TRANSPLANT SURGERY

## 2022-01-18 PROCEDURE — 80197 ASSAY OF TACROLIMUS: CPT | Mod: ORL | Performed by: TRANSPLANT SURGERY

## 2022-01-18 PROCEDURE — 80053 COMPREHEN METABOLIC PANEL: CPT | Mod: ORL | Performed by: TRANSPLANT SURGERY

## 2022-01-18 PROCEDURE — 84100 ASSAY OF PHOSPHORUS: CPT | Mod: ORL | Performed by: TRANSPLANT SURGERY

## 2022-01-18 PROCEDURE — 85027 COMPLETE CBC AUTOMATED: CPT | Mod: ORL | Performed by: TRANSPLANT SURGERY

## 2022-01-18 RX ORDER — SULFAMETHOXAZOLE AND TRIMETHOPRIM 400; 80 MG/1; MG/1
1 TABLET ORAL DAILY
Qty: 90 TABLET | Refills: 3 | Status: SHIPPED | OUTPATIENT
Start: 2022-01-18 | End: 2022-06-20

## 2022-01-18 RX ORDER — PREDNISONE 5 MG/1
5 TABLET ORAL DAILY
Qty: 90 TABLET | Refills: 1 | Status: SHIPPED | OUTPATIENT
Start: 2022-01-18 | End: 2022-02-10

## 2022-01-18 RX ORDER — HYDROXYZINE HYDROCHLORIDE 25 MG/1
25 TABLET, FILM COATED ORAL EVERY 6 HOURS PRN
Qty: 15 TABLET | Refills: 0 | Status: SHIPPED | OUTPATIENT
Start: 2022-01-18 | End: 2022-01-26

## 2022-01-18 RX ORDER — MAGNESIUM OXIDE 400 MG/1
800 TABLET ORAL 2 TIMES DAILY
Qty: 180 TABLET | Refills: 3 | Status: SHIPPED | OUTPATIENT
Start: 2022-01-18 | End: 2022-01-29

## 2022-01-18 RX ORDER — PANTOPRAZOLE SODIUM 40 MG/1
40 TABLET, DELAYED RELEASE ORAL DAILY
Qty: 90 TABLET | Refills: 3 | Status: SHIPPED | OUTPATIENT
Start: 2022-01-18 | End: 2022-04-12

## 2022-01-18 RX ORDER — TACROLIMUS 1 MG/1
4 CAPSULE ORAL 2 TIMES DAILY
Qty: 730 CAPSULE | Refills: 3 | Status: SHIPPED | OUTPATIENT
Start: 2022-01-18 | End: 2022-01-24

## 2022-01-18 RX ORDER — MAGNESIUM OXIDE 400 MG/1
400 TABLET ORAL 2 TIMES DAILY
Qty: 180 TABLET | Refills: 3 | Status: SHIPPED | OUTPATIENT
Start: 2022-01-18 | End: 2022-01-18

## 2022-01-18 RX ORDER — VALGANCICLOVIR 450 MG/1
450 TABLET, FILM COATED ORAL DAILY
Qty: 90 TABLET | Refills: 3 | Status: SHIPPED | OUTPATIENT
Start: 2022-01-18 | End: 2022-04-07

## 2022-01-18 RX ORDER — MYCOPHENOLATE MOFETIL 250 MG/1
750 CAPSULE ORAL 2 TIMES DAILY
Qty: 540 CAPSULE | Refills: 3 | Status: SHIPPED | OUTPATIENT
Start: 2022-01-18 | End: 2022-02-18

## 2022-01-18 NOTE — TELEPHONE ENCOUNTER
Post Discharge Liver Transplant Phone Call (within 1-3 business days post discharge)      Reviewed with the patient the Transplant Center contact information:  Best phone contact is [phone]-Friday from 8am-5pm.   *You may have to leave a message and get a call back.    Good alternative communication method is via [employer] message.    Coordinators are available 8am-5pm M-F, otherwise there will be an on-call RN available 24/7  *If calling after hours, please call [phone] and ask to speak with the on-call Transplant Coordinator    When to contact the Transplant Center:  - Fever > 100.5F  - Dizziness, lightheadedness  - Severe pain  - If you are unable to take your immunosuppression medications.  - Unable to obtain refill on immunosuppressive medications    Medications:  Reviewed medications with patient.   - confirmed pt has supply of meds  - MAR is up to date   - Verify preferred pharmacy in [employer]  - Reminded patient to always contact the pharmacy first for refills    Confirmed the patient has an up to date medication card at home and is knowledgeable in updating their med card (or has someone to assist in this).   - Reinforced patient always bring med card to appointment      Labs:  Labs are expected to be drawn on Monday and Thursday until you are told differently by your transplant team.   - confirmed patient's preferred lab and updated EPIC   - Take a copy of your lab letter with to each lab draw   - Lab order sent directly to patient via [employer] or mail.    - Confirmed patient has a copy of lab letter  - Review how to review lab results on [employer] or obtaining and recording lab values in handbook    Vitals:  Encouraged the patient to record the following:  - BP - daily unless light headed  - Temperature - daily  - Weight - daily    Follow Up:    Role of the Transplant Coordinator vs LPN was reviewed. Contact information provided for both.     Reviewed current scheduled follow up appointments with  surgeon.      Reminded the patient to follow up with PCP within 1 -2 weeks for general follow-up and management of diabetes, pain, and blood pressure    STENT REMOVAL - reminded patient that if stent was placed at time of transplant (this is indicated on the check list) this will need to come out 2-3 mos post transplant.  Asked patient to notify transplant coordinator if sees stent in stool.    Has the patient been contacted with initial visit from HOME CARE? yes  o If not, Transplant Coordinator to be notified to follow up with Home Care  o Pt will utilize  Specialty pharmacy.     Soumya Wren LPN

## 2022-01-18 NOTE — TELEPHONE ENCOUNTER
Spoke with Fidelina.  She has tremors so bad. She is struggling with using her cell and wiping herself. No headaches or nightmares. Message to dr Matson. Per dr matson pt to continue to monitor and update tx office.    She is eating ok, but felt it was minimal. She is drinking 2 protein shakes a day for the protein intake.     Pt's magnesium low. Pt will increase to 800 mg twice a day.   Pt at this time does not have diarrhea, but will update tx office if does have diarrhea.

## 2022-01-18 NOTE — TELEPHONE ENCOUNTER
Patient Call: Home care orders      Reason for call: Home care would like to continue home care labs twice a week. Also requesting orders for physical therapy, occupational therapy, and nursing for next 2 months       Call back needed? Yes  Return Call Needed  Same as documented in contacts section

## 2022-01-19 ENCOUNTER — TELEPHONE (OUTPATIENT)
Dept: TRANSPLANT | Facility: CLINIC | Age: 52
End: 2022-01-19
Payer: COMMERCIAL

## 2022-01-19 NOTE — TELEPHONE ENCOUNTER
The [place] form, 1/7/22 hospital admission, discharge note, and 1/7/22 labs faxed to [phone].  Sent for immediate abstraction into chart. Placed in HIM bin.    PAOLA Baldwin.

## 2022-01-19 NOTE — TELEPHONE ENCOUNTER
Forms completed   Please fax them along with the hospital admission and discharge note from 1/07/2022 and the lab work from that date

## 2022-01-19 NOTE — PROGRESS NOTES
Transplant Social Work Service Discharge Note      Patient Name:  Melita Cortes     Anticipated Discharge Date:  1/14/2022    Discharge Disposition:   Home Care:  Home care - [facility] home care     Plan for 24 hour care for immediate post transplant period: Spouse, Doug    If not local, plans for short term stay:  They live local    Additional Services/Equipment Arranged:  [facility] home care Home RN, PT, OT will start on 1/18      Persons notified of above discharge plan:  Primary team, patient, patient's family, charge nurse     Patient / Family response to discharge plan:  In favor of discharge     Education and resources provided by SW at discharge: role of transplant  in out patient setting and provided contact info for , availability of support groups,     Discussed anticipated pharmacy out of pocket costs: NO    Provided [employer] Donor Letter Writing packet : NO - Will provide outpatient     Plan: this writer will follow up on an outpatient basis     ELMO Vaelncia, LICSW

## 2022-01-20 ENCOUNTER — TELEPHONE (OUTPATIENT)
Dept: TRANSPLANT | Facility: CLINIC | Age: 52
End: 2022-01-20
Payer: COMMERCIAL

## 2022-01-20 ENCOUNTER — TEAM CONFERENCE (OUTPATIENT)
Dept: TRANSPLANT | Facility: CLINIC | Age: 52
End: 2022-01-20
Payer: COMMERCIAL

## 2022-01-20 DIAGNOSIS — Z94.4 STATUS POST LIVER TRANSPLANTATION (H): ICD-10-CM

## 2022-01-20 DIAGNOSIS — K70.31 ALCOHOLIC CIRRHOSIS OF LIVER WITH ASCITES (H): Primary | ICD-10-CM

## 2022-01-20 NOTE — TELEPHONE ENCOUNTER
Fidelina continues to have pretty severe tremors and it's starting to effect her ambulation. She's having a difficult time even walking to the bathroom. Plan was initially to discuss with Dr. Browne at her clinic visit on Monday 1/24/22 but she said now that it is effecting her walking she would like us to discuss with him sooner. Message sent to Dr. Browne. Fidelina is aware and will check back in tomorrow.

## 2022-01-20 NOTE — TELEPHONE ENCOUNTER
Post Liver Transplant Team Conference  Date: 1/20/2022  Transplant Coordinator: Shalini Hawkins  Transplant Surgeon: Pradeep Browne      Attendees:  [] Dr. Carmona [] Dr. Steele []       [x] Dr. Browne [x] Soumya Wren LPN []    [] Dr. Chairez [] Savannah Diego NP []    [] Dr. Rivas [] Lupe Llanos NP []    [] Dr. Nayak [x] Lupe Lund, CRICKET []       [] Dr. Finney [x] Bria Briscoe RN []       [] Dr. Jhonathan Hawthorne [x] Renetta Dale, CRICKET []       [] Dr. CHRISTI Lou [] Shalini Angelo RN []       [] Dr. Stephenson [] Nurys White RN []       [] Dr. Delatorre [] Christoph Fisher RN []         Verbal Plan Read Back:   - Ensure Fidelina is set up with counselor such as Wei Mares if possible.   - monthly PETH tests.       Routed to RN Coordinator   Bria Briscoe, RN

## 2022-01-20 NOTE — TELEPHONE ENCOUNTER
"Received a call from patient via triage re: tremors, weakness.  No headaches, no vision changes.  Just feels like she doesn't \"have muscle control\".    Has been having ongoing issues with tremors.  Will have RNCC f/u right away in the AM.  "

## 2022-01-21 ENCOUNTER — LAB REQUISITION (OUTPATIENT)
Dept: LAB | Facility: CLINIC | Age: 52
End: 2022-01-21
Payer: COMMERCIAL

## 2022-01-21 ENCOUNTER — VIRTUAL VISIT (OUTPATIENT)
Dept: PHARMACY | Facility: CLINIC | Age: 52
End: 2022-01-21
Attending: TRANSPLANT SURGERY
Payer: COMMERCIAL

## 2022-01-21 ENCOUNTER — TELEPHONE (OUTPATIENT)
Dept: TRANSPLANT | Facility: CLINIC | Age: 52
End: 2022-01-21

## 2022-01-21 DIAGNOSIS — Z94.4 LIVER REPLACED BY TRANSPLANT (H): ICD-10-CM

## 2022-01-21 DIAGNOSIS — Z79.899 OTHER LONG TERM (CURRENT) DRUG THERAPY: ICD-10-CM

## 2022-01-21 DIAGNOSIS — K59.00 CONSTIPATION, UNSPECIFIED CONSTIPATION TYPE: ICD-10-CM

## 2022-01-21 DIAGNOSIS — Z94.4 LIVER TRANSPLANT STATUS (H): ICD-10-CM

## 2022-01-21 DIAGNOSIS — Z94.4 STATUS POST LIVER TRANSPLANTATION (H): Primary | ICD-10-CM

## 2022-01-21 DIAGNOSIS — G89.18 ACUTE POST-OPERATIVE PAIN: ICD-10-CM

## 2022-01-21 DIAGNOSIS — Z94.4 LIVER TRANSPLANTED (H): Primary | ICD-10-CM

## 2022-01-21 LAB
ALBUMIN SERPL-MCNC: 2.8 G/DL (ref 3.4–5)
ALP SERPL-CCNC: 77 U/L (ref 40–150)
ALT SERPL W P-5'-P-CCNC: 30 U/L (ref 0–50)
ANION GAP SERPL CALCULATED.3IONS-SCNC: 2 MMOL/L (ref 3–14)
AST SERPL W P-5'-P-CCNC: 11 U/L (ref 0–45)
BASOPHILS # BLD MANUAL: 0.2 10E3/UL (ref 0–0.2)
BASOPHILS NFR BLD MANUAL: 4 %
BILIRUB DIRECT SERPL-MCNC: 0.9 MG/DL (ref 0–0.2)
BILIRUB SERPL-MCNC: 1.3 MG/DL (ref 0.2–1.3)
BUN SERPL-MCNC: 16 MG/DL (ref 7–30)
CALCIUM SERPL-MCNC: 8.6 MG/DL (ref 8.5–10.1)
CHLORIDE BLD-SCNC: 100 MMOL/L (ref 94–109)
CO2 SERPL-SCNC: 33 MMOL/L (ref 20–32)
CREAT SERPL-MCNC: 0.94 MG/DL (ref 0.52–1.04)
EOSINOPHIL # BLD MANUAL: 0.3 10E3/UL (ref 0–0.7)
EOSINOPHIL NFR BLD MANUAL: 5 %
ERYTHROCYTE [DISTWIDTH] IN BLOOD BY AUTOMATED COUNT: 17.9 % (ref 10–15)
GFR SERPL CREATININE-BSD FRML MDRD: 74 ML/MIN/1.73M2
GLUCOSE BLD-MCNC: 113 MG/DL (ref 70–99)
HCT VFR BLD AUTO: 32.7 % (ref 35–47)
HGB BLD-MCNC: 10.2 G/DL (ref 11.7–15.7)
HOLD SPECIMEN: NORMAL
LYMPHOCYTES # BLD MANUAL: 0.9 10E3/UL (ref 0.8–5.3)
LYMPHOCYTES NFR BLD MANUAL: 15 %
MAGNESIUM SERPL-MCNC: 1.4 MG/DL (ref 1.6–2.3)
MCH RBC QN AUTO: 30.9 PG (ref 26.5–33)
MCHC RBC AUTO-ENTMCNC: 31.2 G/DL (ref 31.5–36.5)
MCV RBC AUTO: 99 FL (ref 78–100)
MONOCYTES # BLD MANUAL: 0.2 10E3/UL (ref 0–1.3)
MONOCYTES NFR BLD MANUAL: 3 %
NEUTROPHILS # BLD MANUAL: 4.4 10E3/UL (ref 1.6–8.3)
NEUTROPHILS NFR BLD MANUAL: 73 %
PHOSPHATE SERPL-MCNC: 3.9 MG/DL (ref 2.5–4.5)
PLAT MORPH BLD: NORMAL
PLATELET # BLD AUTO: 409 10E3/UL (ref 150–450)
POTASSIUM BLD-SCNC: 3.8 MMOL/L (ref 3.4–5.3)
PROT SERPL-MCNC: 5.4 G/DL (ref 6.8–8.8)
RBC # BLD AUTO: 3.3 10E6/UL (ref 3.8–5.2)
RBC MORPH BLD: NORMAL
SODIUM SERPL-SCNC: 135 MMOL/L (ref 133–144)
TACROLIMUS BLD-MCNC: 7.8 UG/L (ref 5–15)
TME LAST DOSE: NORMAL H
TME LAST DOSE: NORMAL H
WBC # BLD AUTO: 6 10E3/UL (ref 4–11)

## 2022-01-21 PROCEDURE — 80197 ASSAY OF TACROLIMUS: CPT | Mod: ORL | Performed by: TRANSPLANT SURGERY

## 2022-01-21 PROCEDURE — 99605 MTMS BY PHARM NP 15 MIN: CPT | Performed by: PHARMACIST

## 2022-01-21 PROCEDURE — 82248 BILIRUBIN DIRECT: CPT | Mod: ORL | Performed by: TRANSPLANT SURGERY

## 2022-01-21 PROCEDURE — 99607 MTMS BY PHARM ADDL 15 MIN: CPT | Performed by: PHARMACIST

## 2022-01-21 PROCEDURE — 84100 ASSAY OF PHOSPHORUS: CPT | Mod: ORL | Performed by: TRANSPLANT SURGERY

## 2022-01-21 PROCEDURE — 36415 COLL VENOUS BLD VENIPUNCTURE: CPT | Mod: ORL | Performed by: TRANSPLANT SURGERY

## 2022-01-21 PROCEDURE — 85027 COMPLETE CBC AUTOMATED: CPT | Mod: ORL | Performed by: TRANSPLANT SURGERY

## 2022-01-21 PROCEDURE — 80053 COMPREHEN METABOLIC PANEL: CPT | Mod: ORL | Performed by: TRANSPLANT SURGERY

## 2022-01-21 PROCEDURE — 83735 ASSAY OF MAGNESIUM: CPT | Mod: ORL | Performed by: TRANSPLANT SURGERY

## 2022-01-21 RX ORDER — TRAMADOL HYDROCHLORIDE 50 MG/1
50 TABLET ORAL EVERY 6 HOURS PRN
Qty: 14 TABLET | Refills: 0 | COMMUNITY
Start: 2022-01-21 | End: 2022-01-28

## 2022-01-21 RX ORDER — LANOLIN ALCOHOL/MO/W.PET/CERES
100 CREAM (GRAM) TOPICAL DAILY
Qty: 14 TABLET | Refills: 0 | Status: SHIPPED | OUTPATIENT
Start: 2022-01-21 | End: 2022-01-29

## 2022-01-21 NOTE — TELEPHONE ENCOUNTER
Spoke with patient. Per dr matson pt to have thiamine 100 mg daily  And follow up on Monday regarding shakiness and tremors.     Pt asking for refill on oxycodone. She has 3 tablets left. Spoke with dr Morgan. Pt to have 14 tablets of tramadol. 50 mg every 6 hours as needed.

## 2022-01-21 NOTE — PATIENT INSTRUCTIONS
Recommendations from today's MTM visit:                                                       Separate Magnesium at least 2 hours from Mycophenolate Mofetil.     Follow-up: 2-3 months    It was great to speak with you today.  I value your experience and would be very thankful for your time with providing feedback on our clinic survey. You may receive a survey via email or text message in the next few days.     To schedule another MTM appointment, please call the clinic directly or you may call the MTM scheduling line at [phone] or toll-free at [phone].     My Clinical Pharmacist's contact information:                                                      Please feel free to contact me with any questions or concerns you have.      Brett Guan, PharmD  MTM Pharmacist    Phone: [phone]

## 2022-01-21 NOTE — PROGRESS NOTES
Medication Therapy Management (MTM) Encounter    ASSESSMENT:                            Medication Adherence/Access: No issues identified    Liver Transplant:  Separate magnesium 2 hours from Mycophenolate Mofetil to avoid absorption interaction.    Constipation:Stable.     Pain: Stable.    PLAN:                            Separate Magnesium at least 2 hours from Mycophenolate Mofetil.     Follow-up: 2-3 months    SUBJECTIVE/OBJECTIVE:                          Melita Cortes is a 51 year old female called for a transitions of care visit. She was discharged from [facility] on 1/14/22 for liver transplant. Patient was accompanied by Brandon.     Reason for visit: initial post txp med review. Having trouble with [facility] filling scripts.     Allergies/ADRs: Reviewed in chart  Past Medical History: Reviewed in chart  Tobacco: She reports that she quit smoking about 20 months ago. She has never used smokeless tobacco.  Alcohol: not currently using    Medication Adherence/Access: Patient uses pill box(es) and has assistance with medication administration: family member, Brandon.  Patient takes medications 2 time(s) per day. 8am and 8pm  Per patient, misses medication 0 times per week.   Medication barriers: none.   The patient fills medications at [place]: YES.    Liver Transplant:  Current immunosuppressants include Tacrolimus 4mg twice daily, Prednisone 5mg every morning, and Mycophenolate Mofetil 750mg twice daily.  Pt reports Tremors severe in hands and legs. Weakness as well in the legs. Trouble walking around the house.   Vascular prophylaxis: Aspirin 81mg daily. Denies bruising bleeding.   Transplant date: 1/7/22  Estimated Creatinine Clearance: 72.3 mL/min (based on SCr of 0.94 mg/dL).  CMV prophylaxis: Valcyte 450 mg once daily. Treat 3 months post tx.   PCP prophylaxis: Bactrim S S daily for 6 months post txp  PPI use: Pantoprazole 40mg daily. Denies GERD sx.  Supplements: Mag Oxide 800mg twice daily ( 2  hours from MMF), MVI daily  Tx Coordinator: Shalini Hawkins, Using Med Card: Yes  Immunizations: annual flu shot 2021; Schmdmltg27:  2019; Prevnar 13: unknown; TDaP:  2019; Shingrix: unknown, HBV: unknown, COVID: Pfizer-BioNTTibersoft 1x2021    Constipation: Senna 1 tablet twice daily. Having several BMs a day, more formed.     Pain: Pt is using an ice pack, not using Oxycodone currently, Hydroxyzine, Methocarbamol. Pain is a 4-5/10. Content with current pain control.     Today's Vitals: There were no vitals taken for this visit.  ----------------  Post Discharge Medication Reconciliation Status: discharge medications reconciled and changed, per note/orders.    I spent 33 minutes with this patient today. I offer these suggestions for consideration by txp team. A copy of the visit note was provided to the patient's provider(s).    The patient was sent via [employer] a summary of these recommendations.     Brett Guan, PharmD  MTM Pharmacist    Phone: [phone]     Telemedicine Visit Details  Type of service:  Telephone visit  Start Time: 3:06 PM  End Time: 3:39 PM  Originating Location (patient location): Home  Distant Location (provider location):  [facility]     Medication Therapy Recommendations  Liver transplanted (H)    Current Medication: magnesium oxide (MAG-OX) 400 MG tablet   Rationale: Medication interaction - Dosage too low - Effectiveness   Recommendation: Change Administration Time   Status: Patient Agreed - Adherence/Education

## 2022-01-24 ENCOUNTER — OFFICE VISIT (OUTPATIENT)
Dept: TRANSPLANT | Facility: CLINIC | Age: 52
End: 2022-01-24
Attending: TRANSPLANT SURGERY
Payer: COMMERCIAL

## 2022-01-24 ENCOUNTER — TELEPHONE (OUTPATIENT)
Dept: TRANSPLANT | Facility: CLINIC | Age: 52
End: 2022-01-24

## 2022-01-24 ENCOUNTER — LAB (OUTPATIENT)
Dept: LAB | Facility: CLINIC | Age: 52
End: 2022-01-24
Attending: TRANSPLANT SURGERY
Payer: COMMERCIAL

## 2022-01-24 DIAGNOSIS — Z94.4 STATUS POST LIVER TRANSPLANTATION (H): ICD-10-CM

## 2022-01-24 DIAGNOSIS — Z94.4 LIVER REPLACED BY TRANSPLANT (H): ICD-10-CM

## 2022-01-24 LAB
ALBUMIN SERPL-MCNC: 3.2 G/DL (ref 3.4–5)
ALP SERPL-CCNC: 87 U/L (ref 40–150)
ALT SERPL W P-5'-P-CCNC: 28 U/L (ref 0–50)
ANION GAP SERPL CALCULATED.3IONS-SCNC: 10 MMOL/L (ref 3–14)
AST SERPL W P-5'-P-CCNC: 16 U/L (ref 0–45)
BILIRUB DIRECT SERPL-MCNC: 0.8 MG/DL (ref 0–0.2)
BILIRUB SERPL-MCNC: 1.4 MG/DL (ref 0.2–1.3)
BUN SERPL-MCNC: 22 MG/DL (ref 7–30)
CALCIUM SERPL-MCNC: 9 MG/DL (ref 8.5–10.1)
CHLORIDE BLD-SCNC: 100 MMOL/L (ref 94–109)
CO2 SERPL-SCNC: 28 MMOL/L (ref 20–32)
CREAT SERPL-MCNC: 1.2 MG/DL (ref 0.52–1.04)
ERYTHROCYTE [DISTWIDTH] IN BLOOD BY AUTOMATED COUNT: 17.6 % (ref 10–15)
GFR SERPL CREATININE-BSD FRML MDRD: 55 ML/MIN/1.73M2
GLUCOSE BLD-MCNC: 102 MG/DL (ref 70–99)
HCT VFR BLD AUTO: 35.8 % (ref 35–47)
HGB BLD-MCNC: 11.2 G/DL (ref 11.7–15.7)
MAGNESIUM SERPL-MCNC: 1.6 MG/DL (ref 1.6–2.3)
MCH RBC QN AUTO: 30.9 PG (ref 26.5–33)
MCHC RBC AUTO-ENTMCNC: 31.3 G/DL (ref 31.5–36.5)
MCV RBC AUTO: 99 FL (ref 78–100)
PHOSPHATE SERPL-MCNC: 4.3 MG/DL (ref 2.5–4.5)
PLATELET # BLD AUTO: 333 10E3/UL (ref 150–450)
POTASSIUM BLD-SCNC: 4.3 MMOL/L (ref 3.4–5.3)
PROT SERPL-MCNC: 6 G/DL (ref 6.8–8.8)
RBC # BLD AUTO: 3.62 10E6/UL (ref 3.8–5.2)
SODIUM SERPL-SCNC: 138 MMOL/L (ref 133–144)
TACROLIMUS BLD-MCNC: 7.6 UG/L (ref 5–15)
TME LAST DOSE: NORMAL H
TME LAST DOSE: NORMAL H
WBC # BLD AUTO: 5.6 10E3/UL (ref 4–11)

## 2022-01-24 PROCEDURE — 83735 ASSAY OF MAGNESIUM: CPT | Performed by: PATHOLOGY

## 2022-01-24 PROCEDURE — 99000 SPECIMEN HANDLING OFFICE-LAB: CPT | Performed by: PATHOLOGY

## 2022-01-24 PROCEDURE — 36415 COLL VENOUS BLD VENIPUNCTURE: CPT | Performed by: PATHOLOGY

## 2022-01-24 PROCEDURE — 85027 COMPLETE CBC AUTOMATED: CPT | Performed by: PATHOLOGY

## 2022-01-24 PROCEDURE — 99213 OFFICE O/P EST LOW 20 MIN: CPT | Mod: 24 | Performed by: TRANSPLANT SURGERY

## 2022-01-24 PROCEDURE — 80053 COMPREHEN METABOLIC PANEL: CPT | Performed by: PATHOLOGY

## 2022-01-24 PROCEDURE — 84100 ASSAY OF PHOSPHORUS: CPT | Performed by: PATHOLOGY

## 2022-01-24 PROCEDURE — 80197 ASSAY OF TACROLIMUS: CPT | Mod: 90 | Performed by: PATHOLOGY

## 2022-01-24 PROCEDURE — 82248 BILIRUBIN DIRECT: CPT | Performed by: PATHOLOGY

## 2022-01-24 RX ORDER — TACROLIMUS 1 MG/1
CAPSULE ORAL
Qty: 630 CAPSULE | Refills: 3 | Status: SHIPPED | OUTPATIENT
Start: 2022-01-24 | End: 2022-01-28

## 2022-01-24 NOTE — PROGRESS NOTES
Pt evaluated via billable telephone visit d/t COVID-19 restrictions. Time spent: 15 min    [facility] Solid Organ Transplant  Outpatient MNT: Post Liver Transplant    Time Spent: 15 minutes  Visit Type: follow up (saw pt for virtual visit 11/2021)  Referring Physician: Pavan   Pt accompanied by: her , Doug     Date of txp: liver 1/7/22    Nutrition Assessment/Diet Recall  Appetite slowly improving. Trying to eat a lot of protein. Drinks Ensure daily.   Reports as prior to txp, meat and savory proteins aren't very appealing to her. Her  encourages her to eat, otherwise she reports that eating/food aren't in the forefront of her mind.     Now is having leg weakness and tremors (MD adjusted meds to see if this will help)  Home PT started today- plan for 2x/week      Labs  eGFR 55, prev 70  K wnl   Mg low     Anthropometrics  HT: 66    WT: 141    IBW/%IBW: 130/108  Dosing wt: 141 lbs/64 kg    Estimated Nutrition Needs   Protein  75-80 grams/day    (1.2 g/kg for increased needs x 2 months post txp with reduced eGFR)       Nutrition Diagnosis  Inadequate protein intake related to increased protein needs s/p transplant AEB diet recall shows likely not meeting minimum of 75 grams/day.    Nutrition Intervention  1. Discussed importance of consuming adequate calories and protein for 2 months post-txp to help heal and reduce muscle/fat wasting. Discussed sources of protein and ways to ensure she is increasing her protein intake.  - Reviewed some options for protein bars  - Discussed other protein-rich foods to try instead of savory proteins, etc     2. Discussed potential for wt gain post-txp and after 2 months of eating higher calorie/higher protein foods, to return to baseline eating habits and monitor for any weight gains.     3. Reviewed importance of food safety and increased risk for food-borne illness post-txp. Also discussed potential need to monitor K+, CHO, and Na+ intakes d/t medication  side effects.     4. Avoid the following post txp d/t risk for rejection, unknown effects on the organs, and/or potential interactions with immunosuppressants:  - Herbal, Chinese, holistic, chiropractic, natural, alternative medicines and supplements  - Detoxes and cleanses  - Weight loss pills  - Protein powders or other products with extracts or herbs (ie green tea extract)      Patient Understanding: Pt verbalized understanding of education provided.  Expected Engagement: Good  Follow-Up Plans: PRN     Nutrition Goals  Ideal minimum of 75 grams protein/day     Lucila Winter RD, LD, CCTD

## 2022-01-24 NOTE — LETTER
1/24/2022     RE: Melita Cortes  [address]  [facility] [address]    Dear Colleague,    Thank you for referring your patient, Melita Cortes, to the [facility]PL[facility]. Please see a copy of my visit note below.    Transplant Surgery -OUTPATIENT IMMUNOSUPPRESSION PROGRESS NOTE    Date of Visit: 01/24/2022    Transplants:  1/7/2022 (Liver); Postoperative day:  17  ASSESMENT AND PLAN:  1.Graft Function:Liver allograft: no rejection or technical problems.    2.Immunosuppression Management: keep tacrolimus levels 5-6 ng/dl  3.Hypertension: ok  4.Renal Function: increased   5.Lab frequency:  6.Other:  Wound healthy    Date: January 24, 2022    Transplant:  [x]                             Liver [x]                              Kidney []                             Pancreas []                              Other:             Chief Complaint:  Tremors    History of Present Illness:  Patient Active Problem List   Diagnosis     Abdominal bloating     Family history of pancreatic cancer     High risk HPV infection     Transaminitis     Macrocytosis     Cervical high risk HPV (human papillomavirus) test positive     Other ascites     Acute liver failure without hepatic coma     Anemia, unspecified type     Alcoholic liver disease (H)     Elevated serum creatinine     Alcoholic cirrhosis of liver with ascites (H)     Hyponatremia     Malaise and fatigue     At high risk for severe sepsis     Symptomatic anemia     Decompensated liver disease (H)     Bandemia     Hyperbilirubinemia     End-stage liver disease (H)     Obesity (BMI 30-39.9)     Hyperlipidemia     Anxiety     Asthma     Non-intractable vomiting with nausea, unspecified vomiting type     Status post liver transplantation (H)     Immunosuppressed status (H)     Pleural effusion     Ileus, postoperative (H)     Steroid-induced hyperglycemia     Hypervolemia     ROSSY (acute kidney injury) (H)     Bacteremia     Anemia due to blood  loss, acute     Severe malnutrition (H)     SOCIAL /FAMILY HISTORY: [x]                  No recent change    Past Medical History:   Diagnosis Date     Alcoholic hepatitis      Asthma 3/10/2006     Cervical high risk HPV (human papillomavirus) test positive 2020    See problem list     Past Surgical History:   Procedure Laterality Date     APPENDECTOMY       COLONOSCOPY N/A 2022    Procedure: COLONOSCOPY;  Surgeon: Zita Steele MD;  Location:  GI     ESOPHAGOGASTRODUODENOSCOPY, WITH BRUSHINGS N/A 10/29/2021    Procedure: ESOPHAGOGASTRODUODENOSCOPY, WITH BRUSHINGS;  Surgeon: Torey Ruiz MD;  Location:  GI     TRANSPLANT LIVER RECIPIENT  DONOR N/A 2022    Procedure: TRANSPLANT, LIVER, RECIPIENT,  DONOR;  Surgeon: Pradeep Browne MD;  Location:  OR     Social History     Socioeconomic History     Marital status:      Spouse name: Not on file     Number of children: Not on file     Years of education: Not on file     Highest education level: Not on file   Occupational History     Not on file   Tobacco Use     Smoking status: Former Smoker     Quit date: 2020     Years since quittin.7     Smokeless tobacco: Never Used   Substance and Sexual Activity     Alcohol use: Not Currently     Comment: Last drink 2021     Drug use: Never     Sexual activity: Not Currently   Other Topics Concern     Parent/sibling w/ CABG, MI or angioplasty before 65F 55M? Not Asked   Social History Narrative     Not on file     Social Determinants of Health     Financial Resource Strain: Not on file   Food Insecurity: Not on file   Transportation Needs: Not on file   Physical Activity: Not on file   Stress: Not on file   Social Connections: Not on file   Intimate Partner Violence: Not on file   Housing Stability: Not on file     Prescription Medications as of 2022       Rx Number Disp Refills Start End Last Dispensed Date Next Fill Date Owning Pharmacy     albuterol (PROAIR HFA/PROVENTIL HFA/VENTOLIN HFA) 108 (90 Base) MCG/ACT inhaler  18 g 0 11/8/2021    [address]    Sig: Inhale 2 puffs into the lungs every 4 hours as needed for wheezing    Class: E-Prescribe    Notes to Pharmacy: Pharmacy may dispense brand covered by insurance (Proair, or proventil or ventolin or generic albuterol inhaler)    Route: Inhalation    aspirin (ASA) 81 MG chewable tablet  90 tablet 0 1/14/2022    [facility] [address]    Sig: Take 1 tablet (81 mg) by mouth daily    Class: E-Prescribe    Route: Oral    magnesium oxide (MAG-OX) 400 MG tablet  180 tablet 3 1/18/2022    [facility] [address] [address]    Sig: Take 2 tablets (800 mg) by mouth 2 times daily    Class: E-Prescribe    Route: Oral    multivitamin (CENTRUM SILVER) tablet            Sig: Take 1 tablet by mouth daily    Class: Historical    Route: Oral    mycophenolate (GENERIC EQUIVALENT) 250 MG capsule  540 capsule 3 1/18/2022    [facility] [address] [address]    Sig: Take 3 capsules (750 mg) by mouth 2 times daily    Class: E-Prescribe    Notes to Pharmacy: TXP DT 1/7/2022 (Liver) TXP Dischg DT 1/14/2022 DX Liver replaced by transplant Z94.4 TX [facility] ([place], [place])    Route: Oral    pantoprazole (PROTONIX) 40 MG EC tablet  90 tablet 3 1/18/2022    [facility]/[place] [address] [address]    Sig: Take 1 tablet (40 mg) by mouth daily    Class: E-Prescribe    Route: Oral    predniSONE (DELTASONE) 5 MG tablet  90 tablet 1 1/18/2022    [facility]/[place] [address] [address]    Sig: Take 1 tablet (5 mg) by mouth daily Until tacrolimus levels therapeutic    Class: E-Prescribe    Notes to Pharmacy: TXP DT 1/7/2022 (Liver) TXP Dischg DT  1/14/2022 DX Liver replaced by transplant Z94.4 TX [facility] ([place], [place])    Route: Oral    sennosides (SENOKOT) 8.6 MG tablet  60 tablet 0 1/14/2022    [facility] - [place], [place] - [address]    Sig: Take 1-2 tablets by mouth 2 times daily Take while taking oxycodone, HOLD for loose stools    Class: E-Prescribe    Route: Oral    sulfamethoxazole-trimethoprim (BACTRIM) 400-80 MG tablet  90 tablet 3 1/18/2022    [facility] - [place], [place] - [address]    Sig: Take 1 tablet by mouth daily    Class: E-Prescribe    Route: Oral    tacrolimus (GENERIC EQUIVALENT) 1 MG capsule  730 capsule 3 1/18/2022    [facility] [place], [place] - [facility] [address]    Sig: Take 4 capsules (4 mg) by mouth 2 times daily    Class: E-Prescribe    Notes to Pharmacy: TXP DT 1/7/2022 (Liver) TXP Dischg DT 1/14/2022 DX Liver replaced by transplant Z94.4 TX [facility] ([place], [place])    Route: Oral    thiamine (B-1) 100 MG tablet  14 tablet 0 1/21/2022 2/4/2022   [facility] #[id number] [place] [address] [address] [facility] [address] [facility] [address]    Sig: Take 1 tablet (100 mg) by mouth daily for 14 days    Class: E-Prescribe    Route: Oral    tretinoin (RETIN-A) 0.025 % external cream  45 g 11 12/10/2021    [facility] #[id number] [place] [address] [address] AT [facility] & [facility] [address]    Sig: Apply a pea size to entire face QD    Class: E-Prescribe    valGANciclovir (VALCYTE) 450 MG tablet  90 tablet 3 1/18/2022    [facility] [place], [place] - [address]    Sig: Take 1 tablet (450 mg) by mouth daily    Class: E-Prescribe    Route: Oral    bumetanide (BUMEX) 1 MG tablet  3 tablet 0 1/14/2022 1/17/2022   [facility] [place], [place] - [address] [facility]    Sig: Take 1 tablet (1 mg) by mouth  daily for 3 days    Class: E-Prescribe    Route: Oral    hydrOXYzine (ATARAX) 25 MG tablet  15 tablet 0 1/18/2022    [facility] [address] [place] [address] [facility]    Sig: Take 1 tablet (25 mg) by mouth every 6 hours as needed for other (adjuvant pain)    Class: E-Prescribe    Route: Oral    Lidocaine (LIDOCARE) 4 % Patch  10 patch 0 1/14/2022    [address]    Sig: Place 1 patch onto the skin every 24 hours To prevent lidocaine toxicity, patient should be patch free for 12 hrs daily.    Class: E-Prescribe    Route: Transdermal    methocarbamol (ROBAXIN) 500 MG tablet  15 tablet 0 1/14/2022    [address]    Sig: Take 1 tablet (500 mg) by mouth 3 times daily as needed for muscle spasms    Class: E-Prescribe    Route: Oral    oxyCODONE (ROXICODONE) 5 MG tablet  20 tablet 0 1/14/2022    [address]    Sig: Take 1 tablet (5 mg) by mouth every 6 hours as needed for moderate to severe pain    Class: E-Prescribe    Earliest Fill Date: 1/14/2022    Route: Oral    tacrolimus (GENERIC EQUIVALENT) 0.5 MG capsule  60 capsule 1 1/14/2022    [address]    Sig: Take 1 capsule by mouth twice daily as directed by transplant team for dose titration    Class: E-Prescribe    traMADol (ULTRAM) 50 MG tablet  14 tablet 0 1/21/2022    [facility] #[id number] - [facility] [address] [facility] & [facility] [address]    Sig: Take 1 tablet (50 mg) by mouth every 6 hours as needed for severe pain Script called in    Class: Historical    Route: Oral        Patient has no known allergies.   REVIEW OF SYSTEMS (check box if normal)  [x]               GENERAL  [x]                 PULMONARY [x]                GENITOURINARY  [x]                CNS                 [x]                  CARDIAC  [x]                 ENDOCRINE  [x]                EARS,NOSE,THROAT [x]                 GASTROINTESTINAL [x]                 NEUROLOGIC    [x]                MUSCLOSKELTAL  [x]                  HEMATOLOGY      PHYSICAL EXAM (check box if normal)There were no vitals taken for this visit.        [x]            GENERAL:    [x]       EYES:  ICTERIC   []        YES  []                    NO  [x]           EXTREMITIES: Clubbing []       Y     [x]           N    [x]           EARS, NOSE, THROAT: Membranes Moist    YES   [x]                   NO []                  [x]           LUNGS:  CLEAR    YES       [x]                  NO    []                                [x]           SKIN: Jaundice           YES       []                  NO    [x]                   Rash: YES       []                  NO    [x]                                     [x]             HEART: Regular Rate          YES       [x]                  NO    []                   Incision Clean:  YES       [x]                  NO    []                                [x]                    ABDOMEN: Organomegaly YES       []                  NO    [x]                       [x]                    NEUROLOGICAL:  Nonfocal  YES       [x]                  NO    []                       [x]                    Hernia YES       []                  NO    [x]                   PSYCHIATRIC:  Appropriate  YES       [x]                  NO    []                       OTHER:                                                                                                   PAIN SCALE:: 3    Again, thank you for allowing me to participate in the care of your patient.      Sincerely,    Pradeep Browne MD

## 2022-01-24 NOTE — PROGRESS NOTES
Transplant Surgery -OUTPATIENT IMMUNOSUPPRESSION PROGRESS NOTE    Date of Visit: 01/24/2022    Transplants:  1/7/2022 (Liver); Postoperative day:  17  ASSESMENT AND PLAN:  1.Graft Function:Liver allograft: no rejection or technical problems.    2.Immunosuppression Management: keep tacrolimus levels 5-6 ng/dl  3.Hypertension: ok  4.Renal Function: increased   5.Lab frequency:  6.Other:  Wound healthy    Date: January 24, 2022    Transplant:  [x]                             Liver [x]                              Kidney []                             Pancreas []                              Other:             Chief Complaint:  Tremors    History of Present Illness:  Patient Active Problem List   Diagnosis     Abdominal bloating     Family history of pancreatic cancer     High risk HPV infection     Transaminitis     Macrocytosis     Cervical high risk HPV (human papillomavirus) test positive     Other ascites     Acute liver failure without hepatic coma     Anemia, unspecified type     Alcoholic liver disease (H)     Elevated serum creatinine     Alcoholic cirrhosis of liver with ascites (H)     Hyponatremia     Malaise and fatigue     At high risk for severe sepsis     Symptomatic anemia     Decompensated liver disease (H)     Bandemia     Hyperbilirubinemia     End-stage liver disease (H)     Obesity (BMI 30-39.9)     Hyperlipidemia     Anxiety     Asthma     Non-intractable vomiting with nausea, unspecified vomiting type     Status post liver transplantation (H)     Immunosuppressed status (H)     Pleural effusion     Ileus, postoperative (H)     Steroid-induced hyperglycemia     Hypervolemia     ROSSY (acute kidney injury) (H)     Bacteremia     Anemia due to blood loss, acute     Severe malnutrition (H)     SOCIAL /FAMILY HISTORY: [x]                  No recent change    Past Medical History:   Diagnosis Date     Alcoholic hepatitis      Asthma 3/10/2006     Cervical high risk HPV (human papillomavirus) test  positive 2020    See problem list     Past Surgical History:   Procedure Laterality Date     APPENDECTOMY       COLONOSCOPY N/A 2022    Procedure: COLONOSCOPY;  Surgeon: Zita Steele MD;  Location:  GI     ESOPHAGOGASTRODUODENOSCOPY, WITH BRUSHINGS N/A 10/29/2021    Procedure: ESOPHAGOGASTRODUODENOSCOPY, WITH BRUSHINGS;  Surgeon: Torey Ruiz MD;  Location:  GI     TRANSPLANT LIVER RECIPIENT  DONOR N/A 2022    Procedure: TRANSPLANT, LIVER, RECIPIENT,  DONOR;  Surgeon: Pradeep Browne MD;  Location:  OR     Social History     Socioeconomic History     Marital status:      Spouse name: Not on file     Number of children: Not on file     Years of education: Not on file     Highest education level: Not on file   Occupational History     Not on file   Tobacco Use     Smoking status: Former Smoker     Quit date: 2020     Years since quittin.7     Smokeless tobacco: Never Used   Substance and Sexual Activity     Alcohol use: Not Currently     Comment: Last drink 2021     Drug use: Never     Sexual activity: Not Currently   Other Topics Concern     Parent/sibling w/ CABG, MI or angioplasty before 65F 55M? Not Asked   Social History Narrative     Not on file     Social Determinants of Health     Financial Resource Strain: Not on file   Food Insecurity: Not on file   Transportation Needs: Not on file   Physical Activity: Not on file   Stress: Not on file   Social Connections: Not on file   Intimate Partner Violence: Not on file   Housing Stability: Not on file     Prescription Medications as of 2022       Rx Number Disp Refills Start End Last Dispensed Date Next Fill Date Owning Pharmacy    albuterol (PROAIR HFA/PROVENTIL HFA/VENTOLIN HFA) 108 (90 Base) MCG/ACT inhaler  18 g 0 2021    [facility] [place], [place] - [address] [facility]    Sig: Inhale 2 puffs into the lungs every 4 hours as needed for wheezing     Class: E-Prescribe    Notes to Pharmacy: Pharmacy may dispense brand covered by insurance (Proair, or proventil or ventolin or generic albuterol inhaler)    Route: Inhalation    aspirin (ASA) 81 MG chewable tablet  90 tablet 0 1/14/2022    [facility] - [place], [place] - [address]    Sig: Take 1 tablet (81 mg) by mouth daily    Class: E-Prescribe    Route: Oral    magnesium oxide (MAG-OX) 400 MG tablet  180 tablet 3 1/18/2022    [facility] [place] [address] [address]    Sig: Take 2 tablets (800 mg) by mouth 2 times daily    Class: E-Prescribe    Route: Oral    multivitamin (CENTRUM SILVER) tablet            Sig: Take 1 tablet by mouth daily    Class: Historical    Route: Oral    mycophenolate (GENERIC EQUIVALENT) 250 MG capsule  540 capsule 3 1/18/2022    [facility] [place] [address] [address]    Sig: Take 3 capsules (750 mg) by mouth 2 times daily    Class: E-Prescribe    Notes to Pharmacy: TXP DT 1/7/2022 (Liver) TXP Dischg DT 1/14/2022 DX Liver replaced by transplant Z94.4 TX [facility] ([place], [place])    Route: Oral    pantoprazole (PROTONIX) 40 MG EC tablet  90 tablet 3 1/18/2022    [facility] [place] [address] [address]    Sig: Take 1 tablet (40 mg) by mouth daily    Class: E-Prescribe    Route: Oral    predniSONE (DELTASONE) 5 MG tablet  90 tablet 1 1/18/2022    [facility] [place] [address] [address]    Sig: Take 1 tablet (5 mg) by mouth daily Until tacrolimus levels therapeutic    Class: E-Prescribe    Notes to Pharmacy: TXP DT 1/7/2022 (Liver) TXP Dischg DT 1/14/2022 DX Liver replaced by transplant Z94.4 TX [facility] ([place], [place])    Route: Oral    sennosides (SENOKOT) 8.6 MG tablet  60 tablet 0 1/14/2022    [place], [place]  - [address]    Sig: Take 1-2 tablets by mouth 2 times daily Take while taking oxycodone, HOLD for loose stools    Class: E-Prescribe    Route: Oral    sulfamethoxazole-trimethoprim (BACTRIM) 400-80 MG tablet  90 tablet 3 1/18/2022    [facility]/[place] [address]    Sig: Take 1 tablet by mouth daily    Class: E-Prescribe    Route: Oral    tacrolimus (GENERIC EQUIVALENT) 1 MG capsule  730 capsule 3 1/18/2022    [place] [facility]    Sig: Take 4 capsules (4 mg) by mouth 2 times daily    Class: E-Prescribe    Notes to Pharmacy: TXP DT 1/7/2022 (Liver) TXP Dischg DT 1/14/2022 DX Liver replaced by transplant Z94.4 [facility] ([place], [place])    Route: Oral    thiamine (B-1) 100 MG tablet  14 tablet 0 1/21/2022 2/4/2022   [facility] #[id number] [place], [place] - [address] AT [facility] & [facility] [address]    Sig: Take 1 tablet (100 mg) by mouth daily for 14 days    Class: E-Prescribe    Route: Oral    tretinoin (RETIN-A) 0.025 % external cream  45 g 11 12/10/2021    [facility] #[id number] [place] [address] - [address] AT [facility] & [facility] [address]    Sig: Apply a pea size to entire face QD    Class: E-Prescribe    valGANciclovir (VALCYTE) 450 MG tablet  90 tablet 3 1/18/2022    [facility]/[place], [place] - [facility] [address]    Sig: Take 1 tablet (450 mg) by mouth daily    Class: E-Prescribe    Route: Oral    bumetanide (BUMEX) 1 MG tablet  3 tablet 0 1/14/2022 1/17/2022   [facility] [place], [place] - [address] [place] [address] [address]    Sig: Take 1 tablet (1 mg) by mouth daily for 3 days    Class: E-Prescribe    Route: Oral    hydrOXYzine (ATARAX) 25 MG tablet  15 tablet 0 1/18/2022    [place] [facility] [place] - [address]    Sig: Take 1 tablet (25 mg) by mouth every 6 hours as needed for  other (adjuvant pain)    Class: E-Prescribe    Route: Oral    Lidocaine (LIDOCARE) 4 % Patch  10 patch 0 1/14/2022    [address]    Sig: Place 1 patch onto the skin every 24 hours To prevent lidocaine toxicity, patient should be patch free for 12 hrs daily.    Class: E-Prescribe    Route: Transdermal    methocarbamol (ROBAXIN) 500 MG tablet  15 tablet 0 1/14/2022    [address]    Sig: Take 1 tablet (500 mg) by mouth 3 times daily as needed for muscle spasms    Class: E-Prescribe    Route: Oral    oxyCODONE (ROXICODONE) 5 MG tablet  20 tablet 0 1/14/2022    [address]    Sig: Take 1 tablet (5 mg) by mouth every 6 hours as needed for moderate to severe pain    Class: E-Prescribe    Earliest Fill Date: 1/14/2022    Route: Oral    tacrolimus (GENERIC EQUIVALENT) 0.5 MG capsule  60 capsule 1 1/14/2022    [address]    Sig: Take 1 capsule by mouth twice daily as directed by transplant team for dose titration    Class: E-Prescribe    traMADol (ULTRAM) 50 MG tablet  14 tablet 0 1/21/2022    [facility] #[id number] - [address] AT [facility] & [facility] [address]    Sig: Take 1 tablet (50 mg) by mouth every 6 hours as needed for severe pain Script called in    Class: Historical    Route: Oral        Patient has no known allergies.   REVIEW OF SYSTEMS (check box if normal)  [x]               GENERAL  [x]                 PULMONARY [x]                GENITOURINARY  [x]                CNS                 [x]                 CARDIAC  [x]                 ENDOCRINE  [x]                EARS,NOSE,THROAT [x]                 GASTROINTESTINAL [x]                 NEUROLOGIC    [x]                MUSCLOSKELTAL  [x]                  HEMATOLOGY      PHYSICAL EXAM (check box if  normal)There were no vitals taken for this visit.        [x]            GENERAL:    [x]       EYES:  ICTERIC   []        YES  []                    NO  [x]           EXTREMITIES: Clubbing []       Y     [x]           N    [x]           EARS, NOSE, THROAT: Membranes Moist    YES   [x]                   NO []                  [x]           LUNGS:  CLEAR    YES       [x]                  NO    []                                [x]           SKIN: Jaundice           YES       []                  NO    [x]                   Rash: YES       []                  NO    [x]                                     [x]             HEART: Regular Rate          YES       [x]                  NO    []                   Incision Clean:  YES       [x]                  NO    []                                [x]                    ABDOMEN: Organomegaly YES       []                  NO    [x]                       [x]                    NEUROLOGICAL:  Nonfocal  YES       [x]                  NO    []                       [x]                    Hernia YES       []                  NO    [x]                   PSYCHIATRIC:  Appropriate  YES       [x]                  NO    []                       OTHER:                                                                                                   PAIN SCALE:: 3

## 2022-01-24 NOTE — TELEPHONE ENCOUNTER
ISSUE:   Tacrolimus IR level 7.8 on 1/21, goal 5-6, dose 4 mg BID. Per dr matson 1/24/22 patient to have lower tacro goal due to tremors    PLAN:   Please call patient and confirm this was an accurate 12-hour trough. Verify Tacrolimus IR dose 4 mg BID. Confirm no new medications or illness. Confirm no missed doses. If accurate trough and accurate dose, decrease Tacrolimus IR dose to 4 mg AM and 3 mg pM and repeat labs Thursday     OUTCOME:   Spoke with patient, they confirm accurate trough level and current dose. Patient confirmed dose change to 4 mg And 3 mg pM and to repeat labs in thursday. Orders sent to preferred pharmacy for dose change and lab for repeat labs. Patient voiced understanding of plan.

## 2022-01-25 ENCOUNTER — VIRTUAL VISIT (OUTPATIENT)
Dept: TRANSPLANT | Facility: CLINIC | Age: 52
End: 2022-01-25
Attending: TRANSPLANT SURGERY
Payer: COMMERCIAL

## 2022-01-25 ENCOUNTER — TELEPHONE (OUTPATIENT)
Dept: TRANSPLANT | Facility: CLINIC | Age: 52
End: 2022-01-25
Payer: COMMERCIAL

## 2022-01-25 DIAGNOSIS — Z94.4 LIVER REPLACED BY TRANSPLANT (H): ICD-10-CM

## 2022-01-25 NOTE — LETTER
1/25/2022     RE: Melita Cortes  [id number]  [facility] [address]    Dear Colleague,    Thank you for referring your patient, Melita Cortes, to the [facility] TRANSPL[facility]. Please see a copy of my visit note below.    Pt evaluated via billable telephone visit d/t COVID-19 restrictions. Time spent: 15 min    [facility] Transplant  Outpatient MNT: Post Liver Transplant    Time Spent: 15 minutes  Visit Type: follow up (saw pt for virtual visit 11/2021)  Referring Physician: Pavan   Pt accompanied by: her , Doug     Date of txp: liver 1/7/22    Nutrition Assessment/Diet Recall  Appetite slowly improving. Trying to eat a lot of protein. Drinks Ensure daily.   Reports as prior to txp, meat and savory proteins aren't very appealing to her. Her  encourages her to eat, otherwise she reports that eating/food aren't in the forefront of her mind.     Now is having leg weakness and tremors (MD adjusted meds to see if this will help)  Home PT started today- plan for 2x/week      Labs  eGFR 55, prev 70  K wnl   Mg low     Anthropometrics  HT: 66    WT: 141    IBW/%IBW: 130/108  Dosing wt: 141 lbs/64 kg    Estimated Nutrition Needs   Protein  75-80 grams/day    (1.2 g/kg for increased needs x 2 months post txp with reduced eGFR)       Nutrition Diagnosis  Inadequate protein intake related to increased protein needs s/p transplant AEB diet recall shows likely not meeting minimum of 75 grams/day.    Nutrition Intervention  1. Discussed importance of consuming adequate calories and protein for 2 months post-txp to help heal and reduce muscle/fat wasting. Discussed sources of protein and ways to ensure she is increasing her protein intake.  - Reviewed some options for protein bars  - Discussed other protein-rich foods to try instead of savory proteins, etc     2. Discussed potential for wt gain post-txp and after 2 months of eating higher calorie/higher  protein foods, to return to baseline eating habits and monitor for any weight gains.     3. Reviewed importance of food safety and increased risk for food-borne illness post-txp. Also discussed potential need to monitor K+, CHO, and Na+ intakes d/t medication side effects.     4. Avoid the following post txp d/t risk for rejection, unknown effects on the organs, and/or potential interactions with immunosuppressants:  - Herbal, Chinese, holistic, chiropractic, natural, alternative medicines and supplements  - Detoxes and cleanses  - Weight loss pills  - Protein powders or other products with extracts or herbs (ie green tea extract)      Patient Understanding: Pt verbalized understanding of education provided.  Expected Engagement: Good  Follow-Up Plans: PRN     Nutrition Goals  Ideal minimum of 75 grams protein/day     Lucila Winter RD, LD, CCTD

## 2022-01-25 NOTE — TELEPHONE ENCOUNTER
Provider Call: Home Care  [facility] for PT order can take a verbal  1 x day for 3 weeks  2 x a week for two weeks   Goal to get her stronger     Callback needed? Yes    Return Call Needed  Same as documented in contacts section  When to return call?: Same day: Route High Priority

## 2022-01-26 ENCOUNTER — TELEPHONE (OUTPATIENT)
Dept: TRANSPLANT | Facility: CLINIC | Age: 52
End: 2022-01-26
Payer: COMMERCIAL

## 2022-01-26 ENCOUNTER — OFFICE VISIT (OUTPATIENT)
Dept: FAMILY MEDICINE | Facility: CLINIC | Age: 52
End: 2022-01-26
Payer: COMMERCIAL

## 2022-01-26 VITALS
DIASTOLIC BLOOD PRESSURE: 71 MMHG | HEART RATE: 79 BPM | SYSTOLIC BLOOD PRESSURE: 105 MMHG | RESPIRATION RATE: 18 BRPM | OXYGEN SATURATION: 95 % | WEIGHT: 140 LBS | BODY MASS INDEX: 23.3 KG/M2 | TEMPERATURE: 98.1 F

## 2022-01-26 DIAGNOSIS — D84.9 IMMUNOSUPPRESSED STATUS (H): ICD-10-CM

## 2022-01-26 DIAGNOSIS — N17.9 AKI (ACUTE KIDNEY INJURY) (H): ICD-10-CM

## 2022-01-26 DIAGNOSIS — K70.31 ALCOHOLIC CIRRHOSIS OF LIVER WITH ASCITES (H): Primary | ICD-10-CM

## 2022-01-26 DIAGNOSIS — Z94.4 STATUS POST LIVER TRANSPLANTATION (H): ICD-10-CM

## 2022-01-26 PROCEDURE — 99214 OFFICE O/P EST MOD 30 MIN: CPT | Performed by: INTERNAL MEDICINE

## 2022-01-26 ASSESSMENT — PAIN SCALES - GENERAL: PAINLEVEL: MODERATE PAIN (5)

## 2022-01-26 ASSESSMENT — ASTHMA QUESTIONNAIRES: ACT_TOTALSCORE: 24

## 2022-01-26 NOTE — PROGRESS NOTES
Assessment & Plan     Alcoholic cirrhosis of liver with ascites (H)  He is status post liver transplant as below  Closely being followed up by the transplant team    Status post liver transplantation (H)  She  was admitted on the seventh and discharged on the 14th  She still having considerable weakness  On Bactrim for prophylaxis  Getting PT and OT  Closely being followed up with the liver transplant team  Has home care coming to her  She is currently in short-term disability till end of March    Immunosuppressed status (H)  She is currently on mycophenolate/tacrolimus/prednisone 5 mg  Transplant team is monitoring tacrolimus levels  I understand they want to slowly wean her off prednisone and mycophenolate    ROSSY (acute kidney injury) (H)  Her last creatinine was 1.20 but she was on Bumex for 3 days  I am thinking this is because of that  Hopefully her creatinine will rebound  We will closely monitor this        30 minutes spent on the date of the encounter doing chart review, history and exam, documentation and further activities per the note           No follow-ups on file.    Navneet Johnson MD  [facility] ALMA ROSA Kitchen is a 51 year old who presents for the following health issues     History of Present Illness       She eats 0-1 servings of fruits and vegetables daily.She consumes 1 sweetened beverage(s) daily.She exercises with enough effort to increase her heart rate 9 or less minutes per day.  She exercises with enough effort to increase her heart rate 3 or less days per week.   She is taking medications regularly.         Hospital Follow-up Visit:    Hospital/Nursing Home/IP Rehab Facility: [facility]  Date of Admission: 17/22  Date of Discharge: 1/14/22  Reason(s) for Admission:     Status post liver transplantation (H)  Active Problems:    Immunosuppressed status (H)    Pleural effusion    Ileus, postoperative (H)     Steroid-induced hyperglycemia    Hypervolemia    ROSSY (acute kidney injury) (H)    Bacteremia    Anemia due to blood loss, acute    Severe malnutrition (H)      Was your hospitalization related to COVID-19? No   Problems taking medications regularly:  None  Medication changes since discharge: Mag & Thiamine added   Problems adhering to non-medication therapy:  None    Summary of hospitalization:  [facility] discharge summary reviewed  Diagnostic Tests/Treatments reviewed.  Follow up needed: none  Other Healthcare Providers Involved in Patient s Care:         Homecare and Specialist appointment - Transplant team  Update since discharge: improved.       Post Discharge Medication Reconciliation: discharge medications reconciled and changed, per note/orders.  Plan of care communicated with patient and family                  Review of Systems   Constitutional, HEENT, cardiovascular, pulmonary, gi and gu systems are negative, except as otherwise noted.      Objective    /71 (BP Location: Right arm, Patient Position: Sitting, Cuff Size: Adult Small)   Pulse 79   Temp 98.1  F (36.7  C) (Oral)   Resp 18   Wt 63.5 kg (140 lb)   SpO2 95%   BMI 23.30 kg/m    Body mass index is 23.3 kg/m .  Physical Exam   GENERAL: healthy, alert and no distress  EYES: Eyes grossly normal to inspection, PERRL and conjunctivae and sclerae normal  RESP: lungs clear to auscultation - no rales, rhonchi or wheezes  CV: regular rate and rhythm, normal S1 S2, no S3 or S4, no murmur, click or rub, no peripheral edema and peripheral pulses strong  ABDOMEN: She has a scar extending from right hypochondrium all the way to left hypochondrium  Staples are still in place  Wound looks healthy  MS: no gross musculoskeletal defects noted, no edema

## 2022-01-26 NOTE — TELEPHONE ENCOUNTER
Provider Call: Home Car Verbal for OT     Facility Name: [facility] need verbal for OT   1x a week x 4 weeks   ADL/IDL     Callback needed? Yes ok to leave VM     Return Call Needed  Same as documented in contacts section  When to return call?: Greater than one day: Route standard priority

## 2022-01-27 ENCOUNTER — LAB REQUISITION (OUTPATIENT)
Dept: LAB | Facility: CLINIC | Age: 52
End: 2022-01-27
Payer: COMMERCIAL

## 2022-01-27 DIAGNOSIS — Z79.899 OTHER LONG TERM (CURRENT) DRUG THERAPY: ICD-10-CM

## 2022-01-27 DIAGNOSIS — Z94.4 LIVER TRANSPLANT STATUS (H): ICD-10-CM

## 2022-01-27 LAB
ALBUMIN SERPL-MCNC: 2.8 G/DL (ref 3.4–5)
ALP SERPL-CCNC: 83 U/L (ref 40–150)
ALT SERPL W P-5'-P-CCNC: 22 U/L (ref 0–50)
ANION GAP SERPL CALCULATED.3IONS-SCNC: 4 MMOL/L (ref 3–14)
AST SERPL W P-5'-P-CCNC: 8 U/L (ref 0–45)
BILIRUB DIRECT SERPL-MCNC: 0.7 MG/DL (ref 0–0.2)
BILIRUB SERPL-MCNC: 1.1 MG/DL (ref 0.2–1.3)
BUN SERPL-MCNC: 21 MG/DL (ref 7–30)
CALCIUM SERPL-MCNC: 9 MG/DL (ref 8.5–10.1)
CHLORIDE BLD-SCNC: 102 MMOL/L (ref 94–109)
CO2 SERPL-SCNC: 28 MMOL/L (ref 20–32)
CREAT SERPL-MCNC: 1 MG/DL (ref 0.52–1.04)
ERYTHROCYTE [DISTWIDTH] IN BLOOD BY AUTOMATED COUNT: 17.1 % (ref 10–15)
GFR SERPL CREATININE-BSD FRML MDRD: 68 ML/MIN/1.73M2
GLUCOSE BLD-MCNC: 106 MG/DL (ref 70–99)
HCT VFR BLD AUTO: 30.3 % (ref 35–47)
HGB BLD-MCNC: 9.7 G/DL (ref 11.7–15.7)
MAGNESIUM SERPL-MCNC: 1.7 MG/DL (ref 1.6–2.3)
MCH RBC QN AUTO: 31.5 PG (ref 26.5–33)
MCHC RBC AUTO-ENTMCNC: 32 G/DL (ref 31.5–36.5)
MCV RBC AUTO: 98 FL (ref 78–100)
PHOSPHATE SERPL-MCNC: 4.3 MG/DL (ref 2.5–4.5)
PLATELET # BLD AUTO: 314 10E3/UL (ref 150–450)
POTASSIUM BLD-SCNC: 4.5 MMOL/L (ref 3.4–5.3)
PROT SERPL-MCNC: 5.7 G/DL (ref 6.8–8.8)
RBC # BLD AUTO: 3.08 10E6/UL (ref 3.8–5.2)
SODIUM SERPL-SCNC: 134 MMOL/L (ref 133–144)
TACROLIMUS BLD-MCNC: 7 UG/L (ref 5–15)
TME LAST DOSE: NORMAL H
TME LAST DOSE: NORMAL H
WBC # BLD AUTO: 4.4 10E3/UL (ref 4–11)

## 2022-01-27 PROCEDURE — 85027 COMPLETE CBC AUTOMATED: CPT | Mod: ORL | Performed by: TRANSPLANT SURGERY

## 2022-01-27 PROCEDURE — 80053 COMPREHEN METABOLIC PANEL: CPT | Mod: ORL | Performed by: TRANSPLANT SURGERY

## 2022-01-27 PROCEDURE — 80197 ASSAY OF TACROLIMUS: CPT | Mod: ORL | Performed by: TRANSPLANT SURGERY

## 2022-01-27 PROCEDURE — 82248 BILIRUBIN DIRECT: CPT | Mod: ORL | Performed by: TRANSPLANT SURGERY

## 2022-01-27 PROCEDURE — 84100 ASSAY OF PHOSPHORUS: CPT | Mod: ORL | Performed by: TRANSPLANT SURGERY

## 2022-01-27 PROCEDURE — 83735 ASSAY OF MAGNESIUM: CPT | Mod: ORL | Performed by: TRANSPLANT SURGERY

## 2022-01-28 DIAGNOSIS — Z94.4 STATUS POST LIVER TRANSPLANTATION (H): ICD-10-CM

## 2022-01-28 RX ORDER — TRAMADOL HYDROCHLORIDE 50 MG/1
50 TABLET ORAL EVERY 6 HOURS PRN
Qty: 14 TABLET | Refills: 0 | COMMUNITY
Start: 2022-01-28 | End: 2022-02-05

## 2022-01-28 RX ORDER — TACROLIMUS 1 MG/1
3 CAPSULE ORAL EVERY 12 HOURS
Qty: 540 CAPSULE | Refills: 3 | Status: SHIPPED | OUTPATIENT
Start: 2022-01-28 | End: 2022-02-01

## 2022-01-28 NOTE — TELEPHONE ENCOUNTER
ISSUE:   Tacrolimus IR level 7 on 1/28, goal 6, dose 4 mg AM and 3 mg PM.    PLAN:   Please call patient and confirm this was an accurate 12-hour trough. Verify Tacrolimus IR dose 4 mg AM and 3 mg PM. Confirm no new medications or illness. Confirm no missed doses. If accurate trough and accurate dose, decrease Tacrolimus IR dose to 3 mg BID and repeat labs on Monday.    OUTCOME:   Spoke with patient, they confirm accurate trough level and current dose 4 mg am, 3 mg pm. Patient confirmed dose change to 3 mg BID and to repeat labs in 3 days. Orders sent to preferred pharmacy for dose change and lab for repeat labs. Patient voiced understanding of plan.     Soumya Wren LPN

## 2022-01-28 NOTE — TELEPHONE ENCOUNTER
Pt has 3 tablets left of tramadol. Asking for refill. Per dr matson pt can have refill of 14 tablets.

## 2022-01-29 DIAGNOSIS — Z94.4 STATUS POST LIVER TRANSPLANTATION (H): ICD-10-CM

## 2022-01-29 RX ORDER — LANOLIN ALCOHOL/MO/W.PET/CERES
100 CREAM (GRAM) TOPICAL DAILY
Qty: 30 TABLET | Refills: 1 | Status: SHIPPED | OUTPATIENT
Start: 2022-01-29 | End: 2023-01-17

## 2022-01-29 RX ORDER — MAGNESIUM OXIDE 400 MG/1
800 TABLET ORAL 2 TIMES DAILY
Qty: 180 TABLET | Refills: 3 | Status: SHIPPED | OUTPATIENT
Start: 2022-01-29 | End: 2022-06-27

## 2022-01-30 ENCOUNTER — TELEPHONE (OUTPATIENT)
Dept: TRANSPLANT | Facility: CLINIC | Age: 52
End: 2022-01-30
Payer: COMMERCIAL

## 2022-01-30 ENCOUNTER — NURSE TRIAGE (OUTPATIENT)
Dept: NURSING | Facility: CLINIC | Age: 52
End: 2022-01-30
Payer: COMMERCIAL

## 2022-01-31 ENCOUNTER — LAB (OUTPATIENT)
Dept: LAB | Facility: CLINIC | Age: 52
End: 2022-01-31
Attending: TRANSPLANT SURGERY
Payer: COMMERCIAL

## 2022-01-31 ENCOUNTER — TELEPHONE (OUTPATIENT)
Dept: TRANSPLANT | Facility: CLINIC | Age: 52
End: 2022-01-31

## 2022-01-31 ENCOUNTER — OFFICE VISIT (OUTPATIENT)
Dept: TRANSPLANT | Facility: CLINIC | Age: 52
End: 2022-01-31
Attending: TRANSPLANT SURGERY
Payer: COMMERCIAL

## 2022-01-31 ENCOUNTER — TELEPHONE (OUTPATIENT)
Dept: INFUSION THERAPY | Facility: CLINIC | Age: 52
End: 2022-01-31

## 2022-01-31 VITALS
OXYGEN SATURATION: 97 % | WEIGHT: 140 LBS | HEART RATE: 79 BPM | BODY MASS INDEX: 23.3 KG/M2 | DIASTOLIC BLOOD PRESSURE: 70 MMHG | SYSTOLIC BLOOD PRESSURE: 111 MMHG

## 2022-01-31 DIAGNOSIS — Z94.4 STATUS POST LIVER TRANSPLANTATION (H): ICD-10-CM

## 2022-01-31 DIAGNOSIS — Z94.4 LIVER REPLACED BY TRANSPLANT (H): ICD-10-CM

## 2022-01-31 DIAGNOSIS — K70.31 ALCOHOLIC CIRRHOSIS OF LIVER WITH ASCITES (H): ICD-10-CM

## 2022-01-31 LAB
ALBUMIN SERPL-MCNC: 3.1 G/DL (ref 3.4–5)
ALP SERPL-CCNC: 148 U/L (ref 40–150)
ALT SERPL W P-5'-P-CCNC: 35 U/L (ref 0–50)
ANION GAP SERPL CALCULATED.3IONS-SCNC: 6 MMOL/L (ref 3–14)
AST SERPL W P-5'-P-CCNC: 25 U/L (ref 0–45)
BILIRUB DIRECT SERPL-MCNC: 0.7 MG/DL (ref 0–0.2)
BILIRUB SERPL-MCNC: 1.1 MG/DL (ref 0.2–1.3)
BUN SERPL-MCNC: 19 MG/DL (ref 7–30)
CALCIUM SERPL-MCNC: 9.3 MG/DL (ref 8.5–10.1)
CHLORIDE BLD-SCNC: 103 MMOL/L (ref 94–109)
CO2 SERPL-SCNC: 26 MMOL/L (ref 20–32)
CREAT SERPL-MCNC: 0.85 MG/DL (ref 0.52–1.04)
ERYTHROCYTE [DISTWIDTH] IN BLOOD BY AUTOMATED COUNT: 16.6 % (ref 10–15)
GFR SERPL CREATININE-BSD FRML MDRD: 83 ML/MIN/1.73M2
GLUCOSE BLD-MCNC: 105 MG/DL (ref 70–99)
HCT VFR BLD AUTO: 30.1 % (ref 35–47)
HGB BLD-MCNC: 9.8 G/DL (ref 11.7–15.7)
MAGNESIUM SERPL-MCNC: 1.6 MG/DL (ref 1.6–2.3)
MCH RBC QN AUTO: 31 PG (ref 26.5–33)
MCHC RBC AUTO-ENTMCNC: 32.6 G/DL (ref 31.5–36.5)
MCV RBC AUTO: 95 FL (ref 78–100)
PHOSPHATE SERPL-MCNC: 4.6 MG/DL (ref 2.5–4.5)
PLATELET # BLD AUTO: 271 10E3/UL (ref 150–450)
POTASSIUM BLD-SCNC: 4.6 MMOL/L (ref 3.4–5.3)
PROT SERPL-MCNC: 5.9 G/DL (ref 6.8–8.8)
RBC # BLD AUTO: 3.16 10E6/UL (ref 3.8–5.2)
SODIUM SERPL-SCNC: 135 MMOL/L (ref 133–144)
TACROLIMUS BLD-MCNC: 6.1 UG/L (ref 5–15)
TME LAST DOSE: NORMAL H
TME LAST DOSE: NORMAL H
WBC # BLD AUTO: 3.9 10E3/UL (ref 4–11)

## 2022-01-31 PROCEDURE — 80321 ALCOHOLS BIOMARKERS 1OR 2: CPT | Mod: 90 | Performed by: PATHOLOGY

## 2022-01-31 PROCEDURE — 36415 COLL VENOUS BLD VENIPUNCTURE: CPT | Performed by: PATHOLOGY

## 2022-01-31 PROCEDURE — 84100 ASSAY OF PHOSPHORUS: CPT | Performed by: PATHOLOGY

## 2022-01-31 PROCEDURE — 80053 COMPREHEN METABOLIC PANEL: CPT | Performed by: PATHOLOGY

## 2022-01-31 PROCEDURE — 85027 COMPLETE CBC AUTOMATED: CPT | Performed by: PATHOLOGY

## 2022-01-31 PROCEDURE — 83735 ASSAY OF MAGNESIUM: CPT | Performed by: PATHOLOGY

## 2022-01-31 PROCEDURE — 80197 ASSAY OF TACROLIMUS: CPT | Mod: 90 | Performed by: PATHOLOGY

## 2022-01-31 PROCEDURE — 82248 BILIRUBIN DIRECT: CPT | Performed by: PATHOLOGY

## 2022-01-31 PROCEDURE — 99000 SPECIMEN HANDLING OFFICE-LAB: CPT | Performed by: PATHOLOGY

## 2022-01-31 PROCEDURE — 99213 OFFICE O/P EST LOW 20 MIN: CPT | Performed by: TRANSPLANT SURGERY

## 2022-01-31 NOTE — TELEPHONE ENCOUNTER
Transplant Social Work Services Progress Note     Collaborated with: Liver Transplant Team     Data: Fidelina is s/p  donor Liver Transplant and this writer contacted Fidelina via phone for a routine follow up. Fidelina is 24 days post liver transplant       Intervention/Education: I spoke with Fidelina  and we reviewed the followin. Writing to the Donor Family process. - I sent this via mail.         2. Liver Transplant Support Group and social events-        3. Immunosuppression copays-no current concerns.  I reminded her to contact me if they have future concerns and we can evaluate for financial assistance programs, grants, etc. They did not report any surprises or concerns when picking up their medications.         4. Adjustment to illness - She indicates that she feel like she should be progressing fast, but overall happy about her progress. She is having PT that she finds helpful         5. Importance of maintaining abstinence from all non-prescribed drugs and alcohol.    I reviewed recommendation to engage in relapse prevention/treatment program. She voiced understanding and I asked her to call TYRA Lynn for next steps. I reported that she will likely need to do a updated assessment and he will assist with engagement in treatment. He would also be available for 1:1 counseling if her medical does not allow for a treatment program at this time.     Assessment: Fidelina voiced understanding to the topics reviewed. They decline any concerns and continue to have adequate support.      Plan: I will remain available for the psychosocial needs of this patient.     ELMO Betts, [facility]  Liver Transplant   [facility]  Phone: [phone]  Pager: [phone]

## 2022-01-31 NOTE — TELEPHONE ENCOUNTER
RAULITO Goodman, called to report patient paged that she has temp 99.5-100.1. at time of call 99.4. If pt's temp 101 or higher will go to ED. Pt does have follow up with surgeon in [place].

## 2022-01-31 NOTE — TELEPHONE ENCOUNTER
Central Prior Authorization Team   Phone: [phone]      Prior Authorization Retail Medication Request    Medication/Dose: traMADol (ULTRAM) 50 MG tablet  ICD code (if different than what is on RX):  Status post liver transplantation (H) [Z94.4]  - Primary   Previously Tried and Failed:    Rationale:      Insurance Name:  [employer] [phone]  Insurance ID:  [id number]      Pharmacy Information (if different than what is on RX)  Name:  [facility][id number] [place] [address] AT [facility] [address]  Phone:  [phone]

## 2022-01-31 NOTE — TELEPHONE ENCOUNTER
Patient had a liver transplant three weeks ago.  Calling to report temp of 99.5 to 100.1 to 99.4 tympanic.  She is to report any temp elevation and is concerned.    Paged the transplant  on call to [phone] via answering service, who recommended if temp goes over 101 she should go to ER at [place].  Otherwise patient has appointment with surgery tomorrow 2-31-22.    Updated patient who verbalized understanding and agreed with plan.    Kindra Turpin RN  [facility]      Reason for Disposition    [1] Caller has URGENT question AND [2] triager unable to answer question    Additional Information    Negative: Sounds like a life-threatening emergency to the triager    Negative: [1] Widespread rash AND [2] bright red, sunburn-like    Negative: [1] SEVERE headache AND [2] after spinal (epidural) anesthesia    Negative: [1] Vomiting AND [2] persists > 4 hours    Negative: [1] Vomiting AND [2] abdomen looks much more swollen than usual    Negative: [1] Drinking very little AND [2] dehydration suspected (e.g., no urine > 12 hours, very dry mouth, very lightheaded)    Negative: Patient sounds very sick or weak to the triager    Negative: Sounds like a serious complication to the triager    Negative: Fever > 100.4 F (38.0 C)    Negative: [1] SEVERE post-op pain (e.g., excruciating, pain scale 8-10) AND [2] not controlled with pain medications    Protocols used: POST-OP SYMPTOMS AND [id number]

## 2022-01-31 NOTE — PROGRESS NOTES
Transplant Surgery -OUTPATIENT IMMUNOSUPPRESSION PROGRESS NOTE    Date of Visit: 01/31/2022    Transplants:  1/7/2022 (Liver); Postoperative day:  24  ASSESMENT AND PLAN:  1.Graft Function:Liver allograft: no rejection or technical problems.  Mild increase in alkaline phosphatase continue to monitor.    2.Immunosuppression Management: keep tacrolimus levels at 8-10 ng/dL  3.Hypertension: ok  4.Renal Function:better  5.Lab frequency: weekly  6.Other:  Wound ok, remove staples today    Date: January 31, 2022    Transplant:  [x]                             Liver [x]                              Kidney []                             Pancreas []                              Other:             Chief Complaint:  Doing well, no fever  Food does not taste good    History of Present Illness:  Patient Active Problem List   Diagnosis     Abdominal bloating     Family history of pancreatic cancer     High risk HPV infection     Transaminitis     Macrocytosis     Cervical high risk HPV (human papillomavirus) test positive     Other ascites     Acute liver failure without hepatic coma     Anemia, unspecified type     Alcoholic liver disease (H)     Elevated serum creatinine     Alcoholic cirrhosis of liver with ascites (H)     Hyponatremia     Malaise and fatigue     At high risk for severe sepsis     Symptomatic anemia     Decompensated liver disease (H)     Bandemia     Hyperbilirubinemia     End-stage liver disease (H)     Obesity (BMI 30-39.9)     Hyperlipidemia     Anxiety     Asthma     Non-intractable vomiting with nausea, unspecified vomiting type     Status post liver transplantation (H)     Immunosuppressed status (H)     Pleural effusion     Ileus, postoperative (H)     Steroid-induced hyperglycemia     Hypervolemia     ROSSY (acute kidney injury) (H)     Bacteremia     Anemia due to blood loss, acute     Severe malnutrition (H)     SOCIAL /FAMILY HISTORY: [x]                  No recent change    Past Medical History:    Diagnosis Date     Alcoholic hepatitis      Asthma 3/10/2006     Cervical high risk HPV (human papillomavirus) test positive 2020    See problem list     Past Surgical History:   Procedure Laterality Date     APPENDECTOMY       COLONOSCOPY N/A 2022    Procedure: COLONOSCOPY;  Surgeon: Zita Steele MD;  Location:  GI     ESOPHAGOGASTRODUODENOSCOPY, WITH BRUSHINGS N/A 10/29/2021    Procedure: ESOPHAGOGASTRODUODENOSCOPY, WITH BRUSHINGS;  Surgeon: Torey Ruiz MD;  Location:  GI     TRANSPLANT LIVER RECIPIENT  DONOR N/A 2022    Procedure: TRANSPLANT, LIVER, RECIPIENT,  DONOR;  Surgeon: Pradeep Browne MD;  Location:  OR     Social History     Socioeconomic History     Marital status:      Spouse name: Not on file     Number of children: Not on file     Years of education: Not on file     Highest education level: Not on file   Occupational History     Not on file   Tobacco Use     Smoking status: Former Smoker     Quit date: 2020     Years since quittin.7     Smokeless tobacco: Never Used   Vaping Use     Vaping Use: Never used   Substance and Sexual Activity     Alcohol use: Not Currently     Comment: Last drink 2021     Drug use: Never     Sexual activity: Not Currently   Other Topics Concern     Parent/sibling w/ CABG, MI or angioplasty before 65F 55M? Not Asked   Social History Narrative     Not on file     Social Determinants of Health     Financial Resource Strain: Not on file   Food Insecurity: Not on file   Transportation Needs: Not on file   Physical Activity: Not on file   Stress: Not on file   Social Connections: Not on file   Intimate Partner Violence: Not on file   Housing Stability: Not on file     Prescription Medications as of 2022       Rx Number Disp Refills Start End Last Dispensed Date Next Fill Date Owning Pharmacy    albuterol (PROAIR HFA/PROVENTIL HFA/VENTOLIN HFA) 108 (90 Base) MCG/ACT inhaler  18 g 0  11/8/2021    [facility] [address]    Sig: Inhale 2 puffs into the lungs every 4 hours as needed for wheezing    Class: E-Prescribe    Notes to Pharmacy: Pharmacy may dispense brand covered by insurance (Proair, or proventil or ventolin or generic albuterol inhaler)    Route: Inhalation    aspirin (ASA) 81 MG chewable tablet  90 tablet 0 1/14/2022    [address]    Sig: Take 1 tablet (81 mg) by mouth daily    Class: E-Prescribe    Route: Oral    bumetanide (BUMEX) 1 MG tablet  3 tablet 0 1/14/2022 1/17/2022   [address]    Sig: Take 1 tablet (1 mg) by mouth daily for 3 days    Class: E-Prescribe    Route: Oral    magnesium oxide (MAG-OX) 400 MG tablet  180 tablet 3 1/29/2022    [facility] #[id number] [address] AT [facility] & [facility] [address]    Sig: Take 2 tablets (800 mg) by mouth 2 times daily    Class: E-Prescribe    Route: Oral    multivitamin (CENTRUM SILVER) tablet            Sig: Take 1 tablet by mouth daily    Class: Historical    Route: Oral    mycophenolate (GENERIC EQUIVALENT) 250 MG capsule  540 capsule 3 1/18/2022    [facility] - [place] [address] [address]    Sig: Take 3 capsules (750 mg) by mouth 2 times daily    Class: E-Prescribe    Notes to Pharmacy: TXP DT 1/7/2022 (Liver) TXP Dischg DT 1/14/2022 DX Liver replaced by transplant Z94.4 TX [facility] ([place], [place])    Route: Oral    pantoprazole (PROTONIX) 40 MG EC tablet  90 tablet 3 1/18/2022    [facility] - [place] [address] [address]    Sig: Take 1 tablet (40 mg) by mouth daily    Class: E-Prescribe    Route: Oral    predniSONE (DELTASONE) 5 MG tablet  90 tablet 1 1/18/2022    [facility] - [place] [address]     Sig: Take 1 tablet (5 mg) by mouth daily Until tacrolimus levels therapeutic    Class: E-Prescribe    Notes to Pharmacy: TXP DT 1/7/2022 (Liver) TXP Dischg DT 1/14/2022 DX Liver replaced by transplant Z94.4 TX Center [facility] ([place], [place])    Route: Oral    sennosides (SENOKOT) 8.6 MG tablet  60 tablet 0 1/14/2022    [facility] - [place], [place] - [address] [place] [address]    Sig: Take 1-2 tablets by mouth 2 times daily Take while taking oxycodone, HOLD for loose stools    Class: E-Prescribe    Route: Oral    sulfamethoxazole-trimethoprim (BACTRIM) 400-80 MG tablet  90 tablet 3 1/18/2022    [facility] - [address]    Sig: Take 1 tablet by mouth daily    Class: E-Prescribe    Route: Oral    tacrolimus (GENERIC EQUIVALENT) 0.5 MG capsule  60 capsule 1 1/14/2022    [facility] - [place], [place] - [address] [place] [address]    Sig: Take 1 capsule by mouth twice daily as directed by transplant team for dose titration    Class: E-Prescribe    tacrolimus (GENERIC EQUIVALENT) 1 MG capsule  540 capsule 3 1/28/2022    [facility] - [address]    Sig: Take 3 capsules (3 mg) by mouth every 12 hours    Class: E-Prescribe    Notes to Pharmacy: Dose change    Route: Oral    thiamine (B-1) 100 MG tablet  30 tablet 1 1/29/2022    [id number] [place], [place] [id number] [facility] [address] AT [facility] & [facility] [address]    Sig: Take 1 tablet (100 mg) by mouth daily    Class: E-Prescribe    Route: Oral    traMADol (ULTRAM) 50 MG tablet  14 tablet 0 1/28/2022    [facility] [place], [place] [address] [facility] [address] AT [facility] & [facility] [address]    Sig: Take 1 tablet (50 mg) by mouth every 6 hours as needed for severe pain Script called in    Class: Historical    Notes to Pharmacy: Script called into pharmacy    Route: Oral    tretinoin (RETIN-A) 0.025 %  external cream  45 g 11 12/10/2021    [facility][id number] - [facility] [address] AT [facility] [address]    Sig: Apply a pea size to entire face QD    Class: E-Prescribe    valGANciclovir (VALCYTE) 450 MG tablet  90 tablet 3 1/18/2022    [facility] - [place], [place] - [address]    Sig: Take 1 tablet (450 mg) by mouth daily    Class: E-Prescribe    Route: Oral        Patient has no known allergies.   REVIEW OF SYSTEMS (check box if normal)  [x]               GENERAL  [x]                 PULMONARY [x]                GENITOURINARY  [x]                CNS                 [x]                 CARDIAC  [x]                 ENDOCRINE  [x]                EARS,NOSE,THROAT [x]                 GASTROINTESTINAL [x]                 NEUROLOGIC    [x]                MUSCLOSKELTAL  [x]                  HEMATOLOGY      PHYSICAL EXAM (check box if normal)/70   Pulse 79   Wt 63.5 kg (140 lb)   SpO2 97%   Breastfeeding No   BMI 23.30 kg/m        [x]            GENERAL:    [x]       EYES:  ICTERIC   []        YES  []                    NO  [x]           EXTREMITIES: Clubbing []       Y     [x]           N    [x]           EARS, NOSE, THROAT: Membranes Moist    YES   [x]                   NO []                  [x]           LUNGS:  CLEAR    YES       [x]                  NO    []                                [x]           SKIN: Jaundice           YES       []                  NO    [x]                   Rash: YES       []                  NO    [x]                                     [x]             HEART: Regular Rate          YES       [x]                  NO    []                   Incision Clean:  YES       [x]                  NO    []                                [x]                    ABDOMEN: Wound healing well Organomegaly YES       []                  NO    [x]                       [x]                    NEUROLOGICAL:  Nonfocal  YES       [x]                   NO    []                       [x]                    Hernia YES       []                  NO    [x]                   PSYCHIATRIC:  Appropriate  YES       [x]                  NO    []                       OTHER:                                                                                                   PAIN SCALE:: 3

## 2022-01-31 NOTE — TELEPHONE ENCOUNTER
Prior Authorization Approval    Authorization Effective Date: 1/31/2022  Authorization Expiration Date: 3/2/2022  Medication: traMADol (ULTRAM) 50 MG tablet - APPROVED  Approved Dose/Quantity: 14 FOR 3 DAYS  Insurance Company: [employer] - Phone [phone] Fax [phone]  Which Pharmacy is filling the prescription (Not needed for infusion/clinic administered): [id number] [address] [facility] & [facility]  Pharmacy Notified: Yes PATIENT ALREADY PICKED UP WITH COUPON CARD  Patient Notified: Yes

## 2022-01-31 NOTE — LETTER
1/31/2022     RE: Melita Cortes  [id number]  [facility] [address]    Dear Colleague,    Thank you for referring your patient, Melita Cortes, to the [facility] TRANSPLANT [facility]. Please see a copy of my visit note below.    Transplant Surgery -OUTPATIENT IMMUNOSUPPRESSION PROGRESS NOTE    Date of Visit: 01/31/2022    Transplants:  1/7/2022 (Liver); Postoperative day:  24  ASSESMENT AND PLAN:  1.Graft Function:Liver allograft: no rejection or technical problems.  Mild increase in alkaline phosphatase continue to monitor.    2.Immunosuppression Management: keep tacrolimus levels at 8-10 ng/dL  3.Hypertension: ok  4.Renal Function:better  5.Lab frequency: weekly  6.Other:  Wound ok, remove staples today    Date: January 31, 2022    Transplant:  [x]                             Liver [x]                              Kidney []                             Pancreas []                              Other:             Chief Complaint:  Doing well, no fever  Food does not taste good    History of Present Illness:  Patient Active Problem List   Diagnosis     Abdominal bloating     Family history of pancreatic cancer     High risk HPV infection     Transaminitis     Macrocytosis     Cervical high risk HPV (human papillomavirus) test positive     Other ascites     Acute liver failure without hepatic coma     Anemia, unspecified type     Alcoholic liver disease (H)     Elevated serum creatinine     Alcoholic cirrhosis of liver with ascites (H)     Hyponatremia     Malaise and fatigue     At high risk for severe sepsis     Symptomatic anemia     Decompensated liver disease (H)     Bandemia     Hyperbilirubinemia     End-stage liver disease (H)     Obesity (BMI 30-39.9)     Hyperlipidemia     Anxiety     Asthma     Non-intractable vomiting with nausea, unspecified vomiting type     Status post liver transplantation (H)     Immunosuppressed status (H)     Pleural effusion     Ileus, postoperative (H)      Steroid-induced hyperglycemia     Hypervolemia     ROSSY (acute kidney injury) (H)     Bacteremia     Anemia due to blood loss, acute     Severe malnutrition (H)     SOCIAL /FAMILY HISTORY: [x]                  No recent change    Past Medical History:   Diagnosis Date     Alcoholic hepatitis      Asthma 3/10/2006     Cervical high risk HPV (human papillomavirus) test positive 2020    See problem list     Past Surgical History:   Procedure Laterality Date     APPENDECTOMY       COLONOSCOPY N/A 2022    Procedure: COLONOSCOPY;  Surgeon: Zita Steele MD;  Location:  GI     ESOPHAGOGASTRODUODENOSCOPY, WITH BRUSHINGS N/A 10/29/2021    Procedure: ESOPHAGOGASTRODUODENOSCOPY, WITH BRUSHINGS;  Surgeon: Torey Ruiz MD;  Location:  GI     TRANSPLANT LIVER RECIPIENT  DONOR N/A 2022    Procedure: TRANSPLANT, LIVER, RECIPIENT,  DONOR;  Surgeon: Pradeep Browne MD;  Location:  OR     Social History     Socioeconomic History     Marital status:      Spouse name: Not on file     Number of children: Not on file     Years of education: Not on file     Highest education level: Not on file   Occupational History     Not on file   Tobacco Use     Smoking status: Former Smoker     Quit date: 2020     Years since quittin.7     Smokeless tobacco: Never Used   Vaping Use     Vaping Use: Never used   Substance and Sexual Activity     Alcohol use: Not Currently     Comment: Last drink 2021     Drug use: Never     Sexual activity: Not Currently   Other Topics Concern     Parent/sibling w/ CABG, MI or angioplasty before 65F 55M? Not Asked   Social History Narrative     Not on file     Social Determinants of Health     Financial Resource Strain: Not on file   Food Insecurity: Not on file   Transportation Needs: Not on file   Physical Activity: Not on file   Stress: Not on file   Social Connections: Not on file   Intimate Partner Violence: Not on file   Housing  Stability: Not on file     Prescription Medications as of 1/31/2022       Rx Number Disp Refills Start End Last Dispensed Date Next Fill Date Owning Pharmacy    albuterol (PROAIR HFA/PROVENTIL HFA/VENTOLIN HFA) 108 (90 Base) MCG/ACT inhaler  18 g 0 11/8/2021    [facility] - [place], [place] - [address] [place] [address]    Sig: Inhale 2 puffs into the lungs every 4 hours as needed for wheezing    Class: E-Prescribe    Notes to Pharmacy: Pharmacy may dispense brand covered by insurance (Proair, or proventil or ventolin or generic albuterol inhaler)    Route: Inhalation    aspirin (ASA) 81 MG chewable tablet  90 tablet 0 1/14/2022    [facility] - [place], [place] - [address] [place] [address]    Sig: Take 1 tablet (81 mg) by mouth daily    Class: E-Prescribe    Route: Oral    bumetanide (BUMEX) 1 MG tablet  3 tablet 0 1/14/2022 1/17/2022   [facility] - [place], [place] - [address] [place] [address]    Sig: Take 1 tablet (1 mg) by mouth daily for 3 days    Class: E-Prescribe    Route: Oral    magnesium oxide (MAG-OX) 400 MG tablet  180 tablet 3 1/29/2022    [id number] [address] AT [facility] [address]    Sig: Take 2 tablets (800 mg) by mouth 2 times daily    Class: E-Prescribe    Route: Oral    multivitamin (CENTRUM SILVER) tablet            Sig: Take 1 tablet by mouth daily    Class: Historical    Route: Oral    mycophenolate (GENERIC EQUIVALENT) 250 MG capsule  540 capsule 3 1/18/2022    [facility] - [place], [place] - [address]    Sig: Take 3 capsules (750 mg) by mouth 2 times daily    Class: E-Prescribe    Notes to Pharmacy: TXP DT 1/7/2022 (Liver) TXP Dischg DT 1/14/2022 DX Liver replaced by transplant Z94.4 [facility] ([place], [place])    Route: Oral    pantoprazole (PROTONIX) 40 MG EC tablet  90 tablet 3 1/18/2022    [facility] -  [place] [address] [address]     Sig: Take 1 tablet (40 mg) by mouth daily    Class: E-Prescribe    Route: Oral    predniSONE (DELTASONE) 5 MG tablet  90 tablet 1 1/18/2022    [facility] [place] [address] [address]     Sig: Take 1 tablet (5 mg) by mouth daily Until tacrolimus levels therapeutic    Class: E-Prescribe    Notes to Pharmacy: TXP DT 1/7/2022 (Liver) TXP Dischg DT 1/14/2022 DX Liver replaced by transplant Z94.4 TX [facility] ([place], [place])    Route: Oral    sennosides (SENOKOT) 8.6 MG tablet  60 tablet 0 1/14/2022    [facility] - [place], [place] - [address] [place] [address] SE    Sig: Take 1-2 tablets by mouth 2 times daily Take while taking oxycodone, HOLD for loose stools    Class: E-Prescribe    Route: Oral    sulfamethoxazole-trimethoprim (BACTRIM) 400-80 MG tablet  90 tablet 3 1/18/2022    [facility] - [place] [address] [address]     Sig: Take 1 tablet by mouth daily    Class: E-Prescribe    Route: Oral    tacrolimus (GENERIC EQUIVALENT) 0.5 MG capsule  60 capsule 1 1/14/2022    [facility] - [place], [place] - [address] [place] [address] SE    Sig: Take 1 capsule by mouth twice daily as directed by transplant team for dose titration    Class: E-Prescribe    tacrolimus (GENERIC EQUIVALENT) 1 MG capsule  540 capsule 3 1/28/2022    [facility] [place] [address] [address]     Sig: Take 3 capsules (3 mg) by mouth every 12 hours    Class: E-Prescribe    Notes to Pharmacy: Dose change    Route: Oral    thiamine (B-1) 100 MG tablet  30 tablet 1 1/29/2022    [facility][id number] [place] [address] AT [facility] [address] [facility] [address]    Sig: Take 1 tablet (100 mg) by mouth daily    Class: E-Prescribe    Route: Oral    traMADol (ULTRAM) 50 MG tablet  14 tablet 0 1/28/2022    [facility] #[id number] [place] [address] AT [facility] & [facility] [address]    Sig: Take 1 tablet (50 mg) by mouth every 6 hours as needed for severe pain Script called in    Class: Historical    Notes to Pharmacy: Script called into pharmacy    Route: Oral    tretinoin (RETIN-A) 0.025 % external cream  45 g 11 12/10/2021    [id number] [address] AT [facility] [address]    Sig: Apply a pea size to entire face QD    Class: E-Prescribe    valGANciclovir (VALCYTE) 450 MG tablet  90 tablet 3 1/18/2022    [facility] - [place], [place] - [address]    Sig: Take 1 tablet (450 mg) by mouth daily    Class: E-Prescribe    Route: Oral        Patient has no known allergies.   REVIEW OF SYSTEMS (check box if normal)  [x]               GENERAL  [x]                 PULMONARY [x]                GENITOURINARY  [x]                CNS                 [x]                 CARDIAC  [x]                 ENDOCRINE  [x]                EARS,NOSE,THROAT [x]                 GASTROINTESTINAL [x]                 NEUROLOGIC    [x]                MUSCLOSKELTAL  [x]                  HEMATOLOGY      PHYSICAL EXAM (check box if normal)/70   Pulse 79   Wt 63.5 kg (140 lb)   SpO2 97%   Breastfeeding No   BMI 23.30 kg/m        [x]            GENERAL:    [x]       EYES:  ICTERIC   []        YES  []                    NO  [x]           EXTREMITIES: Clubbing []       Y     [x]           N    [x]           EARS, NOSE, THROAT: Membranes Moist    YES   [x]                   NO []                  [x]           LUNGS:  CLEAR    YES       [x]                  NO    []                                [x]           SKIN: Jaundice           YES       []                  NO    [x]                   Rash: YES       []                  NO    [x]                                     [x]             HEART: Regular Rate          YES       [x]                  NO    []                   Incision Clean:  YES       [x]                   NO    []                                [x]                    ABDOMEN: Wound healing well Organomegaly YES       []                  NO    [x]                       [x]                    NEUROLOGICAL:  Nonfocal  YES       [x]                  NO    []                       [x]                    Hernia YES       []                  NO    [x]                   PSYCHIATRIC:  Appropriate  YES       [x]                  NO    []                       OTHER:                                                                                                   PAIN SCALE:: 3    Again, thank you for allowing me to participate in the care of your patient.      Sincerely,    Pradeep Browne MD

## 2022-02-01 ENCOUNTER — TELEPHONE (OUTPATIENT)
Dept: TRANSPLANT | Facility: CLINIC | Age: 52
End: 2022-02-01
Payer: COMMERCIAL

## 2022-02-01 DIAGNOSIS — Z94.4 STATUS POST LIVER TRANSPLANTATION (H): ICD-10-CM

## 2022-02-01 RX ORDER — TACROLIMUS 1 MG/1
CAPSULE ORAL
Qty: 630 CAPSULE | Refills: 3 | Status: SHIPPED | OUTPATIENT
Start: 2022-02-01 | End: 2022-02-04

## 2022-02-01 NOTE — TELEPHONE ENCOUNTER
ISSUE:   Tacrolimus IR level 6 on 1/31, goal 8, dose 3 mg BID.    PLAN:   Please call patient and confirm this was an accurate 12-hour trough. Verify Tacrolimus IR dose 3 mg BID. Confirm no new medications or illness. Confirm no missed doses. If accurate trough and accurate dose, increase Tacrolimus IR dose to 3 mg AM and 4 mg PM and repeat labs in 1 week.    OUTCOME:   Spoke with patient, they confirm accurate trough level and current dose 3 mg BID. Patient confirmed dose change to 3 mg AM and 4 mg PM and to repeat labs in 1 week. Orders sent to preferred pharmacy for dose change and lab for repeat labs. Patient voiced understanding of plan.

## 2022-02-02 ENCOUNTER — TELEPHONE (OUTPATIENT)
Dept: TRANSPLANT | Facility: CLINIC | Age: 52
End: 2022-02-02
Payer: COMMERCIAL

## 2022-02-02 NOTE — TELEPHONE ENCOUNTER
----- Message from Shalini Hawkins RN sent at 1/31/2022  2:00 PM CST -----  In the next month or so. Can do with PCP follow up  ----- Message -----  From: Magalis Muniz, [facility]  Sent: 1/31/2022   2:00 PM CST  To: Shalini Hawkins RN    Fidelina is wondering if the Pap and mammogram is urgent and/or would like to know a timeline of when she needs to get it done.     Thanks!      Informed pt.

## 2022-02-03 ENCOUNTER — LAB REQUISITION (OUTPATIENT)
Dept: LAB | Facility: CLINIC | Age: 52
End: 2022-02-03
Payer: COMMERCIAL

## 2022-02-03 ENCOUNTER — TELEPHONE (OUTPATIENT)
Dept: TRANSPLANT | Facility: CLINIC | Age: 52
End: 2022-02-03

## 2022-02-03 ENCOUNTER — LAB (OUTPATIENT)
Dept: LAB | Facility: CLINIC | Age: 52
End: 2022-02-03
Attending: TRANSPLANT SURGERY
Payer: COMMERCIAL

## 2022-02-03 DIAGNOSIS — Z79.899 OTHER LONG TERM (CURRENT) DRUG THERAPY: ICD-10-CM

## 2022-02-03 DIAGNOSIS — Z94.4 LIVER TRANSPLANT STATUS (H): ICD-10-CM

## 2022-02-03 DIAGNOSIS — Z94.4 LIVER REPLACED BY TRANSPLANT (H): Primary | ICD-10-CM

## 2022-02-03 DIAGNOSIS — Z94.4 LIVER REPLACED BY TRANSPLANT (H): ICD-10-CM

## 2022-02-03 LAB
ALBUMIN SERPL-MCNC: 2.7 G/DL (ref 3.4–5)
ALP SERPL-CCNC: 761 U/L (ref 40–150)
ALT SERPL W P-5'-P-CCNC: 207 U/L (ref 0–50)
ANION GAP SERPL CALCULATED.3IONS-SCNC: 6 MMOL/L (ref 3–14)
AST SERPL W P-5'-P-CCNC: 136 U/L (ref 0–45)
BILIRUB DIRECT SERPL-MCNC: 0.8 MG/DL (ref 0–0.2)
BILIRUB SERPL-MCNC: 1.2 MG/DL (ref 0.2–1.3)
BUN SERPL-MCNC: 15 MG/DL (ref 7–30)
CALCIUM SERPL-MCNC: 9.2 MG/DL (ref 8.5–10.1)
CHLORIDE BLD-SCNC: 102 MMOL/L (ref 94–109)
CO2 SERPL-SCNC: 26 MMOL/L (ref 20–32)
CREAT SERPL-MCNC: 0.78 MG/DL (ref 0.52–1.04)
ERYTHROCYTE [DISTWIDTH] IN BLOOD BY AUTOMATED COUNT: 16.5 % (ref 10–15)
GFR SERPL CREATININE-BSD FRML MDRD: >90 ML/MIN/1.73M2
GLUCOSE BLD-MCNC: 105 MG/DL (ref 70–99)
HCT VFR BLD AUTO: 28.9 % (ref 35–47)
HGB BLD-MCNC: 9.4 G/DL (ref 11.7–15.7)
HOLD SPECIMEN: NORMAL
MAGNESIUM SERPL-MCNC: 1.8 MG/DL (ref 1.6–2.3)
MCH RBC QN AUTO: 31.1 PG (ref 26.5–33)
MCHC RBC AUTO-ENTMCNC: 32.5 G/DL (ref 31.5–36.5)
MCV RBC AUTO: 96 FL (ref 78–100)
PETH BLD-MCNC: NEGATIVE NG/ML
PHOSPHATE SERPL-MCNC: 4.7 MG/DL (ref 2.5–4.5)
PLATELET # BLD AUTO: 283 10E3/UL (ref 150–450)
POTASSIUM BLD-SCNC: 4.7 MMOL/L (ref 3.4–5.3)
PROT SERPL-MCNC: 5.7 G/DL (ref 6.8–8.8)
RBC # BLD AUTO: 3.02 10E6/UL (ref 3.8–5.2)
SODIUM SERPL-SCNC: 134 MMOL/L (ref 133–144)
TACROLIMUS BLD-MCNC: 7 UG/L (ref 5–15)
TME LAST DOSE: NORMAL H
TME LAST DOSE: NORMAL H
WBC # BLD AUTO: 4.1 10E3/UL (ref 4–11)

## 2022-02-03 PROCEDURE — 84100 ASSAY OF PHOSPHORUS: CPT | Mod: ORL | Performed by: TRANSPLANT SURGERY

## 2022-02-03 PROCEDURE — 82248 BILIRUBIN DIRECT: CPT | Mod: ORL | Performed by: TRANSPLANT SURGERY

## 2022-02-03 PROCEDURE — U0003 INFECTIOUS AGENT DETECTION BY NUCLEIC ACID (DNA OR RNA); SEVERE ACUTE RESPIRATORY SYNDROME CORONAVIRUS 2 (SARS-COV-2) (CORONAVIRUS DISEASE [COVID-19]), AMPLIFIED PROBE TECHNIQUE, MAKING USE OF HIGH THROUGHPUT TECHNOLOGIES AS DESCRIBED BY CMS-2020-01-R: HCPCS | Mod: 90 | Performed by: PATHOLOGY

## 2022-02-03 PROCEDURE — 83735 ASSAY OF MAGNESIUM: CPT | Mod: ORL | Performed by: TRANSPLANT SURGERY

## 2022-02-03 PROCEDURE — U0005 INFEC AGEN DETEC AMPLI PROBE: HCPCS | Mod: 90 | Performed by: PATHOLOGY

## 2022-02-03 PROCEDURE — 85027 COMPLETE CBC AUTOMATED: CPT | Mod: ORL | Performed by: TRANSPLANT SURGERY

## 2022-02-03 PROCEDURE — 80053 COMPREHEN METABOLIC PANEL: CPT | Mod: ORL | Performed by: TRANSPLANT SURGERY

## 2022-02-03 PROCEDURE — 99000 SPECIMEN HANDLING OFFICE-LAB: CPT | Performed by: PATHOLOGY

## 2022-02-03 PROCEDURE — 80197 ASSAY OF TACROLIMUS: CPT | Mod: ORL | Performed by: TRANSPLANT SURGERY

## 2022-02-03 PROCEDURE — 36415 COLL VENOUS BLD VENIPUNCTURE: CPT | Mod: ORL | Performed by: TRANSPLANT SURGERY

## 2022-02-03 NOTE — TELEPHONE ENCOUNTER
Patient Call: General  Route to LPN    Reason for call: Patient has appointment on Monday for some labs and wants to know if they are okay to go prior to their biopsy at noon.    Call back needed? Yes    Return Call Needed  Same as documented in contacts section  When to return call?: Greater than one day: Route standard priority

## 2022-02-03 NOTE — TELEPHONE ENCOUNTER
Spoke with patient. She does have a sore stomach    Pt has increase in LFTs. Per dr Browne. Pt to have US and liver biopsy.    Pt is set up for US 2/4 at 2pm. NPO for 8 hours. Check in 145 pm.  Pt biopsy Monday 2/7. Check in 12pm. Biopsy 130 pm. NPO guidelines.. Must have COVID test prior to biopsy. Pt is set up for cOVID test today as only open appt.     Pt also to have MRCP set up and possible cancel if not needed.

## 2022-02-04 ENCOUNTER — ANCILLARY PROCEDURE (OUTPATIENT)
Dept: ULTRASOUND IMAGING | Facility: CLINIC | Age: 52
End: 2022-02-04
Attending: TRANSPLANT SURGERY
Payer: COMMERCIAL

## 2022-02-04 DIAGNOSIS — Z94.4 LIVER REPLACED BY TRANSPLANT (H): ICD-10-CM

## 2022-02-04 DIAGNOSIS — Z94.4 STATUS POST LIVER TRANSPLANTATION (H): ICD-10-CM

## 2022-02-04 LAB
BACTERIA PRT CULT: NO GROWTH
SARS-COV-2 RNA RESP QL NAA+PROBE: NEGATIVE

## 2022-02-04 PROCEDURE — 93975 VASCULAR STUDY: CPT | Performed by: RADIOLOGY

## 2022-02-04 RX ORDER — TACROLIMUS 1 MG/1
4 CAPSULE ORAL EVERY 12 HOURS
Qty: 720 CAPSULE | Refills: 3 | Status: SHIPPED | OUTPATIENT
Start: 2022-02-04 | End: 2022-02-08

## 2022-02-04 NOTE — TELEPHONE ENCOUNTER
ISSUE:   Tacrolimus IR level 7 on 2/3, goal 8, dose 3 mg Am and 4 mg pM.    PLAN:   Please call patient and confirm this was an accurate 12-hour trough. Verify Tacrolimus IR dose 3 mg AM and 4 mg PM. Confirm no new medications or illness. Confirm no missed doses. If accurate trough and accurate dose, increase Tacrolimus IR dose to 4 mg BID and repeat labson Monday.    OUTCOME:   Spoke with patient, they confirm accurate trough level and current dose 3 mg am, 4 mg pm. Patient confirmed dose change to 4 mg BID and to repeat labs in 3 days. Orders sent to preferred pharmacy for dose change and lab for repeat labs. Patient voiced understanding of plan.     Soumya Wren LPN

## 2022-02-05 ENCOUNTER — TELEPHONE (OUTPATIENT)
Dept: TRANSPLANT | Facility: CLINIC | Age: 52
End: 2022-02-05
Payer: COMMERCIAL

## 2022-02-05 DIAGNOSIS — R11.0 NAUSEA: Primary | ICD-10-CM

## 2022-02-05 DIAGNOSIS — Z94.4 STATUS POST LIVER TRANSPLANTATION (H): ICD-10-CM

## 2022-02-05 RX ORDER — ONDANSETRON 4 MG/1
4 TABLET, ORALLY DISINTEGRATING ORAL EVERY 6 HOURS PRN
Qty: 22 TABLET | Refills: 0 | Status: SHIPPED | OUTPATIENT
Start: 2022-02-05 | End: 2022-02-20

## 2022-02-05 RX ORDER — TRAMADOL HYDROCHLORIDE 50 MG/1
50 TABLET ORAL EVERY 8 HOURS PRN
Qty: 8 TABLET | Refills: 0 | Status: SHIPPED | OUTPATIENT
Start: 2022-02-05 | End: 2022-04-07

## 2022-02-05 NOTE — TELEPHONE ENCOUNTER
Patient called concerned with nausea and and fatigue    She is concerned about her biopsy on Monday.  She states that she would like more tramadol and something to help with her nausea something to help with her nausea.    Fellow was contacted agrees with plan to give patient Zofran and a few tramadol.  Patient was reassured about biopsy Monday discussed ultrasound results with patient patient will follow up with coordinator on Monday

## 2022-02-07 ENCOUNTER — TELEPHONE (OUTPATIENT)
Dept: TRANSPLANT | Facility: CLINIC | Age: 52
End: 2022-02-07

## 2022-02-07 ENCOUNTER — APPOINTMENT (OUTPATIENT)
Dept: MEDSURG UNIT | Facility: CLINIC | Age: 52
End: 2022-02-07
Attending: TRANSPLANT SURGERY
Payer: COMMERCIAL

## 2022-02-07 ENCOUNTER — OFFICE VISIT (OUTPATIENT)
Dept: TRANSPLANT | Facility: CLINIC | Age: 52
End: 2022-02-07
Attending: TRANSPLANT SURGERY
Payer: COMMERCIAL

## 2022-02-07 ENCOUNTER — APPOINTMENT (OUTPATIENT)
Dept: INTERVENTIONAL RADIOLOGY/VASCULAR | Facility: CLINIC | Age: 52
End: 2022-02-07
Attending: TRANSPLANT SURGERY
Payer: COMMERCIAL

## 2022-02-07 ENCOUNTER — HOSPITAL ENCOUNTER (OUTPATIENT)
Facility: CLINIC | Age: 52
Discharge: HOME | End: 2022-02-07
Attending: TRANSPLANT SURGERY | Admitting: TRANSPLANT SURGERY
Payer: COMMERCIAL

## 2022-02-07 ENCOUNTER — LAB (OUTPATIENT)
Dept: LAB | Facility: CLINIC | Age: 52
End: 2022-02-07
Attending: TRANSPLANT SURGERY
Payer: COMMERCIAL

## 2022-02-07 ENCOUNTER — ANCILLARY PROCEDURE (OUTPATIENT)
Dept: GENERAL RADIOLOGY | Facility: CLINIC | Age: 52
End: 2022-02-07
Attending: TRANSPLANT SURGERY
Payer: COMMERCIAL

## 2022-02-07 VITALS
SYSTOLIC BLOOD PRESSURE: 115 MMHG | TEMPERATURE: 98.5 F | OXYGEN SATURATION: 96 % | BODY MASS INDEX: 22.16 KG/M2 | DIASTOLIC BLOOD PRESSURE: 79 MMHG | RESPIRATION RATE: 18 BRPM | WEIGHT: 133 LBS | HEART RATE: 72 BPM | HEIGHT: 65 IN

## 2022-02-07 VITALS
SYSTOLIC BLOOD PRESSURE: 114 MMHG | WEIGHT: 133.1 LBS | OXYGEN SATURATION: 97 % | HEART RATE: 77 BPM | BODY MASS INDEX: 22.15 KG/M2 | DIASTOLIC BLOOD PRESSURE: 71 MMHG

## 2022-02-07 DIAGNOSIS — Z94.4 LIVER REPLACED BY TRANSPLANT (H): ICD-10-CM

## 2022-02-07 DIAGNOSIS — Z94.4 LIVER REPLACED BY TRANSPLANT (H): Primary | ICD-10-CM

## 2022-02-07 LAB
ALBUMIN SERPL-MCNC: 2.8 G/DL (ref 3.4–5)
ALP SERPL-CCNC: 830 U/L (ref 40–150)
ALT SERPL W P-5'-P-CCNC: 162 U/L (ref 0–50)
ANION GAP SERPL CALCULATED.3IONS-SCNC: 10 MMOL/L (ref 3–14)
AST SERPL W P-5'-P-CCNC: 49 U/L (ref 0–45)
BILIRUB DIRECT SERPL-MCNC: 3.4 MG/DL (ref 0–0.2)
BILIRUB SERPL-MCNC: 3.9 MG/DL (ref 0.2–1.3)
BUN SERPL-MCNC: 19 MG/DL (ref 7–30)
CALCIUM SERPL-MCNC: 9.5 MG/DL (ref 8.5–10.1)
CHLORIDE BLD-SCNC: 98 MMOL/L (ref 94–109)
CO2 SERPL-SCNC: 24 MMOL/L (ref 20–32)
CREAT SERPL-MCNC: 0.99 MG/DL (ref 0.52–1.04)
ERYTHROCYTE [DISTWIDTH] IN BLOOD BY AUTOMATED COUNT: 15.6 % (ref 10–15)
GFR SERPL CREATININE-BSD FRML MDRD: 69 ML/MIN/1.73M2
GLUCOSE BLD-MCNC: 134 MG/DL (ref 70–99)
HCT VFR BLD AUTO: 30.6 % (ref 35–47)
HGB BLD-MCNC: 9.9 G/DL (ref 11.7–15.7)
MAGNESIUM SERPL-MCNC: 1.5 MG/DL (ref 1.8–2.6)
MCH RBC QN AUTO: 30.9 PG (ref 26.5–33)
MCHC RBC AUTO-ENTMCNC: 32.4 G/DL (ref 31.5–36.5)
MCV RBC AUTO: 96 FL (ref 78–100)
PHOSPHATE SERPL-MCNC: 4.2 MG/DL (ref 2.5–4.5)
PLATELET # BLD AUTO: 256 10E3/UL (ref 150–450)
POTASSIUM BLD-SCNC: 4.6 MMOL/L (ref 3.4–5.3)
PROT SERPL-MCNC: 6.1 G/DL (ref 6.8–8.8)
RBC # BLD AUTO: 3.2 10E6/UL (ref 3.8–5.2)
SODIUM SERPL-SCNC: 132 MMOL/L (ref 133–144)
TACROLIMUS BLD-MCNC: 12.7 UG/L (ref 5–15)
TME LAST DOSE: NORMAL H
TME LAST DOSE: NORMAL H
WBC # BLD AUTO: 6.3 10E3/UL (ref 4–11)

## 2022-02-07 PROCEDURE — 74019 RADEX ABDOMEN 2 VIEWS: CPT | Performed by: RADIOLOGY

## 2022-02-07 PROCEDURE — 47000 NEEDLE BIOPSY OF LIVER PERQ: CPT | Performed by: PHYSICIAN ASSISTANT

## 2022-02-07 PROCEDURE — 250N000009 HC RX 250: Performed by: STUDENT IN AN ORGANIZED HEALTH CARE EDUCATION/TRAINING PROGRAM

## 2022-02-07 PROCEDURE — 80053 COMPREHEN METABOLIC PANEL: CPT | Performed by: PATHOLOGY

## 2022-02-07 PROCEDURE — 999N000142 HC STATISTIC PROCEDURE PREP ONLY

## 2022-02-07 PROCEDURE — 999N000133 HC STATISTIC PP CARE STAGE 2

## 2022-02-07 PROCEDURE — 88313 SPECIAL STAINS GROUP 2: CPT | Mod: TC | Performed by: TRANSPLANT SURGERY

## 2022-02-07 PROCEDURE — 82248 BILIRUBIN DIRECT: CPT | Performed by: PATHOLOGY

## 2022-02-07 PROCEDURE — 99152 MOD SED SAME PHYS/QHP 5/>YRS: CPT

## 2022-02-07 PROCEDURE — 272N000505 IR LIVER BIOPSY PERCUTANEOUS

## 2022-02-07 PROCEDURE — 96360 HYDRATION IV INFUSION INIT: CPT

## 2022-02-07 PROCEDURE — 83735 ASSAY OF MAGNESIUM: CPT | Performed by: TRANSPLANT SURGERY

## 2022-02-07 PROCEDURE — 85027 COMPLETE CBC AUTOMATED: CPT | Performed by: PATHOLOGY

## 2022-02-07 PROCEDURE — 80197 ASSAY OF TACROLIMUS: CPT | Performed by: TRANSPLANT SURGERY

## 2022-02-07 PROCEDURE — 76942 ECHO GUIDE FOR BIOPSY: CPT | Mod: 26 | Performed by: PHYSICIAN ASSISTANT

## 2022-02-07 PROCEDURE — 250N000011 HC RX IP 250 OP 636: Performed by: TRANSPLANT SURGERY

## 2022-02-07 PROCEDURE — 87521 HEPATITIS C PROBE&RVRS TRNSC: CPT | Performed by: TRANSPLANT SURGERY

## 2022-02-07 PROCEDURE — 99213 OFFICE O/P EST LOW 20 MIN: CPT | Mod: 24 | Performed by: TRANSPLANT SURGERY

## 2022-02-07 PROCEDURE — 99152 MOD SED SAME PHYS/QHP 5/>YRS: CPT | Performed by: PHYSICIAN ASSISTANT

## 2022-02-07 PROCEDURE — 36415 COLL VENOUS BLD VENIPUNCTURE: CPT | Performed by: PATHOLOGY

## 2022-02-07 PROCEDURE — 250N000011 HC RX IP 250 OP 636: Performed by: STUDENT IN AN ORGANIZED HEALTH CARE EDUCATION/TRAINING PROGRAM

## 2022-02-07 PROCEDURE — 258N000003 HC RX IP 258 OP 636: Performed by: TRANSPLANT SURGERY

## 2022-02-07 PROCEDURE — 84100 ASSAY OF PHOSPHORUS: CPT | Performed by: PATHOLOGY

## 2022-02-07 RX ORDER — FENTANYL CITRATE 50 UG/ML
25-50 INJECTION, SOLUTION INTRAMUSCULAR; INTRAVENOUS EVERY 5 MIN PRN
Status: DISCONTINUED | OUTPATIENT
Start: 2022-02-07 | End: 2022-02-07 | Stop reason: HOSPADM

## 2022-02-07 RX ORDER — NALOXONE HYDROCHLORIDE 0.4 MG/ML
0.4 INJECTION, SOLUTION INTRAMUSCULAR; INTRAVENOUS; SUBCUTANEOUS
Status: DISCONTINUED | OUTPATIENT
Start: 2022-02-07 | End: 2022-02-07 | Stop reason: HOSPADM

## 2022-02-07 RX ORDER — FLUMAZENIL 0.1 MG/ML
0.2 INJECTION, SOLUTION INTRAVENOUS
Status: DISCONTINUED | OUTPATIENT
Start: 2022-02-07 | End: 2022-02-07 | Stop reason: HOSPADM

## 2022-02-07 RX ORDER — NALOXONE HYDROCHLORIDE 0.4 MG/ML
0.2 INJECTION, SOLUTION INTRAMUSCULAR; INTRAVENOUS; SUBCUTANEOUS
Status: DISCONTINUED | OUTPATIENT
Start: 2022-02-07 | End: 2022-02-07 | Stop reason: HOSPADM

## 2022-02-07 RX ORDER — LIDOCAINE 40 MG/G
CREAM TOPICAL
Status: DISCONTINUED | OUTPATIENT
Start: 2022-02-07 | End: 2022-02-07 | Stop reason: HOSPADM

## 2022-02-07 RX ORDER — SODIUM CHLORIDE 9 MG/ML
INJECTION, SOLUTION INTRAVENOUS ONCE
Status: COMPLETED | OUTPATIENT
Start: 2022-02-07 | End: 2022-02-07

## 2022-02-07 RX ADMIN — SODIUM CHLORIDE 1000 MG: 9 INJECTION, SOLUTION INTRAVENOUS at 14:05

## 2022-02-07 RX ADMIN — FENTANYL CITRATE 50 MCG: 50 INJECTION, SOLUTION INTRAMUSCULAR; INTRAVENOUS at 13:28

## 2022-02-07 RX ADMIN — MIDAZOLAM 1 MG: 1 INJECTION INTRAMUSCULAR; INTRAVENOUS at 13:27

## 2022-02-07 RX ADMIN — SODIUM CHLORIDE: 9 INJECTION, SOLUTION INTRAVENOUS at 12:28

## 2022-02-07 RX ADMIN — FENTANYL CITRATE 25 MCG: 50 INJECTION, SOLUTION INTRAMUSCULAR; INTRAVENOUS at 13:31

## 2022-02-07 RX ADMIN — MIDAZOLAM 0.5 MG: 1 INJECTION INTRAMUSCULAR; INTRAVENOUS at 13:31

## 2022-02-07 RX ADMIN — LIDOCAINE HYDROCHLORIDE 5 ML: 10 INJECTION, SOLUTION EPIDURAL; INFILTRATION; INTRACAUDAL; PERINEURAL at 13:33

## 2022-02-07 ASSESSMENT — MIFFLIN-ST. JEOR: SCORE: 1223.54

## 2022-02-07 NOTE — PROGRESS NOTES
Pt arrived back to 2A from liver biopsy. VSS on RA, denies pain. RUQ abdomen site C/D/I, negative for a hematoma. Tolerating PO intake. Strict bedrest time for one hour with one hour of bathroom privileges. Solu-medrol infusing.

## 2022-02-07 NOTE — PRE-PROCEDURE
GENERAL PRE-PROCEDURE:   Procedure:  Image guided random transplant liver biopsy  Date/Time:  2/7/2022 12:45 PM    Verbal consent obtained?: Yes    Written consent obtained?: Yes    Risks and benefits: Risks, benefits and alternatives were discussed    DC Plan: Appropriate discharge home plan in place for patients who are going home after procedure   Consent given by:  Patient  Patient states understanding of procedure being performed: Yes    Patient's understanding of procedure matches consent: Yes    Procedure consent matches procedure scheduled: Yes    Expected level of sedation:  Moderate  Appropriately NPO:  Yes  ASA Class:  2  Mallampati  :  Grade 2- soft palate, base of uvula, tonsillar pillars, and portion of posterior pharyngeal wall visible  Lungs:  Lungs clear with good breath sounds bilaterally  Heart:  Normal heart sounds and rate  History & Physical reviewed:  History and physical reviewed and no updates needed  Statement of review:  I have reviewed the lab findings, diagnostic data, medications, and the plan for sedation

## 2022-02-07 NOTE — DISCHARGE INSTRUCTIONS
[facility]    Interventional Radiology  Patient Instructions Following Biopsy    AFTER YOU GO HOME  ? If you were given sedation DO NOT drive or operate machinery at home or at work for at least 24 hours  ? DO relax and take it easy for 48 hours, no strenuous activity for 24 hours  ? DO drink plenty of fluids  ? DO resume your regular diet, unless otherwise instructed by your Primary Physician  ? Keep the dressing dry and in place for 24 hours.  ? DO NOT SMOKE FOR AT LEAST 24 HOURS, if you have been given any medications that were to help you relax or sedate you during your procedure  ? DO NOT drink alcoholic beverages the day of your procedure  ? DO NOT do any strenuous exercise or lifting (> 10 lbs) for at least 7 days following your procedure  ? DO NOT take a bath or shower for at least 12 hours following your procedure  ? Remove dressing after shower the next day. Replace with Band aid for 2 days.  Never leave a wet dressing in place.  ? DO NOT make any important or legal decisions for 24 hours following your procedure  ? There should be minimum drainage from the biopsy site    CALL THE PHYSICIAN IF:  ? You start bleeding from the procedure site.  If you do start to bleed from that site, lie down flat and hold pressure on the site for a minimum of 10 minutes.  Your physician will tell you if you need to return to the hospital  ? You develop nausea or vomiting  ? You have excessive swelling, redness, or tenderness at the site  ? You have drainage that looks like it is infected.  ? You experience severe pain  ? You develop hives or a rash or unexplained itching  ? You develop shortness of breath  ? You develop a temperature of 101 degrees F or greater    ADDITIONAL INSTRUCTIONS  If you are taking Coumadin, restart tonight.  Follow up with your Coumadin Clinic or Primary Care MD to have your INR rechecked.    [facility] INTERVENTIONAL RADIOLOGY DEPARTMENT  Procedure Physician: Dr. Zhang                              Date of procedure: February 7, 2022  Telephone Numbers: [phone] Monday-Friday 8:00 am to 4:30 pm  [phone] After 4:30 pm Monday-Friday, Weekends & Holidays.   Ask for the Interventional Radiologist on call.  Someone is on call 24 hrs/day  [facility] toll free number: [phone] Monday-Friday 8:00 am to 4:30 pm  [facility] Emergency Dept: [phone]

## 2022-02-07 NOTE — NURSING NOTE
Chief Complaint   Patient presents with     RECHECK     follow up      Blood pressure 114/71, pulse 77, weight 60.4 kg (133 lb 1.6 oz), SpO2 97 %, not currently breastfeeding.    Dolly Snyder on 2/7/2022 at 9:21 AM

## 2022-02-07 NOTE — IR NOTE
Patient Name: Melita Cortes  Medical Record Number: [id number]  Today's Date: 2/7/2022    Procedure: image guided random transplant liver biopsy  Proceduralist: Leatha MACKEY    Procedure Start: 1328  Procedure end: 1039  Sedation medications administered: 1.5mg versed IV, 75mcg fentanyl IV    Report given to: Christina HARLEY  : n/a    Other Notes: Pt arrived to IR room 6 from . Consent reviewed. Pt denies any questions or concerns regarding procedure. Pt positioned supine and monitored per protocol. Pt tolerated procedure without any noted complications. Pt transferred back to .

## 2022-02-07 NOTE — PROCEDURES
[facility]    Procedure: IR Procedure Note    Date/Time: 2/7/2022 1:47 PM  Performed by: Jacek Zhang PA-C  Authorized by: Jacek Zhang PA-C       UNIVERSAL PROTOCOL   Site Marked: NA  Prior Images Obtained and Reviewed:  Yes  Required items: Required blood products, implants, devices and special equipment available    Patient identity confirmed:  Verbally with patient, arm band, provided demographic data and hospital-assigned identification number  Patient was reevaluated immediately before administering moderate or deep sedation or anesthesia  Confirmation Checklist:  Patient's identity using two indicators, relevant allergies, procedure was appropriate and matched the consent or emergent situation and correct equipment/implants were available  Time out: Immediately prior to the procedure a time out was called    Universal Protocol: the Joint Commission Universal Protocol was followed    Preparation: Patient was prepped and draped in usual sterile fashion       ANESTHESIA    Anesthesia: Local infiltration  Local Anesthetic:  Lidocaine 1% without epinephrine  Anesthetic Total (mL):  6      SEDATION  Patient Sedated: Yes    Sedation Type:  Moderate (conscious) sedation  Sedation:  Fentanyl and midazolam  Vital signs: Vital signs monitored during sedation    See dictated procedure note for full details.  Findings: Moderate sedation for liver biopsy - 1.5 mg of midazolam and 75 mcg of fentanyl.     Specimens: core needle biopsy specimens sent for pathological analysis    Complications: None    Condition: Stable    Plan: 2 hours bedrest      PROCEDURE  Describe Procedure: Completed ultrasound-guided transplant liver biopsy. 3 passes yielded 3 cores sent to pathology in preservative. Gelfoam in tract. No immediate complication.    Patient Tolerance:  Patient tolerated the procedure well with no immediate complications  Length of time physician/provider present  for 1:1 monitoring during sedation: 10

## 2022-02-07 NOTE — LETTER
2/7/2022     RE: Melita Cortes  [address]  [facility] [address]    Dear Colleague,    Thank you for referring your patient, Melita oCrtes, to the [facility] TRANSPL[facility]. Please see a copy of my visit note below.    Transplant Surgery -OUTPATIENT IMMUNOSUPPRESSION PROGRESS NOTE    Date of Visit: 02/07/2022    Transplants:  1/7/2022 (Liver); Postoperative day:  31  ASSESMENT AND PLAN:  1.Graft Function:. Liver tests elevated, liver biopsy today and if negative for rejection, will need MRCP / ERCP stent  2.Immunosuppression Management: keep the tacrolimus level at 10  3.Hypertension: ok  4.Renal Function: better  5.Lab frequency: weekly  6.Other:  Wound healthy  KUB for constipation evaluation    Date: February 7, 2022    Transplant:  [x]                             Liver [x]                              Kidney []                             Pancreas []                              Other:             Chief Complaint:  Still has nausea, not passed stools for 2 days    History of Present Illness:  Patient Active Problem List   Diagnosis     Abdominal bloating     Family history of pancreatic cancer     High risk HPV infection     Transaminitis     Macrocytosis     Cervical high risk HPV (human papillomavirus) test positive     Other ascites     Acute liver failure without hepatic coma     Anemia, unspecified type     Alcoholic liver disease (H)     Elevated serum creatinine     Alcoholic cirrhosis of liver with ascites (H)     Hyponatremia     Malaise and fatigue     At high risk for severe sepsis     Symptomatic anemia     Decompensated liver disease (H)     Bandemia     Hyperbilirubinemia     End-stage liver disease (H)     Obesity (BMI 30-39.9)     Hyperlipidemia     Anxiety     Asthma     Non-intractable vomiting with nausea, unspecified vomiting type     Status post liver transplantation (H)     Immunosuppressed status (H)     Pleural effusion     Ileus, postoperative (H)      Steroid-induced hyperglycemia     Hypervolemia     ROSSY (acute kidney injury) (H)     Bacteremia     Anemia due to blood loss, acute     Severe malnutrition (H)     SOCIAL /FAMILY HISTORY: [x]                  No recent change    Past Medical History:   Diagnosis Date     Alcoholic hepatitis      Asthma 3/10/2006     Cervical high risk HPV (human papillomavirus) test positive 2020    See problem list     Past Surgical History:   Procedure Laterality Date     APPENDECTOMY       COLONOSCOPY N/A 2022    Procedure: COLONOSCOPY;  Surgeon: Zita Steele MD;  Location:  GI     ESOPHAGOGASTRODUODENOSCOPY, WITH BRUSHINGS N/A 10/29/2021    Procedure: ESOPHAGOGASTRODUODENOSCOPY, WITH BRUSHINGS;  Surgeon: Torey Ruiz MD;  Location:  GI     TRANSPLANT LIVER RECIPIENT  DONOR N/A 2022    Procedure: TRANSPLANT, LIVER, RECIPIENT,  DONOR;  Surgeon: Pradeep Browne MD;  Location:  OR     Social History     Socioeconomic History     Marital status:      Spouse name: Not on file     Number of children: Not on file     Years of education: Not on file     Highest education level: Not on file   Occupational History     Not on file   Tobacco Use     Smoking status: Former Smoker     Quit date: 2020     Years since quittin.7     Smokeless tobacco: Never Used   Vaping Use     Vaping Use: Never used   Substance and Sexual Activity     Alcohol use: Not Currently     Comment: Last drink 2021     Drug use: Never     Sexual activity: Not Currently   Other Topics Concern     Parent/sibling w/ CABG, MI or angioplasty before 65F 55M? Not Asked   Social History Narrative     Not on file     Social Determinants of Health     Financial Resource Strain: Not on file   Food Insecurity: Not on file   Transportation Needs: Not on file   Physical Activity: Not on file   Stress: Not on file   Social Connections: Not on file   Intimate Partner Violence: Not on file   Housing  Stability: Not on file     Prescription Medications as of 2/7/2022       Rx Number Disp Refills Start End Last Dispensed Date Next Fill Date Owning Pharmacy    albuterol (PROAIR HFA/PROVENTIL HFA/VENTOLIN HFA) 108 (90 Base) MCG/ACT inhaler  18 g 0 11/8/2021    [facility] - [place], [place] - [address] [place] [address]    Sig: Inhale 2 puffs into the lungs every 4 hours as needed for wheezing    Class: E-Prescribe    Notes to Pharmacy: Pharmacy may dispense brand covered by insurance (Proair, or proventil or ventolin or generic albuterol inhaler)    Route: Inhalation    aspirin (ASA) 81 MG chewable tablet  90 tablet 0 1/14/2022    [facility] - [place], [place] - [address] [place] [address]    Sig: Take 1 tablet (81 mg) by mouth daily    Class: E-Prescribe    Route: Oral    magnesium oxide (MAG-OX) 400 MG tablet  180 tablet 3 1/29/2022    [id number] [place], [place] - [address] AT [facility] [address]    Sig: Take 2 tablets (800 mg) by mouth 2 times daily    Class: E-Prescribe    Route: Oral    multivitamin (CENTRUM SILVER) tablet            Sig: Take 1 tablet by mouth daily    Class: Historical    Route: Oral    mycophenolate (GENERIC EQUIVALENT) 250 MG capsule  540 capsule 3 1/18/2022    [facility] - [place], [place] - [address] [place] [address]    Sig: Take 3 capsules (750 mg) by mouth 2 times daily    Class: E-Prescribe    Notes to Pharmacy: TXP DT 1/7/2022 (Liver) TXP Dischg DT 1/14/2022 DX Liver replaced by transplant Z94.4 [facility] ([place], [place])    Route: Oral    ondansetron (ZOFRAN-ODT) 4 MG ODT tab  22 tablet 0 2/5/2022 2/20/2022   [id number] [place], [place] - [address] AT [facility] & [facility] [address]    Sig: Take 1 tablet (4 mg) by mouth every 6 hours as needed for nausea    Class: E-Prescribe    Route: Oral    pantoprazole (PROTONIX) 40 MG EC tablet  90 tablet 3  1/18/2022    [facility]/[place] [address]    Sig: Take 1 tablet (40 mg) by mouth daily    Class: E-Prescribe    Route: Oral    predniSONE (DELTASONE) 5 MG tablet  90 tablet 1 1/18/2022    [facility] [address]    Sig: Take 1 tablet (5 mg) by mouth daily Until tacrolimus levels therapeutic    Class: E-Prescribe    Notes to Pharmacy: TXP DT 1/7/2022 (Liver) TXP Dischg DT 1/14/2022 DX Liver replaced by transplant Z94.4 TX Center [facility] ([place], [place])    Route: Oral    sennosides (SENOKOT) 8.6 MG tablet  60 tablet 0 1/14/2022    [place], [place] - [address] [place] [address] SE    Sig: Take 1-2 tablets by mouth 2 times daily Take while taking oxycodone, HOLD for loose stools    Class: E-Prescribe    Route: Oral    sulfamethoxazole-trimethoprim (BACTRIM) 400-80 MG tablet  90 tablet 3 1/18/2022    [facility]/[place], [place] - [facility] [address]    Sig: Take 1 tablet by mouth daily    Class: E-Prescribe    Route: Oral    tacrolimus (GENERIC EQUIVALENT) 0.5 MG capsule  60 capsule 1 1/14/2022    [facility] [place], [place] - [address] [place] [address] [address]    Sig: Take 1 capsule by mouth twice daily as directed by transplant team for dose titration    Class: E-Prescribe    tacrolimus (GENERIC EQUIVALENT) 1 MG capsule  720 capsule 3 2/4/2022    [facility] [address]    Sig: Take 4 capsules (4 mg) by mouth every 12 hours    Class: E-Prescribe    Notes to Pharmacy: Dose change    Route: Oral    thiamine (B-1) 100 MG tablet  30 tablet 1 1/29/2022    [facility][id number] - [address] AT [facility] [address]    Sig: Take 1 tablet (100 mg) by mouth daily    Class: E-Prescribe    Route: Oral    traMADol (ULTRAM) 50 MG tablet  8 tablet 0 2/5/2022    [facility] #[id number] - [place], [place] - [address] AT [facility] & [facility] 55    Sig: Take 1 tablet (50 mg) by mouth every 8 hours as needed for severe pain Script called in    Class: E-Prescribe    Route: Oral    tretinoin (RETIN-A) 0.025 % external cream  45 g 11 12/10/2021    [id number]  [place], [place] - [address] AT [employer] [address]    Sig: Apply a pea size to entire face QD    Class: E-Prescribe    valGANciclovir (VALCYTE) 450 MG tablet  90 tablet 3 1/18/2022    [facility] [place], [place] - [address] [place] [address] Ave [address]    Sig: Take 1 tablet (450 mg) by mouth daily    Class: E-Prescribe    Route: Oral    bumetanide (BUMEX) 1 MG tablet  3 tablet 0 1/14/2022 1/17/2022   [facility] [place], [place] - [address] [facility] [address]    Sig: Take 1 tablet (1 mg) by mouth daily for 3 days    Class: E-Prescribe    Route: Oral        Patient has no known allergies.   REVIEW OF SYSTEMS (check box if normal)  [x]               GENERAL  [x]                 PULMONARY [x]                GENITOURINARY  [x]                CNS                 [x]                 CARDIAC  [x]                 ENDOCRINE  [x]                EARS,NOSE,THROAT [x]                 GASTROINTESTINAL [x]                 NEUROLOGIC    [x]                MUSCLOSKELTAL  [x]                  HEMATOLOGY      PHYSICAL EXAM (check box if normal)/71   Pulse 77   Wt 60.4 kg (133 lb 1.6 oz)   SpO2 97%   BMI 22.15 kg/m          [x]            GENERAL:    [x]       EYES:  ICTERIC   []        YES  []                    NO  [x]           EXTREMITIES: Clubbing []       Y     [x]           N    [x]           EARS, NOSE, THROAT: Membranes Moist    YES   [x]                   NO []                  [x]           LUNGS:  CLEAR    YES       [x]                  NO    []                                [x]           SKIN: Jaundice           YES       []                  NO    [x]                   Rash: YES        []                  NO    [x]                                     [x]             HEART: Regular Rate          YES       [x]                  NO    []                   Incision Clean:  YES       [x]                  NO    []                                [x]                    ABDOMEN: Wound healthy Organomegaly YES       []                  NO    [x]                       [x]                    NEUROLOGICAL:  Nonfocal  YES       [x]                  NO    []                       [x]                    Hernia YES       []                  NO    [x]                   PSYCHIATRIC:  Appropriate  YES       [x]                  NO    []                       OTHER:                                                                                                   PAIN SCALE:: 3    Again, thank you for allowing me to participate in the care of your patient.      Sincerely,    Pradeep Browne MD

## 2022-02-07 NOTE — PROGRESS NOTES
Transplant Surgery -OUTPATIENT IMMUNOSUPPRESSION PROGRESS NOTE    Date of Visit: 02/07/2022    Transplants:  1/7/2022 (Liver); Postoperative day:  31  ASSESMENT AND PLAN:  1.Graft Function:. Liver tests elevated, liver biopsy today and if negative for rejection, will need MRCP / ERCP stent  2.Immunosuppression Management: keep the tacrolimus level at 10  3.Hypertension: ok  4.Renal Function: better  5.Lab frequency: weekly  6.Other:  Wound healthy  KUB for constipation evaluation    Date: February 7, 2022    Transplant:  [x]                             Liver [x]                              Kidney []                             Pancreas []                              Other:             Chief Complaint:  Still has nausea, not passed stools for 2 days    History of Present Illness:  Patient Active Problem List   Diagnosis     Abdominal bloating     Family history of pancreatic cancer     High risk HPV infection     Transaminitis     Macrocytosis     Cervical high risk HPV (human papillomavirus) test positive     Other ascites     Acute liver failure without hepatic coma     Anemia, unspecified type     Alcoholic liver disease (H)     Elevated serum creatinine     Alcoholic cirrhosis of liver with ascites (H)     Hyponatremia     Malaise and fatigue     At high risk for severe sepsis     Symptomatic anemia     Decompensated liver disease (H)     Bandemia     Hyperbilirubinemia     End-stage liver disease (H)     Obesity (BMI 30-39.9)     Hyperlipidemia     Anxiety     Asthma     Non-intractable vomiting with nausea, unspecified vomiting type     Status post liver transplantation (H)     Immunosuppressed status (H)     Pleural effusion     Ileus, postoperative (H)     Steroid-induced hyperglycemia     Hypervolemia     ROSSY (acute kidney injury) (H)     Bacteremia     Anemia due to blood loss, acute     Severe malnutrition (H)     SOCIAL /FAMILY HISTORY: [x]                  No recent change    Past Medical History:    Diagnosis Date     Alcoholic hepatitis      Asthma 3/10/2006     Cervical high risk HPV (human papillomavirus) test positive 2020    See problem list     Past Surgical History:   Procedure Laterality Date     APPENDECTOMY       COLONOSCOPY N/A 2022    Procedure: COLONOSCOPY;  Surgeon: Zita Steele MD;  Location:  GI     ESOPHAGOGASTRODUODENOSCOPY, WITH BRUSHINGS N/A 10/29/2021    Procedure: ESOPHAGOGASTRODUODENOSCOPY, WITH BRUSHINGS;  Surgeon: Torey Ruiz MD;  Location:  GI     TRANSPLANT LIVER RECIPIENT  DONOR N/A 2022    Procedure: TRANSPLANT, LIVER, RECIPIENT,  DONOR;  Surgeon: Pradeep Browne MD;  Location:  OR     Social History     Socioeconomic History     Marital status:      Spouse name: Not on file     Number of children: Not on file     Years of education: Not on file     Highest education level: Not on file   Occupational History     Not on file   Tobacco Use     Smoking status: Former Smoker     Quit date: 2020     Years since quittin.7     Smokeless tobacco: Never Used   Vaping Use     Vaping Use: Never used   Substance and Sexual Activity     Alcohol use: Not Currently     Comment: Last drink 2021     Drug use: Never     Sexual activity: Not Currently   Other Topics Concern     Parent/sibling w/ CABG, MI or angioplasty before 65F 55M? Not Asked   Social History Narrative     Not on file     Social Determinants of Health     Financial Resource Strain: Not on file   Food Insecurity: Not on file   Transportation Needs: Not on file   Physical Activity: Not on file   Stress: Not on file   Social Connections: Not on file   Intimate Partner Violence: Not on file   Housing Stability: Not on file     Prescription Medications as of 2022       Rx Number Disp Refills Start End Last Dispensed Date Next Fill Date Owning Pharmacy    albuterol (PROAIR HFA/PROVENTIL HFA/VENTOLIN HFA) 108 (90 Base) MCG/ACT inhaler  18 g 0 2021     [facility] [place], [place] - [address] [place] [address]    Sig: Inhale 2 puffs into the lungs every 4 hours as needed for wheezing    Class: E-Prescribe    Notes to Pharmacy: Pharmacy may dispense brand covered by insurance (Proair, or proventil or ventolin or generic albuterol inhaler)    Route: Inhalation    aspirin (ASA) 81 MG chewable tablet  90 tablet 0 1/14/2022    [facility] [place], [place] - [address] [place] [address]    Sig: Take 1 tablet (81 mg) by mouth daily    Class: E-Prescribe    Route: Oral    magnesium oxide (MAG-OX) 400 MG tablet  180 tablet 3 1/29/2022    [facility] #[id number] [place], [place] - [address] AT [facility] & [facility] 55    Sig: Take 2 tablets (800 mg) by mouth 2 times daily    Class: E-Prescribe    Route: Oral    multivitamin (CENTRUM SILVER) tablet            Sig: Take 1 tablet by mouth daily    Class: Historical    Route: Oral    mycophenolate (GENERIC EQUIVALENT) 250 MG capsule  540 capsule 3 1/18/2022    [facility] - [place] [address] [address]     Sig: Take 3 capsules (750 mg) by mouth 2 times daily    Class: E-Prescribe    Notes to Pharmacy: TXP DT 1/7/2022 (Liver) TXP Dischg DT 1/14/2022 DX Liver replaced by transplant Z94.4 TX Center [facility] ([place], [place])    Route: Oral    ondansetron (ZOFRAN-ODT) 4 MG ODT tab  22 tablet 0 2/5/2022 2/20/2022   [id number] [address] AT [facility] & [facility] [address]    Sig: Take 1 tablet (4 mg) by mouth every 6 hours as needed for nausea    Class: E-Prescribe    Route: Oral    pantoprazole (PROTONIX) 40 MG EC tablet  90 tablet 3 1/18/2022    [facility] [place] [address] [address]    Sig: Take 1 tablet (40 mg) by mouth daily    Class: E-Prescribe    Route: Oral    predniSONE (DELTASONE) 5 MG tablet  90 tablet 1 1/18/2022    [facility]  [address]    Sig: Take 1 tablet (5 mg) by mouth daily Until tacrolimus levels therapeutic    Class: E-Prescribe    Notes to Pharmacy: TXP DT 1/7/2022 (Liver) TXP Dischg DT 1/14/2022 DX Liver replaced by transplant Z94.4 TX Center [facility] ([place], [place])    Route: Oral    sennosides (SENOKOT) 8.6 MG tablet  60 tablet 0 1/14/2022    [facility] - [place], [place] - [address] [place] [address] SE    Sig: Take 1-2 tablets by mouth 2 times daily Take while taking oxycodone, HOLD for loose stools    Class: E-Prescribe    Route: Oral    sulfamethoxazole-trimethoprim (BACTRIM) 400-80 MG tablet  90 tablet 3 1/18/2022    [facility]    Sig: Take 1 tablet by mouth daily    Class: E-Prescribe    Route: Oral    tacrolimus (GENERIC EQUIVALENT) 0.5 MG capsule  60 capsule 1 1/14/2022    [facility] - [place], [place] - [address] [place] [address] SE    Sig: Take 1 capsule by mouth twice daily as directed by transplant team for dose titration    Class: E-Prescribe    tacrolimus (GENERIC EQUIVALENT) 1 MG capsule  720 capsule 3 2/4/2022    [facility]/[address]    Sig: Take 4 capsules (4 mg) by mouth every 12 hours    Class: E-Prescribe    Notes to Pharmacy: Dose change    Route: Oral    thiamine (B-1) 100 MG tablet  30 tablet 1 1/29/2022    [facility][id number] [place] [address] [facility] [address] AT [facility] & [address] [facility] [address]    Sig: Take 1 tablet (100 mg) by mouth daily    Class: E-Prescribe    Route: Oral    traMADol (ULTRAM) 50 MG tablet  8 tablet 0 2/5/2022    [facility] [place] [address] [address] [facility] [address] AT [facility] & [address] [facility] [address]    Sig: Take 1 tablet (50 mg) by mouth every 8 hours as needed for severe pain Script called in    Class: E-Prescribe    Route: Oral    tretinoin (RETIN-A) 0.025 % external cream  45 g  11 12/10/2021    [id number] - [place], [place] - [address] AT [facility] [address]    Sig: Apply a pea size to entire face QD    Class: E-Prescribe    valGANciclovir (VALCYTE) 450 MG tablet  90 tablet 3 1/18/2022    [facility] - [place], [place] - [address]    Sig: Take 1 tablet (450 mg) by mouth daily    Class: E-Prescribe    Route: Oral    bumetanide (BUMEX) 1 MG tablet  3 tablet 0 1/14/2022 1/17/2022   [facility] - [place], [place] - [address]    Sig: Take 1 tablet (1 mg) by mouth daily for 3 days    Class: E-Prescribe    Route: Oral        Patient has no known allergies.   REVIEW OF SYSTEMS (check box if normal)  [x]               GENERAL  [x]                 PULMONARY [x]                GENITOURINARY  [x]                CNS                 [x]                 CARDIAC  [x]                 ENDOCRINE  [x]                EARS,NOSE,THROAT [x]                 GASTROINTESTINAL [x]                 NEUROLOGIC    [x]                MUSCLOSKELTAL  [x]                  HEMATOLOGY      PHYSICAL EXAM (check box if normal)/71   Pulse 77   Wt 60.4 kg (133 lb 1.6 oz)   SpO2 97%   BMI 22.15 kg/m          [x]            GENERAL:    [x]       EYES:  ICTERIC   []        YES  []                    NO  [x]           EXTREMITIES: Clubbing []       Y     [x]           N    [x]           EARS, NOSE, THROAT: Membranes Moist    YES   [x]                   NO []                  [x]           LUNGS:  CLEAR    YES       [x]                  NO    []                                [x]           SKIN: Jaundice           YES       []                  NO    [x]                   Rash: YES       []                  NO    [x]                                     [x]             HEART: Regular Rate          YES       [x]                  NO    []                   Incision Clean:  YES       [x]                  NO    []                                [x]                     ABDOMEN: Wound healthy Organomegaly YES       []                  NO    [x]                       [x]                    NEUROLOGICAL:  Nonfocal  YES       [x]                  NO    []                       [x]                    Hernia YES       []                  NO    [x]                   PSYCHIATRIC:  Appropriate  YES       [x]                  NO    []                       OTHER:                                                                                                   PAIN SCALE:: 3

## 2022-02-07 NOTE — PROGRESS NOTES
PIV removed. Patient ambulated to bathroom independently, Tolerated PO intake, RUQ abdominal site with primapore CDI. Discharge paperwork reviewed with patient, pt stated understanding.  Doug will transport patient home, patient transported to front door via wheelchair.

## 2022-02-07 NOTE — PROGRESS NOTES
Pt arrived for liver biopsy. VSS on RA, c/o pain to abdominal area; aching in nature. Offered tylenol, but pt declined. H&P current. Consent needs to be signed. Labs resulted. PIV infusing NS @ 75 mL/hr.  Brandon at the bedside.

## 2022-02-07 NOTE — PROGRESS NOTES
Pt to have 1,000 mg solu medrol post liver biopsy. Spoke with MEHNAZ patrick RN, orders placed. Pt to receive in recovery area.

## 2022-02-08 ENCOUNTER — TELEPHONE (OUTPATIENT)
Dept: TRANSPLANT | Facility: CLINIC | Age: 52
End: 2022-02-08

## 2022-02-08 DIAGNOSIS — Z94.4 STATUS POST LIVER TRANSPLANTATION (H): ICD-10-CM

## 2022-02-08 LAB
PATH REPORT.COMMENTS IMP SPEC: NORMAL
PATH REPORT.FINAL DX SPEC: NORMAL
PATH REPORT.GROSS SPEC: NORMAL
PATH REPORT.MICROSCOPIC SPEC OTHER STN: NORMAL
PATH REPORT.RELEVANT HX SPEC: NORMAL
PHOTO IMAGE: NORMAL

## 2022-02-08 PROCEDURE — 88313 SPECIAL STAINS GROUP 2: CPT | Mod: 26 | Performed by: PATHOLOGY

## 2022-02-08 PROCEDURE — 88307 TISSUE EXAM BY PATHOLOGIST: CPT | Mod: 26 | Performed by: PATHOLOGY

## 2022-02-08 RX ORDER — TACROLIMUS 1 MG/1
CAPSULE ORAL
Qty: 630 CAPSULE | Refills: 3 | Status: SHIPPED | OUTPATIENT
Start: 2022-02-08 | End: 2022-03-22

## 2022-02-08 NOTE — TELEPHONE ENCOUNTER
ISSUE:   Tacrolimus IR level 12.4 on2/7, goal 8-10, dose 4 mg BID.    PLAN:   Please call patient and confirm this was an accurate 12-hour trough. Verify Tacrolimus IR dose 4 mg BID. Confirm no new medications or illness. Confirm no missed doses. If accurate trough and accurate dose, decrease Tacrolimus IR dose to 4 mg AM and 3 mg PM and repeat labs Thursday.    OUTCOME:   Spoke with patient, they confirm accurate trough level and current dose 4 mg BID. Patient confirmed dose change to 4 mg am, 3 mg pm and to repeat labs in 3 days. Orders sent to preferred pharmacy for dose change and lab for repeat labs. Patient voiced understanding of plan.     Soumya Wren LPN

## 2022-02-08 NOTE — TELEPHONE ENCOUNTER
Reviewed biopsy results with DR Browne. Pt to have ERCP. Message to ERCP team. Spoke with patient and updated her ERCP is recommended, but she may need MRCP first.

## 2022-02-09 ENCOUNTER — PREP FOR PROCEDURE (OUTPATIENT)
Dept: GASTROENTEROLOGY | Facility: CLINIC | Age: 52
End: 2022-02-09
Payer: COMMERCIAL

## 2022-02-09 ENCOUNTER — PATIENT OUTREACH (OUTPATIENT)
Dept: GASTROENTEROLOGY | Facility: CLINIC | Age: 52
End: 2022-02-09
Payer: COMMERCIAL

## 2022-02-09 DIAGNOSIS — Z11.59 ENCOUNTER FOR SCREENING FOR OTHER VIRAL DISEASES: Primary | ICD-10-CM

## 2022-02-09 DIAGNOSIS — R79.89 ELEVATED LFTS: Primary | ICD-10-CM

## 2022-02-09 NOTE — TELEPHONE ENCOUNTER
Per Dr. Escobar    Please assist in scheduling:     Procedure/Imaging/Clinic: ERCP  Physician: Dr. Escobar  Timin/14  Procedure length:provider  Anesthesia:gen  Dx: elevated  Tier:2  Location: [facility]       Called to discuss with patient. Explained they can expect a call from  for date and time of procedure, will need a , someone to stay with them for 24 hours and should stay in town for 24 hours (within 45 min of Hospital) post procedure    Patient needs to get pre-op physical completed. If outside [facility] system will need physical faxed to number [phone]   If you do not get a preop physical, your procedure could be cancelled, patient voiced understanding*    Preop Plan:  See note from Dr Fountain from 22, Dr. Escobar will update as needed    Med Review    Blood thinner -  no  ASA - no  Diabetic - no    COVID test discussed:orders placed pt know to get, Mychart    Patient Education r/t procedure:done    Does patient have any history of gastric bypass/gastric surgery/altered panc/bili anatomy?no    A pre-op nurse will call 1-2 days prior to the procedure. I    NPO/Prep: not discussed    Other specific details/comments:none     Verbalized understanding of all instructions. All questions answered.

## 2022-02-10 ENCOUNTER — PATIENT OUTREACH (OUTPATIENT)
Dept: FAMILY MEDICINE | Facility: CLINIC | Age: 52
End: 2022-02-10

## 2022-02-10 ENCOUNTER — LAB REQUISITION (OUTPATIENT)
Dept: LAB | Facility: CLINIC | Age: 52
End: 2022-02-10
Payer: COMMERCIAL

## 2022-02-10 DIAGNOSIS — Z94.4 LIVER TRANSPLANT STATUS (H): ICD-10-CM

## 2022-02-10 DIAGNOSIS — Z79.899 OTHER LONG TERM (CURRENT) DRUG THERAPY: ICD-10-CM

## 2022-02-10 DIAGNOSIS — R87.810 CERVICAL HIGH RISK HPV (HUMAN PAPILLOMAVIRUS) TEST POSITIVE: ICD-10-CM

## 2022-02-10 LAB
ALBUMIN SERPL-MCNC: 2.7 G/DL (ref 3.4–5)
ALP SERPL-CCNC: 461 U/L (ref 40–150)
ALT SERPL W P-5'-P-CCNC: 65 U/L (ref 0–50)
ANION GAP SERPL CALCULATED.3IONS-SCNC: 5 MMOL/L (ref 3–14)
AST SERPL W P-5'-P-CCNC: 13 U/L (ref 0–45)
BILIRUB DIRECT SERPL-MCNC: 1.1 MG/DL (ref 0–0.2)
BILIRUB SERPL-MCNC: 1.6 MG/DL (ref 0.2–1.3)
BUN SERPL-MCNC: 18 MG/DL (ref 7–30)
CALCIUM SERPL-MCNC: 9.1 MG/DL (ref 8.5–10.1)
CHLORIDE BLD-SCNC: 102 MMOL/L (ref 94–109)
CO2 SERPL-SCNC: 27 MMOL/L (ref 20–32)
CREAT SERPL-MCNC: 0.88 MG/DL (ref 0.52–1.04)
ERYTHROCYTE [DISTWIDTH] IN BLOOD BY AUTOMATED COUNT: 15.8 % (ref 10–15)
GFR SERPL CREATININE-BSD FRML MDRD: 80 ML/MIN/1.73M2
GLUCOSE BLD-MCNC: 109 MG/DL (ref 70–99)
HBV DNA SERPL QL NAA+PROBE: NORMAL
HCT VFR BLD AUTO: 30.7 % (ref 35–47)
HCV RNA SERPL QL NAA+PROBE: NORMAL
HGB BLD-MCNC: 9.7 G/DL (ref 11.7–15.7)
HIV1+2 RNA SERPL QL NAA+PROBE: NORMAL
MAGNESIUM SERPL-MCNC: 1.5 MG/DL (ref 1.8–2.6)
MCH RBC QN AUTO: 30.9 PG (ref 26.5–33)
MCHC RBC AUTO-ENTMCNC: 31.6 G/DL (ref 31.5–36.5)
MCV RBC AUTO: 98 FL (ref 78–100)
PHOSPHATE SERPL-MCNC: 4.1 MG/DL (ref 2.5–4.5)
PLATELET # BLD AUTO: 317 10E3/UL (ref 150–450)
POTASSIUM BLD-SCNC: 4.5 MMOL/L (ref 3.4–5.3)
PROT SERPL-MCNC: 5.9 G/DL (ref 6.8–8.8)
RBC # BLD AUTO: 3.14 10E6/UL (ref 3.8–5.2)
SODIUM SERPL-SCNC: 134 MMOL/L (ref 133–144)
TACROLIMUS BLD-MCNC: 7.4 UG/L (ref 5–15)
TME LAST DOSE: NORMAL H
TME LAST DOSE: NORMAL H
WBC # BLD AUTO: 5.3 10E3/UL (ref 4–11)

## 2022-02-10 PROCEDURE — 84100 ASSAY OF PHOSPHORUS: CPT | Mod: ORL | Performed by: TRANSPLANT SURGERY

## 2022-02-10 PROCEDURE — 82248 BILIRUBIN DIRECT: CPT | Mod: ORL | Performed by: TRANSPLANT SURGERY

## 2022-02-10 PROCEDURE — 80197 ASSAY OF TACROLIMUS: CPT | Mod: ORL | Performed by: TRANSPLANT SURGERY

## 2022-02-10 PROCEDURE — 83735 ASSAY OF MAGNESIUM: CPT | Mod: ORL | Performed by: TRANSPLANT SURGERY

## 2022-02-10 PROCEDURE — 85027 COMPLETE CBC AUTOMATED: CPT | Mod: ORL | Performed by: TRANSPLANT SURGERY

## 2022-02-10 PROCEDURE — 80053 COMPREHEN METABOLIC PANEL: CPT | Mod: ORL | Performed by: TRANSPLANT SURGERY

## 2022-02-11 ENCOUNTER — LAB (OUTPATIENT)
Dept: LAB | Facility: CLINIC | Age: 52
End: 2022-02-11
Payer: COMMERCIAL

## 2022-02-11 DIAGNOSIS — Z11.59 ENCOUNTER FOR SCREENING FOR OTHER VIRAL DISEASES: ICD-10-CM

## 2022-02-11 PROCEDURE — U0005 INFEC AGEN DETEC AMPLI PROBE: HCPCS

## 2022-02-11 PROCEDURE — U0003 INFECTIOUS AGENT DETECTION BY NUCLEIC ACID (DNA OR RNA); SEVERE ACUTE RESPIRATORY SYNDROME CORONAVIRUS 2 (SARS-COV-2) (CORONAVIRUS DISEASE [COVID-19]), AMPLIFIED PROBE TECHNIQUE, MAKING USE OF HIGH THROUGHPUT TECHNOLOGIES AS DESCRIBED BY CMS-2020-01-R: HCPCS

## 2022-02-12 LAB — SARS-COV-2 RNA RESP QL NAA+PROBE: NEGATIVE

## 2022-02-14 ENCOUNTER — ANESTHESIA (OUTPATIENT)
Dept: SURGERY | Facility: CLINIC | Age: 52
End: 2022-02-14
Payer: COMMERCIAL

## 2022-02-14 ENCOUNTER — ANESTHESIA EVENT (OUTPATIENT)
Dept: SURGERY | Facility: CLINIC | Age: 52
End: 2022-02-14
Payer: COMMERCIAL

## 2022-02-14 ENCOUNTER — DOCUMENTATION ONLY (OUTPATIENT)
Dept: TRANSPLANT | Facility: CLINIC | Age: 52
End: 2022-02-14

## 2022-02-14 ENCOUNTER — LAB (OUTPATIENT)
Dept: LAB | Facility: CLINIC | Age: 52
End: 2022-02-14
Attending: TRANSPLANT SURGERY
Payer: COMMERCIAL

## 2022-02-14 ENCOUNTER — HOSPITAL ENCOUNTER (OUTPATIENT)
Facility: CLINIC | Age: 52
Discharge: HOME | End: 2022-02-14
Attending: INTERNAL MEDICINE | Admitting: INTERNAL MEDICINE
Payer: COMMERCIAL

## 2022-02-14 ENCOUNTER — OFFICE VISIT (OUTPATIENT)
Dept: TRANSPLANT | Facility: CLINIC | Age: 52
End: 2022-02-14
Attending: TRANSPLANT SURGERY
Payer: COMMERCIAL

## 2022-02-14 ENCOUNTER — APPOINTMENT (OUTPATIENT)
Dept: GENERAL RADIOLOGY | Facility: CLINIC | Age: 52
End: 2022-02-14
Attending: INTERNAL MEDICINE
Payer: COMMERCIAL

## 2022-02-14 VITALS
BODY MASS INDEX: 20.54 KG/M2 | OXYGEN SATURATION: 99 % | SYSTOLIC BLOOD PRESSURE: 121 MMHG | HEART RATE: 79 BPM | DIASTOLIC BLOOD PRESSURE: 78 MMHG | WEIGHT: 123.3 LBS

## 2022-02-14 VITALS
BODY MASS INDEX: 20.75 KG/M2 | TEMPERATURE: 97.9 F | SYSTOLIC BLOOD PRESSURE: 134 MMHG | HEART RATE: 68 BPM | WEIGHT: 124.56 LBS | RESPIRATION RATE: 16 BRPM | HEIGHT: 65 IN | DIASTOLIC BLOOD PRESSURE: 89 MMHG | OXYGEN SATURATION: 99 %

## 2022-02-14 DIAGNOSIS — Z94.4 LIVER REPLACED BY TRANSPLANT (H): ICD-10-CM

## 2022-02-14 LAB
ALBUMIN SERPL-MCNC: 3.1 G/DL (ref 3.4–5)
ALP SERPL-CCNC: 562 U/L (ref 40–150)
ALT SERPL W P-5'-P-CCNC: 61 U/L (ref 0–50)
AMYLASE SERPL-CCNC: 18 U/L (ref 30–110)
ANION GAP SERPL CALCULATED.3IONS-SCNC: 8 MMOL/L (ref 3–14)
AST SERPL W P-5'-P-CCNC: 16 U/L (ref 0–45)
BILIRUB DIRECT SERPL-MCNC: 1 MG/DL (ref 0–0.2)
BILIRUB SERPL-MCNC: 1.3 MG/DL (ref 0.2–1.3)
BUN SERPL-MCNC: 18 MG/DL (ref 7–30)
CALCIUM SERPL-MCNC: 9.7 MG/DL (ref 8.5–10.1)
CHLORIDE BLD-SCNC: 102 MMOL/L (ref 94–109)
CO2 SERPL-SCNC: 24 MMOL/L (ref 20–32)
CREAT SERPL-MCNC: 0.94 MG/DL (ref 0.52–1.04)
ERCP: NORMAL
ERYTHROCYTE [DISTWIDTH] IN BLOOD BY AUTOMATED COUNT: 15 % (ref 10–15)
GFR SERPL CREATININE-BSD FRML MDRD: 74 ML/MIN/1.73M2
GLUCOSE BLD-MCNC: 137 MG/DL (ref 70–99)
GLUCOSE BLDC GLUCOMTR-MCNC: 123 MG/DL (ref 70–99)
GLUCOSE BLDC GLUCOMTR-MCNC: 168 MG/DL (ref 70–99)
HCT VFR BLD AUTO: 34.6 % (ref 35–47)
HGB BLD-MCNC: 11.2 G/DL (ref 11.7–15.7)
INR PPP: 1.08 (ref 0.85–1.15)
LIPASE SERPL-CCNC: 30 U/L (ref 73–393)
MAGNESIUM SERPL-MCNC: 1.6 MG/DL (ref 1.6–2.3)
MCH RBC QN AUTO: 30.5 PG (ref 26.5–33)
MCHC RBC AUTO-ENTMCNC: 32.4 G/DL (ref 31.5–36.5)
MCV RBC AUTO: 94 FL (ref 78–100)
PHOSPHATE SERPL-MCNC: 4.5 MG/DL (ref 2.5–4.5)
PLATELET # BLD AUTO: 335 10E3/UL (ref 150–450)
POTASSIUM BLD-SCNC: 4.8 MMOL/L (ref 3.4–5.3)
PROT SERPL-MCNC: 6.8 G/DL (ref 6.8–8.8)
RBC # BLD AUTO: 3.67 10E6/UL (ref 3.8–5.2)
SODIUM SERPL-SCNC: 134 MMOL/L (ref 133–144)
TACROLIMUS BLD-MCNC: 9.6 UG/L (ref 5–15)
TME LAST DOSE: NORMAL H
TME LAST DOSE: NORMAL H
WBC # BLD AUTO: 5.7 10E3/UL (ref 4–11)

## 2022-02-14 PROCEDURE — 250N000009 HC RX 250: Performed by: STUDENT IN AN ORGANIZED HEALTH CARE EDUCATION/TRAINING PROGRAM

## 2022-02-14 PROCEDURE — 999N000179 XR SURGERY CARM FLUORO LESS THAN 5 MIN W STILLS: Mod: TC

## 2022-02-14 PROCEDURE — 250N000011 HC RX IP 250 OP 636: Performed by: NURSE ANESTHETIST, CERTIFIED REGISTERED

## 2022-02-14 PROCEDURE — 85027 COMPLETE CBC AUTOMATED: CPT | Performed by: PATHOLOGY

## 2022-02-14 PROCEDURE — 82150 ASSAY OF AMYLASE: CPT | Performed by: INTERNAL MEDICINE

## 2022-02-14 PROCEDURE — C1726 CATH, BAL DIL, NON-VASCULAR: HCPCS | Performed by: INTERNAL MEDICINE

## 2022-02-14 PROCEDURE — 360N000082 HC SURGERY LEVEL 2 W/ FLUORO, PER MIN: Performed by: INTERNAL MEDICINE

## 2022-02-14 PROCEDURE — 99213 OFFICE O/P EST LOW 20 MIN: CPT | Mod: 24 | Performed by: TRANSPLANT SURGERY

## 2022-02-14 PROCEDURE — 710N000010 HC RECOVERY PHASE 1, LEVEL 2, PER MIN: Performed by: INTERNAL MEDICINE

## 2022-02-14 PROCEDURE — 258N000003 HC RX IP 258 OP 636: Performed by: STUDENT IN AN ORGANIZED HEALTH CARE EDUCATION/TRAINING PROGRAM

## 2022-02-14 PROCEDURE — 36415 COLL VENOUS BLD VENIPUNCTURE: CPT | Performed by: INTERNAL MEDICINE

## 2022-02-14 PROCEDURE — 85610 PROTHROMBIN TIME: CPT | Performed by: INTERNAL MEDICINE

## 2022-02-14 PROCEDURE — 250N000011 HC RX IP 250 OP 636: Performed by: STUDENT IN AN ORGANIZED HEALTH CARE EDUCATION/TRAINING PROGRAM

## 2022-02-14 PROCEDURE — 250N000011 HC RX IP 250 OP 636: Performed by: INTERNAL MEDICINE

## 2022-02-14 PROCEDURE — 83690 ASSAY OF LIPASE: CPT | Performed by: INTERNAL MEDICINE

## 2022-02-14 PROCEDURE — C1876 STENT, NON-COA/NON-COV W/DEL: HCPCS | Performed by: INTERNAL MEDICINE

## 2022-02-14 PROCEDURE — 82962 GLUCOSE BLOOD TEST: CPT | Mod: 91

## 2022-02-14 PROCEDURE — 370N000017 HC ANESTHESIA TECHNICAL FEE, PER MIN: Performed by: INTERNAL MEDICINE

## 2022-02-14 PROCEDURE — 80197 ASSAY OF TACROLIMUS: CPT | Performed by: TRANSPLANT SURGERY

## 2022-02-14 PROCEDURE — 82248 BILIRUBIN DIRECT: CPT | Performed by: PATHOLOGY

## 2022-02-14 PROCEDURE — 255N000002 HC RX 255 OP 636: Performed by: INTERNAL MEDICINE

## 2022-02-14 PROCEDURE — 999N000141 HC STATISTIC PRE-PROCEDURE NURSING ASSESSMENT: Performed by: INTERNAL MEDICINE

## 2022-02-14 PROCEDURE — 83735 ASSAY OF MAGNESIUM: CPT | Performed by: PATHOLOGY

## 2022-02-14 PROCEDURE — 36415 COLL VENOUS BLD VENIPUNCTURE: CPT | Performed by: PATHOLOGY

## 2022-02-14 PROCEDURE — 250N000009 HC RX 250: Performed by: INTERNAL MEDICINE

## 2022-02-14 PROCEDURE — 272N000001 HC OR GENERAL SUPPLY STERILE: Performed by: INTERNAL MEDICINE

## 2022-02-14 PROCEDURE — 80053 COMPREHEN METABOLIC PANEL: CPT | Performed by: PATHOLOGY

## 2022-02-14 PROCEDURE — 250N000025 HC SEVOFLURANE, PER MIN: Performed by: INTERNAL MEDICINE

## 2022-02-14 PROCEDURE — 250N000011 HC RX IP 250 OP 636

## 2022-02-14 PROCEDURE — C1769 GUIDE WIRE: HCPCS | Performed by: INTERNAL MEDICINE

## 2022-02-14 PROCEDURE — 258N000003 HC RX IP 258 OP 636: Performed by: INTERNAL MEDICINE

## 2022-02-14 PROCEDURE — 710N000012 HC RECOVERY PHASE 2, PER MINUTE: Performed by: INTERNAL MEDICINE

## 2022-02-14 PROCEDURE — 84100 ASSAY OF PHOSPHORUS: CPT | Performed by: PATHOLOGY

## 2022-02-14 DEVICE — STENT JOHLIN PANCREA WEDGE 10FRX22CM W/INTRO G26828
Type: IMPLANTABLE DEVICE | Site: BILE DUCT | Status: NON-FUNCTIONAL
Removed: 2022-03-16

## 2022-02-14 RX ORDER — ONDANSETRON 4 MG/1
4 TABLET, ORALLY DISINTEGRATING ORAL EVERY 30 MIN PRN
Status: DISCONTINUED | OUTPATIENT
Start: 2022-02-14 | End: 2022-02-14 | Stop reason: HOSPADM

## 2022-02-14 RX ORDER — NALOXONE HYDROCHLORIDE 0.4 MG/ML
0.4 INJECTION, SOLUTION INTRAMUSCULAR; INTRAVENOUS; SUBCUTANEOUS
Status: DISCONTINUED | OUTPATIENT
Start: 2022-02-14 | End: 2022-02-14 | Stop reason: HOSPADM

## 2022-02-14 RX ORDER — SODIUM CHLORIDE, SODIUM LACTATE, POTASSIUM CHLORIDE, CALCIUM CHLORIDE 600; 310; 30; 20 MG/100ML; MG/100ML; MG/100ML; MG/100ML
INJECTION, SOLUTION INTRAVENOUS CONTINUOUS
Status: DISCONTINUED | OUTPATIENT
Start: 2022-02-14 | End: 2022-02-14 | Stop reason: HOSPADM

## 2022-02-14 RX ORDER — HYDROMORPHONE HCL IN WATER/PF 6 MG/30 ML
0.2 PATIENT CONTROLLED ANALGESIA SYRINGE INTRAVENOUS EVERY 5 MIN PRN
Status: DISCONTINUED | OUTPATIENT
Start: 2022-02-14 | End: 2022-02-14 | Stop reason: HOSPADM

## 2022-02-14 RX ORDER — FLUMAZENIL 0.1 MG/ML
0.2 INJECTION, SOLUTION INTRAVENOUS
Status: DISCONTINUED | OUTPATIENT
Start: 2022-02-14 | End: 2022-02-14 | Stop reason: HOSPADM

## 2022-02-14 RX ORDER — DIMENHYDRINATE 50 MG/ML
25 INJECTION, SOLUTION INTRAMUSCULAR; INTRAVENOUS
Status: DISCONTINUED | OUTPATIENT
Start: 2022-02-14 | End: 2022-02-14 | Stop reason: HOSPADM

## 2022-02-14 RX ORDER — MEPERIDINE HYDROCHLORIDE 25 MG/ML
12.5 INJECTION INTRAMUSCULAR; INTRAVENOUS; SUBCUTANEOUS
Status: DISCONTINUED | OUTPATIENT
Start: 2022-02-14 | End: 2022-02-14 | Stop reason: HOSPADM

## 2022-02-14 RX ORDER — ONDANSETRON 2 MG/ML
4 INJECTION INTRAMUSCULAR; INTRAVENOUS EVERY 6 HOURS PRN
Status: DISCONTINUED | OUTPATIENT
Start: 2022-02-14 | End: 2022-02-14 | Stop reason: HOSPADM

## 2022-02-14 RX ORDER — OXYCODONE HYDROCHLORIDE 5 MG/1
5 TABLET ORAL EVERY 4 HOURS PRN
Status: DISCONTINUED | OUTPATIENT
Start: 2022-02-14 | End: 2022-02-14 | Stop reason: HOSPADM

## 2022-02-14 RX ORDER — NALOXONE HYDROCHLORIDE 0.4 MG/ML
0.2 INJECTION, SOLUTION INTRAMUSCULAR; INTRAVENOUS; SUBCUTANEOUS
Status: DISCONTINUED | OUTPATIENT
Start: 2022-02-14 | End: 2022-02-14 | Stop reason: HOSPADM

## 2022-02-14 RX ORDER — INDOMETHACIN 50 MG/1
100 SUPPOSITORY RECTAL
Status: DISCONTINUED | OUTPATIENT
Start: 2022-02-14 | End: 2022-02-14 | Stop reason: HOSPADM

## 2022-02-14 RX ORDER — FENTANYL CITRATE 50 UG/ML
INJECTION, SOLUTION INTRAMUSCULAR; INTRAVENOUS PRN
Status: DISCONTINUED | OUTPATIENT
Start: 2022-02-14 | End: 2022-02-14

## 2022-02-14 RX ORDER — SODIUM CHLORIDE, SODIUM LACTATE, POTASSIUM CHLORIDE, CALCIUM CHLORIDE 600; 310; 30; 20 MG/100ML; MG/100ML; MG/100ML; MG/100ML
INJECTION, SOLUTION INTRAVENOUS CONTINUOUS PRN
Status: DISCONTINUED | OUTPATIENT
Start: 2022-02-14 | End: 2022-02-14

## 2022-02-14 RX ORDER — FENTANYL CITRATE 50 UG/ML
25 INJECTION, SOLUTION INTRAMUSCULAR; INTRAVENOUS
Status: DISCONTINUED | OUTPATIENT
Start: 2022-02-14 | End: 2022-02-14 | Stop reason: HOSPADM

## 2022-02-14 RX ORDER — INDOMETHACIN 50 MG/1
SUPPOSITORY RECTAL PRN
Status: DISCONTINUED | OUTPATIENT
Start: 2022-02-14 | End: 2022-02-14 | Stop reason: HOSPADM

## 2022-02-14 RX ORDER — IOPAMIDOL 510 MG/ML
INJECTION, SOLUTION INTRAVASCULAR PRN
Status: DISCONTINUED | OUTPATIENT
Start: 2022-02-14 | End: 2022-02-14 | Stop reason: HOSPADM

## 2022-02-14 RX ORDER — ONDANSETRON 2 MG/ML
INJECTION INTRAMUSCULAR; INTRAVENOUS PRN
Status: DISCONTINUED | OUTPATIENT
Start: 2022-02-14 | End: 2022-02-14

## 2022-02-14 RX ORDER — ONDANSETRON 4 MG/1
4 TABLET, ORALLY DISINTEGRATING ORAL EVERY 6 HOURS PRN
Status: DISCONTINUED | OUTPATIENT
Start: 2022-02-14 | End: 2022-02-14 | Stop reason: HOSPADM

## 2022-02-14 RX ORDER — LIDOCAINE 40 MG/G
CREAM TOPICAL
Status: DISCONTINUED | OUTPATIENT
Start: 2022-02-14 | End: 2022-02-14 | Stop reason: HOSPADM

## 2022-02-14 RX ORDER — EPINEPHRINE 1 MG/ML
INJECTION, SOLUTION, CONCENTRATE INTRAVENOUS PRN
Status: DISCONTINUED | OUTPATIENT
Start: 2022-02-14 | End: 2022-02-14 | Stop reason: HOSPADM

## 2022-02-14 RX ORDER — LEVOFLOXACIN 5 MG/ML
500 INJECTION, SOLUTION INTRAVENOUS ONCE
Status: COMPLETED | OUTPATIENT
Start: 2022-02-14 | End: 2022-02-14

## 2022-02-14 RX ORDER — EPHEDRINE SULFATE 50 MG/ML
INJECTION, SOLUTION INTRAMUSCULAR; INTRAVENOUS; SUBCUTANEOUS PRN
Status: DISCONTINUED | OUTPATIENT
Start: 2022-02-14 | End: 2022-02-14

## 2022-02-14 RX ORDER — LIDOCAINE HYDROCHLORIDE 20 MG/ML
INJECTION, SOLUTION INFILTRATION; PERINEURAL PRN
Status: DISCONTINUED | OUTPATIENT
Start: 2022-02-14 | End: 2022-02-14

## 2022-02-14 RX ORDER — FENTANYL CITRATE 50 UG/ML
25 INJECTION, SOLUTION INTRAMUSCULAR; INTRAVENOUS EVERY 5 MIN PRN
Status: DISCONTINUED | OUTPATIENT
Start: 2022-02-14 | End: 2022-02-14 | Stop reason: HOSPADM

## 2022-02-14 RX ORDER — ONDANSETRON 2 MG/ML
4 INJECTION INTRAMUSCULAR; INTRAVENOUS EVERY 30 MIN PRN
Status: DISCONTINUED | OUTPATIENT
Start: 2022-02-14 | End: 2022-02-14 | Stop reason: HOSPADM

## 2022-02-14 RX ORDER — DEXAMETHASONE SODIUM PHOSPHATE 4 MG/ML
INJECTION, SOLUTION INTRA-ARTICULAR; INTRALESIONAL; INTRAMUSCULAR; INTRAVENOUS; SOFT TISSUE PRN
Status: DISCONTINUED | OUTPATIENT
Start: 2022-02-14 | End: 2022-02-14

## 2022-02-14 RX ORDER — PROPOFOL 10 MG/ML
INJECTION, EMULSION INTRAVENOUS PRN
Status: DISCONTINUED | OUTPATIENT
Start: 2022-02-14 | End: 2022-02-14

## 2022-02-14 RX ADMIN — PHENYLEPHRINE HYDROCHLORIDE 100 MCG: 10 INJECTION INTRAVENOUS at 14:33

## 2022-02-14 RX ADMIN — PROPOFOL 50 MG: 10 INJECTION, EMULSION INTRAVENOUS at 13:57

## 2022-02-14 RX ADMIN — PROPOFOL 50 MG: 10 INJECTION, EMULSION INTRAVENOUS at 13:55

## 2022-02-14 RX ADMIN — SODIUM CHLORIDE, POTASSIUM CHLORIDE, SODIUM LACTATE AND CALCIUM CHLORIDE: 600; 310; 30; 20 INJECTION, SOLUTION INTRAVENOUS at 13:51

## 2022-02-14 RX ADMIN — MIDAZOLAM 2 MG: 1 INJECTION INTRAMUSCULAR; INTRAVENOUS at 13:44

## 2022-02-14 RX ADMIN — DEXAMETHASONE SODIUM PHOSPHATE 8 MG: 4 INJECTION, SOLUTION INTRA-ARTICULAR; INTRALESIONAL; INTRAMUSCULAR; INTRAVENOUS; SOFT TISSUE at 13:57

## 2022-02-14 RX ADMIN — LIDOCAINE HYDROCHLORIDE 80 MG: 20 INJECTION, SOLUTION INFILTRATION; PERINEURAL at 13:54

## 2022-02-14 RX ADMIN — LEVOFLOXACIN 500 MG: 5 INJECTION, SOLUTION INTRAVENOUS at 14:00

## 2022-02-14 RX ADMIN — FENTANYL CITRATE 100 MCG: 50 INJECTION, SOLUTION INTRAMUSCULAR; INTRAVENOUS at 13:54

## 2022-02-14 RX ADMIN — ONDANSETRON 4 MG: 2 INJECTION INTRAMUSCULAR; INTRAVENOUS at 14:39

## 2022-02-14 RX ADMIN — SUGAMMADEX 200 MG: 100 INJECTION, SOLUTION INTRAVENOUS at 14:39

## 2022-02-14 RX ADMIN — GLUCAGON HYDROCHLORIDE 0.4 MG: KIT at 14:20

## 2022-02-14 RX ADMIN — Medication 5 MG: at 13:55

## 2022-02-14 RX ADMIN — Medication 5 MG: at 14:16

## 2022-02-14 RX ADMIN — ROCURONIUM BROMIDE 50 MG: 50 INJECTION, SOLUTION INTRAVENOUS at 13:57

## 2022-02-14 RX ADMIN — SODIUM CHLORIDE, POTASSIUM CHLORIDE, SODIUM LACTATE AND CALCIUM CHLORIDE 1000 ML: 600; 310; 30; 20 INJECTION, SOLUTION INTRAVENOUS at 15:15

## 2022-02-14 ASSESSMENT — MIFFLIN-ST. JEOR: SCORE: 1185.88

## 2022-02-14 ASSESSMENT — LIFESTYLE VARIABLES: TOBACCO_USE: 1

## 2022-02-14 NOTE — PROGRESS NOTES
RN RECOVERY NOTE   Assigned as pt nurse while pt in PACU post procedure   Report from JANELLE Ocampo RN @ pt bedside @ 1510.  Dr. Escobar @ pt bedside during this time. Ordered to administer remaining LR in current bag infusing and that pt should receive another 1L bolus prior to amylase and lipase labs. Pt also should NOT have ASA or Any Blood thinners for the following 3 days.   Tele strip printed and placed in written chart   Report provided via telephone to Rafaela HARLEY on 3C @ 1620   in PACU

## 2022-02-14 NOTE — ANESTHESIA PREPROCEDURE EVALUATION
Anesthesia Pre-Procedure Evaluation    Patient: Melita Cortes   MRN: [id number] : 1970        Preoperative Diagnosis: Elevated LFTs [R79.89]    Procedure : Procedure(s):  ENDOSCOPIC RETROGRADE CHOLANGIOPANCREATOGRAPHY          Past Medical History:   Diagnosis Date     Alcoholic hepatitis      Asthma 3/10/2006     Cervical high risk HPV (human papillomavirus) test positive 2020    See problem list      Past Surgical History:   Procedure Laterality Date     APPENDECTOMY       COLONOSCOPY N/A 2022    Procedure: COLONOSCOPY;  Surgeon: Zita Steele MD;  Location:  GI     ESOPHAGOGASTRODUODENOSCOPY, WITH BRUSHINGS N/A 10/29/2021    Procedure: ESOPHAGOGASTRODUODENOSCOPY, WITH BRUSHINGS;  Surgeon: Torey Ruiz MD;  Location: [facility]     IR LIVER BIOPSY PERCUTANEOUS  2022     TRANSPLANT LIVER RECIPIENT  DONOR N/A 2022    Procedure: TRANSPLANT, LIVER, RECIPIENT,  DONOR;  Surgeon: Pradeep Browne MD;  Location:  OR      No Known Allergies   Social History     Tobacco Use     Smoking status: Former Smoker     Quit date: 2020     Years since quittin.7     Smokeless tobacco: Never Used   Substance Use Topics     Alcohol use: Not Currently     Comment: Last drink 2021      Wt Readings from Last 1 Encounters:   22 55.9 kg (123 lb 4.8 oz)        Anesthesia Evaluation   Pt has had prior anesthetic. Type: General.    No history of anesthetic complications       ROS/MED HX  ENT/Pulmonary: Comment:   Former smoker, quit 2020    (+) tobacco use, Past use, asthma     Neurologic: Comment: H/o Hepatic encephalopathy. Now s/p DDLT      Cardiovascular: Comment:   No cardiac limitation to physical activity, but still working on resuming normal activities after liver transplant 4wks ago.  - neg cardiovascular ROS   (+) -----Previous cardiac testing   Echo: Date: 2022 Results:  LV size and function normal, LVEF 60-65%. RV normal size and  function. AoV normal. Trace mitral and tricuspid insufficiency. RVSP 27mmHg above RA. PASP normal/.  Stress Test: Date: Results:    ECG Reviewed: Date: Results:    Cath: Date: Results:      METS/Exercise Tolerance: 3 - Able to walk 1-2 blocks without stopping    Hematologic:     (+) anemia,     Musculoskeletal:       GI/Hepatic: Comment:   *S/p Liver transplant 1/7/2022, complicated with rising liver tests. Underwent liver biopsy to rule out rejection. Now here for ERCP.   *Tolerating regular diet with no abdominal pain and no nausea or emesis. Reports poor oral intake due to low appetite.     (+) hepatitis type Alcoholic, liver disease,     Renal/Genitourinary: Comment:   Kidney function improving., Creat ~0.94, GFR estimated >60    (+) renal disease, Pt does not require dialysis,     Endo:     (+) Obesity,     Psychiatric/Substance Use: Comment:   Alcohol abuse in remission.     (+) alcohol abuse     Infectious Disease:       Malignancy:       Other:            Physical Exam    Airway  airway exam normal      Mallampati: II   TM distance: > 3 FB   Neck ROM: full   Mouth opening: > 3 cm    Respiratory Devices and Support         Dental  no notable dental history         Cardiovascular   cardiovascular exam normal          Pulmonary   pulmonary exam normal                OUTSIDE LABS:  CBC:   Lab Results   Component Value Date    WBC 5.7 02/14/2022    WBC 5.3 02/10/2022    HGB 11.2 (L) 02/14/2022    HGB 9.7 (L) 02/10/2022    HCT 34.6 (L) 02/14/2022    HCT 30.7 (L) 02/10/2022     02/14/2022     02/10/2022     BMP:   Lab Results   Component Value Date     02/14/2022     02/10/2022    POTASSIUM 4.8 02/14/2022    POTASSIUM 4.5 02/10/2022    CHLORIDE 102 02/14/2022    CHLORIDE 102 02/10/2022    CO2 24 02/14/2022    CO2 27 02/10/2022    BUN 18 02/14/2022    BUN 18 02/10/2022    CR 0.94 02/14/2022    CR 0.88 02/10/2022     (H) 02/14/2022     (H) 02/10/2022     COAGS:   Lab Results    Component Value Date    PTT 59 (H) 01/08/2022    INR 1.32 (H) 01/10/2022    FIBR 161 (L) 01/09/2022     POC:   Lab Results   Component Value Date    HCGS Negative 12/28/2021     HEPATIC:   Lab Results   Component Value Date    ALBUMIN 3.1 (L) 02/14/2022    PROTTOTAL 6.8 02/14/2022    ALT 61 (H) 02/14/2022    AST 16 02/14/2022    ALKPHOS 562 (H) 02/14/2022    BILITOTAL 1.3 02/14/2022    JENNIFFER 34 12/30/2021     OTHER:   Lab Results   Component Value Date    PH 7.44 01/08/2022    LACT 0.9 01/08/2022    A1C 5.2 01/08/2022    SHAI 9.7 02/14/2022    PHOS 4.5 02/14/2022    MAG 1.6 02/14/2022    LIPASE 67 (L) 01/09/2022    AMYLASE 38 01/09/2022    TSH 0.66 12/28/2021       Anesthesia Plan    ASA Status:  3      Anesthesia Type: General.     - Airway: ETT   Induction: Intravenous.   Maintenance: Balanced.        Consents    Anesthesia Plan(s) and associated risks, benefits, and realistic alternatives discussed. Questions answered and patient/representative(s) expressed understanding.    - Discussed:     - Discussed with:  Patient      - Extended Intubation/Ventilatory Support Discussed: No.      - Patient is DNR/DNI Status: No    Use of blood products discussed: No .     Postoperative Care    Pain management: IV analgesics, Neuraxial analgesia.   PONV prophylaxis: Ondansetron (or other 5HT-3), Dexamethasone or Solumedrol     Comments:           H&P reviewed: Unable to attach H&P to encounter due to EHR limitations. H&P Update: appropriate H&P reviewed, patient examined. No interval changes since H&P (within 30 days).         Livier Daniels MD

## 2022-02-14 NOTE — BRIEF OP NOTE
[facility] Brief Operative Note    Pre-operative diagnosis: Elevated LFTs [R79.89]   Post-operative diagnosis * No post-op diagnosis entered *   Procedure: Procedure(s):  ENDOSCOPIC RETROGRADE CHOLANGIOPANCREATOGRAPHY WITH BILIARY SPHINCTEROTOMY, SLUDGE REMOVAL, DILATION  AND STENT PLACEMENT   Surgeon: Guru Luz MD       Estimated blood loss: None    Specimens: None   Findings:      Selective biliary cannulation    12 mm biliary sphinctertomy     Low grade stricture in anasatmosis    10 Fr by 16 cm Johlin stent in CBD    Recommendations    No ASA or anticoagulation for 3 days    Repeat ERCP in 4 weeks

## 2022-02-14 NOTE — PROGRESS NOTES
Saw pt and her  today in clinic. Weight down ~10 lbs since last week. She feels her weight is a dry weight and is no longer retaining fluid. Difficult to get a good grasp on weight hx after txp with fluid retention, etc. I talked with her on the phone post transplant recently. She reports GI sx: stomach pain, nausea, and diarrhea. Hoping that a stent placed today will help with these sx. She does report liquids are much better tolerated than solids. She consumes 1 Ensure/day, coffee with protein powder and cream, a fruit smoothie, and maybe 1 additional 'snack' such as cheez its or a protein waffle. Meats are still not appealing to her. She reports she is very motivated to avoid ever getting a feeding tube (apparently was brought up at one point while inpatient).    Interventions:  - Continue with liquids as main source of nutrition. Recommend increasing ONS/day to at least 2.   - Consider making your own smoothies w/ protein powder/milk/yogurt instead of just fruit  - Aim for minimum of 85 grams protein/d x 2 months post txp (1.5 g/kg)  - Reassured pt that poor appetite and lower appeal for certain foods is rather common in the first few months post txp.     Pt has my phone # if questions arise

## 2022-02-14 NOTE — ANESTHESIA PROCEDURE NOTES
Airway       Patient location during procedure: OR       Procedure Start/Stop Times: 2/14/2022 1:58 PM  Staff -        Anesthesiologist:  Livier Murillo MD       CRNA: Delgado Wilson APRN CRNA       Other Anesthesia Staff: Amanda Holm       Performed By: SRNA  Consent for Airway        Urgency: elective  Indications and Patient Condition       Indications for airway management: keith-procedural       Induction type:intravenous       Mask difficulty assessment: 1 - vent by mask    Final Airway Details       Final airway type: endotracheal airway       Successful airway: ETT - single and Oral  Endotracheal Airway Details        ETT size (mm): 7.0       Cuffed: yes       Successful intubation technique: direct laryngoscopy       DL Blade Type: [place] 2       Grade View of Cords: 1       Adjucts: stylet       Position: Right       Measured from: gums/teeth       Secured at (cm): 21       Bite Block used: Endoscopy bite block.    Post intubation assessment        Placement verified by: capnometry, equal breath sounds and chest rise        Number of attempts at approach: 1       Number of other approaches attempted: 0       Secured with: pink tape       Ease of procedure: easy       Dentition: Intact and Unchanged

## 2022-02-14 NOTE — ANESTHESIA CARE TRANSFER NOTE
Patient: Melita Cortes    Procedure: Procedure(s):  ENDOSCOPIC RETROGRADE CHOLANGIOPANCREATOGRAPHY WITH BILIARY SPHINCTEROTOMY, SLUDGE REMOVAL, DILATION  AND STENT PLACEMENT       Diagnosis: Elevated LFTs [R79.89]  Diagnosis Additional Information: No value filed.    Anesthesia Type:   General     Note:    Oropharynx: oropharynx clear of all foreign objects and spontaneously breathing  Level of Consciousness: drowsy  Oxygen Supplementation: face mask  Level of Supplemental Oxygen (L/min / FiO2): 6  Independent Airway: airway patency satisfactory and stable  Dentition: dentition unchanged  Vital Signs Stable: post-procedure vital signs reviewed and stable  Report to RN Given: handoff report given  Patient transferred to: PACU    Handoff Report: Identifed the Patient, Identified the Reponsible Provider, Reviewed the pertinent medical history, Discussed the surgical course, Reviewed Intra-OP anesthesia mangement and issues during anesthesia, Set expectations for post-procedure period and Allowed opportunity for questions and acknowledgement of understanding      Vitals:  Vitals Value Taken Time   BP     Temp     Pulse     Resp     SpO2         Electronically Signed By: HAKAN Olson CRNA  February 14, 2022  2:56 PM

## 2022-02-14 NOTE — ANESTHESIA POSTPROCEDURE EVALUATION
Patient: Melita Cortes    Procedure: Procedure(s):  ENDOSCOPIC RETROGRADE CHOLANGIOPANCREATOGRAPHY WITH BILIARY SPHINCTEROTOMY, SLUDGE REMOVAL, DILATION  AND STENT PLACEMENT       Diagnosis:Elevated LFTs [R79.89]  Diagnosis Additional Information: No value filed.    Anesthesia Type:  General    Note:  Disposition: Outpatient   Postop Pain Control: Uneventful            Sign Out: Well controlled pain   PONV: No   Neuro/Psych: Uneventful            Sign Out: Acceptable/Baseline neuro status   Airway/Respiratory: Uneventful            Sign Out: Acceptable/Baseline resp. status   CV/Hemodynamics: Uneventful            Sign Out: Acceptable CV status; No obvious hypovolemia; No obvious fluid overload   Other NRE: NONE   DID A NON-ROUTINE EVENT OCCUR? No    Event details/Postop Comments:  Hemodynamically stable, denies N/V, well controlled pain.             Last vitals:  Vitals Value Taken Time   BP     Temp     Pulse     Resp     SpO2         Electronically Signed By: Juan Narayan MD  February 14, 2022  3:13 PM

## 2022-02-14 NOTE — PROGRESS NOTES
History and Physical  And Immunosuppression note    Date of Visit: 02/14/2022    Transplants:  1/7/2022 (Liver); Postoperative day:  38  ASSESMENT AND PLAN:  1.Graft Function: liver tests better but alkaline phosphatase is 561, ok to go ahead with ERCP  2.Immunosuppression Management: keep tacolimus levels  3.Hypertension: ok  4.Renal Function: better  5.Lab frequency: weekly  6.Other:  Needs to eat more has malnoursisment    Date: February 14, 2022    Transplant:  [x]                             Liver []                              Kidney []                             Pancreas []                              Other:             Chief Complaint:  Doing well     History of Present Illness:  Patient Active Problem List   Diagnosis     Abdominal bloating     Family history of pancreatic cancer     High risk HPV infection     Transaminitis     Macrocytosis     Cervical high risk HPV (human papillomavirus) test positive     Other ascites     Acute liver failure without hepatic coma     Anemia, unspecified type     Alcoholic liver disease (H)     Elevated serum creatinine     Alcoholic cirrhosis of liver with ascites (H)     Hyponatremia     Malaise and fatigue     At high risk for severe sepsis     Symptomatic anemia     Decompensated liver disease (H)     Bandemia     Hyperbilirubinemia     End-stage liver disease (H)     Obesity (BMI 30-39.9)     Hyperlipidemia     Anxiety     Asthma     Non-intractable vomiting with nausea, unspecified vomiting type     Status post liver transplantation (H)     Immunosuppressed status (H)     Pleural effusion     Ileus, postoperative (H)     Steroid-induced hyperglycemia     Hypervolemia     ROSSY (acute kidney injury) (H)     Bacteremia     Anemia due to blood loss, acute     Severe malnutrition (H)     SOCIAL /FAMILY HISTORY: [x]                  No recent change    Past Medical History:   Diagnosis Date     Alcoholic hepatitis      Asthma 3/10/2006     Cervical high risk HPV (human  papillomavirus) test positive 2020    See problem list     Past Surgical History:   Procedure Laterality Date     APPENDECTOMY       COLONOSCOPY N/A 2022    Procedure: COLONOSCOPY;  Surgeon: Zita Steele MD;  Location:  GI     ESOPHAGOGASTRODUODENOSCOPY, WITH BRUSHINGS N/A 10/29/2021    Procedure: ESOPHAGOGASTRODUODENOSCOPY, WITH BRUSHINGS;  Surgeon: Torey Ruiz MD;  Location:  GI     IR LIVER BIOPSY PERCUTANEOUS  2022     TRANSPLANT LIVER RECIPIENT  DONOR N/A 2022    Procedure: TRANSPLANT, LIVER, RECIPIENT,  DONOR;  Surgeon: Pradeep Browne MD;  Location:  OR     Social History     Socioeconomic History     Marital status:      Spouse name: Not on file     Number of children: Not on file     Years of education: Not on file     Highest education level: Not on file   Occupational History     Not on file   Tobacco Use     Smoking status: Former Smoker     Quit date: 2020     Years since quittin.7     Smokeless tobacco: Never Used   Vaping Use     Vaping Use: Never used   Substance and Sexual Activity     Alcohol use: Not Currently     Comment: Last drink 2021     Drug use: Never     Sexual activity: Not Currently   Other Topics Concern     Parent/sibling w/ CABG, MI or angioplasty before 65F 55M? Not Asked   Social History Narrative     Not on file     Social Determinants of Health     Financial Resource Strain: Not on file   Food Insecurity: Not on file   Transportation Needs: Not on file   Physical Activity: Not on file   Stress: Not on file   Social Connections: Not on file   Intimate Partner Violence: Not on file   Housing Stability: Not on file     Prescription Medications as of 2022       Rx Number Disp Refills Start End Last Dispensed Date Next Fill Date Owning Pharmacy    aspirin (ASA) 81 MG chewable tablet  90 tablet 0 2022    [facility] - [place], [place] - [address]    Sig: Take 1  tablet (81 mg) by mouth daily    Class: E-Prescribe    Route: Oral    magnesium oxide (MAG-OX) 400 MG tablet  180 tablet 3 1/29/2022    [id number] [place], [place] - [address] AT [facility] [address] [facility] [address]    Sig: Take 2 tablets (800 mg) by mouth 2 times daily    Class: E-Prescribe    Route: Oral    multivitamin (CENTRUM SILVER) tablet            Sig: Take 1 tablet by mouth daily    Class: Historical    Route: Oral    mycophenolate (GENERIC EQUIVALENT) 250 MG capsule  540 capsule 3 1/18/2022    [facility] [place], [place] - [address]    Sig: Take 3 capsules (750 mg) by mouth 2 times daily    Class: E-Prescribe    Notes to Pharmacy: TXP DT 1/7/2022 (Liver) TXP Dischg DT 1/14/2022 DX Liver replaced by transplant Z94.4 [facility] ([place], [place])    Route: Oral    ondansetron (ZOFRAN-ODT) 4 MG ODT tab  22 tablet 0 2/5/2022 2/20/2022   [facility] #[id number] - [place], [place] - [address] AT [facility] [address] [facility] [address]    Sig: Take 1 tablet (4 mg) by mouth every 6 hours as needed for nausea    Class: E-Prescribe    Route: Oral    pantoprazole (PROTONIX) 40 MG EC tablet  90 tablet 3 1/18/2022    [facility]/[place], [place] - [address]    Sig: Take 1 tablet (40 mg) by mouth daily    Class: E-Prescribe    Route: Oral    sennosides (SENOKOT) 8.6 MG tablet  60 tablet 0 1/14/2022    [facility] - [place], [place] - [address]    Sig: Take 1-2 tablets by mouth 2 times daily Take while taking oxycodone, HOLD for loose stools    Class: E-Prescribe    Route: Oral    sulfamethoxazole-trimethoprim (BACTRIM) 400-80 MG tablet  90 tablet 3 1/18/2022    [facility] [place], [place] - [address]    Sig: Take 1 tablet by mouth daily    Class: E-Prescribe    Route: Oral    tacrolimus (GENERIC EQUIVALENT) 0.5 MG capsule  60 capsule 1 1/14/2022     [facility] - [place], [place] - [address]    Sig: Take 1 capsule by mouth twice daily as directed by transplant team for dose titration    Class: E-Prescribe    tacrolimus (GENERIC EQUIVALENT) 1 MG capsule  630 capsule 3 2/8/2022    [facility] - [place] [address] [address]     Sig: Take 4 capsules (4 mg) by mouth every morning AND 3 capsules (3 mg) every evening.    Class: E-Prescribe    Notes to Pharmacy: Dose change    Route: Oral    thiamine (B-1) 100 MG tablet  30 tablet 1 1/29/2022    [id number] [place] [address] [address] [place] [address]    Sig: Take 1 tablet (100 mg) by mouth daily    Class: E-Prescribe    Route: Oral    traMADol (ULTRAM) 50 MG tablet  8 tablet 0 2/5/2022    [facility] [id number] [place] [address] - [address] [place] [address]    Sig: Take 1 tablet (50 mg) by mouth every 8 hours as needed for severe pain Script called in    Class: E-Prescribe    Route: Oral    tretinoin (RETIN-A) 0.025 % external cream  45 g 11 12/10/2021    [facility] ST[facility] [address] [place] [address] [address] [place] [address]    Sig: Apply a pea size to entire face QD    Class: E-Prescribe    valGANciclovir (VALCYTE) 450 MG tablet  90 tablet 3 1/18/2022    [facility] - [place] [address] [address]     Sig: Take 1 tablet (450 mg) by mouth daily    Class: E-Prescribe    Route: Oral        Patient has no known allergies.   REVIEW OF SYSTEMS (check box if normal)  [x]               GENERAL  [x]                 PULMONARY [x]                GENITOURINARY  [x]                CNS                 [x]                 CARDIAC  [x]                 ENDOCRINE  [x]                EARS,NOSE,THROAT [x]                 GASTROINTESTINAL [x]                 NEUROLOGIC    [x]                MUSCLOSKELTAL  [x]                  HEMATOLOGY      PHYSICAL EXAM (check box if  normal)/78   Pulse 79   Wt 55.9 kg (123 lb 4.8 oz)   SpO2 99%   BMI 20.54 kg/m          [x]            GENERAL:    [x]       EYES:  ICTERIC   []        YES  []                    NO  [x]           EXTREMITIES: Clubbing []       Y     [x]           N    [x]           EARS, NOSE, THROAT: Membranes Moist    YES   [x]                   NO []                  [x]           LUNGS:  CLEAR    YES       [x]                  NO    []                                [x]           SKIN: Jaundice           YES       []                  NO    [x]                   Rash: YES       []                  NO    [x]                                     [x]             HEART: Regular Rate          YES       [x]                  NO    []                   Incision Clean:  YES       [x]                  NO    []                                [x]                    ABDOMEN: Wound healthy Organomegaly YES       []                  NO    [x]                       [x]                    NEUROLOGICAL:  Nonfocal  YES       [x]                  NO    []                       [x]                    Hernia YES       []                  NO    [x]                   PSYCHIATRIC:  Appropriate  YES       [x]                  NO    []                       OTHER:                                                                                                   PAIN SCALE:: 3

## 2022-02-14 NOTE — NURSING NOTE
Chief Complaint   Patient presents with     Consult     Post op     Blood pressure 121/78, pulse 79, weight 55.9 kg (123 lb 4.8 oz), SpO2 99 %, not currently breastfeeding.    Dolly Snyder on 2/14/2022 at 9:35 AM

## 2022-02-14 NOTE — LETTER
2/14/2022     RE: Melita Cortes  [id number]  [facility] [address]    Dear Colleague,    Thank you for referring your patient, Melita Cortes, to the [facility]. Please see a copy of my visit note below.    History and Physical  And Immunosuppression note    Date of Visit: 02/14/2022    Transplants:  1/7/2022 (Liver); Postoperative day:  38  ASSESMENT AND PLAN:  1.Graft Function: liver tests better but alkaline phosphatase is 561, ok to go ahead with ERCP  2.Immunosuppression Management: keep tacolimus levels  3.Hypertension: ok  4.Renal Function: better  5.Lab frequency: weekly  6.Other:  Needs to eat more has malnoursisment    Date: February 14, 2022    Transplant:  [x]                             Liver []                              Kidney []                             Pancreas []                              Other:             Chief Complaint:  Doing well     History of Present Illness:  Patient Active Problem List   Diagnosis     Abdominal bloating     Family history of pancreatic cancer     High risk HPV infection     Transaminitis     Macrocytosis     Cervical high risk HPV (human papillomavirus) test positive     Other ascites     Acute liver failure without hepatic coma     Anemia, unspecified type     Alcoholic liver disease (H)     Elevated serum creatinine     Alcoholic cirrhosis of liver with ascites (H)     Hyponatremia     Malaise and fatigue     At high risk for severe sepsis     Symptomatic anemia     Decompensated liver disease (H)     Bandemia     Hyperbilirubinemia     End-stage liver disease (H)     Obesity (BMI 30-39.9)     Hyperlipidemia     Anxiety     Asthma     Non-intractable vomiting with nausea, unspecified vomiting type     Status post liver transplantation (H)     Immunosuppressed status (H)     Pleural effusion     Ileus, postoperative (H)     Steroid-induced hyperglycemia     Hypervolemia     ROSSY (acute kidney injury) (H)      Bacteremia     Anemia due to blood loss, acute     Severe malnutrition (H)     SOCIAL /FAMILY HISTORY: [x]                  No recent change    Past Medical History:   Diagnosis Date     Alcoholic hepatitis      Asthma 3/10/2006     Cervical high risk HPV (human papillomavirus) test positive 2020    See problem list     Past Surgical History:   Procedure Laterality Date     APPENDECTOMY       COLONOSCOPY N/A 2022    Procedure: COLONOSCOPY;  Surgeon: Zita Steele MD;  Location:  GI     ESOPHAGOGASTRODUODENOSCOPY, WITH BRUSHINGS N/A 10/29/2021    Procedure: ESOPHAGOGASTRODUODENOSCOPY, WITH BRUSHINGS;  Surgeon: Torey Ruiz MD;  Location:  GI     IR LIVER BIOPSY PERCUTANEOUS  2022     TRANSPLANT LIVER RECIPIENT  DONOR N/A 2022    Procedure: TRANSPLANT, LIVER, RECIPIENT,  DONOR;  Surgeon: Pradeep Browne MD;  Location:  OR     Social History     Socioeconomic History     Marital status:      Spouse name: Not on file     Number of children: Not on file     Years of education: Not on file     Highest education level: Not on file   Occupational History     Not on file   Tobacco Use     Smoking status: Former Smoker     Quit date: 2020     Years since quittin.7     Smokeless tobacco: Never Used   Vaping Use     Vaping Use: Never used   Substance and Sexual Activity     Alcohol use: Not Currently     Comment: Last drink 2021     Drug use: Never     Sexual activity: Not Currently   Other Topics Concern     Parent/sibling w/ CABG, MI or angioplasty before 65F 55M? Not Asked   Social History Narrative     Not on file     Social Determinants of Health     Financial Resource Strain: Not on file   Food Insecurity: Not on file   Transportation Needs: Not on file   Physical Activity: Not on file   Stress: Not on file   Social Connections: Not on file   Intimate Partner Violence: Not on file   Housing Stability: Not on file     Prescription  Medications as of 2/14/2022       Rx Number Disp Refills Start End Last Dispensed Date Next Fill Date Owning Pharmacy    aspirin (ASA) 81 MG chewable tablet  90 tablet 0 1/14/2022    [facility] [address]    Sig: Take 1 tablet (81 mg) by mouth daily    Class: E-Prescribe    Route: Oral    magnesium oxide (MAG-OX) 400 MG tablet  180 tablet 3 1/29/2022    [id number] [place], [place] - [address] AT [place] [address]    Sig: Take 2 tablets (800 mg) by mouth 2 times daily    Class: E-Prescribe    Route: Oral    multivitamin (CENTRUM SILVER) tablet            Sig: Take 1 tablet by mouth daily    Class: Historical    Route: Oral    mycophenolate (GENERIC EQUIVALENT) 250 MG capsule  540 capsule 3 1/18/2022    [facility] [address]     Sig: Take 3 capsules (750 mg) by mouth 2 times daily    Class: E-Prescribe    Notes to Pharmacy: TXP DT 1/7/2022 (Liver) TXP Dischg DT 1/14/2022 DX Liver replaced by transplant Z94.4 [facility] ([place], [place])    Route: Oral    ondansetron (ZOFRAN-ODT) 4 MG ODT tab  22 tablet 0 2/5/2022 2/20/2022   [id number] - [place], [place] - [address] [place] [address]    Sig: Take 1 tablet (4 mg) by mouth every 6 hours as needed for nausea    Class: E-Prescribe    Route: Oral    pantoprazole (PROTONIX) 40 MG EC tablet  90 tablet 3 1/18/2022    [facility] [place] [address]    Sig: Take 1 tablet (40 mg) by mouth daily    Class: E-Prescribe    Route: Oral    sennosides (SENOKOT) 8.6 MG tablet  60 tablet 0 1/14/2022    [facility] - [place], [place] - [address] [place] [address]    Sig: Take 1-2 tablets by mouth 2 times daily Take while taking oxycodone, HOLD for loose stools    Class: E-Prescribe    Route: Oral    sulfamethoxazole-trimethoprim  (BACTRIM) 400-80 MG tablet  90 tablet 3 1/18/2022    [facility] [address]    Sig: Take 1 tablet by mouth daily    Class: E-Prescribe    Route: Oral    tacrolimus (GENERIC EQUIVALENT) 0.5 MG capsule  60 capsule 1 1/14/2022    [facility] [place], [place] - [address]    Sig: Take 1 capsule by mouth twice daily as directed by transplant team for dose titration    Class: E-Prescribe    tacrolimus (GENERIC EQUIVALENT) 1 MG capsule  630 capsule 3 2/8/2022    [facility] [address]    Sig: Take 4 capsules (4 mg) by mouth every morning AND 3 capsules (3 mg) every evening.    Class: E-Prescribe    Notes to Pharmacy: Dose change    Route: Oral    thiamine (B-1) 100 MG tablet  30 tablet 1 1/29/2022    [id number] [place] [address], [place] - [address] [place] [address]    Sig: Take 1 tablet (100 mg) by mouth daily    Class: E-Prescribe    Route: Oral    traMADol (ULTRAM) 50 MG tablet  8 tablet 0 2/5/2022    [facility] #[id number] [place] [address], [place] - [address] [place] [address]    Sig: Take 1 tablet (50 mg) by mouth every 8 hours as needed for severe pain Script called in    Class: E-Prescribe    Route: Oral    tretinoin (RETIN-A) 0.025 % external cream  45 g 11 12/10/2021    [id number] [place] [address] [address] [place] [address]    Sig: Apply a pea size to entire face QD    Class: E-Prescribe    valGANciclovir (VALCYTE) 450 MG tablet  90 tablet 3 1/18/2022    [facility] [address] [address]     Sig: Take 1 tablet (450 mg) by mouth daily    Class: E-Prescribe    Route: Oral        Patient has no known allergies.   REVIEW OF SYSTEMS (check box if normal)  [x]               GENERAL  [x]                 PULMONARY [x]                GENITOURINARY  [x]                CNS                  [x]                 CARDIAC  [x]                 ENDOCRINE  [x]                EARS,NOSE,THROAT [x]                 GASTROINTESTINAL [x]                 NEUROLOGIC    [x]                MUSCLOSKELTAL  [x]                  HEMATOLOGY      PHYSICAL EXAM (check box if normal)/78   Pulse 79   Wt 55.9 kg (123 lb 4.8 oz)   SpO2 99%   BMI 20.54 kg/m          [x]            GENERAL:    [x]       EYES:  ICTERIC   []        YES  []                    NO  [x]           EXTREMITIES: Clubbing []       Y     [x]           N    [x]           EARS, NOSE, THROAT: Membranes Moist    YES   [x]                   NO []                  [x]           LUNGS:  CLEAR    YES       [x]                  NO    []                                [x]           SKIN: Jaundice           YES       []                  NO    [x]                   Rash: YES       []                  NO    [x]                                     [x]             HEART: Regular Rate          YES       [x]                  NO    []                   Incision Clean:  YES       [x]                  NO    []                                [x]                    ABDOMEN: Wound healthy Organomegaly YES       []                  NO    [x]                       [x]                    NEUROLOGICAL:  Nonfocal  YES       [x]                  NO    []                       [x]                    Hernia YES       []                  NO    [x]                   PSYCHIATRIC:  Appropriate  YES       [x]                  NO    []                       OTHER:                                                                                                   PAIN SCALE:: 3    Again, thank you for allowing me to participate in the care of your patient.      Sincerely,    Pradeep Browne MD

## 2022-02-14 NOTE — DISCHARGE INSTRUCTIONS
[facility]   Adult Discharge Orders & Instructions     For 24 hours after surgery    1. Get plenty of rest.  A responsible adult must stay with you for at least 24 hours after you leave the hospital.   2. Do not drive or use heavy equipment.  If you have weakness or tingling, don't drive or use heavy equipment until this feeling goes away.  3. Do not drink alcohol.  4. Avoid strenuous or risky activities.  Ask for help when climbing stairs.   5. You may feel lightheaded.  IF so, sit for a few minutes before standing.  Have someone help you get up.   6. If you have nausea (feel sick to your stomach): Drink only clear liquids such as apple juice, ginger ale, broth or 7-Up.  Rest may also help.  Be sure to drink enough fluids.  Move to a regular diet as you feel able.  7. You may have a slight fever. Call the doctor if your fever is over 100 F (37.7 C) (taken under the tongue) or lasts longer than 24 hours.  8. You may have a dry mouth, a sore throat, muscle aches or trouble sleeping.  These should go away after 24 hours  9. Repeat ERCP in 4 weeksDo not make important or legal decisions.         10.No ASA or anticoagulation for 3 days  Call your doctor for any of the followin.  Signs of infection (fever, growing tenderness at the surgery site, a large amount of drainage or bleeding, severe pain, foul-smelling drainage, redness, swelling).    2. It has been over 8 to 10 hours since surgery and you are still not able to urinate (pass water).    3.  Headache for over 24 hours.    4.  Numbness, tingling or weakness the day after surgery (if you had spinal anesthesia).

## 2022-02-15 ENCOUNTER — NURSE TRIAGE (OUTPATIENT)
Dept: FAMILY MEDICINE | Facility: CLINIC | Age: 52
End: 2022-02-15
Payer: COMMERCIAL

## 2022-02-15 NOTE — TELEPHONE ENCOUNTER
"    Reason for Disposition    [1] MODERATE dizziness (e.g., vertigo; feels very unsteady, interferes with normal activities) AND [2] has NOT been evaluated by physician for this    Additional Information    Negative: [1] Weakness (i.e., paralysis, loss of muscle strength) of the face, arm or leg on one side of the body AND [2] sudden onset AND [3] present now    Negative: [1] Numbness (i.e., loss of sensation) of the face, arm or leg on one side of the body AND [2] sudden onset AND [3] present now    Negative: [1] Loss of speech or garbled speech AND [2] sudden onset AND [3] present now    Negative: Difficult to awaken or acting confused (e.g., disoriented, slurred speech)    Negative: Sounds like a life-threatening emergency to the triager    Negative: SEVERE dizziness (vertigo) (e.g., unable to walk without assistance)    Negative: [1] Dizziness (vertigo) present now AND [2] one or more stroke risk factors (i.e., hypertension, diabetes, prior stroke/TIA, heart attack)  (Exception: prior physician evaluation for this AND no different/worse than usual)    Negative: [1] Dizziness (vertigo) present now AND [2] age > 60  (Exception: prior physician evaluation for this AND no different/worse than usual)    Negative: Severe headache (e.g., excruciating)  (Exception: similar to previous migraines)    Negative: Patient sounds very sick or weak to the triager    Negative: Taking a medicine that could cause dizziness (e.g., phenytoin [Dilantin], carbamazepine [Tegretol], primidone [Mysoline])    Answer Assessment - Initial Assessment Questions  1. DESCRIPTION: \"Describe your dizziness.\"      On standing feels very lightheaded and dizzy  2. VERTIGO: \"Do you feel like either you or the room is spinning or tilting?\"       no  3. LIGHTHEADED: \"Do you feel lightheaded?\" (e.g., somewhat faint, woozy, weak upon standing)      Feels lightheaded and weak  4. SEVERITY: \"How bad is it?\"  \"Can you walk?\"    - MILD - Feels unsteady but " "walking normally.    - MODERATE - Feels very unsteady when walking, but not falling; interferes with normal activities (e.g., school, work) .    - SEVERE - Unable to walk without falling (requires assistance).      Severe using walker and needs assistance  5. ONSET:  \"When did the dizziness begin?\"      When she woke up this morning  6. AGGRAVATING FACTORS: \"Does anything make it worse?\" (e.g., standing, change in head position)      standing  7. CAUSE: \"What do you think is causing the dizziness?\"      Patient had a stent placed yesterday and was upside down for proceedure  8. RECURRENT SYMPTOM: \"Have you had dizziness before?\" If so, ask: \"When was the last time?\" \"What happened that time?\"      no  9. OTHER SYMPTOMS: \"Do you have any other symptoms?\" (e.g., headache, weakness, numbness, vomiting, earache)      Ear pressure, ears are muffled.  10. PREGNANCY: \"Is there any chance you are pregnant?\" \"When was your last menstrual period?\"        no    Protocols used: DIZZINESS - VERTIGO-A-AH      "

## 2022-02-16 ENCOUNTER — OFFICE VISIT (OUTPATIENT)
Dept: FAMILY MEDICINE | Facility: CLINIC | Age: 52
End: 2022-02-16
Payer: COMMERCIAL

## 2022-02-16 ENCOUNTER — DOCUMENTATION ONLY (OUTPATIENT)
Dept: FAMILY MEDICINE | Facility: CLINIC | Age: 52
End: 2022-02-16

## 2022-02-16 VITALS
WEIGHT: 124 LBS | RESPIRATION RATE: 18 BRPM | DIASTOLIC BLOOD PRESSURE: 83 MMHG | SYSTOLIC BLOOD PRESSURE: 112 MMHG | TEMPERATURE: 98.9 F | BODY MASS INDEX: 20.66 KG/M2 | OXYGEN SATURATION: 100 % | HEIGHT: 65 IN | HEART RATE: 89 BPM

## 2022-02-16 DIAGNOSIS — H69.91 DYSFUNCTION OF RIGHT EUSTACHIAN TUBE: ICD-10-CM

## 2022-02-16 DIAGNOSIS — R74.8 ELEVATED ALKALINE PHOSPHATASE LEVEL: ICD-10-CM

## 2022-02-16 DIAGNOSIS — Z94.4 STATUS POST LIVER TRANSPLANTATION (H): Primary | ICD-10-CM

## 2022-02-16 DIAGNOSIS — Z94.4 LIVER REPLACED BY TRANSPLANT (H): Primary | ICD-10-CM

## 2022-02-16 DIAGNOSIS — E43 SEVERE MALNUTRITION (H): ICD-10-CM

## 2022-02-16 PROBLEM — K72.10 END-STAGE LIVER DISEASE (H): Status: RESOLVED | Noted: 2021-12-23 | Resolved: 2022-02-16

## 2022-02-16 PROCEDURE — 99214 OFFICE O/P EST MOD 30 MIN: CPT | Performed by: INTERNAL MEDICINE

## 2022-02-16 ASSESSMENT — PAIN SCALES - GENERAL: PAINLEVEL: MODERATE PAIN (4)

## 2022-02-16 NOTE — PROGRESS NOTES
{PROVIDER CHARTING PREFERENCE:774760}    Cameron Kitchen is a 51 year old who presents for the following health issues {ACCOMPANIED BY STATEMENT (Optional):285263}    HPI     The patient is here today for Vertigo and Ear pressure.    {SUPERLIST (Optional):557492}  {additonal problems for provider to add (Optional):703671}    Review of Systems   {ROS COMP (Optional):162694}      Objective    There were no vitals taken for this visit.  There is no height or weight on file to calculate BMI.  Physical Exam   {Exam List (Optional):248291}    {Diagnostic Test Results (Optional):851872}    {AMBULATORY ATTESTATION (Optional):868640}

## 2022-02-16 NOTE — PROGRESS NOTES
Assessment & Plan     Status post liver transplantation (H)  Is following closely with the transplant team  She is currently on tacrolimus and mycophenolate mofetil  She is no longer taking prednisone    Severe malnutrition (H)  Obviously she needs to get stronger and start taking more calories  She has seen a nutritionist    Elevated alkaline phosphatase level  The cause for this remains unclear but rejection has been ruled out with biopsy  I am suspecting this could be from the medications that she is taking in particular mycophenolate   She had a ERCP and sludge was removed and stent was placed as it was thought that this was causing the elevated ALP     Dysfunction of right eustachian tube  Complaining of some fullness in the right ear  Also right ear is popping  Examination shows some dullness of the right eardrum  She probably has some otitis media and eustachian tube dysfunction on the right side caused by viral infection  She is already on Bactrim prophylactic dose  I will not add any antibiotics for now  We can think about taking some decongestants if things do not improve soon        30 minutes spent on the date of the encounter doing chart review, history and exam, documentation and further activities per the note           Return in about 3 months (around 5/16/2022).    Navneet Johnson MD  [facility] ALMA ROSA Kitchen is a 51 year old who presents for the following health issues     The patient is here today for Vertigo and Ear pressure, the patient also would like to discuss the stomach problem.     History of Present Illness     Reason for visit:  Ear pain and stuffiness  Symptom onset:  1-3 days ago    She eats 0-1 servings of fruits and vegetables daily.She consumes 2 sweetened beverage(s) daily.She exercises with enough effort to increase her heart rate 9 or less minutes per day.  She exercises with enough effort to increase her heart rate 3 or less days per week.   She  "is taking medications regularly.  Started having some left ear fullness few days ago  That resolved  Now having right ear fullness  No sore throat  Right ear is popping          Review of Systems   Constitutional, HEENT, cardiovascular, pulmonary, gi and gu systems are negative, except as otherwise noted.      Objective    Ht 1.651 m (5' 5\")   Wt 56.2 kg (124 lb)   BMI 20.63 kg/m    Body mass index is 20.63 kg/m .  Physical Exam   GENERAL: healthy, alert and no distress  EYES: Eyes grossly normal to inspection, PERRL and conjunctivae and sclerae normal  HENT: Dullness of the right eardrum  RESP: lungs clear to auscultation - no rales, rhonchi or wheezes  CV: regular rate and rhythm, normal S1 S2, no S3 or S4, no murmur, click or rub, no peripheral edema and peripheral pulses strong  ABDOMEN: well-healed incision   MS: no gross musculoskeletal defects noted, no edema                "

## 2022-02-17 ENCOUNTER — LAB REQUISITION (OUTPATIENT)
Dept: LAB | Facility: CLINIC | Age: 52
End: 2022-02-17
Payer: COMMERCIAL

## 2022-02-17 ENCOUNTER — TELEPHONE (OUTPATIENT)
Dept: TRANSPLANT | Facility: CLINIC | Age: 52
End: 2022-02-17
Payer: COMMERCIAL

## 2022-02-17 DIAGNOSIS — Z94.4 LIVER REPLACED BY TRANSPLANT (H): Primary | ICD-10-CM

## 2022-02-17 DIAGNOSIS — Z79.899 OTHER LONG TERM (CURRENT) DRUG THERAPY: ICD-10-CM

## 2022-02-17 DIAGNOSIS — Z94.4 LIVER TRANSPLANT STATUS (H): ICD-10-CM

## 2022-02-17 LAB
ALBUMIN SERPL-MCNC: 2.9 G/DL (ref 3.4–5)
ALP SERPL-CCNC: 352 U/L (ref 40–150)
ALT SERPL W P-5'-P-CCNC: 41 U/L (ref 0–50)
ANION GAP SERPL CALCULATED.3IONS-SCNC: 7 MMOL/L (ref 3–14)
AST SERPL W P-5'-P-CCNC: 14 U/L (ref 0–45)
BILIRUB DIRECT SERPL-MCNC: 0.8 MG/DL (ref 0–0.2)
BILIRUB SERPL-MCNC: 1 MG/DL (ref 0.2–1.3)
BUN SERPL-MCNC: 23 MG/DL (ref 7–30)
CALCIUM SERPL-MCNC: 9.6 MG/DL (ref 8.5–10.1)
CHLORIDE BLD-SCNC: 102 MMOL/L (ref 94–109)
CO2 SERPL-SCNC: 24 MMOL/L (ref 20–32)
CREAT SERPL-MCNC: 0.94 MG/DL (ref 0.52–1.04)
ERYTHROCYTE [DISTWIDTH] IN BLOOD BY AUTOMATED COUNT: 14.7 % (ref 10–15)
GFR SERPL CREATININE-BSD FRML MDRD: 74 ML/MIN/1.73M2
GLUCOSE BLD-MCNC: 116 MG/DL (ref 70–99)
HCT VFR BLD AUTO: 31.8 % (ref 35–47)
HGB BLD-MCNC: 10.5 G/DL (ref 11.7–15.7)
MAGNESIUM SERPL-MCNC: 1.6 MG/DL (ref 1.6–2.3)
MCH RBC QN AUTO: 30.9 PG (ref 26.5–33)
MCHC RBC AUTO-ENTMCNC: 33 G/DL (ref 31.5–36.5)
MCV RBC AUTO: 94 FL (ref 78–100)
PHOSPHATE SERPL-MCNC: 4.8 MG/DL (ref 2.5–4.5)
PLATELET # BLD AUTO: 384 10E3/UL (ref 150–450)
POTASSIUM BLD-SCNC: 4.6 MMOL/L (ref 3.4–5.3)
PROT SERPL-MCNC: 6.4 G/DL (ref 6.8–8.8)
RBC # BLD AUTO: 3.4 10E6/UL (ref 3.8–5.2)
SODIUM SERPL-SCNC: 133 MMOL/L (ref 133–144)
TACROLIMUS BLD-MCNC: 10.3 UG/L (ref 5–15)
TME LAST DOSE: NORMAL H
TME LAST DOSE: NORMAL H
WBC # BLD AUTO: 5.3 10E3/UL (ref 4–11)

## 2022-02-17 PROCEDURE — 80053 COMPREHEN METABOLIC PANEL: CPT | Mod: ORL | Performed by: TRANSPLANT SURGERY

## 2022-02-17 PROCEDURE — 36415 COLL VENOUS BLD VENIPUNCTURE: CPT | Mod: ORL | Performed by: TRANSPLANT SURGERY

## 2022-02-17 PROCEDURE — 82248 BILIRUBIN DIRECT: CPT | Mod: ORL | Performed by: TRANSPLANT SURGERY

## 2022-02-17 PROCEDURE — 83735 ASSAY OF MAGNESIUM: CPT | Mod: ORL | Performed by: TRANSPLANT SURGERY

## 2022-02-17 PROCEDURE — 80197 ASSAY OF TACROLIMUS: CPT | Mod: ORL | Performed by: TRANSPLANT SURGERY

## 2022-02-17 PROCEDURE — 85027 COMPLETE CBC AUTOMATED: CPT | Mod: ORL | Performed by: TRANSPLANT SURGERY

## 2022-02-17 PROCEDURE — 84100 ASSAY OF PHOSPHORUS: CPT | Mod: ORL | Performed by: TRANSPLANT SURGERY

## 2022-02-17 RX ORDER — URSODIOL 250 MG/1
250 TABLET, FILM COATED ORAL 2 TIMES DAILY
Qty: 60 TABLET | Refills: 1 | Status: SHIPPED | OUTPATIENT
Start: 2022-02-17 | End: 2022-02-18

## 2022-02-17 NOTE — PATIENT INSTRUCTIONS
Patient Education     Common Middle Ear Problems  Middle ear problems may be caused by something that occurs at birth or right after birth (congenital). This may be an inherited condition. Or it may be due to medicines or to a viral infection such as hepatitis, HIV, syphilis, or cytomegalovirus. Over time, certain growths or bone disease can also harm the middle ear. Left untreated, middle ear problems often lead to lifelong (permanent) hearing loss.  The ear has 3 main parts: the outer ear, middle ear, and inner ear. The middle ear is made up of:    The eardrum (tympanic membrane)    An air-filled space with bones (ossicles) that link the eardrum to the inner ear     There are 3 types of hearing loss:    Conductive hearing loss. This is caused by anything that limits outside sound from getting into the inner ear.    Sensorineural hearing loss. This type affects the inner ear (cochlea) or the auditory nerve.    Mixed hearing loss. This is a combination of conductive and sensorineural hearing loss.    Injury, infection, certain growths, or bone disease can cause your symptoms. A ruptured eardrum or a long-lasting (chronic) ear infection may be painful and decrease hearing.  Symptoms    Hearing loss in one or both ears    Fluid, often smelly, draining from the ear    Mild pain or pressure in the ear    Ringing in the ear    Types of hearing loss  Conductive hearing loss  Sound waves may be disrupted before they get to the inner ear. If this happens, you may have conductive hearing loss. Loud sounds may be muffled. And it can be hard to hear soft sounds.  Causes can include:    Fluid in the middle ear    Ear infection (otitis media)    Infection in the ear canal (swimmer s ear or external otitis)    Earwax in the ear canal    Noncancer (benign) tumors blocking the outer or middle ear    A hole in your eardrum (perforated eardrum)    Something stuck in the outer ear    Structural problem in the outer or middle  ear  This type of hearing loss can often be treated with medicine or surgery.  Sensorineural hearing loss  This is the most common type of lifelong hearing loss. It occurs after damage to the inner ear. It can also occur when there are problems with the nerves that travel from the inner ear to the brain. You may find it hard to hear soft sounds. Louder sounds may not be clear. Or they may be muffled.  Causes can include:    Illnesses    Certain medicines that damage the ear    Age-related hearing loss (presbycusis)    Family history of hearing loss    Head injury    Loud noise exposure    Structural problem in the inner ear  In some cases it may be hard to know the cause of sensorineural hearing loss. Some metabolic disorders, such as diabetes, have been linked to it. If your hearing loss is unexplained, you may need tests to help find the cause. These can include:    Blood sugar level tests    Complete blood count (CBC)    Thyroid function tests    Syphilis blood tests  In most cases, a sensorineural hearing loss can t be fixed with medicine or surgery. Hearing aids may be needed.  Mixed hearing loss  This type occurs when a conductive hearing loss happens at the same time as a sensorineural hearing loss. It is a problem in your outer or middle ear and in your inner ear. You may not hear as well in one or both ears.  Treatment for mixed hearing loss may be a combination of medicine or surgery, as well as hearing aids.  Cheli last reviewed this educational content on 7/1/2019 2000-2021 [employer]. All rights reserved. This information is not intended as a substitute for professional medical care. Always follow your healthcare professional's instructions.

## 2022-02-17 NOTE — TELEPHONE ENCOUNTER
Post Liver Transplant Team Conference  Date: 2/17/2022  Transplant Coordinator: Shalini Hawkins  Transplant Surgeon: Pradeep Browne      Attendees:  [] Dr. Carmona [] Dr. Steele []       [x] Dr. Browne [x] Soumya Wren LPN []    [] Dr. Chairez [] Savannah Diego NP []    [] Dr. Rivas [] Lupe Llanos NP []    [] Dr. Nayak [x] Lupe Lund, RN []       [] Dr. Finney [x] Bria Briscoe, RN []       [] Dr. Jhonathan Hawthorne [x] Renetta Dale, CRICKET []       [] Dr. CHRISTI Lou [x] Shalini Angelo RN []       [] Dr. Stephenson [] Nurys White RN []       [] Dr. Delatorre [] Christoph Fisher, CRICKET []         Verbal Plan Read Back:   Start ursodiol 250 mg BID  Monthly PETH. Order in place    Pt would like ursodiol to [facility]. Script sent.    Routed to RN Coordinator   Shalini Hawkins RN

## 2022-02-17 NOTE — PROGRESS NOTES
Forms given to me by patient  I have completed them  Please fax those forms and also fax the hospital admission discharge from 7 January  Pharmacy note from 21 January  Surgeon note from 31 January and seventh February  Also the admission and discharge note from 14th February  Fax to the most recent blood work from 14 February  Also fax the ultrasound report from 4 February  Please fax all these and also call patient and give them the copies

## 2022-02-18 ENCOUNTER — TELEPHONE (OUTPATIENT)
Dept: TRANSPLANT | Facility: CLINIC | Age: 52
End: 2022-02-18
Payer: COMMERCIAL

## 2022-02-18 ENCOUNTER — TELEPHONE (OUTPATIENT)
Dept: PHARMACY | Facility: CLINIC | Age: 52
End: 2022-02-18
Payer: COMMERCIAL

## 2022-02-18 DIAGNOSIS — Z94.4 LIVER REPLACED BY TRANSPLANT (H): Primary | ICD-10-CM

## 2022-02-18 DIAGNOSIS — Z94.4 LIVER REPLACED BY TRANSPLANT (H): ICD-10-CM

## 2022-02-18 RX ORDER — URSODIOL 250 MG/1
250 TABLET, FILM COATED ORAL 2 TIMES DAILY
Qty: 60 TABLET | Refills: 1 | Status: SHIPPED | OUTPATIENT
Start: 2022-02-18 | End: 2022-05-19

## 2022-02-18 RX ORDER — MYCOPHENOLIC ACID 180 MG/1
540 TABLET, DELAYED RELEASE ORAL 2 TIMES DAILY
Qty: 180 TABLET | Refills: 1 | Status: SHIPPED | OUTPATIENT
Start: 2022-02-18 | End: 2022-04-07

## 2022-02-18 NOTE — TELEPHONE ENCOUNTER
Clinical Pharmacy Consult:                                                      Transplant Specific: 1 Month Post Transplant Medication Review  Date of Transplant: 01/07/2022  Type of Transplant: liver  First Transplant: yes  History of rejection: no    Immunosuppression Regimen   TAC 4mg qAM & 3mg qPM and MMF 750mg qAM & 750mg qPM  Patient specific goal: 8-10  Most recent level: 10.3, date 2/17/22  Immunosuppressant Levels:  Supratherapeutic  Pt adherent to lab draws: yes  Scr:   Lab Results   Component Value Date    CR 0.94 02/17/2022    CR 0.56 09/18/2020     Side effects: Diarrhea, and abdominal spasms    Prophylactic Medications  Antibacterial:  Bactrim ss daily  Scheduled Discontinue Date: 6 months    Antifungal: Not needed thus far  Scheduled Discontinue Date: N/A    Antiviral: CrCl 40 to 59 mL/minute: Valcyte 450 mg once daily   Scheduled Discontinue Date: 3 months    Acid Reducer: Protonix (pantoprazole)  Scheduled Reviewed Date: N/A    Thrombosis Prevention: Aspirin 81 mg PO daily  Scheduled Discontinue Date: managed by clinic    Blood Pressure Management  Frequency of home Blood Pressure checks: twice daily  Most recent home BP: 112/83  Patient Blood pressure goal: <140/90  Patient blood pressure at goal:  yes  Hospitalizations/ER visits since last assessment: 0      Med rec/DUR performed: yes  Med Rec Discrepancies: no    Medication adherence flowsheet 2/18/2022   Patient medication administration: Has assistance with medications   Patient estimated adherence level: %   Pharmacist assessment of adherence: Good   Patient reported doses missed per week: 0-1   Facilitators to medication adherence  Medication dosing chart;Pill box;Caregiver assistance;Schedule/routine   Patient reported barriers to medication adherence  -   Adherence intervention(s): -      Medication access flowsheet 2/18/2022   Number of pharmacies used: 1   Pharmacy: [facility]   Enrolled in [facility]? Yes    Patient reported barriers to accessing medications: -   Medication access interventions: -      Fidelina reports feeling good.  She reports very bad diarrhea and abdominal spasms.  She is taking robaxin for the spasms.    She reports no missed doses.  Her husbands helps and they use a med list, a pill box and have a good routine.  Last tacro was a little high.  Blood pressure is under control.  Her major concern is diarrhea.  [place] will [facility] [place].    Ambrosio Holt, [facility]

## 2022-02-18 NOTE — TELEPHONE ENCOUNTER
Pt having horrible diarrhea. Pt is going multiple times a day. Pt is barely making it to the bathroom. Discussed with dr matson pt to switch to myfortic. Pt aware. And would like to not sent to [facility] as they are struggling with filling ursodiol. Pt tried to get ursodiol at [facility] but it's stuck at [facility].

## 2022-02-21 ENCOUNTER — OFFICE VISIT (OUTPATIENT)
Dept: TRANSPLANT | Facility: CLINIC | Age: 52
End: 2022-02-21
Attending: TRANSPLANT SURGERY
Payer: COMMERCIAL

## 2022-02-21 ENCOUNTER — LAB (OUTPATIENT)
Dept: LAB | Facility: CLINIC | Age: 52
End: 2022-02-21
Payer: COMMERCIAL

## 2022-02-21 ENCOUNTER — PREP FOR PROCEDURE (OUTPATIENT)
Dept: GASTROENTEROLOGY | Facility: CLINIC | Age: 52
End: 2022-02-21

## 2022-02-21 ENCOUNTER — PATIENT OUTREACH (OUTPATIENT)
Dept: GASTROENTEROLOGY | Facility: CLINIC | Age: 52
End: 2022-02-21

## 2022-02-21 ENCOUNTER — HOSPITAL ENCOUNTER (OUTPATIENT)
Dept: BEHAVIORAL HEALTH | Facility: CLINIC | Age: 52
End: 2022-02-21
Attending: FAMILY MEDICINE
Payer: COMMERCIAL

## 2022-02-21 VITALS — DIASTOLIC BLOOD PRESSURE: 71 MMHG | OXYGEN SATURATION: 100 % | SYSTOLIC BLOOD PRESSURE: 103 MMHG | HEART RATE: 82 BPM

## 2022-02-21 DIAGNOSIS — Z94.4 LIVER REPLACED BY TRANSPLANT (H): ICD-10-CM

## 2022-02-21 DIAGNOSIS — K83.1 BILIARY STRICTURE (H): Primary | ICD-10-CM

## 2022-02-21 LAB
ALBUMIN SERPL-MCNC: 3.2 G/DL (ref 3.4–5)
ALP SERPL-CCNC: 276 U/L (ref 40–150)
ALT SERPL W P-5'-P-CCNC: 36 U/L (ref 0–50)
ANION GAP SERPL CALCULATED.3IONS-SCNC: 8 MMOL/L (ref 3–14)
AST SERPL W P-5'-P-CCNC: 17 U/L (ref 0–45)
BILIRUB DIRECT SERPL-MCNC: 0.8 MG/DL (ref 0–0.2)
BILIRUB SERPL-MCNC: 1 MG/DL (ref 0.2–1.3)
BUN SERPL-MCNC: 23 MG/DL (ref 7–30)
CALCIUM SERPL-MCNC: 10 MG/DL (ref 8.5–10.1)
CHLORIDE BLD-SCNC: 101 MMOL/L (ref 94–109)
CO2 SERPL-SCNC: 24 MMOL/L (ref 20–32)
CREAT SERPL-MCNC: 0.94 MG/DL (ref 0.52–1.04)
ERYTHROCYTE [DISTWIDTH] IN BLOOD BY AUTOMATED COUNT: 14.5 % (ref 10–15)
GFR SERPL CREATININE-BSD FRML MDRD: 74 ML/MIN/1.73M2
GLUCOSE BLD-MCNC: 120 MG/DL (ref 70–99)
HCT VFR BLD AUTO: 33.3 % (ref 35–47)
HGB BLD-MCNC: 11 G/DL (ref 11.7–15.7)
MAGNESIUM SERPL-MCNC: 1.5 MG/DL (ref 1.6–2.3)
MCH RBC QN AUTO: 30.5 PG (ref 26.5–33)
MCHC RBC AUTO-ENTMCNC: 33 G/DL (ref 31.5–36.5)
MCV RBC AUTO: 92 FL (ref 78–100)
PHOSPHATE SERPL-MCNC: 4.6 MG/DL (ref 2.5–4.5)
PLATELET # BLD AUTO: 354 10E3/UL (ref 150–450)
POTASSIUM BLD-SCNC: 4.7 MMOL/L (ref 3.4–5.3)
PROT SERPL-MCNC: 6.9 G/DL (ref 6.8–8.8)
RBC # BLD AUTO: 3.61 10E6/UL (ref 3.8–5.2)
SODIUM SERPL-SCNC: 133 MMOL/L (ref 133–144)
TACROLIMUS BLD-MCNC: 6.1 UG/L (ref 5–15)
TME LAST DOSE: NORMAL H
TME LAST DOSE: NORMAL H
WBC # BLD AUTO: 4.7 10E3/UL (ref 4–11)

## 2022-02-21 PROCEDURE — 85027 COMPLETE CBC AUTOMATED: CPT | Performed by: PATHOLOGY

## 2022-02-21 PROCEDURE — 84100 ASSAY OF PHOSPHORUS: CPT | Performed by: PATHOLOGY

## 2022-02-21 PROCEDURE — 80197 ASSAY OF TACROLIMUS: CPT | Performed by: TRANSPLANT SURGERY

## 2022-02-21 PROCEDURE — 82248 BILIRUBIN DIRECT: CPT | Performed by: PATHOLOGY

## 2022-02-21 PROCEDURE — 36415 COLL VENOUS BLD VENIPUNCTURE: CPT | Performed by: PATHOLOGY

## 2022-02-21 PROCEDURE — 80053 COMPREHEN METABOLIC PANEL: CPT | Performed by: PATHOLOGY

## 2022-02-21 PROCEDURE — 99213 OFFICE O/P EST LOW 20 MIN: CPT | Performed by: TRANSPLANT SURGERY

## 2022-02-21 PROCEDURE — H0001 ALCOHOL AND/OR DRUG ASSESS: HCPCS | Mod: HF

## 2022-02-21 PROCEDURE — 83735 ASSAY OF MAGNESIUM: CPT | Performed by: PATHOLOGY

## 2022-02-21 NOTE — PROGRESS NOTES
Transplant Surgery -OUTPATIENT IMMUNOSUPPRESSION PROGRESS NOTE    Date of Visit: 02/21/2022    Transplants:  1/7/2022 (Liver); Postoperative day:  45  ASSESMENT AND PLAN:  1.Graft Function:Liver allograft: no rejection or technical problems.  The bile duct has been stented  2.Immunosuppression Management: keep tacrolimus levels 6-8 ng/dL  3.Hypertension: ok  4.Renal Function: ok  5.Lab frequency: weekly  6.Other:  Needs nutrition supplements and outpatient physical therapy    Date: February 21, 2022    Transplant:  [x]                             Liver [x]                              Kidney []                             Pancreas []                              Other:             Chief Complaint:  Doing well    History of Present Illness:  Patient Active Problem List   Diagnosis     Abdominal bloating     Family history of pancreatic cancer     High risk HPV infection     Transaminitis     Macrocytosis     Cervical high risk HPV (human papillomavirus) test positive     Other ascites     Acute liver failure without hepatic coma     Anemia, unspecified type     Alcoholic liver disease (H)     Elevated serum creatinine     Alcoholic cirrhosis of liver with ascites (H)     Hyponatremia     Malaise and fatigue     At high risk for severe sepsis     Symptomatic anemia     Decompensated liver disease (H)     Bandemia     Hyperbilirubinemia     Obesity (BMI 30-39.9)     Hyperlipidemia     Anxiety     Asthma     Non-intractable vomiting with nausea, unspecified vomiting type     Status post liver transplantation (H)     Immunosuppressed status (H)     Pleural effusion     Ileus, postoperative (H)     Steroid-induced hyperglycemia     Hypervolemia     ROSSY (acute kidney injury) (H)     Bacteremia     Anemia due to blood loss, acute     Severe malnutrition (H)     Elevated alkaline phosphatase level     SOCIAL /FAMILY HISTORY: [x]                  No recent change    Past Medical History:   Diagnosis Date     Alcoholic  hepatitis      Asthma 3/10/2006     Cervical high risk HPV (human papillomavirus) test positive 2020    See problem list     Liver failure (H)      Past Surgical History:   Procedure Laterality Date     APPENDECTOMY       COLONOSCOPY N/A 2022    Procedure: COLONOSCOPY;  Surgeon: Zita Steele MD;  [facility]: [facility]     ENDOSCOPIC RETROGRADE CHOLANGIOPANCREATOGRAM N/A 2022    Procedure: ENDOSCOPIC RETROGRADE CHOLANGIOPANCREATOGRAPHY WITH BILIARY SPHINCTEROTOMY, SLUDGE REMOVAL, DILATION  AND STENT PLACEMENT;  Surgeon: Guru Gardenia Escobar MD;  Location: [facility]     ESOPHAGOGASTRODUODENOSCOPY, WITH BRUSHINGS N/A 10/29/2021    Procedure: ESOPHAGOGASTRODUODENOSCOPY, WITH BRUSHINGS;  Surgeon: Torey Ruiz MD;  Location:  GI     IR LIVER BIOPSY PERCUTANEOUS  2022     TRANSPLANT LIVER RECIPIENT  DONOR N/A 2022    Procedure: TRANSPLANT, LIVER, RECIPIENT,  DONOR;  Surgeon: Pradeep Browne MD;  Location:  OR     Social History     Socioeconomic History     Marital status:      Spouse name: Not on file     Number of children: Not on file     Years of education: Not on file     Highest education level: Not on file   Occupational History     Not on file   Tobacco Use     Smoking status: Former Smoker     Quit date: 2020     Years since quittin.8     Smokeless tobacco: Never Used   Vaping Use     Vaping Use: Never used   Substance and Sexual Activity     Alcohol use: Not Currently     Comment: Last drink 2021     Drug use: Never     Sexual activity: Not Currently   Other Topics Concern     Parent/sibling w/ CABG, MI or angioplasty before 65F 55M? Not Asked   Social History Narrative     Not on file     Social Determinants of Health     Financial Resource Strain: Not on file   Food Insecurity: Not on file   Transportation Needs: Not on file   Physical Activity: Not on file   Stress: Not on file   Social Connections: Not on file    Intimate Partner Violence: Not on file   Housing Stability: Not on file     Prescription Medications as of 2/21/2022       Rx Number Disp Refills Start End Last Dispensed Date Next Fill Date Owning Pharmacy    aspirin (ASA) 81 MG chewable tablet  90 tablet 0 1/14/2022    [facility] [address]    Sig: Take 1 tablet (81 mg) by mouth daily    Class: E-Prescribe    Route: Oral    magnesium oxide (MAG-OX) 400 MG tablet  180 tablet 3 1/29/2022    [id number] [address] AT [place] [address]    Sig: Take 2 tablets (800 mg) by mouth 2 times daily    Class: E-Prescribe    Route: Oral    multivitamin (CENTRUM SILVER) tablet            Sig: Take 1 tablet by mouth daily    Class: Historical    Route: Oral    mycophenolic acid (GENERIC EQUIVALENT) 180 MG EC tablet  180 tablet 1 2/18/2022    [facility] [place] [address] [address]    Sig: Take 3 tablets (540 mg) by mouth 2 times daily Deliver 2/18    Class: E-Prescribe    Notes to Pharmacy: TXP DT 1/7/2022 (Liver) TXP Dischg DT 1/14/2022 DX Liver replaced by transplant Z94.4 [facility] ([place], [place])    Route: Oral    ondansetron (ZOFRAN-ODT) 4 MG ODT tab (Ended)  22 tablet 0 2/5/2022 2/20/2022   [id number] [address] [place] [address]    Sig: Take 1 tablet (4 mg) by mouth every 6 hours as needed for nausea    Class: E-Prescribe    Route: Oral    pantoprazole (PROTONIX) 40 MG EC tablet  90 tablet 3 1/18/2022    [facility] [place] [address] [address]    Sig: Take 1 tablet (40 mg) by mouth daily    Class: E-Prescribe    Route: Oral    sennosides (SENOKOT) 8.6 MG tablet  60 tablet 0 1/14/2022    [facility] [address]    Sig: Take 1-2 tablets by mouth 2 times daily Take  while taking oxycodone, HOLD for loose stools    Class: E-Prescribe    Route: Oral    sulfamethoxazole-trimethoprim (BACTRIM) 400-80 MG tablet  90 tablet 3 1/18/2022    [facility] [place], [place] - [address]    Sig: Take 1 tablet by mouth daily    Class: E-Prescribe    Route: Oral    tacrolimus (GENERIC EQUIVALENT) 0.5 MG capsule  60 capsule 1 1/14/2022    [facility] - [place], [place] - [address]    Sig: Take 1 capsule by mouth twice daily as directed by transplant team for dose titration    Class: E-Prescribe    tacrolimus (GENERIC EQUIVALENT) 1 MG capsule  630 capsule 3 2/8/2022    [facility] [place], [place] - [address]    Sig: Take 4 capsules (4 mg) by mouth every morning AND 3 capsules (3 mg) every evening.    Class: E-Prescribe    Notes to Pharmacy: Dose change    Route: Oral    thiamine (B-1) 100 MG tablet  30 tablet 1 1/29/2022    [id number] [place], [place] [address] [place] [address]    Sig: Take 1 tablet (100 mg) by mouth daily    Class: E-Prescribe    Route: Oral    traMADol (ULTRAM) 50 MG tablet  8 tablet 0 2/5/2022    [facility] [address] [place] [address] [address] [place] [address]    Sig: Take 1 tablet (50 mg) by mouth every 8 hours as needed for severe pain Script called in    Class: E-Prescribe    Route: Oral    tretinoin (RETIN-A) 0.025 % external cream  45 g 11 12/10/2021    [facility] [place] [address] [address] [place] [address]    Sig: Apply a pea size to entire face QD    Class: E-Prescribe    ursodiol (ACTIGALL) 250 MG tablet  60 tablet 1 2/18/2022    [facility] [place], [place] - [address]    Sig: Take 1 tablet (250 mg) by mouth 2 times daily    Class: E-Prescribe    Route: Oral    valGANciclovir (VALCYTE) 450 MG tablet  90 tablet 3 1/18/2022    [facility] [place], [place] - [address]    Sig: Take 1 tablet (450 mg) by mouth daily    Class: E-Prescribe    Route: Oral        Patient has no known allergies.   REVIEW OF SYSTEMS (check box if normal)  [x]               GENERAL  [x]                 PULMONARY [x]                GENITOURINARY  [x]                CNS                 [x]                 CARDIAC  [x]                 ENDOCRINE  [x]                EARS,NOSE,THROAT [x]                 GASTROINTESTINAL [x]                 NEUROLOGIC    [x]                MUSCLOSKELTAL  [x]                  HEMATOLOGY      PHYSICAL EXAM (check box if normal)/71   Pulse 82   SpO2 100%         [x]            GENERAL:    [x]       EYES:  ICTERIC   []        YES  []                    NO  [x]           EXTREMITIES: Clubbing []       Y     [x]           N    [x]           EARS, NOSE, THROAT: Membranes Moist    YES   [x]                   NO []                  [x]           LUNGS:  CLEAR    YES       [x]                  NO    []                                [x]           SKIN: Jaundice           YES       []                  NO    [x]                   Rash: YES       []                  NO    [x]                                     [x]             HEART: Regular Rate          YES       [x]                  NO    []                   Incision Clean:  YES       [x]                  NO    []                                [x]                    ABDOMEN: Organomegaly YES       []                  NO    [x]                       [x]                    NEUROLOGICAL:  Nonfocal  YES       [x]                  NO    []                       [x]                    Hernia YES       []                  NO    [x]                   PSYCHIATRIC:  Appropriate  YES       [x]                  NO    []                       OTHER:                                                                                                   PAIN SCALE:: 3

## 2022-02-21 NOTE — PROGRESS NOTES
Type Of Assessment: Comprehensive Assessment Update    Referral Source:  [facility] Liver Transplant support  MRN: [id number]    DATE OF SERVICE: 2022  Date of previous JESSE Assessment: 21    Patient confirmed identity through two factor verification: Full Legal Name and     PATIENT'S NAME: Melita Cortes  Age: 51 year old  Last 4 SSN: [id number]  Sex: female   Gender Identity: female  Sexual Orientation: Heterosexual  Cultural Background: No, Denies any cultural influences or concerns that need to be considered for treatment  YOB: 1970  Current Address:   [address] [place] [address] A  [facility] [address]  Patient Phone Number:  [phone]   Patient's E-Mail Contact:  [email or web address]  Funding: [facility]  PMI: NA  Emergency Contact:Doug Cortes (): [phone]     DAANES information was provided to patient and patient does not want a copy.     Mode of Communication:  In person JESSE Assessment update at [facility] location  START TIME: 10:15AM  END TIME: 11:15AM    Melita Cortes was seen for a substance use disorder consult on 2022 by TYRA Patricio.    Reason for Substance Use Disorder Consult:  Updated JESSE Assessment. Client assessed and recommended to attend   JESSE treatment on . Was too ill in liver failure and received transplant 22. Is willing to follow recommendation now.     Are you currently having severe withdrawal symptoms that are putting yourself or others in danger? No  Are you currently having severe medical problems that require immediate attention? Yes, explain: Is recovering (at Home) from   22 liver transplant procedure. See [facility] EMR for details.     Are you currently having severe emotional or behavioral problems that are putting yourself or others at risk of harm? No    Have you participated in prior substance use disorder evaluations? Yes. When, Where, and What circumstances: ,  [facility]   Have you ever been to detox, inpatient or outpatient treatment for substance related use? List previous treatment: No   Have you ever had a gambling problem or had treatment for compulsive gambling? No  Patient does not appear to be in severe withdrawal, an imminent safety risk to self or others, or requiring immediate medical attention and may proceed with the assessment interview.    Comprehensive Substance Use History   X X = Primary Drug Used Age of First Use    Pattern of Substance Use   (heaviest use in life and a use history within the past year if applicable) (DSM-5: Sx #3) Date /  Quantity of last use if within the past 30 days Withdrawal Potential?   Method of use  (Oral, smoked, snorted, IV, etc)   X Alcohol   20's Pt reports drinking at least bottle of wine per day, if not more. Pt reports some hard liquor but mostly used wine.     Pt reports 1.5 - 2 years ago her had stressors such as her mom passed away and dad has dementia which lead to him going into a assisted living facility (pt is an only child), work stress and with COVID,  reports her drinking increased.  9/1/21 No  Oral    Marijuana/Hashish   No use        Cocaine/Crack No use        Meth/Amphetamines   No use        Heroin   No use        Other Opiates/Synthetics   As prescribed Tramadol prescription 2/5 50mg. Sig - Route: Take 1 tablet (50 mg) by mouth every 8 hours as needed for severe pain  2/21 No Oral    Inhalants  No use        Benzodiazepines   No use        Hallucinogens   No use        Barbiturates/Sedatives/Hypnotics   No use        Over-the-Counter Drugs   No use        Other   No use        Nicotine   No use Pt has a history of tobacco use, but quit about a year ago.  About year ago. No Smoke     Withdrawal symptoms: Have you had any of the following withdrawal symptoms?  None    Have you experienced any cravings?  No    Have you had periods of abstinence?  Yes   What was your longest period? Since 9/21 CORTES date  "alcohol    Any circumstances that lead to relapse? No extended periods other than diagnosis of liver failure in 2021.     What activities have you engaged in when using alcohol/other drugs that could be hazardous to you or others?  The patient reported having a history of driving while under the influence of alcohol or drugs.    A description of any risk-taking behavior, including behavior that puts the client at risk of exposure to blood-borne or sexually transmitted diseases: None    Arrests and legal interventions related to substance use: DWI 2009. No current.    A description of how the patient's use affected their ability to function appropriately in a work setting: The patient reported her substance use has not negatively impacted her ability to function in a work setting. Patient reports though she is now unable to work due to medical issues from her alcohol use.     A description of how the patient's use affected their ability to function appropriately in an educational setting: The patient reported her substance use has not negatively impacted her ability to function in a school setting.    Leisure time activities that are associated with substance use: Pt denies, but reports she did used to drink socially.     Do you think your substance use has become a problem for you? She agrees she has a substance abuse problem. Has anyone expressed concern about your substance use: Pt reports her  started to express concerns \"a while ago\".     MEDICAL HISTORY  Physical or medical concerns or diagnoses: See EMR.   Past Medical History:   Diagnosis Date     Alcoholic hepatitis      Asthma 3/10/2006     Cervical high risk HPV (human papillomavirus) test positive 2019, 2020    See problem list     Liver failure (H)      Do you have any current medical treatment needs not being addressed by inpatient treatment?  None    Do you need a referral for a medical provider? No    Current medications:   Patient reports " current meds as:     Current Outpatient Medications   Medication     aspirin (ASA) 81 MG chewable tablet     calcium carbonate (OS-SHAI) 1500 (600 Ca) MG tablet     magnesium oxide (MAG-OX) 400 MG tablet     multivitamin (CENTRUM SILVER) tablet     mycophenolic acid (GENERIC EQUIVALENT) 180 MG EC tablet     pantoprazole (PROTONIX) 40 MG EC tablet     sennosides (SENOKOT) 8.6 MG tablet     sulfamethoxazole-trimethoprim (BACTRIM) 400-80 MG tablet     tacrolimus (GENERIC EQUIVALENT) 0.5 MG capsule     tacrolimus (GENERIC EQUIVALENT) 1 MG capsule     thiamine (B-1) 100 MG tablet     traMADol (ULTRAM) 50 MG tablet     tretinoin (RETIN-A) 0.025 % external cream     ursodiol (ACTIGALL) 250 MG tablet     valGANciclovir (VALCYTE) 450 MG tablet     No current facility-administered medications for this encounter.     Are you pregnant? No    Do you have any specific physical needs/accommodations? Yes, Due to medical condition and recovery from liver transplant.     MENTAL HEALTH HISTORY:  Have you ever had  hospitalizations or treatment for mental health illness: No    Mental health history, including diagnosis and symptoms, and the effect on the client's ability to function:   Pt reports anxiety and depression. Pt reports being on medications a few years ago.   Pt denied mental health providers. Pt denied any thoughts of self harm or previous suicide attempts.     Current mental health treatment including psychotropic medication needed to maintain stability: (Note: The assessment must utilize screening tools approved by the commissioner pursuant to section 245.4863 to identify whether the client screens positive for co-occurring disorders): See above.    GAIN-SS Tool:  When was the last time that you had significant problems... 12/31/2021   with feeling very trapped, lonely, sad, blue, depressed or hopeless about the future? Past month   with sleep trouble, such as bad dreams, sleeping restlessly, or falling asleep during the  day? Past Month   with feeling very anxious, nervous, tense, scared, panicked or like something bad was going to happen? Past month   with becoming very distressed & upset when something reminded you of the past? Past month   with thinking about ending your life or committing suicide? Never     When was the last time that you did the following things 2 or more times? 12/31/2021   Lied or conned to get things you wanted or to avoid having to do something? Never   Had a hard time paying attention at school, work or home? Never   Had a hard time listening to instructions at school, work or home? Never   Were a bully or threatened other people? Never   Started physical fights with other people? Never     Have you ever been verbally, emotionally, physically or sexually abused?   The patient denied having any history of being verbally, emotionally, physically or sexually abused.    Family history of substance use and misuse: Pt reports her mom passed away due to alcohol complications.     The patient's desire for family involvement in the treatment program: Pt would have  involved    Level of family support:  supportive    Social network in relation to expected support for recovery: None at this time.     Are you currently in a significant relationship? Yes.  4B. How long? 20 years   Please describe your significant other's use of mood altering chemicals? No concerns with substance use on part of .     Do you have any children (include living arrangements/custody/contact)?:  No    What is your current living situation? Pt lives with her  in a townhouse they currently rent.     Are you employed/attending school? 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 Pt is on social disability currently. Is interested in   Returning to some kind of work in future, but realizes is dependent upon recovery from transplant.     SUMMARY:  Ability to understand written treatment materials: Yes  Ability to  understand patient rules and patient rights: Yes  Does the patient recognize needs related to substance use and is willing to follow treatment recommendations: Yes  Does the patient have an opioid use disorder:  does not have a history of opiate use.    ASAM Dimension Scale Ratings:  Dimension 1: 0 Client displays full functioning with good ability to tolerate and cope with withdrawal discomfort. No signs or symptoms of intoxication or withdrawal or resolving signs or symptoms.  Dimension 2: 2 Client has difficulty tolerating and coping with physical problems or has other biomedical problems that interfere with recovery and treatment. Client neglects or does not seek care for serious biomedical problems.  Dimension 3: 1 Client has impulse control and coping skills. Client presents a mild to moderate risk of harm to self or others or displays symptoms of emotional, behavioral or cognitive problems. Client has a mental health diagnosis and is stable. Client functions adequately in significant life areas.  Dimension 4: 0 Client is cooperative, motivated, ready to change, admits problems, committed to change, and engaged in treatment as a responsible participant.  Dimension 5: 2 (A) Client has minimal recognition and understanding of relapse and recidivism issues and displays moderate vulnerability for further substance use or mental health problems. (B) Client has some coping skills inconsistently applied.  Dimension 6: 0 Client is engaged in structured, meaningful activity and has a supportive significant other, family, and living environment.    Category of Substance Severity (ICD-10 Code / DSM 5 Code)     Alcohol Use Disorder Severe  (10.20) (303.90)   Cannabis Use Disorder The patient does not meet the criteria for a Cannabis use disorder.   Hallucinogen Use Disorder The patient does not meet the criteria for a Hallucinogen use disorder.   Inhalant Use Disorder The patient does not meet the criteria for an Inhalant  use disorder.   Opioid Use Disorder The patient does not meet the criteria for an Opioid use disorder.   Sedative, Hypnotic, or Anxiolytic Use Disorder The patient does not meet the criteria for a Sedative/Hypnotic use disorder.   Stimulant Related Disorder The patient does not meet the criteria for a Stimulant use disorder.   Tobacco Use Disorder The patient does not meet the criteria for a Tobacco use disorder.   Other (or unknown) Substance Use Disorder The patient does not meet the criteria for a Other (or unknown) Substance use disorder.     A problematic pattern of alcohol/drug use leading to clinically significant impairment or distress, as manifested by at least two of the following, occurring within a 12-month period:    1.) Alcohol/drug is often taken in larger amounts or over a longer period than was intended.  2.) There is a persistent desire or unsuccessful efforts to cut down or control alcohol/drug use  3.) A great deal of time is spent in activities necessary to obtain alcohol, use alcohol, or recover from its effects.  4.) Craving, or a strong desire or urge to use alcohol/drug  6.) Continued alcohol use despite having persistent or recurrent social or interpersonal problems caused or exacerbated by the effects of alcohol/drug.  10.) Tolerance, as defined by either of the following: A need for markedly increased amounts of alcohol/drug to achieve intoxication or desired effect.    Specify if: In early remission:  After full criteria for alcohol/drug use disorder were previously met, none of the criteria for alcohol/drug use disorder have been met for at least 3 months but for less than 12 months (with the exception that Criterion A4,  Craving or a strong desire or urge to use alcohol/drug  may be met).     In sustained remission:   After full criteria for alcohol use disorder were previously met, non of the criteria for alcohol/drug use disorder have been met at any time during a period of 12 months  or longer (with the exception that Criterion A4,  Craving or strong desire or urge to use alcohol/drug  may be met).     Specify if:   This additional specifier is used if the individual is in an environment where access to alcohol is restricted.    Mild: Presence of 2-3 symptoms  Moderate: Presence of 4-5 symptoms  Severe: Presence of 6 or more symptoms    Collateral information: JESSE Collateral Info: Sufficient information is obtained from the patient to support diagnosis and recommendations. Contact with a collateral sources is not required.    Recommendations:     1.) It was recommended that the patient continue to abstain from alcohol and from all other non-prescribed mood altering chemicals.   2.) Follow all of the recommendations of her medical and mental health providers.  3.) Follow all of the terms and conditions of working with the [facility] Liver Transplant Team.  4.) Participate in random alcohol and drug testing to ensure compliance with abstinence from alcohol and from all other non-prescribed mood altering chemicals.  5.) Attend low intensity Substance Use Disorder treatment program as soon as medically able to participate.   6.) Attend liver transplant support group meetings or other support group meetings as she is physically able.    Clinical Substantiation:  Patient lacks sober coping skills and long-term sober maintenance skills. Patient currently lacks a sober peer support network and has medical issues which are exacerbated by substance abuse.    Referrals/ Alternatives:    [facility] virtual low intensity day treatment JESSE program.     JESSE consult completed by: TYRA Patricio.  Phone Number: [phone]  E-mail Address: [email or web address] Department  [address] [place] [address]     *Due to regulation of Title 42 of the Code of Federal Regulations (CFR) Part 2:  Confidentiality laws apply to this note and the information wherein.  Thus, this note cannot be copy and pasted into any other health care staff's note nor can it be included in general medical records sent to ANY outside agency without the patient's written consent.

## 2022-02-21 NOTE — LETTER
2/21/2022         RE: Melita Cortes  [id number]  [place] [facility] [place] [address]        Dear Colleague,    Thank you for referring your patient, Melita Cortes, to the [facility]PL[facility]. Please see a copy of my visit note below.    Transplant Surgery -OUTPATIENT IMMUNOSUPPRESSION PROGRESS NOTE    Date of Visit: 02/21/2022    Transplants:  1/7/2022 (Liver); Postoperative day:  45  ASSESMENT AND PLAN:  1.Graft Function:Liver allograft: no rejection or technical problems.  The bile duct has been stented  2.Immunosuppression Management: keep tacrolimus levels 6-8 ng/dL  3.Hypertension: ok  4.Renal Function: ok  5.Lab frequency: weekly  6.Other:  Needs nutrition supplements and outpatient physical therapy    Date: February 21, 2022    Transplant:  [x]                             Liver [x]                              Kidney []                             Pancreas []                              Other:             Chief Complaint:  Doing well    History of Present Illness:  Patient Active Problem List   Diagnosis     Abdominal bloating     Family history of pancreatic cancer     High risk HPV infection     Transaminitis     Macrocytosis     Cervical high risk HPV (human papillomavirus) test positive     Other ascites     Acute liver failure without hepatic coma     Anemia, unspecified type     Alcoholic liver disease (H)     Elevated serum creatinine     Alcoholic cirrhosis of liver with ascites (H)     Hyponatremia     Malaise and fatigue     At high risk for severe sepsis     Symptomatic anemia     Decompensated liver disease (H)     Bandemia     Hyperbilirubinemia     Obesity (BMI 30-39.9)     Hyperlipidemia     Anxiety     Asthma     Non-intractable vomiting with nausea, unspecified vomiting type     Status post liver transplantation (H)     Immunosuppressed status (H)     Pleural effusion     Ileus, postoperative (H)     Steroid-induced hyperglycemia     Hypervolemia     ROSSY (acute  kidney injury) (H)     Bacteremia     Anemia due to blood loss, acute     Severe malnutrition (H)     Elevated alkaline phosphatase level     SOCIAL /FAMILY HISTORY: [x]                  No recent change    Past Medical History:   Diagnosis Date     Alcoholic hepatitis      Asthma 3/10/2006     Cervical high risk HPV (human papillomavirus) test positive 2020    See problem list     Liver failure (H)      Past Surgical History:   Procedure Laterality Date     APPENDECTOMY       COLONOSCOPY N/A 2022    Procedure: COLONOSCOPY;  Surgeon: Zita Steele MD;  [facility]: [facility]     ENDOSCOPIC RETROGRADE CHOLANGIOPANCREATOGRAM N/A 2022    Procedure: ENDOSCOPIC RETROGRADE CHOLANGIOPANCREATOGRAPHY WITH BILIARY SPHINCTEROTOMY, SLUDGE REMOVAL, DILATION  AND STENT PLACEMENT;  Surgeon: Guru Gardenia Escobar MD;  [facility]     ESOPHAGOGASTRODUODENOSCOPY, WITH BRUSHINGS N/A 10/29/2021    Procedure: ESOPHAGOGASTRODUODENOSCOPY, WITH BRUSHINGS;  Surgeon: Torey Ruiz MD;  [facility]     IR LIVER BIOPSY PERCUTANEOUS  2022     TRANSPLANT LIVER RECIPIENT  DONOR N/A 2022    Procedure: TRANSPLANT, LIVER, RECIPIENT,  DONOR;  Surgeon: Pradeep Browne MD;  [facility]     Social History     Socioeconomic History     Marital status:      Spouse name: Not on file     Number of children: Not on file     Years of education: Not on file     Highest education level: Not on file   Occupational History     Not on file   Tobacco Use     Smoking status: Former Smoker     Quit date: 2020     Years since quittin.8     Smokeless tobacco: Never Used   Vaping Use     Vaping Use: Never used   Substance and Sexual Activity     Alcohol use: Not Currently     Comment: Last drink 2021     Drug use: Never     Sexual activity: Not Currently   Other Topics Concern     Parent/sibling w/ CABG, MI or angioplasty before 65F 55M? Not Asked   Social History  Narrative     Not on file     Social Determinants of Health     Financial Resource Strain: Not on file   Food Insecurity: Not on file   Transportation Needs: Not on file   Physical Activity: Not on file   Stress: Not on file   Social Connections: Not on file   Intimate Partner Violence: Not on file   Housing Stability: Not on file     Prescription Medications as of 2/21/2022       Rx Number Disp Refills Start End Last Dispensed Date Next Fill Date Owning Pharmacy    aspirin (ASA) 81 MG chewable tablet  90 tablet 0 1/14/2022    [facility] - [place], [place] - [address]    Sig: Take 1 tablet (81 mg) by mouth daily    Class: E-Prescribe    Route: Oral    magnesium oxide (MAG-OX) 400 MG tablet  180 tablet 3 1/29/2022    [id number] [facility] [address] AT [facility] [address]    Sig: Take 2 tablets (800 mg) by mouth 2 times daily    Class: E-Prescribe    Route: Oral    multivitamin (CENTRUM SILVER) tablet            Sig: Take 1 tablet by mouth daily    Class: Historical    Route: Oral    mycophenolic acid (GENERIC EQUIVALENT) 180 MG EC tablet  180 tablet 1 2/18/2022    [facility] [address]    Sig: Take 3 tablets (540 mg) by mouth 2 times daily Deliver 2/18    Class: E-Prescribe    Notes to Pharmacy: TXP DT 1/7/2022 (Liver) TXP Dischg DT 1/14/2022 DX Liver replaced by transplant Z94.4 [facility] ([place], [place])    Route: Oral    ondansetron (ZOFRAN-ODT) 4 MG ODT tab (Ended)  22 tablet 0 2/5/2022 2/20/2022   [facility] #[id number] [facility] [address] AT [facility] [address]    Sig: Take 1 tablet (4 mg) by mouth every 6 hours as needed for nausea    Class: E-Prescribe    Route: Oral    pantoprazole (PROTONIX) 40 MG EC tablet  90 tablet 3 1/18/2022    [facility] - [place], [place] - [address]    Sig: Take  1 tablet (40 mg) by mouth daily    Class: E-Prescribe    Route: Oral    sennosides (SENOKOT) 8.6 MG tablet  60 tablet 0 1/14/2022    [facility] [place], [place] - [address] [place] [address]    Sig: Take 1-2 tablets by mouth 2 times daily Take while taking oxycodone, HOLD for loose stools    Class: E-Prescribe    Route: Oral    sulfamethoxazole-trimethoprim (BACTRIM) 400-80 MG tablet  90 tablet 3 1/18/2022    [facility]    Sig: Take 1 tablet by mouth daily    Class: E-Prescribe    Route: Oral    tacrolimus (GENERIC EQUIVALENT) 0.5 MG capsule  60 capsule 1 1/14/2022    [place], [place] - [address] [place] [address]    Sig: Take 1 capsule by mouth twice daily as directed by transplant team for dose titration    Class: E-Prescribe    tacrolimus (GENERIC EQUIVALENT) 1 MG capsule  630 capsule 3 2/8/2022    [facility]/[address]    Sig: Take 4 capsules (4 mg) by mouth every morning AND 3 capsules (3 mg) every evening.    Class: E-Prescribe    Notes to Pharmacy: Dose change    Route: Oral    thiamine (B-1) 100 MG tablet  30 tablet 1 1/29/2022    [facility] [address] [place], [place] [address] [facility] [address] [facility] [address]    Sig: Take 1 tablet (100 mg) by mouth daily    Class: E-Prescribe    Route: Oral    traMADol (ULTRAM) 50 MG tablet  8 tablet 0 2/5/2022    [facility] [address] [place] [address] [address] AT [facility] [address]    Sig: Take 1 tablet (50 mg) by mouth every 8 hours as needed for severe pain Script called in    Class: E-Prescribe    Route: Oral    tretinoin (RETIN-A) 0.025 % external cream  45 g 11 12/10/2021    [facility] [address] [place] [address] - [address] AT [facility] [address]    Sig: Apply a pea size to entire face QD    Class: E-Prescribe    ursodiol (ACTIGALL) 250 MG tablet  60 tablet 1  2/18/2022    [facility] [place] - [address]    Sig: Take 1 tablet (250 mg) by mouth 2 times daily    Class: E-Prescribe    Route: Oral    valGANciclovir (VALCYTE) 450 MG tablet  90 tablet 3 1/18/2022    [facility] - [place], [place] - [address]     Sig: Take 1 tablet (450 mg) by mouth daily    Class: E-Prescribe    Route: Oral        Patient has no known allergies.   REVIEW OF SYSTEMS (check box if normal)  [x]               GENERAL  [x]                 PULMONARY [x]                GENITOURINARY  [x]                CNS                 [x]                 CARDIAC  [x]                 ENDOCRINE  [x]                EARS,NOSE,THROAT [x]                 GASTROINTESTINAL [x]                 NEUROLOGIC    [x]                MUSCLOSKELTAL  [x]                  HEMATOLOGY      PHYSICAL EXAM (check box if normal)/71   Pulse 82   SpO2 100%         [x]            GENERAL:    [x]       EYES:  ICTERIC   []        YES  []                    NO  [x]           EXTREMITIES: Clubbing []       Y     [x]           N    [x]           EARS, NOSE, THROAT: Membranes Moist    YES   [x]                   NO []                  [x]           LUNGS:  CLEAR    YES       [x]                  NO    []                                [x]           SKIN: Jaundice           YES       []                  NO    [x]                   Rash: YES       []                  NO    [x]                                     [x]             HEART: Regular Rate          YES       [x]                  NO    []                   Incision Clean:  YES       [x]                  NO    []                                [x]                    ABDOMEN: Organomegaly YES       []                  NO    [x]                       [x]                    NEUROLOGICAL:  Nonfocal  YES       [x]                  NO    []                       [x]                    Hernia YES       []                   NO    [x]                   PSYCHIATRIC:  Appropriate  YES       [x]                  NO    []                       OTHER:                                                                                                   PAIN SCALE:: 3      Again, thank you for allowing me to participate in the care of your patient.        Sincerely,        Pradeep Browne MD

## 2022-02-21 NOTE — TELEPHONE ENCOUNTER
Post ERCP (2/14/22) with Dr. Escobar: Follow-up    Post procedure recommendations:     Please assist in scheduling:     Procedure/Imaging/Clinic: ERCP  Physician: Dr. Escobar  Timing: 3/16  Procedure length:provider average  Anesthesia:gen  Dx: biliary striture  Tier:2  [facility]: [facility]      Patient states: feeling better now, did have some lip bruising and has fluid on her ears (follow up with internist) Saw Dr Browne today for follow up - can use that note as H&P for next procedure. Know's she'll need another covid test. Pt understands plan, has no concerning symptoms.     Clinic contact and scheduling numbers verified for future questions/concerns.    Bronwyn Payne RN Care Coordinator

## 2022-02-22 DIAGNOSIS — Z11.59 ENCOUNTER FOR SCREENING FOR OTHER VIRAL DISEASES: Primary | ICD-10-CM

## 2022-02-23 NOTE — PROGRESS NOTES
SUBJECTIVE:   CC: Melita Cortes is an 51 year old woman who presents for preventive health visit.       Patient has been advised of split billing requirements and indicates understanding: Yes  HPI        No concerns    Today's PHQ-2 Score:   PHQ-2 (  [employer]) 2022   Q1: Little interest or pleasure in doing things 1   Q2: Feeling down, depressed or hopeless 1   PHQ-2 Score 2   PHQ-2 Total Score (12-17 Years)- Positive if 3 or more points; Administer PHQ-A if positive -   Q1: Little interest or pleasure in doing things Several days   Q2: Feeling down, depressed or hopeless Several days   PHQ-2 Score 2       Abuse: Current or Past (Physical, Sexual or Emotional) - No  Do you feel safe in your environment? Yes        Social History     Tobacco Use     Smoking status: Former Smoker     Quit date: 2020     Years since quittin.8     Smokeless tobacco: Never Used   Substance Use Topics     Alcohol use: Not Currently     Comment: Last drink 2021     If you drink alcohol do you typically have >3 drinks per day or >7 drinks per week? No    Alcohol Use 2020   Prescreen: >3 drinks/day or >7 drinks/week? No   No flowsheet data found.    Reviewed orders with patient.  Reviewed health maintenance and updated orders accordingly - Yes  Lab work is in process    Breast Cancer Screening:  Any new diagnosis of family breast, ovarian, or bowel cancer? No    FHS-7: No flowsheet data found.    Mammogram Screening: Recommended annual mammography  Pertinent mammograms are reviewed under the imaging tab.    History of abnormal Pap smear:   PAP / HPV Latest Ref Rng & Units 2020   PAP (Historical) - NIL   HPV16 NEG:Negative Negative   HPV18 NEG:Negative Positive(A)   HRHPV NEG:Negative Negative     Reviewed and updated as needed this visit by clinical staff                  Reviewed and updated as needed this visit by Provider                 Past Medical History:   Diagnosis Date     Alcoholic hepatitis       Asthma 3/10/2006     Cervical high risk HPV (human papillomavirus) test positive 2020    See problem list     Liver failure (H)       Past Surgical History:   Procedure Laterality Date     APPENDECTOMY       COLONOSCOPY N/A 2022    Procedure: COLONOSCOPY;  Surgeon: Zita Steele MD;  [facility]     ENDOSCOPIC RETROGRADE CHOLANGIOPANCREATOGRAM N/A 2022    Procedure: ENDOSCOPIC RETROGRADE CHOLANGIOPANCREATOGRAPHY WITH BILIARY SPHINCTEROTOMY, SLUDGE REMOVAL, DILATION  AND STENT PLACEMENT;  Surgeon: Guru Gardenia Escobar MD;  [facility]     ESOPHAGOGASTRODUODENOSCOPY, WITH BRUSHINGS N/A 10/29/2021    Procedure: ESOPHAGOGASTRODUODENOSCOPY, WITH BRUSHINGS;  Surgeon: Torey Ruiz MD;  Location:  GI     IR LIVER BIOPSY PERCUTANEOUS  2022     TRANSPLANT LIVER RECIPIENT  DONOR N/A 2022    Procedure: TRANSPLANT, LIVER, RECIPIENT,  DONOR;  Surgeon: Pradeep Browne MD;  Location:  OR       Review of Systems  CONSTITUTIONAL: NEGATIVE for fever, chills, change in weight  INTEGUMENTARY/SKIN: NEGATIVE for worrisome rashes, moles or lesions  EYES: NEGATIVE for vision changes or irritation  ENT: NEGATIVE for ear, mouth and throat problems  RESP: NEGATIVE for significant cough or SOB  BREAST: NEGATIVE for masses, tenderness or discharge  CV: NEGATIVE for chest pain, palpitations or peripheral edema  GI: NEGATIVE for nausea, abdominal pain, heartburn, or change in bowel habits  : NEGATIVE for unusual urinary or vaginal symptoms. No vaginal bleeding.  LMP 2021  MUSCULOSKELETAL: NEGATIVE for significant arthralgias or myalgia  NEURO: NEGATIVE for weakness, dizziness or paresthesias  ENDOCRINE: NEGATIVE for temperature intolerance, skin/hair changes  HEME/ALLERGY/IMMUNE: NEGATIVE for bleeding problems  PSYCHIATRIC: NEGATIVE for changes in mood or affect      OBJECTIVE:   /74 (BP Location: Right arm, Patient Position: Sitting,  Cuff Size: Adult Regular)   Pulse 81   Wt 56.8 kg (125 lb 3 oz)   BMI 20.83 kg/m    Physical Exam  GENERAL APPEARANCE: healthy, alert and no distress  EYES: Eyes grossly normal to inspection, PERRL and conjunctivae and sclerae normal  HENT: ear canals and TM's normal, nose and mouth without ulcers or lesions, oropharynx clear and oral mucous membranes moist  NECK: no adenopathy, no asymmetry, masses, or scars and thyroid normal to palpation  RESP: lungs clear to auscultation - no rales, rhonchi or wheezes  BREAST: normal without masses, tenderness or nipple discharge and no palpable axillary masses or adenopathy  CV: regular rate and rhythm, normal S1 S2, no S3 or S4, no murmur, click or rub, no peripheral edema and peripheral pulses strong  ABDOMEN: soft, nontender, no hepatosplenomegaly, no masses and bowel sounds normal   (female): normal female external genitalia, normal urethral meatus, vaginal mucosal atrophy noted, normal cervix, adnexae, and uterus without masses or abnormal discharge  MS: no musculoskeletal defects are noted and gait is age appropriate without ataxia  SKIN: no suspicious lesions or rashes  NEURO: Normal strength and tone, sensory exam grossly normal, mentation intact and speech normal  PSYCH: mentation appears normal and affect normal/bright    Diagnostic Test Results:  Labs reviewed in Epic  No results found for this or any previous visit (from the past 24 hour(s)).    ASSESSMENT/PLAN:   (Z00.00) Routine general medical examination at a health care facility  (primary encounter diagnosis)  Comment:   Plan:     (R87.810) Cervical high risk HPV (human papillomavirus) test positive  Comment:   Plan: Pap screen with HPV - recommended age 30 - 65         years            (F41.9) Anxiety  Comment:   Plan: Adult Mental Health  Referral  Has had anxiety in the past and is currently in a transplant support group but agrees that professional therapy may be helpful to her.          "          COUNSELING:  Reviewed preventive health counseling, as reflected in patient instructions       Vision screening    Estimated body mass index is 20.63 kg/m  as calculated from the following:    Height as of 2/16/22: 1.651 m (5' 5\").    Weight as of 2/16/22: 56.2 kg (124 lb).      Discussed her lapsed vaccinations.  Will follow up with PCP regarding vaccinations post transplant      She reports that she quit smoking about 21 months ago. She has never used smokeless tobacco.      Counseling Resources:  ATP IV Guidelines  Pooled Cohorts Equation Calculator  Breast Cancer Risk Calculator  BRCA-Related Cancer Risk Assessment: FHS-7 Tool  FRAX Risk Assessment  ICSI Preventive Guidelines  Dietary Guidelines for Americans, 2010  USDA's MyPlate  ASA Prophylaxis  Lung CA Screening    [facility]  "

## 2022-02-24 ENCOUNTER — LAB REQUISITION (OUTPATIENT)
Dept: LAB | Facility: CLINIC | Age: 52
End: 2022-02-24
Payer: COMMERCIAL

## 2022-02-24 DIAGNOSIS — Z79.899 OTHER LONG TERM (CURRENT) DRUG THERAPY: ICD-10-CM

## 2022-02-24 DIAGNOSIS — Z94.4 LIVER REPLACED BY TRANSPLANT (H): Primary | ICD-10-CM

## 2022-02-24 DIAGNOSIS — Z94.4 LIVER TRANSPLANT STATUS (H): ICD-10-CM

## 2022-02-24 LAB
ALBUMIN SERPL-MCNC: 2.9 G/DL (ref 3.4–5)
ALP SERPL-CCNC: 212 U/L (ref 40–150)
ALT SERPL W P-5'-P-CCNC: 30 U/L (ref 0–50)
ANION GAP SERPL CALCULATED.3IONS-SCNC: 4 MMOL/L (ref 3–14)
AST SERPL W P-5'-P-CCNC: 16 U/L (ref 0–45)
BILIRUB DIRECT SERPL-MCNC: 0.6 MG/DL (ref 0–0.2)
BILIRUB SERPL-MCNC: 0.8 MG/DL (ref 0.2–1.3)
BUN SERPL-MCNC: 24 MG/DL (ref 7–30)
CALCIUM SERPL-MCNC: 9.5 MG/DL (ref 8.5–10.1)
CHLORIDE BLD-SCNC: 103 MMOL/L (ref 94–109)
CO2 SERPL-SCNC: 27 MMOL/L (ref 20–32)
CREAT SERPL-MCNC: 0.94 MG/DL (ref 0.52–1.04)
ERYTHROCYTE [DISTWIDTH] IN BLOOD BY AUTOMATED COUNT: 14.1 % (ref 10–15)
GFR SERPL CREATININE-BSD FRML MDRD: 74 ML/MIN/1.73M2
GLUCOSE BLD-MCNC: 113 MG/DL (ref 70–99)
HCT VFR BLD AUTO: 30.6 % (ref 35–47)
HGB BLD-MCNC: 9.9 G/DL (ref 11.7–15.7)
MAGNESIUM SERPL-MCNC: 1.6 MG/DL (ref 1.6–2.3)
MCH RBC QN AUTO: 30.4 PG (ref 26.5–33)
MCHC RBC AUTO-ENTMCNC: 32.4 G/DL (ref 31.5–36.5)
MCV RBC AUTO: 94 FL (ref 78–100)
PHOSPHATE SERPL-MCNC: 4.5 MG/DL (ref 2.5–4.5)
PLATELET # BLD AUTO: 353 10E3/UL (ref 150–450)
POTASSIUM BLD-SCNC: 4.8 MMOL/L (ref 3.4–5.3)
PROT SERPL-MCNC: 6.3 G/DL (ref 6.8–8.8)
RBC # BLD AUTO: 3.26 10E6/UL (ref 3.8–5.2)
SODIUM SERPL-SCNC: 134 MMOL/L (ref 133–144)
TACROLIMUS BLD-MCNC: 7.8 UG/L (ref 5–15)
TME LAST DOSE: NORMAL H
TME LAST DOSE: NORMAL H
WBC # BLD AUTO: 3.6 10E3/UL (ref 4–11)

## 2022-02-24 PROCEDURE — 84100 ASSAY OF PHOSPHORUS: CPT | Mod: ORL | Performed by: TRANSPLANT SURGERY

## 2022-02-24 PROCEDURE — 80197 ASSAY OF TACROLIMUS: CPT | Mod: ORL | Performed by: TRANSPLANT SURGERY

## 2022-02-24 PROCEDURE — 82248 BILIRUBIN DIRECT: CPT | Mod: ORL | Performed by: TRANSPLANT SURGERY

## 2022-02-24 PROCEDURE — 83735 ASSAY OF MAGNESIUM: CPT | Mod: ORL | Performed by: TRANSPLANT SURGERY

## 2022-02-24 PROCEDURE — 80053 COMPREHEN METABOLIC PANEL: CPT | Mod: ORL | Performed by: TRANSPLANT SURGERY

## 2022-02-24 PROCEDURE — 85027 COMPLETE CBC AUTOMATED: CPT | Mod: ORL | Performed by: TRANSPLANT SURGERY

## 2022-02-25 ENCOUNTER — TELEPHONE (OUTPATIENT)
Dept: TRANSPLANT | Facility: CLINIC | Age: 52
End: 2022-02-25
Payer: COMMERCIAL

## 2022-02-25 NOTE — TELEPHONE ENCOUNTER
Patient Call: Voicemail  Date/Time: 2/25/22 @ 1:52 pm  Reason for call: has been waiting on a return call to schedule her appointment with Dr Browne for Monday.

## 2022-02-25 NOTE — TELEPHONE ENCOUNTER
Pt has an appt on Monday with dr matson for Monday. appt was made and  did left message. This writer also sent a mychart message with patient time for Monday.

## 2022-02-25 NOTE — TELEPHONE ENCOUNTER
Patient Call: Voicemail  Date/Time: 2/25/22 @ 1:07 pm  Reason for call: needs a verbal for an extension on PT for Strength, posture, and balance for in home mobility 1x a week for 3 weeks. A message can be left on his confidential VM.

## 2022-02-28 ENCOUNTER — LAB (OUTPATIENT)
Dept: LAB | Facility: CLINIC | Age: 52
End: 2022-02-28
Payer: COMMERCIAL

## 2022-02-28 ENCOUNTER — OFFICE VISIT (OUTPATIENT)
Dept: TRANSPLANT | Facility: CLINIC | Age: 52
End: 2022-02-28
Attending: TRANSPLANT SURGERY
Payer: COMMERCIAL

## 2022-02-28 VITALS
WEIGHT: 124 LBS | DIASTOLIC BLOOD PRESSURE: 80 MMHG | BODY MASS INDEX: 20.63 KG/M2 | SYSTOLIC BLOOD PRESSURE: 114 MMHG | OXYGEN SATURATION: 100 % | HEART RATE: 75 BPM

## 2022-02-28 DIAGNOSIS — K70.9 ALCOHOLIC LIVER DISEASE (H): Primary | ICD-10-CM

## 2022-02-28 DIAGNOSIS — Z94.4 LIVER REPLACED BY TRANSPLANT (H): ICD-10-CM

## 2022-02-28 DIAGNOSIS — Z94.4 STATUS POST LIVER TRANSPLANTATION (H): ICD-10-CM

## 2022-02-28 LAB
ALBUMIN SERPL-MCNC: 3 G/DL (ref 3.4–5)
ALP SERPL-CCNC: 187 U/L (ref 40–150)
ALT SERPL W P-5'-P-CCNC: 30 U/L (ref 0–50)
ANION GAP SERPL CALCULATED.3IONS-SCNC: 9 MMOL/L (ref 3–14)
AST SERPL W P-5'-P-CCNC: 15 U/L (ref 0–45)
BILIRUB DIRECT SERPL-MCNC: 0.6 MG/DL (ref 0–0.2)
BILIRUB SERPL-MCNC: 0.8 MG/DL (ref 0.2–1.3)
BUN SERPL-MCNC: 26 MG/DL (ref 7–30)
CALCIUM SERPL-MCNC: 9.6 MG/DL (ref 8.5–10.1)
CHLORIDE BLD-SCNC: 102 MMOL/L (ref 94–109)
CO2 SERPL-SCNC: 26 MMOL/L (ref 20–32)
CREAT SERPL-MCNC: 1.1 MG/DL (ref 0.52–1.04)
ERYTHROCYTE [DISTWIDTH] IN BLOOD BY AUTOMATED COUNT: 14.1 % (ref 10–15)
GFR SERPL CREATININE-BSD FRML MDRD: 61 ML/MIN/1.73M2
GLUCOSE BLD-MCNC: 138 MG/DL (ref 70–99)
HCT VFR BLD AUTO: 32.4 % (ref 35–47)
HGB BLD-MCNC: 10.7 G/DL (ref 11.7–15.7)
MAGNESIUM SERPL-MCNC: 1.5 MG/DL (ref 1.6–2.3)
MCH RBC QN AUTO: 30.2 PG (ref 26.5–33)
MCHC RBC AUTO-ENTMCNC: 33 G/DL (ref 31.5–36.5)
MCV RBC AUTO: 92 FL (ref 78–100)
PHOSPHATE SERPL-MCNC: 4.6 MG/DL (ref 2.5–4.5)
PLATELET # BLD AUTO: 281 10E3/UL (ref 150–450)
POTASSIUM BLD-SCNC: 4.6 MMOL/L (ref 3.4–5.3)
PROT SERPL-MCNC: 6.3 G/DL (ref 6.8–8.8)
RBC # BLD AUTO: 3.54 10E6/UL (ref 3.8–5.2)
SODIUM SERPL-SCNC: 137 MMOL/L (ref 133–144)
TACROLIMUS BLD-MCNC: 8.3 UG/L (ref 5–15)
TME LAST DOSE: NORMAL H
TME LAST DOSE: NORMAL H
WBC # BLD AUTO: 3.1 10E3/UL (ref 4–11)

## 2022-02-28 PROCEDURE — 36415 COLL VENOUS BLD VENIPUNCTURE: CPT | Performed by: PATHOLOGY

## 2022-02-28 PROCEDURE — 99213 OFFICE O/P EST LOW 20 MIN: CPT | Mod: 24 | Performed by: TRANSPLANT SURGERY

## 2022-02-28 PROCEDURE — 80321 ALCOHOLS BIOMARKERS 1OR 2: CPT | Mod: 90 | Performed by: PATHOLOGY

## 2022-02-28 PROCEDURE — 80197 ASSAY OF TACROLIMUS: CPT | Performed by: TRANSPLANT SURGERY

## 2022-02-28 PROCEDURE — 84100 ASSAY OF PHOSPHORUS: CPT | Performed by: PATHOLOGY

## 2022-02-28 PROCEDURE — 83735 ASSAY OF MAGNESIUM: CPT | Performed by: PATHOLOGY

## 2022-02-28 PROCEDURE — 80053 COMPREHEN METABOLIC PANEL: CPT | Performed by: PATHOLOGY

## 2022-02-28 PROCEDURE — 85027 COMPLETE CBC AUTOMATED: CPT | Performed by: PATHOLOGY

## 2022-02-28 PROCEDURE — 82248 BILIRUBIN DIRECT: CPT | Performed by: PATHOLOGY

## 2022-02-28 PROCEDURE — 99000 SPECIMEN HANDLING OFFICE-LAB: CPT | Performed by: PATHOLOGY

## 2022-02-28 NOTE — LETTER
"/28/2022     RE: Melita Cortes  [address]  [facility] [address]    Dear Colleague,    Thank you for referring your patient, Melita Cortes, to the [facility]. Please see a copy of my visit note below.    Transplant Surgery -OUTPATIENT IMMUNOSUPPRESSION PROGRESS NOTE    Date of Visit: 02/28/2022    Transplants:  1/7/2022 (Liver); Postoperative day:  52  ASSESMENT AND PLAN:  1.Graft Function:Liver allograft: no rejection   CHANGE biliary stent by Dr. Winter  2.Immunosuppression Management:keep tacrolimus levels at 8-10  3.Hypertension:ok  4.Renal Function:better  5.Lab frequency: weekly  6.Other:  Has some \"neuropathty/tingling of feet\" would recommend thiamine supplements    Date: February 28, 2022    Transplant:  [x]                             Liver [x]                              Kidney []                             Pancreas []                              Other:             Chief Complaint:  Doing well eating more    History of Present Illness:  Patient Active Problem List   Diagnosis     Abdominal bloating     Family history of pancreatic cancer     High risk HPV infection     Transaminitis     Macrocytosis     Cervical high risk HPV (human papillomavirus) test positive     Other ascites     Acute liver failure without hepatic coma     Anemia, unspecified type     Alcoholic liver disease (H)     Elevated serum creatinine     Alcoholic cirrhosis of liver with ascites (H)     Hyponatremia     Malaise and fatigue     At high risk for severe sepsis     Symptomatic anemia     Decompensated liver disease (H)     Bandemia     Hyperbilirubinemia     Obesity (BMI 30-39.9)     Hyperlipidemia     Anxiety     Asthma     Non-intractable vomiting with nausea, unspecified vomiting type     Status post liver transplantation (H)     Immunosuppressed status (H)     Pleural effusion     Ileus, postoperative (H)     Steroid-induced hyperglycemia     Hypervolemia     ROSSY (acute kidney " injury) (H)     Bacteremia     Anemia due to blood loss, acute     Severe malnutrition (H)     Elevated alkaline phosphatase level     SOCIAL /FAMILY HISTORY: [x]                  No recent change    Past Medical History:   Diagnosis Date     Alcoholic hepatitis      Asthma 3/10/2006     Cervical high risk HPV (human papillomavirus) test positive 2020    See problem list     Liver failure (H)      Past Surgical History:   Procedure Laterality Date     APPENDECTOMY       COLONOSCOPY N/A 2022    Procedure: COLONOSCOPY;  Surgeon: Zita Steele MD;  Location: [facility]     ENDOSCOPIC RETROGRADE CHOLANGIOPANCREATOGRAM N/A 2022    Procedure: ENDOSCOPIC RETROGRADE CHOLANGIOPANCREATOGRAPHY WITH BILIARY SPHINCTEROTOMY, SLUDGE REMOVAL, DILATION  AND STENT PLACEMENT;  Surgeon: Guru Gardenia Escobar MD;  Location: [facility]     ESOPHAGOGASTRODUODENOSCOPY, WITH BRUSHINGS N/A 10/29/2021    Procedure: ESOPHAGOGASTRODUODENOSCOPY, WITH BRUSHINGS;  Surgeon: Torey Ruiz MD;  [facility]     IR LIVER BIOPSY PERCUTANEOUS  2022     TRANSPLANT LIVER RECIPIENT  DONOR N/A 2022    Procedure: TRANSPLANT, LIVER, RECIPIENT,  DONOR;  Surgeon: Pradeep Browne MD;  [facility]     Social History     Socioeconomic History     Marital status:      Spouse name: Not on file     Number of children: Not on file     Years of education: Not on file     Highest education level: Not on file   Occupational History     Not on file   Tobacco Use     Smoking status: Former Smoker     Quit date: 2020     Years since quittin.8     Smokeless tobacco: Never Used   Vaping Use     Vaping Use: Never used   Substance and Sexual Activity     Alcohol use: Not Currently     Comment: Last drink 2021     Drug use: Never     Sexual activity: Not Currently   Other Topics Concern     Parent/sibling w/ CABG, MI or angioplasty before 65F 55M? Not Asked   Social History Narrative      Not on file     Social Determinants of Health     Financial Resource Strain: Not on file   Food Insecurity: Not on file   Transportation Needs: Not on file   Physical Activity: Not on file   Stress: Not on file   Social Connections: Not on file   Intimate Partner Violence: Not on file   Housing Stability: Not on file     Prescription Medications as of 2/28/2022       Rx Number Disp Refills Start End Last Dispensed Date Next Fill Date Owning Pharmacy    aspirin (ASA) 81 MG chewable tablet  90 tablet 0 1/14/2022    [facility] - [place], [place] - [address]    Sig: Take 1 tablet (81 mg) by mouth daily    Class: E-Prescribe    Route: Oral    calcium carbonate (OS-SHAI) 1500 (600 Ca) MG tablet  60 tablet 3 2/21/2022        Sig: Take 1 tablet (600 mg) by mouth daily    Class: Local Print    Route: Oral    magnesium oxide (MAG-OX) 400 MG tablet  180 tablet 3 1/29/2022    [facility] #[id number] [address] AT [facility] [address]    Sig: Take 2 tablets (800 mg) by mouth 2 times daily    Class: E-Prescribe    Route: Oral    multivitamin (CENTRUM SILVER) tablet            Sig: Take 1 tablet by mouth daily    Class: Historical    Route: Oral    mycophenolic acid (GENERIC EQUIVALENT) 180 MG EC tablet  180 tablet 1 2/18/2022    [facility] - [place] [address]    Sig: Take 3 tablets (540 mg) by mouth 2 times daily Deliver 2/18    Class: E-Prescribe    Notes to Pharmacy: TXP DT 1/7/2022 (Liver) TXP Dischg DT 1/14/2022 DX Liver replaced by transplant Z94.4 [facility] ([place], [place])    Route: Oral    pantoprazole (PROTONIX) 40 MG EC tablet  90 tablet 3 1/18/2022    [facility] - [facility] [address]    Sig: Take 1 tablet (40 mg) by mouth daily    Class: E-Prescribe    Route: Oral    sulfamethoxazole-trimethoprim (BACTRIM) 400-80 MG tablet  90  tablet 3 1/18/2022    [facility]/[place] [address]    Sig: Take 1 tablet by mouth daily    Class: E-Prescribe    Route: Oral    tacrolimus (GENERIC EQUIVALENT) 0.5 MG capsule  60 capsule 1 1/14/2022    [address]    Sig: Take 1 capsule by mouth twice daily as directed by transplant team for dose titration    Class: E-Prescribe    tacrolimus (GENERIC EQUIVALENT) 1 MG capsule  630 capsule 3 2/8/2022    [facility]/[address]    Sig: Take 4 capsules (4 mg) by mouth every morning AND 3 capsules (3 mg) every evening.    Class: E-Prescribe    Notes to Pharmacy: Dose change    Route: Oral    thiamine (B-1) 100 MG tablet  30 tablet 1 1/29/2022    [facility] #[id number] [place] [address] - [address] AT [facility] [address]    Sig: Take 1 tablet (100 mg) by mouth daily    Class: E-Prescribe    Route: Oral    tretinoin (RETIN-A) 0.025 % external cream  45 g 11 12/10/2021    [facility] #[id number] [place] [address] - [address] AT [facility] [address]    Sig: Apply a pea size to entire face QD    Class: E-Prescribe    ursodiol (ACTIGALL) 250 MG tablet  60 tablet 1 2/18/2022    [facility] - [facility] [address]    Sig: Take 1 tablet (250 mg) by mouth 2 times daily    Class: E-Prescribe    Route: Oral    valGANciclovir (VALCYTE) 450 MG tablet  90 tablet 3 1/18/2022    [facility]/[place] [address]    Sig: Take 1 tablet (450 mg) by mouth daily    Class: E-Prescribe    Route: Oral    sennosides (SENOKOT) 8.6 MG tablet  60 tablet 0 1/14/2022    [place], [place] - [address]    Sig: Take 1-2 tablets by mouth 2 times daily Take while taking oxycodone, HOLD for loose stools    Class: E-Prescribe    Route: Oral    traMADol (ULTRAM)  50 MG tablet  8 tablet 0 2/5/2022    [facility] #[id number] [place] [address] AT [facility] [address]    Sig: Take 1 tablet (50 mg) by mouth every 8 hours as needed for severe pain Script called in    Class: E-Prescribe    Route: Oral        Patient has no known allergies.   REVIEW OF SYSTEMS (check box if normal)  [x]               GENERAL  [x]                 PULMONARY [x]                GENITOURINARY  [x]                CNS                 [x]                 CARDIAC  [x]                 ENDOCRINE  [x]                EARS,NOSE,THROAT [x]                 GASTROINTESTINAL [x]                 NEUROLOGIC    [x]                MUSCLOSKELTAL  [x]                  HEMATOLOGY      PHYSICAL EXAM (check box if normal)/80   Pulse 75   Wt 56.2 kg (124 lb)   SpO2 100%   Breastfeeding No   BMI 20.63 kg/m          [x]            GENERAL:    [x]       EYES:  ICTERIC   []        YES  []                    NO  [x]           EXTREMITIES: Clubbing []       Y     [x]           N    [x]           EARS, NOSE, THROAT: Membranes Moist    YES   [x]                   NO []                  [x]           LUNGS:  CLEAR    YES       [x]                  NO    []                                [x]           SKIN: Jaundice           YES       []                  NO    [x]                   Rash: YES       []                  NO    [x]                                     [x]             HEART: Regular Rate          YES       [x]                  NO    []                   Incision Clean:  YES       [x]                  NO    []                                [x]                    ABDOMEN: Organomegaly YES       []                  NO    [x]                       [x]                    NEUROLOGICAL:  Nonfocal  YES       [x]                  NO    []                       [x]                    Hernia YES       []                  NO    [x]                   PSYCHIATRIC:  Appropriate  YES       [x]                   NO    []                       OTHER:                                                                                                   PAIN SCALE:: 3    Again, thank you for allowing me to participate in the care of your patient.      Sincerely,    Pradeep Browne MD

## 2022-02-28 NOTE — PROGRESS NOTES
"Transplant Surgery -OUTPATIENT IMMUNOSUPPRESSION PROGRESS NOTE    Date of Visit: 02/28/2022    Transplants:  1/7/2022 (Liver); Postoperative day:  52  ASSESMENT AND PLAN:  1.Graft Function:Liver allograft: no rejection   CHANGE biliary stent by Dr. Winter  2.Immunosuppression Management:keep tacrolimus levels at 8-10  3.Hypertension:ok  4.Renal Function:better  5.Lab frequency: weekly  6.Other:  Has some \"neuropathty/tingling of feet\" would recommend thiamine supplements    Date: February 28, 2022    Transplant:  [x]                             Liver [x]                              Kidney []                             Pancreas []                              Other:             Chief Complaint:  Doing well eating more    History of Present Illness:  Patient Active Problem List   Diagnosis     Abdominal bloating     Family history of pancreatic cancer     High risk HPV infection     Transaminitis     Macrocytosis     Cervical high risk HPV (human papillomavirus) test positive     Other ascites     Acute liver failure without hepatic coma     Anemia, unspecified type     Alcoholic liver disease (H)     Elevated serum creatinine     Alcoholic cirrhosis of liver with ascites (H)     Hyponatremia     Malaise and fatigue     At high risk for severe sepsis     Symptomatic anemia     Decompensated liver disease (H)     Bandemia     Hyperbilirubinemia     Obesity (BMI 30-39.9)     Hyperlipidemia     Anxiety     Asthma     Non-intractable vomiting with nausea, unspecified vomiting type     Status post liver transplantation (H)     Immunosuppressed status (H)     Pleural effusion     Ileus, postoperative (H)     Steroid-induced hyperglycemia     Hypervolemia     ROSSY (acute kidney injury) (H)     Bacteremia     Anemia due to blood loss, acute     Severe malnutrition (H)     Elevated alkaline phosphatase level     SOCIAL /FAMILY HISTORY: [x]                  No recent change    Past Medical History:   Diagnosis Date     Alcoholic " hepatitis      Asthma 3/10/2006     Cervical high risk HPV (human papillomavirus) test positive 2020    See problem list     Liver failure (H)      Past Surgical History:   Procedure Laterality Date     APPENDECTOMY       COLONOSCOPY N/A 2022    Procedure: COLONOSCOPY;  Surgeon: Zita Steele MD;  [facility]     ENDOSCOPIC RETROGRADE CHOLANGIOPANCREATOGRAM N/A 2022    Procedure: ENDOSCOPIC RETROGRADE CHOLANGIOPANCREATOGRAPHY WITH BILIARY SPHINCTEROTOMY, SLUDGE REMOVAL, DILATION  AND STENT PLACEMENT;  Surgeon: Guru Gardenia Escobar MD;  Location: [facility]     ESOPHAGOGASTRODUODENOSCOPY, WITH BRUSHINGS N/A 10/29/2021    Procedure: ESOPHAGOGASTRODUODENOSCOPY, WITH BRUSHINGS;  Surgeon: Torey Ruiz MD;  Location:  GI     IR LIVER BIOPSY PERCUTANEOUS  2022     TRANSPLANT LIVER RECIPIENT  DONOR N/A 2022    Procedure: TRANSPLANT, LIVER, RECIPIENT,  DONOR;  Surgeon: Pradeep Browne MD;  Location:  OR     Social History     Socioeconomic History     Marital status:      Spouse name: Not on file     Number of children: Not on file     Years of education: Not on file     Highest education level: Not on file   Occupational History     Not on file   Tobacco Use     Smoking status: Former Smoker     Quit date: 2020     Years since quittin.8     Smokeless tobacco: Never Used   Vaping Use     Vaping Use: Never used   Substance and Sexual Activity     Alcohol use: Not Currently     Comment: Last drink 2021     Drug use: Never     Sexual activity: Not Currently   Other Topics Concern     Parent/sibling w/ CABG, MI or angioplasty before 65F 55M? Not Asked   Social History Narrative     Not on file     Social Determinants of Health     Financial Resource Strain: Not on file   Food Insecurity: Not on file   Transportation Needs: Not on file   Physical Activity: Not on file   Stress: Not on file   Social Connections: Not on file    Intimate Partner Violence: Not on file   Housing Stability: Not on file     Prescription Medications as of 2/28/2022       Rx Number Disp Refills Start End Last Dispensed Date Next Fill Date Owning Pharmacy    aspirin (ASA) 81 MG chewable tablet  90 tablet 0 1/14/2022    [facility] - [place], [place] - [address] [facility] [address]    Sig: Take 1 tablet (81 mg) by mouth daily    Class: E-Prescribe    Route: Oral    calcium carbonate (OS-SHAI) 1500 (600 Ca) MG tablet  60 tablet 3 2/21/2022        Sig: Take 1 tablet (600 mg) by mouth daily    Class: Local Print    Route: Oral    magnesium oxide (MAG-OX) 400 MG tablet  180 tablet 3 1/29/2022    [facility] #[id number] - [place], [place] - [address] [place] [address] [address] AT [facility] [address]    Sig: Take 2 tablets (800 mg) by mouth 2 times daily    Class: E-Prescribe    Route: Oral    multivitamin (CENTRUM SILVER) tablet            Sig: Take 1 tablet by mouth daily    Class: Historical    Route: Oral    mycophenolic acid (GENERIC EQUIVALENT) 180 MG EC tablet  180 tablet 1 2/18/2022    [facility] - [place] - [facility] [address]    Sig: Take 3 tablets (540 mg) by mouth 2 times daily Deliver 2/18    Class: E-Prescribe    Notes to Pharmacy: TXP DT 1/7/2022 (Liver) TXP Dischg DT 1/14/2022 DX Liver replaced by transplant Z94.4 TX [facility] ([place], [place])    Route: Oral    pantoprazole (PROTONIX) 40 MG EC tablet  90 tablet 3 1/18/2022    [facility] - [place] - [address]    Sig: Take 1 tablet (40 mg) by mouth daily    Class: E-Prescribe    Route: Oral    sulfamethoxazole-trimethoprim (BACTRIM) 400-80 MG tablet  90 tablet 3 1/18/2022    [facility] [place] - [address]    Sig: Take 1 tablet by mouth daily    Class: E-Prescribe    Route: Oral    tacrolimus (GENERIC EQUIVALENT) 0.5 MG capsule  60 capsule 1 1/14/2022     [facility] [address]    Sig: Take 1 capsule by mouth twice daily as directed by transplant team for dose titration    Class: E-Prescribe    tacrolimus (GENERIC EQUIVALENT) 1 MG capsule  630 capsule 3 2/8/2022    [facility] [address]    Sig: Take 4 capsules (4 mg) by mouth every morning AND 3 capsules (3 mg) every evening.    Class: E-Prescribe    Notes to Pharmacy: Dose change    Route: Oral    thiamine (B-1) 100 MG tablet  30 tablet 1 1/29/2022    [facility] #[address] [place] [address] - [address] AT [place] [address]    Sig: Take 1 tablet (100 mg) by mouth daily    Class: E-Prescribe    Route: Oral    tretinoin (RETIN-A) 0.025 % external cream  45 g 11 12/10/2021    [facility] #[address] [place] [address] - [address] AT [place] [address]    Sig: Apply a pea size to entire face QD    Class: E-Prescribe    ursodiol (ACTIGALL) 250 MG tablet  60 tablet 1 2/18/2022    [facility] [address]    Sig: Take 1 tablet (250 mg) by mouth 2 times daily    Class: E-Prescribe    Route: Oral    valGANciclovir (VALCYTE) 450 MG tablet  90 tablet 3 1/18/2022    [facility] [address]    Sig: Take 1 tablet (450 mg) by mouth daily    Class: E-Prescribe    Route: Oral    sennosides (SENOKOT) 8.6 MG tablet  60 tablet 0 1/14/2022    [facility] [address]    Sig: Take 1-2 tablets by mouth 2 times daily Take while taking oxycodone, HOLD for loose stools    Class: E-Prescribe    Route: Oral    traMADol (ULTRAM) 50 MG tablet  8 tablet 0 2/5/2022    [facility] [place] [address] - [address]    Sig: Take 1 tablet (50 mg) by mouth every 8 hours as needed for severe pain Script called in    Class:  E-Prescribe    Route: Oral        Patient has no known allergies.   REVIEW OF SYSTEMS (check box if normal)  [x]               GENERAL  [x]                 PULMONARY [x]                GENITOURINARY  [x]                CNS                 [x]                 CARDIAC  [x]                 ENDOCRINE  [x]                EARS,NOSE,THROAT [x]                 GASTROINTESTINAL [x]                 NEUROLOGIC    [x]                MUSCLOSKELTAL  [x]                  HEMATOLOGY      PHYSICAL EXAM (check box if normal)/80   Pulse 75   Wt 56.2 kg (124 lb)   SpO2 100%   Breastfeeding No   BMI 20.63 kg/m          [x]            GENERAL:    [x]       EYES:  ICTERIC   []        YES  []                    NO  [x]           EXTREMITIES: Clubbing []       Y     [x]           N    [x]           EARS, NOSE, THROAT: Membranes Moist    YES   [x]                   NO []                  [x]           LUNGS:  CLEAR    YES       [x]                  NO    []                                [x]           SKIN: Jaundice           YES       []                  NO    [x]                   Rash: YES       []                  NO    [x]                                     [x]             HEART: Regular Rate          YES       [x]                  NO    []                   Incision Clean:  YES       [x]                  NO    []                                [x]                    ABDOMEN: Organomegaly YES       []                  NO    [x]                       [x]                    NEUROLOGICAL:  Nonfocal  YES       [x]                  NO    []                       [x]                    Hernia YES       []                  NO    [x]                   PSYCHIATRIC:  Appropriate  YES       [x]                  NO    []                       OTHER:                                                                                                   PAIN SCALE:: 3

## 2022-03-01 ENCOUNTER — OFFICE VISIT (OUTPATIENT)
Dept: OBGYN | Facility: CLINIC | Age: 52
End: 2022-03-01
Payer: COMMERCIAL

## 2022-03-01 VITALS
WEIGHT: 125.19 LBS | HEART RATE: 81 BPM | SYSTOLIC BLOOD PRESSURE: 112 MMHG | DIASTOLIC BLOOD PRESSURE: 74 MMHG | BODY MASS INDEX: 20.83 KG/M2

## 2022-03-01 DIAGNOSIS — F41.9 ANXIETY: ICD-10-CM

## 2022-03-01 DIAGNOSIS — R87.810 CERVICAL HIGH RISK HPV (HUMAN PAPILLOMAVIRUS) TEST POSITIVE: ICD-10-CM

## 2022-03-01 DIAGNOSIS — Z94.4 STATUS POST LIVER TRANSPLANTATION (H): ICD-10-CM

## 2022-03-01 DIAGNOSIS — Z00.00 ROUTINE GENERAL MEDICAL EXAMINATION AT A HEALTH CARE FACILITY: Primary | ICD-10-CM

## 2022-03-01 PROBLEM — K74.69 DECOMPENSATED LIVER DISEASE (H): Status: RESOLVED | Noted: 2021-11-03 | Resolved: 2022-03-01

## 2022-03-01 PROBLEM — D62 ANEMIA DUE TO BLOOD LOSS, ACUTE: Status: RESOLVED | Noted: 2022-01-14 | Resolved: 2022-03-01

## 2022-03-01 PROBLEM — K74.60 DECOMPENSATED LIVER DISEASE (H): Status: RESOLVED | Noted: 2021-11-03 | Resolved: 2022-03-01

## 2022-03-01 PROBLEM — N17.9 AKI (ACUTE KIDNEY INJURY) (H): Status: RESOLVED | Noted: 2022-01-12 | Resolved: 2022-03-01

## 2022-03-01 PROBLEM — K72.00 ACUTE LIVER FAILURE WITHOUT HEPATIC COMA: Status: RESOLVED | Noted: 2021-09-24 | Resolved: 2022-03-01

## 2022-03-01 PROBLEM — R78.81 BACTEREMIA: Status: RESOLVED | Noted: 2022-01-12 | Resolved: 2022-03-01

## 2022-03-01 PROBLEM — B97.7 HIGH RISK HPV INFECTION: Status: RESOLVED | Noted: 2020-09-14 | Resolved: 2022-03-01

## 2022-03-01 PROBLEM — R18.8 OTHER ASCITES: Status: RESOLVED | Noted: 2021-09-24 | Resolved: 2022-03-01

## 2022-03-01 PROBLEM — K91.89 ILEUS, POSTOPERATIVE (H): Status: RESOLVED | Noted: 2022-01-12 | Resolved: 2022-03-01

## 2022-03-01 PROBLEM — E66.9 OBESITY (BMI 30-39.9): Status: RESOLVED | Noted: 2021-12-28 | Resolved: 2022-03-01

## 2022-03-01 PROBLEM — J90 PLEURAL EFFUSION: Status: RESOLVED | Noted: 2022-01-12 | Resolved: 2022-03-01

## 2022-03-01 PROBLEM — K70.9 ALCOHOLIC LIVER DISEASE (H): Status: RESOLVED | Noted: 2021-09-24 | Resolved: 2022-03-01

## 2022-03-01 PROBLEM — K56.7 ILEUS, POSTOPERATIVE (H): Status: RESOLVED | Noted: 2022-01-12 | Resolved: 2022-03-01

## 2022-03-01 PROBLEM — K72.90 DECOMPENSATED LIVER DISEASE (H): Status: RESOLVED | Noted: 2021-11-03 | Resolved: 2022-03-01

## 2022-03-01 PROBLEM — E80.6 HYPERBILIRUBINEMIA: Status: RESOLVED | Noted: 2021-11-03 | Resolved: 2022-03-01

## 2022-03-01 PROBLEM — R11.2 NON-INTRACTABLE VOMITING WITH NAUSEA, UNSPECIFIED VOMITING TYPE: Status: RESOLVED | Noted: 2021-12-30 | Resolved: 2022-03-01

## 2022-03-01 PROCEDURE — G0145 SCR C/V CYTO,THINLAYER,RESCR: HCPCS | Performed by: ADVANCED PRACTICE MIDWIFE

## 2022-03-01 PROCEDURE — 87624 HPV HI-RISK TYP POOLED RSLT: CPT | Performed by: ADVANCED PRACTICE MIDWIFE

## 2022-03-01 PROCEDURE — 99386 PREV VISIT NEW AGE 40-64: CPT | Performed by: ADVANCED PRACTICE MIDWIFE

## 2022-03-03 ENCOUNTER — LAB REQUISITION (OUTPATIENT)
Dept: LAB | Facility: CLINIC | Age: 52
End: 2022-03-03
Payer: COMMERCIAL

## 2022-03-03 DIAGNOSIS — Z94.4 LIVER TRANSPLANT STATUS (H): ICD-10-CM

## 2022-03-03 DIAGNOSIS — Z79.899 OTHER LONG TERM (CURRENT) DRUG THERAPY: ICD-10-CM

## 2022-03-03 LAB
ALBUMIN SERPL-MCNC: 3.2 G/DL (ref 3.4–5)
ALP SERPL-CCNC: 170 U/L (ref 40–150)
ALT SERPL W P-5'-P-CCNC: 26 U/L (ref 0–50)
ANION GAP SERPL CALCULATED.3IONS-SCNC: 5 MMOL/L (ref 3–14)
AST SERPL W P-5'-P-CCNC: 14 U/L (ref 0–45)
BILIRUB DIRECT SERPL-MCNC: 0.5 MG/DL (ref 0–0.2)
BILIRUB SERPL-MCNC: 0.8 MG/DL (ref 0.2–1.3)
BKR LAB AP GYN ADEQUACY: NORMAL
BKR LAB AP GYN INTERPRETATION: NORMAL
BKR LAB AP HPV REFLEX: NORMAL
BKR LAB AP PREVIOUS ABNL DX: NORMAL
BKR LAB AP PREVIOUS ABNORMAL: NORMAL
BUN SERPL-MCNC: 25 MG/DL (ref 7–30)
CALCIUM SERPL-MCNC: 9.9 MG/DL (ref 8.5–10.1)
CHLORIDE BLD-SCNC: 104 MMOL/L (ref 94–109)
CO2 SERPL-SCNC: 26 MMOL/L (ref 20–32)
CREAT SERPL-MCNC: 1.17 MG/DL (ref 0.52–1.04)
ERYTHROCYTE [DISTWIDTH] IN BLOOD BY AUTOMATED COUNT: 14 % (ref 10–15)
GFR SERPL CREATININE-BSD FRML MDRD: 57 ML/MIN/1.73M2
GLUCOSE BLD-MCNC: 123 MG/DL (ref 70–99)
HCT VFR BLD AUTO: 31.9 % (ref 35–47)
HGB BLD-MCNC: 10.3 G/DL (ref 11.7–15.7)
MAGNESIUM SERPL-MCNC: 1.5 MG/DL (ref 1.6–2.3)
MCH RBC QN AUTO: 30.4 PG (ref 26.5–33)
MCHC RBC AUTO-ENTMCNC: 32.3 G/DL (ref 31.5–36.5)
MCV RBC AUTO: 94 FL (ref 78–100)
PATH REPORT.COMMENTS IMP SPEC: NORMAL
PATH REPORT.COMMENTS IMP SPEC: NORMAL
PATH REPORT.RELEVANT HX SPEC: NORMAL
PHOSPHATE SERPL-MCNC: 4.8 MG/DL (ref 2.5–4.5)
PLATELET # BLD AUTO: 309 10E3/UL (ref 150–450)
PLPETH BLD-MCNC: <10 NG/ML
POPETH BLD-MCNC: <10 NG/ML
POTASSIUM BLD-SCNC: 4.4 MMOL/L (ref 3.4–5.3)
PROT SERPL-MCNC: 6.4 G/DL (ref 6.8–8.8)
RBC # BLD AUTO: 3.39 10E6/UL (ref 3.8–5.2)
SODIUM SERPL-SCNC: 135 MMOL/L (ref 133–144)
TACROLIMUS BLD-MCNC: 10.4 UG/L (ref 5–15)
TME LAST DOSE: NORMAL H
TME LAST DOSE: NORMAL H
WBC # BLD AUTO: 3.2 10E3/UL (ref 4–11)

## 2022-03-03 PROCEDURE — 84100 ASSAY OF PHOSPHORUS: CPT | Mod: ORL | Performed by: TRANSPLANT SURGERY

## 2022-03-03 PROCEDURE — 82248 BILIRUBIN DIRECT: CPT | Mod: ORL | Performed by: TRANSPLANT SURGERY

## 2022-03-03 PROCEDURE — 80197 ASSAY OF TACROLIMUS: CPT | Mod: ORL | Performed by: TRANSPLANT SURGERY

## 2022-03-03 PROCEDURE — 85027 COMPLETE CBC AUTOMATED: CPT | Mod: ORL | Performed by: TRANSPLANT SURGERY

## 2022-03-03 PROCEDURE — 80053 COMPREHEN METABOLIC PANEL: CPT | Mod: ORL | Performed by: TRANSPLANT SURGERY

## 2022-03-03 PROCEDURE — 83735 ASSAY OF MAGNESIUM: CPT | Mod: ORL | Performed by: TRANSPLANT SURGERY

## 2022-03-04 ENCOUNTER — DOCUMENTATION ONLY (OUTPATIENT)
Dept: TRANSPLANT | Facility: CLINIC | Age: 52
End: 2022-03-04
Payer: COMMERCIAL

## 2022-03-04 LAB
HUMAN PAPILLOMA VIRUS 16 DNA: NEGATIVE
HUMAN PAPILLOMA VIRUS 18 DNA: POSITIVE
HUMAN PAPILLOMA VIRUS FINAL DIAGNOSIS: ABNORMAL
HUMAN PAPILLOMA VIRUS OTHER HR: NEGATIVE

## 2022-03-04 NOTE — PROGRESS NOTES
Received my chart from Fidelina. Wondering about her glucose levels and what calorie intake should be.     Low end calorie needs ~1800/day (30 kcal/kg) based on 57 kg dosing wt.     A1c wnl last month. Reviewed that medication can affect glucose, if labs are fasting or not, and counseled on avoiding high intake of high sugar items on a regular basis (ie pop, juice, candy, etc). OK in moderation.

## 2022-03-07 ENCOUNTER — VIRTUAL VISIT (OUTPATIENT)
Dept: FAMILY MEDICINE | Facility: CLINIC | Age: 52
End: 2022-03-07
Payer: COMMERCIAL

## 2022-03-07 ENCOUNTER — PATIENT OUTREACH (OUTPATIENT)
Dept: OBGYN | Facility: CLINIC | Age: 52
End: 2022-03-07
Payer: COMMERCIAL

## 2022-03-07 ENCOUNTER — LAB REQUISITION (OUTPATIENT)
Dept: LAB | Facility: CLINIC | Age: 52
End: 2022-03-07
Payer: COMMERCIAL

## 2022-03-07 DIAGNOSIS — Z94.4 LIVER TRANSPLANT STATUS (H): ICD-10-CM

## 2022-03-07 DIAGNOSIS — Z53.9 ERRONEOUS ENCOUNTER--DISREGARD: Primary | ICD-10-CM

## 2022-03-07 LAB
ALBUMIN SERPL-MCNC: 3.2 G/DL (ref 3.4–5)
ALP SERPL-CCNC: 136 U/L (ref 40–150)
ALT SERPL W P-5'-P-CCNC: 23 U/L (ref 0–50)
ANION GAP SERPL CALCULATED.3IONS-SCNC: 3 MMOL/L (ref 3–14)
AST SERPL W P-5'-P-CCNC: 13 U/L (ref 0–45)
BILIRUB DIRECT SERPL-MCNC: 0.4 MG/DL (ref 0–0.2)
BILIRUB SERPL-MCNC: 0.7 MG/DL (ref 0.2–1.3)
BUN SERPL-MCNC: 26 MG/DL (ref 7–30)
CALCIUM SERPL-MCNC: 9.5 MG/DL (ref 8.5–10.1)
CHLORIDE BLD-SCNC: 106 MMOL/L (ref 94–109)
CO2 SERPL-SCNC: 29 MMOL/L (ref 20–32)
CREAT SERPL-MCNC: 1.29 MG/DL (ref 0.52–1.04)
ERYTHROCYTE [DISTWIDTH] IN BLOOD BY AUTOMATED COUNT: 14.1 % (ref 10–15)
GFR SERPL CREATININE-BSD FRML MDRD: 50 ML/MIN/1.73M2
GLUCOSE BLD-MCNC: 102 MG/DL (ref 70–99)
HCT VFR BLD AUTO: 31.4 % (ref 35–47)
HGB BLD-MCNC: 9.9 G/DL (ref 11.7–15.7)
HOLD SPECIMEN: NORMAL
HOLD SPECIMEN: NORMAL
MAGNESIUM SERPL-MCNC: 1.5 MG/DL (ref 1.6–2.3)
MCH RBC QN AUTO: 29.8 PG (ref 26.5–33)
MCHC RBC AUTO-ENTMCNC: 31.5 G/DL (ref 31.5–36.5)
MCV RBC AUTO: 95 FL (ref 78–100)
PHOSPHATE SERPL-MCNC: 4.7 MG/DL (ref 2.5–4.5)
PLATELET # BLD AUTO: 251 10E3/UL (ref 150–450)
POTASSIUM BLD-SCNC: 4.4 MMOL/L (ref 3.4–5.3)
PROT SERPL-MCNC: 6 G/DL (ref 6.8–8.8)
RBC # BLD AUTO: 3.32 10E6/UL (ref 3.8–5.2)
SODIUM SERPL-SCNC: 138 MMOL/L (ref 133–144)
TACROLIMUS BLD-MCNC: 7.6 UG/L (ref 5–15)
TME LAST DOSE: NORMAL H
TME LAST DOSE: NORMAL H
WBC # BLD AUTO: 2.4 10E3/UL (ref 4–11)

## 2022-03-07 PROCEDURE — 85027 COMPLETE CBC AUTOMATED: CPT | Mod: ORL | Performed by: TRANSPLANT SURGERY

## 2022-03-07 PROCEDURE — 36415 COLL VENOUS BLD VENIPUNCTURE: CPT | Mod: ORL | Performed by: TRANSPLANT SURGERY

## 2022-03-07 PROCEDURE — 80053 COMPREHEN METABOLIC PANEL: CPT | Mod: ORL | Performed by: TRANSPLANT SURGERY

## 2022-03-07 PROCEDURE — 83735 ASSAY OF MAGNESIUM: CPT | Mod: ORL | Performed by: TRANSPLANT SURGERY

## 2022-03-07 PROCEDURE — 80197 ASSAY OF TACROLIMUS: CPT | Mod: ORL | Performed by: TRANSPLANT SURGERY

## 2022-03-07 PROCEDURE — 82248 BILIRUBIN DIRECT: CPT | Mod: ORL | Performed by: TRANSPLANT SURGERY

## 2022-03-07 PROCEDURE — 84100 ASSAY OF PHOSPHORUS: CPT | Mod: ORL | Performed by: TRANSPLANT SURGERY

## 2022-03-07 NOTE — PROGRESS NOTES
It was never admitted to the ED or hospital recently  This is not a ED or hospital follow-up visit  She did not have any other issues to discuss except some neuropathy symptoms which could be from the tacrolimus  I suggested Capscain cream and asked her to get in touch with her transplant team to see if she can use that

## 2022-03-08 DIAGNOSIS — Z94.4 LIVER REPLACED BY TRANSPLANT (H): Primary | ICD-10-CM

## 2022-03-08 NOTE — PROGRESS NOTES
Pt having increase in foot pain at night and when first wake up. Pt did see PCP and mentioned using a cream. Dr Browne ok with having patient trying cream.

## 2022-03-10 ENCOUNTER — LAB REQUISITION (OUTPATIENT)
Dept: LAB | Facility: CLINIC | Age: 52
End: 2022-03-10
Payer: COMMERCIAL

## 2022-03-10 DIAGNOSIS — Z94.4 LIVER TRANSPLANT STATUS (H): ICD-10-CM

## 2022-03-10 DIAGNOSIS — Z79.899 OTHER LONG TERM (CURRENT) DRUG THERAPY: ICD-10-CM

## 2022-03-10 LAB
ALBUMIN SERPL-MCNC: 3 G/DL (ref 3.4–5)
ALP SERPL-CCNC: 108 U/L (ref 40–150)
ALT SERPL W P-5'-P-CCNC: 19 U/L (ref 0–50)
ANION GAP SERPL CALCULATED.3IONS-SCNC: 2 MMOL/L (ref 3–14)
AST SERPL W P-5'-P-CCNC: 12 U/L (ref 0–45)
BILIRUB DIRECT SERPL-MCNC: 0.4 MG/DL (ref 0–0.2)
BILIRUB SERPL-MCNC: 0.7 MG/DL (ref 0.2–1.3)
BUN SERPL-MCNC: 26 MG/DL (ref 7–30)
CALCIUM SERPL-MCNC: 9.4 MG/DL (ref 8.5–10.1)
CHLORIDE BLD-SCNC: 104 MMOL/L (ref 94–109)
CO2 SERPL-SCNC: 29 MMOL/L (ref 20–32)
CREAT SERPL-MCNC: 1.18 MG/DL (ref 0.52–1.04)
ERYTHROCYTE [DISTWIDTH] IN BLOOD BY AUTOMATED COUNT: 13.8 % (ref 10–15)
GFR SERPL CREATININE-BSD FRML MDRD: 56 ML/MIN/1.73M2
GLUCOSE BLD-MCNC: 111 MG/DL (ref 70–99)
HCT VFR BLD AUTO: 30.2 % (ref 35–47)
HGB BLD-MCNC: 10 G/DL (ref 11.7–15.7)
MAGNESIUM SERPL-MCNC: 1.4 MG/DL (ref 1.6–2.3)
MCH RBC QN AUTO: 30.3 PG (ref 26.5–33)
MCHC RBC AUTO-ENTMCNC: 33.1 G/DL (ref 31.5–36.5)
MCV RBC AUTO: 92 FL (ref 78–100)
PHOSPHATE SERPL-MCNC: 4.8 MG/DL (ref 2.5–4.5)
PLATELET # BLD AUTO: 210 10E3/UL (ref 150–450)
POTASSIUM BLD-SCNC: 4.4 MMOL/L (ref 3.4–5.3)
PROT SERPL-MCNC: 5.6 G/DL (ref 6.8–8.8)
RBC # BLD AUTO: 3.3 10E6/UL (ref 3.8–5.2)
SODIUM SERPL-SCNC: 135 MMOL/L (ref 133–144)
TACROLIMUS BLD-MCNC: 8.5 UG/L (ref 5–15)
TME LAST DOSE: NORMAL H
TME LAST DOSE: NORMAL H
WBC # BLD AUTO: 2.3 10E3/UL (ref 4–11)

## 2022-03-10 PROCEDURE — 80053 COMPREHEN METABOLIC PANEL: CPT | Mod: ORL | Performed by: TRANSPLANT SURGERY

## 2022-03-10 PROCEDURE — 82248 BILIRUBIN DIRECT: CPT | Mod: ORL | Performed by: TRANSPLANT SURGERY

## 2022-03-10 PROCEDURE — 85027 COMPLETE CBC AUTOMATED: CPT | Mod: ORL | Performed by: TRANSPLANT SURGERY

## 2022-03-10 PROCEDURE — 84100 ASSAY OF PHOSPHORUS: CPT | Mod: ORL | Performed by: TRANSPLANT SURGERY

## 2022-03-10 PROCEDURE — 83735 ASSAY OF MAGNESIUM: CPT | Mod: ORL | Performed by: TRANSPLANT SURGERY

## 2022-03-10 PROCEDURE — 80197 ASSAY OF TACROLIMUS: CPT | Mod: ORL | Performed by: TRANSPLANT SURGERY

## 2022-03-12 ENCOUNTER — LAB (OUTPATIENT)
Dept: URGENT CARE | Facility: URGENT CARE | Age: 52
End: 2022-03-12
Payer: COMMERCIAL

## 2022-03-12 DIAGNOSIS — Z11.59 ENCOUNTER FOR SCREENING FOR OTHER VIRAL DISEASES: ICD-10-CM

## 2022-03-12 LAB — SARS-COV-2 RNA RESP QL NAA+PROBE: NEGATIVE

## 2022-03-12 PROCEDURE — U0005 INFEC AGEN DETEC AMPLI PROBE: HCPCS

## 2022-03-12 PROCEDURE — U0003 INFECTIOUS AGENT DETECTION BY NUCLEIC ACID (DNA OR RNA); SEVERE ACUTE RESPIRATORY SYNDROME CORONAVIRUS 2 (SARS-COV-2) (CORONAVIRUS DISEASE [COVID-19]), AMPLIFIED PROBE TECHNIQUE, MAKING USE OF HIGH THROUGHPUT TECHNOLOGIES AS DESCRIBED BY CMS-2020-01-R: HCPCS

## 2022-03-14 ENCOUNTER — LAB REQUISITION (OUTPATIENT)
Dept: LAB | Facility: CLINIC | Age: 52
End: 2022-03-14
Payer: COMMERCIAL

## 2022-03-14 ENCOUNTER — TELEPHONE (OUTPATIENT)
Dept: PHARMACY | Facility: CLINIC | Age: 52
End: 2022-03-14

## 2022-03-14 DIAGNOSIS — Z94.4 LIVER REPLACED BY TRANSPLANT (H): Primary | ICD-10-CM

## 2022-03-14 DIAGNOSIS — Z79.899 OTHER LONG TERM (CURRENT) DRUG THERAPY: ICD-10-CM

## 2022-03-14 DIAGNOSIS — Z94.4 LIVER TRANSPLANT STATUS (H): ICD-10-CM

## 2022-03-14 LAB
ALBUMIN SERPL-MCNC: 2.9 G/DL (ref 3.4–5)
ALP SERPL-CCNC: 105 U/L (ref 40–150)
ALT SERPL W P-5'-P-CCNC: 20 U/L (ref 0–50)
ANION GAP SERPL CALCULATED.3IONS-SCNC: 4 MMOL/L (ref 3–14)
AST SERPL W P-5'-P-CCNC: 13 U/L (ref 0–45)
BASOPHILS # BLD AUTO: 0.1 10E3/UL (ref 0–0.2)
BASOPHILS NFR BLD AUTO: 3 %
BILIRUB DIRECT SERPL-MCNC: 0.3 MG/DL (ref 0–0.2)
BILIRUB SERPL-MCNC: 0.5 MG/DL (ref 0.2–1.3)
BUN SERPL-MCNC: 25 MG/DL (ref 7–30)
CALCIUM SERPL-MCNC: 9.1 MG/DL (ref 8.5–10.1)
CHLORIDE BLD-SCNC: 104 MMOL/L (ref 94–109)
CO2 SERPL-SCNC: 25 MMOL/L (ref 20–32)
CREAT SERPL-MCNC: 1.08 MG/DL (ref 0.52–1.04)
EOSINOPHIL # BLD AUTO: 0.1 10E3/UL (ref 0–0.7)
EOSINOPHIL NFR BLD AUTO: 2 %
ERYTHROCYTE [DISTWIDTH] IN BLOOD BY AUTOMATED COUNT: 13.8 % (ref 10–15)
GFR SERPL CREATININE-BSD FRML MDRD: 62 ML/MIN/1.73M2
GLUCOSE BLD-MCNC: 114 MG/DL (ref 70–99)
HCT VFR BLD AUTO: 28.1 % (ref 35–47)
HGB BLD-MCNC: 9.3 G/DL (ref 11.7–15.7)
IMM GRANULOCYTES # BLD: 0 10E3/UL
IMM GRANULOCYTES NFR BLD: 0 %
LYMPHOCYTES # BLD AUTO: 0.9 10E3/UL (ref 0.8–5.3)
LYMPHOCYTES NFR BLD AUTO: 33 %
MAGNESIUM SERPL-MCNC: 1.5 MG/DL (ref 1.6–2.3)
MCH RBC QN AUTO: 30.5 PG (ref 26.5–33)
MCHC RBC AUTO-ENTMCNC: 33.1 G/DL (ref 31.5–36.5)
MCV RBC AUTO: 92 FL (ref 78–100)
MONOCYTES # BLD AUTO: 0.2 10E3/UL (ref 0–1.3)
MONOCYTES NFR BLD AUTO: 6 %
NEUTROPHILS # BLD AUTO: 1.5 10E3/UL (ref 1.6–8.3)
NEUTROPHILS NFR BLD AUTO: 56 %
NRBC # BLD AUTO: 0 10E3/UL
NRBC BLD AUTO-RTO: 0 /100
PHOSPHATE SERPL-MCNC: 4.5 MG/DL (ref 2.5–4.5)
PLATELET # BLD AUTO: 178 10E3/UL (ref 150–450)
POTASSIUM BLD-SCNC: 4.5 MMOL/L (ref 3.4–5.3)
PROT SERPL-MCNC: 5.4 G/DL (ref 6.8–8.8)
RBC # BLD AUTO: 3.05 10E6/UL (ref 3.8–5.2)
SODIUM SERPL-SCNC: 133 MMOL/L (ref 133–144)
TACROLIMUS BLD-MCNC: 9 UG/L (ref 5–15)
TME LAST DOSE: NORMAL H
TME LAST DOSE: NORMAL H
WBC # BLD AUTO: 2.7 10E3/UL (ref 4–11)

## 2022-03-14 PROCEDURE — 84100 ASSAY OF PHOSPHORUS: CPT | Mod: ORL | Performed by: TRANSPLANT SURGERY

## 2022-03-14 PROCEDURE — 80197 ASSAY OF TACROLIMUS: CPT | Mod: ORL | Performed by: TRANSPLANT SURGERY

## 2022-03-14 PROCEDURE — 82248 BILIRUBIN DIRECT: CPT | Mod: ORL | Performed by: TRANSPLANT SURGERY

## 2022-03-14 PROCEDURE — 83735 ASSAY OF MAGNESIUM: CPT | Mod: ORL | Performed by: TRANSPLANT SURGERY

## 2022-03-14 PROCEDURE — 80053 COMPREHEN METABOLIC PANEL: CPT | Mod: ORL | Performed by: TRANSPLANT SURGERY

## 2022-03-14 PROCEDURE — 85025 COMPLETE CBC W/AUTO DIFF WBC: CPT | Mod: ORL | Performed by: TRANSPLANT SURGERY

## 2022-03-14 NOTE — TELEPHONE ENCOUNTER
Clinical Pharmacy Consult:                                                      Transplant Specific: 2 Month Post Transplant Medication Review  Date of Transplant: 01/07/2022  Type of Transplant: liver  First Transplant: yes  History of rejection: no    Immunosuppression Regimen   TAC 4mg qAM & 3mg qPM and MPA 540mg qAM & 540mg qPM  Patient specific goal: 8-10  Most recent level: 8.5, date 3/10/22  Immunosuppressant Levels: therapeutic  Pt adherent to lab draws: yes  Scr:   Lab Results   Component Value Date    CR 0.94 02/17/2022    CR 0.56 09/18/2020     Side effects: Diarrhea, neuropathy in feet, and tennis elbow    Prophylactic Medications  Antibacterial:  Bactrim ss daily  Scheduled Discontinue Date: 6 months    Antifungal: Not needed thus far  Scheduled Discontinue Date: N/A    Antiviral: CrCl 40 to 59 mL/minute: Valcyte 450 mg once daily   Scheduled Discontinue Date: 3 months    Acid Reducer: Protonix (pantoprazole)  Scheduled Reviewed Date: managed by clinic    Thrombosis Prevention: Aspirin 81 mg PO daily  Scheduled Discontinue Date: managed by clinic    Blood Pressure Management  Frequency of home Blood Pressure checks: once daily  Most recent home BP: 110/?  Wasn't sure   Patient Blood pressure goal: <140/90  Patient blood pressure at goal:  yes  Hospitalizations/ER visits since last assessment: 0      Med rec/DUR performed: yes  Med Rec Discrepancies: yes, no longer taking tramadol    Medication adherence flowsheet 2/18/2022   Patient medication administration: Has assistance with medications   Patient estimated adherence level: %   Pharmacist assessment of adherence: Good   Patient reported doses missed per week: 0-1   Facilitators to medication adherence  Medication dosing chart;Pill box;Caregiver assistance;Schedule/routine   Patient reported barriers to medication adherence  -   Adherence intervention(s): -      Medication access flowsheet 2/18/2022   Number of pharmacies used: 1   Pharmacy:  [facility]   Enrolled in [facility] pharmacy? Yes   Patient reported barriers to accessing medications: -   Medication access interventions: -      Fidelina reports feeling ok, getting better.  She reports that she is still having diarrhea even after switching to MPA.  Talked about taking her magnesium with lunch and dinner and adding Metamucil daily to see if that helps.    She reports no missed doses.  Her husbands helps and they use a med list, a pill box and have a good routine.    Blood pressure is under control.    Fidelina was a reliable historian and was able to recall medication names, doses and administration instructions. She had no further questions and were advised to reach out to the transplant coordinator (Shalini) to address any additional concerns.     Afsaneh Heaton [facility]

## 2022-03-15 ENCOUNTER — TELEPHONE (OUTPATIENT)
Dept: GASTROENTEROLOGY | Facility: CLINIC | Age: 52
End: 2022-03-15
Payer: COMMERCIAL

## 2022-03-15 ENCOUNTER — TELEPHONE (OUTPATIENT)
Dept: TRANSPLANT | Facility: CLINIC | Age: 52
End: 2022-03-15
Payer: COMMERCIAL

## 2022-03-15 ENCOUNTER — ANESTHESIA EVENT (OUTPATIENT)
Dept: SURGERY | Facility: CLINIC | Age: 52
End: 2022-03-15
Payer: COMMERCIAL

## 2022-03-15 DIAGNOSIS — Z94.4 LIVER REPLACED BY TRANSPLANT (H): Primary | ICD-10-CM

## 2022-03-15 ASSESSMENT — LIFESTYLE VARIABLES: TOBACCO_USE: 1

## 2022-03-15 NOTE — TELEPHONE ENCOUNTER
Called Fidelina regarding her ERCP procedure tomorrow. No answer, left VM with Raina's (ERCP ) number to contact her for more information regarding pre procedure preparation. I instructed her to call back if she has any further questions.

## 2022-03-15 NOTE — TELEPHONE ENCOUNTER
Patient Call: General  Route to LPN    Reason for call: Pt having a procedure tomorrow and no one has gotten to her to let her know what time  The procedure is  And what to do    Is emotional re the test      Call back needed? Yes    Return Call Needed  Same as documented in contacts section  When to return call?: Same day: Route High Priority

## 2022-03-15 NOTE — TELEPHONE ENCOUNTER
Patient left  stating she has not heard from PAN. Was very upset because she needs to plan and just want to know what is going on.    Called patient back and she had just got off the phone with PAN she was all set for her procedure tomorrow. I did give patient our direct line because she said she had been transferred to 3 different people before getting to me.

## 2022-03-16 ENCOUNTER — APPOINTMENT (OUTPATIENT)
Dept: GENERAL RADIOLOGY | Facility: CLINIC | Age: 52
End: 2022-03-16
Attending: INTERNAL MEDICINE
Payer: COMMERCIAL

## 2022-03-16 ENCOUNTER — ANESTHESIA (OUTPATIENT)
Dept: SURGERY | Facility: CLINIC | Age: 52
End: 2022-03-16
Payer: COMMERCIAL

## 2022-03-16 ENCOUNTER — HOSPITAL ENCOUNTER (OUTPATIENT)
Facility: CLINIC | Age: 52
Discharge: HOME | End: 2022-03-16
Attending: INTERNAL MEDICINE | Admitting: INTERNAL MEDICINE
Payer: COMMERCIAL

## 2022-03-16 VITALS
HEIGHT: 65 IN | HEART RATE: 78 BPM | WEIGHT: 126.1 LBS | BODY MASS INDEX: 21.01 KG/M2 | OXYGEN SATURATION: 98 % | TEMPERATURE: 97.7 F | DIASTOLIC BLOOD PRESSURE: 77 MMHG | RESPIRATION RATE: 16 BRPM | SYSTOLIC BLOOD PRESSURE: 118 MMHG

## 2022-03-16 DIAGNOSIS — Z98.890 S/P ERCP: Primary | ICD-10-CM

## 2022-03-16 LAB
ALBUMIN SERPL-MCNC: 3.4 G/DL (ref 3.4–5)
ALP SERPL-CCNC: 113 U/L (ref 40–150)
ALT SERPL W P-5'-P-CCNC: 22 U/L (ref 0–50)
ANION GAP SERPL CALCULATED.3IONS-SCNC: 8 MMOL/L (ref 3–14)
AST SERPL W P-5'-P-CCNC: 17 U/L (ref 0–45)
BILIRUB SERPL-MCNC: 0.6 MG/DL (ref 0.2–1.3)
BUN SERPL-MCNC: 22 MG/DL (ref 7–30)
CALCIUM SERPL-MCNC: 9.9 MG/DL (ref 8.5–10.1)
CHLORIDE BLD-SCNC: 104 MMOL/L (ref 94–109)
CO2 SERPL-SCNC: 24 MMOL/L (ref 20–32)
CREAT SERPL-MCNC: 1.09 MG/DL (ref 0.52–1.04)
ERCP: NORMAL
ERYTHROCYTE [DISTWIDTH] IN BLOOD BY AUTOMATED COUNT: 13.9 % (ref 10–15)
GFR SERPL CREATININE-BSD FRML MDRD: 62 ML/MIN/1.73M2
GLUCOSE BLD-MCNC: 115 MG/DL (ref 70–99)
GLUCOSE BLDC GLUCOMTR-MCNC: 119 MG/DL (ref 70–99)
HCT VFR BLD AUTO: 33.8 % (ref 35–47)
HGB BLD-MCNC: 11.5 G/DL (ref 11.7–15.7)
INR PPP: 1.07 (ref 0.85–1.15)
MCH RBC QN AUTO: 31 PG (ref 26.5–33)
MCHC RBC AUTO-ENTMCNC: 34 G/DL (ref 31.5–36.5)
MCV RBC AUTO: 91 FL (ref 78–100)
PLATELET # BLD AUTO: 198 10E3/UL (ref 150–450)
POTASSIUM BLD-SCNC: 4.5 MMOL/L (ref 3.4–5.3)
PROT SERPL-MCNC: 6.3 G/DL (ref 6.8–8.8)
RBC # BLD AUTO: 3.71 10E6/UL (ref 3.8–5.2)
SODIUM SERPL-SCNC: 136 MMOL/L (ref 133–144)
WBC # BLD AUTO: 3 10E3/UL (ref 4–11)

## 2022-03-16 PROCEDURE — 85610 PROTHROMBIN TIME: CPT | Performed by: INTERNAL MEDICINE

## 2022-03-16 PROCEDURE — 80053 COMPREHEN METABOLIC PANEL: CPT | Performed by: INTERNAL MEDICINE

## 2022-03-16 PROCEDURE — C1876 STENT, NON-COA/NON-COV W/DEL: HCPCS | Performed by: INTERNAL MEDICINE

## 2022-03-16 PROCEDURE — 999N000141 HC STATISTIC PRE-PROCEDURE NURSING ASSESSMENT: Performed by: INTERNAL MEDICINE

## 2022-03-16 PROCEDURE — C1726 CATH, BAL DIL, NON-VASCULAR: HCPCS | Performed by: INTERNAL MEDICINE

## 2022-03-16 PROCEDURE — 250N000009 HC RX 250: Performed by: INTERNAL MEDICINE

## 2022-03-16 PROCEDURE — 250N000025 HC SEVOFLURANE, PER MIN: Performed by: INTERNAL MEDICINE

## 2022-03-16 PROCEDURE — 85027 COMPLETE CBC AUTOMATED: CPT | Performed by: INTERNAL MEDICINE

## 2022-03-16 PROCEDURE — 250N000009 HC RX 250

## 2022-03-16 PROCEDURE — 36592 COLLECT BLOOD FROM PICC: CPT | Performed by: INTERNAL MEDICINE

## 2022-03-16 PROCEDURE — 258N000003 HC RX IP 258 OP 636

## 2022-03-16 PROCEDURE — 272N000001 HC OR GENERAL SUPPLY STERILE: Performed by: INTERNAL MEDICINE

## 2022-03-16 PROCEDURE — C1769 GUIDE WIRE: HCPCS | Performed by: INTERNAL MEDICINE

## 2022-03-16 PROCEDURE — 370N000017 HC ANESTHESIA TECHNICAL FEE, PER MIN: Performed by: INTERNAL MEDICINE

## 2022-03-16 PROCEDURE — 82962 GLUCOSE BLOOD TEST: CPT

## 2022-03-16 PROCEDURE — 710N000012 HC RECOVERY PHASE 2, PER MINUTE: Performed by: INTERNAL MEDICINE

## 2022-03-16 PROCEDURE — 999N000182 XR SURGERY CARM FLUORO GREATER THAN 5 MIN: Mod: TC

## 2022-03-16 PROCEDURE — 710N000010 HC RECOVERY PHASE 1, LEVEL 2, PER MIN: Performed by: INTERNAL MEDICINE

## 2022-03-16 PROCEDURE — 250N000011 HC RX IP 250 OP 636

## 2022-03-16 PROCEDURE — 360N000082 HC SURGERY LEVEL 2 W/ FLUORO, PER MIN: Performed by: INTERNAL MEDICINE

## 2022-03-16 PROCEDURE — 250N000011 HC RX IP 250 OP 636: Performed by: INTERNAL MEDICINE

## 2022-03-16 PROCEDURE — 255N000002 HC RX 255 OP 636: Performed by: INTERNAL MEDICINE

## 2022-03-16 DEVICE — STENT JOHLIN PANCREA WEDGE 10FRX22CM W/INTRO G26828
Type: IMPLANTABLE DEVICE | Site: BILE DUCT | Status: NON-FUNCTIONAL
Removed: 2022-05-16

## 2022-03-16 RX ORDER — TRAMADOL HYDROCHLORIDE 50 MG/1
50 TABLET ORAL EVERY 6 HOURS PRN
Qty: 10 TABLET | Refills: 0 | Status: SHIPPED | OUTPATIENT
Start: 2022-03-16 | End: 2022-03-19

## 2022-03-16 RX ORDER — FENTANYL CITRATE 50 UG/ML
25 INJECTION, SOLUTION INTRAMUSCULAR; INTRAVENOUS
Status: DISCONTINUED | OUTPATIENT
Start: 2022-03-16 | End: 2022-03-16 | Stop reason: HOSPADM

## 2022-03-16 RX ORDER — LEVOFLOXACIN 5 MG/ML
INJECTION, SOLUTION INTRAVENOUS PRN
Status: DISCONTINUED | OUTPATIENT
Start: 2022-03-16 | End: 2022-03-16

## 2022-03-16 RX ORDER — FENTANYL CITRATE 50 UG/ML
INJECTION, SOLUTION INTRAMUSCULAR; INTRAVENOUS PRN
Status: DISCONTINUED | OUTPATIENT
Start: 2022-03-16 | End: 2022-03-16

## 2022-03-16 RX ORDER — INDOMETHACIN 50 MG/1
SUPPOSITORY RECTAL PRN
Status: DISCONTINUED | OUTPATIENT
Start: 2022-03-16 | End: 2022-03-16 | Stop reason: HOSPADM

## 2022-03-16 RX ORDER — DIMENHYDRINATE 50 MG/ML
25 INJECTION, SOLUTION INTRAMUSCULAR; INTRAVENOUS
Status: DISCONTINUED | OUTPATIENT
Start: 2022-03-16 | End: 2022-03-16 | Stop reason: HOSPADM

## 2022-03-16 RX ORDER — OXYCODONE HYDROCHLORIDE 5 MG/1
5 TABLET ORAL EVERY 4 HOURS PRN
Status: DISCONTINUED | OUTPATIENT
Start: 2022-03-16 | End: 2022-03-16 | Stop reason: HOSPADM

## 2022-03-16 RX ORDER — ONDANSETRON 4 MG/1
4 TABLET, ORALLY DISINTEGRATING ORAL EVERY 6 HOURS PRN
Status: DISCONTINUED | OUTPATIENT
Start: 2022-03-16 | End: 2022-03-16 | Stop reason: HOSPADM

## 2022-03-16 RX ORDER — SODIUM CHLORIDE, SODIUM LACTATE, POTASSIUM CHLORIDE, CALCIUM CHLORIDE 600; 310; 30; 20 MG/100ML; MG/100ML; MG/100ML; MG/100ML
INJECTION, SOLUTION INTRAVENOUS CONTINUOUS
Status: DISCONTINUED | OUTPATIENT
Start: 2022-03-16 | End: 2022-03-16 | Stop reason: HOSPADM

## 2022-03-16 RX ORDER — LIDOCAINE 40 MG/G
CREAM TOPICAL
Status: DISCONTINUED | OUTPATIENT
Start: 2022-03-16 | End: 2022-03-16 | Stop reason: HOSPADM

## 2022-03-16 RX ORDER — ONDANSETRON 2 MG/ML
INJECTION INTRAMUSCULAR; INTRAVENOUS PRN
Status: DISCONTINUED | OUTPATIENT
Start: 2022-03-16 | End: 2022-03-16

## 2022-03-16 RX ORDER — FENTANYL CITRATE 50 UG/ML
25 INJECTION, SOLUTION INTRAMUSCULAR; INTRAVENOUS EVERY 5 MIN PRN
Status: DISCONTINUED | OUTPATIENT
Start: 2022-03-16 | End: 2022-03-16 | Stop reason: HOSPADM

## 2022-03-16 RX ORDER — ONDANSETRON 2 MG/ML
4 INJECTION INTRAMUSCULAR; INTRAVENOUS EVERY 30 MIN PRN
Status: DISCONTINUED | OUTPATIENT
Start: 2022-03-16 | End: 2022-03-16 | Stop reason: HOSPADM

## 2022-03-16 RX ORDER — NALOXONE HYDROCHLORIDE 0.4 MG/ML
0.2 INJECTION, SOLUTION INTRAMUSCULAR; INTRAVENOUS; SUBCUTANEOUS
Status: DISCONTINUED | OUTPATIENT
Start: 2022-03-16 | End: 2022-03-16 | Stop reason: HOSPADM

## 2022-03-16 RX ORDER — HYDROMORPHONE HCL IN WATER/PF 6 MG/30 ML
0.2 PATIENT CONTROLLED ANALGESIA SYRINGE INTRAVENOUS EVERY 5 MIN PRN
Status: DISCONTINUED | OUTPATIENT
Start: 2022-03-16 | End: 2022-03-16 | Stop reason: HOSPADM

## 2022-03-16 RX ORDER — EPINEPHRINE IN SOD CHLOR,ISO 1 MG/10 ML
SYRINGE (ML) INTRAVENOUS PRN
Status: DISCONTINUED | OUTPATIENT
Start: 2022-03-16 | End: 2022-03-16 | Stop reason: HOSPADM

## 2022-03-16 RX ORDER — NALOXONE HYDROCHLORIDE 0.4 MG/ML
0.4 INJECTION, SOLUTION INTRAMUSCULAR; INTRAVENOUS; SUBCUTANEOUS
Status: DISCONTINUED | OUTPATIENT
Start: 2022-03-16 | End: 2022-03-16 | Stop reason: HOSPADM

## 2022-03-16 RX ORDER — ONDANSETRON 4 MG/1
4 TABLET, ORALLY DISINTEGRATING ORAL EVERY 30 MIN PRN
Status: DISCONTINUED | OUTPATIENT
Start: 2022-03-16 | End: 2022-03-16 | Stop reason: HOSPADM

## 2022-03-16 RX ORDER — IOPAMIDOL 510 MG/ML
INJECTION, SOLUTION INTRAVASCULAR PRN
Status: DISCONTINUED | OUTPATIENT
Start: 2022-03-16 | End: 2022-03-16 | Stop reason: HOSPADM

## 2022-03-16 RX ORDER — DEXAMETHASONE SODIUM PHOSPHATE 4 MG/ML
INJECTION, SOLUTION INTRA-ARTICULAR; INTRALESIONAL; INTRAMUSCULAR; INTRAVENOUS; SOFT TISSUE PRN
Status: DISCONTINUED | OUTPATIENT
Start: 2022-03-16 | End: 2022-03-16

## 2022-03-16 RX ORDER — EPHEDRINE SULFATE 50 MG/ML
INJECTION, SOLUTION INTRAMUSCULAR; INTRAVENOUS; SUBCUTANEOUS PRN
Status: DISCONTINUED | OUTPATIENT
Start: 2022-03-16 | End: 2022-03-16

## 2022-03-16 RX ORDER — MEPERIDINE HYDROCHLORIDE 25 MG/ML
12.5 INJECTION INTRAMUSCULAR; INTRAVENOUS; SUBCUTANEOUS
Status: DISCONTINUED | OUTPATIENT
Start: 2022-03-16 | End: 2022-03-16 | Stop reason: HOSPADM

## 2022-03-16 RX ORDER — SODIUM CHLORIDE, SODIUM LACTATE, POTASSIUM CHLORIDE, CALCIUM CHLORIDE 600; 310; 30; 20 MG/100ML; MG/100ML; MG/100ML; MG/100ML
INJECTION, SOLUTION INTRAVENOUS CONTINUOUS PRN
Status: DISCONTINUED | OUTPATIENT
Start: 2022-03-16 | End: 2022-03-16

## 2022-03-16 RX ORDER — FLUMAZENIL 0.1 MG/ML
0.2 INJECTION, SOLUTION INTRAVENOUS
Status: DISCONTINUED | OUTPATIENT
Start: 2022-03-16 | End: 2022-03-16 | Stop reason: HOSPADM

## 2022-03-16 RX ORDER — PROPOFOL 10 MG/ML
INJECTION, EMULSION INTRAVENOUS PRN
Status: DISCONTINUED | OUTPATIENT
Start: 2022-03-16 | End: 2022-03-16

## 2022-03-16 RX ORDER — ONDANSETRON 2 MG/ML
4 INJECTION INTRAMUSCULAR; INTRAVENOUS EVERY 6 HOURS PRN
Status: DISCONTINUED | OUTPATIENT
Start: 2022-03-16 | End: 2022-03-16 | Stop reason: HOSPADM

## 2022-03-16 RX ORDER — LIDOCAINE HYDROCHLORIDE 20 MG/ML
INJECTION, SOLUTION INFILTRATION; PERINEURAL PRN
Status: DISCONTINUED | OUTPATIENT
Start: 2022-03-16 | End: 2022-03-16

## 2022-03-16 RX ADMIN — PHENYLEPHRINE HYDROCHLORIDE 100 MCG: 10 INJECTION INTRAVENOUS at 08:53

## 2022-03-16 RX ADMIN — PHENYLEPHRINE HYDROCHLORIDE 100 MCG: 10 INJECTION INTRAVENOUS at 09:21

## 2022-03-16 RX ADMIN — Medication 10 MG: at 08:57

## 2022-03-16 RX ADMIN — MIDAZOLAM 2 MG: 1 INJECTION INTRAMUSCULAR; INTRAVENOUS at 08:41

## 2022-03-16 RX ADMIN — LEVOFLOXACIN 500 MG: 5 INJECTION, SOLUTION INTRAVENOUS at 08:44

## 2022-03-16 RX ADMIN — FENTANYL CITRATE 100 MCG: 50 INJECTION, SOLUTION INTRAMUSCULAR; INTRAVENOUS at 08:45

## 2022-03-16 RX ADMIN — Medication 5 MG: at 08:49

## 2022-03-16 RX ADMIN — Medication 5 MG: at 09:25

## 2022-03-16 RX ADMIN — ROCURONIUM BROMIDE 10 MG: 50 INJECTION, SOLUTION INTRAVENOUS at 08:49

## 2022-03-16 RX ADMIN — PHENYLEPHRINE HYDROCHLORIDE 100 MCG: 10 INJECTION INTRAVENOUS at 09:18

## 2022-03-16 RX ADMIN — LIDOCAINE HYDROCHLORIDE 50 MG: 20 INJECTION, SOLUTION INFILTRATION; PERINEURAL at 08:49

## 2022-03-16 RX ADMIN — FENTANYL CITRATE 50 MCG: 50 INJECTION, SOLUTION INTRAMUSCULAR; INTRAVENOUS at 09:13

## 2022-03-16 RX ADMIN — DEXAMETHASONE SODIUM PHOSPHATE 8 MG: 4 INJECTION, SOLUTION INTRA-ARTICULAR; INTRALESIONAL; INTRAMUSCULAR; INTRAVENOUS; SOFT TISSUE at 09:05

## 2022-03-16 RX ADMIN — PHENYLEPHRINE HYDROCHLORIDE 100 MCG: 10 INJECTION INTRAVENOUS at 09:46

## 2022-03-16 RX ADMIN — PHENYLEPHRINE HYDROCHLORIDE 100 MCG: 10 INJECTION INTRAVENOUS at 09:25

## 2022-03-16 RX ADMIN — PROPOFOL 40 MG: 10 INJECTION, EMULSION INTRAVENOUS at 09:13

## 2022-03-16 RX ADMIN — SUGAMMADEX 200 MG: 100 INJECTION, SOLUTION INTRAVENOUS at 09:50

## 2022-03-16 RX ADMIN — PROPOFOL 150 MG: 10 INJECTION, EMULSION INTRAVENOUS at 08:44

## 2022-03-16 RX ADMIN — PHENYLEPHRINE HYDROCHLORIDE 100 MCG: 10 INJECTION INTRAVENOUS at 09:07

## 2022-03-16 RX ADMIN — Medication 60 MG: at 08:50

## 2022-03-16 RX ADMIN — SODIUM CHLORIDE, POTASSIUM CHLORIDE, SODIUM LACTATE AND CALCIUM CHLORIDE: 600; 310; 30; 20 INJECTION, SOLUTION INTRAVENOUS at 08:43

## 2022-03-16 RX ADMIN — ONDANSETRON 4 MG: 2 INJECTION INTRAMUSCULAR; INTRAVENOUS at 09:05

## 2022-03-16 RX ADMIN — PHENYLEPHRINE HYDROCHLORIDE 100 MCG: 10 INJECTION INTRAVENOUS at 08:55

## 2022-03-16 NOTE — ANESTHESIA POSTPROCEDURE EVALUATION
Patient: Melita Cortes    Procedure: Procedure(s):  ENDOSCOPIC RETROGRADE CHOLANGIOPANCREATOGRAPHY, bile duct stents exchanged, balloon dilation and sweepofbile ducts for sludge       Anesthesia Type:  General    Note:  Disposition: Outpatient   Postop Pain Control: Uneventful            Sign Out: Well controlled pain   PONV: No   Neuro/Psych: Uneventful            Sign Out: Acceptable/Baseline neuro status   Airway/Respiratory: Uneventful            Sign Out: Acceptable/Baseline resp. status   CV/Hemodynamics: Uneventful            Sign Out: Acceptable CV status; No obvious hypovolemia; No obvious fluid overload   Other NRE: NONE   DID A NON-ROUTINE EVENT OCCUR? No           Last vitals:  Vitals Value Taken Time   /72 03/16/22 1030   Temp     Pulse 78 03/16/22 1038   Resp 11 03/16/22 1038   SpO2 99 % 03/16/22 1038   Vitals shown include unvalidated device data.    Electronically Signed By: Livier Daniels MD  March 16, 2022  10:39 AM

## 2022-03-16 NOTE — ANESTHESIA PREPROCEDURE EVALUATION
Anesthesia Pre-Procedure Evaluation    Patient: Melita Cortes   MRN: [id number] : 1970        Procedure : Procedure(s):  ENDOSCOPIC RETROGRADE CHOLANGIOPANCREATOGRAPHY          Past Medical History:   Diagnosis Date     Alcoholic hepatitis      Asthma 3/10/2006     Cervical high risk HPV (human papillomavirus) test positive 2020    See problem list     Liver failure (H)       Past Surgical History:   Procedure Laterality Date     APPENDECTOMY       COLONOSCOPY N/A 2022    Procedure: COLONOSCOPY;  Surgeon: Zita Steele MD;  Location: [facility]     ENDOSCOPIC RETROGRADE CHOLANGIOPANCREATOGRAM N/A 2022    Procedure: ENDOSCOPIC RETROGRADE CHOLANGIOPANCREATOGRAPHY WITH BILIARY SPHINCTEROTOMY, SLUDGE REMOVAL, DILATION  AND STENT PLACEMENT;  Surgeon: Guru Gardenia Escobar MD;  Location: [facility]     ESOPHAGOGASTRODUODENOSCOPY, WITH BRUSHINGS N/A 10/29/2021    Procedure: ESOPHAGOGASTRODUODENOSCOPY, WITH BRUSHINGS;  Surgeon: Torey Ruiz MD;  Location: [facility]     IR LIVER BIOPSY PERCUTANEOUS  2022     TRANSPLANT LIVER RECIPIENT  DONOR N/A 2022    Procedure: TRANSPLANT, LIVER, RECIPIENT,  DONOR;  Surgeon: Pradeep Browne MD;  Location: [facility]      Allergies   Allergen Reactions     Latex      rash      Social History     Tobacco Use     Smoking status: Former Smoker     Quit date: 2020     Years since quittin.8     Smokeless tobacco: Never Used   Substance Use Topics     Alcohol use: Not Currently     Comment: Last drink 2021      Wt Readings from Last 1 Encounters:   22 56.8 kg (125 lb 3 oz)        Anesthesia Evaluation   Pt has had prior anesthetic. Type: General.    No history of anesthetic complications       ROS/MED HX  ENT/Pulmonary: Comment:   Former smoker, quit 2020    (+) tobacco use, Past use, asthma     Neurologic: Comment: H/o Hepatic encephalopathy. Now s/p DDLT      Cardiovascular: Comment:    - neg  cardiovascular ROS   (+) -----Previous cardiac testing   Echo: Date: 1/02/2022 Results:  LV size and function normal, LVEF 60-65%. RV normal size and function. AoV normal. Trace mitral and tricuspid insufficiency. RVSP 27mmHg above RA. PASP normal/.  Stress Test: Date: Results:    ECG Reviewed: Date: Results:    Cath: Date: Results:      METS/Exercise Tolerance: 3 - Able to walk 1-2 blocks without stopping    Hematologic:     (+) anemia,     Musculoskeletal:       GI/Hepatic: Comment:   * S/p Liver transplant 1/7/2022, complicated with rising liver tests. Underwent liver biopsy to rule out rejection.   * S/p ERCP + biliary stent. Now here for biliary stent exchange.   * Graft Function:Liver allograft: no rejection   * Immunosuppression: tacrolimus  *Tolerating regular diet with no abdominal pain and no nausea or emesis.    (+) hepatitis type Alcoholic, liver disease,     Renal/Genitourinary: Comment:   Kidney function improving., Creat ~0.94, GFR estimated >60    (+) renal disease, Pt does not require dialysis,     Endo:     (+) Obesity,     Psychiatric/Substance Use: Comment:   Alcohol abuse in remission.     (+) alcohol abuse     Infectious Disease: Comment:   COVID test negative 03/12/2022  Per chart, received only one dose of COVID vaccine 07/2021 (no records of vaccination in epic)      Malignancy:       Other:            Physical Exam    Airway  airway exam normal      Mallampati: II   TM distance: > 3 FB   Neck ROM: full   Mouth opening: > 3 cm    Respiratory Devices and Support         Dental  no notable dental history         Cardiovascular   cardiovascular exam normal          Pulmonary   pulmonary exam normal                OUTSIDE LABS:  CBC:   Lab Results   Component Value Date    WBC 2.7 (L) 03/14/2022    WBC 2.3 (L) 03/10/2022    HGB 9.3 (L) 03/14/2022    HGB 10.0 (L) 03/10/2022    HCT 28.1 (L) 03/14/2022    HCT 30.2 (L) 03/10/2022     03/14/2022     03/10/2022     BMP:   Lab Results    Component Value Date     03/14/2022     03/10/2022    POTASSIUM 4.5 03/14/2022    POTASSIUM 4.4 03/10/2022    CHLORIDE 104 03/14/2022    CHLORIDE 104 03/10/2022    CO2 25 03/14/2022    CO2 29 03/10/2022    BUN 25 03/14/2022    BUN 26 03/10/2022    CR 1.08 (H) 03/14/2022    CR 1.18 (H) 03/10/2022     (H) 03/14/2022     (H) 03/10/2022     COAGS:   Lab Results   Component Value Date    PTT 59 (H) 01/08/2022    INR 1.08 02/14/2022    FIBR 161 (L) 01/09/2022     POC:   Lab Results   Component Value Date    HCGS Negative 12/28/2021     HEPATIC:   Lab Results   Component Value Date    ALBUMIN 2.9 (L) 03/14/2022    PROTTOTAL 5.4 (L) 03/14/2022    ALT 20 03/14/2022    AST 13 03/14/2022    ALKPHOS 105 03/14/2022    BILITOTAL 0.5 03/14/2022    JENNIFFER 34 12/30/2021     OTHER:   Lab Results   Component Value Date    PH 7.44 01/08/2022    LACT 0.9 01/08/2022    A1C 5.2 01/08/2022    SHAI 9.1 03/14/2022    PHOS 4.5 03/14/2022    MAG 1.5 (L) 03/14/2022    LIPASE 30 (L) 02/14/2022    AMYLASE 18 (L) 02/14/2022    TSH 0.66 12/28/2021       Anesthesia Plan    ASA Status:  3   NPO Status:  NPO Appropriate    Anesthesia Type: General.     - Airway: ETT   Induction: Intravenous.   Maintenance: Balanced.   Techniques and Equipment:     - Airway: Video-Laryngoscope     - Lines/Monitors: BIS     Consents    Anesthesia Plan(s) and associated risks, benefits, and realistic alternatives discussed. Questions answered and patient/representative(s) expressed understanding.    - Discussed:     - Discussed with:  Patient      - Extended Intubation/Ventilatory Support Discussed: No.      - Patient is DNR/DNI Status: No    Use of blood products discussed: No .     Postoperative Care    Pain management: IV analgesics, Oral pain medications.   PONV prophylaxis: Ondansetron (or other 5HT-3), Dexamethasone or Solumedrol     Comments:           H&P reviewed: Unable to attach H&P to encounter due to EHR limitations. H&P Update:  appropriate H&P reviewed, patient examined. No interval changes since H&P (within 30 days).         Livier Daniels MD

## 2022-03-16 NOTE — ANESTHESIA PROCEDURE NOTES
Airway       Patient location during procedure: OR       Procedure Start/Stop Times: 3/16/2022 8:51 AM  Staff -        CRNA: Nehemias Harris APRN CRNA       Performed By: CRNA  Consent for Airway        Urgency: elective  Indications and Patient Condition       Indications for airway management: keith-procedural       Induction type:intravenous       Mask difficulty assessment: 1 - vent by mask    Final Airway Details       Final airway type: endotracheal airway       Successful airway: ETT - single  Endotracheal Airway Details        ETT size (mm): 7.0       Cuffed: yes       Successful intubation technique: direct laryngoscopy       DL Blade Type: [place] 2       Grade View of Cords: 1       Adjucts: stylet       Position: Right       Measured from: gums/teeth       Secured at (cm): 21       Bite block used: None    Post intubation assessment        Placement verified by: capnometry, equal breath sounds and chest rise        Number of attempts at approach: 1       Secured with: silk tape       Ease of procedure: easy       Dentition: Intact and Unchanged

## 2022-03-16 NOTE — DISCHARGE INSTRUCTIONS
[facility]  Same-Day Surgery   Adult Discharge Orders & Instructions     For 24 hours after surgery    1. Get plenty of rest.  A responsible adult must stay with you for at least 24 hours after you leave the hospital.   2. Do not drive or use heavy equipment.  If you have weakness or tingling, don't drive or use heavy equipment until this feeling goes away.  3. Do not drink alcohol.  4. Avoid strenuous or risky activities.  Ask for help when climbing stairs.   5. You may feel lightheaded.  IF so, sit for a few minutes before standing.  Have someone help you get up.   6. If you have nausea (feel sick to your stomach): Drink only clear liquids such as apple juice, ginger ale, broth or 7-Up.  Rest may also help.  Be sure to drink enough fluids.  Move to a regular diet as you feel able.  7. You may have a slight fever. Call the doctor if your fever is over 100 F (37.7 C) (taken under the tongue) or lasts longer than 24 hours.  8. You may have a dry mouth, a sore throat, muscle aches or trouble sleeping.  These should go away after 24 hours.  9. Do not make important or legal decisions.   Call your doctor for any of the followin.  Signs of infection (fever, growing tenderness at the surgery site, a large amount of drainage or bleeding, severe pain, foul-smelling drainage, redness, swelling).    2. It has been over 8 to 10 hours since surgery and you are still not able to urinate (pass water).    3.  Headache for over 24 hours.    To contact a doctor, call:  [ ] Dr. Guru Escobar @ [phone] (GI Clinic) or [phone] (Monday thru Friday 8:00am to 4:30pm)      [phone] and ask for the gastroenterology resident on call (answered 24 hours a day)      Emergency Department: [facility]: [phone]

## 2022-03-16 NOTE — ANESTHESIA CARE TRANSFER NOTE
Patient: Melita Cortes    Procedure: Procedure(s):  ENDOSCOPIC RETROGRADE CHOLANGIOPANCREATOGRAPHY, bile duct stents exchanged, balloon dilation and sweepofbile ducts for sludge       Diagnosis: Biliary stricture [K83.1]  Diagnosis Additional Information: No value filed.    Anesthesia Type:   General     Note:    Oropharynx: oropharynx clear of all foreign objects and spontaneously breathing  Level of Consciousness: awake  Oxygen Supplementation: nasal cannula  Level of Supplemental Oxygen (L/min / FiO2): 3  Independent Airway: airway patency satisfactory and stable  Dentition: dentition unchanged  Vital Signs Stable: post-procedure vital signs reviewed and stable  Report to RN Given: handoff report given  Patient transferred to: PACU    Handoff Report: Identifed the Patient, Identified the Reponsible Provider, Reviewed the pertinent medical history, Discussed the surgical course, Reviewed Intra-OP anesthesia mangement and issues during anesthesia, Set expectations for post-procedure period and Allowed opportunity for questions and acknowledgement of understanding      Vitals:  Vitals Value Taken Time   /61 03/16/22 1005   Temp     Pulse 90 03/16/22 1009   Resp 16 03/16/22 1009   SpO2 100 % 03/16/22 1009   Vitals shown include unvalidated device data.    Electronically Signed By: HAKAN Unger CRNA  March 16, 2022  10:10 AM

## 2022-03-17 ENCOUNTER — LAB REQUISITION (OUTPATIENT)
Dept: LAB | Facility: CLINIC | Age: 52
End: 2022-03-17
Payer: COMMERCIAL

## 2022-03-17 DIAGNOSIS — Z94.4 LIVER TRANSPLANT STATUS (H): ICD-10-CM

## 2022-03-17 DIAGNOSIS — Z79.899 OTHER LONG TERM (CURRENT) DRUG THERAPY: ICD-10-CM

## 2022-03-17 LAB
ALBUMIN SERPL-MCNC: 3 G/DL (ref 3.4–5)
ALP SERPL-CCNC: 98 U/L (ref 40–150)
ALT SERPL W P-5'-P-CCNC: 19 U/L (ref 0–50)
ANION GAP SERPL CALCULATED.3IONS-SCNC: 6 MMOL/L (ref 3–14)
AST SERPL W P-5'-P-CCNC: 10 U/L (ref 0–45)
BILIRUB DIRECT SERPL-MCNC: 0.4 MG/DL (ref 0–0.2)
BILIRUB SERPL-MCNC: 0.5 MG/DL (ref 0.2–1.3)
BUN SERPL-MCNC: 29 MG/DL (ref 7–30)
CALCIUM SERPL-MCNC: 8.8 MG/DL (ref 8.5–10.1)
CHLORIDE BLD-SCNC: 102 MMOL/L (ref 94–109)
CO2 SERPL-SCNC: 28 MMOL/L (ref 20–32)
CREAT SERPL-MCNC: 1.39 MG/DL (ref 0.52–1.04)
ERYTHROCYTE [DISTWIDTH] IN BLOOD BY AUTOMATED COUNT: 13.7 % (ref 10–15)
GFR SERPL CREATININE-BSD FRML MDRD: 46 ML/MIN/1.73M2
GLUCOSE BLD-MCNC: 114 MG/DL (ref 70–99)
HCT VFR BLD AUTO: 28.3 % (ref 35–47)
HGB BLD-MCNC: 9.4 G/DL (ref 11.7–15.7)
MAGNESIUM SERPL-MCNC: 1.6 MG/DL (ref 1.6–2.3)
MCH RBC QN AUTO: 30.3 PG (ref 26.5–33)
MCHC RBC AUTO-ENTMCNC: 33.2 G/DL (ref 31.5–36.5)
MCV RBC AUTO: 91 FL (ref 78–100)
PHOSPHATE SERPL-MCNC: 4 MG/DL (ref 2.5–4.5)
PLATELET # BLD AUTO: 198 10E3/UL (ref 150–450)
POTASSIUM BLD-SCNC: 4.4 MMOL/L (ref 3.4–5.3)
PROT SERPL-MCNC: 5.5 G/DL (ref 6.8–8.8)
RBC # BLD AUTO: 3.1 10E6/UL (ref 3.8–5.2)
SODIUM SERPL-SCNC: 136 MMOL/L (ref 133–144)
TACROLIMUS BLD-MCNC: 8.5 UG/L (ref 5–15)
TME LAST DOSE: NORMAL H
TME LAST DOSE: NORMAL H
WBC # BLD AUTO: 4.1 10E3/UL (ref 4–11)

## 2022-03-17 PROCEDURE — 84100 ASSAY OF PHOSPHORUS: CPT | Mod: ORL | Performed by: TRANSPLANT SURGERY

## 2022-03-17 PROCEDURE — 80053 COMPREHEN METABOLIC PANEL: CPT | Mod: ORL | Performed by: TRANSPLANT SURGERY

## 2022-03-17 PROCEDURE — 82248 BILIRUBIN DIRECT: CPT | Mod: ORL | Performed by: TRANSPLANT SURGERY

## 2022-03-17 PROCEDURE — 80197 ASSAY OF TACROLIMUS: CPT | Mod: ORL | Performed by: TRANSPLANT SURGERY

## 2022-03-17 PROCEDURE — 85027 COMPLETE CBC AUTOMATED: CPT | Mod: ORL | Performed by: TRANSPLANT SURGERY

## 2022-03-17 PROCEDURE — 83735 ASSAY OF MAGNESIUM: CPT | Mod: ORL | Performed by: TRANSPLANT SURGERY

## 2022-03-18 ENCOUNTER — MEDICAL CORRESPONDENCE (OUTPATIENT)
Dept: HEALTH INFORMATION MANAGEMENT | Facility: CLINIC | Age: 52
End: 2022-03-18
Payer: COMMERCIAL

## 2022-03-21 ENCOUNTER — LAB (OUTPATIENT)
Dept: LAB | Facility: CLINIC | Age: 52
End: 2022-03-21
Payer: COMMERCIAL

## 2022-03-21 ENCOUNTER — PATIENT OUTREACH (OUTPATIENT)
Dept: GASTROENTEROLOGY | Facility: CLINIC | Age: 52
End: 2022-03-21

## 2022-03-21 ENCOUNTER — PREP FOR PROCEDURE (OUTPATIENT)
Dept: GASTROENTEROLOGY | Facility: CLINIC | Age: 52
End: 2022-03-21

## 2022-03-21 DIAGNOSIS — Z46.89 ENCOUNTER FOR REMOVAL OF BILIARY STENT: Primary | ICD-10-CM

## 2022-03-21 DIAGNOSIS — Z94.4 LIVER REPLACED BY TRANSPLANT (H): ICD-10-CM

## 2022-03-21 LAB
ALBUMIN SERPL-MCNC: 3.3 G/DL (ref 3.4–5)
ALP SERPL-CCNC: 101 U/L (ref 40–150)
ALT SERPL W P-5'-P-CCNC: 19 U/L (ref 0–50)
ANION GAP SERPL CALCULATED.3IONS-SCNC: 6 MMOL/L (ref 3–14)
AST SERPL W P-5'-P-CCNC: 12 U/L (ref 0–45)
BILIRUB DIRECT SERPL-MCNC: 0.4 MG/DL (ref 0–0.2)
BILIRUB SERPL-MCNC: 0.6 MG/DL (ref 0.2–1.3)
BUN SERPL-MCNC: 17 MG/DL (ref 7–30)
CALCIUM SERPL-MCNC: 9.2 MG/DL (ref 8.5–10.1)
CHLORIDE BLD-SCNC: 103 MMOL/L (ref 94–109)
CO2 SERPL-SCNC: 26 MMOL/L (ref 20–32)
CREAT SERPL-MCNC: 1.05 MG/DL (ref 0.52–1.04)
ERYTHROCYTE [DISTWIDTH] IN BLOOD BY AUTOMATED COUNT: 13.8 % (ref 10–15)
GFR SERPL CREATININE-BSD FRML MDRD: 64 ML/MIN/1.73M2
GLUCOSE BLD-MCNC: 106 MG/DL (ref 70–99)
HCT VFR BLD AUTO: 32.7 % (ref 35–47)
HGB BLD-MCNC: 10.7 G/DL (ref 11.7–15.7)
MAGNESIUM SERPL-MCNC: 1.3 MG/DL (ref 1.6–2.3)
MCH RBC QN AUTO: 29.7 PG (ref 26.5–33)
MCHC RBC AUTO-ENTMCNC: 32.7 G/DL (ref 31.5–36.5)
MCV RBC AUTO: 91 FL (ref 78–100)
PHOSPHATE SERPL-MCNC: 4.6 MG/DL (ref 2.5–4.5)
PLATELET # BLD AUTO: 230 10E3/UL (ref 150–450)
POTASSIUM BLD-SCNC: 4.7 MMOL/L (ref 3.4–5.3)
PROT SERPL-MCNC: 6 G/DL (ref 6.8–8.8)
RBC # BLD AUTO: 3.6 10E6/UL (ref 3.8–5.2)
SODIUM SERPL-SCNC: 135 MMOL/L (ref 133–144)
TACROLIMUS BLD-MCNC: 11.6 UG/L (ref 5–15)
TME LAST DOSE: NORMAL H
TME LAST DOSE: NORMAL H
WBC # BLD AUTO: 3.3 10E3/UL (ref 4–11)

## 2022-03-21 PROCEDURE — 80053 COMPREHEN METABOLIC PANEL: CPT

## 2022-03-21 PROCEDURE — 80197 ASSAY OF TACROLIMUS: CPT

## 2022-03-21 PROCEDURE — 85027 COMPLETE CBC AUTOMATED: CPT

## 2022-03-21 PROCEDURE — 36415 COLL VENOUS BLD VENIPUNCTURE: CPT

## 2022-03-21 PROCEDURE — 83735 ASSAY OF MAGNESIUM: CPT

## 2022-03-21 PROCEDURE — 82248 BILIRUBIN DIRECT: CPT

## 2022-03-21 PROCEDURE — 84100 ASSAY OF PHOSPHORUS: CPT

## 2022-03-21 NOTE — TELEPHONE ENCOUNTER
Post ERCP (3/16/22) with Dr. Escobar: Follow-up    Post procedure recommendations:     Will recommend repeat ERCP to re-evaluate the  anastamosis and stent removal if feasible in 6-8 weeks     - If patient develops fever, chills, jaundice or   passes dark urine or has worsening of LFTs before   scheduled ERCP, will recommend patient to call us   immediately for consideration of an earlier urgent ERC      Orders placed:   Please assist in scheduling:     Procedure/Imaging/Clinic: ERCP  Physician: Dr. Escobar  Timin-8 weeks  Procedure length:provider average  Anesthesia:general  Dx: biliary stent removal  Tier:2  Location: [facility]       Comments:     Patient states: left message    Clinic contact and scheduling numbers verified for future questions/concerns.    Bronwyn Payne RN Care Coordinator

## 2022-03-22 DIAGNOSIS — Z94.4 STATUS POST LIVER TRANSPLANTATION (H): ICD-10-CM

## 2022-03-22 RX ORDER — TACROLIMUS 1 MG/1
3 CAPSULE ORAL EVERY 12 HOURS
Qty: 540 CAPSULE | Refills: 3 | Status: SHIPPED | OUTPATIENT
Start: 2022-03-22 | End: 2022-05-17

## 2022-03-22 NOTE — TELEPHONE ENCOUNTER
ISSUE:   Tacrolimus IR level 11.6 on 3/21, goal8-10, dose 4 mg AM and 3 mg pM.    PLAN:   Please call patient and confirm this was an accurate 12-hour trough. Verify Tacrolimus IR dose 4 mg Am and 3 mg PM. Confirm no new medications or illness. Confirm no missed doses. If accurate trough and accurate dose, decrease Tacrolimus IR dose to 3 mg BID and repeat labs in 1 week.    OUTCOME:   Spoke with patient, they confirm accurate trough level and current dose 4 mg am, 3 mg pm. Patient confirmed dose change to 3 mg BID and to repeat labs in 1 weeks. Orders sent to preferred pharmacy for dose change and lab for repeat labs. Patient left VM understanding of plan.    Soumya Wren LPN

## 2022-03-22 NOTE — TELEPHONE ENCOUNTER
Patient Call: Voicemail  Date/Time: 03/22 10:10    Reason for call: Patient is returning missed call. She reports she will change medication dose as directed.

## 2022-03-23 ENCOUNTER — OFFICE VISIT (OUTPATIENT)
Dept: TRANSPLANT | Facility: CLINIC | Age: 52
End: 2022-03-23
Attending: TRANSPLANT SURGERY
Payer: COMMERCIAL

## 2022-03-23 VITALS — HEART RATE: 73 BPM | SYSTOLIC BLOOD PRESSURE: 118 MMHG | DIASTOLIC BLOOD PRESSURE: 81 MMHG | OXYGEN SATURATION: 99 %

## 2022-03-23 DIAGNOSIS — Z94.4 LIVER REPLACED BY TRANSPLANT (H): ICD-10-CM

## 2022-03-23 PROCEDURE — 99213 OFFICE O/P EST LOW 20 MIN: CPT | Mod: 24 | Performed by: SURGERY

## 2022-03-23 RX ORDER — FUROSEMIDE 20 MG
40 TABLET ORAL DAILY
Qty: 14 TABLET | Refills: 0 | Status: SHIPPED | OUTPATIENT
Start: 2022-03-23 | End: 2022-03-31

## 2022-03-23 NOTE — LETTER
"3/23/2022     RE: Melita Cortes  [address]  [facility] [address]    Dear Colleague,    Thank you for referring your patient, Melita Cortes, to the [facility] TRANSPLANT [facility]. Please see a copy of my visit note below.    Transplant Surgery -OUTPATIENT IMMUNOSUPPRESSION PROGRESS NOTE    Date of Visit: 03/23/2022    Transplants:  1/7/2022 (Liver);   ASSESMENT AND PLAN:  1.Graft Function:Liver allograft: no rejection   2.Immunosuppression Management:keep tacrolimus levels at 8-10  3.Hypertension:ok  4.Renal Function:better  5.Lab frequency: weekly  6.Other:  Has some \"neuropathty/tingling of feet\" would recommend thiamine supplements  Complaining of tennis elbow like pain in her left elbow- will consult PT    15 minutes spent on the date of the encounter in chart review, patient visit, review of tests, documentation and/or discussion with other providers about the issues documented above.    Jair Stephenson MD    Date:  03/23/2022  Transplant:  [x]                             Liver [x]                              Kidney []                             Pancreas []                              Other:             Chief Complaint:  Eating well, Complaining of tennis elbow like pain in her left elbow     History of Present Illness:  Patient Active Problem List   Diagnosis     Abdominal bloating     Family history of pancreatic cancer     Transaminitis     Macrocytosis     Cervical high risk HPV (human papillomavirus) test positive     Anemia, unspecified type     Elevated serum creatinine     Alcoholic cirrhosis of liver with ascites (H)     Hyponatremia     Malaise and fatigue     At high risk for severe sepsis     Symptomatic anemia     Bandemia     Hyperlipidemia     Anxiety     Status post liver transplantation (H)     Immunosuppressed status (H)     Steroid-induced hyperglycemia     Hypervolemia     Severe malnutrition (H)     Elevated alkaline phosphatase level     SOCIAL /FAMILY " HISTORY: [x]                  No recent change    Past Medical History:   Diagnosis Date     Alcoholic hepatitis      Asthma 3/10/2006     Cervical high risk HPV (human papillomavirus) test positive 2020    See problem list     Liver failure (H)      Past Surgical History:   Procedure Laterality Date     APPENDECTOMY       COLONOSCOPY N/A 2022    Procedure: COLONOSCOPY;  Surgeon: Zita Steele MD;  Location: [facility]     ENDOSCOPIC RETROGRADE CHOLANGIOPANCREATOGRAM N/A 2022    Procedure: ENDOSCOPIC RETROGRADE CHOLANGIOPANCREATOGRAPHY WITH BILIARY SPHINCTEROTOMY, SLUDGE REMOVAL, DILATION  AND STENT PLACEMENT;  Surgeon: Guru Gardenia Escobar MD;  Location: [facility]     ENDOSCOPIC RETROGRADE CHOLANGIOPANCREATOGRAPHY, EXCHANGE TUBE/STENT N/A 3/16/2022    Procedure: ENDOSCOPIC RETROGRADE CHOLANGIOPANCREATOGRAPHY, bile duct stents exchanged, balloon dilation and sweep of bile ducts for sludge;  Surgeon: Guru Gardenia Escobar MD;  Location: [facility]     ESOPHAGOGASTRODUODENOSCOPY, WITH BRUSHINGS N/A 10/29/2021    Procedure: ESOPHAGOGASTRODUODENOSCOPY, WITH BRUSHINGS;  Surgeon: Torey Ruiz MD;  Location:  GI     IR LIVER BIOPSY PERCUTANEOUS  2022     TRANSPLANT LIVER RECIPIENT  DONOR N/A 2022    Procedure: TRANSPLANT, LIVER, RECIPIENT,  DONOR;  Surgeon: Pradeep Browne MD;  Location:  OR     Social History     Socioeconomic History     Marital status:      Spouse name: Not on file     Number of children: Not on file     Years of education: Not on file     Highest education level: Not on file   Occupational History     Not on file   Tobacco Use     Smoking status: Former Smoker     Quit date: 2020     Years since quittin.9     Smokeless tobacco: Never Used   Vaping Use     Vaping Use: Never used   Substance and Sexual Activity     Alcohol use: Not Currently     Comment: Last drink 2021     Drug use: Never     Sexual  activity: Not Currently     Partners: Male   Other Topics Concern     Parent/sibling w/ CABG, MI or angioplasty before 65F 55M? Not Asked   Social History Narrative     Not on file     Social Determinants of Health     Financial Resource Strain: Not on file   Food Insecurity: Not on file   Transportation Needs: Not on file   Physical Activity: Not on file   Stress: Not on file   Social Connections: Not on file   Intimate Partner Violence: Not on file   Housing Stability: Not on file     Prescription Medications as of 3/28/2022       Rx Number Disp Refills Start End Last Dispensed Date Next Fill Date Owning Pharmacy    aspirin (ASA) 81 MG chewable tablet  90 tablet 0 1/14/2022    [facility] - [place], [place] - [address]    Sig: Take 1 tablet (81 mg) by mouth daily    Class: E-Prescribe    Route: Oral    calcium carbonate (OS-SHAI) 1500 (600 Ca) MG tablet  60 tablet 3 2/21/2022        Sig: Take 1 tablet (600 mg) by mouth daily    Class: Local Print    Route: Oral    furosemide (LASIX) 20 MG tablet  14 tablet 0 3/23/2022        Sig: Take 2 tablets (40 mg) by mouth daily    Class: Local Print    Route: Oral    magnesium oxide (MAG-OX) 400 MG tablet  180 tablet 3 1/29/2022    [id number] - [place], [place] - [address] AT [facility] [address] [facility] [address]    Sig: Take 2 tablets (800 mg) by mouth 2 times daily    Class: E-Prescribe    Route: Oral    multivitamin (CENTRUM SILVER) tablet            Sig: Take 1 tablet by mouth daily    Class: Historical    Route: Oral    mycophenolic acid (GENERIC EQUIVALENT) 180 MG EC tablet  180 tablet 1 2/18/2022    [facility] - [place], [place] - [address]    Sig: Take 3 tablets (540 mg) by mouth 2 times daily Deliver 2/18    Class: E-Prescribe    Notes to Pharmacy: TXP DT 1/7/2022 (Liver) TXP Dischg DT 1/14/2022 DX Liver replaced by transplant Z94.4 TX [facility]  ([place], [place])    Route: Oral    pantoprazole (PROTONIX) 40 MG EC tablet  90 tablet 3 1/18/2022    [facility] [address]     Sig: Take 1 tablet (40 mg) by mouth daily    Class: E-Prescribe    Route: Oral    sennosides (SENOKOT) 8.6 MG tablet  60 tablet 0 1/14/2022    [facility] - [place], [place] - [address] [place] [address] [address]    Sig: Take 1-2 tablets by mouth 2 times daily Take while taking oxycodone, HOLD for loose stools    Class: E-Prescribe    Route: Oral    sulfamethoxazole-trimethoprim (BACTRIM) 400-80 MG tablet  90 tablet 3 1/18/2022    [facility] [address] [facility]    Sig: Take 1 tablet by mouth daily    Class: E-Prescribe    Route: Oral    tacrolimus (GENERIC EQUIVALENT) 0.5 MG capsule  60 capsule 1 1/14/2022    [facility] - [place], [place] - [address] [place] [address] [address]    Sig: Take 1 capsule by mouth twice daily as directed by transplant team for dose titration    Class: E-Prescribe    tacrolimus (GENERIC EQUIVALENT) 1 MG capsule  540 capsule 3 3/22/2022    [facility], [place] - [address] [place] [address]    Sig: Take 3 capsules (3 mg) by mouth every 12 hours    Class: E-Prescribe    Notes to Pharmacy: Dose change    Route: Oral    thiamine (B-1) 100 MG tablet  30 tablet 1 1/29/2022    [facility] [address] [place] [address] [address] AT [facility] [address]    Sig: Take 1 tablet (100 mg) by mouth daily    Class: E-Prescribe    Route: Oral    traMADol (ULTRAM) 50 MG tablet  8 tablet 0 2/5/2022    [facility] [address] [place], [place] [address] AT [facility] & [facility] [address]    Sig: Take 1 tablet (50 mg) by mouth every 8 hours as needed for severe pain Script called in    Class: E-Prescribe    Route: Oral    tretinoin (RETIN-A) 0.025 % external cream  45 g 11 12/10/2021    [facility] [address] [place] [address] LN [address]  AT [facility] [place] & [facility] [address]    Sig: Apply a pea size to entire face QD    Class: E-Prescribe    ursodiol (ACTIGALL) 250 MG tablet  60 tablet 1 2/18/2022    [facility] - [place], [place] - [address]    Sig: Take 1 tablet (250 mg) by mouth 2 times daily    Class: E-Prescribe    Route: Oral    valGANciclovir (VALCYTE) 450 MG tablet  90 tablet 3 1/18/2022    [facility] - [place], [place] - [address]    Sig: Take 1 tablet (450 mg) by mouth daily    Class: E-Prescribe    Route: Oral        Latex   REVIEW OF SYSTEMS (check box if normal)  [x]               GENERAL  [x]                 PULMONARY [x]                GENITOURINARY  [x]                CNS                 [x]                 CARDIAC  [x]                 ENDOCRINE  [x]                EARS,NOSE,THROAT [x]                 GASTROINTESTINAL [x]                 NEUROLOGIC    [x]                MUSCLOSKELTAL  [x]                  HEMATOLOGY      PHYSICAL EXAM (check box if normal)/81   Pulse 73   SpO2 99%   Breastfeeding No         [x]            GENERAL:    [x]       EYES:  ICTERIC   []        YES  []                    NO  [x]           EXTREMITIES: Clubbing []       Y     [x]           N    [x]           EARS, NOSE, THROAT: Membranes Moist    YES   [x]                   NO []                  [x]           LUNGS:  CLEAR    YES       [x]                  NO    []                                [x]           SKIN: Jaundice           YES       []                  NO    [x]                   Rash: YES       []                  NO    [x]                                     [x]             HEART: Regular Rate          YES       [x]                  NO    []                   Incision Clean:  YES       [x]                  NO    []                                [x]                    ABDOMEN: Organomegaly YES       []                  NO    [x]                       [x]                     NEUROLOGICAL:  Nonfocal  YES       [x]                  NO    []                       [x]                    Hernia YES       []                  NO    [x]                   PSYCHIATRIC:  Appropriate  YES       [x]                  NO    []                       Again, thank you for allowing me to participate in the care of your patient.      Sincerely,    Jair Stephenson MD

## 2022-03-24 ENCOUNTER — TELEPHONE (OUTPATIENT)
Dept: TRANSPLANT | Facility: CLINIC | Age: 52
End: 2022-03-24
Payer: COMMERCIAL

## 2022-03-24 ENCOUNTER — LAB (OUTPATIENT)
Dept: URGENT CARE | Facility: URGENT CARE | Age: 52
End: 2022-03-24
Attending: TRANSPLANT SURGERY
Payer: COMMERCIAL

## 2022-03-24 DIAGNOSIS — Z94.4 LIVER REPLACED BY TRANSPLANT (H): Primary | ICD-10-CM

## 2022-03-24 DIAGNOSIS — J02.9 SORE THROAT: ICD-10-CM

## 2022-03-24 DIAGNOSIS — Z94.4 LIVER REPLACED BY TRANSPLANT (H): ICD-10-CM

## 2022-03-24 DIAGNOSIS — R09.89 RUNNY NOSE: ICD-10-CM

## 2022-03-24 LAB — SARS-COV-2 RNA RESP QL NAA+PROBE: NEGATIVE

## 2022-03-24 PROCEDURE — U0003 INFECTIOUS AGENT DETECTION BY NUCLEIC ACID (DNA OR RNA); SEVERE ACUTE RESPIRATORY SYNDROME CORONAVIRUS 2 (SARS-COV-2) (CORONAVIRUS DISEASE [COVID-19]), AMPLIFIED PROBE TECHNIQUE, MAKING USE OF HIGH THROUGHPUT TECHNOLOGIES AS DESCRIBED BY CMS-2020-01-R: HCPCS

## 2022-03-24 PROCEDURE — U0005 INFEC AGEN DETEC AMPLI PROBE: HCPCS

## 2022-03-24 NOTE — TELEPHONE ENCOUNTER
Pt not feeling well at all. Pt's  thought he had a cold. Pt now feels really run down, runny nose, sore throat. Using cough drops to soothe throat. No shortness of breathe. Pt to have covid test completed.

## 2022-03-28 ENCOUNTER — LAB (OUTPATIENT)
Dept: LAB | Facility: CLINIC | Age: 52
End: 2022-03-28
Payer: COMMERCIAL

## 2022-03-28 DIAGNOSIS — Z94.4 LIVER REPLACED BY TRANSPLANT (H): ICD-10-CM

## 2022-03-28 LAB
ALBUMIN SERPL-MCNC: 3.3 G/DL (ref 3.4–5)
ALP SERPL-CCNC: 85 U/L (ref 40–150)
ALT SERPL W P-5'-P-CCNC: 23 U/L (ref 0–50)
ANION GAP SERPL CALCULATED.3IONS-SCNC: 4 MMOL/L (ref 3–14)
AST SERPL W P-5'-P-CCNC: 17 U/L (ref 0–45)
BILIRUB DIRECT SERPL-MCNC: 0.3 MG/DL (ref 0–0.2)
BILIRUB SERPL-MCNC: 0.6 MG/DL (ref 0.2–1.3)
BUN SERPL-MCNC: 22 MG/DL (ref 7–30)
CALCIUM SERPL-MCNC: 9.5 MG/DL (ref 8.5–10.1)
CHLORIDE BLD-SCNC: 103 MMOL/L (ref 94–109)
CO2 SERPL-SCNC: 30 MMOL/L (ref 20–32)
CREAT SERPL-MCNC: 1.37 MG/DL (ref 0.52–1.04)
ERYTHROCYTE [DISTWIDTH] IN BLOOD BY AUTOMATED COUNT: 13.6 % (ref 10–15)
GFR SERPL CREATININE-BSD FRML MDRD: 47 ML/MIN/1.73M2
GLUCOSE BLD-MCNC: 119 MG/DL (ref 70–99)
HCT VFR BLD AUTO: 32.9 % (ref 35–47)
HGB BLD-MCNC: 10.6 G/DL (ref 11.7–15.7)
MAGNESIUM SERPL-MCNC: 1.7 MG/DL (ref 1.6–2.3)
MCH RBC QN AUTO: 29.7 PG (ref 26.5–33)
MCHC RBC AUTO-ENTMCNC: 32.2 G/DL (ref 31.5–36.5)
MCV RBC AUTO: 92 FL (ref 78–100)
PHOSPHATE SERPL-MCNC: 4.9 MG/DL (ref 2.5–4.5)
PLATELET # BLD AUTO: 244 10E3/UL (ref 150–450)
POTASSIUM BLD-SCNC: 4.5 MMOL/L (ref 3.4–5.3)
PROT SERPL-MCNC: 6.5 G/DL (ref 6.8–8.8)
RBC # BLD AUTO: 3.57 10E6/UL (ref 3.8–5.2)
SODIUM SERPL-SCNC: 137 MMOL/L (ref 133–144)
TACROLIMUS BLD-MCNC: 8.6 UG/L (ref 5–15)
TME LAST DOSE: NORMAL H
TME LAST DOSE: NORMAL H
WBC # BLD AUTO: 2.9 10E3/UL (ref 4–11)

## 2022-03-28 PROCEDURE — 84100 ASSAY OF PHOSPHORUS: CPT

## 2022-03-28 PROCEDURE — 85027 COMPLETE CBC AUTOMATED: CPT

## 2022-03-28 PROCEDURE — 80053 COMPREHEN METABOLIC PANEL: CPT

## 2022-03-28 PROCEDURE — 80197 ASSAY OF TACROLIMUS: CPT

## 2022-03-28 PROCEDURE — 83735 ASSAY OF MAGNESIUM: CPT

## 2022-03-28 PROCEDURE — 36415 COLL VENOUS BLD VENIPUNCTURE: CPT

## 2022-03-28 PROCEDURE — 82248 BILIRUBIN DIRECT: CPT

## 2022-03-28 NOTE — PROGRESS NOTES
"Transplant Surgery -OUTPATIENT IMMUNOSUPPRESSION PROGRESS NOTE    Date of Visit: 03/23/2022    Transplants:  1/7/2022 (Liver);   ASSESMENT AND PLAN:  1.Graft Function:Liver allograft: no rejection   2.Immunosuppression Management:keep tacrolimus levels at 8-10  3.Hypertension:ok  4.Renal Function:better  5.Lab frequency: weekly  6.Other:  Has some \"neuropathty/tingling of feet\" would recommend thiamine supplements  Complaining of tennis elbow like pain in her left elbow- will consult PT    15 minutes spent on the date of the encounter in chart review, patient visit, review of tests, documentation and/or discussion with other providers about the issues documented above.    Jair Stephenson MD    Date:  03/23/2022  Transplant:  [x]                             Liver [x]                              Kidney []                             Pancreas []                              Other:             Chief Complaint:  Eating well, Complaining of tennis elbow like pain in her left elbow     History of Present Illness:  Patient Active Problem List   Diagnosis     Abdominal bloating     Family history of pancreatic cancer     Transaminitis     Macrocytosis     Cervical high risk HPV (human papillomavirus) test positive     Anemia, unspecified type     Elevated serum creatinine     Alcoholic cirrhosis of liver with ascites (H)     Hyponatremia     Malaise and fatigue     At high risk for severe sepsis     Symptomatic anemia     Bandemia     Hyperlipidemia     Anxiety     Status post liver transplantation (H)     Immunosuppressed status (H)     Steroid-induced hyperglycemia     Hypervolemia     Severe malnutrition (H)     Elevated alkaline phosphatase level     SOCIAL /FAMILY HISTORY: [x]                  No recent change    Past Medical History:   Diagnosis Date     Alcoholic hepatitis      Asthma 3/10/2006     Cervical high risk HPV (human papillomavirus) test positive 2019, 2020    See problem list     Liver failure (H)      Past " Surgical History:   Procedure Laterality Date     APPENDECTOMY       COLONOSCOPY N/A 2022    Procedure: COLONOSCOPY;  Surgeon: iZta Steele MD;  Location: [facility]     ENDOSCOPIC RETROGRADE CHOLANGIOPANCREATOGRAM N/A 2022    Procedure: ENDOSCOPIC RETROGRADE CHOLANGIOPANCREATOGRAPHY WITH BILIARY SPHINCTEROTOMY, SLUDGE REMOVAL, DILATION  AND STENT PLACEMENT;  Surgeon: Guru Gardenia Escobar MD;  Location: [facility]     ENDOSCOPIC RETROGRADE CHOLANGIOPANCREATOGRAPHY, EXCHANGE TUBE/STENT N/A 3/16/2022    Procedure: ENDOSCOPIC RETROGRADE CHOLANGIOPANCREATOGRAPHY, bile duct stents exchanged, balloon dilation and sweep of bile ducts for sludge;  Surgeon: Guru Gardenia Escobar MD;  [facility]     ESOPHAGOGASTRODUODENOSCOPY, WITH BRUSHINGS N/A 10/29/2021    Procedure: ESOPHAGOGASTRODUODENOSCOPY, WITH BRUSHINGS;  Surgeon: Torey Ruiz MD;  Location:  GI     IR LIVER BIOPSY PERCUTANEOUS  2022     TRANSPLANT LIVER RECIPIENT  DONOR N/A 2022    Procedure: TRANSPLANT, LIVER, RECIPIENT,  DONOR;  Surgeon: Pradeep Browne MD;  Location: [facility]     Social History     Socioeconomic History     Marital status:      Spouse name: Not on file     Number of children: Not on file     Years of education: Not on file     Highest education level: Not on file   Occupational History     Not on file   Tobacco Use     Smoking status: Former Smoker     Quit date: 2020     Years since quittin.9     Smokeless tobacco: Never Used   Vaping Use     Vaping Use: Never used   Substance and Sexual Activity     Alcohol use: Not Currently     Comment: Last drink 2021     Drug use: Never     Sexual activity: Not Currently     Partners: Male   Other Topics Concern     Parent/sibling w/ CABG, MI or angioplasty before 65F 55M? Not Asked   Social History Narrative     Not on file     Social Determinants of Health     Financial Resource Strain: Not on file    Food Insecurity: Not on file   Transportation Needs: Not on file   Physical Activity: Not on file   Stress: Not on file   Social Connections: Not on file   Intimate Partner Violence: Not on file   Housing Stability: Not on file     Prescription Medications as of 3/28/2022       Rx Number Disp Refills Start End Last Dispensed Date Next Fill Date Owning Pharmacy    aspirin (ASA) 81 MG chewable tablet  90 tablet 0 1/14/2022    [facility] - [place], [place] - [address] [facility]    Sig: Take 1 tablet (81 mg) by mouth daily    Class: E-Prescribe    Route: Oral    calcium carbonate (OS-SHAI) 1500 (600 Ca) MG tablet  60 tablet 3 2/21/2022        Sig: Take 1 tablet (600 mg) by mouth daily    Class: Local Print    Route: Oral    furosemide (LASIX) 20 MG tablet  14 tablet 0 3/23/2022        Sig: Take 2 tablets (40 mg) by mouth daily    Class: Local Print    Route: Oral    magnesium oxide (MAG-OX) 400 MG tablet  180 tablet 3 1/29/2022    [facility] #[id number] [address] AT [facility] [address]    Sig: Take 2 tablets (800 mg) by mouth 2 times daily    Class: E-Prescribe    Route: Oral    multivitamin (CENTRUM SILVER) tablet            Sig: Take 1 tablet by mouth daily    Class: Historical    Route: Oral    mycophenolic acid (GENERIC EQUIVALENT) 180 MG EC tablet  180 tablet 1 2/18/2022    [facility] - [place], [place] - [address]    Sig: Take 3 tablets (540 mg) by mouth 2 times daily Deliver 2/18    Class: E-Prescribe    Notes to Pharmacy: TXP DT 1/7/2022 (Liver) TXP Dischg DT 1/14/2022 DX Liver replaced by transplant Z94.4 TX Center [facility] ([place], [place])    Route: Oral    pantoprazole (PROTONIX) 40 MG EC tablet  90 tablet 3 1/18/2022    [facility] - [place], [place] - [facility] [address]    Sig: Take 1 tablet (40 mg) by mouth daily    Class: E-Prescribe    Route: Oral     sennosides (SENOKOT) 8.6 MG tablet  60 tablet 0 1/14/2022    [facility] - [place], [place] - [address] [facility] [address]    Sig: Take 1-2 tablets by mouth 2 times daily Take while taking oxycodone, HOLD for loose stools    Class: E-Prescribe    Route: Oral    sulfamethoxazole-trimethoprim (BACTRIM) 400-80 MG tablet  90 tablet 3 1/18/2022    [facility] [place], [place] - [address] [place] [address] [address]    Sig: Take 1 tablet by mouth daily    Class: E-Prescribe    Route: Oral    tacrolimus (GENERIC EQUIVALENT) 0.5 MG capsule  60 capsule 1 1/14/2022    [facility] - [place], [place] - [address] [facility] [address]    Sig: Take 1 capsule by mouth twice daily as directed by transplant team for dose titration    Class: E-Prescribe    tacrolimus (GENERIC EQUIVALENT) 1 MG capsule  540 capsule 3 3/22/2022    [facility] [place], [place] - [address] [place] [address]    Sig: Take 3 capsules (3 mg) by mouth every 12 hours    Class: E-Prescribe    Notes to Pharmacy: Dose change    Route: Oral    thiamine (B-1) 100 MG tablet  30 tablet 1 1/29/2022    [facility] [address] [place] [address] - [address] AT [place] [address]    Sig: Take 1 tablet (100 mg) by mouth daily    Class: E-Prescribe    Route: Oral    traMADol (ULTRAM) 50 MG tablet  8 tablet 0 2/5/2022    [id number] [place] - [address] AT [facility] [address]    Sig: Take 1 tablet (50 mg) by mouth every 8 hours as needed for severe pain Script called in    Class: E-Prescribe    Route: Oral    tretinoin (RETIN-A) 0.025 % external cream  45 g 11 12/10/2021    [facility] [address] [place] [address] [place] - [address] AT [facility] [address]    Sig: Apply a pea size to entire face QD    Class: E-Prescribe    ursodiol (ACTIGALL) 250 MG tablet  60 tablet 1 2/18/2022    [facility] - [place], [place] - [address]    Sig: Take 1 tablet  (250 mg) by mouth 2 times daily    Class: E-Prescribe    Route: Oral    valGANciclovir (VALCYTE) 450 MG tablet  90 tablet 3 1/18/2022    [facility] - [place], [place] - [address]    Sig: Take 1 tablet (450 mg) by mouth daily    Class: E-Prescribe    Route: Oral        Latex   REVIEW OF SYSTEMS (check box if normal)  [x]               GENERAL  [x]                 PULMONARY [x]                GENITOURINARY  [x]                CNS                 [x]                 CARDIAC  [x]                 ENDOCRINE  [x]                EARS,NOSE,THROAT [x]                 GASTROINTESTINAL [x]                 NEUROLOGIC    [x]                MUSCLOSKELTAL  [x]                  HEMATOLOGY      PHYSICAL EXAM (check box if normal)/81   Pulse 73   SpO2 99%   Breastfeeding No         [x]            GENERAL:    [x]       EYES:  ICTERIC   []        YES  []                    NO  [x]           EXTREMITIES: Clubbing []       Y     [x]           N    [x]           EARS, NOSE, THROAT: Membranes Moist    YES   [x]                   NO []                  [x]           LUNGS:  CLEAR    YES       [x]                  NO    []                                [x]           SKIN: Jaundice           YES       []                  NO    [x]                   Rash: YES       []                  NO    [x]                                     [x]             HEART: Regular Rate          YES       [x]                  NO    []                   Incision Clean:  YES       [x]                  NO    []                                [x]                    ABDOMEN: Organomegaly YES       []                  NO    [x]                       [x]                    NEUROLOGICAL:  Nonfocal  YES       [x]                  NO    []                       [x]                    Hernia YES       []                  NO    [x]                   PSYCHIATRIC:  Appropriate  YES       [x]                  NO    []

## 2022-03-30 ENCOUNTER — MYC MEDICAL ADVICE (OUTPATIENT)
Dept: FAMILY MEDICINE | Facility: CLINIC | Age: 52
End: 2022-03-30
Payer: COMMERCIAL

## 2022-03-31 ENCOUNTER — TELEPHONE (OUTPATIENT)
Dept: TRANSPLANT | Facility: CLINIC | Age: 52
End: 2022-03-31

## 2022-03-31 NOTE — TELEPHONE ENCOUNTER
Post Liver Transplant Team Conference  Date: 3/31/2022  Transplant Coordinator: Shalini Hawkins  Transplant Surgeon: Pradeep Browne      Attendees:  [x] Dr. Carmona [] Dr. Steele []       [x] Dr. Browne [x] Soumya Wren LPN []    [] Dr. Chairez [] Savannah Diego NP []    [] Dr. Rivas [] Lupe Llanos NP []    [x] Dr. Nayak [x] Lupe Lund, RN []       [] Dr. Finney [x] Bria Briscoe, CRICKET []       [] Dr. Jhonathan Hawthorne [x] Renetta Dale RN []       [x] Dr. CHRISTI Lou [x] Shalini Angelo RN []       [] Dr. Stephenson [] Nurys White RN []       [] Dr. Delatorre [] Christoph Fisher RN []         Verbal Plan Read Back:   Creatinine 1.37.  Spoke to Fidelina, assured that she is no longer taking lasix, it was stopped when her lower extremity edema went away.  She is pretty sure she isn't drinking enough fluids.  I encouraged her to drink 8 glasses of something / day.  She will do.  She is doing well.    Routed to RN Coordinator   Renetta Dale RN

## 2022-04-04 ENCOUNTER — OFFICE VISIT (OUTPATIENT)
Dept: TRANSPLANT | Facility: CLINIC | Age: 52
End: 2022-04-04
Attending: TRANSPLANT SURGERY
Payer: COMMERCIAL

## 2022-04-04 ENCOUNTER — LAB (OUTPATIENT)
Dept: LAB | Facility: CLINIC | Age: 52
End: 2022-04-04
Attending: TRANSPLANT SURGERY
Payer: COMMERCIAL

## 2022-04-04 VITALS
SYSTOLIC BLOOD PRESSURE: 120 MMHG | WEIGHT: 125.7 LBS | OXYGEN SATURATION: 99 % | BODY MASS INDEX: 20.92 KG/M2 | HEART RATE: 78 BPM | TEMPERATURE: 97.8 F | DIASTOLIC BLOOD PRESSURE: 83 MMHG

## 2022-04-04 DIAGNOSIS — Z94.4 LIVER REPLACED BY TRANSPLANT (H): ICD-10-CM

## 2022-04-04 DIAGNOSIS — Z94.4 STATUS POST LIVER TRANSPLANTATION (H): ICD-10-CM

## 2022-04-04 DIAGNOSIS — D72.819 LEUKOPENIA: ICD-10-CM

## 2022-04-04 DIAGNOSIS — L29.9 ITCHING: Primary | ICD-10-CM

## 2022-04-04 DIAGNOSIS — K70.31 ALCOHOLIC CIRRHOSIS OF LIVER WITH ASCITES (H): ICD-10-CM

## 2022-04-04 LAB
ALBUMIN SERPL-MCNC: 3.5 G/DL (ref 3.4–5)
ALP SERPL-CCNC: 80 U/L (ref 40–150)
ALT SERPL W P-5'-P-CCNC: 21 U/L (ref 0–50)
ANION GAP SERPL CALCULATED.3IONS-SCNC: 8 MMOL/L (ref 3–14)
AST SERPL W P-5'-P-CCNC: 16 U/L (ref 0–45)
BASOPHILS # BLD AUTO: 0.1 10E3/UL (ref 0–0.2)
BASOPHILS NFR BLD AUTO: 3 %
BILIRUB DIRECT SERPL-MCNC: 0.3 MG/DL (ref 0–0.2)
BILIRUB SERPL-MCNC: 0.7 MG/DL (ref 0.2–1.3)
BUN SERPL-MCNC: 27 MG/DL (ref 7–30)
CALCIUM SERPL-MCNC: 9.9 MG/DL (ref 8.5–10.1)
CHLORIDE BLD-SCNC: 104 MMOL/L (ref 94–109)
CO2 SERPL-SCNC: 26 MMOL/L (ref 20–32)
CREAT SERPL-MCNC: 1.34 MG/DL (ref 0.52–1.04)
EOSINOPHIL # BLD AUTO: 0.1 10E3/UL (ref 0–0.7)
EOSINOPHIL NFR BLD AUTO: 4 %
ERYTHROCYTE [DISTWIDTH] IN BLOOD BY AUTOMATED COUNT: 12.9 % (ref 10–15)
GFR SERPL CREATININE-BSD FRML MDRD: 48 ML/MIN/1.73M2
GLUCOSE BLD-MCNC: 118 MG/DL (ref 70–99)
HCT VFR BLD AUTO: 35.3 % (ref 35–47)
HGB BLD-MCNC: 11.6 G/DL (ref 11.7–15.7)
IMM GRANULOCYTES # BLD: 0 10E3/UL
IMM GRANULOCYTES NFR BLD: 0 %
LYMPHOCYTES # BLD AUTO: 0.8 10E3/UL (ref 0.8–5.3)
LYMPHOCYTES NFR BLD AUTO: 33 %
MAGNESIUM SERPL-MCNC: 1.7 MG/DL (ref 1.6–2.3)
MCH RBC QN AUTO: 29.3 PG (ref 26.5–33)
MCHC RBC AUTO-ENTMCNC: 32.9 G/DL (ref 31.5–36.5)
MCV RBC AUTO: 89 FL (ref 78–100)
MONOCYTES # BLD AUTO: 0.1 10E3/UL (ref 0–1.3)
MONOCYTES NFR BLD AUTO: 5 %
NEUTROPHILS # BLD AUTO: 1.3 10E3/UL (ref 1.6–8.3)
NEUTROPHILS NFR BLD AUTO: 55 %
PHOSPHATE SERPL-MCNC: 4.7 MG/DL (ref 2.5–4.5)
PLATELET # BLD AUTO: 263 10E3/UL (ref 150–450)
POTASSIUM BLD-SCNC: 4.6 MMOL/L (ref 3.4–5.3)
PROT SERPL-MCNC: 6.5 G/DL (ref 6.8–8.8)
RBC # BLD AUTO: 3.96 10E6/UL (ref 3.8–5.2)
SODIUM SERPL-SCNC: 138 MMOL/L (ref 133–144)
TACROLIMUS BLD-MCNC: 8.6 UG/L (ref 5–15)
TME LAST DOSE: NORMAL H
TME LAST DOSE: NORMAL H
WBC # BLD AUTO: 2.4 10E3/UL (ref 4–11)
WBC # BLD AUTO: ABNORMAL 10*3/UL

## 2022-04-04 PROCEDURE — 99000 SPECIMEN HANDLING OFFICE-LAB: CPT | Performed by: PATHOLOGY

## 2022-04-04 PROCEDURE — 85025 COMPLETE CBC W/AUTO DIFF WBC: CPT | Performed by: PATHOLOGY

## 2022-04-04 PROCEDURE — 99213 OFFICE O/P EST LOW 20 MIN: CPT | Performed by: TRANSPLANT SURGERY

## 2022-04-04 PROCEDURE — 80197 ASSAY OF TACROLIMUS: CPT | Performed by: TRANSPLANT SURGERY

## 2022-04-04 PROCEDURE — 80053 COMPREHEN METABOLIC PANEL: CPT | Performed by: PATHOLOGY

## 2022-04-04 PROCEDURE — 84100 ASSAY OF PHOSPHORUS: CPT | Performed by: PATHOLOGY

## 2022-04-04 PROCEDURE — G0463 HOSPITAL OUTPT CLINIC VISIT: HCPCS

## 2022-04-04 PROCEDURE — 83735 ASSAY OF MAGNESIUM: CPT | Performed by: PATHOLOGY

## 2022-04-04 PROCEDURE — 80321 ALCOHOLS BIOMARKERS 1OR 2: CPT | Mod: 90 | Performed by: PATHOLOGY

## 2022-04-04 PROCEDURE — 36415 COLL VENOUS BLD VENIPUNCTURE: CPT | Performed by: PATHOLOGY

## 2022-04-04 PROCEDURE — 82248 BILIRUBIN DIRECT: CPT | Performed by: PATHOLOGY

## 2022-04-04 RX ORDER — HYDROXYZINE HYDROCHLORIDE 25 MG/1
25 TABLET, FILM COATED ORAL EVERY 6 HOURS PRN
Qty: 15 TABLET | Refills: 0 | Status: SHIPPED | OUTPATIENT
Start: 2022-04-04 | End: 2022-07-05

## 2022-04-04 NOTE — NURSING NOTE
Chief Complaint   Patient presents with     RECHECK     Liver TX f/u      Symptoms     Pain in elbows, feet, knees and ankle swelling     Blood pressure 120/83, pulse 78, temperature 97.8  F (36.6  C), temperature source Oral, weight 57 kg (125 lb 11.2 oz), SpO2 99 %, not currently breastfeeding.    Michelle Mcdonald CMA'

## 2022-04-04 NOTE — LETTER
4/4/2022     RE: Melita Cortes  [id number]  [facility] [address]    Dear Colleague,    Thank you for referring your patient, Melita Cortes, to the [facility]. Please see a copy of my visit note below.    Transplant Surgery -OUTPATIENT IMMUNOSUPPRESSION PROGRESS NOTE    Date of Visit: 04/04/2022    Transplants:  1/7/2022 (Liver); Postoperative day:  87  ASSESMENT AND PLAN:  1.Graft Function:Liver allograft: no rejection, Will need bile duct change    2.Immunosuppression Management: keep tacrolimus levels at 8 ng/dL  3.Hypertension: beter  4.Renal Function: creatinine 1.34  5.Lab frequency: weekly  6.Other:  Has joint pains: to see PCP or rheumatologist      Date: April 4, 2022    Transplant:  [x]                             Liver [x]                              Kidney []                             Pancreas []                              Other:             Chief Complaint:  Doing well       History of Present Illness:  Patient Active Problem List   Diagnosis     Abdominal bloating     Family history of pancreatic cancer     Transaminitis     Macrocytosis     Cervical high risk HPV (human papillomavirus) test positive     Anemia, unspecified type     Elevated serum creatinine     Alcoholic cirrhosis of liver with ascites (H)     Hyponatremia     Malaise and fatigue     At high risk for severe sepsis     Symptomatic anemia     Bandemia     Hyperlipidemia     Anxiety     Status post liver transplantation (H)     Immunosuppressed status (H)     Steroid-induced hyperglycemia     Hypervolemia     Severe malnutrition (H)     Elevated alkaline phosphatase level     SOCIAL /FAMILY HISTORY: [x]                  No recent change    Past Medical History:   Diagnosis Date     Alcoholic hepatitis      Asthma 3/10/2006     Cervical high risk HPV (human papillomavirus) test positive 2019, 2020    See problem list     Liver failure (H)      Past Surgical History:   Procedure  Laterality Date     APPENDECTOMY       COLONOSCOPY N/A 2022    Procedure: COLONOSCOPY;  Surgeon: Zita Steele MD;  Location: [facility]     ENDOSCOPIC RETROGRADE CHOLANGIOPANCREATOGRAM N/A 2022    Procedure: ENDOSCOPIC RETROGRADE CHOLANGIOPANCREATOGRAPHY WITH BILIARY SPHINCTEROTOMY, SLUDGE REMOVAL, DILATION  AND STENT PLACEMENT;  Surgeon: Guru Gardenia Escobar MD;  Location: [facility]     ENDOSCOPIC RETROGRADE CHOLANGIOPANCREATOGRAPHY, EXCHANGE TUBE/STENT N/A 3/16/2022    Procedure: ENDOSCOPIC RETROGRADE CHOLANGIOPANCREATOGRAPHY, bile duct stents exchanged, balloon dilation and sweep of bile ducts for sludge;  Surgeon: Guru Gardenia Escobar MD;  Location: [facility]     ESOPHAGOGASTRODUODENOSCOPY, WITH BRUSHINGS N/A 10/29/2021    Procedure: ESOPHAGOGASTRODUODENOSCOPY, WITH BRUSHINGS;  Surgeon: Torey Ruiz MD;  Location: [facility]     IR LIVER BIOPSY PERCUTANEOUS  2022     TRANSPLANT LIVER RECIPIENT  DONOR N/A 2022    Procedure: TRANSPLANT, LIVER, RECIPIENT,  DONOR;  Surgeon: Pradeep Browne MD;  Location: [facility]     Social History     Socioeconomic History     Marital status:      Spouse name: Not on file     Number of children: Not on file     Years of education: Not on file     Highest education level: Not on file   Occupational History     Not on file   Tobacco Use     Smoking status: Former Smoker     Quit date: 2020     Years since quittin.9     Smokeless tobacco: Never Used   Vaping Use     Vaping Use: Never used   Substance and Sexual Activity     Alcohol use: Not Currently     Comment: Last drink 2021     Drug use: Never     Sexual activity: Not Currently     Partners: Male   Other Topics Concern     Parent/sibling w/ CABG, MI or angioplasty before 65F 55M? Not Asked   Social History Narrative     Not on file     Social Determinants of Health     Financial Resource Strain: Not on file   Food Insecurity: Not on file    Transportation Needs: Not on file   Physical Activity: Not on file   Stress: Not on file   Social Connections: Not on file   Intimate Partner Violence: Not on file   Housing Stability: Not on file     Prescription Medications as of 4/4/2022       Rx Number Disp Refills Start End Last Dispensed Date Next Fill Date Owning Pharmacy    aspirin (ASA) 81 MG chewable tablet  90 tablet 0 1/14/2022    [facility] [place], [place] - [address]    Sig: Take 1 tablet (81 mg) by mouth daily    Class: E-Prescribe    Route: Oral    calcium carbonate (OS-SHAI) 1500 (600 Ca) MG tablet  60 tablet 3 2/21/2022        Sig: Take 1 tablet (600 mg) by mouth daily    Class: Local Print    Route: Oral    hydrOXYzine (ATARAX) 25 MG tablet  15 tablet 0 4/4/2022    [facility] [place], [place] - [address] [facility] [address]    Sig: Take 1 tablet (25 mg) by mouth every 6 hours as needed for itching    Class: E-Prescribe    Route: Oral    magnesium oxide (MAG-OX) 400 MG tablet  180 tablet 3 1/29/2022    [facility] #[id number] [address] AT [facility] [place] [address] [facility] [address]    Sig: Take 2 tablets (800 mg) by mouth 2 times daily    Class: E-Prescribe    Route: Oral    multivitamin (CENTRUM SILVER) tablet            Sig: Take 1 tablet by mouth daily    Class: Historical    Route: Oral    mycophenolic acid (GENERIC EQUIVALENT) 180 MG EC tablet  180 tablet 1 2/18/2022    [facility] - [place] [address]    Sig: Take 3 tablets (540 mg) by mouth 2 times daily Deliver 2/18    Class: E-Prescribe    Notes to Pharmacy: TXP DT 1/7/2022 (Liver) TXP Dischg DT 1/14/2022 DX Liver replaced by transplant Z94.4 TX Center [facility] ([place], [place])    Route: Oral    pantoprazole (PROTONIX) 40 MG EC tablet  90 tablet 3 1/18/2022    [facility] - [place], [place] - [address]    Sig: Take 1  tablet (40 mg) by mouth daily    Class: E-Prescribe    Route: Oral    sennosides (SENOKOT) 8.6 MG tablet  60 tablet 0 1/14/2022    [facility] [place], [place] - [address] [place] [address]    Sig: Take 1-2 tablets by mouth 2 times daily Take while taking oxycodone, HOLD for loose stools    Class: E-Prescribe    Route: Oral    sulfamethoxazole-trimethoprim (BACTRIM) 400-80 MG tablet  90 tablet 3 1/18/2022    [facility]    Sig: Take 1 tablet by mouth daily    Class: E-Prescribe    Route: Oral    tacrolimus (GENERIC EQUIVALENT) 0.5 MG capsule  60 capsule 1 1/14/2022    [place], [place] - [address] [place] [address]    Sig: Take 1 capsule by mouth twice daily as directed by transplant team for dose titration    Class: E-Prescribe    tacrolimus (GENERIC EQUIVALENT) 1 MG capsule  540 capsule 3 3/22/2022    [facility]    Sig: Take 3 capsules (3 mg) by mouth every 12 hours    Class: E-Prescribe    Notes to Pharmacy: Dose change    Route: Oral    thiamine (B-1) 100 MG tablet  30 tablet 1 1/29/2022    [facility] [place], [place] [address] [facility] [address] [place] [address]    Sig: Take 1 tablet (100 mg) by mouth daily    Class: E-Prescribe    Route: Oral    traMADol (ULTRAM) 50 MG tablet  8 tablet 0 2/5/2022    [facility] [address] [place], [place] [address] [facility] [address]    Sig: Take 1 tablet (50 mg) by mouth every 8 hours as needed for severe pain Script called in    Class: E-Prescribe    Route: Oral    tretinoin (RETIN-A) 0.025 % external cream  45 g 11 12/10/2021    [facility] [address] [place] [address] [address] AT [facility] [address]    Sig: Apply a pea size to entire face QD    Class: E-Prescribe    ursodiol (ACTIGALL) 250 MG tablet  60 tablet 1 2/18/2022    [facility] [place] -  [place], [place] - [address]    Sig: Take 1 tablet (250 mg) by mouth 2 times daily    Class: E-Prescribe    Route: Oral    valGANciclovir (VALCYTE) 450 MG tablet  90 tablet 3 1/18/2022    [facility] - [place], [place] - [address]    Sig: Take 1 tablet (450 mg) by mouth daily    Class: E-Prescribe    Route: Oral        Latex   REVIEW OF SYSTEMS (check box if normal)  [x]               GENERAL  [x]                 PULMONARY [x]                GENITOURINARY  [x]                CNS                 [x]                 CARDIAC  [x]                 ENDOCRINE  [x]                EARS,NOSE,THROAT [x]                 GASTROINTESTINAL [x]                 NEUROLOGIC    [x]                MUSCLOSKELTAL  [x]                  HEMATOLOGY      PHYSICAL EXAM (check box if normal)/83   Pulse 78   Temp 97.8  F (36.6  C) (Oral)   Wt 57 kg (125 lb 11.2 oz)   SpO2 99%   BMI 20.92 kg/m          [x]            GENERAL:    [x]       EYES:  ICTERIC   []        YES  []                    NO  [x]           EXTREMITIES: Clubbing []       Y     [x]           N    [x]           EARS, NOSE, THROAT: Membranes Moist    YES   [x]                   NO []                  [x]           LUNGS:  CLEAR    YES       [x]                  NO    []                                [x]           SKIN: Jaundice           YES       []                  NO    [x]                   Rash: YES       []                  NO    [x]                                     [x]             HEART: Regular Rate          YES       [x]                  NO    []                   Incision Clean:  YES       [x]                  NO    []                                [x]                    ABDOMEN: Organomegaly YES       []                  NO    [x]                       [x]                    NEUROLOGICAL:  Nonfocal  YES       [x]                  NO    []                       [x]                    Hernia YES       []                   NO    [x]                   PSYCHIATRIC:  Appropriate  YES       [x]                  NO    []                       OTHER:                                                                                                   PAIN SCALE:: 3    Again, thank you for allowing me to participate in the care of your patient.      Sincerely,    Pradeep Browne MD

## 2022-04-04 NOTE — PROGRESS NOTES
Transplant Surgery -OUTPATIENT IMMUNOSUPPRESSION PROGRESS NOTE    Date of Visit: 04/04/2022    Transplants:  1/7/2022 (Liver); Postoperative day:  87  ASSESMENT AND PLAN:  1.Graft Function:Liver allograft: no rejection, Will need bile duct change    2.Immunosuppression Management: keep tacrolimus levels at 8 ng/dL  3.Hypertension: beter  4.Renal Function: creatinine 1.34  5.Lab frequency: weekly  6.Other:  Has joint pains: to see PCP or rheumatologist      Date: April 4, 2022    Transplant:  [x]                             Liver [x]                              Kidney []                             Pancreas []                              Other:             Chief Complaint:  Doing well       History of Present Illness:  Patient Active Problem List   Diagnosis     Abdominal bloating     Family history of pancreatic cancer     Transaminitis     Macrocytosis     Cervical high risk HPV (human papillomavirus) test positive     Anemia, unspecified type     Elevated serum creatinine     Alcoholic cirrhosis of liver with ascites (H)     Hyponatremia     Malaise and fatigue     At high risk for severe sepsis     Symptomatic anemia     Bandemia     Hyperlipidemia     Anxiety     Status post liver transplantation (H)     Immunosuppressed status (H)     Steroid-induced hyperglycemia     Hypervolemia     Severe malnutrition (H)     Elevated alkaline phosphatase level     SOCIAL /FAMILY HISTORY: [x]                  No recent change    Past Medical History:   Diagnosis Date     Alcoholic hepatitis      Asthma 3/10/2006     Cervical high risk HPV (human papillomavirus) test positive 2019, 2020    See problem list     Liver failure (H)      Past Surgical History:   Procedure Laterality Date     APPENDECTOMY       COLONOSCOPY N/A 1/4/2022    Procedure: COLONOSCOPY;  Surgeon: Zita Steele MD;  Location:  GI     ENDOSCOPIC RETROGRADE CHOLANGIOPANCREATOGRAM N/A 2/14/2022    Procedure: ENDOSCOPIC RETROGRADE  CHOLANGIOPANCREATOGRAPHY WITH BILIARY SPHINCTEROTOMY, SLUDGE REMOVAL, DILATION  AND STENT PLACEMENT;  Surgeon: Guru Gardenia Escobar MD;  Location: [facility]     ENDOSCOPIC RETROGRADE CHOLANGIOPANCREATOGRAPHY, EXCHANGE TUBE/STENT N/A 3/16/2022    Procedure: ENDOSCOPIC RETROGRADE CHOLANGIOPANCREATOGRAPHY, bile duct stents exchanged, balloon dilation and sweep of bile ducts for sludge;  Surgeon: Guru Gardenia Escobar MD;  [facility]: [facility]     ESOPHAGOGASTRODUODENOSCOPY, WITH BRUSHINGS N/A 10/29/2021    Procedure: ESOPHAGOGASTRODUODENOSCOPY, WITH BRUSHINGS;  Surgeon: Torey Ruiz MD;  Location: [facility]     IR LIVER BIOPSY PERCUTANEOUS  2022     TRANSPLANT LIVER RECIPIENT  DONOR N/A 2022    Procedure: TRANSPLANT, LIVER, RECIPIENT,  DONOR;  Surgeon: Pradeep Browne MD;  [facility]: [facility]     Social History     Socioeconomic History     Marital status:      Spouse name: Not on file     Number of children: Not on file     Years of education: Not on file     Highest education level: Not on file   Occupational History     Not on file   Tobacco Use     Smoking status: Former Smoker     Quit date: 2020     Years since quittin.9     Smokeless tobacco: Never Used   Vaping Use     Vaping Use: Never used   Substance and Sexual Activity     Alcohol use: Not Currently     Comment: Last drink 2021     Drug use: Never     Sexual activity: Not Currently     Partners: Male   Other Topics Concern     Parent/sibling w/ CABG, MI or angioplasty before 65F 55M? Not Asked   Social History Narrative     Not on file     Social Determinants of Health     Financial Resource Strain: Not on file   Food Insecurity: Not on file   Transportation Needs: Not on file   Physical Activity: Not on file   Stress: Not on file   Social Connections: Not on file   Intimate Partner Violence: Not on file   Housing Stability: Not on file     Prescription Medications as of 2022       Rx  Number Disp Refills Start End Last Dispensed Date Next Fill Date Owning Pharmacy    aspirin (ASA) 81 MG chewable tablet  90 tablet 0 1/14/2022    [facility] - [place], [place] - [address] [place] [address] [address]    Sig: Take 1 tablet (81 mg) by mouth daily    Class: E-Prescribe    Route: Oral    calcium carbonate (OS-SHAI) 1500 (600 Ca) MG tablet  60 tablet 3 2/21/2022        Sig: Take 1 tablet (600 mg) by mouth daily    Class: Local Print    Route: Oral    hydrOXYzine (ATARAX) 25 MG tablet  15 tablet 0 4/4/2022    [facility] [address] [place] [address] [address]    Sig: Take 1 tablet (25 mg) by mouth every 6 hours as needed for itching    Class: E-Prescribe    Route: Oral    magnesium oxide (MAG-OX) 400 MG tablet  180 tablet 3 1/29/2022    [facility] #80442 [address] AT [facility] [address] [facility] [address]    Sig: Take 2 tablets (800 mg) by mouth 2 times daily    Class: E-Prescribe    Route: Oral    multivitamin (CENTRUM SILVER) tablet            Sig: Take 1 tablet by mouth daily    Class: Historical    Route: Oral    mycophenolic acid (GENERIC EQUIVALENT) 180 MG EC tablet  180 tablet 1 2/18/2022    [facility] - [place] [address]     Sig: Take 3 tablets (540 mg) by mouth 2 times daily Deliver 2/18    Class: E-Prescribe    Notes to Pharmacy: TXP DT 1/7/2022 (Liver) TXP Dischg DT 1/14/2022 DX Liver replaced by transplant Z94.4 TX Center [facility] ([place], [place])    Route: Oral    pantoprazole (PROTONIX) 40 MG EC tablet  90 tablet 3 1/18/2022    [facility] - [place] [address] [address]     Sig: Take 1 tablet (40 mg) by mouth daily    Class: E-Prescribe    Route: Oral    sennosides (SENOKOT) 8.6 MG tablet  60 tablet 0 1/14/2022    [facility] - [place], [place] - [address] [place] [address] [address]    Sig: Take 1-2 tablets by mouth 2 times daily  Take while taking oxycodone, HOLD for loose stools    Class: E-Prescribe    Route: Oral    sulfamethoxazole-trimethoprim (BACTRIM) 400-80 MG tablet  90 tablet 3 1/18/2022    [facility] [place], [place] - [address]    Sig: Take 1 tablet by mouth daily    Class: E-Prescribe    Route: Oral    tacrolimus (GENERIC EQUIVALENT) 0.5 MG capsule  60 capsule 1 1/14/2022    [facility] [place], [place] - [address]    Sig: Take 1 capsule by mouth twice daily as directed by transplant team for dose titration    Class: E-Prescribe    tacrolimus (GENERIC EQUIVALENT) 1 MG capsule  540 capsule 3 3/22/2022    [facility] [place], [place] - [address]    Sig: Take 3 capsules (3 mg) by mouth every 12 hours    Class: E-Prescribe    Notes to Pharmacy: Dose change    Route: Oral    thiamine (B-1) 100 MG tablet  30 tablet 1 1/29/2022    [facility]32877 [place], [place] - [address] [place] [address]    Sig: Take 1 tablet (100 mg) by mouth daily    Class: E-Prescribe    Route: Oral    traMADol (ULTRAM) 50 MG tablet  8 tablet 0 2/5/2022    [facility] [place], [place] [address] [place] [address]    Sig: Take 1 tablet (50 mg) by mouth every 8 hours as needed for severe pain Script called in    Class: E-Prescribe    Route: Oral    tretinoin (RETIN-A) 0.025 % external cream  45 g 11 12/10/2021    [facility] [place] [address] [address] [place] [address]    Sig: Apply a pea size to entire face QD    Class: E-Prescribe    ursodiol (ACTIGALL) 250 MG tablet  60 tablet 1 2/18/2022    [facility] [place], [place] - [address]    Sig: Take 1 tablet (250 mg) by mouth 2 times daily    Class: E-Prescribe    Route: Oral    valGANciclovir (VALCYTE) 450 MG tablet  90 tablet 3 1/18/2022    [facility] -  [place], [place] - [address]    Sig: Take 1 tablet (450 mg) by mouth daily    Class: E-Prescribe    Route: Oral        Latex   REVIEW OF SYSTEMS (check box if normal)  [x]               GENERAL  [x]                 PULMONARY [x]                GENITOURINARY  [x]                CNS                 [x]                 CARDIAC  [x]                 ENDOCRINE  [x]                EARS,NOSE,THROAT [x]                 GASTROINTESTINAL [x]                 NEUROLOGIC    [x]                MUSCLOSKELTAL  [x]                  HEMATOLOGY      PHYSICAL EXAM (check box if normal)/83   Pulse 78   Temp 97.8  F (36.6  C) (Oral)   Wt 57 kg (125 lb 11.2 oz)   SpO2 99%   BMI 20.92 kg/m          [x]            GENERAL:    [x]       EYES:  ICTERIC   []        YES  []                    NO  [x]           EXTREMITIES: Clubbing []       Y     [x]           N    [x]           EARS, NOSE, THROAT: Membranes Moist    YES   [x]                   NO []                  [x]           LUNGS:  CLEAR    YES       [x]                  NO    []                                [x]           SKIN: Jaundice           YES       []                  NO    [x]                   Rash: YES       []                  NO    [x]                                     [x]             HEART: Regular Rate          YES       [x]                  NO    []                   Incision Clean:  YES       [x]                  NO    []                                [x]                    ABDOMEN: Organomegaly YES       []                  NO    [x]                       [x]                    NEUROLOGICAL:  Nonfocal  YES       [x]                  NO    []                       [x]                    Hernia YES       []                  NO    [x]                   PSYCHIATRIC:  Appropriate  YES       [x]                  NO    []                       OTHER:                                                                                                   PAIN  SCALE:: 3

## 2022-04-04 NOTE — PROGRESS NOTES
Per CINTHIA Hill to write prescription for Hydroxyzine.  Reordered previous prescription, sent to [address] [place] [address] pharmacy per pt request.

## 2022-04-07 DIAGNOSIS — Z94.4 LIVER REPLACED BY TRANSPLANT (H): ICD-10-CM

## 2022-04-07 LAB
PLPETH BLD-MCNC: <10 NG/ML
POPETH BLD-MCNC: <10 NG/ML

## 2022-04-07 RX ORDER — MYCOPHENOLIC ACID 180 MG/1
TABLET, DELAYED RELEASE ORAL
Qty: 49 TABLET | Refills: 0 | Status: SHIPPED | OUTPATIENT
Start: 2022-04-07 | End: 2022-04-26

## 2022-04-07 NOTE — TELEPHONE ENCOUNTER
Pt is 90 post and can start wean. Pt currently taking 540 mg BID. Pt can decrease to 360 mg BID.    Spoke with patient. She reports that she did decrease on Monday. She is having horrible joint pain. She is seeing PT tomorrow.    Pt is 90 days and can also stop valcyte per protocol. valcyte can cause joint pain so hoping this will improve. Message to dr matson. Pt at times is in tears the joint pain is so bad.

## 2022-04-08 ENCOUNTER — HOSPITAL ENCOUNTER (OUTPATIENT)
Dept: PHYSICAL THERAPY | Facility: CLINIC | Age: 52
Setting detail: THERAPIES SERIES
Discharge: HOME | End: 2022-04-08
Attending: INTERNAL MEDICINE
Payer: COMMERCIAL

## 2022-04-08 ENCOUNTER — TELEPHONE (OUTPATIENT)
Dept: GASTROENTEROLOGY | Facility: CLINIC | Age: 52
End: 2022-04-08
Payer: COMMERCIAL

## 2022-04-08 DIAGNOSIS — Z94.4 LIVER REPLACED BY TRANSPLANT (H): ICD-10-CM

## 2022-04-08 PROCEDURE — 97110 THERAPEUTIC EXERCISES: CPT | Mod: GP

## 2022-04-08 PROCEDURE — 97162 PT EVAL MOD COMPLEX 30 MIN: CPT | Mod: GP

## 2022-04-08 NOTE — TELEPHONE ENCOUNTER
Attempted to reach patient to schedule follow-up procedure with Dr. Winter in May. Unable to leave message as phone kept ringing. Will attempt again next week.

## 2022-04-10 NOTE — PROGRESS NOTES
04/08/22 1300   Signing Clinician's Name / Credentials   Signing clinician's name / credentials Katharina Candelario, PT, DPT   Functional Gait Assessment (LUIS Skinner., Montse GWesly F., et al. (2004))   1. GAIT LEVEL SURFACE 3   2. CHANGE IN GAIT SPEED 3   3. GAIT WITH HORIZONTAL HEAD TURNS 3   4. GAIT WITH VERTICAL HEAD TURNS 3   5. GAIT AND PIVOT TURN 3   6. STEP OVER OBSTACLE 1   7. GAIT WITH NARROW BASE OF SUPPORT 0   8. GAIT WITH EYES CLOSED 1   9. AMBULATING BACKWARDS 3   10. STEPS 2   Total Functional Gait Assessment Score   TOTAL SCORE: (MAXIMUM SCORE 30) 22

## 2022-04-10 NOTE — PROGRESS NOTES
04/08/22 1300   General Information   Start of Care Date 04/08/22   Referring Physician Pradeep Browne MD   Orders Evaluate and Treat as Indicated   Order Date 03/15/22   Medical Diagnosis Liver replaced by transplant   Onset of illness/injury or Date of Surgery 01/07/22  (Liver transplant)   Surgical/Medical history reviewed Yes   Pertinent history of current problem (include personal factors and/or comorbidities that impact the POC) Fidelina is 3 months post liver transplant, weekly checks for lab values. Has neuropathy in feet and severe joint pain in elbows, feet, knees and hands.   Prior level of function comment Pt previously was active and working out going to the gym and spin classes.   Previous/Current Treatment Physical Therapy  (Home PT was coming 1x/week after transplant, stopped 2 weeks ago and attending OP PT for continued care)   Improvement after PT Moderate  (Improved from walking with AD to no longer needing one for ambulation)   Current Community Support Family/friend caregiver  ( assists with driving to appointments)   Patient role/Employment history Unemployed   Living environment House/[facility]   Home/Community Accessibility Comments 2 floors; 15 stairs to bedroom, has a landing inbetween and railing on one side   Current Assistive Devices   (Used a 4WW, but hasn't used in a month)   ADL Devices Raised Toilet Seat  (No longer uses raises toliet seat)   Patient/Family Goals Statement Decreased pain, want to get back to getting to the gym   General Information Comments Fidelina is present with her  for inital PT evaluation. Biggest concerns is maia amount of joint/muscle pain feeling everyday. Fidelina reports increased difficulty just getting out of bed and pushing up from her elbows due to the pain. Difficulty holding on a glass cause of pain and weakness. Also has neuropathy in her feet as well as plantar fascitis. Overall severe joint pain that is limiting her in her everday life.  Does notice the more she is up and moving the better she feels but when she first wakes up or sits for a whlie then increase pain.   Fall Risk Screen   Fall screen completed by PT   Have you fallen 2 or more times in the past year? No   Have you fallen and had an injury in the past year? No   Timed Up and Go score (seconds) 13.03   Is patient a fall risk? No   Fall screen comments Slow shuffled steps and minimal arm swing due to pain   Pain   Patient currently in pain Yes   Pain location Joint pain: hands, feet, knees and elbows. Muscles: shoulders and neck   Pain rating 8/10   (Feet feel the most painful)   Pain description Burning;Throbbing;Ache;Dull;Sharp  (Numbness/burning in toes)   Pain description comment Severe pain throughout her body   Pain comments Amount of pain is limiting patient in her functional daily tasks  (Some pain may be result of medication)   Cognitive Status Examination   Orientation orientation to person, place and time   Level of Consciousness alert   Follows Commands and Answers Questions 100% of the time;able to follow multistep instructions   Personal Safety and Judgment intact   Memory intact   Integumentary   Integumentary Comments Increased swelling of bilateral ankles; unable to wear her typical tennis shoes due to the amount of swelling. Has compression socks but they are too tight for her ankles.   Posture   Posture Comments Protracted shoulders, foreard head posture   Palpation   Palpation Increased pain with palpation during LE MMT especially on R foot   Range of Motion (ROM)   ROM Comment LE ROM is functional. Decreased shoulder ER/IR due to elbow pain and reduced shoulder flexion to ~10-20 degrees   Strength   Strength Comments UE MMT not tested during this time. LLE MMT: Ankle DF 4/5 with pain on R foot, Knee extension 3+/5 with Left quad pain, Hip flexion 4/5 with L hip pain   Bed Mobility   Bed Mobility Comments Pt IND with bed mobility but increased difficulty due to pain  pushing up onto her left elbow   Transfer Skills   Transfer Comments Getting out of the car is hard, increased difficulty and pain gripping onto something to pull herself out   Gait   Gait Gait Analysis;Stairs   Gait Analysis   Gait Pattern Used swing-through gait   Gait Deviations Noted decreased josh;decreased step length;decreased stride length;decreased velocity of limb motion   Impairments Contributing to Gait Deviations pain   Stairs   Self Performance Independent   Physical/Nonphysical Assist: Stairs No set-up   Rails 1 rail   Indicate number of stairs 4  (Reciprocal gait pattern)   Gait Special Tests   Gait Special Tests 25 FOOT TIMED WALK;FUNCTIONAL GAIT ASSESSMENT   Gait Special Tests 25 Foot Timed Walk   Seconds 10.37   Steps 17 Steps   Gait Special Tests Functional Gait Assessment Score out of 30   Score out of 30 22   Balance   Balance other (describe)   Balance Comments Unable to perform tandem walking without 2HHA   Balance Quick Add Identified impairments contributing to balance disturbance   Balance Special Tests   Balance Special Tests Modified CTSIB Conditions;Sit to stand reps   Balance Special Tests Modified CTSIB Conditions   Condition 1, seconds 30 Seconds   Condition 2, seconds 30 Seconds   Condition 4, seconds 30 Seconds   Condition 5, seconds 4 Seconds   Balance Special Tests Sit to Stand Reps in 30 Seconds   Reps in 30 seconds 9   Height 18   Sensory Examination   Sensory Perception Comments Neuropathy in bilateral feet   Muscle Tone   Muscle Tone no deficits were identified   Planned Therapy Interventions   Planned Therapy Interventions balance training;bed mobility training;gait training;neuromuscular re-education;ROM;strengthening;stretching   Clinical Impression   Criteria for Skilled Therapeutic Interventions Met yes, treatment indicated   PT Diagnosis Weakness, deconditioning, impaired gait, balance   Influenced by the following impairments Severe joint/muscle pain, post liver  transplant, general weakness   Functional limitations due to impairments Decreased tolerance to activites/functional transfers   Clinical Presentation Evolving/Changing   Clinical Presentation Rationale Due to fluctuation of sxs and medications; clinical judgement   Clinical Decision Making (Complexity) Moderate complexity   Therapy Frequency 1 time/week   Predicted Duration of Therapy Intervention (days/wks) 3-6 months   Risk & Benefits of therapy have been explained Yes   Patient, Family & other staff in agreement with plan of care Yes   Clinical Impression Comments Fidelina is a pleasant 51 year old female who underwent a liver transplant approximately 3 months and presents with significant joint/muscle pain. Fidelina presents with deficits including general weakness, deconditioning, impaired gait mechanics, balance, and decreased tolerance to functional transfers and daily activities. Fdielina would greatly benefit from OP PT interventions to address these deficits in a timely and efficient matter.   Education Assessment   Preferred Learning Style Listening;Demonstration   Barriers to Learning No barriers   GOALS   PT Eval Goals 1;2;3   Goal 1   Goal Identifier Endurance   Goal Description Pt will tolerate 20 minutes of aerobic activity per day to improve her cardiovascular endurance without excessive fatigue nor pain.   Target Date 06/30/22   Goal 2   Goal Identifier FGA   Goal Description Pt will improve her score on the Functional Gait Assessment by 3 points or greater with an initial score of 22/30.   Target Date 06/30/22   Goal 3   Goal Identifier LE strength   Goal Description Pt will increase her sit<>stand reps in 30 seconds from 9 to 12 or greater and demonstrate improved LE strength   Target Date 06/30/22   Total Evaluation Time   PT Eval, Moderate Complexity Minutes (43943) 40

## 2022-04-11 ENCOUNTER — LAB (OUTPATIENT)
Dept: LAB | Facility: CLINIC | Age: 52
End: 2022-04-11
Payer: COMMERCIAL

## 2022-04-11 DIAGNOSIS — Z94.4 LIVER REPLACED BY TRANSPLANT (H): ICD-10-CM

## 2022-04-11 DIAGNOSIS — M25.50 JOINT PAIN: ICD-10-CM

## 2022-04-11 LAB
ALBUMIN SERPL-MCNC: 3.4 G/DL (ref 3.4–5)
ALP SERPL-CCNC: 76 U/L (ref 40–150)
ALT SERPL W P-5'-P-CCNC: 22 U/L (ref 0–50)
ANION GAP SERPL CALCULATED.3IONS-SCNC: 4 MMOL/L (ref 3–14)
AST SERPL W P-5'-P-CCNC: 13 U/L (ref 0–45)
BILIRUB DIRECT SERPL-MCNC: 0.2 MG/DL (ref 0–0.2)
BILIRUB SERPL-MCNC: 0.5 MG/DL (ref 0.2–1.3)
BUN SERPL-MCNC: 22 MG/DL (ref 7–30)
CALCIUM SERPL-MCNC: 9.7 MG/DL (ref 8.5–10.1)
CHLORIDE BLD-SCNC: 106 MMOL/L (ref 94–109)
CO2 SERPL-SCNC: 27 MMOL/L (ref 20–32)
CREAT SERPL-MCNC: 1.21 MG/DL (ref 0.52–1.04)
ERYTHROCYTE [DISTWIDTH] IN BLOOD BY AUTOMATED COUNT: 13.4 % (ref 10–15)
GFR SERPL CREATININE-BSD FRML MDRD: 54 ML/MIN/1.73M2
GLUCOSE BLD-MCNC: 138 MG/DL (ref 70–99)
HCT VFR BLD AUTO: 32.7 % (ref 35–47)
HGB BLD-MCNC: 10.8 G/DL (ref 11.7–15.7)
MAGNESIUM SERPL-MCNC: 1.7 MG/DL (ref 1.6–2.3)
MCH RBC QN AUTO: 29.8 PG (ref 26.5–33)
MCHC RBC AUTO-ENTMCNC: 33 G/DL (ref 31.5–36.5)
MCV RBC AUTO: 90 FL (ref 78–100)
PHOSPHATE SERPL-MCNC: 4.4 MG/DL (ref 2.5–4.5)
PLATELET # BLD AUTO: 281 10E3/UL (ref 150–450)
POTASSIUM BLD-SCNC: 4.8 MMOL/L (ref 3.4–5.3)
PROT SERPL-MCNC: 6.4 G/DL (ref 6.8–8.8)
RBC # BLD AUTO: 3.62 10E6/UL (ref 3.8–5.2)
SODIUM SERPL-SCNC: 137 MMOL/L (ref 133–144)
WBC # BLD AUTO: 2.8 10E3/UL (ref 4–11)

## 2022-04-11 PROCEDURE — 83735 ASSAY OF MAGNESIUM: CPT

## 2022-04-11 PROCEDURE — 80053 COMPREHEN METABOLIC PANEL: CPT

## 2022-04-11 PROCEDURE — 82248 BILIRUBIN DIRECT: CPT

## 2022-04-11 PROCEDURE — 80197 ASSAY OF TACROLIMUS: CPT

## 2022-04-11 PROCEDURE — 36415 COLL VENOUS BLD VENIPUNCTURE: CPT

## 2022-04-11 PROCEDURE — 84100 ASSAY OF PHOSPHORUS: CPT

## 2022-04-11 PROCEDURE — 85027 COMPLETE CBC AUTOMATED: CPT

## 2022-04-11 PROCEDURE — 84550 ASSAY OF BLOOD/URIC ACID: CPT

## 2022-04-12 DIAGNOSIS — K70.9 ALCOHOLIC LIVER DISEASE (H): ICD-10-CM

## 2022-04-12 LAB
TACROLIMUS BLD-MCNC: 9.7 UG/L (ref 5–15)
TME LAST DOSE: NORMAL H
TME LAST DOSE: NORMAL H

## 2022-04-12 RX ORDER — PANTOPRAZOLE SODIUM 40 MG/1
40 TABLET, DELAYED RELEASE ORAL DAILY
Qty: 90 TABLET | Refills: 3 | Status: SHIPPED | OUTPATIENT
Start: 2022-04-12 | End: 2022-06-27

## 2022-04-13 ENCOUNTER — TELEPHONE (OUTPATIENT)
Dept: TRANSPLANT | Facility: CLINIC | Age: 52
End: 2022-04-13
Payer: COMMERCIAL

## 2022-04-14 ENCOUNTER — TELEPHONE (OUTPATIENT)
Dept: PHARMACY | Facility: CLINIC | Age: 52
End: 2022-04-14
Payer: COMMERCIAL

## 2022-04-14 DIAGNOSIS — M25.50 JOINT PAIN: Primary | ICD-10-CM

## 2022-04-14 LAB — URATE SERPL-MCNC: 6.6 MG/DL (ref 2.6–6)

## 2022-04-14 NOTE — PROGRESS NOTES
Pt having horrible joint pain. Per dr matson uric acid and sed rate. If no abnormality can refer to rheum

## 2022-04-14 NOTE — TELEPHONE ENCOUNTER
Clinical Pharmacy Consult:                                                      Transplant Specific: 3 Month Post Transplant Medication Review  Date of Transplant: 01/07/2022  Type of Transplant: liver  First Transplant: yes  History of rejection: no    Immunosuppression Regimen   TAC 3mg qAM & 3mg qPM and MPA 540mg qAM & 540mg qPM  Patient specific goal: 8-10  Most recent level: 11.6, date 3/21/22  Immunosuppressant Levels: supratherapeutic dose reduced  Pt adherent to lab draws: yes  Scr:   Lab Results   Component Value Date    CR 0.94 02/17/2022    CR 0.56 09/18/2020     Side effects: Diarrhea, very painful joints, elbows, knees, fingers and her feet are burning.    Prophylactic Medications  Antibacterial:  Bactrim ss daily  Scheduled Discontinue Date: 6 months    Antifungal: Not needed thus far  Scheduled Discontinue Date: N/A    Antiviral: CrCl 40 to 59 mL/minute: Valcyte 450 mg once daily   Scheduled Discontinue Date: completed    Acid Reducer: Protonix (pantoprazole)  Scheduled Reviewed Date: managed by clinic    Thrombosis Prevention: Aspirin 81 mg PO daily  Scheduled Discontinue Date: managed by clinic    Blood Pressure Management  Frequency of home Blood Pressure checks: not checking  Most recent home BP: 110/?  Wasn't sure   Patient Blood pressure goal: <140/90  Patient blood pressure at goal:  Yes    Hospitalizations/ER visits since last assessment: 0      Med rec/DUR performed: yes  Med Rec Discrepancies: no    Medication adherence flowsheet 4/14/2022   Patient medication administration: Has assistance with medications   Patient estimated adherence level: %   Pharmacist assessment of adherence: Good   Patient reported doses missed per week: 0-1   Facilitators to medication adherence  Cell phone;Pill box;Caregiver assistance;Schedule/routine   Patient reported barriers to medication adherence  -   Adherence intervention(s): -      Medication access flowsheet 4/14/2022   Number of pharmacies used: 1    Pharmacy: [facility]   Enrolled in [facility]? Yes   Patient reported barriers to accessing medications: -   Medication access interventions: -        Fidelina reports feeling ok.  She reports that she is still having diarrhea even after switching to MPA.  It has improved some and they are weaning her off MPA.  She reports no missed doses.  Her husbands helps but she is trying to be more independent. They use their own system in stead of a med list, a pill box and have a good routine.      Blood pressure is under control.    She said her joint pain is so bad she has difficulty gripping things and getting out of bed is difficult.  Do not see a uric acid test, will msg coordinator to check if it is gout.    Follow up in 3 months.    Ambrosio Holt [facility]  Specialty Pharmacist [phone]

## 2022-04-15 ENCOUNTER — VIRTUAL VISIT (OUTPATIENT)
Dept: GASTROENTEROLOGY | Facility: CLINIC | Age: 52
End: 2022-04-15
Attending: TRANSPLANT SURGERY
Payer: COMMERCIAL

## 2022-04-15 ENCOUNTER — TELEPHONE (OUTPATIENT)
Dept: GASTROENTEROLOGY | Facility: CLINIC | Age: 52
End: 2022-04-15
Payer: COMMERCIAL

## 2022-04-15 DIAGNOSIS — M25.50 MULTIPLE JOINT PAIN: Primary | ICD-10-CM

## 2022-04-15 DIAGNOSIS — Z94.4 LIVER REPLACED BY TRANSPLANT (H): ICD-10-CM

## 2022-04-15 PROCEDURE — 99214 OFFICE O/P EST MOD 30 MIN: CPT | Mod: GC | Performed by: INTERNAL MEDICINE

## 2022-04-15 PROCEDURE — G0463 HOSPITAL OUTPT CLINIC VISIT: HCPCS | Mod: PN,RTG | Performed by: INTERNAL MEDICINE

## 2022-04-15 RX ORDER — PREDNISONE 10 MG/1
TABLET ORAL
Qty: 32 TABLET | Refills: 0 | Status: SHIPPED | OUTPATIENT
Start: 2022-04-15 | End: 2022-05-13

## 2022-04-15 NOTE — PROGRESS NOTES
[facility]  LIVER TRANSPLANT CLINIC    A/P  Ms. Cortes is a 51 Y F s/p DDLT for ETOH 1/7/22.    Medically doing extremely well.  No changes to medications today. Labs up to date as is cancer screening.    IS: Tac and weaning MMF, Tac goal 8-10  -- Continue tacrolimus and weaning MMF    Biliary stricture: s/p ERCP with stenting x2, repeat ERCP in next few weeks    Arthralgia: She has been experiencing joint pain that is fairly significant. Rheum consult ordered. Will try small dose of pred in the meantime.      Will see back in 3 months.  Pt was seen with Dr. Steele.    Niecy Dumas MD  Hepatology/ Liver Transplant Fellow  [facility]  ATTENDING NOTE HEPATOLOGY    I saw and discussed this patient with the fellow and participated in the decision making. I agree with the fellow's note. Zita Steele MD    ===================================================================  PCP: Navneet Johnson MD    SUBJECTIVE  Ms. Cortes is a 51 year old female s/p DDLT 1/7/22  for ETOH.  EXPLANT: alcoholic cirrhosis, -ve for HCC  IS: Prograf, weaning MMF  LABS: Up to date  REJECTION: None.  BILIARY ISSUES: biliary stricture  STENT:stent in place  KIDNEY FUNCTION:   Creatinine   Date Value Ref Range Status   04/11/2022 1.21 (H) 0.52 - 1.04 mg/dL Final   09/18/2020 0.56 0.52 - 1.04 mg/dL Final     BP: Good. No meds.  PREV: UTD on screening   Colonoscopy 2022   Mammogram 2020   Pap/pelvic 2022   Dermatology will establish with one      Pt has been c/o joint pain with pain in big toe and some numbness on her feet ALl of her joints hurt however. It has been going on approx 1-2 months. No tremors any more. Also c/o itching and some rash for which she uses atarax and helps it.    Off of Valcyte and weaning off MMF.    ROS: 10 point ROS neg other than the symptoms noted above in the HPI.      Creatinine   Date Value Ref Range Status   04/11/2022 1.21 (H) 0.52 - 1.04 mg/dL Final   09/18/2020 0.56 0.52 - 1.04  "mg/dL Final   ]   Lab Results   Component Value Date    BILITOTAL 0.5 04/11/2022    BILITOTAL 0.7 04/04/2022    BILITOTAL 0.6 03/28/2022    BILITOTAL 0.6 03/21/2022    BILITOTAL 0.5 03/17/2022    BILITOTAL 1.1 09/18/2020      Lab Results   Component Value Date    ALT 22 04/11/2022    ALT 57 09/18/2020      Lab Results   Component Value Date    ALBUMIN 3.4 04/11/2022    ALBUMIN 2.8 09/18/2020       Hemoglobin   Date Value Ref Range Status   04/11/2022 10.8 (L) 11.7 - 15.7 g/dL Final   09/18/2020 12.3 11.7 - 15.7 g/dL Final   ]    Current Outpatient Medications   Medication     aspirin (ASA) 81 MG chewable tablet     calcium carbonate (OS-SHAI) 1500 (600 Ca) MG tablet     hydrOXYzine (ATARAX) 25 MG tablet     magnesium oxide (MAG-OX) 400 MG tablet     multivitamin (CENTRUM SILVER) tablet     mycophenolic acid (GENERIC EQUIVALENT) 180 MG EC tablet     pantoprazole (PROTONIX) 40 MG EC tablet     sulfamethoxazole-trimethoprim (BACTRIM) 400-80 MG tablet     tacrolimus (GENERIC EQUIVALENT) 0.5 MG capsule     tacrolimus (GENERIC EQUIVALENT) 1 MG capsule     thiamine (B-1) 100 MG tablet     tretinoin (RETIN-A) 0.025 % external cream     ursodiol (ACTIGALL) 250 MG tablet     No current facility-administered medications for this visit.     Exam  Gen Alert pleasant NAD  Resp No difficulty breathing. No cough  Skin No Jaundice  Eyes No icterus  Neuro RESENDEZ  MSK no muscle wasting  Psyche Pleasant, appropriate. Well groomed.    Melita Cortes is a 51 year old female who is being evaluated via a billable video visit.      The patient has been notified of following:     \"This video visit will be conducted via a call between you and your physician/provider. We have found that certain health care needs can be provided without the need for an in-person physical exam.  This service lets us provide the care you need with a video conversation.  If a prescription is necessary we can send it directly to your pharmacy.  If lab work is " "needed we can place an order for that and you can then stop by our lab to have the test done at a later time.    If during the course of the call the physician/provider feels a video visit is not appropriate, you will not be charged for this service.\"     Patient confirmed that they are in [place] for today's visit yes    Video-Visit Details  Type of service:  Video Visit    Video Start Time: 10:01  Video End Time:  10:52 AM    Originating Location (pt. Location): Home    Distant Location (provider location):  [facility] HEPATOLOGY CLINIC [place]     Platform used: [employer]              "

## 2022-04-15 NOTE — TELEPHONE ENCOUNTER
Referral to rheumatology for joint pain, rash and swelling under eyes routed to on-call provider for review and scheduling instructions as first available new patient appointment is in September 2022.   Amelie Blankenship RN  Adult Rheumatology Clinic

## 2022-04-15 NOTE — LETTER
4/15/2022         RE: Melita Cortes  [address]  [place] [address]        Dear Colleague,    Thank you for referring your patient, Melita Cortes, to the [facility] HEPATOLOGY [facility] [place]. Please see a copy of my visit note below.    [facility]  LIVER TRANSPLANT CLINIC    A/P  Ms. Cortes is a 51 Y F s/p DDLT for ETOH 1/7/22.    Medically doing extremely well.  No changes to medications today. Labs up to date as is cancer screening.    IS: Tac and weaning MMF, Tac goal 8-10  -- Continue tacrolimus and weaning MMF    Biliary stricture: s/p ERCP with stenting x2, repeat ERCP in next few weeks    Arthralgia: She has been experiencing joint pain that is fairly significant. Rheum consult ordered. Will try small dose of pred in the meantime.      Will see back in 3 months.  Pt was seen with Dr. Steele.    Niecy Dumas MD  Hepatology/ Liver Transplant Fellow  [facility]  ATTENDING NOTE HEPATOLOGY    I saw and discussed this patient with the fellow and participated in the decision making. I agree with the fellow's note. Zita Steele MD    ===================================================================  PCP: Navneet Johnson MD    SUBJECTIVE  Ms. Cortes is a 51 year old female s/p DDLT 1/7/22  for ETOH.  EXPLANT: alcoholic cirrhosis, -ve for HCC  IS: Prograf, weaning MMF  LABS: Up to date  REJECTION: None.  BILIARY ISSUES: biliary stricture  STENT:stent in place  KIDNEY FUNCTION:   Creatinine   Date Value Ref Range Status   04/11/2022 1.21 (H) 0.52 - 1.04 mg/dL Final   09/18/2020 0.56 0.52 - 1.04 mg/dL Final     BP: Good. No meds.  PREV: UTD on screening   Colonoscopy 2022   Mammogram 2020   Pap/pelvic 2022   Dermatology will establish with one      Pt has been c/o joint pain with pain in big toe and some numbness on her feet ALl of her joints hurt however. It has been going on approx 1-2 months. No tremors any more. Also c/o itching and some rash  "for which she uses atarax and helps it.    Off of Valcyte and weaning off MMF.    ROS: 10 point ROS neg other than the symptoms noted above in the HPI.      Creatinine   Date Value Ref Range Status   04/11/2022 1.21 (H) 0.52 - 1.04 mg/dL Final   09/18/2020 0.56 0.52 - 1.04 mg/dL Final   ]   Lab Results   Component Value Date    BILITOTAL 0.5 04/11/2022    BILITOTAL 0.7 04/04/2022    BILITOTAL 0.6 03/28/2022    BILITOTAL 0.6 03/21/2022    BILITOTAL 0.5 03/17/2022    BILITOTAL 1.1 09/18/2020      Lab Results   Component Value Date    ALT 22 04/11/2022    ALT 57 09/18/2020      Lab Results   Component Value Date    ALBUMIN 3.4 04/11/2022    ALBUMIN 2.8 09/18/2020       Hemoglobin   Date Value Ref Range Status   04/11/2022 10.8 (L) 11.7 - 15.7 g/dL Final   09/18/2020 12.3 11.7 - 15.7 g/dL Final   ]    Current Outpatient Medications   Medication     aspirin (ASA) 81 MG chewable tablet     calcium carbonate (OS-SHAI) 1500 (600 Ca) MG tablet     hydrOXYzine (ATARAX) 25 MG tablet     magnesium oxide (MAG-OX) 400 MG tablet     multivitamin (CENTRUM SILVER) tablet     mycophenolic acid (GENERIC EQUIVALENT) 180 MG EC tablet     pantoprazole (PROTONIX) 40 MG EC tablet     sulfamethoxazole-trimethoprim (BACTRIM) 400-80 MG tablet     tacrolimus (GENERIC EQUIVALENT) 0.5 MG capsule     tacrolimus (GENERIC EQUIVALENT) 1 MG capsule     thiamine (B-1) 100 MG tablet     tretinoin (RETIN-A) 0.025 % external cream     ursodiol (ACTIGALL) 250 MG tablet     No current facility-administered medications for this visit.     Exam  Gen Alert pleasant NAD  Resp No difficulty breathing. No cough  Skin No Jaundice  Eyes No icterus  Neuro RESENDEZ  MSK no muscle wasting  Psyche Pleasant, appropriate. Well groomed.    Melita Cortes is a 51 year old female who is being evaluated via a billable video visit.      The patient has been notified of following:     \"This video visit will be conducted via a call between you and your physician/provider. We " "have found that certain health care needs can be provided without the need for an in-person physical exam.  This service lets us provide the care you need with a video conversation.  If a prescription is necessary we can send it directly to your pharmacy.  If lab work is needed we can place an order for that and you can then stop by our lab to have the test done at a later time.    If during the course of the call the physician/provider feels a video visit is not appropriate, you will not be charged for this service.\"     Patient confirmed that they are in [place] for today's visit yes    Video-Visit Details  Type of service:  Video Visit    Video Start Time: 10:01  Video End Time:  10:52 AM    Originating Location (pt. Location): Home    Distant Location (provider location):  [facility] HEPATOLOGY CLINIC [place]     Platform used: [employer]                  Again, thank you for allowing me to participate in the care of your patient.        Sincerely,        Zita Steele MD    "

## 2022-04-15 NOTE — TELEPHONE ENCOUNTER
Health Call Center    Phone Message    May a detailed message be left on voicemail: yes     Reason for Call: Appointment Intake    Referring Provider Name: Zita Steele MD in  HEPATOLOGY  Diagnosis and/or Symptoms: post liver transplant, joint pain, rash and swelling under eyes,     Pt would like to see someone at the [facility] since she is a transplant pt. Pt states that she probably should get seen sooner then next available in Sept or Oct.     Action Taken: Message routed to:  [facility] ([facility] Adult Rheumatology    Travel Screening: Not Applicable

## 2022-04-15 NOTE — PROGRESS NOTES
"Fidelina is a 51 year old who is being evaluated via a billable video visit.      How would you like to obtain your AVS? MyChart  If the video visit is dropped, the invitation should be resent by: Text to cell phone: [phone]  Will anyone else be joining your video visit? No  {If patient encounters technical issues they should call [phone] :[id number]}    Video Start Time: {video visit start/end time for provider to select:152948}  Video-Visit Details    Type of service:  Video Visit    Video End Time:{video visit start/end time for provider to select:152948}    Originating Location (pt. Location): {video visit patient location:514579::\"Home\"}    Distant Location (provider location):  [facility] HEPATOLOGY CLINIC [place]     Platform used for Video Visit: {Virtual Visit Platforms:419142::\"[employer]\"}  "

## 2022-04-18 ENCOUNTER — LAB (OUTPATIENT)
Dept: LAB | Facility: CLINIC | Age: 52
End: 2022-04-18
Payer: COMMERCIAL

## 2022-04-18 ENCOUNTER — DOCUMENTATION ONLY (OUTPATIENT)
Dept: FAMILY MEDICINE | Facility: CLINIC | Age: 52
End: 2022-04-18

## 2022-04-18 DIAGNOSIS — M77.01: Primary | ICD-10-CM

## 2022-04-18 DIAGNOSIS — Z94.4 LIVER REPLACED BY TRANSPLANT (H): ICD-10-CM

## 2022-04-18 DIAGNOSIS — M25.50 JOINT PAIN: ICD-10-CM

## 2022-04-18 DIAGNOSIS — M77.02 GOLFERS ELBOW, LEFT: ICD-10-CM

## 2022-04-18 DIAGNOSIS — Z94.4 STATUS POST LIVER TRANSPLANTATION (H): Primary | ICD-10-CM

## 2022-04-18 DIAGNOSIS — D84.9 IMMUNOSUPPRESSED STATUS (H): ICD-10-CM

## 2022-04-18 LAB
ALBUMIN SERPL-MCNC: 3.7 G/DL (ref 3.4–5)
ALP SERPL-CCNC: 82 U/L (ref 40–150)
ALT SERPL W P-5'-P-CCNC: 23 U/L (ref 0–50)
ANION GAP SERPL CALCULATED.3IONS-SCNC: 7 MMOL/L (ref 3–14)
AST SERPL W P-5'-P-CCNC: 10 U/L (ref 0–45)
BILIRUB DIRECT SERPL-MCNC: 0.2 MG/DL (ref 0–0.2)
BILIRUB SERPL-MCNC: 0.4 MG/DL (ref 0.2–1.3)
BUN SERPL-MCNC: 20 MG/DL (ref 7–30)
CALCIUM SERPL-MCNC: 9.6 MG/DL (ref 8.5–10.1)
CHLORIDE BLD-SCNC: 106 MMOL/L (ref 94–109)
CO2 SERPL-SCNC: 25 MMOL/L (ref 20–32)
CREAT SERPL-MCNC: 1.08 MG/DL (ref 0.52–1.04)
ERYTHROCYTE [DISTWIDTH] IN BLOOD BY AUTOMATED COUNT: 13.9 % (ref 10–15)
ERYTHROCYTE [SEDIMENTATION RATE] IN BLOOD BY WESTERGREN METHOD: 8 MM/HR (ref 0–30)
GFR SERPL CREATININE-BSD FRML MDRD: 62 ML/MIN/1.73M2
GLUCOSE BLD-MCNC: 106 MG/DL (ref 70–99)
HCT VFR BLD AUTO: 33 % (ref 35–47)
HGB BLD-MCNC: 10.9 G/DL (ref 11.7–15.7)
MAGNESIUM SERPL-MCNC: 1.7 MG/DL (ref 1.6–2.3)
MCH RBC QN AUTO: 29.6 PG (ref 26.5–33)
MCHC RBC AUTO-ENTMCNC: 33 G/DL (ref 31.5–36.5)
MCV RBC AUTO: 90 FL (ref 78–100)
PHOSPHATE SERPL-MCNC: 3.9 MG/DL (ref 2.5–4.5)
PLATELET # BLD AUTO: 253 10E3/UL (ref 150–450)
POTASSIUM BLD-SCNC: 4.3 MMOL/L (ref 3.4–5.3)
PROT SERPL-MCNC: 6.4 G/DL (ref 6.8–8.8)
RBC # BLD AUTO: 3.68 10E6/UL (ref 3.8–5.2)
SODIUM SERPL-SCNC: 138 MMOL/L (ref 133–144)
TACROLIMUS BLD-MCNC: 7.7 UG/L (ref 5–15)
TME LAST DOSE: NORMAL H
TME LAST DOSE: NORMAL H
WBC # BLD AUTO: 4.4 10E3/UL (ref 4–11)

## 2022-04-18 PROCEDURE — 80197 ASSAY OF TACROLIMUS: CPT

## 2022-04-18 PROCEDURE — 85652 RBC SED RATE AUTOMATED: CPT

## 2022-04-18 PROCEDURE — 83735 ASSAY OF MAGNESIUM: CPT

## 2022-04-18 PROCEDURE — 80053 COMPREHEN METABOLIC PANEL: CPT

## 2022-04-18 PROCEDURE — 82248 BILIRUBIN DIRECT: CPT

## 2022-04-18 PROCEDURE — 85027 COMPLETE CBC AUTOMATED: CPT

## 2022-04-18 PROCEDURE — 36415 COLL VENOUS BLD VENIPUNCTURE: CPT

## 2022-04-18 PROCEDURE — 84100 ASSAY OF PHOSPHORUS: CPT

## 2022-04-18 NOTE — PROGRESS NOTES
Here today for lab work  She was complaining of some joint pains in both elbows  No joint swelling elsewhere  No joint pains elsewhere  She has tenderness on palpation of the medial epicondyle area and both elbows  Consistent with golfer's elbow  We will send a referral to orthopedics

## 2022-04-18 NOTE — TELEPHONE ENCOUNTER
Per on-call leandro Burleson to schedule this patient in the next available new patient slot with a fellow.  Amelie Blankenship RN  Adult Rheumatology Clinic

## 2022-04-19 ENCOUNTER — DOCUMENTATION ONLY (OUTPATIENT)
Dept: TRANSPLANT | Facility: CLINIC | Age: 52
End: 2022-04-19
Payer: COMMERCIAL

## 2022-04-19 NOTE — TELEPHONE ENCOUNTER
DIAGNOSIS: Golfers elbow, right /Golfers elbow, left    APPOINTMENT DATE: 05/07/2022   NOTES STATUS DETAILS   OFFICE NOTE from referring provider Internal 04/18/2022 Dr Johnson [facility] documentation only    OFFICE NOTE from other specialist N/A    DISCHARGE SUMMARY from hospital N/A    DISCHARGE REPORT from the ER N/A    OPERATIVE REPORT N/A    EMG report N/A    MEDICATION LIST N/A    MRI N/A    DEXA (osteoporosis/bone health) N/A    CT SCAN N/A    XRAYS (IMAGES & REPORTS) N/A

## 2022-04-19 NOTE — PROGRESS NOTES
Bradleyt message from Fidelina asking if she can continue myfortic wean.  Labs look good. She will decrease to 180 mg po bid for a week and recheck labs.

## 2022-04-22 ENCOUNTER — HOSPITAL ENCOUNTER (OUTPATIENT)
Dept: PHYSICAL THERAPY | Facility: CLINIC | Age: 52
Setting detail: THERAPIES SERIES
Discharge: HOME | End: 2022-04-22
Attending: TRANSPLANT SURGERY
Payer: COMMERCIAL

## 2022-04-22 DIAGNOSIS — Z94.4 STATUS POST LIVER TRANSPLANTATION (H): Primary | ICD-10-CM

## 2022-04-22 PROCEDURE — 97110 THERAPEUTIC EXERCISES: CPT | Mod: GP

## 2022-04-25 ENCOUNTER — LAB (OUTPATIENT)
Dept: LAB | Facility: CLINIC | Age: 52
End: 2022-04-25
Payer: COMMERCIAL

## 2022-04-25 DIAGNOSIS — Z94.4 LIVER REPLACED BY TRANSPLANT (H): ICD-10-CM

## 2022-04-25 DIAGNOSIS — Z11.59 ENCOUNTER FOR SCREENING FOR OTHER VIRAL DISEASES: Primary | ICD-10-CM

## 2022-04-25 LAB
ALBUMIN SERPL-MCNC: 3.7 G/DL (ref 3.4–5)
ALP SERPL-CCNC: 81 U/L (ref 40–150)
ALT SERPL W P-5'-P-CCNC: 21 U/L (ref 0–50)
ANION GAP SERPL CALCULATED.3IONS-SCNC: 8 MMOL/L (ref 3–14)
AST SERPL W P-5'-P-CCNC: 10 U/L (ref 0–45)
BILIRUB DIRECT SERPL-MCNC: 0.2 MG/DL (ref 0–0.2)
BILIRUB SERPL-MCNC: 0.4 MG/DL (ref 0.2–1.3)
BUN SERPL-MCNC: 12 MG/DL (ref 7–30)
CALCIUM SERPL-MCNC: 9 MG/DL (ref 8.5–10.1)
CHLORIDE BLD-SCNC: 108 MMOL/L (ref 94–109)
CO2 SERPL-SCNC: 23 MMOL/L (ref 20–32)
CREAT SERPL-MCNC: 1.07 MG/DL (ref 0.52–1.04)
ERYTHROCYTE [DISTWIDTH] IN BLOOD BY AUTOMATED COUNT: 14.3 % (ref 10–15)
GFR SERPL CREATININE-BSD FRML MDRD: 63 ML/MIN/1.73M2
GLUCOSE BLD-MCNC: 114 MG/DL (ref 70–99)
HCT VFR BLD AUTO: 35.5 % (ref 35–47)
HGB BLD-MCNC: 11.5 G/DL (ref 11.7–15.7)
MAGNESIUM SERPL-MCNC: 1.7 MG/DL (ref 1.6–2.3)
MCH RBC QN AUTO: 29.6 PG (ref 26.5–33)
MCHC RBC AUTO-ENTMCNC: 32.4 G/DL (ref 31.5–36.5)
MCV RBC AUTO: 92 FL (ref 78–100)
PHOSPHATE SERPL-MCNC: 3.9 MG/DL (ref 2.5–4.5)
PLATELET # BLD AUTO: 213 10E3/UL (ref 150–450)
POTASSIUM BLD-SCNC: 4.2 MMOL/L (ref 3.4–5.3)
PROT SERPL-MCNC: 6.4 G/DL (ref 6.8–8.8)
RBC # BLD AUTO: 3.88 10E6/UL (ref 3.8–5.2)
SODIUM SERPL-SCNC: 139 MMOL/L (ref 133–144)
TACROLIMUS BLD-MCNC: 7.9 UG/L (ref 5–15)
TME LAST DOSE: NORMAL H
TME LAST DOSE: NORMAL H
WBC # BLD AUTO: 5.4 10E3/UL (ref 4–11)

## 2022-04-25 PROCEDURE — 82248 BILIRUBIN DIRECT: CPT

## 2022-04-25 PROCEDURE — 85027 COMPLETE CBC AUTOMATED: CPT

## 2022-04-25 PROCEDURE — 80053 COMPREHEN METABOLIC PANEL: CPT

## 2022-04-25 PROCEDURE — 84100 ASSAY OF PHOSPHORUS: CPT

## 2022-04-25 PROCEDURE — 36415 COLL VENOUS BLD VENIPUNCTURE: CPT

## 2022-04-25 PROCEDURE — 80197 ASSAY OF TACROLIMUS: CPT

## 2022-04-25 PROCEDURE — 83735 ASSAY OF MAGNESIUM: CPT

## 2022-04-26 ENCOUNTER — TELEPHONE (OUTPATIENT)
Dept: TRANSPLANT | Facility: CLINIC | Age: 52
End: 2022-04-26
Payer: COMMERCIAL

## 2022-04-26 NOTE — PROGRESS NOTES
[facility] Dermatology  High Risk Non-Melanoma Skin Cancer Clinic Initial Consult Note  Encounter Date: Apr 27, 2022  Office Visit     Dermatology Problem List:  1. PMH liver transplant 1/7/2022  - tacrolimus, prednisone  2. 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 Rhytides - tretinoin 0.025% cream  4. AK, s/p cryo  # NUB, left shin, s/p shave bx 4/27/2022   ____________________________________________    Assessment & Plan:     # History of Liver solid organ transplantation in the setting of alcoholic liver cirrhosis.   - We reviewed with the patient that due to the immunosuppressive medications used following transplant, solid organ transplant recipients are at a roughly 65-fold increased risk of squamous cell carcinoma, 20-fold increased risk of basal cell carcinoma, and 3.4 fold increased risk of melanoma compared to the general population.   - Based on the patient's risk factors and the Skin and Ultraviolet Neoplasia Transplant Risk Assessment Tool, the patient's risk of skin cancer following transplant is categorized as:  - Medium Risk: 5-Year Risk of 6.15% and 10-Year Risk of 13.73%  - We briefly reviewed prevention methods for reducing skin cancer after transplantation including avoidance of sun exposure, avoidance of indoor tanning beds or other indoor tanning devices, use of protective clothing (e.g. wide-brimmed hats, long sleeves, long pants), SPF 30 or greater sunscreen, and UV-protective sunglasses when outdoors.     # NUB, left shin, biopsy ddx ISK vs AK r/o SCC/BCC  - Shave biopsy today, see procedure below    # Actinic keratosis, left cheek  - Cryo today, see procedure below    # Benign lesions: Multiple benign nevi, solar lentigos, seborrheic keratoses, cherry angiomas. Explained to patient benign nature of lesion. No treatment is necessary at this time unless the lesion changes or becomes symptomatic.   - ABCDs of melanoma were discussed and self skin  checks were advised.  - Sun precaution was advised including the use of sun screens of SPF 30 or higher, sun protective clothing, and avoidance of tanning beds.     Procedures Performed:   - Shave biopsy procedure note, location(s): see above. After discussion of benefits and risks including but not limited to bleeding, infection, scar, incomplete removal, recurrence, and non-diagnostic biopsy, written consent and photographs were obtained. The area was cleaned with isopropyl alcohol. 0.5mL of 1% lidocaine with epinephrine was injected to obtain adequate anesthesia of lesion(s). Shave biopsy at site(s) performed. Hemostasis was achieved with aluminium chloride. Petrolatum ointment and a sterile dressing were applied. The patient tolerated the procedure and no complications were noted. The patient was provided with verbal and written post care instructions.   - Cryotherapy procedure note, location(s): see above. After verbal consent and discussion of risks and benefits including, but not limited to, dyspigmentation/scar, blister, and pain, 1 lesion(s) was(were) treated with 1-2 mm freeze border for 1-2 cycles with liquid nitrogen. Post cryotherapy instructions were provided.    Follow-up: 1 year(s) in-person, or earlier for new or changing lesions    Staff and Scribe:     Scribe Disclosure:  I, Alexis Lew, am serving as a scribe to document services personally performed by Donovan Breen MD based on data collection and the provider's statements to me.     Provider Disclosure:   The documentation recorded by the scribe accurately reflects the services I personally performed and the decisions made by me.    Donovan Breen MD    Department of Dermatology  Formerly named [facility]: Phone: [phone], Fax:[phone]  [facility]: Phone: [phone] Fax:  [phone]  ____________________________________________    CC: Skin Check (Fidelina is here today for FBSE, one area of concern on face and L leg)      HPI:  Ms. Melita Cortes is a(n) 51 year old female who presents today as a return patient for FBSE. Last seen in dermatology by Bronwyn Mott PA-C, on 12/10/2021, at which time patient underwent shave biopsy which returned as traumatized angioma. Additionally, patient was started on tretinoin 0.025% cream every day for treatment of facial rhytides.    Today, patient reports a spot on her left shin that she would like examined. Denies associated pain. She also reports a scaly spot on her left cheek.    Patient is otherwise feeling well, without additional skin concerns.    Type of Organ Transplant: Liver  Reason for Transplant: alcoholic liver cirrhosis  Date of Transplant: 1/7/22  Immunosuppression Regimen: prednisone, tacrolimus    Personal History of Skin Cancer:  Every been diagnosed with the following:   IF YES, (if known) indicate type, body location, year diagnosed, and treatment.    - Pre-skin cancers (e.g. actinic keratoses): YES  - Atypical nevi: NO  - Keratinocyte carcinomas (basal cell carcinoma or squamous cell carcinoma): NO  - Melanoma: NO  - Other type(s) of skin cancer: NO     Family History of Skin Cancer:  Any first-degree relative relative(s) diagnosed with the following:  IF YES, (if known) indicate relationship, type, and age at diagnosis.     - Keratinocyte carcinomas (basal cell carcinoma or squamous cell carcinoma): YES  - Melanoma: NO  - Other type of skin cancer: NO    Skin Cancer Review of Systems:  - Fair skin (Gerber Type I or II): YES  - Blue, green or sharon eyes: YES  - Light or red hair: NO  - Male sex assigned at birth: YES  - History of blistering sunburns: YES  - Use of indoor tanning devices or sunlamps: Yes - Former   - Over 100 times in 20's  - Smoking History: Yes - Former  - 0.5 packs/day for 10 years  - Ever lived  in a sub-tropical region? Yes - Former  - Any history of arsenic exposure (e.g. well water)? No  - Any history of viral warts? Yes - Former  - Have you received the HPV vaccine? NO  - Any occupational exposure to ultraviolet light exposure (e.g. construction work, agricultural work, ): No    Medication Review:  Is the patient currently taking any of the following medications?   IF YES, indicate which medication (if necessary).     - TNF-alpha inhibitor (e.g. etanercept, adalimumab, infliximab): NO  - Tetracycline antibiotic (e.g. minocycline, doxycycline, tetracycline): NO  - Thiazide-containing anti-hypertensive (e.g. hydrochlorothiazide): NO  - Angiotensin receptor blocker (e.g. losartan): NO  - Sulfonylurea (e.g. glipizide): NO  - Amiodarone: NO  - Diltiazem: NO  - Voriconazole: NO     Labs Reviewed:  N/A    Physical Exam:  Vitals: There were no vitals taken for this visit.  SKIN: Full skin, which includes the head/face, both arms, chest, back, abdomen,both legs, genitalia and/or groin buttocks, digits and/or nails, was examined.  - On the left shin, there is a 4 mm pink crusted papule with non specific white polarizing lines.  - There is an erythematous macule with overyling adherent scale on the left cheek.   - There are dome shaped bright red papules on the trunk and extremities.   - Multiple regular brown pigmented macules and papules are identified on the trunk and extremities.   - Scattered brown macules on sun exposed areas.  - There are waxy stuck on tan to brown papules on the trunk and extremities.    - No other lesions of concern on areas examined.         Medications:  Current Outpatient Medications   Medication     aspirin (ASA) 81 MG chewable tablet     calcium carbonate (OS-SHAI) 1500 (600 Ca) MG tablet     hydrOXYzine (ATARAX) 25 MG tablet     magnesium oxide (MAG-OX) 400 MG tablet     multivitamin (CENTRUM SILVER) tablet     pantoprazole (PROTONIX) 40 MG EC tablet     predniSONE  (DELTASONE) 10 MG tablet     sulfamethoxazole-trimethoprim (BACTRIM) 400-80 MG tablet     tacrolimus (GENERIC EQUIVALENT) 1 MG capsule     thiamine (B-1) 100 MG tablet     tretinoin (RETIN-A) 0.025 % external cream     ursodiol (ACTIGALL) 250 MG tablet     tacrolimus (GENERIC EQUIVALENT) 0.5 MG capsule     No current facility-administered medications for this visit.      Past Medical History:   Patient Active Problem List   Diagnosis     Abdominal bloating     Family history of pancreatic cancer     Transaminitis     Macrocytosis     Cervical high risk HPV (human papillomavirus) test positive     Anemia, unspecified type     Elevated serum creatinine     Alcoholic cirrhosis of liver with ascites (H)     Hyponatremia     Malaise and fatigue     At high risk for severe sepsis     Symptomatic anemia     Bandemia     Hyperlipidemia     Anxiety     Status post liver transplantation (H)     Immunosuppressed status (H)     Steroid-induced hyperglycemia     Hypervolemia     Severe malnutrition (H)     Elevated alkaline phosphatase level     Past Medical History:   Diagnosis Date     Alcoholic hepatitis      Asthma 3/10/2006     Cervical high risk HPV (human papillomavirus) test positive 2019, 2020    See problem list     Liver failure (H)        CC Zita Steele MD  [address] [facility] [address]  [place] [address] on close of this encounter.

## 2022-04-27 ENCOUNTER — OFFICE VISIT (OUTPATIENT)
Dept: DERMATOLOGY | Facility: CLINIC | Age: 52
End: 2022-04-27
Attending: INTERNAL MEDICINE
Payer: COMMERCIAL

## 2022-04-27 ENCOUNTER — OFFICE VISIT (OUTPATIENT)
Dept: OBGYN | Facility: CLINIC | Age: 52
End: 2022-04-27

## 2022-04-27 ENCOUNTER — MYC MEDICAL ADVICE (OUTPATIENT)
Dept: GASTROENTEROLOGY | Facility: CLINIC | Age: 52
End: 2022-04-27
Payer: COMMERCIAL

## 2022-04-27 VITALS
HEART RATE: 83 BPM | DIASTOLIC BLOOD PRESSURE: 84 MMHG | WEIGHT: 124.4 LBS | SYSTOLIC BLOOD PRESSURE: 115 MMHG | BODY MASS INDEX: 20.7 KG/M2 | OXYGEN SATURATION: 100 %

## 2022-04-27 DIAGNOSIS — D22.9 MULTIPLE BENIGN NEVI: Primary | ICD-10-CM

## 2022-04-27 DIAGNOSIS — L81.4 SOLAR LENTIGO: ICD-10-CM

## 2022-04-27 DIAGNOSIS — Z32.00 PREGNANCY EXAMINATION OR TEST, PREGNANCY UNCONFIRMED: ICD-10-CM

## 2022-04-27 DIAGNOSIS — D48.9 NEOPLASM OF UNCERTAIN BEHAVIOR: ICD-10-CM

## 2022-04-27 DIAGNOSIS — R87.810 CERVICAL HIGH RISK HPV (HUMAN PAPILLOMAVIRUS) TEST POSITIVE: Primary | ICD-10-CM

## 2022-04-27 DIAGNOSIS — D84.9 IMMUNOSUPPRESSED STATUS (H): ICD-10-CM

## 2022-04-27 DIAGNOSIS — L57.0 ACTINIC KERATOSIS: ICD-10-CM

## 2022-04-27 DIAGNOSIS — Z94.4 STATUS POST LIVER TRANSPLANTATION (H): ICD-10-CM

## 2022-04-27 DIAGNOSIS — L82.1 SEBORRHEIC KERATOSIS: ICD-10-CM

## 2022-04-27 DIAGNOSIS — D18.01 CHERRY ANGIOMA: ICD-10-CM

## 2022-04-27 LAB — HCG UR QL: NEGATIVE

## 2022-04-27 PROCEDURE — 88305 TISSUE EXAM BY PATHOLOGIST: CPT | Performed by: PATHOLOGY

## 2022-04-27 PROCEDURE — 17000 DESTRUCT PREMALG LESION: CPT | Mod: 59 | Performed by: DERMATOLOGY

## 2022-04-27 PROCEDURE — 11102 TANGNTL BX SKIN SINGLE LES: CPT | Performed by: DERMATOLOGY

## 2022-04-27 PROCEDURE — 99203 OFFICE O/P NEW LOW 30 MIN: CPT | Mod: 25 | Performed by: DERMATOLOGY

## 2022-04-27 PROCEDURE — 88305 TISSUE EXAM BY PATHOLOGIST: CPT | Mod: 26 | Performed by: DERMATOLOGY

## 2022-04-27 PROCEDURE — 57456 ENDOCERV CURETTAGE W/SCOPE: CPT | Performed by: OBSTETRICS & GYNECOLOGY

## 2022-04-27 PROCEDURE — 81025 URINE PREGNANCY TEST: CPT | Performed by: PATHOLOGY

## 2022-04-27 PROCEDURE — 88305 TISSUE EXAM BY PATHOLOGIST: CPT | Mod: TC | Performed by: DERMATOLOGY

## 2022-04-27 ASSESSMENT — PAIN SCALES - GENERAL: PAINLEVEL: NO PAIN (0)

## 2022-04-27 NOTE — PROGRESS NOTES
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  Her pap result on that same date was normal.  She did undergo a liver transplant 3 months ago so is currently taking medications to prevent rejection.  She denies any risk of pregnancy exposure and today's UPT was negative.  She denies any occurrence of postmenopausal spotting or bleeding.  /84 (BP Location: Right arm, Patient Position: Sitting, Cuff Size: Adult Regular)   Pulse 83   Wt 56.4 kg (124 lb 6.4 oz)   SpO2 100%   BMI 20.70 kg/m    ROS:  10 systems were reviewed and the positives were listed under problems.  Informed consent was reviewed and obtained for colposcopy with biopsies and all her questions were addressed.    In the dorsal lithotomy position, a bi-valve speculum was placed and her cervix was soaked with 3% acetic acid.  After several minutes, this fluid was removed and colposcopy examination was performed.  No areas of acetowhite were noted to involve the ectocervix, so endocervical curettings were obtained and submitted to the lab.  She tolerated this procedure well and it was a satisfactory examination.  F/u care and instructions were reviewed with the patient and all her questions and concerns were addressed.  All instruments were removed and counts were correct.  There were no complications.  Assessment - colposcopy exam with biopsy (ECC) due to + high-risk HPV subtype #18 per recent DNA marker test, recent liver transplant recipient  Plan - Due to the medications that she is taking to prevent rejection of her liver transplant, this puts her at higher risk for the development of cervical disease so if today's pathology results are normal, then I would advise a yearly co-test to closely monitor.  The biopsy (ECC) was submitted to lab and follow up care and instructions were reviewed with the patient.  20  minutes were spent today in chart review, the patient visit, review of tests, and documentation in regards to the issues noted above.  This did NOT include the time spent in the procedure (colposcopy exam with biopsy).

## 2022-04-27 NOTE — NURSING NOTE
Dermatology Rooming Note    Melita Cortes's goals for this visit include:   Chief Complaint   Patient presents with     Skin Check     Fidelina is here today for FBSE, one area of concern on face and L leg     Marilee Gupta, EMT

## 2022-04-27 NOTE — PATIENT INSTRUCTIONS
Patient Education     Checking for Skin Cancer  You can find cancer early by checking your skin each month. There are 3 kinds of skin cancer. They are melanoma, basal cell carcinoma, and squamous cell carcinoma. Doing monthly skin checks is the best way to find new marks or skin changes. Follow the instructions below for checking your skin.   The ABCDEs of checking moles for melanoma   Check your moles or growths for signs of melanoma using ABCDE:   Asymmetry: the sides of the mole or growth don t match  Border: the edges are ragged, notched, or blurred  Color: the color within the mole or growth varies  Diameter: the mole or growth is larger than 6 mm (size of a pencil eraser)  Evolving: the size, shape, or color of the mole or growth is changing (evolving is not shown in the images below)    Checking for other types of skin cancer  Basal cell carcinoma or squamous cell carcinoma have symptoms such as:     A spot or mole that looks different from all other marks on your skin  Changes in how an area feels, such as itching, tenderness, or pain  Changes in the skin's surface, such as oozing, bleeding, or scaliness  A sore that does not heal  New swelling or redness beyond the border of a mole    Who s at risk?  Anyone can get skin cancer. But you are at greater risk if you have:   Fair skin, light-colored hair, or light-colored eyes  Many moles or abnormal moles on your skin  A history of sunburns from sunlight or tanning beds  A family history of skin cancer  A history of exposure to radiation or chemicals  A weakened immune system  If you have had skin cancer in the past, you are at risk for recurring skin cancer.   How to check your skin  Do your monthly skin checkups in front of a full-length mirror. Check all parts of your body, including your:   Head (ears, face, neck, and scalp)  Torso (front, back, and sides)  Arms (tops, undersides, upper, and lower armpits)  Hands (palms, backs, and fingers, including  under the nails)  Buttocks and genitals  Legs (front, back, and sides)  Feet (tops, soles, toes, including under the nails, and between toes)  If you have a lot of moles, take digital photos of them each month. Make sure to take photos both up close and from a distance. These can help you see if any moles change over time.   Most skin changes are not cancer. But if you see any changes in your skin, call your doctor right away. Only he or she can diagnose a problem. If you have skin cancer, seeing your doctor can be the first step toward getting the treatment that could save your life.   Skype last reviewed this educational content on 4/1/2019 2000-2020 [employer]. [address] [place] [address], [place] [address]. All rights reserved. This information is not intended as a substitute for professional medical care. Always follow your healthcare professional's instructions.       When should I call my doctor?  If you are worsening or not improving, please, contact us or seek urgent care as noted below.     Who should I call with questions (adults)?  [facility] (adult and pediatric): [phone]  [facility] (adult): [phone]  For urgent needs outside of business hours call the [facility] at [phone] and ask for the dermatology resident on call to be paged  If this is a medical emergency and you are unable to reach an ER, Call [phone]    Who should I call with questions (pediatric)?  [facility]- Pediatric Dermatology  Dr. Joanna Harris, Dr. Vy Tanner, Dr. Joyce Rhodes, TOSHIA Kam, Dr. Roopa Valentine, Dr. Nayeli Tran & Dr. Srinivas Fontanez  Non-urgent nurse triage line; [phone]- Alayna and Priya HARLEY Care Coordinatorsiena Mcgee (/Complex ) [phone]    If you need a prescription refill, please contact your pharmacy. Refills are approved or denied by our  Physicians during normal business hours, Monday through Fridays  Per office policy, refills will not be granted if you have not been seen within the past year (or sooner depending on your child's condition)    Scheduling Information:  Pediatric Appointment Scheduling and Call Center [phone]  Radiology Scheduling- [phone]  Sedation Unit Scheduling- [phone]  [facility] Scheduling- General [phone]; Pediatric Dermatology [phone]  Main  Services: [phone]  Urdu: [phone]  Bahamian: [phone]  Hmong/Canadian/Bulgarian: [phone]  Preadmission Nursing Department Fax Number: [phone] (Fax all pre-operative paperwork to this number)    For urgent matters arising during evenings, weekends, or holidays that cannot wait for normal business hours please call [phone] and ask for the dermatology resident on call to be paged.     Cryotherapy    What is it?  Use of a very cold liquid, such as liquid nitrogen, to freeze and destroy abnormal skin cells that need to be removed    What should I expect?  Tenderness and redness  A small blister that might grow and fill with dark purple blood. There may be crusting.  More than one treatment may be needed if the lesions do not go away.    How do I care for the treated area?  Gently wash the area with your hands when bathing.  Use a thin layer of Vaseline to help with healing. You may use a Band-Aid.   The area should heal within 7-10 days and may leave behind a pink or lighter color.   Do not use an antibiotic or Neosporin ointment.   You may take acetaminophen (Tylenol) for pain.     Call your doctor if you have:  Severe pain  Signs of infection (warmth, redness, cloudy yellow drainage, and or a bad smell)  Questions or concerns    Who should I call with questions?      [facility]: [phone]      [facility]: [phone]      For urgent needs outside of business hours  call the [facility] at [phone] and ask for the dermatology resident on call     Wound Care After a Biopsy    What is a skin biopsy?  A skin biopsy allows the doctor to examine a very small piece of tissue under the microscope to determine the diagnosis and the best treatment for the skin condition. A local anesthetic (numbing medicine)  is injected with a very small needle into the skin area to be tested. A small piece of skin is taken from the area. Sometimes a suture (stitch) is used.     What are the risks of a skin biopsy?  I will experience scar, bleeding, swelling, pain, crusting and redness. I may experience incomplete removal or recurrence. Risks of this procedure are excessive bleeding, bruising, infection, nerve damage, numbness, thick (hypertrophic or keloidal) scar and non-diagnostic biopsy.    How should I care for my wound for the first 24 hours?  Keep the wound dry and covered for 24 hours  If it bleeds, hold direct pressure on the area for 15 minutes. If bleeding does not stop then go to the emergency room  Avoid strenuous exercise the first 1-2 days or as your doctor instructs you    How should I care for the wound after 24 hours?  After 24 hours, remove the bandage  You may bathe or shower as normal  If you had a scalp biopsy, you can shampoo as usual and can use shower water to clean the biopsy site daily  Clean the wound twice a day with gentle soap and water  Do not scrub, be gentle  Apply white petroleum/Vaseline after cleaning the wound with a cotton swab or a clean finger, and keep the site covered with a Bandaid /bandage. Bandages are not necessary with a scalp biopsy  If you are unable to cover the site with a Bandaid /bandage, re-apply ointment 2-3 times a day to keep the site moist. Moisture will help with healing  Avoid strenuous activity for first 1-2 days  Avoid lakes, rivers, pools, and oceans until the stitches are removed or the site is healed    How do I clean my  wound?  Wash hands thoroughly with soap or use hand  before all wound care  Clean the wound with gentle soap and water  Apply white petroleum/Vaseline  to wound after it is clean  Replace the Bandaid /bandage to keep the wound covered for the first few days or as instructed by your doctor  If you had a scalp biopsy, warm shower water to the area on a daily basis should suffice    What should I use to clean my wound?   Cotton-tipped applicators (Qtips )  White petroleum jelly (Vaseline ). Use a clean new container and use Q-tips to apply.  Bandaids   as needed  Gentle soap     How should I care for my wound long term?  Do not get your wound dirty  Keep up with wound care for one week or until the area is healed.  A small scab will form and fall off by itself when the area is completely healed. The area will be red and will become pink in color as it heals. Sun protection is very important for how your scar will turn out. Sunscreen with an SPF 30 or greater is recommended once the area is healed.  You should have some soreness but it should be mild and slowly go away over several days. Talk to your doctor about using tylenol for pain,    When should I call my doctor?  If you have increased:   Pain or swelling  Pus or drainage (clear or slightly yellow drainage is ok)  Temperature over 100F  Spreading redness or warmth around wound    When will I hear about my results?  The biopsy results can take 2 weeks to come back.  Your results will automatically release to [facility] before your provider has even reviewed them.  The clinic will call you with the results, send you a [employer] message, or have you schedule a follow-up clinic or phone time to discuss the results.  Contact our clinics if you do not hear from us in 2 weeks.    Who should I call with questions?  [facility]: [phone]  [facility]: [phone]  For urgent needs outside of business  hours call the [facility] at [phone] and ask for the dermatology resident on call

## 2022-04-27 NOTE — NURSING NOTE
Lidocaine-epinephrine 1-1:550056 % injection   1mL once for one use, starting 4/27/2022 ending 4/27/2022,  2mL disp, R-0, injection  Injected by Dr. Donovan Breen

## 2022-04-27 NOTE — LETTER
4/27/2022       RE: Melita Cortes  [address]  [facility] [address]     Dear Colleague,    Thank you for referring your patient, Melita Cortes, to the [facility] DERMATOLOGY CLINIC [place] at [facility]. Please see a copy of my visit note below.    [facility] Dermatology  High Risk Non-Melanoma Skin Cancer Clinic Initial Consult Note  Encounter Date: Apr 27, 2022  Office Visit     Dermatology Problem List:  1. [facility] liver transplant 1/7/2022  - tacrolimus, prednisone  2. 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 Rhytides - tretinoin 0.025% cream  4. AK, s/p cryo  # NUB, left shin, s/p shave bx 4/27/2022   ____________________________________________    Assessment & Plan:     # History of Liver solid organ transplantation in the setting of alcoholic liver cirrhosis.   - We reviewed with the patient that due to the immunosuppressive medications used following transplant, solid organ transplant recipients are at a roughly 65-fold increased risk of squamous cell carcinoma, 20-fold increased risk of basal cell carcinoma, and 3.4 fold increased risk of melanoma compared to the general population.   - Based on the patient's risk factors and the Skin and Ultraviolet Neoplasia Transplant Risk Assessment Tool, the patient's risk of skin cancer following transplant is categorized as:  - Medium Risk: 5-Year Risk of 6.15% and 10-Year Risk of 13.73%  - We briefly reviewed prevention methods for reducing skin cancer after transplantation including avoidance of sun exposure, avoidance of indoor tanning beds or other indoor tanning devices, use of protective clothing (e.g. wide-brimmed hats, long sleeves, long pants), SPF 30 or greater sunscreen, and UV-protective sunglasses when outdoors.     # NUB, left shin, biopsy ddx ISK vs AK r/o SCC/BCC  - Shave biopsy today, see procedure below    # Actinic keratosis, left  cheek  - Cryo today, see procedure below    # Benign lesions: Multiple benign nevi, solar lentigos, seborrheic keratoses, cherry angiomas. Explained to patient benign nature of lesion. No treatment is necessary at this time unless the lesion changes or becomes symptomatic.   - ABCDs of melanoma were discussed and self skin checks were advised.  - Sun precaution was advised including the use of sun screens of SPF 30 or higher, sun protective clothing, and avoidance of tanning beds.     Procedures Performed:   - Shave biopsy procedure note, location(s): see above. After discussion of benefits and risks including but not limited to bleeding, infection, scar, incomplete removal, recurrence, and non-diagnostic biopsy, written consent and photographs were obtained. The area was cleaned with isopropyl alcohol. 0.5mL of 1% lidocaine with epinephrine was injected to obtain adequate anesthesia of lesion(s). Shave biopsy at site(s) performed. Hemostasis was achieved with aluminium chloride. Petrolatum ointment and a sterile dressing were applied. The patient tolerated the procedure and no complications were noted. The patient was provided with verbal and written post care instructions.   - Cryotherapy procedure note, location(s): see above. After verbal consent and discussion of risks and benefits including, but not limited to, dyspigmentation/scar, blister, and pain, 1 lesion(s) was(were) treated with 1-2 mm freeze border for 1-2 cycles with liquid nitrogen. Post cryotherapy instructions were provided.    Follow-up: 1 year(s) in-person, or earlier for new or changing lesions    Staff and Scribe:     Scribe Disclosure:  I, Alexis Lew, am serving as a scribe to document services personally performed by Donovan Breen MD based on data collection and the provider's statements to me.     Provider Disclosure:   The documentation recorded by the scribe accurately reflects the services I personally performed and the  decisions made by me.    Donovan rBeen MD    Department of Dermatology  [facility]: Phone: [phone], Fax:[phone]  [facility]: Phone: [phone] Fax: [phone]  ____________________________________________    CC: Skin Check (Fidelina is here today for FBSE, one area of concern on face and L leg)      HPI:  Ms. Melita Cortes is a(n) 51 year old female who presents today as a return patient for FBSE. Last seen in dermatology by Bronwyn Mott PA-C, on 12/10/2021, at which time patient underwent shave biopsy which returned as traumatized angioma. Additionally, patient was started on tretinoin 0.025% cream every day for treatment of facial rhytides.    Today, patient reports a spot on her left shin that she would like examined. Denies associated pain. She also reports a scaly spot on her left cheek.    Patient is otherwise feeling well, without additional skin concerns.    Type of Organ Transplant: Liver  Reason for Transplant: alcoholic liver cirrhosis  Date of Transplant: 1/7/22  Immunosuppression Regimen: prednisone, tacrolimus    Personal History of Skin Cancer:  Every been diagnosed with the following:   IF YES, (if known) indicate type, body location, year diagnosed, and treatment.    - Pre-skin cancers (e.g. actinic keratoses): YES  - Atypical nevi: NO  - Keratinocyte carcinomas (basal cell carcinoma or squamous cell carcinoma): NO  - Melanoma: NO  - Other type(s) of skin cancer: NO     Family History of Skin Cancer:  Any first-degree relative relative(s) diagnosed with the following:  IF YES, (if known) indicate relationship, type, and age at diagnosis.     - Keratinocyte carcinomas (basal cell carcinoma or squamous cell carcinoma): YES  - Melanoma: NO  - Other type of skin cancer: NO    Skin Cancer Review of Systems:  - Fair skin (Gerber Type I or II): YES  - Blue,  green or sharon eyes: YES  - Light or red hair: NO  - Male sex assigned at birth: YES  - History of blistering sunburns: YES  - Use of indoor tanning devices or sunlamps: Yes - Former   - Over 100 times in 20's  - Smoking History: Yes - Former  - 0.5 packs/day for 10 years  - Ever lived in a sub-tropical region? Yes - Former  - Any history of arsenic exposure (e.g. well water)? No  - Any history of viral warts? Yes - Former  - Have you received the HPV vaccine? NO  - Any occupational exposure to ultraviolet light exposure (e.g. construction work, agricultural work, ): No    Medication Review:  Is the patient currently taking any of the following medications?   IF YES, indicate which medication (if necessary).     - TNF-alpha inhibitor (e.g. etanercept, adalimumab, infliximab): NO  - Tetracycline antibiotic (e.g. minocycline, doxycycline, tetracycline): NO  - Thiazide-containing anti-hypertensive (e.g. hydrochlorothiazide): NO  - Angiotensin receptor blocker (e.g. losartan): NO  - Sulfonylurea (e.g. glipizide): NO  - Amiodarone: NO  - Diltiazem: NO  - Voriconazole: NO     Labs Reviewed:  N/A    Physical Exam:  Vitals: There were no vitals taken for this visit.  SKIN: Full skin, which includes the head/face, both arms, chest, back, abdomen,both legs, genitalia and/or groin buttocks, digits and/or nails, was examined.  - On the left shin, there is a 4 mm pink crusted papule with non specific white polarizing lines.  - There is an erythematous macule with overyling adherent scale on the left cheek.   - There are dome shaped bright red papules on the trunk and extremities.   - Multiple regular brown pigmented macules and papules are identified on the trunk and extremities.   - Scattered brown macules on sun exposed areas.  - There are waxy stuck on tan to brown papules on the trunk and extremities.    - No other lesions of concern on areas examined.         Medications:  Current Outpatient Medications    Medication     aspirin (ASA) 81 MG chewable tablet     calcium carbonate (OS-SHAI) 1500 (600 Ca) MG tablet     hydrOXYzine (ATARAX) 25 MG tablet     magnesium oxide (MAG-OX) 400 MG tablet     multivitamin (CENTRUM SILVER) tablet     pantoprazole (PROTONIX) 40 MG EC tablet     predniSONE (DELTASONE) 10 MG tablet     sulfamethoxazole-trimethoprim (BACTRIM) 400-80 MG tablet     tacrolimus (GENERIC EQUIVALENT) 1 MG capsule     thiamine (B-1) 100 MG tablet     tretinoin (RETIN-A) 0.025 % external cream     ursodiol (ACTIGALL) 250 MG tablet     tacrolimus (GENERIC EQUIVALENT) 0.5 MG capsule     No current facility-administered medications for this visit.      Past Medical History:   Patient Active Problem List   Diagnosis     Abdominal bloating     Family history of pancreatic cancer     Transaminitis     Macrocytosis     Cervical high risk HPV (human papillomavirus) test positive     Anemia, unspecified type     Elevated serum creatinine     Alcoholic cirrhosis of liver with ascites (H)     Hyponatremia     Malaise and fatigue     At high risk for severe sepsis     Symptomatic anemia     Bandemia     Hyperlipidemia     Anxiety     Status post liver transplantation (H)     Immunosuppressed status (H)     Steroid-induced hyperglycemia     Hypervolemia     Severe malnutrition (H)     Elevated alkaline phosphatase level     Past Medical History:   Diagnosis Date     Alcoholic hepatitis      Asthma 3/10/2006     Cervical high risk HPV (human papillomavirus) test positive 2019, 2020    See problem list     Liver failure (H)        CC Zita Steele MD  [address] [place] [address] [address]  [place] [address] on close of this encounter.

## 2022-04-27 NOTE — PATIENT INSTRUCTIONS
If you have any questions regarding your visit, Please contact your care team.    [employer] [facility]: [phone]    To Schedule an Appointment 24/7  Call: [phone] TELEPHONE NUMBER   Caitlyn Healy DO.    AIDAN Barreto -  [place] -       Irma, RN  Josey, RN  Isa, RN     [place]  Wednesday and Friday  8:30 a.m-5:00 p.m    [place]-Temporary  Monday 8:30 a.m-5:00 p.m [facility]  [address] [place] [address]  [phone] ask for [facility]    Imaging Scheduling-All Locations [phone]    [facility]  [address] [facility] [place] [address]     Urgent Care locations:  [facility]   Monday-Friday   10 am - 8 pm  Saturday and Sunday   9 am - 5 pm   [phone]     [facility] Labor and Delivery:  [phone]    If you need a medication refill, please contact your pharmacy. Please allow 3 business days for your refill to be completed.  As always, Thank you for trusting us with your healthcare needs!  see additional instructions from your care team below

## 2022-04-28 LAB
PATH REPORT.COMMENTS IMP SPEC: ABNORMAL
PATH REPORT.COMMENTS IMP SPEC: ABNORMAL
PATH REPORT.COMMENTS IMP SPEC: NORMAL
PATH REPORT.COMMENTS IMP SPEC: NORMAL
PATH REPORT.COMMENTS IMP SPEC: YES
PATH REPORT.FINAL DX SPEC: ABNORMAL
PATH REPORT.FINAL DX SPEC: NORMAL
PATH REPORT.GROSS SPEC: ABNORMAL
PATH REPORT.GROSS SPEC: NORMAL
PATH REPORT.MICROSCOPIC SPEC OTHER STN: ABNORMAL
PATH REPORT.MICROSCOPIC SPEC OTHER STN: NORMAL
PATH REPORT.RELEVANT HX SPEC: ABNORMAL
PATH REPORT.RELEVANT HX SPEC: NORMAL
PHOTO IMAGE: NORMAL

## 2022-04-28 NOTE — RESULT ENCOUNTER NOTE
Could we call patient and let her know the spot on the L shin was a SCC. I would recommend MMS surgery to ensure complete removal. Could you schedule with derm surg and place referral and then follow-up with me in 6 months for another skin exam (can use transplant skin check slot)? Thanks!    Donovan Breen MD  Pronouns: he/him/his    Department of Dermatology  [facility]: Phone: [phone], Fax:[phone]  [facility]: Phone: [phone] Fax: [phone]

## 2022-04-29 ENCOUNTER — MYC MEDICAL ADVICE (OUTPATIENT)
Dept: DERMATOLOGY | Facility: CLINIC | Age: 52
End: 2022-04-29
Payer: COMMERCIAL

## 2022-04-29 DIAGNOSIS — L98.8 FACIAL RHYTIDS: Primary | ICD-10-CM

## 2022-05-02 ENCOUNTER — LAB (OUTPATIENT)
Dept: LAB | Facility: CLINIC | Age: 52
End: 2022-05-02
Payer: COMMERCIAL

## 2022-05-02 ENCOUNTER — PATIENT OUTREACH (OUTPATIENT)
Dept: OBGYN | Facility: CLINIC | Age: 52
End: 2022-05-02

## 2022-05-02 DIAGNOSIS — Z94.4 LIVER REPLACED BY TRANSPLANT (H): ICD-10-CM

## 2022-05-02 DIAGNOSIS — R87.810 CERVICAL HIGH RISK HPV (HUMAN PAPILLOMAVIRUS) TEST POSITIVE: ICD-10-CM

## 2022-05-02 LAB
ALBUMIN SERPL-MCNC: 3.6 G/DL (ref 3.4–5)
ALP SERPL-CCNC: 75 U/L (ref 40–150)
ALT SERPL W P-5'-P-CCNC: 23 U/L (ref 0–50)
ANION GAP SERPL CALCULATED.3IONS-SCNC: 4 MMOL/L (ref 3–14)
AST SERPL W P-5'-P-CCNC: 10 U/L (ref 0–45)
BILIRUB DIRECT SERPL-MCNC: 0.2 MG/DL (ref 0–0.2)
BILIRUB SERPL-MCNC: 0.5 MG/DL (ref 0.2–1.3)
BUN SERPL-MCNC: 15 MG/DL (ref 7–30)
CALCIUM SERPL-MCNC: 9.6 MG/DL (ref 8.5–10.1)
CHLORIDE BLD-SCNC: 106 MMOL/L (ref 94–109)
CO2 SERPL-SCNC: 28 MMOL/L (ref 20–32)
CREAT SERPL-MCNC: 1.1 MG/DL (ref 0.52–1.04)
ERYTHROCYTE [DISTWIDTH] IN BLOOD BY AUTOMATED COUNT: 13.8 % (ref 10–15)
GFR SERPL CREATININE-BSD FRML MDRD: 61 ML/MIN/1.73M2
GLUCOSE BLD-MCNC: 110 MG/DL (ref 70–99)
HCT VFR BLD AUTO: 37.5 % (ref 35–47)
HGB BLD-MCNC: 12.3 G/DL (ref 11.7–15.7)
MAGNESIUM SERPL-MCNC: 2.1 MG/DL (ref 1.6–2.3)
MCH RBC QN AUTO: 29.9 PG (ref 26.5–33)
MCHC RBC AUTO-ENTMCNC: 32.8 G/DL (ref 31.5–36.5)
MCV RBC AUTO: 91 FL (ref 78–100)
PHOSPHATE SERPL-MCNC: 4.2 MG/DL (ref 2.5–4.5)
PLATELET # BLD AUTO: 186 10E3/UL (ref 150–450)
POTASSIUM BLD-SCNC: 4.3 MMOL/L (ref 3.4–5.3)
PROT SERPL-MCNC: 6.3 G/DL (ref 6.8–8.8)
RBC # BLD AUTO: 4.11 10E6/UL (ref 3.8–5.2)
SODIUM SERPL-SCNC: 138 MMOL/L (ref 133–144)
WBC # BLD AUTO: 6.3 10E3/UL (ref 4–11)

## 2022-05-02 PROCEDURE — 80197 ASSAY OF TACROLIMUS: CPT

## 2022-05-02 PROCEDURE — 85027 COMPLETE CBC AUTOMATED: CPT

## 2022-05-02 PROCEDURE — 84100 ASSAY OF PHOSPHORUS: CPT

## 2022-05-02 PROCEDURE — 82248 BILIRUBIN DIRECT: CPT

## 2022-05-02 PROCEDURE — 80053 COMPREHEN METABOLIC PANEL: CPT

## 2022-05-02 PROCEDURE — 83735 ASSAY OF MAGNESIUM: CPT

## 2022-05-02 PROCEDURE — 36415 COLL VENOUS BLD VENIPUNCTURE: CPT

## 2022-05-02 NOTE — TELEPHONE ENCOUNTER
"  4/27/22 [place] ECC: no WILLI. Plan: Cotest in  1 yr.    Per provider visit notes, \"Due to the medications that she is taking to prevent rejection of her liver transplant, this puts her at higher risk for the development of cervical disease so if today's pathology results are normal, then I would advise a yearly co-test to closely monitor.\"  "

## 2022-05-03 LAB
TACROLIMUS BLD-MCNC: 8.1 UG/L (ref 5–15)
TME LAST DOSE: NORMAL H
TME LAST DOSE: NORMAL H

## 2022-05-03 NOTE — TELEPHONE ENCOUNTER
FUTURE VISIT INFORMATION      FUTURE VISIT INFORMATION:    Date: 5.23.22    Time: 2:00    Location: Telephone  REFERRAL INFORMATION:    Referring provider:  Jamshid    Referring providers clinic:  Derm    Reason for visit/diagnosis  SCCIS L young    RECORDS REQUESTED FROM:       Clinic name Comments Records Status Photos Status   Derm 4.27.22  Path # [id number] [facility]

## 2022-05-04 ENCOUNTER — TELEPHONE (OUTPATIENT)
Dept: DERMATOLOGY | Facility: CLINIC | Age: 52
End: 2022-05-04
Payer: COMMERCIAL

## 2022-05-04 RX ORDER — TRETINOIN 1 MG/G
CREAM TOPICAL
Qty: 45 G | Refills: 11 | Status: SHIPPED | OUTPATIENT
Start: 2022-05-04 | End: 2022-07-01

## 2022-05-04 NOTE — TELEPHONE ENCOUNTER
PA Initiation    Medication: tretinoin (RETIN-A) 0.1 % external cream   Insurance Company: [employer] - Phone [phone] Fax [phone]  Pharmacy Filling the Rx: [id number] [place] [address] [address] [facility] [address]  Filling Pharmacy Phone: [phone]  Filling Pharmacy Fax: [phone]  Start Date: 5/4/2022

## 2022-05-05 NOTE — TELEPHONE ENCOUNTER
PRIOR AUTHORIZATION DENIED    Medication: tretinoin (RETIN-A) 0.1 % external cream--DENIED    Denial Date: 5/4/2022    Denial Rational: Patient is not using to treat acne vulgaris or Keratosis follicularis (Darier's disease, Darier-White disease)    Appeal Information:

## 2022-05-06 ENCOUNTER — HOSPITAL ENCOUNTER (OUTPATIENT)
Dept: PHYSICAL THERAPY | Facility: CLINIC | Age: 52
Setting detail: THERAPIES SERIES
Discharge: HOME | End: 2022-05-06
Attending: TRANSPLANT SURGERY
Payer: COMMERCIAL

## 2022-05-06 PROCEDURE — 97110 THERAPEUTIC EXERCISES: CPT | Mod: GP

## 2022-05-06 PROCEDURE — 97112 NEUROMUSCULAR REEDUCATION: CPT | Mod: GP

## 2022-05-07 ENCOUNTER — ANCILLARY PROCEDURE (OUTPATIENT)
Dept: GENERAL RADIOLOGY | Facility: CLINIC | Age: 52
End: 2022-05-07
Attending: PREVENTIVE MEDICINE
Payer: COMMERCIAL

## 2022-05-07 ENCOUNTER — OFFICE VISIT (OUTPATIENT)
Dept: ORTHOPEDICS | Facility: CLINIC | Age: 52
End: 2022-05-07

## 2022-05-07 ENCOUNTER — PRE VISIT (OUTPATIENT)
Dept: ORTHOPEDICS | Facility: CLINIC | Age: 52
End: 2022-05-07
Payer: COMMERCIAL

## 2022-05-07 DIAGNOSIS — M16.12 ARTHRITIS OF LEFT HIP: ICD-10-CM

## 2022-05-07 DIAGNOSIS — M25.522 BILATERAL ELBOW JOINT PAIN: ICD-10-CM

## 2022-05-07 DIAGNOSIS — M25.552 LEFT HIP PAIN: ICD-10-CM

## 2022-05-07 DIAGNOSIS — M25.521 BILATERAL ELBOW JOINT PAIN: ICD-10-CM

## 2022-05-07 DIAGNOSIS — M54.16 LUMBAR RADICULAR PAIN: Primary | ICD-10-CM

## 2022-05-07 DIAGNOSIS — M54.16 LUMBAR RADICULAR PAIN: ICD-10-CM

## 2022-05-07 DIAGNOSIS — M77.02 GOLFERS ELBOW, LEFT: ICD-10-CM

## 2022-05-07 DIAGNOSIS — M77.01: ICD-10-CM

## 2022-05-07 DIAGNOSIS — M50.30 DDD (DEGENERATIVE DISC DISEASE), CERVICAL: ICD-10-CM

## 2022-05-07 PROCEDURE — 73070 X-RAY EXAM OF ELBOW: CPT | Mod: LT | Performed by: RADIOLOGY

## 2022-05-07 PROCEDURE — 72100 X-RAY EXAM L-S SPINE 2/3 VWS: CPT | Performed by: RADIOLOGY

## 2022-05-07 PROCEDURE — 99203 OFFICE O/P NEW LOW 30 MIN: CPT | Mod: 25 | Performed by: PREVENTIVE MEDICINE

## 2022-05-07 PROCEDURE — 20611 DRAIN/INJ JOINT/BURSA W/US: CPT | Mod: LT | Performed by: PREVENTIVE MEDICINE

## 2022-05-07 RX ORDER — METHOCARBAMOL 500 MG/1
500-1000 TABLET, FILM COATED ORAL
Qty: 60 TABLET | Refills: 0 | Status: SHIPPED | OUTPATIENT
Start: 2022-05-07 | End: 2022-06-24

## 2022-05-07 RX ADMIN — TRIAMCINOLONE ACETONIDE 40 MG: 40 INJECTION, SUSPENSION INTRA-ARTICULAR; INTRAMUSCULAR at 11:27

## 2022-05-07 ASSESSMENT — PAIN SCALES - GENERAL: PAINLEVEL: MODERATE PAIN (4)

## 2022-05-07 NOTE — LETTER
5/7/2022         RE: Melita Cortes  [address]  [facility] [address]        Dear Colleague,    Thank you for referring your patient, Melita Cortes, to the [facility] SPORTS MEDICINE CLINIC [place]. Please see a copy of my visit note below.                  [facility]  5/7/2022    Melita Cortes's chief complaint for this visit includes:  Chief Complaint   Patient presents with     Left Elbow - New Patient     Left elbow     Right Elbow - New Patient     Right elbow     PCP: Navneet Johnson    Referring Provider:  Navneet Johnson MD  [address] [place] [address]    There were no vitals taken for this visit.  Moderate Pain (4)       Reason for visit:     What part of your body is injured / painful?  bilateral elbow    What caused the injury /pain? Unsure    How long ago did your injury occur or pain begin? several months ago    What are your most bothersome symptoms? Pain    How would you characterize your symptom?  sharp    What makes your symptoms better? Other: Predisone    What makes your symptoms worse? Movement    Have you been previously seen for this problem? No    Medical History:    Any recent changes to your medical history? Yes, Liver transplant    Any new medication prescribed since last visit? No    Have you had surgery on this body part before? No    Social History:    Occupation: insurence    Handedness: Right    Exercise: Cardiovascular: Other: treadmill walking    Review of Systems:    Do you have fever, chills, weight loss? No    Do you have any vision problems? No    Do you have any chest pain or edema? No    Do you have any shortness of breath or wheezing?  No    Do you have stomach problems? No    Do you have any urinary track issues? No    Do you have any numbness or focal weakness? Yes, Feet and toes    Do you have diabetes? No    Do you have problems with bleeding or clotting? No    Do you have an rashes or other skin  lesions? No           HISTORY OF PRESENT ILLNESS  Ms. Cortes is a pleasant 51 year old year old female who presents to clinic today with left hip pain and wants to review xrays and discuss injection possibly  Also has low back pain and neck pain and radiating pain into arms  Has been occurring for over a year worsened    MEDICAL HISTORY  Patient Active Problem List   Diagnosis     Abdominal bloating     Family history of pancreatic cancer     Transaminitis     Macrocytosis     Cervical high risk HPV (human papillomavirus) test positive     Anemia, unspecified type     Elevated serum creatinine     Alcoholic cirrhosis of liver with ascites (H)     Hyponatremia     Malaise and fatigue     At high risk for severe sepsis     Symptomatic anemia     Bandemia     Hyperlipidemia     Anxiety     Status post liver transplantation (H)     Immunosuppressed status (H)     Steroid-induced hyperglycemia     Hypervolemia     Severe malnutrition (H)     Elevated alkaline phosphatase level       Current Outpatient Medications   Medication Sig Dispense Refill     aspirin (ASA) 81 MG chewable tablet Take 1 tablet (81 mg) by mouth daily 90 tablet 0     calcium carbonate (OS-SHAI) 1500 (600 Ca) MG tablet Take 1 tablet (600 mg) by mouth daily 60 tablet 3     hydrOXYzine (ATARAX) 25 MG tablet Take 1 tablet (25 mg) by mouth every 6 hours as needed for itching 15 tablet 0     magnesium oxide (MAG-OX) 400 MG tablet Take 2 tablets (800 mg) by mouth 2 times daily 180 tablet 3     multivitamin (CENTRUM SILVER) tablet Take 1 tablet by mouth daily       pantoprazole (PROTONIX) 40 MG EC tablet Take 1 tablet (40 mg) by mouth in the morning. 90 tablet 3     predniSONE (DELTASONE) 10 MG tablet Take 1.5 tablets (15 mg) by mouth daily for 14 days, THEN 1 tablet (10 mg) daily for 7 days, THEN 0.5 tablets (5 mg) daily for 7 days. 32 tablet 0     sulfamethoxazole-trimethoprim (BACTRIM) 400-80 MG tablet Take 1 tablet by mouth daily 90 tablet 3      tacrolimus (GENERIC EQUIVALENT) 0.5 MG capsule Take 1 capsule by mouth twice daily as directed by transplant team for dose titration 60 capsule 1     tacrolimus (GENERIC EQUIVALENT) 1 MG capsule Take 3 capsules (3 mg) by mouth every 12 hours 540 capsule 3     thiamine (B-1) 100 MG tablet Take 1 tablet (100 mg) by mouth daily 30 tablet 1     tretinoin (RETIN-A) 0.025 % external cream Apply a pea size to entire face QD 45 g 11     tretinoin (RETIN-A) 0.1 % external cream Apply a pea-sized amount evenly over the face at nighttime before bed. 45 g 11     ursodiol (ACTIGALL) 250 MG tablet Take 1 tablet (250 mg) by mouth 2 times daily 60 tablet 1       Allergies   Allergen Reactions     Latex      rash     Adhesive Tape Rash       Family History   Problem Relation Age of Onset     Cancer Mother      Colon Cancer Paternal Aunt      Colon Cancer Maternal Uncle      Social History     Socioeconomic History     Marital status:    Tobacco Use     Smoking status: Former Smoker     Quit date: 2020     Years since quittin.0     Smokeless tobacco: Never Used   Vaping Use     Vaping Use: Never used   Substance and Sexual Activity     Alcohol use: Not Currently     Comment: Last drink 2021     Drug use: Never     Sexual activity: Not Currently     Partners: Male       Additional medical/Social/Surgical histories reviewed in [facility] and updated as appropriate.     REVIEW OF SYSTEMS (2022)  10 point ROS of systems including Constitutional, Eyes, Respiratory, Cardiovascular, Gastroenterology, Genitourinary, Integumentary, Musculoskeletal, Psychiatric, Allergic/Immunologic were all negative except for pertinent positives noted in my HPI.     PHYSICAL EXAM  Vital Signs:VSS    General  - normal appearance, in no obvious distress  HEENT  - conjunctivae not injected, moist mucous membranes, normocephalic/atraumatic head, ears normal appearance, no lesions, mouth normal appearance, no scars, normal dentition and teeth  present  CV  - normal peripheral perfusion  Pulm  - normal respiratory pattern, non-labored    Musculoskeletal - CERVICAL SPINE  - stance and posture: normal gait without limp, no obvious leg length discrepancy, normal heel and toe walk, normal balance, slightly forward shoulders, head balanced normally on trunk  - inspection: normal bone and joint alignment, no obvious kyphosis  - palpation: no paravertebral or bony tenderness, except at base of neck and trapezius muscles  - ROM: normal and painless flexion, extension, left and right sidebending and rotation with some discomfort  - strength: upper extremities 5/5 in all planes  - special tests:  (+) spurlings    Neuro  - biceps, triceps, supinator DTRs 2+ bilaterally, no sensory or motor deficit, grossly normal coordination, normal muscle tone  Skin  - no ecchymosis, erythema, warmth, or induration, no obvious rash  Psych  - interactive, appropriate, normal mood and affect  Low back: has some pain with flexion/extension  Left hip: has pain with internal rotation  ASSESSMENT & PLAN  50 yo female with left hip arthritis, low back pain and cervical ddd, radicular pain    I independently reviewed the following imaging studies:  Cervical ddd xrays  Lumbar xray: shows ddd  Discussed and ordered cervical MRI  After a 20 minute discussion and examination, we decided to perform a same day injection for diagnostic and therapeutic purposes for left hip  Given rx for robaxin  Appropriate PPE was utilized for prevention of spread of Covid-19.  Srinivas Cisneros MD, CAQSM    Intraarticular Hip Injection - Ultrasound Guided  The patient was informed of the risks and the benefits of the procedure and a written consent was signed.  The patient s left hip was prepped with chlorhexidine in sterile fashion.   40 mg of triamcinolone suspension was drawn up into a 5 mL syringe with 4 mL of 1% lidocaine.  Injection was performed using sterile technique.  Under ultrasound guidance a 3.5-inch  22-gauge needle was used to enter the femoracetabular joint.  Anterior approach was used, needle placement was visualized and documented with ultrasound.  Ultrasound visualization was necessary due to decreased joint space in the setting of osteoarthritis.  Injection performed long axis to the probe.  Injection solution visualized within the joint space.  Images were permanently stored for the patient's record.  There were no complications. The patient tolerated the procedure well. There was negligible bleeding.           Large Joint Injection/Arthocentesis: L hip joint    Date/Time: 5/7/2022 11:27 AM  Performed by: Srinivas Cisneros MD  Authorized by: Srinivas Cisneros MD     Indications:  Pain and osteoarthritis  Needle Size:  22 G  Guidance: ultrasound    Approach:  Anterior  Location:  Hip      Site:  L hip joint  Medications:  40 mg triamcinolone 40 MG/ML  Outcome:  Tolerated well, no immediate complications  Procedure discussed: discussed risks, benefits, and alternatives    Consent Given by:  Patient  Timeout: timeout called immediately prior to procedure    Prep: patient was prepped and draped in usual sterile fashion              Again, thank you for allowing me to participate in the care of your patient.        Sincerely,        Srinivas Cisneros MD

## 2022-05-07 NOTE — PROGRESS NOTES
Large Joint Injection/Arthocentesis: L hip joint    Date/Time: 5/7/2022 11:27 AM  Performed by: Srinivas Cisneros MD  Authorized by: Srinivas Cisneros MD     Indications:  Pain and osteoarthritis  Needle Size:  22 G  Guidance: ultrasound    Approach:  Anterior  Location:  Hip      Site:  L hip joint  Medications:  40 mg triamcinolone 40 MG/ML  Outcome:  Tolerated well, no immediate complications  Procedure discussed: discussed risks, benefits, and alternatives    Consent Given by:  Patient  Timeout: timeout called immediately prior to procedure    Prep: patient was prepped and draped in usual sterile fashion

## 2022-05-07 NOTE — PROGRESS NOTES
[facility]  5/7/2022    Melita Cortes's chief complaint for this visit includes:  Chief Complaint   Patient presents with     Left Elbow - New Patient     Left elbow     Right Elbow - New Patient     Right elbow     PCP: Navneet Johnson    Referring Provider:  Navneet Johnson MD  [address] [place] [address]    There were no vitals taken for this visit.  Moderate Pain (4)       Reason for visit:     What part of your body is injured / painful?  bilateral elbow    What caused the injury /pain? Unsure    How long ago did your injury occur or pain begin? several months ago    What are your most bothersome symptoms? Pain    How would you characterize your symptom?  sharp    What makes your symptoms better? Other: Predisone    What makes your symptoms worse? Movement    Have you been previously seen for this problem? No    Medical History:    Any recent changes to your medical history? Yes, Liver transplant    Any new medication prescribed since last visit? No    Have you had surgery on this body part before? No    Social History:    Occupation: insurence    Handedness: Right    Exercise: Cardiovascular: Other: treadmill walking    Review of Systems:    Do you have fever, chills, weight loss? No    Do you have any vision problems? No    Do you have any chest pain or edema? No    Do you have any shortness of breath or wheezing?  No    Do you have stomach problems? No    Do you have any urinary track issues? No    Do you have any numbness or focal weakness? Yes, Feet and toes    Do you have diabetes? No    Do you have problems with bleeding or clotting? No    Do you have an rashes or other skin lesions? No

## 2022-05-07 NOTE — PROGRESS NOTES
HISTORY OF PRESENT ILLNESS  Ms. Cortes is a pleasant 51 year old year old female who presents to clinic today with left hip pain and wants to review xrays and discuss injection possibly  Also has low back pain and neck pain and radiating pain into arms  Has been occurring for over a year worsened    MEDICAL HISTORY  Patient Active Problem List   Diagnosis     Abdominal bloating     Family history of pancreatic cancer     Transaminitis     Macrocytosis     Cervical high risk HPV (human papillomavirus) test positive     Anemia, unspecified type     Elevated serum creatinine     Alcoholic cirrhosis of liver with ascites (H)     Hyponatremia     Malaise and fatigue     At high risk for severe sepsis     Symptomatic anemia     Bandemia     Hyperlipidemia     Anxiety     Status post liver transplantation (H)     Immunosuppressed status (H)     Steroid-induced hyperglycemia     Hypervolemia     Severe malnutrition (H)     Elevated alkaline phosphatase level       Current Outpatient Medications   Medication Sig Dispense Refill     aspirin (ASA) 81 MG chewable tablet Take 1 tablet (81 mg) by mouth daily 90 tablet 0     calcium carbonate (OS-SHAI) 1500 (600 Ca) MG tablet Take 1 tablet (600 mg) by mouth daily 60 tablet 3     hydrOXYzine (ATARAX) 25 MG tablet Take 1 tablet (25 mg) by mouth every 6 hours as needed for itching 15 tablet 0     magnesium oxide (MAG-OX) 400 MG tablet Take 2 tablets (800 mg) by mouth 2 times daily 180 tablet 3     multivitamin (CENTRUM SILVER) tablet Take 1 tablet by mouth daily       pantoprazole (PROTONIX) 40 MG EC tablet Take 1 tablet (40 mg) by mouth in the morning. 90 tablet 3     predniSONE (DELTASONE) 10 MG tablet Take 1.5 tablets (15 mg) by mouth daily for 14 days, THEN 1 tablet (10 mg) daily for 7 days, THEN 0.5 tablets (5 mg) daily for 7 days. 32 tablet 0     sulfamethoxazole-trimethoprim (BACTRIM) 400-80 MG tablet Take 1 tablet by mouth daily 90 tablet 3     tacrolimus (GENERIC EQUIVALENT)  0.5 MG capsule Take 1 capsule by mouth twice daily as directed by transplant team for dose titration 60 capsule 1     tacrolimus (GENERIC EQUIVALENT) 1 MG capsule Take 3 capsules (3 mg) by mouth every 12 hours 540 capsule 3     thiamine (B-1) 100 MG tablet Take 1 tablet (100 mg) by mouth daily 30 tablet 1     tretinoin (RETIN-A) 0.025 % external cream Apply a pea size to entire face QD 45 g 11     tretinoin (RETIN-A) 0.1 % external cream Apply a pea-sized amount evenly over the face at nighttime before bed. 45 g 11     ursodiol (ACTIGALL) 250 MG tablet Take 1 tablet (250 mg) by mouth 2 times daily 60 tablet 1       Allergies   Allergen Reactions     Latex      rash     Adhesive Tape Rash       Family History   Problem Relation Age of Onset     Cancer Mother      Colon Cancer Paternal Aunt      Colon Cancer Maternal Uncle      Social History     Socioeconomic History     Marital status:    Tobacco Use     Smoking status: Former Smoker     Quit date: 2020     Years since quittin.0     Smokeless tobacco: Never Used   Vaping Use     Vaping Use: Never used   Substance and Sexual Activity     Alcohol use: Not Currently     Comment: Last drink 2021     Drug use: Never     Sexual activity: Not Currently     Partners: Male       Additional medical/Social/Surgical histories reviewed in [facility] and updated as appropriate.     REVIEW OF SYSTEMS (2022)  10 point ROS of systems including Constitutional, Eyes, Respiratory, Cardiovascular, Gastroenterology, Genitourinary, Integumentary, Musculoskeletal, Psychiatric, Allergic/Immunologic were all negative except for pertinent positives noted in my HPI.     PHYSICAL EXAM  Vital Signs:VSS    General  - normal appearance, in no obvious distress  HEENT  - conjunctivae not injected, moist mucous membranes, normocephalic/atraumatic head, ears normal appearance, no lesions, mouth normal appearance, no scars, normal dentition and teeth present  CV  - normal peripheral  perfusion  Pulm  - normal respiratory pattern, non-labored    Musculoskeletal - CERVICAL SPINE  - stance and posture: normal gait without limp, no obvious leg length discrepancy, normal heel and toe walk, normal balance, slightly forward shoulders, head balanced normally on trunk  - inspection: normal bone and joint alignment, no obvious kyphosis  - palpation: no paravertebral or bony tenderness, except at base of neck and trapezius muscles  - ROM: normal and painless flexion, extension, left and right sidebending and rotation with some discomfort  - strength: upper extremities 5/5 in all planes  - special tests:  (+) spurlings    Neuro  - biceps, triceps, supinator DTRs 2+ bilaterally, no sensory or motor deficit, grossly normal coordination, normal muscle tone  Skin  - no ecchymosis, erythema, warmth, or induration, no obvious rash  Psych  - interactive, appropriate, normal mood and affect  Low back: has some pain with flexion/extension  Left hip: has pain with internal rotation  ASSESSMENT & PLAN  52 yo female with left hip arthritis, low back pain and cervical ddd, radicular pain    I independently reviewed the following imaging studies:  Cervical ddd xrays  Lumbar xray: shows ddd  Discussed and ordered cervical MRI  After a 20 minute discussion and examination, we decided to perform a same day injection for diagnostic and therapeutic purposes for left hip  Given rx for robaxin  Appropriate PPE was utilized for prevention of spread of Covid-19.  Srinivas Cisneros MD, CAQSM    Intraarticular Hip Injection - Ultrasound Guided  The patient was informed of the risks and the benefits of the procedure and a written consent was signed.  The patient s left hip was prepped with chlorhexidine in sterile fashion.   40 mg of triamcinolone suspension was drawn up into a 5 mL syringe with 4 mL of 1% lidocaine.  Injection was performed using sterile technique.  Under ultrasound guidance a 3.5-inch 22-gauge needle was used to  enter the femoracetabular joint.  Anterior approach was used, needle placement was visualized and documented with ultrasound.  Ultrasound visualization was necessary due to decreased joint space in the setting of osteoarthritis.  Injection performed long axis to the probe.  Injection solution visualized within the joint space.  Images were permanently stored for the patient's record.  There were no complications. The patient tolerated the procedure well. There was negligible bleeding.

## 2022-05-09 ENCOUNTER — TELEPHONE (OUTPATIENT)
Dept: ORTHOPEDICS | Facility: CLINIC | Age: 52
End: 2022-05-09
Payer: COMMERCIAL

## 2022-05-09 NOTE — TELEPHONE ENCOUNTER
5/9 Provided phone number [phone] to schedule mri and follow up with .     Mitzi farrar Procedure   Orthopedics, Podiatry, Sports Medicine, ENT/Eye Specialties  [facility]   [phone]

## 2022-05-11 NOTE — TELEPHONE ENCOUNTER
5/11 2nd attempt.  Provided phone number [phone] to schedule mri and follow up with .     Mitzi farrar Procedure   Orthopedics, Podiatry, Sports Medicine, ENT/Eye Specialties  [facility]   [phone]

## 2022-05-12 ENCOUNTER — LAB (OUTPATIENT)
Dept: URGENT CARE | Facility: URGENT CARE | Age: 52
End: 2022-05-12
Payer: COMMERCIAL

## 2022-05-12 DIAGNOSIS — Z11.59 ENCOUNTER FOR SCREENING FOR OTHER VIRAL DISEASES: ICD-10-CM

## 2022-05-12 PROCEDURE — U0005 INFEC AGEN DETEC AMPLI PROBE: HCPCS

## 2022-05-12 PROCEDURE — U0003 INFECTIOUS AGENT DETECTION BY NUCLEIC ACID (DNA OR RNA); SEVERE ACUTE RESPIRATORY SYNDROME CORONAVIRUS 2 (SARS-COV-2) (CORONAVIRUS DISEASE [COVID-19]), AMPLIFIED PROBE TECHNIQUE, MAKING USE OF HIGH THROUGHPUT TECHNOLOGIES AS DESCRIBED BY CMS-2020-01-R: HCPCS

## 2022-05-13 ENCOUNTER — HOSPITAL ENCOUNTER (OUTPATIENT)
Dept: PHYSICAL THERAPY | Facility: CLINIC | Age: 52
Setting detail: THERAPIES SERIES
Discharge: HOME | End: 2022-05-13
Attending: TRANSPLANT SURGERY
Payer: COMMERCIAL

## 2022-05-13 DIAGNOSIS — Z94.4 STATUS POST LIVER TRANSPLANTATION (H): Primary | ICD-10-CM

## 2022-05-13 LAB — SARS-COV-2 RNA RESP QL NAA+PROBE: NEGATIVE

## 2022-05-13 PROCEDURE — 97110 THERAPEUTIC EXERCISES: CPT | Mod: GP

## 2022-05-14 NOTE — BRIEF OP NOTE
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 One 10 Fr by 20 cm Johlin plastic biliary stent was placed into the left hepatic duct in an intraductal manner and another 10 Fr by 17 cm plastic biliary stent was placed into the right hepatic duct in a transpapillary manner. Undergoing repeat ERCP to re-evaluate the anastamosis and stent removal if feasible      - Two visibly patent stents from the biliary tree were seen in the major papilla. Extracted.  - Prior biliary sphincterotomy appeared open.   - Selective biliary cannulation and cholangiogram showed complete resolution of previously noted anastomotic biliary stricture.  - The biliary tree was swept and debris was found.   - One ''fall-out'' 7 Fr Zhou plastic stent was placed into the common bile duct.       Plan  - Discharge patient to home.   - Follow with clinical course. Expect no further ERCPs needed at this juncture given resolution of anastomotic stricture and normalizaion of liver enzymes. However, If patient develops fever, chills, jaundice or passes dark urine or has worsening of LFTs, will recommend patient to call us immediately for consideration of an urgent ERCP.  - Obtain a KUB in 4 weeks to ensure spontanous passage of common bile duct stent.   - The findings and recommendations were discussed with the patient and their family.

## 2022-05-16 ENCOUNTER — ANESTHESIA (OUTPATIENT)
Dept: SURGERY | Facility: CLINIC | Age: 52
End: 2022-05-16
Payer: COMMERCIAL

## 2022-05-16 ENCOUNTER — HOSPITAL ENCOUNTER (OUTPATIENT)
Facility: CLINIC | Age: 52
Discharge: HOME | End: 2022-05-16
Attending: INTERNAL MEDICINE | Admitting: INTERNAL MEDICINE
Payer: COMMERCIAL

## 2022-05-16 ENCOUNTER — APPOINTMENT (OUTPATIENT)
Dept: GENERAL RADIOLOGY | Facility: CLINIC | Age: 52
End: 2022-05-16
Attending: INTERNAL MEDICINE
Payer: COMMERCIAL

## 2022-05-16 ENCOUNTER — LAB (OUTPATIENT)
Dept: LAB | Facility: CLINIC | Age: 52
End: 2022-05-16

## 2022-05-16 ENCOUNTER — ANESTHESIA EVENT (OUTPATIENT)
Dept: SURGERY | Facility: CLINIC | Age: 52
End: 2022-05-16
Payer: COMMERCIAL

## 2022-05-16 VITALS
HEART RATE: 66 BPM | WEIGHT: 129.19 LBS | BODY MASS INDEX: 21.52 KG/M2 | RESPIRATION RATE: 16 BRPM | SYSTOLIC BLOOD PRESSURE: 121 MMHG | DIASTOLIC BLOOD PRESSURE: 79 MMHG | OXYGEN SATURATION: 100 % | HEIGHT: 65 IN | TEMPERATURE: 98 F

## 2022-05-16 DIAGNOSIS — Z94.4 LIVER REPLACED BY TRANSPLANT (H): ICD-10-CM

## 2022-05-16 DIAGNOSIS — Z94.4 STATUS POST LIVER TRANSPLANTATION (H): ICD-10-CM

## 2022-05-16 LAB
ALBUMIN SERPL-MCNC: 3.4 G/DL (ref 3.4–5)
ALP SERPL-CCNC: 71 U/L (ref 40–150)
ALT SERPL W P-5'-P-CCNC: 23 U/L (ref 0–50)
AMYLASE SERPL-CCNC: 42 U/L (ref 30–110)
ANION GAP SERPL CALCULATED.3IONS-SCNC: 6 MMOL/L (ref 3–14)
AST SERPL W P-5'-P-CCNC: 14 U/L (ref 0–45)
BILIRUB DIRECT SERPL-MCNC: 0.2 MG/DL (ref 0–0.2)
BILIRUB SERPL-MCNC: 0.5 MG/DL (ref 0.2–1.3)
BUN SERPL-MCNC: 22 MG/DL (ref 7–30)
CALCIUM SERPL-MCNC: 9.2 MG/DL (ref 8.5–10.1)
CHLORIDE BLD-SCNC: 110 MMOL/L (ref 94–109)
CO2 SERPL-SCNC: 28 MMOL/L (ref 20–32)
CREAT SERPL-MCNC: 1.31 MG/DL (ref 0.52–1.04)
ERCP: NORMAL
ERYTHROCYTE [DISTWIDTH] IN BLOOD BY AUTOMATED COUNT: 13.8 % (ref 10–15)
GFR SERPL CREATININE-BSD FRML MDRD: 49 ML/MIN/1.73M2
GLUCOSE BLD-MCNC: 116 MG/DL (ref 70–99)
GLUCOSE BLDC GLUCOMTR-MCNC: 93 MG/DL (ref 70–99)
HCT VFR BLD AUTO: 39.9 % (ref 35–47)
HGB BLD-MCNC: 12.9 G/DL (ref 11.7–15.7)
HOLD SPECIMEN: NORMAL
INR PPP: 1.04 (ref 0.85–1.15)
LIPASE SERPL-CCNC: 56 U/L (ref 73–393)
MAGNESIUM SERPL-MCNC: 1.9 MG/DL (ref 1.6–2.3)
MCH RBC QN AUTO: 30.1 PG (ref 26.5–33)
MCHC RBC AUTO-ENTMCNC: 32.3 G/DL (ref 31.5–36.5)
MCV RBC AUTO: 93 FL (ref 78–100)
PHOSPHATE SERPL-MCNC: 4.2 MG/DL (ref 2.5–4.5)
PLATELET # BLD AUTO: 205 10E3/UL (ref 150–450)
POTASSIUM BLD-SCNC: 4.9 MMOL/L (ref 3.4–5.3)
PROT SERPL-MCNC: 6 G/DL (ref 6.8–8.8)
RBC # BLD AUTO: 4.28 10E6/UL (ref 3.8–5.2)
SODIUM SERPL-SCNC: 144 MMOL/L (ref 133–144)
TACROLIMUS BLD-MCNC: 9.2 UG/L (ref 5–15)
TME LAST DOSE: NORMAL H
TME LAST DOSE: NORMAL H
WBC # BLD AUTO: 5.3 10E3/UL (ref 4–11)

## 2022-05-16 PROCEDURE — 80197 ASSAY OF TACROLIMUS: CPT | Performed by: TRANSPLANT SURGERY

## 2022-05-16 PROCEDURE — 85610 PROTHROMBIN TIME: CPT | Performed by: INTERNAL MEDICINE

## 2022-05-16 PROCEDURE — 80053 COMPREHEN METABOLIC PANEL: CPT | Performed by: PATHOLOGY

## 2022-05-16 PROCEDURE — 83690 ASSAY OF LIPASE: CPT | Performed by: INTERNAL MEDICINE

## 2022-05-16 PROCEDURE — 82962 GLUCOSE BLOOD TEST: CPT

## 2022-05-16 PROCEDURE — 84100 ASSAY OF PHOSPHORUS: CPT | Performed by: PATHOLOGY

## 2022-05-16 PROCEDURE — 80321 ALCOHOLS BIOMARKERS 1OR 2: CPT | Mod: 90 | Performed by: PATHOLOGY

## 2022-05-16 PROCEDURE — 82248 BILIRUBIN DIRECT: CPT | Performed by: PATHOLOGY

## 2022-05-16 PROCEDURE — 999N000141 HC STATISTIC PRE-PROCEDURE NURSING ASSESSMENT: Performed by: INTERNAL MEDICINE

## 2022-05-16 PROCEDURE — 360N000083 HC SURGERY LEVEL 3 W/ FLUORO, PER MIN: Performed by: INTERNAL MEDICINE

## 2022-05-16 PROCEDURE — 99000 SPECIMEN HANDLING OFFICE-LAB: CPT | Performed by: PATHOLOGY

## 2022-05-16 PROCEDURE — 255N000002 HC RX 255 OP 636: Performed by: INTERNAL MEDICINE

## 2022-05-16 PROCEDURE — C1769 GUIDE WIRE: HCPCS | Performed by: INTERNAL MEDICINE

## 2022-05-16 PROCEDURE — 710N000010 HC RECOVERY PHASE 1, LEVEL 2, PER MIN: Performed by: INTERNAL MEDICINE

## 2022-05-16 PROCEDURE — 710N000012 HC RECOVERY PHASE 2, PER MINUTE: Performed by: INTERNAL MEDICINE

## 2022-05-16 PROCEDURE — 83735 ASSAY OF MAGNESIUM: CPT | Performed by: PATHOLOGY

## 2022-05-16 PROCEDURE — 250N000025 HC SEVOFLURANE, PER MIN: Performed by: INTERNAL MEDICINE

## 2022-05-16 PROCEDURE — C1877 STENT, NON-COAT/COV W/O DEL: HCPCS | Performed by: INTERNAL MEDICINE

## 2022-05-16 PROCEDURE — 250N000009 HC RX 250: Performed by: REGISTERED NURSE

## 2022-05-16 PROCEDURE — 36415 COLL VENOUS BLD VENIPUNCTURE: CPT | Performed by: INTERNAL MEDICINE

## 2022-05-16 PROCEDURE — 370N000017 HC ANESTHESIA TECHNICAL FEE, PER MIN: Performed by: INTERNAL MEDICINE

## 2022-05-16 PROCEDURE — 272N000001 HC OR GENERAL SUPPLY STERILE: Performed by: INTERNAL MEDICINE

## 2022-05-16 PROCEDURE — 36415 COLL VENOUS BLD VENIPUNCTURE: CPT | Performed by: PATHOLOGY

## 2022-05-16 PROCEDURE — 250N000009 HC RX 250: Performed by: INTERNAL MEDICINE

## 2022-05-16 PROCEDURE — C1726 CATH, BAL DIL, NON-VASCULAR: HCPCS | Performed by: INTERNAL MEDICINE

## 2022-05-16 PROCEDURE — 999N000181 XR SURGERY CARM FLUORO GREATER THAN 5 MIN W STILLS: Mod: TC

## 2022-05-16 PROCEDURE — 258N000003 HC RX IP 258 OP 636: Performed by: REGISTERED NURSE

## 2022-05-16 PROCEDURE — 82150 ASSAY OF AMYLASE: CPT | Performed by: INTERNAL MEDICINE

## 2022-05-16 PROCEDURE — 85027 COMPLETE CBC AUTOMATED: CPT | Performed by: PATHOLOGY

## 2022-05-16 PROCEDURE — 250N000011 HC RX IP 250 OP 636: Performed by: REGISTERED NURSE

## 2022-05-16 DEVICE — STENT FREEMAN PANCREA FLEX 7FRX7CM SGL PIGTAIL: Type: IMPLANTABLE DEVICE | Site: BILE DUCT | Status: FUNCTIONAL

## 2022-05-16 RX ORDER — PROPOFOL 10 MG/ML
INJECTION, EMULSION INTRAVENOUS PRN
Status: DISCONTINUED | OUTPATIENT
Start: 2022-05-16 | End: 2022-05-16

## 2022-05-16 RX ORDER — ONDANSETRON 2 MG/ML
4 INJECTION INTRAMUSCULAR; INTRAVENOUS EVERY 6 HOURS PRN
Status: DISCONTINUED | OUTPATIENT
Start: 2022-05-16 | End: 2022-05-16 | Stop reason: HOSPADM

## 2022-05-16 RX ORDER — DEXAMETHASONE SODIUM PHOSPHATE 4 MG/ML
INJECTION, SOLUTION INTRA-ARTICULAR; INTRALESIONAL; INTRAMUSCULAR; INTRAVENOUS; SOFT TISSUE PRN
Status: DISCONTINUED | OUTPATIENT
Start: 2022-05-16 | End: 2022-05-16

## 2022-05-16 RX ORDER — FLUMAZENIL 0.1 MG/ML
0.2 INJECTION, SOLUTION INTRAVENOUS
Status: DISCONTINUED | OUTPATIENT
Start: 2022-05-16 | End: 2022-05-16 | Stop reason: HOSPADM

## 2022-05-16 RX ORDER — LIDOCAINE HYDROCHLORIDE 20 MG/ML
INJECTION, SOLUTION INFILTRATION; PERINEURAL PRN
Status: DISCONTINUED | OUTPATIENT
Start: 2022-05-16 | End: 2022-05-16

## 2022-05-16 RX ORDER — LIDOCAINE 40 MG/G
CREAM TOPICAL
Status: DISCONTINUED | OUTPATIENT
Start: 2022-05-16 | End: 2022-05-16 | Stop reason: HOSPADM

## 2022-05-16 RX ORDER — ESMOLOL HYDROCHLORIDE 10 MG/ML
INJECTION INTRAVENOUS PRN
Status: DISCONTINUED | OUTPATIENT
Start: 2022-05-16 | End: 2022-05-16

## 2022-05-16 RX ORDER — ONDANSETRON 4 MG/1
4 TABLET, ORALLY DISINTEGRATING ORAL EVERY 6 HOURS PRN
Status: DISCONTINUED | OUTPATIENT
Start: 2022-05-16 | End: 2022-05-16 | Stop reason: HOSPADM

## 2022-05-16 RX ORDER — NALOXONE HYDROCHLORIDE 0.4 MG/ML
0.4 INJECTION, SOLUTION INTRAMUSCULAR; INTRAVENOUS; SUBCUTANEOUS
Status: DISCONTINUED | OUTPATIENT
Start: 2022-05-16 | End: 2022-05-16 | Stop reason: HOSPADM

## 2022-05-16 RX ORDER — GLYCOPYRROLATE 0.2 MG/ML
INJECTION, SOLUTION INTRAMUSCULAR; INTRAVENOUS PRN
Status: DISCONTINUED | OUTPATIENT
Start: 2022-05-16 | End: 2022-05-16

## 2022-05-16 RX ORDER — TRIAMCINOLONE ACETONIDE 40 MG/ML
40 INJECTION, SUSPENSION INTRA-ARTICULAR; INTRAMUSCULAR
Status: DISCONTINUED | OUTPATIENT
Start: 2022-05-07 | End: 2023-08-31

## 2022-05-16 RX ORDER — ONDANSETRON 2 MG/ML
INJECTION INTRAMUSCULAR; INTRAVENOUS PRN
Status: DISCONTINUED | OUTPATIENT
Start: 2022-05-16 | End: 2022-05-16

## 2022-05-16 RX ORDER — IOPAMIDOL 510 MG/ML
INJECTION, SOLUTION INTRAVASCULAR PRN
Status: DISCONTINUED | OUTPATIENT
Start: 2022-05-16 | End: 2022-05-16 | Stop reason: HOSPADM

## 2022-05-16 RX ORDER — FENTANYL CITRATE 50 UG/ML
INJECTION, SOLUTION INTRAMUSCULAR; INTRAVENOUS PRN
Status: DISCONTINUED | OUTPATIENT
Start: 2022-05-16 | End: 2022-05-16

## 2022-05-16 RX ORDER — NALOXONE HYDROCHLORIDE 0.4 MG/ML
0.2 INJECTION, SOLUTION INTRAMUSCULAR; INTRAVENOUS; SUBCUTANEOUS
Status: DISCONTINUED | OUTPATIENT
Start: 2022-05-16 | End: 2022-05-16 | Stop reason: HOSPADM

## 2022-05-16 RX ORDER — LEVOFLOXACIN 5 MG/ML
INJECTION, SOLUTION INTRAVENOUS PRN
Status: DISCONTINUED | OUTPATIENT
Start: 2022-05-16 | End: 2022-05-16

## 2022-05-16 RX ORDER — SODIUM CHLORIDE, SODIUM LACTATE, POTASSIUM CHLORIDE, CALCIUM CHLORIDE 600; 310; 30; 20 MG/100ML; MG/100ML; MG/100ML; MG/100ML
INJECTION, SOLUTION INTRAVENOUS CONTINUOUS PRN
Status: DISCONTINUED | OUTPATIENT
Start: 2022-05-16 | End: 2022-05-16

## 2022-05-16 RX ADMIN — SODIUM CHLORIDE, POTASSIUM CHLORIDE, SODIUM LACTATE AND CALCIUM CHLORIDE: 600; 310; 30; 20 INJECTION, SOLUTION INTRAVENOUS at 11:28

## 2022-05-16 RX ADMIN — ESMOLOL HYDROCHLORIDE 20 MG: 10 INJECTION, SOLUTION INTRAVENOUS at 12:03

## 2022-05-16 RX ADMIN — FENTANYL CITRATE 50 MCG: 50 INJECTION, SOLUTION INTRAMUSCULAR; INTRAVENOUS at 11:33

## 2022-05-16 RX ADMIN — SUGAMMADEX 200 MG: 100 INJECTION, SOLUTION INTRAVENOUS at 12:17

## 2022-05-16 RX ADMIN — PHENYLEPHRINE HYDROCHLORIDE 150 MCG: 10 INJECTION INTRAVENOUS at 11:48

## 2022-05-16 RX ADMIN — ROCURONIUM BROMIDE 50 MG: 50 INJECTION, SOLUTION INTRAVENOUS at 11:33

## 2022-05-16 RX ADMIN — ONDANSETRON 4 MG: 2 INJECTION INTRAMUSCULAR; INTRAVENOUS at 12:12

## 2022-05-16 RX ADMIN — LEVOFLOXACIN 500 MG: 5 INJECTION, SOLUTION INTRAVENOUS at 11:15

## 2022-05-16 RX ADMIN — MIDAZOLAM 2 MG: 1 INJECTION INTRAMUSCULAR; INTRAVENOUS at 11:31

## 2022-05-16 RX ADMIN — LIDOCAINE HYDROCHLORIDE 100 MG: 20 INJECTION, SOLUTION INFILTRATION; PERINEURAL at 11:33

## 2022-05-16 RX ADMIN — PROPOFOL 150 MG: 10 INJECTION, EMULSION INTRAVENOUS at 11:34

## 2022-05-16 RX ADMIN — DEXAMETHASONE SODIUM PHOSPHATE 6 MG: 4 INJECTION, SOLUTION INTRA-ARTICULAR; INTRALESIONAL; INTRAMUSCULAR; INTRAVENOUS; SOFT TISSUE at 11:34

## 2022-05-16 RX ADMIN — GLYCOPYRROLATE 0.2 MG: 0.2 INJECTION, SOLUTION INTRAMUSCULAR; INTRAVENOUS at 11:50

## 2022-05-16 ASSESSMENT — LIFESTYLE VARIABLES: TOBACCO_USE: 1

## 2022-05-16 NOTE — ANESTHESIA PREPROCEDURE EVALUATION
Anesthesia Pre-Procedure Evaluation    Patient: Melita Cortes   MRN: [id number] : 1970        Procedure : Procedure(s):  ENDOSCOPIC RETROGRADE CHOLANGIOPANCREATOGRAPHY **Latex Allergy**          Past Medical History:   Diagnosis Date     Alcoholic hepatitis      Asthma 3/10/2006     Cervical high risk HPV (human papillomavirus) test positive 2020    See problem list     Liver failure (H)       Past Surgical History:   Procedure Laterality Date     APPENDECTOMY       COLONOSCOPY N/A 2022    Procedure: COLONOSCOPY;  Surgeon: Zita Steele MD;  Location: [facility]     ENDOSCOPIC RETROGRADE CHOLANGIOPANCREATOGRAM N/A 2022    Procedure: ENDOSCOPIC RETROGRADE CHOLANGIOPANCREATOGRAPHY WITH BILIARY SPHINCTEROTOMY, SLUDGE REMOVAL, DILATION  AND STENT PLACEMENT;  Surgeon: Guru Gardenia Escobar MD;  Location: [facility] OR     ENDOSCOPIC RETROGRADE CHOLANGIOPANCREATOGRAPHY, EXCHANGE TUBE/STENT N/A 3/16/2022    Procedure: ENDOSCOPIC RETROGRADE CHOLANGIOPANCREATOGRAPHY, bile duct stents exchanged, balloon dilation and sweep of bile ducts for sludge;  Surgeon: Guru Gardenia Escobar MD;  Location: [facility] OR     ESOPHAGOGASTRODUODENOSCOPY, WITH BRUSHINGS N/A 10/29/2021    Procedure: ESOPHAGOGASTRODUODENOSCOPY, WITH BRUSHINGS;  Surgeon: Torey Ruiz MD;  Location: [facility]     IR LIVER BIOPSY PERCUTANEOUS  2022     TRANSPLANT LIVER RECIPIENT  DONOR N/A 2022    Procedure: TRANSPLANT, LIVER, RECIPIENT,  DONOR;  Surgeon: Pradeep Browne MD;  Location: [facility]      Allergies   Allergen Reactions     Latex      rash     Adhesive Tape Rash      Social History     Tobacco Use     Smoking status: Former Smoker     Quit date: 2020     Years since quittin.0     Smokeless tobacco: Never Used   Substance Use Topics     Alcohol use: Not Currently     Comment: Last drink 2021      Wt Readings from Last 1 Encounters:   22 58.6 kg (129 lb 3 oz)         Anesthesia Evaluation            ROS/MED HX  ENT/Pulmonary:     (+) tobacco use, Past use, 2000  Pack-Year Hx,  asthma     Neurologic: Comment: H/o hepatic encephalopathy in the past due to ESLD now s/p tranplant - neg neurologic ROS  (-) no Parkinson's disease   Cardiovascular:     (+) -----Previous cardiac testing   Echo: Date: 1/2022 Results:  Left ventricular size, wall motion and function are normal. The ejection  fraction is 60-65%.  Right ventricular function, chamber size, wall motion, and thickness are Normal .Right ventricular systolic pressure is 27mmHg above the right atrial pressure.  Pulmonary artery systolic pressure is normal.  IVC diameter <2.1 cm collapsing >50% with sniff suggests a normal RA pressure  of 3 mmHg.    Stress Test: Date: Results:    ECG Reviewed: Date: Results:    Cath: Date: Results:      METS/Exercise Tolerance: 3 - Able to walk 1-2 blocks without stopping    Hematologic:     (+) anemia,     Musculoskeletal:  - neg musculoskeletal ROS     GI/Hepatic: Comment:  S/p Liver transplant 1/7/2022, complicated with rising liver tests. Underwent liver biopsy to rule out rejection.   * S/p ERCP + biliary stent. Now here for biliary stent exchange.   * Graft Function:Liver allograft: no rejection   * Immunosuppression: tacrolimus  *Tolerating regular diet with no abdominal pain and no nausea or emesis.    (+) liver disease,     Renal/Genitourinary:  - neg Renal ROS     Endo:  - neg endo ROS     Psychiatric/Substance Use: Comment: ETOH abuse on remission  - neg psychiatric ROS   (+) alcohol abuse     Infectious Disease: Comment: covid test on 5/12/22 negative  - neg infectious disease ROS     Malignancy:    (-) malignancy   Other:            Physical Exam    Airway        Mallampati: I   TM distance: > 3 FB   Neck ROM: full   Mouth opening: > 3 cm    Respiratory Devices and Support         Dental  no notable dental history         Cardiovascular   cardiovascular exam normal           Pulmonary   pulmonary exam normal                OUTSIDE LABS:  CBC:   Lab Results   Component Value Date    WBC 5.3 05/16/2022    WBC 6.3 05/02/2022    HGB 12.9 05/16/2022    HGB 12.3 05/02/2022    HCT 39.9 05/16/2022    HCT 37.5 05/02/2022     05/16/2022     05/02/2022     BMP:   Lab Results   Component Value Date     05/16/2022     05/02/2022    POTASSIUM 4.9 05/16/2022    POTASSIUM 4.3 05/02/2022    CHLORIDE 110 (H) 05/16/2022    CHLORIDE 106 05/02/2022    CO2 28 05/16/2022    CO2 28 05/02/2022    BUN 22 05/16/2022    BUN 15 05/02/2022    CR 1.31 (H) 05/16/2022    CR 1.10 (H) 05/02/2022    GLC 93 05/16/2022     (H) 05/16/2022     COAGS:   Lab Results   Component Value Date    PTT 59 (H) 01/08/2022    INR 1.07 03/16/2022    FIBR 161 (L) 01/09/2022     POC:   Lab Results   Component Value Date    HCG Negative 04/27/2022    HCGS Negative 12/28/2021     HEPATIC:   Lab Results   Component Value Date    ALBUMIN 3.4 05/16/2022    PROTTOTAL 6.0 (L) 05/16/2022    ALT 23 05/16/2022    AST 14 05/16/2022    ALKPHOS 71 05/16/2022    BILITOTAL 0.5 05/16/2022    JENNIFFER 34 12/30/2021     OTHER:   Lab Results   Component Value Date    PH 7.44 01/08/2022    LACT 0.9 01/08/2022    A1C 5.2 01/08/2022    SHAI 9.2 05/16/2022    PHOS 4.2 05/16/2022    MAG 1.9 05/16/2022    LIPASE 30 (L) 02/14/2022    AMYLASE 18 (L) 02/14/2022    TSH 0.66 12/28/2021    SED 8 04/18/2022       Anesthesia Plan    ASA Status:  2   NPO Status:  NPO Appropriate    Anesthesia Type: General.     - Airway: ETT      Maintenance: Balanced.        Consents    Anesthesia Plan(s) and associated risks, benefits, and realistic alternatives discussed. Questions answered and patient/representative(s) expressed understanding.    - Discussed:     - Discussed with:  Spouse, Patient      - Extended Intubation/Ventilatory Support Discussed: No.      - Patient is DNR/DNI Status: No    Use of blood products discussed: No .     Postoperative  Care    Pain management: Multi-modal analgesia.   PONV prophylaxis: Ondansetron (or other 5HT-3), Dexamethasone or Solumedrol     Comments:                Carlos Morelos MD

## 2022-05-16 NOTE — ANESTHESIA POSTPROCEDURE EVALUATION
Patient: Melita Cortes    Procedure: Procedure(s):  ENDOSCOPIC RETROGRADE CHOLANGIOPANCREATOGRAPHY WITH BILE DUCT STENT REMOVAL       Anesthesia Type:  No value filed.    Note:  Disposition: Outpatient   Postop Pain Control: Uneventful            Sign Out: Well controlled pain   PONV: No   Neuro/Psych: Uneventful            Sign Out: Acceptable/Baseline neuro status   Airway/Respiratory: Uneventful            Sign Out: Acceptable/Baseline resp. status   CV/Hemodynamics: Uneventful            Sign Out: Acceptable CV status; No obvious hypovolemia; No obvious fluid overload   Other NRE: NONE   DID A NON-ROUTINE EVENT OCCUR? No           Last vitals:  Vitals Value Taken Time   /76 05/16/22 1245   Temp     Pulse 65 05/16/22 1251   Resp 15 05/16/22 1251   SpO2 100 % 05/16/22 1251   Vitals shown include unvalidated device data.    Electronically Signed By: Kiah Huang MD  May 16, 2022  12:53 PM

## 2022-05-16 NOTE — ANESTHESIA CARE TRANSFER NOTE
Patient: Melita Cortes    Procedure: Procedure(s):  ENDOSCOPIC RETROGRADE CHOLANGIOPANCREATOGRAPHY **Latex Allergy**       Diagnosis: Encounter for removal of biliary stent [Z46.89]  Diagnosis Additional Information: No value filed.    Anesthesia Type:   No value filed.     Note:    Oropharynx: oropharynx clear of all foreign objects and spontaneously breathing  Level of Consciousness: awake  Oxygen Supplementation: nasal cannula  Level of Supplemental Oxygen (L/min / FiO2): 2  Independent Airway: airway patency satisfactory and stable  Dentition: dentition unchanged  Vital Signs Stable: post-procedure vital signs reviewed and stable  Report to RN Given: handoff report given  Patient transferred to: PACU    Handoff Report: Identifed the Patient, Identified the Reponsible Provider, Reviewed the pertinent medical history, Discussed the surgical course, Reviewed Intra-OP anesthesia mangement and issues during anesthesia, Set expectations for post-procedure period and Allowed opportunity for questions and acknowledgement of understanding      Vitals:  Vitals Value Taken Time    86    Temp     Pulse 76 05/16/22 1228   Resp 16 05/16/22 1228   SpO2 100 % 05/16/22 1228   Vitals shown include unvalidated device data.    Electronically Signed By: HAKAN Barba CRNA  May 16, 2022  12:30 PM

## 2022-05-16 NOTE — ANESTHESIA PROCEDURE NOTES
Airway         Procedure Start/Stop Times: 5/16/2022 11:37 AM  Staff -        Anesthesiologist:  Carlos Morelos MD       CRNA: Afsaneh Yin APRN CRNA       Performed By: residentIndications and Patient Condition       Indications for airway management: keith-procedural       Induction type:intravenous       Mask difficulty assessment: 1 - vent by mask    Final Airway Details       Final airway type: endotracheal airway       Successful airway: ETT - single  Endotracheal Airway Details        ETT size (mm): 7.0       Cuffed: yes       Successful intubation technique: direct laryngoscopy       DL Blade Type: MAC 3       Grade View of Cords: 1       Adjucts: stylet       Position: Right       Measured from: lips       Secured at (cm): 21    Post intubation assessment        Placement verified by: capnometry, equal breath sounds and chest rise        Number of attempts at approach: 1       Number of other approaches attempted: 0       Secured with: cloth tape       Ease of procedure: easy       Dentition: Intact and Unchanged    Medication(s) Administered   Medication Administration Time: 5/16/2022 11:37 AM

## 2022-05-17 ENCOUNTER — TELEPHONE (OUTPATIENT)
Dept: TRANSPLANT | Facility: CLINIC | Age: 52
End: 2022-05-17
Payer: COMMERCIAL

## 2022-05-17 DIAGNOSIS — Z94.4 STATUS POST LIVER TRANSPLANTATION (H): ICD-10-CM

## 2022-05-17 RX ORDER — TACROLIMUS 1 MG/1
2 CAPSULE ORAL EVERY 12 HOURS
Qty: 360 CAPSULE | Refills: 3 | Status: SHIPPED | OUTPATIENT
Start: 2022-05-17 | End: 2022-05-24

## 2022-05-17 NOTE — TELEPHONE ENCOUNTER
ISSUE:   Tacrolimus IR level 9.2 on 5/16, goal 6-8, dose 3mg BID.    PLAN:   Please call patient and confirm this was an accurate 12-hour trough. Verify Tacrolimus IR dose 3 mg BID. Confirm no new medications or illness. Confirm no missed doses. If accurate trough and accurate dose, decrease Tacrolimus IR dose to 2 mg BID and repeat labs in 1 week.  Shalini Hawkins RN      OUTCOME:   Spoke with patient, they confirm accurate trough level and current dose 3 mg BID. Patient confirmed dose change to 2 mg BID and to repeat labs in 1 weeks. Orders sent to preferred pharmacy for dose change and lab for repeat labs. Patient voiced understanding of plan.     Soumya Wren LPN

## 2022-05-18 LAB
PLPETH BLD-MCNC: <10 NG/ML
POPETH BLD-MCNC: <10 NG/ML

## 2022-05-19 DIAGNOSIS — Z94.4 LIVER REPLACED BY TRANSPLANT (H): ICD-10-CM

## 2022-05-19 RX ORDER — URSODIOL 250 MG/1
250 TABLET, FILM COATED ORAL 2 TIMES DAILY
Qty: 60 TABLET | Refills: 1 | Status: SHIPPED | OUTPATIENT
Start: 2022-05-19 | End: 2022-05-22

## 2022-05-22 DIAGNOSIS — Z94.4 LIVER REPLACED BY TRANSPLANT (H): ICD-10-CM

## 2022-05-22 RX ORDER — URSODIOL 250 MG/1
250 TABLET, FILM COATED ORAL 2 TIMES DAILY
Qty: 60 TABLET | Refills: 1 | Status: SHIPPED | OUTPATIENT
Start: 2022-05-22 | End: 2022-05-23

## 2022-05-23 ENCOUNTER — ANCILLARY PROCEDURE (OUTPATIENT)
Dept: MAMMOGRAPHY | Facility: CLINIC | Age: 52
End: 2022-05-23
Payer: COMMERCIAL

## 2022-05-23 ENCOUNTER — VIRTUAL VISIT (OUTPATIENT)
Dept: DERMATOLOGY | Facility: CLINIC | Age: 52
End: 2022-05-23
Payer: COMMERCIAL

## 2022-05-23 ENCOUNTER — PRE VISIT (OUTPATIENT)
Dept: DERMATOLOGY | Facility: CLINIC | Age: 52
End: 2022-05-23
Payer: COMMERCIAL

## 2022-05-23 ENCOUNTER — CARE COORDINATION (OUTPATIENT)
Dept: GASTROENTEROLOGY | Facility: CLINIC | Age: 52
End: 2022-05-23

## 2022-05-23 ENCOUNTER — LAB (OUTPATIENT)
Dept: LAB | Facility: CLINIC | Age: 52
End: 2022-05-23
Payer: COMMERCIAL

## 2022-05-23 DIAGNOSIS — Z94.4 LIVER REPLACED BY TRANSPLANT (H): ICD-10-CM

## 2022-05-23 DIAGNOSIS — Z12.31 ENCOUNTER FOR SCREENING MAMMOGRAM FOR BREAST CANCER: ICD-10-CM

## 2022-05-23 DIAGNOSIS — Z46.89 ENCOUNTER FOR REMOVAL OF BILIARY STENT: Primary | ICD-10-CM

## 2022-05-23 DIAGNOSIS — D04.72 SQUAMOUS CELL CARCINOMA IN SITU (SCCIS) OF SKIN OF LEFT LOWER LEG: Primary | ICD-10-CM

## 2022-05-23 LAB
ALBUMIN SERPL-MCNC: 3.3 G/DL (ref 3.4–5)
ALP SERPL-CCNC: 74 U/L (ref 40–150)
ALT SERPL W P-5'-P-CCNC: 25 U/L (ref 0–50)
ANION GAP SERPL CALCULATED.3IONS-SCNC: 5 MMOL/L (ref 3–14)
AST SERPL W P-5'-P-CCNC: 14 U/L (ref 0–45)
BILIRUB DIRECT SERPL-MCNC: 0.2 MG/DL (ref 0–0.2)
BILIRUB SERPL-MCNC: 0.6 MG/DL (ref 0.2–1.3)
BUN SERPL-MCNC: 14 MG/DL (ref 7–30)
CALCIUM SERPL-MCNC: 9.2 MG/DL (ref 8.5–10.1)
CHLORIDE BLD-SCNC: 108 MMOL/L (ref 94–109)
CO2 SERPL-SCNC: 26 MMOL/L (ref 20–32)
CREAT SERPL-MCNC: 1.21 MG/DL (ref 0.52–1.04)
ERYTHROCYTE [DISTWIDTH] IN BLOOD BY AUTOMATED COUNT: 13.3 % (ref 10–15)
GFR SERPL CREATININE-BSD FRML MDRD: 54 ML/MIN/1.73M2
GLUCOSE BLD-MCNC: 121 MG/DL (ref 70–99)
HCT VFR BLD AUTO: 37.9 % (ref 35–47)
HGB BLD-MCNC: 12.8 G/DL (ref 11.7–15.7)
MAGNESIUM SERPL-MCNC: 1.6 MG/DL (ref 1.6–2.3)
MCH RBC QN AUTO: 30.7 PG (ref 26.5–33)
MCHC RBC AUTO-ENTMCNC: 33.8 G/DL (ref 31.5–36.5)
MCV RBC AUTO: 91 FL (ref 78–100)
PHOSPHATE SERPL-MCNC: 3.9 MG/DL (ref 2.5–4.5)
PLATELET # BLD AUTO: 200 10E3/UL (ref 150–450)
POTASSIUM BLD-SCNC: 4.4 MMOL/L (ref 3.4–5.3)
PROT SERPL-MCNC: 6.1 G/DL (ref 6.8–8.8)
RBC # BLD AUTO: 4.17 10E6/UL (ref 3.8–5.2)
SODIUM SERPL-SCNC: 139 MMOL/L (ref 133–144)
TACROLIMUS BLD-MCNC: 8.9 UG/L (ref 5–15)
TME LAST DOSE: NORMAL H
TME LAST DOSE: NORMAL H
WBC # BLD AUTO: 7 10E3/UL (ref 4–11)

## 2022-05-23 PROCEDURE — 85027 COMPLETE CBC AUTOMATED: CPT

## 2022-05-23 PROCEDURE — 77067 SCR MAMMO BI INCL CAD: CPT | Mod: GC | Performed by: STUDENT IN AN ORGANIZED HEALTH CARE EDUCATION/TRAINING PROGRAM

## 2022-05-23 PROCEDURE — 99213 OFFICE O/P EST LOW 20 MIN: CPT | Mod: TEL | Performed by: DERMATOLOGY

## 2022-05-23 PROCEDURE — 80053 COMPREHEN METABOLIC PANEL: CPT

## 2022-05-23 PROCEDURE — 83735 ASSAY OF MAGNESIUM: CPT

## 2022-05-23 PROCEDURE — 80197 ASSAY OF TACROLIMUS: CPT

## 2022-05-23 PROCEDURE — 84100 ASSAY OF PHOSPHORUS: CPT

## 2022-05-23 PROCEDURE — 82248 BILIRUBIN DIRECT: CPT

## 2022-05-23 PROCEDURE — 36415 COLL VENOUS BLD VENIPUNCTURE: CPT

## 2022-05-23 PROCEDURE — 77063 BREAST TOMOSYNTHESIS BI: CPT | Mod: GC | Performed by: STUDENT IN AN ORGANIZED HEALTH CARE EDUCATION/TRAINING PROGRAM

## 2022-05-23 RX ORDER — URSODIOL 250 MG/1
250 TABLET, FILM COATED ORAL 2 TIMES DAILY
Qty: 180 TABLET | Refills: 3 | Status: SHIPPED | OUTPATIENT
Start: 2022-05-23 | End: 2022-06-27

## 2022-05-23 ASSESSMENT — PAIN SCALES - GENERAL: PAINLEVEL: NO PAIN (0)

## 2022-05-23 NOTE — NURSING NOTE
Chief Complaint   Patient presents with     Derm Problem     Consult for mohs on left young.      Isabella GARCIA CMA

## 2022-05-23 NOTE — LETTER
5/23/2022       RE: Melita Cortes  [address]  [place] [address]     Dear Colleague,    Thank you for referring your patient, Melita Cortes, to the [facility] DERMATOLOGIC SURGERY CLINIC [place] at [facility]. Please see a copy of my visit note below.    Mohs Micrographic Surgery Consult Note    May 23, 2022  Start time (if telephone encounter): 2:00 p.m.  End time (if telephone encounter): 2:10 p.m.    Dermatology Problem List:  1. PMH liver transplant 1/7/2022  - tacrolimus, prednisone  2. SCCis, left shin, s/p shave bx 4/27/2022, pending MMS  3. 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 Rhytides - tretinoin 0.025% cream  5. AK, s/p cryo      Subjective: The patient is a 51 year old woman who presents today for Mohs micrographic surgery consultation for a recent diagnosis of skin cancer.    Skin cancer(s): SCCis  Location(s): Left shin  Associated symptoms: pruritus, tenderness to touch  Onset: within last 1 year    No other associated symptoms, modifying factors, or prior treatments, except when noted above. The patient denies any constitutional symptoms, lymphadenopathy, unintentional weight loss or decreased appetite. No other skin concerns today.    Objective:   Skin: Focused examination of the left shin within the teledermatology photograph(s) on 4/27/22 was performed.   - At the left shin, there is a pink, scaly, roughly 2.0 cm plaque corresponding to known skin cancer as above.    Assessment and Plan:     1. Plan for Mohs micrographic surgery for skin cancer(s) above:  - We discussed the nature of the diagnosis/condition above. We discussed the treatment options, including the risks benefits and expectations of these options. We recommend micrographic surgery as the most effective and most tissue sparing option for treatment, and the patient agrees to proceed with this.  The patient is aware of the  risks, benefits and expectations of this procedure. The patient will be scheduled for this procedure, if not already done so.  - We anticipate the following closure type: Linear closure, such as complex linear closure (CLC)   - She says that she would like an anxiolytic, such as Ativan, for the procedure. She agrees to sign the consent form prior to taking the medication, which will be dispensed from clinic. Her  will be her designated .    The patient was discussed with and evaluated by attending physician, Long Saba MD.    Boom Whaley MD  Micrographic Surgery and Dermatologic Oncology (MSDO) Fellow        Attestation signed by Long Saba MD at 5/23/2022  3:37 PM:  Attending Attestation  I attest that I discussed the case with the Fellow.  I agree with the plan.   I have reviewed the note and edited it as necessary.    Long Saba M.D.  Professor  Director of Dermatologic Surgery  Department of Dermatology  [facility]

## 2022-05-23 NOTE — PROGRESS NOTES
Post ERCP 5/15/22 with Dr. Escobar  Obtain a KUB in 4 weeks to ensure spontanous passage                          of common bile duct stent.    Xray ordered, MyChart sent.    ML

## 2022-05-23 NOTE — PROGRESS NOTES
Mohs Micrographic Surgery Consult Note    May 23, 2022  Start time (if telephone encounter): 2:00 p.m.  End time (if telephone encounter): 2:10 p.m.    Dermatology Problem List:  1. PMH liver transplant 1/7/2022  - tacrolimus, prednisone  2. SCCis, left shin, s/p shave bx 4/27/2022, pending MMS  3. 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 Rhytides - tretinoin 0.025% cream  5. AK, s/p cryo      Subjective: The patient is a 51 year old woman who presents today for Mohs micrographic surgery consultation for a recent diagnosis of skin cancer.    Skin cancer(s): SCCis  Location(s): Left shin  Associated symptoms: pruritus, tenderness to touch  Onset: within last 1 year    No other associated symptoms, modifying factors, or prior treatments, except when noted above. The patient denies any constitutional symptoms, lymphadenopathy, unintentional weight loss or decreased appetite. No other skin concerns today.    Objective:   Skin: Focused examination of the left shin within the teledermatology photograph(s) on 4/27/22 was performed.   - At the left shin, there is a pink, scaly, roughly 2.0 cm plaque corresponding to known skin cancer as above.    Assessment and Plan:     1. Plan for Mohs micrographic surgery for skin cancer(s) above:  - We discussed the nature of the diagnosis/condition above. We discussed the treatment options, including the risks benefits and expectations of these options. We recommend micrographic surgery as the most effective and most tissue sparing option for treatment, and the patient agrees to proceed with this.  The patient is aware of the risks, benefits and expectations of this procedure. The patient will be scheduled for this procedure, if not already done so.  - We anticipate the following closure type: Linear closure, such as complex linear closure (CLC)   - She says that she would like an anxiolytic, such as Ativan, for the procedure. She agrees to sign the consent form  prior to taking the medication, which will be dispensed from clinic. Her  will be her designated .    The patient was discussed with and evaluated by attending physician, Long Saba MD.    Boom Whaley MD  Micrographic Surgery and Dermatologic Oncology (MSDO) Fellow

## 2022-05-24 ENCOUNTER — TELEPHONE (OUTPATIENT)
Dept: TRANSPLANT | Facility: CLINIC | Age: 52
End: 2022-05-24
Payer: COMMERCIAL

## 2022-05-24 DIAGNOSIS — Z94.4 STATUS POST LIVER TRANSPLANTATION (H): ICD-10-CM

## 2022-05-24 RX ORDER — TACROLIMUS 1 MG/1
CAPSULE ORAL
Qty: 270 CAPSULE | Refills: 3 | Status: SHIPPED | OUTPATIENT
Start: 2022-05-24 | End: 2022-08-02

## 2022-05-24 NOTE — TELEPHONE ENCOUNTER
ISSUE:   Tacrolimus IR level 8.9 on 5/23, goal 6-8, dose 2 mg BID.    PLAN:   Please call patient and confirm this was an accurate 12-hour trough. Verify Tacrolimus IR dose 2 mg BID. Confirm no new medications or illness. Confirm no missed doses. If accurate trough and accurate dose, decrease Tacrolimus IR dose to 2 mg AM and 1 mg PM and repeat labs in 2 weeks.    OUTCOME:   Spoke with patient, they confirm accurate trough level and current dose 3 mg BID. Patient confirmed dose change to 2 mg AM and 1 mg PM and to repeat labs in 2 weeks. Orders sent to preferred pharmacy for dose change and lab for repeat labs. Patient voiced understanding of plan.

## 2022-05-27 ENCOUNTER — HOSPITAL ENCOUNTER (OUTPATIENT)
Dept: PHYSICAL THERAPY | Facility: CLINIC | Age: 52
Setting detail: THERAPIES SERIES
Discharge: HOME | End: 2022-05-27
Attending: TRANSPLANT SURGERY
Payer: COMMERCIAL

## 2022-05-27 ENCOUNTER — INFUSION THERAPY VISIT (OUTPATIENT)
Dept: NURSING | Facility: CLINIC | Age: 52
End: 2022-05-27
Payer: COMMERCIAL

## 2022-05-27 DIAGNOSIS — Z29.89 NEED FOR PROPHYLACTIC IMMUNOTHERAPY: Primary | ICD-10-CM

## 2022-05-27 DIAGNOSIS — Z94.4 STATUS POST LIVER TRANSPLANTATION (H): Primary | ICD-10-CM

## 2022-05-27 PROCEDURE — M0220 PR INJECTION TIXAGEVIMAB & CILGAVIMAB (EVUSHELD): HCPCS | Performed by: INTERNAL MEDICINE

## 2022-05-27 PROCEDURE — 97110 THERAPEUTIC EXERCISES: CPT | Mod: GP

## 2022-05-27 NOTE — PROGRESS NOTES
Evusheld Consent   Confirmed patient received the Emergency Use Authorization Fact Sheet for Patients, Parents and Caregivers prior to receiving medication. We discussed the following risks and benefits of receiving EVUSHELD, as well as alternative treatments and the patient wished to proceed with EVUSHELD.     Providers believe the benefits of Evusheld outweigh the risks for all moderately to severely immunocompromised patients.      Benefits:   Evusheld is a synthetic antibody that provides protection against COVID-19 for 6 months and is recommended for patients that have a weakened immune system that may not be able to make antibodies themselves.     Risks:    There is a very small risk of allergic reactions and you should notify us if you have any symptoms of an allergic reaction after the injection. There were also some extremely rare cardiac events reported in the initial trials in people that already had heart disease, but experts do not think these were caused by the medication. We will observe you for any chest pain or trouble breathing after the injections and you can reach out to your provider if any of these symptoms develop.     Alternatives:   Vaccines to prevent COVID-19 are approved or available under Emergency Use Authorization. Use of EVUSHELD does not replace vaccination against COVID-19. You can continue to mask and isolate to avoid infections. It is your choice to receive or not receive EVUSHELD. Should you decide not to receive EVUSHELD, it will not change your standard medical care.     Patient does consent to the injection.      EVUSHELD Administration Note:  Melita Cotres presents today for EVUSHELD.   present during visit today: Not Applicable.    Consent:   Informed Consent confirmed in chart, obtained on this date: 5/27/2022    Post Injection Assessment:   Patient tolerated injection without incident.  Patient observed for 60 minutes post injection per protocol.       Patient was observed in the room for a minimum of 10 minutes after injection per standard, then remained in the buidling for a total 60 minute observation period.         Discharge Plan:    Discharge instructions reviewed with: Patient and Family.  Patient and/or family verbalized understanding of discharge instructions and all questions answered.  Patient discharged in stable condition accompanied by: self and family.     Nolvia Ni RN

## 2022-05-29 ENCOUNTER — MYC MEDICAL ADVICE (OUTPATIENT)
Dept: DERMATOLOGY | Facility: CLINIC | Age: 52
End: 2022-05-29
Payer: COMMERCIAL

## 2022-05-31 ENCOUNTER — ANCILLARY PROCEDURE (OUTPATIENT)
Dept: MRI IMAGING | Facility: CLINIC | Age: 52
End: 2022-05-31
Attending: PREVENTIVE MEDICINE
Payer: COMMERCIAL

## 2022-05-31 DIAGNOSIS — M50.30 DDD (DEGENERATIVE DISC DISEASE), CERVICAL: ICD-10-CM

## 2022-05-31 PROCEDURE — 72141 MRI NECK SPINE W/O DYE: CPT | Performed by: RADIOLOGY

## 2022-06-02 ENCOUNTER — TELEPHONE (OUTPATIENT)
Dept: TRANSPLANT | Facility: CLINIC | Age: 52
End: 2022-06-02
Payer: COMMERCIAL

## 2022-06-02 DIAGNOSIS — Z94.4 LIVER REPLACED BY TRANSPLANT (H): Primary | ICD-10-CM

## 2022-06-02 NOTE — TELEPHONE ENCOUNTER
I spoke with Fidelina and she reported more depression recently. I discussed referrals for psychotherapy. She would like referral to Dr. Issa Alford. Referral initiated.    ELMO Betts, [facility]  Liver Transplant   [facility]  Phone: [phone]  Pager: [phone]

## 2022-06-06 ENCOUNTER — LAB (OUTPATIENT)
Dept: LAB | Facility: CLINIC | Age: 52
End: 2022-06-06
Payer: COMMERCIAL

## 2022-06-06 DIAGNOSIS — K70.31 ALCOHOLIC CIRRHOSIS OF LIVER WITH ASCITES (H): Primary | ICD-10-CM

## 2022-06-06 DIAGNOSIS — Z94.4 LIVER REPLACED BY TRANSPLANT (H): ICD-10-CM

## 2022-06-06 DIAGNOSIS — Z94.4 STATUS POST LIVER TRANSPLANTATION (H): ICD-10-CM

## 2022-06-06 LAB
ALBUMIN SERPL-MCNC: 3.3 G/DL (ref 3.4–5)
ALP SERPL-CCNC: 73 U/L (ref 40–150)
ALT SERPL W P-5'-P-CCNC: 31 U/L (ref 0–50)
ANION GAP SERPL CALCULATED.3IONS-SCNC: 4 MMOL/L (ref 3–14)
AST SERPL W P-5'-P-CCNC: 17 U/L (ref 0–45)
BILIRUB DIRECT SERPL-MCNC: 0.2 MG/DL (ref 0–0.2)
BILIRUB SERPL-MCNC: 0.5 MG/DL (ref 0.2–1.3)
BUN SERPL-MCNC: 15 MG/DL (ref 7–30)
CALCIUM SERPL-MCNC: 9.5 MG/DL (ref 8.5–10.1)
CHLORIDE BLD-SCNC: 108 MMOL/L (ref 94–109)
CO2 SERPL-SCNC: 28 MMOL/L (ref 20–32)
CREAT SERPL-MCNC: 1.15 MG/DL (ref 0.52–1.04)
ERYTHROCYTE [DISTWIDTH] IN BLOOD BY AUTOMATED COUNT: 13.3 % (ref 10–15)
GFR SERPL CREATININE-BSD FRML MDRD: 58 ML/MIN/1.73M2
GLUCOSE BLD-MCNC: 109 MG/DL (ref 70–99)
HCT VFR BLD AUTO: 35.4 % (ref 35–47)
HGB BLD-MCNC: 12 G/DL (ref 11.7–15.7)
MAGNESIUM SERPL-MCNC: 2 MG/DL (ref 1.6–2.3)
MCH RBC QN AUTO: 30.8 PG (ref 26.5–33)
MCHC RBC AUTO-ENTMCNC: 33.9 G/DL (ref 31.5–36.5)
MCV RBC AUTO: 91 FL (ref 78–100)
PHOSPHATE SERPL-MCNC: 4.7 MG/DL (ref 2.5–4.5)
PLATELET # BLD AUTO: 190 10E3/UL (ref 150–450)
POTASSIUM BLD-SCNC: 4.5 MMOL/L (ref 3.4–5.3)
PROT SERPL-MCNC: 6 G/DL (ref 6.8–8.8)
RBC # BLD AUTO: 3.9 10E6/UL (ref 3.8–5.2)
SODIUM SERPL-SCNC: 140 MMOL/L (ref 133–144)
TACROLIMUS BLD-MCNC: 6.8 UG/L (ref 5–15)
TME LAST DOSE: NORMAL H
TME LAST DOSE: NORMAL H
WBC # BLD AUTO: 3.1 10E3/UL (ref 4–11)

## 2022-06-06 PROCEDURE — 99000 SPECIMEN HANDLING OFFICE-LAB: CPT

## 2022-06-06 PROCEDURE — 80321 ALCOHOLS BIOMARKERS 1OR 2: CPT | Mod: 90

## 2022-06-06 PROCEDURE — 87535 HIV-1 PROBE&REVERSE TRNSCRPJ: CPT | Mod: 90

## 2022-06-06 PROCEDURE — 84100 ASSAY OF PHOSPHORUS: CPT

## 2022-06-06 PROCEDURE — 36415 COLL VENOUS BLD VENIPUNCTURE: CPT

## 2022-06-06 PROCEDURE — 80053 COMPREHEN METABOLIC PANEL: CPT

## 2022-06-06 PROCEDURE — 87521 HEPATITIS C PROBE&RVRS TRNSC: CPT | Mod: 90

## 2022-06-06 PROCEDURE — 82248 BILIRUBIN DIRECT: CPT

## 2022-06-06 PROCEDURE — 83735 ASSAY OF MAGNESIUM: CPT

## 2022-06-06 PROCEDURE — 87516 HEPATITIS B DNA AMP PROBE: CPT | Mod: 90

## 2022-06-06 PROCEDURE — 80197 ASSAY OF TACROLIMUS: CPT

## 2022-06-06 PROCEDURE — 85027 COMPLETE CBC AUTOMATED: CPT

## 2022-06-08 ENCOUNTER — PRE VISIT (OUTPATIENT)
Dept: RHEUMATOLOGY | Facility: CLINIC | Age: 52
End: 2022-06-08
Payer: COMMERCIAL

## 2022-06-08 ENCOUNTER — OFFICE VISIT (OUTPATIENT)
Dept: RHEUMATOLOGY | Facility: CLINIC | Age: 52
End: 2022-06-08
Attending: INTERNAL MEDICINE
Payer: COMMERCIAL

## 2022-06-08 VITALS
TEMPERATURE: 98.1 F | BODY MASS INDEX: 21.63 KG/M2 | OXYGEN SATURATION: 100 % | SYSTOLIC BLOOD PRESSURE: 102 MMHG | RESPIRATION RATE: 16 BRPM | DIASTOLIC BLOOD PRESSURE: 76 MMHG | WEIGHT: 130 LBS | HEART RATE: 74 BPM

## 2022-06-08 DIAGNOSIS — Z94.4 LIVER REPLACED BY TRANSPLANT (H): ICD-10-CM

## 2022-06-08 DIAGNOSIS — M25.50 MULTIPLE JOINT PAIN: Primary | ICD-10-CM

## 2022-06-08 LAB
PLPETH BLD-MCNC: <10 NG/ML
POPETH BLD-MCNC: <10 NG/ML

## 2022-06-08 PROCEDURE — 99204 OFFICE O/P NEW MOD 45 MIN: CPT | Mod: GC | Performed by: STUDENT IN AN ORGANIZED HEALTH CARE EDUCATION/TRAINING PROGRAM

## 2022-06-08 PROCEDURE — G0463 HOSPITAL OUTPT CLINIC VISIT: HCPCS

## 2022-06-08 ASSESSMENT — PAIN SCALES - GENERAL: PAINLEVEL: SEVERE PAIN (7)

## 2022-06-08 NOTE — NURSING NOTE
Chief Complaint   Patient presents with     Consult     New Patient Consult     /76   Pulse 74   Temp 98.1  F (36.7  C)   Resp 16   Wt 59 kg (130 lb)   SpO2 100%   BMI 21.63 kg/m    Bala Montiel on 6/8/2022 at 8:16 AM

## 2022-06-08 NOTE — PROGRESS NOTES
[facility] Initial Consult  06/08/22    Reason for visit: Polyarthralgia     HPI:  Melita Cortes is a 51 year old female with a history of liver cirrhosis c/b portal hypertension, esophageal varices and hepatic encephalopathy. She is status post liver transplant in 01/2022. She reports bilateral elbow pain main around medical epicondyl. Left elbow pain is worse than right. Pain is worse in morning and with activity. She states that the pain in medial elbows and base of palm started few weeks after the transplant surgery when she was trying to get up with the support of elbows and hands. She has mild pain at the base of thumb that usually worsens with activity. She has long standing hx of hip pain mainly in left hip since accident around 30 yrs ago. She denies any hx of joint swelling or redness. She feels stiff for about 5-10 minutes in morning. She was prescribed with prednisone taper for 4 weeks but provided some relief in joint pain. She also tried Voltaren gel on elbows with minimal improvement but she was using only once a day. She reports shoulder pain that interferes with her daily activities sometimes. She denies any hx of numbness or tingling in hands. She used to have left big toe tingling sensation that has completely alleviated now.     She has been feeling tired since transplant surgery. She is underlying physical therapy to get some strength. She is currently on Tacrolimus. She was also on Cellcept and later on myfortic acid that was stopped a month ago. She is feeling overall better. Denies dry eyes/dry mouth, oral ulcers, occular sx consistent with uveitis, malar rash, photosensitivity, pleurisy, blood clots or hx of spontaneous miscarriages. She also denies dry cough, hemoptysis, chest pain, shortness of breat, headache, dizziness, abdominal pain, hematochezia, or hematuria. She is former smoker. She denies any family hx of autoimmune conditions.     Rest of the ROS is negative  except mentioned HPI      Past Medical History  Past Medical History:   Diagnosis Date     Alcoholic hepatitis      Asthma 3/10/2006     Cervical high risk HPV (human papillomavirus) test positive 2020    See problem list     Liver failure (H)      Past Surgical History:   Procedure Laterality Date     APPENDECTOMY       COLONOSCOPY N/A 2022    Procedure: COLONOSCOPY;  Surgeon: Zita Steele MD;  Location:  GI     ENDOSCOPIC RETROGRADE CHOLANGIOPANCREATOGRAM N/A 2022    Procedure: ENDOSCOPIC RETROGRADE CHOLANGIOPANCREATOGRAPHY WITH BILIARY SPHINCTEROTOMY, SLUDGE REMOVAL, DILATION  AND STENT PLACEMENT;  Surgeon: Guru Gardenia Escobar MD;  Location: [facility] OR     ENDOSCOPIC RETROGRADE CHOLANGIOPANCREATOGRAM N/A 2022    Procedure: ENDOSCOPIC RETROGRADE CHOLANGIOPANCREATOGRAPHY WITH BILE DUCT STENT REMOVAL;  Surgeon: Guru Gardenia Escobar MD;  Location: [facility] OR     ENDOSCOPIC RETROGRADE CHOLANGIOPANCREATOGRAPHY, EXCHANGE TUBE/STENT N/A 3/16/2022    Procedure: ENDOSCOPIC RETROGRADE CHOLANGIOPANCREATOGRAPHY, bile duct stents exchanged, balloon dilation and sweep of bile ducts for sludge;  Surgeon: Guru Gardenia Escobar MD;  Location: [facility] OR     ESOPHAGOGASTRODUODENOSCOPY, WITH BRUSHINGS N/A 10/29/2021    Procedure: ESOPHAGOGASTRODUODENOSCOPY, WITH BRUSHINGS;  Surgeon: Torey Ruiz MD;  Location:  GI     IR LIVER BIOPSY PERCUTANEOUS  2022     TRANSPLANT LIVER RECIPIENT  DONOR N/A 2022    Procedure: TRANSPLANT, LIVER, RECIPIENT,  DONOR;  Surgeon: Pradeep Browne MD;  Location:  OR     Social History     Socioeconomic History     Marital status:      Spouse name: Not on file     Number of children: Not on file     Years of education: Not on file     Highest education level: Not on file   Occupational History     Not on file   Tobacco Use     Smoking status: Former Smoker     Quit date: 2020     Years  since quittin.1     Smokeless tobacco: Never Used   Vaping Use     Vaping Use: Never used   Substance and Sexual Activity     Alcohol use: Not Currently     Comment: Last drink 2021     Drug use: Never     Sexual activity: Not Currently     Partners: Male   Other Topics Concern     Parent/sibling w/ CABG, MI or angioplasty before 65F 55M? Not Asked   Social History Narrative     Not on file     Social Determinants of Health     Financial Resource Strain: Not on file   Food Insecurity: Not on file   Transportation Needs: Not on file   Physical Activity: Not on file   Stress: Not on file   Social Connections: Not on file   Intimate Partner Violence: Not on file   Housing Stability: Not on file     Family History   Problem Relation Age of Onset     Cancer Mother      Colon Cancer Paternal Aunt      Colon Cancer Maternal Uncle        Medications:  Current Outpatient Medications   Medication     aspirin (ASA) 81 MG chewable tablet     calcium carbonate (OS-SHAI) 1500 (600 Ca) MG tablet     hydrOXYzine (ATARAX) 25 MG tablet     magnesium oxide (MAG-OX) 400 MG tablet     methocarbamol (ROBAXIN) 500 MG tablet     multivitamin (CENTRUM SILVER) tablet     pantoprazole (PROTONIX) 40 MG EC tablet     tacrolimus (GENERIC EQUIVALENT) 1 MG capsule     thiamine (B-1) 100 MG tablet     tretinoin (RETIN-A) 0.025 % external cream     tretinoin (RETIN-A) 0.1 % external cream     ursodiol (ACTIGALL) 250 MG tablet     sulfamethoxazole-trimethoprim (BACTRIM) 400-80 MG tablet     Current Facility-Administered Medications   Medication     triamcinolone (KENALOG-40) injection 40 mg       Allergies:     Allergies   Allergen Reactions     Latex      rash     Adhesive Tape Rash       /76   Pulse 74   Temp 98.1  F (36.7  C)   Resp 16   Wt 59 kg (130 lb)   SpO2 100%   BMI 21.63 kg/m       Physical Exam:  Constitutional: well-developed, appearing stated age; cooperative,  Skin: no nail pitting, alopecia, rash, nodules or  lesions  Eyes: nl EOM, vision, conjunctiva, sclera,   Neck: no mass, non-tender thyroid  Resp: lungs clear to auscultation, breathing comfortably on room air  CV: RRR, no murmurs, rubs or gallops, no edema  GI: soft, non-tender, non-distended  Lymph: no cervical, supraclavicular, or epitrochlear nodes  Neuro: nl cranial nerves, strength, sensation,   Psych: nl judgement, orientation, memory, affect.  MSK: localized tenderness at medial epicondyl bilaterally, no evidence of swelling or redness. squaring at base of thumb, mild discomfort around hypothenar area. Rest of joint look normal with full range of motion without any evidence of acute synovitis.  Normal  strength. No dactylitis,  tenosynovitis, enthesopathy.    Lab/Imaging: Reviewed recent labs and imaging.     Xray elbows bilateral  5/7/2022    Impression:  1. No acute osseous abnormality.  2. No substantial degenerative change.    Xray Hip bilateral  5/7/2022  Impression:  1. No acute osseous abnormality.  2. Mild to moderate left greater than right bilateral hip degenerative  changes, similar to comparison.    Assessment/Plan:     #. Polyarthralgia   #. Alcoholic liver cirrhosis s/p liver transplantation   # Immunosuppressive medications     51 yr old female with hx of alcoholic cirrhosis s/p liver transplant (01/2022) on Tacrolimus presents for evaluation of polyarthralgia. She has localized tenderness at medial epicondyles bilaterally worse with movement. I don't see any evidence of acute synovitis around the elbows and at base of thumbs. She doesn't have significant stiffness and inflammatory markers were normal. I thinks bilateral elbow pain is non inflammatory and mechanical in nature. It seems more consistent with medial epicondylitis given her hx of pressure/overuse of medial elbows for getting up after transplant surgery. I suggested her to try elbow brace and Voltaren gel for relief.  She may benefit form physical therapy as well. I encouraged  her to discuss this with sports medicine team during her follow up next week. Her presentation doesn't raise concern for any autoimmune condition at this time but I would send RF and CCP to rule out rheumatoid arthritis.  She has remained on Tacrolimus for liver transplantation. It is possible some of her arthralgias are related to Tacrolimus and Cellcept. Both of these immunosuppressive medications can cause arthralgias in > 10%.  She had negative CARTER in 09/2021. I will update her with lab results.  She can follow up with us as needed if there are new symptoms concerning for autoimmune condition.    Plan  - Check RF, CCP ab, ESR and CRP  - Apply Voltaren gel 1% on elbow 3-4 times daily as need  - Brace for medial epicondylitis       Follow up: as needed      The patient was seen and staffed with Dr. Carter MD.       Óscar Schumacher MD  Rheumatology Fellow  Pager [phone]    Attending Note: I saw and evaluated the patient with Dr. Schumacher. I agree with the assessment and plan.    Kenneth Machado MD      Orders Placed This Encounter   Procedures     Rheumatoid factor     Cyclic Citrullinated Peptide Antibody IgG     ESR     CRP inflammation

## 2022-06-08 NOTE — TELEPHONE ENCOUNTER
NOTES Status Details   OFFICE NOTE from referring provider Internal 04.15.2022 Zita Steele MD   OFFICE NOTE from other specialist     DISCHARGE SUMMARY from hospital     DISCHARGE REPORT from the ER     MEDICATION LIST Internal    LABS (Any and all labs)      Internal    Biopsy/pathology (Anything related to diagnoses I.e. fluid aspirations, lip biopsy, muscle biopsy)               Imaging (All imaging related to diagnoses)     Echo     HRCT     CXR     EMG                    Scleroderma/Dermatomyositis diagnoses     Previous Cardiology notes      Previous Pulmonary notes     Previous Dermatology notes     Previous GI notes     Lupus diagnoses     Previous Nephrology notes     Previous Dermatology notes     Previous Cardiology notes

## 2022-06-09 LAB
HBV DNA SERPL QL NAA+PROBE: NORMAL
HCV RNA SERPL QL NAA+PROBE: NORMAL
HIV1+2 RNA SERPL QL NAA+PROBE: NORMAL

## 2022-06-10 ENCOUNTER — MYC MEDICAL ADVICE (OUTPATIENT)
Dept: TRANSPLANT | Facility: CLINIC | Age: 52
End: 2022-06-10
Payer: COMMERCIAL

## 2022-06-13 ENCOUNTER — ANCILLARY PROCEDURE (OUTPATIENT)
Dept: GENERAL RADIOLOGY | Facility: CLINIC | Age: 52
End: 2022-06-13
Payer: COMMERCIAL

## 2022-06-13 DIAGNOSIS — Z46.89 ENCOUNTER FOR REMOVAL OF BILIARY STENT: ICD-10-CM

## 2022-06-13 PROCEDURE — 74019 RADEX ABDOMEN 2 VIEWS: CPT | Performed by: RADIOLOGY

## 2022-06-14 ENCOUNTER — OFFICE VISIT (OUTPATIENT)
Dept: DERMATOLOGY | Facility: CLINIC | Age: 52
End: 2022-06-14
Attending: DERMATOLOGY
Payer: COMMERCIAL

## 2022-06-14 VITALS — SYSTOLIC BLOOD PRESSURE: 107 MMHG | DIASTOLIC BLOOD PRESSURE: 75 MMHG | HEART RATE: 80 BPM

## 2022-06-14 DIAGNOSIS — D48.9 NEOPLASM OF UNCERTAIN BEHAVIOR: ICD-10-CM

## 2022-06-14 DIAGNOSIS — D04.72 SQUAMOUS CELL CARCINOMA IN SITU (SCCIS) OF SKIN OF LEFT LOWER LEG: Primary | ICD-10-CM

## 2022-06-14 PROCEDURE — 12032 INTMD RPR S/A/T/EXT 2.6-7.5: CPT | Performed by: DERMATOLOGY

## 2022-06-14 PROCEDURE — 17313 MOHS 1 STAGE T/A/L: CPT | Performed by: DERMATOLOGY

## 2022-06-14 RX ORDER — TRAMADOL HYDROCHLORIDE 50 MG/1
50 TABLET ORAL EVERY 6 HOURS PRN
Qty: 10 TABLET | Refills: 0 | Status: SHIPPED | OUTPATIENT
Start: 2022-06-14 | End: 2022-06-17

## 2022-06-14 ASSESSMENT — PAIN SCALES - GENERAL: PAINLEVEL: NO PAIN (0)

## 2022-06-14 NOTE — LETTER
6/14/2022       RE: Melita Cortes  [address] [address] [place] [address] [address]  [facility] [address]     Dear Colleague,    Thank you for referring your patient, Melita Cortes, to the [facility] DERMATOLOGIC SURGERY CLINIC [place] at [facility]. Please see a copy of my visit note below.    [facility] Mohs Surgery Procedure Note    Case #: 1  Date of Service:  Jun 14, 2022  Surgery: Mohs micrographic surgery (MMS)  Staff surgeon: Long Saba MD  Fellow surgeon: Boom Whaley MD  Resident surgeon: None  Nurse: Isabella Webber CMA    Tumor Type: SCCis  Location: L shin  Derm-Path Accession #: [id number]    Mohs Accession #:   Pre-Op Size: 0.8 cm x 0.8 cm  Final Defect Size: 1.5 cm x 1.7 cm  Number of Mohs stages: 1  Level of Defect: Fat  Local anesthetic: 3 mL 1% lidocaine with epinephrine  Repair Type: Purse String Closure    PROCEDURE:  The open wound was cleansed with chlorhexidine and draped in the usual sterile fashion.  The area was infiltrated with 1% lidocaine with epinephrine.  A circumferential horizontal buried suture was distributed evenly throughout the edge of the wound and the wound was drawn closed. The wound was cleaned, ointment applied and a non-adherent pressure dressing was used.  Wound care reviewed and the handout given.     Follow-up for suture removal: 2 weeks    Long Saba MD was immediately available for the entire surgery and was physicially present for the key portions of the procedure.    Repair Size: 3 cm  Suture Material: 4-0 Monocryl; 4-0 Prolene    Procedure:    Stage I  We discussed the principles of treatment and most likely complications including scarring, bleeding, infection, swelling, pain, crusting, nerve damage, large wound,  incomplete excision, wound dehiscence,  nerve damage, recurrence, and a second procedure may be recommended to obtain the best cosmetic or functional result.    Informed consent was  obtained and the patient underwent the procedure as follows:  The patient was placed supine on the operating table.  The cancer was identified, outlined with a marker, and verified by the patient.  The entire surgical field was prepped with chlorhexidine.  The surgical site was anesthetized using lidocaine with epinephrine.    The area of clinically apparent tumor was debulked. The layer of tissue was then surgically excised using a #15 blade and was then transferred onto a specimen sheet maintaining the orientation of the specimen. Hemostasis was obtained using electrocoagulation. The wound site was then covered with a dressing while the tissue samples were processed for examination.    The excised tissue was transported to the Mohs histology laboratory maintaining the tissue orientation.  The tissue specimen was relaxed so that the entire surgical margin was in a a single horizontal plane for sectioning and inked for precise mapping.  A precise reference map was drawn to reflect the sectioning of the specimen, colored inking of the margins, and orientation on the patient.  The tissue was processed using horizontal sectioning of the base and continuous peripheral margins.  The histopathologic sections were reviewed in conjunction with the reference map.    Total blocks: 1  Total slides: 2    There were no cancer cells visualized on examination, therefore Mohs surgery was complete.      Dr. Boom Whaley (Mohs micrographic surgery fellow) performed the Mohs micrographic surgery and reconstruction under the direct supervision of Long Saba MD, who was present for the entire micrographic surgery and key portions of the reconstruction, and always immediately available.    Scribe Disclosure:  Steph FORD, am serving as a scribe to document services personally performed by Long Saba MD based on data collection and the provider's statements to me.     Attestation signed by Long Saba MD at 6/15/2022  9:05 AM:  Attending  attestation:  I personally performed the entire procedure.  I have reviewed the note and edited it as necessary, and agree with its contents.    Long Saba M.D.  Professor  Director of Dermatologic Surgery  Department of Dermatology  [facility]    Dermatology Surgery Clinic  [facility] [place] [phone]

## 2022-06-14 NOTE — PROGRESS NOTES
[facility] Mohs Surgery Procedure Note    Case #: 1  Date of Service:  Jun 14, 2022  Surgery: Mohs micrographic surgery (MMS)  Staff surgeon: Long Saba MD  Fellow surgeon: Boom Whaley MD  Resident surgeon: None  Nurse: Isabella Webber CMA    Tumor Type: SCCis  Location: L shin  Derm-Path Accession #: [id number]    Mohs Accession #:   Pre-Op Size: 0.8 cm x 0.8 cm  Final Defect Size: 1.5 cm x 1.7 cm  Number of Mohs stages: 1  Level of Defect: Fat  Local anesthetic: 3 mL 1% lidocaine with epinephrine  Repair Type: Purse String Closure    PROCEDURE:  The open wound was cleansed with chlorhexidine and draped in the usual sterile fashion.  The area was infiltrated with 1% lidocaine with epinephrine.  A circumferential horizontal buried suture was distributed evenly throughout the edge of the wound and the wound was drawn closed. The wound was cleaned, ointment applied and a non-adherent pressure dressing was used.  Wound care reviewed and the handout given.     Follow-up for suture removal: 2 weeks    Long Saba MD was immediately available for the entire surgery and was physicially present for the key portions of the procedure.    Repair Size: 3 cm  Suture Material: 4-0 Monocryl; 4-0 Prolene    Procedure:    Stage I  We discussed the principles of treatment and most likely complications including scarring, bleeding, infection, swelling, pain, crusting, nerve damage, large wound,  incomplete excision, wound dehiscence,  nerve damage, recurrence, and a second procedure may be recommended to obtain the best cosmetic or functional result.    Informed consent was obtained and the patient underwent the procedure as follows:  The patient was placed supine on the operating table.  The cancer was identified, outlined with a marker, and verified by the patient.  The entire surgical field was prepped with chlorhexidine.  The surgical site was anesthetized using lidocaine with epinephrine.    The area of  clinically apparent tumor was debulked. The layer of tissue was then surgically excised using a #15 blade and was then transferred onto a specimen sheet maintaining the orientation of the specimen. Hemostasis was obtained using electrocoagulation. The wound site was then covered with a dressing while the tissue samples were processed for examination.    The excised tissue was transported to the Mohs histology laboratory maintaining the tissue orientation.  The tissue specimen was relaxed so that the entire surgical margin was in a a single horizontal plane for sectioning and inked for precise mapping.  A precise reference map was drawn to reflect the sectioning of the specimen, colored inking of the margins, and orientation on the patient.  The tissue was processed using horizontal sectioning of the base and continuous peripheral margins.  The histopathologic sections were reviewed in conjunction with the reference map.    Total blocks: 1  Total slides: 2    There were no cancer cells visualized on examination, therefore Mohs surgery was complete.      Dr. Boom Whaley (Mohs micrographic surgery fellow) performed the Mohs micrographic surgery and reconstruction under the direct supervision of Long Saba MD, who was present for the entire micrographic surgery and key portions of the reconstruction, and always immediately available.    Scribe Disclosure:  I, Steph Diego, am serving as a scribe to document services personally performed by Long Saba MD based on data collection and the provider's statements to me.

## 2022-06-14 NOTE — NURSING NOTE
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     Bronwyn MCFARLANE RN

## 2022-06-15 ENCOUNTER — TELEPHONE (OUTPATIENT)
Dept: ORTHOPEDICS | Facility: CLINIC | Age: 52
End: 2022-06-15

## 2022-06-15 ENCOUNTER — OFFICE VISIT (OUTPATIENT)
Dept: ORTHOPEDICS | Facility: CLINIC | Age: 52
End: 2022-06-15
Payer: COMMERCIAL

## 2022-06-15 DIAGNOSIS — M50.20 CERVICAL DISC HERNIATION: ICD-10-CM

## 2022-06-15 DIAGNOSIS — M50.30 DDD (DEGENERATIVE DISC DISEASE), CERVICAL: Primary | ICD-10-CM

## 2022-06-15 PROCEDURE — 99214 OFFICE O/P EST MOD 30 MIN: CPT | Performed by: PREVENTIVE MEDICINE

## 2022-06-15 NOTE — LETTER
6/15/2022         RE: Melita Cortes  [address]  [facility] [address]        Dear Colleague,    Thank you for referring your patient, Melita Cortes, to the [facility] [place]. Please see a copy of my visit note below.    HISTORY OF PRESENT ILLNESS  Ms. Cortes is a pleasant 52 year old year old female who presents to clinic today with ongoing neck and right arm pain  Melita explains that she is here to review her cervical MRI results  Location: neck and right arm  Quality:  achy pain    Severity: 4/10 at worst        MEDICAL HISTORY  Patient Active Problem List   Diagnosis     Abdominal bloating     Family history of pancreatic cancer     Transaminitis     Macrocytosis     Cervical high risk HPV (human papillomavirus) test positive     Anemia, unspecified type     Elevated serum creatinine     Alcoholic cirrhosis of liver with ascites (H)     Hyponatremia     Malaise and fatigue     At high risk for severe sepsis     Symptomatic anemia     Bandemia     Hyperlipidemia     Anxiety     Status post liver transplantation (H)     Immunosuppressed status (H)     Steroid-induced hyperglycemia     Hypervolemia     Severe malnutrition (H)     Elevated alkaline phosphatase level       Current Outpatient Medications   Medication Sig Dispense Refill     aspirin (ASA) 81 MG chewable tablet Take 1 tablet (81 mg) by mouth daily 90 tablet 0     calcium carbonate (OS-SHAI) 1500 (600 Ca) MG tablet Take 1 tablet (600 mg) by mouth daily 60 tablet 3     hydrOXYzine (ATARAX) 25 MG tablet Take 1 tablet (25 mg) by mouth every 6 hours as needed for itching 15 tablet 0     magnesium oxide (MAG-OX) 400 MG tablet Take 2 tablets (800 mg) by mouth 2 times daily 180 tablet 3     methocarbamol (ROBAXIN) 500 MG tablet Take 1-2 tablets (500-1,000 mg) by mouth nightly as needed for muscle spasms (Patient not taking: Reported on 6/14/2022) 60 tablet 0     multivitamin (CENTRUM SILVER)  tablet Take 1 tablet by mouth daily       pantoprazole (PROTONIX) 40 MG EC tablet Take 1 tablet (40 mg) by mouth in the morning. 90 tablet 3     sulfamethoxazole-trimethoprim (BACTRIM) 400-80 MG tablet Take 1 tablet by mouth daily (Patient not taking: No sig reported) 90 tablet 3     tacrolimus (GENERIC EQUIVALENT) 1 MG capsule Take 2 capsules (2 mg) by mouth every morning AND 1 capsule (1 mg) every evening. 270 capsule 3     thiamine (B-1) 100 MG tablet Take 1 tablet (100 mg) by mouth daily 30 tablet 1     traMADol (ULTRAM) 50 MG tablet Take 1 tablet (50 mg) by mouth every 6 hours as needed for severe pain 10 tablet 0     tretinoin (RETIN-A) 0.025 % external cream Apply a pea size to entire face QD 45 g 11     tretinoin (RETIN-A) 0.1 % external cream Apply a pea-sized amount evenly over the face at nighttime before bed. 45 g 11     ursodiol (ACTIGALL) 250 MG tablet Take 1 tablet (250 mg) by mouth 2 times daily 180 tablet 3       Allergies   Allergen Reactions     Latex      rash     Adhesive Tape Rash       Family History   Problem Relation Age of Onset     Cancer Mother      Colon Cancer Paternal Aunt      Colon Cancer Maternal Uncle      Social History     Socioeconomic History     Marital status:    Tobacco Use     Smoking status: Former Smoker     Quit date: 2020     Years since quittin.1     Smokeless tobacco: Never Used   Vaping Use     Vaping Use: Never used   Substance and Sexual Activity     Alcohol use: Not Currently     Comment: Last drink 2021     Drug use: Never     Sexual activity: Not Currently     Partners: Male       Additional medical/Social/Surgical histories reviewed in Psychiatric and updated as appropriate.     REVIEW OF SYSTEMS (6/15/2022)  10 point ROS of systems including Constitutional, Eyes, Respiratory, Cardiovascular, Gastroenterology, Genitourinary, Integumentary, Musculoskeletal, Psychiatric, Allergic/Immunologic were all negative except for pertinent positives noted in my  HPI.     PHYSICAL EXAM  Vital Signs: There were no vitals taken for this visit. Patient declined being weighed. There is no height or weight on file to calculate BMI.    General  - normal appearance, in no obvious distress  HEENT  - conjunctivae not injected, moist mucous membranes, normocephalic/atraumatic head, ears normal appearance, no lesions, mouth normal appearance, no scars, normal dentition and teeth present  CV  - normal peripheral perfusion  Pulm  - normal respiratory pattern, non-labored    Musculoskeletal - CERVICAL SPINE  - stance and posture: normal gait without limp, no obvious leg length discrepancy, normal heel and toe walk, normal balance, slightly forward shoulders, head balanced normally on trunk  - inspection: normal bone and joint alignment, no obvious kyphosis  - palpation: no paravertebral or bony tenderness, except at base of neck and trapezius muscles  - ROM: normal and painless flexion, extension, left and right sidebending and rotation with some discomfort  - strength: upper extremities 5/5 in all planes  - special tests:  (+) spurlings    Neuro  - biceps, triceps, supinator DTRs 2+ bilaterally, no sensory or motor deficit, grossly normal coordination, normal muscle tone  Skin  - no ecchymosis, erythema, warmth, or induration, no obvious rash  Psych  - interactive, appropriate, normal mood and affect  ASSESSMENT & PLAN  53 yo female with cervical ddd, disc herniations, radicular pain, right arm  I independently reviewed the following imaging studies:  Cervical MRI: shows ddd, disc herniations  Discussed and ordered BISHNU  Cont. HEP  F/u after BISHNU  Appropriate PPE was utilized for prevention of spread of Covid-19.  Srinivas Cisneros MD, [facility]          Again, thank you for allowing me to participate in the care of your patient.        Sincerely,        Srinivas Cisneros MD

## 2022-06-15 NOTE — TELEPHONE ENCOUNTER
[facility] Call Center    Phone Message    May a detailed message be left on voicemail: yes     Reason for Call: Order(s): Other:   Reason for requested: X-ray cervical/thoracic epidural injection  Date needed: 06/15/2022  Provider name: Dr. Cisneros     Pt requesting order to be faxed over to [facility] at - [phone]        Action Taken: Message routed to:  [facility] medicine - Dr.David Cisneros     Travel Screening: Not Applicable

## 2022-06-15 NOTE — PROGRESS NOTES
HISTORY OF PRESENT ILLNESS  Ms. Cortes is a pleasant 52 year old year old female who presents to clinic today with ongoing neck and right arm pain  Melita explains that she is here to review her cervical MRI results  Location: neck and right arm  Quality:  achy pain    Severity: 4/10 at worst        MEDICAL HISTORY  Patient Active Problem List   Diagnosis     Abdominal bloating     Family history of pancreatic cancer     Transaminitis     Macrocytosis     Cervical high risk HPV (human papillomavirus) test positive     Anemia, unspecified type     Elevated serum creatinine     Alcoholic cirrhosis of liver with ascites (H)     Hyponatremia     Malaise and fatigue     At high risk for severe sepsis     Symptomatic anemia     Bandemia     Hyperlipidemia     Anxiety     Status post liver transplantation (H)     Immunosuppressed status (H)     Steroid-induced hyperglycemia     Hypervolemia     Severe malnutrition (H)     Elevated alkaline phosphatase level       Current Outpatient Medications   Medication Sig Dispense Refill     aspirin (ASA) 81 MG chewable tablet Take 1 tablet (81 mg) by mouth daily 90 tablet 0     calcium carbonate (OS-SHAI) 1500 (600 Ca) MG tablet Take 1 tablet (600 mg) by mouth daily 60 tablet 3     hydrOXYzine (ATARAX) 25 MG tablet Take 1 tablet (25 mg) by mouth every 6 hours as needed for itching 15 tablet 0     magnesium oxide (MAG-OX) 400 MG tablet Take 2 tablets (800 mg) by mouth 2 times daily 180 tablet 3     methocarbamol (ROBAXIN) 500 MG tablet Take 1-2 tablets (500-1,000 mg) by mouth nightly as needed for muscle spasms (Patient not taking: Reported on 6/14/2022) 60 tablet 0     multivitamin (CENTRUM SILVER) tablet Take 1 tablet by mouth daily       pantoprazole (PROTONIX) 40 MG EC tablet Take 1 tablet (40 mg) by mouth in the morning. 90 tablet 3     sulfamethoxazole-trimethoprim (BACTRIM) 400-80 MG tablet Take 1 tablet by mouth daily (Patient not taking: No sig reported) 90 tablet 3      tacrolimus (GENERIC EQUIVALENT) 1 MG capsule Take 2 capsules (2 mg) by mouth every morning AND 1 capsule (1 mg) every evening. 270 capsule 3     thiamine (B-1) 100 MG tablet Take 1 tablet (100 mg) by mouth daily 30 tablet 1     traMADol (ULTRAM) 50 MG tablet Take 1 tablet (50 mg) by mouth every 6 hours as needed for severe pain 10 tablet 0     tretinoin (RETIN-A) 0.025 % external cream Apply a pea size to entire face QD 45 g 11     tretinoin (RETIN-A) 0.1 % external cream Apply a pea-sized amount evenly over the face at nighttime before bed. 45 g 11     ursodiol (ACTIGALL) 250 MG tablet Take 1 tablet (250 mg) by mouth 2 times daily 180 tablet 3       Allergies   Allergen Reactions     Latex      rash     Adhesive Tape Rash       Family History   Problem Relation Age of Onset     Cancer Mother      Colon Cancer Paternal Aunt      Colon Cancer Maternal Uncle      Social History     Socioeconomic History     Marital status:    Tobacco Use     Smoking status: Former Smoker     Quit date: 2020     Years since quittin.1     Smokeless tobacco: Never Used   Vaping Use     Vaping Use: Never used   Substance and Sexual Activity     Alcohol use: Not Currently     Comment: Last drink 2021     Drug use: Never     Sexual activity: Not Currently     Partners: Male       Additional medical/Social/Surgical histories reviewed in [facility] and updated as appropriate.     REVIEW OF SYSTEMS (6/15/2022)  10 point ROS of systems including Constitutional, Eyes, Respiratory, Cardiovascular, Gastroenterology, Genitourinary, Integumentary, Musculoskeletal, Psychiatric, Allergic/Immunologic were all negative except for pertinent positives noted in my HPI.     PHYSICAL EXAM  Vital Signs: There were no vitals taken for this visit. Patient declined being weighed. There is no height or weight on file to calculate BMI.    General  - normal appearance, in no obvious distress  HEENT  - conjunctivae not injected, moist mucous membranes,  normocephalic/atraumatic head, ears normal appearance, no lesions, mouth normal appearance, no scars, normal dentition and teeth present  CV  - normal peripheral perfusion  Pulm  - normal respiratory pattern, non-labored    Musculoskeletal - CERVICAL SPINE  - stance and posture: normal gait without limp, no obvious leg length discrepancy, normal heel and toe walk, normal balance, slightly forward shoulders, head balanced normally on trunk  - inspection: normal bone and joint alignment, no obvious kyphosis  - palpation: no paravertebral or bony tenderness, except at base of neck and trapezius muscles  - ROM: normal and painless flexion, extension, left and right sidebending and rotation with some discomfort  - strength: upper extremities 5/5 in all planes  - special tests:  (+) spurlings    Neuro  - biceps, triceps, supinator DTRs 2+ bilaterally, no sensory or motor deficit, grossly normal coordination, normal muscle tone  Skin  - no ecchymosis, erythema, warmth, or induration, no obvious rash  Psych  - interactive, appropriate, normal mood and affect  ASSESSMENT & PLAN  53 yo female with cervical ddd, disc herniations, radicular pain, right arm  I independently reviewed the following imaging studies:  Cervical MRI: shows ddd, disc herniations  Discussed and ordered BISHNU  Cont. HEP  F/u after BISHNU  Appropriate PPE was utilized for prevention of spread of Covid-19.  Srinivas Cisneros MD, [facility]

## 2022-06-16 ENCOUNTER — TELEPHONE (OUTPATIENT)
Dept: ORTHOPEDICS | Facility: CLINIC | Age: 52
End: 2022-06-16
Payer: COMMERCIAL

## 2022-06-16 NOTE — TELEPHONE ENCOUNTER
[facility] Call Center    Phone Message    May a detailed message be left on voicemail: yes     Reason for Call: Order(s): Other:   Reason for requested: Thoracic Epidural Injection  Date needed: N/A  Provider name: Srinivas Cisneros    Fax order to [facility]: [phone]      Action Taken: Message routed to:  Other: [facility]    Travel Screening: Not Applicable

## 2022-06-17 ENCOUNTER — VIRTUAL VISIT (OUTPATIENT)
Dept: BEHAVIORAL HEALTH | Facility: CLINIC | Age: 52
End: 2022-06-17
Payer: COMMERCIAL

## 2022-06-17 DIAGNOSIS — F33.0 MAJOR DEPRESSIVE DISORDER, RECURRENT EPISODE, MILD (H): Primary | ICD-10-CM

## 2022-06-17 PROCEDURE — 90834 PSYTX W PT 45 MINUTES: CPT | Mod: GT | Performed by: PSYCHOLOGIST

## 2022-06-17 ASSESSMENT — COLUMBIA-SUICIDE SEVERITY RATING SCALE - C-SSRS
TOTAL  NUMBER OF ABORTED OR SELF INTERRUPTED ATTEMPTS LIFETIME: NO
ATTEMPT LIFETIME: NO
1. HAVE YOU WISHED YOU WERE DEAD OR WISHED YOU COULD GO TO SLEEP AND NOT WAKE UP?: NO
2. HAVE YOU ACTUALLY HAD ANY THOUGHTS OF KILLING YOURSELF?: NO
6. HAVE YOU EVER DONE ANYTHING, STARTED TO DO ANYTHING, OR PREPARED TO DO ANYTHING TO END YOUR LIFE?: NO
TOTAL  NUMBER OF INTERRUPTED ATTEMPTS LIFETIME: NO

## 2022-06-17 NOTE — PROGRESS NOTES
[facility]: Integrated Behavioral Health  2022      Behavioral Health Clinician Progress Note    Patient Name: Melita Cortes           Service Type: Individual      Service Location:  Video Visit      Session Start Time: 9:05  Session End Time: 9:58      Session Length: 38 - 52      Attendees: Patient    Visit Activities (Refresh list every visit): NEW and [facility] Only     Service Modality:  Video Visit:      Provider verified identity through the following two step process.  Patient provided:  Patient  and Patient address    Telemedicine Visit: The patient's condition can be safely assessed and treated via synchronous audio and visual telemedicine encounter.      Reason for Telemedicine Visit: Services only offered telehealth    Originating Site (Patient Location): Patient's home    Distant Site (Provider Location): Provider Remote Setting- Home Office    Consent:  The patient/guardian has verbally consented to: the potential risks and benefits of telemedicine (video visit) versus in person care; bill my insurance or make self-payment for services provided; and responsibility for payment of non-covered services.     Patient would like the video invitation sent by:  My Chart    Mode of Communication:  Video Conference via [facility]    As the provider I attest to compliance with applicable laws and regulations related to telemedicine.      Diagnostic Assessment Date: IP  Treatment Plan Review Date: IP  See Flowsheets for today's PHQ-9 and JORGE-7 results  Previous PHQ-9: No flowsheet data found.  Previous JORGE-7: No flowsheet data found.    DATA  Extended Session (60+ minutes): No  Interactive Complexity: No  Crisis: No    Treatment Objective(s) Addressed in This Session:   Target Behavior(s): disease management/lifestyle changes      Depressed Mood: Increase interest, engagement, and pleasure in doing things  Decrease frequency and intensity of feeling down,  depressed, hopeless  Identify negative self-talk and behaviors: challenge core beliefs, myths, and actions    Current Stressors / Issues:  Transplant 2022 - has feeling really depressed and down lately, feels like she should be feeling more grateful and positive, though she notes struggling with a few side effects of medications and emergent medical issues. Also reports concern with her  who can be overbearing, though is very supportive. Mom  2 years ago and father has dementia. Struggling with connections with in laws.     Onset depression - 2022 estimated. Reports feeling down, depressed, hopeless 2-3x per week. Feels better in the morning, though as the day progresses she notes her mood can decline. Notes less interest in certain activities, such as fitness and exercise. Feeling down by not being able to walk on the treadmill or exercise to extent she would prefer.      Suspects a history of depression - only child from a  family, she suspects having mood difficulties as a youth. Saw a counselor for a brief period. Discussed her experiences in therapy, what could be better/different this time around.    No psychiatric history inpatient treatment. Alcohol misuse history after COVID-19 hit and her mother . Sober since 2021. Completed a 12 week CD course at [facility]. Doing well in sobriety now. Is worried about social group now that she is not drinking. Reports thinking often about her connections and is experiencing loneliness, disconnection due to sobriety and COVID-19 factors.     Reviewed options for care - agreed on 1:1 counseling every 2 weeks for now. Probable recurrent MDD - symptoms do not appear solely related to her course of medical treatment post transplant, such that a depressive disorder due to a known medical condition as ruled out. More likely her recent experiences with transplant have contributed to the return of a pre-existing MDD  diagnosis that began as a youth.     Completed CSSR- denies concerns with SI now or in the past.       Progress on Treatment Objective(s) / Homework:  New Objective established this session - PREPARATION (Decided to change - considering how); Intervened by negotiating a change plan and determining options / strategies for behavior change, identifying triggers, exploring social supports, and working towards setting a date to begin behavior change    Motivational Interviewing    MI Intervention: Expressed Empathy/Understanding, Supported Autonomy, Collaboration, Evocation, Permission to raise concern or advise, Open-ended questions, Reflections: simple and complex, Change talk (evoked) and Reframe     Change Talk Expressed by the Patient: Reasons to change Need to change    Provider Response to Change Talk: E - Evoked more info from patient about behavior change, A - Affirmed patient's thoughts, decisions, or attempts at behavior change, R - Reflected patient's change talk and S - Summarized patient's change talk statements      Care Plan review completed: Yes    Medication Review:  No current psychiatric medications prescribed    Medication Compliance:  Yes    Changes in Health Issues:   Yes: Chronic disease management, Associated Psychological Distress    Chemical Use Review:   Substance Use: Chemical use reviewed, no active concerns identified      Tobacco Use: No current tobacco use.      Assessment: Current Emotional / Mental Status (status of significant symptoms):  Risk status (Self / Other harm or suicidal ideation)  Patient denies a history of suicidal ideation, suicide attempts, self-injurious behavior, homicidal ideation, homicidal behavior and and other safety concerns  Patient denies current fears or concerns for personal safety.  Patient denies current or recent suicidal ideation or behaviors.  Patient denies current or recent homicidal ideation or behaviors.  Patient denies current or recent self  injurious behavior or ideation.  Patient denies other safety concerns.  A safety and risk management plan has not been developed at this time, however patient was encouraged to call [place] [id number] should there be a change in any of these risk factors.     [place] Suicide Severity Rating Scale (Lifetime/Recent)  [place] Suicide Severity Rating (Lifetime/Recent) 6/17/2022   1. Wish to be Dead (Lifetime) 0   2. Non-Specific Active Suicidal Thoughts (Lifetime) 0   Actual Attempt (Lifetime) 0   Has subject engaged in non-suicidal self-injurious behavior? (Lifetime) 0   Interrupted Attempts (Lifetime) 0   Aborted or Self-Interrupted Attempt (Lifetime) 0   Preparatory Acts or Behavior (Lifetime) 0   Calculated C-SSRS Risk Score (Lifetime/Recent) No Risk Indicated       Appearance:   Appropriate   Eye Contact:   Good   Psychomotor Behavior: Normal   Attitude:   Cooperative   Orientation:   All  Speech   Rate / Production: Normal    Volume:  Normal   Mood:    Depressed   Affect:    Appropriate   Thought Content:  Clear   Thought Form:  Coherent  Logical   Insight:    Good     Diagnoses:  1. Major depressive disorder, recurrent episode, mild (H)        Collateral Reports Completed:  Routed note to Care Team Member(s)    Plan: (Homework, other):  Patient was given information about behavioral services and encouraged to schedule a follow up appointment with the clinic [facility] in 2 weeks.  She was also given information about mental health symptoms and treatment options .  CD Recommendations: Maintain Sobriety.     Issa Alford Psy.D, LP   Behavioral Health Clinician   [facility]

## 2022-06-17 NOTE — LETTER
2022       RE: Melita Cortes  [address] [facility] [facility] [address]  [facility] [address]     Dear Colleague,    Thank you for referring your patient, Melita Cortes, to the [facility] MENTAL HEALTH & ADDICTION SERVICES at [facility]. Please see a copy of my visit note below.    [facility]: Integrated Behavioral Health  2022    Behavioral Health Clinician Progress Note    Patient Name: Melita Cortes           Service Type: Individual      Service Location:  Video Visit      Session Start Time: 9:05  Session End Time: 9:58      Session Length: 38 - 52      Attendees: Patient    Visit Activities (Refresh list every visit): NEW and [facility] [place] Only     Service Modality:  Video Visit:      Provider verified identity through the following two step process.  Patient provided:  Patient  and Patient address    Telemedicine Visit: The patient's condition can be safely assessed and treated via synchronous audio and visual telemedicine encounter.      Reason for Telemedicine Visit: Services only offered telehealth    Originating Site (Patient Location): Patient's home    Distant Site (Provider Location): Provider Remote Setting- Home Office    Consent:  The patient/guardian has verbally consented to: the potential risks and benefits of telemedicine (video visit) versus in person care; bill my insurance or make self-payment for services provided; and responsibility for payment of non-covered services.     Patient would like the video invitation sent by:  My Chart    Mode of Communication:  Video Conference via Amwell    As the provider I attest to compliance with applicable laws and regulations related to telemedicine.      Diagnostic Assessment Date: IP  Treatment Plan Review Date: IP  See Flowsheets for today's PHQ-9 and JORGE-7 results  Previous PHQ-9: No flowsheet data found.  Previous JORGE-7: No  flowsheet data found.    DATA  Extended Session (60+ minutes): No  Interactive Complexity: No  Crisis: No    Treatment Objective(s) Addressed in This Session:   Target Behavior(s): disease management/lifestyle changes      Depressed Mood: Increase interest, engagement, and pleasure in doing things  Decrease frequency and intensity of feeling down, depressed, hopeless  Identify negative self-talk and behaviors: challenge core beliefs, myths, and actions    Current Stressors / Issues:  Transplant 2022 - has feeling really depressed and down lately, feels like she should be feeling more grateful and positive, though she notes struggling with a few side effects of medications and emergent medical issues. Also reports concern with her  who can be overbearing, though is very supportive. Mom  2 years ago and father has dementia. Struggling with connections with in laws.     Onset depression - 2022 estimated. Reports feeling down, depressed, hopeless 2-3x per week. Feels better in the morning, though as the day progresses she notes her mood can decline. Notes less interest in certain activities, such as fitness and exercise. Feeling down by not being able to walk on the treadmill or exercise to extent she would prefer.      Suspects a history of depression - only child from a  family, she suspects having mood difficulties as a youth. Saw a counselor for a brief period. Discussed her experiences in therapy, what could be better/different this time around.    No psychiatric history inpatient treatment. Alcohol misuse history after COVID-19 hit and her mother . Sober since 2021. Completed a 12 week CD course at [facility]. Doing well in sobriety now. Is worried about social group now that she is not drinking. Reports thinking often about her connections and is experiencing loneliness, disconnection due to sobriety and COVID-19 factors.     Reviewed options for  care - agreed on 1:1 counseling every 2 weeks for now. Probable recurrent MDD - symptoms do not appear solely related to her course of medical treatment post transplant, such that a depressive disorder due to a known medical condition as ruled out. More likely her recent experiences with transplant have contributed to the return of a pre-existing MDD diagnosis that began as a youth.     Completed CSS[facility]- denies concerns with SI now or in the past.       Progress on Treatment Objective(s) / Homework:  New Objective established this session - PREPARATION (Decided to change - considering how); Intervened by negotiating a change plan and determining options / strategies for behavior change, identifying triggers, exploring social supports, and working towards setting a date to begin behavior change    Motivational Interviewing    MI Intervention: Expressed Empathy/Understanding, Supported Autonomy, Collaboration, Evocation, Permission to raise concern or advise, Open-ended questions, Reflections: simple and complex, Change talk (evoked) and Reframe     Change Talk Expressed by the Patient: Reasons to change Need to change    Provider Response to Change Talk: E - Evoked more info from patient about behavior change, A - Affirmed patient's thoughts, decisions, or attempts at behavior change, R - Reflected patient's change talk and S - Summarized patient's change talk statements      Care Plan review completed: Yes    Medication Review:  No current psychiatric medications prescribed    Medication Compliance:  Yes    Changes in Health Issues:   Yes: Chronic disease management, Associated Psychological Distress    Chemical Use Review:   Substance Use: Chemical use reviewed, no active concerns identified      Tobacco Use: No current tobacco use.      Assessment: Current Emotional / Mental Status (status of significant symptoms):  Risk status (Self / Other harm or suicidal ideation)  Patient denies a history of suicidal ideation,  suicide attempts, self-injurious behavior, homicidal ideation, homicidal behavior and and other safety concerns  Patient denies current fears or concerns for personal safety.  Patient denies current or recent suicidal ideation or behaviors.  Patient denies current or recent homicidal ideation or behaviors.  Patient denies current or recent self injurious behavior or ideation.  Patient denies other safety concerns.  A safety and risk management plan has not been developed at this time, however patient was encouraged to call [place] [phone] should there be a change in any of these risk factors.     [place] Suicide Severity Rating Scale (Lifetime/Recent)  [place] Suicide Severity Rating (Lifetime/Recent) 6/17/2022   1. Wish to be Dead (Lifetime) 0   2. Non-Specific Active Suicidal Thoughts (Lifetime) 0   Actual Attempt (Lifetime) 0   Has subject engaged in non-suicidal self-injurious behavior? (Lifetime) 0   Interrupted Attempts (Lifetime) 0   Aborted or Self-Interrupted Attempt (Lifetime) 0   Preparatory Acts or Behavior (Lifetime) 0   Calculated C-SSRS Risk Score (Lifetime/Recent) No Risk Indicated     Appearance:   Appropriate   Eye Contact:   Good   Psychomotor Behavior: Normal   Attitude:   Cooperative   Orientation:   All  Speech   Rate / Production: Normal    Volume:  Normal   Mood:    Depressed   Affect:    Appropriate   Thought Content:  Clear   Thought Form:  Coherent  Logical   Insight:    Good     Diagnoses:  1. Major depressive disorder, recurrent episode, mild (H)        Collateral Reports Completed:  Routed note to Care Team Member(s)    Plan: (Homework, other):  Patient was given information about behavioral services and encouraged to schedule a follow up appointment with the clinic [facility] in 2 weeks.  She was also given information about mental health symptoms and treatment options .  CD Recommendations: Maintain Sobriety.     Issa Alford Psy.D, LP   Behavioral Health Clinician   [facility]

## 2022-06-20 ENCOUNTER — TRANSFERRED RECORDS (OUTPATIENT)
Dept: ORTHOPEDICS | Facility: CLINIC | Age: 52
End: 2022-06-20

## 2022-06-20 ENCOUNTER — VIRTUAL VISIT (OUTPATIENT)
Dept: DERMATOLOGY | Facility: CLINIC | Age: 52
End: 2022-06-20

## 2022-06-20 DIAGNOSIS — L81.4 SOLAR LENTIGINOSIS: Primary | ICD-10-CM

## 2022-06-20 PROCEDURE — 99213 OFFICE O/P EST LOW 20 MIN: CPT | Mod: TEL | Performed by: DERMATOLOGY

## 2022-06-20 RX ORDER — HYDROQUINONE 40 MG/G
CREAM TOPICAL
Qty: 30 G | Refills: 2 | Status: SHIPPED | OUTPATIENT
Start: 2022-06-20 | End: 2024-01-24

## 2022-06-20 NOTE — NURSING NOTE
Chief Complaint   Patient presents with     Follow Up     Fidelina, is here for her face and chest and stated she had a biopsy done on her leg.     Teledermatology Nurse Call Patients:     Are you in the [facility] at the time of the encounter? yes    Today's visit will be billed to you and your insurance.    A teledermatology visit is not as thorough as an in-person visit and the quality of the photograph sent may not be of the same quality as that taken by the dermatology clinic.  Abel Grier VF

## 2022-06-20 NOTE — PATIENT INSTRUCTIONS
[facility] Dermatology Visit    Thank you for allowing us to participate in your care. Your findings, instructions and follow-up plan are as follows:       Sun screens with Iron Oxide    Supergoop 100% Mineral CC cream 50- face, chest and neck          When should I call my doctor?  If you are worsening or not improving, please, contact us or seek urgent care as noted below.     Who should I call with questions (adults)?  [facility] (adult and pediatric): [phone]  [facility] (adult): [phone]  For urgent needs outside of business hours call the [facility] at [phone] and ask for the dermatology resident on call  If this is a medical emergency and you are unable to reach an ER, Call [phone]    Who should I call with questions (pediatric)?  [facility]- Pediatric Dermatology  Dr. Joanna Harris, Dr. Vy Tanner, Dr. Joyce Rhodes, Evita Reynoso, PA  Dr. Roopa Valnetine, Dr. Nayeli Tran & Dr. Srinivas Fontanez  Non Urgent  Nurse Triage Line; [phone]- Alayna and Priya HARLEY Care Coordinators   Arely (/Complex ) [phone]    If you need a prescription refill, please contact your pharmacy. Refills are approved or denied by our physicians during normal business hours, Monday through Fridays  Per office policy, refills will not be granted if you have not been seen within the past year (or sooner depending on your child's condition).    Scheduling Information:  Pediatric Appointment Scheduling and Call Center [phone]  Radiology Scheduling- [phone]  Sedation Unit Scheduling- [phone]  [place] Scheduling- General [phone]; Pediatric Dermatology [phone]  Main  Services: [phone]  Wallisian: [phone]  Central African: [phone]  Hmong/Ruben/Irish: [phone]  Preadmission Nursing Department Fax Number: [phone] (fax all  pre-operative paperwork to this number)    For urgent matters arising during evenings, weekends, or holidays that cannot wait for normal business hours please call [phone] and ask for the dermatology resident on call to be paged.   Topical Retinoids    What are topical retinoids?  These are medicines that are related to Vitamin A. They are used on the skin.  Retin-A , Renova , Differin , and Tazorac  are brand names.  Come in creams and clear gels  Used to treat skin conditions like pimples (acne), face wrinkling, or dark-colored sunspots    How do I use these medicines?  Wash face and let dry for 15 to 30 minutes.  Use a large pea-size amount of medicine to cover the whole face. Do not put on close to the eyes and lips. Rub in gently.   Start by using every other day. If you have no irritation after a few days, start to use it daily.   You might have too much irritation with daily use. Use it less often until the irritation goes away. Then try to increase slowly to daily use.   Irritation improves over time.  You may use moisturizer if your skin becomes dry. Look for  non-comedogenic  (non-pore plugging) and oil free products.     What are the side effects?  Dryness   Peeling and flaking   Irritation of the skin   Possible increased chance of sunburns. Protect your skin from sunlight. Wear a hat and use a sunscreen with SPF 30 or higher. Your sunscreen should have both UVA and UVB (broad-spectrum) protection.    Who should I call with questions?  [facility]: [phone]   [facility]: [phone]  For urgent needs outside of business hours call the [facility] at [phone] and ask for the dermatology resident on call  Hydroquinone is a cream that lightens skin color:   -stop this medication for a few days if you have irritation, then restart when the irritation resolves. Call the clinic if the irritation does not resolve within a few  "days or if the reaction is severe   -Use this medication for up to 3 months  -This medication should be used with sunscreen, as the sun can darken the skin  -Stop tretinoin, tazorac (tazarotene), differin (adapalene), and any retinols when you are using hydroquinone  -Do not use this product in combination with resorcinol containing skin products  -Do not use this product if you are pregnant or breastfeeding.  -Using this cream when your skin is irritated or using it too long can result in darkening of the skin             Hydroquinone: Patient drug information  Access [employer] for additional drug information, tools, and databases.  Copyright 9164-1381 [employer] All rights reserved.  Contributor Disclosures    (For additional information see \"Hydroquinone: Drug information\")    You must carefully read the \"Consumer Information Use and Disclaimer\" below in order to understand and correctly use this information.    Brand Names: US  AMBI Fade [OTC] [DSC]; [place]; EpiQuin Micro [DSC]; Esoterica Daytime [OTC] [DSC]; Esoterica Facial [OTC]; Esoterica Fade Nighttime [OTC] [DSC]; Esoterica Sensitive Skin [OTC]; Hydroquinone Time Release [DSC]; Melpaque HP; NeoStrata HQ Skin Lightening [OTC] [DSC]; Remergent HQ [DSC]; TL Hydroquinone [DSC]  Brand Names: Saulo  Corrector 4 [DSC]; Drula Fade Superforte Medicate [DSC]; Erfa Hydroquinone; Lustra-AF [DSC]; Nuderm Sunfader [DSC]; Seequin 4 IDs [DSC]    What is this drug used for?   It is used to lighten the skin where there are changes in color.  What do I need to tell my doctor BEFORE I take this drug?   If you are allergic to this drug; any part of this drug; or any other drugs, foods, or substances. Tell your doctor about the allergy and what signs you had.  This drug may interact with other drugs or health problems.  Tell your doctor and pharmacist about all of your drugs (prescription or OTC, natural products, vitamins) and health problems. You must check to " make sure that it is safe for you to take this drug with all of your drugs and health problems. Do not start, stop, or change the dose of any drug without checking with your doctor.  What are some things I need to know or do while I take this drug?   Tell all of your health care providers that you take this drug. This includes your doctors, nurses, pharmacists, and dentists.   After stopping this drug, some of the color change may come back.   If you have a sulfite allergy, talk with your doctor.   This drug may cause harm if swallowed. If this drug is swallowed, call a doctor or poison control center right away.   Avoid sun, sunlamps, and tanning beds. Use sunscreen and wear clothing and eyewear that protects you from the sun.   Tell your doctor if you are pregnant, plan on getting pregnant, or are breast-feeding. You will need to talk about the benefits and risks to you and the baby.  What are some side effects that I need to call my doctor about right away?  WARNING/CAUTION: Even though it may be rare, some people may have very bad and sometimes deadly side effects when taking a drug. Tell your doctor or get medical help right away if you have any of the following signs or symptoms that may be related to a very bad side effect:   Signs of an allergic reaction, like rash; hives; itching; red, swollen, blistered, or peeling skin with or without fever; wheezing; tightness in the chest or throat; trouble breathing, swallowing, or talking; unusual hoarseness; or swelling of the mouth, face, lips, tongue, or throat.   Change in color of skin to blue-black.   Blisters or sores.      What are some other side effects of this drug?  All drugs may cause side effects. However, many people have no side effects or only have minor side effects. Call your doctor or get medical help if any of these side effects or any other side effects bother you or do not go away:   Dry skin.   Stinging.   Redness.   Skin irritation.  These are  not all of the side effects that may occur. If you have questions about side effects, call your doctor. Call your doctor for medical advice about side effects.  You may report side effects to your national health agency.  How is this drug best taken?  Use this drug as ordered by your doctor. Read all information given to you. Follow all instructions closely.   Do not take this drug by mouth. Use on your skin only. Keep out of your mouth, nose, and eyes (may burn).   Wash your hands before and after use. If your hand is the treated area, do not wash your hand after use.   Clean affected part before use. Make sure to dry well.   Put a thin layer on the affected skin and rub in gently.   Practice good skin care and avoid the sun.   Do not use coverings (bandages, dressings, make-up) unless told to do so by the doctor.   Do not use on irritated skin.      What do I do if I miss a dose?   Put on a missed dose as soon as you think about it.   If it is close to the time for your next dose, skip the missed dose and go back to your normal time.   Do not put on 2 doses at the same time or extra doses.  How do I store and/or throw out this drug?   Store at room temperature in a dry place. Do not store in a bathroom.   Keep all drugs in a safe place. Keep all drugs out of the reach of children and pets.   Throw away unused or  drugs. Do not flush down a toilet or pour down a drain unless you are told to do so. Check with your pharmacist if you have questions about the best way to throw out drugs. There may be drug take-back programs in your area.      General drug facts   If your symptoms or health problems do not get better or if they become worse, call your doctor.   Do not share your drugs with others and do not take anyone else's drugs.   Some drugs may have another patient information leaflet. If you have any questions about this drug, please talk with your doctor, nurse, pharmacist, or other health care provider.    If you think there has been an overdose, call your poison control center or get medical care right away. Be ready to tell or show what was taken, how much, and when it happened.  Last Reviewed Date  2020-09-21        PRECAUTIONS TO CONSIDER BEFORE CLEAR AND BRILLIANT  TREATMENTS  SIX TO TWELVE MONTHS BEFORE TREATMENT  Stop use of Accutane    TWO WEEKS BEFORE TREATMENT  Stop use of all Retinols   Retin-A (tretinoin), Tazorac, Differin (adapalene),  anti-aging  products  Stop use of all glycolic acid treatments (longer if deeper peel)  Stop use of all salicylic acid products  Stop excessive sun exposure 2-4 weeks prior to treatment  Stop waxing  Stop abrasive scrubs  Stop microdermabrasion treatments  Talk to your doctor if you have ever had a cold sore    OTHER PRE-TREATMENT CONSIDERATIONS  Are you pregnant or breastfeeding?   If yes, you are not a candidate at this time.     Stay Hydrated!  Drink plenty of water before, during and after your treatment.  This will help improve your healing process.      Tendency to hyperpigment?   Talk to your doctor about starting a bleaching regiment four to six weeks before your treatment series    Be prepared!  Two to three weeks pre and post treatment, it is a good idea to avoid excessive sun exposure.  Broad spectrum (UVA/UVB) sunblock must be worn to protect from sun exposure.    Patients with history of autoimmune disease (such as lupus and rheumatoid arthritis) are not candidates for treatment.    Patients with history of keloid formation, active bacterial, viral or fungal infections are not candidates for treatment.     Consumer Information Use and Disclaimer  This generalized information is a limited summary of diagnosis, treatment, and/or medication information. It is not meant to be comprehensive and should be used as a tool to help the user understand and/or assess potential diagnostic and treatment options. It does NOT include all information about conditions,  treatments, medications, side effects, or risks that may apply to a specific patient. It is not intended to be medical advice or a substitute for the medical advice, diagnosis, or treatment of a health care provider based on the health care provider's examination and assessment of a patient's specific and unique circumstances. Patients must speak with a health care provider for complete information about their health, medical questions, and treatment options, including any risks or benefits regarding use of medications. This information does not endorse any treatments or medications as safe, effective, or approved for treating a specific patient. UpToDate, Inc. and its affiliates disclaim any warranty or liability relating to this information or the use thereof. The use of this information is governed by the Terms of Use, available at https://www.Metapser.com/en/know/clinical-effectiveness-terms.    2022 UpToDate, Inc. and its affiliates and/or licensors. All rights reserved.

## 2022-06-20 NOTE — LETTER
6/20/2022         RE: Melita Cortes  [address]  [facility] [address]        Dear Colleague,    Thank you for referring your patient, Melita Cortes, to the [facility]. Please see a copy of my visit note below.    [facility] Dermatology Note  Encounter Date: Jun 20, 2022  Store-and-Forward and Telephone ([phone] ). Location of teledermatologist: [facility].  Start time: 4:48pm. End time: 5:00pm.    Dermatology Problem List:  1. PMH liver transplant 1/7/2022-alcoholic liver cirrhosis  - tacrolimus, prednisone  2. SCCis, left shin, s/p shave bx 4/27/2022, p s/p Mohs surgery  3. 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 Rhytides - tretinoin 0.025% cream  5. AK, s/p cryo  6. Urticaria and dermatographism  7. Intertrigo  -ketoconazole  8. Facial rhytides  -tretinoin    9. Cosmetics  -botox  -juvederm  ____________________________________________    Assessment & Plan:     # Hx of NMSC  -repeat skin exam in 6 months      # Solar lentigenes  -.reviewed suncreens with iron oxide  -okay to use the tretinoin 0.1% vs hydroquinonoe.   -start hydroquinone 1-2 times daily as tolerated to the face, skip eyelid area, use neck and chest. Reviewed this can be irritating so hold if too irritating.  -stop this medication for a few days if you have irritation, then restart when the irritation resolves. Call the clinic if the irritation does not resolve within a few days or if the reaction is severe   -Use this medication for up to 3 months  -This medication should be used with sunscreen, as the sun can darken the skin  -Stop tretinoin retinols when you are using hydroquinone  -Do not use this product in combination with resorcinol containing skin products  -Do not use this product if you are pregnant or breastfeeding.  -Using this cream when your skin is irritated or using it too long can result in darkening of the  skin Procedures Performed:    None    Follow-up: 3 months, consider clear and brilliant laser and then also keep Dr. Breen visit.  Staff:     Leila Felder MD    Department of Dermatology  [facility]: Phone: [phone], Fax:[phone]  [facility]: Phone: [phone], Fax: [phone]      ____________________________________________    CC: Follow Up (Fidelina, is here for her face and chest and stated she had a biopsy done on her leg. )    HPI:  Ms. Melita Cortes is a(n) 52 year old female who presents today as a return patient for  of [facility] and spots on face and chest. Feels spot son chest have gotten bigger. She is using retin-A    Patient is otherwise feeling well, without additional skin concerns.    Labs Reviewed:  N/A    Physical Exam:  Vitals: There were no vitals taken for this visit.  SKIN: Teledermatology photos were reviewed; image quality and interpretability: acceptable. Image date: last 24 hours  - There are scattered light brown macules on sun exposed areas.     - No other lesions of concern on areas examined.     Medications:  Current Outpatient Medications   Medication     aspirin (ASA) 81 MG chewable tablet     calcium carbonate (OS-SHAI) 1500 (600 Ca) MG tablet     hydrOXYzine (ATARAX) 25 MG tablet     magnesium oxide (MAG-OX) 400 MG tablet     multivitamin (CENTRUM SILVER) tablet     pantoprazole (PROTONIX) 40 MG EC tablet     tacrolimus (GENERIC EQUIVALENT) 1 MG capsule     thiamine (B-1) 100 MG tablet     tretinoin (RETIN-A) 0.025 % external cream     tretinoin (RETIN-A) 0.1 % external cream     ursodiol (ACTIGALL) 250 MG tablet     methocarbamol (ROBAXIN) 500 MG tablet     sulfamethoxazole-trimethoprim (BACTRIM) 400-80 MG tablet     Current Facility-Administered Medications   Medication     triamcinolone (KENALOG-40) injection 40 mg      Past Medical/Surgical  History:   Patient Active Problem List   Diagnosis     Abdominal bloating     Family history of pancreatic cancer     Transaminitis     Macrocytosis     Cervical high risk HPV (human papillomavirus) test positive     Anemia, unspecified type     Elevated serum creatinine     Alcoholic cirrhosis of liver with ascites (H)     Hyponatremia     Malaise and fatigue     At high risk for severe sepsis     Symptomatic anemia     Bandemia     Hyperlipidemia     Anxiety     Status post liver transplantation (H)     Immunosuppressed status (H)     Steroid-induced hyperglycemia     Hypervolemia     Severe malnutrition (H)     Elevated alkaline phosphatase level     Past Medical History:   Diagnosis Date     Alcoholic hepatitis      Asthma 3/10/2006     Cervical high risk HPV (human papillomavirus) test positive 2019, 2020    See problem list     Liver failure (H)        CC No referring provider defined for this encounter. on close of this encounter.      Again, thank you for allowing me to participate in the care of your patient.        Sincerely,        Leila Felder MD

## 2022-06-20 NOTE — PROGRESS NOTES
[facility] Dermatology Note  Encounter Date: Jun 20, 2022  Store-and-Forward and Telephone ([phone] ). Location of teledermatologist: [facility].  Start time: 4:48pm. End time: 5:00pm.    Dermatology Problem List:  1. PMH liver transplant 1/7/2022-alcoholic liver cirrhosis  - tacrolimus, prednisone  2. SCCis, left shin, s/p shave bx 4/27/2022, p s/p Mohs surgery  3. 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 Rhytides - tretinoin 0.025% cream  5. AK, s/p cryo  6. Urticaria and dermatographism  7. Intertrigo  -ketoconazole  8. Facial rhytides  -tretinoin    9. Cosmetics  -botox  -juvederm  ____________________________________________    Assessment & Plan:     # Hx of NMSC  -repeat skin exam in 6 months      # Solar lentigenes  -.reviewed suncreens with iron oxide  -okay to use the tretinoin 0.1% vs hydroquinonoe.   -start hydroquinone 1-2 times daily as tolerated to the face, skip eyelid area, use neck and chest. Reviewed this can be irritating so hold if too irritating.  -stop this medication for a few days if you have irritation, then restart when the irritation resolves. Call the clinic if the irritation does not resolve within a few days or if the reaction is severe   -Use this medication for up to 3 months  -This medication should be used with sunscreen, as the sun can darken the skin  -Stop tretinoin retinols when you are using hydroquinone  -Do not use this product in combination with resorcinol containing skin products  -Do not use this product if you are pregnant or breastfeeding.  -Using this cream when your skin is irritated or using it too long can result in darkening of the skin Procedures Performed:    None    Follow-up: 3 months, consider clear and brilliant laser and then also keep Dr. Breen visit.  Staff:     Leila Felder MD    Department of Dermatology  [facility]  [facility]: Phone: [phone], Fax:[phone]  [facility]: Phone: [phone], Fax: [phone]      ____________________________________________    CC: Follow Up (Fidelina, is here for her face and chest and stated she had a biopsy done on her leg. )    HPI:  Ms. Melita Cortes is a(n) 52 year old female who presents today as a return patient for  of [facility] and spots on face and chest. Feels spot son chest have gotten bigger. She is using retin-A    Patient is otherwise feeling well, without additional skin concerns.    Labs Reviewed:  N/A    Physical Exam:  Vitals: There were no vitals taken for this visit.  SKIN: Teledermatology photos were reviewed; image quality and interpretability: acceptable. Image date: last 24 hours  - There are scattered light brown macules on sun exposed areas.     - No other lesions of concern on areas examined.     Medications:  Current Outpatient Medications   Medication     aspirin (ASA) 81 MG chewable tablet     calcium carbonate (OS-SHAI) 1500 (600 Ca) MG tablet     hydrOXYzine (ATARAX) 25 MG tablet     magnesium oxide (MAG-OX) 400 MG tablet     multivitamin (CENTRUM SILVER) tablet     pantoprazole (PROTONIX) 40 MG EC tablet     tacrolimus (GENERIC EQUIVALENT) 1 MG capsule     thiamine (B-1) 100 MG tablet     tretinoin (RETIN-A) 0.025 % external cream     tretinoin (RETIN-A) 0.1 % external cream     ursodiol (ACTIGALL) 250 MG tablet     methocarbamol (ROBAXIN) 500 MG tablet     sulfamethoxazole-trimethoprim (BACTRIM) 400-80 MG tablet     Current Facility-Administered Medications   Medication     triamcinolone (KENALOG-40) injection 40 mg      Past Medical/Surgical History:   Patient Active Problem List   Diagnosis     Abdominal bloating     Family history of pancreatic cancer     Transaminitis     Macrocytosis     Cervical high risk HPV (human papillomavirus) test positive     Anemia, unspecified type     Elevated serum  creatinine     Alcoholic cirrhosis of liver with ascites (H)     Hyponatremia     Malaise and fatigue     At high risk for severe sepsis     Symptomatic anemia     Bandemia     Hyperlipidemia     Anxiety     Status post liver transplantation (H)     Immunosuppressed status (H)     Steroid-induced hyperglycemia     Hypervolemia     Severe malnutrition (H)     Elevated alkaline phosphatase level     Past Medical History:   Diagnosis Date     Alcoholic hepatitis      Asthma 3/10/2006     Cervical high risk HPV (human papillomavirus) test positive 2019, 2020    See problem list     Liver failure (H)        CC No referring provider defined for this encounter. on close of this encounter.

## 2022-06-23 NOTE — PROGRESS NOTES
[facility]  LIVER TRANSPLANT CLINIC  VIDEO VISIT    A/P  Ms. Cortes is a 52 Y F s/p DDLT for ETOH 1/7/22.    IS Tac. Continue monitoring labs and IS level to assess for appropriate dosing and side effects from IS including ROSSY, pancytopenia, hyperkalemia, hypomagnesemia.    Graft function Excellent    Biliary stricture s/p ERCP with stenting x2, last was 5/16/22. Stricture appeared resolved. No further ERCP unless clinical change    Arthralgia Seeing sports ortho.     Hives I asked her to send photos to Dr. Felder in dermatology.    Meds Ok to stop ASA and jayce    RTC 3 mo    ===================================================================  PCP: Navneet Johnson MD    SUBJECTIVE  Ms. Cortse is a 52 Y F s/p DDLT 1/7/22  for ETOH.    Last visit she complained of significant joint pain. She had a spine injection and this seemed to help (epidural thoracic). We tried her on a prednisone course and this helped about 50%.     Recently she had itching x a few weeks arms and legs. She just saw dermatology a couple days ago. She didn't mention this to her. She sees slightly raised red patches. It will occur mostly at night. She has tried benadryl and unsure if this has helped. She has taken for 4 nights. No new medications.    EXPLANT: alcoholic cirrhosis, -ve for HCC  IS: Prograf,   LABS: Up to date    Lab Test 06/06/22  0908   PROTTOTAL 6.0*   ALBUMIN 3.3*   BILITOTAL 0.5   ALKPHOS 73   AST 17   ALT 31     Lab Test 06/06/22  0908   WBC 3.1*   RBC 3.90   HGB 12.0   HCT 35.4   MCV 91   MCH 30.8   MCHC 33.9   RDW 13.3        REJECTION: None.  BILIARY ISSUES: biliary stricture s/p ERCP with stenting x2, last was 5/16/22. Stricture appeared resolved. No further ERCP unless clinical change  STENT:stent in place  KIDNEY FUNCTION:   Creatinine   Date Value Ref Range Status   06/06/2022 1.15 (H) 0.52 - 1.04 mg/dL Final   09/18/2020 0.56 0.52 - 1.04 mg/dL Final     BP: Good. No meds.  PREV: UTD on  screening   Colonoscopy 2022   Mammogram 2020   Pap/pelvic 2022   Dermatology will establish with one            ROS: 10 point ROS neg other than the symptoms noted above in the HPI.  Exam  Gen Alert pleasant NAD  Resp No difficulty breathing. No cough  Skin No Jaundice  Eyes No icterus  Neuro RESENDEZ  MSK no muscle wasting  Psyche Pleasant, appropriate. Well groomed.    Melita Cortes is a 52 year old female who is being evaluated via a billable video visit.    Video-Visit Details  Type of service:  Video Visit  Video Start Time: 1100  Video End Time: 1128  Originating Location (pt. Location):home  Distant Location (provider location):  [facility]PATOLOGY [facility] [place]      Platform used for Video Visit: [employer] or [employer]

## 2022-06-24 ENCOUNTER — HOSPITAL ENCOUNTER (OUTPATIENT)
Dept: PHYSICAL THERAPY | Facility: CLINIC | Age: 52
Setting detail: THERAPIES SERIES
Discharge: HOME | End: 2022-06-24
Attending: TRANSPLANT SURGERY
Payer: COMMERCIAL

## 2022-06-24 ENCOUNTER — VIRTUAL VISIT (OUTPATIENT)
Dept: GASTROENTEROLOGY | Facility: CLINIC | Age: 52
End: 2022-06-24
Attending: INTERNAL MEDICINE
Payer: COMMERCIAL

## 2022-06-24 DIAGNOSIS — Z94.4 STATUS POST LIVER TRANSPLANTATION (H): ICD-10-CM

## 2022-06-24 PROCEDURE — 97110 THERAPEUTIC EXERCISES: CPT | Mod: GP

## 2022-06-24 PROCEDURE — G0463 HOSPITAL OUTPT CLINIC VISIT: HCPCS | Mod: PN,RTG | Performed by: INTERNAL MEDICINE

## 2022-06-24 PROCEDURE — 99214 OFFICE O/P EST MOD 30 MIN: CPT | Mod: GT | Performed by: INTERNAL MEDICINE

## 2022-06-24 PROCEDURE — 97140 MANUAL THERAPY 1/> REGIONS: CPT | Mod: GP

## 2022-06-24 ASSESSMENT — PAIN SCALES - GENERAL: PAINLEVEL: MODERATE PAIN (4)

## 2022-06-24 NOTE — PROGRESS NOTES
"Fidelina is a 52 year old who is being evaluated via a billable video visit.      How would you like to obtain your AVS? MyChart  If the video visit is dropped, the invitation should be resent by: Text to cell phone: [phone]  Will anyone else be joining your video visit? No  {If patient encounters technical issues they should call [phone] :[id number]}      Video-Visit Details    Video Start Time: {video visit start/end time for provider to select:951006}    Type of service:  Video Visit    Video End Time:{video visit start/end time for provider to select:669665}    Originating Location (pt. Location): {video visit patient location:633146::\"Home\"}    Distant Location (provider location):  [facility]PATOLOG[facility] [place]     Platform used for Video Visit: [employer]    "

## 2022-06-24 NOTE — LETTER
6/24/2022         RE: Melita Cortes  [address]  [place] [facility] [address]        Dear Colleague,    Thank you for referring your patient, Melita Cortes, to the [facility] HEPATOLOGY CLINIC [place]. Please see a copy of my visit note below.    [facility]  LIVER TRANSPLANT CLINIC  VIDEO VISIT    A/P  Ms. Cortes is a 52 Y F s/p DDLT for ETOH 1/7/22.    IS Tac. Continue monitoring labs and IS level to assess for appropriate dosing and side effects from IS including ROSSY, pancytopenia, hyperkalemia, hypomagnesemia.    Graft function Excellent    Biliary stricture s/p ERCP with stenting x2, last was 5/16/22. Stricture appeared resolved. No further ERCP unless clinical change    Arthralgia Seeing sports ortho.     Hives I asked her to send photos to Dr. Felder in dermatology.    Meds Ok to stop ASA and jayce    RTC 3 mo    ===================================================================  PCP: Navneet Johnson MD    SUBJECTIVE  Ms. Cortes is a 52 Y F s/p DDLT 1/7/22  for ETOH.    Last visit she complained of significant joint pain. She had a spine injection and this seemed to help (epidural thoracic). We tried her on a prednisone course and this helped about 50%.     Recently she had itching x a few weeks arms and legs. She just saw dermatology a couple days ago. She didn't mention this to her. She sees slightly raised red patches. It will occur mostly at night. She has tried benadryl and unsure if this has helped. She has taken for 4 nights. No new medications.    EXPLANT: alcoholic cirrhosis, -ve for HCC  IS: Prograf,   LABS: Up to date    Lab Test 06/06/22  0908   PROTTOTAL 6.0*   ALBUMIN 3.3*   BILITOTAL 0.5   ALKPHOS 73   AST 17   ALT 31     Lab Test 06/06/22  0908   WBC 3.1*   RBC 3.90   HGB 12.0   HCT 35.4   MCV 91   MCH 30.8   MCHC 33.9   RDW 13.3        REJECTION: None.  BILIARY ISSUES: biliary stricture s/p ERCP with stenting x2, last was 5/16/22. Stricture  appeared resolved. No further ERCP unless clinical change  STENT:stent in place  KIDNEY FUNCTION:   Creatinine   Date Value Ref Range Status   06/06/2022 1.15 (H) 0.52 - 1.04 mg/dL Final   09/18/2020 0.56 0.52 - 1.04 mg/dL Final     BP: Good. No meds.  PREV: UTD on screening   Colonoscopy 2022   Mammogram 2020   Pap/pelvic 2022   Dermatology will establish with one            ROS: 10 point ROS neg other than the symptoms noted above in the HPI.  Exam  Gen Alert pleasant NAD  Resp No difficulty breathing. No cough  Skin No Jaundice  Eyes No icterus  Neuro RESNEDEZ  MSK no muscle wasting  Psyche Pleasant, appropriate. Well groomed.    Melita Cortes is a 52 year old female who is being evaluated via a billable video visit.    Video-Visit Details  Type of service:  Video Visit  Video Start Time: 1100  Video End Time: 1128  Originating Location (pt. Location):home  Distant Location (provider location):  [facility]PATOLOG[facility] [place]      Platform used for Video Visit: [employer] or [employer]      Again, thank you for allowing me to participate in the care of your patient.        Sincerely,        Zita Steele MD

## 2022-06-26 ENCOUNTER — MYC MEDICAL ADVICE (OUTPATIENT)
Dept: DERMATOLOGY | Facility: CLINIC | Age: 52
End: 2022-06-26

## 2022-06-27 DIAGNOSIS — Z94.4 STATUS POST LIVER TRANSPLANTATION (H): ICD-10-CM

## 2022-06-27 DIAGNOSIS — K70.9 ALCOHOLIC LIVER DISEASE (H): ICD-10-CM

## 2022-06-27 DIAGNOSIS — Z94.4 LIVER REPLACED BY TRANSPLANT (H): Primary | ICD-10-CM

## 2022-06-27 RX ORDER — PANTOPRAZOLE SODIUM 40 MG/1
40 TABLET, DELAYED RELEASE ORAL DAILY
Qty: 90 TABLET | Refills: 3 | Status: SHIPPED | OUTPATIENT
Start: 2022-06-27

## 2022-06-27 RX ORDER — MAGNESIUM OXIDE 400 MG/1
400 TABLET ORAL 2 TIMES DAILY
Qty: 90 TABLET | Refills: 3 | Status: SHIPPED | OUTPATIENT
Start: 2022-06-27

## 2022-06-30 ENCOUNTER — VIRTUAL VISIT (OUTPATIENT)
Dept: BEHAVIORAL HEALTH | Facility: CLINIC | Age: 52
End: 2022-06-30
Payer: COMMERCIAL

## 2022-06-30 DIAGNOSIS — F33.0 MAJOR DEPRESSIVE DISORDER, RECURRENT EPISODE, MILD (H): Primary | ICD-10-CM

## 2022-06-30 PROCEDURE — 90834 PSYTX W PT 45 MINUTES: CPT | Mod: GT | Performed by: PSYCHOLOGIST

## 2022-06-30 NOTE — PROGRESS NOTES
[facility]: Integrated Behavioral Health  2022      Behavioral Health Clinician Progress Note    Patient Name: Melita Cortes           Service Type: Individual      Service Location:  Video Visit      Session Start Time: 9:05  Session End Time: 9:56      Session Length: 38 - 52      Attendees: Patient    Visit Activities (Refresh list every visit): [facility] Only     Service Modality:  Video Visit:      Provider verified identity through the following two step process.  Patient provided:  Patient  and Patient address    Telemedicine Visit: The patient's condition can be safely assessed and treated via synchronous audio and visual telemedicine encounter.      Reason for Telemedicine Visit: Services only offered telehealth    Originating Site (Patient Location): Patient's home    Distant Site (Provider Location): Provider Remote Setting- Home Office    Consent:  The patient/guardian has verbally consented to: the potential risks and benefits of telemedicine (video visit) versus in person care; bill my insurance or make self-payment for services provided; and responsibility for payment of non-covered services.     Patient would like the video invitation sent by:  My Chart    Mode of Communication:  Video Conference via [facility]    As the provider I attest to compliance with applicable laws and regulations related to telemedicine.      Diagnostic Assessment Date: IP  Treatment Plan Review Date: IP  See Flowsheets for today's PHQ-9 and JORGE-7 results  Previous PHQ-9: No flowsheet data found.  Previous JORGE-7: No flowsheet data found.    DATA  Extended Session (60+ minutes): No  Interactive Complexity: No  Crisis: No    Treatment Objective(s) Addressed in This Session:   Target Behavior(s): disease management/lifestyle changes      Depressed Mood: Increase interest, engagement, and pleasure in doing things  Decrease frequency and intensity of feeling down,  depressed, hopeless  Identify negative self-talk and behaviors: challenge core beliefs, myths, and actions    Current Stressors / Issues:  [facility] met with Fidelina today for a follow-up to continue supporting her treatment of depressed mood secondary to chronic disease management and participation in SOT. Reports intermitant periods of depressed mood and low energy over the last few weeks. Presents as tearful on occasion when discussing themes of loneliness and feeling disconnected from others. Provided supportive therapy and we discussed her experiences with post transplant, fear she has for the future about her health. Discussed common experiences with depression following transplant, as this can drastically change individual's social networks, like being away from those who are drinking. Notes feeling as though she relies more on her  for support, which she notes disliking and feels this is unfair though understandable right now.       Progress on Treatment Objective(s) / Homework:  Satisfactory progress - ACTION (Actively working towards change); Intervened by reinforcing change plan / affirming steps taken    Motivational Interviewing    MI Intervention: Expressed Empathy/Understanding, Supported Autonomy, Collaboration, Evocation, Permission to raise concern or advise, Open-ended questions, Reflections: simple and complex, Change talk (evoked) and Reframe     Change Talk Expressed by the Patient: Reasons to change Need to change    Provider Response to Change Talk: E - Evoked more info from patient about behavior change, A - Affirmed patient's thoughts, decisions, or attempts at behavior change, R - Reflected patient's change talk and S - Summarized patient's change talk statements      Care Plan review completed: Yes    Medication Review:  No current psychiatric medications prescribed    Medication Compliance:  Yes    Changes in Health Issues:   Yes: Chronic disease management, Associated Psychological  Distress    Chemical Use Review:   Substance Use: Chemical use reviewed, no active concerns identified      Tobacco Use: No current tobacco use.      Assessment: Current Emotional / Mental Status (status of significant symptoms):  Risk status (Self / Other harm or suicidal ideation)  Patient denies a history of suicidal ideation, suicide attempts, self-injurious behavior, homicidal ideation, homicidal behavior and and other safety concerns  Patient denies current fears or concerns for personal safety.  Patient denies current or recent suicidal ideation or behaviors.  Patient denies current or recent homicidal ideation or behaviors.  Patient denies current or recent self injurious behavior or ideation.  Patient denies other safety concerns.  A safety and risk management plan has not been developed at this time, however patient was encouraged to call [place] [phone] should there be a change in any of these risk factors.     [place] Suicide Severity Rating Scale (Lifetime/Recent)  [place] Suicide Severity Rating (Lifetime/Recent) 6/17/2022   1. Wish to be Dead (Lifetime) 0   2. Non-Specific Active Suicidal Thoughts (Lifetime) 0   Actual Attempt (Lifetime) 0   Has subject engaged in non-suicidal self-injurious behavior? (Lifetime) 0   Interrupted Attempts (Lifetime) 0   Aborted or Self-Interrupted Attempt (Lifetime) 0   Preparatory Acts or Behavior (Lifetime) 0   Calculated C-SSRS Risk Score (Lifetime/Recent) No Risk Indicated       Appearance:   Appropriate   Eye Contact:   Good   Psychomotor Behavior: Normal   Attitude:   Cooperative   Orientation:   All  Speech   Rate / Production: Normal    Volume:  Normal   Mood:    Depressed   Affect:    Appropriate ; tearful on occasion  Thought Content:  Clear   Thought Form:  Coherent  Logical   Insight:    Good     Diagnoses:  1. Major depressive disorder, recurrent episode, mild (H)        Collateral Reports Completed:  Routed note to Care Team Member(s)    Plan:  (Homework, other):  Patient was given information about behavioral services and encouraged to schedule a follow up appointment with the clinic [facility] in 2 weeks.  She was also given information about mental health symptoms and treatment options  and Cognitive Behavioral Therapy skills to practice when experiencing depression.  CD Recommendations: Maintain Sobriety.     Issa Alford Psy.D, LP   Behavioral Health Clinician   [facility]     June 30, 2022

## 2022-06-30 NOTE — LETTER
2022       RE: Melita Cortes  [address]  [place] [address]     Dear Colleague,    Thank you for referring your patient, Melita Cortes, to the [facility] MENTAL HEALTH & ADDI[facility] at [facility]. Please see a copy of my visit note below.    [facility] and [facility]: Integrated Behavioral Health  2022      Behavioral Health Clinician Progress Note    Patient Name: Melita Cortes           Service Type: Individual      Service Location:  Video Visit      Session Start Time: 9:05  Session End Time: 9:56      Session Length: 38 - 52      Attendees: Patient    Visit Activities (Refresh list every visit): [facility] Only     Service Modality:  Video Visit:      Provider verified identity through the following two step process.  Patient provided:  Patient  and Patient address    Telemedicine Visit: The patient's condition can be safely assessed and treated via synchronous audio and visual telemedicine encounter.      Reason for Telemedicine Visit: Services only offered telehealth    Originating Site (Patient Location): Patient's home    Distant Site (Provider Location): Provider Remote Setting- Home Office    Consent:  The patient/guardian has verbally consented to: the potential risks and benefits of telemedicine (video visit) versus in person care; bill my insurance or make self-payment for services provided; and responsibility for payment of non-covered services.     Patient would like the video invitation sent by:  My Chart    Mode of Communication:  Video Conference via Amwell    As the provider I attest to compliance with applicable laws and regulations related to telemedicine.      Diagnostic Assessment Date: IP  Treatment Plan Review Date: IP  See Flowsheets for today's PHQ-9 and JORGE-7 results  Previous PHQ-9: No flowsheet data found.  Previous JORGE-7: No flowsheet  data found.    DATA  Extended Session (60+ minutes): No  Interactive Complexity: No  Crisis: No    Treatment Objective(s) Addressed in This Session:   Target Behavior(s): disease management/lifestyle changes      Depressed Mood: Increase interest, engagement, and pleasure in doing things  Decrease frequency and intensity of feeling down, depressed, hopeless  Identify negative self-talk and behaviors: challenge core beliefs, myths, and actions    Current Stressors / Issues:  [facility] met with Fidelina today for a follow-up to continue supporting her treatment of depressed mood secondary to chronic disease management and participation in SOT. Reports intermitant periods of depressed mood and low energy over the last few weeks. Presents as tearful on occasion when discussing themes of loneliness and feeling disconnected from others. Provided supportive therapy and we discussed her experiences with post transplant, fear she has for the future about her health. Discussed common experiences with depression following transplant, as this can drastically change individual's social networks, like being away from those who are drinking. Notes feeling as though she relies more on her  for support, which she notes disliking and feels this is unfair though understandable right now.       Progress on Treatment Objective(s) / Homework:  Satisfactory progress - ACTION (Actively working towards change); Intervened by reinforcing change plan / affirming steps taken    Motivational Interviewing    MI Intervention: Expressed Empathy/Understanding, Supported Autonomy, Collaboration, Evocation, Permission to raise concern or advise, Open-ended questions, Reflections: simple and complex, Change talk (evoked) and Reframe     Change Talk Expressed by the Patient: Reasons to change Need to change    Provider Response to Change Talk: E - Evoked more info from patient about behavior change, A - Affirmed patient's thoughts, decisions, or attempts  at behavior change, R - Reflected patient's change talk and S - Summarized patient's change talk statements      Care Plan review completed: Yes    Medication Review:  No current psychiatric medications prescribed    Medication Compliance:  Yes    Changes in Health Issues:   Yes: Chronic disease management, Associated Psychological Distress    Chemical Use Review:   Substance Use: Chemical use reviewed, no active concerns identified      Tobacco Use: No current tobacco use.      Assessment: Current Emotional / Mental Status (status of significant symptoms):  Risk status (Self / Other harm or suicidal ideation)  Patient denies a history of suicidal ideation, suicide attempts, self-injurious behavior, homicidal ideation, homicidal behavior and and other safety concerns  Patient denies current fears or concerns for personal safety.  Patient denies current or recent suicidal ideation or behaviors.  Patient denies current or recent homicidal ideation or behaviors.  Patient denies current or recent self injurious behavior or ideation.  Patient denies other safety concerns.  A safety and risk management plan has not been developed at this time, however patient was encouraged to call [place] [phone] should there be a change in any of these risk factors.     [place] Suicide Severity Rating Scale (Lifetime/Recent)  [place] Suicide Severity Rating (Lifetime/Recent) 6/17/2022   1. Wish to be Dead (Lifetime) 0   2. Non-Specific Active Suicidal Thoughts (Lifetime) 0   Actual Attempt (Lifetime) 0   Has subject engaged in non-suicidal self-injurious behavior? (Lifetime) 0   Interrupted Attempts (Lifetime) 0   Aborted or Self-Interrupted Attempt (Lifetime) 0   Preparatory Acts or Behavior (Lifetime) 0   Calculated C-SSRS Risk Score (Lifetime/Recent) No Risk Indicated       Appearance:   Appropriate   Eye Contact:   Good   Psychomotor Behavior: Normal   Attitude:   Cooperative   Orientation:   All  Speech   Rate /  Production: Normal    Volume:  Normal   Mood:    Depressed   Affect:    Appropriate ; tearful on occasion  Thought Content:  Clear   Thought Form:  Coherent  Logical   Insight:    Good     Diagnoses:  1. Major depressive disorder, recurrent episode, mild (H)        Collateral Reports Completed:  Routed note to Care Team Member(s)    Plan: (Homework, other):  Patient was given information about behavioral services and encouraged to schedule a follow up appointment with the clinic [facility] in 2 weeks.  She was also given information about mental health symptoms and treatment options  and Cognitive Behavioral Therapy skills to practice when experiencing depression.  CD Recommendations: Maintain Sobriety.         June 30, 2022          Again, thank you for allowing me to participate in the care of your patient.      Sincerely,    Issa Alford

## 2022-07-01 ENCOUNTER — VIRTUAL VISIT (OUTPATIENT)
Dept: DERMATOLOGY | Facility: CLINIC | Age: 52
End: 2022-07-01
Payer: COMMERCIAL

## 2022-07-01 ENCOUNTER — HOSPITAL ENCOUNTER (OUTPATIENT)
Dept: PHYSICAL THERAPY | Facility: CLINIC | Age: 52
Setting detail: THERAPIES SERIES
Discharge: HOME | End: 2022-07-01
Attending: TRANSPLANT SURGERY
Payer: COMMERCIAL

## 2022-07-01 DIAGNOSIS — L50.9 URTICARIA: Primary | ICD-10-CM

## 2022-07-01 DIAGNOSIS — M25.50 MULTIPLE JOINT PAIN: ICD-10-CM

## 2022-07-01 PROCEDURE — 99214 OFFICE O/P EST MOD 30 MIN: CPT | Mod: TEL | Performed by: DERMATOLOGY

## 2022-07-01 PROCEDURE — 97140 MANUAL THERAPY 1/> REGIONS: CPT | Mod: GP

## 2022-07-01 PROCEDURE — 97110 THERAPEUTIC EXERCISES: CPT | Mod: GP

## 2022-07-01 RX ORDER — FEXOFENADINE HCL 60 MG/1
60 TABLET, FILM COATED ORAL DAILY
Qty: 60 TABLET | Refills: 2 | Status: SHIPPED | OUTPATIENT
Start: 2022-07-01 | End: 2022-08-25

## 2022-07-01 RX ORDER — PANTOPRAZOLE SODIUM 40 MG/1
TABLET, DELAYED RELEASE ORAL EVERY 24 HOURS
COMMUNITY
Start: 2021-10-07 | End: 2023-09-18

## 2022-07-01 NOTE — LETTER
7/1/2022         RE: Melita Cortes  [address]  [place] [address]        Dear Colleague,    Thank you for referring your patient, Melita Cortes, to the [facility]. Please see a copy of my visit note below.    [facility] Dermatology Note  Encounter Date: Jul 1, 2022  [employer] and Telephone ([phone]). Location of teledermatologist: [facility].  Start time: 5:12pm. End time: 5:40pm.    Dermatology Problem List:  1. PMH liver transplant 1/7/2022-alcoholic liver cirrhosis  - tacrolimus, prednisone  2. SCCis, left shin, s/p shave bx 4/27/2022, p s/p Mohs surgery  3. 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 Rhytides - tretinoin 0.025% cream  5. AK, s/p cryo  6. Urticaria and dermatographism  7. Intertrigo  -ketoconazole  8. Facial rhytides  -tretinoin     9. Cosmetics  -botox  -juvederm    ____________________________________________    Assessment & Plan:     # Hx of NMSC  -repeat skin exam in 6 months       # Solar lentigenes on hydroquinone    #rash consistent with urticaria X1 month, transplant patient- does not recall illness prior to onset about 1 month ago. Drugs reviewed, protonix most likely. Last EKG normal  -schedule atarax at night, add allegra 60m in the am  At pharmacy  -has been on tacrolimus 6 months, unlikely trigger,  Has been used to treat hives. Found 1 report (Davidata Y, Donna M, Mesfin S. Treatment of rheumatoid arthritis with tacrolimus: tacrolimus-induced urticaria. J Clin Rheumatol. 2009 [place];15(4):213. PubMed Id: [id number])  -stop protonix as has many more reports of causing  -ask primary care if okay to add prednisone taper to stop hives  -consider prednisone if not improved  -see urgent care for mouth lip or throat swelling.   -prednisone is declined by the patient      Procedures Performed:    None    Follow-up: send message on Tuesday with update. Bring in if  not better. Consider prednisone or zyrtec. Creatinine clearance approximately 53    Staff:     Scribe Disclosure:   I, Noam Phillips, am serving as a scribe to document services personally performed by this physician, Dr. Leila Felder, based on data collection and the provider's statements to me.     Provider Disclosure:   The documentation recorded by the scribe accurately reflects the services I personally performed and the decisions made by me.    Leila Felder MD    Department of Dermatology  [facility]: Phone: [phone], Fax:[phone]  [facility]: Phone: [phone], Fax: [phone]      ____________________________________________    CC: Follow Up (Patient is not seeing improvement. She is thinking she might be dealing with an internal issue versus superficial skin issue. Topical medication not more effective for pt than OTC)    HPI:  Ms. Melita Cortes is a(n) 52 year old female who presents today as a return patient for follow up.    Last seen virtually 6/20/22 for solar lentigines. Started on hydroquinone at that time.    Today, she reports rash on torso neck and extremities. Hydrocortisone not very helpful. Has had for 1-1.5 months. Spots stay <24 hours. Benadryl helps.     She will schedule these    Patient is otherwise feeling well, without additional skin concerns.    Labs Reviewed:  Component Ref Range & Units 3 wk ago   (6/6/22) 1 mo ago   (5/23/22) 1 mo ago   (5/16/22) 2 mo ago   (5/2/22) 2 mo ago   (4/25/22) 2 mo ago   (4/18/22) 2 mo ago   (4/11/22)     Sodium 133 - 144 mmol/L 140  139  144  138  139  138  137     Potassium 3.4 - 5.3 mmol/L 4.5  4.4  4.9  4.3  4.2  4.3  4.8     Chloride 94 - 109 mmol/L 108  108  110 High   106  108  106  106     Carbon Dioxide (CO2) 20 - 32 mmol/L 28  26  28  28  23  25  27     Anion Gap 3 - 14 mmol/L 4  5  6  4  8  7  4      Urea Nitrogen 7 - 30 mg/dL 15  14  22  15  12  20  22     Creatinine 0.52 - 1.04 mg/dL 1.15 High   1.21 High   1.31 High   1.10 High   1.07 High   1.08 High   1.21 High      Calcium 8.5 - 10.1 mg/dL 9.5  9.2  9.2  9.6  9.0  9.6  9.7     Glucose 70 - 99 mg/dL 109 High   121 High   116 High   110 High   114 High   106 High   138 High      GFR Estimate >60 mL/min/1.73m2 58 Low   54 Low  CM  49              Physical Exam:  Vitals: There were no vitals taken for this visit.  SKIN: Teledermatology photos were reviewed; image quality and interpretability:  Limited Image date: several over last 2 days  - erytematous patches, possibly thin plaques  - No other lesions of concern on areas examined.     Medications:  Current Outpatient Medications   Medication     hydrocortisone 2.5 % ointment     hydroquinone (KARIN) 4 % external cream     hydrOXYzine (ATARAX) 25 MG tablet     pantoprazole (PROTONIX) 40 MG EC tablet     tacrolimus (GENERIC EQUIVALENT) 1 MG capsule     calcium carbonate (OS-SHAI) 1500 (600 Ca) MG tablet     magnesium oxide (MAG-OX) 400 MG tablet     multivitamin (CENTRUM SILVER) tablet     pantoprazole (PROTONIX) 40 MG EC tablet     thiamine (B-1) 100 MG tablet     tretinoin (RETIN-A) 0.025 % external cream     tretinoin (RETIN-A) 0.1 % external cream     Current Facility-Administered Medications   Medication     triamcinolone (KENALOG-40) injection 40 mg      Past Medical/Surgical History:   Patient Active Problem List   Diagnosis     Abdominal bloating     Family history of pancreatic cancer     Transaminitis     Macrocytosis     Cervical high risk HPV (human papillomavirus) test positive     Anemia, unspecified type     Elevated serum creatinine     Alcoholic cirrhosis of liver with ascites (H)     Hyponatremia     Malaise and fatigue     At high risk for severe sepsis     Symptomatic anemia     Bandemia     Hyperlipidemia     Anxiety     Status post liver transplantation (H)     Immunosuppressed status (H)      Steroid-induced hyperglycemia     Hypervolemia     Severe malnutrition (H)     Elevated alkaline phosphatase level     Past Medical History:   Diagnosis Date     Alcoholic hepatitis      Asthma 3/10/2006     Cervical high risk HPV (human papillomavirus) test positive 2019, 2020    See problem list     Liver failure (H)        CC No referring provider defined for this encounter. on close of this encounter.      Again, thank you for allowing me to participate in the care of your patient.        Sincerely,        Leila Felder MD

## 2022-07-01 NOTE — PATIENT INSTRUCTIONS
[facility] Dermatology Visit    Thank you for allowing us to participate in your care. Your findings, instructions and follow-up plan are as follows:   schedule atarax at night, add allegra (fexofenadine) 60m in the am  At pharmacy    Go to er for throat or lip swelling    Hold pantoprazole      When should I call my doctor?  If you are worsening or not improving, please, contact us or seek urgent care as noted below.     Who should I call with questions (adults)?  [facility] (adult and pediatric): [phone]  [facility] (adult): [phone]  For urgent needs outside of business hours call the [facility] at [phone] and ask for the dermatology resident on call  If this is a medical emergency and you are unable to reach an ER, Call [phone]    Who should I call with questions (pediatric)?  [facility]- Pediatric Dermatology  Dr. Joanna Harris, Dr. Vy Tanner, Dr. Joyce Rhodes, Evita Reynoso, TOSHIA Valentine, Dr. Nayeli Tran & Dr. Srinivas Fontanez  Non Urgent  Nurse Triage Line; [phone]- Alayna and Priya RN Care Coordinators   Arely (/Complex ) [phone]    If you need a prescription refill, please contact your pharmacy. Refills are approved or denied by our physicians during normal business hours, Monday through Fridays  Per office policy, refills will not be granted if you have not been seen within the past year (or sooner depending on your child's condition).    Scheduling Information:  Pediatric Appointment Scheduling and Call Center [phone]  Radiology Scheduling- [phone]  Sedation Unit Scheduling- [phone]  [place] Scheduling- General [phone]; Pediatric Dermatology [phone]  Main  Services: [phone]  [place]: [phone]  Bruneian: [phone]  Hmong/Libyan/Malaysian: [phone]  Preadmission Nursing  Department Fax Number: [phone] (fax all pre-operative paperwork to this number)    For urgent matters arising during evenings, weekends, or holidays that cannot wait for normal business hours please call [phone] and ask for the dermatology resident on call to be paged.

## 2022-07-01 NOTE — NURSING NOTE
Chief Complaint   Patient presents with     Follow Up     Patient is not seeing improvement. She is thinking she might be dealing with an internal issue versus superficial skin issue. Topical medication not more effective for pt than OTC     Teledermatology Nurse Call Patients:     Are you in the [facility] at the time of the encounter? yes    Today's visit will be billed to you and your insurance.    A teledermatology visit is not as thorough as an in-person visit and the quality of the photograph sent may not be of the same quality as that taken by the dermatology clinic.    Patient medications and allergies verified with patient.    She was holding a plastic shopping bag on her arm today and developed a rash in that area. She notices break outs are sporadic and uses topical medications. Topical creams are helpful in resolving rash but new ones always appear.     JORGE SalazarF

## 2022-07-02 ENCOUNTER — MYC MEDICAL ADVICE (OUTPATIENT)
Dept: DERMATOLOGY | Facility: CLINIC | Age: 52
End: 2022-07-02

## 2022-07-02 DIAGNOSIS — L29.9 ITCHING: ICD-10-CM

## 2022-07-05 ENCOUNTER — LAB (OUTPATIENT)
Dept: LAB | Facility: CLINIC | Age: 52
End: 2022-07-05
Payer: COMMERCIAL

## 2022-07-05 DIAGNOSIS — Z94.4 STATUS POST LIVER TRANSPLANTATION (H): ICD-10-CM

## 2022-07-05 DIAGNOSIS — K70.31 ALCOHOLIC CIRRHOSIS OF LIVER WITH ASCITES (H): ICD-10-CM

## 2022-07-05 DIAGNOSIS — L29.9 ITCHING: ICD-10-CM

## 2022-07-05 DIAGNOSIS — M25.50 MULTIPLE JOINT PAIN: ICD-10-CM

## 2022-07-05 DIAGNOSIS — Z94.4 LIVER REPLACED BY TRANSPLANT (H): ICD-10-CM

## 2022-07-05 LAB
ALBUMIN SERPL-MCNC: 3.5 G/DL (ref 3.4–5)
ALP SERPL-CCNC: 88 U/L (ref 40–150)
ALT SERPL W P-5'-P-CCNC: 28 U/L (ref 0–50)
ANION GAP SERPL CALCULATED.3IONS-SCNC: 4 MMOL/L (ref 3–14)
AST SERPL W P-5'-P-CCNC: 14 U/L (ref 0–45)
BILIRUB DIRECT SERPL-MCNC: 0.1 MG/DL (ref 0–0.2)
BILIRUB SERPL-MCNC: 0.5 MG/DL (ref 0.2–1.3)
BUN SERPL-MCNC: 17 MG/DL (ref 7–30)
CALCIUM SERPL-MCNC: 9.4 MG/DL (ref 8.5–10.1)
CHLORIDE BLD-SCNC: 111 MMOL/L (ref 94–109)
CO2 SERPL-SCNC: 28 MMOL/L (ref 20–32)
CREAT SERPL-MCNC: 0.94 MG/DL (ref 0.52–1.04)
ERYTHROCYTE [DISTWIDTH] IN BLOOD BY AUTOMATED COUNT: 13 % (ref 10–15)
ERYTHROCYTE [SEDIMENTATION RATE] IN BLOOD BY WESTERGREN METHOD: 7 MM/HR (ref 0–30)
GFR SERPL CREATININE-BSD FRML MDRD: 73 ML/MIN/1.73M2
GLUCOSE BLD-MCNC: 108 MG/DL (ref 70–99)
HCT VFR BLD AUTO: 37 % (ref 35–47)
HGB BLD-MCNC: 13 G/DL (ref 11.7–15.7)
MAGNESIUM SERPL-MCNC: 1.9 MG/DL (ref 1.6–2.3)
MCH RBC QN AUTO: 31.2 PG (ref 26.5–33)
MCHC RBC AUTO-ENTMCNC: 35.1 G/DL (ref 31.5–36.5)
MCV RBC AUTO: 89 FL (ref 78–100)
PHOSPHATE SERPL-MCNC: 4.3 MG/DL (ref 2.5–4.5)
PLATELET # BLD AUTO: 203 10E3/UL (ref 150–450)
POTASSIUM BLD-SCNC: 4.4 MMOL/L (ref 3.4–5.3)
PROT SERPL-MCNC: 6.1 G/DL (ref 6.8–8.8)
RBC # BLD AUTO: 4.17 10E6/UL (ref 3.8–5.2)
SODIUM SERPL-SCNC: 143 MMOL/L (ref 133–144)
TACROLIMUS BLD-MCNC: 5.4 UG/L (ref 5–15)
TME LAST DOSE: NORMAL H
TME LAST DOSE: NORMAL H
WBC # BLD AUTO: 4.5 10E3/UL (ref 4–11)

## 2022-07-05 PROCEDURE — 80053 COMPREHEN METABOLIC PANEL: CPT

## 2022-07-05 PROCEDURE — 80197 ASSAY OF TACROLIMUS: CPT

## 2022-07-05 PROCEDURE — 86431 RHEUMATOID FACTOR QUANT: CPT

## 2022-07-05 PROCEDURE — 82248 BILIRUBIN DIRECT: CPT

## 2022-07-05 PROCEDURE — 85027 COMPLETE CBC AUTOMATED: CPT

## 2022-07-05 PROCEDURE — 84100 ASSAY OF PHOSPHORUS: CPT

## 2022-07-05 PROCEDURE — 85652 RBC SED RATE AUTOMATED: CPT

## 2022-07-05 PROCEDURE — 86140 C-REACTIVE PROTEIN: CPT

## 2022-07-05 PROCEDURE — 86200 CCP ANTIBODY: CPT

## 2022-07-05 PROCEDURE — 83735 ASSAY OF MAGNESIUM: CPT

## 2022-07-05 PROCEDURE — 36415 COLL VENOUS BLD VENIPUNCTURE: CPT

## 2022-07-05 RX ORDER — HYDROXYZINE HYDROCHLORIDE 25 MG/1
25 TABLET, FILM COATED ORAL EVERY 6 HOURS PRN
Qty: 15 TABLET | Refills: 0 | Status: CANCELLED | OUTPATIENT
Start: 2022-07-05

## 2022-07-05 RX ORDER — HYDROXYZINE HYDROCHLORIDE 25 MG/1
25 TABLET, FILM COATED ORAL AT BEDTIME
Qty: 60 TABLET | Refills: 0 | Status: SHIPPED | OUTPATIENT
Start: 2022-07-05 | End: 2022-07-28

## 2022-07-05 NOTE — TELEPHONE ENCOUNTER
I reviewed the last visit with Dr. Felder on 7/1/22.  It was discussed adding atarax at night.  No specifics on dosing were documented so RN unable to send in prescription.  Sent high priority to MD to send in Rx.    Anna Solano RN

## 2022-07-05 NOTE — TELEPHONE ENCOUNTER
I sent it    Please ask her to call next time if any issues at pharmacy with refills. There is a resident on call that can help on the spot. ITs in the back of the after visit summary    Please apologize for the miscommunication.

## 2022-07-06 LAB — CRP SERPL-MCNC: <2.9 MG/L (ref 0–8)

## 2022-07-07 LAB
CCP AB SER IA-ACNC: 0.8 U/ML
RHEUMATOID FACT SER NEPH-ACNC: <6 IU/ML

## 2022-07-08 ENCOUNTER — HOSPITAL ENCOUNTER (OUTPATIENT)
Dept: PHYSICAL THERAPY | Facility: CLINIC | Age: 52
Setting detail: THERAPIES SERIES
Discharge: HOME | End: 2022-07-08
Attending: TRANSPLANT SURGERY
Payer: COMMERCIAL

## 2022-07-08 ENCOUNTER — MYC MEDICAL ADVICE (OUTPATIENT)
Dept: ORTHOPEDICS | Facility: CLINIC | Age: 52
End: 2022-07-08

## 2022-07-08 PROCEDURE — 97110 THERAPEUTIC EXERCISES: CPT | Mod: GP

## 2022-07-08 PROCEDURE — 97140 MANUAL THERAPY 1/> REGIONS: CPT | Mod: GP

## 2022-07-11 ENCOUNTER — MYC MEDICAL ADVICE (OUTPATIENT)
Dept: FAMILY MEDICINE | Facility: CLINIC | Age: 52
End: 2022-07-11

## 2022-07-11 ENCOUNTER — VIRTUAL VISIT (OUTPATIENT)
Dept: FAMILY MEDICINE | Facility: CLINIC | Age: 52
End: 2022-07-11
Payer: COMMERCIAL

## 2022-07-11 DIAGNOSIS — M25.50 ARTHRALGIA, UNSPECIFIED JOINT: ICD-10-CM

## 2022-07-11 DIAGNOSIS — F33.0 MILD RECURRENT MAJOR DEPRESSION (H): Primary | ICD-10-CM

## 2022-07-11 DIAGNOSIS — F33.0 MILD RECURRENT MAJOR DEPRESSION (H): ICD-10-CM

## 2022-07-11 PROCEDURE — 99214 OFFICE O/P EST MOD 30 MIN: CPT | Mod: GT | Performed by: INTERNAL MEDICINE

## 2022-07-11 RX ORDER — VENLAFAXINE HYDROCHLORIDE 37.5 MG/1
37.5 TABLET, EXTENDED RELEASE ORAL DAILY
Qty: 30 TABLET | Refills: 0 | Status: SHIPPED | OUTPATIENT
Start: 2022-07-11 | End: 2022-07-12

## 2022-07-11 ASSESSMENT — PATIENT HEALTH QUESTIONNAIRE - PHQ9: SUM OF ALL RESPONSES TO PHQ QUESTIONS 1-9: 7

## 2022-07-11 NOTE — PROGRESS NOTES
Fidelina is a 52 year old who is being evaluated via a billable video visit.      How would you like to obtain your AVS? MyChart  If the video visit is dropped, the invitation should be resent by: Text to cell phone: [phone]  Will anyone else be joining your video visit? No          Assessment & Plan     Mild recurrent major depression (H)  Has been battling with depression for the last few weeks  She is s/p liver transplant  PHQ-9 score is 7  Seeing a counselor  She had a history of depression in the past many years ago and was on medication  She is going to get busy  She is going to be volunterring soon  Discussed options  We will start antidepressant Effexor ER  We will start slowly at 37.5 mg  If she tolerates in a couple of weeks I will increase it to 70 mg  Side effects discussed  - venlafaxine (EFFEXOR-ER) 37.5 MG 24 hr tablet; Take 1 tablet (37.5 mg) by mouth daily    Arthralgia, unspecified joint  She has got arthralgia of the joints  She has seen rheumatology and also she is seeing sports medicine  She certainly has got some epicondylitis  No cause identified for arthralgia of the joints however autoimmune disease was ruled out  They are wondering if this is from tacrolimus  She is getting physical therapy        30 minutes spent on the date of the encounter doing chart review, history and exam, documentation and further activities per the note           Return in about 4 weeks (around 8/8/2022).    Navneet Johnson MD  [facility]    Cameron Kitchen is a 52 year old, presenting for the following health issues:  Follow Up      HPI     Would like to discuss depression since having this liver transplant and also follow up on the transplant.     MEHRDAD Valles        Review of Systems   Constitutional, HEENT, cardiovascular, pulmonary, gi and gu systems are negative, except as otherwise noted.      Objective           Vitals:  No vitals were obtained today due to virtual  visit.    Physical Exam   GENERAL: Healthy, alert and no distress  EYES: Eyes grossly normal to inspection.  No discharge or erythema, or obvious scleral/conjunctival abnormalities.  RESP: No audible wheeze, cough, or visible cyanosis.  No visible retractions or increased work of breathing.    SKIN: Visible skin clear. No significant rash, abnormal pigmentation or lesions.  NEURO: Cranial nerves grossly intact.  Mentation and speech appropriate for age.  PSYCH: Mentation appears normal, affect normal/bright, judgement and insight intact, normal speech and appearance well-groomed.                Video-Visit Details    Video Start Time: 100 PM    Type of service:  Video Visit    Video End Time:140 PM    Originating Location (pt. Location): Home    Distant Location (provider location):  [facility]     Platform used for Video Visit: Fjuul    Wesly Jarrell.

## 2022-07-11 NOTE — TELEPHONE ENCOUNTER
Messaged patient scheduling line to get them on  schedule to check in on their pain.      Abelino Muller, EMT

## 2022-07-12 RX ORDER — VENLAFAXINE HYDROCHLORIDE 37.5 MG/1
37.5 CAPSULE, EXTENDED RELEASE ORAL DAILY
Qty: 30 CAPSULE | Refills: 0 | Status: SHIPPED | OUTPATIENT
Start: 2022-07-12 | End: 2022-08-04

## 2022-07-12 RX ORDER — DESVENLAFAXINE 25 MG/1
TABLET, EXTENDED RELEASE ORAL
Refills: 0 | OUTPATIENT
Start: 2022-07-12

## 2022-07-18 ENCOUNTER — TELEPHONE (OUTPATIENT)
Dept: PHARMACY | Facility: CLINIC | Age: 52
End: 2022-07-18

## 2022-07-19 ENCOUNTER — TELEPHONE (OUTPATIENT)
Dept: TRANSPLANT | Facility: CLINIC | Age: 52
End: 2022-07-19

## 2022-07-19 NOTE — TELEPHONE ENCOUNTER
----- Message from Afsaneh Heaton [facility] sent at 7/18/2022  3:26 PM CDT -----  Regarding: Trial of Gabapentin?  Julio Loya,    Just spoke with Fidelina to check in with her.  She states she has been having a lot of neuropathic pain in her feet, hands, and elbows.  It is bothering her mostly in the evening and then right away in the morning when she gets up.  She sounds pretty miserable.  Told her she could take 500mg of tylenol every 6 hours but also may be a good candidate to try gabapentin to see if that helps.  Told her to contact you about trying it.  Just wanted to give you a heads up.    Thanks!    Afsaneh

## 2022-07-19 NOTE — TELEPHONE ENCOUNTER
"Call to Fidelina, encouraged her to speak to her PCP about this. She is frustrated that she has seen multiple people about this and doesn't feel like she is getting anywhere.  She is concerned that this is caused by tacrolimus.  Her primary care team is not helping, she has seen multiple providers who aren't offering help, joint pain persists. When our pharmacist spoke w/ her she suggested that Fidelina speak to the liver team.   Fidelina states that Dr. Setele is aware of this. She says she feels like she's getting a little \"attitude\" from our office in not seeking ideas on what could make this better if it is truly caused by our transplant medication.  Told her I would send a message to Dr. Steele.   "

## 2022-07-21 NOTE — TELEPHONE ENCOUNTER
Message from Dr. Steele:    Zita mancia MD sent to Renetta Dale RN; Navneet Johnson MD; Srinivas Cisneros MD; Kenneth Machado MD; Óscar Schumacher MD  Caller: Unspecified (2 days ago, 10:44 AM)  I see multiple descriptions of her pain: epicondylitis, neuropathic, arthralgias etc.   Unless is tacrolimus-related, I don't know what I can offer her as her liver transplant doctor. I would ask Dr. Johnson's thoughts as her PCP.   If everyone thinks her pain is from tacrolimus, I can switch her to something else.     Zita     Call to Fidelina to give her the above info. She understood and will wait for the appointments w/ Dr. Cisneros and Elizabeth to discuss further.

## 2022-07-21 NOTE — TELEPHONE ENCOUNTER
She has an appointment to see me in a couple of weeks. I will address from my perspective at that point. Dr Cisneros

## 2022-07-26 ENCOUNTER — VIRTUAL VISIT (OUTPATIENT)
Dept: DERMATOLOGY | Facility: CLINIC | Age: 52
End: 2022-07-26
Payer: COMMERCIAL

## 2022-07-26 DIAGNOSIS — R23.8 VESICULAR LESION: Primary | ICD-10-CM

## 2022-07-26 PROCEDURE — 99213 OFFICE O/P EST LOW 20 MIN: CPT | Mod: TEL | Performed by: DERMATOLOGY

## 2022-07-26 NOTE — NURSING NOTE
Chief Complaint   Patient presents with     Derm Problem     Rash on hand about 1 week      Teledermatology Nurse Call Patients:     Are you in the [facility] at the time of the encounter? yes    Today's visit will be billed to you and your insurance.    A teledermatology visit is not as thorough as an in-person visit and the quality of the photograph sent may not be of the same quality as that taken by the dermatology clinic.    Medications and allergies reviewed with patient.    LINDSEY Salazar

## 2022-07-26 NOTE — LETTER
7/26/2022         RE: Melita Cortes  [address]  [facility] [address]        Dear Colleague,    Thank you for referring your patient, Melita Cortes, to the [facility]. Please see a copy of my visit note below.    [facility] Dermatology Note  Encounter Date: Jul 26, 2022  Store-and-Forward and Telephone ([phone]). Location of teledermatologist: [facility].  Start time: 6:10pm. End time: 6:26pm    Dermatology Problem List:  1. [facility] liver transplant 1/7/2022-alcoholic liver cirrhosis  - tacrolimus, prednisone  2. SCCis, left shin, s/p shave bx 4/27/2022, p s/p Mohs surgery  3. 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 Rhytides - tretinoin 0.025% cream  5. AK, s/p cryo  6. Urticaria and dermatographism  7. Intertrigo  -ketoconazole  8. Facial rhytides  -tretinoin    9. Cosmetics  -botox  -juvederm       ____________________________________________    Assessment & Plan:   # Hx of NMSC  -repeat skin exam 11/2022        #rash consistent with urticaria X1 month, transplant patient- no known illness prior, possibly urticaria, cleared with atarax and allegra. Drugs reviewed, protonix most likely.    -atarax at night, try to drop it, reviewed dementia publication  -allegra once daily in am for another 4 weeks    #lesion on hand, unclear if vesicle- will have seen in person, routed to  pool, if cannot be seen at  send to main site  -come in person tomorrow  -ordered VZV/and culture already. Monkey pox precautions though unlikely until can bee seen. Pt isolate until tomorrow.      Procedures Performed:    None    Follow-up: tomorrow, Go to ER or urgent care for signs of infection including increasing pain, increasing redness, discharge such as pus, fevers, chills or if not feeling well.       Staff and Scribe:     Scribe Disclosure:   I, Guido Small, am serving as a scribe to document services  personally performed by this physician, Dr. Leila Felder, based on data collection and the provider's statements to me.     Provider Disclosure:   The documentation recorded by the scribe accurately reflects the services I personally performed and the decisions made by me.    Leila Felder MD    Department of Dermatology  [facility]: Phone: [phone], Fax:[phone]  [facility]: Phone: [phone], Fax: [phone]      ____________________________________________    CC: Derm Problem (Rash on hand about 1 week )    HPI:  Ms. Melita Cortes is a(n) 52 year old female who presents today as a return patient for rash and hands.     She reports her old rash is gone. Last hive has been clear since 7/1/2022. Taking allegra in the am and atarax at night.     Last seen on 7/1/22 virtually for a rash. Patient to start atarax nightly and allegra in the morning.     Today, she reports new issue of hand rash. She reports 2 little bumps like a blister. She reports it has been there about a work and it is getting more inflamed and it burns. It hurts when she bumps it.     No fevers, joint pain or feeling sick. Is tired.     Patient is otherwise feeling well, without additional skin concerns.    Labs Reviewed:  NA    Physical Exam:  Vitals: There were no vitals taken for this visit.  SKIN: Teledermatology photos were reviewed; image quality and interpretability: acceptable. Image date: 2 days.  - skin lesion possible vesicle with erythema  - No other lesions of concern on areas examined.     Medications:  Current Outpatient Medications   Medication     calcium carbonate (OS-SHAI) 1500 (600 Ca) MG tablet     fexofenadine (ALLEGRA) 60 MG tablet     hydrocortisone 2.5 % ointment     hydroquinone (KARIN) 4 % external cream     hydrOXYzine (ATARAX) 25 MG tablet     magnesium oxide (MAG-OX) 400 MG  tablet     multivitamin (CENTRUM SILVER) tablet     pantoprazole (PROTONIX) 40 MG EC tablet     tacrolimus (GENERIC EQUIVALENT) 1 MG capsule     thiamine (B-1) 100 MG tablet     venlafaxine (EFFEXOR XR) 37.5 MG 24 hr capsule     pantoprazole (PROTONIX) 40 MG EC tablet     Current Facility-Administered Medications   Medication     triamcinolone (KENALOG-40) injection 40 mg      Past Medical/Surgical History:   Patient Active Problem List   Diagnosis     Abdominal bloating     Family history of pancreatic cancer     Transaminitis     Macrocytosis     Cervical high risk HPV (human papillomavirus) test positive     Anemia, unspecified type     Elevated serum creatinine     Alcoholic cirrhosis of liver with ascites (H)     Hyponatremia     Malaise and fatigue     At high risk for severe sepsis     Symptomatic anemia     Bandemia     Hyperlipidemia     Anxiety     Status post liver transplantation (H)     Immunosuppressed status (H)     Steroid-induced hyperglycemia     Hypervolemia     Severe malnutrition (H)     Elevated alkaline phosphatase level     Past Medical History:   Diagnosis Date     Alcoholic hepatitis      Asthma 3/10/2006     Cervical high risk HPV (human papillomavirus) test positive 2019, 2020    See problem list     Liver failure (H)        CC No referring provider defined for this encounter. on close of this encounter.      Again, thank you for allowing me to participate in the care of your patient.        Sincerely,        Leila Felder MD

## 2022-07-26 NOTE — PROGRESS NOTES
[facility] Dermatology Note  Encounter Date: Jul 26, 2022  Store-and-Forward and Telephone ([phone]). Location of teledermatologist: [facility].  Start time: 6:10pm. End time: 6:26pm    Dermatology Problem List:  1. PMH liver transplant 1/7/2022-alcoholic liver cirrhosis  - tacrolimus, prednisone  2. SCCis, left shin, s/p shave bx 4/27/2022, p s/p Mohs surgery  3. 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 Rhytides - tretinoin 0.025% cream  5. AK, s/p cryo  6. Urticaria and dermatographism  7. Intertrigo  -ketoconazole  8. Facial rhytides  -tretinoin    9. Cosmetics  -botox  -juvederm       ____________________________________________    Assessment & Plan:   # Hx of NMSC  -repeat skin exam 11/2022        #rash consistent with urticaria X1 month, transplant patient- no known illness prior, possibly urticaria, cleared with atarax and allegra. Drugs reviewed, protonix most likely.    -atarax at night, try to drop it, reviewed dementia publication  -allegra once daily in am for another 4 weeks    #lesion on hand, unclear if vesicle- will have seen in person, routed to  pool, if cannot be seen at  send to main site  -come in person tomorrow  -ordered VZV/and culture already. Monkey pox precautions though unlikely until can bee seen. Pt isolate until tomorrow.      Procedures Performed:    None    Follow-up: tomorrow, Go to ER or urgent care for signs of infection including increasing pain, increasing redness, discharge such as pus, fevers, chills or if not feeling well.       Staff and Scribe:     Scribe Disclosure:   Guido FORD, am serving as a scribe to document services personally performed by this physician, Dr. Leila Felder, based on data collection and the provider's statements to me.     Provider Disclosure:   The documentation recorded by the scribe accurately reflects the services I personally performed and the decisions made by  me.    Leila Felder MD    Department of Dermatology  [facility]: Phone: [phone], Fax:[phone]  [facility]: Phone: [phone], Fax: [phone]      ____________________________________________    CC: Derm Problem (Rash on hand about 1 week )    HPI:  Ms. Melita Cortes is a(n) 52 year old female who presents today as a return patient for rash and hands.     She reports her old rash is gone. Last hive has been clear since 7/1/2022. Taking allegra in the am and atarax at night.     Last seen on 7/1/22 virtually for a rash. Patient to start atarax nightly and allegra in the morning.     Today, she reports new issue of hand rash. She reports 2 little bumps like a blister. She reports it has been there about a work and it is getting more inflamed and it burns. It hurts when she bumps it.     No fevers, joint pain or feeling sick. Is tired.     Patient is otherwise feeling well, without additional skin concerns.    Labs Reviewed:  NA    Physical Exam:  Vitals: There were no vitals taken for this visit.  SKIN: Teledermatology photos were reviewed; image quality and interpretability: acceptable. Image date: 2 days.  - skin lesion possible vesicle with erythema  - No other lesions of concern on areas examined.     Medications:  Current Outpatient Medications   Medication     calcium carbonate (OS-SHAI) 1500 (600 Ca) MG tablet     fexofenadine (ALLEGRA) 60 MG tablet     hydrocortisone 2.5 % ointment     hydroquinone (KARIN) 4 % external cream     hydrOXYzine (ATARAX) 25 MG tablet     magnesium oxide (MAG-OX) 400 MG tablet     multivitamin (CENTRUM SILVER) tablet     pantoprazole (PROTONIX) 40 MG EC tablet     tacrolimus (GENERIC EQUIVALENT) 1 MG capsule     thiamine (B-1) 100 MG tablet     venlafaxine (EFFEXOR XR) 37.5 MG 24 hr capsule     pantoprazole (PROTONIX) 40 MG EC tablet      Current Facility-Administered Medications   Medication     triamcinolone (KENALOG-40) injection 40 mg      Past Medical/Surgical History:   Patient Active Problem List   Diagnosis     Abdominal bloating     Family history of pancreatic cancer     Transaminitis     Macrocytosis     Cervical high risk HPV (human papillomavirus) test positive     Anemia, unspecified type     Elevated serum creatinine     Alcoholic cirrhosis of liver with ascites (H)     Hyponatremia     Malaise and fatigue     At high risk for severe sepsis     Symptomatic anemia     Bandemia     Hyperlipidemia     Anxiety     Status post liver transplantation (H)     Immunosuppressed status (H)     Steroid-induced hyperglycemia     Hypervolemia     Severe malnutrition (H)     Elevated alkaline phosphatase level     Past Medical History:   Diagnosis Date     Alcoholic hepatitis      Asthma 3/10/2006     Cervical high risk HPV (human papillomavirus) test positive 2019, 2020    See problem list     Liver failure (H)        CC No referring provider defined for this encounter. on close of this encounter.

## 2022-07-26 NOTE — PATIENT INSTRUCTIONS
[facility] Dermatology Visit    Thank you for allowing us to participate in your care. Your findings, instructions and follow-up plan are as follows:     Go to ER or urgent care for signs of infection including increasing pain, increasing redness, discharge such as pus, fevers, chills or if not feeling well.     Isolate until seen    When should I call my doctor?  If you are worsening or not improving, please, contact us or seek urgent care as noted below.     Who should I call with questions (adults)?  [facility] (adult and pediatric): [phone]  [facility] (adult): [phone]  For urgent needs outside of business hours call the [facility] at [phone] and ask for the dermatology resident on call  If this is a medical emergency and you are unable to reach an ER, Call [phone]    Who should I call with questions (pediatric)?  [facility]- Pediatric Dermatology  Dr. Joanna Harris, Dr. Vy Tanner, Dr. Joyce Rhodes, Evita Reynoso, TOSHIA Valentine, Dr. Nayeli Tran & Dr. Srinivas Fontanez  Non Urgent  Nurse Triage Line; [phone]- Alayna and Priya HARLEY Care Coordinators   Arely (/Complex ) [phone]    If you need a prescription refill, please contact your pharmacy. Refills are approved or denied by our physicians during normal business hours, Monday through Fridays  Per office policy, refills will not be granted if you have not been seen within the past year (or sooner depending on your child's condition).    Scheduling Information:  Pediatric Appointment Scheduling and Call Center [phone]  Radiology Scheduling- [phone]  Sedation Unit Scheduling- [phone]  [place] Scheduling- General [phone]; Pediatric Dermatology [phone]  Main  Services: [phone]  Bhutanese: [phone]  Bangladeshi: [phone]  Hmong/[place] Islander/Somali:  [phone]  Preadmission Nursing Department Fax Number: [phone] (fax all pre-operative paperwork to this number)    For urgent matters arising during evenings, weekends, or holidays that cannot wait for normal business hours please call [phone] and ask for the dermatology resident on call to be paged.

## 2022-07-27 ENCOUNTER — OFFICE VISIT (OUTPATIENT)
Dept: DERMATOLOGY | Facility: CLINIC | Age: 52
End: 2022-07-27
Payer: COMMERCIAL

## 2022-07-27 DIAGNOSIS — R21 RASH: Primary | ICD-10-CM

## 2022-07-27 PROCEDURE — 87070 CULTURE OTHR SPECIMN AEROBIC: CPT | Performed by: PHYSICIAN ASSISTANT

## 2022-07-27 PROCEDURE — 99000 SPECIMEN HANDLING OFFICE-LAB: CPT | Performed by: PHYSICIAN ASSISTANT

## 2022-07-27 PROCEDURE — 99213 OFFICE O/P EST LOW 20 MIN: CPT | Performed by: PHYSICIAN ASSISTANT

## 2022-07-27 PROCEDURE — 87252 VIRUS INOCULATION TISSUE: CPT | Mod: 90 | Performed by: PHYSICIAN ASSISTANT

## 2022-07-27 NOTE — NURSING NOTE
Melita Cortes's goals for this visit include:   Chief Complaint   Patient presents with     Rash     She requests these members of her care team be copied on today's visit information:     PCP: Navneet Johnson    Referring Provider:  No referring provider defined for this encounter.    There were no vitals taken for this visit.    Do you need any medication refills at today's visit? No  Suzi Ragland, MEHRDAD on 7/27/2022 at 9:20 AM

## 2022-07-27 NOTE — PROGRESS NOTES
[facility] Dermatology Note  Encounter Date: Jul 27, 2022  Office Visit     Dermatology Problem List:  1. [facility] liver transplant 1/7/2022-alcoholic liver cirrhosis  - tacrolimus, prednisone  2. SCCis, left shin, s/p shave bx 4/27/2022, p s/p Mohs surgery  3. 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 Rhytides - tretinoin 0.025% cream  5. AK, s/p cryo  6. Urticaria and dermatographism  7. Intertrigo  -ketoconazole  8. Facial rhytides  -tretinoin  9. Cosmetics  -botox  -juvederm    Social: .   ____________________________________________    Assessment & Plan:     # Lesions on hand/rash - onset 7-10 days ago. 3 intact vesicles seen, two discrete lesions. Tender on palpation. Patient is transplant patient. On Tacrolimus and prednisone.    -bacterial culture, viral culture and culture for monkey pox (sent to [facility]) ordered today  -patient to isolate until results on Monkey pox return    # Urticaria  -responding well to Allegra and hydroxyzine    Procedures Performed:   Swabs for cultures listed above    Follow-up: pending results    Staff and Scribe:     Scribe Disclosure:   Guido FORD, am serving as a scribe to document services personally performed by Natasha Chavira PA-C, based on data collection and the provider's statements to me.  Provider Disclosure:   The documentation recorded by the scribe accurately reflects the services I personally performed and the decisions made by me.    All risks, benefits and alternatives were discussed with patient.  Patient is in agreement and understands the assessment and plan.  All questions were answered.    Natasha Chavira PA-C, MPAS  [facility]: Phone: [phone], Fax: [phone]  [facility]: Phone: [phone],  Fax: [phone]  [facility]: Phone: [phone], Fax:  [phone]  ____________________________________________    CC: Rash    HPI:  Ms. Melita Cortes is a(n) 52 year old female who presents today as a return patient for a rash.     Last seen on 7/26/22 with Dr. Felder for a rash. Patient to decrease usage of atarax nightly.    Today, patient presents for a rash. Concern for vesicles present so here today for testing. Patient notes increased tiredness/exhaustion the past two weeks. She recently started new antidepressant 2 weeks ago so thinks this may be associated. Alsop states she got her period for the 1st time recently in 1.5 years. Notes the rash on the hand appeared 7-10 days ago. It is tender to the touch. Does not itch. Denies lesions elsewhere on the body. Denies recent sexual contact in several months. . Transplant patient - liver transplant 6mo ago. Recently started going to the gym/exercising.  No hx HSV. She has had shingles in the past. Has not had shingles vaccine. She had chicken pox as a child. Hx SCC on the leg.     Patient is otherwise feeling well, without additional skin concerns.    Labs Reviewed:  N/A    Physical Exam:  Vitals: There were no vitals taken for this visit.  SKIN: Focused examination of face and hands was performed.  - L hand/L index finder - erythematous papule with 2 discrete intact vesicles on the lateral aspect of the 1st finger as well as an intact vesicle on the distal index finger near the cuticle. Tender on palpation.   - No other lesions of concern on areas examined.               Medications:  Current Outpatient Medications   Medication     calcium carbonate (OS-SHAI) 1500 (600 Ca) MG tablet     fexofenadine (ALLEGRA) 60 MG tablet     hydrocortisone 2.5 % ointment     hydroquinone (KARIN) 4 % external cream     hydrOXYzine (ATARAX) 25 MG tablet     magnesium oxide (MAG-OX) 400 MG tablet     multivitamin (CENTRUM SILVER) tablet     pantoprazole (PROTONIX) 40 MG EC tablet     pantoprazole (PROTONIX) 40 MG EC  tablet     tacrolimus (GENERIC EQUIVALENT) 1 MG capsule     thiamine (B-1) 100 MG tablet     venlafaxine (EFFEXOR XR) 37.5 MG 24 hr capsule     Current Facility-Administered Medications   Medication     triamcinolone (KENALOG-40) injection 40 mg      Past Medical History:   Patient Active Problem List   Diagnosis     Abdominal bloating     Family history of pancreatic cancer     Transaminitis     Macrocytosis     Cervical high risk HPV (human papillomavirus) test positive     Anemia, unspecified type     Elevated serum creatinine     Alcoholic cirrhosis of liver with ascites (H)     Hyponatremia     Malaise and fatigue     At high risk for severe sepsis     Symptomatic anemia     Bandemia     Hyperlipidemia     Anxiety     Status post liver transplantation (H)     Immunosuppressed status (H)     Steroid-induced hyperglycemia     Hypervolemia     Severe malnutrition (H)     Elevated alkaline phosphatase level     Past Medical History:   Diagnosis Date     Alcoholic hepatitis      Asthma 3/10/2006     Cervical high risk HPV (human papillomavirus) test positive 2019, 2020    See problem list     Liver failure (H)

## 2022-07-27 NOTE — LETTER
7/27/2022         RE: Melita Cortes  [address]  [place] [address]        Dear Colleague,    Thank you for referring your patient, Melita Cortes, to the [facility]. Please see a copy of my visit note below.    [facility] Dermatology Note  Encounter Date: Jul 27, 2022  Office Visit     Dermatology Problem List:  1. [facility] liver transplant 1/7/2022-alcoholic liver cirrhosis  - tacrolimus, prednisone  2. SCCis, left shin, s/p shave bx 4/27/2022, p s/p Mohs surgery  3. 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 Rhytides - tretinoin 0.025% cream  5. AK, s/p cryo  6. Urticaria and dermatographism  7. Intertrigo  -ketoconazole  8. Facial rhytides  -tretinoin  9. Cosmetics  -botox  -juvederm    Social: .   ____________________________________________    Assessment & Plan:     # Lesions on hand/rash - onset 7-10 days ago. 3 intact vesicles seen, two discrete lesions. Tender on palpation. Patient is transplant patient. On Tacrolimus and prednisone.    -bacterial culture, viral culture and culture for monkey pox (sent to MD) ordered today  -patient to isolate until results on Monkey pox return    # Urticaria  -responding well to Allegra and hydroxyzine    Procedures Performed:   Swabs for cultures listed above    Follow-up: pending results    Staff and Scribe:     Scribe Disclosure:   I, Guido Small, am serving as a scribe to document services personally performed by Natasha Chavira PA-C, based on data collection and the provider's statements to me.  Provider Disclosure:   The documentation recorded by the scribe accurately reflects the services I personally performed and the decisions made by me.    All risks, benefits and alternatives were discussed with patient.  Patient is in agreement and understands the assessment and plan.  All questions were answered.    Natasha Chavira PA-C, MPAS  [facility]  [facility]: Phone: [phone], Fax: [phone]  [facility]: Phone: [phone],  Fax: [phone]  [facility] [place]: Phone: [phone], Fax: [phone]  ____________________________________________    CC: Rash    HPI:  Ms. eMlita Cortes is a(n) 52 year old female who presents today as a return patient for a rash.     Last seen on 7/26/22 with Dr. Felder for a rash. Patient to decrease usage of atarax nightly.    Today, patient presents for a rash. Concern for vesicles present so here today for testing. Patient notes increased tiredness/exhaustion the past two weeks. She recently started new antidepressant 2 weeks ago so thinks this may be associated. Alsop states she got her period for the 1st time recently in 1.5 years. Notes the rash on the hand appeared 7-10 days ago. It is tender to the touch. Does not itch. Denies lesions elsewhere on the body. Denies recent sexual contact in several months. . Transplant patient - liver transplant 6mo ago. Recently started going to the gym/exercising.  No hx HSV. She has had shingles in the past. Has not had shingles vaccine. She had chicken pox as a child. Hx SCC on the leg.     Patient is otherwise feeling well, without additional skin concerns.    Labs Reviewed:  N/A    Physical Exam:  Vitals: There were no vitals taken for this visit.  SKIN: Focused examination of face and hands was performed.  - L hand/L index finder - erythematous papule with 2 discrete intact vesicles on the lateral aspect of the 1st finger as well as an intact vesicle on the distal index finger near the cuticle. Tender on palpation.   - No other lesions of concern on areas examined.               Medications:  Current Outpatient Medications   Medication     calcium carbonate (OS-SHAI) 1500 (600 Ca) MG tablet     fexofenadine (ALLEGRA) 60 MG tablet     hydrocortisone 2.5 % ointment     hydroquinone (KARIN) 4  % external cream     hydrOXYzine (ATARAX) 25 MG tablet     magnesium oxide (MAG-OX) 400 MG tablet     multivitamin (CENTRUM SILVER) tablet     pantoprazole (PROTONIX) 40 MG EC tablet     pantoprazole (PROTONIX) 40 MG EC tablet     tacrolimus (GENERIC EQUIVALENT) 1 MG capsule     thiamine (B-1) 100 MG tablet     venlafaxine (EFFEXOR XR) 37.5 MG 24 hr capsule     Current Facility-Administered Medications   Medication     triamcinolone (KENALOG-40) injection 40 mg      Past Medical History:   Patient Active Problem List   Diagnosis     Abdominal bloating     Family history of pancreatic cancer     Transaminitis     Macrocytosis     Cervical high risk HPV (human papillomavirus) test positive     Anemia, unspecified type     Elevated serum creatinine     Alcoholic cirrhosis of liver with ascites (H)     Hyponatremia     Malaise and fatigue     At high risk for severe sepsis     Symptomatic anemia     Bandemia     Hyperlipidemia     Anxiety     Status post liver transplantation (H)     Immunosuppressed status (H)     Steroid-induced hyperglycemia     Hypervolemia     Severe malnutrition (H)     Elevated alkaline phosphatase level     Past Medical History:   Diagnosis Date     Alcoholic hepatitis      Asthma 3/10/2006     Cervical high risk HPV (human papillomavirus) test positive 2019, 2020    See problem list     Liver failure (H)               Again, thank you for allowing me to participate in the care of your patient.        Sincerely,        Natasha Chavira PA-C

## 2022-07-29 ENCOUNTER — TELEPHONE (OUTPATIENT)
Dept: DERMATOLOGY | Facility: CLINIC | Age: 52
End: 2022-07-29

## 2022-07-29 LAB
BACTERIA SKIN AEROBE CULT: NO GROWTH
SCANNED LAB RESULT: NORMAL

## 2022-07-29 NOTE — TELEPHONE ENCOUNTER
[facility] Call Center    Phone Message    May a detailed message be left on voicemail: yes     Reason for Call: Other: The pt would like a call when the results from testing on 7.27.22 are available. Thanks.     Action Taken: Routed to  Derm    Travel Screening: Not Applicable

## 2022-08-01 ENCOUNTER — LAB (OUTPATIENT)
Dept: LAB | Facility: CLINIC | Age: 52
End: 2022-08-01
Payer: COMMERCIAL

## 2022-08-01 DIAGNOSIS — Z94.4 STATUS POST LIVER TRANSPLANTATION (H): ICD-10-CM

## 2022-08-01 DIAGNOSIS — K70.31 ALCOHOLIC CIRRHOSIS OF LIVER WITH ASCITES (H): ICD-10-CM

## 2022-08-01 DIAGNOSIS — Z94.4 LIVER REPLACED BY TRANSPLANT (H): ICD-10-CM

## 2022-08-01 LAB
ALBUMIN SERPL-MCNC: 3.6 G/DL (ref 3.4–5)
ALP SERPL-CCNC: 82 U/L (ref 40–150)
ALT SERPL W P-5'-P-CCNC: 37 U/L (ref 0–50)
ANION GAP SERPL CALCULATED.3IONS-SCNC: 4 MMOL/L (ref 3–14)
AST SERPL W P-5'-P-CCNC: 21 U/L (ref 0–45)
BILIRUB DIRECT SERPL-MCNC: 0.2 MG/DL (ref 0–0.2)
BILIRUB SERPL-MCNC: 0.6 MG/DL (ref 0.2–1.3)
BUN SERPL-MCNC: 20 MG/DL (ref 7–30)
CALCIUM SERPL-MCNC: 8.9 MG/DL (ref 8.5–10.1)
CHLORIDE BLD-SCNC: 109 MMOL/L (ref 94–109)
CO2 SERPL-SCNC: 29 MMOL/L (ref 20–32)
CREAT SERPL-MCNC: 0.96 MG/DL (ref 0.52–1.04)
ERYTHROCYTE [DISTWIDTH] IN BLOOD BY AUTOMATED COUNT: 12.4 % (ref 10–15)
GFR SERPL CREATININE-BSD FRML MDRD: 71 ML/MIN/1.73M2
GLUCOSE BLD-MCNC: 104 MG/DL (ref 70–99)
HCT VFR BLD AUTO: 36.9 % (ref 35–47)
HGB BLD-MCNC: 13 G/DL (ref 11.7–15.7)
MAGNESIUM SERPL-MCNC: 1.8 MG/DL (ref 1.6–2.3)
MCH RBC QN AUTO: 31.5 PG (ref 26.5–33)
MCHC RBC AUTO-ENTMCNC: 35.2 G/DL (ref 31.5–36.5)
MCV RBC AUTO: 89 FL (ref 78–100)
PHOSPHATE SERPL-MCNC: 3.4 MG/DL (ref 2.5–4.5)
PLATELET # BLD AUTO: 185 10E3/UL (ref 150–450)
POTASSIUM BLD-SCNC: 4.4 MMOL/L (ref 3.4–5.3)
PROT SERPL-MCNC: 6.1 G/DL (ref 6.8–8.8)
RBC # BLD AUTO: 4.13 10E6/UL (ref 3.8–5.2)
SODIUM SERPL-SCNC: 142 MMOL/L (ref 133–144)
TACROLIMUS BLD-MCNC: 3.8 UG/L (ref 5–15)
TME LAST DOSE: ABNORMAL H
TME LAST DOSE: ABNORMAL H
WBC # BLD AUTO: 3.6 10E3/UL (ref 4–11)

## 2022-08-01 PROCEDURE — 80321 ALCOHOLS BIOMARKERS 1OR 2: CPT | Mod: 90

## 2022-08-01 PROCEDURE — 36415 COLL VENOUS BLD VENIPUNCTURE: CPT

## 2022-08-01 PROCEDURE — 83735 ASSAY OF MAGNESIUM: CPT

## 2022-08-01 PROCEDURE — 85027 COMPLETE CBC AUTOMATED: CPT

## 2022-08-01 PROCEDURE — 99000 SPECIMEN HANDLING OFFICE-LAB: CPT

## 2022-08-01 PROCEDURE — 80053 COMPREHEN METABOLIC PANEL: CPT

## 2022-08-01 PROCEDURE — 80197 ASSAY OF TACROLIMUS: CPT

## 2022-08-01 PROCEDURE — 84100 ASSAY OF PHOSPHORUS: CPT

## 2022-08-01 PROCEDURE — 82248 BILIRUBIN DIRECT: CPT

## 2022-08-02 ENCOUNTER — TELEPHONE (OUTPATIENT)
Dept: TRANSPLANT | Facility: CLINIC | Age: 52
End: 2022-08-02

## 2022-08-02 DIAGNOSIS — Z94.4 STATUS POST LIVER TRANSPLANTATION (H): ICD-10-CM

## 2022-08-02 RX ORDER — TACROLIMUS 1 MG/1
2 CAPSULE ORAL EVERY 12 HOURS
Qty: 360 CAPSULE | Refills: 3 | Status: SHIPPED | OUTPATIENT
Start: 2022-08-02 | End: 2022-09-02

## 2022-08-02 NOTE — TELEPHONE ENCOUNTER
ISSUE: //  Tacrolimus IR level 3.8 on 8/1, goal 5-8, dose 2 mg AM and 1 mg PM.    PLAN:   Please call patient and confirm this was an accurate 12-hour trough. Verify Tacrolimus IR dose 2 mg AM and 1 mg PM. Confirm no new medications or illness. Confirm no missed doses. If accurate trough and accurate dose, increase Tacrolimus IR dose to 2 mg BID and repeat labs in 1 month.   Shalini Hawkins RN      OUTCOME:   Spoke with patient, they confirm accurate trough level and current dose 2 mg am, 1mg pm. Patient confirmed dose change to 2 mg BID and to repeat labs in 1 months. Orders sent to preferred pharmacy for dose change and lab for repeat labs. Patient voiced understanding of plan.    Soumya Wren LPN

## 2022-08-03 ENCOUNTER — TELEPHONE (OUTPATIENT)
Dept: TRANSPLANT | Facility: CLINIC | Age: 52
End: 2022-08-03

## 2022-08-03 ENCOUNTER — MYC MEDICAL ADVICE (OUTPATIENT)
Dept: FAMILY MEDICINE | Facility: CLINIC | Age: 52
End: 2022-08-03

## 2022-08-03 DIAGNOSIS — L29.9 ITCHING: ICD-10-CM

## 2022-08-03 LAB
PLPETH BLD-MCNC: <10 NG/ML
POPETH BLD-MCNC: <10 NG/ML

## 2022-08-03 NOTE — TELEPHONE ENCOUNTER
Fidelina paged to state that she is unsure if she took her night time dose and unsure what to do. Patient will take dose as normal starting tomorrow and will not double up.

## 2022-08-04 NOTE — TELEPHONE ENCOUNTER
Pt is updating provider on mood.      Routing to provider.      Alicia Knott RN  [facility]

## 2022-08-05 RX ORDER — HYDROXYZINE HYDROCHLORIDE 25 MG/1
25 TABLET, FILM COATED ORAL
Qty: 30 TABLET | Refills: 0 | Status: CANCELLED | OUTPATIENT
Start: 2022-08-05

## 2022-08-05 RX ORDER — HYDROXYZINE HYDROCHLORIDE 25 MG/1
50 TABLET, FILM COATED ORAL
Qty: 90 TABLET | Refills: 0 | Status: SHIPPED | OUTPATIENT
Start: 2022-08-05 | End: 2022-09-10

## 2022-08-08 ENCOUNTER — DOCUMENTATION ONLY (OUTPATIENT)
Dept: TRANSPLANT | Facility: CLINIC | Age: 52
End: 2022-08-08

## 2022-08-08 NOTE — PROGRESS NOTES
Received my chart message from Fidelina with photos inquiring about a whey protein powder and Vital Protein Collagen. Both are OK- no herbal ingredients.

## 2022-08-15 ENCOUNTER — OFFICE VISIT (OUTPATIENT)
Dept: ORTHOPEDICS | Facility: CLINIC | Age: 52
End: 2022-08-15
Payer: COMMERCIAL

## 2022-08-15 DIAGNOSIS — M50.20 CERVICAL DISC HERNIATION: Primary | ICD-10-CM

## 2022-08-15 DIAGNOSIS — M50.30 DDD (DEGENERATIVE DISC DISEASE), CERVICAL: ICD-10-CM

## 2022-08-15 PROCEDURE — 99213 OFFICE O/P EST LOW 20 MIN: CPT | Performed by: PREVENTIVE MEDICINE

## 2022-08-15 NOTE — PROGRESS NOTES
HISTORY OF PRESENT ILLNESS  Ms. Cortes is a pleasant 52 year old year old female who presents to clinic today with   Melita explains that she is following up today after her cervical injection, she states it hasn't helped much and still is having pain in bilateral elbows    Patient needs refills on the following medications: no    Location: cervical spine, bilateral elbows    Quality:  achy pain    Severity: 8/10 at worst    Duration: see above  Timing: occurs intermittently  Context: occurs while exercising and lifting and using the joint  Modifying factors:  resting and non-use makes it better, movement and use makes it worse  Associated signs & symptoms: none  Previous similar pain: yes  Exercise: walking, ROM exercises with weights  Additional history: as documented    MEDICAL HISTORY  Patient Active Problem List   Diagnosis     Abdominal bloating     Family history of pancreatic cancer     Transaminitis     Macrocytosis     Cervical high risk HPV (human papillomavirus) test positive     Anemia, unspecified type     Elevated serum creatinine     Alcoholic cirrhosis of liver with ascites (H)     Hyponatremia     Malaise and fatigue     At high risk for severe sepsis     Symptomatic anemia     Bandemia     Hyperlipidemia     Anxiety     Status post liver transplantation (H)     Immunosuppressed status (H)     Steroid-induced hyperglycemia     Hypervolemia     Severe malnutrition (H)     Elevated alkaline phosphatase level       Current Outpatient Medications   Medication Sig Dispense Refill     calcium carbonate (OS-SHAI) 1500 (600 Ca) MG tablet Take 1 tablet (600 mg) by mouth daily 60 tablet 3     fexofenadine (ALLEGRA) 60 MG tablet Take 1 tablet (60 mg) by mouth daily 60 tablet 2     hydrocortisone 2.5 % ointment Apply twice daily for 1 week on itchy, scaly areas on the body.  Apply Vaseline on top. 60 g 0     hydroquinone (KARIN) 4 % external cream Apply once daily to the face, neck and chest as tolerated.  Start with every other day. Skip when irritating 30 g 2     hydrOXYzine (ATARAX) 25 MG tablet Take 2 tablets (50 mg) by mouth nightly as needed for other (insomina) 90 tablet 0     magnesium oxide (MAG-OX) 400 MG tablet Take 1 tablet (400 mg) by mouth 2 times daily 90 tablet 3     multivitamin (CENTRUM SILVER) tablet Take 1 tablet by mouth daily       pantoprazole (PROTONIX) 40 MG EC tablet Take by mouth every 24 hours       pantoprazole (PROTONIX) 40 MG EC tablet Take 1 tablet (40 mg) by mouth daily 90 tablet 3     tacrolimus (GENERIC EQUIVALENT) 1 MG capsule Take 2 capsules (2 mg) by mouth every 12 hours 360 capsule 3     thiamine (B-1) 100 MG tablet Take 1 tablet (100 mg) by mouth daily 30 tablet 1     venlafaxine (EFFEXOR XR) 37.5 MG 24 hr capsule TAKE 1 CAPSULE BY MOUTH EVERY DAY 90 capsule 0       Allergies   Allergen Reactions     Latex      rash     Adhesive Tape Rash       Family History   Problem Relation Age of Onset     Cancer Mother      Colon Cancer Paternal Aunt      Colon Cancer Maternal Uncle      Social History     Socioeconomic History     Marital status:    Tobacco Use     Smoking status: Former Smoker     Quit date: 2020     Years since quittin.2     Smokeless tobacco: Never Used   Vaping Use     Vaping Use: Never used   Substance and Sexual Activity     Alcohol use: Not Currently     Comment: Last drink 2021     Drug use: Never     Sexual activity: Not Currently     Partners: Male       Additional medical/Social/Surgical histories reviewed in [facility] and updated as appropriate.     REVIEW OF SYSTEMS (8/15/2022)  10 point ROS of systems including Constitutional, Eyes, Respiratory, Cardiovascular, Gastroenterology, Genitourinary, Integumentary, Musculoskeletal, Psychiatric, Allergic/Immunologic were all negative except for pertinent positives noted in my HPI.     PHYSICAL EXAM  VSS    ASSESSMENT & PLAN  1. Cervical disc herniation    - X-ray cervical/thoracic epidural injection;  Future  - Physical Therapy Referral; Future    2. DDD (degenerative disc disease), cervical  - X-ray cervical/thoracic epidural injection; Future  - Physical Therapy Referral; Future    I independently reviewed the following imaging studies:    Patient HAS / HAS NOT completed physical therapy for 4-6 weeks  Patient has been doing home exercise physical therapy program for this problem      Appropriate PPE was utilized for prevention of spread of Covid-19.  Srinivas Cisneros MD, [facility]

## 2022-08-15 NOTE — LETTER
8/15/2022         RE: Melita Cortes  [address]  [place] [address]        Dear Colleague,    Thank you for referring your patient, Melita Cortes, to the [facility] [place]. Please see a copy of my visit note below.    HISTORY OF PRESENT ILLNESS  Ms. Cortes is a pleasant 52 year old year old female who presents to clinic today with   Melita explains that she is following up today after her cervical injection, she states it hasn't helped much and still is having pain in bilateral elbows    Patient needs refills on the following medications: no    Location: cervical spine, bilateral elbows    Quality:  achy pain    Severity: 8/10 at worst    Duration: see above  Timing: occurs intermittently  Context: occurs while exercising and lifting and using the joint  Modifying factors:  resting and non-use makes it better, movement and use makes it worse  Associated signs & symptoms: none  Previous similar pain: yes  Exercise: walking, ROM exercises with weights  Additional history: as documented    MEDICAL HISTORY  Patient Active Problem List   Diagnosis     Abdominal bloating     Family history of pancreatic cancer     Transaminitis     Macrocytosis     Cervical high risk HPV (human papillomavirus) test positive     Anemia, unspecified type     Elevated serum creatinine     Alcoholic cirrhosis of liver with ascites (H)     Hyponatremia     Malaise and fatigue     At high risk for severe sepsis     Symptomatic anemia     Bandemia     Hyperlipidemia     Anxiety     Status post liver transplantation (H)     Immunosuppressed status (H)     Steroid-induced hyperglycemia     Hypervolemia     Severe malnutrition (H)     Elevated alkaline phosphatase level       Current Outpatient Medications   Medication Sig Dispense Refill     calcium carbonate (OS-SHAI) 1500 (600 Ca) MG tablet Take 1 tablet (600 mg) by mouth daily 60 tablet 3     fexofenadine (ALLEGRA) 60 MG tablet  Take 1 tablet (60 mg) by mouth daily 60 tablet 2     hydrocortisone 2.5 % ointment Apply twice daily for 1 week on itchy, scaly areas on the body.  Apply Vaseline on top. 60 g 0     hydroquinone (KARIN) 4 % external cream Apply once daily to the face, neck and chest as tolerated. Start with every other day. Skip when irritating 30 g 2     hydrOXYzine (ATARAX) 25 MG tablet Take 2 tablets (50 mg) by mouth nightly as needed for other (insomina) 90 tablet 0     magnesium oxide (MAG-OX) 400 MG tablet Take 1 tablet (400 mg) by mouth 2 times daily 90 tablet 3     multivitamin (CENTRUM SILVER) tablet Take 1 tablet by mouth daily       pantoprazole (PROTONIX) 40 MG EC tablet Take by mouth every 24 hours       pantoprazole (PROTONIX) 40 MG EC tablet Take 1 tablet (40 mg) by mouth daily 90 tablet 3     tacrolimus (GENERIC EQUIVALENT) 1 MG capsule Take 2 capsules (2 mg) by mouth every 12 hours 360 capsule 3     thiamine (B-1) 100 MG tablet Take 1 tablet (100 mg) by mouth daily 30 tablet 1     venlafaxine (EFFEXOR XR) 37.5 MG 24 hr capsule TAKE 1 CAPSULE BY MOUTH EVERY DAY 90 capsule 0       Allergies   Allergen Reactions     Latex      rash     Adhesive Tape Rash       Family History   Problem Relation Age of Onset     Cancer Mother      Colon Cancer Paternal Aunt      Colon Cancer Maternal Uncle      Social History     Socioeconomic History     Marital status:    Tobacco Use     Smoking status: Former Smoker     Quit date: 2020     Years since quittin.2     Smokeless tobacco: Never Used   Vaping Use     Vaping Use: Never used   Substance and Sexual Activity     Alcohol use: Not Currently     Comment: Last drink 2021     Drug use: Never     Sexual activity: Not Currently     Partners: Male       Additional medical/Social/Surgical histories reviewed in [facility] and updated as appropriate.     REVIEW OF SYSTEMS (8/15/2022)  10 point ROS of systems including Constitutional, Eyes, Respiratory, Cardiovascular,  Gastroenterology, Genitourinary, Integumentary, Musculoskeletal, Psychiatric, Allergic/Immunologic were all negative except for pertinent positives noted in my HPI.     PHYSICAL EXAM  VSS    ASSESSMENT & PLAN  1. Cervical disc herniation    - X-ray cervical/thoracic epidural injection; Future  - Physical Therapy Referral; Future    2. DDD (degenerative disc disease), cervical  - X-ray cervical/thoracic epidural injection; Future  - Physical Therapy Referral; Future    I independently reviewed the following imaging studies:    Patient HAS / HAS NOT completed physical therapy for 4-6 weeks  Patient has been doing home exercise physical therapy program for this problem      Appropriate PPE was utilized for prevention of spread of Covid-19.  Srinivas Cisneros MD, [facility]          Again, thank you for allowing me to participate in the care of your patient.        Sincerely,        Srinivas Cisneros MD

## 2022-08-19 ENCOUNTER — HOSPITAL ENCOUNTER (OUTPATIENT)
Dept: PHYSICAL THERAPY | Facility: CLINIC | Age: 52
Setting detail: THERAPIES SERIES
Discharge: HOME | End: 2022-08-19
Attending: TRANSPLANT SURGERY
Payer: COMMERCIAL

## 2022-08-19 ENCOUNTER — THERAPY VISIT (OUTPATIENT)
Dept: PHYSICAL THERAPY | Facility: CLINIC | Age: 52
End: 2022-08-19
Attending: PREVENTIVE MEDICINE
Payer: COMMERCIAL

## 2022-08-19 DIAGNOSIS — M50.20 CERVICAL DISC HERNIATION: ICD-10-CM

## 2022-08-19 DIAGNOSIS — M25.521 PAIN OF BOTH ELBOWS: ICD-10-CM

## 2022-08-19 DIAGNOSIS — M25.522 PAIN OF BOTH ELBOWS: ICD-10-CM

## 2022-08-19 DIAGNOSIS — M54.2 CERVICALGIA: ICD-10-CM

## 2022-08-19 DIAGNOSIS — M50.30 DDD (DEGENERATIVE DISC DISEASE), CERVICAL: ICD-10-CM

## 2022-08-19 PROCEDURE — 97161 PT EVAL LOW COMPLEX 20 MIN: CPT | Mod: GP | Performed by: PHYSICAL THERAPIST

## 2022-08-19 PROCEDURE — 97110 THERAPEUTIC EXERCISES: CPT | Mod: GP

## 2022-08-19 PROCEDURE — 97110 THERAPEUTIC EXERCISES: CPT | Mod: GP | Performed by: PHYSICAL THERAPIST

## 2022-08-19 NOTE — PROGRESS NOTES
Physical Therapy Initial Evaluation  Subjective:  The history is provided by the patient. No  was used.   Therapist Generated HPI Evaluation  Problem details: Pt reports about 7 months ago had a liver transplant and is on Tacrolimus for rejection meds and feels this is causing her joint pain throughout her body. Her elbow is what she was seeing Dr. Simental for so had a cervical injection and maybe had slight relief but nothing significant. Struggling to exercises. Talked to her doctor about changing the drug but the goal is not to if she doesn't have to. .         Type of problem:  Cervical spine.    This is a chronic (1/7/22) condition.  Condition occurred with:  Other reason.  Where condition occurred: other (liver transplant).    Pain is described as aching and is intermittent.  Pain radiates to:  Elbow left and elbow right (elbows and fingers). Pain is worse in the P.M..  Since onset symptoms are unchanged.  Associated symptoms:  Loss of strength and loss of motion/stiffness. Exacerbated by: lifting, exercise.  and relieved by nothing.  Special tests included:  X-ray (hyDegeneration at C4-C5, C5-C6, C6-C7).  Past treatment: injection. There was mild improvement following previous treatment.  Barriers include:  None as reported by patient.    Patient Health History             Pertinent medical history includes: depression, menopausal and numbness/tingling.   Red flags:  None as reported by patient.  Medical allergies: none. Other medical allergies details: latex.   Surgeries include:  Other. Other surgery history details: liver transplant.    Current medications:  Anti-depressants, pain medication and sleep medication.    Current occupation is Dis[employer].      Other job/home tasks details: household tasks. .                                  Objective:  System              Cervical/Thoracic Evaluation      Cervical Myotomes:      C3 (neck side bend): Left: 5    Right: 5  C4 (shrug):  Left: 5     Right: 5  C5 (Deltoid):  Left: 5    Right: 5  C6 (Biceps):  Left: 5    Right: 5  C7 (Triceps):  Left: 4    Right: 4  C8 (Thumb Ext): Left: 5    Right: 5  T1 (Intrinsics): Left: 5    Right: 5                            ROM:  AROM:  normal                    PROM:  normal                        Strength:    Flexion Elbow:  Left: 5/5 Strong/pain free  Pain:    Right: 5/5 Strong/pain free  Pain:  Extension Elbow: Left: 4/5 Weak/painful  Pain:    Right: 4/5 Weak/painful  Pain:  Flexion Wrist:  Left: 5/5 strong/painful Pain:    Right: 5/5 Strong/painful  Pain:  Extension Wrist: Left: 5/5 Strong/pain free  Pain:  Right: 5/5 Strong/pain free  Pain:  Supination Elbow/Wrist: Left: 5/5 Strong/pain free  Pain:    Right: 5/5 Strong/pain free  Pain:  Pronation Elbow/Wrist: Left: 4/5 Weak/painful  Pain:        Right: 5/5 Strong/pain free  Pain:  Radial Deviation:  Left: 5/5 Strong/pain free  Pain:    Right: 5/5 Strong/pain free  Pain:  Ulnar Deviation: Left: 5/5 Strong/pain free  Pain:    Right: 5/5 Strong/pain free  Pain:  Ligament Testing:  Valgus 0: Left: Neg  Right: Neg  Valgus 30:  Left:  Neg   Right:  Neg  [place]'s Milking:  Left:  Neg   Right:  Neg    Palpation:    Left wrist/elbow tenderness not present at:Lateral Epicondyle; Medial Epicondyle or Olecranon Bursa  Right wrist/elbow tenderness not present at:Lateral Epicondyle; Medial Epicondyle or Olecranon Bursa  Mobility:    Proximal Radioulnar:  Left: hypomobile   Right:  Hypomobile                                        Baldev Cervical Evaluation      Movement Loss:  Protrusion (PRO): nil  Flexion (Flex): nil  Retraction (RET): nil  Extension (EXT): mod  Lateral Flexion Right (LF R): min  Lateral Flexion Left (LF L): min  Rotation Right (ROT R): min  Rotation Left (ROT L): min  Test Movements:      RET: During: no effect  After: no effect    Repeat RET: During: no effect  After: no effect    RET EXT: During: no effect  After: no effect    Repeat RET EXT: During:  no effect  After: no effect  Mechanical Response: no effect                        Conclusion: other                                           ROS    Assessment/Plan:    Patient is a 52 year old female with cervical and both sides elbow complaints.    Patient has the following significant findings with corresponding treatment plan.                Diagnosis 1:  Cervical spine pain / elbow pain  Pain -  hot/cold therapy, US, manual therapy, self management, education, directional preference exercise and home program  Decreased ROM/flexibility - manual therapy, therapeutic exercise, therapeutic activity and home program  Decreased joint mobility - manual therapy, therapeutic exercise, therapeutic activity and home program  Decreased strength - therapeutic exercise, therapeutic activities and home program  Decreased function - therapeutic activities and home program    Therapy Evaluation Codes:   1) History comprised of:   Personal factors that impact the plan of care:      None.    Comorbidity factors that impact the plan of care are:      liver transplant.     Medications impacting care: Anti-depressant, Pain and Sleep.  2) Examination of Body Systems comprised of:   Body structures and functions that impact the plan of care:      Cervical spine and Elbow.   Activity limitations that impact the plan of care are:      Lifting.  3) Clinical presentation characteristics are:   Stable/Uncomplicated.  4) Decision-Making    Low complexity using standardized patient assessment instrument and/or measureable assessment of functional outcome.  Cumulative Therapy Evaluation is: Low complexity.    Previous and current functional limitations:  (See Goal Flow Sheet for this information)    Short term and Long term goals: (See Goal Flow Sheet for this information)     Communication ability:  Patient appears to be able to clearly communicate and understand verbal and written communication and follow directions correctly.  Treatment  Explanation - The following has been discussed with the patient:   RX ordered/plan of care  Anticipated outcomes  Possible risks and side effects  This patient would benefit from PT intervention to resume normal activities.   Rehab potential is good.    Frequency:  1 X week, once daily  Duration:  for 8 weeks  Discharge Plan:  Achieve all LTG.  Independent in home treatment program.  Reach maximal therapeutic benefit.    Please refer to the daily flowsheet for treatment today, total treatment time and time spent performing 1:1 timed codes.

## 2022-08-22 DIAGNOSIS — L50.9 URTICARIA: ICD-10-CM

## 2022-08-25 RX ORDER — FEXOFENADINE HCL 60 MG/1
60 TABLET, FILM COATED ORAL DAILY
Qty: 90 TABLET | Refills: 1 | Status: SHIPPED | OUTPATIENT
Start: 2022-08-25 | End: 2023-12-27

## 2022-08-25 NOTE — TELEPHONE ENCOUNTER
FEXOFENADINE HCL 60 MG TABLET  Last Written Prescription Date:   7/1/2022  Last Fill Quantity: 60,   # refills: 2  Last Office Visit : 7/27/2022  Future Office visit:  None    Routing refill request to provider for review/approval because:  Would Provider like a 90 day supply with refills to cover for a whole year for Pt care.        Cheryl Mon RN  Central Triage Red Flags/Med Refills

## 2022-08-27 DIAGNOSIS — L29.9 ITCHING: ICD-10-CM

## 2022-08-30 RX ORDER — HYDROXYZINE HYDROCHLORIDE 25 MG/1
50 TABLET, FILM COATED ORAL
Qty: 60 TABLET | Refills: 1 | OUTPATIENT
Start: 2022-08-30

## 2022-09-01 ENCOUNTER — LAB (OUTPATIENT)
Dept: LAB | Facility: CLINIC | Age: 52
End: 2022-09-01
Payer: COMMERCIAL

## 2022-09-01 DIAGNOSIS — Z94.4 LIVER REPLACED BY TRANSPLANT (H): ICD-10-CM

## 2022-09-01 LAB
ALBUMIN SERPL-MCNC: 3.7 G/DL (ref 3.4–5)
ALP SERPL-CCNC: 87 U/L (ref 40–150)
ALT SERPL W P-5'-P-CCNC: 52 U/L (ref 0–50)
ANION GAP SERPL CALCULATED.3IONS-SCNC: 5 MMOL/L (ref 3–14)
AST SERPL W P-5'-P-CCNC: 21 U/L (ref 0–45)
BILIRUB DIRECT SERPL-MCNC: 0.2 MG/DL (ref 0–0.2)
BILIRUB SERPL-MCNC: 0.5 MG/DL (ref 0.2–1.3)
BUN SERPL-MCNC: 22 MG/DL (ref 7–30)
CALCIUM SERPL-MCNC: 9 MG/DL (ref 8.5–10.1)
CHLORIDE BLD-SCNC: 106 MMOL/L (ref 94–109)
CO2 SERPL-SCNC: 28 MMOL/L (ref 20–32)
CREAT SERPL-MCNC: 1.04 MG/DL (ref 0.52–1.04)
ERYTHROCYTE [DISTWIDTH] IN BLOOD BY AUTOMATED COUNT: 12.5 % (ref 10–15)
GFR SERPL CREATININE-BSD FRML MDRD: 65 ML/MIN/1.73M2
GLUCOSE BLD-MCNC: 125 MG/DL (ref 70–99)
HCT VFR BLD AUTO: 38.9 % (ref 35–47)
HGB BLD-MCNC: 13.6 G/DL (ref 11.7–15.7)
MAGNESIUM SERPL-MCNC: 2 MG/DL (ref 1.6–2.3)
MCH RBC QN AUTO: 31.1 PG (ref 26.5–33)
MCHC RBC AUTO-ENTMCNC: 35 G/DL (ref 31.5–36.5)
MCV RBC AUTO: 89 FL (ref 78–100)
PHOSPHATE SERPL-MCNC: 3.4 MG/DL (ref 2.5–4.5)
PLATELET # BLD AUTO: 197 10E3/UL (ref 150–450)
POTASSIUM BLD-SCNC: 4.7 MMOL/L (ref 3.4–5.3)
PROT SERPL-MCNC: 6.3 G/DL (ref 6.8–8.8)
RBC # BLD AUTO: 4.38 10E6/UL (ref 3.8–5.2)
SODIUM SERPL-SCNC: 139 MMOL/L (ref 133–144)
TACROLIMUS BLD-MCNC: 4.5 UG/L (ref 5–15)
TME LAST DOSE: ABNORMAL H
TME LAST DOSE: ABNORMAL H
WBC # BLD AUTO: 4.1 10E3/UL (ref 4–11)

## 2022-09-01 PROCEDURE — 83735 ASSAY OF MAGNESIUM: CPT

## 2022-09-01 PROCEDURE — 80053 COMPREHEN METABOLIC PANEL: CPT

## 2022-09-01 PROCEDURE — 84100 ASSAY OF PHOSPHORUS: CPT

## 2022-09-01 PROCEDURE — 80197 ASSAY OF TACROLIMUS: CPT

## 2022-09-01 PROCEDURE — 85027 COMPLETE CBC AUTOMATED: CPT

## 2022-09-01 PROCEDURE — 36415 COLL VENOUS BLD VENIPUNCTURE: CPT

## 2022-09-01 PROCEDURE — 82248 BILIRUBIN DIRECT: CPT

## 2022-09-02 ENCOUNTER — TELEPHONE (OUTPATIENT)
Dept: TRANSPLANT | Facility: CLINIC | Age: 52
End: 2022-09-02

## 2022-09-02 DIAGNOSIS — Z94.4 STATUS POST LIVER TRANSPLANTATION (H): Primary | ICD-10-CM

## 2022-09-02 RX ORDER — TACROLIMUS 1 MG/1
CAPSULE ORAL
Qty: 450 CAPSULE | Refills: 3 | Status: SHIPPED | OUTPATIENT
Start: 2022-09-02 | End: 2022-11-10

## 2022-09-02 NOTE — TELEPHONE ENCOUNTER
ISSUE:   Tacrolimus IR level 4.5 on 9/1, goal 5-8 dose 2 mg BID.    PLAN:   Please call patient and confirm this was an accurate 12-hour trough. Verify Tacrolimus IR dose 2 mg BID. Confirm no new medications or illness. Confirm no missed doses. If accurate trough and accurate dose, increase Tacrolimus IR dose to 2 mg AM and 3 mg PM and repeat hepatic and tacro in 2 weeks.  Shalini Hawkins RN

## 2022-09-02 NOTE — TELEPHONE ENCOUNTER
Left a message to change tacro to 2mg am, 3mg pm. Check LFT's and tacro in 2 weeks and confirm dose change.

## 2022-09-10 ENCOUNTER — MYC REFILL (OUTPATIENT)
Dept: FAMILY MEDICINE | Facility: CLINIC | Age: 52
End: 2022-09-10

## 2022-09-10 DIAGNOSIS — L29.9 ITCHING: ICD-10-CM

## 2022-09-11 ENCOUNTER — MYC MEDICAL ADVICE (OUTPATIENT)
Dept: FAMILY MEDICINE | Facility: CLINIC | Age: 52
End: 2022-09-11

## 2022-09-12 RX ORDER — HYDROXYZINE HYDROCHLORIDE 25 MG/1
50 TABLET, FILM COATED ORAL
Qty: 90 TABLET | Refills: 0 | Status: SHIPPED | OUTPATIENT
Start: 2022-09-12 | End: 2022-10-04

## 2022-09-16 ENCOUNTER — THERAPY VISIT (OUTPATIENT)
Dept: PHYSICAL THERAPY | Facility: CLINIC | Age: 52
End: 2022-09-16
Payer: COMMERCIAL

## 2022-09-16 ENCOUNTER — HOSPITAL ENCOUNTER (OUTPATIENT)
Dept: PHYSICAL THERAPY | Facility: CLINIC | Age: 52
Setting detail: THERAPIES SERIES
Discharge: HOME | End: 2022-09-16
Attending: TRANSPLANT SURGERY
Payer: COMMERCIAL

## 2022-09-16 DIAGNOSIS — M54.2 CERVICALGIA: Primary | ICD-10-CM

## 2022-09-16 DIAGNOSIS — M25.521 PAIN OF BOTH ELBOWS: ICD-10-CM

## 2022-09-16 DIAGNOSIS — M25.522 PAIN OF BOTH ELBOWS: ICD-10-CM

## 2022-09-16 PROCEDURE — 97140 MANUAL THERAPY 1/> REGIONS: CPT | Mod: GP

## 2022-09-16 PROCEDURE — 97110 THERAPEUTIC EXERCISES: CPT | Mod: GP | Performed by: PHYSICAL THERAPIST

## 2022-09-16 PROCEDURE — 97110 THERAPEUTIC EXERCISES: CPT | Mod: GP

## 2022-09-21 ENCOUNTER — OFFICE VISIT (OUTPATIENT)
Dept: ORTHOPEDICS | Facility: CLINIC | Age: 52
End: 2022-09-21
Payer: COMMERCIAL

## 2022-09-21 ENCOUNTER — LAB (OUTPATIENT)
Dept: LAB | Facility: CLINIC | Age: 52
End: 2022-09-21

## 2022-09-21 DIAGNOSIS — M16.12 ARTHRITIS OF LEFT HIP: Primary | ICD-10-CM

## 2022-09-21 DIAGNOSIS — Z94.4 STATUS POST LIVER TRANSPLANTATION (H): ICD-10-CM

## 2022-09-21 LAB
ALBUMIN SERPL-MCNC: 3.6 G/DL (ref 3.4–5)
ALP SERPL-CCNC: 85 U/L (ref 40–150)
ALT SERPL W P-5'-P-CCNC: 53 U/L (ref 0–50)
AST SERPL W P-5'-P-CCNC: 26 U/L (ref 0–45)
BILIRUB DIRECT SERPL-MCNC: 0.1 MG/DL (ref 0–0.2)
BILIRUB SERPL-MCNC: 0.3 MG/DL (ref 0.2–1.3)
PROT SERPL-MCNC: 6.2 G/DL (ref 6.8–8.8)
TACROLIMUS BLD-MCNC: 7.4 UG/L (ref 5–15)
TME LAST DOSE: NORMAL H
TME LAST DOSE: NORMAL H

## 2022-09-21 PROCEDURE — 80197 ASSAY OF TACROLIMUS: CPT

## 2022-09-21 PROCEDURE — 36415 COLL VENOUS BLD VENIPUNCTURE: CPT

## 2022-09-21 PROCEDURE — 20611 DRAIN/INJ JOINT/BURSA W/US: CPT | Mod: LT | Performed by: PREVENTIVE MEDICINE

## 2022-09-21 PROCEDURE — 80076 HEPATIC FUNCTION PANEL: CPT

## 2022-09-21 PROCEDURE — 99207 PR DROP WITH A PROCEDURE: CPT | Performed by: PREVENTIVE MEDICINE

## 2022-09-21 RX ADMIN — TRIAMCINOLONE ACETONIDE 40 MG: 40 INJECTION, SUSPENSION INTRA-ARTICULAR; INTRAMUSCULAR at 15:40

## 2022-09-21 ASSESSMENT — PAIN SCALES - GENERAL: PAINLEVEL: MODERATE PAIN (5)

## 2022-09-21 NOTE — PROGRESS NOTES
Fidelina presents requesting a left hip injection as previously discussed.  Diagnosis: left hip arthritis  Intraarticular Hip Injection - Ultrasound Guided  The patient was informed of the risks and the benefits of the procedure and a written consent was signed.  The patient s left hip was prepped with chlorhexidine in sterile fashion.   40 mg of triamcinolone suspension was drawn up into a 5 mL syringe with 4 mL of 1% lidocaine.  Injection was performed using sterile technique.  Under ultrasound guidance a 3.5-inch 22-gauge needle was used to enter the femoracetabular joint.  Anterior approach was used, needle placement was visualized and documented with ultrasound.  Ultrasound visualization was necessary due to decreased joint space in the setting of osteoarthritis.  Injection performed long axis to the probe.  Injection solution visualized within the joint space.  Images were permanently stored for the patient's record.  There were no complications. The patient tolerated the procedure well. There was negligible bleeding.

## 2022-09-21 NOTE — NURSING NOTE
Chief Complaint   Patient presents with     Left Hip - Pain, Follow Up       There were no vitals filed for this visit.    There is no height or weight on file to calculate BMI.      ANTONIO Correa NREMT

## 2022-09-21 NOTE — NURSING NOTE
[facility]   ORTHOPEDICS & SPORTS MEDICINE  [address] [place] [address]  Dept: [phone]  ______________________________________________________________________________    Patient: Melita Cortes   : 1970   MRN: [id number]   2022    INVASIVE PROCEDURE SAFETY CHECKLIST    Date: 2022   Procedure: Left hip injection  Patient Name: Melita Cortes  MRN: [id number]  YOB: 1970    Action: Complete sections as appropriate. Any discrepancy results in a HARD COPY until resolved.     PRE PROCEDURE:  Patient ID verified with 2 identifiers (name and  or MRN): Yes  Procedure and site verified with patient/designee (when able): Yes  Accurate consent documentation in medical record: Yes  H&P (or appropriate assessment) documented in medical record: Yes  H&P must be up to 20 days prior to procedure and updates within 24 hours of procedure as applicable: NA  Relevant diagnostic and radiology test results appropriately labeled and displayed as applicable: NA  Procedure site(s) marked with provider initials: NA    TIMEOUT:  Time-Out performed immediately prior to starting procedure, including verbal and active participation of all team members addressing the following:Yes  * Correct patient identify  * Confirmed that the correct side and site are marked  * An accurate procedure consent form  * Agreement on the procedure to be done  * Correct patient position  * Relevant images and results are properly labeled and appropriately displayed  * The need to administer antibiotics or fluids for irrigation purposes during the procedure as applicable   * Safety precautions based on patient history or medication use    DURING PROCEDURE: Verification of correct person, site, and procedures any time the responsibility for care of the patient is transferred to another member of the care team.       Prior to injection, verified patient identity using patient's name and  date of birth.  Due to injection administration, patient instructed to remain in clinic for 15 minutes  afterwards, and to report any adverse reaction to me immediately.    Joint injection was performed.      Drug Amount Wasted:  None.  Vial/Syringe: Single dose vial  Expiration Date:  6/30/2024      Sunny German, EMT  September 21, 2022

## 2022-09-21 NOTE — LETTER
9/21/2022         RE: Melita Cortes  [address] [address] [place] [address] [address]  [place] [place] [address]        Dear Colleague,    Thank you for referring your patient, Melita Cortes, to the [facility] [place]. Please see a copy of my visit note below.    Fidelina presents requesting a left hip injection as previously discussed.  Diagnosis: left hip arthritis  Intraarticular Hip Injection - Ultrasound Guided  The patient was informed of the risks and the benefits of the procedure and a written consent was signed.  The patient s left hip was prepped with chlorhexidine in sterile fashion.   40 mg of triamcinolone suspension was drawn up into a 5 mL syringe with 4 mL of 1% lidocaine.  Injection was performed using sterile technique.  Under ultrasound guidance a 3.5-inch 22-gauge needle was used to enter the femoracetabular joint.  Anterior approach was used, needle placement was visualized and documented with ultrasound.  Ultrasound visualization was necessary due to decreased joint space in the setting of osteoarthritis.  Injection performed long axis to the probe.  Injection solution visualized within the joint space.  Images were permanently stored for the patient's record.  There were no complications. The patient tolerated the procedure well. There was negligible bleeding.             Large Joint Injection/Arthocentesis: L hip joint    Date/Time: 9/21/2022 3:40 PM  Performed by: Srinivas Cisneros MD  Authorized by: Srinivas Cisneros MD     Indications:  Pain and osteoarthritis  Needle Size:  22 G  Guidance: ultrasound    Approach:  Anterior  Location:  Hip      Site:  L hip joint  Medications:  40 mg triamcinolone 40 MG/ML  Outcome:  Tolerated well, no immediate complications  Procedure discussed: discussed risks, benefits, and alternatives    Consent Given by:  Patient  Timeout: timeout called immediately prior to procedure    Prep: patient was prepped and draped in usual sterile  fashion              Again, thank you for allowing me to participate in the care of your patient.        Sincerely,        Srinivas Cisneros MD

## 2022-09-21 NOTE — PROGRESS NOTES
Large Joint Injection/Arthocentesis: L hip joint    Date/Time: 9/21/2022 3:40 PM  Performed by: Srinivas Cisneros MD  Authorized by: Srinivas Cisneros MD     Indications:  Pain and osteoarthritis  Needle Size:  22 G  Guidance: ultrasound    Approach:  Anterior  Location:  Hip      Site:  L hip joint  Medications:  40 mg triamcinolone 40 MG/ML  Outcome:  Tolerated well, no immediate complications  Procedure discussed: discussed risks, benefits, and alternatives    Consent Given by:  Patient  Timeout: timeout called immediately prior to procedure    Prep: patient was prepped and draped in usual sterile fashion

## 2022-09-22 ENCOUNTER — TELEPHONE (OUTPATIENT)
Dept: TRANSPLANT | Facility: CLINIC | Age: 52
End: 2022-09-22

## 2022-09-22 DIAGNOSIS — Z94.4 LIVER REPLACED BY TRANSPLANT (H): Primary | ICD-10-CM

## 2022-09-22 DIAGNOSIS — R89.9 ELEVATED LABORATORY TEST RESULT: ICD-10-CM

## 2022-09-22 NOTE — TELEPHONE ENCOUNTER
Reviewed joint pain and elevated ALt with dr thomas. Patient to repeat PETH and hepatic panel. Left message for patient.

## 2022-09-24 ENCOUNTER — HEALTH MAINTENANCE LETTER (OUTPATIENT)
Age: 52
End: 2022-09-24

## 2022-09-28 ENCOUNTER — TELEPHONE (OUTPATIENT)
Dept: TRANSPLANT | Facility: CLINIC | Age: 52
End: 2022-09-28

## 2022-09-28 NOTE — TELEPHONE ENCOUNTER
Pt calls and wants to know if she can get the covid booster if she received her flu vaccine today. Informed pt that she can receive both vaccines and does not require a 2 weeks waiting period per Crystal.

## 2022-10-02 RX ORDER — TRIAMCINOLONE ACETONIDE 40 MG/ML
40 INJECTION, SUSPENSION INTRA-ARTICULAR; INTRAMUSCULAR
Status: DISCONTINUED | OUTPATIENT
Start: 2022-09-21 | End: 2023-08-31

## 2022-10-04 DIAGNOSIS — L29.9 ITCHING: ICD-10-CM

## 2022-10-04 RX ORDER — HYDROXYZINE HYDROCHLORIDE 25 MG/1
50 TABLET, FILM COATED ORAL
Qty: 28 TABLET | Refills: 0 | Status: SHIPPED | OUTPATIENT
Start: 2022-10-04 | End: 2022-10-23

## 2022-10-05 ENCOUNTER — TELEPHONE (OUTPATIENT)
Dept: FAMILY MEDICINE | Facility: CLINIC | Age: 52
End: 2022-10-05

## 2022-10-05 NOTE — TELEPHONE ENCOUNTER
Nurse from pt insurance calling about pt. She wants fax number for BA, number given.  She wants to confirm pt last ov with Dr. Johnson, confirmed on 7/11/22.  Confirmed pt is getting PT at [facility].  Nothing else needed.  Lupe THORNTONN, RN

## 2022-10-07 ENCOUNTER — LAB (OUTPATIENT)
Dept: LAB | Facility: CLINIC | Age: 52
End: 2022-10-07

## 2022-10-07 DIAGNOSIS — Z94.4 LIVER REPLACED BY TRANSPLANT (H): ICD-10-CM

## 2022-10-07 DIAGNOSIS — R89.9 ELEVATED LABORATORY TEST RESULT: ICD-10-CM

## 2022-10-07 LAB
ALBUMIN SERPL-MCNC: 3.5 G/DL (ref 3.4–5)
ALP SERPL-CCNC: 97 U/L (ref 40–150)
ALT SERPL W P-5'-P-CCNC: 68 U/L (ref 0–50)
AST SERPL W P-5'-P-CCNC: 29 U/L (ref 0–45)
BILIRUB DIRECT SERPL-MCNC: <0.1 MG/DL (ref 0–0.2)
BILIRUB SERPL-MCNC: 0.5 MG/DL (ref 0.2–1.3)
PROT SERPL-MCNC: 6.2 G/DL (ref 6.8–8.8)
TACROLIMUS BLD-MCNC: 6.6 UG/L (ref 5–15)
TME LAST DOSE: NORMAL H
TME LAST DOSE: NORMAL H

## 2022-10-07 PROCEDURE — 80076 HEPATIC FUNCTION PANEL: CPT

## 2022-10-07 PROCEDURE — 99000 SPECIMEN HANDLING OFFICE-LAB: CPT

## 2022-10-07 PROCEDURE — 36415 COLL VENOUS BLD VENIPUNCTURE: CPT

## 2022-10-07 PROCEDURE — 80197 ASSAY OF TACROLIMUS: CPT

## 2022-10-07 PROCEDURE — 80321 ALCOHOLS BIOMARKERS 1OR 2: CPT | Mod: 90

## 2022-10-09 LAB
PLPETH BLD-MCNC: <10 NG/ML
POPETH BLD-MCNC: <10 NG/ML

## 2022-10-13 ENCOUNTER — TELEPHONE (OUTPATIENT)
Dept: TRANSPLANT | Facility: CLINIC | Age: 52
End: 2022-10-13

## 2022-10-13 NOTE — TELEPHONE ENCOUNTER
Left a message that any future refills for hydroxyzine needs to go through the PCP and to call with any questions.

## 2022-10-23 ENCOUNTER — MYC REFILL (OUTPATIENT)
Dept: TRANSPLANT | Facility: CLINIC | Age: 52
End: 2022-10-23

## 2022-10-23 DIAGNOSIS — L29.9 ITCHING: ICD-10-CM

## 2022-10-24 RX ORDER — HYDROXYZINE HYDROCHLORIDE 25 MG/1
50 TABLET, FILM COATED ORAL
Qty: 28 TABLET | Refills: 0 | Status: SHIPPED | OUTPATIENT
Start: 2022-10-24 | End: 2022-11-09

## 2022-11-01 ENCOUNTER — TELEPHONE (OUTPATIENT)
Dept: DERMATOLOGY | Facility: CLINIC | Age: 52
End: 2022-11-01

## 2022-11-01 ENCOUNTER — OFFICE VISIT (OUTPATIENT)
Dept: DERMATOLOGY | Facility: CLINIC | Age: 52
End: 2022-11-01
Payer: COMMERCIAL

## 2022-11-01 DIAGNOSIS — D04.72 SQUAMOUS CELL CARCINOMA IN SITU (SCCIS) OF SKIN OF LEFT LOWER LEG: Primary | ICD-10-CM

## 2022-11-01 PROCEDURE — 99024 POSTOP FOLLOW-UP VISIT: CPT | Performed by: DERMATOLOGY

## 2022-11-01 ASSESSMENT — PAIN SCALES - GENERAL: PAINLEVEL: NO PAIN (0)

## 2022-11-01 NOTE — PROGRESS NOTES
Dermatologic Surgery Clinic Note    Nov 1, 2022    Dermatology Problem List:  1. PMH liver transplant 1/7/2022-alcoholic liver cirrhosis  - tacrolimus, prednisone  2. SCCis, left shin, s/p shave bx 4/27/2022, p s/p Mohs surgery  3. 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 Rhytides - tretinoin 0.025% cream  5. AK, s/p cryo  6. Urticaria and dermatographism  7. Intertrigo  -ketoconazole  8. Facial rhytides  -tretinoin  9. Cosmetics  -botox  -juvederm     Social: .     Subjective: The patient is a 52 year old woman who presents today for scar evaluation. Patient notes no other spots of concern    No other associated symptoms, modifying factors, or prior treatments, except when noted above. The patient denies any constitutional symptoms, lymphadenopathy, unintentional weight loss or decreased appetite. No other skin concerns today.    Objective:   Gen: This is a well appearing individual in no acute distress. The patient is alert and oriented x 3.  An exam of the leg was performed today and visualized the following:    - Well healed scar on leg    Assessment and Plan:     Well healed scar in SOTR.  Follow up with general dermatology.    The patient was discussed with and evaluated by attending physician, Long Saba MD.    Scribe Disclosure:  I, Gabbi Loja, am serving as a scribe to document services personally performed by Long Saba MD based on data collection and the provider's statements to me.     Attending Attestation  I attest that the Scribe recorded the interview and exam that I personally performed.  I have reviewed the note and edited it as necessary.    Long Saba M.D.  Professor  Director of Dermatologic Surgery  Department of Dermatology  [facility]

## 2022-11-01 NOTE — TELEPHONE ENCOUNTER
Rohitm sent my chart informing Pt she needs to call and rescheduled her 12-21-22 appointment with  [facility].

## 2022-11-01 NOTE — LETTER
11/1/2022       RE: Melita Cortes  [id number]  [place] [address]     Dear Colleague,    Thank you for referring your patient, Melita Cortes, to the [facility]MAT[facility] [place] at [facility]. Please see a copy of my visit note below.      Dermatologic Surgery Clinic Note    Nov 1, 2022    Dermatology Problem List:  1. PMH liver transplant 1/7/2022-alcoholic liver cirrhosis  - tacrolimus, prednisone  2. SCCis, left shin, s/p shave bx 4/27/2022, p s/p Mohs surgery  3. 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 Rhytides - tretinoin 0.025% cream  5. AK, s/p cryo  6. Urticaria and dermatographism  7. Intertrigo  -ketoconazole  8. Facial rhytides  -tretinoin  9. Cosmetics  -botox  -juvederm     Social: .     Subjective: The patient is a 52 year old woman who presents today for scar evaluation. Patient notes no other spots of concern    No other associated symptoms, modifying factors, or prior treatments, except when noted above. The patient denies any constitutional symptoms, lymphadenopathy, unintentional weight loss or decreased appetite. No other skin concerns today.    Objective:   Gen: This is a well appearing individual in no acute distress. The patient is alert and oriented x 3.  An exam of the leg was performed today and visualized the following:    - Well healed scar on leg    Assessment and Plan:     Well healed scar in SOTR.  Follow up with general dermatology.    The patient was discussed with and evaluated by attending physician, Long Saba MD.    Scribe Disclosure:  I, Gabbi Loja, am serving as a scribe to document services personally performed by Long Saba MD based on data collection and the provider's statements to me.     Attending Attestation  I attest that the Scribe recorded the interview and exam that I personally performed.  I have reviewed the note and edited it  as necessary.    Long Saba M.D.  Professor  Director of Dermatologic Surgery  Department of Dermatology  [facility]

## 2022-11-01 NOTE — NURSING NOTE
Chief Complaint   Patient presents with     Derm Problem     Patient is here today for a 3 month scar evaluation on the left young.     Bronwyn MCFARLANE RN

## 2022-11-07 ENCOUNTER — TELEPHONE (OUTPATIENT)
Dept: TRANSPLANT | Facility: CLINIC | Age: 52
End: 2022-11-07

## 2022-11-07 ENCOUNTER — MYC MEDICAL ADVICE (OUTPATIENT)
Dept: FAMILY MEDICINE | Facility: CLINIC | Age: 52
End: 2022-11-07

## 2022-11-07 DIAGNOSIS — F33.0 MILD RECURRENT MAJOR DEPRESSION (H): ICD-10-CM

## 2022-11-07 DIAGNOSIS — L29.9 ITCHING: ICD-10-CM

## 2022-11-07 NOTE — TELEPHONE ENCOUNTER
Spoke to pt and informed her that effexor and atarax filled through PCP. Pt will reach out to PCP.

## 2022-11-09 ENCOUNTER — LAB (OUTPATIENT)
Dept: LAB | Facility: CLINIC | Age: 52
End: 2022-11-09
Payer: COMMERCIAL

## 2022-11-09 DIAGNOSIS — R89.9 ELEVATED LABORATORY TEST RESULT: ICD-10-CM

## 2022-11-09 DIAGNOSIS — Z94.4 LIVER REPLACED BY TRANSPLANT (H): Primary | ICD-10-CM

## 2022-11-09 LAB
ALBUMIN SERPL-MCNC: 3.9 G/DL (ref 3.4–5)
ALP SERPL-CCNC: 91 U/L (ref 40–150)
ALT SERPL W P-5'-P-CCNC: 61 U/L (ref 0–50)
ANION GAP SERPL CALCULATED.3IONS-SCNC: 5 MMOL/L (ref 3–14)
AST SERPL W P-5'-P-CCNC: 33 U/L (ref 0–45)
BILIRUB DIRECT SERPL-MCNC: 0.1 MG/DL (ref 0–0.2)
BILIRUB SERPL-MCNC: 0.5 MG/DL (ref 0.2–1.3)
BUN SERPL-MCNC: 23 MG/DL (ref 7–30)
CALCIUM SERPL-MCNC: 9.2 MG/DL (ref 8.5–10.1)
CHLORIDE BLD-SCNC: 108 MMOL/L (ref 94–109)
CO2 SERPL-SCNC: 26 MMOL/L (ref 20–32)
CREAT SERPL-MCNC: 0.94 MG/DL (ref 0.52–1.04)
ERYTHROCYTE [DISTWIDTH] IN BLOOD BY AUTOMATED COUNT: 11.7 % (ref 10–15)
GFR SERPL CREATININE-BSD FRML MDRD: 73 ML/MIN/1.73M2
GLUCOSE BLD-MCNC: 123 MG/DL (ref 70–99)
HCT VFR BLD AUTO: 41.5 % (ref 35–47)
HGB BLD-MCNC: 14.2 G/DL (ref 11.7–15.7)
MAGNESIUM SERPL-MCNC: 1.6 MG/DL (ref 1.6–2.3)
MCH RBC QN AUTO: 29.6 PG (ref 26.5–33)
MCHC RBC AUTO-ENTMCNC: 34.2 G/DL (ref 31.5–36.5)
MCV RBC AUTO: 87 FL (ref 78–100)
PHOSPHATE SERPL-MCNC: 4.1 MG/DL (ref 2.5–4.5)
PLATELET # BLD AUTO: 203 10E3/UL (ref 150–450)
POTASSIUM BLD-SCNC: 4.3 MMOL/L (ref 3.4–5.3)
PROT SERPL-MCNC: 6.8 G/DL (ref 6.8–8.8)
RBC # BLD AUTO: 4.8 10E6/UL (ref 3.8–5.2)
SODIUM SERPL-SCNC: 139 MMOL/L (ref 133–144)
TACROLIMUS BLD-MCNC: 8.6 UG/L (ref 5–15)
TME LAST DOSE: NORMAL H
TME LAST DOSE: NORMAL H
WBC # BLD AUTO: 4.2 10E3/UL (ref 4–11)

## 2022-11-09 PROCEDURE — 80053 COMPREHEN METABOLIC PANEL: CPT

## 2022-11-09 PROCEDURE — 85027 COMPLETE CBC AUTOMATED: CPT

## 2022-11-09 PROCEDURE — 83735 ASSAY OF MAGNESIUM: CPT

## 2022-11-09 PROCEDURE — 80197 ASSAY OF TACROLIMUS: CPT

## 2022-11-09 PROCEDURE — 84100 ASSAY OF PHOSPHORUS: CPT

## 2022-11-09 PROCEDURE — 36415 COLL VENOUS BLD VENIPUNCTURE: CPT

## 2022-11-09 PROCEDURE — 82248 BILIRUBIN DIRECT: CPT

## 2022-11-09 PROCEDURE — 99000 SPECIMEN HANDLING OFFICE-LAB: CPT

## 2022-11-09 PROCEDURE — 80321 ALCOHOLS BIOMARKERS 1OR 2: CPT | Mod: 90

## 2022-11-09 RX ORDER — HYDROXYZINE HYDROCHLORIDE 25 MG/1
50 TABLET, FILM COATED ORAL
Qty: 60 TABLET | Refills: 0 | Status: SHIPPED | OUTPATIENT
Start: 2022-11-09 | End: 2022-12-02

## 2022-11-09 NOTE — TELEPHONE ENCOUNTER
Routing to provider to review and advise.    Pt is requesting a prescription of hydroxyzine.  Med was previously prescribed by pt's liver specialist.        Alicia Knott RN  [facility]

## 2022-11-10 DIAGNOSIS — Z94.4 STATUS POST LIVER TRANSPLANTATION (H): Primary | ICD-10-CM

## 2022-11-10 RX ORDER — TACROLIMUS 1 MG/1
2 CAPSULE ORAL EVERY 12 HOURS
Qty: 360 CAPSULE | Refills: 3 | Status: SHIPPED | OUTPATIENT
Start: 2022-11-10 | End: 2023-01-12

## 2022-11-10 NOTE — TELEPHONE ENCOUNTER
ISSUE:   Tacrolimus IR level 8.6 on 11.9.2022, goal 5-7 dose 2 mg QAM, 3 mg QPM.     PLAN:   Please call patient and confirm this was an accurate 12-hour trough. Verify Tacrolimus IR dose 2 mg QAM/ 3mgQPM. Confirm no new medications or illness. Confirm no missed doses. If accurate trough and accurate dose, change Tacrolimus IR dose to 2 mg BID and repeat hepatic and tacro in 2 weeks.

## 2022-11-11 LAB
PLPETH BLD-MCNC: <10 NG/ML
POPETH BLD-MCNC: <10 NG/ML

## 2022-11-20 ENCOUNTER — MYC MEDICAL ADVICE (OUTPATIENT)
Dept: FAMILY MEDICINE | Facility: CLINIC | Age: 52
End: 2022-11-20

## 2022-11-25 ENCOUNTER — LAB (OUTPATIENT)
Dept: LAB | Facility: CLINIC | Age: 52
End: 2022-11-25
Payer: COMMERCIAL

## 2022-11-25 DIAGNOSIS — Z94.4 STATUS POST LIVER TRANSPLANTATION (H): ICD-10-CM

## 2022-11-25 LAB
ALBUMIN SERPL-MCNC: 3.8 G/DL (ref 3.4–5)
ALP SERPL-CCNC: 95 U/L (ref 40–150)
ALT SERPL W P-5'-P-CCNC: 55 U/L (ref 0–50)
ANION GAP SERPL CALCULATED.3IONS-SCNC: 6 MMOL/L (ref 3–14)
AST SERPL W P-5'-P-CCNC: 29 U/L (ref 0–45)
BILIRUB DIRECT SERPL-MCNC: <0.1 MG/DL (ref 0–0.2)
BILIRUB SERPL-MCNC: 0.4 MG/DL (ref 0.2–1.3)
BUN SERPL-MCNC: 18 MG/DL (ref 7–30)
CALCIUM SERPL-MCNC: 8.9 MG/DL (ref 8.5–10.1)
CHLORIDE BLD-SCNC: 110 MMOL/L (ref 94–109)
CO2 SERPL-SCNC: 27 MMOL/L (ref 20–32)
CREAT SERPL-MCNC: 0.98 MG/DL (ref 0.52–1.04)
ERYTHROCYTE [DISTWIDTH] IN BLOOD BY AUTOMATED COUNT: 11.9 % (ref 10–15)
GFR SERPL CREATININE-BSD FRML MDRD: 70 ML/MIN/1.73M2
GLUCOSE BLD-MCNC: 123 MG/DL (ref 70–99)
HCT VFR BLD AUTO: 41 % (ref 35–47)
HGB BLD-MCNC: 14 G/DL (ref 11.7–15.7)
MAGNESIUM SERPL-MCNC: 1.6 MG/DL (ref 1.6–2.3)
MCH RBC QN AUTO: 29.5 PG (ref 26.5–33)
MCHC RBC AUTO-ENTMCNC: 34.1 G/DL (ref 31.5–36.5)
MCV RBC AUTO: 86 FL (ref 78–100)
PHOSPHATE SERPL-MCNC: 4 MG/DL (ref 2.5–4.5)
PLATELET # BLD AUTO: 212 10E3/UL (ref 150–450)
POTASSIUM BLD-SCNC: 4.6 MMOL/L (ref 3.4–5.3)
PROT SERPL-MCNC: 6.3 G/DL (ref 6.8–8.8)
RBC # BLD AUTO: 4.75 10E6/UL (ref 3.8–5.2)
SODIUM SERPL-SCNC: 143 MMOL/L (ref 133–144)
TACROLIMUS BLD-MCNC: 7.2 UG/L (ref 5–15)
TME LAST DOSE: NORMAL H
TME LAST DOSE: NORMAL H
WBC # BLD AUTO: 3.8 10E3/UL (ref 4–11)

## 2022-11-25 PROCEDURE — 82248 BILIRUBIN DIRECT: CPT

## 2022-11-25 PROCEDURE — 80053 COMPREHEN METABOLIC PANEL: CPT

## 2022-11-25 PROCEDURE — 36415 COLL VENOUS BLD VENIPUNCTURE: CPT

## 2022-11-25 PROCEDURE — 85027 COMPLETE CBC AUTOMATED: CPT

## 2022-11-25 PROCEDURE — 80197 ASSAY OF TACROLIMUS: CPT

## 2022-11-25 PROCEDURE — 83735 ASSAY OF MAGNESIUM: CPT

## 2022-11-25 PROCEDURE — 84100 ASSAY OF PHOSPHORUS: CPT

## 2022-11-29 NOTE — PROGRESS NOTES
DISCHARGE SUMMARY    Melita Cortes was seen 2 times for evaluation and treatment.  Patient did not return for further treatment and current status is unknown.  Due to short treatment duration, no objective or functional changes were made.  Please see goal flow sheet from episode noted date below and initial evaluation for further information.  Patient is discharged from therapy and therapy episode is resolved as of 11/29/22.      Linked Episodes   Type: Episode: Status: Noted: Resolved: Last update: Updated by:   PHYSICAL THERAPY neck pain 8/19/22 Active 8/19/2022 9/16/2022 12:51 PM Unruly Cormier, PT      Comments:

## 2022-11-30 ENCOUNTER — OFFICE VISIT (OUTPATIENT)
Dept: DERMATOLOGY | Facility: CLINIC | Age: 52
End: 2022-11-30
Payer: COMMERCIAL

## 2022-11-30 DIAGNOSIS — L81.4 SOLAR LENTIGO: ICD-10-CM

## 2022-11-30 DIAGNOSIS — Z94.4 LIVER REPLACED BY TRANSPLANT (H): ICD-10-CM

## 2022-11-30 DIAGNOSIS — D18.01 CHERRY ANGIOMA: ICD-10-CM

## 2022-11-30 DIAGNOSIS — D22.9 MULTIPLE BENIGN NEVI: ICD-10-CM

## 2022-11-30 DIAGNOSIS — L82.1 SEBORRHEIC KERATOSIS: Primary | ICD-10-CM

## 2022-11-30 DIAGNOSIS — Z85.828 HISTORY OF NONMELANOMA SKIN CANCER: ICD-10-CM

## 2022-11-30 PROCEDURE — 99213 OFFICE O/P EST LOW 20 MIN: CPT | Performed by: DERMATOLOGY

## 2022-11-30 ASSESSMENT — PAIN SCALES - GENERAL: PAINLEVEL: NO PAIN (0)

## 2022-11-30 NOTE — LETTER
11/30/2022       RE: Melita Cortes  [id number]  [place] [address]     Dear Colleague,    Thank you for referring your patient, Melita Cortes, to the [facility] DERMATOLOGY [facility] [place] at [facility]. Please see a copy of my visit note below.    [facility] Dermatology Note  Encounter Date: Nov 30, 2022  Office Visit     Dermatology Problem List:  1. History of immunosuppression s/p liver transplant 2/2 alcoholic cirrhosis 1/7/2022  - Current tx: tacrolimus, prednisone  2. 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 Rhytides   - Current tx: tretinoin 0.025% cream  4. AK, s/p cryo  5. Urticaria and dermatographism  6. Intertrigo  -Current tx: ketoconazole  7. Cosmetics  - botox  - juvederm  8. 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   - Reviewed with the patient that due to the immunosuppressive medications used following transplant, solid organ transplant recipients are at a roughly 65-fold increased risk of squamous cell carcinoma, 20-fold increased risk of basal cell carcinoma, and 3.4 fold increased risk of melanoma compared to the general population.   - Briefly reviewed prevention methods for reducing skin cancer after transplantation including avoidance of sun exposure, avoidance of indoor tanning beds or other indoor tanning devices, use of protective clothing (e.g. wide-brimmed hats, long sleeves, long pants), SPF 30 or greater sunscreen, and UV-protective sunglasses when outdoors.   - Recommended OTC Heliocare for heavy sun exposure such as upcoming trip to [place]. Advised that 2 pills can be taken vs label instructions of 1 pill.   - Discussed self skin examinations and  partner-skin examinations.     # Benign lesions: Multiple benign nevi, solar lentigos, seborrheic keratoses, cherry angiomas. Explained to patient benign nature of lesion. No treatment is necessary at this time unless the lesion changes or becomes symptomatic.   - ABCDs of melanoma were discussed and self skin checks were advised.  - Sun precaution was advised including the use of sun screens of SPF 30 or higher, sun protective clothing, and avoidance of tanning beds.    # History of NMSC. No evidence of recurrence.  - Continue annual skin exams every.    Procedures Performed:   None    Follow-up: 1 year(s) in-person, or earlier for new or changing lesions    Staff and Scribe:     Scribe Disclosure:  I, Magdaleno Owusu, am serving as a scribe to document services personally performed by Donovan Breen MD based on data collection and the provider's statements to me.     Provider Disclosure:   The documentation recorded by the scribe accurately reflects the services I personally performed and the decisions made by me.    Donovan Breen MD    Department of Dermatology  [facility]: Phone: [phone], Fax:[phone]  [facility]: Phone: [phone] Fax: [phone]  ____________________________________________    CC: Skin Check (Fidelina is here for a skin check and has no spots of concern.)    HPI:  Ms. Melita Cortes is a(n) 52 year old female who presents today as a return patient for a FBSE. Last seen in dermatology by Dr. Long Saba on 11/1/2022, at which time patient's MMS scar on the left shin was evaluated.    Today, the patient is otherwise feeling well, without additional skin concerns. She reviews that she sun burns fairly easily. She is going on vacation to [place] in 1 week.     Labs Reviewed:  \N/A    Physical Exam:  Vitals: There were no vitals taken for this visit.  SKIN: Full  skin, which includes the head/face, both arms, chest, back, abdomen,both legs, genitalia and/or groin buttocks, digits and/or nails, was examined.  - Well-healed scar on the left shin.  - There are dome shaped bright red papules on the trunk and extremities.   - Multiple regular brown pigmented macules and papules are identified on the trunk and extremities.   - Scattered brown macules on sun exposed areas.  - There are waxy stuck on tan to brown papules on the trunk and extremities.   - No other lesions of concern on areas examined.     Medications:  Current Outpatient Medications   Medication     calcium carbonate (OS-SHAI) 1500 (600 Ca) MG tablet     hydrOXYzine (ATARAX) 25 MG tablet     magnesium oxide (MAG-OX) 400 MG tablet     multivitamin (CENTRUM SILVER) tablet     pantoprazole (PROTONIX) 40 MG EC tablet     tacrolimus (GENERIC EQUIVALENT) 1 MG capsule     venlafaxine (EFFEXOR XR) 37.5 MG 24 hr capsule     fexofenadine (ALLEGRA) 60 MG tablet     hydrocortisone 2.5 % ointment     hydroquinone (KARIN) 4 % external cream     pantoprazole (PROTONIX) 40 MG EC tablet     thiamine (B-1) 100 MG tablet     Current Facility-Administered Medications   Medication     triamcinolone (KENALOG-40) injection 40 mg     triamcinolone (KENALOG-40) injection 40 mg      Past Medical History:   Patient Active Problem List   Diagnosis     Abdominal bloating     Family history of pancreatic cancer     Transaminitis     Macrocytosis     Cervical high risk HPV (human papillomavirus) test positive     Anemia, unspecified type     Elevated serum creatinine     Alcoholic cirrhosis of liver with ascites (H)     Hyponatremia     Malaise and fatigue     At high risk for severe sepsis     Symptomatic anemia     Bandemia     Hyperlipidemia     Anxiety     Status post liver transplantation (H)     Immunosuppressed status (H)     Steroid-induced hyperglycemia     Hypervolemia     Severe malnutrition (H)     Elevated alkaline phosphatase level      Cervicalgia     Pain of both elbows     Past Medical History:   Diagnosis Date     Alcoholic hepatitis      Asthma 3/10/2006     Cervical high risk HPV (human papillomavirus) test positive 2019, 2020    See problem list     Liver failure (H)          Again, thank you for allowing me to participate in the care of your patient.      Sincerely,    Donovan Breen MD

## 2022-11-30 NOTE — PATIENT INSTRUCTIONS
Sunburn Protection  Recommend OTC Heliocare supplement      Patient Education     Checking for Skin Cancer  You can find cancer early by checking your skin each month. There are 3 kinds of skin cancer. They are melanoma, basal cell carcinoma, and squamous cell carcinoma. Doing monthly skin checks is the best way to find new marks or skin changes. Follow the instructions below for checking your skin.   The ABCDEs of checking moles for melanoma   Check your moles or growths for signs of melanoma using ABCDE:   Asymmetry: the sides of the mole or growth don t match  Border: the edges are ragged, notched, or blurred  Color: the color within the mole or growth varies  Diameter: the mole or growth is larger than 6 mm (size of a pencil eraser)  Evolving: the size, shape, or color of the mole or growth is changing (evolving is not shown in the images below)    Checking for other types of skin cancer  Basal cell carcinoma or squamous cell carcinoma have symptoms such as:     A spot or mole that looks different from all other marks on your skin  Changes in how an area feels, such as itching, tenderness, or pain  Changes in the skin's surface, such as oozing, bleeding, or scaliness  A sore that does not heal  New swelling or redness beyond the border of a mole    Who s at risk?  Anyone can get skin cancer. But you are at greater risk if you have:   Fair skin, light-colored hair, or light-colored eyes  Many moles or abnormal moles on your skin  A history of sunburns from sunlight or tanning beds  A family history of skin cancer  A history of exposure to radiation or chemicals  A weakened immune system  If you have had skin cancer in the past, you are at risk for recurring skin cancer.   How to check your skin  Do your monthly skin checkups in front of a full-length mirror. Check all parts of your body, including your:   Head (ears, face, neck, and scalp)  Torso (front, back, and sides)  Arms (tops, undersides, upper, and lower  armpits)  Hands (palms, backs, and fingers, including under the nails)  Buttocks and genitals  Legs (front, back, and sides)  Feet (tops, soles, toes, including under the nails, and between toes)  If you have a lot of moles, take digital photos of them each month. Make sure to take photos both up close and from a distance. These can help you see if any moles change over time.   Most skin changes are not cancer. But if you see any changes in your skin, call your doctor right away. Only he or she can diagnose a problem. If you have skin cancer, seeing your doctor can be the first step toward getting the treatment that could save your life.   [employer] last reviewed this educational content on 4/1/2019 2000-2020 [employer]. [address] [place] [address]. All rights reserved. This information is not intended as a substitute for professional medical care. Always follow your healthcare professional's instructions.       When should I call my doctor?  If you are worsening or not improving, please, contact us or seek urgent care as noted below.     Who should I call with questions (adults)?  [facility] (adult and pediatric): [phone]  [facility] (adult): [phone]  For urgent needs outside of business hours call the [facility] at [phone] and ask for the dermatology resident on call to be paged  If this is a medical emergency and you are unable to reach an ER, Call [phone]    Who should I call with questions (pediatric)?  [facility]- Pediatric Dermatology  Dr. Joanna Harris, Dr. Vy Tanner, Dr. Joyce Rhodes, Evita Reynoso, PA, Dr. Roopa Valentine, Dr. Nayeli Tran & Dr. Srinivas Fontanez  Non-urgent nurse triage line; [phone]- Alayna and Priya HARLEY Care Coordinatorsiena Mcgee (/Complex ) [phone]    If you need a prescription refill, please contact your  pharmacy. Refills are approved or denied by our Physicians during normal business hours, Monday through Fridays  Per office policy, refills will not be granted if you have not been seen within the past year (or sooner depending on your child's condition)    Scheduling Information:  Pediatric Appointment Scheduling and Call Center [phone]  Radiology Scheduling- [phone]  Sedation Unit Scheduling- [phone]  [place] Scheduling- [facility] [phone]; Pediatric Dermatology [phone]  Main  Services: [phone]  Croatian: [phone]  Pakistani: [phone]  Hmong/[place] citizen/Nate: [phone]  Preadmission Nursing Department Fax Number: [phone] (Fax all pre-operative paperwork to this number)    For urgent matters arising during evenings, weekends, or holidays that cannot wait for normal business hours please call [phone] and ask for the dermatology resident on call to be paged.

## 2022-11-30 NOTE — PROGRESS NOTES
[facility] Dermatology Note  Encounter Date: Nov 30, 2022  Office Visit     Dermatology Problem List:  1. History of immunosuppression s/p liver transplant 2/2 alcoholic cirrhosis 1/7/2022  - Current tx: tacrolimus, prednisone  2. 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 Rhytides   - Current tx: tretinoin 0.025% cream  4. AK, s/p cryo  5. Urticaria and dermatographism  6. Intertrigo  -Current tx: ketoconazole  7. Cosmetics  - botox  - juvederm  8. 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   - Reviewed with the patient that due to the immunosuppressive medications used following transplant, solid organ transplant recipients are at a roughly 65-fold increased risk of squamous cell carcinoma, 20-fold increased risk of basal cell carcinoma, and 3.4 fold increased risk of melanoma compared to the general population.   - Briefly reviewed prevention methods for reducing skin cancer after transplantation including avoidance of sun exposure, avoidance of indoor tanning beds or other indoor tanning devices, use of protective clothing (e.g. wide-brimmed hats, long sleeves, long pants), SPF 30 or greater sunscreen, and UV-protective sunglasses when outdoors.   - Recommended OTC Heliocare for heavy sun exposure such as upcoming trip to [place]. Advised that 2 pills can be taken vs label instructions of 1 pill.   - Discussed self skin examinations and partner-skin examinations.     # Benign lesions: Multiple benign nevi, solar lentigos, seborrheic keratoses, cherry angiomas. Explained to patient benign nature of lesion. No treatment is necessary at this time unless the lesion changes or becomes symptomatic.   - ABCDs of melanoma were discussed and self skin checks were advised.  -  Sun precaution was advised including the use of sun screens of SPF 30 or higher, sun protective clothing, and avoidance of tanning beds.    # History of NMSC. No evidence of recurrence.  - Continue annual skin exams every.    Procedures Performed:   None    Follow-up: 1 year(s) in-person, or earlier for new or changing lesions    Staff and Scribe:     Scribe Disclosure:  I, Magdaleno Winstonlelandanne marie, am serving as a scribe to document services personally performed by Donovan Breen MD based on data collection and the provider's statements to me.     Provider Disclosure:   The documentation recorded by the scribe accurately reflects the services I personally performed and the decisions made by me.    Donovan Breen MD    Department of Dermatology  [facility]: Phone: [phone], Fax:[phone]  [facility]: Phone: [phone] Fax: [phone]  ____________________________________________    CC: Skin Check (Fidelina is here for a skin check and has no spots of concern.)    HPI:  Ms. Melita Cortes is a(n) 52 year old female who presents today as a return patient for a FBSE. Last seen in dermatology by Dr. Long Saba on 11/1/2022, at which time patient's MMS scar on the left shin was evaluated.    Today, the patient is otherwise feeling well, without additional skin concerns. She reviews that she sun burns fairly easily. She is going on vacation to [place] in 1 week.     Labs Reviewed:  \N/A    Physical Exam:  Vitals: There were no vitals taken for this visit.  SKIN: Full skin, which includes the head/face, both arms, chest, back, abdomen,both legs, genitalia and/or groin buttocks, digits and/or nails, was examined.  - Well-healed scar on the left shin.  - There are dome shaped bright red papules on the trunk and extremities.   - Multiple regular brown pigmented macules and papules are identified on  the trunk and extremities.   - Scattered brown macules on sun exposed areas.  - There are waxy stuck on tan to brown papules on the trunk and extremities.   - No other lesions of concern on areas examined.     Medications:  Current Outpatient Medications   Medication     calcium carbonate (OS-SHAI) 1500 (600 Ca) MG tablet     hydrOXYzine (ATARAX) 25 MG tablet     magnesium oxide (MAG-OX) 400 MG tablet     multivitamin (CENTRUM SILVER) tablet     pantoprazole (PROTONIX) 40 MG EC tablet     tacrolimus (GENERIC EQUIVALENT) 1 MG capsule     venlafaxine (EFFEXOR XR) 37.5 MG 24 hr capsule     fexofenadine (ALLEGRA) 60 MG tablet     hydrocortisone 2.5 % ointment     hydroquinone (KARIN) 4 % external cream     pantoprazole (PROTONIX) 40 MG EC tablet     thiamine (B-1) 100 MG tablet     Current Facility-Administered Medications   Medication     triamcinolone (KENALOG-40) injection 40 mg     triamcinolone (KENALOG-40) injection 40 mg      Past Medical History:   Patient Active Problem List   Diagnosis     Abdominal bloating     Family history of pancreatic cancer     Transaminitis     Macrocytosis     Cervical high risk HPV (human papillomavirus) test positive     Anemia, unspecified type     Elevated serum creatinine     Alcoholic cirrhosis of liver with ascites (H)     Hyponatremia     Malaise and fatigue     At high risk for severe sepsis     Symptomatic anemia     Bandemia     Hyperlipidemia     Anxiety     Status post liver transplantation (H)     Immunosuppressed status (H)     Steroid-induced hyperglycemia     Hypervolemia     Severe malnutrition (H)     Elevated alkaline phosphatase level     Cervicalgia     Pain of both elbows     Past Medical History:   Diagnosis Date     Alcoholic hepatitis      Asthma 3/10/2006     Cervical high risk HPV (human papillomavirus) test positive 2019, 2020    See problem list     Liver failure (H)

## 2022-11-30 NOTE — NURSING NOTE
Dermatology Rooming Note    Melita Cortes's goals for this visit include:   Chief Complaint   Patient presents with     Skin Check     Fidelina is here for a skin check and has no spots of concern.     Geoffrey Abraham, EMT

## 2022-12-06 ENCOUNTER — MYC MEDICAL ADVICE (OUTPATIENT)
Dept: TRANSPLANT | Facility: CLINIC | Age: 52
End: 2022-12-06

## 2022-12-09 ENCOUNTER — LAB (OUTPATIENT)
Dept: LAB | Facility: CLINIC | Age: 52
End: 2022-12-09
Payer: COMMERCIAL

## 2022-12-09 DIAGNOSIS — Z94.4 LIVER REPLACED BY TRANSPLANT (H): ICD-10-CM

## 2022-12-09 LAB
ALBUMIN SERPL-MCNC: 3.7 G/DL (ref 3.4–5)
ALP SERPL-CCNC: 84 U/L (ref 40–150)
ALT SERPL W P-5'-P-CCNC: 50 U/L (ref 0–50)
ANION GAP SERPL CALCULATED.3IONS-SCNC: 2 MMOL/L (ref 3–14)
AST SERPL W P-5'-P-CCNC: 26 U/L (ref 0–45)
BILIRUB DIRECT SERPL-MCNC: 0.1 MG/DL (ref 0–0.2)
BILIRUB SERPL-MCNC: 0.4 MG/DL (ref 0.2–1.3)
BUN SERPL-MCNC: 21 MG/DL (ref 7–30)
CALCIUM SERPL-MCNC: 9 MG/DL (ref 8.5–10.1)
CHLORIDE BLD-SCNC: 109 MMOL/L (ref 94–109)
CO2 SERPL-SCNC: 31 MMOL/L (ref 20–32)
CREAT SERPL-MCNC: 0.98 MG/DL (ref 0.52–1.04)
ERYTHROCYTE [DISTWIDTH] IN BLOOD BY AUTOMATED COUNT: 12.2 % (ref 10–15)
GFR SERPL CREATININE-BSD FRML MDRD: 70 ML/MIN/1.73M2
GLUCOSE BLD-MCNC: 118 MG/DL (ref 70–99)
HCT VFR BLD AUTO: 40.9 % (ref 35–47)
HGB BLD-MCNC: 13.8 G/DL (ref 11.7–15.7)
MAGNESIUM SERPL-MCNC: 2 MG/DL (ref 1.6–2.3)
MCH RBC QN AUTO: 29.2 PG (ref 26.5–33)
MCHC RBC AUTO-ENTMCNC: 33.7 G/DL (ref 31.5–36.5)
MCV RBC AUTO: 87 FL (ref 78–100)
PHOSPHATE SERPL-MCNC: 3.8 MG/DL (ref 2.5–4.5)
PLATELET # BLD AUTO: 195 10E3/UL (ref 150–450)
POTASSIUM BLD-SCNC: 5 MMOL/L (ref 3.4–5.3)
PROT SERPL-MCNC: 6.3 G/DL (ref 6.8–8.8)
RBC # BLD AUTO: 4.72 10E6/UL (ref 3.8–5.2)
SODIUM SERPL-SCNC: 142 MMOL/L (ref 133–144)
TACROLIMUS BLD-MCNC: 5.8 UG/L (ref 5–15)
TME LAST DOSE: NORMAL H
TME LAST DOSE: NORMAL H
WBC # BLD AUTO: 3.9 10E3/UL (ref 4–11)

## 2022-12-09 PROCEDURE — 99000 SPECIMEN HANDLING OFFICE-LAB: CPT

## 2022-12-09 PROCEDURE — 36415 COLL VENOUS BLD VENIPUNCTURE: CPT

## 2022-12-09 PROCEDURE — 80321 ALCOHOLS BIOMARKERS 1OR 2: CPT | Mod: 90

## 2022-12-09 PROCEDURE — 82248 BILIRUBIN DIRECT: CPT

## 2022-12-09 PROCEDURE — 85027 COMPLETE CBC AUTOMATED: CPT

## 2022-12-09 PROCEDURE — 84100 ASSAY OF PHOSPHORUS: CPT

## 2022-12-09 PROCEDURE — 83735 ASSAY OF MAGNESIUM: CPT

## 2022-12-09 PROCEDURE — 80197 ASSAY OF TACROLIMUS: CPT

## 2022-12-09 PROCEDURE — 80053 COMPREHEN METABOLIC PANEL: CPT

## 2022-12-14 LAB
PLPETH BLD-MCNC: <10 NG/ML
POPETH BLD-MCNC: <10 NG/ML

## 2022-12-26 ENCOUNTER — VIRTUAL VISIT (OUTPATIENT)
Dept: FAMILY MEDICINE | Facility: CLINIC | Age: 52
End: 2022-12-26
Payer: COMMERCIAL

## 2022-12-26 DIAGNOSIS — M25.552 HIP PAIN, LEFT: Primary | ICD-10-CM

## 2022-12-26 DIAGNOSIS — M25.50 POLYARTHRALGIA: ICD-10-CM

## 2022-12-26 PROCEDURE — 99214 OFFICE O/P EST MOD 30 MIN: CPT | Mod: GT | Performed by: INTERNAL MEDICINE

## 2022-12-26 ASSESSMENT — PATIENT HEALTH QUESTIONNAIRE - PHQ9
SUM OF ALL RESPONSES TO PHQ QUESTIONS 1-9: 4
10. IF YOU CHECKED OFF ANY PROBLEMS, HOW DIFFICULT HAVE THESE PROBLEMS MADE IT FOR YOU TO DO YOUR WORK, TAKE CARE OF THINGS AT HOME, OR GET ALONG WITH OTHER PEOPLE: NOT DIFFICULT AT ALL
SUM OF ALL RESPONSES TO PHQ QUESTIONS 1-9: 4

## 2022-12-26 NOTE — PROGRESS NOTES
Fidelina is a 52 year old who is being evaluated via a billable video visit.      How would you like to obtain your AVS? MyChart  If the video visit is dropped, the invitation should be resent by: Text to cell phone: [phone]  Will anyone else be joining your video visit? No          Assessment & Plan     Hip pain, left  She did have x-rays which showed that she has moderate left hip osteoarthritis  She was following with [facility] sports medicine  She had some steroid injections but they only helped her temporarily  Pain is still persisting  She is wondering if she can see a hip specialist  We discussed options including [facility]  I asked him to see if she can check with her insurance to see who is in network    Polyarthralgia  She continues to have polyarthralgia  This is in all her small joints of the hands and feet  She was evaluated by  Rheumatology   she is currently on tacrolimus  In the past she was on tacrolimus and mycophenolate mofetil  The rheumatologist felt that the joint pains could be from tacrolimus  She is going to meet with her transplant team in February  At that point I asked her to discuss with them if she can come off tacrolimus  They are considering sirolimus  We certainly can do a trial of this as she is having significant problems with activities of daily living and her quality of life is impaired because of the joint pains  If she persist to have symptoms with sirolimus then we have to focus on symptom control  - Acupuncture Referral; Future      30 minutes spent on the date of the encounter doing chart review, history and exam, documentation and further activities per the note           No follow-ups on file.    Navneet Johnson MD  [facility]    Cameron Kitchen is a 52 year old, presenting for the following health issues:  Recheck Medication      History of Present Illness       Reason for visit:  Re check meds and having joint  and hip pain    She eats 0-1 servings of fruits and vegetables daily.She consumes 1 sweetened beverage(s) daily.She exercises with enough effort to increase her heart rate 20 to 29 minutes per day.  She exercises with enough effort to increase her heart rate 3 or less days per week.   She is taking medications regularly.    Today's PHQ-9         PHQ-9 Total Score: 4    PHQ-9 Q9 Thoughts of better off dead/self-harm past 2 weeks :   Not at all    How difficult have these problems made it for you to do your work, take care of things at home, or get along with other people: Not difficult at all             Left hip pain and joint pain     Review of Systems   Constitutional, HEENT, cardiovascular, pulmonary, gi and gu systems are negative, except as otherwise noted.      Objective           Vitals:  No vitals were obtained today due to virtual visit.    Physical Exam   GENERAL: Healthy, alert and no distress  EYES: Eyes grossly normal to inspection.  No discharge or erythema, or obvious scleral/conjunctival abnormalities.  RESP: No audible wheeze, cough, or visible cyanosis.  No visible retractions or increased work of breathing.    SKIN: Visible skin clear. No significant rash, abnormal pigmentation or lesions.  NEURO: Cranial nerves grossly intact.  Mentation and speech appropriate for age.  PSYCH: Mentation appears normal, affect normal/bright, judgement and insight intact, normal speech and appearance well-groomed.                Video-Visit Details    Type of service:  Video Visit     Originating Location (pt. Location): Home    Distant Location (provider location):  On-site  Platform used for Video Visit: [employer]

## 2022-12-28 ENCOUNTER — MYC MEDICAL ADVICE (OUTPATIENT)
Dept: FAMILY MEDICINE | Facility: CLINIC | Age: 52
End: 2022-12-28

## 2022-12-28 DIAGNOSIS — M25.552 HIP PAIN, LEFT: ICD-10-CM

## 2022-12-28 DIAGNOSIS — M25.50 POLYARTHRALGIA: Primary | ICD-10-CM

## 2022-12-28 NOTE — TELEPHONE ENCOUNTER
Please print out the orthopedic referral I did today and fax it to [facility] at Fax [phone]

## 2022-12-29 ENCOUNTER — DOCUMENTATION ONLY (OUTPATIENT)
Dept: TRANSPLANT | Facility: CLINIC | Age: 52
End: 2022-12-29

## 2022-12-30 NOTE — TELEPHONE ENCOUNTER
Routing to provider.    referral for acupuncture that provider placed on 12/28/22 is for Epilepsy care.      Iss this the correct location for the referral?      Alicia Knott RN  [facility]

## 2023-01-01 ENCOUNTER — MYC MEDICAL ADVICE (OUTPATIENT)
Dept: TRANSPLANT | Facility: CLINIC | Age: 53
End: 2023-01-01

## 2023-01-03 ENCOUNTER — MYC MEDICAL ADVICE (OUTPATIENT)
Dept: FAMILY MEDICINE | Facility: CLINIC | Age: 53
End: 2023-01-03

## 2023-01-03 DIAGNOSIS — N95.1 PERIMENOPAUSAL: Primary | ICD-10-CM

## 2023-01-03 NOTE — TELEPHONE ENCOUNTER
Routed to provider, Patient [facility]   She also reports that she does not have a current OBGYN.    Niki MCFARLANE RN, BSN   [facility]

## 2023-01-04 ENCOUNTER — TRANSFERRED RECORDS (OUTPATIENT)
Dept: HEALTH INFORMATION MANAGEMENT | Facility: CLINIC | Age: 53
End: 2023-01-04

## 2023-01-06 ENCOUNTER — TELEPHONE (OUTPATIENT)
Dept: TRANSPLANT | Facility: CLINIC | Age: 53
End: 2023-01-06

## 2023-01-06 NOTE — TELEPHONE ENCOUNTER
"Transplant Social Work Services Phone Call    Data: Melita s/p liver transplant from 1/7/2022. Melita sent this writer an email inquiring about SSDI and the hip replacement that she will need to undergo. She reported that she had been feeling down regarding various medical complications and family situations.   Intervention: I called Melita to provide supportive counseling. She was seen by [facility] and indicated that the surgeon would like the thumbs up from our team for her hip replacement.   I provided education regarding SSDI, and that a hip replacement alone likely would not qualify her for SSDI as the condition is not expected to last a year. She has been on social security disability for nine months, and reported that her initial letter indicated that they will review in three years.   We discussed mental health resources. She met with one of our mental health providers and did not feel like it was that helpful. I provided supportive counseling and praised her for trying it out. I offered to identify other resources and indicated that she can always call this writer as well. Fidelina reported that the support group has been her \"therapy\" as she can talk with folks that really understand what is going on, and have been or are in her shoes. She appreciated phone call and she knows how to reach this writer.     Assessment: No new assessment at this time   Education provided by SW: SSDI   Plan: This writer will be available for ongoing psychosocial support.     ELMO Betts, [facility]  Liver Transplant   [facility]  Phone: [phone]  Pager: [phone]    "

## 2023-01-10 ENCOUNTER — TELEPHONE (OUTPATIENT)
Dept: TRANSPLANT | Facility: CLINIC | Age: 53
End: 2023-01-10

## 2023-01-10 NOTE — TELEPHONE ENCOUNTER
Call to Fidelina to let her know that Dr. Steele is fine w/ her going to [facility].  She will start the process.

## 2023-01-11 ENCOUNTER — DOCUMENTATION ONLY (OUTPATIENT)
Dept: LAB | Facility: CLINIC | Age: 53
End: 2023-01-11

## 2023-01-11 ENCOUNTER — LAB (OUTPATIENT)
Dept: LAB | Facility: CLINIC | Age: 53
End: 2023-01-11
Payer: COMMERCIAL

## 2023-01-11 ENCOUNTER — MYC MEDICAL ADVICE (OUTPATIENT)
Dept: FAMILY MEDICINE | Facility: CLINIC | Age: 53
End: 2023-01-11

## 2023-01-11 DIAGNOSIS — K70.31 ALCOHOLIC CIRRHOSIS OF LIVER WITH ASCITES (H): ICD-10-CM

## 2023-01-11 DIAGNOSIS — Z94.4 STATUS POST LIVER TRANSPLANTATION (H): ICD-10-CM

## 2023-01-11 DIAGNOSIS — N95.1 PERIMENOPAUSAL: ICD-10-CM

## 2023-01-11 DIAGNOSIS — Z94.4 LIVER REPLACED BY TRANSPLANT (H): Primary | ICD-10-CM

## 2023-01-11 DIAGNOSIS — Z94.4 LIVER REPLACED BY TRANSPLANT (H): ICD-10-CM

## 2023-01-11 LAB
ALBUMIN SERPL-MCNC: 3.6 G/DL (ref 3.4–5)
ALP SERPL-CCNC: 95 U/L (ref 40–150)
ALT SERPL W P-5'-P-CCNC: 57 U/L (ref 0–50)
AST SERPL W P-5'-P-CCNC: 27 U/L (ref 0–45)
BILIRUB DIRECT SERPL-MCNC: <0.1 MG/DL (ref 0–0.2)
BILIRUB SERPL-MCNC: 0.5 MG/DL (ref 0.2–1.3)
ERYTHROCYTE [DISTWIDTH] IN BLOOD BY AUTOMATED COUNT: 12.6 % (ref 10–15)
ESTRADIOL SERPL-MCNC: 77 PG/ML
FSH SERPL IRP2-ACNC: 60.6 MIU/ML
HCT VFR BLD AUTO: 41.6 % (ref 35–47)
HGB BLD-MCNC: 14.2 G/DL (ref 11.7–15.7)
MCH RBC QN AUTO: 29.5 PG (ref 26.5–33)
MCHC RBC AUTO-ENTMCNC: 34.1 G/DL (ref 31.5–36.5)
MCV RBC AUTO: 86 FL (ref 78–100)
PLATELET # BLD AUTO: 194 10E3/UL (ref 150–450)
PROT SERPL-MCNC: 6.4 G/DL (ref 6.8–8.8)
RBC # BLD AUTO: 4.82 10E6/UL (ref 3.8–5.2)
TSH SERPL DL<=0.005 MIU/L-ACNC: 2.13 MU/L (ref 0.4–4)
WBC # BLD AUTO: 4.2 10E3/UL (ref 4–11)

## 2023-01-11 PROCEDURE — 84443 ASSAY THYROID STIM HORMONE: CPT

## 2023-01-11 PROCEDURE — 80197 ASSAY OF TACROLIMUS: CPT

## 2023-01-11 PROCEDURE — 85027 COMPLETE CBC AUTOMATED: CPT

## 2023-01-11 PROCEDURE — 36415 COLL VENOUS BLD VENIPUNCTURE: CPT

## 2023-01-11 PROCEDURE — 82670 ASSAY OF TOTAL ESTRADIOL: CPT

## 2023-01-11 PROCEDURE — 80076 HEPATIC FUNCTION PANEL: CPT

## 2023-01-11 PROCEDURE — 80321 ALCOHOLS BIOMARKERS 1OR 2: CPT | Mod: 90

## 2023-01-11 PROCEDURE — 99000 SPECIMEN HANDLING OFFICE-LAB: CPT

## 2023-01-11 PROCEDURE — 83001 ASSAY OF GONADOTROPIN (FSH): CPT

## 2023-01-11 NOTE — PROGRESS NOTES
PT came in today 1/11/23 for lab draw and there was not an order for a Tacrolimus could you please review her chart and place orders  Steph Camarillo on 1/11/2023 at 9:46 AM

## 2023-01-12 ENCOUNTER — TELEPHONE (OUTPATIENT)
Dept: TRANSPLANT | Facility: CLINIC | Age: 53
End: 2023-01-12

## 2023-01-12 DIAGNOSIS — Z94.4 STATUS POST LIVER TRANSPLANTATION (H): ICD-10-CM

## 2023-01-12 LAB
TACROLIMUS BLD-MCNC: 6.3 UG/L (ref 5–15)
TME LAST DOSE: NORMAL H
TME LAST DOSE: NORMAL H

## 2023-01-12 RX ORDER — TACROLIMUS 1 MG/1
CAPSULE ORAL
Qty: 270 CAPSULE | Refills: 3 | Status: SHIPPED | OUTPATIENT
Start: 2023-01-12 | End: 2023-11-02

## 2023-01-12 NOTE — TELEPHONE ENCOUNTER
ISSUE:   Tacrolimus IR level 6.3 on 1/12, goal 5, dose 2 mg BID.    PLAN:   Please call patient and confirm this was an accurate 12-hour trough. Verify Tacrolimus IR dose 2 mg BID. Confirm no new medications or illness. Confirm no missed doses. If accurate trough and accurate dose, decrease Tacrolimus IR dose to 2 mg AM and 1 mg PM and repeat labs in 2-3 weeks. Shalini Hawkins RN      OUTCOME:   Spoke with patient, they confirm accurate trough level and current dose 2 mg BID. Patient confirmed dose change to 2 gm am, 1mg pm mg  and to repeat labs in 2-3 weeks. Orders sent to preferred pharmacy for dose change and lab for repeat labs. Patient voiced understanding of plan.     Soumya Wren LPN

## 2023-01-13 LAB
PLPETH BLD-MCNC: <10 NG/ML
POPETH BLD-MCNC: <10 NG/ML

## 2023-01-17 ENCOUNTER — TELEPHONE (OUTPATIENT)
Dept: PHARMACY | Facility: CLINIC | Age: 53
End: 2023-01-17
Payer: COMMERCIAL

## 2023-01-17 NOTE — TELEPHONE ENCOUNTER
"Clinical Pharmacy Consult:                                                      Transplant Specific: 12 Month Post Transplant Medication Review  Date of Transplant: 01/07/2022  Type of Transplant: liver  First Transplant: yes  History of rejection: no    Immunosuppression Regimen   TAC 2mg qAM & 1mg qPM  Patient specific goal: 5-7  Most recent level: 6.3, date 1/11/23  Immunosuppressant Levels: therapeutic   Pt adherent to lab draws: yes  Scr:   Lab Results   Component Value Date    CR 0.94 02/17/2022    CR 0.56 09/18/2020     Side effects: very painful joints, elbows, hands, and her feet     Prophylactic Medications  Antibacterial:  Bactrim ss daily  Scheduled Discontinue Date: 6 months, therapy completed    Antifungal: Not needed thus far  Scheduled Discontinue Date: N/A    Antiviral: CrCl 40 to 59 mL/minute: Valcyte 450 mg once daily   Scheduled Discontinue Date: completed    Acid Reducer: Protonix (pantoprazole)  Scheduled Reviewed Date: managed by clinic    Thrombosis Prevention: Aspirin 81 mg PO daily  Scheduled Discontinue Date:  therapy completed    Blood Pressure Management  Frequency of home Blood Pressure checks: rarely  Most recent home BP:  Checked recently and it was \"good\"  Patient Blood pressure goal: <140/90  Patient blood pressure at goal:  Yes    Hospitalizations/ER visits since last assessment: 0      Med rec/DUR performed: yes  Med Rec Discrepancies: yes, no longer taking Vitamin B1    Medication adherence flowsheet 4/14/2022   Patient medication administration: Has assistance with medications   Patient estimated adherence level: %   Pharmacist assessment of adherence: Good   Patient reported doses missed per week: 0-1   Facilitators to medication adherence  Cell phone;Pill box;Caregiver assistance;Schedule/routine   Patient reported barriers to medication adherence  -   Adherence intervention(s): -      Medication access flowsheet 4/14/2022   Number of pharmacies used: 1   Pharmacy: " [facility]   Enrolled in [facility] pharmacy? Yes   Patient reported barriers to accessing medications: -   Medication access interventions: -        Fidelina reports feeling ok.  She has come so far and feels so gisselle to be alive. She does have joint pain (feet, elbows, and hands).  She is having hip surgery next month and his hoping that she will feel some pain relief after that.  If she is still having issues with her feet, elbows, and hands, she may switch to sirolimus to see if that helps with the pain.     She reports no missed doses.    Blood pressure is under control.  She rarely checks her blood pressure, but did recently and it was good.      No other questions or concerns today.  Will follow up in 1 year.     Afsaneh Heaton [facility]  Specialty Pharmacist [phone]

## 2023-01-27 ENCOUNTER — MYC MEDICAL ADVICE (OUTPATIENT)
Dept: FAMILY MEDICINE | Facility: CLINIC | Age: 53
End: 2023-01-27
Payer: COMMERCIAL

## 2023-01-27 ENCOUNTER — DOCUMENTATION ONLY (OUTPATIENT)
Dept: FAMILY MEDICINE | Facility: CLINIC | Age: 53
End: 2023-01-27
Payer: COMMERCIAL

## 2023-01-27 NOTE — PROGRESS NOTES
Form faxed to the [place] [phone] along with the Medication list and lab work from 1/11/2023 and the USS report from 2/4/2022, copy placed in abstract and original in tc bin.

## 2023-01-27 NOTE — PROGRESS NOTES
Please fax the [place] forms that I completed today along with the Medication list and lab work from 1/11/2023 and the USS report from 2/4/2022

## 2023-02-06 ENCOUNTER — LAB (OUTPATIENT)
Dept: LAB | Facility: CLINIC | Age: 53
End: 2023-02-06
Payer: COMMERCIAL

## 2023-02-06 DIAGNOSIS — Z94.4 LIVER REPLACED BY TRANSPLANT (H): ICD-10-CM

## 2023-02-06 LAB
ALBUMIN SERPL-MCNC: 3.3 G/DL (ref 3.4–5)
ALP SERPL-CCNC: 93 U/L (ref 40–150)
ALT SERPL W P-5'-P-CCNC: 46 U/L (ref 0–50)
ANION GAP SERPL CALCULATED.3IONS-SCNC: 4 MMOL/L (ref 3–14)
AST SERPL W P-5'-P-CCNC: 21 U/L (ref 0–45)
BILIRUB DIRECT SERPL-MCNC: 0.1 MG/DL (ref 0–0.2)
BILIRUB SERPL-MCNC: 0.4 MG/DL (ref 0.2–1.3)
BUN SERPL-MCNC: 9 MG/DL (ref 7–30)
CALCIUM SERPL-MCNC: 8.4 MG/DL (ref 8.5–10.1)
CHLORIDE BLD-SCNC: 105 MMOL/L (ref 94–109)
CO2 SERPL-SCNC: 28 MMOL/L (ref 20–32)
CREAT SERPL-MCNC: 0.82 MG/DL (ref 0.52–1.04)
ERYTHROCYTE [DISTWIDTH] IN BLOOD BY AUTOMATED COUNT: 13.2 % (ref 10–15)
GFR SERPL CREATININE-BSD FRML MDRD: 86 ML/MIN/1.73M2
GLUCOSE BLD-MCNC: 116 MG/DL (ref 70–99)
HCT VFR BLD AUTO: 41.4 % (ref 35–47)
HGB BLD-MCNC: 13.2 G/DL (ref 11.7–15.7)
MAGNESIUM SERPL-MCNC: 1.7 MG/DL (ref 1.6–2.3)
MCH RBC QN AUTO: 29.8 PG (ref 26.5–33)
MCHC RBC AUTO-ENTMCNC: 31.9 G/DL (ref 31.5–36.5)
MCV RBC AUTO: 94 FL (ref 78–100)
PLATELET # BLD AUTO: 201 10E3/UL (ref 150–450)
POTASSIUM BLD-SCNC: 4.5 MMOL/L (ref 3.4–5.3)
PROT SERPL-MCNC: 5.9 G/DL (ref 6.8–8.8)
RBC # BLD AUTO: 4.43 10E6/UL (ref 3.8–5.2)
SODIUM SERPL-SCNC: 137 MMOL/L (ref 133–144)
TACROLIMUS BLD-MCNC: 5 UG/L (ref 5–15)
TME LAST DOSE: NORMAL H
TME LAST DOSE: NORMAL H
WBC # BLD AUTO: 4.8 10E3/UL (ref 4–11)

## 2023-02-06 PROCEDURE — 80197 ASSAY OF TACROLIMUS: CPT

## 2023-02-06 PROCEDURE — 80053 COMPREHEN METABOLIC PANEL: CPT

## 2023-02-06 PROCEDURE — 85027 COMPLETE CBC AUTOMATED: CPT

## 2023-02-06 PROCEDURE — 82248 BILIRUBIN DIRECT: CPT

## 2023-02-06 PROCEDURE — 36415 COLL VENOUS BLD VENIPUNCTURE: CPT

## 2023-02-06 PROCEDURE — 83735 ASSAY OF MAGNESIUM: CPT

## 2023-02-07 ENCOUNTER — OFFICE VISIT (OUTPATIENT)
Dept: GASTROENTEROLOGY | Facility: CLINIC | Age: 53
End: 2023-02-07
Attending: INTERNAL MEDICINE
Payer: COMMERCIAL

## 2023-02-07 VITALS
BODY MASS INDEX: 23.96 KG/M2 | WEIGHT: 144 LBS | OXYGEN SATURATION: 99 % | HEART RATE: 77 BPM | DIASTOLIC BLOOD PRESSURE: 75 MMHG | SYSTOLIC BLOOD PRESSURE: 117 MMHG

## 2023-02-07 DIAGNOSIS — Z94.4 LIVER REPLACED BY TRANSPLANT (H): ICD-10-CM

## 2023-02-07 PROCEDURE — 99214 OFFICE O/P EST MOD 30 MIN: CPT | Performed by: INTERNAL MEDICINE

## 2023-02-07 PROCEDURE — G0463 HOSPITAL OUTPT CLINIC VISIT: HCPCS | Performed by: INTERNAL MEDICINE

## 2023-02-07 NOTE — LETTER
2/7/2023         RE: Melita Cortes  [id number]  [place] [address]        Dear Colleague,    Thank you for referring your patient, Melita Cortes, to the [facility] [place]. Please see a copy of my visit note below.    [facility]  LIVER TRANSPLANT CLINIC      A/P  Ms. Cortes is a 52 Y F s/p DDLT for ETOH 1/7/22. Post LT course c/b biliary stricture, now resolved, and arthralgias c/f 2/2 tac.     IS Tac. Continue monitoring labs and IS level to assess for appropriate dosing and side effects from IS including ROSSY, pancytopenia, hyperkalemia, hypomagnesemia. We discussed alternative regimens including CSA, MMF/pred and SRL. She had diarrhea with MMF and signficant side effects from pred. Will plan to switch to sirolimus after hip replacement.     Graft function Excellent.    Biliary stricture s/p ERCP with stenting x2, last was 5/16/22. Stricture appeared resolved. No further ERCP unless clinical change    Arthralgia Saw sports ortho [facility] 9/21/22 with injection to L hip.    Hip replacement scheduled for March     Fertility She has started having menses again. Discussed birth control in past visits.y.  Mental Health On venlafaxine.    Meds Ok to stop ASA and jayce  Proph Had flu vaccine and Covid booster last week.    RTC 4 mo    ===================================================================  PCP: Navneet Johnson MD    SUBJECTIVE  Ms. Cortes is a 52 Y F s/p DDLT 1/7/22  for alcohol related cirrhosis..    Overall she is doing well. She has had c/o significant joint pain. Has seen sports ortho: had a spine injection (epidural thoracic) and hip injection. We tried her on a prednisone course and this helped about 50% but she didn't like side effects.  Had rheum consult 6/8/22: medial epicondylitis, nl inflammatory markers. She is planning to undergo WENDY in March at [facility]. Her hip is her main complaint, but she does have pain in fingers  and elbows as well.  Her hip impairs her ability to exercised in the ways she would like.    Her father passed away recently. He had dementia. He was living in [place].  Fidelina received letters and photos from her donor family, which she shared today.    EXPLANT: alcoholic cirrhosis, -ve for HCC  IS: Prograf,   LABS: Up to date  Liver Function Studies - Recent Labs   Lab Test 02/06/23  1101   PROTTOTAL 5.9*   ALBUMIN 3.3*   BILITOTAL 0.4   ALKPHOS 93   AST 21   ALT 46     CBC RESULTS: Recent Labs   Lab Test 02/06/23  1101   WBC 4.8   RBC 4.43   HGB 13.2   HCT 41.4   MCV 94   MCH 29.8   MCHC 31.9   RDW 13.2            Lab Test 09/21/22  0900   PROTTOTAL 6.2*   ALBUMIN 3.6   BILITOTAL 0.3   ALKPHOS 85   AST 26   ALT 53*     Lab Test 09/01/22  0839   WBC 4.1   RBC 4.38   HGB 13.6   HCT 38.9   MCV 89   MCH 31.1   MCHC 35.0   RDW 12.5        REJECTION: None.  BILIARY ISSUES: biliary stricture s/p ERCP with stenting x2, last was 5/16/22. Stricture appeared resolved. No further ERCP unless clinical change  STENT: out on AXR 6/13/22  KIDNEY FUNCTION:   Creatinine   Date Value Ref Range Status   02/06/2023 0.82 0.52 - 1.04 mg/dL Final   09/18/2020 0.56 0.52 - 1.04 mg/dL Final     BP: Good. No meds.  PREV: UTD on screening   Colonoscopy 2022   Mammogram 2020   Pap/pelvic 2022   Dermatology will establish with one  ROS: 10 point ROS neg other than the symptoms noted above in the HPI.  Exam  Gen Alert pleasant NAD  Resp No difficulty breathing. No cough  Skin No Jaundice  Eyes No icterus  Neuro RESENDEZ  MSK no muscle wasting  Psyche Pleasant, appropriate. Well groomed.    Current Outpatient Medications   Medication     calcium carbonate (OS-SHAI) 1500 (600 Ca) MG tablet     hydrocortisone 2.5 % ointment     hydroquinone (KARIN) 4 % external cream     hydrOXYzine (ATARAX) 25 MG tablet     magnesium oxide (MAG-OX) 400 MG tablet     multivitamin (CENTRUM SILVER) tablet     pantoprazole (PROTONIX) 40 MG EC tablet      pantoprazole (PROTONIX) 40 MG EC tablet     tacrolimus (GENERIC EQUIVALENT) 1 MG capsule     venlafaxine (EFFEXOR XR) 37.5 MG 24 hr capsule     fexofenadine (ALLEGRA) 60 MG tablet     Current Facility-Administered Medications   Medication     triamcinolone (KENALOG-40) injection 40 mg     triamcinolone (KENALOG-40) injection 40 mg           Again, thank you for allowing me to participate in the care of your patient.        Sincerely,        Zita Steele MD

## 2023-02-07 NOTE — PROGRESS NOTES
[facility]  LIVER TRANSPLANT CLINIC      A/P  Ms. Cortes is a 52 Y F s/p DDLT for ETOH 1/7/22. Post LT course c/b biliary stricture, now resolved, and arthralgias c/f 2/2 tac.     IS Tac. Continue monitoring labs and IS level to assess for appropriate dosing and side effects from IS including ROSSY, pancytopenia, hyperkalemia, hypomagnesemia. We discussed alternative regimens including CSA, MMF/pred and SRL. She had diarrhea with MMF and signficant side effects from pred. Will plan to switch to sirolimus after hip replacement.     Graft function Excellent.    Biliary stricture s/p ERCP with stenting x2, last was 5/16/22. Stricture appeared resolved. No further ERCP unless clinical change    Arthralgia Saw sports ortho [facility] 9/21/22 with injection to L hip.    Hip replacement scheduled for March     Fertility She has started having menses again. Discussed birth control in past visits.y.  Mental Health On venlafaxine.    Meds Ok to stop ASA and jayce  Proph Had flu vaccine and Covid booster last week.    RTC 4 mo    ===================================================================  PCP: Navneet Johnson MD    SUBJECTIVE  Ms. Cortes is a 52 Y F s/p DDLT 1/7/22  for alcohol related cirrhosis..    Overall she is doing well. She has had c/o significant joint pain. Has seen sports ortho: had a spine injection (epidural thoracic) and hip injection. We tried her on a prednisone course and this helped about 50% but she didn't like side effects.  Had rheum consult 6/8/22: medial epicondylitis, nl inflammatory markers. She is planning to undergo WENDY in March at [facility]. Her hip is her main complaint, but she does have pain in fingers and elbows as well.  Her hip impairs her ability to exercised in the ways she would like.    Her father passed away recently. He had dementia. He was living in [place].  Fidelina received letters and photos from her donor family, which she shared today.    EXPLANT: alcoholic  cirrhosis, -ve for HCC  IS: Prograf,   LABS: Up to date  Liver Function Studies - Recent Labs   Lab Test 02/06/23  1101   PROTTOTAL 5.9*   ALBUMIN 3.3*   BILITOTAL 0.4   ALKPHOS 93   AST 21   ALT 46     CBC RESULTS: Recent Labs   Lab Test 02/06/23  1101   WBC 4.8   RBC 4.43   HGB 13.2   HCT 41.4   MCV 94   MCH 29.8   MCHC 31.9   RDW 13.2            Lab Test 09/21/22  0900   PROTTOTAL 6.2*   ALBUMIN 3.6   BILITOTAL 0.3   ALKPHOS 85   AST 26   ALT 53*     Lab Test 09/01/22  0839   WBC 4.1   RBC 4.38   HGB 13.6   HCT 38.9   MCV 89   MCH 31.1   MCHC 35.0   RDW 12.5        REJECTION: None.  BILIARY ISSUES: biliary stricture s/p ERCP with stenting x2, last was 5/16/22. Stricture appeared resolved. No further ERCP unless clinical change  STENT: out on AXR 6/13/22  KIDNEY FUNCTION:   Creatinine   Date Value Ref Range Status   02/06/2023 0.82 0.52 - 1.04 mg/dL Final   09/18/2020 0.56 0.52 - 1.04 mg/dL Final     BP: Good. No meds.  PREV: UTD on screening   Colonoscopy 2022   Mammogram 2020   Pap/pelvic 2022   Dermatology will establish with one  ROS: 10 point ROS neg other than the symptoms noted above in the HPI.  Exam  Gen Alert pleasant NAD  Resp No difficulty breathing. No cough  Skin No Jaundice  Eyes No icterus  Neuro RESENDEZ  MSK no muscle wasting  Psyche Pleasant, appropriate. Well groomed.    Current Outpatient Medications   Medication     calcium carbonate (OS-SHAI) 1500 (600 Ca) MG tablet     hydrocortisone 2.5 % ointment     hydroquinone (KARIN) 4 % external cream     hydrOXYzine (ATARAX) 25 MG tablet     magnesium oxide (MAG-OX) 400 MG tablet     multivitamin (CENTRUM SILVER) tablet     pantoprazole (PROTONIX) 40 MG EC tablet     pantoprazole (PROTONIX) 40 MG EC tablet     tacrolimus (GENERIC EQUIVALENT) 1 MG capsule     venlafaxine (EFFEXOR XR) 37.5 MG 24 hr capsule     fexofenadine (ALLEGRA) 60 MG tablet     Current Facility-Administered Medications   Medication     triamcinolone (KENALOG-40)  injection 40 mg     triamcinolone (KENALOG-40) injection 40 mg

## 2023-02-07 NOTE — NURSING NOTE
Chief Complaint   Patient presents with     RECHECK     S/p liver tx     Blood pressure 117/75, pulse 77, weight 65.3 kg (144 lb), SpO2 99 %, not currently breastfeeding.    Miguel Angel Nur on 2/7/2023 at 8:57 AM

## 2023-02-09 ENCOUNTER — MYC MEDICAL ADVICE (OUTPATIENT)
Dept: FAMILY MEDICINE | Facility: CLINIC | Age: 53
End: 2023-02-09
Payer: COMMERCIAL

## 2023-02-15 ENCOUNTER — OFFICE VISIT (OUTPATIENT)
Dept: FAMILY MEDICINE | Facility: CLINIC | Age: 53
End: 2023-02-15
Payer: COMMERCIAL

## 2023-02-15 VITALS
SYSTOLIC BLOOD PRESSURE: 112 MMHG | WEIGHT: 148.9 LBS | OXYGEN SATURATION: 99 % | BODY MASS INDEX: 24.81 KG/M2 | TEMPERATURE: 97.5 F | HEART RATE: 72 BPM | RESPIRATION RATE: 14 BRPM | HEIGHT: 65 IN | DIASTOLIC BLOOD PRESSURE: 78 MMHG

## 2023-02-15 DIAGNOSIS — Z01.818 PREOP GENERAL PHYSICAL EXAM: Primary | ICD-10-CM

## 2023-02-15 DIAGNOSIS — F33.0 MILD RECURRENT MAJOR DEPRESSION (H): ICD-10-CM

## 2023-02-15 DIAGNOSIS — Z13.1 SCREENING FOR DIABETES MELLITUS: ICD-10-CM

## 2023-02-15 DIAGNOSIS — M25.552 HIP PAIN, LEFT: ICD-10-CM

## 2023-02-15 DIAGNOSIS — Z94.4 STATUS POST LIVER TRANSPLANTATION (H): ICD-10-CM

## 2023-02-15 PROBLEM — E43 SEVERE MALNUTRITION (H): Status: RESOLVED | Noted: 2022-01-14 | Resolved: 2023-02-15

## 2023-02-15 PROBLEM — K70.31 ALCOHOLIC CIRRHOSIS OF LIVER WITH ASCITES (H): Status: RESOLVED | Noted: 2021-10-13 | Resolved: 2023-02-15

## 2023-02-15 LAB — HBA1C MFR BLD: 5.2 % (ref 0–5.6)

## 2023-02-15 PROCEDURE — 36415 COLL VENOUS BLD VENIPUNCTURE: CPT | Performed by: INTERNAL MEDICINE

## 2023-02-15 PROCEDURE — 99214 OFFICE O/P EST MOD 30 MIN: CPT | Performed by: INTERNAL MEDICINE

## 2023-02-15 PROCEDURE — 83036 HEMOGLOBIN GLYCOSYLATED A1C: CPT | Performed by: INTERNAL MEDICINE

## 2023-02-15 ASSESSMENT — PAIN SCALES - GENERAL: PAINLEVEL: SEVERE PAIN (6)

## 2023-02-15 NOTE — PROGRESS NOTES
[address] [address]  Phone: [phone]  Primary Provider: Bart More  Pre-op Performing Provider: BART MORE      PREOPERATIVE EVALUATION:  Today's date: 2/15/2023    Melita Cortes is a 52 year old female who presents for a preoperative evaluation.    Surgical Information:  Surgery/Procedure: Hip replacement left  [facility]  Surgeon: Dr. Fabian Nichols  Surgery Date: 03/07/2023  Time of Surgery: TBD  Where patient plans to recover: At home with family  Fax number for surgical facility: Pt does not have at the moment        Type of Anesthesia Anticipated: General    Assessment & Plan     The proposed surgical procedure is considered INTERMEDIATE risk.    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  She is currently on tacrolimus  Initially her exercise tolerance was very bad but recently she has been more mobile  No history of any CAD or CVA  No history of any exertional chest chest pain or shortness of breath  Her exercise tolerance is 5 METS  No further testing is warranted  She is medically optimized for the procedure  She does have a history of latex allergy and this has to be taken into consideration    Status post liver transplantation (H)  She is on tacrolimus and this will be continued perioperatively    Hip pain, left  She has osteoarthritis of left hip and is going to have left hip arthroplasty    Screening for diabetes mellitus  Her A1c is normal today  - Hemoglobin A1c; Future  - Hemoglobin A1c    Mild recurrent major depression (H)  She is on venlafaxine             Risks and Recommendations:  The patient has the following additional risks and recommendations for perioperative complications:   - Consult Hospitalist / IM to assist with post-op medical  management    Medication Instructions:  Patient is to take all scheduled medications on the day of surgery    RECOMMENDATION:  APPROVAL GIVEN to proceed with proposed procedure, without further diagnostic evaluation.      30 minutes spent on the date of the encounter doing chart review, history and exam, documentation and further activities per the note      Subjective     HPI related to upcoming procedure: She had bad osteoarthritis of left hip  She is going to get left hip arthroplasty    Preop Questions 2/15/2023   1. Have you ever had a heart attack or stroke? No   2. Have you ever had surgery on your heart or blood vessels, such as a stent placement, a coronary artery bypass, or surgery on an artery in your head, neck, heart, or legs? No   3. Do you have chest pain with activity? No   4. Do you have a history of  heart failure? No   5. Do you currently have a cold, bronchitis or symptoms of other infection? No   6. Do you have a cough, shortness of breath, or wheezing? No   7. Do you or anyone in your family have previous history of blood clots? YES    8. Do you or does anyone in your family have a serious bleeding problem such as prolonged bleeding following surgeries or cuts? UNKNOWN    9. Have you ever had problems with anemia or been told to take iron pills? No   10. Have you had any abnormal blood loss such as black, tarry or bloody stools, or abnormal vaginal bleeding? No   11. Have you ever had a blood transfusion? YES    11a. Have you ever had a transfusion reaction? No   12. Are you willing to have a blood transfusion if it is medically needed before, during, or after your surgery? Yes   13. Have you or any of your relatives ever had problems with anesthesia? No   14. Do you have sleep apnea, excessive snoring or daytime drowsiness? No   15. Do you have any artifical heart valves or other implanted medical devices like a pacemaker, defibrillator, or continuous glucose monitor? No   16. Do you have  artificial joints? No   17. Are you allergic to latex? YES:   18. Is there any chance that you may be pregnant? No       Health Care Directive:  Patient has a Health Care Directive on file      [id number]}    Status of Chronic Conditions:  See problem list for active medical problems.  Problems all longstanding and stable, except as noted/documented.  See ROS for pertinent symptoms related to these conditions.      Review of Systems  Constitutional, neuro, ENT, endocrine, pulmonary, cardiac, gastrointestinal, genitourinary, musculoskeletal, integument and psychiatric systems are negative, except as otherwise noted.    Patient Active Problem List    Diagnosis Date Noted     Mild recurrent major depression (H) 02/15/2023     Priority: Medium     Cervicalgia 08/19/2022     Priority: Medium     Pain of both elbows 08/19/2022     Priority: Medium     Elevated alkaline phosphatase level 02/16/2022     Priority: Medium     Status post liver transplantation (H) 01/12/2022     Priority: Medium     Immunosuppressed status (H) 01/12/2022     Priority: Medium     Steroid-induced hyperglycemia 01/12/2022     Priority: Medium     Hypervolemia 01/12/2022     Priority: Medium     Hyperlipidemia 12/28/2021     Priority: Medium     Hyponatremia 11/03/2021     Priority: Medium     Malaise and fatigue 11/03/2021     Priority: Medium     At high risk for severe sepsis 11/03/2021     Priority: Medium     Symptomatic anemia 11/03/2021     Priority: Medium     Bandemia 11/03/2021     Priority: Medium     Elevated serum creatinine 10/13/2021     Priority: Medium     Anemia, unspecified type 09/24/2021     Priority: Medium     Cervical high risk HPV (human papillomavirus) test positive      Priority: Medium     1/2019 NIL, +HPV 18  3/2019 [place]- Neg  9/14/20 NIL, +HPV 18. Plan [place] bef 12/14/20 12/29/20 [place]- no visible lesions, no Bx done. Plan 1 yr co-test  3/1/22 NIL pap, + HR HPV # 18. Plan: colp   4/27/22 [place] ECC: no WILLI. Plan: Cotest in  1  "yr. Per notes from visit, \"Due to the medications that she is taking to prevent rejection of her liver transplant, this puts her at higher risk for the development of cervical disease so if today's pathology results are normal, then I would advise a yearly co-test to closely monitor.\"           Transaminitis 09/20/2020     Priority: Medium     Macrocytosis 09/20/2020     Priority: Medium     Abdominal bloating 09/14/2020     Priority: Medium     Family history of pancreatic cancer 09/14/2020     Priority: Medium     Anxiety 09/12/2014     Priority: Medium     Formatting of this note might be different from the original.  did not end up starting Zoloft        Past Medical History:   Diagnosis Date     Alcoholic hepatitis      Asthma 3/10/2006     Cervical high risk HPV (human papillomavirus) test positive 2019, 2020    See problem list     Liver failure (H)      Past Surgical History:   Procedure Laterality Date     APPENDECTOMY       COLONOSCOPY N/A 1/4/2022    Procedure: COLONOSCOPY;  Surgeon: Zita Steele MD;  Location: [facility]     ENDOSCOPIC RETROGRADE CHOLANGIOPANCREATOGRAM N/A 2/14/2022    Procedure: ENDOSCOPIC RETROGRADE CHOLANGIOPANCREATOGRAPHY WITH BILIARY SPHINCTEROTOMY, SLUDGE REMOVAL, DILATION  AND STENT PLACEMENT;  Surgeon: Guru Gardenia Escobar MD;  Location: [facility]     ENDOSCOPIC RETROGRADE CHOLANGIOPANCREATOGRAM N/A 5/16/2022    Procedure: ENDOSCOPIC RETROGRADE CHOLANGIOPANCREATOGRAPHY WITH BILE DUCT STENT REMOVAL;  Surgeon: Guru Gardenia Escobar MD;  Location: [facility]     ENDOSCOPIC RETROGRADE CHOLANGIOPANCREATOGRAPHY, EXCHANGE TUBE/STENT N/A 3/16/2022    Procedure: ENDOSCOPIC RETROGRADE CHOLANGIOPANCREATOGRAPHY, bile duct stents exchanged, balloon dilation and sweep of bile ducts for sludge;  Surgeon: Guru Gardenia Escobar MD;  [facility]: [facility]     ESOPHAGOGASTRODUODENOSCOPY, WITH BRUSHINGS N/A 10/29/2021    Procedure: " ESOPHAGOGASTRODUODENOSCOPY, WITH BRUSHINGS;  Surgeon: Torey Ruiz MD;  Location:  GI     IR LIVER BIOPSY PERCUTANEOUS  2022     TRANSPLANT LIVER RECIPIENT  DONOR N/A 2022    Procedure: TRANSPLANT, LIVER, RECIPIENT,  DONOR;  Surgeon: Pradeep Browne MD;  [facility]     Current Outpatient Medications   Medication Sig Dispense Refill     calcium carbonate (OS-SHAI) 1500 (600 Ca) MG tablet Take 1 tablet (600 mg) by mouth daily 60 tablet 3     hydrocortisone 2.5 % ointment Apply twice daily for 1 week on itchy, scaly areas on the body.  Apply Vaseline on top. 60 g 0     hydroquinone (KARIN) 4 % external cream Apply once daily to the face, neck and chest as tolerated. Start with every other day. Skip when irritating 30 g 2     hydrOXYzine (ATARAX) 25 MG tablet TAKE 2 TABLETS BY MOUTH NIGHTLY AS NEEDED FOR (INSOMINA) FUTURE REFILLS TO GO TO PRIMARY CARE OFFICE 180 tablet 3     magnesium oxide (MAG-OX) 400 MG tablet Take 1 tablet (400 mg) by mouth 2 times daily 90 tablet 3     multivitamin (CENTRUM SILVER) tablet Take 1 tablet by mouth daily       pantoprazole (PROTONIX) 40 MG EC tablet Take by mouth every 24 hours       pantoprazole (PROTONIX) 40 MG EC tablet Take 1 tablet (40 mg) by mouth daily 90 tablet 3     tacrolimus (GENERIC EQUIVALENT) 1 MG capsule Take 2 capsules (2 mg) by mouth every morning AND 1 capsule (1 mg) every evening. 270 capsule 3     venlafaxine (EFFEXOR XR) 37.5 MG 24 hr capsule TAKE 1 CAPSULE BY MOUTH EVERY DAY 90 capsule 0     fexofenadine (ALLEGRA) 60 MG tablet Take 1 tablet (60 mg) by mouth daily (Patient not taking: Reported on 2022) 90 tablet 1       Allergies   Allergen Reactions     Latex      rash     Adhesive Tape Rash        Social History     Tobacco Use     Smoking status: Former     Types: Cigarettes     Quit date: 2020     Years since quittin.7     Smokeless tobacco: Never   Substance Use Topics     Alcohol use: Not Currently      "Comment: Last drink 8/2021     Family History   Problem Relation Age of Onset     Cancer Mother      Melanoma Father      Skin Cancer Father      Colon Cancer Maternal Uncle      Colon Cancer Paternal Aunt      History   Drug Use Unknown         Objective     /78 (BP Location: Right arm, Patient Position: Sitting, Cuff Size: Adult Large)   Pulse 72   Temp 97.5  F (36.4  C) (Oral)   Resp 14   Ht 1.64 m (5' 4.57\")   Wt 67.5 kg (148 lb 14.4 oz)   SpO2 99%   BMI 25.11 kg/m      Physical Exam    GENERAL APPEARANCE: healthy, alert and no distress     EYES: EOMI,  PERRL     NECK: no adenopathy, no asymmetry, masses, or scars and thyroid normal to palpation     RESP: lungs clear to auscultation - no rales, rhonchi or wheezes     CV: regular rates and rhythm, normal S1 S2, no S3 or S4 and no murmur, click or rub     ABDOMEN:  soft, nontender, no HSM or masses and bowel sounds normal     ABDOMEN: Well-healed scar     MS: extremities normal- no gross deformities noted, no evidence of inflammation in joints, FROM in all extremities.     SKIN: no suspicious lesions or rashes     NEURO: Normal strength and tone, sensory exam grossly normal, mentation intact and speech normal     PSYCH: mentation appears normal. and affect normal/bright     LYMPHATICS: No cervical adenopathy    Recent Labs   Lab Test 02/06/23  1101 01/11/23  0936 12/09/22  0953 05/23/22  0815 05/16/22  0927 03/17/22  0815 03/16/22  0809 01/08/22  1326 01/08/22  1242 11/02/21  1319 10/29/21  1337   HGB 13.2 14.2 13.8   < >  --    < > 11.5*   < >  --    < > 7.8*    194 195   < >  --    < > 198   < >  --    < > 96*   INR  --   --   --   --  1.04  --  1.07   < >  --    < > 2.34*     --  142   < >  --    < > 136   < >  --    < > 124*   POTASSIUM 4.5  --  5.0   < >  --    < > 4.5   < >  --    < > 3.4   CR 0.82  --  0.98   < >  --    < > 1.09*   < >  --    < > 2.16*   A1C  --   --   --   --   --   --   --   --  5.2  --   --     < > = values in " this interval not displayed.        Diagnostics:  No labs were ordered during this visit.   No EKG required, no history of coronary heart disease, significant arrhythmia, peripheral arterial disease or other structural heart disease.    Revised Cardiac Risk Index (RCRI):  The patient has the following serious cardiovascular risks for perioperative complications:   - No serious cardiac risks = 0 points     RCRI Interpretation: 0 points: Class I (very low risk - 0.4% complication rate)           Signed Electronically by: Navneet Johnson MD  Copy of this evaluation report is provided to requesting physician.

## 2023-02-15 NOTE — PROGRESS NOTES
[address] [address]  Phone: [phone]  Primary Provider: Bart More  Pre-op Performing Provider: BART MORE    {Provider  Link to PREOP SmartSet  Use this to apply standard patient instructions to AVS; includes medication directions, common orders, guidelines for anemia, warfarin, additional testing   :899570}  PREOPERATIVE EVALUATION:  Today's date: 2/15/2023    Melita Cortes is a 52 year old female who presents for a preoperative evaluation.    Surgical Information:  Surgery/Procedure: Hip replacement   Surgery Location:   Surgeon:   Surgery Date: 2/21/23  Time of Surgery:   Where patient plans to recover: {Preop post recovery plans :723153}  Fax number for surgical facility:     Type of Anesthesia Anticipated: {ANESTHESIA:279439}    {2021 Provider Charting Preference for Preop :552825}    Subjective     HPI related to upcoming procedure: ***    Preop Questions 2/15/2023   1. Have you ever had a heart attack or stroke? No   2. Have you ever had surgery on your heart or blood vessels, such as a stent placement, a coronary artery bypass, or surgery on an artery in your head, neck, heart, or legs? No   3. Do you have chest pain with activity? No   4. Do you have a history of  heart failure? No   5. Do you currently have a cold, bronchitis or symptoms of other infection? No   6. Do you have a cough, shortness of breath, or wheezing? No   7. Do you or anyone in your family have previous history of blood clots? YES - ***   8. Do you or does anyone in your family have a serious bleeding problem such as prolonged bleeding following surgeries or cuts? UNKNOWN - ***   9. Have you ever had problems with anemia or been told to take iron pills? No   10. Have you had any abnormal blood loss such as black, tarry or bloody stools, or abnormal vaginal bleeding? No   11. Have you ever had a blood transfusion? YES - ***   11a. Have you ever  had a transfusion reaction? No   12. Are you willing to have a blood transfusion if it is medically needed before, during, or after your surgery? Yes   13. Have you or any of your relatives ever had problems with anesthesia? No   14. Do you have sleep apnea, excessive snoring or daytime drowsiness? No   15. Do you have any artifical heart valves or other implanted medical devices like a pacemaker, defibrillator, or continuous glucose monitor? No   16. Do you have artificial joints? No   17. Are you allergic to latex? YES: ***   18. Is there any chance that you may be pregnant? No       Health Care Directive:  Patient has a Health Care Directive on file      Preoperative Review of :  {Mnpmpreport:042238}  {Review MNPMP for all patients per [facility] MNPMP Profile:163918}    {Chronic problem details (Optional) :501110}    Review of Systems  {ROS Preop Choices:272366}    Patient Active Problem List    Diagnosis Date Noted     Cervicalgia 08/19/2022     Priority: Medium     Pain of both elbows 08/19/2022     Priority: Medium     Elevated alkaline phosphatase level 02/16/2022     Priority: Medium     Severe malnutrition (H) 01/14/2022     Priority: Medium     Status post liver transplantation (H) 01/12/2022     Priority: Medium     Immunosuppressed status (H) 01/12/2022     Priority: Medium     Steroid-induced hyperglycemia 01/12/2022     Priority: Medium     Hypervolemia 01/12/2022     Priority: Medium     Hyperlipidemia 12/28/2021     Priority: Medium     Hyponatremia 11/03/2021     Priority: Medium     Malaise and fatigue 11/03/2021     Priority: Medium     At high risk for severe sepsis 11/03/2021     Priority: Medium     Symptomatic anemia 11/03/2021     Priority: Medium     Bandemia 11/03/2021     Priority: Medium     Elevated serum creatinine 10/13/2021     Priority: Medium     Alcoholic cirrhosis of liver with ascites (H) 10/13/2021     Priority: Medium     Anemia, unspecified type 09/24/2021     Priority: Medium  "    Cervical high risk HPV (human papillomavirus) test positive      Priority: Medium     1/2019 NIL, +HPV 18  3/2019 [place]- Neg  9/14/20 NIL, +HPV 18. Plan [place] bef 12/14/20 12/29/20 [place]- no visible lesions, no Bx done. Plan 1 yr co-test  3/1/22 NIL pap, + HR HPV # 18. Plan: colp   4/27/22 [place] ECC: no WILLI. Plan: Cotest in  1 yr. Per notes from visit, \"Due to the medications that she is taking to prevent rejection of her liver transplant, this puts her at higher risk for the development of cervical disease so if today's pathology results are normal, then I would advise a yearly co-test to closely monitor.\"           Transaminitis 09/20/2020     Priority: Medium     Macrocytosis 09/20/2020     Priority: Medium     Abdominal bloating 09/14/2020     Priority: Medium     Family history of pancreatic cancer 09/14/2020     Priority: Medium     Anxiety 09/12/2014     Priority: Medium     Formatting of this note might be different from the original.  did not end up starting Zoloft        Past Medical History:   Diagnosis Date     Alcoholic hepatitis      Asthma 3/10/2006     Cervical high risk HPV (human papillomavirus) test positive 2019, 2020    See problem list     Liver failure (H)      Past Surgical History:   Procedure Laterality Date     APPENDECTOMY       COLONOSCOPY N/A 1/4/2022    Procedure: COLONOSCOPY;  Surgeon: Zita Steele MD;  Location:  GI     ENDOSCOPIC RETROGRADE CHOLANGIOPANCREATOGRAM N/A 2/14/2022    Procedure: ENDOSCOPIC RETROGRADE CHOLANGIOPANCREATOGRAPHY WITH BILIARY SPHINCTEROTOMY, SLUDGE REMOVAL, DILATION  AND STENT PLACEMENT;  Surgeon: Guru Gardenia Escobar MD;  Location:  OR     ENDOSCOPIC RETROGRADE CHOLANGIOPANCREATOGRAM N/A 5/16/2022    Procedure: ENDOSCOPIC RETROGRADE CHOLANGIOPANCREATOGRAPHY WITH BILE DUCT STENT REMOVAL;  Surgeon: Guru Gardenia Escobar MD;  Location:  OR     ENDOSCOPIC RETROGRADE CHOLANGIOPANCREATOGRAPHY, EXCHANGE " TUBE/STENT N/A 3/16/2022    Procedure: ENDOSCOPIC RETROGRADE CHOLANGIOPANCREATOGRAPHY, bile duct stents exchanged, balloon dilation and sweep of bile ducts for sludge;  Surgeon: Guru Gardenia Escobar MD;  [facility]: [facility]     ESOPHAGOGASTRODUODENOSCOPY, WITH BRUSHINGS N/A 10/29/2021    Procedure: ESOPHAGOGASTRODUODENOSCOPY, WITH BRUSHINGS;  Surgeon: Torey Ruiz MD;  [facility]     IR LIVER BIOPSY PERCUTANEOUS  2022     TRANSPLANT LIVER RECIPIENT  DONOR N/A 2022    Procedure: TRANSPLANT, LIVER, RECIPIENT,  DONOR;  Surgeon: Pradeep Browne MD;  [facility]     Current Outpatient Medications   Medication Sig Dispense Refill     calcium carbonate (OS-SHAI) 1500 (600 Ca) MG tablet Take 1 tablet (600 mg) by mouth daily 60 tablet 3     fexofenadine (ALLEGRA) 60 MG tablet Take 1 tablet (60 mg) by mouth daily (Patient not taking: Reported on 2022) 90 tablet 1     hydrocortisone 2.5 % ointment Apply twice daily for 1 week on itchy, scaly areas on the body.  Apply Vaseline on top. 60 g 0     hydroquinone (KARIN) 4 % external cream Apply once daily to the face, neck and chest as tolerated. Start with every other day. Skip when irritating 30 g 2     hydrOXYzine (ATARAX) 25 MG tablet TAKE 2 TABLETS BY MOUTH NIGHTLY AS NEEDED FOR (INSOMINA) FUTURE REFILLS TO GO TO PRIMARY CARE OFFICE 180 tablet 3     magnesium oxide (MAG-OX) 400 MG tablet Take 1 tablet (400 mg) by mouth 2 times daily 90 tablet 3     multivitamin (CENTRUM SILVER) tablet Take 1 tablet by mouth daily       pantoprazole (PROTONIX) 40 MG EC tablet Take by mouth every 24 hours       pantoprazole (PROTONIX) 40 MG EC tablet Take 1 tablet (40 mg) by mouth daily 90 tablet 3     tacrolimus (GENERIC EQUIVALENT) 1 MG capsule Take 2 capsules (2 mg) by mouth every morning AND 1 capsule (1 mg) every evening. 270 capsule 3     venlafaxine (EFFEXOR XR) 37.5 MG 24 hr capsule TAKE 1 CAPSULE BY MOUTH EVERY DAY 90  "capsule 0       Allergies   Allergen Reactions     Latex      rash     Adhesive Tape Rash        Social History     Tobacco Use     Smoking status: Former     Types: Cigarettes     Quit date: 2020     Years since quittin.7     Smokeless tobacco: Never   Substance Use Topics     Alcohol use: Not Currently     Comment: Last drink 2021     {FAMILY HISTORY (Optional):546912730}  History   Drug Use Unknown         Objective     There were no vitals taken for this visit.    Physical Exam  {EXAM Preop Choices:599484}    Recent Labs   Lab Test 23  1101 23  0936 22  0953 22  0815 22  0927 22  0815 22  0809 22  1326 22  1242 21  1319 10/29/21  1337   HGB 13.2 14.2 13.8   < >  --    < > 11.5*   < >  --    < > 7.8*    194 195   < >  --    < > 198   < >  --    < > 96*   INR  --   --   --   --  1.04  --  1.07   < >  --    < > 2.34*     --  142   < >  --    < > 136   < >  --    < > 124*   POTASSIUM 4.5  --  5.0   < >  --    < > 4.5   < >  --    < > 3.4   CR 0.82  --  0.98   < >  --    < > 1.09*   < >  --    < > 2.16*   A1C  --   --   --   --   --   --   --   --  5.2  --   --     < > = values in this interval not displayed.        Diagnostics:  {LABS:358020}   {EK}    Revised Cardiac Risk Index (RCRI):  The patient has the following serious cardiovascular risks for perioperative complications:  {PREOP REVISED CARDIAC RISK INDEX (RCRI) :718600::\" - No serious cardiac risks = 0 points\"}     RCRI Interpretation: {REVISED CARDIAC RISK INTERPRETATION :343865}         Signed Electronically by: Navneet Johnson MD  Copy of this evaluation report is provided to requesting physician.    {Provider Resources  Preop [facility] Preop Guidelines  Revised Cardiac Risk Index :715801}  "

## 2023-02-16 ENCOUNTER — MYC MEDICAL ADVICE (OUTPATIENT)
Dept: FAMILY MEDICINE | Facility: CLINIC | Age: 53
End: 2023-02-16
Payer: COMMERCIAL

## 2023-02-22 ENCOUNTER — MYC MEDICAL ADVICE (OUTPATIENT)
Dept: FAMILY MEDICINE | Facility: CLINIC | Age: 53
End: 2023-02-22
Payer: COMMERCIAL

## 2023-02-24 ENCOUNTER — MYC MEDICAL ADVICE (OUTPATIENT)
Dept: FAMILY MEDICINE | Facility: CLINIC | Age: 53
End: 2023-02-24
Payer: COMMERCIAL

## 2023-03-08 ENCOUNTER — TELEPHONE (OUTPATIENT)
Dept: TRANSPLANT | Facility: CLINIC | Age: 53
End: 2023-03-08
Payer: COMMERCIAL

## 2023-03-08 NOTE — TELEPHONE ENCOUNTER
Pt had hip replacement yesterday at the [facility] and wanted to be sure if it ok to take oxy and tylenol. Informed pt it is ok to take both, but no more 2000/day of tylenol.

## 2023-03-08 NOTE — TELEPHONE ENCOUNTER
Patient Call: General  Route to LPN    Reason for call: Pt is currently inpt with  Hip replacement  Is runnig a fever and has been given max dose for Tylenol  Since she had liver transplant wonders if she can take more tylenol while she is still running a fever      Call back needed? Yes    Return Call Needed  Same as documented in contacts section  When to return call?: Same day: Route High Priority

## 2023-03-14 ENCOUNTER — MYC MEDICAL ADVICE (OUTPATIENT)
Dept: FAMILY MEDICINE | Facility: CLINIC | Age: 53
End: 2023-03-14
Payer: COMMERCIAL

## 2023-03-14 DIAGNOSIS — G89.18 ACUTE POST-OPERATIVE PAIN: Primary | ICD-10-CM

## 2023-03-14 RX ORDER — OXYCODONE HYDROCHLORIDE 5 MG/1
5 TABLET ORAL 2 TIMES DAILY PRN
Qty: 14 TABLET | Refills: 0 | Status: SHIPPED | OUTPATIENT
Start: 2023-03-14 | End: 2023-03-21

## 2023-03-14 NOTE — TELEPHONE ENCOUNTER
Pt calling to see if provider can give her some pain medication. She is almost out of the medication that she was prescribed to her when she was discharged from the hospital. She had hip replacement on 3/7. She will call the surgeon as well but wanted to start with her primary care provider first. Pended pharmacy pt prefers to use.  Brittaney Anna CMA

## 2023-03-17 ENCOUNTER — TRANSFERRED RECORDS (OUTPATIENT)
Dept: HEALTH INFORMATION MANAGEMENT | Facility: CLINIC | Age: 53
End: 2023-03-17

## 2023-03-23 ENCOUNTER — MYC MEDICAL ADVICE (OUTPATIENT)
Dept: FAMILY MEDICINE | Facility: CLINIC | Age: 53
End: 2023-03-23
Payer: COMMERCIAL

## 2023-03-27 ENCOUNTER — TRANSFERRED RECORDS (OUTPATIENT)
Dept: HEALTH INFORMATION MANAGEMENT | Facility: CLINIC | Age: 53
End: 2023-03-27

## 2023-03-30 NOTE — ED PROVIDER NOTES
History   Chief Complaint:  Bleeding    HPI   Melita Michelle Cortes is a 51 year old female with history of alcoholic cirrhosis with ascites, CKD, hyponatremia, anemia, and elevated serum creatinine who presents with bleeding. The patient thought she had a wart on her left hand, so she was treating it accordingly. The wart went away. She did notice some peeling skin, which she was able to flake off without any bleeding. Yesterday, after picking at the wound, the area started to bleed, but she was able to apply pressure to get it to stop. She placed a bandage on the area and the bleeding was stopped for the rest of the day and overnight. Today, she did not touch the wound, but it started to bleed a lot and was spewing from the opening. She has not been able to get this to stop. She is not bleeding anywhere else. Her last INR was 2.52 on November 17, 2021. She is scheduled to get labs at her clinic today in preparation for paracentesis on 11/26/21.    The patient is right handed.     Review of Systems   Constitutional: Negative for fatigue and fever.   Respiratory: Negative for shortness of breath.    Skin: Positive for wound. Negative for rash.   Neurological: Negative for dizziness, weakness and light-headedness.   Hematological: Bruises/bleeds easily.   All other systems reviewed and are negative.    Allergies:  No Known Allergies    Medications:  Albuterol inhaler  Lasix  Protonix  Aldactone  Chronulac    Past Medical History:     CKD  Alcoholic cirrhosis with ascites  Alcoholic hepatitis  Cervical high risk HPV test positive  Transaminitis  Macrocytosis  Other ascites  Acute liver failure without hepatic coma  Anemia  Alcoholic liver disease  Elevated serum creatinine  Hyponatremia  At high risk for severe sepsis  Symptomatic anemia  Decompensated liver disease  Bandemia  Hyperbilirubinemia    Past Surgical History:    Appendectomy  EGD with brushings    Family History:    Mother: cancer    Social History:  PCP:  Navneet Johnson  Presents with her   History of smoking  History of alcohol use, none currently    Physical Exam     Patient Vitals for the past 24 hrs:   BP Temp Temp src Pulse Resp SpO2   11/23/21 1418 117/70 98.3  F (36.8  C) Temporal 110 18 100 %       Physical Exam  Nursing notes reviewed. Vitals reviewed.  General: Alert. Well kept.  Eyes:  Conjunctiva non-injected, icteric.  Neck/Throat: Moist mucous membranes. Normal voice.  Cardiac: Regular rhythm. Normal heart sounds.  HR 92 and regular by auscultation  Pulmonary: Clear and equal breath sounds bilaterally.   Musculoskeletal: Normal gross range of motion of all 4 extremities.   Neurological: Alert and oriented x4.   Skin: Warm and dry.  Jaundiced.  2 mm area of bleeding within scabbed area on distal left thenar eminence.  Psych: Affect normal. Good eye contact.    Emergency Department Course     Laboratory:  Asymptomatic SARS-CoV2 (COVID-19) virus by PCR Nasopharyngeal: Negative     Procedures    Laceration Repair        LACERATION:  A simple clean 2 mm laceration.      LOCATION:  Left thenar eminence      FUNCTION:  Distally sensation, circulation, motor and tendon function are intact.      ANESTHESIA:  Local using 0.5% bupivacaine total of 1 mLs      PREPARATION:  Scrubbing with Normal Saline and Shur Clens      DEBRIDEMENT:  no debridement and wound explored, no foreign body found      CLOSURE:  Wound was closed with One Layer.  Skin closed with two x 4.0 Ethilon using interrupted sutures.    Emergency Department Course:  Reviewed:  I reviewed nursing notes, vitals, past medical history and Care Everywhere    Assessments:  1514 I obtained history and examined the patient as noted above.   1555 I rechecked the patient and explained findings.     Disposition:  The patient was discharged to home.     Impression & Plan     Medical Decision Making:  Melita Cortes is a 51 year old female with history of alcoholic cirrhosis with ascites, CKD,  hyponatremia, anemia, and elevated serum creatinine who presents with bleeding.  The patient is not on anticoagulation but does have a known elevated INR and history of alcoholic cirrhosis.  On exam she has a small area of bleeding on the left thenar eminence within the area of a scab.  I was unable to control this with pressure and therefore applied two interrupted sutures and had good hemostasis.  The patient did miss her lab visit at her primary care office in preparation for her paracentesis.  I contacted our lab at [facility] here who was able to perform those labs for her.  She will follow up with her gastroenterologist regarding these labs.  She was observed for 30 minutes with no ongoing bleeding.  The area was dressed and she was instructed on wound care and needing suture removal in 7 days.  She is discharged home in stable condition.    Diagnosis:    ICD-10-CM    1. Bleeding from wound  T14.8XXA      Scribe Disclosure:  Bharti FORD, am serving as a scribe at 3:09 PM on 11/23/2021 to document services personally performed by Radha Goodwin DNP based on my observations and the provider's statements to me.          Radha Goodwin, CNP  11/24/21 0012     No

## 2023-04-06 NOTE — TELEPHONE ENCOUNTER
Printed patient pre-op faxed to [employer] ATT[employer] claim # [id number] Karime Lopez-  at [phone] right faxed confirmed @ 7:01am    Copy placed in TC file

## 2023-04-11 ENCOUNTER — TELEPHONE (OUTPATIENT)
Dept: FAMILY MEDICINE | Facility: CLINIC | Age: 53
End: 2023-04-11
Payer: COMMERCIAL

## 2023-04-11 NOTE — TELEPHONE ENCOUNTER
Received clarification on Long Term Disability form via fax from the [employer] placed in providers red folder       When complete please fax to [employer]     ATTN claim # [id number]   Karime Lopez-  at [phone]

## 2023-04-12 ENCOUNTER — MYC MEDICAL ADVICE (OUTPATIENT)
Dept: FAMILY MEDICINE | Facility: CLINIC | Age: 53
End: 2023-04-12
Payer: COMMERCIAL

## 2023-04-13 ENCOUNTER — TELEPHONE (OUTPATIENT)
Dept: FAMILY MEDICINE | Facility: CLINIC | Age: 53
End: 2023-04-13
Payer: COMMERCIAL

## 2023-04-13 NOTE — TELEPHONE ENCOUNTER
Forms/Letter Request    Type of form/letter: [employer] Claim Event ID [id number]    Have you been seen for this request: N/A    Do we have the form/letter: Yes:Will place form on provider's desk for review/signature.    When is form/letter needed by: asap    How would you like the form/letter returned: Fax : [phone]

## 2023-04-14 NOTE — TELEPHONE ENCOUNTER
Please call patient and schedule her for a telephone appointment with me on Monday pr Wednesday as I need to ask her some questions about her paper work

## 2023-04-17 ENCOUNTER — VIRTUAL VISIT (OUTPATIENT)
Dept: FAMILY MEDICINE | Facility: CLINIC | Age: 53
End: 2023-04-17
Payer: COMMERCIAL

## 2023-04-17 DIAGNOSIS — M25.50 POLYARTHRALGIA: ICD-10-CM

## 2023-04-17 DIAGNOSIS — Z94.4 STATUS POST LIVER TRANSPLANTATION (H): Primary | ICD-10-CM

## 2023-04-17 DIAGNOSIS — M25.552 HIP PAIN, LEFT: ICD-10-CM

## 2023-04-17 DIAGNOSIS — F33.0 MILD RECURRENT MAJOR DEPRESSION (H): ICD-10-CM

## 2023-04-17 PROCEDURE — 99214 OFFICE O/P EST MOD 30 MIN: CPT | Mod: VID | Performed by: INTERNAL MEDICINE

## 2023-04-17 RX ORDER — VENLAFAXINE HYDROCHLORIDE 75 MG/1
75 CAPSULE, EXTENDED RELEASE ORAL DAILY
Qty: 90 CAPSULE | Refills: 1 | Status: SHIPPED | OUTPATIENT
Start: 2023-04-17 | End: 2023-11-01

## 2023-04-17 ASSESSMENT — PATIENT HEALTH QUESTIONNAIRE - PHQ9: SUM OF ALL RESPONSES TO PHQ QUESTIONS 1-9: 14

## 2023-04-17 NOTE — PROGRESS NOTES
"Fidelina is a 52 year old who is being evaluated via a billable video visit.      How would you like to obtain your AVS? MyChart  If the video visit is dropped, the invitation should be resent by: Text to cell phone: [phone]  Will anyone else be joining your video visit? No          Assessment & Plan     Status post liver transplantation (H)  She follows up with the transplant team  She is currently still on tacrolimus  This is causing her a lot of joint pains and muscle aches which is hampering her mobility  They are looking at changing this to sirolimus at some point    Mild recurrent major depression (H)  PHQ-9 score is again 14 today  Increase the venlafaxine XR dose to 75 mg  She has a history of depression and was doing fairly fine up until recently  She recently had some significant life changing events  Her father has passed away  One of her cousins had attempted suicide  This has made her mental health worse  She is planning to set up seeing a grief counselor  - venlafaxine (EFFEXOR XR) 75 MG 24 hr capsule; Take 1 capsule (75 mg) by mouth daily    Polyarthralgia  She had extensive work-up for this  It was determined in the end that this is from her tacrolimus    Hip pain, left  Status post left hip arthroplasty  She is working with PT  She still has significant pain but taking no pain medications        30 minutes spent by me on the date of the encounter doing chart review, history and exam, documentation and further activities per the note       BMI:   Estimated body mass index is 25.11 kg/m  as calculated from the following:    Height as of 2/15/23: 1.64 m (5' 4.57\").    Weight as of 2/15/23: 67.5 kg (148 lb 14.4 oz).           Navneet Johnson MD  [facility]    Cameron Kitchen is a 52 year old, presenting for the following health issues:  Forms        4/17/2023     7:34 AM   Additional Questions   Roomed by Dolores     History of Present Illness       Reason for visit:  LTD " Form    She eats 2-3 servings of fruits and vegetables daily.She consumes 1 sweetened beverage(s) daily.She exercises with enough effort to increase her heart rate 9 or less minutes per day.  She exercises with enough effort to increase her heart rate 3 or less days per week.   She is taking medications regularly.               Review of Systems   Constitutional, HEENT, cardiovascular, pulmonary, gi and gu systems are negative, except as otherwise noted.      Objective           Vitals:  No vitals were obtained today due to virtual visit.    Physical Exam   GENERAL: Healthy, alert and no distress  EYES: Eyes grossly normal to inspection.  No discharge or erythema, or obvious scleral/conjunctival abnormalities.  RESP: No audible wheeze, cough, or visible cyanosis.  No visible retractions or increased work of breathing.    SKIN: Visible skin clear. No significant rash, abnormal pigmentation or lesions.  NEURO: Cranial nerves grossly intact.  Mentation and speech appropriate for age.  PSYCH: Mentation appears normal, affect normal/bright, judgement and insight intact, normal speech and appearance well-groomed.                Video-Visit Details    Type of service:  Video Visit     Originating Location (pt. Location): Home    Distant Location (provider location):  On-site  Platform used for Video Visit: Melty

## 2023-04-20 ENCOUNTER — LAB (OUTPATIENT)
Dept: LAB | Facility: CLINIC | Age: 53
End: 2023-04-20
Payer: COMMERCIAL

## 2023-04-20 ENCOUNTER — MYC MEDICAL ADVICE (OUTPATIENT)
Dept: TRANSPLANT | Facility: CLINIC | Age: 53
End: 2023-04-20

## 2023-04-20 DIAGNOSIS — Z94.4 LIVER REPLACED BY TRANSPLANT (H): ICD-10-CM

## 2023-04-20 LAB
ALBUMIN SERPL-MCNC: 3.8 G/DL (ref 3.4–5)
ALP SERPL-CCNC: 116 U/L (ref 40–150)
ALT SERPL W P-5'-P-CCNC: 21 U/L (ref 0–50)
ANION GAP SERPL CALCULATED.3IONS-SCNC: 1 MMOL/L (ref 3–14)
AST SERPL W P-5'-P-CCNC: 19 U/L (ref 0–45)
BILIRUB DIRECT SERPL-MCNC: 0.1 MG/DL (ref 0–0.2)
BILIRUB SERPL-MCNC: 0.5 MG/DL (ref 0.2–1.3)
BUN SERPL-MCNC: 20 MG/DL (ref 7–30)
CALCIUM SERPL-MCNC: 9 MG/DL (ref 8.5–10.1)
CHLORIDE BLD-SCNC: 107 MMOL/L (ref 94–109)
CO2 SERPL-SCNC: 29 MMOL/L (ref 20–32)
CREAT SERPL-MCNC: 0.78 MG/DL (ref 0.52–1.04)
ERYTHROCYTE [DISTWIDTH] IN BLOOD BY AUTOMATED COUNT: 13.3 % (ref 10–15)
GFR SERPL CREATININE-BSD FRML MDRD: >90 ML/MIN/1.73M2
GLUCOSE BLD-MCNC: 107 MG/DL (ref 70–99)
HCT VFR BLD AUTO: 38.8 % (ref 35–47)
HGB BLD-MCNC: 12.4 G/DL (ref 11.7–15.7)
MAGNESIUM SERPL-MCNC: 1.9 MG/DL (ref 1.6–2.3)
MCH RBC QN AUTO: 27.7 PG (ref 26.5–33)
MCHC RBC AUTO-ENTMCNC: 32 G/DL (ref 31.5–36.5)
MCV RBC AUTO: 87 FL (ref 78–100)
PLATELET # BLD AUTO: 197 10E3/UL (ref 150–450)
POTASSIUM BLD-SCNC: 4.5 MMOL/L (ref 3.4–5.3)
PROT SERPL-MCNC: 6.6 G/DL (ref 6.8–8.8)
RBC # BLD AUTO: 4.48 10E6/UL (ref 3.8–5.2)
SODIUM SERPL-SCNC: 137 MMOL/L (ref 133–144)
TACROLIMUS BLD-MCNC: 4 UG/L (ref 5–15)
TME LAST DOSE: ABNORMAL H
TME LAST DOSE: ABNORMAL H
WBC # BLD AUTO: 3.5 10E3/UL (ref 4–11)

## 2023-04-20 PROCEDURE — 80197 ASSAY OF TACROLIMUS: CPT

## 2023-04-20 PROCEDURE — 82248 BILIRUBIN DIRECT: CPT

## 2023-04-20 PROCEDURE — 36415 COLL VENOUS BLD VENIPUNCTURE: CPT

## 2023-04-20 PROCEDURE — 85027 COMPLETE CBC AUTOMATED: CPT

## 2023-04-20 PROCEDURE — 83735 ASSAY OF MAGNESIUM: CPT

## 2023-04-20 PROCEDURE — 80053 COMPREHEN METABOLIC PANEL: CPT

## 2023-04-24 ENCOUNTER — TRANSFERRED RECORDS (OUTPATIENT)
Dept: HEALTH INFORMATION MANAGEMENT | Facility: CLINIC | Age: 53
End: 2023-04-24
Payer: COMMERCIAL

## 2023-05-06 NOTE — TELEPHONE ENCOUNTER
Pre-visit planning for upcoming procedure.     Patient scheduled for EGD on 10/29/21    Covid test scheduled: 10/25/21    Arrival time: 1300    Facility location: [facility]    Sedation type: CS - Upon review patient with alcoholic cirrhosis of liver with OV 10/19/21 noting no alcohol in 30 days.     Indication for procedure: variceal screening    Anticoagulations? no     Aimee Milton RN     Yes

## 2023-05-08 ENCOUNTER — HEALTH MAINTENANCE LETTER (OUTPATIENT)
Age: 53
End: 2023-05-08

## 2023-05-10 ENCOUNTER — TELEPHONE (OUTPATIENT)
Dept: FAMILY MEDICINE | Facility: CLINIC | Age: 53
End: 2023-05-10
Payer: COMMERCIAL

## 2023-05-10 NOTE — TELEPHONE ENCOUNTER
Received Attending Physician Statement from [employer] placed in providers red folder

## 2023-05-12 NOTE — TELEPHONE ENCOUNTER
Form faxed to the [employer] [phone] along with office note from 4/17/2023 and also the hospital admission from 3/7/2023 along with the lab work from 4/20/23. Copy placed in abstract and original placed in tc bin. 91 page fax including cover sheet at 12:53pm on 5/12/23

## 2023-05-12 NOTE — TELEPHONE ENCOUNTER
Please print my office note from 4/17/2023 and also the hospital admission from 3/7/2023 along with the lab work from 4/20/23   I did not receive the 2 nd page from [employer] and only received two 1st pages

## 2023-05-14 ENCOUNTER — MYC MEDICAL ADVICE (OUTPATIENT)
Dept: FAMILY MEDICINE | Facility: CLINIC | Age: 53
End: 2023-05-14
Payer: COMMERCIAL

## 2023-05-23 ENCOUNTER — DOCUMENTATION ONLY (OUTPATIENT)
Dept: BEHAVIORAL HEALTH | Facility: CLINIC | Age: 53
End: 2023-05-23
Payer: COMMERCIAL

## 2023-05-23 DIAGNOSIS — F33.0 MAJOR DEPRESSIVE DISORDER, RECURRENT EPISODE, MILD (H): Primary | ICD-10-CM

## 2023-05-23 NOTE — PROGRESS NOTES
Pt requesting referral for grief counseling and complex medical need support. Referral will be placed today.

## 2023-05-25 ENCOUNTER — TELEPHONE (OUTPATIENT)
Dept: BEHAVIORAL HEALTH | Facility: CLINIC | Age: 53
End: 2023-05-25
Payer: COMMERCIAL

## 2023-05-25 NOTE — TELEPHONE ENCOUNTER
Schedule DA with AC and bridge TC  Due: Today Received: Today  Chinyere Ingram L, LICSW  P Tc Referrals For Review  Verify a DA has not been completed yet, if no - schedule with AC and bridge care with TC.  If Yes, schedule TC.       First attempt to contact pt. Writer left a VM with TC contact info and encouraged a phone call back to schedule initial therapy appointment and to schedule DA with Assessment center as requested by provider. Writer will postpone for tomorrow.    Tessa Richard  05/25/2023  [phone]    ----- Message from Fred Loza sent at 5/25/2023  1:45 PM CDT -----  Transition Clinic Referral   [place]         Please Check Type of Referral Requested:       __X__THERAPY: The Transition clinic is able to schedule patients without current medical insurance; these patient will be referred to our Social Work Care Coordinator for Medical Insurance              Assistance. We are open for referral for psychotherapy. Patient is referred from:  PCP at   [facility]      ____MEDICATION:  Referrals for Medication are ONLY accepted from the following areas (select): NA                                       Suboxone and Opioid Management Referrals are automatically denied. TC Psychiatry cannot see patient without active medical insurance.   TC Psychiatry cannot accept patient with next level of care scheduled with PCP      GUARDIAN: If your patient is not their own Guardian, please provide the following:    Guardian Name:  Guardian Contact Information (Phone & Email) :  Guardian Address:     FOSTER CARE PROVIDER: If your patient lives at a Licensed Foster Care, please provide the following:    Foster Provider:  Foster Provider Contact Information (Phone & Email):  Foster Provider Address:         Referring Provider Contact Name: Julieth Pickens; Phone Number: unk    Reason for Transition Clinic Referral: F33.0 (ICD-10-CM) - Major depressive disorder, recurrent episode, mild (H)    Next  Level of Care Patient Will Be Transitioned To:   Provider(s)  Location   Date/Time  Fidelina Cortes MRN: [id number]  Date: 10/11/2023 Status: Scheduled  Time: 11:00 AM Length: 60  Visit Type: ADULT PSYCHOTHERAPY NEW [[id number]] Copay: $0.00  Provider: Jake Coyne LGSW Department: [facility]  Encounter #: [id number]          What Would Be Helpful from the Transition Clinic: bridge gap     Needs: NO    Does Patient Have Access to Technology: yes    Patient E-mail Address: [email or web address]    Current Patient Phone Number: [phone];     Clinician Gender Preference (if applicable): NO    Patient location preference: [place]

## 2023-05-26 ENCOUNTER — TELEPHONE (OUTPATIENT)
Dept: BEHAVIORAL HEALTH | Facility: CLINIC | Age: 53
End: 2023-05-26
Payer: COMMERCIAL

## 2023-05-26 NOTE — TELEPHONE ENCOUNTER
Second attempt to contact pt. Jasperr left a VM with TC contact info and encouraged a phone call back to schedule initial therapy appointment. Coordinator will clifton referral as complete.Tracker completed.     Katarina Jha, Lead   5/26/23  6:50pm        ----- Message from Fred Loza sent at 5/25/2023  1:45 PM CDT -----  Transition Clinic Referral   [place]         Please Check Type of Referral Requested:       __X__THERAPY: The Transition clinic is able to schedule patients without current medical insurance; these patient will be referred to our Social Work Care Coordinator for Medical Insurance              Assistance. We are open for referral for psychotherapy. Patient is referred from:  PCP at   [facility]      ____MEDICATION:  Referrals for Medication are ONLY accepted from the following areas (select): NA                                       Suboxone and Opioid Management Referrals are automatically denied. TC Psychiatry cannot see patient without active medical insurance.   TC Psychiatry cannot accept patient with next level of care scheduled with PCP      GUARDIAN: If your patient is not their own Guardian, please provide the following:    Guardian Name:  Guardian Contact Information (Phone & Email) :  Guardian Address:     FOSTER CARE PROVIDER: If your patient lives at a Licensed Foster Care, please provide the following:    Foster Provider:  Foster Provider Contact Information (Phone & Email):  Foster Provider Address:         Referring Provider Contact Name: Julieth Pickens; Phone Number: [phone]    Reason for Transition Clinic Referral: F33.0 (ICD-10-CM) - Major depressive disorder, recurrent episode, mild (H)    Next Level of Care Patient Will Be Transitioned To:   Provider(s)  Location   Date/Time  Fidelina Cortes MRN: [id number]  Date: 10/11/2023 Status: Scheduled  Time: 11:00 AM Length: 60  Visit Type: ADULT PSYCHOTHERAPY NEW  [[id number]] Copay: $0.00  Provider: Jake Coyne LGSW Department: [facility]  Encounter #: [id number]          What Would Be Helpful from the Transition Clinic: bridge gap     Needs: NO    Does Patient Have Access to Technology: yes    Patient E-mail Address: [email or web address]    Current Patient Phone Number: [phone];     Clinician Gender Preference (if applicable): NO    Patient location preference: [place]

## 2023-05-26 NOTE — TELEPHONE ENCOUNTER
Patient called on que to schedule appointment. Writer postponed to call patient for first attempt.    Tessa Richard  05/25/2023  [phone]

## 2023-05-30 ENCOUNTER — TELEPHONE (OUTPATIENT)
Dept: BEHAVIORAL HEALTH | Facility: CLINIC | Age: 53
End: 2023-05-30
Payer: COMMERCIAL

## 2023-05-30 NOTE — TELEPHONE ENCOUNTER
Patient called back to TC. Scheduled DA with AC and initial TC Therapy.    Millie Dominguez  Transition Clinic Coordinator  Date and Time: 05/30/23 9:27 AM

## 2023-06-02 ENCOUNTER — TELEPHONE (OUTPATIENT)
Dept: TRANSPLANT | Facility: CLINIC | Age: 53
End: 2023-06-02
Payer: COMMERCIAL

## 2023-06-02 NOTE — TELEPHONE ENCOUNTER
"Transplant Social Work Services Phone Call    Data: Fidelina is s/p  liver transplant from 2022  Intervention: I received a call from Fidelina requesting a call back. Fidelina discussed with this writer dynamics with other patient's. She reports feeling overwhelmed and needing to take care of herself. I provided supportive counseling and indicated that if she is overwhelmed she is allowed to say no to \"asks\" of others. I provided support regarding not feeling like she needs to give folks a reason why she can or cannot do something, and support regarding navigating those friendships. She also recently went to the optomologist and does not feel safe to drive d/t her sight. Fidelina is set up with counseling and a DA on 2023 and I encouraged her to keep that appointment.     Assessment: Fidelina is feeling overwhelmed and sought out some mental health support. She called this writer for additional support today   Education provided by SW: No new education  Plan: This writer will be available for ongoing psychosocial support.     ELMO Betts, [facility]  Liver Transplant   [facility] [place]  Phone: [phone]  Pager: [phone]    "

## 2023-06-06 ENCOUNTER — HOSPITAL ENCOUNTER (OUTPATIENT)
Dept: BEHAVIORAL HEALTH | Facility: CLINIC | Age: 53
Discharge: HOME | End: 2023-06-06
Attending: FAMILY MEDICINE | Admitting: FAMILY MEDICINE
Payer: COMMERCIAL

## 2023-06-06 PROCEDURE — 90791 PSYCH DIAGNOSTIC EVALUATION: CPT | Performed by: COUNSELOR

## 2023-06-06 ASSESSMENT — COLUMBIA-SUICIDE SEVERITY RATING SCALE - C-SSRS
1. HAVE YOU WISHED YOU WERE DEAD OR WISHED YOU COULD GO TO SLEEP AND NOT WAKE UP?: YES
2. HAVE YOU ACTUALLY HAD ANY THOUGHTS OF KILLING YOURSELF?: YES
REASONS FOR IDEATION PAST MONTH: MOSTLY TO END OR STOP THE PAIN (YOU COULDN'T GO ON LIVING WITH THE PAIN OR HOW YOU WERE FEELING)
ATTEMPT LIFETIME: NO
REASONS FOR IDEATION LIFETIME: MOSTLY TO END OR STOP THE PAIN (YOU COULDN'T GO ON LIVING WITH THE PAIN OR HOW YOU WERE FEELING)
3. HAVE YOU BEEN THINKING ABOUT HOW YOU MIGHT KILL YOURSELF?: NO
4. HAVE YOU HAD THESE THOUGHTS AND HAD SOME INTENTION OF ACTING ON THEM?: NO
6. HAVE YOU EVER DONE ANYTHING, STARTED TO DO ANYTHING, OR PREPARED TO DO ANYTHING TO END YOUR LIFE?: NO
2. HAVE YOU ACTUALLY HAD ANY THOUGHTS OF KILLING YOURSELF?: NO
5. HAVE YOU STARTED TO WORK OUT OR WORKED OUT THE DETAILS OF HOW TO KILL YOURSELF? DO YOU INTEND TO CARRY OUT THIS PLAN?: NO
TOTAL  NUMBER OF INTERRUPTED ATTEMPTS LIFETIME: NO
TOTAL  NUMBER OF ABORTED OR SELF INTERRUPTED ATTEMPTS LIFETIME: NO
1. IN THE PAST MONTH, HAVE YOU WISHED YOU WERE DEAD OR WISHED YOU COULD GO TO SLEEP AND NOT WAKE UP?: NO

## 2023-06-06 ASSESSMENT — ANXIETY QUESTIONNAIRES
GAD7 TOTAL SCORE: 14
4. TROUBLE RELAXING: MORE THAN HALF THE DAYS
7. FEELING AFRAID AS IF SOMETHING AWFUL MIGHT HAPPEN: MORE THAN HALF THE DAYS
2. NOT BEING ABLE TO STOP OR CONTROL WORRYING: MORE THAN HALF THE DAYS
5. BEING SO RESTLESS THAT IT IS HARD TO SIT STILL: MORE THAN HALF THE DAYS
6. BECOMING EASILY ANNOYED OR IRRITABLE: MORE THAN HALF THE DAYS
GAD7 TOTAL SCORE: 14
1. FEELING NERVOUS, ANXIOUS, OR ON EDGE: MORE THAN HALF THE DAYS
3. WORRYING TOO MUCH ABOUT DIFFERENT THINGS: MORE THAN HALF THE DAYS

## 2023-06-06 ASSESSMENT — PATIENT HEALTH QUESTIONNAIRE - PHQ9
SUM OF ALL RESPONSES TO PHQ QUESTIONS 1-9: 11
SUM OF ALL RESPONSES TO PHQ QUESTIONS 1-9: 11
10. IF YOU CHECKED OFF ANY PROBLEMS, HOW DIFFICULT HAVE THESE PROBLEMS MADE IT FOR YOU TO DO YOUR WORK, TAKE CARE OF THINGS AT HOME, OR GET ALONG WITH OTHER PEOPLE: SOMEWHAT DIFFICULT

## 2023-06-06 NOTE — PROGRESS NOTES
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 increased arousal (not present before the trauma), as indicated by two (or more) of the following:     - Difficulty falling or staying asleep.      - Irritability or outbursts of anger.      - Difficulty concentrating.   E. Duration of the disturbance is more than 1 month.  F. The disturbance causes clinically significant distress or impairment in social, occupational, or other important areas of functioning.    Functional Status:  Patient reports the following functional impairments:  Patient reported that she is experiencing functional impairment in several aspects of her life with the most impacted being her social connections. Non-programmatic care: Patient is requesting basic services to address current mental health concerns based on a score of 15.      LOCUS Worksheet     Name: Melita Cortes MRN: [id number]    : 1970      Gender:  Female    Provider Name: Isabella Mooney MA, LMFT, LADC  Provider NPI:  [id number]    Actual level of Care Provided:  Adult Mental Health Diagnostic Assessment    Service(s) receiving or referred to:  Individual Therapy      Rating completed by: Isabella Mooney MA, LMFT, LADC      I. Risk of Harm:   1      Minimal Risk of Harm    II. Functional Status:   3       Moderate Impairment    III. Co-Morbidity:   3      Significant Co-Morbidity    IV - A. Recovery Environment - Level of Stress:   2      Mildly Stressful Environment    IV - B. Recovery Environment - Level of Support:   2      Supportive Environment    V. Treatment and Recovery History:   2      Significant Response to Treatment and Recovery Management    VI. Engagement and Recovery Project:   2      Positive Engagement and Recovery       15 Composite Score    Level of Care Recommendation:   14 to 16       Low Intensity Community Based Services    Clinical Summary:  1. Reason for assessment: The patient was seen for an assessment to determine the appropriate next steps to getting help for her mental health symptoms and maintenance of sobriety.  2. Psychosocial, Cultural and Contextual Factors: The patient recently had a liver transplant, a hip surgery, is in recovery, and working through a lot of past family trauma and current family stress.  3. Principal DSM5 Diagnoses  (Sustained by DSM5 Criteria Listed Above):     296.32 (F33.1) Major Depressive Disorder, Recurrent Episode, Moderate _ and With anxious distress.  4. Other Diagnoses that is relevant to services:     300.02 (F41.1) Generalized Anxiety Disorder  309.81 (F43.10) Posttraumatic Stress Disorder (includes Posttraumatic Stress Disorder for Children 6 Years and Younger)  With dissociative symptoms  Substance-Related & Addictive Disorders Alcohol Use Disorder   303.90 (F10.20) Moderate In sustained remission.  5. Provisional Diagnosis:   296.32 (F33.1) Major Depressive Disorder, Recurrent Episode, Moderate _ and With anxious distress  309.81 (F43.10) Posttraumatic Stress Disorder (includes Posttraumatic Stress Disorder for Children 6 Years and Younger)  With dissociative symptoms  6. Prognosis: Expect Improvement.  7. Likely consequences of symptoms if not treated: The patient could see a worsening of depression and anxiety symptoms, especially if her past  trauma is not worked through which could lead to a relapse and possible dangerous behavior.  8. Client strengths include:  caring, committed to sobriety, creative, goal-focused, motivated, open to learning and open to suggestions / feedback.     Recommendations:     1. Plan for Safety and Risk Management:   Safety and Risk: A safety and risk management plan has been developed including: When the Burnett Suicide Severity Rating Scale has been completed, the patient identifies lifetime history of suicidal ideation and/or Suicidal Behavior that is greater than 10 years.      The recommendation is to provide the Brief Safety Plan which can be found at the end of this assessment            Report to child / adult protection services was NA.     2. Patient's identified no health concerns or cultural influence.     3. Initial Treatment will focus on: Depressed mood as evidenced by changes in sleep, lack of interest, excessive or inappropriate guilt, change in energy level, difficulties concentrating, change in appetite, suicidal ideation, low self-worth, ruminations, irritability, feeling sad, down, or depressed, withdrawn, frequent crying and anger outbursts. Anxiety as evidenced by excessive worry, nervousness, physical complaints, such as headaches, stomachaches, muscle tension, separation anxiety, sleep disturbance, psychomotor agitation, ruminations, poor concentration, irritability and anger outbursts. Trauma symptoms as evidenced by experienced traumatic events such as physical abuse, emotional abuse, and mental abuse, reexperiencing of the trauma, avoids traumatic stimuli, impaired functioning and nightmares.     4. Resources/Service Plan:    services are not indicated.   Modifications to assist communication are not indicated.   Additional disability accommodations are not indicated.      5. Collaboration:   Collaboration / coordination of treatment will be initiated with the following support  professionals: primary care physician and .      6.  Referrals:   The following referral(s) will be initiated: Outpatient [facility].      Next Scheduled Appointment:    Date: Wednesday, 6/14/2023  Time: 1:00 pm - 1:50 pm   Provider: Medina Lovett MA  [facility]   [facility] [address] [address] [place] [address], [address], [place] [id number]   Phone: [phone]   Type: Teletherapy      A Release of Information has been obtained for the following:  and Primary Care Provider.     Emergency Contact was obtained: Brandon Cortes () [phone]     Clinical Substantiation/medical necessity for the above recommendations: The patient has been battling her mental health for some time now and in her past she was covering up her mental health symptoms with substance use and abuse (especially alcohol). The patient did attend chemical dependency treatment and found that to be somewhat beneficial and did not feel like she wanted to enter a group setting again. The patient would benefit from having a weekly individual therapist that will allow her space to process her emotions regarding past trauma from family, depression issues, anxiety, and mood dysregulation. The patient has been through a great deal of pain in her life and needs a person that can be supportive and interactive while working through these difficult experiences from her past. The patient would benefit from a therapist who has an [facility] background so they can continue to support the patient in recovery as well. The patient currently attends a liver transplant group which she finds helpful, but the transplant is another layer that would be good to process in therapy as the patient reported having a new lease on life and not knowing what to do with it.     7. JESSE:    JESSE:  Discussed the general effects of drugs and alcohol on health and well-being. Recommendations: Patient was referred to an individual therapist  "[facility] who can address her mental health while helping her maintain her recent sobriety.     8. Records:   These were reviewed at time of assessment. Information in this assessment was obtained from the medical record and provided by patient who is a good historian.  Patient will have open access to their mental health medical record.    9.   Interactive Complexity: No       Adult Long Safety Plan:     [facility]                                       Melita Cortes     SAFETY PLAN:  Step 1: Warning signs / cues (Thoughts, images, mood, situation, behavior) that a crisis may be developing:    Thoughts: I am just tired    Images: No imagery used    Thinking Processes: ruminations (can't stop thinking about my problems): when I feel tired about everything I have been dealing with    Mood: worsening depression, hopelessness and helplessness    Behaviors: isolating/withdrawing     Situations: relationship problems   Step 2: Coping strategies - Things I can do to take my mind off of my problems without contacting another person (relaxation technique, physical activity):    Distress Tolerance Strategies:  Taking deep breaths    Physical Activities: go for a walk and working out    Focus on helpful thoughts:  \"This is temporary\" and \"I will get through this\"  Step 3: People and social settings that provide distraction:   Name: Doug ()    Name: Shauna (Cousin)    Name: Aimee (Cousin)      coffee shop   Step 4: Remind myself of people and things that are important to me and worth living for:    My , my cats, my friends in my liver support group, and the liver donor's family    Step 5: When I am in crisis, I can ask these people to help me use my safety plan:   Name: Doug ()    Name: Viri (Cousin)    Name: Aimee (Cousin)   Step 6: Making the environment safe:     be around others  Step 7: Professionals or agencies I can " contact during a crisis:    Suicide Prevention Lifeline: Call or Text 982   Local Crisis Services: Contact [facility] for local crisis resources    Call 911 or go to my nearest emergency department.   I helped develop this safety plan and agree to use it when needed.  I have been given a copy of this plan. Adapted from Safety Plan Template 2008 Chelsi Marx and Fred Ramos is reprinted with the express permission of the authors.  No portion of the Safety Plan Template may be reproduced without the express, written permission.  You can contact the authors at bhs@[facility] or ubaldo@mail.[facility]. Patient consented to co-developed safety plan.  Safety and risk management plan was completed.  Patient agreed to use safety plan should any safety concerns arise.  A copy was given to the patient.      Isabella Mooney MA, LMFT, [facility]      June 6, 2023

## 2023-06-07 NOTE — TELEPHONE ENCOUNTER
Writer rescheduled patient appointment for 06/20/2023 @ 12:30 pm as requested by provider.     Tessa Richard  06/07/2023  122

## 2023-06-07 NOTE — PATIENT INSTRUCTIONS
The patient was seen for an adult mental health diagnostic assessment to determine the appropriate next steps for her care. The patient was referred to an individual therapist to address current mental health concerns and to help with recovery maintenance. Initial appointment details are as follows:       Date: Wednesday, 6/14/2023  Time: 1:00 pm - 1:50 pm  Provider: Medina Lovett MA  [facility]  Location: [facility], [address] [place] [address] [address], [place] [address]  Phone: [phone]  Type: Teletherapy

## 2023-06-19 ENCOUNTER — TELEPHONE (OUTPATIENT)
Dept: BEHAVIORAL HEALTH | Facility: CLINIC | Age: 53
End: 2023-06-19
Payer: COMMERCIAL

## 2023-06-19 NOTE — TELEPHONE ENCOUNTER
Patient called TC stating she has an appointment and is not sure if she needs the appointment. Coordinator reviewed upcoming appt and explained TC services. Patient stated is seeing Clara with [facility], small counseling group to bridge until Oct appointment. Said coordinator can cancel the appointment.    Millie Dominguez  Transition Clinic Coordinator  Date and Time: 06/19/23 4:45 PM

## 2023-06-20 ENCOUNTER — LAB (OUTPATIENT)
Dept: LAB | Facility: CLINIC | Age: 53
End: 2023-06-20
Payer: COMMERCIAL

## 2023-06-20 DIAGNOSIS — Z94.4 LIVER REPLACED BY TRANSPLANT (H): ICD-10-CM

## 2023-06-20 LAB
ALBUMIN SERPL BCG-MCNC: 4.5 G/DL (ref 3.5–5.2)
ALP SERPL-CCNC: 99 U/L (ref 35–104)
ALT SERPL W P-5'-P-CCNC: 22 U/L (ref 0–50)
ANION GAP SERPL CALCULATED.3IONS-SCNC: 11 MMOL/L (ref 7–15)
AST SERPL W P-5'-P-CCNC: 27 U/L (ref 0–45)
BILIRUB DIRECT SERPL-MCNC: <0.2 MG/DL (ref 0–0.3)
BILIRUB SERPL-MCNC: 0.2 MG/DL
BUN SERPL-MCNC: 23.5 MG/DL (ref 6–20)
CALCIUM SERPL-MCNC: 9.6 MG/DL (ref 8.6–10)
CHLORIDE SERPL-SCNC: 104 MMOL/L (ref 98–107)
CREAT SERPL-MCNC: 0.93 MG/DL (ref 0.51–0.95)
DEPRECATED HCO3 PLAS-SCNC: 25 MMOL/L (ref 22–29)
ERYTHROCYTE [DISTWIDTH] IN BLOOD BY AUTOMATED COUNT: 13.7 % (ref 10–15)
GFR SERPL CREATININE-BSD FRML MDRD: 74 ML/MIN/1.73M2
GLUCOSE SERPL-MCNC: 114 MG/DL (ref 70–99)
HCT VFR BLD AUTO: 44.4 % (ref 35–47)
HGB BLD-MCNC: 14.6 G/DL (ref 11.7–15.7)
MAGNESIUM SERPL-MCNC: 1.7 MG/DL (ref 1.7–2.3)
MCH RBC QN AUTO: 27 PG (ref 26.5–33)
MCHC RBC AUTO-ENTMCNC: 32.9 G/DL (ref 31.5–36.5)
MCV RBC AUTO: 82 FL (ref 78–100)
PLATELET # BLD AUTO: 217 10E3/UL (ref 150–450)
POTASSIUM SERPL-SCNC: 4.3 MMOL/L (ref 3.4–5.3)
PROT SERPL-MCNC: 6.9 G/DL (ref 6.4–8.3)
RBC # BLD AUTO: 5.4 10E6/UL (ref 3.8–5.2)
SODIUM SERPL-SCNC: 140 MMOL/L (ref 136–145)
TACROLIMUS BLD-MCNC: 5.2 UG/L (ref 5–15)
TME LAST DOSE: NORMAL H
TME LAST DOSE: NORMAL H
WBC # BLD AUTO: 4.3 10E3/UL (ref 4–11)

## 2023-06-20 PROCEDURE — 80197 ASSAY OF TACROLIMUS: CPT

## 2023-06-20 PROCEDURE — 36415 COLL VENOUS BLD VENIPUNCTURE: CPT

## 2023-06-20 PROCEDURE — 85027 COMPLETE CBC AUTOMATED: CPT

## 2023-06-20 PROCEDURE — 83735 ASSAY OF MAGNESIUM: CPT

## 2023-06-20 PROCEDURE — 82248 BILIRUBIN DIRECT: CPT

## 2023-06-20 PROCEDURE — 80053 COMPREHEN METABOLIC PANEL: CPT

## 2023-06-21 ENCOUNTER — MYC MEDICAL ADVICE (OUTPATIENT)
Dept: FAMILY MEDICINE | Facility: CLINIC | Age: 53
End: 2023-06-21
Payer: COMMERCIAL

## 2023-06-22 NOTE — TELEPHONE ENCOUNTER
"Dr. Navneet Johnson,    If you think it's appropriate, the following Physical Therapists at [facility] works with [facility]\": Vania Gomez, Brandy Shah, and Joe Rodriguez.     SHANITA Agee [facility] Referral Rep    "

## 2023-06-23 ENCOUNTER — TELEPHONE (OUTPATIENT)
Dept: DERMATOLOGY | Facility: CLINIC | Age: 53
End: 2023-06-23
Payer: COMMERCIAL

## 2023-06-23 NOTE — TELEPHONE ENCOUNTER
Via phone patient was rescheduled for the following:    Appointment type: Return  Provider:   Return date: 12-1-23  Specialty phone number: [phone]

## 2023-06-29 ENCOUNTER — OFFICE VISIT (OUTPATIENT)
Dept: OBGYN | Facility: CLINIC | Age: 53
End: 2023-06-29
Payer: COMMERCIAL

## 2023-06-29 VITALS
BODY MASS INDEX: 25.26 KG/M2 | WEIGHT: 151.6 LBS | HEIGHT: 65 IN | SYSTOLIC BLOOD PRESSURE: 106 MMHG | DIASTOLIC BLOOD PRESSURE: 76 MMHG

## 2023-06-29 DIAGNOSIS — N95.1 SYMPTOMATIC MENOPAUSAL OR FEMALE CLIMACTERIC STATES: ICD-10-CM

## 2023-06-29 DIAGNOSIS — Z01.419 ENCOUNTER FOR GYNECOLOGICAL EXAMINATION WITHOUT ABNORMAL FINDING: Primary | ICD-10-CM

## 2023-06-29 DIAGNOSIS — N92.6 IRREGULAR MENSES: ICD-10-CM

## 2023-06-29 DIAGNOSIS — Z12.4 SCREENING FOR CERVICAL CANCER: ICD-10-CM

## 2023-06-29 DIAGNOSIS — N92.6 IRREGULAR PERIODS/MENSTRUAL CYCLES: ICD-10-CM

## 2023-06-29 LAB — FSH SERPL IRP2-ACNC: 111 MIU/ML

## 2023-06-29 PROCEDURE — 87624 HPV HI-RISK TYP POOLED RSLT: CPT | Performed by: NURSE PRACTITIONER

## 2023-06-29 PROCEDURE — 36415 COLL VENOUS BLD VENIPUNCTURE: CPT | Performed by: NURSE PRACTITIONER

## 2023-06-29 PROCEDURE — 83001 ASSAY OF GONADOTROPIN (FSH): CPT | Performed by: NURSE PRACTITIONER

## 2023-06-29 PROCEDURE — 99396 PREV VISIT EST AGE 40-64: CPT | Performed by: NURSE PRACTITIONER

## 2023-06-29 PROCEDURE — 99213 OFFICE O/P EST LOW 20 MIN: CPT | Mod: 25 | Performed by: NURSE PRACTITIONER

## 2023-06-29 PROCEDURE — G0124 SCREEN C/V THIN LAYER BY MD: HCPCS | Performed by: PATHOLOGY

## 2023-06-29 PROCEDURE — G0145 SCR C/V CYTO,THINLAYER,RESCR: HCPCS | Performed by: NURSE PRACTITIONER

## 2023-06-29 ASSESSMENT — ANXIETY QUESTIONNAIRES
2. NOT BEING ABLE TO STOP OR CONTROL WORRYING: SEVERAL DAYS
1. FEELING NERVOUS, ANXIOUS, OR ON EDGE: SEVERAL DAYS
GAD7 TOTAL SCORE: 7
7. FEELING AFRAID AS IF SOMETHING AWFUL MIGHT HAPPEN: SEVERAL DAYS
5. BEING SO RESTLESS THAT IT IS HARD TO SIT STILL: SEVERAL DAYS
6. BECOMING EASILY ANNOYED OR IRRITABLE: SEVERAL DAYS
GAD7 TOTAL SCORE: 7
3. WORRYING TOO MUCH ABOUT DIFFERENT THINGS: SEVERAL DAYS
IF YOU CHECKED OFF ANY PROBLEMS ON THIS QUESTIONNAIRE, HOW DIFFICULT HAVE THESE PROBLEMS MADE IT FOR YOU TO DO YOUR WORK, TAKE CARE OF THINGS AT HOME, OR GET ALONG WITH OTHER PEOPLE: SOMEWHAT DIFFICULT

## 2023-06-29 ASSESSMENT — PATIENT HEALTH QUESTIONNAIRE - PHQ9
5. POOR APPETITE OR OVEREATING: SEVERAL DAYS
SUM OF ALL RESPONSES TO PHQ QUESTIONS 1-9: 8

## 2023-06-29 NOTE — PROGRESS NOTES
Melita is a 53 year old  female who presents for annual exam.     Besides routine health maintenance,  she would like to discuss menopause, perimenopause.    HPI: here for annual exam.  Last pap in  was NIL, but positive for HPV 18, colpo was negative.  She has also recently had squamous skin cancer.  She has had a liver transplant in  for alcoholic cirrhosis.  She has been doing great.  The anti rejection med she takes can cause cancers.  She states she did not have cycles for probably a year before liver transplant, but after that , she has had them irregularly now since transplant.  Has no other menopausal symptoms.  All blood work done with PCP and transplant physician.    The patient's PCP is  Navneet Johnson MD.        GYNECOLOGIC HISTORY:    No LMP recorded. Patient is perimenopausal.    Regular menses? No, irregular  Her current contraception method is: condoms.  She  reports that she quit smoking about 3 years ago. Her smoking use included cigarettes. She has never used smokeless tobacco.  Patient is not sexually active.  STD testing offered?  Declined  Last PHQ-9 score on record =       2023     1:43 PM   PHQ-9 SCORE   PHQ-9 Total Score 8     Last GAD7 score on record =       2023     1:43 PM   JORGE-7 SCORE   Total Score 7     Alcohol Score = 0    HEALTH MAINTENANCE:  Health maintenance updated:  yes  Overdue          Never   Done DEPRESSION ACTION PLAN (Once)     Never   Done HEPATITIS B IMMUNIZATION (1 of 3 - 3-dose series)     Never   Done ZOSTER IMMUNIZATION (1 of 2)     2020 Pneumococcal Vaccine: Pediatrics (0 to 5 Years) and At-Risk Patients (6 to 64 Years) (2 - PCV)  Last completed: 2019     Never   Done LUNG CANCER SCREENING (Yearly)     AUG 2   2021 COVID-19 Vaccine (3 - Pfizer risk series)  Last completed: 2021     MAR 1   2023 YEARLY PREVENTIVE VISIT (Yearly)  Last completed: Mar 1, 2022     today PAP FOLLOW-UP (Once)  Last completed: Mar 1,        HPV FOLLOW-UP (Once)  Last completed: Mar 1, 2022     MAY 23   2023 MAMMO SCREENING (Yearly)   Scheduled for: 2023       HISTORY:  OB History    Para Term  AB Living   1 0 0 0 1 0   SAB IAB Ectopic Multiple Live Births   1 0 0 0 0      # Outcome Date GA Lbr Sushil/2nd Weight Sex Delivery Anes PTL Lv   1 SAB                Patient Active Problem List   Diagnosis     Abdominal bloating     Family history of pancreatic cancer     Transaminitis     Macrocytosis     Cervical high risk HPV (human papillomavirus) test positive     Anemia, unspecified type     Elevated serum creatinine     Hyponatremia     Malaise and fatigue     At high risk for severe sepsis     Symptomatic anemia     Bandemia     Hyperlipidemia     Anxiety     Status post liver transplantation (H)     Immunosuppressed status (H)     Steroid-induced hyperglycemia     Hypervolemia     Elevated alkaline phosphatase level     Cervicalgia     Pain of both elbows     Mild recurrent major depression (H)     Past Surgical History:   Procedure Laterality Date     APPENDECTOMY       COLONOSCOPY N/A 2022    Procedure: COLONOSCOPY;  Surgeon: Zita Steele MD;  Location: [facility]     ENDOSCOPIC RETROGRADE CHOLANGIOPANCREATOGRAM N/A 2022    Procedure: ENDOSCOPIC RETROGRADE CHOLANGIOPANCREATOGRAPHY WITH BILIARY SPHINCTEROTOMY, SLUDGE REMOVAL, DILATION  AND STENT PLACEMENT;  Surgeon: Guru Gardenia Escobar MD;  Location: [facility] OR     ENDOSCOPIC RETROGRADE CHOLANGIOPANCREATOGRAM N/A 2022    Procedure: ENDOSCOPIC RETROGRADE CHOLANGIOPANCREATOGRAPHY WITH BILE DUCT STENT REMOVAL;  Surgeon: Guru Gardenia Escobar MD;  Location: [facility] OR     ENDOSCOPIC RETROGRADE CHOLANGIOPANCREATOGRAPHY, EXCHANGE TUBE/STENT N/A 2022    Procedure: ENDOSCOPIC RETROGRADE CHOLANGIOPANCREATOGRAPHY, bile duct stents exchanged, balloon dilation and sweep of bile ducts for sludge;  Surgeon: Guru Luz  Gardenia Jane MD;  Location:  OR     ESOPHAGOGASTRODUODENOSCOPY, WITH BRUSHINGS N/A 10/29/2021    Procedure: ESOPHAGOGASTRODUODENOSCOPY, WITH BRUSHINGS;  Surgeon: Torey Ruiz MD;  Location:  GI     IR LIVER BIOPSY PERCUTANEOUS  2022     ORTHOPEDIC SURGERY Left 2023    hip replacement     TRANSPLANT LIVER RECIPIENT  DONOR N/A 2022    Procedure: TRANSPLANT, LIVER, RECIPIENT,  DONOR;  Surgeon: Pradeep Browne MD;  Location:  OR      Social History     Tobacco Use     Smoking status: Former     Types: Cigarettes     Quit date: 2020     Years since quitting: 3.1     Smokeless tobacco: Never   Substance Use Topics     Alcohol use: Not Currently     Comment: Last drink 2021      Problem (# of Occurrences) Relation (Name,Age of Onset)    Cancer (1) Mother    Melanoma (1) Father    Colon Cancer (2) Maternal Uncle, Paternal Aunt    Skin Cancer (1) Father            Current Outpatient Medications   Medication Sig     calcium carbonate (OS-SHAI) 1500 (600 Ca) MG tablet Take 1 tablet (600 mg) by mouth daily     fexofenadine (ALLEGRA) 60 MG tablet Take 1 tablet (60 mg) by mouth daily     hydrocortisone 2.5 % ointment Apply twice daily for 1 week on itchy, scaly areas on the body.  Apply Vaseline on top.     hydrOXYzine (ATARAX) 25 MG tablet TAKE 2 TABLETS BY MOUTH NIGHTLY AS NEEDED FOR (INSOMINA) FUTURE REFILLS TO GO TO PRIMARY CARE OFFICE     magnesium oxide (MAG-OX) 400 MG tablet Take 1 tablet (400 mg) by mouth 2 times daily     multivitamin (CENTRUM SILVER) tablet Take 1 tablet by mouth daily     tacrolimus (GENERIC EQUIVALENT) 1 MG capsule Take 2 capsules (2 mg) by mouth every morning AND 1 capsule (1 mg) every evening.     venlafaxine (EFFEXOR XR) 75 MG 24 hr capsule Take 1 capsule (75 mg) by mouth daily     hydroquinone (KARIN) 4 % external cream Apply once daily to the face, neck and chest as tolerated. Start with every other day. Skip when irritating (Patient  "not taking: Reported on 6/29/2023)     pantoprazole (PROTONIX) 40 MG EC tablet Take by mouth every 24 hours (Patient not taking: Reported on 6/6/2023)     pantoprazole (PROTONIX) 40 MG EC tablet Take 1 tablet (40 mg) by mouth daily (Patient not taking: Reported on 6/6/2023)     Current Facility-Administered Medications   Medication     triamcinolone (KENALOG-40) injection 40 mg     triamcinolone (KENALOG-40) injection 40 mg     Allergies   Allergen Reactions     Latex      rash     Adhesive Tape Rash       Past medical, surgical, social and family histories were reviewed and updated in EPIC.    ROS:   12 point review of systems negative other than symptoms noted below or in the HPI.  irregular menses    EXAM:  /76   Ht 1.651 m (5' 5\")   Wt 68.8 kg (151 lb 9.6 oz)   BMI 25.23 kg/m     BMI: Body mass index is 25.23 kg/m .    PHYSICAL EXAM:  Constitutional:   Appearance: Well nourished, well developed, alert, in no acute distress  Neck:  Lymph Nodes:  No lymphadenopathy present    Thyroid:  Gland size normal, nontender, no nodules or masses present  on palpation  Chest:  Respiratory Effort:  Breathing unlabored  Cardiovascular:    Heart: Auscultation:  Regular rate, normal rhythm, no murmurs present  Breasts: Inspection of Breasts:  No lymphadenopathy present., Palpation of Breasts and Axillae:  No masses present on palpation, no breast tenderness., Axillary Lymph Nodes:  No lymphadenopathy present. and No nodularity, asymmetry or nipple discharge bilaterally.  Gastrointestinal:   Abdominal Examination:  Abdomen nontender to palpation, tone normal without rigidity or guarding, no masses present, umbilicus without lesions   Liver and Spleen:  No hepatomegaly present, liver nontender to palpation    Hernias:  No hernias present  Lymphatic: Lymph Nodes:  No other lymphadenopathy present  Skin:  General Inspection:  No rashes present, no lesions present, no areas of  discoloration  Neurologic:    Mental Status:  " Oriented X3.  Normal strength and tone, sensory exam                grossly normal, mentation intact and speech normal.    Psychiatric:   Mentation appears normal and affect normal/bright.         Pelvic Exam:  External Genitalia:     Normal appearance for age, no discharge present, no tenderness present, no inflammatory lesions present, color normal  Vagina:     Normal vaginal vault without central or paravaginal defects, no discharge present, no inflammatory lesions present, no masses present  Bladder:     Nontender to palpation  Urethra:   Urethral Body:  Urethra palpation normal, urethra structural support normal   Urethral Meatus:  No erythema or lesions present  Cervix:     Appearance healthy, no lesions present, nontender to palpation, no bleeding present  Uterus:     Uterus: firm, normal sized and nontender, anteverted in position.   Adnexa:     No adnexal tenderness present, no adnexal masses present  Perineum:     Perineum within normal limits, no evidence of trauma, no rashes or skin lesions present  Anus:     Anus within normal limits, no hemorrhoids present  Inguinal Lymph Nodes:     No lymphadenopathy present  Pubic Hair:     Normal pubic hair distribution for age  Genitalia and Groin:     No rashes present, no lesions present, no areas of discoloration, no masses present      COUNSELING:   Reviewed preventive health counseling, as reflected in patient instructions       Regular exercise       Healthy diet/nutrition       Colorectal Cancer Screening       (Joanne)menopause management    BMI: Body mass index is 25.23 kg/m .      ASSESSMENT:  53 year old female with satisfactory annual exam.    ICD-10-CM    1. Encounter for gynecological examination without abnormal finding  Z01.419       2. Irregular menses  N92.6 Follicle stimulating hormone     Follicle stimulating hormone      3. Screening for cervical cancer  Z12.4 Pap thin layer screen with HPV - recommended age 30 - 65 years      4. Irregular  periods/menstrual cycles  N92.6 US Transvaginal Pelvic Non-OB      5. Symptomatic menopausal or female climacteric states  N95.1 Follicle stimulating hormone     Follicle stimulating hormone          PLAN:  Normal Gyn exam.  Will return for pelvic US.  Check FSH ?menopausal.  Return prn or 1 year for annual and pap.    HAKAN Jane CNP

## 2023-07-05 ENCOUNTER — TRANSFERRED RECORDS (OUTPATIENT)
Dept: HEALTH INFORMATION MANAGEMENT | Facility: CLINIC | Age: 53
End: 2023-07-05
Payer: COMMERCIAL

## 2023-07-05 DIAGNOSIS — K12.0 APHTHOUS ULCER: ICD-10-CM

## 2023-07-05 DIAGNOSIS — B08.8: Primary | ICD-10-CM

## 2023-07-05 RX ORDER — CLOBETASOL PROPIONATE 0.05 MG/G
GEL TOPICAL
Qty: 30 G | Refills: 2 | Status: SHIPPED | OUTPATIENT
Start: 2023-07-05 | End: 2023-11-01

## 2023-07-08 LAB
BKR LAB AP GYN ADEQUACY: ABNORMAL
BKR LAB AP GYN INTERPRETATION: ABNORMAL
BKR LAB AP HPV REFLEX: ABNORMAL
BKR LAB AP PREVIOUS ABNL DX: ABNORMAL
BKR LAB AP PREVIOUS ABNORMAL: ABNORMAL
PATH REPORT.COMMENTS IMP SPEC: ABNORMAL
PATH REPORT.COMMENTS IMP SPEC: ABNORMAL
PATH REPORT.RELEVANT HX SPEC: ABNORMAL

## 2023-07-10 LAB
HUMAN PAPILLOMA VIRUS 16 DNA: NEGATIVE
HUMAN PAPILLOMA VIRUS 18 DNA: NEGATIVE
HUMAN PAPILLOMA VIRUS FINAL DIAGNOSIS: NORMAL
HUMAN PAPILLOMA VIRUS OTHER HR: NEGATIVE

## 2023-07-13 ENCOUNTER — MYC MEDICAL ADVICE (OUTPATIENT)
Dept: OBGYN | Facility: CLINIC | Age: 53
End: 2023-07-13
Payer: COMMERCIAL

## 2023-07-14 NOTE — TELEPHONE ENCOUNTER
6/29/23 PAP/HPV  ASCUS and neg HPV    Routing to pap pool - any response on plan?    Brittaney Sandoval RN on 7/14/2023 at 8:06 AM

## 2023-07-17 ENCOUNTER — PATIENT OUTREACH (OUTPATIENT)
Dept: OBGYN | Facility: CLINIC | Age: 53
End: 2023-07-17
Payer: COMMERCIAL

## 2023-07-17 ENCOUNTER — MYC MEDICAL ADVICE (OUTPATIENT)
Dept: DERMATOLOGY | Facility: CLINIC | Age: 53
End: 2023-07-17
Payer: COMMERCIAL

## 2023-07-19 ENCOUNTER — TELEPHONE (OUTPATIENT)
Dept: TRANSPLANT | Facility: CLINIC | Age: 53
End: 2023-07-19

## 2023-07-19 ENCOUNTER — OFFICE VISIT (OUTPATIENT)
Dept: DERMATOLOGY | Facility: CLINIC | Age: 53
End: 2023-07-19
Payer: COMMERCIAL

## 2023-07-19 DIAGNOSIS — L56.8 ACTINIC CHEILITIS: Primary | ICD-10-CM

## 2023-07-19 PROCEDURE — 17003 DESTRUCT PREMALG LES 2-14: CPT | Performed by: DERMATOLOGY

## 2023-07-19 PROCEDURE — 17000 DESTRUCT PREMALG LESION: CPT | Performed by: DERMATOLOGY

## 2023-07-19 ASSESSMENT — PAIN SCALES - GENERAL: PAINLEVEL: NO PAIN (1)

## 2023-07-19 NOTE — TELEPHONE ENCOUNTER
Transplant Social Work Services Phone Call    Data: Fidelina underwent a  donor liver transplant on 2021  Intervention: Fidelina called this writer to discuss her mental health and some friend dynamics. I provided supportive listening towards her concerns, and provided some supportive feedback. She appreciated conversation. We also discussed SSDI and Medicare once she has been on it for two years. She is currently paying a $500 premium for her insurance through her employer, but that will end . I asked her to keep a look out for a letter indicating when she will qualify for Medicare or check when she became disabled. No other questions/concerns.   Assessment: No new assessment   Education provided by SW: SSDI   Plan: This writer will continue to be available for ongoing psychosocial support     ELMO Betts, [facility]  Liver Transplant   [facility]  Phone: [phone]  Pager: [phone]

## 2023-07-19 NOTE — PATIENT INSTRUCTIONS
Cryotherapy    What is it?  Use of a very cold liquid, such as liquid nitrogen, to freeze and destroy abnormal skin cells that need to be removed    What should I expect?  Tenderness and redness  A small blister that might grow and fill with dark purple blood. There may be crusting.  More than one treatment may be needed if the lesions do not go away.    How do I care for the treated area?  Gently wash the area with your hands when bathing.  Use a thin layer of Vaseline to help with healing. You may use a Band-Aid.   The area should heal within 7-10 days and may leave behind a pink or lighter color.   Do not use an antibiotic or Neosporin ointment.   You may take acetaminophen (Tylenol) for pain.     Call your doctor if you have:  Severe pain  Signs of infection (warmth, redness, cloudy yellow drainage, and or a bad smell)  Questions or concerns    Who should I call with questions?      [facility]: [phone]      [facility]: [phone]      For urgent needs outside of business hours call the [facility] at [phone] and ask for the dermatology resident on call

## 2023-07-19 NOTE — PROGRESS NOTES
[facility] Dermatology Note  Encounter Date: 2023  Office Visit     Dermatology Problem List:  1. History of immunosuppression s/p liver transplant 2/2 alcoholic cirrhosis 2022  - Current tx: tacrolimus, prednisone  2. History of NMSC  - SCCis, left young, s/p MMS 2022  3. Rhytides   - Current tx: tretinoin 0.025% cream  4. AK, s/p cryo  5. Urticaria and dermatographism  6. Intertrigo  -Current tx: ketoconazole  7. Cosmetics  - botox  - juvederm  8. 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   - Photodocumentation  - Cryotherapy today; follow-up in 3 months for FBSE and recheck spots on lips  - Recommended Vanicream lip balm with sunscreen    Procedures Performed:   - Cryotherapy procedure note, location(s): right lower mucosal lip. After verbal consent and discussion of risks and benefits including, but not limited to, dyspigmentation/scar, blister, and pain, 2 AK lesion(s) was(were) treated with 1-2 mm freeze border for 1-2 cycles with liquid nitrogen. Post cryotherapy instructions were provided.    Follow-up: 3 month(s) in-person, or earlier for new or changing lesions    Staff:     Donovan Breen MD  Pronouns: he/him/his    Department of Dermatology  [facility]: Phone: [phone], Fax:[phone]  [facility]: Phone: [phone] Fax: [phone]  ____________________________________________    CC: Derm Problem (Pt following up for spots on her lips.)    HPI:  Ms. Melita Cortes is a(n) 53 year old female who presents today as a return patient for spot check.     Today, the patient reports the following areas of concern includin. Two rough spots on the lips. Reports have not responded  completely to clobetasol gel. Mildly tender. No bleeding.     The patient otherwise denies any new or concerning lesions. No bleeding, painful, pruritic, or changing lesions. Health otherwise stable. No other skin concerns.      Labs Reviewed:  N/A    Physical Exam:  Vitals: There were no vitals taken for this visit.  SKIN: Focused examination of the lip significant for:.  - Pink gritty papules on the right lower mucosal lip x 2.   - No other lesions of concern on areas examined.             Medications:  Current Outpatient Medications   Medication     calcium carbonate (OS-SHAI) 1500 (600 Ca) MG tablet     clobetasol (TEMOVATE) 0.05 % GEL topical gel     fexofenadine (ALLEGRA) 60 MG tablet     hydrocortisone 2.5 % ointment     hydrOXYzine (ATARAX) 25 MG tablet     magnesium oxide (MAG-OX) 400 MG tablet     multivitamin (CENTRUM SILVER) tablet     tacrolimus (GENERIC EQUIVALENT) 1 MG capsule     venlafaxine (EFFEXOR XR) 75 MG 24 hr capsule     hydroquinone (KARIN) 4 % external cream     pantoprazole (PROTONIX) 40 MG EC tablet     pantoprazole (PROTONIX) 40 MG EC tablet     Current Facility-Administered Medications   Medication     triamcinolone (KENALOG-40) injection 40 mg     triamcinolone (KENALOG-40) injection 40 mg      Past Medical History:   Patient Active Problem List   Diagnosis     Abdominal bloating     Family history of pancreatic cancer     Transaminitis     Macrocytosis     Cervical high risk HPV (human papillomavirus) test positive     Anemia, unspecified type     Elevated serum creatinine     Hyponatremia     Malaise and fatigue     At high risk for severe sepsis     Symptomatic anemia     Bandemia     Hyperlipidemia     Anxiety     Status post liver transplantation (H)     Immunosuppressed status (H)     Steroid-induced hyperglycemia     Hypervolemia     Elevated alkaline phosphatase level     Cervicalgia     Pain of both elbows     Mild recurrent major depression (H)     Past Medical History:    Diagnosis Date     Alcoholic hepatitis      Asthma 3/10/2006     Cervical high risk HPV (human papillomavirus) test positive 2019, 2020    See problem list     Liver failure (H)

## 2023-07-19 NOTE — LETTER
7/19/2023       RE: Melita Cortes  [id number]  [place] [address]     Dear Colleague,    Thank you for referring your patient, Melita Cortes, to the [facility] [place] [facility]. Please see a copy of my visit note below.    [facility] Dermatology Note  Encounter Date: Jul 19, 2023  Office Visit     Dermatology Problem List:  1. History of immunosuppression s/p liver transplant 2/2 alcoholic cirrhosis 1/7/2022  - Current tx: tacrolimus, prednisone  2. 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 Rhytides   - Current tx: tretinoin 0.025% cream  4. AK, s/p cryo  5. Urticaria and dermatographism  6. Intertrigo  -Current tx: ketoconazole  7. Cosmetics  - botox  - juvederm  8. 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   - Photodocumentation  - Cryotherapy today; follow-up in 3 months for FBSE and recheck spots on lips  - Recommended Vanicream lip balm with sunscreen    Procedures Performed:   - Cryotherapy procedure note, location(s): right lower mucosal lip. After verbal consent and discussion of risks and benefits including, but not limited to, dyspigmentation/scar, blister, and pain, 2 AK lesion(s) was(were) treated with 1-2 mm freeze border for 1-2 cycles with liquid nitrogen. Post cryotherapy instructions were provided.    Follow-up: 3 month(s) in-person, or earlier for new or changing lesions    Staff:     Donovan Breen MD  Pronouns: he/him/his    Department of Dermatology  [facility]: Phone: [phone], Fax:[phone]  [facility]: Phone: [phone] Fax:  [phone]  ____________________________________________    CC: Derm Problem (Pt following up for spots on her lips.)    HPI:  Ms. Melita Cortes is a(n) 53 year old female who presents today as a return patient for spot check.     Today, the patient reports the following areas of concern includin. Two rough spots on the lips. Reports have not responded completely to clobetasol gel. Mildly tender. No bleeding.     The patient otherwise denies any new or concerning lesions. No bleeding, painful, pruritic, or changing lesions. Health otherwise stable. No other skin concerns.      Labs Reviewed:  N/A    Physical Exam:  Vitals: There were no vitals taken for this visit.  SKIN: Focused examination of the lip significant for:.  - Pink gritty papules on the right lower mucosal lip x 2.   - No other lesions of concern on areas examined.             Medications:  Current Outpatient Medications   Medication    calcium carbonate (OS-SHAI) 1500 (600 Ca) MG tablet    clobetasol (TEMOVATE) 0.05 % GEL topical gel    fexofenadine (ALLEGRA) 60 MG tablet    hydrocortisone 2.5 % ointment    hydrOXYzine (ATARAX) 25 MG tablet    magnesium oxide (MAG-OX) 400 MG tablet    multivitamin (CENTRUM SILVER) tablet    tacrolimus (GENERIC EQUIVALENT) 1 MG capsule    venlafaxine (EFFEXOR XR) 75 MG 24 hr capsule    hydroquinone (KARIN) 4 % external cream    pantoprazole (PROTONIX) 40 MG EC tablet    pantoprazole (PROTONIX) 40 MG EC tablet     Current Facility-Administered Medications   Medication    triamcinolone (KENALOG-40) injection 40 mg    triamcinolone (KENALOG-40) injection 40 mg      Past Medical History:   Patient Active Problem List   Diagnosis    Abdominal bloating    Family history of pancreatic cancer    Transaminitis    Macrocytosis    Cervical high risk HPV (human papillomavirus) test positive    Anemia, unspecified type    Elevated serum creatinine    Hyponatremia    Malaise and fatigue    At high risk for severe  sepsis    Symptomatic anemia    Bandemia    Hyperlipidemia    Anxiety    Status post liver transplantation (H)    Immunosuppressed status (H)    Steroid-induced hyperglycemia    Hypervolemia    Elevated alkaline phosphatase level    Cervicalgia    Pain of both elbows    Mild recurrent major depression (H)     Past Medical History:   Diagnosis Date    Alcoholic hepatitis     Asthma 3/10/2006    Cervical high risk HPV (human papillomavirus) test positive 2019, 2020    See problem list    Liver failure (H)

## 2023-07-19 NOTE — NURSING NOTE
Chief Complaint   Patient presents with     Derm Problem     Pt following up for spots on her lips.     Nathanael Cantu, EMT

## 2023-07-20 ENCOUNTER — ANCILLARY PROCEDURE (OUTPATIENT)
Dept: MAMMOGRAPHY | Facility: CLINIC | Age: 53
End: 2023-07-20
Attending: INTERNAL MEDICINE
Payer: COMMERCIAL

## 2023-07-20 DIAGNOSIS — Z12.31 VISIT FOR SCREENING MAMMOGRAM: ICD-10-CM

## 2023-07-20 PROCEDURE — 77063 BREAST TOMOSYNTHESIS BI: CPT | Mod: GC

## 2023-07-20 PROCEDURE — 77067 SCR MAMMO BI INCL CAD: CPT | Mod: GC

## 2023-07-25 NOTE — PLAN OF CARE
A&O x4, AVSS on 2L of O2. Tmax 99.5. Patient denies any pain, N/V, or SOB. PRN albuterol inhaler ordered for wheezing q 4 hours. Patient had some relief with use x2. 3 bowel movements total today with scheduled lactulose. SBA to bathroom with adequate urine output.    Admission

## 2023-07-27 ENCOUNTER — TELEPHONE (OUTPATIENT)
Dept: TRANSPLANT | Facility: CLINIC | Age: 53
End: 2023-07-27
Payer: COMMERCIAL

## 2023-07-27 DIAGNOSIS — Z94.4 STATUS POST LIVER TRANSPLANTATION (H): Primary | ICD-10-CM

## 2023-07-27 RX ORDER — SIROLIMUS 1 MG/1
2 TABLET, FILM COATED ORAL DAILY
Qty: 60 TABLET | Refills: 11 | Status: SHIPPED | OUTPATIENT
Start: 2023-07-27 | End: 2023-12-08

## 2023-07-27 NOTE — TELEPHONE ENCOUNTER
Pt interested in stopping tacrolimus and switching to sirolimus. Left message for patient to return call and to call ayala to set up delivery.

## 2023-07-31 ENCOUNTER — ANCILLARY PROCEDURE (OUTPATIENT)
Dept: ULTRASOUND IMAGING | Facility: CLINIC | Age: 53
End: 2023-07-31
Attending: NURSE PRACTITIONER
Payer: COMMERCIAL

## 2023-07-31 ENCOUNTER — OFFICE VISIT (OUTPATIENT)
Dept: OBGYN | Facility: CLINIC | Age: 53
End: 2023-07-31
Attending: NURSE PRACTITIONER
Payer: COMMERCIAL

## 2023-07-31 ENCOUNTER — VIRTUAL VISIT (OUTPATIENT)
Dept: FAMILY MEDICINE | Facility: CLINIC | Age: 53
End: 2023-07-31
Payer: COMMERCIAL

## 2023-07-31 ENCOUNTER — MYC MEDICAL ADVICE (OUTPATIENT)
Dept: FAMILY MEDICINE | Facility: CLINIC | Age: 53
End: 2023-07-31

## 2023-07-31 VITALS
HEIGHT: 65 IN | SYSTOLIC BLOOD PRESSURE: 102 MMHG | BODY MASS INDEX: 25.62 KG/M2 | WEIGHT: 153.8 LBS | DIASTOLIC BLOOD PRESSURE: 72 MMHG

## 2023-07-31 DIAGNOSIS — D84.9 IMMUNOSUPPRESSED STATUS (H): ICD-10-CM

## 2023-07-31 DIAGNOSIS — R45.89 FIDGETING: Primary | ICD-10-CM

## 2023-07-31 DIAGNOSIS — R93.89 THICKENED ENDOMETRIUM: Primary | ICD-10-CM

## 2023-07-31 DIAGNOSIS — N92.6 IRREGULAR PERIODS/MENSTRUAL CYCLES: ICD-10-CM

## 2023-07-31 DIAGNOSIS — R41.840 POOR CONCENTRATION: ICD-10-CM

## 2023-07-31 PROCEDURE — 99213 OFFICE O/P EST LOW 20 MIN: CPT | Performed by: NURSE PRACTITIONER

## 2023-07-31 PROCEDURE — 99214 OFFICE O/P EST MOD 30 MIN: CPT | Mod: VID | Performed by: INTERNAL MEDICINE

## 2023-07-31 PROCEDURE — 76830 TRANSVAGINAL US NON-OB: CPT | Performed by: OBSTETRICS & GYNECOLOGY

## 2023-07-31 NOTE — PROGRESS NOTES
"Fidelina is a 53 year old who is being evaluated via a billable video visit.      How would you like to obtain your AVS? MyChart  If the video visit is dropped, the invitation should be resent by: Send to e-mail at: [email or web address]  Will anyone else be joining your video visit? No          Assessment & Plan     Herb  Had issues with poor concentration and being fidgety since her childhood  She is concerned he might be having ADHD  She wants to get tested for the same  In the past some of the symptoms were masked by her alcohol intake  Since he no longer takes alcohol her symptoms have become more prominent now  She was never a A grade student  She also did not complete college  She did some job hopping  Certainly she could be having ADHD  We will get formal testin  - Adult Mental Health  Referral; Future    Poor concentration  As above she certainly could be having ADHD we will get formal testing  - Adult Mental Health  Referral; Future    Immunosuppressed status (H)  She has been switched to sirolimus but currently she is not taking it since she just had a gynecological ultrasound which showed thickened endometrium and she is waiting for the work-up of the same      30 minutes spent by me on the date of the encounter doing chart review, history and exam, documentation and further activities per the note       BMI:   Estimated body mass index is 25.59 kg/m  as calculated from the following:    Height as of an earlier encounter on 7/31/23: 1.651 m (5' 5\").    Weight as of an earlier encounter on 7/31/23: 69.8 kg (153 lb 12.8 oz).           Navneet Johnson MD  [facility]    Cameron Kitchen is a 53 year old, presenting for the following health issues:  Behavioral Problem      7/31/2023     3:46 PM   Additional Questions   Roomed by Shelly       History of Present Illness       Reason for visit:  Behavior issues, possible ADHD  Symptom onset:  More than a month  Symptoms include: "  Figidty, binge eating, forgetful, unable to finish tasks, racing mind, frustration  Symptom intensity:  Severe  Symptom progression:  Worsening  Had these symptoms before:  Yes  Has tried/received treatment for these symptoms:  No  What makes it worse:  None  What makes it better:  None    She eats 2-3 servings of fruits and vegetables daily.She consumes 1 sweetened beverage(s) daily.She exercises with enough effort to increase her heart rate 30 to 60 minutes per day.  She exercises with enough effort to increase her heart rate 4 days per week.   She is taking medications regularly.               Review of Systems   Constitutional, HEENT, cardiovascular, pulmonary, gi and gu systems are negative, except as otherwise noted.      Objective           Vitals:  No vitals were obtained today due to virtual visit.    Physical Exam   GENERAL: Healthy, alert and no distress  EYES: Eyes grossly normal to inspection.  No discharge or erythema, or obvious scleral/conjunctival abnormalities.  RESP: No audible wheeze, cough, or visible cyanosis.  No visible retractions or increased work of breathing.    SKIN: Visible skin clear. No significant rash, abnormal pigmentation or lesions.  NEURO: Cranial nerves grossly intact.  Mentation and speech appropriate for age.  PSYCH: Mentation appears normal, affect normal/bright, judgement and insight intact, normal speech and appearance well-groomed.                Video-Visit Details    Type of service:  Video Visit     Originating Location (pt. Location): Home    Distant Location (provider location):  Off-site  Platform used for Video Visit: SCREEMO

## 2023-07-31 NOTE — PROGRESS NOTES
SUBJECTIVE:                                                   Melita Cortes is a 53 year old female who presents to clinic today for the following health issue(s):  Patient presents with:  Follow Up: US - irregular periods            HPI:  Here for follow up US for amenorrhea.  She had liver transplant . The rejection med she takes can cause other cancers. + FSH does show she is menopausal.  Patient denies any bleeding    No LMP recorded. Patient is perimenopausal..     Patient is sexually active, .  Using none for contraception.    reports that she quit smoking about 3 years ago. Her smoking use included cigarettes. She has never used smokeless tobacco.  STD testing offered?  Declined    Health maintenance updated:  yes    Today's PHQ-2 Score:       2022     3:28 PM   PHQ-2 (  [employer])   Q1: Little interest or pleasure in doing things 1   Q2: Feeling down, depressed or hopeless 1   PHQ-2 Score 2     Today's PHQ-9 Score:       2023     1:43 PM   PHQ-9 SCORE   PHQ-9 Total Score 8     Today's JORGE-7 Score:       2023     1:43 PM   JORGE-7 SCORE   Total Score 7       Problem list and histories reviewed & adjusted, as indicated.  Additional history: as documented.    Patient Active Problem List   Diagnosis    Abdominal bloating    Family history of pancreatic cancer    Transaminitis    Macrocytosis    Cervical high risk HPV (human papillomavirus) test positive    Anemia, unspecified type    Elevated serum creatinine    Hyponatremia    Malaise and fatigue    At high risk for severe sepsis    Symptomatic anemia    Bandemia    Hyperlipidemia    Anxiety    Status post liver transplantation (H)    Immunosuppressed status (H)    Steroid-induced hyperglycemia    Hypervolemia    Elevated alkaline phosphatase level    Cervicalgia    Pain of both elbows    Mild recurrent major depression (H)     Past Surgical History:   Procedure Laterality Date    APPENDECTOMY      COLONOSCOPY N/A 2022     Procedure: COLONOSCOPY;  Surgeon: Zita Steele MD;  Location: [facility]    ENDOSCOPIC RETROGRADE CHOLANGIOPANCREATOGRAM N/A 2022    Procedure: ENDOSCOPIC RETROGRADE CHOLANGIOPANCREATOGRAPHY WITH BILIARY SPHINCTEROTOMY, SLUDGE REMOVAL, DILATION  AND STENT PLACEMENT;  Surgeon: Guru Gardenia Escobar MD;  Location: [facility]    ENDOSCOPIC RETROGRADE CHOLANGIOPANCREATOGRAM N/A 2022    Procedure: ENDOSCOPIC RETROGRADE CHOLANGIOPANCREATOGRAPHY WITH BILE DUCT STENT REMOVAL;  Surgeon: Guru Gardenia Escobar MD;  Location: [facility]    ENDOSCOPIC RETROGRADE CHOLANGIOPANCREATOGRAPHY, EXCHANGE TUBE/STENT N/A 2022    Procedure: ENDOSCOPIC RETROGRADE CHOLANGIOPANCREATOGRAPHY, bile duct stents exchanged, balloon dilation and sweep of bile ducts for sludge;  Surgeon: Guru Gardenia Escobar MD;  Location: [facility]    ESOPHAGOGASTRODUODENOSCOPY, WITH BRUSHINGS N/A 10/29/2021    Procedure: ESOPHAGOGASTRODUODENOSCOPY, WITH BRUSHINGS;  Surgeon: Torey Ruiz MD;  Location: [facility]    IR LIVER BIOPSY PERCUTANEOUS  2022    ORTHOPEDIC SURGERY Left 2023    hip replacement    TRANSPLANT LIVER RECIPIENT  DONOR N/A 2022    Procedure: TRANSPLANT, LIVER, RECIPIENT,  DONOR;  Surgeon: Pradeep Browne MD;  Location: [facility]      Social History     Tobacco Use    Smoking status: Former     Types: Cigarettes     Quit date: 2020     Years since quitting: 3.2    Smokeless tobacco: Never   Substance Use Topics    Alcohol use: Not Currently     Comment: Last drink 2021      Problem (# of Occurrences) Relation (Name,Age of Onset)    Cancer (1) Mother    Melanoma (1) Father    Colon Cancer (2) Maternal Uncle, Paternal Aunt    Skin Cancer (1) Father              Current Outpatient Medications   Medication Sig    calcium carbonate (OS-SHAI) 1500 (600 Ca) MG tablet Take 1 tablet (600 mg) by mouth daily    clobetasol (TEMOVATE) 0.05 % GEL topical gel  "Apply twice daily as needed for sore on the lip    fexofenadine (ALLEGRA) 60 MG tablet Take 1 tablet (60 mg) by mouth daily    hydrocortisone 2.5 % ointment Apply twice daily for 1 week on itchy, scaly areas on the body.  Apply Vaseline on top.    hydrOXYzine (ATARAX) 25 MG tablet TAKE 2 TABLETS BY MOUTH NIGHTLY AS NEEDED FOR (INSOMINA) FUTURE REFILLS TO GO TO PRIMARY CARE OFFICE    magnesium oxide (MAG-OX) 400 MG tablet Take 1 tablet (400 mg) by mouth 2 times daily    multivitamin (CENTRUM SILVER) tablet Take 1 tablet by mouth daily    sirolimus (GENERIC EQUIVALENT) 1 MG tablet Take 2 tablets (2 mg) by mouth daily    tacrolimus (GENERIC EQUIVALENT) 1 MG capsule Take 2 capsules (2 mg) by mouth every morning AND 1 capsule (1 mg) every evening.    venlafaxine (EFFEXOR XR) 75 MG 24 hr capsule Take 1 capsule (75 mg) by mouth daily    hydroquinone (KARIN) 4 % external cream Apply once daily to the face, neck and chest as tolerated. Start with every other day. Skip when irritating (Patient not taking: Reported on 6/29/2023)    pantoprazole (PROTONIX) 40 MG EC tablet Take by mouth every 24 hours (Patient not taking: Reported on 6/6/2023)    pantoprazole (PROTONIX) 40 MG EC tablet Take 1 tablet (40 mg) by mouth daily (Patient not taking: Reported on 6/6/2023)     Current Facility-Administered Medications   Medication    triamcinolone (KENALOG-40) injection 40 mg    triamcinolone (KENALOG-40) injection 40 mg     Allergies   Allergen Reactions    Latex      rash    Adhesive Tape Rash       ROS:  12 point review of systems negative other than symptoms noted below or in the HPI.  No urinary frequency or dysuria, bladder or kidney problems      OBJECTIVE:     /72   Ht 1.651 m (5' 5\")   Wt 69.8 kg (153 lb 12.8 oz)   BMI 25.59 kg/m    Body mass index is 25.59 kg/m .    Exam:  Constitutional:  Appearance: Well nourished, well developed alert, in no acute distress  Neurologic:  Mental Status:  Oriented X3.  Normal " strength and tone, sensory exam grossly normal, mentation intact and speech normal.    Psychiatric:  Mentation appears normal and affect normal/bright.   Indication: Bleeding/Menses- Metrorrhagia (irregular menses)  LMP: No LMP recorded. Patient is perimenopausal.  History:   Gynecological Ultrasonography:   Uterus: anteverted. Contour is smooth/regular.  Size: 7.3 x 4.5 x 3.9 cm  Endometrium: Thickness Total 13.9 mm  Findings: thickened endometrium with possible polyp 0.5 x 0.4 x 0.4 cm  Right Ovary: 2.3 x 0.8 x 1.0 cm. Simple cyst 0.6 x 0.4 x 0.5 cm  Left Ovary: 1.9 x 1.4 x 1.2 cm. Simple cyst 0.8 x 0.8 x 0.8 cm  Cul de Sac Free Fluid: No free fluid     Impression:             The uterus is anteverted and without fibroids.  The EMS is trilaminar and normal and 13.9mm  There is a small 5mm area of hyperechoic tissue on the posterior wall in the endometrium. This may be a very small polyp or it may just be some focal endometrial tissue excess. Consider SIS or immediately postemenstrual ultrasound to further evaluate  The ovaries are normal bilaterally with <1cm follicles of no significance  No free fluid in the pelvis     Nancy Goodman MD    In-Clinic Test Results:  No results found for this or any previous visit (from the past 24 hour(s)).    ASSESSMENT/PLAN:                                                        ICD-10-CM    1. Thickened endometrium  R93.89               Discussed US results.  Small simple cysts on both ovaries, thickened endometrium  Patient will return for consult and possible endometrial biopsy .    HAKAN [facility] [place]

## 2023-08-03 ENCOUNTER — TRANSFERRED RECORDS (OUTPATIENT)
Dept: HEALTH INFORMATION MANAGEMENT | Facility: CLINIC | Age: 53
End: 2023-08-03
Payer: COMMERCIAL

## 2023-08-06 ENCOUNTER — MYC MEDICAL ADVICE (OUTPATIENT)
Dept: OBGYN | Facility: CLINIC | Age: 53
End: 2023-08-06
Payer: COMMERCIAL

## 2023-08-07 NOTE — TELEPHONE ENCOUNTER
7/31/23 RADHA Simms NP after US    Routing pt Adynxxt message to provider to advise.    Brittaney Sandoval RN on 8/7/2023 at 10:09 AM

## 2023-08-07 NOTE — PROGRESS NOTES
"[facility]  OB/GYN Clinic Note    SUBJECTIVE:                                                   Melita Cortes is a 53 year old   female who presents to clinic today for the following health issue(s):  Patient presents with:  Consult      HPI:  -Melita is seen today to follow-up on thickened endometrium on US done for irregular cycles. She reports that prior to her liver transplant on 2022 she did not menstruate for a few months and believed she was in menopause. Following the procedure she had irregular bleeding that was lighter than her previous periods. She last menstruated in May, 2023.   -She was seen by on  for amenorrhea and a pap smear. Had an US showing a thickened endometrium with possible polyp and advised to return for an endometrial biopsy.   -Denies any abdominal pain or heavy bleeding. No urinary symptoms.   -Endorses occasional bloating, but reports changes in diet due to increased life stress. Does note left hip pain that she describes as \"deep\", but attributes to left hip arthroplasty completed 2023. She is currently in physical therapy for this concern. She is unable to take Ibuprofen due to her transplant, but has been occasionally taking acetaminophen.  -Also endorses lower back pain, which she is treating for with chiropractor.   -Reports that her father recently passed away and shared a family history mom passing from pancreatic cancer, grandma and uncle colon cancer, and aunt from lung cancer. She has a personal history of melanoma.     No LMP recorded. Patient is perimenopausal..   Patient is sexually active, .  Using menopause for contraception.    reports that she quit smoking about 3 years ago. Her smoking use included cigarettes. She has never used smokeless tobacco.  STD testing offered?  Declined  Health maintenance updated:  yes    Today's PHQ-2 Score:       2023     9:45 AM   PHQ-2 (  [employer])   Q1: Little interest or " pleasure in doing things 2   Q2: Feeling down, depressed or hopeless 2   PHQ-2 Score 4   Q1: Little interest or pleasure in doing things More than half the days   Q2: Feeling down, depressed or hopeless More than half the days   PHQ-2 Score 4     Today's PHQ-9 Score:       8/8/2023     9:45 AM   PHQ-9 SCORE   PHQ-9 Total Score MyChart 25 (Severe depression)   PHQ-9 Total Score 25         Problem list and histories reviewed & adjusted, as indicated.  Additional history: as documented.    Patient Active Problem List   Diagnosis    Abdominal bloating    Family history of pancreatic cancer    Transaminitis    Macrocytosis    Cervical high risk HPV (human papillomavirus) test positive    Anemia, unspecified type    Elevated serum creatinine    Hyponatremia    Malaise and fatigue    At high risk for severe sepsis    Symptomatic anemia    Bandemia    Hyperlipidemia    Anxiety    Status post liver transplantation (H)    Immunosuppressed status (H)    Steroid-induced hyperglycemia    Hypervolemia    Elevated alkaline phosphatase level    Cervicalgia    Pain of both elbows    Mild recurrent major depression (H)     Past Surgical History:   Procedure Laterality Date    APPENDECTOMY      COLONOSCOPY N/A 01/04/2022    Procedure: COLONOSCOPY;  Surgeon: Zita Steele MD;  Location:  GI    ENDOSCOPIC RETROGRADE CHOLANGIOPANCREATOGRAM N/A 02/14/2022    Procedure: ENDOSCOPIC RETROGRADE CHOLANGIOPANCREATOGRAPHY WITH BILIARY SPHINCTEROTOMY, SLUDGE REMOVAL, DILATION  AND STENT PLACEMENT;  Surgeon: Guru Gardenia Escobar MD;  Location:  OR    ENDOSCOPIC RETROGRADE CHOLANGIOPANCREATOGRAM N/A 05/16/2022    Procedure: ENDOSCOPIC RETROGRADE CHOLANGIOPANCREATOGRAPHY WITH BILE DUCT STENT REMOVAL;  Surgeon: Guru Gardenia Escobar MD;  Location: [facility] OR    ENDOSCOPIC RETROGRADE CHOLANGIOPANCREATOGRAPHY, EXCHANGE TUBE/STENT N/A 03/16/2022    Procedure: ENDOSCOPIC RETROGRADE CHOLANGIOPANCREATOGRAPHY,  bile duct stents exchanged, balloon dilation and sweep of bile ducts for sludge;  Surgeon: Guru Gardenia Escobar MD;  Location: [facility] OR    ESOPHAGOGASTRODUODENOSCOPY, WITH BRUSHINGS N/A 10/29/2021    Procedure: ESOPHAGOGASTRODUODENOSCOPY, WITH BRUSHINGS;  Surgeon: Torey Ruiz MD;  Location: [facility] GI    IR LIVER BIOPSY PERCUTANEOUS  2022    ORTHOPEDIC SURGERY Left 2023    hip replacement    TRANSPLANT LIVER RECIPIENT  DONOR N/A 2022    Procedure: TRANSPLANT, LIVER, RECIPIENT,  DONOR;  Surgeon: Pradeep Browne MD;  Location: [facility] OR      Social History     Tobacco Use    Smoking status: Former     Types: Cigarettes     Quit date: 2020     Years since quitting: 3.2    Smokeless tobacco: Never   Substance Use Topics    Alcohol use: Not Currently     Comment: Last drink 2021      Problem (# of Occurrences) Relation (Name,Age of Onset)    Cancer (1) Mother    Melanoma (1) Father    Colon Cancer (2) Maternal Uncle, Paternal Aunt    Skin Cancer (1) Father              Current Outpatient Medications   Medication Sig    calcium carbonate (OS-SHAI) 1500 (600 Ca) MG tablet Take 1 tablet (600 mg) by mouth daily    clobetasol (TEMOVATE) 0.05 % GEL topical gel Apply twice daily as needed for sore on the lip    fexofenadine (ALLEGRA) 60 MG tablet Take 1 tablet (60 mg) by mouth daily    hydrocortisone 2.5 % ointment Apply twice daily for 1 week on itchy, scaly areas on the body.  Apply Vaseline on top.    hydroquinone (KARIN) 4 % external cream Apply once daily to the face, neck and chest as tolerated. Start with every other day. Skip when irritating (Patient not taking: Reported on 2023)    hydrOXYzine (ATARAX) 25 MG tablet TAKE 2 TABLETS BY MOUTH NIGHTLY AS NEEDED FOR (INSOMINA) FUTURE REFILLS TO GO TO PRIMARY CARE OFFICE    magnesium oxide (MAG-OX) 400 MG tablet Take 1 tablet (400 mg) by mouth 2 times daily    multivitamin (CENTRUM SILVER) tablet Take 1 tablet by  mouth daily    pantoprazole (PROTONIX) 40 MG EC tablet Take by mouth every 24 hours (Patient not taking: Reported on 6/6/2023)    pantoprazole (PROTONIX) 40 MG EC tablet Take 1 tablet (40 mg) by mouth daily (Patient not taking: Reported on 6/6/2023)    sirolimus (GENERIC EQUIVALENT) 1 MG tablet Take 2 tablets (2 mg) by mouth daily    tacrolimus (GENERIC EQUIVALENT) 1 MG capsule Take 2 capsules (2 mg) by mouth every morning AND 1 capsule (1 mg) every evening.    venlafaxine (EFFEXOR XR) 75 MG 24 hr capsule Take 1 capsule (75 mg) by mouth daily     Current Facility-Administered Medications   Medication    triamcinolone (KENALOG-40) injection 40 mg    triamcinolone (KENALOG-40) injection 40 mg     Allergies   Allergen Reactions    Latex      rash    Adhesive Tape Rash         OBJECTIVE:     /78   Wt 69.4 kg (153 lb)   BMI 25.46 kg/m    Body mass index is 25.46 kg/m .    Exam:  Constitutional:  Appearance: Well nourished, well developed alert, in no acute distress  Psychiatric:  Mentation appears normal and affect normal/bright.         Spurling, Brittnee on 7/31/2023  1:30 PM      Gynecological Ultrasound Report  Pelvic U/S - Transvaginal  [facility]  Referring Provider: Mimi Simms CNP   Sonographer:  Brittnee Spurling, RDMS  Indication: Bleeding/Menses- Metrorrhagia (irregular menses)  LMP: No LMP recorded. Patient is perimenopausal.  History:   Gynecological Ultrasonography:   Uterus: anteverted. Contour is smooth/regular.  Size: 7.3 x 4.5 x 3.9 cm  Endometrium: Thickness Total 13.9 mm  Findings: thickened endometrium with possible polyp 0.5 x 0.4 x 0.4 cm  Right Ovary: 2.3 x 0.8 x 1.0 cm. Simple cyst 0.6 x 0.4 x 0.5 cm  Left Ovary: 1.9 x 1.4 x 1.2 cm. Simple cyst 0.8 x 0.8 x 0.8 cm  Cul de Sac Free Fluid: No free fluid     Impression:             The uterus is anteverted and without fibroids.  The EMS is trilaminar and normal and 13.9mm  There is a small 5mm area of hyperechoic  tissue on the posterior wall in the endometrium. This may be a very small polyp or it may just be some focal endometrial tissue excess. Consider SIS or immediately postemenstrual ultrasound to further evaluate  The ovaries are normal bilaterally with <1cm follicles of no significance  No free fluid in the pelvis     Nancy Goodman MD    ASSESSMENT/PLAN:                                                        ICD-10-CM    1. Thickened endometrium  R93.89 Case Request: Operative hysteroscopy with Myosure morcellator     Case Request: Operative hysteroscopy with Myosure morcellator      2. Perimenopausal  N95.1 Case Request: Operative hysteroscopy with Myosure morcellator     Case Request: Operative hysteroscopy with Myosure morcellator      3. Malignant melanoma, unspecified site (H)  C43.9 Adult Oncology/Hematology  Referral        Meltia Cortes is a perimenopausal 53 year old female s/p liver transplant for follow up and planning after an US showing endometrial thickening.     1-2. Discussed with patient in detail about her US findings and options for next steps. After discussion with patient, she deferred endometrial biopsy today, secondary to concern for discomfort due to history of painful procedures. She elected to proceed with a hysteroscopy to obtain a biopsy to rule out endometrial cancer as well as remove any polyp that is present. Risks of infection and uterine perforation were discussed, as well as her increased risk give her immunocompromised status. She was reassured and agreeable with this plan. She will meet with her PCP for a pre-operative physical prior to the procedure. She noted that she would like this procedure done soon as she would like to change her immunosuppression medications (sirolimus and tacrolimus) but cannot complete this until the procedure is completed. We also discussed that a pap smear does not screen for endometrial cancer and given her last pap result, advised that  she follow up in one year.     3. Given her extensive family cancer history without a clear pattern and no known mutations, we also discussed if she would like to meet with a genetic counselor. She was agreeable with this plan. Referral placed.     Fidencio Williamson, MS3    I was present with the medical student who participated in the service and in the documentation of the note. I have verified the history and personally performed the physical exam and medical decision making. I agree with the assessment and plan of care as documented in the note.    Salud Arnold MD, S  [facility]  08/09/23

## 2023-08-08 ENCOUNTER — LAB (OUTPATIENT)
Dept: LAB | Facility: CLINIC | Age: 53
End: 2023-08-08
Payer: COMMERCIAL

## 2023-08-08 DIAGNOSIS — Z94.4 LIVER REPLACED BY TRANSPLANT (H): ICD-10-CM

## 2023-08-08 LAB
ALBUMIN SERPL BCG-MCNC: 4.4 G/DL (ref 3.5–5.2)
ALP SERPL-CCNC: 82 U/L (ref 35–104)
ALT SERPL W P-5'-P-CCNC: 25 U/L (ref 0–50)
ANION GAP SERPL CALCULATED.3IONS-SCNC: 9 MMOL/L (ref 7–15)
AST SERPL W P-5'-P-CCNC: 26 U/L (ref 0–45)
BILIRUB DIRECT SERPL-MCNC: <0.2 MG/DL (ref 0–0.3)
BILIRUB SERPL-MCNC: 0.3 MG/DL
BUN SERPL-MCNC: 12.5 MG/DL (ref 6–20)
CALCIUM SERPL-MCNC: 9.4 MG/DL (ref 8.6–10)
CHLORIDE SERPL-SCNC: 102 MMOL/L (ref 98–107)
CREAT SERPL-MCNC: 0.97 MG/DL (ref 0.51–0.95)
DEPRECATED HCO3 PLAS-SCNC: 27 MMOL/L (ref 22–29)
ERYTHROCYTE [DISTWIDTH] IN BLOOD BY AUTOMATED COUNT: 14.6 % (ref 10–15)
GFR SERPL CREATININE-BSD FRML MDRD: 70 ML/MIN/1.73M2
GLUCOSE SERPL-MCNC: 140 MG/DL (ref 70–99)
HCT VFR BLD AUTO: 44.9 % (ref 35–47)
HGB BLD-MCNC: 15.1 G/DL (ref 11.7–15.7)
MAGNESIUM SERPL-MCNC: 1.7 MG/DL (ref 1.7–2.3)
MCH RBC QN AUTO: 28.4 PG (ref 26.5–33)
MCHC RBC AUTO-ENTMCNC: 33.6 G/DL (ref 31.5–36.5)
MCV RBC AUTO: 85 FL (ref 78–100)
PLATELET # BLD AUTO: 217 10E3/UL (ref 150–450)
POTASSIUM SERPL-SCNC: 5.1 MMOL/L (ref 3.4–5.3)
PROT SERPL-MCNC: 6.6 G/DL (ref 6.4–8.3)
RBC # BLD AUTO: 5.31 10E6/UL (ref 3.8–5.2)
SODIUM SERPL-SCNC: 138 MMOL/L (ref 136–145)
TACROLIMUS BLD-MCNC: 6.3 UG/L (ref 5–15)
TME LAST DOSE: NORMAL H
TME LAST DOSE: NORMAL H
WBC # BLD AUTO: 5 10E3/UL (ref 4–11)

## 2023-08-08 PROCEDURE — 80053 COMPREHEN METABOLIC PANEL: CPT

## 2023-08-08 PROCEDURE — 80197 ASSAY OF TACROLIMUS: CPT

## 2023-08-08 PROCEDURE — 85027 COMPLETE CBC AUTOMATED: CPT

## 2023-08-08 PROCEDURE — 83735 ASSAY OF MAGNESIUM: CPT

## 2023-08-08 PROCEDURE — 36415 COLL VENOUS BLD VENIPUNCTURE: CPT

## 2023-08-08 PROCEDURE — 82248 BILIRUBIN DIRECT: CPT

## 2023-08-08 ASSESSMENT — PATIENT HEALTH QUESTIONNAIRE - PHQ9
SUM OF ALL RESPONSES TO PHQ QUESTIONS 1-9: 25
SUM OF ALL RESPONSES TO PHQ QUESTIONS 1-9: 25
10. IF YOU CHECKED OFF ANY PROBLEMS, HOW DIFFICULT HAVE THESE PROBLEMS MADE IT FOR YOU TO DO YOUR WORK, TAKE CARE OF THINGS AT HOME, OR GET ALONG WITH OTHER PEOPLE: EXTREMELY DIFFICULT

## 2023-08-09 ENCOUNTER — PATIENT OUTREACH (OUTPATIENT)
Dept: ONCOLOGY | Facility: CLINIC | Age: 53
End: 2023-08-09

## 2023-08-09 ENCOUNTER — OFFICE VISIT (OUTPATIENT)
Dept: OBGYN | Facility: CLINIC | Age: 53
End: 2023-08-09
Payer: COMMERCIAL

## 2023-08-09 ENCOUNTER — TELEPHONE (OUTPATIENT)
Dept: OBGYN | Facility: CLINIC | Age: 53
End: 2023-08-09

## 2023-08-09 VITALS — BODY MASS INDEX: 25.46 KG/M2 | WEIGHT: 153 LBS | DIASTOLIC BLOOD PRESSURE: 78 MMHG | SYSTOLIC BLOOD PRESSURE: 118 MMHG

## 2023-08-09 DIAGNOSIS — R93.89 THICKENED ENDOMETRIUM: Primary | ICD-10-CM

## 2023-08-09 DIAGNOSIS — C43.9 MALIGNANT MELANOMA, UNSPECIFIED SITE (H): ICD-10-CM

## 2023-08-09 DIAGNOSIS — N95.1 PERIMENOPAUSAL: ICD-10-CM

## 2023-08-09 PROCEDURE — 99214 OFFICE O/P EST MOD 30 MIN: CPT | Performed by: STUDENT IN AN ORGANIZED HEALTH CARE EDUCATION/TRAINING PROGRAM

## 2023-08-09 NOTE — TELEPHONE ENCOUNTER
Type of surgery: OPERATIVE HSC w/MYOSURE  Location of surgery: [facility] OR  Date and time of surgery: 8/31/2023 7:30a  Surgeon: REGINALD  Pre-Op Appt Date: PCP  Post-Op Appt Date: 9/18/2023 9:30a   Packet sent out:  MAILED 8/9/2023  Pre-cert/Authorization completed:   TBD  Date: 8/9/2023 Milena w/Millie Correia  Surgery Scheduler    CPT 13018    .

## 2023-08-09 NOTE — PROGRESS NOTES
Jasperr received Cancer Risk Management Program referral, referred for:       Family history cancers, and personal history of melanoma        Reviewed for appropriate plan, and sent to New Patient Scheduling for completion.

## 2023-08-09 NOTE — TELEPHONE ENCOUNTER
ERAS BAG GIVEN N/A  ERAS INSTRUCTIONS EXPLAINED N/A    ASSIST: None    H&P TO BE COMPLETED BY:   PCP  FOR H&P TO BE DONE BY SURGEON     SAME DAY/OBSERVATION/INPATIENT: STRAIGHT SAME DAY  EQUIPMENT: nCinosure  VENDOR NEEDED AT CASE: No  IF IUD WHAT BRAND None  LABS/SPECIAL INSTRUCTIONS: None  TIME OFF WORK: 3 days  ESTIMATED DOCTOR TIME IN MINUTES 60  POST OP TO BE SCHEDULED 2 WEEKS AFTER SX. APPOINTMENT LENGTH IN MINUTES 15 minutes.

## 2023-08-17 ENCOUNTER — TELEPHONE (OUTPATIENT)
Dept: FAMILY MEDICINE | Facility: CLINIC | Age: 53
End: 2023-08-17
Payer: COMMERCIAL

## 2023-08-17 NOTE — TELEPHONE ENCOUNTER
General Call    Contacts       Type Contact Phone/Fax    08/17/2023 12:56 PM CDT Phone (Incoming) Fidelina Cortes (Self) [phone] (M)        Reason for Call:  Pt needs a pre-op for 8/31 procedure but first available appt with Dr. Johnson is the day before surgery (pt scheduled it). Would that be okay? If not, can you all pt if she can be seen sooner? Thank you!    Could we send this information to you in [facility] or would you prefer to receive a phone call?:   Patient would prefer a phone call   Okay to leave a detailed message?: Yes at Home number on file [phone] (home)

## 2023-08-20 ENCOUNTER — MYC MEDICAL ADVICE (OUTPATIENT)
Dept: FAMILY MEDICINE | Facility: CLINIC | Age: 53
End: 2023-08-20
Payer: COMMERCIAL

## 2023-08-21 NOTE — TELEPHONE ENCOUNTER
Returned pt call.  States reo-op is scheduled day before surgery and wondering if that was OK.  Last are completed every two months and were just completed early August. Pt should be able to proceed with timing of appts.  Pt verbalized understanding, in agreement with plan, and voiced no further questions.  Katie Gracia RN on 8/21/2023 at 2:49 PM

## 2023-08-21 NOTE — TELEPHONE ENCOUNTER
Reason for call:  Other   Patient called regarding (reason for call): call back  Additional comments: Patient has a question for a nurse about her pre-op appt    Phone number to reach patient:  Cell number on file:    Telephone Information:   Mobile [phone]       Best Time: ASAP    Can we leave a detailed message on this number?  Not Applicable    Travel screening: Not Applicable

## 2023-08-25 ENCOUNTER — DOCUMENTATION ONLY (OUTPATIENT)
Dept: FAMILY MEDICINE | Facility: CLINIC | Age: 53
End: 2023-08-25
Payer: COMMERCIAL

## 2023-08-25 NOTE — PROGRESS NOTES
Received provider signed Attending Physician's Statement-Progress Report form. Printed and faxed Med list and 4/17/2023 visit notes and form to [employer] [facility], [phone], right fax confirmed at 1:34 pm today, 8/25/2023. Copy to TC and abstracting.  Marleen Gagnon MA  [facility]   Primary Care

## 2023-08-25 NOTE — PROGRESS NOTES
I have completed the paperwork for this patient  Please fax it along with the current medication list and my office note from 4/17/2023

## 2023-08-30 ENCOUNTER — OFFICE VISIT (OUTPATIENT)
Dept: FAMILY MEDICINE | Facility: CLINIC | Age: 53
End: 2023-08-30
Payer: COMMERCIAL

## 2023-08-30 ENCOUNTER — ANESTHESIA EVENT (OUTPATIENT)
Dept: SURGERY | Facility: CLINIC | Age: 53
End: 2023-08-30
Payer: COMMERCIAL

## 2023-08-30 VITALS
TEMPERATURE: 98.1 F | BODY MASS INDEX: 25.79 KG/M2 | HEIGHT: 65 IN | RESPIRATION RATE: 15 BRPM | OXYGEN SATURATION: 99 % | HEART RATE: 80 BPM | DIASTOLIC BLOOD PRESSURE: 79 MMHG | SYSTOLIC BLOOD PRESSURE: 112 MMHG | WEIGHT: 154.8 LBS

## 2023-08-30 DIAGNOSIS — Z94.4 STATUS POST LIVER TRANSPLANTATION (H): ICD-10-CM

## 2023-08-30 DIAGNOSIS — R93.89 ENDOMETRIAL THICKENING ON ULTRASOUND: ICD-10-CM

## 2023-08-30 DIAGNOSIS — Z01.818 PREOP GENERAL PHYSICAL EXAM: Primary | ICD-10-CM

## 2023-08-30 PROCEDURE — 99214 OFFICE O/P EST MOD 30 MIN: CPT | Performed by: INTERNAL MEDICINE

## 2023-08-30 NOTE — PROGRESS NOTES
[address] [address]  Phone: [phone]  Primary Provider: Bart More  Pre-op Performing Provider: BART MORE      PREOPERATIVE EVALUATION:  Today's date: 8/30/2023    Melita Cortes is a 53 year old female who presents for a preoperative evaluation.      8/30/2023     2:53 PM   Additional Questions   Roomed by Lynnette LEVY   Accompanied by self       Surgical Information:  Surgery/Procedure: Operative hysteroscopy with myosure morecellator   Surgery Location: [facility]   Surgeon: Salud Arnold MD  Surgery Date: 8/31/2023  Time of Surgery: 7:30 AM  Where patient plans to recover: At home with family  Fax number for surgical facility: Note does not need to be faxed, will be available electronically in Epic.    Assessment & Plan     The proposed surgical procedure is considered INTERMEDIATE risk.    Preop general physical exam  She has no history of any CVA or CAD  No history of any diabetes  Her exercise tolerance is 4.5 METS  She is not on any anticoagulants  She had an echo earlier this year which showed good EF  No history of any exertional chest pain or shortness of breath  She is medically optimized for the procedure  No further testing is warranted    Endometrial thickening on ultrasound  She was found to have thickened endometrium on ultrasound  She is  going to get operative hysteroscopy with MyoSure morcellator    Status post liver transplantation (H)  She is immunosuppressed and is on tacrolimus           Risks and Recommendations:  The patient has the following additional risks and recommendations for perioperative complications:   - No identified additional risk factors other than previously addressed    Antiplatelet or Anticoagulation Medication Instructions:   - Patient is on no antiplatelet or anticoagulation medications.    Additional Medication Instructions:  Patient is to take all scheduled  medications on the day of surgery    RECOMMENDATION:  APPROVAL GIVEN to proceed with proposed procedure, without further diagnostic evaluation.      30 minutes spent by me on the date of the encounter doing chart review, history and exam, documentation and further activities per the note      Subjective       HPI related to upcoming procedure: Pre- OP        8/29/2023     9:27 PM   Preop Questions   1. Have you ever had a heart attack or stroke? No   2. Have you ever had surgery on your heart or blood vessels, such as a stent placement, a coronary artery bypass, or surgery on an artery in your head, neck, heart, or legs? No   3. Do you have chest pain with activity? No   4. Do you have a history of  heart failure? No   5. Do you currently have a cold, bronchitis or symptoms of other infection? No   6. Do you have a cough, shortness of breath, or wheezing? No   7. Do you or anyone in your family have previous history of blood clots? No   8. Do you or does anyone in your family have a serious bleeding problem such as prolonged bleeding following surgeries or cuts? No   9. Have you ever had problems with anemia or been told to take iron pills? No   10. Have you had any abnormal blood loss such as black, tarry or bloody stools, or abnormal vaginal bleeding? No   11. Have you ever had a blood transfusion? YES    11a. Have you ever had a transfusion reaction? No   12. Are you willing to have a blood transfusion if it is medically needed before, during, or after your surgery? Yes   13. Have you or any of your relatives ever had problems with anesthesia? No   14. Do you have sleep apnea, excessive snoring or daytime drowsiness? No   15. Do you have any artifical heart valves or other implanted medical devices like a pacemaker, defibrillator, or continuous glucose monitor? No   16. Do you have artificial joints? YES    17. Are you allergic to latex? YES   18. Is there any chance that you may be pregnant? No       Health Care  "Directive:  Patient has a Health Care Directive on file        Review of Systems  Constitutional, neuro, ENT, endocrine, pulmonary, cardiac, gastrointestinal, genitourinary, musculoskeletal, integument and psychiatric systems are negative, except as otherwise noted.    Patient Active Problem List    Diagnosis Date Noted    Mild recurrent major depression (H) 02/15/2023     Priority: Medium    Cervicalgia 08/19/2022     Priority: Medium    Pain of both elbows 08/19/2022     Priority: Medium    Elevated alkaline phosphatase level 02/16/2022     Priority: Medium    Status post liver transplantation (H) 01/12/2022     Priority: Medium    Immunosuppressed status (H) 01/12/2022     Priority: Medium    Steroid-induced hyperglycemia 01/12/2022     Priority: Medium    Hypervolemia 01/12/2022     Priority: Medium    Hyperlipidemia 12/28/2021     Priority: Medium    Hyponatremia 11/03/2021     Priority: Medium    Malaise and fatigue 11/03/2021     Priority: Medium    At high risk for severe sepsis 11/03/2021     Priority: Medium    Symptomatic anemia 11/03/2021     Priority: Medium    Bandemia 11/03/2021     Priority: Medium    Elevated serum creatinine 10/13/2021     Priority: Medium    Anemia, unspecified type 09/24/2021     Priority: Medium    Cervical high risk HPV (human papillomavirus) test positive      Priority: Medium     1/2019 NIL, +HPV 18  3/2019 [place]- Neg  9/14/20 NIL, +HPV 18. Plan [place] bef 12/14/20 12/29/20 [place]- no visible lesions, no Bx done. Plan 1 yr co-test  3/1/22 NIL pap, + HR HPV # 18. Plan: colp   4/27/22 [place] ECC: no WILLI. Plan: Cotest in  1 yr. Per notes from visit, \"Due to the medications that she is taking to prevent rejection of her liver transplant, this puts her at higher risk for the development of cervical disease so if today's pathology results are normal, then I would advise a yearly co-test to closely monitor.\"  6/29/23 ASCUS pap, neg HR HPV. Plan 1 year cotest          Transaminitis " 2020     Priority: Medium    Macrocytosis 2020     Priority: Medium    Abdominal bloating 2020     Priority: Medium    Family history of pancreatic cancer 2020     Priority: Medium    Anxiety 2014     Priority: Medium     Formatting of this note might be different from the original.  did not end up starting Zoloft        Past Medical History:   Diagnosis Date    Alcoholic hepatitis     Asthma 3/10/2006    Cervical high risk HPV (human papillomavirus) test positive 2020    See problem list    Liver failure (H)      Past Surgical History:   Procedure Laterality Date    APPENDECTOMY      COLONOSCOPY N/A 2022    Procedure: COLONOSCOPY;  Surgeon: Zita Steele MD;  Location: [facility]    ENDOSCOPIC RETROGRADE CHOLANGIOPANCREATOGRAM N/A 2022    Procedure: ENDOSCOPIC RETROGRADE CHOLANGIOPANCREATOGRAPHY WITH BILIARY SPHINCTEROTOMY, SLUDGE REMOVAL, DILATION  AND STENT PLACEMENT;  Surgeon: Guru Gardenia Escobar MD;  Location: [facility]    ENDOSCOPIC RETROGRADE CHOLANGIOPANCREATOGRAM N/A 2022    Procedure: ENDOSCOPIC RETROGRADE CHOLANGIOPANCREATOGRAPHY WITH BILE DUCT STENT REMOVAL;  Surgeon: Guru Gardenia Escobar MD;  [facility]: [facility]    ENDOSCOPIC RETROGRADE CHOLANGIOPANCREATOGRAPHY, EXCHANGE TUBE/STENT N/A 2022    Procedure: ENDOSCOPIC RETROGRADE CHOLANGIOPANCREATOGRAPHY, bile duct stents exchanged, balloon dilation and sweep of bile ducts for sludge;  Surgeon: Guru Gardenia Escobar MD;  [facility]: [facility]    ESOPHAGOGASTRODUODENOSCOPY, WITH BRUSHINGS N/A 10/29/2021    Procedure: ESOPHAGOGASTRODUODENOSCOPY, WITH BRUSHINGS;  Surgeon: Torey Ruiz MD;  [facility]    IR LIVER BIOPSY PERCUTANEOUS  2022    ORTHOPEDIC SURGERY Left 2023    hip replacement    TRANSPLANT LIVER RECIPIENT  DONOR N/A 2022    Procedure: TRANSPLANT, LIVER, RECIPIENT,  DONOR;  Surgeon: Pavan  MD Pradeep;  Location: [facility]     Current Outpatient Medications   Medication Sig Dispense Refill    calcium carbonate (OS-SHAI) 1500 (600 Ca) MG tablet Take 1 tablet (600 mg) by mouth daily 60 tablet 3    clobetasol (TEMOVATE) 0.05 % GEL topical gel Apply twice daily as needed for sore on the lip 30 g 2    fexofenadine (ALLEGRA) 60 MG tablet Take 1 tablet (60 mg) by mouth daily 90 tablet 1    hydrocortisone 2.5 % ointment Apply twice daily for 1 week on itchy, scaly areas on the body.  Apply Vaseline on top. 60 g 0    hydroquinone (KARIN) 4 % external cream Apply once daily to the face, neck and chest as tolerated. Start with every other day. Skip when irritating 30 g 2    hydrOXYzine (ATARAX) 25 MG tablet TAKE 2 TABLETS BY MOUTH NIGHTLY AS NEEDED FOR (INSOMINA) FUTURE REFILLS TO GO TO PRIMARY CARE OFFICE 180 tablet 3    magnesium oxide (MAG-OX) 400 MG tablet Take 1 tablet (400 mg) by mouth 2 times daily 90 tablet 3    multivitamin (CENTRUM SILVER) tablet Take 1 tablet by mouth daily      pantoprazole (PROTONIX) 40 MG EC tablet Take by mouth every 24 hours      pantoprazole (PROTONIX) 40 MG EC tablet Take 1 tablet (40 mg) by mouth daily 90 tablet 3    sirolimus (GENERIC EQUIVALENT) 1 MG tablet Take 2 tablets (2 mg) by mouth daily 60 tablet 11    tacrolimus (GENERIC EQUIVALENT) 1 MG capsule Take 2 capsules (2 mg) by mouth every morning AND 1 capsule (1 mg) every evening. 270 capsule 3    venlafaxine (EFFEXOR XR) 75 MG 24 hr capsule Take 1 capsule (75 mg) by mouth daily 90 capsule 1       Allergies   Allergen Reactions    Latex      rash    Adhesive Tape Rash        Social History     Tobacco Use    Smoking status: Former     Types: Cigarettes     Quit date: 5/1/2020     Years since quitting: 3.3    Smokeless tobacco: Never   Substance Use Topics    Alcohol use: Not Currently     Comment: Last drink 8/2021     Family History   Problem Relation Age of Onset    Cancer Mother     Melanoma Father     Skin Cancer Father   "   Colon Cancer Maternal Uncle     Colon Cancer Paternal Aunt      History   Drug Use Unknown         Objective     /79 (BP Location: Right arm, Patient Position: Sitting, Cuff Size: Adult Large)   Pulse 80   Temp 98.1  F (36.7  C) (Oral)   Resp 15   Ht 1.651 m (5' 5\")   Wt 70.2 kg (154 lb 12.8 oz)   SpO2 99%   BMI 25.76 kg/m      Physical Exam    GENERAL APPEARANCE: healthy, alert and no distress     EYES: EOMI, PERRL     RESP: lungs clear to auscultation - no rales, rhonchi or wheezes     CV: regular rates and rhythm, normal S1 S2, no S3 or S4 and no murmur, click or rub     ABDOMEN:  soft, nontender, no HSM or masses and bowel sounds normal     MS: extremities normal- no gross deformities noted, no evidence of inflammation in joints, FROM in all extremities.     SKIN: no suspicious lesions or rashes     NEURO: Normal strength and tone, sensory exam grossly normal, mentation intact and speech normal     PSYCH: mentation appears normal. and affect normal/bright     LYMPHATICS: No cervical adenopathy    Recent Labs   Lab Test 08/08/23  0942 06/20/23  1006 04/20/23  1108 02/15/23  1603 05/23/22  0815 05/16/22  0927 03/17/22  0815 03/16/22  0809 01/08/22  1326 01/08/22  1242   HGB 15.1 14.6   < >  --    < >  --    < > 11.5*   < >  --     217   < >  --    < >  --    < > 198   < >  --    INR  --   --   --   --   --  1.04  --  1.07   < >  --     140   < >  --    < >  --    < > 136   < >  --    POTASSIUM 5.1 4.3   < >  --    < >  --    < > 4.5   < >  --    CR 0.97* 0.93   < >  --    < >  --    < > 1.09*   < >  --    A1C  --   --   --  5.2  --   --   --   --   --  5.2    < > = values in this interval not displayed.        Diagnostics:  No labs were ordered during this visit.   No EKG required, no history of coronary heart disease, significant arrhythmia, peripheral arterial disease or other structural heart disease.    Revised Cardiac Risk Index (RCRI):  The patient has the following serious " cardiovascular risks for perioperative complications:   - No serious cardiac risks = 0 points     RCRI Interpretation: 0 points: Class I (very low risk - 0.4% complication rate)         Signed Electronically by: Navneet Johnson MD  Copy of this evaluation report is provided to requesting physician.

## 2023-08-30 NOTE — H&P (VIEW-ONLY)
[address] [address]  Phone: [phone]  Primary Provider: Bart More  Pre-op Performing Provider: BART MORE      PREOPERATIVE EVALUATION:  Today's date: 8/30/2023    Melita Cortes is a 53 year old female who presents for a preoperative evaluation.      8/30/2023     2:53 PM   Additional Questions   Roomed by Lynnette LEVY   Accompanied by self       Surgical Information:  Surgery/Procedure: Operative hysteroscopy with myosure morecellator   Surgery Location: [facility]   Surgeon: Salud Arnold MD  Surgery Date: 8/31/2023  Time of Surgery: 7:30 AM  Where patient plans to recover: At home with family  Fax number for surgical facility: Note does not need to be faxed, will be available electronically in Epic.    Assessment & Plan     The proposed surgical procedure is considered INTERMEDIATE risk.    Preop general physical exam  She has no history of any CVA or CAD  No history of any diabetes  Her exercise tolerance is 4.5 METS  She is not on any anticoagulants  She had an echo earlier this year which showed good EF  No history of any exertional chest pain or shortness of breath  She is medically optimized for the procedure  No further testing is warranted    Endometrial thickening on ultrasound  She was found to have thickened endometrium on ultrasound  She is  going to get operative hysteroscopy with MyoSure morcellator    Status post liver transplantation (H)  She is immunosuppressed and is on tacrolimus           Risks and Recommendations:  The patient has the following additional risks and recommendations for perioperative complications:   - No identified additional risk factors other than previously addressed    Antiplatelet or Anticoagulation Medication Instructions:   - Patient is on no antiplatelet or anticoagulation medications.    Additional Medication Instructions:  Patient is to take all scheduled  medications on the day of surgery    RECOMMENDATION:  APPROVAL GIVEN to proceed with proposed procedure, without further diagnostic evaluation.      30 minutes spent by me on the date of the encounter doing chart review, history and exam, documentation and further activities per the note      Subjective       HPI related to upcoming procedure: Pre- OP        8/29/2023     9:27 PM   Preop Questions   1. Have you ever had a heart attack or stroke? No   2. Have you ever had surgery on your heart or blood vessels, such as a stent placement, a coronary artery bypass, or surgery on an artery in your head, neck, heart, or legs? No   3. Do you have chest pain with activity? No   4. Do you have a history of  heart failure? No   5. Do you currently have a cold, bronchitis or symptoms of other infection? No   6. Do you have a cough, shortness of breath, or wheezing? No   7. Do you or anyone in your family have previous history of blood clots? No   8. Do you or does anyone in your family have a serious bleeding problem such as prolonged bleeding following surgeries or cuts? No   9. Have you ever had problems with anemia or been told to take iron pills? No   10. Have you had any abnormal blood loss such as black, tarry or bloody stools, or abnormal vaginal bleeding? No   11. Have you ever had a blood transfusion? YES    11a. Have you ever had a transfusion reaction? No   12. Are you willing to have a blood transfusion if it is medically needed before, during, or after your surgery? Yes   13. Have you or any of your relatives ever had problems with anesthesia? No   14. Do you have sleep apnea, excessive snoring or daytime drowsiness? No   15. Do you have any artifical heart valves or other implanted medical devices like a pacemaker, defibrillator, or continuous glucose monitor? No   16. Do you have artificial joints? YES    17. Are you allergic to latex? YES   18. Is there any chance that you may be pregnant? No       Health Care  "Directive:  Patient has a Health Care Directive on file        Review of Systems  Constitutional, neuro, ENT, endocrine, pulmonary, cardiac, gastrointestinal, genitourinary, musculoskeletal, integument and psychiatric systems are negative, except as otherwise noted.    Patient Active Problem List    Diagnosis Date Noted    Mild recurrent major depression (H) 02/15/2023     Priority: Medium    Cervicalgia 08/19/2022     Priority: Medium    Pain of both elbows 08/19/2022     Priority: Medium    Elevated alkaline phosphatase level 02/16/2022     Priority: Medium    Status post liver transplantation (H) 01/12/2022     Priority: Medium    Immunosuppressed status (H) 01/12/2022     Priority: Medium    Steroid-induced hyperglycemia 01/12/2022     Priority: Medium    Hypervolemia 01/12/2022     Priority: Medium    Hyperlipidemia 12/28/2021     Priority: Medium    Hyponatremia 11/03/2021     Priority: Medium    Malaise and fatigue 11/03/2021     Priority: Medium    At high risk for severe sepsis 11/03/2021     Priority: Medium    Symptomatic anemia 11/03/2021     Priority: Medium    Bandemia 11/03/2021     Priority: Medium    Elevated serum creatinine 10/13/2021     Priority: Medium    Anemia, unspecified type 09/24/2021     Priority: Medium    Cervical high risk HPV (human papillomavirus) test positive      Priority: Medium     1/2019 NIL, +HPV 18  3/2019 [place]- Neg  9/14/20 NIL, +HPV 18. Plan [place] bef 12/14/20 12/29/20 [place]- no visible lesions, no Bx done. Plan 1 yr co-test  3/1/22 NIL pap, + HR HPV # 18. Plan: colp   4/27/22 [place] ECC: no WILLI. Plan: Cotest in  1 yr. Per notes from visit, \"Due to the medications that she is taking to prevent rejection of her liver transplant, this puts her at higher risk for the development of cervical disease so if today's pathology results are normal, then I would advise a yearly co-test to closely monitor.\"  6/29/23 ASCUS pap, neg HR HPV. Plan 1 year cotest          Transaminitis " 2020     Priority: Medium    Macrocytosis 2020     Priority: Medium    Abdominal bloating 2020     Priority: Medium    Family history of pancreatic cancer 2020     Priority: Medium    Anxiety 2014     Priority: Medium     Formatting of this note might be different from the original.  did not end up starting Zoloft        Past Medical History:   Diagnosis Date    Alcoholic hepatitis     Asthma 3/10/2006    Cervical high risk HPV (human papillomavirus) test positive 2020    See problem list    Liver failure (H)      Past Surgical History:   Procedure Laterality Date    APPENDECTOMY      COLONOSCOPY N/A 2022    Procedure: COLONOSCOPY;  Surgeon: Zita Steele MD;  Location: [facility]    ENDOSCOPIC RETROGRADE CHOLANGIOPANCREATOGRAM N/A 2022    Procedure: ENDOSCOPIC RETROGRADE CHOLANGIOPANCREATOGRAPHY WITH BILIARY SPHINCTEROTOMY, SLUDGE REMOVAL, DILATION  AND STENT PLACEMENT;  Surgeon: Guru Gardenia Escobar MD;  [facility]    ENDOSCOPIC RETROGRADE CHOLANGIOPANCREATOGRAM N/A 2022    Procedure: ENDOSCOPIC RETROGRADE CHOLANGIOPANCREATOGRAPHY WITH BILE DUCT STENT REMOVAL;  Surgeon: Guru Gardenia Escobar MD;  [facility]    ENDOSCOPIC RETROGRADE CHOLANGIOPANCREATOGRAPHY, EXCHANGE TUBE/STENT N/A 2022    Procedure: ENDOSCOPIC RETROGRADE CHOLANGIOPANCREATOGRAPHY, bile duct stents exchanged, balloon dilation and sweep of bile ducts for sludge;  Surgeon: Guru Gardenia Escobar MD;  [facility]: [facility]    ESOPHAGOGASTRODUODENOSCOPY, WITH BRUSHINGS N/A 10/29/2021    Procedure: ESOPHAGOGASTRODUODENOSCOPY, WITH BRUSHINGS;  Surgeon: Torey Ruiz MD;  [facility]    IR LIVER BIOPSY PERCUTANEOUS  2022    ORTHOPEDIC SURGERY Left 2023    hip replacement    TRANSPLANT LIVER RECIPIENT  DONOR N/A 2022    Procedure: TRANSPLANT, LIVER, RECIPIENT,  DONOR;  Surgeon: Pavan  MD Pradeep;  Location: [facility]     Current Outpatient Medications   Medication Sig Dispense Refill    calcium carbonate (OS-SHAI) 1500 (600 Ca) MG tablet Take 1 tablet (600 mg) by mouth daily 60 tablet 3    clobetasol (TEMOVATE) 0.05 % GEL topical gel Apply twice daily as needed for sore on the lip 30 g 2    fexofenadine (ALLEGRA) 60 MG tablet Take 1 tablet (60 mg) by mouth daily 90 tablet 1    hydrocortisone 2.5 % ointment Apply twice daily for 1 week on itchy, scaly areas on the body.  Apply Vaseline on top. 60 g 0    hydroquinone (KARIN) 4 % external cream Apply once daily to the face, neck and chest as tolerated. Start with every other day. Skip when irritating 30 g 2    hydrOXYzine (ATARAX) 25 MG tablet TAKE 2 TABLETS BY MOUTH NIGHTLY AS NEEDED FOR (INSOMINA) FUTURE REFILLS TO GO TO PRIMARY CARE OFFICE 180 tablet 3    magnesium oxide (MAG-OX) 400 MG tablet Take 1 tablet (400 mg) by mouth 2 times daily 90 tablet 3    multivitamin (CENTRUM SILVER) tablet Take 1 tablet by mouth daily      pantoprazole (PROTONIX) 40 MG EC tablet Take by mouth every 24 hours      pantoprazole (PROTONIX) 40 MG EC tablet Take 1 tablet (40 mg) by mouth daily 90 tablet 3    sirolimus (GENERIC EQUIVALENT) 1 MG tablet Take 2 tablets (2 mg) by mouth daily 60 tablet 11    tacrolimus (GENERIC EQUIVALENT) 1 MG capsule Take 2 capsules (2 mg) by mouth every morning AND 1 capsule (1 mg) every evening. 270 capsule 3    venlafaxine (EFFEXOR XR) 75 MG 24 hr capsule Take 1 capsule (75 mg) by mouth daily 90 capsule 1       Allergies   Allergen Reactions    Latex      rash    Adhesive Tape Rash        Social History     Tobacco Use    Smoking status: Former     Types: Cigarettes     Quit date: 5/1/2020     Years since quitting: 3.3    Smokeless tobacco: Never   Substance Use Topics    Alcohol use: Not Currently     Comment: Last drink 8/2021     Family History   Problem Relation Age of Onset    Cancer Mother     Melanoma Father     Skin Cancer Father   "   Colon Cancer Maternal Uncle     Colon Cancer Paternal Aunt      History   Drug Use Unknown         Objective     /79 (BP Location: Right arm, Patient Position: Sitting, Cuff Size: Adult Large)   Pulse 80   Temp 98.1  F (36.7  C) (Oral)   Resp 15   Ht 1.651 m (5' 5\")   Wt 70.2 kg (154 lb 12.8 oz)   SpO2 99%   BMI 25.76 kg/m      Physical Exam    GENERAL APPEARANCE: healthy, alert and no distress     EYES: EOMI, PERRL     RESP: lungs clear to auscultation - no rales, rhonchi or wheezes     CV: regular rates and rhythm, normal S1 S2, no S3 or S4 and no murmur, click or rub     ABDOMEN:  soft, nontender, no HSM or masses and bowel sounds normal     MS: extremities normal- no gross deformities noted, no evidence of inflammation in joints, FROM in all extremities.     SKIN: no suspicious lesions or rashes     NEURO: Normal strength and tone, sensory exam grossly normal, mentation intact and speech normal     PSYCH: mentation appears normal. and affect normal/bright     LYMPHATICS: No cervical adenopathy    Recent Labs   Lab Test 08/08/23  0942 06/20/23  1006 04/20/23  1108 02/15/23  1603 05/23/22  0815 05/16/22  0927 03/17/22  0815 03/16/22  0809 01/08/22  1326 01/08/22  1242   HGB 15.1 14.6   < >  --    < >  --    < > 11.5*   < >  --     217   < >  --    < >  --    < > 198   < >  --    INR  --   --   --   --   --  1.04  --  1.07   < >  --     140   < >  --    < >  --    < > 136   < >  --    POTASSIUM 5.1 4.3   < >  --    < >  --    < > 4.5   < >  --    CR 0.97* 0.93   < >  --    < >  --    < > 1.09*   < >  --    A1C  --   --   --  5.2  --   --   --   --   --  5.2    < > = values in this interval not displayed.        Diagnostics:  No labs were ordered during this visit.   No EKG required, no history of coronary heart disease, significant arrhythmia, peripheral arterial disease or other structural heart disease.    Revised Cardiac Risk Index (RCRI):  The patient has the following serious " cardiovascular risks for perioperative complications:   - No serious cardiac risks = 0 points     RCRI Interpretation: 0 points: Class I (very low risk - 0.4% complication rate)         Signed Electronically by: Navneet Johnson MD  Copy of this evaluation report is provided to requesting physician.

## 2023-08-30 NOTE — PATIENT INSTRUCTIONS
For informational purposes only. Not to replace the advice of your health care provider. Copyright   2003,  [employer] [facility]. All rights reserved. Clinically reviewed by Sylvia Thomas MD. [employer] [id number] - REV .  Preparing for Your Surgery  Getting started  A nurse will call you to review your health history and instructions. They will give you an arrival time based on your scheduled surgery time. Please be ready to share:  Your doctor's clinic name and phone number  Your medical, surgical, and anesthesia history  A list of allergies and sensitivities  A list of medicines, including herbal treatments and over-the-counter drugs  Whether the patient has a legal guardian (ask how to send us the papers in advance)  Please tell us if you're pregnant--or if there's any chance you might be pregnant. Some surgeries may injure a fetus (unborn baby), so they require a pregnancy test. Surgeries that are safe for a fetus don't always need a test, and you can choose whether to have one.   If you have a child who's having surgery, please ask for a copy of Preparing for Your Child's Surgery.    Preparing for surgery  Within 10 to 30 days of surgery: Have a pre-op exam (sometimes called an H&P, or History and Physical). This can be done at a clinic or pre-operative center.  If you're having a , you may not need this exam. Talk to your care team.  At your pre-op exam, talk to your care team about all medicines you take. If you need to stop any medicines before surgery, ask when to start taking them again.  We do this for your safety. Many medicines can make you bleed too much during surgery. Some change how well surgery (anesthesia) drugs work.  Call your insurance company to let them know you're having surgery. (If you don't have insurance, call [phone].)  Call your clinic if there's any change in your health. This includes signs of a cold or flu (sore throat, runny nose, cough, rash, fever). It also  includes a scrape or scratch near the surgery site.  If you have questions on the day of surgery, call your hospital or surgery center.  Eating and drinking guidelines  For your safety: Unless your surgeon tells you otherwise, follow the guidelines below.  Eat and drink as usual until 8 hours before you arrive for surgery. After that, no food or milk.  Drink clear liquids until 2 hours before you arrive. These are liquids you can see through, like water, Gatorade, and Propel Water. They also include plain black coffee and tea (no cream or milk), candy, and breath mints. You can spit out gum when you arrive.  If you drink alcohol: Stop drinking it the night before surgery.  If your care team tells you to take medicine on the morning of surgery, it's okay to take it with a sip of water.  Preventing infection  Shower or bathe the night before and morning of your surgery. Follow the instructions your clinic gave you. (If no instructions, use regular soap.)  Don't shave or clip hair near your surgery site. We'll remove the hair if needed.  Don't smoke or vape the morning of surgery. You may chew nicotine gum up to 2 hours before surgery. A nicotine patch is okay.  Note: Some surgeries require you to completely quit smoking and nicotine. Check with your surgeon.  Your care team will make every effort to keep you safe from infection. We will:  Clean our hands often with soap and water (or an alcohol-based hand rub).  Clean the skin at your surgery site with a special soap that kills germs.  Give you a special gown to keep you warm. (Cold raises the risk of infection.)  Wear special hair covers, masks, gowns and gloves during surgery.  Give antibiotic medicine, if prescribed. Not all surgeries need antibiotics.  What to bring on the day of surgery  Photo ID and insurance card  Copy of your health care directive, if you have one  Glasses and hearing aids (bring cases)  You can't wear contacts during surgery  Inhaler and eye  drops, if you use them (tell us about these when you arrive)  CPAP machine or breathing device, if you use them  A few personal items, if spending the night  If you have . . .  A pacemaker, ICD (cardiac defibrillator) or other implant: Bring the ID card.  An implanted stimulator: Bring the remote control.  A legal guardian: Bring a copy of the certified (court-stamped) guardianship papers.  Please remove any jewelry, including body piercings. Leave jewelry and other valuables at home.  If you're going home the day of surgery  You must have a responsible adult drive you home. They should stay with you overnight as well.  If you don't have someone to stay with you, and you aren't safe to go home alone, we may keep you overnight. Insurance often won't pay for this.  After surgery  If it's hard to control your pain or you need more pain medicine, please call your surgeon's office.  Questions?   If you have any questions for your care team, list them here: _________________________________________________________________________________________________________________________________________________________________________ ____________________________________ ____________________________________ ____________________________________    How to Take Your Medication Before Surgery  - Take all of your medications before surgery as usual    For informational purposes only. Not to replace the advice of your health care provider. Copyright   2003, 2019 [employer]. All rights reserved. Clinically reviewed by Sylvia Thomas MD. [employer] [id number] - REV 12/22.  Preparing for Your Surgery  Getting started  A nurse will call you to review your health history and instructions. They will give you an arrival time based on your scheduled surgery time. Please be ready to share:  Your doctor's clinic name and phone number  Your medical, surgical, and anesthesia history  A list of allergies and sensitivities  A list of medicines,  including herbal treatments and over-the-counter drugs  Whether the patient has a legal guardian (ask how to send us the papers in advance)  Please tell us if you're pregnant--or if there's any chance you might be pregnant. Some surgeries may injure a fetus (unborn baby), so they require a pregnancy test. Surgeries that are safe for a fetus don't always need a test, and you can choose whether to have one.   If you have a child who's having surgery, please ask for a copy of Preparing for Your Child's Surgery.    Preparing for surgery  Within 10 to 30 days of surgery: Have a pre-op exam (sometimes called an H&P, or History and Physical). This can be done at a clinic or pre-operative center.  If you're having a , you may not need this exam. Talk to your care team.  At your pre-op exam, talk to your care team about all medicines you take. If you need to stop any medicines before surgery, ask when to start taking them again.  We do this for your safety. Many medicines can make you bleed too much during surgery. Some change how well surgery (anesthesia) drugs work.  Call your insurance company to let them know you're having surgery. (If you don't have insurance, call [phone].)  Call your clinic if there's any change in your health. This includes signs of a cold or flu (sore throat, runny nose, cough, rash, fever). It also includes a scrape or scratch near the surgery site.  If you have questions on the day of surgery, call your hospital or surgery center.  Eating and drinking guidelines  For your safety: Unless your surgeon tells you otherwise, follow the guidelines below.  Eat and drink as usual until 8 hours before you arrive for surgery. After that, no food or milk.  Drink clear liquids until 2 hours before you arrive. These are liquids you can see through, like water, Gatorade, and Propel Water. They also include plain black coffee and tea (no cream or milk), candy, and breath mints. You can spit out gum  when you arrive.  If you drink alcohol: Stop drinking it the night before surgery.  If your care team tells you to take medicine on the morning of surgery, it's okay to take it with a sip of water.  Preventing infection  Shower or bathe the night before and morning of your surgery. Follow the instructions your clinic gave you. (If no instructions, use regular soap.)  Don't shave or clip hair near your surgery site. We'll remove the hair if needed.  Don't smoke or vape the morning of surgery. You may chew nicotine gum up to 2 hours before surgery. A nicotine patch is okay.  Note: Some surgeries require you to completely quit smoking and nicotine. Check with your surgeon.  Your care team will make every effort to keep you safe from infection. We will:  Clean our hands often with soap and water (or an alcohol-based hand rub).  Clean the skin at your surgery site with a special soap that kills germs.  Give you a special gown to keep you warm. (Cold raises the risk of infection.)  Wear special hair covers, masks, gowns and gloves during surgery.  Give antibiotic medicine, if prescribed. Not all surgeries need antibiotics.  What to bring on the day of surgery  Photo ID and insurance card  Copy of your health care directive, if you have one  Glasses and hearing aids (bring cases)  You can't wear contacts during surgery  Inhaler and eye drops, if you use them (tell us about these when you arrive)  CPAP machine or breathing device, if you use them  A few personal items, if spending the night  If you have . . .  A pacemaker, ICD (cardiac defibrillator) or other implant: Bring the ID card.  An implanted stimulator: Bring the remote control.  A legal guardian: Bring a copy of the certified (court-stamped) guardianship papers.  Please remove any jewelry, including body piercings. Leave jewelry and other valuables at home.  If you're going home the day of surgery  You must have a responsible adult drive you home. They should stay  with you overnight as well.  If you don't have someone to stay with you, and you aren't safe to go home alone, we may keep you overnight. Insurance often won't pay for this.  After surgery  If it's hard to control your pain or you need more pain medicine, please call your surgeon's office.  Questions?   If you have any questions for your care team, list them here: _________________________________________________________________________________________________________________________________________________________________________ ____________________________________ ____________________________________ ____________________________________    How to Take Your Medication Before Surgery  - Take all of your medications before surgery as usual

## 2023-08-31 ENCOUNTER — ANESTHESIA (OUTPATIENT)
Dept: SURGERY | Facility: CLINIC | Age: 53
End: 2023-08-31
Payer: COMMERCIAL

## 2023-08-31 ENCOUNTER — HOSPITAL ENCOUNTER (OUTPATIENT)
Facility: CLINIC | Age: 53
Discharge: HOME | End: 2023-08-31
Attending: STUDENT IN AN ORGANIZED HEALTH CARE EDUCATION/TRAINING PROGRAM | Admitting: STUDENT IN AN ORGANIZED HEALTH CARE EDUCATION/TRAINING PROGRAM
Payer: COMMERCIAL

## 2023-08-31 VITALS
DIASTOLIC BLOOD PRESSURE: 75 MMHG | HEART RATE: 67 BPM | WEIGHT: 153.6 LBS | TEMPERATURE: 97.5 F | RESPIRATION RATE: 14 BRPM | HEIGHT: 65 IN | BODY MASS INDEX: 25.59 KG/M2 | OXYGEN SATURATION: 97 % | SYSTOLIC BLOOD PRESSURE: 107 MMHG

## 2023-08-31 DIAGNOSIS — R93.89 ENDOMETRIAL THICKENING ON ULTRASOUND: Primary | ICD-10-CM

## 2023-08-31 LAB — HCG UR QL: NEGATIVE

## 2023-08-31 PROCEDURE — 250N000025 HC SEVOFLURANE, PER MIN: Performed by: STUDENT IN AN ORGANIZED HEALTH CARE EDUCATION/TRAINING PROGRAM

## 2023-08-31 PROCEDURE — 250N000011 HC RX IP 250 OP 636: Mod: JZ | Performed by: ANESTHESIOLOGY

## 2023-08-31 PROCEDURE — 250N000013 HC RX MED GY IP 250 OP 250 PS 637: Performed by: STUDENT IN AN ORGANIZED HEALTH CARE EDUCATION/TRAINING PROGRAM

## 2023-08-31 PROCEDURE — 250N000011 HC RX IP 250 OP 636: Mod: JZ | Performed by: NURSE ANESTHETIST, CERTIFIED REGISTERED

## 2023-08-31 PROCEDURE — 81025 URINE PREGNANCY TEST: CPT | Performed by: STUDENT IN AN ORGANIZED HEALTH CARE EDUCATION/TRAINING PROGRAM

## 2023-08-31 PROCEDURE — 999N000141 HC STATISTIC PRE-PROCEDURE NURSING ASSESSMENT: Performed by: STUDENT IN AN ORGANIZED HEALTH CARE EDUCATION/TRAINING PROGRAM

## 2023-08-31 PROCEDURE — 88305 TISSUE EXAM BY PATHOLOGIST: CPT | Mod: TC | Performed by: STUDENT IN AN ORGANIZED HEALTH CARE EDUCATION/TRAINING PROGRAM

## 2023-08-31 PROCEDURE — 250N000009 HC RX 250: Performed by: NURSE ANESTHETIST, CERTIFIED REGISTERED

## 2023-08-31 PROCEDURE — 370N000017 HC ANESTHESIA TECHNICAL FEE, PER MIN: Performed by: STUDENT IN AN ORGANIZED HEALTH CARE EDUCATION/TRAINING PROGRAM

## 2023-08-31 PROCEDURE — 710N000009 HC RECOVERY PHASE 1, LEVEL 1, PER MIN: Performed by: STUDENT IN AN ORGANIZED HEALTH CARE EDUCATION/TRAINING PROGRAM

## 2023-08-31 PROCEDURE — 710N000012 HC RECOVERY PHASE 2, PER MINUTE: Performed by: STUDENT IN AN ORGANIZED HEALTH CARE EDUCATION/TRAINING PROGRAM

## 2023-08-31 PROCEDURE — 258N000003 HC RX IP 258 OP 636: Performed by: NURSE ANESTHETIST, CERTIFIED REGISTERED

## 2023-08-31 PROCEDURE — 360N000076 HC SURGERY LEVEL 3, PER MIN: Performed by: STUDENT IN AN ORGANIZED HEALTH CARE EDUCATION/TRAINING PROGRAM

## 2023-08-31 PROCEDURE — 272N000001 HC OR GENERAL SUPPLY STERILE: Performed by: STUDENT IN AN ORGANIZED HEALTH CARE EDUCATION/TRAINING PROGRAM

## 2023-08-31 PROCEDURE — 258N000001 HC RX 258: Performed by: STUDENT IN AN ORGANIZED HEALTH CARE EDUCATION/TRAINING PROGRAM

## 2023-08-31 PROCEDURE — 58558 HYSTEROSCOPY BIOPSY: CPT | Performed by: STUDENT IN AN ORGANIZED HEALTH CARE EDUCATION/TRAINING PROGRAM

## 2023-08-31 RX ORDER — LIDOCAINE HYDROCHLORIDE 20 MG/ML
INJECTION, SOLUTION INFILTRATION; PERINEURAL PRN
Status: DISCONTINUED | OUTPATIENT
Start: 2023-08-31 | End: 2023-08-31

## 2023-08-31 RX ORDER — SODIUM CHLORIDE, SODIUM LACTATE, POTASSIUM CHLORIDE, CALCIUM CHLORIDE 600; 310; 30; 20 MG/100ML; MG/100ML; MG/100ML; MG/100ML
INJECTION, SOLUTION INTRAVENOUS CONTINUOUS
Status: DISCONTINUED | OUTPATIENT
Start: 2023-08-31 | End: 2023-08-31 | Stop reason: HOSPADM

## 2023-08-31 RX ORDER — HYDROMORPHONE HCL IN WATER/PF 6 MG/30 ML
0.2 PATIENT CONTROLLED ANALGESIA SYRINGE INTRAVENOUS EVERY 5 MIN PRN
Status: DISCONTINUED | OUTPATIENT
Start: 2023-08-31 | End: 2023-08-31 | Stop reason: HOSPADM

## 2023-08-31 RX ORDER — FENTANYL CITRATE 50 UG/ML
INJECTION, SOLUTION INTRAMUSCULAR; INTRAVENOUS PRN
Status: DISCONTINUED | OUTPATIENT
Start: 2023-08-31 | End: 2023-08-31

## 2023-08-31 RX ORDER — ONDANSETRON 2 MG/ML
4 INJECTION INTRAMUSCULAR; INTRAVENOUS EVERY 30 MIN PRN
Status: DISCONTINUED | OUTPATIENT
Start: 2023-08-31 | End: 2023-08-31 | Stop reason: HOSPADM

## 2023-08-31 RX ORDER — DEXAMETHASONE SODIUM PHOSPHATE 4 MG/ML
INJECTION, SOLUTION INTRA-ARTICULAR; INTRALESIONAL; INTRAMUSCULAR; INTRAVENOUS; SOFT TISSUE PRN
Status: DISCONTINUED | OUTPATIENT
Start: 2023-08-31 | End: 2023-08-31

## 2023-08-31 RX ORDER — FENTANYL CITRATE 50 UG/ML
50 INJECTION, SOLUTION INTRAMUSCULAR; INTRAVENOUS EVERY 5 MIN PRN
Status: DISCONTINUED | OUTPATIENT
Start: 2023-08-31 | End: 2023-08-31 | Stop reason: HOSPADM

## 2023-08-31 RX ORDER — PROPOFOL 10 MG/ML
INJECTION, EMULSION INTRAVENOUS PRN
Status: DISCONTINUED | OUTPATIENT
Start: 2023-08-31 | End: 2023-08-31

## 2023-08-31 RX ORDER — OXYCODONE HYDROCHLORIDE 5 MG/1
5 TABLET ORAL
Status: COMPLETED | OUTPATIENT
Start: 2023-08-31 | End: 2023-08-31

## 2023-08-31 RX ORDER — ONDANSETRON 4 MG/1
4 TABLET, ORALLY DISINTEGRATING ORAL EVERY 30 MIN PRN
Status: DISCONTINUED | OUTPATIENT
Start: 2023-08-31 | End: 2023-08-31 | Stop reason: HOSPADM

## 2023-08-31 RX ORDER — PROPOFOL 10 MG/ML
INJECTION, EMULSION INTRAVENOUS CONTINUOUS PRN
Status: DISCONTINUED | OUTPATIENT
Start: 2023-08-31 | End: 2023-08-31

## 2023-08-31 RX ORDER — DIPHENHYDRAMINE HYDROCHLORIDE 50 MG/ML
12.5 INJECTION INTRAMUSCULAR; INTRAVENOUS ONCE
Status: COMPLETED | OUTPATIENT
Start: 2023-08-31 | End: 2023-08-31

## 2023-08-31 RX ORDER — FENTANYL CITRATE 50 UG/ML
25 INJECTION, SOLUTION INTRAMUSCULAR; INTRAVENOUS EVERY 5 MIN PRN
Status: DISCONTINUED | OUTPATIENT
Start: 2023-08-31 | End: 2023-08-31 | Stop reason: HOSPADM

## 2023-08-31 RX ORDER — ACETAMINOPHEN 325 MG/1
325 TABLET ORAL EVERY 6 HOURS PRN
Qty: 50 TABLET | Refills: 0 | COMMUNITY
Start: 2023-08-31 | End: 2023-09-18

## 2023-08-31 RX ORDER — OXYCODONE HYDROCHLORIDE 5 MG/1
5-10 TABLET ORAL EVERY 4 HOURS PRN
Qty: 3 TABLET | Refills: 0 | Status: SHIPPED | OUTPATIENT
Start: 2023-08-31 | End: 2023-09-18

## 2023-08-31 RX ORDER — HYDROXYZINE HYDROCHLORIDE 25 MG/1
25 TABLET, FILM COATED ORAL
Status: DISCONTINUED | OUTPATIENT
Start: 2023-08-31 | End: 2023-08-31 | Stop reason: HOSPADM

## 2023-08-31 RX ORDER — HYDROMORPHONE HCL IN WATER/PF 6 MG/30 ML
0.4 PATIENT CONTROLLED ANALGESIA SYRINGE INTRAVENOUS EVERY 5 MIN PRN
Status: DISCONTINUED | OUTPATIENT
Start: 2023-08-31 | End: 2023-08-31 | Stop reason: HOSPADM

## 2023-08-31 RX ORDER — ACETAMINOPHEN 325 MG/1
650 TABLET ORAL ONCE
Status: DISCONTINUED | OUTPATIENT
Start: 2023-08-31 | End: 2023-08-31 | Stop reason: HOSPADM

## 2023-08-31 RX ORDER — SODIUM CHLORIDE, SODIUM LACTATE, POTASSIUM CHLORIDE, CALCIUM CHLORIDE 600; 310; 30; 20 MG/100ML; MG/100ML; MG/100ML; MG/100ML
INJECTION, SOLUTION INTRAVENOUS CONTINUOUS PRN
Status: DISCONTINUED | OUTPATIENT
Start: 2023-08-31 | End: 2023-08-31

## 2023-08-31 RX ORDER — DIPHENHYDRAMINE HYDROCHLORIDE 50 MG/ML
12.5 INJECTION INTRAMUSCULAR; INTRAVENOUS EVERY 6 HOURS PRN
Status: DISCONTINUED | OUTPATIENT
Start: 2023-08-31 | End: 2023-08-31 | Stop reason: HOSPADM

## 2023-08-31 RX ORDER — ONDANSETRON 2 MG/ML
INJECTION INTRAMUSCULAR; INTRAVENOUS PRN
Status: DISCONTINUED | OUTPATIENT
Start: 2023-08-31 | End: 2023-08-31

## 2023-08-31 RX ADMIN — FENTANYL CITRATE 50 MCG: 50 INJECTION, SOLUTION INTRAMUSCULAR; INTRAVENOUS at 07:33

## 2023-08-31 RX ADMIN — OXYCODONE HYDROCHLORIDE 5 MG: 5 TABLET ORAL at 08:51

## 2023-08-31 RX ADMIN — DEXAMETHASONE SODIUM PHOSPHATE 4 MG: 4 INJECTION, SOLUTION INTRA-ARTICULAR; INTRALESIONAL; INTRAMUSCULAR; INTRAVENOUS; SOFT TISSUE at 07:42

## 2023-08-31 RX ADMIN — SODIUM CHLORIDE, POTASSIUM CHLORIDE, SODIUM LACTATE AND CALCIUM CHLORIDE: 600; 310; 30; 20 INJECTION, SOLUTION INTRAVENOUS at 07:31

## 2023-08-31 RX ADMIN — PROPOFOL 100 MCG/KG/MIN: 10 INJECTION, EMULSION INTRAVENOUS at 07:33

## 2023-08-31 RX ADMIN — DIPHENHYDRAMINE HYDROCHLORIDE 12.5 MG: 50 INJECTION, SOLUTION INTRAMUSCULAR; INTRAVENOUS at 07:27

## 2023-08-31 RX ADMIN — PHENYLEPHRINE HYDROCHLORIDE 100 MCG: 10 INJECTION INTRAVENOUS at 07:45

## 2023-08-31 RX ADMIN — PROPOFOL 120 MG: 10 INJECTION, EMULSION INTRAVENOUS at 07:39

## 2023-08-31 RX ADMIN — ONDANSETRON 4 MG: 2 INJECTION INTRAMUSCULAR; INTRAVENOUS at 08:05

## 2023-08-31 RX ADMIN — FAMOTIDINE 20 MG: 10 INJECTION INTRAVENOUS at 07:31

## 2023-08-31 RX ADMIN — MIDAZOLAM 2 MG: 1 INJECTION INTRAMUSCULAR; INTRAVENOUS at 07:31

## 2023-08-31 RX ADMIN — LIDOCAINE HYDROCHLORIDE 50 MG: 20 INJECTION, SOLUTION INFILTRATION; PERINEURAL at 07:33

## 2023-08-31 RX ADMIN — DIPHENHYDRAMINE HYDROCHLORIDE 12.5 MG: 50 INJECTION, SOLUTION INTRAMUSCULAR; INTRAVENOUS at 08:26

## 2023-08-31 RX ADMIN — FENTANYL CITRATE 50 MCG: 50 INJECTION, SOLUTION INTRAMUSCULAR; INTRAVENOUS at 07:45

## 2023-08-31 ASSESSMENT — ACTIVITIES OF DAILY LIVING (ADL)
ADLS_ACUITY_SCORE: 35
ADLS_ACUITY_SCORE: 35

## 2023-08-31 ASSESSMENT — LIFESTYLE VARIABLES: TOBACCO_USE: 1

## 2023-08-31 NOTE — OR NURSING
PATIENT IS RED AND ITCHY ON LEFT FOREARM WHERE TOURNIQUET TOUCHED SKIN DURING I.V. PLACEMENT. REDNESS IS SLOWLY RESOLVING.  PATIENT AND  STATE THAT SHE HAS VERY SENSITIVE SKIN, MORE SO SINCE LIVER TRANSPLANT. PATIENT REPORTS THAT SHE HAS HAD RED, ITCHY EYES THIS MORNING PRIOR TO ADMISSION THAT HAS SINCE CLEARED.

## 2023-08-31 NOTE — ANESTHESIA CARE TRANSFER NOTE
Patient: Melita Cortes    Procedure: Procedure(s):  Operative hysteroscopy with Myosure morcellator       Diagnosis: Thickened endometrium [R93.89]  Perimenopausal [N95.1]  Diagnosis Additional Information: No value filed.    Anesthesia Type:   General     Note:    Oropharynx: oropharynx clear of all foreign objects and spontaneously breathing  Level of Consciousness: awake  Oxygen Supplementation: face mask  Level of Supplemental Oxygen (L/min / FiO2): 6  Independent Airway: airway patency satisfactory and stable  Dentition: dentition unchanged  Vital Signs Stable: post-procedure vital signs reviewed and stable  Report to RN Given: handoff report given  Patient transferred to: PACU  Comments: At end of procedure, spontaneous respirations, patient alert to voice, able to follow commands. Oxygen via facemask at 6 liters per minute to PACU. Oxygen tubing connected to wall O2 in PACU, SpO2, NiBP, and EKG monitors and alarms on and functioning, Geeta Hugger warmer connected to patient gown, report on patient's clinical status given to PACU RN, RN questions answered.      Handoff Report: Identifed the Patient, Identified the Reponsible Provider, Reviewed the pertinent medical history, Discussed the surgical course, Reviewed Intra-OP anesthesia mangement and issues during anesthesia, Set expectations for post-procedure period and Allowed opportunity for questions and acknowledgement of understanding      Vitals:  Vitals Value Taken Time   /90 08/31/23 0819   Temp     Pulse 60 08/31/23 0822   Resp 13 08/31/23 0822   SpO2 100 % 08/31/23 0822   Vitals shown include unvalidated device data.    Electronically Signed By: HAKAN Robles CRNA  August 31, 2023  8:23 AM

## 2023-08-31 NOTE — INTERVAL H&P NOTE
"I have reviewed the surgical (or preoperative) H&P that is linked to this encounter, and examined the patient. There are no significant changes    Clinical Conditions Present on Arrival:  Clinically Significant Risk Factors Present on Admission                  # Overweight: Estimated body mass index is 25.56 kg/m  as calculated from the following:    Height as of this encounter: 1.651 m (5' 5\").    Weight as of this encounter: 69.7 kg (153 lb 9.6 oz).       "

## 2023-08-31 NOTE — DISCHARGE INSTRUCTIONS
Same Day Surgery Discharge Instructions for  Sedation and General Anesthesia     It's not unusual to feel dizzy, light-headed or faint for up to 24 hours after surgery or while taking pain medication.  If you have these symptoms: sit for a few minutes before standing and have someone assist you when you get up to walk or use the bathroom.    You should rest and relax for the next 24 hours. We recommend you make arrangements to have an adult stay with you for at least 24 hours after your discharge.  Avoid hazardous and strenuous activity.    DO NOT DRIVE any vehicle or operate mechanical equipment for 24 hours following the end of your surgery.  Even though you may feel normal, your reactions may be affected by the medication you have received.    Do not drink alcoholic beverages for 24 hours following surgery.     Slowly progress to your regular diet as you feel able. It's not unusual to feel nauseated and/or vomit after receiving anesthesia.  If you develop these symptoms, drink clear liquids (apple juice, ginger ale, broth, 7-up, etc. ) until you feel better.  If your nausea and vomiting persists for 24 hours, please notify your surgeon.      All narcotic pain medications, along with inactivity and anesthesia, can cause constipation. Drinking plenty of liquids and increasing fiber intake will help.    For any questions of a medical nature, call your surgeon.    Do not make important decisions for 24 hours.    If you had general anesthesia, you may have a sore throat for a couple of days related to the breathing tube used during surgery.  You may use Cepacol lozenges to help with this discomfort.  If it worsens or if you develop a fever, contact your surgeon.     If you feel your pain is not well managed with the pain medications prescribed by your surgeon, please contact your surgeon's office to let them know so they can address your concerns.      **If you have questions or concerns about your procedure,   call  Dr. Arnold at [phone]**

## 2023-08-31 NOTE — OP NOTE
Operative Note    Name: Melita Cortes  MRN: [id number]  : 1970  DOS: 23     Preoperative diagnosis:  Thickened endometrium [R93.89]  Perimenopausal [N95.1]  Postoperative diagnosis:  Same, s/p procedure below    Procedure:  Procedures:    * Operative hysteroscopy with Myosure morcellator    Surgeon: Salud Arnold MD, S    Anesthesia:  MAC converted to GETA   Specimens:  Endometrial curettings  Complications: None apparent    EBL:  2mL  Fluid deficit:  90mL    Findings:  On EUA, normal external genitalia, normal vagina. Nulliparous cervix. No adnexal masses palpated. On hysteroscopy, bilateral tubal ostia visualized. Erythematous thin endometrium, slightly thickened and polypoid posteriorly.     Indications:  Melita Cortes is a 53 year old  with history of liver cirrhosis, s/p liver transplant. Had amenorrhea prior to transplant and was thought to be in menopause. After transplant, she experienced episodes of irregular bleeding. US was done showing thickened EMS and possible endometrial polyp. Management options were discussed.   Risks, benefits, and alternatives to the procedure were discussed with the patient who elected to proceed.  All questions were answered and an informed consent was obtained.    Procedure:  The patient was taken to the operating room where she underwent MAC anesthesia without difficulty.  She was placed in a dorsal lithotomy position using yellow fin stirrups. Patient was noted to be uncomfortable and decision was made to covert to GETA. She underwent GETA without difficulty. The patient was examined for the above noted findings and then prepped and draped in the usual sterile fashion. A medium graves speculum was inserted into the vagina.  A tenaculum was placed on the anterior cervical lip.  The endocervical canal was serially dilated to 21 Fr using Menjivar dilators.  The uterus sounded to 7.5 cm.  The hysteroscope was inserted without difficulty with the  above findings noted.  Visualization was achieved using normal saline as a distending medium. The Myosure morcellator was inserted through the obturator of the hysteroscope and the thickened tissue was removed. Generalized sampling of endometrial canal was undertaken with the morcellator.  The tissue was sent to pathology.  All instruments were then removed.  The tenaculum was removed from the cervix and the puncture sites were hemostatic.  The patient was repositioned to the supine position. Instrument and sponge counts correct x 2.  The patient tolerated the procedure well and was taken to the recovery room in stable condition.      Salud Arnold MD, S  8/31/2023

## 2023-08-31 NOTE — OR NURSING
Patient is adequate for discharge to home from Phase 2. Ox4 and AVSS. Tolerating PO, denies nausea. Pain well controlled. Discharge instructions completed with spouse Brandon. Discharge medications and all belongings returned to patient and sent home.

## 2023-08-31 NOTE — ANESTHESIA PREPROCEDURE EVALUATION
Anesthesia Pre-Procedure Evaluation    Patient: Melita Cortes   MRN: [id number] : 1970        Procedure : Procedure(s):  Operative hysteroscopy with Myosure morcellator          Past Medical History:   Diagnosis Date    Alcoholic hepatitis     Asthma 03/10/2006    Cervical high risk HPV (human papillomavirus) test positive 2020    See problem list    Depressive disorder     Gastroesophageal reflux disease without esophagitis     Liver failure (H)       Past Surgical History:   Procedure Laterality Date    APPENDECTOMY      COLONOSCOPY N/A 2022    Procedure: COLONOSCOPY;  Surgeon: Zita Steele MD;  Location: [facility]    ENDOSCOPIC RETROGRADE CHOLANGIOPANCREATOGRAM N/A 2022    Procedure: ENDOSCOPIC RETROGRADE CHOLANGIOPANCREATOGRAPHY WITH BILIARY SPHINCTEROTOMY, SLUDGE REMOVAL, DILATION  AND STENT PLACEMENT;  Surgeon: Guru Gardenia Escobar MD;  Location: [facility] OR    ENDOSCOPIC RETROGRADE CHOLANGIOPANCREATOGRAM N/A 2022    Procedure: ENDOSCOPIC RETROGRADE CHOLANGIOPANCREATOGRAPHY WITH BILE DUCT STENT REMOVAL;  Surgeon: Guru Gardenia Escobar MD;  Location: [facility] OR    ENDOSCOPIC RETROGRADE CHOLANGIOPANCREATOGRAPHY, EXCHANGE TUBE/STENT N/A 2022    Procedure: ENDOSCOPIC RETROGRADE CHOLANGIOPANCREATOGRAPHY, bile duct stents exchanged, balloon dilation and sweep of bile ducts for sludge;  Surgeon: Guru Gardenia Escobar MD;  Location: [facility]    ESOPHAGOGASTRODUODENOSCOPY, WITH BRUSHINGS N/A 10/29/2021    Procedure: ESOPHAGOGASTRODUODENOSCOPY, WITH BRUSHINGS;  Surgeon: Torey Ruiz MD;  Location: [facility]    IR LIVER BIOPSY PERCUTANEOUS  2022    ORTHOPEDIC SURGERY Left 2023    hip replacement    TRANSPLANT LIVER RECIPIENT  DONOR N/A 2022    Procedure: TRANSPLANT, LIVER, RECIPIENT,  DONOR;  Surgeon: Pradeep Browne MD;  Location: [facility] OR      Allergies   Allergen Reactions    Adhesive Tape  Rash    Latex Rash      Social History     Tobacco Use    Smoking status: Former     Types: Cigarettes     Quit date: 5/1/2020     Years since quitting: 3.3    Smokeless tobacco: Never   Substance Use Topics    Alcohol use: Not Currently     Comment: Last drink 8/2021      Wt Readings from Last 1 Encounters:   08/31/23 69.7 kg (153 lb 9.6 oz)        Anesthesia Evaluation   Pt has had prior anesthetic.     No history of anesthetic complications       ROS/MED HX  ENT/Pulmonary:     (+)                tobacco use, Past use,    asthma               (-) sleep apnea   Neurologic:       Cardiovascular:       METS/Exercise Tolerance:     Hematologic:       Musculoskeletal:       GI/Hepatic:     (+) GERD, Asymptomatic on medication,         hepatitis type Alcoholic, liver disease (s/p liver translplantation),       Renal/Genitourinary:       Endo:       Psychiatric/Substance Use:       Infectious Disease:       Malignancy:       Other:            Physical Exam    Airway        Mallampati: II   TM distance: > 3 FB   Neck ROM: full   Mouth opening: > 3 cm    Respiratory Devices and Support         Dental           Cardiovascular   cardiovascular exam normal          Pulmonary   pulmonary exam normal                OUTSIDE LABS:  CBC:   Lab Results   Component Value Date    WBC 5.0 08/08/2023    WBC 4.3 06/20/2023    HGB 15.1 08/08/2023    HGB 14.6 06/20/2023    HCT 44.9 08/08/2023    HCT 44.4 06/20/2023     08/08/2023     06/20/2023     BMP:   Lab Results   Component Value Date     08/08/2023     06/20/2023    POTASSIUM 5.1 08/08/2023    POTASSIUM 4.3 06/20/2023    CHLORIDE 102 08/08/2023    CHLORIDE 104 06/20/2023    CO2 27 08/08/2023    CO2 25 06/20/2023    BUN 12.5 08/08/2023    BUN 23.5 (H) 06/20/2023    CR 0.97 (H) 08/08/2023    CR 0.93 06/20/2023     (H) 08/08/2023     (H) 06/20/2023     COAGS:   Lab Results   Component Value Date    PTT 59 (H) 01/08/2022    INR 1.04 05/16/2022     FIBR 161 (L) 01/09/2022     POC:   Lab Results   Component Value Date    HCG Negative 08/31/2023    HCGS Negative 12/28/2021     HEPATIC:   Lab Results   Component Value Date    ALBUMIN 4.4 08/08/2023    PROTTOTAL 6.6 08/08/2023    ALT 25 08/08/2023    AST 26 08/08/2023    ALKPHOS 82 08/08/2023    BILITOTAL 0.3 08/08/2023    JENNIFFER 34 12/30/2021     OTHER:   Lab Results   Component Value Date    PH 7.44 01/08/2022    LACT 0.9 01/08/2022    A1C 5.2 02/15/2023    SHAI 9.4 08/08/2023    PHOS 3.8 12/09/2022    MAG 1.7 08/08/2023    LIPASE 56 (L) 05/16/2022    AMYLASE 42 05/16/2022    TSH 2.13 01/11/2023    CRP <2.9 07/05/2022    SED 7 07/05/2022       Anesthesia Plan    ASA Status:  3       Anesthesia Type: General.     - Airway: LMA   Induction: Intravenous.           Consents    Anesthesia Plan(s) and associated risks, benefits, and realistic alternatives discussed. Questions answered and patient/representative(s) expressed understanding.     - Discussed:     - Discussed with:  Patient            Postoperative Care       PONV prophylaxis: Ondansetron (or other 5HT-3), Background Propofol Infusion     Comments:                Afsaneh Tavarez

## 2023-08-31 NOTE — ANESTHESIA POSTPROCEDURE EVALUATION
Patient: Melita Cortes    Procedure: Procedure(s):  Operative hysteroscopy with Myosure morcellator       Anesthesia Type:  General    Note:  Disposition: Inpatient   Postop Pain Control: Uneventful            Sign Out: Well controlled pain   PONV: No   Neuro/Psych: Uneventful            Sign Out: Acceptable/Baseline neuro status   Airway/Respiratory: Uneventful            Sign Out: Acceptable/Baseline resp. status   CV/Hemodynamics: Uneventful            Sign Out: Acceptable CV status; No obvious hypovolemia; No obvious fluid overload   Other NRE: NONE   DID A NON-ROUTINE EVENT OCCUR?            Last vitals:  Vitals Value Taken Time   /75 08/31/23 0900   Temp 36.2  C (97.2  F) 08/31/23 0900   Pulse 67 08/31/23 0903   Resp 53 08/31/23 0904   SpO2 99 % 08/31/23 0903   Vitals shown include unvalidated device data.    Electronically Signed By: Afsaneh Tavarez  August 31, 2023  3:48 PM

## 2023-08-31 NOTE — ANESTHESIA PROCEDURE NOTES
Airway       Patient location during procedure: OR  Staff -        Anesthesiologist:  Afsaneh Tavarez       CRNA: Maria Fernanda Harp APRN CRNA       Performed By: CRNA  Consent for Airway        Urgency: elective  Indications and Patient Condition       Indications for airway management: keith-procedural       Induction type:intravenous       Mask difficulty assessment: 0 - not attempted    Final Airway Details       Final airway type: supraglottic airway    Supraglottic Airway Details        Type: LMA       Brand: Ambu AuraGain       LMA size: 4    Post intubation assessment        Placement verified by: capnometry, equal breath sounds and chest rise        Number of attempts at approach: 1       Secured with: other (comment) (no tape used)       Ease of procedure: easy       Dentition: Unchanged and Intact

## 2023-09-01 LAB
PATH REPORT.COMMENTS IMP SPEC: NORMAL
PATH REPORT.COMMENTS IMP SPEC: NORMAL
PATH REPORT.FINAL DX SPEC: NORMAL
PATH REPORT.GROSS SPEC: NORMAL
PATH REPORT.MICROSCOPIC SPEC OTHER STN: NORMAL
PATH REPORT.RELEVANT HX SPEC: NORMAL
PHOTO IMAGE: NORMAL

## 2023-09-01 PROCEDURE — 88305 TISSUE EXAM BY PATHOLOGIST: CPT | Mod: 26 | Performed by: PATHOLOGY

## 2023-09-11 ENCOUNTER — VIRTUAL VISIT (OUTPATIENT)
Dept: PSYCHOLOGY | Facility: CLINIC | Age: 53
End: 2023-09-11
Payer: COMMERCIAL

## 2023-09-11 DIAGNOSIS — F33.1 MDD (MAJOR DEPRESSIVE DISORDER), RECURRENT EPISODE, MODERATE (H): Primary | ICD-10-CM

## 2023-09-11 DIAGNOSIS — F41.1 GAD (GENERALIZED ANXIETY DISORDER): ICD-10-CM

## 2023-09-11 PROCEDURE — 90791 PSYCH DIAGNOSTIC EVALUATION: CPT | Mod: 95 | Performed by: STUDENT IN AN ORGANIZED HEALTH CARE EDUCATION/TRAINING PROGRAM

## 2023-09-11 ASSESSMENT — ANXIETY QUESTIONNAIRES
1. FEELING NERVOUS, ANXIOUS, OR ON EDGE: NEARLY EVERY DAY
4. TROUBLE RELAXING: SEVERAL DAYS
IF YOU CHECKED OFF ANY PROBLEMS ON THIS QUESTIONNAIRE, HOW DIFFICULT HAVE THESE PROBLEMS MADE IT FOR YOU TO DO YOUR WORK, TAKE CARE OF THINGS AT HOME, OR GET ALONG WITH OTHER PEOPLE: VERY DIFFICULT
2. NOT BEING ABLE TO STOP OR CONTROL WORRYING: NEARLY EVERY DAY
3. WORRYING TOO MUCH ABOUT DIFFERENT THINGS: NEARLY EVERY DAY
6. BECOMING EASILY ANNOYED OR IRRITABLE: SEVERAL DAYS
GAD7 TOTAL SCORE: 15
5. BEING SO RESTLESS THAT IT IS HARD TO SIT STILL: MORE THAN HALF THE DAYS
7. FEELING AFRAID AS IF SOMETHING AWFUL MIGHT HAPPEN: MORE THAN HALF THE DAYS
GAD7 TOTAL SCORE: 15

## 2023-09-11 ASSESSMENT — PATIENT HEALTH QUESTIONNAIRE - PHQ9
SUM OF ALL RESPONSES TO PHQ QUESTIONS 1-9: 12
10. IF YOU CHECKED OFF ANY PROBLEMS, HOW DIFFICULT HAVE THESE PROBLEMS MADE IT FOR YOU TO DO YOUR WORK, TAKE CARE OF THINGS AT HOME, OR GET ALONG WITH OTHER PEOPLE: VERY DIFFICULT
SUM OF ALL RESPONSES TO PHQ QUESTIONS 1-9: 12

## 2023-09-11 NOTE — PROGRESS NOTES
"    [facility] Day Treatment      PATIENT'S NAME: Melita Cortes  PREFERRED NAME: Fidelina  PRONOUNS: she/her  MRN: [id number]  : 1970  ADDRESS: [id number] [place] [address] [address]  [facility] [address]  ACCT. NUMBER:  [id number]  DATE OF SERVICE: 23  START TIME: 12.30  END TIME: 1.20  PREFERRED PHONE: [phone]  May we leave a program related message: Yes  SERVICE MODALITY:  Video Visit:      Provider verified identity through the following two step process.  Patient provided:  Patient photo, Patient , and Patient address    Telemedicine Visit: The patient's condition can be safely assessed and treated via synchronous audio and visual telemedicine encounter.      Reason for Telemedicine Visit: Patient has requested telehealth visit    Originating Site (Patient Location): Patient's home    Distant Site (Provider Location): [facility]    Consent:  The patient/guardian has verbally consented to: the potential risks and benefits of telemedicine (video visit) versus in person care; bill my insurance or make self-payment for services provided; and responsibility for payment of non-covered services.     Patient would like the video invitation sent by:  My Chart    Mode of Communication:  [facility] Conference [facility]    Distant Location (Provider):  On-site    As the provider I attest to compliance with applicable laws and regulations related to telemedicine.    UNIVERSAL ADULT Mental Health DIAGNOSTIC ASSESSMENT    Identifying Information:  Patient is a 53 year old,   individual.  Patient was referred for an assessment by current Behavioral Health Provider.  Patient attended the session alone.    Chief Complaint:   The reason for seeking services at this time is: \"Trauma Adhd depres\". Patient reported \" I think I have had some trauma in my life, my liver transplant and I think have ADHD.\" Pt reported she is  \"overly sensitive\" " "and \" I dont handle stress very well and struggle with impulsivity\"  and that \"I have a poor self image, negative talk a lot \" patient also reported she lost her mother in 2019 and her dad 7 months ago and still struggling with grieving the lost of both parents.   The problem(s) began [birth year].    Patient has attempted to resolve these concerns in the past through therapy .    Social/Family History:  Patient reported they grew up in [facility]  .  They were raised by biological mother  .  Parents  / .  Patient reported that their childhood was \" I don't remenmber a lot\" and that she was 6 when parents   and that was not a good experience for her.  Patient described their current relationships with family of origin as good with both parents before their death. Pt reported she had about 5 years of not talking with her mum and she moved to another state without telling her and they reunited 2 years before her death and really bonded during that time. Patient reported she did not have a lot of closeness with  her dad who was out of state and that the relationship was stressful and that he later had dementia and could not remember her during his last days and that was hard to bear.    The patient describes their cultural background as .  Cultural influences and impact on patient's life structure, values, norms, and healthcare: Grew up with mother very little income.  Contextual influences on patient's health include: Contextual Factors: Family Factors relating to grief and loss, Economic Factors relating to financial stress, and Health- Seeking Factors relating to liver transplant and psychosocial impact .    These factors will be addressed in the Preliminary Treatment plan. Patient identified their preferred language to be English. Patient reported they does not need the assistance of an  or other support involved in therapy.     Patient reported had no significant delays " "in developmental tasks.   Patient's highest education level was high school graduate  .  Patient identified the following learning problems: none reported.  Modifications will not be used to assist communication in therapy.  Patient reports they are  able to understand written materials.    Patient reported the following relationship history; that \"I dated a lot or jerks, a lot of toxic relationships, was engaged  and that failed and finally I met current partner\".  Patient's current relationship status is  for 22 years.   Patient identified their sexual orientation as heterosexual.  Patient reported having   no child(nestor). Patient identified partner; pets as part of their support system.  Patient identified the quality of these relationships as good,  .      Patient's current living/housing situation involves staying in own home/apartment.  The immediate members of family and household include Brandon Cortes, 58,  and they report that housing is stable.    Patient is currently disabled.  Patient reports their finances are obtained through disability. Patient does identify finances as a current stressor.      Patient reported that they have been involved with the legal system.  Andrew 2009 . Patient does not report being under probation/ parole/ jurisdiction. They are not under any current court jurisdiction. .    Patient's Strengths and Limitations:  Patient identified the following strengths or resources that will help them succeed in treatment: commitment to health and well being, michel / spirituality, friends / good social support, and motivation. Things that may interfere with the patient's success in treatment include: financial hardship and physical health concerns.     Assessments:  The following assessments were completed by patient for this visit:  PHQ9:       12/26/2022    11:48 AM 4/17/2023     8:54 AM 6/6/2023    10:39 AM 6/6/2023     1:00 PM 6/29/2023     1:43 PM 8/8/2023     9:45 AM " 9/11/2023    11:55 AM   PHQ-9 SCORE   PHQ-9 Total Score [place] 4 (Minimal depression)  11 (Moderate depression)   25 (Severe depression) 12 (Moderate depression)   PHQ-9 Total Score 4 14 11 14 8 25 12     GAD7:       6/6/2023     1:00 PM 6/29/2023     1:43 PM 9/11/2023    12:07 PM   JORGE-7 SCORE   Total Score   15 (severe anxiety)   Total Score 14 7 15     PROMIS 10-Global Health (all questions and answers displayed):       6/6/2023     1:00 PM 9/11/2023    12:09 PM   PROMIS 10   In general, would you say your health is:  Good   In general, would you say your quality of life is:  Good   In general, how would you rate your physical health?  Fair   In general, how would you rate your mental health, including your mood and your ability to think?  Fair   In general, how would you rate your satisfaction with your social activities and relationships?  Fair   In general, please rate how well you carry out your usual social activities and roles  Poor   To what extent are you able to carry out your everyday physical activities such as walking, climbing stairs, carrying groceries, or moving a chair?  Moderately   In the past 7 days, how often have you been bothered by emotional problems such as feeling anxious, depressed, or irritable?  Often   In the past 7 days, how would you rate your fatigue on average?  Severe   In the past 7 days, how would you rate your pain on average, where 0 means no pain, and 10 means worst imaginable pain?  7   In general, would you say your health is: 2 3   In general, would you say your quality of life is: 3 3   In general, how would you rate your physical health? 2 2   In general, how would you rate your mental health, including your mood and your ability to think? 1 2   In general, how would you rate your satisfaction with your social activities and relationships? 2 2   In general, please rate how well you carry out your usual social activities and roles. (This includes activities at home, at  work and in your community, and responsibilities as a parent, child, spouse, employee, friend, etc.) 2 1   To what extent are you able to carry out your everyday physical activities such as walking, climbing stairs, carrying groceries, or moving a chair? 2 3   In the past 7 days, how often have you been bothered by emotional problems such as feeling anxious, depressed, or irritable? 5 4   In the past 7 days, how would you rate your fatigue on average? 4 4   In the past 7 days, how would you rate your pain on average, where 0 means no pain, and 10 means worst imaginable pain? 7 7   Global Mental Health Score 7 9   Global Physical Health Score 8 9   PROMIS TOTAL - SUBSCORES 15 18       Personal and Family Medical History:  Patient does report a family history of mental health concerns.  Patient reports family history includes Cancer in her mother; Colon Cancer in her maternal uncle and paternal aunt; Melanoma in her father; Skin Cancer in her father..     Patient does report Mental Health Diagnosis and/or Treatment.  Patient Patient reported the following previous diagnoses which include(s): an Anxiety Disorder and Depression.  Patient reported symptoms began 11.   Patient has received mental health services in the past: therapy with [facility] .  Psychiatric Hospitalizations: None.  Patient denies a history of civil commitment.  Patient is not receiving other mental health services.  These include none.       Patient has had a physical exam to rule out medical causes for current symptoms.  Date of last physical exam was within the past year. Client was encouraged to follow up with PCP if symptoms were to develop. The patient has a [facility] Provider, who is named Navneet Johnson..  Patient reports the following current medical concerns: dental needs and the following current dental concerns: need a crown .  Patient reports pain concerns including joint, elbow and finger pain.  Patient does want help  addressing pain concerns..   There are significant appetite / nutritional concerns / weight changes.   Patient does not report a history of head injury / trauma / cognitive impairment.      Patient reports current meds as:   Effexor medications have been marked as taking for the 9/11/23 encounter (Appointment) with Jake Coyne LGSW.       Medication Adherence:  Patient reports taking.  taking prescribed medications as prescribed.    Patient Allergies:    Allergies   Allergen Reactions    Adhesive Tape Rash    Latex Rash       Medical History:    Past Medical History:   Diagnosis Date    Alcoholic hepatitis     Asthma 03/10/2006    Cervical high risk HPV (human papillomavirus) test positive 2019, 2020    See problem list    Depressive disorder     Gastroesophageal reflux disease without esophagitis     Liver failure (H)          Current Mental Status Exam:   Appearance:  Appropriate    Eye Contact:  Good   Psychomotor:  Normal       Gait / station:  no problem  Attitude / Demeanor: Cooperative  Interested Friendly Pleasant  Speech      Rate / Production: Normal/ Responsive      Volume:  Normal  volume      Language:  good  Mood:   Anxious  Depressed  Grieving  Affect:   Tearful Worrisome    Thought Content: Clear   Thought Process: Coherent  Goal Directed       Associations: No loosening of associations  Insight:   Good   Judgment:  Good   Orientation:  Person Place Time Situation  Attention/concentration: Good    Substance Use:  Patient did not report a family history of substance use concerns; see medical history section for details.  Patient has received chemical dependency treatment in the past at   an agency in 2009 and did an outpatient treatment then .  Patient has not ever been to detox.      Patient is not currently receiving any chemical dependency treatment.           Substance History of use Age of first use Date of last use     Pattern and duration of use (include amounts and frequency)   Alcohol used in  the past   17 09/01/20 REPORTS SUBSTANCE USE: N/A   Cannabis   never used     REPORTS SUBSTANCE USE: N/A     Amphetamines   never used     REPORTS SUBSTANCE USE: N/A   Cocaine/crack    never used       REPORTS SUBSTANCE USE: N/A   Hallucinogens never used         REPORTS SUBSTANCE USE: N/A   Inhalants never used         REPORTS SUBSTANCE USE: N/A   Heroin never used         REPORTS SUBSTANCE USE: N/A   Other Opiates never used     REPORTS SUBSTANCE USE: N/A   Benzodiazepine   never used     REPORTS SUBSTANCE USE: N/A   Barbiturates never used     REPORTS SUBSTANCE USE: N/A   Over the counter meds never used ? 06/06/22 Used as prescribed   Caffeine currently use Unknown   REPORTS SUBSTANCE USE: N/A   Nicotine  used in the past 17 01/11/18 REPORTS SUBSTANCE USE: N/A   Other substances not listed above:  Identify:  never used     REPORTS SUBSTANCE USE: N/A     Patient reported the following problems as a result of their substance use: DUI; relationship problems.    Substance Use: No symptoms    Based on the negative CAGE score and clinical interview there  are not indications of drug or alcohol abuse.    Significant Losses / Trauma / Abuse / Neglect Issues:   Patient did not  serve in the .  There are indications or report of significant loss, trauma, abuse or neglect issues related to: are indications or report of significant loss, trauma, abuse or neglect issues related to and death of of both parents .  Concerns for possible neglect are not present.     Safety Assessment:   Patient denies current homicidal ideation and behaviors.  Patient denies current self-injurious ideation and behaviors.    Patient denied risk behaviors associated with substance use.  Patient denies any high risk behaviors associated with mental health symptoms.  Patient reports the following current concerns for their personal safety: None.  Patient reports there are not firearms in the house.       .    History of Safety  Concerns:  Patient denied a history of homicidal ideation.     Patient denied a history of personal safety concerns.    Patient denied a history of assaultive behaviors.    Patient denied a history of sexual assault behaviors.     Patient denied a history of risk behaviors associated with substance use.  Patient denies any history of high risk behaviors associated with mental health symptoms.  Patient reports the following protective factors: dedication to family or friends; safe and stable environment; regular sleep; effectively controls impulses; regular physical activity; sense of belonging; purpose; secure attachment; help seeking behaviors when distressed; abstinence from substances; adherence with prescribed medication; agreement to use safety plan; living with other people; daily obligations; structured day; uses community crisis resources; effective problem solving skills; commitment to well being; sense of meaning; positive social skills; healthy fear of risky behaviors or pain; financial stability; strong sense of self worth or esteem; sense of personal control or determination; access to a variety of clinical interventions and pets    Risk Plan:  See Recommendations for Safety and Risk Management Plan    Review of Symptoms per patient report:   Depression: Change in sleep, Excessive or inappropriate guilt, Change in energy level, Difficulties concentrating, Feelings of helplessness, Low self-worth, Ruminations, and Feeling sad, down, or depressed  Lauren:  Decreased need for sleep  Psychosis: No Symptoms  Anxiety: Excessive worry, Nervousness, Physical complaints, such as headaches, stomachaches, muscle tension, Sleep disturbance, Ruminations, Poor concentration, and Irritability  Panic:  Palpitations and Hot or cold flashes  Post Traumatic Stress Disorder:  Experienced traumatic event stressful relationship with parents prior to their reunion and Nightmares   Eating Disorder: No Symptoms  ADD /  ADHD:  Inattentive, Difficulties listening, Poor task completion, Poor organizational skills, Distractibility, Forgetful, Impulsive, and Restlessness/fidgety  Conduct Disorder: No symptoms  Autism Spectrum Disorder: No symptoms  Obsessive Compulsive Disorder: No Symptoms    Patient reports the following compulsive behaviors and treatment history:     Diagnostic Criteria:   Generalized Anxiety Disorder   - Restlessness or feeling keyed up or on edge.    - Being easily fatigued.    - Difficulty concentrating or mind going blank.    - Irritability.    - Muscle tension.    - Sleep disturbance (difficulty falling or staying asleep, or restless unsatisfying sleep).   D. The focus of the anxiety and worry is not confined to features of an Axis I disorder.  E. The anxiety, worry, or physical symptoms cause clinically significant distress or impairment in social, occupational, or other important areas of functioning.   F. The disturbance is not due to the direct physiological effects of a substance (e.g., a drug of abuse, a medication) or a general medical condition (e.g., hyperthyroidism) and does not occur exclusively during a Mood Disorder, a Psychotic Disorder, or a Pervasive Developmental Disorder.    - The aformentioned symptoms began more than 10  year(s) ago and occurs 5 days per week and is experienced as moderate. Major Depressive Disorder   - Depressed mood. Note: In children and adolescents, can be irritable mood.     - Decreased sleep.    - Psychomotor activity retardation.    - Fatigue or loss of energy.    - Feelings of worthlessness or inappropriate guilt.    - Diminished ability to think or concentrate, or indecisiveness.   B) The symptoms cause clinically significant distress or impairment in social, occupational, or other important areas of functioning  C) The episode is not attributable to the physiological effects of a substance or to another medical condition  D) The occurence of major depressive  "episode is not better explained by other thought / psychotic disorders  E) There has never been a manic episode or hypomanic episode    Functional Status:  Patient reports the following functional impairments:  chronic disease management, health maintenance, management of the household and or completion of tasks, operation of a motor vehicle, organization, relationship(s), self-care, social interactions, use of public transportation, and work / vocational responsibilities.     Nonprogrammatic care:  Patient is requesting basic services to address current mental health concerns.    Clinical Summary:  1. Reason for assessment:  \"Trauma Adhd depres\". Patient reported \" I think I have had some trauma in my life, my liver transplant and I think have ADHD.\" Pt reported she is  \"overly sensitive\" and \" I dont handle stress very well and struggle with impulsivity\"  and that \"I have a poor self image, negative talk a lot \" patient also reported she lost her mother in 2019 and her dad 7 months ago and still struggling with grieving the lost of both parents..  2. Psychosocial, Cultural and Contextual Factors: lost of both parents/ liver transplant, knee replacement/ history of stressful filial relationship.  3. Principal DSM5 Diagnoses  (Sustained by DSM5 Criteria Listed Above):   296.32 (F33.1) Major Depressive Disorder, Recurrent Episode, Moderate _ and With atypical features  300.02 (F41.1) Generalized Anxiety Disorder.  4. Other Diagnoses that is relevant to services:   None reported at this time.  5. Provisional Diagnosis:  Attention-Deficit/Hyperactivity Disorder  314.01 (F90.2) Combined presentation as evidenced by see review of symptoms above .  6. Prognosis: Expect Improvement.  7. Likely consequences of symptoms if not treated: Current condition could deteriorate causing adverse health problems with a negative impact on  pt's psychosocial functioning. .  8. Client strengths include:  committed to sobriety, " goal-focused, has a previous history of therapy, motivated, open to learning, open to suggestions / feedback, and willing to ask questions .     Recommendations:     1. Plan for Safety and Risk Management:   Safety and Risk: Recommended that patient call 911 or go to the local ED should there be a change in any of these risk factors..          Report to child / adult protection services was NA.     2. Patient's identified nothing at this time.     3. Initial Treatment will focus on:    Depressed Mood - associated with history of loss of both parents and adjustment to current with new liver  .     4. Resources/Service Plan:    services are not indicated.   Modifications to assist communication are not indicated.   Additional disability accommodations are not indicated.      5. Collaboration:   Collaboration / coordination of treatment will be initiated with the following  support professionals: None at this time.      6.  Referrals:   The following referral(s) will be initiated:  ADHD assessment . Next Scheduled Appointment: 10/18.      A Release of Information has been obtained for the following: NA.     Emergency Contact  was not obtained.     7. JESSE:    JESSE:  Discussed the general effects of drugs and alcohol on health and well-being.   8. Records:   These were reviewed at time of assessment.   Information in this assessment was obtained from the medical record and  provided by patient who is a good historian.    Patient will have open access to their mental health medical record.    9.   Interactive Complexity: No    Provider Name/ Credentials:  CECIL Vasquez  September 11, 2023    ----- Service Performed and Documented by CECIL------  This note was reviewed and clinical supervision by ELMO [facility]    9/20/2023   Answers submitted by the patient for this visit:  Patient Health Questionnaire (Submitted on 9/11/2023)  If you checked off any problems, how difficult have these problems made  it for you to do your work, take care of things at home, or get along with other people?: Very difficult  PHQ9 TOTAL SCORE: 12  JORGE-7 (Submitted on 9/11/2023)  JORGE 7 TOTAL SCORE: 15

## 2023-09-18 ENCOUNTER — OFFICE VISIT (OUTPATIENT)
Dept: OBGYN | Facility: CLINIC | Age: 53
End: 2023-09-18
Payer: COMMERCIAL

## 2023-09-18 VITALS
BODY MASS INDEX: 25.66 KG/M2 | DIASTOLIC BLOOD PRESSURE: 60 MMHG | SYSTOLIC BLOOD PRESSURE: 98 MMHG | WEIGHT: 154 LBS | HEIGHT: 65 IN

## 2023-09-18 DIAGNOSIS — N89.8 VAGINAL DRYNESS: ICD-10-CM

## 2023-09-18 DIAGNOSIS — Z98.890 STATUS POST HYSTEROSCOPY: Primary | ICD-10-CM

## 2023-09-18 PROCEDURE — 99213 OFFICE O/P EST LOW 20 MIN: CPT | Performed by: STUDENT IN AN ORGANIZED HEALTH CARE EDUCATION/TRAINING PROGRAM

## 2023-09-18 NOTE — PROGRESS NOTES
"[facility]  OB/GYN Clinic Note    SUBJECTIVE:                                                   Melita Cortes is a 53 year old  female who presents to clinic today for the following health issue(s):  Patient presents with:  Surgical Followup      HPI:  Patient is 3 weeks s/p operative hysteroscopy due to thickened EMS, and perimenopausal AUB. Wondering about thickened endometrium and if this is concerning. She has a history of alcoholic liver cirrhosis and is s/p liver transplant. Amenorrhea preceeded transplant, and resumption of menses occurred post transplant. Wondering if she truly was in menopause.   Wondering about HRT, pellets, herbal supplements. Has painful intercourse, vaginal dryness.       No LMP recorded. Patient is perimenopausal..   Patient is sexually active, .  Using none for contraception.    reports that she quit smoking about 3 years ago. Her smoking use included cigarettes. She has never used smokeless tobacco.        OBJECTIVE:     BP 98/60   Ht 1.651 m (5' 5\")   Wt 69.9 kg (154 lb)   BMI 25.63 kg/m    Body mass index is 25.63 kg/m .    Exam:  Constitutional:  Appearance: Well nourished, well developed alert, in no acute distress     Surg path: 23  Final Diagnosis   A. Endometrium, curettage:  -Proliferative pattern endometrium.         ASSESSMENT/PLAN:                                                        ICD-10-CM    1. Status post hysteroscopy  Z98.890       2. Vaginal dryness  N89.8         Melita Cortes is a 53 year old  three week s/p hysteroscopy. Patient presents to review surgery, menopause symptoms. Discussed surgical findings in detail including normal proliferative endometrium. Reviewed menopause in the context of liver failure, and normalization of menses after liver transplant. Discussed that she is now normally going through menopause. Last cycle in May. If no menses for 12 months, would consider this menopause. No " specific follow-up needed for menopause unless having persistent bothersome symptoms (AUB, vasomotor symptoms, vaginal dryness). Discussed initial management of vaginal dryness and painful intercourse. Recommend lubrication, use of vaginal Replens, and coconut oil. If this does not resolve symptoms, follow-up to discuss alternatives such as vaginal estrogen. Discussed systemic HRT, and would not recommend this due to history of liver transplant. She is at increased risk of DVT.     Follow-up as above.     Salud Arnold MD, MHS  [facility] [place]

## 2023-09-22 ENCOUNTER — VIRTUAL VISIT (OUTPATIENT)
Dept: PSYCHOLOGY | Facility: CLINIC | Age: 53
End: 2023-09-22
Payer: COMMERCIAL

## 2023-09-22 DIAGNOSIS — F32.1 CURRENT MODERATE EPISODE OF MAJOR DEPRESSIVE DISORDER, UNSPECIFIED WHETHER RECURRENT (H): Primary | ICD-10-CM

## 2023-09-22 PROCEDURE — 90834 PSYTX W PT 45 MINUTES: CPT | Mod: VID | Performed by: STUDENT IN AN ORGANIZED HEALTH CARE EDUCATION/TRAINING PROGRAM

## 2023-09-22 NOTE — PROGRESS NOTES
[facility]                                     Progress Note    Patient Name: Melita Cortes  Date: 09/22/2023         Service Type: Individual      Session Start Time: 8.00  Session End Time: 8.45     Session Length: 38-52    Session #: 02    Attendees: Client attended alone    Service Modality:  Video Visit:      Provider verified identity through the following two step process.  Patient provided:  Patient is known previously to provider    Telemedicine Visit: The patient's condition can be safely assessed and treated via synchronous audio and visual telemedicine encounter.      Reason for Telemedicine Visit: Patient has requested telehealth visit    Originating Site (Patient Location): Patient's home    Distant Site (Provider Location): Provider Remote Setting- Home Office    Consent:  The patient/guardian has verbally consented to: the potential risks and benefits of telemedicine (video visit) versus in person care; bill my insurance or make self-payment for services provided; and responsibility for payment of non-covered services.     Patient would like the video invitation sent by:  My Chart    Mode of Communication:  Video Conference via Amwell    Distant Location (Provider):  Off-site    As the provider I attest to compliance with applicable laws and regulations related to telemedicine.    DATA  Interactive Complexity: No  Crisis: No        Progress Since Last Session (Related to Symptoms / Goals / Homework):   Symptoms: No change pt reported no change from previous session    Homework: Completed in session review patient's  goal for treatment and collaboratively develop treatment plan.        Episode of Care Goals: Minimal progress - PREPARATION (Decided to change - considering how); Intervened by negotiating a change plan and determining options / strategies for behavior change, identifying triggers, exploring social supports, and working towards setting a date to begin behavior  "change     Current / Ongoing Stressors and Concerns:   Patient reported  \"Trauma Adhd depres\". Patient reported \" I think I have had some trauma in my life, my liver transplant and I think have ADHD.\" Pt reported she is  \"overly sensitive\" and \" I dont handle stress very well and struggle with impulsivity\"  and that \"I have a poor self image, negative talk a lot \" patient also reported she lost her mother in 2019 and her dad 7 months ago and still struggling with grieving the lost of both parents.       Treatment Objective(s) Addressed in This Session:   Patient will increase daily activity level by exercising for 20-30 minutes and participate in at least 1 daily pleasant/fun activities.     Intervention:  Motivational Interviewing: Utilizing OARS to build insight around situation, observing barriers patient is seeing in her ability to manage distressful feelings and emotions and identifying what is needed to improve mood. Collaboratively develop treatment goal and objectives.      Assessments completed prior to visit:  PHQ9:       12/26/2022    11:48 AM 4/17/2023     8:54 AM 6/6/2023    10:39 AM 6/6/2023     1:00 PM 6/29/2023     1:43 PM 8/8/2023     9:45 AM 9/11/2023    11:55 AM   PHQ-9 SCORE   PHQ-9 Total Score MyChart 4 (Minimal depression)  11 (Moderate depression)   25 (Severe depression) 12 (Moderate depression)   PHQ-9 Total Score 4 14 11 14 8 25 12     GAD7:       6/6/2023     1:00 PM 6/29/2023     1:43 PM 9/11/2023    12:07 PM   JORGE-7 SCORE   Total Score   15 (severe anxiety)   Total Score 14 7 15         ASSESSMENT: Current Emotional / Mental Status (status of significant symptoms):   Risk status (Self / Other harm or suicidal ideation)   Patient denies current fears or concerns for personal safety.   Patient denies current or recent suicidal ideation or behaviors.   Patient denies current or recent homicidal ideation or behaviors.   Patient denies current or recent self injurious behavior or " ideation.   Patient denies other safety concerns.   Patient reports there has been no change in risk factors since their last session.     Patient reports there has been no change in protective factors since their last session.     Recommended that patient call 911 or go to the local ED should there be a change in any of these risk factors.     Appearance:   Appropriate    Eye Contact:   Good    Psychomotor Behavior: Normal    Attitude:   Cooperative  Interested   Orientation:   Person Place Time Situation   Speech    Rate / Production: Normal/ Responsive    Volume:  Normal    Mood:    Anxious  Depressed  Grieving   Affect:    Tearful   Thought Content:  Clear    Thought Form:  Coherent  Logical    Insight:    Good      Medication Review:   No changes to current psychiatric medication(s)     Medication Compliance:   Yes     Changes in Health Issues:   None reported     Chemical Use Review:   Substance Use: Chemical use reviewed, no active concerns identified      Tobacco Use: No current tobacco use.      Diagnosis:  296.32 (F33.1) Major Depressive Disorder, Recurrent Episode, Moderate _ and With atypical features    Collateral Reports Completed:   Not Applicable    PLAN: (Patient Tasks / Therapist Tasks / Other)  Increase daily physical activities.      Jake Coyne [facility]                ----- Service Performed and Documented by [facility]------  This note was reviewed and clinical supervision by ELMO Colmenares [facility]    9/22/2023                                      _____________________________________________________________________    Individual Treatment Plan    Patient's Name: Melita Cortes  YOB: 1970    Date of Creation: 09/22/2023  Date Treatment Plan Last Reviewed/Revised:     DSM5 Diagnoses: 296.32 (F33.1) Major Depressive Disorder, Recurrent Episode, Moderate _ and With atypical features  Psychosocial / Contextual Factors:   PROMIS (reviewed every 90 days):     Referral /  Collaboration:  Referral to another professional/service is not indicated at this time..    Anticipated number of session for this episode of care:  15-25  Anticipation frequency of session: Biweekly  Anticipated Duration of each session: 38-52 minutes  Treatment plan will be reviewed in 90 days or when goals have been changed.       MeasurableTreatment Goal(s) related to diagnosis / functional impairment(s)  Goal 1: Patient will identify and address specific triggers to feelings of depression and improve coping by  90 % in the next three months.    I will know I've met my goal when I am less impulsive, better communicator and can comfortably manage distressful emotions.           Objective #A (Patient Action)     Develop and utilize 2 effective distress tolerance strategies daily.   Status: Continued - Date(s):   11/23/2023       Intervention(s)  Therapist will provide psychoeducation, behavioral activation, and cognitive restructuring.    Objective #B (Patient Action)    Patient will increase daily activity level by exercising for 20-30 minutes and participate in at least 1 daily pleasant/fun activities.  Status: Continued - Date(s):   11/23/2023    Intervention(s)  Therapist will provide psychoeducation, behavioral activation, and cognitive restructuring.    Objective #C  Patient will identify specific areas of cognitive distortion and challenge irrational thoughts with reality   Status: Continued - Date(s):    11/23/2023       Intervention(s)  Therapist will provide psychoeducation, behavioral activation, and cognitive restructuring.    Objective #D  Patient will learn and practice relaxation techniques to manage depression.  Status: Continued - Date(s):  11/23/2023    Intervention(s)    Therapist will provide psychoeducation, behavioral activation, and cognitive restructuring.      Patient has reviewed and agreed to the above plan.      CECIL Vasquez  September 22, 2023

## 2023-09-29 DIAGNOSIS — Z94.4 STATUS POST LIVER TRANSPLANTATION (H): Primary | ICD-10-CM

## 2023-10-02 ENCOUNTER — LAB (OUTPATIENT)
Dept: LAB | Facility: CLINIC | Age: 53
End: 2023-10-02
Payer: COMMERCIAL

## 2023-10-02 DIAGNOSIS — Z94.4 LIVER REPLACED BY TRANSPLANT (H): ICD-10-CM

## 2023-10-02 DIAGNOSIS — Z94.4 STATUS POST LIVER TRANSPLANTATION (H): ICD-10-CM

## 2023-10-02 LAB
ALBUMIN SERPL BCG-MCNC: 4.1 G/DL (ref 3.5–5.2)
ALP SERPL-CCNC: 76 U/L (ref 35–104)
ALT SERPL W P-5'-P-CCNC: 23 U/L (ref 0–50)
ANION GAP SERPL CALCULATED.3IONS-SCNC: 11 MMOL/L (ref 7–15)
AST SERPL W P-5'-P-CCNC: 26 U/L (ref 0–45)
BILIRUB DIRECT SERPL-MCNC: <0.2 MG/DL (ref 0–0.3)
BILIRUB SERPL-MCNC: 0.3 MG/DL
BUN SERPL-MCNC: 18.9 MG/DL (ref 6–20)
CALCIUM SERPL-MCNC: 9 MG/DL (ref 8.6–10)
CHLORIDE SERPL-SCNC: 105 MMOL/L (ref 98–107)
CREAT SERPL-MCNC: 0.97 MG/DL (ref 0.51–0.95)
DEPRECATED HCO3 PLAS-SCNC: 24 MMOL/L (ref 22–29)
EGFRCR SERPLBLD CKD-EPI 2021: 70 ML/MIN/1.73M2
ERYTHROCYTE [DISTWIDTH] IN BLOOD BY AUTOMATED COUNT: 12.5 % (ref 10–15)
GLUCOSE SERPL-MCNC: 104 MG/DL (ref 70–99)
HCT VFR BLD AUTO: 41.6 % (ref 35–47)
HGB BLD-MCNC: 14.4 G/DL (ref 11.7–15.7)
MAGNESIUM SERPL-MCNC: 1.9 MG/DL (ref 1.7–2.3)
MCH RBC QN AUTO: 29.8 PG (ref 26.5–33)
MCHC RBC AUTO-ENTMCNC: 34.6 G/DL (ref 31.5–36.5)
MCV RBC AUTO: 86 FL (ref 78–100)
PLATELET # BLD AUTO: 213 10E3/UL (ref 150–450)
POTASSIUM SERPL-SCNC: 4.7 MMOL/L (ref 3.4–5.3)
PROT SERPL-MCNC: 6.4 G/DL (ref 6.4–8.3)
RBC # BLD AUTO: 4.83 10E6/UL (ref 3.8–5.2)
SODIUM SERPL-SCNC: 140 MMOL/L (ref 135–145)
TACROLIMUS BLD-MCNC: 4.4 UG/L (ref 5–15)
TME LAST DOSE: ABNORMAL H
TME LAST DOSE: ABNORMAL H
VIT B12 SERPL-MCNC: 1305 PG/ML (ref 232–1245)
VIT D+METAB SERPL-MCNC: 43 NG/ML (ref 20–50)
WBC # BLD AUTO: 4.1 10E3/UL (ref 4–11)

## 2023-10-02 PROCEDURE — 36415 COLL VENOUS BLD VENIPUNCTURE: CPT

## 2023-10-02 PROCEDURE — 82607 VITAMIN B-12: CPT

## 2023-10-02 PROCEDURE — 80197 ASSAY OF TACROLIMUS: CPT

## 2023-10-02 PROCEDURE — 80053 COMPREHEN METABOLIC PANEL: CPT

## 2023-10-02 PROCEDURE — 82248 BILIRUBIN DIRECT: CPT

## 2023-10-02 PROCEDURE — 85027 COMPLETE CBC AUTOMATED: CPT

## 2023-10-02 PROCEDURE — 82306 VITAMIN D 25 HYDROXY: CPT

## 2023-10-02 PROCEDURE — 83735 ASSAY OF MAGNESIUM: CPT

## 2023-10-04 ENCOUNTER — TELEPHONE (OUTPATIENT)
Dept: TRANSPLANT | Facility: CLINIC | Age: 53
End: 2023-10-04
Payer: COMMERCIAL

## 2023-10-04 DIAGNOSIS — Z94.4 STATUS POST LIVER TRANSPLANTATION (H): Primary | ICD-10-CM

## 2023-10-04 NOTE — TELEPHONE ENCOUNTER
Left message for patient to see if she is ready to start sirolimus and then discuss plan for net labs. Pt to repeat vit b12 in 6 months. Order placed.

## 2023-10-06 DIAGNOSIS — Z94.4 STATUS POST LIVER TRANSPLANTATION (H): Primary | ICD-10-CM

## 2023-10-16 ENCOUNTER — TELEPHONE (OUTPATIENT)
Dept: TRANSPLANT | Facility: CLINIC | Age: 53
End: 2023-10-16
Payer: COMMERCIAL

## 2023-10-16 NOTE — TELEPHONE ENCOUNTER
Pt calls to state that she has received the sirolimus and is ready for the switch. However pt states that it can wait until tomorrow upon Crystal's return.

## 2023-10-17 NOTE — TELEPHONE ENCOUNTER
Attempted to reach patient, but no answer. Pt is interested in doing breast reduction and eye lid lift. Left message regarding sirolimus and slow wound healing. Requested patient return call.

## 2023-10-18 ENCOUNTER — VIRTUAL VISIT (OUTPATIENT)
Dept: PSYCHOLOGY | Facility: CLINIC | Age: 53
End: 2023-10-18
Payer: COMMERCIAL

## 2023-10-18 DIAGNOSIS — F32.1 CURRENT MODERATE EPISODE OF MAJOR DEPRESSIVE DISORDER, UNSPECIFIED WHETHER RECURRENT (H): Primary | ICD-10-CM

## 2023-10-18 PROCEDURE — 90834 PSYTX W PT 45 MINUTES: CPT | Mod: VID | Performed by: STUDENT IN AN ORGANIZED HEALTH CARE EDUCATION/TRAINING PROGRAM

## 2023-10-18 NOTE — PROGRESS NOTES
[facility]                                     Progress Note    Patient Name: Melita Cortes  Date: 10/18/2023         Service Type: Individual      Session Start Time: 10.00  Session End Time: 10.45     Session Length: 38-52    Session #: 03    Attendees: Client attended alone    Service Modality:  Video Visit:      Provider verified identity through the following two step process.  Patient provided:  Patient is known previously to provider    Telemedicine Visit: The patient's condition can be safely assessed and treated via synchronous audio and visual telemedicine encounter.      Reason for Telemedicine Visit: Patient has requested telehealth visit    Originating Site (Patient Location): Patient's home    Distant Site (Provider Location): Provider Remote Setting- Home Office    Consent:  The patient/guardian has verbally consented to: the potential risks and benefits of telemedicine (video visit) versus in person care; bill my insurance or make self-payment for services provided; and responsibility for payment of non-covered services.     Patient would like the video invitation sent by:  My Chart    Mode of Communication:  Video Conference via Amwell    Distant Location (Provider):  Off-site    As the provider I attest to compliance with applicable laws and regulations related to telemedicine.    DATA  Interactive Complexity: No  Crisis: No        Progress Since Last Session (Related to Symptoms / Goals / Homework):   Symptoms: No change pt reported no change from previous session    Homework: Completed in session review patient's  goal for treatment and collaboratively develop treatment plan.        Episode of Care Goals: Minimal progress - PREPARATION (Decided to change - considering how); Intervened by negotiating a change plan and determining options / strategies for behavior change, identifying triggers, exploring social supports, and working towards setting a date to begin behavior  "change     Current / Ongoing Stressors and Concerns:  Patient reported  \"Trauma Adhd depres\". Patient reported \" I think I have had some trauma in my life, my liver transplant and I think have ADHD.\" Pt reported she is  \"overly sensitive\" and \" I dont handle stress very well and struggle with impulsivity\"  and that \"I have a poor self image, negative talk a lot \" patient also reported she lost her mother in 2019 and her dad 7 months ago and still struggling with grieving the lost of both parents.      Current: Today, patient reported that its her mothers one year anniversary since her death and thoughts about parent's separation and not being able to see her dad for over twenty years. Patient reported she continue to struggle with negative self image, self deprecation and an overall general negative sense of self.      Treatment Objective(s) Addressed in This Session:   Patient will increase daily activity level by exercising for 20-30 minutes and participate in at least 1 daily pleasant/fun activities.     Intervention:  Psychodynamic: Discussion today on  self-esteem as  how we view and think about ourselves and the value that we place on ourselves as a person and low self-esteem as having a generally negative overall opinion of oneself, judging  oneself negatively, and placing a general negative value on oneself as a person. Discuss how people with low self-esteem generally have deep-seated negative belief about  themselves and the kind of person they have and explain to pt how the negative sense of self  adversely impact the emotions that are developed and expressed, and general psychosocial functioning. Explore early life experiences, attachment to primary care givers and invalidating environments that may have let to these negative cores beliefs and explore ways to change these schemas.       Assessments completed prior to visit:  PHQ9:       12/26/2022    11:48 AM 4/17/2023     8:54 AM 6/6/2023    10:39 AM " 6/6/2023     1:00 PM 6/29/2023     1:43 PM 8/8/2023     9:45 AM 9/11/2023    11:55 AM   PHQ-9 SCORE   PHQ-9 Total Score MyChart 4 (Minimal depression)  11 (Moderate depression)   25 (Severe depression) 12 (Moderate depression)   PHQ-9 Total Score 4 14 11 14 8 25 12     GAD7:       6/6/2023     1:00 PM 6/29/2023     1:43 PM 9/11/2023    12:07 PM   JORGE-7 SCORE   Total Score   15 (severe anxiety)   Total Score 14 7 15         ASSESSMENT: Current Emotional / Mental Status (status of significant symptoms):   Risk status (Self / Other harm or suicidal ideation)   Patient denies current fears or concerns for personal safety.   Patient denies current or recent suicidal ideation or behaviors.   Patient denies current or recent homicidal ideation or behaviors.   Patient denies current or recent self injurious behavior or ideation.   Patient denies other safety concerns.   Patient reports there has been no change in risk factors since their last session.     Patient reports there has been no change in protective factors since their last session.     Recommended that patient call 911 or go to the local ED should there be a change in any of these risk factors.     Appearance:   Appropriate    Eye Contact:   Good    Psychomotor Behavior: Normal    Attitude:   Cooperative  Interested   Orientation:   Person Place Time Situation   Speech    Rate / Production: Normal/ Responsive    Volume:  Normal    Mood:    Anxious  Depressed  Grieving   Affect:    Tearful   Thought Content:  Clear    Thought Form:  Coherent  Logical    Insight:    Good      Medication Review:   No changes to current psychiatric medication(s)     Medication Compliance:   Yes     Changes in Health Issues:   None reported     Chemical Use Review:   Substance Use: Chemical use reviewed, no active concerns identified      Tobacco Use: No current tobacco use.      Diagnosis:  296.32 (F33.1) Major Depressive Disorder, Recurrent Episode, Moderate _ and With atypical  features    Collateral Reports Completed:   Not Applicable    PLAN: (Patient Tasks / Therapist Tasks / Other)  Increase daily physical activities. Read provided handout on low self esteem      CECIL Vasquez    ----- Service Performed and Documented by CECIL------  This note was reviewed and clinical supervision by ELMO Colmenares [facility]    10/18/2023      _________________________________________________________    Individual Treatment Plan    Patient's Name: Melita Cortes  YOB: 1970    Date of Creation: 09/22/2023  Date Treatment Plan Last Reviewed/Revised:     DSM5 Diagnoses: 296.32 (F33.1) Major Depressive Disorder, Recurrent Episode, Moderate _ and With atypical features  Psychosocial / Contextual Factors:   PROMIS (reviewed every 90 days):     Referral / Collaboration:  Referral to another professional/service is not indicated at this time..    Anticipated number of session for this episode of care:  15-25  Anticipation frequency of session: Biweekly  Anticipated Duration of each session: 38-52 minutes  Treatment plan will be reviewed in 90 days or when goals have been changed.       MeasurableTreatment Goal(s) related to diagnosis / functional impairment(s)  Goal 1: Patient will identify and address specific triggers to feelings of depression and improve coping by  90 % in the next three months.    I will know I've met my goal when I am less impulsive, better communicator and can comfortably manage distressful emotions.           Objective #A (Patient Action)     Develop and utilize 2 effective distress tolerance strategies daily.   Status: Continued - Date(s):   11/23/2023       Intervention(s)  Therapist will provide psychoeducation, behavioral activation, and cognitive restructuring.    Objective #B (Patient Action)    Patient will increase daily activity level by exercising for 20-30 minutes and participate in at least 1 daily pleasant/fun activities.  Status: Continued - Date(s):    11/23/2023    Intervention(s)  Therapist will provide psychoeducation, behavioral activation, and cognitive restructuring.    Objective #C  Patient will identify specific areas of cognitive distortion and challenge irrational thoughts with reality   Status: Continued - Date(s):    11/23/2023       Intervention(s)  Therapist will provide psychoeducation, behavioral activation, and cognitive restructuring.    Objective #D  Patient will learn and practice relaxation techniques to manage depression.  Status: Continued - Date(s):  11/23/2023    Intervention(s)    Therapist will provide psychoeducation, behavioral activation, and cognitive restructuring.      Patient has reviewed and agreed to the above plan.      CECIL Vasquez  September 22, 2023
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
## 2023-10-23 ENCOUNTER — VIRTUAL VISIT (OUTPATIENT)
Dept: GASTROENTEROLOGY | Facility: CLINIC | Age: 53
End: 2023-10-23
Attending: INTERNAL MEDICINE
Payer: COMMERCIAL

## 2023-10-23 DIAGNOSIS — D84.9 IMMUNOSUPPRESSED STATUS (H): ICD-10-CM

## 2023-10-23 DIAGNOSIS — Z94.4 STATUS POST LIVER TRANSPLANTATION (H): Primary | ICD-10-CM

## 2023-10-23 PROCEDURE — 99214 OFFICE O/P EST MOD 30 MIN: CPT | Mod: VID | Performed by: INTERNAL MEDICINE

## 2023-10-23 ASSESSMENT — PATIENT HEALTH QUESTIONNAIRE - PHQ9: SUM OF ALL RESPONSES TO PHQ QUESTIONS 1-9: 10

## 2023-10-23 ASSESSMENT — PAIN SCALES - GENERAL: PAINLEVEL: NO PAIN (0)
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
## 2023-10-23 NOTE — NURSING NOTE
Is the patient currently in the state of [place]? YES    Visit mode:VIDEO    If the visit is dropped, the patient can be reconnected by: VIDEO VISIT: Text to cell phone:   Telephone Information:   Mobile [phone]    and VIDEO VISIT: Send to e-mail at: [email or web address]    Will anyone else be joining the visit? NO  (If patient encounters technical issues they should call [phone])    How would you like to obtain your AVS? MyChart    Are changes needed to the allergy or medication list? No    Reason for visit: NIKKI PORTILLO

## 2023-10-23 NOTE — PROGRESS NOTES
"Virtual Visit Details    Type of service:  Video Visit     Originating Location (pt. Location): {video visit patient location:525370::\"Home\"}  {PROVIDER LOCATION On-site should be selected for visits conducted from your clinic location or adjoining [facility] hospital, academic office, or other nearby [facility] building. Off-site should be selected for all other provider locations, including home:[id number]}  Distant Location (provider location):  {virtual location provider:860072}  Platform used for Video Visit: {Virtual Visit Platforms:992102::\"BOLD Guidance\"}    "
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
## 2023-10-30 ENCOUNTER — LAB (OUTPATIENT)
Dept: LAB | Facility: CLINIC | Age: 53
End: 2023-10-30
Payer: COMMERCIAL

## 2023-10-30 DIAGNOSIS — Z94.4 STATUS POST LIVER TRANSPLANTATION (H): ICD-10-CM

## 2023-10-30 DIAGNOSIS — Z94.4 LIVER REPLACED BY TRANSPLANT (H): ICD-10-CM

## 2023-10-30 LAB
ALBUMIN SERPL BCG-MCNC: 4.1 G/DL (ref 3.5–5.2)
ALP SERPL-CCNC: 71 U/L (ref 35–104)
ALT SERPL W P-5'-P-CCNC: 29 U/L (ref 0–50)
ANION GAP SERPL CALCULATED.3IONS-SCNC: 7 MMOL/L (ref 7–15)
AST SERPL W P-5'-P-CCNC: 28 U/L (ref 0–45)
BILIRUB DIRECT SERPL-MCNC: <0.2 MG/DL (ref 0–0.3)
BILIRUB SERPL-MCNC: 0.3 MG/DL
BUN SERPL-MCNC: 17.4 MG/DL (ref 6–20)
CALCIUM SERPL-MCNC: 9.2 MG/DL (ref 8.6–10)
CHLORIDE SERPL-SCNC: 108 MMOL/L (ref 98–107)
CREAT SERPL-MCNC: 0.91 MG/DL (ref 0.51–0.95)
DEPRECATED HCO3 PLAS-SCNC: 25 MMOL/L (ref 22–29)
EGFRCR SERPLBLD CKD-EPI 2021: 76 ML/MIN/1.73M2
ERYTHROCYTE [DISTWIDTH] IN BLOOD BY AUTOMATED COUNT: 12 % (ref 10–15)
GLUCOSE SERPL-MCNC: 123 MG/DL (ref 70–99)
HCT VFR BLD AUTO: 42.6 % (ref 35–47)
HGB BLD-MCNC: 14.3 G/DL (ref 11.7–15.7)
MAGNESIUM SERPL-MCNC: 1.8 MG/DL (ref 1.7–2.3)
MCH RBC QN AUTO: 29.9 PG (ref 26.5–33)
MCHC RBC AUTO-ENTMCNC: 33.6 G/DL (ref 31.5–36.5)
MCV RBC AUTO: 89 FL (ref 78–100)
PLATELET # BLD AUTO: 171 10E3/UL (ref 150–450)
POTASSIUM SERPL-SCNC: 4.9 MMOL/L (ref 3.4–5.3)
PROT SERPL-MCNC: 6.5 G/DL (ref 6.4–8.3)
RBC # BLD AUTO: 4.78 10E6/UL (ref 3.8–5.2)
SIROLIMUS BLD-MCNC: 5.1 UG/L (ref 5–15)
SODIUM SERPL-SCNC: 140 MMOL/L (ref 135–145)
TACROLIMUS BLD-MCNC: 3.9 UG/L (ref 5–15)
TME LAST DOSE: ABNORMAL H
TME LAST DOSE: ABNORMAL H
TME LAST DOSE: NORMAL H
TME LAST DOSE: NORMAL H
WBC # BLD AUTO: 4.1 10E3/UL (ref 4–11)

## 2023-10-30 PROCEDURE — 36415 COLL VENOUS BLD VENIPUNCTURE: CPT

## 2023-10-30 PROCEDURE — 82248 BILIRUBIN DIRECT: CPT

## 2023-10-30 PROCEDURE — 80053 COMPREHEN METABOLIC PANEL: CPT

## 2023-10-30 PROCEDURE — 80197 ASSAY OF TACROLIMUS: CPT

## 2023-10-30 PROCEDURE — 83735 ASSAY OF MAGNESIUM: CPT

## 2023-10-30 PROCEDURE — 80195 ASSAY OF SIROLIMUS: CPT

## 2023-10-30 PROCEDURE — 85027 COMPLETE CBC AUTOMATED: CPT

## 2023-11-01 ENCOUNTER — OFFICE VISIT (OUTPATIENT)
Dept: FAMILY MEDICINE | Facility: CLINIC | Age: 53
End: 2023-11-01
Attending: INTERNAL MEDICINE
Payer: COMMERCIAL

## 2023-11-01 VITALS
HEIGHT: 65 IN | OXYGEN SATURATION: 99 % | BODY MASS INDEX: 26.66 KG/M2 | TEMPERATURE: 97.6 F | DIASTOLIC BLOOD PRESSURE: 79 MMHG | RESPIRATION RATE: 18 BRPM | HEART RATE: 81 BPM | SYSTOLIC BLOOD PRESSURE: 112 MMHG | WEIGHT: 160 LBS

## 2023-11-01 DIAGNOSIS — Z23 NEED FOR PROPHYLACTIC VACCINATION AND INOCULATION AGAINST INFLUENZA: ICD-10-CM

## 2023-11-01 DIAGNOSIS — G47.00 INSOMNIA, UNSPECIFIED TYPE: Primary | ICD-10-CM

## 2023-11-01 DIAGNOSIS — Z94.4 STATUS POST LIVER TRANSPLANTATION (H): ICD-10-CM

## 2023-11-01 DIAGNOSIS — M25.561 ACUTE PAIN OF RIGHT KNEE: ICD-10-CM

## 2023-11-01 DIAGNOSIS — F33.0 MILD RECURRENT MAJOR DEPRESSION (H): ICD-10-CM

## 2023-11-01 DIAGNOSIS — N95.1 MENOPAUSAL SYNDROME (HOT FLASHES): ICD-10-CM

## 2023-11-01 PROCEDURE — 99214 OFFICE O/P EST MOD 30 MIN: CPT | Performed by: INTERNAL MEDICINE

## 2023-11-01 RX ORDER — GABAPENTIN 300 MG/1
300 CAPSULE ORAL 3 TIMES DAILY
Qty: 90 CAPSULE | Refills: 0 | Status: SHIPPED | OUTPATIENT
Start: 2023-11-01 | End: 2024-01-24

## 2023-11-01 RX ORDER — HYDROXYZINE HYDROCHLORIDE 25 MG/1
TABLET, FILM COATED ORAL
Qty: 180 TABLET | Refills: 3 | Status: SHIPPED | OUTPATIENT
Start: 2023-11-01 | End: 2024-07-17

## 2023-11-01 RX ORDER — BUPROPION HYDROCHLORIDE 150 MG/1
150 TABLET ORAL EVERY MORNING
Qty: 90 TABLET | Refills: 0 | Status: SHIPPED | OUTPATIENT
Start: 2023-11-01 | End: 2023-11-29

## 2023-11-01 ASSESSMENT — PAIN SCALES - GENERAL: PAINLEVEL: WORST PAIN (10)
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
## 2023-11-02 ENCOUNTER — TELEPHONE (OUTPATIENT)
Dept: TRANSPLANT | Facility: CLINIC | Age: 53
End: 2023-11-02
Payer: COMMERCIAL

## 2023-11-02 DIAGNOSIS — Z94.4 STATUS POST LIVER TRANSPLANTATION (H): ICD-10-CM

## 2023-11-02 RX ORDER — TACROLIMUS 1 MG/1
CAPSULE ORAL
Qty: 60 CAPSULE | Refills: 0 | Status: SHIPPED | OUTPATIENT
Start: 2023-11-02 | End: 2023-11-10

## 2023-11-02 NOTE — TELEPHONE ENCOUNTER
Pt's sirolimus level with in normal goal. Pt to start weaning off tacrolimus. Pt taking 2 mg Am and 1 mg PM. Pt will decrease to 1 mg BID and repeat labs in 1 week. Left detailed message. Encouraged patient to return call or send my chart message to confirm and with any questions.

## 2023-11-03 NOTE — TELEPHONE ENCOUNTER
DIAGNOSIS: Acute pain of right knee    APPOINTMENT DATE: 11/22/2023   NOTES STATUS DETAILS   OFFICE NOTE from referring provider Internal 11/01/2023 Dr Johnson [facility]    OFFICE NOTE from other specialist N/A    DISCHARGE SUMMARY from hospital N/A    DISCHARGE REPORT from the ER N/A    OPERATIVE REPORT N/A    EMG report N/A    MEDICATION LIST N/A    MRI N/A    DEXA (osteoporosis/bone health) N/A    CT SCAN N/A    XRAYS (IMAGES & REPORTS) N/A

## 2023-11-06 ENCOUNTER — TELEPHONE (OUTPATIENT)
Dept: OBGYN | Facility: CLINIC | Age: 53
End: 2023-11-06
Payer: COMMERCIAL

## 2023-11-06 NOTE — TELEPHONE ENCOUNTER
Patient needs a phone appointment with Dr. Arnold as soon as possible for a follow up. Said Dr. Arnold needs to see her in a week or so. Thank you!

## 2023-11-07 ENCOUNTER — VIRTUAL VISIT (OUTPATIENT)
Dept: PSYCHOLOGY | Facility: CLINIC | Age: 53
End: 2023-11-07
Payer: COMMERCIAL

## 2023-11-07 DIAGNOSIS — F32.1 CURRENT MODERATE EPISODE OF MAJOR DEPRESSIVE DISORDER, UNSPECIFIED WHETHER RECURRENT (H): Primary | ICD-10-CM

## 2023-11-07 PROCEDURE — 90834 PSYTX W PT 45 MINUTES: CPT | Mod: VID | Performed by: STUDENT IN AN ORGANIZED HEALTH CARE EDUCATION/TRAINING PROGRAM

## 2023-11-07 NOTE — PROGRESS NOTES
[facility]                                     Progress Note    Patient Name: Melita Cortes  Date: 11/07/2023         Service Type: Individual      Session Start Time: 2.30  Session End Time: 3.15     Session Length: 38-52    Session #: 04    Attendees: Client attended alone    Service Modality:  Video Visit:      Provider verified identity through the following two step process.  Patient provided:  Patient is known previously to provider    Telemedicine Visit: The patient's condition can be safely assessed and treated via synchronous audio and visual telemedicine encounter.      Reason for Telemedicine Visit: Patient has requested telehealth visit    Originating Site (Patient Location): Patient's home    Distant Site (Provider Location): Provider Remote Setting- Home Office    Consent:  The patient/guardian has verbally consented to: the potential risks and benefits of telemedicine (video visit) versus in person care; bill my insurance or make self-payment for services provided; and responsibility for payment of non-covered services.     Patient would like the video invitation sent by:  My Chart    Mode of Communication:  Video Conference via Amwell    Distant Location (Provider):  Off-site    As the provider I attest to compliance with applicable laws and regulations related to telemedicine.    DATA  Interactive Complexity: No  Crisis: No        Progress Since Last Session (Related to Symptoms / Goals / Homework):   Symptoms: No change pt reported no change from previous session    Homework: Completed in session review patient's  goal for treatment and collaboratively develop treatment plan.        Episode of Care Goals: Minimal progress - PREPARATION (Decided to change - considering how); Intervened by negotiating a change plan and determining options / strategies for behavior change, identifying triggers, exploring social supports, and working towards setting a date to begin behavior  "change     Current / Ongoing Stressors and Concerns:  Patient reported  \"Trauma Adhd depres\". Patient reported \" I think I have had some trauma in my life, my liver transplant and I think have ADHD.\" Pt reported she is  \"overly sensitive\" and \" I dont handle stress very well and struggle with impulsivity\"  and that \"I have a poor self image, negative talk a lot \" patient also reported she lost her mother in 2019 and her dad 7 months ago and still struggling with grieving the lost of both parents.      Current: Today, patient reported that she feels overwhelmed and struggles with heavy emotions. Feels like she cannot self regulate without medication and reported feeling like she is always \"in a fight or flight situation\" and reactive to situations.     Treatment Objective(s) Addressed in This Session:   Patient will increase daily activity level by exercising for 20-30 minutes and participate in at least 1 daily pleasant/fun activities.     Intervention:  Discussion today on the importance of understanding our window of tolerance to increase our sense of calm and become able to deal with stress in more adaptive ways. Explore pt past trauma and  its impact on current reactive response and difficulty managing stressful situation. Couched patient that whenever they are irritable, angry, reactive and having a heightened startled reaction, there are in a hyper aroused emotional state and out of their window of tolerance and should try to self-regulate so they can mitigate the stress to be able to logically respond to others in a way that is appropriate and supportive.       Assessments completed prior to visit:  PHQ9:       4/17/2023     8:54 AM 6/6/2023    10:39 AM 6/6/2023     1:00 PM 6/29/2023     1:43 PM 8/8/2023     9:45 AM 9/11/2023    11:55 AM 10/23/2023     8:59 AM   PHQ-9 SCORE   PHQ-9 Total Score MyChart  11 (Moderate depression)   25 (Severe depression) 12 (Moderate depression)    PHQ-9 Total Score 14 11 14 8 " 25 12 10     GAD7:       6/6/2023     1:00 PM 6/29/2023     1:43 PM 9/11/2023    12:07 PM   JORGE-7 SCORE   Total Score   15 (severe anxiety)   Total Score 14 7 15         ASSESSMENT: Current Emotional / Mental Status (status of significant symptoms):   Risk status (Self / Other harm or suicidal ideation)   Patient denies current fears or concerns for personal safety.   Patient denies current or recent suicidal ideation or behaviors.   Patient denies current or recent homicidal ideation or behaviors.   Patient denies current or recent self injurious behavior or ideation.   Patient denies other safety concerns.   Patient reports there has been no change in risk factors since their last session.     Patient reports there has been no change in protective factors since their last session.     Recommended that patient call 911 or go to the local ED should there be a change in any of these risk factors.     Appearance:   Appropriate    Eye Contact:   Good    Psychomotor Behavior: Normal    Attitude:   Cooperative  Interested   Orientation:   Person Place Time Situation   Speech    Rate / Production: Normal/ Responsive    Volume:  Normal    Mood:    Anxious  Depressed  Grieving   Affect:    Tearful   Thought Content:  Clear    Thought Form:  Coherent  Logical    Insight:    Good      Medication Review:   No changes to current psychiatric medication(s)     Medication Compliance:   Yes     Changes in Health Issues:   None reported     Chemical Use Review:   Substance Use: Chemical use reviewed, no active concerns identified      Tobacco Use: No current tobacco use.      Diagnosis:  296.32 (F33.1) Major Depressive Disorder, Recurrent Episode, Moderate _ and With atypical features    Collateral Reports Completed:   Not Applicable    PLAN: (Patient Tasks / Therapist Tasks / Other)  Increase daily physical activities. Read provided handout on low self esteem      CECIL Vasquez      ----- Service Performed and Documented by  SW------  This note was reviewed and clinical supervision by ELMO Colmenares [facility]    11/9/2023    _________________________________________________________    Individual Treatment Plan    Patient's Name: Melita Cortes  YOB: 1970    Date of Creation: 09/22/2023  Date Treatment Plan Last Reviewed/Revised:     DSM5 Diagnoses: 296.32 (F33.1) Major Depressive Disorder, Recurrent Episode, Moderate _ and With atypical features  Psychosocial / Contextual Factors:   PROMIS (reviewed every 90 days):     Referral / Collaboration:  Referral to another professional/service is not indicated at this time..    Anticipated number of session for this episode of care:  15-25  Anticipation frequency of session: Biweekly  Anticipated Duration of each session: 38-52 minutes  Treatment plan will be reviewed in 90 days or when goals have been changed.       MeasurableTreatment Goal(s) related to diagnosis / functional impairment(s)  Goal 1: Patient will identify and address specific triggers to feelings of depression and improve coping by  90 % in the next three months.    I will know I've met my goal when I am less impulsive, better communicator and can comfortably manage distressful emotions.           Objective #A (Patient Action)     Develop and utilize 2 effective distress tolerance strategies daily.   Status: Continued - Date(s):   11/23/2023       Intervention(s)  Therapist will provide psychoeducation, behavioral activation, and cognitive restructuring.    Objective #B (Patient Action)    Patient will increase daily activity level by exercising for 20-30 minutes and participate in at least 1 daily pleasant/fun activities.  Status: Continued - Date(s):   11/23/2023    Intervention(s)  Therapist will provide psychoeducation, behavioral activation, and cognitive restructuring.    Objective #C  Patient will identify specific areas of cognitive distortion and challenge irrational thoughts with reality    Status: Continued - Date(s):    11/23/2023       Intervention(s)  Therapist will provide psychoeducation, behavioral activation, and cognitive restructuring.    Objective #D  Patient will learn and practice relaxation techniques to manage depression.  Status: Continued - Date(s):  11/23/2023    Intervention(s)    Therapist will provide psychoeducation, behavioral activation, and cognitive restructuring.      Patient has reviewed and agreed to the above plan.      CECIL Vasquez  September 22, 2023

## 2023-11-07 NOTE — TELEPHONE ENCOUNTER
Per Dr. Arnold, this does not need to be in a week. Leave as is and ad to waitlist.    Please inform patient

## 2023-11-08 ENCOUNTER — LAB (OUTPATIENT)
Dept: LAB | Facility: CLINIC | Age: 53
End: 2023-11-08
Payer: COMMERCIAL

## 2023-11-08 DIAGNOSIS — Z94.4 STATUS POST LIVER TRANSPLANTATION (H): ICD-10-CM

## 2023-11-08 DIAGNOSIS — R19.7 DIARRHEA: ICD-10-CM

## 2023-11-08 DIAGNOSIS — Z94.4 LIVER REPLACED BY TRANSPLANT (H): ICD-10-CM

## 2023-11-08 LAB
ALBUMIN SERPL BCG-MCNC: 4.2 G/DL (ref 3.5–5.2)
ALP SERPL-CCNC: 76 U/L (ref 35–104)
ALT SERPL W P-5'-P-CCNC: 37 U/L (ref 0–50)
ANION GAP SERPL CALCULATED.3IONS-SCNC: 9 MMOL/L (ref 7–15)
AST SERPL W P-5'-P-CCNC: 36 U/L (ref 0–45)
BILIRUB DIRECT SERPL-MCNC: <0.2 MG/DL (ref 0–0.3)
BILIRUB SERPL-MCNC: 0.2 MG/DL
BUN SERPL-MCNC: 20.1 MG/DL (ref 6–20)
CALCIUM SERPL-MCNC: 9.4 MG/DL (ref 8.6–10)
CHLORIDE SERPL-SCNC: 105 MMOL/L (ref 98–107)
CREAT SERPL-MCNC: 0.93 MG/DL (ref 0.51–0.95)
DEPRECATED HCO3 PLAS-SCNC: 26 MMOL/L (ref 22–29)
EGFRCR SERPLBLD CKD-EPI 2021: 74 ML/MIN/1.73M2
ERYTHROCYTE [DISTWIDTH] IN BLOOD BY AUTOMATED COUNT: 11.7 % (ref 10–15)
GLUCOSE SERPL-MCNC: 114 MG/DL (ref 70–99)
HCT VFR BLD AUTO: 42.5 % (ref 35–47)
HGB BLD-MCNC: 14.2 G/DL (ref 11.7–15.7)
MCH RBC QN AUTO: 29.7 PG (ref 26.5–33)
MCHC RBC AUTO-ENTMCNC: 33.4 G/DL (ref 31.5–36.5)
MCV RBC AUTO: 89 FL (ref 78–100)
PLATELET # BLD AUTO: 227 10E3/UL (ref 150–450)
POTASSIUM SERPL-SCNC: 4.8 MMOL/L (ref 3.4–5.3)
PROT SERPL-MCNC: 6.5 G/DL (ref 6.4–8.3)
RBC # BLD AUTO: 4.78 10E6/UL (ref 3.8–5.2)
SIROLIMUS BLD-MCNC: 4.7 UG/L (ref 5–15)
SODIUM SERPL-SCNC: 140 MMOL/L (ref 135–145)
TACROLIMUS BLD-MCNC: 3.2 UG/L (ref 5–15)
TME LAST DOSE: ABNORMAL H
WBC # BLD AUTO: 3.5 10E3/UL (ref 4–11)

## 2023-11-08 PROCEDURE — 80195 ASSAY OF SIROLIMUS: CPT

## 2023-11-08 PROCEDURE — 36415 COLL VENOUS BLD VENIPUNCTURE: CPT

## 2023-11-08 PROCEDURE — 82248 BILIRUBIN DIRECT: CPT

## 2023-11-08 PROCEDURE — 80053 COMPREHEN METABOLIC PANEL: CPT

## 2023-11-08 PROCEDURE — 85027 COMPLETE CBC AUTOMATED: CPT

## 2023-11-08 PROCEDURE — 80197 ASSAY OF TACROLIMUS: CPT

## 2023-11-10 ENCOUNTER — TELEPHONE (OUTPATIENT)
Dept: TRANSPLANT | Facility: CLINIC | Age: 53
End: 2023-11-10
Payer: COMMERCIAL

## 2023-11-10 DIAGNOSIS — R19.7 DIARRHEA: Primary | ICD-10-CM

## 2023-11-10 DIAGNOSIS — Z94.4 STATUS POST LIVER TRANSPLANTATION (H): ICD-10-CM

## 2023-11-10 RX ORDER — TACROLIMUS 1 MG/1
1 CAPSULE ORAL EVERY MORNING
Qty: 7 CAPSULE | Refills: 0 | Status: SHIPPED | OUTPATIENT
Start: 2023-11-10 | End: 2023-11-16 | Stop reason: ALTCHOICE

## 2023-11-10 NOTE — TELEPHONE ENCOUNTER
Pt having diarrhea a few times a day for last couple weeks.    Pt's last sirolimus level 4.7 but 25 hour level. No dose change.    Pt to wean off of tacrolimus. Pt currently taking 1 mg BID. Pt to decrease to 1 mg BID x 7 days. Repeat labs. If stable patient will stop.

## 2023-11-13 LAB

## 2023-11-13 PROCEDURE — 87493 C DIFF AMPLIFIED PROBE: CPT | Mod: 59

## 2023-11-13 PROCEDURE — 87507 IADNA-DNA/RNA PROBE TQ 12-25: CPT

## 2023-11-14 ENCOUNTER — VIRTUAL VISIT (OUTPATIENT)
Dept: PSYCHOLOGY | Facility: CLINIC | Age: 53
End: 2023-11-14
Payer: COMMERCIAL

## 2023-11-14 DIAGNOSIS — F32.1 CURRENT MODERATE EPISODE OF MAJOR DEPRESSIVE DISORDER, UNSPECIFIED WHETHER RECURRENT (H): Primary | ICD-10-CM

## 2023-11-14 PROCEDURE — 90834 PSYTX W PT 45 MINUTES: CPT | Mod: VID | Performed by: STUDENT IN AN ORGANIZED HEALTH CARE EDUCATION/TRAINING PROGRAM

## 2023-11-14 ASSESSMENT — ANXIETY QUESTIONNAIRES
6. BECOMING EASILY ANNOYED OR IRRITABLE: MORE THAN HALF THE DAYS
5. BEING SO RESTLESS THAT IT IS HARD TO SIT STILL: MORE THAN HALF THE DAYS
GAD7 TOTAL SCORE: 14
3. WORRYING TOO MUCH ABOUT DIFFERENT THINGS: MORE THAN HALF THE DAYS
1. FEELING NERVOUS, ANXIOUS, OR ON EDGE: MORE THAN HALF THE DAYS
IF YOU CHECKED OFF ANY PROBLEMS ON THIS QUESTIONNAIRE, HOW DIFFICULT HAVE THESE PROBLEMS MADE IT FOR YOU TO DO YOUR WORK, TAKE CARE OF THINGS AT HOME, OR GET ALONG WITH OTHER PEOPLE: VERY DIFFICULT
GAD7 TOTAL SCORE: 14
2. NOT BEING ABLE TO STOP OR CONTROL WORRYING: MORE THAN HALF THE DAYS
7. FEELING AFRAID AS IF SOMETHING AWFUL MIGHT HAPPEN: MORE THAN HALF THE DAYS
4. TROUBLE RELAXING: MORE THAN HALF THE DAYS

## 2023-11-14 ASSESSMENT — PATIENT HEALTH QUESTIONNAIRE - PHQ9
10. IF YOU CHECKED OFF ANY PROBLEMS, HOW DIFFICULT HAVE THESE PROBLEMS MADE IT FOR YOU TO DO YOUR WORK, TAKE CARE OF THINGS AT HOME, OR GET ALONG WITH OTHER PEOPLE: VERY DIFFICULT
SUM OF ALL RESPONSES TO PHQ QUESTIONS 1-9: 18
SUM OF ALL RESPONSES TO PHQ QUESTIONS 1-9: 18

## 2023-11-14 NOTE — PROGRESS NOTES
[facility]                                     Progress Note    Patient Name: Melita Cortes  Date: 11/07/2023         Service Type: Individual      Session Start Time: 2.30  Session End Time: 3.15     Session Length: 38-52    Session #: 04    Attendees: Client attended alone    Service Modality:  Video Visit:      Provider verified identity through the following two step process.  Patient provided:  Patient is known previously to provider    Telemedicine Visit: The patient's condition can be safely assessed and treated via synchronous audio and visual telemedicine encounter.      Reason for Telemedicine Visit: Patient has requested telehealth visit    Originating Site (Patient Location): Patient's home    Distant Site (Provider Location): Provider Remote Setting- Home Office    Consent:  The patient/guardian has verbally consented to: the potential risks and benefits of telemedicine (video visit) versus in person care; bill my insurance or make self-payment for services provided; and responsibility for payment of non-covered services.     Patient would like the video invitation sent by:  My Chart    Mode of Communication:  Video Conference via Amwell    Distant Location (Provider):  Off-site    As the provider I attest to compliance with applicable laws and regulations related to telemedicine.    DATA  Interactive Complexity: No  Crisis: No        Progress Since Last Session (Related to Symptoms / Goals / Homework):   Symptoms: Worsening  as evident by current phq9 scores    Homework: Completed in session review patient's  goal for treatment and collaboratively develop safety plan.        Episode of Care Goals: Minimal progress - PREPARATION (Decided to change - considering how); Intervened by negotiating a change plan and determining options / strategies for behavior change, identifying triggers, exploring social supports, and working towards setting a date to begin behavior  "change     Current / Ongoing Stressors and Concerns:  Patient reported  \"Trauma Adhd depres\". Patient reported \" I think I have had some trauma in my life, my liver transplant and I think have ADHD.\" Pt reported she is  \"overly sensitive\" and \" I dont handle stress very well and struggle with impulsivity\"  and that \"I have a poor self image, negative talk a lot \" patient also reported she lost her mother in 2019 and her dad 7 months ago and still struggling with grieving the lost of both parents.      Current: Today, patient reported that she feels overwhelmed and struggles with heavy emotions. Reported she has been having a car problem. Patient reported also feeling \"unhinged\" , lonely and isolated as 's work time does not provide much time for both of them to be together. Reported feeling he is distant and pulled away making her feel insecure.      Treatment Objective(s) Addressed in This Session:   Patient will increase daily activity level by exercising for 20-30 minutes and participate in at least 1 daily pleasant/fun activities.  Use safety plan as needed daily.     Intervention:     MI Intervention: Expressed Empathy/Understanding, Supported Autonomy, Collaboration, Evocation, Open-ended questions, and Reflections: simple and complex   Brief Cognitive-Behavioral Therapy for Suicide Prevention (BCBT-SP). Focused today on recent suicidal thoughts, patient-specific suicide triggers and psychosocial factors that triggered suicidal ideation, and collaboratively developed safety plan.     Assessments completed prior to visit:  PHQ9:       6/6/2023    10:39 AM 6/6/2023     1:00 PM 6/29/2023     1:43 PM 8/8/2023     9:45 AM 9/11/2023    11:55 AM 10/23/2023     8:59 AM 11/14/2023     3:13 PM   PHQ-9 SCORE   PHQ-9 Total Score MyChart 11 (Moderate depression)   25 (Severe depression) 12 (Moderate depression)  18 (Moderately severe depression)   PHQ-9 Total Score 11 14 8 25 12 10 18     GAD7:       6/6/2023     " 1:00 PM 6/29/2023     1:43 PM 9/11/2023    12:07 PM 11/14/2023     3:13 PM   JORGE-7 SCORE   Total Score   15 (severe anxiety) 14 (moderate anxiety)   Total Score 14 7 15 14    14         ASSESSMENT: Current Emotional / Mental Status (status of significant symptoms):   Risk status (Self / Other harm or suicidal ideation)   Patient denies current fears or concerns for personal safety.   Patient denies current or recent suicidal ideation or behaviors.   Patient denies current or recent homicidal ideation or behaviors.   Patient denies current or recent self injurious behavior or ideation.   Patient denies other safety concerns.   Patient reports there has been no change in risk factors since their last session.     Patient reports there has been no change in protective factors since their last session.     Recommended that patient call 911 or go to the local ED should there be a change in any of these risk factors.     Appearance:   Appropriate    Eye Contact:   Good    Psychomotor Behavior: Normal    Attitude:   Cooperative  Interested   Orientation:   Person Place Time Situation   Speech    Rate / Production: Normal/ Responsive    Volume:  Normal    Mood:    Anxious  Depressed  Grieving   Affect:    Tearful   Thought Content:  Clear    Thought Form:  Coherent  Logical    Insight:    Good      Medication Review:   No changes to current psychiatric medication(s)     Medication Compliance:   Yes     Changes in Health Issues:   None reported     Chemical Use Review:   Substance Use: Chemical use reviewed, no active concerns identified      Tobacco Use: No current tobacco use.      Diagnosis:  296.32 (F33.1) Major Depressive Disorder, Recurrent Episode, Moderate _ and With atypical features    Collateral Reports Completed:   Not Applicable    PLAN: (Patient Tasks / Therapist Tasks / Other)    Utilize safety plan as needed      CECIL Vasquez    ----- Service Performed and Documented by CECIL------  This note was reviewed  and clinical supervision by ELMO Colmenares [facility]    11/15/2023      _________________________________________________________    Individual Treatment Plan    Patient's Name: Melita Cortes  YOB: 1970    Date of Creation: 09/22/2023  Date Treatment Plan Last Reviewed/Revised: 11/14/2023    DSM5 Diagnoses: 296.32 (F33.1) Major Depressive Disorder, Recurrent Episode, Moderate _ and With atypical features  Psychosocial / Contextual Factors:   PROMIS (reviewed every 90 days):     Referral / Collaboration:  Referral to another professional/service is not indicated at this time..    Anticipated number of session for this episode of care:  15-25  Anticipation frequency of session: Biweekly  Anticipated Duration of each session: 38-52 minutes  Treatment plan will be reviewed in 90 days or when goals have been changed.       MeasurableTreatment Goal(s) related to diagnosis / functional impairment(s)  Goal 1: Patient will identify and address specific triggers to feelings of depression and improve coping by  90 % in the next three months.    I will know I've met my goal when I am less impulsive, better communicator and can comfortably manage distressful emotions.         Objective #A (Patient Action)    Develop and utilize 3 effective distress tolerance strategies to address SI.   Status: Continued - Date(s): 02/15/2024     Intervention(s)   Therapist will engage in collaborative brainstorming with pt to identify and practice individualized distress tolerance coping strategies to address SI weekly.    Objective #B (Patient Action)    Patient will increase daily activity level by exercising for 20-30 minutes and participate in at least 1 daily pleasant/fun activities.  Status: Continued - Date(s):  02/15/2024    Intervention(s)  Therapist will provide psychoeducation, behavioral activation, and cognitive restructuring.    Objective #C  Patient will identify specific areas of cognitive distortion and  "challenge irrational thoughts with reality   Status: Continued - Date(s):   02/15/2024       Intervention(s)  Therapist will provide psychoeducation, behavioral activation, and cognitive restructuring.    Objective #D  Patient will learn and practice relaxation techniques to manage depression.  Status: Continued - Date(s):   02/15/2024    Intervention(s)   Therapist will teach patient thought stopping, deep breathing exercises, mindful meditation, and creative visualization.        Patient has reviewed and agreed to the above plan.      CECIL Vasquez  September 22, 2023          [facility] Matthew Cortes     SAFETY PLAN:  Step 1: Warning signs / cues (Thoughts, images, mood, situation, behavior) that a crisis may be developing:  Thoughts:   \" sometimes I feel like I don't have a purpose\"  Images:  Feels lonely after losing mum and dad and lonely in marriage  Thinking Processes: ruminations (can't stop thinking about my problems): health, family loses  Mood: worsening depression  Behaviors: isolating/withdrawing   Situations: loss: parents and lonely in marriage    Step 2: Coping strategies - Things I can do to take my mind off of my problems without contacting another person (relaxation technique, physical activity):  Distress Tolerance Strategies:  play with my pet , intense exercise: work out for 2-3 minutes , and support group attendance for transplant  Physical Activities: go for a walk and stretching   Focus on helpful thoughts:  \"This is temporary\" and \"It always passes\"  Step 3: People and social settings that provide distraction:   Name:  Augustine ( friend) Phone: [phone]   Name:  Aimee arcelia Phone: [phone]     gym  and [employer] store  Liver transplant group  Step 4: Remind myself of people and things that are important to me and worth living for:    My , cousins and friends.  Step 5: When I am in crisis, I can ask these people to " help me use my safety plan:   Name:  Jake ( Therapist )  Phone: [phone]   Name:  Aimee paniagua Phone: [phone]   Step 6: Making the environment safe:   remove alcohol, remove drugs, and be around others  Step 7: Professionals or agencies I can contact during a crisis:  Suicide Prevention Lifeline: Call or Text 621   [facility]: [phone]    Call 911 or go to my nearest emergency department.   I helped develop this safety plan and agree to use it when needed.  I have been given a copy of this plan.      Client signature _________________________________________________________________  Today s date:  11/14/2023  Completed by Provider Name/ Credentials:  CECIL Vasquez  November 14, 2023  Adapted from Safety Plan Template 2008 Chelsi Marx and Fred Ramos is reprinted with the express permission of the authors.  No portion of the Safety Plan Template may be reproduced without the express, written permission.  You can contact the authors at [email or web address] or [email or web address].        Answers submitted by the patient for this visit:  Patient Health Questionnaire (Submitted on 11/14/2023)  If you checked off any problems, how difficult have these problems made it for you to do your work, take care of things at home, or get along with other people?: Very difficult  PHQ9 TOTAL SCORE: 18  JORGE-7 (Submitted on 11/14/2023)  JORGE 7 TOTAL SCORE: 14

## 2023-11-15 ENCOUNTER — LAB (OUTPATIENT)
Dept: LAB | Facility: CLINIC | Age: 53
End: 2023-11-15
Payer: COMMERCIAL

## 2023-11-15 DIAGNOSIS — Z94.4 STATUS POST LIVER TRANSPLANTATION (H): ICD-10-CM

## 2023-11-15 DIAGNOSIS — Z94.4 LIVER REPLACED BY TRANSPLANT (H): ICD-10-CM

## 2023-11-15 LAB
ALBUMIN SERPL BCG-MCNC: 4.1 G/DL (ref 3.5–5.2)
ALP SERPL-CCNC: 73 U/L (ref 40–150)
ALT SERPL W P-5'-P-CCNC: 32 U/L (ref 0–50)
ANION GAP SERPL CALCULATED.3IONS-SCNC: 8 MMOL/L (ref 7–15)
AST SERPL W P-5'-P-CCNC: 30 U/L (ref 0–45)
BILIRUB DIRECT SERPL-MCNC: <0.2 MG/DL (ref 0–0.3)
BILIRUB SERPL-MCNC: 0.3 MG/DL
BUN SERPL-MCNC: 18.2 MG/DL (ref 6–20)
CALCIUM SERPL-MCNC: 9 MG/DL (ref 8.6–10)
CHLORIDE SERPL-SCNC: 106 MMOL/L (ref 98–107)
CREAT SERPL-MCNC: 0.98 MG/DL (ref 0.51–0.95)
DEPRECATED HCO3 PLAS-SCNC: 25 MMOL/L (ref 22–29)
EGFRCR SERPLBLD CKD-EPI 2021: 69 ML/MIN/1.73M2
ERYTHROCYTE [DISTWIDTH] IN BLOOD BY AUTOMATED COUNT: 11.5 % (ref 10–15)
GLUCOSE SERPL-MCNC: 145 MG/DL (ref 70–99)
HCT VFR BLD AUTO: 42.2 % (ref 35–47)
HGB BLD-MCNC: 14.1 G/DL (ref 11.7–15.7)
MCH RBC QN AUTO: 29.7 PG (ref 26.5–33)
MCHC RBC AUTO-ENTMCNC: 33.4 G/DL (ref 31.5–36.5)
MCV RBC AUTO: 89 FL (ref 78–100)
PLATELET # BLD AUTO: 232 10E3/UL (ref 150–450)
POTASSIUM SERPL-SCNC: 5.1 MMOL/L (ref 3.4–5.3)
PROT SERPL-MCNC: 6.4 G/DL (ref 6.4–8.3)
RBC # BLD AUTO: 4.74 10E6/UL (ref 3.8–5.2)
SIROLIMUS BLD-MCNC: 6.1 UG/L (ref 5–15)
SODIUM SERPL-SCNC: 139 MMOL/L (ref 135–145)
TACROLIMUS BLD-MCNC: 2 UG/L (ref 5–15)
TME LAST DOSE: ABNORMAL H
TME LAST DOSE: ABNORMAL H
TME LAST DOSE: NORMAL H
TME LAST DOSE: NORMAL H
WBC # BLD AUTO: 3.5 10E3/UL (ref 4–11)

## 2023-11-15 PROCEDURE — 36415 COLL VENOUS BLD VENIPUNCTURE: CPT

## 2023-11-15 PROCEDURE — 85027 COMPLETE CBC AUTOMATED: CPT

## 2023-11-15 PROCEDURE — 80053 COMPREHEN METABOLIC PANEL: CPT

## 2023-11-15 PROCEDURE — 80197 ASSAY OF TACROLIMUS: CPT

## 2023-11-15 PROCEDURE — 82248 BILIRUBIN DIRECT: CPT

## 2023-11-15 PROCEDURE — 80195 ASSAY OF SIROLIMUS: CPT

## 2023-11-16 ENCOUNTER — MYC MEDICAL ADVICE (OUTPATIENT)
Dept: FAMILY MEDICINE | Facility: CLINIC | Age: 53
End: 2023-11-16
Payer: COMMERCIAL

## 2023-11-16 ENCOUNTER — TELEPHONE (OUTPATIENT)
Dept: TRANSPLANT | Facility: CLINIC | Age: 53
End: 2023-11-16
Payer: COMMERCIAL

## 2023-11-16 NOTE — TELEPHONE ENCOUNTER
Fidelina developed canker sores in mouth, rash on hands, likely related to sirolimus.  Fidelina would like to continue sirolimus.  Tacrolimus wean completed, will repeat labs in one week.

## 2023-11-22 ENCOUNTER — OFFICE VISIT (OUTPATIENT)
Dept: ORTHOPEDICS | Facility: CLINIC | Age: 53
End: 2023-11-22
Attending: INTERNAL MEDICINE
Payer: COMMERCIAL

## 2023-11-22 ENCOUNTER — ANCILLARY PROCEDURE (OUTPATIENT)
Dept: GENERAL RADIOLOGY | Facility: CLINIC | Age: 53
End: 2023-11-22
Attending: FAMILY MEDICINE
Payer: COMMERCIAL

## 2023-11-22 ENCOUNTER — PRE VISIT (OUTPATIENT)
Dept: ORTHOPEDICS | Facility: CLINIC | Age: 53
End: 2023-11-22

## 2023-11-22 ENCOUNTER — LAB (OUTPATIENT)
Dept: LAB | Facility: CLINIC | Age: 53
End: 2023-11-22
Payer: COMMERCIAL

## 2023-11-22 DIAGNOSIS — Z94.4 STATUS POST LIVER TRANSPLANTATION (H): ICD-10-CM

## 2023-11-22 DIAGNOSIS — M25.561 ACUTE PAIN OF RIGHT KNEE: ICD-10-CM

## 2023-11-22 DIAGNOSIS — M25.561 RIGHT KNEE PAIN: Primary | ICD-10-CM

## 2023-11-22 DIAGNOSIS — M25.561 RIGHT KNEE PAIN: ICD-10-CM

## 2023-11-22 LAB
ALBUMIN SERPL BCG-MCNC: 3.9 G/DL (ref 3.5–5.2)
ALP SERPL-CCNC: 69 U/L (ref 40–150)
ALT SERPL W P-5'-P-CCNC: 38 U/L (ref 0–50)
ANION GAP SERPL CALCULATED.3IONS-SCNC: 7 MMOL/L (ref 7–15)
AST SERPL W P-5'-P-CCNC: 38 U/L (ref 0–45)
BILIRUB DIRECT SERPL-MCNC: <0.2 MG/DL (ref 0–0.3)
BILIRUB SERPL-MCNC: 0.2 MG/DL
BUN SERPL-MCNC: 18.3 MG/DL (ref 6–20)
CALCIUM SERPL-MCNC: 8.9 MG/DL (ref 8.6–10)
CHLORIDE SERPL-SCNC: 107 MMOL/L (ref 98–107)
CREAT SERPL-MCNC: 0.96 MG/DL (ref 0.51–0.95)
DEPRECATED HCO3 PLAS-SCNC: 25 MMOL/L (ref 22–29)
EGFRCR SERPLBLD CKD-EPI 2021: 71 ML/MIN/1.73M2
ERYTHROCYTE [DISTWIDTH] IN BLOOD BY AUTOMATED COUNT: 11.5 % (ref 10–15)
GLUCOSE SERPL-MCNC: 121 MG/DL (ref 70–99)
HCT VFR BLD AUTO: 39.9 % (ref 35–47)
HGB BLD-MCNC: 13.2 G/DL (ref 11.7–15.7)
MCH RBC QN AUTO: 29 PG (ref 26.5–33)
MCHC RBC AUTO-ENTMCNC: 33.1 G/DL (ref 31.5–36.5)
MCV RBC AUTO: 88 FL (ref 78–100)
PLATELET # BLD AUTO: 173 10E3/UL (ref 150–450)
POTASSIUM SERPL-SCNC: 4.4 MMOL/L (ref 3.4–5.3)
PROT SERPL-MCNC: 6 G/DL (ref 6.4–8.3)
RBC # BLD AUTO: 4.55 10E6/UL (ref 3.8–5.2)
SIROLIMUS BLD-MCNC: 5.5 UG/L (ref 5–15)
SODIUM SERPL-SCNC: 139 MMOL/L (ref 135–145)
TME LAST DOSE: NORMAL H
TME LAST DOSE: NORMAL H
WBC # BLD AUTO: 3.9 10E3/UL (ref 4–11)

## 2023-11-22 PROCEDURE — 20610 DRAIN/INJ JOINT/BURSA W/O US: CPT | Mod: RT | Performed by: FAMILY MEDICINE

## 2023-11-22 PROCEDURE — 85027 COMPLETE CBC AUTOMATED: CPT

## 2023-11-22 PROCEDURE — 36415 COLL VENOUS BLD VENIPUNCTURE: CPT

## 2023-11-22 PROCEDURE — 80195 ASSAY OF SIROLIMUS: CPT

## 2023-11-22 PROCEDURE — 82248 BILIRUBIN DIRECT: CPT

## 2023-11-22 PROCEDURE — 80053 COMPREHEN METABOLIC PANEL: CPT

## 2023-11-22 PROCEDURE — 73564 X-RAY EXAM KNEE 4 OR MORE: CPT | Mod: RT | Performed by: RADIOLOGY

## 2023-11-22 PROCEDURE — 99204 OFFICE O/P NEW MOD 45 MIN: CPT | Mod: 25 | Performed by: FAMILY MEDICINE

## 2023-11-22 RX ADMIN — TRIAMCINOLONE ACETONIDE 40 MG: 40 INJECTION, SUSPENSION INTRA-ARTICULAR; INTRAMUSCULAR at 16:47
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
## 2023-11-22 NOTE — NURSING NOTE
[facility]   ORTHOPEDICS & SPORTS MEDICINE  [address] [place] [address]  Dept: [phone]  ______________________________________________________________________________    Patient: Melita Cortes   : 1970   MRN: [id number]   2023    INVASIVE PROCEDURE SAFETY CHECKLIST    Date: 2023   Procedure:Right knee injection  Patient Name: Melita Cortes  MRN: [id number]  YOB: 1970    Action: Complete sections as appropriate. Any discrepancy results in a HARD COPY until resolved.     PRE PROCEDURE:  Patient ID verified with 2 identifiers (name and  or MRN): Yes  Procedure and site verified with patient/designee (when able): Yes  Accurate consent documentation in medical record: Yes  H&P (or appropriate assessment) documented in medical record: NA  H&P must be up to 20 days prior to procedure and updates within 24 hours of procedure as applicable: NA  Relevant diagnostic and radiology test results appropriately labeled and displayed as applicable: Yes  Procedure site(s) marked with provider initials: Yes    TIMEOUT:  Time-Out performed immediately prior to starting procedure, including verbal and active participation of all team members addressing the following:Yes  * Correct patient identify  * Confirmed that the correct side and site are marked  * An accurate procedure consent form  * Agreement on the procedure to be done  * Correct patient position  * Relevant images and results are properly labeled and appropriately displayed  * The need to administer antibiotics or fluids for irrigation purposes during the procedure as applicable   * Safety precautions based on patient history or medication use    DURING PROCEDURE: Verification of correct person, site, and procedures any time the responsibility for care of the patient is transferred to another member of the care team.       Prior to injection, verified patient identity using patient's name and date  of birth.  Due to injection administration, patient instructed to remain in clinic for 15 minutes  afterwards, and to report any adverse reaction to me immediately.    Joint injection was performed.      Drug Amount Wasted:  None.  Vial/Syringe: Single dose vial  Expiration Date:  06/01/2025      Vickie Real, ATC  November 22, 2023

## 2023-11-27 RX ORDER — TRIAMCINOLONE ACETONIDE 40 MG/ML
40 INJECTION, SUSPENSION INTRA-ARTICULAR; INTRAMUSCULAR
Status: SHIPPED | OUTPATIENT
Start: 2023-11-22

## 2023-11-28 ENCOUNTER — MYC MEDICAL ADVICE (OUTPATIENT)
Dept: FAMILY MEDICINE | Facility: CLINIC | Age: 53
End: 2023-11-28
Payer: COMMERCIAL

## 2023-11-28 ENCOUNTER — TELEPHONE (OUTPATIENT)
Dept: TRANSPLANT | Facility: CLINIC | Age: 53
End: 2023-11-28
Payer: COMMERCIAL

## 2023-11-28 ENCOUNTER — VIRTUAL VISIT (OUTPATIENT)
Dept: PSYCHOLOGY | Facility: CLINIC | Age: 53
End: 2023-11-28
Payer: COMMERCIAL

## 2023-11-28 DIAGNOSIS — Z94.4 LIVER REPLACED BY TRANSPLANT (H): Primary | ICD-10-CM

## 2023-11-28 DIAGNOSIS — F32.1 CURRENT MODERATE EPISODE OF MAJOR DEPRESSIVE DISORDER, UNSPECIFIED WHETHER RECURRENT (H): Primary | ICD-10-CM

## 2023-11-28 DIAGNOSIS — F33.0 MILD RECURRENT MAJOR DEPRESSION (H): Primary | ICD-10-CM

## 2023-11-28 PROCEDURE — 90834 PSYTX W PT 45 MINUTES: CPT | Mod: VID | Performed by: STUDENT IN AN ORGANIZED HEALTH CARE EDUCATION/TRAINING PROGRAM

## 2023-11-28 RX ORDER — TACROLIMUS 1 MG/1
1 CAPSULE ORAL 2 TIMES DAILY
Qty: 180 CAPSULE | Refills: 3 | Status: SHIPPED | OUTPATIENT
Start: 2023-11-28 | End: 2024-02-27 | Stop reason: DRUGHIGH

## 2023-11-28 ASSESSMENT — ANXIETY QUESTIONNAIRES
2. NOT BEING ABLE TO STOP OR CONTROL WORRYING: NEARLY EVERY DAY
GAD7 TOTAL SCORE: 21
5. BEING SO RESTLESS THAT IT IS HARD TO SIT STILL: NEARLY EVERY DAY
4. TROUBLE RELAXING: NEARLY EVERY DAY
IF YOU CHECKED OFF ANY PROBLEMS ON THIS QUESTIONNAIRE, HOW DIFFICULT HAVE THESE PROBLEMS MADE IT FOR YOU TO DO YOUR WORK, TAKE CARE OF THINGS AT HOME, OR GET ALONG WITH OTHER PEOPLE: EXTREMELY DIFFICULT
3. WORRYING TOO MUCH ABOUT DIFFERENT THINGS: NEARLY EVERY DAY
6. BECOMING EASILY ANNOYED OR IRRITABLE: NEARLY EVERY DAY
7. FEELING AFRAID AS IF SOMETHING AWFUL MIGHT HAPPEN: NEARLY EVERY DAY
1. FEELING NERVOUS, ANXIOUS, OR ON EDGE: NEARLY EVERY DAY
GAD7 TOTAL SCORE: 21

## 2023-11-28 ASSESSMENT — PATIENT HEALTH QUESTIONNAIRE - PHQ9
SUM OF ALL RESPONSES TO PHQ QUESTIONS 1-9: 14
SUM OF ALL RESPONSES TO PHQ QUESTIONS 1-9: 14
10. IF YOU CHECKED OFF ANY PROBLEMS, HOW DIFFICULT HAVE THESE PROBLEMS MADE IT FOR YOU TO DO YOUR WORK, TAKE CARE OF THINGS AT HOME, OR GET ALONG WITH OTHER PEOPLE: EXTREMELY DIFFICULT

## 2023-11-28 NOTE — PROGRESS NOTES
[facility]                                     Progress Note    Patient Name: Melita Cortes  Date: 11/28/2023         Service Type: Individual      Session Start Time: 3.30  Session End Time: 4.15     Session Length: 38-52    Session #: 05    Attendees: Client attended alone    Service Modality:  Video Visit:      Provider verified identity through the following two step process.  Patient provided:  Patient is known previously to provider    Telemedicine Visit: The patient's condition can be safely assessed and treated via synchronous audio and visual telemedicine encounter.      Reason for Telemedicine Visit: Patient has requested telehealth visit    Originating Site (Patient Location): Patient's home    Distant Site (Provider Location): Provider Remote Setting- Home Office    Consent:  The patient/guardian has verbally consented to: the potential risks and benefits of telemedicine (video visit) versus in person care; bill my insurance or make self-payment for services provided; and responsibility for payment of non-covered services.     Patient would like the video invitation sent by:  My Chart    Mode of Communication:  Video Conference via Amwell    Distant Location (Provider):  Off-site    As the provider I attest to compliance with applicable laws and regulations related to telemedicine.    DATA  Interactive Complexity: No  Crisis: No        Progress Since Last Session (Related to Symptoms / Goals / Homework):   Symptoms: Worsening  as evident by current phq9 scores    Homework: Completed in session review patient's  goal for treatment and collaboratively develop safety plan.        Episode of Care Goals: Minimal progress - PREPARATION (Decided to change - considering how); Intervened by negotiating a change plan and determining options / strategies for behavior change, identifying triggers, exploring social supports, and working towards setting a date to begin behavior  "change     Current / Ongoing Stressors and Concerns:  Patient reported  \"Trauma Adhd depres\". Patient reported \" I think I have had some trauma in my life, my liver transplant and I think have ADHD.\" Pt reported she is  \"overly sensitive\" and \" I dont handle stress very well and struggle with impulsivity\"  and that \"I have a poor self image, negative talk a lot \" patient also reported she lost her mother in 2019 and her dad 7 months ago and still struggling with grieving the lost of both parents.      Current: Today, patient reported she continue to feel  overwhelmed and struggles with heavy emotions. Reported feeling she \"always get the shot end of the stick\" with regards to her relationship with her  dad, and that every other family member has a good image of him different from the negative image and experience she has and got frustrated and angry that she has removed all the pictures of him from her lining room wall and kept them away.     Treatment Objective(s) Addressed in This Session:   Patient will increase daily activity level by exercising for 20-30 minutes and participate in at least 1 daily pleasant/fun activities.  Use safety plan as needed daily.     Intervention:    DBT: Utilize a STOP skill; stop, take a step back, observe and proceed mindfully with patient today to help them learn healthy ways to regulate distressful emotions. Couched patient that when faced with a difficult situation, it may be hard to think about how to deal with it on the spot. When they feel that their emotions seem to be in control, stop! Don't react and give themselves some time to calm down and think. Observe what is happening around them and within them, who is involved, and what are other people doing or saying and listen to the Automatic Negative Thoughts and proceed mindfully by asking yourself,  What do I want from this situation?  or  What are my goals?  or  What choice might make this situation better or " worse?      Assessments completed prior to visit:  PHQ9:       6/6/2023     1:00 PM 6/29/2023     1:43 PM 8/8/2023     9:45 AM 9/11/2023    11:55 AM 10/23/2023     8:59 AM 11/14/2023     3:13 PM 11/28/2023     9:28 AM   PHQ-9 SCORE   PHQ-9 Total Score MyChart   25 (Severe depression) 12 (Moderate depression)  18 (Moderately severe depression) 14 (Moderate depression)   PHQ-9 Total Score 14 8 25 12 10 18 14     GAD7:       6/6/2023     1:00 PM 6/29/2023     1:43 PM 9/11/2023    12:07 PM 11/14/2023     3:13 PM 11/28/2023     9:29 AM   JORGE-7 SCORE   Total Score   15 (severe anxiety) 14 (moderate anxiety) 21 (severe anxiety)   Total Score 14 7 15 14    14 21         ASSESSMENT: Current Emotional / Mental Status (status of significant symptoms):   Risk status (Self / Other harm or suicidal ideation)   Patient denies current fears or concerns for personal safety.   Patient denies current or recent suicidal ideation or behaviors.   Patient denies current or recent homicidal ideation or behaviors.   Patient denies current or recent self injurious behavior or ideation.   Patient denies other safety concerns.   Patient reports there has been no change in risk factors since their last session.     Patient reports there has been no change in protective factors since their last session.     Recommended that patient call 911 or go to the local ED should there be a change in any of these risk factors.     Appearance:   Appropriate    Eye Contact:   Good    Psychomotor Behavior: Normal    Attitude:   Cooperative  Interested   Orientation:   Person Place Time Situation   Speech    Rate / Production: Normal/ Responsive    Volume:  Normal    Mood:    Anxious  Depressed  Grieving   Affect:    Tearful   Thought Content:  Clear    Thought Form:  Coherent  Logical    Insight:    Good      Medication Review:   No changes to current psychiatric medication(s)     Medication Compliance:   Yes     Changes in Health Issues:   None  reported     Chemical Use Review:   Substance Use: Chemical use reviewed, no active concerns identified      Tobacco Use: No current tobacco use.      Diagnosis:  296.32 (F33.1) Major Depressive Disorder, Recurrent Episode, Moderate _ and With atypical features    Collateral Reports Completed:   Not Applicable    PLAN: (Patient Tasks / Therapist Tasks / Other)    Utilize safety plan as needed  Use DBT skill learned in session daily to regulate self and manage distressful emotions.    Jake Coyne [facility]    ----- Service Performed and Documented by [facility]------  This note was reviewed and clinical supervision by ELMO Colmenares [facility]    11/29/2023    _________________________________________________________    Individual Treatment Plan    Patient's Name: Melita Cortes  YOB: 1970    Date of Creation: 09/22/2023  Date Treatment Plan Last Reviewed/Revised: 11/14/2023    DSM5 Diagnoses: 296.32 (F33.1) Major Depressive Disorder, Recurrent Episode, Moderate _ and With atypical features  Psychosocial / Contextual Factors:   PROMIS (reviewed every 90 days):     Referral / Collaboration:  Referral to another professional/service is not indicated at this time..    Anticipated number of session for this episode of care:  15-25  Anticipation frequency of session: Biweekly  Anticipated Duration of each session: 38-52 minutes  Treatment plan will be reviewed in 90 days or when goals have been changed.       MeasurableTreatment Goal(s) related to diagnosis / functional impairment(s)  Goal 1: Patient will identify and address specific triggers to feelings of depression and improve coping by  90 % in the next three months.    I will know I've met my goal when I am less impulsive, better communicator and can comfortably manage distressful emotions.         Objective #A (Patient Action)    Develop and utilize 3 effective distress tolerance strategies to address SI.   Status: Continued - Date(s):  "02/15/2024     Intervention(s)   Therapist will engage in collaborative brainstorming with pt to identify and practice individualized distress tolerance coping strategies to address SI weekly.    Objective #B (Patient Action)    Patient will increase daily activity level by exercising for 20-30 minutes and participate in at least 1 daily pleasant/fun activities.  Status: Continued - Date(s):  02/15/2024    Intervention(s)  Therapist will provide psychoeducation, behavioral activation, and cognitive restructuring.    Objective #C  Patient will identify specific areas of cognitive distortion and challenge irrational thoughts with reality   Status: Continued - Date(s):   02/15/2024       Intervention(s)  Therapist will provide psychoeducation, behavioral activation, and cognitive restructuring.    Objective #D  Patient will learn and practice relaxation techniques to manage depression.  Status: Continued - Date(s):   02/15/2024    Intervention(s)   Therapist will teach patient thought stopping, deep breathing exercises, mindful meditation, and creative visualization.        Patient has reviewed and agreed to the above plan.      Jake Coyne, CECIL  September 22, 2023          [facility]                                       Melita Cortes     SAFETY PLAN:  Step 1: Warning signs / cues (Thoughts, images, mood, situation, behavior) that a crisis may be developing:  Thoughts:   \" sometimes I feel like I don't have a purpose\"  Images:  Feels lonely after losing mum and dad and lonely in marriage  Thinking Processes: ruminations (can't stop thinking about my problems): health, family loses  Mood: worsening depression  Behaviors: isolating/withdrawing   Situations: loss: parents and lonely in marriage    Step 2: Coping strategies - Things I can do to take my mind off of my problems without contacting another person (relaxation technique, physical activity):  Distress Tolerance Strategies:  play with my pet " ", intense exercise: work out for 2-3 minutes , and support group attendance for transplant  Physical Activities: go for a walk and stretching   Focus on helpful thoughts:  \"This is temporary\" and \"It always passes\"  Step 3: People and social settings that provide distraction:   Name:  Augustine ( friend) Phone: [phone]   Name:  Aimee paniagua Phone: [phone]     gym  and [employer] store  Liver transplant group  Step 4: Remind myself of people and things that are important to me and worth living for:    My , cousins and friends.  Step 5: When I am in crisis, I can ask these people to help me use my safety plan:   Name:  Jake ( Therapist )  Phone: [phone]   Name:  Aimee paniagua Phone: [phone]   Step 6: Making the environment safe:   remove alcohol, remove drugs, and be around others  Step 7: Professionals or agencies I can contact during a crisis:  Suicide Prevention Lifeline: Call or Text 194   [facility]: [phone]    Call 911 or go to my nearest emergency department.   I helped develop this safety plan and agree to use it when needed.  I have been given a copy of this plan.      Client signature _________________________________________________________________  Today s date:  11/14/2023  Completed by Provider Name/ Credentials:  CECIL Vasquez  November 14, 2023  Adapted from Safety Plan Template 2008 Chelsi Ramos is reprinted with the express permission of the authors.  No portion of the Safety Plan Template may be reproduced without the express, written permission.  You can contact the authors at [email or web address] or [email or web address].        Answers submitted by the patient for this visit:  Patient Health Questionnaire (Submitted on 11/14/2023)  If you checked off any problems, how difficult have these problems made it for you to do your work, take care of things at home, or get along with other people?: Very difficult  PHQ9 TOTAL SCORE: " 18  JORGE-7 (Submitted on 11/14/2023)  JORGE 7 TOTAL SCORE: 14    Answers submitted by the patient for this visit:  Patient Health Questionnaire (Submitted on 11/28/2023)  If you checked off any problems, how difficult have these problems made it for you to do your work, take care of things at home, or get along with other people?: Extremely difficult  PHQ9 TOTAL SCORE: 14  JORGE-7 (Submitted on 11/28/2023)  JORGE 7 TOTAL SCORE: 21

## 2023-11-28 NOTE — TELEPHONE ENCOUNTER
Pt does not like side effects of sirolimus. Discussed with patient and dr thomas. Pt prefers to go back to tacro. Pt will start 1 mg BID. Pt to do labs in 3-5 days of starting tacro.

## 2023-11-29 RX ORDER — BUPROPION HYDROCHLORIDE 300 MG/1
300 TABLET ORAL EVERY MORNING
Qty: 90 TABLET | Refills: 1 | Status: SHIPPED | OUTPATIENT
Start: 2023-11-29 | End: 2024-03-19

## 2023-11-30 ENCOUNTER — TELEPHONE (OUTPATIENT)
Dept: TRANSPLANT | Facility: CLINIC | Age: 53
End: 2023-11-30
Payer: COMMERCIAL

## 2023-11-30 DIAGNOSIS — Z94.4 LIVER REPLACED BY TRANSPLANT (H): Primary | ICD-10-CM

## 2023-11-30 NOTE — TELEPHONE ENCOUNTER
Left message for patient to go over medication switch back to tacrolimus. Sent my chart message with directions as well.

## 2023-12-01 ENCOUNTER — TELEPHONE (OUTPATIENT)
Dept: TRANSPLANT | Facility: CLINIC | Age: 53
End: 2023-12-01

## 2023-12-01 ENCOUNTER — OFFICE VISIT (OUTPATIENT)
Dept: DERMATOLOGY | Facility: CLINIC | Age: 53
End: 2023-12-01
Payer: COMMERCIAL

## 2023-12-01 ENCOUNTER — LAB (OUTPATIENT)
Dept: LAB | Facility: CLINIC | Age: 53
End: 2023-12-01
Payer: COMMERCIAL

## 2023-12-01 DIAGNOSIS — L82.1 SEBORRHEIC KERATOSIS: ICD-10-CM

## 2023-12-01 DIAGNOSIS — Z85.828 HISTORY OF NONMELANOMA SKIN CANCER: Primary | ICD-10-CM

## 2023-12-01 DIAGNOSIS — Z94.4 STATUS POST LIVER TRANSPLANTATION (H): ICD-10-CM

## 2023-12-01 DIAGNOSIS — Z92.25 HISTORY OF IMMUNOSUPPRESSION: ICD-10-CM

## 2023-12-01 DIAGNOSIS — D18.01 CHERRY ANGIOMA: ICD-10-CM

## 2023-12-01 DIAGNOSIS — L30.9 CHRONIC DERMATITIS OF HANDS: ICD-10-CM

## 2023-12-01 DIAGNOSIS — L81.4 LENTIGINES: ICD-10-CM

## 2023-12-01 DIAGNOSIS — D22.9 MULTIPLE BENIGN NEVI: ICD-10-CM

## 2023-12-01 DIAGNOSIS — Z94.4 LIVER REPLACED BY TRANSPLANT (H): ICD-10-CM

## 2023-12-01 DIAGNOSIS — L56.8 ACTINIC CHEILITIS: ICD-10-CM

## 2023-12-01 LAB
ALBUMIN SERPL BCG-MCNC: 4.1 G/DL (ref 3.5–5.2)
ALP SERPL-CCNC: 75 U/L (ref 40–150)
ALT SERPL W P-5'-P-CCNC: 34 U/L (ref 0–50)
ANION GAP SERPL CALCULATED.3IONS-SCNC: 7 MMOL/L (ref 7–15)
AST SERPL W P-5'-P-CCNC: 32 U/L (ref 0–45)
BILIRUB DIRECT SERPL-MCNC: <0.2 MG/DL (ref 0–0.3)
BILIRUB SERPL-MCNC: 0.3 MG/DL
BUN SERPL-MCNC: 19.8 MG/DL (ref 6–20)
CALCIUM SERPL-MCNC: 9.2 MG/DL (ref 8.6–10)
CHLORIDE SERPL-SCNC: 107 MMOL/L (ref 98–107)
CREAT SERPL-MCNC: 1.01 MG/DL (ref 0.51–0.95)
DEPRECATED HCO3 PLAS-SCNC: 26 MMOL/L (ref 22–29)
EGFRCR SERPLBLD CKD-EPI 2021: 67 ML/MIN/1.73M2
ERYTHROCYTE [DISTWIDTH] IN BLOOD BY AUTOMATED COUNT: 11.5 % (ref 10–15)
GLUCOSE SERPL-MCNC: 100 MG/DL (ref 70–99)
HCT VFR BLD AUTO: 42.3 % (ref 35–47)
HGB BLD-MCNC: 14.3 G/DL (ref 11.7–15.7)
MCH RBC QN AUTO: 28.9 PG (ref 26.5–33)
MCHC RBC AUTO-ENTMCNC: 33.8 G/DL (ref 31.5–36.5)
MCV RBC AUTO: 86 FL (ref 78–100)
PLATELET # BLD AUTO: 179 10E3/UL (ref 150–450)
POTASSIUM SERPL-SCNC: 4.4 MMOL/L (ref 3.4–5.3)
PROT SERPL-MCNC: 6.4 G/DL (ref 6.4–8.3)
RBC # BLD AUTO: 4.94 10E6/UL (ref 3.8–5.2)
SODIUM SERPL-SCNC: 140 MMOL/L (ref 135–145)
TACROLIMUS BLD-MCNC: 2.7 UG/L (ref 5–15)
TME LAST DOSE: ABNORMAL H
TME LAST DOSE: ABNORMAL H
WBC # BLD AUTO: 5.2 10E3/UL (ref 4–11)

## 2023-12-01 PROCEDURE — 80197 ASSAY OF TACROLIMUS: CPT | Performed by: INTERNAL MEDICINE

## 2023-12-01 PROCEDURE — 85027 COMPLETE CBC AUTOMATED: CPT | Performed by: PATHOLOGY

## 2023-12-01 PROCEDURE — 99214 OFFICE O/P EST MOD 30 MIN: CPT | Performed by: DERMATOLOGY

## 2023-12-01 PROCEDURE — 99000 SPECIMEN HANDLING OFFICE-LAB: CPT | Performed by: PATHOLOGY

## 2023-12-01 PROCEDURE — 80053 COMPREHEN METABOLIC PANEL: CPT | Performed by: PATHOLOGY

## 2023-12-01 PROCEDURE — 36415 COLL VENOUS BLD VENIPUNCTURE: CPT | Performed by: PATHOLOGY

## 2023-12-01 PROCEDURE — 82248 BILIRUBIN DIRECT: CPT | Performed by: PATHOLOGY

## 2023-12-01 RX ORDER — CLOBETASOL PROPIONATE 0.5 MG/G
OINTMENT TOPICAL 2 TIMES DAILY
Qty: 60 G | Refills: 3 | Status: SHIPPED | OUTPATIENT
Start: 2023-12-01 | End: 2024-01-24

## 2023-12-01 NOTE — TELEPHONE ENCOUNTER
Transplant Social Work Services Progress Note    Data: Melita is s/p  donor liver transplant.   Intervention: I met with Fidelina and Doug at the [facility] to review Medicare education. Fidelina will be eligible for Medicare 3/2024 as she will have been on SSDI for 24 months. I provided education about Medicare A, B, D, Medigap and Advantage plans. She will have her employer insurance at least through 2024, and pays about $400 for her premiums. They are pleased with her out of pocket costs. I reviewed that being eligible for Medicare with her SSDI is not longstanding, and that could go away if the [facility] deems her not disabled. I provided them additional resource of the Senior Linkage Line to review further information and select a plan. They will likely stay with her employer insurance and just elect part A of Medicare.   Fidelina shared the letter she received from her donor family, and is planning to reach out to them again.   Assessment: No new assessment  Education provided by SW: Medicare  Plan: Fidelina and Doug know how to reach me if they have further questions    ELMO Betts, [facility]  Liver Transplant   [facility]  Phone: [phone]  Pager: [phone]

## 2023-12-01 NOTE — PROGRESS NOTES
[facility] Dermatology Note  Encounter Date: Dec 1, 2023  Office Visit     Dermatology Problem List:  1. History of immunosuppression s/p liver transplant 2/2 alcoholic cirrhosis 1/7/2022  - Current tx: tacrolimus, prednisone  2. 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 Rhytides   - Current tx: tretinoin 0.025% cream  4. AK, s/p cryo  5. Urticaria and dermatographism  6. Intertrigo  -Current tx: ketoconazole  7. Cosmetics  - botox  - juvederm  8. Benign biopsies  - Traumatized angioma, left 4th digit, s/p biopsy 12/10/2021  9. Actinic cheilitis, resolving  10. Dermatitis of hands, chronic  - Current tx: Clobetasol 0.05% ointment BID prn.     Social:     ____________________________________________    Assessment & Plan:    # Dermatitis of hands, chronic - not sure if can tie to change from tacrolimus to sirolimus; dad did have h/o eczema  - Advised daily moisturization with bland emollient.   - Start Clobetasol 0.05% ointment BID prn    # Actinic cheilitis, resolving.  - Appears improved today.  - Continue sun protection.    # Aphthous ulcer.  - resolved with clobetasol gel     # Benign lesions - SKs, cherry angiomas, lentigenes.  - No treatment required    # Multiple benign nevi.   - Monitor for ABCDEs of melanoma   - Continue sun protection - recommend SPF 30 or higher with frequent application   - Return sooner if noticing changing or symptomatic lesions    # History of organ transplant, liver transplant in setting of alcoholic cirrhosis.   - Patient aware that she is at higher risk for cutaneous malignancy given history of transplant.   - Monitor for changing or symptomatic lesions.   - Continue frequent skin checks and photoprotection.     # History of NMSC. No evidence of recurrent disease.  - Continue photoprotection - recommend SPF 30 or higher with frequent reapplication  - Continue yearly skin exams  - Advised to monitor for changing, non-healing, bleeding,  "painful, changing, or otherwise symptomatic lesions      Procedures Performed:   None.    Follow-up: 1 year(s) in-person, or earlier for new or changing lesions    Staff and Scribe:     Scribe Disclosure:   I, JUDY SHERMAN, am serving as a scribe; to document services personally performed by Di Alberto MD -based on data collection and the provider's statements to me.    Provider Disclosure:   The documentation recorded by the scribe accurately reflects the services I personally performed and the decisions made by me.    Di Alberto MD    Department of Dermatology  [facility]: Phone: [phone], Fax: [phone]  12/1/2023     ____________________________________________    CC: Skin Check (FBSE and re-check  right lower mucosal lip: \"better\"/Hands are \"sore\" and \"raw feeling\")    HPI:  Ms. Melita Cortes is a(n) 53 year old female who presents today as a return patient for FBSE.    The patient would like her right lower mucosal lip checked. Has improved.    The patient reports that her hands are sore and \"raw feeling\". However, her friend told her a few weeks ago that she \"has the softest hands in the world.\" Switched from tacrolimus to sirolimus. Since the switch, has had diarrhea. And other issues.    Patient is otherwise feeling well, without additional skin concerns.    Labs Reviewed:  N/A    Physical Exam:  Vitals: There were no vitals taken for this visit.  SKIN: Total skin excluding the undergarment areas was performed. The exam included the head/face, neck, both arms, chest, back, abdomen, both legs, digits and/or nails.   - Diffused xerosis of dorsal hands and finger tips with fissuring.  - There are dome shaped bright red papules on the trunk and extremities.  - Multiple regular brown pigmented macules and papules are identified on the trunk and extremities. .   - Scattered brown macules " on sun exposed areas.  - Waxy stuck on papules and plaques on trunk and extremities.   - There is no erythema, telangectasias, nodularity, or pigmentation on the site of prior NMSC.  - No other lesions of concern on areas examined.     Medications:  Current Outpatient Medications   Medication    buPROPion (WELLBUTRIN XL) 300 MG 24 hr tablet    calcium carbonate (OS-SHAI) 1500 (600 Ca) MG tablet    fexofenadine (ALLEGRA) 60 MG tablet    gabapentin (NEURONTIN) 300 MG capsule    hydrocortisone 2.5 % ointment    hydroquinone (KARIN) 4 % external cream    hydrOXYzine (ATARAX) 25 MG tablet    magnesium oxide (MAG-OX) 400 MG tablet    multivitamin (CENTRUM SILVER) tablet    pantoprazole (PROTONIX) 40 MG EC tablet    sirolimus (GENERIC EQUIVALENT) 1 MG tablet    tacrolimus (GENERIC) 1 MG capsule     Current Facility-Administered Medications   Medication    triamcinolone (KENALOG-40) injection 40 mg      Past Medical History:   Patient Active Problem List   Diagnosis    Abdominal bloating    Family history of pancreatic cancer    Transaminitis    Macrocytosis    Cervical high risk HPV (human papillomavirus) test positive    Anemia, unspecified type    Elevated serum creatinine    Hyponatremia    Malaise and fatigue    At high risk for severe sepsis    Symptomatic anemia    Bandemia    Hyperlipidemia    Anxiety    Status post liver transplantation (H)    Immunosuppressed status (H24)    Steroid-induced hyperglycemia    Hypervolemia    Elevated alkaline phosphatase level    Cervicalgia    Pain of both elbows    Mild recurrent major depression (H24)     Past Medical History:   Diagnosis Date    Alcoholic hepatitis (H28)     Asthma 03/10/2006    Cervical high risk HPV (human papillomavirus) test positive 2019, 2020    See problem list    Depressive disorder     Gastroesophageal reflux disease without esophagitis     Liver failure (H)         CC Referred Self, MD  No address on file on close of this encounter.

## 2023-12-01 NOTE — TELEPHONE ENCOUNTER
Left message for Fidelina to return call to discuss tacrolimus level. Appears as longer than 12 hour but need confirmation before stopping sirolimus.   regular

## 2023-12-01 NOTE — LETTER
12/1/2023       RE: Melita Cortes  [address]  [facility] [address]     Dear Colleague,    Thank you for referring your patient, Melita Cortes, to the [facility] DERMATOLOGY [facility]IC [place] at [facility]. Please see a copy of my visit note below.    [facility] Dermatology Note  Encounter Date: Dec 1, 2023  Office Visit     Dermatology Problem List:  1. History of immunosuppression s/p liver transplant 2/2 alcoholic cirrhosis 1/7/2022  - Current tx: tacrolimus, prednisone  2. 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 Rhytides   - Current tx: tretinoin 0.025% cream  4. AK, s/p cryo  5. Urticaria and dermatographism  6. Intertrigo  -Current tx: ketoconazole  7. Cosmetics  - botox  - juvederm  8. Benign biopsies  - Traumatized angioma, left 4th digit, s/p biopsy 12/10/2021  9. Actinic cheilitis, resolving  10. Dermatitis of hands, chronic  - Current tx: Clobetasol 0.05% ointment BID prn.     Social:     ____________________________________________    Assessment & Plan:    # Dermatitis of hands, chronic - not sure if can tie to change from tacrolimus to sirolimus; dad did have h/o eczema  - Advised daily moisturization with bland emollient.   - Start Clobetasol 0.05% ointment BID prn    # Actinic cheilitis, resolving.  - Appears improved today.  - Continue sun protection.    # Aphthous ulcer.  - resolved with clobetasol gel     # Benign lesions - SKs, cherry angiomas, lentigenes.  - No treatment required    # Multiple benign nevi.   - Monitor for ABCDEs of melanoma   - Continue sun protection - recommend SPF 30 or higher with frequent application   - Return sooner if noticing changing or symptomatic lesions    # History of organ transplant, liver transplant in setting of alcoholic cirrhosis.   - Patient aware that she is at higher risk for cutaneous malignancy given history of transplant.   - Monitor  "for changing or symptomatic lesions.   - Continue frequent skin checks and photoprotection.     # History of NMSC. No evidence of recurrent disease.  - Continue photoprotection - recommend SPF 30 or higher with frequent reapplication  - Continue yearly skin exams  - Advised to monitor for changing, non-healing, bleeding, painful, changing, or otherwise symptomatic lesions      Procedures Performed:   None.    Follow-up: 1 year(s) in-person, or earlier for new or changing lesions    Staff and Scribe:     Scribe Disclosure:   I, JUDY SHERMAN, am serving as a scribe; to document services personally performed by Di Alberto MD -based on data collection and the provider's statements to me.    Provider Disclosure:   The documentation recorded by the scribe accurately reflects the services I personally performed and the decisions made by me.    Di Alberto MD    Department of Dermatology  [facility]: Phone: [phone], Fax: [phone]  12/1/2023     ____________________________________________    CC: Skin Check (FBSE and re-check  right lower mucosal lip: \"better\"/Hands are \"sore\" and \"raw feeling\")    HPI:  Ms. Melita Cortes is a(n) 53 year old female who presents today as a return patient for FBSE.    The patient would like her right lower mucosal lip checked. Has improved.    The patient reports that her hands are sore and \"raw feeling\". However, her friend told her a few weeks ago that she \"has the softest hands in the world.\" Switched from tacrolimus to sirolimus. Since the switch, has had diarrhea. And other issues.    Patient is otherwise feeling well, without additional skin concerns.    Labs Reviewed:  N/A    Physical Exam:  Vitals: There were no vitals taken for this visit.  SKIN: Total skin excluding the undergarment areas was performed. The exam included the head/face, neck, both arms, chest, " back, abdomen, both legs, digits and/or nails.   - Diffused xerosis of dorsal hands and finger tips with fissuring.  - There are dome shaped bright red papules on the trunk and extremities.  - Multiple regular brown pigmented macules and papules are identified on the trunk and extremities. .   - Scattered brown macules on sun exposed areas.  - Waxy stuck on papules and plaques on trunk and extremities.   - There is no erythema, telangectasias, nodularity, or pigmentation on the site of prior NMSC.  - No other lesions of concern on areas examined.     Medications:  Current Outpatient Medications   Medication    buPROPion (WELLBUTRIN XL) 300 MG 24 hr tablet    calcium carbonate (OS-SHAI) 1500 (600 Ca) MG tablet    fexofenadine (ALLEGRA) 60 MG tablet    gabapentin (NEURONTIN) 300 MG capsule    hydrocortisone 2.5 % ointment    hydroquinone (KARIN) 4 % external cream    hydrOXYzine (ATARAX) 25 MG tablet    magnesium oxide (MAG-OX) 400 MG tablet    multivitamin (CENTRUM SILVER) tablet    pantoprazole (PROTONIX) 40 MG EC tablet    sirolimus (GENERIC EQUIVALENT) 1 MG tablet    tacrolimus (GENERIC) 1 MG capsule     Current Facility-Administered Medications   Medication    triamcinolone (KENALOG-40) injection 40 mg      Past Medical History:   Patient Active Problem List   Diagnosis    Abdominal bloating    Family history of pancreatic cancer    Transaminitis    Macrocytosis    Cervical high risk HPV (human papillomavirus) test positive    Anemia, unspecified type    Elevated serum creatinine    Hyponatremia    Malaise and fatigue    At high risk for severe sepsis    Symptomatic anemia    Bandemia    Hyperlipidemia    Anxiety    Status post liver transplantation (H)    Immunosuppressed status (H24)    Steroid-induced hyperglycemia    Hypervolemia    Elevated alkaline phosphatase level    Cervicalgia    Pain of both elbows    Mild recurrent major depression (H24)     Past Medical History:   Diagnosis Date    Alcoholic  hepatitis (H28)     Asthma 03/10/2006    Cervical high risk HPV (human papillomavirus) test positive 2019, 2020    See problem list    Depressive disorder     Gastroesophageal reflux disease without esophagitis     Liver failure (H)         CC Referred Self, MD  No address on file on close of this encounter.

## 2023-12-01 NOTE — PATIENT INSTRUCTIONS
Checking for Skin Cancer  You can help find cancer early by checking your skin each month. There are 3 main kinds of skin cancer: melanoma, basal cell carcinoma, and squamous cell carcinoma. Doing monthly skin checks is the best way to find new marks, sores, or skin changes. Follow these instructions for checking your skin.   The ABCDEs of checking moles for melanoma   Check your moles or growths for signs of melanoma using ABCDE:   Asymmetry: The sides of the mole or growth don t match.  Border: The edges are ragged, notched, or blurred.  Color: The color within the mole or growth varies. It could be black, brown, tan, white, or shades of red, gray, or blue.  Diameter: The mole or growth is larger than   inch or 6 mm (size of a pencil eraser).  Evolving: The size, shape, texture, or color of the mole or growth is changing.     ABCDE's of moles on light skin.        ABCDE's of moles on dark skin may be harder to identify.     Checking for other types of skin cancer  Basal cell carcinoma or squamous cell carcinoma cause symptoms like:     A spot or mole that looks different from all other marks on your skin  Changes in how an area feels, such as itching, tenderness, or pain  Changes in the skin's surface, such as oozing, bleeding, or scaliness  A sore that doesn't heal  New swelling, redness, or spread of color beyond the border of a mole    Who s at risk?  Anyone of any skin color can get skin cancer. But you're at greater risk if you have:   Fair skin that freckles easily and burns instead of tanning  Light-colored or red hair  Light-colored eyes  Many moles or abnormal moles on your skin  A long history of unprotected exposure to sunlight or tanning beds  A history of many blistering sunburns as a child or teen  A family history of skin cancer  Been exposed to radiation or chemicals  A weakened immune system  Been exposed to arsenic  If you've had skin cancer in the past, you're at high risk of having it again.    How to check your skin  Do your monthly skin checkups in front of a full-length mirror. Use a room with good lighting so it's easier to see. Use a hand mirror to look at hard-to-see places like your buttocks and back. You can also have a trusted friend or family member help you with these checks. Check every part of your body, including your:   Head (ears, face, neck, and scalp)  Torso (front, back, sides, and under breasts)  Arms (tops, undersides, and armpits)  Hands (palms, backs, and fingers, including under the nails)  Lower back, buttocks, and genitals  Legs (front, back, and sides)  Feet (tops, soles, toes, including under the nails, and between toes)  Watch for new spots on your skin or a spot that's changing in color, shape, size.   If you have a lot of moles, take digital photos of them each month. Make sure to take photos both up close and from a distance. These can help you see if any moles change over time.   Know your skin  Most skin changes aren't cancer. But if you see any changes in your skin, call your healthcare provider right away. Only they can tell you if a change is a problem. If you have skin cancer, seeing your provider can be the first step to getting the treatment that could save your life.   Cheli last reviewed this educational content on 10/1/2021    8760-0135 [employer]. All rights reserved. This information is not intended as a substitute for professional medical care. Always follow your healthcare professional's instructions.     Dry Skin    What is dry skin?  Common skin problem  Can be worse during the winter   Affects all ages  Occurs in people with or without other skin problems    What does it look like?  Fine lines in the skin become more visible   Rough feeling skin   Flaky skin  Most common on the arms and legs  Skin can become cracked, especially on the hands and feet    What are some problems caused by dry skin?   Itching  Rubbing or scratching can cause  thickened, rough skin patches  Cracks in skin can be painful  Red, itchy, scaly skin (called eczema) can occur  Yellow crusting or pus could be signs of an infection    What causes dry skin?  A lack of water in the top layer of the skin  Too much soapy water,  hot water, or harsh chemicals  Aging and sun damage    How do I treat dry skin?  Shower or bathe daily for under ten minutes with lukewarm water and mild soap.  Pat yourself dry with a towel gently and leave your skin slightly damp.  Use moisturizing cream or ointment right away.  Avoid lotions.    What kind of mild soap should I be using?  Camay , Dove , Tone , Neutrogena , Purpose , or Oil of Olay   A non-detergent cleanser, like Cetaphil , can be used.    What should I stay away from?  Scented soaps   Bath oils    What moisturizers should I be using?  Cetaphil Cream,CeraVe Cream, Vanicream, Aquaphilic, Eucerin, Aquaphor, or Vaseline   Always apply after showering or bathing.  Reapply throughout the day, if possible.  If dry skin affects your hands, always reapply after handwashing.    What else should I know?  Using a humidifier during winter months may help.  If dry skin gets worse or if eczema develops, a steroid cream may be needed.

## 2023-12-01 NOTE — NURSING NOTE
"Dermatology Rooming Note    Melita Cortes's goals for this visit include:   Chief Complaint   Patient presents with    Skin Check     FBSE and re-check  right lower mucosal lip: \"better\"  Hands are \"sore\" and \"raw feeling\"     Lita Garcia, AIDAN    "

## 2023-12-06 ENCOUNTER — VIRTUAL VISIT (OUTPATIENT)
Dept: PSYCHOLOGY | Facility: CLINIC | Age: 53
End: 2023-12-06
Payer: COMMERCIAL

## 2023-12-06 DIAGNOSIS — F32.1 CURRENT MODERATE EPISODE OF MAJOR DEPRESSIVE DISORDER, UNSPECIFIED WHETHER RECURRENT (H): Primary | ICD-10-CM

## 2023-12-06 PROCEDURE — 90834 PSYTX W PT 45 MINUTES: CPT | Mod: VID | Performed by: STUDENT IN AN ORGANIZED HEALTH CARE EDUCATION/TRAINING PROGRAM

## 2023-12-06 NOTE — PROGRESS NOTES
[facility]                                     Progress Note    Patient Name: Melita Cortes  Date: 12/06/2023         Service Type: Individual      Session Start Time: 9.00  Session End Time: 9.41     Session Length: 38-52    Session #: 06    Attendees: Client attended alone    Service Modality:  Video Visit:      Provider verified identity through the following two step process.  Patient provided:  Patient is known previously to provider    Telemedicine Visit: The patient's condition can be safely assessed and treated via synchronous audio and visual telemedicine encounter.      Reason for Telemedicine Visit: Patient has requested telehealth visit    Originating Site (Patient Location): Patient's home    Distant Site (Provider Location): Provider Remote Setting- Home Office    Consent:  The patient/guardian has verbally consented to: the potential risks and benefits of telemedicine (video visit) versus in person care; bill my insurance or make self-payment for services provided; and responsibility for payment of non-covered services.     Patient would like the video invitation sent by:  My Chart    Mode of Communication:  Video Conference via Amwell    Distant Location (Provider):  Off-site    As the provider I attest to compliance with applicable laws and regulations related to telemedicine.    DATA  Interactive Complexity: No  Crisis: No        Progress Since Last Session (Related to Symptoms / Goals / Homework):   Symptoms: Improving as evident by current phq9 scores    Homework: Completed in session review patient's  goal for treatment and collaboratively develop safety plan.        Episode of Care Goals: Minimal progress - PREPARATION (Decided to change - considering how); Intervened by negotiating a change plan and determining options / strategies for behavior change, identifying triggers, exploring social supports, and working towards setting a date to begin behavior  "change     Current / Ongoing Stressors and Concerns:  Patient reported  \"Trauma Adhd depres\". Patient reported \" I think I have had some trauma in my life, my liver transplant and I think have ADHD.\" Pt reported she is  \"overly sensitive\" and \" I dont handle stress very well and struggle with impulsivity\"  and that \"I have a poor self image, negative talk a lot \" patient also reported she lost her mother in 2019 and her dad 7 months ago and still struggling with grieving the lost of both parents.      Current: Today, patient reported she continue to feel  overwhelmed and struggles with heavy emotions. Patient reported she feels isolated with no sense of identity as she spends her days home doing nothing fulfilling. Patient reported the identity she had three years ago when she was working full time is lost and feels she is not contributing anything to society and and that she struggles daily with  negative self esteem.     Treatment Objective(s) Addressed in This Session:   Patient will increase daily activity level by exercising for 20-30 minutes and participate in at least 1 daily pleasant/fun activities.  Use safety plan as needed daily.  Patient will identify 2-3 volunteering opportunities and apply before next session.     Intervention:    Discussion today on working and the job as a social anchorage for the maintenance of a satisfactory self and explore how to gain similar feeling through volunteering.  Couched patient that volunteering can provide a healthy boost to their self-confidence, self-esteem, and life satisfaction and that helping others provides an opportunity to be social, connect with community, enhancing a natural sense of accomplishment. Provider explain to pt that these feelings stimulate the brain and wards off feelings of anxiety, depression, and loneliness. Encourage pt to identify volunteering opportunities and apply to serve with them.     Assessments completed prior to visit:  PHQ9:       " 6/6/2023     1:00 PM 6/29/2023     1:43 PM 8/8/2023     9:45 AM 9/11/2023    11:55 AM 10/23/2023     8:59 AM 11/14/2023     3:13 PM 11/28/2023     9:28 AM   PHQ-9 SCORE   PHQ-9 Total Score MyChart   25 (Severe depression) 12 (Moderate depression)  18 (Moderately severe depression) 14 (Moderate depression)   PHQ-9 Total Score 14 8 25 12 10 18 14     GAD7:       6/6/2023     1:00 PM 6/29/2023     1:43 PM 9/11/2023    12:07 PM 11/14/2023     3:13 PM 11/28/2023     9:29 AM   JORGE-7 SCORE   Total Score   15 (severe anxiety) 14 (moderate anxiety) 21 (severe anxiety)   Total Score 14 7 15 14    14 21         ASSESSMENT: Current Emotional / Mental Status (status of significant symptoms):   Risk status (Self / Other harm or suicidal ideation)   Patient denies current fears or concerns for personal safety.   Patient denies current or recent suicidal ideation or behaviors.   Patient denies current or recent homicidal ideation or behaviors.   Patient denies current or recent self injurious behavior or ideation.   Patient denies other safety concerns.   Patient reports there has been no change in risk factors since their last session.     Patient reports there has been no change in protective factors since their last session.     Recommended that patient call 911 or go to the local ED should there be a change in any of these risk factors.     Appearance:   Appropriate    Eye Contact:   Good    Psychomotor Behavior: Normal    Attitude:   Cooperative  Interested   Orientation:   Person Place Time Situation   Speech    Rate / Production: Normal/ Responsive    Volume:  Normal    Mood:    Anxious  Depressed  Grieving   Affect:    Tearful   Thought Content:  Clear    Thought Form:  Coherent  Logical    Insight:    Good      Medication Review:   No changes to current psychiatric medication(s)     Medication Compliance:   Yes     Changes in Health Issues:   None reported     Chemical Use Review:   Substance Use: Chemical use reviewed, no  active concerns identified      Tobacco Use: No current tobacco use.      Diagnosis:  296.32 (F33.1) Major Depressive Disorder, Recurrent Episode, Moderate _ and With atypical features    Collateral Reports Completed:   Not Applicable    PLAN: (Patient Tasks / Therapist Tasks / Other)    Utilize safety plan as needed  Use DBT skill learned in session daily to regulate self and manage distressful emotions.    Apply to at least one volunteering job by next session    CECIL Vasquez    ----- Service Performed and Documented by CAL------  This note was reviewed and clinical supervision by ELMO Colmenares [facility]    12/7/2023    _________________________________________________________    Individual Treatment Plan    Patient's Name: Melita Cortes  YOB: 1970    Date of Creation: 09/22/2023  Date Treatment Plan Last Reviewed/Revised: 11/14/2023    DSM5 Diagnoses: 296.32 (F33.1) Major Depressive Disorder, Recurrent Episode, Moderate _ and With atypical features  Psychosocial / Contextual Factors:   PROMIS (reviewed every 90 days):     Referral / Collaboration:  Referral to another professional/service is not indicated at this time..    Anticipated number of session for this episode of care:  15-25  Anticipation frequency of session: Biweekly  Anticipated Duration of each session: 38-52 minutes  Treatment plan will be reviewed in 90 days or when goals have been changed.       MeasurableTreatment Goal(s) related to diagnosis / functional impairment(s)  Goal 1: Patient will identify and address specific triggers to feelings of depression and improve coping by  90 % in the next three months.    I will know I've met my goal when I am less impulsive, better communicator and can comfortably manage distressful emotions.         Objective #A (Patient Action)    Develop and utilize 3 effective distress tolerance strategies to address SI.   Status: Continued - Date(s):  "02/15/2024     Intervention(s)   Therapist will engage in collaborative brainstorming with pt to identify and practice individualized distress tolerance coping strategies to address SI weekly.    Objective #B (Patient Action)    Patient will increase daily activity level by exercising for 20-30 minutes and participate in at least 1 daily pleasant/fun activities.  Status: Continued - Date(s):  02/15/2024    Intervention(s)  Therapist will provide psychoeducation, behavioral activation, and cognitive restructuring.    Objective #C  Patient will identify specific areas of cognitive distortion and challenge irrational thoughts with reality   Status: Continued - Date(s):   02/15/2024       Intervention(s)  Therapist will provide psychoeducation, behavioral activation, and cognitive restructuring.    Objective #D  Patient will learn and practice relaxation techniques to manage depression.  Status: Continued - Date(s):   02/15/2024    Intervention(s)   Therapist will teach patient thought stopping, deep breathing exercises, mindful meditation, and creative visualization.        Patient has reviewed and agreed to the above plan.      Jake Coyne, CECIL  September 22, 2023          [facility]                                       Melita Cortes     SAFETY PLAN:  Step 1: Warning signs / cues (Thoughts, images, mood, situation, behavior) that a crisis may be developing:  Thoughts:   \" sometimes I feel like I don't have a purpose\"  Images:  Feels lonely after losing mum and dad and lonely in marriage  Thinking Processes: ruminations (can't stop thinking about my problems): health, family loses  Mood: worsening depression  Behaviors: isolating/withdrawing   Situations: loss: parents and lonely in marriage    Step 2: Coping strategies - Things I can do to take my mind off of my problems without contacting another person (relaxation technique, physical activity):  Distress Tolerance Strategies:  play with my pet " ", intense exercise: work out for 2-3 minutes , and support group attendance for transplant  Physical Activities: go for a walk and stretching   Focus on helpful thoughts:  \"This is temporary\" and \"It always passes\"  Step 3: People and social settings that provide distraction:   Name:  Augustine ( friend) Phone: [phone]   Name:  Aimee paniagua Phone: [phone]     gym  and [employer] store  Liver transplant group  Step 4: Remind myself of people and things that are important to me and worth living for:    My , cousins and friends.  Step 5: When I am in crisis, I can ask these people to help me use my safety plan:   Name:  Jake ( Therapist )  Phone: [phone]   Name:  Aimee paniagua Phone: [phone]   Step 6: Making the environment safe:   remove alcohol, remove drugs, and be around others  Step 7: Professionals or agencies I can contact during a crisis:  Suicide Prevention Lifeline: Call or Text 709   [facility]: [phone]    Call 911 or go to my nearest emergency department.   I helped develop this safety plan and agree to use it when needed.  I have been given a copy of this plan.      Client signature _________________________________________________________________  Today s date:  11/14/2023  Completed by Provider Name/ Credentials:  CECIL Vasquez  November 14, 2023  Adapted from Safety Plan Template 2008 Chelsi Ramos is reprinted with the express permission of the authors.  No portion of the Safety Plan Template may be reproduced without the express, written permission.  You can contact the authors at [email or web address] or [email or web address].        Answers submitted by the patient for this visit:  Patient Health Questionnaire (Submitted on 11/14/2023)  If you checked off any problems, how difficult have these problems made it for you to do your work, take care of things at home, or get along with other people?: Very difficult  PHQ9 TOTAL SCORE: " 18  JORGE-7 (Submitted on 11/14/2023)  JORGE 7 TOTAL SCORE: 14    Answers submitted by the patient for this visit:  Patient Health Questionnaire (Submitted on 11/28/2023)  If you checked off any problems, how difficult have these problems made it for you to do your work, take care of things at home, or get along with other people?: Extremely difficult  PHQ9 TOTAL SCORE: 14  JORGE-7 (Submitted on 11/28/2023)  JORGE 7 TOTAL SCORE: 21

## 2023-12-07 ENCOUNTER — LAB (OUTPATIENT)
Dept: LAB | Facility: CLINIC | Age: 53
End: 2023-12-07
Payer: COMMERCIAL

## 2023-12-07 ENCOUNTER — TELEPHONE (OUTPATIENT)
Dept: DERMATOLOGY | Facility: CLINIC | Age: 53
End: 2023-12-07

## 2023-12-07 DIAGNOSIS — Z94.4 STATUS POST LIVER TRANSPLANTATION (H): ICD-10-CM

## 2023-12-07 DIAGNOSIS — Z94.4 LIVER REPLACED BY TRANSPLANT (H): ICD-10-CM

## 2023-12-07 LAB
ALBUMIN SERPL BCG-MCNC: 4.1 G/DL (ref 3.5–5.2)
ALP SERPL-CCNC: 83 U/L (ref 40–150)
ALT SERPL W P-5'-P-CCNC: 31 U/L (ref 0–50)
ANION GAP SERPL CALCULATED.3IONS-SCNC: 10 MMOL/L (ref 7–15)
AST SERPL W P-5'-P-CCNC: 25 U/L (ref 0–45)
BILIRUB DIRECT SERPL-MCNC: <0.2 MG/DL (ref 0–0.3)
BILIRUB SERPL-MCNC: 0.3 MG/DL
BUN SERPL-MCNC: 16.3 MG/DL (ref 6–20)
CALCIUM SERPL-MCNC: 9 MG/DL (ref 8.6–10)
CHLORIDE SERPL-SCNC: 106 MMOL/L (ref 98–107)
CREAT SERPL-MCNC: 1 MG/DL (ref 0.51–0.95)
DEPRECATED HCO3 PLAS-SCNC: 24 MMOL/L (ref 22–29)
EGFRCR SERPLBLD CKD-EPI 2021: 67 ML/MIN/1.73M2
ERYTHROCYTE [DISTWIDTH] IN BLOOD BY AUTOMATED COUNT: 11.6 % (ref 10–15)
GLUCOSE SERPL-MCNC: 123 MG/DL (ref 70–99)
HCT VFR BLD AUTO: 42.1 % (ref 35–47)
HGB BLD-MCNC: 14 G/DL (ref 11.7–15.7)
MCH RBC QN AUTO: 29.1 PG (ref 26.5–33)
MCHC RBC AUTO-ENTMCNC: 33.3 G/DL (ref 31.5–36.5)
MCV RBC AUTO: 88 FL (ref 78–100)
PLATELET # BLD AUTO: 206 10E3/UL (ref 150–450)
POTASSIUM SERPL-SCNC: 4.3 MMOL/L (ref 3.4–5.3)
PROT SERPL-MCNC: 6.5 G/DL (ref 6.4–8.3)
RBC # BLD AUTO: 4.81 10E6/UL (ref 3.8–5.2)
SODIUM SERPL-SCNC: 140 MMOL/L (ref 135–145)
TACROLIMUS BLD-MCNC: 3.2 UG/L (ref 5–15)
TME LAST DOSE: ABNORMAL H
TME LAST DOSE: ABNORMAL H
WBC # BLD AUTO: 5.1 10E3/UL (ref 4–11)

## 2023-12-07 PROCEDURE — 80053 COMPREHEN METABOLIC PANEL: CPT

## 2023-12-07 PROCEDURE — 80197 ASSAY OF TACROLIMUS: CPT

## 2023-12-07 PROCEDURE — 36415 COLL VENOUS BLD VENIPUNCTURE: CPT

## 2023-12-07 PROCEDURE — 82248 BILIRUBIN DIRECT: CPT

## 2023-12-07 PROCEDURE — 85027 COMPLETE CBC AUTOMATED: CPT

## 2023-12-07 NOTE — TELEPHONE ENCOUNTER
Patient Contactedpt will call back and schedule the following:    Appointment type: Return   Provider: Dr. Alberto  Return date: Dec 2024  Specialty phone number: [phone]

## 2023-12-08 ENCOUNTER — TELEPHONE (OUTPATIENT)
Dept: TRANSPLANT | Facility: CLINIC | Age: 53
End: 2023-12-08
Payer: COMMERCIAL

## 2023-12-11 ENCOUNTER — VIRTUAL VISIT (OUTPATIENT)
Dept: GASTROENTEROLOGY | Facility: CLINIC | Age: 53
End: 2023-12-11
Attending: INTERNAL MEDICINE
Payer: COMMERCIAL

## 2023-12-11 ENCOUNTER — VIRTUAL VISIT (OUTPATIENT)
Dept: ONCOLOGY | Facility: CLINIC | Age: 53
End: 2023-12-11
Attending: STUDENT IN AN ORGANIZED HEALTH CARE EDUCATION/TRAINING PROGRAM
Payer: COMMERCIAL

## 2023-12-11 DIAGNOSIS — Z80.8 FAMILY HISTORY OF MALIGNANT MELANOMA: ICD-10-CM

## 2023-12-11 DIAGNOSIS — Z94.4 STATUS POST LIVER TRANSPLANTATION (H): Primary | ICD-10-CM

## 2023-12-11 DIAGNOSIS — Z94.4 STATUS POST LIVER TRANSPLANTATION (H): ICD-10-CM

## 2023-12-11 DIAGNOSIS — Z80.0 FAMILY HISTORY OF MALIGNANT NEOPLASM OF COLON: ICD-10-CM

## 2023-12-11 DIAGNOSIS — Z80.0 FAMILY HISTORY OF MALIGNANT NEOPLASM OF PANCREAS: Primary | ICD-10-CM

## 2023-12-11 PROCEDURE — 96040 HC GENETIC COUNSELING, EACH 30 MINUTES: CPT | Mod: GT,95 | Performed by: GENETIC COUNSELOR, MS

## 2023-12-11 PROCEDURE — 99214 OFFICE O/P EST MOD 30 MIN: CPT | Mod: VID | Performed by: INTERNAL MEDICINE

## 2023-12-11 RX ORDER — LIDOCAINE HYDROCHLORIDE 20 MG/ML
10 SOLUTION OROPHARYNGEAL EVERY 6 HOURS PRN
Qty: 210 ML | Refills: 1 | OUTPATIENT
Start: 2023-12-11

## 2023-12-11 ASSESSMENT — PAIN SCALES - GENERAL: PAINLEVEL: MODERATE PAIN (4)
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
## 2023-12-11 NOTE — NURSING NOTE
Is the patient currently in the state of [place]? YES    Visit mode:VIDEO    If the visit is dropped, the patient can be reconnected by: VIDEO VISIT: Text to cell phone:   Telephone Information:   Mobile [phone]       Will anyone else be joining the visit? NO  (If patient encounters technical issues they should call [phone])    How would you like to obtain your AVS? MyChart    Are changes needed to the allergy or medication list? N/A    Reason for visit: Consult    Audrey PORTILLO

## 2023-12-11 NOTE — NURSING NOTE
Is the patient currently in the state of [place]? YES    Visit mode:VIDEO    If the visit is dropped, the patient can be reconnected by: VIDEO VISIT: Text to cell phone:   Telephone Information:   Mobile [phone]       Will anyone else be joining the visit? NO  (If patient encounters technical issues they should call [phone])    How would you like to obtain your AVS? MyChart    Are changes needed to the allergy or medication list? No    Reason for visit: RECHECK (Follow up)    Ambrosio PORTILLO

## 2023-12-11 NOTE — LETTER
2023    Melita Cortes  [address] [address] [place] [address] [address]  [facility] [place] [address]      Dear Melita,    It was a pleasure speaking with you over video on 2023. Here is a copy of the progress note from our discussion. If you have any additional questions, please feel free to call.    Referring Provider: Salud Arnold MD    Presenting Information:   I met with Melita for her video genetic counseling visit, through the Cancer Risk Management Program, to discuss her family history of pancreatic cancer, colon cancer, and melanoma. Today we reviewed this history, cancer screening recommendations, and available genetic testing options.    Personal History:  Melita is a 53 year old year old female. 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 She had her first menstrual period at age 14, she does not have children, and is perimenopausal. Melita has her ovaries, fallopian tubes and uterus in place, and she has had no ovarian cancer screening to date. She reports that she has not used hormone replacement therapy.      She has regular clinical breast exams and mammograms; her most recent mammogram in 2023 was normal. Melita began having colonoscopies at the age of 51. Her most recent colonoscopy in 2022 was normal and follow-up was recommended in 10 years. She does not regularly do any other cancer screening at this time. Melita reported previous tobacco use for approximately 20 years and previous alcohol abuse.    Family History: (Please see scanned pedigree for detailed family history information)  Melita's mother was diagnosed with and  from pancreatic cancer at age 74.  Melita's father has a history of 2-3 melanomas and  at age 84.  A paternal aunt was diagnosed with colon cancer in her 70's.  Melita's paternal grandfather was diagnosed with colon cancer in his 80's.  A maternal uncle was diagnosed with colon cancer in  his 70's and  in his late 70's.  A maternal aunt  from lung cancer at age 79. She has a history of smoking.  Two of her mom's other siblings were diagnosed with unknown cancer.  Her maternal ethnicity is [place]. Her paternal ethnicity is [place]. There is no known Ashkenazi Buddhist ancestry on either side of her family. There is no reported consanguinity.    Discussion:  Melita's family history of pancreatic and colon cancer is suggestive of a hereditary cancer syndrome.  We reviewed the features of sporadic, familial, and hereditary cancers. We discussed that mutations in either BRCA1 or BRCA2 could be possible hereditary explanations for her family history of cancer. Mutations in the BRCA1 or BRCA2 gene are known to cause Hereditary Breast and Ovarian Cancer Syndrome (HBOC). HBOC typically presents with multiple family members diagnosed with breast cancer before age 50 and/or ovarian cancer. Other cancer risks associated with HBOC include male breast cancer, prostate cancer, pancreatic cancer, and melanoma.   We discussed that Riley syndrome could be possible hereditary explanations for her family history of cancer. Riley syndrome can be caused by a mutation in one of five genes:  MLH1, MSH2, MSH6, PMS2, and EPCAM.  The highest cancer risks associated with Riley syndrome include colon cancer, endometrial/uterine cancer, gastric cancer, bladder cancer, prostate cancer, and ovarian cancer.  Other cancers have also been reported with Riley syndrome, such as urinary tract, pancreas, bile duct, and other cancers.  We discussed the natural history and genetics of hereditary cancer. A detailed handout regarding hereditary cancer, along with the other information we discussed, will be mailed to Melita at the end of our appointment today and can be found in the after visit summary. Topics included: inheritance pattern, cancer risks, cancer screening recommendations, and also risks, benefits and limitations of  testing.  Based on her personal and family history, Melita meets current National Comprehensive Cancer Network (NCCN) criteria for genetic testing of BRCA1/2 along with other high-penetrance pancreatic cancer susceptibility genes (I.e. MYA, CDKN2A, EPCAM, MLH1, MSH2, MSH6, PALB2, STK11, and TP53).  We discussed that there are additional genes that could cause increased risk for pancreatic cancer, colon cancer, and/or melanoma. As many of these genes present with overlapping features in a family and accurate cancer risk cannot always be established based upon the pedigree analysis alone, it would be reasonable for Melita to consider panel genetic testing to analyze multiple genes at once.  Genetic testing is available for BRCA1/2 as part of the patient's core panel. This will then be automatically reflexed to a CustomNext-Cancer panel through [employer].  Genetic testing is available for 40 genes associated with hereditary cancer: CustomNext-Cancer, a combination of CancerNext (APC, MYA, BARD1, BRCA1, BRCA2, BRIP1, BMPR1A, CDH1, CDK4, CDKN2A, CHEK2, DICER1, EPCAM, GREM1, HOXB13, MLH1, MRE11A, MSH2, MSH6, MUTYH, NBN, NF1, PALB2, PMS2, POLD1, POLE, PTEN, RAD50, RAD51C, RAD51D, SMAD4, SMARCA4, STK11, and TP53) + pancreatitis genes (CASR, CPA1, CFTR, CTRC, PRSS1, SPINK1).  We discussed that many of the genes in the CustomNext-Cancer panel are associated with specific hereditary cancer syndromes and published management guidelines: Hereditary Breast and Ovarian Cancer syndrome (BRCA1, BRCA2), Riley syndrome (MLH1, MSH2, MSH6, PMS2, EPCAM), Familial Adenomatous Polyposis (APC), Hereditary Diffuse Gastric Cancer (CDH1), Familial Atypical Multiple Mole Melanoma syndrome (CDK4, CDKN2A), Juvenile Polyposis syndrome (BMPR1A, SMAD4), Cowden syndrome (PTEN), Li Fraumeni syndrome (TP53), Peutz-Jeghers syndrome (STK11), MUTYH Associated Polyposis (MUTYH), Cystic Fibrosis (CFTR), and PRSS1-Related Hereditary Pancreatitis (PRSS1), and  Neurofibromatosis type 1 (NF1).   The MYA, BRIP1, CASR, CHEK2, CPA1, CTRC, GREM1, NBN, PALB2, POLD1, POLE, RAD51C, RAD51D and SPINK1 genes are associated with increased pancreatitis and/or cancer risk and have published management guidelines for certain cancers.  The remaining genes (BARD1, DICER1, HOXB13, MRE11A, RAD50, and SMARCA4) are associated with increased cancer risk and may allow us to make medical recommendations when mutations are identified.    Melita would like to submit a blood sample for her genetic testing. She will go to her [facility] at her earliest convenience to get her blood drawn for her genetic testing.   Verbal consent was given over video and written on the consent form. Turnaround time is approximately 4 weeks once the lab receives the sample.    Medical Management: For Melita, we reviewed that the information from genetic testing may determine:  additional cancer screening for which Melita may qualify (i.e. mammogram and breast MRI, more frequent colonoscopies, more frequent dermatologic exams, etc.),  options for risk reducing surgeries Melita could consider (i.e. bilateral mastectomy, surgery to remove her ovaries and/or uterus, etc.),    and targeted chemotherapies if she were to develop certain cancers in the future (i.e. immunotherapy for individuals with Riley syndrome, PARP inhibitors, etc.).   These recommendations and possible targeted chemotherapies will be discussed in detail once genetic testing is completed.     Plan:  1) Today Melita elected to proceed with BRCA1/2 testing with automatic reflex to a CustomNext-Cancer panel through [email or web address].  2) A copy of the consent form and the after visit summary will be sent to Melita.  3) This information should be available in approximately 4 weeks, once the lab receives the sample.  4) I will call Melita with the results once they become available.    Time spent on video: 40 minutes    Jose Barajas MS,  [facility]  Licensed, Certified Genetic Counselor

## 2023-12-11 NOTE — PROCEDURES
"Virtual Visit Details    Type of service:  Video Visit     Originating Location (pt. Location): {video visit patient location:874620::\"Home\"}  {PROVIDER LOCATION On-site should be selected for visits conducted from your clinic location or adjoining [facility] hospital, academic office, or other nearby [facility] building. Off-site should be selected for all other provider locations, including home:[id number]}  Distant Location (provider location):  {virtual location provider:064627}  Platform used for Video Visit: {Virtual Visit Platforms:229685::\"Vital Systems\"}    "

## 2023-12-11 NOTE — PROGRESS NOTES
2023    Referring Provider: Salud Arnold MD    Presenting Information:   I met with Melita for her video genetic counseling visit, through the Cancer Risk Management Program, to discuss her family history of pancreatic cancer, colon cancer, and melanoma. Today we reviewed this history, cancer screening recommendations, and available genetic testing options.    Personal History:  Melita is a 53 year old year old female. 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 She had her first menstrual period at age 14, she does not have children, and is perimenopausal. Melita has her ovaries, fallopian tubes and uterus in place, and she has had no ovarian cancer screening to date. She reports that she has not used hormone replacement therapy.      She has regular clinical breast exams and mammograms; her most recent mammogram in 2023 was normal. Melita began having colonoscopies at the age of 51. Her most recent colonoscopy in 2022 was normal and follow-up was recommended in 10 years. She does not regularly do any other cancer screening at this time. Melita reported previous tobacco use for approximately 20 years and previous alcohol abuse.    Family History: (Please see scanned pedigree for detailed family history information)  Melita's mother was diagnosed with and  from pancreatic cancer at age 74.  Melita's father has a history of 2-3 melanomas and  at age 84.  A paternal aunt was diagnosed with colon cancer in her 70's.  Melita's paternal grandfather was diagnosed with colon cancer in his 80's.  A maternal uncle was diagnosed with colon cancer in his 70's and  in his late 70's.  A maternal aunt  from lung cancer at age 79. She has a history of smoking.  Two of her mom's other siblings were diagnosed with unknown cancer.  Her maternal ethnicity is Burkinan. Her paternal ethnicity is Bangladeshi. There is no known  Ashkenazi Yazdanism ancestry on either side of her family. There is no reported consanguinity.    Discussion:  Melita's family history of pancreatic and colon cancer is suggestive of a hereditary cancer syndrome.  We reviewed the features of sporadic, familial, and hereditary cancers. We discussed that mutations in either BRCA1 or BRCA2 could be possible hereditary explanations for her family history of cancer. Mutations in the BRCA1 or BRCA2 gene are known to cause Hereditary Breast and Ovarian Cancer Syndrome (HBOC). HBOC typically presents with multiple family members diagnosed with breast cancer before age 50 and/or ovarian cancer. Other cancer risks associated with HBOC include male breast cancer, prostate cancer, pancreatic cancer, and melanoma.   We discussed that Riley syndrome could be possible hereditary explanations for her family history of cancer. Riley syndrome can be caused by a mutation in one of five genes:  MLH1, MSH2, MSH6, PMS2, and EPCAM.  The highest cancer risks associated with Riley syndrome include colon cancer, endometrial/uterine cancer, gastric cancer, bladder cancer, prostate cancer, and ovarian cancer.  Other cancers have also been reported with Riley syndrome, such as urinary tract, pancreas, bile duct, and other cancers.  We discussed the natural history and genetics of hereditary cancer. A detailed handout regarding hereditary cancer, along with the other information we discussed, will be mailed to Melita at the end of our appointment today and can be found in the after visit summary. Topics included: inheritance pattern, cancer risks, cancer screening recommendations, and also risks, benefits and limitations of testing.  Based on her personal and family history, Melita meets current National Comprehensive Cancer Network (NCCN) criteria for genetic testing of BRCA1/2 along with other high-penetrance pancreatic cancer susceptibility genes (I.e. MYA, CDKN2A, EPCAM, MLH1, MSH2, MSH6,  PALB2, STK11, and TP53).  We discussed that there are additional genes that could cause increased risk for pancreatic cancer, colon cancer, and/or melanoma. As many of these genes present with overlapping features in a family and accurate cancer risk cannot always be established based upon the pedigree analysis alone, it would be reasonable for Melita to consider panel genetic testing to analyze multiple genes at once.  Genetic testing is available for BRCA1/2 as part of the patient's core panel. This will then be automatically reflexed to a CustomNext-Cancer panel through [employer].  Genetic testing is available for 40 genes associated with hereditary cancer: CustomNext-Cancer, a combination of CancerNext (APC, MYA, BARD1, BRCA1, BRCA2, BRIP1, BMPR1A, CDH1, CDK4, CDKN2A, CHEK2, DICER1, EPCAM, GREM1, HOXB13, MLH1, MRE11A, MSH2, MSH6, MUTYH, NBN, NF1, PALB2, PMS2, POLD1, POLE, PTEN, RAD50, RAD51C, RAD51D, SMAD4, SMARCA4, STK11, and TP53) + pancreatitis genes (CASR, CPA1, CFTR, CTRC, PRSS1, SPINK1).  We discussed that many of the genes in the CustomNext-Cancer panel are associated with specific hereditary cancer syndromes and published management guidelines: Hereditary Breast and Ovarian Cancer syndrome (BRCA1, BRCA2), Riley syndrome (MLH1, MSH2, MSH6, PMS2, EPCAM), Familial Adenomatous Polyposis (APC), Hereditary Diffuse Gastric Cancer (CDH1), Familial Atypical Multiple Mole Melanoma syndrome (CDK4, CDKN2A), Juvenile Polyposis syndrome (BMPR1A, SMAD4), Cowden syndrome (PTEN), Li Fraumeni syndrome (TP53), Peutz-Jeghers syndrome (STK11), MUTYH Associated Polyposis (MUTYH), Cystic Fibrosis (CFTR), and PRSS1-Related Hereditary Pancreatitis (PRSS1), and Neurofibromatosis type 1 (NF1).   The MYA, BRIP1, CASR, CHEK2, CPA1, CTRC, GREM1, NBN, PALB2, POLD1, POLE, RAD51C, RAD51D and SPINK1 genes are associated with increased pancreatitis and/or cancer risk and have published management guidelines for certain cancers.  The  remaining genes (BARD1, DICER1, HOXB13, MRE11A, RAD50, and SMARCA4) are associated with increased cancer risk and may allow us to make medical recommendations when mutations are identified.    Melita would like to submit a blood sample for her genetic testing. She will go to her [facility] at her earliest convenience to get her blood drawn for her genetic testing.   Verbal consent was given over video and written on the consent form. Turnaround time is approximately 4 weeks once the lab receives the sample.  Medical Management: For Melita, we reviewed that the information from genetic testing may determine:  additional cancer screening for which Melita may qualify (i.e. mammogram and breast MRI, more frequent colonoscopies, more frequent dermatologic exams, etc.),  options for risk reducing surgeries Melita could consider (i.e. bilateral mastectomy, surgery to remove her ovaries and/or uterus, etc.),    and targeted chemotherapies if she were to develop certain cancers in the future (i.e. immunotherapy for individuals with Riley syndrome, PARP inhibitors, etc.).   These recommendations and possible targeted chemotherapies will be discussed in detail once genetic testing is completed.     Plan:  1) Today Melita elected to proceed with BRCA1/2 testing with automatic reflex to a CustomNext-Cancer panel through [employer].  2) A copy of the consent form and the after visit summary will be sent to Melita.  3) This information should be available in approximately 4 weeks, once the lab receives the sample.  4) I will call Melita with the results once they become available.    Time spent on video: 40 minutes    Jose Barajas MS, [facility]  Licensed, Certified Genetic Counselor      Virtual Visit Details    Type of service:  Video Visit     Originating Location (pt. Location): Home  Distant Location (provider location):  Off-site  Platform used for Video Visit: Harry

## 2023-12-11 NOTE — Clinical Note
"    12/11/2023         RE: Melita Cortes  [address] [address] [place] [address] [address]  [facility] [address]        Dear Colleague,    Thank you for referring your patient, Melita Cortes, to the [facility]. Please see a copy of my visit note below.    Virtual Visit Details    Type of service:  Video Visit     Originating Location (pt. Location): {video visit patient location:982489::\"Home\"}  {PROVIDER LOCATION On-site should be selected for visits conducted from your clinic location or adjoining [facility] hospital, academic office, or other nearby [facility] building. Off-site should be selected for all other provider locations, including home:[id number]}  Distant Location (provider location):  {virtual location provider:717590}  Platform used for Video Visit: {Virtual Visit Platforms:169969::\"GoCoin\"}    12/11/2023    Referring Provider: ***    Presenting Information:   I met with Melita for her video genetic counseling visit, through the Cancer Risk Management Program, to discuss her personal *** and family history of *** cancer. Today we reviewed this history, cancer screening recommendations, and available genetic testing options.    Personal History:  Melita is a 53 year old year old female. She was diagnosed with ***; treatment included ***  / does not have any personal history of cancer.    ***She had her first menstrual period at age ***, her first child at age ***, and is ***.  ***Melita has her ovaries, fallopian tubes and uterus in place, and she has had *** ovarian cancer screening to date. She reports that she *** hormone replacement therapy.      She has *** clinical breast exams and mammograms; her most recent mammogram in *** was ***. ***Melita began having colonoscopies at the age of ***/Melita has not had a colonoscopy. Her most recent colonoscopy in *** was *** and follow-up was recommended in ***. ***She does not regularly do any other cancer screening at this time. Melita reported " *** tobacco use and *** alcohol use.    Family History: (Please see scanned pedigree for detailed family history information)  ***  ***  Her maternal ethnicity is ***. Her paternal ethnicity is ***. There is no known Ashkenazi Christianity ancestry on either side of her family. There is no reported consanguinity.    Discussion:  Melita's personal and family history of *** is suggestive of a hereditary cancer syndrome.  We reviewed the features of sporadic, familial, and hereditary cancers. ***  We discussed the natural history and genetics of hereditary cancer. A detailed handout regarding hereditary cancer, along with the other information we discussed, will be mailed to Melita at the end of our appointment today and can be found in the after visit summary. Topics included: inheritance pattern, cancer risks, cancer screening recommendations, and also risks, benefits and limitations of testing.  Based on her personal and family history, Melita meets current National Comprehensive Cancer Network (NCCN) criteria for genetic testing of ***.  We discussed that there are additional genes that could cause increased risk for *** cancer. As many of these genes present with overlapping features in a family and accurate cancer risk cannot always be established based upon the pedigree analysis alone, it would be reasonable for Melita to consider panel genetic testing to analyze multiple genes at once.  ***  Melita stated that she would prefer to submit a saliva kit for her genetic testing. ***[facility] will send a kit directly to her home with directions on how to collect a saliva sample. We discussed that there is a small chance for sample failure due to contamination of the sample. To help minimize this, she should follow the directions that are sent with the kit. Melita verbalized understanding of this. Once the sample is collected, she will send it to ***[facility] using the return envelope and prepaid shipping label.    Verbal consent was given over video*** and written on the consent form. Turnaround time is approximately 4 weeks once the lab receives the sample.  Medical Management: For Melita, we reviewed that the information from genetic testing may determine:  ***surgery to treat Bakaris active cancer diagnosis (i.e. lumpectomy versus bilateral mastectomy, partial versus total colectomy, etc.***),  additional cancer screening for which Melita may qualify (i.e. mammogram and breast MRI, more frequent colonoscopies, more frequent dermatologic exams, etc.***),  options for risk reducing surgeries Melita could consider (i.e. bilateral mastectomy, surgery to remove her ovaries and/or uterus, etc.***),    and targeted chemotherapies for Bakaris *** active cancer, or ***if she were to develop certain cancers in the future (i.e. immunotherapy for individuals with Riley syndrome, PARP inhibitors, etc.***).   These recommendations and possible targeted chemotherapies will be discussed in detail once genetic testing is completed.     Plan:  1) Today Melita elected to proceed with ***.  2) A copy of the consent form and the after visit summary will be sent to Melita.  3) This information should be available in approximately 4 weeks, once the lab receives the sample.  4) I will call Melita with the results once they become available.    Time spent on video: 40 minutes    Jose Barajas MS, [facility]  Licensed, Certified Genetic Counselor    ***    Virtual Visit Details    Type of service:  Video Visit     Originating Location (pt. Location): Home  Distant Location (provider location):  Off-site  Platform used for Video Visit: AmWell      Again, thank you for allowing me to participate in the care of your patient.        Sincerely,        Jose Barajas,
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
## 2023-12-13 ENCOUNTER — MYC MEDICAL ADVICE (OUTPATIENT)
Dept: ORTHOPEDICS | Facility: CLINIC | Age: 53
End: 2023-12-13
Payer: COMMERCIAL

## 2023-12-13 ENCOUNTER — VIRTUAL VISIT (OUTPATIENT)
Dept: PSYCHOLOGY | Facility: CLINIC | Age: 53
End: 2023-12-13
Payer: COMMERCIAL

## 2023-12-13 DIAGNOSIS — F32.1 CURRENT MODERATE EPISODE OF MAJOR DEPRESSIVE DISORDER WITHOUT PRIOR EPISODE (H): Primary | ICD-10-CM

## 2023-12-13 DIAGNOSIS — M71.20 BAKER'S CYST: ICD-10-CM

## 2023-12-13 DIAGNOSIS — M25.561 CHRONIC PAIN OF RIGHT KNEE: Primary | ICD-10-CM

## 2023-12-13 DIAGNOSIS — G89.29 CHRONIC PAIN OF RIGHT KNEE: Primary | ICD-10-CM

## 2023-12-13 PROCEDURE — 90834 PSYTX W PT 45 MINUTES: CPT | Mod: VID | Performed by: STUDENT IN AN ORGANIZED HEALTH CARE EDUCATION/TRAINING PROGRAM

## 2023-12-13 ASSESSMENT — ANXIETY QUESTIONNAIRES
6. BECOMING EASILY ANNOYED OR IRRITABLE: NEARLY EVERY DAY
3. WORRYING TOO MUCH ABOUT DIFFERENT THINGS: NEARLY EVERY DAY
7. FEELING AFRAID AS IF SOMETHING AWFUL MIGHT HAPPEN: NEARLY EVERY DAY
4. TROUBLE RELAXING: NEARLY EVERY DAY
2. NOT BEING ABLE TO STOP OR CONTROL WORRYING: NEARLY EVERY DAY
IF YOU CHECKED OFF ANY PROBLEMS ON THIS QUESTIONNAIRE, HOW DIFFICULT HAVE THESE PROBLEMS MADE IT FOR YOU TO DO YOUR WORK, TAKE CARE OF THINGS AT HOME, OR GET ALONG WITH OTHER PEOPLE: EXTREMELY DIFFICULT
1. FEELING NERVOUS, ANXIOUS, OR ON EDGE: NEARLY EVERY DAY
GAD7 TOTAL SCORE: 21
GAD7 TOTAL SCORE: 21
5. BEING SO RESTLESS THAT IT IS HARD TO SIT STILL: NEARLY EVERY DAY

## 2023-12-13 ASSESSMENT — PATIENT HEALTH QUESTIONNAIRE - PHQ9
SUM OF ALL RESPONSES TO PHQ QUESTIONS 1-9: 18
SUM OF ALL RESPONSES TO PHQ QUESTIONS 1-9: 18
10. IF YOU CHECKED OFF ANY PROBLEMS, HOW DIFFICULT HAVE THESE PROBLEMS MADE IT FOR YOU TO DO YOUR WORK, TAKE CARE OF THINGS AT HOME, OR GET ALONG WITH OTHER PEOPLE: VERY DIFFICULT

## 2023-12-13 NOTE — PROGRESS NOTES
"[facility]                                     Progress Note    Patient Name: Melita Cortes  Date: 12/ 13/2023         Service Type: Individual      Session Start Time: 12.00  Session End Time: 12.41     Session Length: 38-52    Session #: 07    Attendees: Client attended alone    Service Modality:  Video Visit:      Provider verified identity through the following two step process.  Patient provided:  Patient is known previously to provider    Telemedicine Visit: The patient's condition can be safely assessed and treated via synchronous audio and visual telemedicine encounter.      Reason for Telemedicine Visit: Patient has requested telehealth visit    Originating Site (Patient Location): Patient's home    Distant Site (Provider Location): Provider Remote Setting- Home Office    Consent:  The patient/guardian has verbally consented to: the potential risks and benefits of telemedicine (video visit) versus in person care; bill my insurance or make self-payment for services provided; and responsibility for payment of non-covered services.     Patient would like the video invitation sent by:  My Chart    Mode of Communication:  Video Conference via Amwell    Distant Location (Provider):  Off-site    As the provider I attest to compliance with applicable laws and regulations related to telemedicine.    DATA  Interactive Complexity: No  Crisis: No        Progress Since Last Session (Related to Symptoms / Goals / Homework):   Symptoms: Improving as evident by current phq9 scores    Homework: Completed in session review patient's utilization of safety plan.        Episode of Care Goals: Minimal progress - ACTION (Actively working towards change); Intervened by reinforcing change plan / affirming steps taken     Current / Ongoing Stressors and Concerns:  Patient reported  \"Trauma Adhd depres\". Patient reported \" I think I have had some trauma in my life, my liver transplant and I think have " "ADHD.\" Pt reported she is  \"overly sensitive\" and \" I dont handle stress very well and struggle with impulsivity\"  and that \"I have a poor self image, negative talk a lot \" patient also reported she lost her mother in 2019 and her dad 7 months ago and still struggling with grieving the lost of both parents.      Current: Today, patient reported she continue to feel  overwhelmed and struggles with heavy emotions. Patient reported she cries often and current stressor is a stressful relationship with her cousin who told her she wants to use a family picture with her dad on it on her [employer] card. Patient noted she has been thinking about why she should do that and why others have a positive image of her dad when she doesn't and can't stop thinking about that.     Treatment Objective(s) Addressed in This Session:   Patient will increase daily activity level by exercising for 20-30 minutes and participate in at least 1 daily pleasant/fun activities.  Use safety plan as needed daily and practice mindfulness daily.  Patient will identify 2-3 volunteering opportunities and apply before next session.     Intervention:    Mindfulness:Walked patient through a mindfulness exercise utilizing leaves on a stream metaphor to help them watch their thoughts from afar as oppose to  seeing the world from inside their thoughts with the goal of helping them see their thoughts as just thoughts and not facts and avoid being engulfed and controlled by them. Provided  Leaves on a stream  mindfulness exercise handout to pt and encouraged them to practice skill learned. Review suicidal thoughts and reinforced the utilization of safety plan.      Assessments completed prior to visit:  PHQ9:       6/29/2023     1:43 PM 8/8/2023     9:45 AM 9/11/2023    11:55 AM 10/23/2023     8:59 AM 11/14/2023     3:13 PM 11/28/2023     9:28 AM 12/13/2023    11:54 AM   PHQ-9 SCORE   PHQ-9 Total Score MyChart  25 (Severe depression) 12 (Moderate depression)  " 18 (Moderately severe depression) 14 (Moderate depression) 18 (Moderately severe depression)   PHQ-9 Total Score 8 25 12 10 18 14 18     GAD7:       6/6/2023     1:00 PM 6/29/2023     1:43 PM 9/11/2023    12:07 PM 11/14/2023     3:13 PM 11/28/2023     9:29 AM 12/13/2023    11:55 AM   JORGE-7 SCORE   Total Score   15 (severe anxiety) 14 (moderate anxiety) 21 (severe anxiety) 21 (severe anxiety)   Total Score 14 7 15 14    14 21 21         ASSESSMENT: Current Emotional / Mental Status (status of significant symptoms):   Risk status (Self / Other harm or suicidal ideation)   Patient denies current fears or concerns for personal safety.   Patient denies current or recent suicidal ideation or behaviors.   Patient denies current or recent homicidal ideation or behaviors.   Patient denies current or recent self injurious behavior or ideation.   Patient denies other safety concerns.   Patient reports there has been no change in risk factors since their last session.     Patient reports there has been no change in protective factors since their last session.     Recommended that patient call 911 or go to the local ED should there be a change in any of these risk factors.     Appearance:   Appropriate    Eye Contact:   Good    Psychomotor Behavior: Normal    Attitude:   Cooperative  Interested   Orientation:   Person Place Time Situation   Speech    Rate / Production: Normal/ Responsive    Volume:  Normal    Mood:    Anxious  Depressed  Grieving   Affect:    Tearful   Thought Content:  Clear    Thought Form:  Coherent  Logical    Insight:    Good      Medication Review:   No changes to current psychiatric medication(s)     Medication Compliance:   Yes     Changes in Health Issues:   None reported     Chemical Use Review:   Substance Use: Chemical use reviewed, no active concerns identified      Tobacco Use: No current tobacco use.      Diagnosis:  296.32 (F33.1) Major Depressive Disorder, Recurrent Episode, Moderate _ and With  atypical features    Collateral Reports Completed:   Not Applicable    PLAN: (Patient Tasks / Therapist Tasks / Other)    Utilize safety plan as needed  Use DBT skill learned in session daily to regulate self and manage distressful emotions.    Apply to at least one volunteering job by next session    CECIL Vasquez    ----- Service Performed and Documented by CECIL------  This note was reviewed and clinical supervision by ELMO Colmenares [facility]    12/15/2023    _________________________________________________________    Individual Treatment Plan    Patient's Name: Melita Cortes  YOB: 1970    Date of Creation: 09/22/2023  Date Treatment Plan Last Reviewed/Revised: 11/14/2023    DSM5 Diagnoses: 296.32 (F33.1) Major Depressive Disorder, Recurrent Episode, Moderate _ and With atypical features  Psychosocial / Contextual Factors:   PROMIS (reviewed every 90 days):     Referral / Collaboration:  Referral to another professional/service is not indicated at this time..    Anticipated number of session for this episode of care:  15-25  Anticipation frequency of session: Biweekly  Anticipated Duration of each session: 38-52 minutes  Treatment plan will be reviewed in 90 days or when goals have been changed.       MeasurableTreatment Goal(s) related to diagnosis / functional impairment(s)  Goal 1: Patient will identify and address specific triggers to feelings of depression and improve coping by  90 % in the next three months.    I will know I've met my goal when I am less impulsive, better communicator and can comfortably manage distressful emotions.         Objective #A (Patient Action)    Develop and utilize 3 effective distress tolerance strategies to address SI.   Status: Continued - Date(s): 02/15/2024     Intervention(s)   Therapist will engage in collaborative brainstorming with pt to identify and practice individualized distress tolerance coping strategies to address SI weekly.    Objective  "#B (Patient Action)    Patient will increase daily activity level by exercising for 20-30 minutes and participate in at least 1 daily pleasant/fun activities.  Status: Continued - Date(s):  02/15/2024    Intervention(s)  Therapist will provide psychoeducation, behavioral activation, and cognitive restructuring.    Objective #C  Patient will identify specific areas of cognitive distortion and challenge irrational thoughts with reality   Status: Continued - Date(s):   02/15/2024       Intervention(s)  Therapist will provide psychoeducation, behavioral activation, and cognitive restructuring.    Objective #D  Patient will learn and practice relaxation techniques to manage depression.  Status: Continued - Date(s):   02/15/2024    Intervention(s)   Therapist will teach patient thought stopping, deep breathing exercises, mindful meditation, and creative visualization.        Patient has reviewed and agreed to the above plan.      Jake Coyne LGSW  September 22, 2023          [facility]                                       Melita Cortes     SAFETY PLAN:  Step 1: Warning signs / cues (Thoughts, images, mood, situation, behavior) that a crisis may be developing:  Thoughts:   \" sometimes I feel like I don't have a purpose\"  Images:  Feels lonely after losing mum and dad and lonely in marriage  Thinking Processes: ruminations (can't stop thinking about my problems): health, family loses  Mood: worsening depression  Behaviors: isolating/withdrawing   Situations: loss: parents and lonely in marriage    Step 2: Coping strategies - Things I can do to take my mind off of my problems without contacting another person (relaxation technique, physical activity):  Distress Tolerance Strategies:  play with my pet , intense exercise: work out for 2-3 minutes , and support group attendance for transplant  Physical Activities: go for a walk and stretching   Focus on helpful thoughts:  \"This is temporary\" and \"It always " "passes\"  Step 3: People and social settings that provide distraction:   Name:  Augustine ( friend) Phone: [phone]   Name:  Aimee paniagua Phone: [phone]     gym  and [employer] store  Liver transplant group  Step 4: Remind myself of people and things that are important to me and worth living for:    My , cousins and friends.  Step 5: When I am in crisis, I can ask these people to help me use my safety plan:   Name:  Jake ( Therapist )  Phone: [phone]   Name:  Aimee paniagua Phone: [phone]   Step 6: Making the environment safe:   remove alcohol, remove drugs, and be around others  Step 7: Professionals or agencies I can contact during a crisis:  Suicide Prevention Lifeline: Call or Text 775   [facility]: [phone]    Call 911 or go to my nearest emergency department.   I helped develop this safety plan and agree to use it when needed.  I have been given a copy of this plan.      Client signature _________________________________________________________________  Today s date:  11/14/2023  Completed by Provider Name/ Credentials:  CECIL Vasquez  November 14, 2023  Adapted from Safety Plan Template 2008 Chelsi Marx and Fred Ramos is reprinted with the express permission of the authors.  No portion of the Safety Plan Template may be reproduced without the express, written permission.  You can contact the authors at bhs@[email or web address] or [email or web address].        Answers submitted by the patient for this visit:  Patient Health Questionnaire (Submitted on 11/14/2023)  If you checked off any problems, how difficult have these problems made it for you to do your work, take care of things at home, or get along with other people?: Very difficult  PHQ9 TOTAL SCORE: 18  JORGE-7 (Submitted on 11/14/2023)  JORGE 7 TOTAL SCORE: 14    Answers submitted by the patient for this visit:  Patient Health Questionnaire (Submitted on 11/28/2023)  If you checked off any problems, how difficult " have these problems made it for you to do your work, take care of things at home, or get along with other people?: Extremely difficult  PHQ9 TOTAL SCORE: 14  JORGE-7 (Submitted on 11/28/2023)  JORGE 7 TOTAL SCORE: 21    Answers submitted by the patient for this visit:  Patient Health Questionnaire (Submitted on 12/13/2023)  If you checked off any problems, how difficult have these problems made it for you to do your work, take care of things at home, or get along with other people?: Very difficult  PHQ9 TOTAL SCORE: 18  JORGE-7 (Submitted on 12/13/2023)  JORGE 7 TOTAL SCORE: 21

## 2023-12-18 ENCOUNTER — TRANSFERRED RECORDS (OUTPATIENT)
Dept: HEALTH INFORMATION MANAGEMENT | Facility: CLINIC | Age: 53
End: 2023-12-18

## 2023-12-18 ENCOUNTER — VIRTUAL VISIT (OUTPATIENT)
Dept: FAMILY MEDICINE | Facility: CLINIC | Age: 53
End: 2023-12-18
Payer: COMMERCIAL

## 2023-12-18 DIAGNOSIS — F33.0 MILD RECURRENT MAJOR DEPRESSION (H): ICD-10-CM

## 2023-12-18 DIAGNOSIS — N95.1 MENOPAUSAL SYNDROME (HOT FLASHES): Primary | ICD-10-CM

## 2023-12-18 PROCEDURE — 99441 PR PHYSICIAN TELEPHONE EVALUATION 5-10 MIN: CPT | Performed by: INTERNAL MEDICINE

## 2023-12-18 ASSESSMENT — PATIENT HEALTH QUESTIONNAIRE - PHQ9
SUM OF ALL RESPONSES TO PHQ QUESTIONS 1-9: 18
10. IF YOU CHECKED OFF ANY PROBLEMS, HOW DIFFICULT HAVE THESE PROBLEMS MADE IT FOR YOU TO DO YOUR WORK, TAKE CARE OF THINGS AT HOME, OR GET ALONG WITH OTHER PEOPLE: SOMEWHAT DIFFICULT
SUM OF ALL RESPONSES TO PHQ QUESTIONS 1-9: 18

## 2023-12-18 NOTE — PROGRESS NOTES
"    Instructions Relayed to Patient by Virtual Roomer:     Patient is active on [employer]:   Relayed following to patient: \"It looks like you are active on [employer], are you able to join the visit this way? If not, do you need us to send you a link now or would you like your provider to send a link via text or email when they are ready to initiate the visit?\"    Reminded patient to ensure they were logged on to virtual visit by arrival time listed. Documented in appointment notes if patient had flexibility to initiate visit sooner than arrival time. If pediatric virtual visit, ensured pediatric patient along with parent/guardian will be present for video visit.     Patient offered the website [email or web address] and/or phone number to [employer] Help line: [phone]      Fidelina is a 53 year old who is being evaluated via a billable telephone visit.      What phone number would you like to be contacted at? [phone]   How would you like to obtain your AVS? [employer]    Distant Location (provider location):  Off-site    Assessment & Plan     Menopausal syndrome (hot flashes)  She is having a lot of menopausal symptoms  She is due to see gynecologist soon and they are contemplating estrogen replacement therapy which should help with her symptoms    Mild recurrent major depression (H24)  She is currently on Wellbutrin 300 XL daily  Part of her low mood could be from her menopausal symptoms  Today we discussed about seeing how she does with estrogen therapy  If her symptoms improve with estrogen therapy then we do not have to increase the dose of Wellbutrin or add anything like selective serotonin reuptake inhibitor  We can just continue Wellbutrin till February when we are going to have the results of the ADHD evaluation at that point of time we might start her on Adderall which can potentially help with her mood and then we can think of tapering her off of Wellbutrin as we also not had this for helping her " "with potentially ADHD symptoms      25 minutes spent by me on the date of the encounter doing chart review, history and exam, documentation and further activities per the note       BMI:   Estimated body mass index is 26.63 kg/m  as calculated from the following:    Height as of 11/1/23: 1.651 m (5' 5\").    Weight as of 11/1/23: 72.6 kg (160 lb).       Depression Screening Follow Up        12/18/2023     5:09 PM   PHQ   PHQ-9 Total Score 18   Q9: Thoughts of better off dead/self-harm past 2 weeks More than half the days   F/U: Thoughts of suicide or self-harm Yes   F/U: Self harm-plan No   F/U: Self-harm action No   F/U: Safety concerns No         12/18/2023     5:09 PM   Last PHQ-9   1.  Little interest or pleasure in doing things 2   2.  Feeling down, depressed, or hopeless 2   3.  Trouble falling or staying asleep, or sleeping too much 2   4.  Feeling tired or having little energy 2   5.  Poor appetite or overeating 2   6.  Feeling bad about yourself 2   7.  Trouble concentrating 2   8.  Moving slowly or restless 2   Q9: Thoughts of better off dead/self-harm past 2 weeks 2   PHQ-9 Total Score 18   In the past two weeks have you had thoughts of suicide or self harm? Yes   Do you have concerns about your personal safety or the safety of others? No   In the past 2 weeks have you thought about a plan or had intention to harm yourself? No   In the past 2 weeks have you acted on these thoughts in any way? No              No data to display                  Follow Up      Follow Up Actions Taken  Crisis resource information provided in the After Visit Summary    Discussed the following ways the patient can remain in a safe environment:  remove alcohol, remove drugs, and be around others      Navneet Johnson MD  [facility] ALMA ROSA Kitchen is a 53 year old, presenting for the following health issues:  No chief complaint on file.        History of Present Illness       Reason for visit:  " Depression        Depression Followup  How are you doing with your depression since your last visit? Worsened   Are you having other symptoms that might be associated with depression? No  Have you had a significant life event?  No   Are you feeling anxious or having panic attacks?   Yes:     Do you have any concerns with your use of alcohol or other drugs? No    Social History     Tobacco Use    Smoking status: Former     Types: Cigarettes     Quit date: 5/1/2020     Years since quitting: 3.6    Smokeless tobacco: Never   Vaping Use    Vaping Use: Never used   Substance Use Topics    Alcohol use: Not Currently     Comment: Last drink 8/2021    Drug use: Never     Comment: OCASSIONAL CBC GUMMIES, THC DRINKS- LAST TAKEN LASTWEEK         11/28/2023     9:28 AM 12/13/2023    11:54 AM 12/18/2023     5:09 PM   PHQ   PHQ-9 Total Score 14 18 18   Q9: Thoughts of better off dead/self-harm past 2 weeks Several days More than half the days More than half the days   F/U: Thoughts of suicide or self-harm No No Yes   F/U: Self harm-plan   No   F/U: Self-harm action   No   F/U: Safety concerns No No No         11/14/2023     3:13 PM 11/28/2023     9:29 AM 12/13/2023    11:55 AM   JORGE-7 SCORE   Total Score 14 (moderate anxiety) 21 (severe anxiety) 21 (severe anxiety)   Total Score 14    14 21 21         Suicide Assessment Five-step Evaluation and Treatment (SAFE-T)    How many servings of fruits and vegetables do you eat daily?  0-1  On average, how many sweetened beverages do you drink each day (Examples: soda, juice, sweet tea, etc.  Do NOT count diet or artificially sweetened beverages)?   0  How many days per week do you exercise enough to make your heart beat faster? 3 or less  How many minutes a day do you exercise enough to make your heart beat faster? 20 - 29  How many days per week do you miss taking your medication? 0        Review of Systems         Objective           Vitals:  No vitals were obtained today due to  virtual visit.    Physical Exam   healthy, alert, and no distress  PSYCH: Alert and oriented times 3; coherent speech, normal   rate and volume, able to articulate logical thoughts, able   to abstract reason, no tangential thoughts, no hallucinations   or delusions  Her affect is normal  RESP: No cough, no audible wheezing, able to talk in full sentences  Remainder of exam unable to be completed due to telephone visits                Phone call duration: 10 minutes

## 2023-12-19 NOTE — PATIENT INSTRUCTIONS
Assessing Cancer Risk  Only about 5-10% of cancers are thought to be due to an inherited cancer susceptibility gene.    These families often have:  Several people with the same or related types of cancer  Cancers diagnosed at a young age (before age 50)  Individuals with more than one primary cancer  Multiple generations of the family affected with cancer    Some people may be candidates for genetic testing of more than one gene.  For these families, genetic testing using a cancer panel may be offered.  These panels will test different genes known to increase the risk for breast, ovarian, uterine, and/or other cancers. All of the genes discussed below have published clinical management guidelines for individuals who are found to carry a mutation. The purpose of this handout is to serve as a brief summary of the genes analyzed by the panels used to inquire about hereditary breast and gynecologic cancer:  APC, MYA, BMPR1A, BRCA1, BRCA2, BRIP1, CDH1, CHEK2, EPCAM, GREM1, MLH1, MSH2, MSH6, MUTYH, PMS2, POLD1, POLE, PTEN, PALB2, RAD51C, RAD51D, SMAD4, STK11, and TP53.   ______________________________________________________________________________  Hereditary Breast and Ovarian Cancer Syndrome   (BRCA1 and BRCA2)  A single mutation in one of the copies of BRCA1 or BRCA2 increases the risk for breast and ovarian cancer, among others.  The risk for pancreatic cancer and melanoma may also be slightly increased in some families.  The chart below shows the chance that someone with a BRCA mutation would develop cancer in his or her lifetime1,2,3,4.        A person s ethnic background is also important to consider, as individuals of Ashkenazi Mosque ancestry have a higher chance of having a BRCA gene mutation.  There are three BRCA mutations that occur more frequently in this population.    Riley Syndrome   (MLH1, MSH2, MSH6, PMS2, and EPCAM)  Currently five genes are known to cause Riley Syndrome: MLH1, MSH2, MSH6, PMS2,  and EPCAM.  A single mutation in one of the Riley Syndrome genes increases the risk for colon, endometrial, ovarian, and stomach cancers.  Other cancers that occur less commonly in Rilye Syndrome include urinary tract, skin, and brain cancers.  The chart below shows the chance that a person with Riley syndrome would develop cancer in his or her lifetime5.      *Cancer risk varies depending on Riley syndrome gene found    Cowden Syndrome   (PTEN)  Cowden syndrome is a hereditary condition that increases the risk for breast, thyroid, endometrial, colon, and kidney cancer.  Cowden syndrome is caused by a mutation in the PTEN gene.  A single mutation in one of the copies of PTEN causes Cowden syndrome and increases cancer risk.  The chart below shows the chance that someone with a PTEN mutation would develop cancer in their lifetime6,7.  Other benign features seen in some individuals with Cowden syndrome include benign skin lesions (facial papules, keratoses, lipomas), learning disability, autism, thyroid nodules, colon polyps, and larger head size.      *One recent study found breast cancer risk to be increased to 85%    Li-Fraumeni Syndrome   (TP53)  Li-Fraumeni Syndrome (LFS) is a cancer predisposition syndrome caused by a mutation in the TP53 gene. A single mutation in one of the copies of TP53 increases the risk for multiple cancers. Individuals with LFS are at an increased risk for developing cancer at a young age. The lifetime risk for development of a LFS-associated cancer is 50% by age 30 and 90% by age 60.   Core Cancers: Sarcomas, Breast, Brain, Lung, Leukemias/Lymphomas, Adrenocortical carcinomas  Other Cancers: Gastrointestinal, Thyroid, Skin, Genitourinary    Hereditary Diffuse Gastric Cancer   (CDH1)  Currently, one gene is known to cause hereditary diffuse gastric cancer (HDGC): CDH1.  Individuals with HDGC are at increased risk for diffuse gastric cancer and lobular breast cancer. Of people diagnosed  with HDGC, 30-50% have a mutation in the CDH1 gene.  This suggests there are likely other genes that may cause HDGC that have not been identified yet.      Lifetime Cancer Risks    General Population HDGC    Diffuse Gastric  <1% ~80%   Breast 12% 39-52%       Familial Adenomatous Polyposis (FAP)  FAP is a hereditary cancer syndrome caused by mutations in the APC gene. The condition is known to cause hundreds to thousands of adenomatous polyps in the colon, creating a  carpet  of polyps. Some individuals have what is called attenuated FAP (AFAP), a milder form of FAP with fewer polyps and typically later onset. Individuals with an APC gene mutation are at an increased risk for colon, thyroid, and duodenal cancers, as well as several other types of cancer1.  Other features of this condition may include: osteomas, dental anomalies, benign skin lesions, CHRPE ( freckle  on the inside of the eye), and desmoid tumors.      Lifetime Cancer Risks     Cancer Type General Population FAP   Colon  5% near 100%   Thyroid (papillary) 1% 1-12%   Duodenal <1% 5%   Liver  <1% 1-2% before age 5   Pancreas <1% 1%   Stomach <1% 1%       Juvenile Polyposis Syndrome (JPS)  JPS is characterized by hamartomatous polyps, called juvenile polyps, in the gastrointestinal tract.  Juvenile  refers to the type of polyps seen in this hereditary cancer condition, not the age of onset. Currently, mutations in two genes are known to cause JPS: BMPR1A and SMAD4. Of individuals clinically diagnosed with JPS, only 40% have an identifiable mutation in one of these genes, suggesting there are other genes that cause JPS that have not been discovered yet. Individuals with JPS are at an increased risk for colon cancer and stomach cancer 2,3,4. Pancreatic and small bowel cancers have also been reported in JPS, but the actual risks are unknown.         Lifetime Cancer Risks    Cancer Type General Population JPS   Colon 5% 40-50%   Gastric/Duodenal <1% 10-21%      Some individuals with SMAD4 mutations have a condition called JPS/HHT (Juvenile Polyposis/Hereditary Hemorrhagic Telangiectasia) where in addition to JPS, individuals may have nose bleeds and clotting issues.       MUTYH-Associated Polyposis (MAP)  MAP is a hereditary cancer syndrome caused by mutations in the MUTYH gene. Unlike the other hereditary cancer genes discussed in this handout, two mutations in the MUTYH gene cause MAP and increase cancer risk. Those affected with MAP typically have between  adenomatous polyps. This syndrome also increases the risk for colon and duodenal cancer. Current research suggests that other cancers may be associated with MUTYH mutations, as well. The table below includes the risk that someone with two MUTYH gene mutations would develop cancer in their lifetime; of note, there is also an increased colon cancer risk for individuals who carry only one MUTYH gene mutation5,6,7.       Lifetime Cancer Risks    Cancer Type General Population MAP   Colon 5% %   Duodenal  <1% 5%       Peutz-Jeghers syndrome (PJS)  PJS is a hereditary cancer syndrome caused by mutations in the STK11 gene. This condition can be distinguished from other hereditary syndromes by the presence of hamartomatous polyps in the gastrointestinal tract and freckles present in unusual places such as the hands, feet, neck, and lips. Individuals with Peutz-Jeghers syndrome have an increased risk for colon, breast, pancreatic, and other cancers3.  Men are at risk for testicular tumors which can affect hormones in the body. Women are at risk for sex cord tumors of the ovaries and a rare aggressive type of cervical cancer.     Lifetime Cancer Risks    Cancer Type General Population PJS   Breast 12% 45-50%   Colon 5% 39%   Stomach <1% 29%   Pancreas 1.5% 11-36%   Small Intestine <1% 13%     Ovarian  2%  18%   Lung 6-7% 15-17%     Additional Genes Associated with Increased Colon Cancer Risk  CHEK2  CHEK2 is a  moderate-risk breast cancer gene.  Women who have a mutation in CHEK2 have around a 2-fold increased risk for breast cancer compared to the general population, and this risk may be higher depending upon family history.12,13,14.  Mutations in CHEK2 have also been shown to increase the risk of a number of other cancers, including colon and prostate, however these cancer risks are currently not well understood.     MYA  MYA is a moderate-risk breast cancer gene. Women who have a mutation in MYA can have between a 2-4 fold increased risk for breast cancer compared to the general population8. MYA mutations have also been associated with increased risk for pancreatic cancer, however an estimate of this cancer risk is not well understood9. Individuals who inherit two MYA mutations have a condition called ataxia-telangiectasia (AT).  This rare autosomal recessive condition affects the nervous system and immune system, and is associated with progressive cerebellar ataxia beginning in childhood.  Individuals with ataxia-telangiectasia often have a weakened immune system and have an increased risk for childhood cancers.    PALB2  Mutations in PALB2 have been shown to increase the risk of breast cancer up to 33-58% in some families; where individuals fall within this risk range is dependent upon family [id number]. PALB2 mutations have also been associated with increased risk for pancreatic cancer, although this risk has not been quantified yet.  Individuals who inherit two PALB2 mutations--one from their mother and one from their father--have a condition called Fanconi Anemia.  This rare autosomal recessive condition is associated with short stature, developmental delay, bone marrow failure, and increased risk for childhood cancers.    GREM1  GREM1 is a moderate-risk colon polyposis gene. Duplications of this gene are more commonly found in individuals with Ashkenazi Yazidism [id number]. Mutations in GREM1 are associated with  colon polyps and therefore an increased risk of colon cancer; however the estimated cancer risk is not well [id number].     POLD1 and POLE  POLD1 and POLE are moderate-risk colon cancer genes. Carriers of a mutation in one of these genes increases the lifetime risk of colorectal [id number],18,19,20. Mutations in these genes may also be associated with increased risk for other cancers including: endometrial cancer, duodenal adenomas and carcinomas, and brain tumors.    BRIP1, RAD51C and RAD51D  Mutations in BRIP1, RAD51C, and RAD51D have been shown to increase the risk of ovarian cancer and possibly female breast cancer as well14,15 .       Lifetime Cancer Risk    General Population BRIP1 RAD51C RAD51D   Ovarian 1-2% ~5-8% ~5-9% ~7-15%       Inheritance  All of the cancer syndromes reviewed above are inherited in an autosomal dominant pattern.  This means that if a parent has a mutation, each of his or her children will have a 50% chance of inheriting that same mutation.  Therefore, each child--male or female--would have a 50% chance of being at increased risk for developing cancer.      Image obtained from Genetics Home Reference, 2013     Mutations in some genes can occur de ar, which means that a person s mutation occurred for the first time in them and was not inherited from a parent.  Now that they have the mutation, however, it can be passed on to future generations.    Genetic Testing  Genetic testing involves a blood test and will look at the genetic information in the APC, MYA, BMPR1A, BRCA1, BRCA2, BRIP1, CDH1, CHEK2, EPCAM, GREM1, MLH1, MSH2, MSH6, MUTYH, PMS2, POLD1, POLE, PTEN, PALB2, RAD51C, RAD51D, SMAD4, STK11, and TP53.genes for any harmful mutations that are associated with increased cancer risk.  If possible, it is recommended that the person(s) who has had cancer be tested before other family members.  That person will give us the most useful information about whether or not a specific gene is  associated with the cancer in the family.    Results  There are three possible results of genetic testing:  Positive--a harmful mutation was identified in one or more of the genes  Negative--no mutation was identified in any of the genes on this panel  Variant of unknown significance--a variation in one of the genes was identified, but it is unclear how this impacts cancer risk in the family    Advantages and Disadvantages   There are advantages and disadvantages to genetic testing.    Advantages  May clarify your cancer risk  Can help you make medical decisions  May explain the cancers in your family  May give useful information to your family members (if you share your results)    Disadvantages  Possible negative emotional impact of learning about inherited cancer risk  Uncertainty in interpreting a negative test result in some situations  Possible genetic discrimination concerns (see below)    Genetic Information Nondiscrimination Act (ZAC)  ZAC is a federal law that protects individuals from health insurance or employment discrimination based on a genetic test result alone.  Although rare, there are currently no legal discrimination protections in terms of life insurance, long term care, or disability insurances.  Visit the National Human Genome Research [place] website to learn more.    Reducing Cancer Risk  All of the genes described above have nationally recognized cancer screening guidelines that would be recommended for individuals who test positive.  In addition to increased cancer screening, surgeries may be offered or recommended to reduce cancer risk.  Recommendations are based upon an individual s genetic test result as well as their personal and family history of cancer.    Questions to Think About Regarding Genetic Testing:  What effect will the test result have on me and my relationship with my family members if I have an inherited gene mutation?  If I don t have a gene mutation?  Should I share  my test results, and how will my family react to this news, which may also affect them?  Are my children ready to learn new information that may one day affect their own health?    Hereditary Cancer Resources    FORCE: Facing Our Risk of Cancer Empowered [email or web address]   Bright Pink bebrightpink.org   Li-Fraumeni Syndrome Association lfsassociation.org   PTEN World PTENworld.com   No stomach for cancer, Inc. nostomachforcancer.org   Stomach cancer relief network Scrnet.org   Collaborative Group of the Americas on Inherited Colorectal Cancer (CGA) cgaicc.com    Cancer Care cancercare.org   American Cancer Society (ACS) cancer.org   National Cancer [place] (NCI) cancer.gov     Please call us if you have any questions or concerns.   Cancer Risk Management Program [phone]-CANCER ([phone])  Jose Barajas, MS, [facility] [place]  [phone]  Suzan Soliz, MS [facility] [place] [phone]  Helga Rutherford, MS, [facility] [place]  [phone]  Justine Huertas, MS, [facility] [place]  [phone]  Kaylyn Bella, MS, [facility] [place]  [phone]  Soumya Pfeiffer, MS, [facility] [place]  [phone]  Chen Morales, MS, [facility] [place]  [phone]    References  Lynn BOCANEGRA, Radha PDP, Xander S, Aimee COTE, Aimee JE, Gisele JL, Ibis N, Lizbeth H, Suresh O, Emily A, Db B, Mason P, Manmarin S, Denise DM, Edenilson N, Pelon E, Rina H, Mack E, Percy J, Yumi J, Christiana B, Tulsarahus H, Thorlacius S, Eerola H, Johnathonna H, Chavez K, Nahomy OP. Average risks of breast and ovarian cancer associated with BRCA1 or BRCA2 mutations detected in case series unselected for family history: a combined analysis of 222 studies. Am J Hum [place]. 2003;72:1117-30.  Gemini N, Leonor M, Kajal G.  BRCA1 and BRCA2 Hereditary Breast and Ovarian Cancer. Gene Reviews online. 2013.  Kip YC, Abhishek S, Osmin G, Kaya S. Breast cancer risk among male BRCA1 and BRCA2 mutation carriers. J Natl Cancer Inst. 2007;99:1811-4.  Phan GOTTI, Ashish I, Anthony J, Hubert E, Jayant KNOX, Blessing F. Risk of breast cancer in  male BRCA2 carriers. J Med Estella. 2010;47:710-1.  National Comprehensive Cancer Network. Clinical practice guidelines in oncology, colorectal cancer screening. Available online (registration required). 2015.  Dima MH, Irvin J, Aubrey J, Kike LA, Mabel MS, Charlene C. Lifetime cancer risks in individuals with germline PTEN mutations. Clin Cancer Res. 2012;18:400-7.  Chitra MCFARLANE. Cowden Syndrome: A Critical Review of the Clinical Literature. J Estella . 2009:18:13-27.  Orion A, Cedric D, Kavita S, Luisa P, Elmer T, Danny M, Dino B, Jesica H, Jay R, Rvai K, Trish L, Phan DG, Denise D, Geovani DF, Piedad MR, The Breast Cancer Susceptibility Collaboration (UK) & Ana Maria CASTRO. MYA mutations that cause ataxia-telangiectasia are breast cancer susceptibility alleles. Nature Genetics. 2006;38:873-875  Jose N , Elidia Y, Stella J, Emilie L, Malia GM , Kamla ML, Gallinger S, Rojas AG, Syngal S, Jason ML, Nazia J , Luciana R, Jaleel SZ, Esherve JR, Osmel VE, Shakira M, Vogelstein B, Kriss N, Humberto RH, Mónica KW, and Clovis AP. MYA mutations in patients with hereditary pancreatic cancer. Cancer Discover. 2012;2:41-46  Lynn STACK., et al. Breast-Cancer Risk in Families with Mutations in PALB2. NEJM. 2014; 371(6):497-506.  CHEK2 Breast Cancer Case-Control Consortium. CHEK2*1100delC and susceptibility to breast cancer: A collaborative analysis involving 10,860 breast cancer cases and 9,065 controls from 10 studies. Am J Hum [place]ella, 74 (2004), pp. 2669-2674  Candis T, Alie S, Leticia K, et al. Spectrum of Mutations in BRCA1, BRCA2, CHEK2, and TP53 in Families at High Risk of Breast Cancer. LOURDES. 2006;295(12):5831-9534.   Ayala CODY, Juliana MARQUEZ, Matteo BOCANEGRA, et al. Risk of breast cancer in women with a CHEK2 mutation with and without a family history of breast cancer. J Clin Oncol. 2011;29:9281-3517.  Aramis H, Pramod E, Simón AMES, et al. Contribution of germline mutations in the RAD51B,  RAD51C, and RAD51D genes to ovarian cancer in the population. J Clin Oncol. 2015;33(26):1350-3596. Doi:10.1200/JCO.2015.61.2408.  Dee GARCIA, Feroz AGUIELRA, Paresh P, et al. Mutations in BRIP1 confer high risk of ovarian cancer. Jami Estella. 2011;43(11):9430-3285. doi:10.1038/ng.955.  Mateo HUGHES, Benedict CORBIN, Tevin G, Karol E, Sonia J, et al. The Prevalence of thyroid cancer and benign thyroid disease in patients with familial adenomatous polyposis may be higher than previously recognized. Clin Colorectal Cancer. 2012;11:304-308.  Keara L, Van Liss A, Khai L, Lizbet CODY, Jennifer K, et al. Risk of colorectal cancer in juvenile polyposis. Gut. 2007;56:965-967.  Billy FG, Princess MN, Oscar CA. Colorectal cancer risk in hamartomatous polyposis syndromes. World Journal of Gastrointestinal Surgery. 2015;27:25-32  Roni MG. Guidance on gastrointestinal surveillance for hereditary non-polyposis colorectal cancer, familial adenomatous polypolis, juvenile polyposis, and Peutz-Jeghers syndrome. Gut. 2002;51:21-27.  Dylan AK, Jose ABDI, Aureliano CORBING et al. Risk of extracolonic cancers for people with biallelic and monoallelic mutations in MUTYH. Int J of Cancer. 2016;139:1550-3836.  Perla S, Robertolrex S, Gonuha H, Gisselle K, Tuttle M, et al. MUTYH-associated polyposis: 70 of 71 patients with biallelic mutations present with an attenuated or atypical phenotype. Int J of Cancer. 2006;119:807-814.  Ismael BIRD, Narayan F, Shane I, Jef M, Ismael H, et al. MUTYH mutation carriers have increased breast cancer risk. Cancer. 2012;9493-2115.  Dima MH, Irvin J, Aubery J, Kike LA, Ortracey MS, Eng C. Lifetime cancer risks in individuals with germline PTEN mutations. Clin Cancer Res. 2012;18:400-7.  Chitra MCFARLANE. Cowden Syndrome: A Critical Review of the Clinical Literature. J Estella . 2009:18:13-27.  National [facility] Cancer Network. Clinical practice guidelines in oncology, colorectal cancer screening.  Available online (registration required). 2013.  National Cancer [place]. SEER Cancer Stat Fact Sheets.  December 2013.  CHEK2 Breast Cancer Case-Control Consortium. CHEK2*1100delC and susceptibility to breast cancer: A collaborative analysis involving 10,860 breast cancer cases and 9,065 controls from 10 studies. Am J Hum [place]ella, 74 (2004), pp. 7506-6821  Candis T, Alie S, Leticia K, et al. Spectrum of Mutations in BRCA1, BRCA2, CHEK2, and TP53 in Families at High Risk of Breast Cancer. LOURDES. 2006;295(12):0373-9208.  Ayala C, Juliana D, Matteo A, et al. Risk of breast cancer in women with a CHEK2 mutation with and without a family history of breast cancer. J Clin Oncol. 2011;29:3315-5909.  Helio CORBIN, Pritesh E, Sudhakar J, Sanford N, Olga M et al. Defining the polyposis/colorectal cancer phenotype associated with the GREM1 duplication: counseling and management guidelines. Estella .Res. 2016;98:1-5.  Crystal E, Pan S, Pablo A, Ean Link, et al. Hereditary mixed polyposis syndrome is caused by a 40kb upstream duplication that leads to increased and ectopic expression of the BMP antagonist GREM1. Jami Estella. 2015;44:699-703.  ESCOBAR Longoria. et al. Germline mutations affecting the proofreading domains of POLE and POLD1 predispose to colorectal adenomas and carcinomas. Jami. Estella. 45, 136-44 (2013).  OPHELIA Huber. et al. POLE and POLD1 mutations in 529 carola with familial colorectal cancer and/or polyposis: review of reported cases and recommendations for genetic testing and surveillance. Estella. Med. (2015). doi:10.1038/gim.2015.75  MILDRED Koch. et al. New insights into POLE and POLD1 germline mutations in familial colorectal cancer and polyposis. Hum. Mol. Estella. 23, 2952-12 (2014).  ANTONIO Sierra et al. Frequency and phenotypic spectrum of germline mutations in POLE and seven other polymerase genes in 266 patients with colorectal adenomas and carcinomas. Int. J. Cancer 137, 320-31 (2015).

## 2023-12-21 ENCOUNTER — MYC MEDICAL ADVICE (OUTPATIENT)
Dept: FAMILY MEDICINE | Facility: CLINIC | Age: 53
End: 2023-12-21
Payer: COMMERCIAL

## 2023-12-21 NOTE — TELEPHONE ENCOUNTER
Forms/Letter Request    Type of form/letter:  Progress Report    Have you been seen for this request: Yes     Do we have the form/letter: Yes: Form placed in MD's box.    Who is the form from? [facility]     Where did/will the form come from? form was faxed in    When is form/letter needed by: ASAP    How would you like the form/letter returned: Fax : [phone]    Patient Notified form requests are processed in 3-5 business days:Yes    Could we send this information to you in [email or web address] or would you prefer to receive a phone call?:   Patient would prefer a phone call   Okay to leave a detailed message?: Yes at Cell number on file:    Telephone Information:   Mobile [phone]

## 2023-12-21 NOTE — TELEPHONE ENCOUNTER
Please print my office note from 11/1/2023 along with a list of patient's medications and also the ultrasound report from 7/31/2023 and fax it along with the forms

## 2023-12-21 NOTE — TELEPHONE ENCOUNTER
Received and faxed provider signed Attending Physician's Statement-Progress Report form, also per Dr Johnson, visit notes from 11/1/2023, patient's medication list and US results from 7/31/2023 to [facility], [phone], right fax confirmed at 5:37 pm today, 12/21/2023. Copy of forms to TC and abstracting.  Marleen Gagnon MA  [facility]   Primary Care

## 2023-12-26 ENCOUNTER — TELEPHONE (OUTPATIENT)
Dept: TRANSPLANT | Facility: CLINIC | Age: 53
End: 2023-12-26

## 2023-12-26 ENCOUNTER — MYC MEDICAL ADVICE (OUTPATIENT)
Dept: FAMILY MEDICINE | Facility: CLINIC | Age: 53
End: 2023-12-26

## 2023-12-26 ENCOUNTER — VIRTUAL VISIT (OUTPATIENT)
Dept: TRANSPLANT | Facility: CLINIC | Age: 53
End: 2023-12-26
Attending: NURSE PRACTITIONER
Payer: COMMERCIAL

## 2023-12-26 DIAGNOSIS — U07.1 INFECTION DUE TO 2019 NOVEL CORONAVIRUS: Primary | ICD-10-CM

## 2023-12-26 DIAGNOSIS — U07.1 CLINICAL DIAGNOSIS OF COVID-19: Primary | ICD-10-CM

## 2023-12-26 DIAGNOSIS — U07.1 CLINICAL DIAGNOSIS OF COVID-19: ICD-10-CM

## 2023-12-26 PROCEDURE — 99442 PR PHYSICIAN TELEPHONE EVALUATION 11-20 MIN: CPT | Performed by: NURSE PRACTITIONER

## 2023-12-26 NOTE — TELEPHONE ENCOUNTER
Pt tested positive for COVID. Saturday started with sore throat, chills and headache.    Pt aware virtual appt for COVID treatment discussion today.

## 2023-12-26 NOTE — PROGRESS NOTES
"Fidelina is a 53 year old who is being evaluated via a billable telephone visit.      What phone number would you like to be contacted at?   How would you like to obtain your AVS? MyChart    Distant Location (provider location):  On-site    Assessment & Plan     Infection due to 2019 novel coronavirus  -discussed treatment options and patient opts for paxlovid. Er precautions discussed- high fevers, CP, SOB, dehydration, worsening s/s. Ensure hydration.      - nirmatrelvir and ritonavir (PAXLOVID) 300 mg/100 mg therapy pack; Take 3 tablets by mouth 2 times daily for 5 days (Take 2 Nirmatrelvir tablets and 1 Ritonavir tablet twice daily for 5 days)        COVID-19 positive patient.  Encounter for consideration of medication intervention. Patient does qualify for a prescription. Full discussion with patient including medication options, risks and benefits. Potential drug interactions reviewed with patient.     Treatment Planned Paxlovid, Rx sent to [place] pharmacy     Temporary change to home medications: HOLD Tac while on Paxlovid. Patient has Tac trough tomorrow AM and will check prior to starting Paxlovid and have Tac level checked again after Paxlovid treatment course is finished and before resuming Tacrolimus. Patient verbalized understanding.       Estimated body mass index is 26.63 kg/m  as calculated from the following:    Height as of 11/1/23: 1.651 m (5' 5\").    Weight as of 11/1/23: 72.6 kg (160 lb).  GFR Estimate   Date Value Ref Range Status   12/07/2023 67 >60 mL/min/1.73m2 Final   09/18/2020 >90 >60 mL/min/[1.73_m2] Final     Comment:     Non  GFR Calc  Starting 12/18/2018, serum creatinine based estimated GFR (eGFR) will be   calculated using the Chronic Kidney Disease Epidemiology Collaboration   (CKD-EPI) equation.       Lab Results   Component Value Date    [id number] Negative 05/12/2022       No follow-ups on file.    HAKAN Black [facility]    Subjective   Fidelina is a 53 year old, presenting for the following health issues:  No chief complaint on file.    HPI   Tested + for COVID at home. S/S started on Saturday - sore throat, coughing, rhinorrhea and diarrhea.     No fever, CP or SOB.   + chills.   Poor appetite but drinking fluids.   Was on sirolimus 3 weeks ago and then switched back to Tac.  Has blood test tomorrow AM to check level.     Review of Systems   Constitutional, HEENT, cardiovascular, pulmonary, gi and gu systems are negative, except as otherwise noted.      Objective           Vitals:  No vitals were obtained today due to virtual visit.    Physical Exam     PSYCH: Alert and coherent speech, normal   rate and volume, able to articulate logical thoughts, able   to abstract reason, no tangential thoughts, no hallucinations   or delusions  Her affect is normal  RESP: No cough, no audible wheezing, able to talk in full sentences  Remainder of exam unable to be completed due to telephone visits    +COVID home test             Phone call duration: 12 minutes

## 2023-12-26 NOTE — TELEPHONE ENCOUNTER
Provider huddled with RN via Teams would like to have virtual visit with provider 12/27/23 at 9:30 am, ok to double book per provider.     RN attempted to call pt to schedule, LVM requesting pt return clinic call. Hook Mobile message sent.     Suzan Finch RN

## 2023-12-26 NOTE — LETTER
"    12/26/2023         RE: Melita Cortes  [address]  [place] [address]        Dear Colleague,    Thank you for referring your patient, Melita Cortes, to the [facility]. Please see a copy of my visit note below.    Fidelina is a 53 year old who is being evaluated via a billable telephone visit.      What phone number would you like to be contacted at?   How would you like to obtain your AVS? MyChart    Distant Location (provider location):  On-site    Assessment & Plan    Infection due to 2019 novel coronavirus  -discussed treatment options and patient opts for paxlovid. Er precautions discussed- high fevers, CP, SOB, dehydration, worsening s/s. Ensure hydration.      - nirmatrelvir and ritonavir (PAXLOVID) 300 mg/100 mg therapy pack; Take 3 tablets by mouth 2 times daily for 5 days (Take 2 Nirmatrelvir tablets and 1 Ritonavir tablet twice daily for 5 days)        COVID-19 positive patient.  Encounter for consideration of medication intervention. Patient does qualify for a prescription. Full discussion with patient including medication options, risks and benefits. Potential drug interactions reviewed with patient.     Treatment Planned Paxlovid, Rx sent to [place] pharmacy     Temporary change to home medications: HOLD Tac while on Paxlovid. Patient has Tac trough tomorrow AM and will check prior to starting Paxlovid and have Tac level checked again after Paxlovid treatment course is finished and before resuming Tacrolimus. Patient verbalized understanding.       Estimated body mass index is 26.63 kg/m  as calculated from the following:    Height as of 11/1/23: 1.651 m (5' 5\").    Weight as of 11/1/23: 72.6 kg (160 lb).  GFR Estimate   Date Value Ref Range Status   12/07/2023 67 >60 mL/min/1.73m2 Final   09/18/2020 >90 >60 mL/min/[1.73_m2] Final     Comment:     Non  GFR Calc  Starting 12/18/2018, serum creatinine based estimated GFR (eGFR) will be "   calculated using the Chronic Kidney Disease Epidemiology Collaboration   (CKD-EPI) equation.       Lab Results   Component Value Date    [id number] Negative 05/12/2022       No follow-ups on file.    HAKAN Black CNP  [facility]    Cameron Kitchen is a 53 year old, presenting for the following health issues:  No chief complaint on file.    HPI   Tested + for COVID at home. S/S started on Saturday - sore throat, coughing, rhinorrhea and diarrhea.     No fever, CP or SOB.   + chills.   Poor appetite but drinking fluids.   Was on sirolimus 3 weeks ago and then switched back to Tac.  Has blood test tomorrow AM to check level.     Review of Systems   Constitutional, HEENT, cardiovascular, pulmonary, gi and gu systems are negative, except as otherwise noted.      Objective          Vitals:  No vitals were obtained today due to virtual visit.    Physical Exam     PSYCH: Alert and coherent speech, normal   rate and volume, able to articulate logical thoughts, able   to abstract reason, no tangential thoughts, no hallucinations   or delusions  Her affect is normal  RESP: No cough, no audible wheezing, able to talk in full sentences  Remainder of exam unable to be completed due to telephone visits    +COVID home test             Phone call duration: 12 minutes        Again, thank you for allowing me to participate in the care of your patient.        Sincerely,        HAKAN Black CNP

## 2023-12-27 ENCOUNTER — TELEPHONE (OUTPATIENT)
Dept: TRANSPLANT | Facility: CLINIC | Age: 53
End: 2023-12-27

## 2023-12-27 ENCOUNTER — VIRTUAL VISIT (OUTPATIENT)
Dept: FAMILY MEDICINE | Facility: CLINIC | Age: 53
End: 2023-12-27
Payer: COMMERCIAL

## 2023-12-27 DIAGNOSIS — Z94.4 LIVER REPLACED BY TRANSPLANT (H): ICD-10-CM

## 2023-12-27 DIAGNOSIS — U07.1 INFECTION DUE TO 2019 NOVEL CORONAVIRUS: Primary | ICD-10-CM

## 2023-12-27 DIAGNOSIS — D84.9 IMMUNOSUPPRESSED STATUS (H): ICD-10-CM

## 2023-12-27 PROCEDURE — 99214 OFFICE O/P EST MOD 30 MIN: CPT | Mod: VID | Performed by: INTERNAL MEDICINE

## 2023-12-27 NOTE — TELEPHONE ENCOUNTER
Pt called to report that she took her tacro this AM and unable to do do blood draw. She is wondering if should start paxlovid. Pt aware cannot take paxlovid and tacro together due to interaction. Pt has to hold tacro before taking paxlovid. She is on day 5 of COVID. Message to CARLOS darnell, to discuss.    Pt to start tomorrow per Eun and hold tacro

## 2023-12-27 NOTE — PROGRESS NOTES
Fidelina is a 53 year old who is being evaluated via a billable video visit.      How would you like to obtain your AVS? MyChart  If the video visit is dropped, the invitation should be resent by: Text to cell phone: [phone]  Will anyone else be joining your video visit? No          Assessment & Plan     Infection due to 2019 novel coronavirus  And having symptoms of COVID on Saturday  Felt feverish  Also had some chills  Did not take her temperature  No energy  Had diarrhea all day yesterday  Also is having dry cough  Spoke with her transplant team and was advised to start Paxlovid  However she is also on tacrolimus and this is class D interaction with Paxlovid  She has been advised not to take her tacrolimus this morning and get a Tac level checked this morning and then take the medication Paxlovid for 5 days and hold off on the tacrolimus for 2 to 3 days after finishing Paxlovid and get the Tac level checked again  She is however very apprehensive about all this and is concerned about stopping the tacrolimus for such a long time which is understandable  Also she has diarrhea yesterday and in the light of that we cannot do Paxlovid as Paxlovid can exacerbate diarrhea  Because of her medication interactions I do not think Paxlovid is a good choice for her  And to complicate matters she already took her tacrolimus this morning so we cannot get the tacrolimus level and if we want to start Paxlovid we can only do it tomorrow which is by then past the treatment window  For all these reasons I think Lagevrio is a better choice for her as there are no medication interactions  Discussed this with her and we will go for this alternate treatment  Advised her to check with her transplant team  - molnupiravir (LAGEVRIO) 200 MG capsule; Take 4 capsules (800 mg) by mouth every 12 hours for 5 days    Immunosuppressed status (H24)  She is on tacrolimus which can be continued while she takes the molnupiravir    Liver replaced by  "transplant (H)  She is status post liver transplantation and follows up with transplant team      30 minutes spent by me on the date of the encounter doing chart review, history and exam, documentation and further activities per the note       BMI:   Estimated body mass index is 26.63 kg/m  as calculated from the following:    Height as of 11/1/23: 1.651 m (5' 5\").    Weight as of 11/1/23: 72.6 kg (160 lb).           Navneet Johnson MD  [facility] AL[facility] ROSA Kitchen is a 53 year old, presenting for the following health issues:  covid treatment        12/27/2023     7:59 AM   Additional Questions   Roomed by Shelly GRANADOS       COVID-19 Symptom Review  How many days ago did these symptoms start? Three days     Are any of the following symptoms significant for you?  New or worsening difficulty breathing? No  Worsening cough? Yes, it's a dry cough.   Fever or chills? Yes, I felt feverish or had chills.  Headache: YES  Sore throat: YES  Chest pain: No  Diarrhea: YES  Body aches? YES    What treatments has patient tried? Acetaminophen   Does patient live in a nursing home, group home, or shelter? No  Does patient have a way to get food/medications during quarantined? Yes, I have a friend or family member who can help me.                Review of Systems   Constitutional, HEENT, cardiovascular, pulmonary, gi and gu systems are negative, except as otherwise noted.      Objective           Vitals:  No vitals were obtained today due to virtual visit.    Physical Exam   GENERAL: Healthy, alert and no distress  EYES: Eyes grossly normal to inspection.  No discharge or erythema, or obvious scleral/conjunctival abnormalities.  RESP: No audible wheeze, cough, or visible cyanosis.  No visible retractions or increased work of breathing.    SKIN: Visible skin clear. No significant rash, abnormal pigmentation or lesions.  NEURO: Cranial nerves grossly intact.  Mentation and speech appropriate for " age.  PSYCH: Mentation appears normal, affect normal/bright, judgement and insight intact, normal speech and appearance well-groomed.                Video-Visit Details    Type of service:  Video Visit     Originating Location (pt. Location): Home    Distant Location (provider location):  On-site  Platform used for Video Visit: TyshawnWell

## 2023-12-28 ENCOUNTER — LAB (OUTPATIENT)
Dept: LAB | Facility: CLINIC | Age: 53
End: 2023-12-28
Payer: COMMERCIAL

## 2023-12-28 DIAGNOSIS — Z80.0 FAMILY HISTORY OF MALIGNANT NEOPLASM OF PANCREAS: ICD-10-CM

## 2023-12-28 DIAGNOSIS — Z80.0 FAMILY HISTORY OF MALIGNANT NEOPLASM OF COLON: ICD-10-CM

## 2023-12-28 DIAGNOSIS — Z94.4 STATUS POST LIVER TRANSPLANTATION (H): ICD-10-CM

## 2023-12-28 DIAGNOSIS — Z80.8 FAMILY HISTORY OF MALIGNANT MELANOMA: ICD-10-CM

## 2023-12-28 DIAGNOSIS — Z94.4 LIVER REPLACED BY TRANSPLANT (H): ICD-10-CM

## 2023-12-28 LAB
ALBUMIN SERPL BCG-MCNC: 3.9 G/DL (ref 3.5–5.2)
ALP SERPL-CCNC: 73 U/L (ref 40–150)
ALT SERPL W P-5'-P-CCNC: 21 U/L (ref 0–50)
ANION GAP SERPL CALCULATED.3IONS-SCNC: 9 MMOL/L (ref 7–15)
AST SERPL W P-5'-P-CCNC: 31 U/L (ref 0–45)
BILIRUB DIRECT SERPL-MCNC: <0.2 MG/DL (ref 0–0.3)
BILIRUB SERPL-MCNC: 0.3 MG/DL
BUN SERPL-MCNC: 11.6 MG/DL (ref 6–20)
CALCIUM SERPL-MCNC: 8.5 MG/DL (ref 8.6–10)
CHLORIDE SERPL-SCNC: 105 MMOL/L (ref 98–107)
CREAT SERPL-MCNC: 1.16 MG/DL (ref 0.51–0.95)
DEPRECATED HCO3 PLAS-SCNC: 24 MMOL/L (ref 22–29)
EGFRCR SERPLBLD CKD-EPI 2021: 56 ML/MIN/1.73M2
ERYTHROCYTE [DISTWIDTH] IN BLOOD BY AUTOMATED COUNT: 13 % (ref 10–15)
GLUCOSE SERPL-MCNC: 106 MG/DL (ref 70–99)
HCT VFR BLD AUTO: 44.2 % (ref 35–47)
HGB BLD-MCNC: 14.5 G/DL (ref 11.7–15.7)
MAGNESIUM SERPL-MCNC: 1.8 MG/DL (ref 1.7–2.3)
MCH RBC QN AUTO: 28 PG (ref 26.5–33)
MCHC RBC AUTO-ENTMCNC: 32.8 G/DL (ref 31.5–36.5)
MCV RBC AUTO: 85 FL (ref 78–100)
PLATELET # BLD AUTO: 191 10E3/UL (ref 150–450)
POTASSIUM SERPL-SCNC: 4.5 MMOL/L (ref 3.4–5.3)
PROT SERPL-MCNC: 6.1 G/DL (ref 6.4–8.3)
RBC # BLD AUTO: 5.18 10E6/UL (ref 3.8–5.2)
SODIUM SERPL-SCNC: 138 MMOL/L (ref 135–145)
TACROLIMUS BLD-MCNC: 2.8 UG/L (ref 5–15)
TME LAST DOSE: ABNORMAL H
TME LAST DOSE: ABNORMAL H
WBC # BLD AUTO: 3.3 10E3/UL (ref 4–11)

## 2023-12-28 PROCEDURE — 36415 COLL VENOUS BLD VENIPUNCTURE: CPT

## 2023-12-28 PROCEDURE — 99000 SPECIMEN HANDLING OFFICE-LAB: CPT

## 2023-12-28 PROCEDURE — 80053 COMPREHEN METABOLIC PANEL: CPT

## 2023-12-28 PROCEDURE — 85027 COMPLETE CBC AUTOMATED: CPT

## 2023-12-28 PROCEDURE — 83735 ASSAY OF MAGNESIUM: CPT

## 2023-12-28 PROCEDURE — 82248 BILIRUBIN DIRECT: CPT

## 2023-12-28 PROCEDURE — 80197 ASSAY OF TACROLIMUS: CPT

## 2024-01-03 ENCOUNTER — VIRTUAL VISIT (OUTPATIENT)
Dept: PSYCHOLOGY | Facility: CLINIC | Age: 54
End: 2024-01-03
Payer: COMMERCIAL

## 2024-01-03 DIAGNOSIS — F32.1 CURRENT MODERATE EPISODE OF MAJOR DEPRESSIVE DISORDER, UNSPECIFIED WHETHER RECURRENT (H): Primary | ICD-10-CM

## 2024-01-03 PROCEDURE — 90834 PSYTX W PT 45 MINUTES: CPT | Mod: 95 | Performed by: STUDENT IN AN ORGANIZED HEALTH CARE EDUCATION/TRAINING PROGRAM

## 2024-01-03 ASSESSMENT — ANXIETY QUESTIONNAIRES
GAD7 TOTAL SCORE: 10
5. BEING SO RESTLESS THAT IT IS HARD TO SIT STILL: SEVERAL DAYS
1. FEELING NERVOUS, ANXIOUS, OR ON EDGE: MORE THAN HALF THE DAYS
2. NOT BEING ABLE TO STOP OR CONTROL WORRYING: MORE THAN HALF THE DAYS
7. FEELING AFRAID AS IF SOMETHING AWFUL MIGHT HAPPEN: SEVERAL DAYS
3. WORRYING TOO MUCH ABOUT DIFFERENT THINGS: MORE THAN HALF THE DAYS
7. FEELING AFRAID AS IF SOMETHING AWFUL MIGHT HAPPEN: SEVERAL DAYS
GAD7 TOTAL SCORE: 10
8. IF YOU CHECKED OFF ANY PROBLEMS, HOW DIFFICULT HAVE THESE MADE IT FOR YOU TO DO YOUR WORK, TAKE CARE OF THINGS AT HOME, OR GET ALONG WITH OTHER PEOPLE?: VERY DIFFICULT
IF YOU CHECKED OFF ANY PROBLEMS ON THIS QUESTIONNAIRE, HOW DIFFICULT HAVE THESE PROBLEMS MADE IT FOR YOU TO DO YOUR WORK, TAKE CARE OF THINGS AT HOME, OR GET ALONG WITH OTHER PEOPLE: VERY DIFFICULT
GAD7 TOTAL SCORE: 10
6. BECOMING EASILY ANNOYED OR IRRITABLE: SEVERAL DAYS
4. TROUBLE RELAXING: SEVERAL DAYS

## 2024-01-03 ASSESSMENT — PATIENT HEALTH QUESTIONNAIRE - PHQ9
SUM OF ALL RESPONSES TO PHQ QUESTIONS 1-9: 16
10. IF YOU CHECKED OFF ANY PROBLEMS, HOW DIFFICULT HAVE THESE PROBLEMS MADE IT FOR YOU TO DO YOUR WORK, TAKE CARE OF THINGS AT HOME, OR GET ALONG WITH OTHER PEOPLE: VERY DIFFICULT
SUM OF ALL RESPONSES TO PHQ QUESTIONS 1-9: 16

## 2024-01-03 NOTE — PROGRESS NOTES
"[facility]                                     Progress Note    Patient Name: Melita Cortes  Date: 01/03/2024         Service Type: Individual      Session Start Time: 2.30  Session End Time: 3.09     Session Length: 38-52    Session #: 08    Attendees: Client attended alone    Service Modality:  Video Visit:      Provider verified identity through the following two step process.  Patient provided:  Patient is known previously to provider    Telemedicine Visit: The patient's condition can be safely assessed and treated via synchronous audio and visual telemedicine encounter.      Reason for Telemedicine Visit: Patient has requested telehealth visit    Originating Site (Patient Location): Patient's home    Distant Site (Provider Location): Provider Remote Setting- Home Office    Consent:  The patient/guardian has verbally consented to: the potential risks and benefits of telemedicine (video visit) versus in person care; bill my insurance or make self-payment for services provided; and responsibility for payment of non-covered services.     Patient would like the video invitation sent by:  My Chart    Mode of Communication:  Video Conference via Amwell    Distant Location (Provider):  Off-site    As the provider I attest to compliance with applicable laws and regulations related to telemedicine.    DATA  Interactive Complexity: No  Crisis: No        Progress Since Last Session (Related to Symptoms / Goals / Homework):   Symptoms: Improving as evident by current phq9 scores    Homework: Completed in session review patient's utilization of safety plan.        Episode of Care Goals: Minimal progress - ACTION (Actively working towards change); Intervened by reinforcing change plan / affirming steps taken     Current / Ongoing Stressors and Concerns:  Patient reported  \"Trauma Adhd depres\". Patient reported \" I think I have had some trauma in my life, my liver transplant and I think have ADHD.\" " "Pt reported she is  \"overly sensitive\" and \" I dont handle stress very well and struggle with impulsivity\"  and that \"I have a poor self image, negative talk a lot \" patient also reported she lost her mother in 2019 and her dad 7 months ago and still struggling with grieving the lost of both parents.      Current: Today, patient reported she was tasted positive with COVID and spent the [place] and New Year sick and isolated to mitigate the spread. Patient continue to report that she is  experiencing  increased neurovegetative symptoms of depression in the form of low motivation, low energy, low mood and does not know what to do to have a moments of nubia. Reported feeling bored and lonely since she does not work and  leaves for work everyday.      Treatment Objective(s) Addressed in This Session:   Patient will increase daily activity level by exercising for 20-30 minutes and participate in at least 1 daily pleasant/fun activities.  Use safety plan as needed daily and practice mindfulness daily.  Patient will identify 2-3 volunteering opportunities and apply before next session.     Intervention:    MI Intervention: Expressed Empathy/Understanding, Supported Autonomy, Collaboration, Evocation, Permission to raise concern or advise, Open-ended questions, and Reflections: simple and complex Explore what patient can do to mitigate boredom and loneliness daily.        Assessments completed prior to visit:  PHQ9:       9/11/2023    11:55 AM 10/23/2023     8:59 AM 11/14/2023     3:13 PM 11/28/2023     9:28 AM 12/13/2023    11:54 AM 12/18/2023     5:09 PM 1/3/2024     2:10 PM   PHQ-9 SCORE   PHQ-9 Total Score MyChart 12 (Moderate depression)  18 (Moderately severe depression) 14 (Moderate depression) 18 (Moderately severe depression) 18 (Moderately severe depression) 16 (Moderately severe depression)   PHQ-9 Total Score 12 10 18 14 18 18 16     GAD7:       6/6/2023     1:00 PM 6/29/2023     1:43 PM 9/11/2023    " 12:07 PM 11/14/2023     3:13 PM 11/28/2023     9:29 AM 12/13/2023    11:55 AM 1/3/2024     2:11 PM   JORGE-7 SCORE   Total Score   15 (severe anxiety) 14 (moderate anxiety) 21 (severe anxiety) 21 (severe anxiety) 10 (moderate anxiety)   Total Score 14 7 15 14    14 21 21 10         ASSESSMENT: Current Emotional / Mental Status (status of significant symptoms):   Risk status (Self / Other harm or suicidal ideation)   Patient denies current fears or concerns for personal safety.   Patient denies current or recent suicidal ideation or behaviors.   Patient denies current or recent homicidal ideation or behaviors.   Patient denies current or recent self injurious behavior or ideation.   Patient denies other safety concerns.   Patient reports there has been no change in risk factors since their last session.     Patient reports there has been no change in protective factors since their last session.     Recommended that patient call 911 or go to the local ED should there be a change in any of these risk factors.     Appearance:   Appropriate    Eye Contact:   Good    Psychomotor Behavior: Normal    Attitude:   Cooperative  Interested   Orientation:   Person Place Time Situation   Speech    Rate / Production: Normal/ Responsive    Volume:  Normal    Mood:    Anxious  Depressed  Grieving   Affect:    Tearful   Thought Content:  Clear    Thought Form:  Coherent  Logical    Insight:    Good      Medication Review:   No changes to current psychiatric medication(s)     Medication Compliance:   Yes     Changes in Health Issues:   None reported     Chemical Use Review:   Substance Use: Chemical use reviewed, no active concerns identified      Tobacco Use: No current tobacco use.      Diagnosis:  296.32 (F33.1) Major Depressive Disorder, Recurrent Episode, Moderate _ and With atypical features    Collateral Reports Completed:   Not Applicable    PLAN: (Patient Tasks / Therapist Tasks / Other)    Utilize safety plan as needed  Use  DBT skill learned in session daily to regulate self and manage distressful emotions.    Look up for suggestion on  a new skill to learn in music, cooking lessons or art and craft work that you can do when feeling bored and lonely daily and share with provider next session.    Jake Coyne [facility]      ----- Service Performed and Documented by CAL------  This note was reviewed and clinical supervision by ELMO Colmenares [facility]    1/3/2024    _________________________________________________________    Individual Treatment Plan    Patient's Name: Melita Cortes  YOB: 1970    Date of Creation: 09/22/2023  Date Treatment Plan Last Reviewed/Revised: 11/14/2023    DSM5 Diagnoses: 296.32 (F33.1) Major Depressive Disorder, Recurrent Episode, Moderate _ and With atypical features  Psychosocial / Contextual Factors:   PROMIS (reviewed every 90 days):     Referral / Collaboration:  Referral to another professional/service is not indicated at this time..    Anticipated number of session for this episode of care:  15-25  Anticipation frequency of session: Biweekly  Anticipated Duration of each session: 38-52 minutes  Treatment plan will be reviewed in 90 days or when goals have been changed.       MeasurableTreatment Goal(s) related to diagnosis / functional impairment(s)  Goal 1: Patient will identify and address specific triggers to feelings of depression and improve coping by  90 % in the next three months.    I will know I've met my goal when I am less impulsive, better communicator and can comfortably manage distressful emotions.         Objective #A (Patient Action)    Develop and utilize 3 effective distress tolerance strategies to address SI.   Status: Continued - Date(s): 02/15/2024     Intervention(s)   Therapist will engage in collaborative brainstorming with pt to identify and practice individualized distress tolerance coping strategies to address SI weekly.    Objective #B (Patient  "Action)    Patient will increase daily activity level by exercising for 20-30 minutes and participate in at least 1 daily pleasant/fun activities.  Status: Continued - Date(s):  02/15/2024    Intervention(s)  Therapist will provide psychoeducation, behavioral activation, and cognitive restructuring.    Objective #C  Patient will identify specific areas of cognitive distortion and challenge irrational thoughts with reality   Status: Continued - Date(s):   02/15/2024       Intervention(s)  Therapist will provide psychoeducation, behavioral activation, and cognitive restructuring.    Objective #D  Patient will learn and practice relaxation techniques to manage depression.  Status: Continued - Date(s):   02/15/2024    Intervention(s)   Therapist will teach patient thought stopping, deep breathing exercises, mindful meditation, and creative visualization.        Patient has reviewed and agreed to the above plan.      Jake Coyne, CECIL  September 22, 2023          [facility]                                       Melita Cortes     SAFETY PLAN:  Step 1: Warning signs / cues (Thoughts, images, mood, situation, behavior) that a crisis may be developing:  Thoughts:   \" sometimes I feel like I don't have a purpose\"  Images:  Feels lonely after losing mum and dad and lonely in marriage  Thinking Processes: ruminations (can't stop thinking about my problems): health, family loses  Mood: worsening depression  Behaviors: isolating/withdrawing   Situations: loss: parents and lonely in marriage    Step 2: Coping strategies - Things I can do to take my mind off of my problems without contacting another person (relaxation technique, physical activity):  Distress Tolerance Strategies:  play with my pet , intense exercise: work out for 2-3 minutes , and support group attendance for transplant  Physical Activities: go for a walk and stretching   Focus on helpful thoughts:  \"This is temporary\" and \"It always " "passes\"  Step 3: People and social settings that provide distraction:   Name:  Augustine ( friend) Phone: [phone]   Name:  Aimee paniagua Phone: [phone]     gym  and [employer] store  Liver transplant group  Step 4: Remind myself of people and things that are important to me and worth living for:    My , cousins and friends.  Step 5: When I am in crisis, I can ask these people to help me use my safety plan:   Name:  Jake ( Therapist )  Phone: [phone]   Name:  Aimee paniagua Phone: [phone]   Step 6: Making the environment safe:   remove alcohol, remove drugs, and be around others  Step 7: Professionals or agencies I can contact during a crisis:  Suicide Prevention Lifeline: Call or Text 171   [facility]: [phone]    Call 911 or go to my nearest emergency department.   I helped develop this safety plan and agree to use it when needed.  I have been given a copy of this plan.      Client signature _________________________________________________________________  Today s date:  11/14/2023  Completed by Provider Name/ Credentials:  CECIL Vasquez  November 14, 2023  Adapted from Safety Plan Template 2008 Chelsi Marx and Fred Ramos is reprinted with the express permission of the authors.  No portion of the Safety Plan Template may be reproduced without the express, written permission.  You can contact the authors at [email or web address] or [email or web address].        Answers submitted by the patient for this visit:  Patient Health Questionnaire (Submitted on 11/14/2023)  If you checked off any problems, how difficult have these problems made it for you to do your work, take care of things at home, or get along with other people?: Very difficult  PHQ9 TOTAL SCORE: 18  JORGE-7 (Submitted on 11/14/2023)  JORGE 7 TOTAL SCORE: 14    Answers submitted by the patient for this visit:  Patient Health Questionnaire (Submitted on 11/28/2023)  If you checked off any problems, how difficult " have these problems made it for you to do your work, take care of things at home, or get along with other people?: Extremely difficult  PHQ9 TOTAL SCORE: 14  JORGE-7 (Submitted on 11/28/2023)  JORGE 7 TOTAL SCORE: 21    Answers submitted by the patient for this visit:  Patient Health Questionnaire (Submitted on 12/13/2023)  If you checked off any problems, how difficult have these problems made it for you to do your work, take care of things at home, or get along with other people?: Very difficult  PHQ9 TOTAL SCORE: 18  JORGE-7 (Submitted on 12/13/2023)  JORGE 7 TOTAL SCORE: 21    Answers submitted by the patient for this visit:  Patient Health Questionnaire (Submitted on 1/3/2024)  If you checked off any problems, how difficult have these problems made it for you to do your work, take care of things at home, or get along with other people?: Very difficult  PHQ9 TOTAL SCORE: 16  JORGE-7 (Submitted on 1/3/2024)  JORGE 7 TOTAL SCORE: 10

## 2024-01-08 LAB — SCANNED LAB RESULT: NORMAL

## 2024-01-09 DIAGNOSIS — Z80.0 FAMILY HISTORY OF MALIGNANT NEOPLASM OF PANCREAS: Primary | ICD-10-CM

## 2024-01-09 DIAGNOSIS — Z80.0 FAMILY HISTORY OF MALIGNANT NEOPLASM OF COLON: ICD-10-CM

## 2024-01-09 DIAGNOSIS — Z80.8 FAMILY HISTORY OF MALIGNANT MELANOMA: ICD-10-CM

## 2024-01-16 ENCOUNTER — VIRTUAL VISIT (OUTPATIENT)
Dept: PSYCHOLOGY | Facility: CLINIC | Age: 54
End: 2024-01-16
Payer: COMMERCIAL

## 2024-01-16 ENCOUNTER — LAB (OUTPATIENT)
Dept: LAB | Facility: CLINIC | Age: 54
End: 2024-01-16
Payer: COMMERCIAL

## 2024-01-16 DIAGNOSIS — Z94.4 STATUS POST LIVER TRANSPLANTATION (H): ICD-10-CM

## 2024-01-16 DIAGNOSIS — Z94.4 LIVER REPLACED BY TRANSPLANT (H): ICD-10-CM

## 2024-01-16 DIAGNOSIS — F32.1 CURRENT MODERATE EPISODE OF MAJOR DEPRESSIVE DISORDER, UNSPECIFIED WHETHER RECURRENT (H): Primary | ICD-10-CM

## 2024-01-16 LAB
ALBUMIN SERPL BCG-MCNC: 4.1 G/DL (ref 3.5–5.2)
ALP SERPL-CCNC: 67 U/L (ref 40–150)
ALT SERPL W P-5'-P-CCNC: 22 U/L (ref 0–50)
ANION GAP SERPL CALCULATED.3IONS-SCNC: 10 MMOL/L (ref 7–15)
AST SERPL W P-5'-P-CCNC: 19 U/L (ref 0–45)
BILIRUB DIRECT SERPL-MCNC: <0.2 MG/DL (ref 0–0.3)
BILIRUB SERPL-MCNC: 0.4 MG/DL
BUN SERPL-MCNC: 17.3 MG/DL (ref 6–20)
CALCIUM SERPL-MCNC: 9.2 MG/DL (ref 8.6–10)
CHLORIDE SERPL-SCNC: 105 MMOL/L (ref 98–107)
CREAT SERPL-MCNC: 1.17 MG/DL (ref 0.51–0.95)
DEPRECATED HCO3 PLAS-SCNC: 24 MMOL/L (ref 22–29)
EGFRCR SERPLBLD CKD-EPI 2021: 56 ML/MIN/1.73M2
ERYTHROCYTE [DISTWIDTH] IN BLOOD BY AUTOMATED COUNT: 14.2 % (ref 10–15)
GLUCOSE SERPL-MCNC: 119 MG/DL (ref 70–99)
HCT VFR BLD AUTO: 41.5 % (ref 35–47)
HGB BLD-MCNC: 13.8 G/DL (ref 11.7–15.7)
MCH RBC QN AUTO: 28.9 PG (ref 26.5–33)
MCHC RBC AUTO-ENTMCNC: 33.3 G/DL (ref 31.5–36.5)
MCV RBC AUTO: 87 FL (ref 78–100)
PLATELET # BLD AUTO: 195 10E3/UL (ref 150–450)
POTASSIUM SERPL-SCNC: 4.6 MMOL/L (ref 3.4–5.3)
PROT SERPL-MCNC: 6.2 G/DL (ref 6.4–8.3)
RBC # BLD AUTO: 4.77 10E6/UL (ref 3.8–5.2)
SODIUM SERPL-SCNC: 139 MMOL/L (ref 135–145)
TACROLIMUS BLD-MCNC: 3.6 UG/L (ref 5–15)
TME LAST DOSE: ABNORMAL H
TME LAST DOSE: ABNORMAL H
WBC # BLD AUTO: 5 10E3/UL (ref 4–11)

## 2024-01-16 PROCEDURE — 82248 BILIRUBIN DIRECT: CPT

## 2024-01-16 PROCEDURE — 85027 COMPLETE CBC AUTOMATED: CPT

## 2024-01-16 PROCEDURE — 80197 ASSAY OF TACROLIMUS: CPT

## 2024-01-16 PROCEDURE — 80053 COMPREHEN METABOLIC PANEL: CPT

## 2024-01-16 PROCEDURE — 36415 COLL VENOUS BLD VENIPUNCTURE: CPT

## 2024-01-16 PROCEDURE — 90834 PSYTX W PT 45 MINUTES: CPT | Mod: 95 | Performed by: STUDENT IN AN ORGANIZED HEALTH CARE EDUCATION/TRAINING PROGRAM

## 2024-01-16 ASSESSMENT — PATIENT HEALTH QUESTIONNAIRE - PHQ9
SUM OF ALL RESPONSES TO PHQ QUESTIONS 1-9: 15
SUM OF ALL RESPONSES TO PHQ QUESTIONS 1-9: 15
10. IF YOU CHECKED OFF ANY PROBLEMS, HOW DIFFICULT HAVE THESE PROBLEMS MADE IT FOR YOU TO DO YOUR WORK, TAKE CARE OF THINGS AT HOME, OR GET ALONG WITH OTHER PEOPLE: VERY DIFFICULT

## 2024-01-16 NOTE — PROGRESS NOTES
"[facility]                                     Progress Note    Patient Name: Melita Cortes  Date: 01/16/2024         Service Type: Individual      Session Start Time: 2.30  Session End Time: 3.09     Session Length: 38-52    Session #: 09    Attendees: Client attended alone    Service Modality:  Video Visit:      Provider verified identity through the following two step process.  Patient provided:  Patient is known previously to provider    Telemedicine Visit: The patient's condition can be safely assessed and treated via synchronous audio and visual telemedicine encounter.      Reason for Telemedicine Visit: Patient has requested telehealth visit    Originating Site (Patient Location): Patient's home    Distant Site (Provider Location): Provider Remote Setting- Home Office    Consent:  The patient/guardian has verbally consented to: the potential risks and benefits of telemedicine (video visit) versus in person care; bill my insurance or make self-payment for services provided; and responsibility for payment of non-covered services.     Patient would like the video invitation sent by:  My Chart    Mode of Communication:  Video Conference via [employer]    Distant Location (Provider):  Off-site    As the provider I attest to compliance with applicable laws and regulations related to telemedicine.    DATA  Interactive Complexity: No  Crisis: No        Progress Since Last Session (Related to Symptoms / Goals / Homework):   Symptoms: Improving as evident by current phq9 scores    Homework: Completed in session review patient's utilization of safety plan.        Episode of Care Goals: Minimal progress - ACTION (Actively working towards change); Intervened by reinforcing change plan / affirming steps taken     Current / Ongoing Stressors and Concerns:  Patient reported  \"Trauma Adhd depres\". Patient reported \" I think I have had some trauma in my life, my liver transplant and I think have ADHD.\" " "Pt reported she is  \"overly sensitive\" and \" I dont handle stress very well and struggle with impulsivity\"  and that \"I have a poor self image, negative talk a lot \" patient also reported she lost her mother in 2019 and her dad 7 months ago and still struggling with grieving the lost of both parents.      Current: Today, patient reported that he continues to experience increased neurovegetative symptoms of depression in the form of low motivation, low energy, low mood. Patient noted that that she is frustrated with the way others invalidate her and reported that \"people don't listen to me\". Patient talked about different experience where she feels interrupted and shut down leading to her decision to limit  social interaction and stay home more often.       Treatment Objective(s) Addressed in This Session:   Patient will increase daily activity level by exercising for 20-30 minutes and participate in at least 1 daily pleasant/fun activities.  Use safety plan as needed daily and practice mindfulness daily.  Patient will identify 2-3 volunteering opportunities and apply before next session.   Patient will identify specific areas of cognitive distortion and challenge irrational thoughts with reality.      Intervention:    CBT: Discussion today on negative thoughts processes and focus today's session on negative core beliefs as generalized beliefs that we hold about ourselves, other people, the world, and the future. Work with patient to explore early life experiences that led to the development of these core beliefs. Couched patient that thoughts are not facts but internal judgements and subjective evaluations and explore ways to make changes to these schemas.       Assessments completed prior to visit:  PHQ9:       10/23/2023     8:59 AM 11/14/2023     3:13 PM 11/28/2023     9:28 AM 12/13/2023    11:54 AM 12/18/2023     5:09 PM 1/3/2024     2:10 PM 1/16/2024     2:15 PM   PHQ-9 SCORE   PHQ-9 Total Score MyChart  18 " (Moderately severe depression) 14 (Moderate depression) 18 (Moderately severe depression) 18 (Moderately severe depression) 16 (Moderately severe depression) 15 (Moderately severe depression)   PHQ-9 Total Score 10 18 14 18 18 16 15     GAD7:       6/6/2023     1:00 PM 6/29/2023     1:43 PM 9/11/2023    12:07 PM 11/14/2023     3:13 PM 11/28/2023     9:29 AM 12/13/2023    11:55 AM 1/3/2024     2:11 PM   JORGE-7 SCORE   Total Score   15 (severe anxiety) 14 (moderate anxiety) 21 (severe anxiety) 21 (severe anxiety) 10 (moderate anxiety)   Total Score 14 7 15 14    14 21 21 10         ASSESSMENT: Current Emotional / Mental Status (status of significant symptoms):   Risk status (Self / Other harm or suicidal ideation)   Patient denies current fears or concerns for personal safety.   Patient denies current or recent suicidal ideation or behaviors.   Patient denies current or recent homicidal ideation or behaviors.   Patient denies current or recent self injurious behavior or ideation.   Patient denies other safety concerns.   Patient reports there has been no change in risk factors since their last session.     Patient reports there has been no change in protective factors since their last session.     Recommended that patient call 911 or go to the local ED should there be a change in any of these risk factors.     Appearance:   Appropriate    Eye Contact:   Good    Psychomotor Behavior: Normal    Attitude:   Cooperative  Interested   Orientation:   Person Place Time Situation   Speech    Rate / Production: Normal/ Responsive    Volume:  Normal    Mood:    Anxious  Depressed  Grieving   Affect:    Tearful   Thought Content:  Clear    Thought Form:  Coherent  Logical    Insight:    Good      Medication Review:   No changes to current psychiatric medication(s)     Medication Compliance:   Yes     Changes in Health Issues:   None reported     Chemical Use Review:   Substance Use: Chemical use reviewed, no active concerns  "identified      Tobacco Use: No current tobacco use.      Diagnosis:  296.32 (F33.1) Major Depressive Disorder, Recurrent Episode, Moderate _ and With atypical features    Collateral Reports Completed:   Not Applicable    PLAN: (Patient Tasks / Therapist Tasks / Other)    Utilize safety plan as needed daily.  Look up for suggestion on  a new skill to learn in music, cooking lessons or art and craft work that you can do when feeling bored and lonely daily and share with provider next session.  Read provided handout on \"What is assertive?\"     Jake Coyne CAL    ----- Service Performed and Documented by CAL------  This note was reviewed and clinical supervision by ELMO Colmenares [facility]    1/17/2024      _________________________________________________________    Individual Treatment Plan    Patient's Name: Melita Cortes  YOB: 1970    Date of Creation: 09/22/2023  Date Treatment Plan Last Reviewed/Revised: 11/14/2023    DSM5 Diagnoses: 296.32 (F33.1) Major Depressive Disorder, Recurrent Episode, Moderate _ and With atypical features  Psychosocial / Contextual Factors:   PROMIS (reviewed every 90 days):     Referral / Collaboration:  Referral to another professional/service is not indicated at this time..    Anticipated number of session for this episode of care:  15-25  Anticipation frequency of session: Biweekly  Anticipated Duration of each session: 38-52 minutes  Treatment plan will be reviewed in 90 days or when goals have been changed.       MeasurableTreatment Goal(s) related to diagnosis / functional impairment(s)  Goal 1: Patient will identify and address specific triggers to feelings of depression and improve coping by  90 % in the next three months.    I will know I've met my goal when I am less impulsive, better communicator and can comfortably manage distressful emotions.         Objective #A (Patient Action)    Develop and utilize 3 effective distress tolerance strategies to " "address SI.   Status: Continued - Date(s): 02/15/2024     Intervention(s)   Therapist will engage in collaborative brainstorming with pt to identify and practice individualized distress tolerance coping strategies to address SI weekly.    Objective #B (Patient Action)    Patient will increase daily activity level by exercising for 20-30 minutes and participate in at least 1 daily pleasant/fun activities.  Status: Continued - Date(s):  02/15/2024    Intervention(s)  Therapist will provide psychoeducation, behavioral activation, and cognitive restructuring.    Objective #C  Patient will identify specific areas of cognitive distortion and challenge irrational thoughts with reality.   Status: Continued - Date(s):   02/15/2024       Intervention(s)  Therapist will provide psychoeducation, behavioral activation, and cognitive restructuring.    Objective #D  Patient will learn and practice relaxation techniques to manage depression.  Status: Continued - Date(s):   02/15/2024    Intervention(s)   Therapist will teach patient thought stopping, deep breathing exercises, mindful meditation, and creative visualization.        Patient has reviewed and agreed to the above plan.      Jake Coyne, CECIL  September 22, 2023          [facility]                                       Melita Cortes     SAFETY PLAN:  Step 1: Warning signs / cues (Thoughts, images, mood, situation, behavior) that a crisis may be developing:  Thoughts:   \" sometimes I feel like I don't have a purpose\"  Images:  Feels lonely after losing mum and dad and lonely in marriage  Thinking Processes: ruminations (can't stop thinking about my problems): health, family loses  Mood: worsening depression  Behaviors: isolating/withdrawing   Situations: loss: parents and lonely in marriage    Step 2: Coping strategies - Things I can do to take my mind off of my problems without contacting another person (relaxation technique, physical " "activity):  Distress Tolerance Strategies:  play with my pet , intense exercise: work out for 2-3 minutes , and support group attendance for transplant  Physical Activities: go for a walk and stretching   Focus on helpful thoughts:  \"This is temporary\" and \"It always passes\"  Step 3: People and social settings that provide distraction:   Name:  Augustine ( friend) Phone: [phone]   Name:  Aimee paniagua Phone: [phone]     gym  and [employer] store  Liver transplant group  Step 4: Remind myself of people and things that are important to me and worth living for:    My , cousins and friends.  Step 5: When I am in crisis, I can ask these people to help me use my safety plan:   Name:  Jake ( Therapist )  Phone: [phone]   Name:  Aimee paniagua Phone: [phone]   Step 6: Making the environment safe:   remove alcohol, remove drugs, and be around others  Step 7: Professionals or agencies I can contact during a crisis:  Suicide Prevention Lifeline: Call or Text 454   [facility]: [phone]    Call 911 or go to my nearest emergency department.   I helped develop this safety plan and agree to use it when needed.  I have been given a copy of this plan.      Client signature _________________________________________________________________  Today s date:  11/14/2023  Completed by Provider Name/ Credentials:  CECIL Vasquez  November 14, 2023  Adapted from Safety Plan Template 2008 Chelsi Marx and Fred Ramos is reprinted with the express permission of the authors.  No portion of the Safety Plan Template may be reproduced without the express, written permission.  You can contact the authors at bhs@[place].[email or web address] or [email or web address].        Answers submitted by the patient for this visit:  Patient Health Questionnaire (Submitted on 11/14/2023)  If you checked off any problems, how difficult have these problems made it for you to do your work, take care of things at home, or get along " with other people?: Very difficult  PHQ9 TOTAL SCORE: 18  JORGE-7 (Submitted on 11/14/2023)  JORGE 7 TOTAL SCORE: 14    Answers submitted by the patient for this visit:  Patient Health Questionnaire (Submitted on 11/28/2023)  If you checked off any problems, how difficult have these problems made it for you to do your work, take care of things at home, or get along with other people?: Extremely difficult  PHQ9 TOTAL SCORE: 14  JORGE-7 (Submitted on 11/28/2023)  JORGE 7 TOTAL SCORE: 21    Answers submitted by the patient for this visit:  Patient Health Questionnaire (Submitted on 12/13/2023)  If you checked off any problems, how difficult have these problems made it for you to do your work, take care of things at home, or get along with other people?: Very difficult  PHQ9 TOTAL SCORE: 18  JORGE-7 (Submitted on 12/13/2023)  JORGE 7 TOTAL SCORE: 21    Answers submitted by the patient for this visit:  Patient Health Questionnaire (Submitted on 1/3/2024)  If you checked off any problems, how difficult have these problems made it for you to do your work, take care of things at home, or get along with other people?: Very difficult  PHQ9 TOTAL SCORE: 16  JORGE-7 (Submitted on 1/3/2024)  JORGE 7 TOTAL SCORE: 10    Answers submitted by the patient for this visit:  Patient Health Questionnaire (Submitted on 1/16/2024)  If you checked off any problems, how difficult have these problems made it for you to do your work, take care of things at home, or get along with other people?: Very difficult  PHQ9 TOTAL SCORE: 15

## 2024-01-17 ENCOUNTER — TELEPHONE (OUTPATIENT)
Dept: TRANSPLANT | Facility: CLINIC | Age: 54
End: 2024-01-17
Payer: COMMERCIAL

## 2024-01-17 ENCOUNTER — TELEPHONE (OUTPATIENT)
Dept: PHARMACY | Facility: CLINIC | Age: 54
End: 2024-01-17

## 2024-01-17 ENCOUNTER — VIRTUAL VISIT (OUTPATIENT)
Dept: ONCOLOGY | Facility: CLINIC | Age: 54
End: 2024-01-17
Attending: GENETIC COUNSELOR, MS
Payer: COMMERCIAL

## 2024-01-17 DIAGNOSIS — Z80.8 FAMILY HISTORY OF MALIGNANT MELANOMA: ICD-10-CM

## 2024-01-17 DIAGNOSIS — Z80.0 FAMILY HISTORY OF MALIGNANT NEOPLASM OF COLON: ICD-10-CM

## 2024-01-17 DIAGNOSIS — Z80.0 FAMILY HISTORY OF MALIGNANT NEOPLASM OF PANCREAS: Primary | ICD-10-CM

## 2024-01-17 PROCEDURE — 999N000069 HC STATISTIC GENETIC COUNSELING, < 16 MIN: Mod: GT,95 | Performed by: GENETIC COUNSELOR, MS

## 2024-01-17 NOTE — LETTER
"January 17, 2024    [facility]  [address] [address] [place] [address] A  [facility] [address]      Dear Melita,    It was a pleasure speaking with you over video on 1/17/2024. Here is a copy of the progress note from our discussion. If you have any additional questions, please feel free to call.    Referring Provider: Salud Arnold MD    Presenting Information:  I spoke to Melita by video today to discuss her genetic testing results. Her blood was drawn on 12/28/23. A CustomNext-Cancer panel was ordered from [facility]. This testing was done because of Melita's family history of pancreatic cancer, colon cancer, and melanoma.    Genetic Testing Result: NEGATIVE  Melita is negative for mutations in the APC, MYA, AXIN2, BAP1, BARD1, BMPR1A, BRCA1, BRCA2, BRIP1, CDH1, CDK4, CDKN2A, CHEK2, DICER1, EPCAM, GREM1, HOXB13, MITF, MLH1, MSH2, MSH3, MSH6, MUTYH, NBN, NF1, NTHL1, PALB2, PMS2, POLD1, POLE, POT1, PTEN, RAD51C, RAD51D, RB1, RECQL, SMAD4, SMARCA4, STK11, and TP53 genes. No mutations were found in any of the 40 genes analyzed. This test involved sequencing and deletion/duplication analysis of all genes with the exception of EPCAM and GREM1 (deletions only) and MITF (sequencing only).       A copy of the test report can be found in the Laboratory tab, dated 12/28/23, and named \"LABORATORY MISCELLANEOUS ORDER\". The report is scanned in as a linked document.    Interpretation:  We discussed several different interpretations of this negative test result.    One explanation may be that there is a different gene or combination of genes and environment that are associated with the cancers in this family.  It is possible that her mother did have a mutation in one of these genes and she did not inherit it.  There is also a small possibility that there is a mutation in one of these genes, and the testing laboratory could not find it with their current testing methods.           Screening:  Based on this negative test result, it is " important for Melita and her relatives to refer back to the family history for appropriate cancer screening.    Due to the close family history of melanoma, Melita remains at some increased risk for developing melanoma. According to the American Cancer Society, Melita is encouraged to speak to her primary care provider about having regular skin exams and safe skin practices (i.e. sunscreen, self skin exams, limited sun exposure, etc.).  Due to Melita s family history of pancreatic cancer, screening for pancreatic cancer may be considered. This screening typically involves endoscopic ultrasonography (EUS) and/or MRI/magnetic resonance cholangiopancreatography (Ariela et al, Gut 2013. 62: 339-347; Phylicia S, et al., Am J Gastroenterol 2015. 110:223-262). Given the significant limitations of this screening, Melita could consider meeting with her primary care provider to discuss this screening in more detail.   Other population cancer screening options, such as those recommended by the American Cancer Society and the National Comprehensive Cancer Network (NCCN), are also appropriate for Melita and her family. These screening recommendations may change if there are changes to Melita's personal and/or family history of cancer. Final screening recommendations should be made by each individual's primary care provider.       Summary:  We do not have an explanation for Melita's family history of cancer. While no genetic changes were identified, Melita may still be at risk for certain cancers due to family history, environmental factors, or other genetic causes not identified by this test. Because of that, it is important that she continue with cancer screening based on her personal and family history as discussed above.    Genetic testing is rapidly advancing, and new cancer susceptibility genes will most likely be identified in the future. Therefore, I encouraged Melita to contact me annually or if there  are changes in her personal or family history. This may change how we assess her cancer risk, screening, and the testing we would offer.    Plan:  1.  A copy of the test results will be sent to Melita.  2.  She plans to follow-up with her other providers.  3.  She should contact me regularly, or sooner if her family history changes.    If Melita has any further questions, I encouraged her to contact me via [employer].    Time spent on video: 5 minutes.    Jose Barajas MS, [facility]  Licensed, Certified Genetic Counselor

## 2024-01-17 NOTE — Clinical Note
"    1/17/2024         RE: Melita Cortes  [address] [address] [place] [address] [address]  [facility] [address]        Dear Colleague,    Thank you for referring your patient, Melita Cortes, to the [facility]. Please see a copy of my visit note below.    1/17/2024    Virtual Visit Details    Type of service:  Video Visit     Originating Location (pt. Location): {video visit patient location:195999::\"Home\"}  {PROVIDER LOCATION On-site should be selected for visits conducted from your clinic location or adjoining [facility] hospital, academic office, or other nearby [facility] building. Off-site should be selected for all other provider locations, including home:[address]}  Distant Location (provider location):  {virtual location provider:[id number]}  Platform used for Video Visit: {Virtual Visit Platforms:291351::\"MobiiWell\"}    Referring Provider: Salud Arnold MD    Presenting Information:  I spoke to Melita by video today to discuss her genetic testing results. Her blood was drawn on 12/28/23. A CustomNext-Cancer panel was ordered from [facility]. This testing was done because of Melita's family history of pancreatic cancer, colon cancer, and melanoma.    Genetic Testing Result: NEGATIVE  Melita is negative for mutations in the APC, MYA, AXIN2, BAP1, BARD1, BMPR1A, BRCA1, BRCA2, BRIP1, CDH1, CDK4, CDKN2A, CHEK2, DICER1, EPCAM, GREM1, HOXB13, MITF, MLH1, MSH2, MSH3, MSH6, MUTYH, NBN, NF1, NTHL1, PALB2, PMS2, POLD1, POLE, POT1, PTEN, RAD51C, RAD51D, RB1, RECQL, SMAD4, SMARCA4, STK11, and TP53 genes. No mutations were found in any of the 40 genes analyzed. This test involved sequencing and deletion/duplication analysis of all genes with the exception of EPCAM and GREM1 (deletions only) and MITF (sequencing only).       A copy of the test report can be found in the Laboratory tab, dated 12/28/23, and named \"LABORATORY MISCELLANEOUS ORDER\". The report is scanned in as a linked document.    Interpretation:  We discussed several " different interpretations of this negative test result.    One explanation may be that there is a different gene or combination of genes and environment that are associated with the cancers in this family.  It is possible that her mother did have a mutation in one of these genes and she did not inherit it.  There is also a small possibility that there is a mutation in one of these genes, and the testing laboratory could not find it with their current testing methods.       Screening:  Based on this negative test result, it is important for Melita and her relatives to refer back to the family history for appropriate cancer screening.    Due to the close family history of melanoma, Melita remains at some increased risk for developing melanoma. According to the American Cancer Society, Melita is encouraged to speak to her primary care provider about having regular skin exams and safe skin practices (i.e. sunscreen, self skin exams, limited sun exposure, etc.).  Due to Melita s family history of pancreatic cancer, screening for pancreatic cancer may be considered. This screening typically involves endoscopic ultrasonography (EUS) and/or MRI/magnetic resonance cholangiopancreatography (Ariela et al, Gut 2013. 62: 339-347; Phylicia S, et al., Am J Gastroenterol 2015. 110:223-262). Given the significant limitations of this screening, Melita could consider meeting with her primary care provider to discuss this screening in more detail.   Other population cancer screening options, such as those recommended by the American Cancer Society and the National Comprehensive Cancer Network (NCCN), are also appropriate for Melita and her family. These screening recommendations may change if there are changes to Melita's personal and/or family history of cancer. Final screening recommendations should be made by each individual's primary care provider.       Summary:  We do not have an explanation for Melita's family  history of cancer. While no genetic changes were identified, Melita may still be at risk for certain cancers due to family history, environmental factors, or other genetic causes not identified by this test. Because of that, it is important that she continue with cancer screening based on her personal and family history as discussed above.    Genetic testing is rapidly advancing, and new cancer susceptibility genes will most likely be identified in the future. Therefore, I encouraged Melita to contact me annually or if there are changes in her personal or family history. This may change how we assess her cancer risk, screening, and the testing we would offer.    Plan:  1.  A copy of the test results will be sent to Melita.  2.  She plans to follow-up with her other providers.  3.  She should contact me regularly, or sooner if her family history changes.    If Melita has any further questions, I encouraged her to contact me via [email or web address].    Time spent on video: *** minutes.    Jose Barajas MS, [facility]  Licensed, Certified Genetic Counselor    ***    1/17/2024    Referring Provider: Salud Arnold MD    Presenting Information:  I spoke to Melita by video today to discuss her genetic testing results. Her blood was drawn on 12/28/23. A CustomNext-Cancer panel was ordered from [facility]. This testing was done because of Melita's family history of pancreatic cancer, colon cancer, and melanoma.    Genetic Testing Result: NEGATIVE  Melita is negative for mutations in the APC, MYA, AXIN2, BAP1, BARD1, BMPR1A, BRCA1, BRCA2, BRIP1, CDH1, CDK4, CDKN2A, CHEK2, DICER1, EPCAM, GREM1, HOXB13, MITF, MLH1, MSH2, MSH3, MSH6, MUTYH, NBN, NF1, NTHL1, PALB2, PMS2, POLD1, POLE, POT1, PTEN, RAD51C, RAD51D, RB1, RECQL, SMAD4, SMARCA4, STK11, and TP53 genes. No mutations were found in any of the 40 genes analyzed. This test involved sequencing and deletion/duplication analysis of all genes with the exception of EPCAM and GREM1  "(deletions only) and MITF (sequencing only).       A copy of the test report can be found in the Laboratory tab, dated 12/28/23, and named \"LABORATORY MISCELLANEOUS ORDER\". The report is scanned in as a linked document.    Interpretation:  We discussed several different interpretations of this negative test result.    One explanation may be that there is a different gene or combination of genes and environment that are associated with the cancers in this family.  It is possible that her mother did have a mutation in one of these genes and she did not inherit it.  There is also a small possibility that there is a mutation in one of these genes, and the testing laboratory could not find it with their current testing methods.       Screening:  Based on this negative test result, it is important for Melita and her relatives to refer back to the family history for appropriate cancer screening.    Due to the close family history of melanoma, Melita remains at some increased risk for developing melanoma. According to the American Cancer Society, Melita is encouraged to speak to her primary care provider about having regular skin exams and safe skin practices (i.e. sunscreen, self skin exams, limited sun exposure, etc.).  Due to Melita s family history of pancreatic cancer, screening for pancreatic cancer may be considered. This screening typically involves endoscopic ultrasonography (EUS) and/or MRI/magnetic resonance cholangiopancreatography (Ariela et al, Gut 2013. 62: 339-347; Phylicia S, et al., Am J Gastroenterol 2015. 110:223-262). Given the significant limitations of this screening, Melita could consider meeting with her primary care provider to discuss this screening in more detail.   Other population cancer screening options, such as those recommended by the American Cancer Society and the National Comprehensive Cancer Network (NCCN), are also appropriate for Melita and her family. These screening " recommendations may change if there are changes to Melita's personal and/or family history of cancer. Final screening recommendations should be made by each individual's primary care provider.       Summary:  We do not have an explanation for Melita's family history of cancer. While no genetic changes were identified, Melita may still be at risk for certain cancers due to family history, environmental factors, or other genetic causes not identified by this test. Because of that, it is important that she continue with cancer screening based on her personal and family history as discussed above.    Genetic testing is rapidly advancing, and new cancer susceptibility genes will most likely be identified in the future. Therefore, I encouraged Melita to contact me annually or if there are changes in her personal or family history. This may change how we assess her cancer risk, screening, and the testing we would offer.    Plan:  1.  A copy of the test results will be sent to Melita.  2.  She plans to follow-up with her other providers.  3.  She should contact me regularly, or sooner if her family history changes.    If Melita has any further questions, I encouraged her to contact me via [employer].    Time spent on video: *** minutes.    Jose Barajas MS, [facility]  Licensed, Certified Genetic Counselor    ***  Virtual Visit Details    Type of service:  Video Visit     Originating Location (pt. Location): Home  Distant Location (provider location):  Off-site  Platform used for Video Visit: AmWell      Again, thank you for allowing me to participate in the care of your patient.        Sincerely,        Jose Barajas GC

## 2024-01-17 NOTE — TELEPHONE ENCOUNTER
Call to [employer] to review Medicare. They obtained information that Fidelina needs to elect part B, and her employer will not pay if she does not elect part B. I provided additional information re: Medicare and then we called the senior linkage line for additional information. This writer provided information re: joseph and Ngoc from the senior linkage line provided further information.

## 2024-01-17 NOTE — PROGRESS NOTES
"1/17/2024    Referring Provider: Salud Arnold MD    Presenting Information:  I spoke to Melita by video today to discuss her genetic testing results. Her blood was drawn on 12/28/23. A CustomNext-Cancer panel was ordered from [facility]. This testing was done because of Melita's family history of pancreatic cancer, colon cancer, and melanoma.    Genetic Testing Result: NEGATIVE  Melita is negative for mutations in the APC, MYA, AXIN2, BAP1, BARD1, BMPR1A, BRCA1, BRCA2, BRIP1, CDH1, CDK4, CDKN2A, CHEK2, DICER1, EPCAM, GREM1, HOXB13, MITF, MLH1, MSH2, MSH3, MSH6, MUTYH, NBN, NF1, NTHL1, PALB2, PMS2, POLD1, POLE, POT1, PTEN, RAD51C, RAD51D, RB1, RECQL, SMAD4, SMARCA4, STK11, and TP53 genes. No mutations were found in any of the 40 genes analyzed. This test involved sequencing and deletion/duplication analysis of all genes with the exception of EPCAM and GREM1 (deletions only) and MITF (sequencing only).       A copy of the test report can be found in the Laboratory tab, dated 12/28/23, and named \"LABORATORY MISCELLANEOUS ORDER\". The report is scanned in as a linked document.    Interpretation:  We discussed several different interpretations of this negative test result.    One explanation may be that there is a different gene or combination of genes and environment that are associated with the cancers in this family.  It is possible that her mother did have a mutation in one of these genes and she did not inherit it.  There is also a small possibility that there is a mutation in one of these genes, and the testing laboratory could not find it with their current testing methods.       Screening:  Based on this negative test result, it is important for Melita and her relatives to refer back to the family history for appropriate cancer screening.    Due to the close family history of melanoma, Melita remains at some increased risk for developing melanoma. According to the American Cancer Society, Melita is " encouraged to speak to her primary care provider about having regular skin exams and safe skin practices (i.e. sunscreen, self skin exams, limited sun exposure, etc.).  Due to Melita s family history of pancreatic cancer, screening for pancreatic cancer may be considered. This screening typically involves endoscopic ultrasonography (EUS) and/or MRI/magnetic resonance cholangiopancreatography (Ariela et al, Gut 2013. 62: 339-347; Phylicia S, et al., Am J Gastroenterol 2015. 110:223-262). Given the significant limitations of this screening, Melita could consider meeting with her primary care provider to discuss this screening in more detail.   Other population cancer screening options, such as those recommended by the American Cancer Society and the National Comprehensive Cancer Network (NCCN), are also appropriate for Melita and her family. These screening recommendations may change if there are changes to Melita's personal and/or family history of cancer. Final screening recommendations should be made by each individual's primary care provider.       Summary:  We do not have an explanation for Melita's family history of cancer. While no genetic changes were identified, Mleita may still be at risk for certain cancers due to family history, environmental factors, or other genetic causes not identified by this test. Because of that, it is important that she continue with cancer screening based on her personal and family history as discussed above.    Genetic testing is rapidly advancing, and new cancer susceptibility genes will most likely be identified in the future. Therefore, I encouraged Melita to contact me annually or if there are changes in her personal or family history. This may change how we assess her cancer risk, screening, and the testing we would offer.    Plan:  1.  A copy of the test results will be sent to Melita.  2.  She plans to follow-up with her other providers.  3.  She should  contact me regularly, or sooner if her family history changes.    If Melita has any further questions, I encouraged her to contact me via [employer].    Time spent on video: 5 minutes.    Jose Barajas MS, [facility]  Licensed, Certified Genetic Counselor      Virtual Visit Details    Type of service:  Video Visit     Originating Location (pt. Location): Home  Distant Location (provider location):  Off-site  Platform used for Video Visit: TyshawnWell

## 2024-01-17 NOTE — TELEPHONE ENCOUNTER
Clinical Pharmacy Consult:                                                      Transplant Specific: 12 Month Post Transplant Medication Review **unable to contact pt**  Date of Transplant: 01/07/2022  Type of Transplant: liver  First Transplant: yes  History of rejection: no    Immunosuppression Regimen   TAC 1mg qAM & 1mg qPM  Patient specific goal: 3-5  Most recent level: 3.6, date 1/16/24  Immunosuppressant Levels: therapeutic   Pt adherent to lab draws: yes  Scr:   Lab Results   Component Value Date    CR 0.94 02/17/2022    CR 0.56 09/18/2020     Side effects: unable to contact pt    Prophylactic Medications  Antibacterial:  Bactrim ss daily  Scheduled Discontinue Date: 6 months, therapy completed    Antifungal: Not needed thus far  Scheduled Discontinue Date: N/A    Antiviral: CrCl 40 to 59 mL/minute: Valcyte 450 mg once daily   Scheduled Discontinue Date: completed    Acid Reducer: Protonix (pantoprazole)  Scheduled Reviewed Date: managed by clinic    Thrombosis Prevention: Aspirin 81 mg PO daily  Scheduled Discontinue Date:  therapy completed    Blood Pressure Management  Frequency of home Blood Pressure checks: unable to contact pt  Most recent home BP:  unknown  Patient Blood pressure goal: <140/90  Patient blood pressure at goal:  unknown    Hospitalizations/ER visits since last assessment: unable to contact pt      Med rec/DUR performed: unable to contact pt  Med Rec Discrepancies: unknown    Unable to contact Fidelina for 24 month post transplant review.  Appears compliant per refill history.  Last tacro level was at goal.      No other questions or concerns today.  Will follow up in 1 year.     Rochelle Connell, PharmD  [facility]

## 2024-01-22 ENCOUNTER — TELEPHONE (OUTPATIENT)
Dept: TRANSPLANT | Facility: CLINIC | Age: 54
End: 2024-01-22
Payer: COMMERCIAL

## 2024-01-22 NOTE — TELEPHONE ENCOUNTER
I received a VM from Fidelina inquiring about some of her upcoming appointments and her ADHD testing. I explained that her therapist is just licensed to provide therapy and he is not license qualified to complete ADHD testing. I explained that Irma S. That she is scheduled is able to complete that testing. She voiced understanding. Fidelina reported that she received a notification from her work that her work disability is ending which was giving her a small amount of income. She is unsure if her insurance through work will end.

## 2024-01-24 ENCOUNTER — OFFICE VISIT (OUTPATIENT)
Dept: FAMILY MEDICINE | Facility: CLINIC | Age: 54
End: 2024-01-24
Payer: COMMERCIAL

## 2024-01-24 VITALS
BODY MASS INDEX: 27.16 KG/M2 | DIASTOLIC BLOOD PRESSURE: 83 MMHG | RESPIRATION RATE: 20 BRPM | HEART RATE: 82 BPM | TEMPERATURE: 98.5 F | WEIGHT: 163 LBS | HEIGHT: 65 IN | SYSTOLIC BLOOD PRESSURE: 118 MMHG | OXYGEN SATURATION: 100 %

## 2024-01-24 DIAGNOSIS — Z01.818 PREOP GENERAL PHYSICAL EXAM: Primary | ICD-10-CM

## 2024-01-24 DIAGNOSIS — D84.9 IMMUNOSUPPRESSED STATUS (H): ICD-10-CM

## 2024-01-24 DIAGNOSIS — N18.31 CHRONIC KIDNEY DISEASE, STAGE 3A (H): ICD-10-CM

## 2024-01-24 DIAGNOSIS — H02.403 PTOSIS OF BOTH EYELIDS: ICD-10-CM

## 2024-01-24 DIAGNOSIS — F33.0 MILD RECURRENT MAJOR DEPRESSION (H): ICD-10-CM

## 2024-01-24 PROCEDURE — 99214 OFFICE O/P EST MOD 30 MIN: CPT | Performed by: INTERNAL MEDICINE

## 2024-01-24 ASSESSMENT — PAIN SCALES - GENERAL: PAINLEVEL: NO PAIN (0)

## 2024-01-24 NOTE — PROGRESS NOTES
"Preoperative Evaluation  [address] [address]  Phone: [phone]  Primary Provider: Bart More  Pre-op Performing Provider: BART MORE  Jan 24, 2024     /83 (BP Location: Right arm, Patient Position: Sitting, Cuff Size: Adult Regular)   Pulse 82   Temp 98.5  F (36.9  C) (Oral)   Resp 20   Ht 1.638 m (5' 4.5\")   Wt 73.9 kg (163 lb)   SpO2 100%   BMI 27.55 kg/m     Fidelina is a 53 year old, presenting for the following:  Pre-Op Exam (Blethoplasty, 2-1-24, Dr. Wu, [facility])        1/24/2024     2:39 PM   Additional Questions   Roomed by Es MCFARLANE   Accompanied by   Doug         1/24/2024     2:39 PM   Patient Reported Additional Medications   Patient reports taking the following new medications none     Surgical Information  Surgery/Procedure: Blethoplasty   Surgery Location: [facility]  Surgeon: Bryce  Surgery Date: 2-1-24  Time of Surgery: 11 am  Where patient plans to recover: At home with family  Fax number for surgical facility: [phone]    Assessment & Plan     The proposed surgical procedure is considered LOW risk.    Preop general physical exam  No history of CAD or CVA  No history of any diabetes  No history of any exertional chest pain or shortness of breath  She had an echo last year which was normal  Her exercise tolerance is however limited because of her poor functional status due to her other comorbid conditions  Still her exercise tolerance is close to 4 METS  Medically optimized for the procedure  No further testing is warranted  She is not on any anticoagulants or aspirin    Ptosis of both eyelids  She is going to have blepharoplasty of both eyelids    Mild recurrent major depression (H24)  She is on Wellbutrin  mg  She takes this more for ADHD like symptoms    Immunosuppressed status (H24)  She is on tacrolimus    Chronic kidney disease, stage 3a " (H)  Her baseline creatinine is around 1.18 her GFR is around 57  She had normal creatinine up until recently when her creatinine is slightly dropped   Not sure why this happened but volume depletion could be a possibility and it could well bounce back to normal again  Blood pressure is well-controlled            - No identified additional risk factors other than previously addressed    Antiplatelet or Anticoagulation Medication Instructions   - Patient is on no antiplatelet or anticoagulation medications.    Additional Medication Instructions  Patient is to take all scheduled medications on the day of surgery    Recommendation  APPROVAL GIVEN to proceed with proposed procedure, without further diagnostic evaluation.      30 minutes spent by me on the date of the encounter doing chart review, history and exam, documentation and further activities per the note    Subjective       HPI related to upcoming procedure: Patient has got ptosis from excess skin on both upper eyelids and she is going to get blepharoplasty        1/24/2024    12:36 PM   Preop Questions   1. Have you ever had a heart attack or stroke? No   2. Have you ever had surgery on your heart or blood vessels, such as a stent placement, a coronary artery bypass, or surgery on an artery in your head, neck, heart, or legs? No   3. Do you have chest pain with activity? No   4. Do you have a history of  heart failure? No   5. Do you currently have a cold, bronchitis or symptoms of other infection? No   6. Do you have a cough, shortness of breath, or wheezing? No   7. Do you or anyone in your family have previous history of blood clots? No   8. Do you or does anyone in your family have a serious bleeding problem such as prolonged bleeding following surgeries or cuts? No   9. Have you ever had problems with anemia or been told to take iron pills? No   10. Have you had any abnormal blood loss such as black, tarry or bloody stools, or abnormal vaginal bleeding?  No   11. Have you ever had a blood transfusion? YES    11a. Have you ever had a transfusion reaction? No   12. Are you willing to have a blood transfusion if it is medically needed before, during, or after your surgery? Yes   13. Have you or any of your relatives ever had problems with anesthesia? No   14. Do you have sleep apnea, excessive snoring or daytime drowsiness? No   15. Do you have any artifical heart valves or other implanted medical devices like a pacemaker, defibrillator, or continuous glucose monitor? No   16. Do you have artificial joints? YES    17. Are you allergic to latex? No   18. Is there any chance that you may be pregnant? No       Health Care Directive  Patient has a Health Care Directive on file    6}    Status of Chronic Conditions:  See problem list for active medical problems.  Problems all longstanding and stable, except as noted/documented.  See ROS for pertinent symptoms related to these conditions.    Patient Active Problem List    Diagnosis Date Noted    Mild recurrent major depression (H24) 02/15/2023     Priority: Medium    Cervicalgia 08/19/2022     Priority: Medium    Pain of both elbows 08/19/2022     Priority: Medium    Elevated alkaline phosphatase level 02/16/2022     Priority: Medium    Liver replaced by transplant (H) 01/12/2022     Priority: Medium    Immunosuppressed status (H24) 01/12/2022     Priority: Medium    Steroid-induced hyperglycemia 01/12/2022     Priority: Medium    Hypervolemia 01/12/2022     Priority: Medium    Hyperlipidemia 12/28/2021     Priority: Medium    Hyponatremia 11/03/2021     Priority: Medium    Malaise and fatigue 11/03/2021     Priority: Medium    At high risk for severe sepsis 11/03/2021     Priority: Medium    Symptomatic anemia 11/03/2021     Priority: Medium    Bandemia 11/03/2021     Priority: Medium    Elevated serum creatinine 10/13/2021     Priority: Medium    Anemia, unspecified type 09/24/2021     Priority: Medium    Cervical high  "risk HPV (human papillomavirus) test positive      Priority: Medium     1/2019 NIL, +HPV 18  3/2019 [place]- Neg  9/14/20 NIL, +HPV 18. Plan [place] bef 12/14/20 12/29/20 [place]- no visible lesions, no Bx done. Plan 1 yr co-test  3/1/22 NIL pap, + HR HPV # 18. Plan: colp   4/27/22 [place] [facility]: no WILLI. Plan: Cotest in  1 yr. Per notes from visit, \"Due to the medications that she is taking to prevent rejection of her liver transplant, this puts her at higher risk for the development of cervical disease so if today's pathology results are normal, then I would advise a yearly co-test to closely monitor.\"  6/29/23 ASC[facility] pap, neg HR HPV. Plan 1 year cotest          Transaminitis 09/20/2020     Priority: Medium    Macrocytosis 09/20/2020     Priority: Medium    Abdominal bloating 09/14/2020     Priority: Medium    Family history of pancreatic cancer 09/14/2020     Priority: Medium    Anxiety 09/12/2014     Priority: Medium     Formatting of this note might be different from the original.  did not end up starting Zoloft        Past Medical History:   Diagnosis Date    Alcoholic hepatitis (H28)     Asthma 03/10/2006    Cervical high risk HPV (human papillomavirus) test positive 2019, 2020    See problem list    Depressive disorder     Gastroesophageal reflux disease without esophagitis     Liver failure (H)      Past Surgical History:   Procedure Laterality Date    APPENDECTOMY      COLONOSCOPY N/A 01/04/2022    Procedure: COLONOSCOPY;  Surgeon: Zita Steele MD;  Location:  GI    ENDOSCOPIC RETROGRADE CHOLANGIOPANCREATOGRAM N/A 02/14/2022    Procedure: ENDOSCOPIC RETROGRADE CHOLANGIOPANCREATOGRAPHY WITH BILIARY SPHINCTEROTOMY, SLUDGE REMOVAL, DILATION  AND STENT PLACEMENT;  Surgeon: Guru Gardenia Escobar MD;  [facility]    ENDOSCOPIC RETROGRADE CHOLANGIOPANCREATOGRAM N/A 05/16/2022    Procedure: ENDOSCOPIC RETROGRADE CHOLANGIOPANCREATOGRAPHY WITH BILE DUCT STENT REMOVAL;  Surgeon: Luz, " Guru Gardenia Jane MD;  [facility]: [facility]    ENDOSCOPIC RETROGRADE CHOLANGIOPANCREATOGRAPHY, EXCHANGE TUBE/STENT N/A 2022    Procedure: ENDOSCOPIC RETROGRADE CHOLANGIOPANCREATOGRAPHY, bile duct stents exchanged, balloon dilation and sweep of bile ducts for sludge;  Surgeon: Guru Gardenia Escobar MD;  [facility]: [facility]    ESOPHAGOGASTRODUODENOSCOPY, WITH BRUSHINGS N/A 10/29/2021    Procedure: ESOPHAGOGASTRODUODENOSCOPY, WITH BRUSHINGS;  Surgeon: Torey Ruiz MD;  Location: [facility]    IR LIVER BIOPSY PERCUTANEOUS  2022    OPERATIVE HYSTEROSCOPY WITH MORCELLATOR N/A 2023    Procedure: Operative hysteroscopy with Myosure morcellator;  Surgeon: Salud Arnold MD;  Location:  OR    ORTHOPEDIC SURGERY Left 2023    hip replacement    TRANSPLANT LIVER RECIPIENT  DONOR N/A 2022    Procedure: TRANSPLANT, LIVER, RECIPIENT,  DONOR;  Surgeon: Pradeep Browne MD;  Location:  OR     Current Outpatient Medications   Medication Sig Dispense Refill    buPROPion (WELLBUTRIN XL) 300 MG 24 hr tablet Take 1 tablet (300 mg) by mouth every morning 90 tablet 1    calcium carbonate (OS-SHAI) 1500 (600 Ca) MG tablet Take 1 tablet (600 mg) by mouth daily 60 tablet 3    hydrocortisone 2.5 % ointment Apply twice daily for 1 week on itchy, scaly areas on the body.  Apply Vaseline on top. 60 g 0    hydrOXYzine (ATARAX) 25 MG tablet TAKE 2 TABLETS BY MOUTH NIGHTLY AS NEEDED FOR (INSOMINA) FUTURE REFILLS TO GO TO PRIMARY CARE OFFICE 180 tablet 3    magnesium oxide (MAG-OX) 400 MG tablet Take 1 tablet (400 mg) by mouth 2 times daily 90 tablet 3    multivitamin (CENTRUM SILVER) tablet Take 1 tablet by mouth daily      pantoprazole (PROTONIX) 40 MG EC tablet Take 1 tablet (40 mg) by mouth daily (Patient taking differently: Take 40 mg by mouth daily as needed for heartburn) 90 tablet 3    tacrolimus (GENERIC) 1 MG capsule Take 1 capsule (1 mg) by mouth 2 times daily 180 capsule 3  "   clobetasol (TEMOVATE) 0.05 % external ointment Apply topically 2 times daily To hands as needed (Patient not taking: Reported on 1/24/2024) 60 g 3    gabapentin (NEURONTIN) 300 MG capsule Take 1 capsule (300 mg) by mouth 3 times daily (Patient not taking: Reported on 1/24/2024) 90 capsule 0    hydroquinone (KARIN) 4 % external cream Apply once daily to the face, neck and chest as tolerated. Start with every other day. Skip when irritating (Patient not taking: Reported on 1/24/2024) 30 g 2    magic mouthwash suspension (diphenhydrAMINE, lidocaine, aluminum-magnesium & simethicone) Swish and swallow 10 mLs in mouth every 6 hours as needed for mouth sores (Patient not taking: Reported on 1/24/2024) 300 mL 1       Allergies   Allergen Reactions    Adhesive Tape Rash    Latex Rash        Social History     Tobacco Use    Smoking status: Former     Types: Cigarettes     Quit date: 5/1/2020     Years since quitting: 3.7    Smokeless tobacco: Never   Substance Use Topics    Alcohol use: Not Currently     Comment: Last drink 8/2021     Family History   Problem Relation Age of Onset    Cancer Mother     Melanoma Father     Skin Cancer Father     Colon Cancer Maternal Uncle     Colon Cancer Paternal Aunt      History   Drug Use Unknown     Comment: OCASSIONAL CBC GUMMIES, THC DRINKS- LAST TAKEN LASTWEEK         Review of Systems    Review of Systems  Constitutional, neuro, ENT, endocrine, pulmonary, cardiac, gastrointestinal, genitourinary, musculoskeletal, integument and psychiatric systems are negative, except as otherwise noted.  Objective    /83 (BP Location: Right arm, Patient Position: Sitting, Cuff Size: Adult Regular)   Pulse 82   Temp 98.5  F (36.9  C) (Oral)   Resp 20   Ht 1.638 m (5' 4.5\")   Wt 73.9 kg (163 lb)   SpO2 100%   BMI 27.55 kg/m     Estimated body mass index is 27.55 kg/m  as calculated from the following:    Height as of this encounter: 1.638 m (5' 4.5\").    Weight as of this encounter: " 73.9 kg (163 lb).  Physical Exam  GENERAL: alert and no distress  EYES: Eyes grossly normal to inspection, PERRL and conjunctivae and sclerae normal  NECK: no adenopathy, no asymmetry, masses, or scars  RESP: lungs clear to auscultation - no rales, rhonchi or wheezes  CV: regular rate and rhythm, normal S1 S2, no S3 or S4, no murmur, click or rub, no peripheral edema  MS: no gross musculoskeletal defects noted, no edema  SKIN: no suspicious lesions or rashes  NEURO: Normal strength and tone, mentation intact and speech normal  PSYCH: mentation appears normal, affect normal/bright  LYMPH: no cervical, supraclavicular, axillary, or inguinal adenopathy    Recent Labs   Lab Test 01/16/24  0853 12/28/23  1038 12/28/23  1037 04/20/23  1108 02/15/23  1603 05/23/22  0815 05/16/22  0927 03/17/22  0815 03/16/22  0809   HGB 13.8 14.5  --    < >  --    < >  --    < > 11.5*    191  --    < >  --    < >  --    < > 198   INR  --   --   --   --   --   --  1.04  --  1.07     --  138   < >  --    < >  --    < > 136   POTASSIUM 4.6  --  4.5   < >  --    < >  --    < > 4.5   CR 1.17*  --  1.16*   < >  --    < >  --    < > 1.09*   A1C  --   --   --   --  5.2  --   --   --   --     < > = values in this interval not displayed.        Diagnostics  No labs were ordered during this visit.   No EKG required for low risk surgery (cataract, skin procedure, breast biopsy, etc).    Revised Cardiac Risk Index (RCRI)  The patient has the following serious cardiovascular risks for perioperative complications:   - No serious cardiac risks = 0 points     RCRI Interpretation: 0 points: Class I (very low risk - 0.4% complication rate)         Signed Electronically by: Navneet Johnson MD  Copy of this evaluation report is provided to requesting physician.

## 2024-01-25 DIAGNOSIS — Z94.4 LIVER REPLACED BY TRANSPLANT (H): Primary | ICD-10-CM

## 2024-01-25 DIAGNOSIS — Z13.220 LIPID SCREENING: ICD-10-CM

## 2024-01-25 DIAGNOSIS — K70.30 ALCOHOLIC CIRRHOSIS (H): ICD-10-CM

## 2024-01-29 ENCOUNTER — VIRTUAL VISIT (OUTPATIENT)
Dept: PSYCHOLOGY | Facility: CLINIC | Age: 54
End: 2024-01-29
Payer: COMMERCIAL

## 2024-01-29 DIAGNOSIS — F32.1 CURRENT MODERATE EPISODE OF MAJOR DEPRESSIVE DISORDER, UNSPECIFIED WHETHER RECURRENT (H): Primary | ICD-10-CM

## 2024-01-29 PROCEDURE — 90834 PSYTX W PT 45 MINUTES: CPT | Mod: 95 | Performed by: STUDENT IN AN ORGANIZED HEALTH CARE EDUCATION/TRAINING PROGRAM

## 2024-01-29 ASSESSMENT — ANXIETY QUESTIONNAIRES
7. FEELING AFRAID AS IF SOMETHING AWFUL MIGHT HAPPEN: SEVERAL DAYS
2. NOT BEING ABLE TO STOP OR CONTROL WORRYING: SEVERAL DAYS
IF YOU CHECKED OFF ANY PROBLEMS ON THIS QUESTIONNAIRE, HOW DIFFICULT HAVE THESE PROBLEMS MADE IT FOR YOU TO DO YOUR WORK, TAKE CARE OF THINGS AT HOME, OR GET ALONG WITH OTHER PEOPLE: EXTREMELY DIFFICULT
1. FEELING NERVOUS, ANXIOUS, OR ON EDGE: SEVERAL DAYS
4. TROUBLE RELAXING: SEVERAL DAYS
3. WORRYING TOO MUCH ABOUT DIFFERENT THINGS: NEARLY EVERY DAY
GAD7 TOTAL SCORE: 9
8. IF YOU CHECKED OFF ANY PROBLEMS, HOW DIFFICULT HAVE THESE MADE IT FOR YOU TO DO YOUR WORK, TAKE CARE OF THINGS AT HOME, OR GET ALONG WITH OTHER PEOPLE?: EXTREMELY DIFFICULT
GAD7 TOTAL SCORE: 9
6. BECOMING EASILY ANNOYED OR IRRITABLE: SEVERAL DAYS
GAD7 TOTAL SCORE: 9
5. BEING SO RESTLESS THAT IT IS HARD TO SIT STILL: SEVERAL DAYS
7. FEELING AFRAID AS IF SOMETHING AWFUL MIGHT HAPPEN: SEVERAL DAYS

## 2024-01-29 ASSESSMENT — PATIENT HEALTH QUESTIONNAIRE - PHQ9
SUM OF ALL RESPONSES TO PHQ QUESTIONS 1-9: 12
SUM OF ALL RESPONSES TO PHQ QUESTIONS 1-9: 12
10. IF YOU CHECKED OFF ANY PROBLEMS, HOW DIFFICULT HAVE THESE PROBLEMS MADE IT FOR YOU TO DO YOUR WORK, TAKE CARE OF THINGS AT HOME, OR GET ALONG WITH OTHER PEOPLE: EXTREMELY DIFFICULT

## 2024-01-29 NOTE — PROGRESS NOTES
"[facility]                                     Progress Note    Patient Name: Melita Cortes  Date: 01/29/2024         Service Type: Individual      Session Start Time: 2.30  Session End Time: 3.09     Session Length: 38-52    Session #: 10    Attendees: Client attended alone    Service Modality:  Video Visit:      Provider verified identity through the following two step process.  Patient provided:  Patient is known previously to provider    Telemedicine Visit: The patient's condition can be safely assessed and treated via synchronous audio and visual telemedicine encounter.      Reason for Telemedicine Visit: Patient has requested telehealth visit    Originating Site (Patient Location): Patient's home    Distant Site (Provider Location): Provider Remote Setting- Home Office    Consent:  The patient/guardian has verbally consented to: the potential risks and benefits of telemedicine (video visit) versus in person care; bill my insurance or make self-payment for services provided; and responsibility for payment of non-covered services.     Patient would like the video invitation sent by:  My Chart    Mode of Communication:  Video Conference via [employer]    Distant Location (Provider):  Off-site    As the provider I attest to compliance with applicable laws and regulations related to telemedicine.    DATA  Interactive Complexity: No  Crisis: No        Progress Since Last Session (Related to Symptoms / Goals / Homework):   Symptoms: Improving as evident by current phq9 scores    Homework: Completed in session review patient's utilization of safety plan.        Episode of Care Goals: Minimal progress - ACTION (Actively working towards change); Intervened by reinforcing change plan / affirming steps taken     Current / Ongoing Stressors and Concerns:  Patient reported  \"Trauma Adhd depres\". Patient reported \" I think I have had some trauma in my life, my liver transplant and I think have ADHD.\" " "Pt reported she is  \"overly sensitive\" and \" I dont handle stress very well and struggle with impulsivity\"  and that \"I have a poor self image, negative talk a lot \" patient also reported she lost her mother in 2019 and her dad 7 months ago and still struggling with grieving the lost of both parents.      Current: Today, patient reported that he continues to experience increased neurovegetative symptoms of depression. Reported she get easily frustrated and irritated when triggered. Patient reported she has been going to the gym about 4 times a week and that has been helpful but still feels lonely and empty daily.         Treatment Objective(s) Addressed in This Session:   Patient will increase daily activity level by exercising for 20-30 minutes and participate in at least 1 daily pleasant/fun activities.  Use safety plan as needed daily and practice mindfulness daily.  Patient will identify 2-3 volunteering opportunities and apply before next session.   Patient will identify specific areas of cognitive distortion and challenge irrational thoughts with reality.      Intervention:    DBT: Discussion today on distress intolerance as a perceived inability to fully experience unpleasant, aversive, or uncomfortable emotions, accompanied by a desperate need to escape these uncomfortable emotions. Discuss common examples like sadness, fear and anger. Couched pt that negative emotions by themselves are not necessarily distressing but will feel distressing only when we evaluate our emotional experience as evasive/bad. We discussed common unhelpful distress escape examples like avoidance, distraction and substance use. Walk patient through a mindfulness exercise to help them watch their emotions, and describe it non judgementally as in    there is fear, I can feel it in the fast beating of my heart , or   there is sadness, I can feel it in the heaviness of my shoulders , or   there is anger, I can feel it in the tightness of " my jaw  and then redirect their focus to the present using mindful breathing and the other 4 senses, sight, touch, hearing and taste.       Assessments completed prior to visit:  PHQ9:       11/14/2023     3:13 PM 11/28/2023     9:28 AM 12/13/2023    11:54 AM 12/18/2023     5:09 PM 1/3/2024     2:10 PM 1/16/2024     2:15 PM 1/29/2024     2:15 PM   PHQ-9 SCORE   PHQ-9 Total Score MyChart 18 (Moderately severe depression) 14 (Moderate depression) 18 (Moderately severe depression) 18 (Moderately severe depression) 16 (Moderately severe depression) 15 (Moderately severe depression) 12 (Moderate depression)   PHQ-9 Total Score 18 14 18 18 16 15 12     GAD7:       6/29/2023     1:43 PM 9/11/2023    12:07 PM 11/14/2023     3:13 PM 11/28/2023     9:29 AM 12/13/2023    11:55 AM 1/3/2024     2:11 PM 1/29/2024     2:16 PM   JORGE-7 SCORE   Total Score  15 (severe anxiety) 14 (moderate anxiety) 21 (severe anxiety) 21 (severe anxiety) 10 (moderate anxiety) 9 (mild anxiety)   Total Score 7 15 14    14 21 21 10 9         ASSESSMENT: Current Emotional / Mental Status (status of significant symptoms):   Risk status (Self / Other harm or suicidal ideation)   Patient denies current fears or concerns for personal safety.   Patient denies current or recent suicidal ideation or behaviors.   Patient denies current or recent homicidal ideation or behaviors.   Patient denies current or recent self injurious behavior or ideation.   Patient denies other safety concerns.   Patient reports there has been no change in risk factors since their last session.     Patient reports there has been no change in protective factors since their last session.     Recommended that patient call 911 or go to the local ED should there be a change in any of these risk factors.     Appearance:   Appropriate    Eye Contact:   Good    Psychomotor Behavior: Normal    Attitude:   Cooperative  Interested   Orientation:   Person Place Time Situation   Speech    Rate /  Production: Normal/ Responsive    Volume:  Normal    Mood:    Anxious  Depressed  Grieving   Affect:    Tearful   Thought Content:  Clear    Thought Form:  Coherent  Logical    Insight:    Good      Medication Review:   No changes to current psychiatric medication(s)     Medication Compliance:   Yes     Changes in Health Issues:   None reported     Chemical Use Review:   Substance Use: Chemical use reviewed, no active concerns identified      Tobacco Use: No current tobacco use.      Diagnosis:  296.32 (F33.1) Major Depressive Disorder, Recurrent Episode, Moderate _ and With atypical features    Collateral Reports Completed:   Not Applicable    PLAN: (Patient Tasks / Therapist Tasks / Other)    Utilize safety plan as needed daily.  Use skill learned in session  to manage distressful emotions daily    CECIL Vasquez    ----- Service Performed and Documented by CECIL------  This note was reviewed and clinical supervision by ELMO [facility]men[facility]    2/2/2024        _________________________________________________________    Individual Treatment Plan    Patient's Name: Melita Cortes  YOB: 1970    Date of Creation: 09/22/2023  Date Treatment Plan Last Reviewed/Revised: 11/14/2023    DSM5 Diagnoses: 296.32 (F33.1) Major Depressive Disorder, Recurrent Episode, Moderate _ and With atypical features  Psychosocial / Contextual Factors:   PROMIS (reviewed every 90 days):     Referral / Collaboration:  Referral to another professional/service is not indicated at this time..    Anticipated number of session for this episode of care:  15-25  Anticipation frequency of session: Biweekly  Anticipated Duration of each session: 38-52 minutes  Treatment plan will be reviewed in 90 days or when goals have been changed.       MeasurableTreatment Goal(s) related to diagnosis / functional impairment(s)  Goal 1: Patient will identify and address specific triggers to feelings of depression and improve coping by  90  "% in the next three months.    I will know I've met my goal when I am less impulsive, better communicator and can comfortably manage distressful emotions.         Objective #A (Patient Action)    Develop and utilize 3 effective distress tolerance strategies to address SI.   Status: Continued - Date(s): 02/15/2024     Intervention(s)   Therapist will engage in collaborative brainstorming with pt to identify and practice individualized distress tolerance coping strategies to address SI weekly.    Objective #B (Patient Action)    Patient will increase daily activity level by exercising for 20-30 minutes and participate in at least 1 daily pleasant/fun activities.  Status: Continued - Date(s):  02/15/2024    Intervention(s)  Therapist will provide psychoeducation, behavioral activation, and cognitive restructuring.    Objective #C  Patient will identify specific areas of cognitive distortion and challenge irrational thoughts with reality.   Status: Continued - Date(s):   02/15/2024       Intervention(s)  Therapist will provide psychoeducation, behavioral activation, and cognitive restructuring.    Objective #D  Patient will learn and practice relaxation techniques to manage depression.  Status: Continued - Date(s):   02/15/2024    Intervention(s)   Therapist will teach patient thought stopping, deep breathing exercises, mindful meditation, and creative visualization.        Patient has reviewed and agreed to the above plan.      CECIL Vasquez  September 22, 2023          [facility] Matthew Cortes     SAFETY PLAN:  Step 1: Warning signs / cues (Thoughts, images, mood, situation, behavior) that a crisis may be developing:  Thoughts:   \" sometimes I feel like I don't have a purpose\"  Images:  Feels lonely after losing mum and dad and lonely in marriage  Thinking Processes: ruminations (can't stop thinking about my problems): health, family loses  Mood: worsening " "depression  Behaviors: isolating/withdrawing   Situations: loss: parents and lonely in marriage    Step 2: Coping strategies - Things I can do to take my mind off of my problems without contacting another person (relaxation technique, physical activity):  Distress Tolerance Strategies:  play with my pet , intense exercise: work out for 2-3 minutes , and support group attendance for transplant  Physical Activities: go for a walk and stretching   Focus on helpful thoughts:  \"This is temporary\" and \"It always passes\"  Step 3: People and social settings that provide distraction:   Name:  Augustine ( friend) Phone: [phone]   Name:  Aimee paniagua Phone: [phone]     gym  and [employer] store  Liver transplant group  Step 4: Remind myself of people and things that are important to me and worth living for:    My , cousins and friends.  Step 5: When I am in crisis, I can ask these people to help me use my safety plan:   Name:  Jake ( Therapist )  Phone: [phone]   Name:  Aimee michaelletitia Phone: [phone]   Step 6: Making the environment safe:   remove alcohol, remove drugs, and be around others  Step 7: Professionals or agencies I can contact during a crisis:  Suicide Prevention Lifeline: Call or Text 326   [facility]: [phone]    Call 911 or go to my nearest emergency department.   I helped develop this safety plan and agree to use it when needed.  I have been given a copy of this plan.      Client signature _________________________________________________________________  Today s date:  11/14/2023  Completed by Provider Name/ Credentials:  CECIL Vasquez  November 14, 2023  Adapted from Safety Plan Template 2008 Chelsi Marx and Fred Ramos is reprinted with the express permission of the authors.  No portion of the Safety Plan Template may be reproduced without the express, written permission.  You can contact the authors at [email or web address] or " [email or web address].        Answers submitted by the patient for this visit:  Patient Health Questionnaire (Submitted on 11/14/2023)  If you checked off any problems, how difficult have these problems made it for you to do your work, take care of things at home, or get along with other people?: Very difficult  PHQ9 TOTAL SCORE: 18  JORGE-7 (Submitted on 11/14/2023)  JORGE 7 TOTAL SCORE: 14    Answers submitted by the patient for this visit:  Patient Health Questionnaire (Submitted on 11/28/2023)  If you checked off any problems, how difficult have these problems made it for you to do your work, take care of things at home, or get along with other people?: Extremely difficult  PHQ9 TOTAL SCORE: 14  JORGE-7 (Submitted on 11/28/2023)  JORGE 7 TOTAL SCORE: 21    Answers submitted by the patient for this visit:  Patient Health Questionnaire (Submitted on 12/13/2023)  If you checked off any problems, how difficult have these problems made it for you to do your work, take care of things at home, or get along with other people?: Very difficult  PHQ9 TOTAL SCORE: 18  JORGE-7 (Submitted on 12/13/2023)  JORGE 7 TOTAL SCORE: 21    Answers submitted by the patient for this visit:  Patient Health Questionnaire (Submitted on 1/3/2024)  If you checked off any problems, how difficult have these problems made it for you to do your work, take care of things at home, or get along with other people?: Very difficult  PHQ9 TOTAL SCORE: 16  JORGE-7 (Submitted on 1/3/2024)  JORGE 7 TOTAL SCORE: 10    Answers submitted by the patient for this visit:  Patient Health Questionnaire (Submitted on 1/16/2024)  If you checked off any problems, how difficult have these problems made it for you to do your work, take care of things at home, or get along with other people?: Very difficult  PHQ9 TOTAL SCORE: 15    Answers submitted by the patient for this visit:  Patient Health Questionnaire (Submitted on 1/29/2024)  If you checked off any problems, how  difficult have these problems made it for you to do your work, take care of things at home, or get along with other people?: Extremely difficult  PHQ9 TOTAL SCORE: 12  JORGE-7 (Submitted on 1/29/2024)  JORGE 7 TOTAL SCORE: 9

## 2024-01-31 ENCOUNTER — TELEPHONE (OUTPATIENT)
Dept: FAMILY MEDICINE | Facility: CLINIC | Age: 54
End: 2024-01-31
Payer: COMMERCIAL

## 2024-01-31 NOTE — TELEPHONE ENCOUNTER
Forms/Letter Request    Type of form/letter: FMLA - Unknown      Do we have the form/letter: Yes: Faxed in    Who is the form from? [employer] (if other please explain)    Where did/will the form come from? form was faxed in    When is form/letter needed by: ASAP    How would you like the form/letter returned: Fax : [phone]    Patient Notified form requests are processed in 5-7 business days:Yes    Could we send this information to you in [employer] or would you prefer to receive a phone call?:   Patient would prefer a phone call   Okay to leave a detailed message?: Yes at Cell number on file:    Telephone Information:   Mobile [phone]

## 2024-02-01 ENCOUNTER — MYC MEDICAL ADVICE (OUTPATIENT)
Dept: FAMILY MEDICINE | Facility: CLINIC | Age: 54
End: 2024-02-01
Payer: COMMERCIAL

## 2024-02-01 NOTE — TELEPHONE ENCOUNTER
Discussed with patient through [employer] message about the forms  She does not need the forms filled up anymore  Because of this I did not fill those forms and placed them in my  red folder just in case we need them in future

## 2024-02-11 ASSESSMENT — ANXIETY QUESTIONNAIRES
5. BEING SO RESTLESS THAT IT IS HARD TO SIT STILL: MORE THAN HALF THE DAYS
GAD7 TOTAL SCORE: 20
7. FEELING AFRAID AS IF SOMETHING AWFUL MIGHT HAPPEN: NEARLY EVERY DAY
6. BECOMING EASILY ANNOYED OR IRRITABLE: NEARLY EVERY DAY
2. NOT BEING ABLE TO STOP OR CONTROL WORRYING: NEARLY EVERY DAY
3. WORRYING TOO MUCH ABOUT DIFFERENT THINGS: NEARLY EVERY DAY
1. FEELING NERVOUS, ANXIOUS, OR ON EDGE: NEARLY EVERY DAY

## 2024-02-11 ASSESSMENT — PATIENT HEALTH QUESTIONNAIRE - PHQ9
10. IF YOU CHECKED OFF ANY PROBLEMS, HOW DIFFICULT HAVE THESE PROBLEMS MADE IT FOR YOU TO DO YOUR WORK, TAKE CARE OF THINGS AT HOME, OR GET ALONG WITH OTHER PEOPLE: VERY DIFFICULT
SUM OF ALL RESPONSES TO PHQ QUESTIONS 1-9: 13
5. POOR APPETITE OR OVEREATING: NEARLY EVERY DAY
SUM OF ALL RESPONSES TO PHQ QUESTIONS 1-9: 13

## 2024-02-12 ENCOUNTER — VIRTUAL VISIT (OUTPATIENT)
Dept: PSYCHOLOGY | Facility: CLINIC | Age: 54
End: 2024-02-12
Attending: INTERNAL MEDICINE
Payer: COMMERCIAL

## 2024-02-12 DIAGNOSIS — F33.1 MAJOR DEPRESSIVE DISORDER, RECURRENT EPISODE, MODERATE (H): ICD-10-CM

## 2024-02-12 DIAGNOSIS — R41.840 POOR CONCENTRATION: ICD-10-CM

## 2024-02-12 DIAGNOSIS — F41.1 GENERALIZED ANXIETY DISORDER: Primary | ICD-10-CM

## 2024-02-12 DIAGNOSIS — R45.89 FIDGETING: ICD-10-CM

## 2024-02-12 DIAGNOSIS — F88 DEVELOPMENTAL MENTAL DISORDER: ICD-10-CM

## 2024-02-12 PROCEDURE — 90834 PSYTX W PT 45 MINUTES: CPT | Mod: 95 | Performed by: PSYCHOLOGIST

## 2024-02-12 ASSESSMENT — ANXIETY QUESTIONNAIRES: GAD7 TOTAL SCORE: 20

## 2024-02-12 NOTE — PROGRESS NOTES
[facility]   Mental Health & Addiction Services     Progress Note - Initial Visit    Client Name:  Melita Cortes Date: 2024         Service Type: Individual     Visit Start Time: 9:00am  Visit End Time: 9:40am    Visit #: 1    Attendees: Client attended alone    Service Modality:  Video Visit:      Provider verified identity through the following two step process.  Patient provided:  Patient  and Patient address    Telemedicine Visit: The patient's condition can be safely assessed and treated via synchronous audio and visual telemedicine encounter.      Reason for Telemedicine Visit: Services only offered telehealth    Originating Site (Patient Location): Patient's home    Distant Site (Provider Location): Provider Remote Setting- Home Office    Consent:  The patient/guardian has verbally consented to: the potential risks and benefits of telemedicine (video visit) versus in person care; bill my insurance or make self-payment for services provided; and responsibility for payment of non-covered services.     Patient would like the video invitation sent by:  Send to e-mail at: [email or web address]    Mode of Communication:  Video Conference via [facility]    Distant Location (Provider):  Off-site    As the provider I attest to compliance with applicable laws and regulations related to telemedicine.       DATA:  Extended Session (53+ minutes): No  Interactive Complexity: No   Crisis: No     Presenting Concerns/Current Stressors:   Patient presented to session to initiate the ADHD evaluation process.           2024     2:15 PM 2024     2:15 PM 2024     9:26 AM   PHQ   PHQ-9 Total Score 15 12 13   Q9: Thoughts of better off dead/self-harm past 2 weeks Not at all Not at all Not at all            1/3/2024     2:11 PM 2024     2:16 PM 2024     9:26 AM   JORGE-7 SCORE   Total Score 10 (moderate anxiety) 9 (mild anxiety)    Total Score 10 9 20        ASSESSMENT:  Mental Status Assessment:  Appearance:   Appropriate   Eye Contact:   Good   Psychomotor Behavior: Normal   Attitude:   Cooperative   Orientation:   All  Speech   Rate / Production: Normal/ Responsive   Volume:  Normal   Mood:    Anxious  Depressed   Affect:    Appropriate  Tearful  Thought Content:  Clear   Thought Form:  Logical   Insight:    Good       Safety Issues and Plan for Safety and Risk Management:   [place] Suicide Severity Rating Scale (Lifetime/Recent)      6/17/2022     9:57 AM 6/6/2023     1:00 PM   [place] Suicide Severity Rating (Lifetime/Recent)   Q1 Wish to be Dead (Lifetime) N Y   1. Wish to be Dead (Past 1 Month)  N   Q2 Non-Specific Active Suicidal Thoughts (Lifetime) N Y   2. Non-Specific Active Suicidal Thoughts (Past 1 Month)  N   3. Active Suicidal Ideation with any Methods (Not Plan) Without Intent to Act (Lifetime)  N   Q4 Active Suicidal Ideation with Some Intent to Act, Without Specific Plan (Lifetime)  N   Q5 Active Suicidal Ideation with Specific Plan and Intent (Lifetime)  N   Most Severe Ideation Rating (Lifetime)  2   Most Severe Ideation Rating (Past 1 Month)  1   Frequency (Lifetime)  1   Frequency (Past 1 Month)  1   Duration (Lifetime)  1   Duration (Past 1 Month)  1   Controllability (Lifetime)  2   Controllability (Past 1 Month)  2   Deterrents (Lifetime)  1   Deterrents (Past 1 Month)  1   Reasons for Ideation (Lifetime)  4   Reasons for Ideation (Past 1 Month)  4   Actual Attempt (Lifetime) N N   Has subject engaged in non-suicidal self-injurious behavior? (Lifetime) N N   Interrupted Attempts (Lifetime) N N   Aborted or Self-Interrupted Attempt (Lifetime) N N   Preparatory Acts or Behavior (Lifetime) N N   Calculated C-SSRS Risk Score (Lifetime/Recent) No Risk Indicated No Risk Indicated     Patient denies current fears or concerns for personal safety.  Patient denies current or recent suicidal ideation or behaviors.  Patient denies current or recent  homicidal ideation or behaviors.  Patient denies current or recent self injurious behavior or ideation.  Patient denies other safety concerns.  Recommended that patient call 911 or go to the local ED should there be a change in any of these risk factors.    Diagnostic Criteria:  F41.1:  A. Excessive anxiety and worry, occurring more days than not for at least 6 months about a number of events or activities.   B. The individual finds it difficult to control the worry.  C. The anxiety and worry are associated with 3 or more of 6 symptoms.  D. The anxiety, worry, or physical symptoms cause clinically significant distress or impairment in social, occupational, or other important areas of functioning.  E. The disturbance is not attributable to the physiological effects of a substance (e.g., a drug of abuse, a medication) or another medical condition (e.g., hyperthyroidism).  F. The disturbance is not better explained by another mental disorder (e.g., anxiety or worry about having panic attacks in panic disorder, negative evaluation in social anxiety disorder [social phobia], contamination or other obsessions in obsessive-compulsive disorder, separation from attachment figures in separation anxiety disorder, reminders of traumatic events in posttraumatic stress disorder, gaining weight in anorexia nervosa, physical complaints in somatic symptom disorder, perceived appearance flaws in body dysmorphic disorder, having a serious illness in illness anxiety disorder, or the content of delusional beliefs in schizophrenia or delusional disorder).    F33.1:  A. Five (or more) symptoms have been present during the same 2-week period and represent a change from previous functioning; at least one of the symptoms is either (1) depressed mood or (2) loss of interest or pleasure.   Depressed mood.   Diminished interest or pleasure in all, or almost all, activities.   Significant appetite change.  Significant sleep change.   Fatigue or  loss of energy.   Feelings of worthlessness or inappropriate guilt.   Diminished ability to think or concentrate, or indecisiveness.   Psychomotor agitation or lethargy.   Recurrent thoughts of death.   B. The symptoms cause clinically significant distress or impairment in social, occupational, or other important areas of functioning.  C. The episode is not attributable to the physiological effects of a substance or to another medical condition.  D. The occurence of major depressive episode is not better explained by other thought / psychotic disorders.  E. There has never been a manic episode or hypomanic episode.     F88:  A. A persistent pattern of inattention and/or hyperactivity-impulsivity that interferes with functioning or development, as characterized by (1) and/or (2):   1. Six or more inattention symptoms that have persisted for at least 6 months to a degree that is inconsistent with developmental level and that negatively impacts directly on social and academic/occupational activities.   2. Six or more hyperactivity and impulsivity symptoms that have persisted for at least 6 months to a degree that is inconsistent with developmental level and that negatively impacts directly on social and academic/occupational activities.  B. Several symptoms (inattentive or hyperactive/impulsive) were present before the age of 12 years.  C. Several symptoms (inattentive or hyperactive/impulsive) present in ?2 settings (eg, at home, school, or work; with friends or relatives; in other activities).  D. There is clear evidence that the symptoms interfere with or reduce the quality of social, academic, or occupational functioning.  E. Symptoms do not occur exclusively during the course of schizophrenia or another psychotic disorder, and are not better explained by another mental disorder (eg, mood disorder, anxiety disorder, dissociative disorder, personality disorder, substance intoxication, or withdrawal).      DSM5  Diagnoses: (Sustained by DSM5 Criteria Listed Above)  Diagnoses:   1. Generalized anxiety disorder    2. Major depressive disorder, recurrent episode, moderate (H)    3. Developmental mental disorder    4. Fidgeting    5. Poor concentration      Psychosocial & Contextual Factors: social support, in therapy, unemployed currently, sober, transplant patient  WHODAS 2.0 (12 item):       6/17/2022     9:00 AM   WHODAS 2.0 Total Score   Total Score 36   Total Score [facility]       PROMIS-10 Scores  Global Mental Health Score: (P) 7  Global Physical Health Score: (P) 13   PROMIS TOTAL - SUBSCORES: (P) 20      Intervention:              Reviewed symptoms and history of presenting concern. Patient endorsed symptoms consistent with depression , anxiety , trauma, and ADHD. Patient denied symptoms associated with amauri, panic, OCD, perceptual difficulties, and disordered eating (currently). DA completed 9/11/2023 with CECIL Vasquez.   CBT: socratic questioning, positive reinforcement  EFT: empathetic attunement, emotion checking, emotion naming  MI: open ended questions, affirmations, reflections        Attendance Agreement:  Client has not signed the attendance agreement. Discussed expectations at beginning of this first session and patient agreed.       PLAN:  Patient will complete Makalya questionnaires  and CNS Vital Signs prior to next session (2/19/2024).    Patient meets the following risk assessment and triage: Patient denied any current/recent/lifetime history of suicidal ideation and/or behaviors.  No safety plan indicated at this time.     Medical necessity criteria is warranted in order to: Measure a psychological disorder and its severity and functional impairment to determine psychiatric diagnosis when a mental illness is suspected, or to achieve a differential diagnosis from a range of medical/psychological disorders that present with similar constellations of symptoms (e.g., determination and measurement of  anxiety severity and impact in the presence of ongoing asthma or heart disease), Perform symptom measurement to objectively measure treatment effectiveness and/or determine the need to refer for pharmacological treatment or other medical evaluation (e.g., based on severity and chronicity of symptoms), and Evaluate primary symptoms of impaired attention and concentration that can occur in many neurological and psychiatric conditions.    Medical necessity for psychological assessment is warranted as a result of the following: (1) A specific clinical question is posed that relates to the condition/symptoms being addressed (2) The question cannot be adequately addressed by clinical interview and/or behavioral observation (3) Results of psychological testing are reasonably expected to provide an answer to the query (4) It is reasonably expected that the testing will provide information leading to a clearer diagnosis and/or guide treatment planning with an expectation of improved clinical outcome.    I acknowledge that, based upon current clinical information, the patient and I have reviewed and discussed issues pertaining to the purpose of therapy/testing, potential therapeutic goals, procedures, risks and benefits, and estimated duration of therapy/testing. Issues pertaining to fees/insurance and confidentiality were also addressed with the patient, who indicated understanding and elected to continue with appointments. I will not be providing any experimental procedures and, if we agree that a change in clinical procedure would be more beneficial, I will obtain specific consent for that procedure or refer you to another provider who has expertise in that area.       Irma Rojas PsyD,   Clinical Psychologist

## 2024-02-14 ENCOUNTER — VIRTUAL VISIT (OUTPATIENT)
Dept: PSYCHOLOGY | Facility: CLINIC | Age: 54
End: 2024-02-14
Payer: COMMERCIAL

## 2024-02-14 DIAGNOSIS — F32.1 CURRENT MODERATE EPISODE OF MAJOR DEPRESSIVE DISORDER, UNSPECIFIED WHETHER RECURRENT (H): Primary | ICD-10-CM

## 2024-02-14 DIAGNOSIS — F41.1 GAD (GENERALIZED ANXIETY DISORDER): ICD-10-CM

## 2024-02-14 PROCEDURE — 90834 PSYTX W PT 45 MINUTES: CPT | Mod: 95 | Performed by: STUDENT IN AN ORGANIZED HEALTH CARE EDUCATION/TRAINING PROGRAM

## 2024-02-14 ASSESSMENT — ANXIETY QUESTIONNAIRES
8. IF YOU CHECKED OFF ANY PROBLEMS, HOW DIFFICULT HAVE THESE MADE IT FOR YOU TO DO YOUR WORK, TAKE CARE OF THINGS AT HOME, OR GET ALONG WITH OTHER PEOPLE?: VERY DIFFICULT
GAD7 TOTAL SCORE: 14
7. FEELING AFRAID AS IF SOMETHING AWFUL MIGHT HAPPEN: MORE THAN HALF THE DAYS
GAD7 TOTAL SCORE: 14
GAD7 TOTAL SCORE: 14
2. NOT BEING ABLE TO STOP OR CONTROL WORRYING: MORE THAN HALF THE DAYS
7. FEELING AFRAID AS IF SOMETHING AWFUL MIGHT HAPPEN: MORE THAN HALF THE DAYS
GAD7 TOTAL SCORE: 14
6. BECOMING EASILY ANNOYED OR IRRITABLE: MORE THAN HALF THE DAYS
GAD7 TOTAL SCORE: 14
IF YOU CHECKED OFF ANY PROBLEMS ON THIS QUESTIONNAIRE, HOW DIFFICULT HAVE THESE PROBLEMS MADE IT FOR YOU TO DO YOUR WORK, TAKE CARE OF THINGS AT HOME, OR GET ALONG WITH OTHER PEOPLE: VERY DIFFICULT
5. BEING SO RESTLESS THAT IT IS HARD TO SIT STILL: MORE THAN HALF THE DAYS
7. FEELING AFRAID AS IF SOMETHING AWFUL MIGHT HAPPEN: MORE THAN HALF THE DAYS
3. WORRYING TOO MUCH ABOUT DIFFERENT THINGS: MORE THAN HALF THE DAYS
1. FEELING NERVOUS, ANXIOUS, OR ON EDGE: MORE THAN HALF THE DAYS
3. WORRYING TOO MUCH ABOUT DIFFERENT THINGS: MORE THAN HALF THE DAYS
7. FEELING AFRAID AS IF SOMETHING AWFUL MIGHT HAPPEN: MORE THAN HALF THE DAYS
GAD7 TOTAL SCORE: 14
IF YOU CHECKED OFF ANY PROBLEMS ON THIS QUESTIONNAIRE, HOW DIFFICULT HAVE THESE PROBLEMS MADE IT FOR YOU TO DO YOUR WORK, TAKE CARE OF THINGS AT HOME, OR GET ALONG WITH OTHER PEOPLE: VERY DIFFICULT
4. TROUBLE RELAXING: MORE THAN HALF THE DAYS
5. BEING SO RESTLESS THAT IT IS HARD TO SIT STILL: MORE THAN HALF THE DAYS
8. IF YOU CHECKED OFF ANY PROBLEMS, HOW DIFFICULT HAVE THESE MADE IT FOR YOU TO DO YOUR WORK, TAKE CARE OF THINGS AT HOME, OR GET ALONG WITH OTHER PEOPLE?: VERY DIFFICULT
2. NOT BEING ABLE TO STOP OR CONTROL WORRYING: MORE THAN HALF THE DAYS
6. BECOMING EASILY ANNOYED OR IRRITABLE: MORE THAN HALF THE DAYS
1. FEELING NERVOUS, ANXIOUS, OR ON EDGE: MORE THAN HALF THE DAYS
4. TROUBLE RELAXING: MORE THAN HALF THE DAYS

## 2024-02-14 ASSESSMENT — PATIENT HEALTH QUESTIONNAIRE - PHQ9
SUM OF ALL RESPONSES TO PHQ QUESTIONS 1-9: 11
SUM OF ALL RESPONSES TO PHQ QUESTIONS 1-9: 11
10. IF YOU CHECKED OFF ANY PROBLEMS, HOW DIFFICULT HAVE THESE PROBLEMS MADE IT FOR YOU TO DO YOUR WORK, TAKE CARE OF THINGS AT HOME, OR GET ALONG WITH OTHER PEOPLE: EXTREMELY DIFFICULT

## 2024-02-14 NOTE — PROGRESS NOTES
"[facility]                                     Progress Note    Patient Name: Melita Cortes  Date: 02/14/2024         Service Type: Individual      Session Start Time: 2.30  Session End Time: 3.10     Session Length: 38-52    Session #: 11    Attendees: Client attended alone    Service Modality:  Video Visit:      Provider verified identity through the following two step process.  Patient provided:  Patient is known previously to provider    Telemedicine Visit: The patient's condition can be safely assessed and treated via synchronous audio and visual telemedicine encounter.      Reason for Telemedicine Visit: Patient has requested telehealth visit    Originating Site (Patient Location): Patient's home    Distant Site (Provider Location): Provider Remote Setting- Home Office    Consent:  The patient/guardian has verbally consented to: the potential risks and benefits of telemedicine (video visit) versus in person care; bill my insurance or make self-payment for services provided; and responsibility for payment of non-covered services.     Patient would like the video invitation sent by:  My Chart    Mode of Communication:  Video Conference via Amwell    Distant Location (Provider):  Off-site    As the provider I attest to compliance with applicable laws and regulations related to telemedicine.    DATA  Interactive Complexity: No  Crisis: No        Progress Since Last Session (Related to Symptoms / Goals / Homework):   Symptoms: Improving as evident by current phq9 scores    Homework: Completed in session review patient's utilization of safety plan.        Episode of Care Goals: Minimal progress - ACTION (Actively working towards change); Intervened by reinforcing change plan / affirming steps taken     Current / Ongoing Stressors and Concerns:  Patient reported  \"Trauma Adhd depres\". Patient reported \" I think I have had some trauma in my life, my liver transplant and I think have ADHD.\" " "Pt reported she is  \"overly sensitive\" and \" I dont handle stress very well and struggle with impulsivity\"  and that \"I have a poor self image, negative talk a lot \" patient also reported she lost her mother in 2019 and her dad 7 months ago and still struggling with grieving the lost of both parents.      Current: Today, patient reported that she continues to experience  neurovegetative symptoms of depression and shared that she has difficulty regulating her emotions. Patient noted a patten of avoidance when she feels uncomfortable with a situation or experience leading to feelings of isolation and loneliness. Patient reported she has a assessment for ADHD next week and hoping the results will help bring clarity to some of her behaviors and feelings.       Treatment Objective(s) Addressed in This Session:   Patient will increase daily activity level by exercising for 20-30 minutes and participate in at least 1 daily pleasant/fun activities.  Use safety plan as needed daily and practice mindfulness daily.  Patient will identify 2-3 volunteering opportunities and apply before next session.   Patient will identify specific areas of cognitive distortion and challenge irrational thoughts with reality.      Intervention:    DBT: Work with patient on a mindfulness exercise to help them learn to clarify what exactly they are feeling and learn to become more skilled in recognizing their emotions i.e., nature and quality as they experience them during the day so they can have more understanding and control of how their emotions affects their behavior and learn how to effectively self-regulate. Model process in session and work with patient on a 5-step process: Tuning in and identifying their current feeling,  drawing a picture of the emotion, writing an action suitable for the emotion, describing the intensity, and writing out the thought arising from the emotions. Handout provided.      Assessments completed prior to " visit:  PHQ9:       12/13/2023    11:54 AM 12/18/2023     5:09 PM 1/3/2024     2:10 PM 1/16/2024     2:15 PM 1/29/2024     2:15 PM 2/11/2024     9:26 AM 2/14/2024    12:14 PM   PHQ-9 SCORE   PHQ-9 Total Score MyChart 18 (Moderately severe depression) 18 (Moderately severe depression) 16 (Moderately severe depression) 15 (Moderately severe depression) 12 (Moderate depression) 13 (Moderate depression) 11 (Moderate depression)   PHQ-9 Total Score 18 18 16 15 12 13 11     GAD7:       11/14/2023     3:13 PM 11/28/2023     9:29 AM 12/13/2023    11:55 AM 1/3/2024     2:11 PM 1/29/2024     2:16 PM 2/11/2024     9:26 AM 2/14/2024    12:15 PM   JORGE-7 SCORE   Total Score 14 (moderate anxiety) 21 (severe anxiety) 21 (severe anxiety) 10 (moderate anxiety) 9 (mild anxiety)  14 (moderate anxiety)   Total Score 14    14 21 21 10 9 20 14    14    14         ASSESSMENT: Current Emotional / Mental Status (status of significant symptoms):   Risk status (Self / Other harm or suicidal ideation)   Patient denies current fears or concerns for personal safety.   Patient denies current or recent suicidal ideation or behaviors.   Patient denies current or recent homicidal ideation or behaviors.   Patient denies current or recent self injurious behavior or ideation.   Patient denies other safety concerns.   Patient reports there has been no change in risk factors since their last session.     Patient reports there has been no change in protective factors since their last session.     Recommended that patient call 911 or go to the local ED should there be a change in any of these risk factors.     Appearance:   Appropriate    Eye Contact:   Good    Psychomotor Behavior: Normal    Attitude:   Cooperative  Interested   Orientation:   Person Place Time Situation   Speech    Rate / Production: Normal/ Responsive    Volume:  Normal    Mood:    Anxious  Depressed  Grieving   Affect:    Tearful   Thought Content:  Clear    Thought Form:  Coherent   Logical    Insight:    Good      Medication Review:   No changes to current psychiatric medication(s)     Medication Compliance:   Yes     Changes in Health Issues:   None reported     Chemical Use Review:   Substance Use: Chemical use reviewed, no active concerns identified      Tobacco Use: No current tobacco use.      Diagnosis:  296.32 (F33.1) Major Depressive Disorder, Recurrent Episode, Moderate _ and With atypical features    Collateral Reports Completed:   Not Applicable    PLAN: (Patient Tasks / Therapist Tasks / Other)    Utilize safety plan as needed daily.      Practice mindfulness skill learned in session for 5-10 minutes daily.    Jake Coyne [facility]      ----- Service Performed and Documented by [facility]------  This note was reviewed and clinical supervision by ELMO Colmenares [facility]    2/15/2024      _________________________________________________________    Individual Treatment Plan    Patient's Name: Melita Cortes  YOB: 1970    Date of Creation: 09/22/2023  Date Treatment Plan Last Reviewed/Revised: 11/14/2023    DSM5 Diagnoses: 296.32 (F33.1) Major Depressive Disorder, Recurrent Episode, Moderate _ and With atypical features  Psychosocial / Contextual Factors:   PROMIS (reviewed every 90 days):     Referral / Collaboration:  Referral to another professional/service is not indicated at this time..    Anticipated number of session for this episode of care:  15-25  Anticipation frequency of session: Biweekly  Anticipated Duration of each session: 38-52 minutes  Treatment plan will be reviewed in 90 days or when goals have been changed.       MeasurableTreatment Goal(s) related to diagnosis / functional impairment(s)  Goal 1: Patient will identify and address specific triggers to feelings of depression and improve coping by  90 % in the next three months.    I will know I've met my goal when I am less impulsive, better communicator and can comfortably manage distressful  "emotions.         Objective #A (Patient Action)    Develop and utilize 3 effective distress tolerance strategies to address SI.   Status: Continued - Date(s): 02/15/2024     Intervention(s)   Therapist will engage in collaborative brainstorming with pt to identify and practice individualized distress tolerance coping strategies to address SI weekly.    Objective #B (Patient Action)    Patient will increase daily activity level by exercising for 20-30 minutes and participate in at least 1 daily pleasant/fun activities.  Status: Continued - Date(s):  02/15/2024    Intervention(s)  Therapist will provide psychoeducation, behavioral activation, and cognitive restructuring.    Objective #C  Patient will identify specific areas of cognitive distortion and challenge irrational thoughts with reality.   Status: Continued - Date(s):   02/15/2024       Intervention(s)  Therapist will provide psychoeducation, behavioral activation, and cognitive restructuring.    Objective #D  Patient will learn and practice relaxation techniques to manage depression.  Status: Continued - Date(s):   02/15/2024    Intervention(s)   Therapist will teach patient thought stopping, deep breathing exercises, mindful meditation, and creative visualization.        Patient has reviewed and agreed to the above plan.      Jake Coyne, JASWINDERSW  September 22, 2023          [facility]                                       Melita Cortes     SAFETY PLAN:  Step 1: Warning signs / cues (Thoughts, images, mood, situation, behavior) that a crisis may be developing:  Thoughts:   \" sometimes I feel like I don't have a purpose\"  Images:  Feels lonely after losing mum and dad and lonely in marriage  Thinking Processes: ruminations (can't stop thinking about my problems): health, family loses  Mood: worsening depression  Behaviors: isolating/withdrawing   Situations: loss: parents and lonely in marriage    Step 2: Coping strategies - Things I can do to " "take my mind off of my problems without contacting another person (relaxation technique, physical activity):  Distress Tolerance Strategies:  play with my pet , intense exercise: work out for 2-3 minutes , and support group attendance for transplant  Physical Activities: go for a walk and stretching   Focus on helpful thoughts:  \"This is temporary\" and \"It always passes\"  Step 3: People and social settings that provide distraction:   Name:  Augustine ( friend) Phone: [phone]   Name:  Aimee paniagua Phone: [phone]     gym  and [employer] store  Liver transplant group  Step 4: Remind myself of people and things that are important to me and worth living for:    My , cousins and friends.  Step 5: When I am in crisis, I can ask these people to help me use my safety plan:   Name:  Jake ( Therapist )  Phone: [phone]   Name:  Aimee paniagua Phone: [phone]   Step 6: Making the environment safe:   remove alcohol, remove drugs, and be around others  Step 7: Professionals or agencies I can contact during a crisis:  Suicide Prevention Lifeline: Call or Text 298   [facility]: [phone]    Call 911 or go to my nearest emergency department.   I helped develop this safety plan and agree to use it when needed.  I have been given a copy of this plan.      Client signature _________________________________________________________________  Today s date:  11/14/2023  Completed by Provider Name/ Credentials:  CECIL Vasquez  November 14, 2023  Adapted from Safety Plan Template 2008 Chelsi Marx and Fred Ramos is reprinted with the express permission of the authors.  No portion of the Safety Plan Template may be reproduced without the express, written permission.  You can contact the authors at [email or web address] or [email or web address].        Answers submitted by the patient for this visit:  Patient Health Questionnaire (Submitted on 11/14/2023)  If you checked off any problems, how " difficult have these problems made it for you to do your work, take care of things at home, or get along with other people?: Very difficult  PHQ9 TOTAL SCORE: 18  JORGE-7 (Submitted on 11/14/2023)  JORGE 7 TOTAL SCORE: 14    Answers submitted by the patient for this visit:  Patient Health Questionnaire (Submitted on 11/28/2023)  If you checked off any problems, how difficult have these problems made it for you to do your work, take care of things at home, or get along with other people?: Extremely difficult  PHQ9 TOTAL SCORE: 14  JORGE-7 (Submitted on 11/28/2023)  JORGE 7 TOTAL SCORE: 21    Answers submitted by the patient for this visit:  Patient Health Questionnaire (Submitted on 12/13/2023)  If you checked off any problems, how difficult have these problems made it for you to do your work, take care of things at home, or get along with other people?: Very difficult  PHQ9 TOTAL SCORE: 18  JORGE-7 (Submitted on 12/13/2023)  JORGE 7 TOTAL SCORE: 21    Answers submitted by the patient for this visit:  Patient Health Questionnaire (Submitted on 1/3/2024)  If you checked off any problems, how difficult have these problems made it for you to do your work, take care of things at home, or get along with other people?: Very difficult  PHQ9 TOTAL SCORE: 16  JORGE-7 (Submitted on 1/3/2024)  JORGE 7 TOTAL SCORE: 10    Answers submitted by the patient for this visit:  Patient Health Questionnaire (Submitted on 1/16/2024)  If you checked off any problems, how difficult have these problems made it for you to do your work, take care of things at home, or get along with other people?: Very difficult  PHQ9 TOTAL SCORE: 15    Answers submitted by the patient for this visit:  Patient Health Questionnaire (Submitted on 1/29/2024)  If you checked off any problems, how difficult have these problems made it for you to do your work, take care of things at home, or get along with other people?: Extremely difficult  PHQ9 TOTAL SCORE: 12  JORGE-7 (Submitted  on 1/29/2024)  JORGE 7 TOTAL SCORE: 9    Answers submitted by the patient for this visit:  Patient Health Questionnaire (Submitted on 2/14/2024)  If you checked off any problems, how difficult have these problems made it for you to do your work, take care of things at home, or get along with other people?: Extremely difficult  PHQ9 TOTAL SCORE: 11  JORGE-7 (Submitted on 2/14/2024)  JORGE 7 TOTAL SCORE: 14

## 2024-02-18 ASSESSMENT — PATIENT HEALTH QUESTIONNAIRE - PHQ9
10. IF YOU CHECKED OFF ANY PROBLEMS, HOW DIFFICULT HAVE THESE PROBLEMS MADE IT FOR YOU TO DO YOUR WORK, TAKE CARE OF THINGS AT HOME, OR GET ALONG WITH OTHER PEOPLE: VERY DIFFICULT
SUM OF ALL RESPONSES TO PHQ QUESTIONS 1-9: 10
SUM OF ALL RESPONSES TO PHQ QUESTIONS 1-9: 10

## 2024-02-19 ENCOUNTER — VIRTUAL VISIT (OUTPATIENT)
Dept: PSYCHOLOGY | Facility: CLINIC | Age: 54
End: 2024-02-19
Payer: COMMERCIAL

## 2024-02-19 DIAGNOSIS — F88 DEVELOPMENTAL MENTAL DISORDER: ICD-10-CM

## 2024-02-19 DIAGNOSIS — F33.1 MAJOR DEPRESSIVE DISORDER, RECURRENT EPISODE, MODERATE (H): ICD-10-CM

## 2024-02-19 DIAGNOSIS — F41.1 GENERALIZED ANXIETY DISORDER: Primary | ICD-10-CM

## 2024-02-19 PROCEDURE — 90834 PSYTX W PT 45 MINUTES: CPT | Mod: 95 | Performed by: PSYCHOLOGIST

## 2024-02-19 NOTE — PROGRESS NOTES
[facility]   Mental Health & Addiction Services    Progress Note    Patient Name: Melita Cortes  Date: 2024       Service Type:  Second ADHD testing appointment      Session Start Time: 10:00am  Session End Time:  10:47am     Session Length: 47 minutes    Session #: 2    Attendees: Client attended alone    Service Modality:  Video Visit:      Provider verified identity through the following two step process.  Patient provided:  Patient  and Patient address    Telemedicine Visit: The patient's condition can be safely assessed and treated via synchronous audio and visual telemedicine encounter.      Reason for Telemedicine Visit: Services only offered telehealth    Originating Site (Patient Location): Patient's home    Distant Site (Provider Location): Provider Remote Setting- Home Office    Consent:  The patient/guardian has verbally consented to: the potential risks and benefits of telemedicine (video visit) versus in person care; bill my insurance or make self-payment for services provided; and responsibility for payment of non-covered services.     Patient would like the video invitation sent by:  Send to e-mail at: [email or web address]    Mode of Communication:  Video Conference via Amwell    Distant Location (Provider):  Off-site    As the provider I attest to compliance with applicable laws and regulations related to telemedicine.      DATA  Interactive Complexity: No  Crisis: No       Progress Since Last Session (Related to Symptoms / Goals / Homework):   Symptoms:  Stable, no change.    Homework: Patient completed CNS Vital Signs.     Episode of Care Goals: Satisfactory progress - ACTION (Actively working towards change); Intervened by reinforcing change plan / affirming steps taken     Current / Ongoing Stressors and Concerns:   Discussed manifestations of attention and concentration problems in various areas of her life.                   Treatment Objective(s) Addressed in This Session:          Continued with testing process - patient will complete Makayla questionnaires before feedback.                 Intervention:              Continued information gathering specific to ADHD and mood symptoms. Briefly reviewed Makayla questionnaires - there were some issues with these and tried to sort out those problems. Ended session by discussing feedbacks process.   CBT: socratic questioning, positive reinforcement  EFT: empathetic attunement, emotion checking  MI: open ended questions, affirmations, reflections    Assessments completed prior to visit:  The following assessments were completed by patient for this visit:  PHQ9:       12/18/2023     5:09 PM 1/3/2024     2:10 PM 1/16/2024     2:15 PM 1/29/2024     2:15 PM 2/11/2024     9:26 AM 2/14/2024    12:14 PM 2/18/2024    10:41 AM   PHQ-9 SCORE   PHQ-9 Total Score MyChart 18 (Moderately severe depression) 16 (Moderately severe depression) 15 (Moderately severe depression) 12 (Moderate depression) 13 (Moderate depression) 11 (Moderate depression) 10 (Moderate depression)   PHQ-9 Total Score 18 16 15 12 13 11 10     GAD7:       11/14/2023     3:13 PM 11/28/2023     9:29 AM 12/13/2023    11:55 AM 1/3/2024     2:11 PM 1/29/2024     2:16 PM 2/11/2024     9:26 AM 2/14/2024    12:15 PM   JORGE-7 SCORE   Total Score 14 (moderate anxiety) 21 (severe anxiety) 21 (severe anxiety) 10 (moderate anxiety) 9 (mild anxiety)  14 (moderate anxiety)   Total Score 14    14 21 21 10 9 20 14    14    14     CAGE-AID:       6/17/2022     9:00 AM 6/6/2023     1:00 PM   CAGE-AID Total Score   Total Score 0    Total Score MyChart 0 (A total score of 2 or greater is considered clinically significant)        Information is confidential and restricted. Go to Review Flowsheets to unlock data.     PROMIS 10-Global Health (only subscores and total score):       6/6/2023     1:00 PM 9/11/2023    12:09 PM 1/3/2024     2:13 PM 1/16/2024      2:17 PM 1/29/2024     2:16 PM 2/5/2024     9:23 AM 2/14/2024    12:16 PM   PROMIS-10 Scores Only   Global Mental Health Score  9 6 7 6 7 8    8    8   Global Physical Health Score  9 11 11 13 13 13    13    13   PROMIS TOTAL - SUBSCORES  18 17 18 19 20 21    21    21       Information is confidential and restricted. Go to Review Flowsheets to unlock data.    Multiple values from one day are sorted in reverse-chronological order     [place] Suicide Severity Rating Scale (Lifetime/Recent)      6/17/2022     9:57 AM 6/6/2023     1:00 PM   [place] Suicide Severity Rating (Lifetime/Recent)   Q1 Wish to be Dead (Lifetime) N Y   1. Wish to be Dead (Past 1 Month)  N   Q2 Non-Specific Active Suicidal Thoughts (Lifetime) N Y   2. Non-Specific Active Suicidal Thoughts (Past 1 Month)  N   3. Active Suicidal Ideation with any Methods (Not Plan) Without Intent to Act (Lifetime)  N   Q4 Active Suicidal Ideation with Some Intent to Act, Without Specific Plan (Lifetime)  N   Q5 Active Suicidal Ideation with Specific Plan and Intent (Lifetime)  N   Most Severe Ideation Rating (Lifetime)  2   Most Severe Ideation Rating (Past 1 Month)  1   Frequency (Lifetime)  1   Frequency (Past 1 Month)  1   Duration (Lifetime)  1   Duration (Past 1 Month)  1   Controllability (Lifetime)  2   Controllability (Past 1 Month)  2   Deterrents (Lifetime)  1   Deterrents (Past 1 Month)  1   Reasons for Ideation (Lifetime)  4   Reasons for Ideation (Past 1 Month)  4   Actual Attempt (Lifetime) N N   Has subject engaged in non-suicidal self-injurious behavior? (Lifetime) N N   Interrupted Attempts (Lifetime) N N   Aborted or Self-Interrupted Attempt (Lifetime) N N   Preparatory Acts or Behavior (Lifetime) N N   Calculated C-SSRS Risk Score (Lifetime/Recent) No Risk Indicated No Risk Indicated        ASSESSMENT: Current Emotional / Mental Status (status of significant symptoms):   Risk status (Self / Other harm or suicidal ideation)   Patient denies  current fears or concerns for personal safety.   Patient denies current or recent suicidal ideation or behaviors.   Patient denies current or recent homicidal ideation or behaviors.   Patient denies current or recent self injurious behavior or ideation.   Patient denies other safety concerns.   Patient reports there has been no change in risk factors since their last session.     Patient reports there has been no change in protective factors since their last session.     Recommended that patient call 911 or go to the local ED should there be a change in any of these risk factors.     Appearance:   Appropriate    Eye Contact:   Good    Psychomotor Behavior: Normal    Attitude:   Cooperative    Orientation:   All   Speech    Rate / Production: Normal     Volume:  Normal    Mood:    Anxious  Depressed    Affect:    Appropriate  Tearful   Thought Content:  Clear    Thought Form:  Coherent  Logical    Insight:    Good      Medication Review:   No changes to current psychiatric medication(s)     Medication Compliance:   Yes     Changes in Health Issues:   None reported     Chemical Use Review:   Substance Use: Chemical use reviewed, no active concerns identified      Nicotine Use: No current tobacco use.      Diagnosis:  1. Generalized anxiety disorder    2. Major depressive disorder, recurrent episode, moderate (H)    3. Developmental mental disorder        Collateral Reports Completed:   Received a portion of Makayla questionnaire from .      PLAN:   Patient will complete Makayla questionnaires before feedback session is scheduled. My contact information was provided.  Patient was in agreement to this plan.      Irma Rojas PsyD, LP  Clinical Psychologist

## 2024-02-23 ENCOUNTER — LAB (OUTPATIENT)
Dept: LAB | Facility: CLINIC | Age: 54
End: 2024-02-23
Payer: COMMERCIAL

## 2024-02-23 DIAGNOSIS — Z94.4 LIVER REPLACED BY TRANSPLANT (H): ICD-10-CM

## 2024-02-23 LAB
ALBUMIN SERPL BCG-MCNC: 4 G/DL (ref 3.5–5.2)
ALP SERPL-CCNC: 72 U/L (ref 40–150)
ALT SERPL W P-5'-P-CCNC: 23 U/L (ref 0–50)
ANION GAP SERPL CALCULATED.3IONS-SCNC: 9 MMOL/L (ref 7–15)
AST SERPL W P-5'-P-CCNC: 26 U/L (ref 0–45)
BILIRUB DIRECT SERPL-MCNC: <0.2 MG/DL (ref 0–0.3)
BILIRUB SERPL-MCNC: 0.3 MG/DL
BUN SERPL-MCNC: 18.9 MG/DL (ref 6–20)
CALCIUM SERPL-MCNC: 8.8 MG/DL (ref 8.6–10)
CHLORIDE SERPL-SCNC: 110 MMOL/L (ref 98–107)
CREAT SERPL-MCNC: 1.24 MG/DL (ref 0.51–0.95)
DEPRECATED HCO3 PLAS-SCNC: 25 MMOL/L (ref 22–29)
EGFRCR SERPLBLD CKD-EPI 2021: 52 ML/MIN/1.73M2
ERYTHROCYTE [DISTWIDTH] IN BLOOD BY AUTOMATED COUNT: 13.4 % (ref 10–15)
GLUCOSE SERPL-MCNC: 114 MG/DL (ref 70–99)
HCT VFR BLD AUTO: 41.9 % (ref 35–47)
HGB BLD-MCNC: 14.3 G/DL (ref 11.7–15.7)
MCH RBC QN AUTO: 28.5 PG (ref 26.5–33)
MCHC RBC AUTO-ENTMCNC: 34.1 G/DL (ref 31.5–36.5)
MCV RBC AUTO: 84 FL (ref 78–100)
PLATELET # BLD AUTO: 197 10E3/UL (ref 150–450)
POTASSIUM SERPL-SCNC: 4.2 MMOL/L (ref 3.4–5.3)
PROT SERPL-MCNC: 6.1 G/DL (ref 6.4–8.3)
RBC # BLD AUTO: 5.02 10E6/UL (ref 3.8–5.2)
SODIUM SERPL-SCNC: 144 MMOL/L (ref 135–145)
TACROLIMUS BLD-MCNC: 11.2 UG/L (ref 5–15)
TME LAST DOSE: NORMAL H
TME LAST DOSE: NORMAL H
WBC # BLD AUTO: 4.7 10E3/UL (ref 4–11)

## 2024-02-23 PROCEDURE — 82248 BILIRUBIN DIRECT: CPT

## 2024-02-23 PROCEDURE — 85027 COMPLETE CBC AUTOMATED: CPT

## 2024-02-23 PROCEDURE — 80053 COMPREHEN METABOLIC PANEL: CPT

## 2024-02-23 PROCEDURE — 36415 COLL VENOUS BLD VENIPUNCTURE: CPT

## 2024-02-23 PROCEDURE — 80197 ASSAY OF TACROLIMUS: CPT

## 2024-02-26 DIAGNOSIS — Z94.4 LIVER REPLACED BY TRANSPLANT (H): Primary | ICD-10-CM

## 2024-02-26 NOTE — TELEPHONE ENCOUNTER
ISSUE:   Tacrolimus IR level 11.2 on 2/23, goal 3-5, dose 1 mg BID.    PLAN:   Call Patientand confirm this was an accurate 12-hour trough.   Verify Tacrolimus IR dose 1 mg BID.   Confirm no new medications or illness.   Confirm no missed doses.   If accurate trough and accurate dose, decrease Tacrolimus IR dose to 0.5 mg BID     Is this more than a 50% increase or decrease in current IS dose: Yes  If YES, justification: 1    Repeat labs in 1 week  *If > 50% change in immunosuppression dose, repeat labs in 1 week.     OUTCOME:   Spoke with Patient, they confirm accurate trough level and current dose 1 mg BID.   Patientconfirmed dose change to 0.5 mg BID.  Patientagreed to repeat labs in 1 weeks.   Orders sent to preferred pharmacy for dose change and lab for repeat labs.   Patientvoiced understanding of plan.     Soumya Wren LPN

## 2024-02-27 RX ORDER — TACROLIMUS 0.5 MG/1
0.5 CAPSULE ORAL EVERY 12 HOURS
Qty: 180 CAPSULE | Refills: 3 | Status: SHIPPED | OUTPATIENT
Start: 2024-02-27 | End: 2024-03-18

## 2024-02-28 ENCOUNTER — VIRTUAL VISIT (OUTPATIENT)
Dept: PSYCHOLOGY | Facility: CLINIC | Age: 54
End: 2024-02-28
Payer: COMMERCIAL

## 2024-02-28 DIAGNOSIS — F32.1 CURRENT MODERATE EPISODE OF MAJOR DEPRESSIVE DISORDER, UNSPECIFIED WHETHER RECURRENT (H): Primary | ICD-10-CM

## 2024-02-28 PROCEDURE — 90834 PSYTX W PT 45 MINUTES: CPT | Mod: 95 | Performed by: STUDENT IN AN ORGANIZED HEALTH CARE EDUCATION/TRAINING PROGRAM

## 2024-02-28 ASSESSMENT — PATIENT HEALTH QUESTIONNAIRE - PHQ9
10. IF YOU CHECKED OFF ANY PROBLEMS, HOW DIFFICULT HAVE THESE PROBLEMS MADE IT FOR YOU TO DO YOUR WORK, TAKE CARE OF THINGS AT HOME, OR GET ALONG WITH OTHER PEOPLE: EXTREMELY DIFFICULT
SUM OF ALL RESPONSES TO PHQ QUESTIONS 1-9: 16
SUM OF ALL RESPONSES TO PHQ QUESTIONS 1-9: 16

## 2024-02-28 NOTE — PROGRESS NOTES
"[facility]                                     Progress Note    Patient Name: Melita Cortes  Date: 02/28/2024         Service Type: Individual      Session Start Time: 12.30  Session End Time: 1.10     Session Length: 38-52    Session #: 12    Attendees: Client attended alone    Service Modality:  Video Visit:      Provider verified identity through the following two step process.  Patient provided:  Patient is known previously to provider    Telemedicine Visit: The patient's condition can be safely assessed and treated via synchronous audio and visual telemedicine encounter.      Reason for Telemedicine Visit: Patient has requested telehealth visit    Originating Site (Patient Location): Patient's home    Distant Site (Provider Location): Provider Remote Setting- Home Office    Consent:  The patient/guardian has verbally consented to: the potential risks and benefits of telemedicine (video visit) versus in person care; bill my insurance or make self-payment for services provided; and responsibility for payment of non-covered services.     Patient would like the video invitation sent by:  My Chart    Mode of Communication:  Video Conference via [employer]well    Distant Location (Provider):  Off-site    As the provider I attest to compliance with applicable laws and regulations related to telemedicine.    DATA  Interactive Complexity: No  Crisis: No        Progress Since Last Session (Related to Symptoms / Goals / Homework):   Symptoms: Improving as evident by current phq9 scores    Homework: Completed in session review patient's utilization of safety plan.        Episode of Care Goals: Minimal progress - ACTION (Actively working towards change); Intervened by reinforcing change plan / affirming steps taken     Current / Ongoing Stressors and Concerns:  Patient reported  \"Trauma Adhd depres\". Patient reported \" I think I have had some trauma in my life, my liver transplant and I think have " "ADHD.\" Pt reported she is  \"overly sensitive\" and \" I dont handle stress very well and struggle with impulsivity\"  and that \"I have a poor self image, negative talk a lot \" patient also reported she lost her mother in 2019 and her dad 7 months ago and still struggling with grieving the lost of both parents.      Current: Today, patient reported that she continues to experience  neurovegetative symptoms of depression and shared that she has difficulty regulating her emotions. Patient noted she feels hurt and frustrated thinking about her  parents and how poorly she was treated by them and how she currently feels \"inadequate, overwhelmed and a weirdo\". Patient shared that she has noticed a patten of reactivity when she feels treated poorly by her  and does not like to respond emotionally to perceived negative experiences as she has been doing.       Treatment Objective(s) Addressed in This Session:   Patient will increase daily activity level by exercising for 20-30 minutes and participate in at least 1 daily pleasant/fun activities.  Use safety plan as needed daily and practice mindfulness daily.  Patient will identify 2-3 volunteering opportunities and apply before next session.   Patient will identify specific areas of cognitive distortion and challenge irrational thoughts with reality.      Intervention:    DBT: Discussion today about Freya Camilo mindfulness exercise RAIN- Recognize, Allow, Investigate and Natural Awareness to help client deal with feelings of insecurity and unworthiness. Model process in session with client by encouraging client to begin by openly and directly connect with their feelings of vulnerability. Couche client to R- recognize and consciously acknowledge the thoughts, feelings and behavior affecting them. A- allow the thoughts, emotions, and sensations to just be there without spontaneously reacting to their presence but create a space within the experience that " allow patient to pause look deeply within before  responding and I-investigate their experience with gentle curiosity and kindness by asking themselves questions like  what does this feeling wants from me? Or how am I experiencing this in my body? and then naturally attend to their experience with love and kindness without identifying with the emotion as   me or mine .       Assessments completed prior to visit:  PHQ9:       1/16/2024     2:15 PM 1/29/2024     2:15 PM 2/11/2024     9:26 AM 2/14/2024    12:14 PM 2/18/2024    10:41 AM 2/20/2024    12:41 PM 2/28/2024    12:07 PM   PHQ-9 SCORE   PHQ-9 Total Score MyChart 15 (Moderately severe depression) 12 (Moderate depression) 13 (Moderate depression) 11 (Moderate depression) 10 (Moderate depression) 12 (Moderate depression) 16 (Moderately severe depression)   PHQ-9 Total Score 15 12 13 11 10 12 16     GAD7:       11/14/2023     3:13 PM 11/28/2023     9:29 AM 12/13/2023    11:55 AM 1/3/2024     2:11 PM 1/29/2024     2:16 PM 2/11/2024     9:26 AM 2/14/2024    12:15 PM   JORGE-7 SCORE   Total Score 14 (moderate anxiety) 21 (severe anxiety) 21 (severe anxiety) 10 (moderate anxiety) 9 (mild anxiety)  14 (moderate anxiety)   Total Score 14    14 21 21 10 9 20 14    14    14         ASSESSMENT: Current Emotional / Mental Status (status of significant symptoms):   Risk status (Self / Other harm or suicidal ideation)   Patient denies current fears or concerns for personal safety.   Patient denies current or recent suicidal ideation or behaviors.   Patient denies current or recent homicidal ideation or behaviors.   Patient denies current or recent self injurious behavior or ideation.   Patient denies other safety concerns.   Patient reports there has been no change in risk factors since their last session.     Patient reports there has been no change in protective factors since their last session.     Recommended that patient call 911 or go to the local ED should there be a  change in any of these risk factors.     Appearance:   Appropriate    Eye Contact:   Good    Psychomotor Behavior: Normal    Attitude:   Cooperative  Interested   Orientation:   Person Place Time Situation   Speech    Rate / Production: Normal/ Responsive    Volume:  Normal    Mood:    Anxious  Depressed  Grieving   Affect:    Tearful   Thought Content:  Clear    Thought Form:  Coherent  Logical    Insight:    Good      Medication Review:   No changes to current psychiatric medication(s)     Medication Compliance:   Yes     Changes in Health Issues:   None reported     Chemical Use Review:   Substance Use: Chemical use reviewed, no active concerns identified      Tobacco Use: No current tobacco use.      Diagnosis:  296.32 (F33.1) Major Depressive Disorder, Recurrent Episode, Moderate _ and With atypical features    Collateral Reports Completed:   Not Applicable    PLAN: (Patient Tasks / Therapist Tasks / Other)    Utilize safety plan as needed daily.      Practice mindfulness skill learned in session for 5-10 minutes daily.    CECIL Vasquez      ----- Service Performed and Documented by CECIL------  This note was reviewed and clinical supervision by ELMO Colmenares [facility]    3/4/2024      _________________________________________________________    Individual Treatment Plan    Patient's Name: Melita Cortes  YOB: 1970    Date of Creation: 09/22/2023  Date Treatment Plan Last Reviewed/Revised: 02/28/2024    DSM5 Diagnoses: 296.32 (F33.1) Major Depressive Disorder, Recurrent Episode, Moderate _ and With atypical features  Psychosocial / Contextual Factors:   PROMIS (reviewed every 90 days):     Referral / Collaboration:  Referral to another professional/service is not indicated at this time..    Anticipated number of session for this episode of care:  15-25  Anticipation frequency of session: Biweekly  Anticipated Duration of each session: 38-52 minutes  Treatment plan will be reviewed in 90  days or when goals have been changed.       MeasurableTreatment Goal(s) related to diagnosis / functional impairment(s)  Goal 1: Patient will identify and address specific triggers to feelings of depression and improve coping by  90 % in the next three months.    I will know I've met my goal when I am less impulsive, better communicator and can comfortably manage distressful emotions.         Objective #A (Patient Action)    Develop and utilize 3 effective distress tolerance strategies to address SI.   Status: Continued - Date(s): 05/29/2024     Intervention(s)   Therapist will engage in collaborative brainstorming with pt to identify and practice individualized distress tolerance coping strategies to address SI weekly.    Objective #B (Patient Action)    Patient will increase daily activity level by exercising for 20-30 minutes and participate in at least 1 daily pleasant/fun activities.  Status: Continued - Date(s):  05/29/2024    Intervention(s)  Therapist will provide psychoeducation, behavioral activation, and cognitive restructuring.    Objective #C  Patient will identify specific areas of cognitive distortion and challenge irrational thoughts with reality.   Status: Continued - Date(s):   05/29/2024       Intervention(s)  Therapist will provide psychoeducation, behavioral activation, and cognitive restructuring.    Objective #D  Patient will learn and practice relaxation techniques to manage depression.  Status: Continued - Date(s):   05/29/2024    Intervention(s)   Therapist will teach patient thought stopping, deep breathing exercises, mindful meditation, and creative visualization.        Patient has reviewed and agreed to the above plan.      CECIL Vasquez  September 22, 2023          [facility] Matthew Cortes     SAFETY PLAN:  Step 1: Warning signs / cues (Thoughts, images, mood, situation, behavior) that a crisis may be developing:  Thoughts:   " \" sometimes I feel like I don't have a purpose\"  Images:  Feels lonely after losing mum and dad and lonely in marriage  Thinking Processes: ruminations (can't stop thinking about my problems): health, family loses  Mood: worsening depression  Behaviors: isolating/withdrawing   Situations: loss: parents and lonely in marriage    Step 2: Coping strategies - Things I can do to take my mind off of my problems without contacting another person (relaxation technique, physical activity):  Distress Tolerance Strategies:  play with my pet , intense exercise: work out for 2-3 minutes , and support group attendance for transplant  Physical Activities: go for a walk and stretching   Focus on helpful thoughts:  \"This is temporary\" and \"It always passes\"  Step 3: People and social settings that provide distraction:   Name:  Augustine ( friend) Phone: [phone]   Name:  Aimee paniagua Phone: [phone]     gym  and [employer] store  Liver transplant group  Step 4: Remind myself of people and things that are important to me and worth living for:    My , cousins and friends.  Step 5: When I am in crisis, I can ask these people to help me use my safety plan:   Name:  Jake ( Therapist )  Phone: [phone]   Name:  Aimee paniagua Phone: [phone]   Step 6: Making the environment safe:   remove alcohol, remove drugs, and be around others  Step 7: Professionals or agencies I can contact during a crisis:  Suicide Prevention Lifeline: Call or Text 649   [facility] Crisis [facility]: [phone]    Call 911 or go to my nearest emergency department.   I helped develop this safety plan and agree to use it when needed.  I have been given a copy of this plan.      Client signature _________________________________________________________________  Today s date:  11/14/2023  Completed by Provider Name/ Credentials:  CECIL Vasquez  November 14, 2023  Adapted from Safety Plan Template 2008 Chelsi Marx and Fred Ramos is reprinted " with the express permission of the authors.  No portion of the Safety Plan Template may be reproduced without the express, written permission.  You can contact the authors at bhs@[facility] or [email or web address].        Answers submitted by the patient for this visit:  Patient Health Questionnaire (Submitted on 11/14/2023)  If you checked off any problems, how difficult have these problems made it for you to do your work, take care of things at home, or get along with other people?: Very difficult  PHQ9 TOTAL SCORE: 18  JORGE-7 (Submitted on 11/14/2023)  JORGE 7 TOTAL SCORE: 14    Answers submitted by the patient for this visit:  Patient Health Questionnaire (Submitted on 11/28/2023)  If you checked off any problems, how difficult have these problems made it for you to do your work, take care of things at home, or get along with other people?: Extremely difficult  PHQ9 TOTAL SCORE: 14  JORGE-7 (Submitted on 11/28/2023)  JORGE 7 TOTAL SCORE: 21    Answers submitted by the patient for this visit:  Patient Health Questionnaire (Submitted on 12/13/2023)  If you checked off any problems, how difficult have these problems made it for you to do your work, take care of things at home, or get along with other people?: Very difficult  PHQ9 TOTAL SCORE: 18  JORGE-7 (Submitted on 12/13/2023)  JORGE 7 TOTAL SCORE: 21    Answers submitted by the patient for this visit:  Patient Health Questionnaire (Submitted on 1/3/2024)  If you checked off any problems, how difficult have these problems made it for you to do your work, take care of things at home, or get along with other people?: Very difficult  PHQ9 TOTAL SCORE: 16  JORGE-7 (Submitted on 1/3/2024)  JORGE 7 TOTAL SCORE: 10    Answers submitted by the patient for this visit:  Patient Health Questionnaire (Submitted on 1/16/2024)  If you checked off any problems, how difficult have these problems made it for you to do your work, take care of things at home, or get along with other  people?: Very difficult  PHQ9 TOTAL SCORE: 15    Answers submitted by the patient for this visit:  Patient Health Questionnaire (Submitted on 1/29/2024)  If you checked off any problems, how difficult have these problems made it for you to do your work, take care of things at home, or get along with other people?: Extremely difficult  PHQ9 TOTAL SCORE: 12  JORGE-7 (Submitted on 1/29/2024)  JORGE 7 TOTAL SCORE: 9    Answers submitted by the patient for this visit:  Patient Health Questionnaire (Submitted on 2/14/2024)  If you checked off any problems, how difficult have these problems made it for you to do your work, take care of things at home, or get along with other people?: Extremely difficult  PHQ9 TOTAL SCORE: 11  JORGE-7 (Submitted on 2/14/2024)  JORGE 7 TOTAL SCORE: 14    Answers submitted by the patient for this visit:  Patient Health Questionnaire (Submitted on 2/28/2024)  If you checked off any problems, how difficult have these problems made it for you to do your work, take care of things at home, or get along with other people?: Extremely difficult  PHQ9 TOTAL SCORE: 16

## 2024-03-07 ENCOUNTER — MYC MEDICAL ADVICE (OUTPATIENT)
Dept: FAMILY MEDICINE | Facility: CLINIC | Age: 54
End: 2024-03-07
Payer: MEDICARE

## 2024-03-07 NOTE — TELEPHONE ENCOUNTER
Triage protocol used: DEPRESSION-A-EARL Ramesy RN  [facility] Primary Care Triage

## 2024-03-11 ENCOUNTER — VIRTUAL VISIT (OUTPATIENT)
Dept: PSYCHOLOGY | Facility: CLINIC | Age: 54
End: 2024-03-11
Payer: MEDICARE

## 2024-03-11 DIAGNOSIS — F32.1 CURRENT MODERATE EPISODE OF MAJOR DEPRESSIVE DISORDER, UNSPECIFIED WHETHER RECURRENT (H): Primary | ICD-10-CM

## 2024-03-11 PROCEDURE — 90834 PSYTX W PT 45 MINUTES: CPT | Mod: 95 | Performed by: STUDENT IN AN ORGANIZED HEALTH CARE EDUCATION/TRAINING PROGRAM

## 2024-03-11 ASSESSMENT — PATIENT HEALTH QUESTIONNAIRE - PHQ9
SUM OF ALL RESPONSES TO PHQ QUESTIONS 1-9: 10
SUM OF ALL RESPONSES TO PHQ QUESTIONS 1-9: 10
10. IF YOU CHECKED OFF ANY PROBLEMS, HOW DIFFICULT HAVE THESE PROBLEMS MADE IT FOR YOU TO DO YOUR WORK, TAKE CARE OF THINGS AT HOME, OR GET ALONG WITH OTHER PEOPLE: VERY DIFFICULT

## 2024-03-11 NOTE — PROGRESS NOTES
"        [facility]                                     Progress Note    Patient Name: Melita Cortes  Date: 03/12/2024         Service Type: Individual      Session Start Time: 11.00  Session End Time: 11.42     Session Length: 38-52    Session #: 13    Attendees: Client attended alone    Service Modality:  Video Visit:      Provider verified identity through the following two step process.  Patient provided:  Patient is known previously to provider    Telemedicine Visit: The patient's condition can be safely assessed and treated via synchronous audio and visual telemedicine encounter.      Reason for Telemedicine Visit: Patient has requested telehealth visit    Originating Site (Patient Location): Patient's home    Distant Site (Provider Location): [facility]ENT[facility]    Consent:  The patient/guardian has verbally consented to: the potential risks and benefits of telemedicine (video visit) versus in person care; bill my insurance or make self-payment for services provided; and responsibility for payment of non-covered services.     Patient would like the video invitation sent by:  My Chart    Mode of Communication:  Video Conference via Amwell    Distant Location (Provider):  Off-site    As the provider I attest to compliance with applicable laws and regulations related to telemedicine.    DATA  Interactive Complexity: No  Crisis: No        Progress Since Last Session (Related to Symptoms / Goals / Homework):   Symptoms: Improving as evident by current phq9 scores    Homework: Completed in session review patient's utilization of safety plan.        Episode of Care Goals: Minimal progress - ACTION (Actively working towards change); Intervened by reinforcing change plan / affirming steps taken     Current / Ongoing Stressors and Concerns:  Patient reported  \"Trauma Adhd depres\". Patient reported \" I think I have had some trauma in my life, my " "liver transplant and I think have ADHD.\" Pt reported she is  \"overly sensitive\" and \" I dont handle stress very well and struggle with impulsivity\"  and that \"I have a poor self image, negative talk a lot \" patient also reported she lost her mother in 2019 and her dad 7 months ago and still struggling with grieving the lost of both parents.      Current: Today, patient shared that she has been worried about her finances if she grows old and applied to work at her former company as an  but her application was rejected. Patient noted she got a closure that she does not have the ability to work likes he use to and that  she was also uncertain about her ability to be back to work after her transplant. Patient noted an ambivalence of emotions and shared that it also felt like a rejection when her application was not accepted. Patient shared she sometimes feel guilty that she is not happy with her life after receiving a liver transplant and given another opportunity to live and that she continue to experience feelings of depression daily.       Treatment Objective(s) Addressed in This Session:   Patient will increase daily activity level by exercising for 20-30 minutes and participate in at least 1 daily pleasant/fun activities.  Use safety plan as needed daily and practice mindfulness daily.   Patient will identify specific areas of cognitive distortion and challenge irrational thoughts with reality.      Intervention:    DBT: Work today with patient on a mental noting mindfulness exercise to help them become more aware of their thoughts and emotions as an objective observer and help to mitigate the frequency of being caught up and controlled by their automatic thoughts. Walk patient through the process beginning with just noticing their thoughts and writing it down using the phrase  I am thinking about . . . X  followed by noticing experiences which includes their thoughts, observations, emotions, and " physical sensations. Model process in session beginning with the phrase  I am thinking about . . .X, I feel the emotion . . . Y. and notice the sensation . . .Z.       Assessments completed prior to visit:  PHQ9:       1/29/2024     2:15 PM 2/11/2024     9:26 AM 2/14/2024    12:14 PM 2/18/2024    10:41 AM 2/20/2024    12:41 PM 2/28/2024    12:07 PM 3/11/2024    10:21 AM   PHQ-9 SCORE   PHQ-9 Total Score MyChart 12 (Moderate depression) 13 (Moderate depression) 11 (Moderate depression) 10 (Moderate depression) 12 (Moderate depression) 16 (Moderately severe depression) 10 (Moderate depression)   PHQ-9 Total Score 12 13 11 10 12 16 10     GAD7:       11/14/2023     3:13 PM 11/28/2023     9:29 AM 12/13/2023    11:55 AM 1/3/2024     2:11 PM 1/29/2024     2:16 PM 2/11/2024     9:26 AM 2/14/2024    12:15 PM   JORGE-7 SCORE   Total Score 14 (moderate anxiety) 21 (severe anxiety) 21 (severe anxiety) 10 (moderate anxiety) 9 (mild anxiety)  14 (moderate anxiety)   Total Score 14    14 21 21 10 9 20 14    14    14         ASSESSMENT: Current Emotional / Mental Status (status of significant symptoms):   Risk status (Self / Other harm or suicidal ideation)   Patient denies current fears or concerns for personal safety.   Patient denies current or recent suicidal ideation or behaviors.   Patient denies current or recent homicidal ideation or behaviors.   Patient denies current or recent self injurious behavior or ideation.   Patient denies other safety concerns.   Patient reports there has been no change in risk factors since their last session.     Patient reports there has been no change in protective factors since their last session.     Recommended that patient call 911 or go to the local ED should there be a change in any of these risk factors.     Appearance:   Appropriate    Eye Contact:   Good    Psychomotor Behavior: Normal    Attitude:   Cooperative  Interested   Orientation:   Person Place Time Situation   Speech    Rate  / Production: Normal/ Responsive    Volume:  Normal    Mood:    Anxious  Depressed  Grieving   Affect:    Tearful   Thought Content:  Clear    Thought Form:  Coherent  Logical    Insight:    Good      Medication Review:   No changes to current psychiatric medication(s)     Medication Compliance:   Yes     Changes in Health Issues:   None reported     Chemical Use Review:   Substance Use: Chemical use reviewed, no active concerns identified      Tobacco Use: No current tobacco use.      Diagnosis:  296.32 (F33.1) Major Depressive Disorder, Recurrent Episode, Moderate _ and With atypical features    Collateral Reports Completed:   Not Applicable    PLAN: (Patient Tasks / Therapist Tasks / Other)    Utilize safety plan as needed daily.      Practice mindfulness skill learned in session for 5-10 minutes daily.    Jake Coyne CAL    ----- Service Performed and Documented by CECIL------  This note was reviewed and clinical supervision by ELMO [facility]    3/11/2024    _________________________________________________________    Individual Treatment Plan    Patient's Name: Melita Cortes  YOB: 1970    Date of Creation: 09/22/2023  Date Treatment Plan Last Reviewed/Revised: 02/28/2024    DSM5 Diagnoses: 296.32 (F33.1) Major Depressive Disorder, Recurrent Episode, Moderate _ and With atypical features  Psychosocial / Contextual Factors:   PROMIS (reviewed every 90 days):     Referral / Collaboration:  Referral to another professional/service is not indicated at this time..    Anticipated number of session for this episode of care:  15-25  Anticipation frequency of session: Biweekly  Anticipated Duration of each session: 38-52 minutes  Treatment plan will be reviewed in 90 days or when goals have been changed.       MeasurableTreatment Goal(s) related to diagnosis / functional impairment(s)  Goal 1: Patient will identify and address specific triggers to feelings of depression and improve coping  "by  90 % in the next three months.    I will know I've met my goal when I am less impulsive, better communicator and can comfortably manage distressful emotions.         Objective #A (Patient Action)    Develop and utilize 3 effective distress tolerance strategies to address SI.   Status: Continued - Date(s): 05/29/2024     Intervention(s)   Therapist will engage in collaborative brainstorming with pt to identify and practice individualized distress tolerance coping strategies to address SI weekly.    Objective #B (Patient Action)    Patient will increase daily activity level by exercising for 20-30 minutes and participate in at least 1 daily pleasant/fun activities.  Status: Continued - Date(s):  05/29/2024    Intervention(s)  Therapist will provide psychoeducation, behavioral activation, and cognitive restructuring.    Objective #C  Patient will identify specific areas of cognitive distortion and challenge irrational thoughts with reality.   Status: Continued - Date(s):   05/29/2024       Intervention(s)  Therapist will provide psychoeducation, behavioral activation, and cognitive restructuring.    Objective #D  Patient will learn and practice relaxation techniques to manage depression.  Status: Continued - Date(s):   05/29/2024    Intervention(s)   Therapist will teach patient thought stopping, deep breathing exercises, mindful meditation, and creative visualization.        Patient has reviewed and agreed to the above plan.      CECIL aVsquez  September 22, 2023          [facility] Matthew Cortes     SAFETY PLAN:  Step 1: Warning signs / cues (Thoughts, images, mood, situation, behavior) that a crisis may be developing:  Thoughts:   \" sometimes I feel like I don't have a purpose\"  Images:  Feels lonely after losing mum and dad and lonely in marriage  Thinking Processes: ruminations (can't stop thinking about my problems): health, family loses  Mood: " "worsening depression  Behaviors: isolating/withdrawing   Situations: loss: parents and lonely in marriage    Step 2: Coping strategies - Things I can do to take my mind off of my problems without contacting another person (relaxation technique, physical activity):  Distress Tolerance Strategies:  play with my pet , intense exercise: work out for 2-3 minutes , and support group attendance for transplant  Physical Activities: go for a walk and stretching   Focus on helpful thoughts:  \"This is temporary\" and \"It always passes\"  Step 3: People and social settings that provide distraction:   Name:  Augustine ( friend) Phone: [phone]   Name:  Aimee paniagua Phone: [phone]     gym  and [employer] store  Liver transplant group  Step 4: Remind myself of people and things that are important to me and worth living for:    My , cousins and friends.  Step 5: When I am in crisis, I can ask these people to help me use my safety plan:   Name:  Jake ( Therapist )  Phone: [phone]   Name:  Aimee arcelia Phone: [phone]   Step 6: Making the environment safe:   remove alcohol, remove drugs, and be around others  Step 7: Professionals or agencies I can contact during a crisis:  Suicide Prevention Lifeline: Call or Text 082   [facility]: [phone]    Call 911 or go to my nearest emergency department.   I helped develop this safety plan and agree to use it when needed.  I have been given a copy of this plan.      Client signature _________________________________________________________________  Today s date:  11/14/2023  Completed by Provider Name/ Credentials:  CECIL Vasquez  November 14, 2023  Adapted from Safety Plan Template 2008 Chelsi Ramos is reprinted with the express permission of the authors.  No portion of the Safety Plan Template may be reproduced without the express, written permission.  You can contact the authors at [email or web address] or " [email or web address].        Answers submitted by the patient for this visit:  Patient Health Questionnaire (Submitted on 11/14/2023)  If you checked off any problems, how difficult have these problems made it for you to do your work, take care of things at home, or get along with other people?: Very difficult  PHQ9 TOTAL SCORE: 18  JORGE-7 (Submitted on 11/14/2023)  JORGE 7 TOTAL SCORE: 14    Answers submitted by the patient for this visit:  Patient Health Questionnaire (Submitted on 11/28/2023)  If you checked off any problems, how difficult have these problems made it for you to do your work, take care of things at home, or get along with other people?: Extremely difficult  PHQ9 TOTAL SCORE: 14  JORGE-7 (Submitted on 11/28/2023)  JORGE 7 TOTAL SCORE: 21    Answers submitted by the patient for this visit:  Patient Health Questionnaire (Submitted on 12/13/2023)  If you checked off any problems, how difficult have these problems made it for you to do your work, take care of things at home, or get along with other people?: Very difficult  PHQ9 TOTAL SCORE: 18  JORGE-7 (Submitted on 12/13/2023)  JORGE 7 TOTAL SCORE: 21    Answers submitted by the patient for this visit:  Patient Health Questionnaire (Submitted on 1/3/2024)  If you checked off any problems, how difficult have these problems made it for you to do your work, take care of things at home, or get along with other people?: Very difficult  PHQ9 TOTAL SCORE: 16  JORGE-7 (Submitted on 1/3/2024)  JORGE 7 TOTAL SCORE: 10    Answers submitted by the patient for this visit:  Patient Health Questionnaire (Submitted on 1/16/2024)  If you checked off any problems, how difficult have these problems made it for you to do your work, take care of things at home, or get along with other people?: Very difficult  PHQ9 TOTAL SCORE: 15    Answers submitted by the patient for this visit:  Patient Health Questionnaire (Submitted on 1/29/2024)  If you checked off any problems, how  difficult have these problems made it for you to do your work, take care of things at home, or get along with other people?: Extremely difficult  PHQ9 TOTAL SCORE: 12  JORGE-7 (Submitted on 1/29/2024)  JORGE 7 TOTAL SCORE: 9    Answers submitted by the patient for this visit:  Patient Health Questionnaire (Submitted on 2/14/2024)  If you checked off any problems, how difficult have these problems made it for you to do your work, take care of things at home, or get along with other people?: Extremely difficult  PHQ9 TOTAL SCORE: 11  JORGE-7 (Submitted on 2/14/2024)  JORGE 7 TOTAL SCORE: 14    Answers submitted by the patient for this visit:  Patient Health Questionnaire (Submitted on 2/28/2024)  If you checked off any problems, how difficult have these problems made it for you to do your work, take care of things at home, or get along with other people?: Extremely difficult  PHQ9 TOTAL SCORE: 16    Answers submitted by the patient for this visit:  Patient Health Questionnaire (Submitted on 3/11/2024)  If you checked off any problems, how difficult have these problems made it for you to do your work, take care of things at home, or get along with other people?: Very difficult  PHQ9 TOTAL SCORE: 10

## 2024-03-15 ENCOUNTER — LAB (OUTPATIENT)
Dept: LAB | Facility: CLINIC | Age: 54
End: 2024-03-15
Payer: MEDICARE

## 2024-03-15 DIAGNOSIS — K70.30 ALCOHOLIC CIRRHOSIS (H): ICD-10-CM

## 2024-03-15 DIAGNOSIS — Z94.4 STATUS POST LIVER TRANSPLANTATION (H): ICD-10-CM

## 2024-03-15 DIAGNOSIS — Z13.220 LIPID SCREENING: ICD-10-CM

## 2024-03-15 DIAGNOSIS — Z94.4 LIVER REPLACED BY TRANSPLANT (H): ICD-10-CM

## 2024-03-15 DIAGNOSIS — N18.31 CHRONIC KIDNEY DISEASE, STAGE 3A (H): ICD-10-CM

## 2024-03-15 LAB
ALBUMIN UR-MCNC: NEGATIVE MG/DL
APPEARANCE UR: CLEAR
BILIRUB UR QL STRIP: NEGATIVE
COLOR UR AUTO: YELLOW
ERYTHROCYTE [DISTWIDTH] IN BLOOD BY AUTOMATED COUNT: 13.2 % (ref 10–15)
GLUCOSE UR STRIP-MCNC: NEGATIVE MG/DL
HCT VFR BLD AUTO: 42.9 % (ref 35–47)
HGB BLD-MCNC: 14.3 G/DL (ref 11.7–15.7)
HGB UR QL STRIP: NEGATIVE
KETONES UR STRIP-MCNC: NEGATIVE MG/DL
LEUKOCYTE ESTERASE UR QL STRIP: NEGATIVE
MCH RBC QN AUTO: 28.6 PG (ref 26.5–33)
MCHC RBC AUTO-ENTMCNC: 33.3 G/DL (ref 31.5–36.5)
MCV RBC AUTO: 86 FL (ref 78–100)
NITRATE UR QL: NEGATIVE
PH UR STRIP: 6 [PH] (ref 5–7)
PLATELET # BLD AUTO: 192 10E3/UL (ref 150–450)
RBC # BLD AUTO: 5 10E6/UL (ref 3.8–5.2)
SP GR UR STRIP: 1.01 (ref 1–1.03)
TACROLIMUS BLD-MCNC: 2.8 UG/L (ref 5–15)
TME LAST DOSE: ABNORMAL H
TME LAST DOSE: ABNORMAL H
UROBILINOGEN UR STRIP-ACNC: 0.2 E.U./DL
WBC # BLD AUTO: 4 10E3/UL (ref 4–11)

## 2024-03-15 PROCEDURE — 81003 URINALYSIS AUTO W/O SCOPE: CPT | Mod: 59

## 2024-03-15 PROCEDURE — 82043 UR ALBUMIN QUANTITATIVE: CPT

## 2024-03-15 PROCEDURE — 82248 BILIRUBIN DIRECT: CPT

## 2024-03-15 PROCEDURE — G0480 DRUG TEST DEF 1-7 CLASSES: HCPCS | Mod: 90

## 2024-03-15 PROCEDURE — 36415 COLL VENOUS BLD VENIPUNCTURE: CPT

## 2024-03-15 PROCEDURE — 82570 ASSAY OF URINE CREATININE: CPT

## 2024-03-15 PROCEDURE — 84156 ASSAY OF PROTEIN URINE: CPT | Mod: 59

## 2024-03-15 PROCEDURE — 83735 ASSAY OF MAGNESIUM: CPT

## 2024-03-15 PROCEDURE — 80197 ASSAY OF TACROLIMUS: CPT

## 2024-03-15 PROCEDURE — 84100 ASSAY OF PHOSPHORUS: CPT

## 2024-03-15 PROCEDURE — 80053 COMPREHEN METABOLIC PANEL: CPT

## 2024-03-15 PROCEDURE — 85027 COMPLETE CBC AUTOMATED: CPT

## 2024-03-15 PROCEDURE — 80061 LIPID PANEL: CPT

## 2024-03-16 LAB
ALBUMIN MFR UR ELPH: 12.8 MG/DL
ALBUMIN SERPL BCG-MCNC: 4.2 G/DL (ref 3.5–5.2)
ALP SERPL-CCNC: 66 U/L (ref 40–150)
ALT SERPL W P-5'-P-CCNC: 30 U/L (ref 0–50)
ANION GAP SERPL CALCULATED.3IONS-SCNC: 10 MMOL/L (ref 7–15)
AST SERPL W P-5'-P-CCNC: 26 U/L (ref 0–45)
BILIRUB DIRECT SERPL-MCNC: <0.2 MG/DL (ref 0–0.3)
BILIRUB SERPL-MCNC: 0.3 MG/DL
BUN SERPL-MCNC: 17.7 MG/DL (ref 6–20)
CALCIUM SERPL-MCNC: 9.2 MG/DL (ref 8.6–10)
CHLORIDE SERPL-SCNC: 107 MMOL/L (ref 98–107)
CHOLEST SERPL-MCNC: 156 MG/DL
CREAT SERPL-MCNC: 1.21 MG/DL (ref 0.51–0.95)
CREAT UR-MCNC: 185 MG/DL
CREAT UR-MCNC: 185 MG/DL
DEPRECATED HCO3 PLAS-SCNC: 23 MMOL/L (ref 22–29)
EGFRCR SERPLBLD CKD-EPI 2021: 54 ML/MIN/1.73M2
FASTING STATUS PATIENT QL REPORTED: YES
GLUCOSE SERPL-MCNC: 110 MG/DL (ref 70–99)
HDLC SERPL-MCNC: 34 MG/DL
LDLC SERPL CALC-MCNC: 106 MG/DL
MAGNESIUM SERPL-MCNC: 2.1 MG/DL (ref 1.7–2.3)
MICROALBUMIN UR-MCNC: <12 MG/L
MICROALBUMIN/CREAT UR: NORMAL MG/G{CREAT}
NONHDLC SERPL-MCNC: 122 MG/DL
PHOSPHATE SERPL-MCNC: 3.6 MG/DL (ref 2.5–4.5)
POTASSIUM SERPL-SCNC: 4.9 MMOL/L (ref 3.4–5.3)
PROT SERPL-MCNC: 6.3 G/DL (ref 6.4–8.3)
PROT/CREAT 24H UR: 0.07 MG/MG CR (ref 0–0.2)
SODIUM SERPL-SCNC: 140 MMOL/L (ref 135–145)
TRIGL SERPL-MCNC: 79 MG/DL

## 2024-03-18 ENCOUNTER — TELEPHONE (OUTPATIENT)
Dept: TRANSPLANT | Facility: CLINIC | Age: 54
End: 2024-03-18
Payer: MEDICARE

## 2024-03-18 DIAGNOSIS — Z94.4 LIVER REPLACED BY TRANSPLANT (H): ICD-10-CM

## 2024-03-18 LAB
LABORATORY REPORT: NORMAL
PETH INTERPRETATION: NORMAL
PLPETH BLD-MCNC: <10 NG/ML
POPETH BLD-MCNC: <10 NG/ML

## 2024-03-18 RX ORDER — TACROLIMUS 0.5 MG/1
0.5 CAPSULE ORAL EVERY MORNING
Qty: 90 CAPSULE | Refills: 3 | Status: SHIPPED | OUTPATIENT
Start: 2024-03-18 | End: 2024-06-01

## 2024-03-18 RX ORDER — TACROLIMUS 1 MG/1
1 CAPSULE ORAL EVERY EVENING
Qty: 90 CAPSULE | Refills: 3 | Status: SHIPPED | OUTPATIENT
Start: 2024-03-18 | End: 2024-06-01

## 2024-03-19 ENCOUNTER — MYC REFILL (OUTPATIENT)
Dept: FAMILY MEDICINE | Facility: CLINIC | Age: 54
End: 2024-03-19
Payer: MEDICARE

## 2024-03-19 DIAGNOSIS — F33.0 MILD RECURRENT MAJOR DEPRESSION (H): ICD-10-CM

## 2024-03-20 RX ORDER — BUPROPION HYDROCHLORIDE 300 MG/1
300 TABLET ORAL EVERY MORNING
Qty: 90 TABLET | Refills: 1 | Status: SHIPPED | OUTPATIENT
Start: 2024-03-20 | End: 2024-04-26

## 2024-03-22 ENCOUNTER — NURSE TRIAGE (OUTPATIENT)
Dept: FAMILY MEDICINE | Facility: CLINIC | Age: 54
End: 2024-03-22
Payer: MEDICARE

## 2024-03-22 NOTE — TELEPHONE ENCOUNTER
There is consent to communicate with Doug (-no last name) on file dated 6/29/2023. (TD-3/22/2024)    This encounter is being created due to the Meez message dated 3/22/2024 as written below:       I left a message to return a call to [phone].  Monae Solis R.N.

## 2024-03-25 ENCOUNTER — VIRTUAL VISIT (OUTPATIENT)
Dept: PSYCHOLOGY | Facility: CLINIC | Age: 54
End: 2024-03-25
Payer: MEDICARE

## 2024-03-25 DIAGNOSIS — F32.1 CURRENT MODERATE EPISODE OF MAJOR DEPRESSIVE DISORDER, UNSPECIFIED WHETHER RECURRENT (H): Primary | ICD-10-CM

## 2024-03-25 PROCEDURE — 90834 PSYTX W PT 45 MINUTES: CPT | Mod: 95 | Performed by: STUDENT IN AN ORGANIZED HEALTH CARE EDUCATION/TRAINING PROGRAM

## 2024-03-25 ASSESSMENT — ANXIETY QUESTIONNAIRES
1. FEELING NERVOUS, ANXIOUS, OR ON EDGE: NEARLY EVERY DAY
7. FEELING AFRAID AS IF SOMETHING AWFUL MIGHT HAPPEN: SEVERAL DAYS
GAD7 TOTAL SCORE: 9
8. IF YOU CHECKED OFF ANY PROBLEMS, HOW DIFFICULT HAVE THESE MADE IT FOR YOU TO DO YOUR WORK, TAKE CARE OF THINGS AT HOME, OR GET ALONG WITH OTHER PEOPLE?: EXTREMELY DIFFICULT
7. FEELING AFRAID AS IF SOMETHING AWFUL MIGHT HAPPEN: SEVERAL DAYS
3. WORRYING TOO MUCH ABOUT DIFFERENT THINGS: SEVERAL DAYS
4. TROUBLE RELAXING: SEVERAL DAYS
IF YOU CHECKED OFF ANY PROBLEMS ON THIS QUESTIONNAIRE, HOW DIFFICULT HAVE THESE PROBLEMS MADE IT FOR YOU TO DO YOUR WORK, TAKE CARE OF THINGS AT HOME, OR GET ALONG WITH OTHER PEOPLE: EXTREMELY DIFFICULT
5. BEING SO RESTLESS THAT IT IS HARD TO SIT STILL: SEVERAL DAYS
6. BECOMING EASILY ANNOYED OR IRRITABLE: SEVERAL DAYS
GAD7 TOTAL SCORE: 9
GAD7 TOTAL SCORE: 9
2. NOT BEING ABLE TO STOP OR CONTROL WORRYING: SEVERAL DAYS

## 2024-03-25 ASSESSMENT — PATIENT HEALTH QUESTIONNAIRE - PHQ9
SUM OF ALL RESPONSES TO PHQ QUESTIONS 1-9: 16
SUM OF ALL RESPONSES TO PHQ QUESTIONS 1-9: 16
10. IF YOU CHECKED OFF ANY PROBLEMS, HOW DIFFICULT HAVE THESE PROBLEMS MADE IT FOR YOU TO DO YOUR WORK, TAKE CARE OF THINGS AT HOME, OR GET ALONG WITH OTHER PEOPLE: EXTREMELY DIFFICULT

## 2024-03-25 NOTE — TELEPHONE ENCOUNTER
"Patient calling in and reports has \"brain zaps.\" Patient is taking Wellbutrin, as prescribed, and has not missed any doses.Patient has recently started estrogen in January (few months ago). Since starting the estrogen, patient has these brain zaps, that last a second or two. No other symptoms associated with these episodes. Patient will feel a tingling type sensation with these zapping sensations, sometimes. Patient denies weakness, dizziness, headache, slurred speech, gait/balance issues, or vision changes. Patient reports this will happen approximately 1-2 times a day.     Patient had reached out to OB because symptoms started after estrogen was started in January. OB advised to continue to monitor symptoms, and symptoms had stopped for a week or so. OB advised to reach back out, but patient wants to send message to PCP.          Reason for Disposition   Patient wants to be seen    Additional Information   Negative: Difficult to awaken or acting confused (e.g., disoriented, slurred speech)   Negative: New neurologic deficit that is present NOW, sudden onset of ANY of the following: * Weakness of the face, arm, or leg on one side of the body* Numbness of the face, arm, or leg on one side of the body* Loss of speech or garbled speech   Negative: Sounds like a life-threatening emergency to the triager   Negative: SEVERE weakness (i.e., unable to walk or barely able to walk, requires support) and new-onset or worsening   Negative: Confusion, disorientation, or hallucinations is main symptom   Negative: Dizziness is main symptom   Negative: Followed a head injury within last 3 days   Negative: Headache (with neurologic deficit)   Negative: Unable to urinate (or only a few drops) and bladder feels very full   Negative: Loss of bladder or bowel control (urine or bowel incontinence; wetting self, leaking stool) of new-onset   Negative: Back pain with numbness (loss of sensation) in groin or rectal area   Negative: " "Neurologic deficit that was brief (now gone), ANY of the following: * Weakness of the face, arm, or leg on one side of the body * Numbness of the face, arm, or leg on one side of the body * Loss of speech or garbled speech   Negative: Patient sounds very sick or weak to the triager   Negative: Neurologic deficit of gradual onset (e.g., days to weeks), ANY of the following: * Weakness of the face, arm, or leg on one side of the body* Numbness of the face, arm, or leg on one side of the body* Loss of speech or garbled speech   Negative: [place] palsy suspected (i.e., weakness only one side of the face, developing over hours to days, no other symptoms)   Negative: Tingling (e.g., pins and needles) of the face, arm or leg on one side of the body, that is present now (Exceptions: Chronic or recurrent symptom lasting > 4 weeks; or tingling from known cause, such as: bumped elbow, carpal tunnel syndrome, pinched nerve, frostbite.)   Negative: Neck pain (with neurologic deficit)   Negative: Back pain (with neurologic deficit)    Answer Assessment - Initial Assessment Questions  1. SYMPTOM: \"What is the main symptom you are concerned about?\" (e.g., weakness, numbness)      \"Brain zaps\", lasts a second or two. Only on left side, same spot every time, near the temple.  2. ONSET: \"When did this start?\" (minutes, hours, days; while sleeping)      Started in January   3. LAST NORMAL: \"When was the last time you (the patient) were normal (no symptoms)?\"      A couple months ago. Patient started estrogen in January and has noticed these \"brain zaps.\" Patient is also on Wellbutrin  4. PATTERN \"Does this come and go, or has it been constant since it started?\"  \"Is it present now?\"      Intermittent, and happens about 1-2 times a day, but can go days without having episodes.   5. CARDIAC SYMPTOMS: \"Have you had any of the following symptoms: chest pain, difficulty breathing, palpitations?\"      No  6. NEUROLOGIC SYMPTOMS: \"Have you had " "any of the following symptoms: headache, dizziness, vision loss, double vision, changes in speech, unsteady on your feet?\"      No  7. OTHER SYMPTOMS: \"Do you have any other symptoms?\"      No other symptoms  8. PREGNANCY: \"Is there any chance you are pregnant?\" \"When was your last menstrual period?\"      No    Protocols used: Neurologic Deficit-A-OH    "

## 2024-03-25 NOTE — PROGRESS NOTES
"        [facility]                                     Progress Note    Patient Name: Melita Cortes  Date: 03/25/2024         Service Type: Individual      Session Start Time: 2.30  Session End Time: 3.10     Session Length: 38-52    Session #: 14    Attendees: Client attended alone    Service Modality:  Video Visit:      Provider verified identity through the following two step process.  Patient provided:  Patient is known previously to provider    Telemedicine Visit: The patient's condition can be safely assessed and treated via synchronous audio and visual telemedicine encounter.      Reason for Telemedicine Visit: Patient has requested telehealth visit    Originating Site (Patient Location): Patient's home    Distant Site (Provider Location): [facility] MENTAL HEALTH & ADDI[facility]    Consent:  The patient/guardian has verbally consented to: the potential risks and benefits of telemedicine (video visit) versus in person care; bill my insurance or make self-payment for services provided; and responsibility for payment of non-covered services.     Patient would like the video invitation sent by:  My Chart    Mode of Communication:  Video Conference via [facility]    Distant Location (Provider):  Off-site    As the provider I attest to compliance with applicable laws and regulations related to telemedicine.    DATA  Interactive Complexity: No  Crisis: No        Progress Since Last Session (Related to Symptoms / Goals / Homework):   Symptoms: Worsening as evident by current phq9 scores    Homework: Completed in session review patient's utilization of safety plan.        Episode of Care Goals: Minimal progress - ACTION (Actively working towards change); Intervened by reinforcing change plan / affirming steps taken     Current / Ongoing Stressors and Concerns:  Patient reported  \"Trauma Adhd depres\". Patient reported \" I think I have had some trauma in my life, my liver " "transplant and I think have ADHD.\" Pt reported she is  \"overly sensitive\" and \" I dont handle stress very well and struggle with impulsivity\"  and that \"I have a poor self image, negative talk a lot \" patient also reported she lost her mother in 2019 and her dad 7 months ago and still struggling with grieving the lost of both parents.      Current:  Today, patient reported that she continues to experience increased neurovegetative symptoms of depression in the form of low motivation, low energy, low mood. Patient reported feeling frustrated and increasingly agitated and feels very insecure about themselves. Patient reported she has been crying over a video that is being shared among family members with about her  parents and feels frustrated and hurt that nobody is talking about his dad's flawed character. Patient noted family members are very positive about him and she feels invalidated and angry when she talks about how  flawed he was as  a dad to her before his death.        Treatment Objective(s) Addressed in This Session:   Patient will increase daily activity level by exercising for 20-30 minutes and participate in at least 1 daily pleasant/fun activities.  Use safety plan as needed daily and practice mindfulness daily.   Patient will identify specific areas of cognitive distortion and challenge irrational thoughts with reality.    Learn healthy ways to deal with distressing emotions daily I.e writing a forgiveness letter to self.     Intervention:      Mindfulness: Discussion today on forgiveness as an inside job and a choice which doesn't mean forgetting or pardoning an offense but rather an acknowledgement of the grievance and the hurtful feelings it created from the relationship, and that humans are flawed, and messy including myself.  Discussed the concept as the awareness that we can put appropriate boundaries in place, letting go, and moving on. Work with patient to identify the internal and " external layers of forgiveness, walk them through the process of observing their current emotions and thoughts  about their dad and then focusing externally to the lessons learned from the pain and hurt and processing that with gratitude. Encourage patient to practice writing a forgiveness letter to self and write a loving phrase to themself daily to mitigate the hurt and resentment.        Assessments completed prior to visit:  PHQ9:       2/11/2024     9:26 AM 2/14/2024    12:14 PM 2/18/2024    10:41 AM 2/20/2024    12:41 PM 2/28/2024    12:07 PM 3/11/2024    10:21 AM 3/25/2024    12:45 PM   PHQ-9 SCORE   PHQ-9 Total Score MyChart 13 (Moderate depression) 11 (Moderate depression) 10 (Moderate depression) 12 (Moderate depression) 16 (Moderately severe depression) 10 (Moderate depression) 16 (Moderately severe depression)   PHQ-9 Total Score 13 11 10 12 16 10 16     GAD7:       11/28/2023     9:29 AM 12/13/2023    11:55 AM 1/3/2024     2:11 PM 1/29/2024     2:16 PM 2/11/2024     9:26 AM 2/14/2024    12:15 PM 3/25/2024    12:46 PM   JORGE-7 SCORE   Total Score 21 (severe anxiety) 21 (severe anxiety) 10 (moderate anxiety) 9 (mild anxiety)  14 (moderate anxiety) 9 (mild anxiety)   Total Score 21 21 10 9 20 14    14    14 9         ASSESSMENT: Current Emotional / Mental Status (status of significant symptoms):   Risk status (Self / Other harm or suicidal ideation)   Patient denies current fears or concerns for personal safety.   Patient denies current or recent suicidal ideation or behaviors.   Patient denies current or recent homicidal ideation or behaviors.   Patient denies current or recent self injurious behavior or ideation.   Patient denies other safety concerns.   Patient reports there has been no change in risk factors since their last session.     Patient reports there has been no change in protective factors since their last session.     Recommended that patient call 911 or go to the local ED should there be a  change in any of these risk factors.     Appearance:   Appropriate    Eye Contact:   Good    Psychomotor Behavior: Normal    Attitude:   Cooperative  Interested   Orientation:   Person Place Time Situation   Speech    Rate / Production: Normal/ Responsive    Volume:  Normal    Mood:    Anxious  Depressed    Affect:    Tearful   Thought Content:  Clear    Thought Form:  Coherent  Logical    Insight:    Good      Medication Review:   No changes to current psychiatric medication(s)     Medication Compliance:   Yes     Changes in Health Issues:   None reported     Chemical Use Review:   Substance Use: Chemical use reviewed, no active concerns identified      Tobacco Use: No current tobacco use.      Diagnosis:  296.32 (F33.1) Major Depressive Disorder, Recurrent Episode, Moderate _ and With atypical features    Collateral Reports Completed:   Not Applicable    PLAN: (Patient Tasks / Therapist Tasks / Other)    Utilize safety plan as needed daily.    Write and letter of forgiveness to self and father and process feeling next session    CECIL Vasquez       _________________________________________________________    Individual Treatment Plan    Patient's Name: Melita Cortes  YOB: 1970    Date of Creation: 09/22/2023  Date Treatment Plan Last Reviewed/Revised: 02/28/2024    DSM5 Diagnoses: 296.32 (F33.1) Major Depressive Disorder, Recurrent Episode, Moderate _ and With atypical features  Psychosocial / Contextual Factors:   PROMIS (reviewed every 90 days):     Referral / Collaboration:  Referral to another professional/service is not indicated at this time..    Anticipated number of session for this episode of care:  15-25  Anticipation frequency of session: Biweekly  Anticipated Duration of each session: 38-52 minutes  Treatment plan will be reviewed in 90 days or when goals have been changed.       MeasurableTreatment Goal(s) related to diagnosis / functional impairment(s)  Goal 1: Patient will  identify and address specific triggers to feelings of depression and improve coping by  90 % in the next three months.    I will know I've met my goal when I am less impulsive, better communicator and can comfortably manage distressful emotions.         Objective #A (Patient Action)    Develop and utilize 3 effective distress tolerance strategies to address SI.   Status: Continued - Date(s): 05/29/2024     Intervention(s)   Therapist will engage in collaborative brainstorming with pt to identify and practice individualized distress tolerance coping strategies to address SI weekly.    Objective #B (Patient Action)    Patient will increase daily activity level by exercising for 20-30 minutes and participate in at least 1 daily pleasant/fun activities.  Status: Continued - Date(s):  05/29/2024    Intervention(s)  Therapist will provide psychoeducation, behavioral activation, and cognitive restructuring.    Objective #C  Patient will identify specific areas of cognitive distortion and challenge irrational thoughts with reality.   Status: Continued - Date(s):   05/29/2024       Intervention(s)  Therapist will provide psychoeducation, behavioral activation, and cognitive restructuring.    Objective #D  Patient will learn and practice relaxation techniques to manage depression.  Status: Continued - Date(s):   05/29/2024    Intervention(s)   Therapist will teach patient thought stopping, deep breathing exercises, mindful meditation, and creative visualization.        Patient has reviewed and agreed to the above plan.      CECIL Vasquez  September 22, 2023    ----- Service Performed and Documented by CECIL------  This note was reviewed and clinical supervision by ELMO Colmenares [facility]    4/1/2024           [facility] Matthew Cortes     SAFETY PLAN:  Step 1: Warning signs / cues (Thoughts, images, mood, situation, behavior) that a crisis may be  "developing:  Thoughts:   \" sometimes I feel like I don't have a purpose\"  Images:  Feels lonely after losing mum and dad and lonely in marriage  Thinking Processes: ruminations (can't stop thinking about my problems): health, family loses  Mood: worsening depression  Behaviors: isolating/withdrawing   Situations: loss: parents and lonely in marriage    Step 2: Coping strategies - Things I can do to take my mind off of my problems without contacting another person (relaxation technique, physical activity):  Distress Tolerance Strategies:  play with my pet , intense exercise: work out for 2-3 minutes , and support group attendance for transplant  Physical Activities: go for a walk and stretching   Focus on helpful thoughts:  \"This is temporary\" and \"It always passes\"  Step 3: People and social settings that provide distraction:   Name:  Augustine ( friend) Phone: [phone]   Name:  Aimee paniagua Phone: [phone]     gym  and [employer] store  Liver transplant group  Step 4: Remind myself of people and things that are important to me and worth living for:    My , cousins and friends.  Step 5: When I am in crisis, I can ask these people to help me use my safety plan:   Name:  Jake ( Therapist )  Phone: [phone]   Name:  Aimee paniagua Phone: [phone]   Step 6: Making the environment safe:   remove alcohol, remove drugs, and be around others  Step 7: Professionals or agencies I can contact during a crisis:  Suicide Prevention Lifeline: Call or Text 265   [facility] Crisis [facility]: [phone]    Call 911 or go to my nearest emergency department.   I helped develop this safety plan and agree to use it when needed.  I have been given a copy of this plan.      Client signature _________________________________________________________________  Today s date:  11/14/2023  Completed by Provider Name/ Credentials:  CECIL Vasquez  November 14, 2023  Adapted from Safety Plan Template 2008 Chelsi Vazquez" HEATHER Ramos is reprinted with the express permission of the authors.  No portion of the Safety Plan Template may be reproduced without the express, written permission.  You can contact the authors at [email or web address] or [email or web address].        Answers submitted by the patient for this visit:  Patient Health Questionnaire (Submitted on 11/14/2023)  If you checked off any problems, how difficult have these problems made it for you to do your work, take care of things at home, or get along with other people?: Very difficult  PHQ9 TOTAL SCORE: 18  JORGE-7 (Submitted on 11/14/2023)  JORGE 7 TOTAL SCORE: 14    Answers submitted by the patient for this visit:  Patient Health Questionnaire (Submitted on 11/28/2023)  If you checked off any problems, how difficult have these problems made it for you to do your work, take care of things at home, or get along with other people?: Extremely difficult  PHQ9 TOTAL SCORE: 14  JORGE-7 (Submitted on 11/28/2023)  JORGE 7 TOTAL SCORE: 21    Answers submitted by the patient for this visit:  Patient Health Questionnaire (Submitted on 12/13/2023)  If you checked off any problems, how difficult have these problems made it for you to do your work, take care of things at home, or get along with other people?: Very difficult  PHQ9 TOTAL SCORE: 18  JORGE-7 (Submitted on 12/13/2023)  JORGE 7 TOTAL SCORE: 21    Answers submitted by the patient for this visit:  Patient Health Questionnaire (Submitted on 1/3/2024)  If you checked off any problems, how difficult have these problems made it for you to do your work, take care of things at home, or get along with other people?: Very difficult  PHQ9 TOTAL SCORE: 16  JORGE-7 (Submitted on 1/3/2024)  JORGE 7 TOTAL SCORE: 10    Answers submitted by the patient for this visit:  Patient Health Questionnaire (Submitted on 1/16/2024)  If you checked off any problems, how difficult have these problems made it for you to do your work, take care of things at home, or  get along with other people?: Very difficult  PHQ9 TOTAL SCORE: 15    Answers submitted by the patient for this visit:  Patient Health Questionnaire (Submitted on 1/29/2024)  If you checked off any problems, how difficult have these problems made it for you to do your work, take care of things at home, or get along with other people?: Extremely difficult  PHQ9 TOTAL SCORE: 12  JORGE-7 (Submitted on 1/29/2024)  JORGE 7 TOTAL SCORE: 9    Answers submitted by the patient for this visit:  Patient Health Questionnaire (Submitted on 2/14/2024)  If you checked off any problems, how difficult have these problems made it for you to do your work, take care of things at home, or get along with other people?: Extremely difficult  PHQ9 TOTAL SCORE: 11  JORGE-7 (Submitted on 2/14/2024)  JORGE 7 TOTAL SCORE: 14    Answers submitted by the patient for this visit:  Patient Health Questionnaire (Submitted on 2/28/2024)  If you checked off any problems, how difficult have these problems made it for you to do your work, take care of things at home, or get along with other people?: Extremely difficult  PHQ9 TOTAL SCORE: 16    Answers submitted by the patient for this visit:  Patient Health Questionnaire (Submitted on 3/11/2024)  If you checked off any problems, how difficult have these problems made it for you to do your work, take care of things at home, or get along with other people?: Very difficult  PHQ9 TOTAL SCORE: 10    Answers submitted by the patient for this visit:  Patient Health Questionnaire (Submitted on 3/25/2024)  If you checked off any problems, how difficult have these problems made it for you to do your work, take care of things at home, or get along with other people?: Extremely difficult  PHQ9 TOTAL SCORE: 16  JORGE-7 (Submitted on 3/25/2024)  JORGE 7 TOTAL SCORE: 9

## 2024-03-25 NOTE — TELEPHONE ENCOUNTER
She has a couple of options  She can make a virtual appointment with me to discuss this or if she wants to wait she can do that till 17 April and discuss the symptoms at that time with me

## 2024-03-25 NOTE — TELEPHONE ENCOUNTER
Patient returning call. RN relayed provider message below. Patient states that she will wait until her April 17th appointment with Dr. Johnson. Patient understands if symptoms worsen or new symptoms show up to be seen sooner.    Carmen LOPEZ RN, BSN  [facility]

## 2024-03-25 NOTE — TELEPHONE ENCOUNTER
Called patient to relay providers message below. Left voicemail message to call clinic back at [phone].    Please read provider message below upon patient return phone call.    Charla Gutierrez RN

## 2024-03-29 ENCOUNTER — LAB (OUTPATIENT)
Dept: LAB | Facility: CLINIC | Age: 54
End: 2024-03-29
Payer: MEDICARE

## 2024-03-29 DIAGNOSIS — Z94.4 STATUS POST LIVER TRANSPLANTATION (H): ICD-10-CM

## 2024-03-29 DIAGNOSIS — Z94.4 LIVER REPLACED BY TRANSPLANT (H): ICD-10-CM

## 2024-03-29 LAB
ALBUMIN SERPL BCG-MCNC: 4.2 G/DL (ref 3.5–5.2)
ALP SERPL-CCNC: 70 U/L (ref 40–150)
ALT SERPL W P-5'-P-CCNC: 30 U/L (ref 0–50)
ANION GAP SERPL CALCULATED.3IONS-SCNC: 9 MMOL/L (ref 7–15)
AST SERPL W P-5'-P-CCNC: 29 U/L (ref 0–45)
BILIRUB DIRECT SERPL-MCNC: <0.2 MG/DL (ref 0–0.3)
BILIRUB SERPL-MCNC: 0.3 MG/DL
BUN SERPL-MCNC: 17.7 MG/DL (ref 6–20)
CALCIUM SERPL-MCNC: 9 MG/DL (ref 8.6–10)
CHLORIDE SERPL-SCNC: 108 MMOL/L (ref 98–107)
CREAT SERPL-MCNC: 1.22 MG/DL (ref 0.51–0.95)
DEPRECATED HCO3 PLAS-SCNC: 24 MMOL/L (ref 22–29)
EGFRCR SERPLBLD CKD-EPI 2021: 53 ML/MIN/1.73M2
ERYTHROCYTE [DISTWIDTH] IN BLOOD BY AUTOMATED COUNT: 13 % (ref 10–15)
GLUCOSE SERPL-MCNC: 97 MG/DL (ref 70–99)
HCT VFR BLD AUTO: 43.2 % (ref 35–47)
HGB BLD-MCNC: 14.4 G/DL (ref 11.7–15.7)
MCH RBC QN AUTO: 28.6 PG (ref 26.5–33)
MCHC RBC AUTO-ENTMCNC: 33.3 G/DL (ref 31.5–36.5)
MCV RBC AUTO: 86 FL (ref 78–100)
PLATELET # BLD AUTO: 188 10E3/UL (ref 150–450)
POTASSIUM SERPL-SCNC: 4.6 MMOL/L (ref 3.4–5.3)
PROT SERPL-MCNC: 6.2 G/DL (ref 6.4–8.3)
RBC # BLD AUTO: 5.03 10E6/UL (ref 3.8–5.2)
SODIUM SERPL-SCNC: 141 MMOL/L (ref 135–145)
TACROLIMUS BLD-MCNC: 3.8 UG/L (ref 5–15)
TME LAST DOSE: ABNORMAL H
TME LAST DOSE: ABNORMAL H
VIT B12 SERPL-MCNC: 660 PG/ML (ref 232–1245)
WBC # BLD AUTO: 4.6 10E3/UL (ref 4–11)

## 2024-03-29 PROCEDURE — 80053 COMPREHEN METABOLIC PANEL: CPT

## 2024-03-29 PROCEDURE — 85027 COMPLETE CBC AUTOMATED: CPT

## 2024-03-29 PROCEDURE — 82607 VITAMIN B-12: CPT

## 2024-03-29 PROCEDURE — 80197 ASSAY OF TACROLIMUS: CPT

## 2024-03-29 PROCEDURE — 36415 COLL VENOUS BLD VENIPUNCTURE: CPT

## 2024-03-29 PROCEDURE — 82248 BILIRUBIN DIRECT: CPT

## 2024-04-03 ENCOUNTER — VIRTUAL VISIT (OUTPATIENT)
Dept: PSYCHOLOGY | Facility: CLINIC | Age: 54
End: 2024-04-03
Payer: MEDICARE

## 2024-04-03 DIAGNOSIS — F41.1 GENERALIZED ANXIETY DISORDER: ICD-10-CM

## 2024-04-03 DIAGNOSIS — F33.1 MAJOR DEPRESSIVE DISORDER, RECURRENT EPISODE, MODERATE (H): ICD-10-CM

## 2024-04-03 DIAGNOSIS — F90.0 ATTENTION DEFICIT HYPERACTIVITY DISORDER, INATTENTIVE TYPE, MODERATE: Primary | ICD-10-CM

## 2024-04-03 PROCEDURE — 96130 PSYCL TST EVAL PHYS/QHP 1ST: CPT | Mod: 95 | Performed by: PSYCHOLOGIST

## 2024-04-03 PROCEDURE — 96131 PSYCL TST EVAL PHYS/QHP EA: CPT | Mod: 95 | Performed by: PSYCHOLOGIST

## 2024-04-03 ASSESSMENT — PATIENT HEALTH QUESTIONNAIRE - PHQ9
10. IF YOU CHECKED OFF ANY PROBLEMS, HOW DIFFICULT HAVE THESE PROBLEMS MADE IT FOR YOU TO DO YOUR WORK, TAKE CARE OF THINGS AT HOME, OR GET ALONG WITH OTHER PEOPLE: EXTREMELY DIFFICULT
SUM OF ALL RESPONSES TO PHQ QUESTIONS 1-9: 14
SUM OF ALL RESPONSES TO PHQ QUESTIONS 1-9: 14

## 2024-04-03 NOTE — PROGRESS NOTES
[facility]   Mental Health & Addiction Services     Progress Note - ADHD Feedback Session     Patient Name: Melita Cortes  Date: April 3, 2024       Service Type:  Individual       Session Start Time: 3:00pm  Session End Time:  3:3pm     Session Length: 32 minutes    Session #: 3    Attendees: Patient attended alone    Service Modality: Video Visit:      Provider verified identity through the following two step process.  Patient provided:  Patient  and Patient address    Telemedicine Visit: The patient's condition can be safely assessed and treated via synchronous audio and visual telemedicine encounter.      Reason for Telemedicine Visit: Services only offered telehealth    Originating Site (Patient Location): Patient's home    Distant Site (Provider Location): Provider Remote Setting- Home Office    Consent:  The patient/guardian has verbally consented to: the potential risks and benefits of telemedicine (video visit) versus in person care; bill my insurance or make self-payment for services provided; and responsibility for payment of non-covered services.     Patient would like the video invitation sent by:  Send to e-mail at: [email or web address]    Mode of Communication:  [facility]    Distant Location (Provider):  Off-site    As the provider I attest to compliance with applicable laws and regulations related to telemedicine.          3/11/2024    10:21 AM 3/25/2024    12:45 PM 4/3/2024     8:12 AM   PHQ   PHQ-9 Total Score 10 16 14   Q9: Thoughts of better off dead/self-harm past 2 weeks Several days Several days Several days   F/U: Thoughts of suicide or self-harm No No No   F/U: Safety concerns No No No           2024     9:26 AM 2024    12:15 PM 3/25/2024    12:46 PM   JORGE-7 SCORE   Total Score  14 (moderate anxiety) 9 (mild anxiety)   Total Score 20 14    14    14 9         DATA      Progress Since Last Session  (Related to Symptoms / Goals / Homework):   Symptoms: Stable.    Homework: Completed.      Treatment Objective(s) Addressed in This Session:   Provided feedback on ADHD evaluation. Reviewed test results in depth. Plan of care and recommendations were discussed based on testing data. See full report attached on secondary note in this encounter.     Intervention:   Provided feedback to patient regarding testing results, diagnoses, and treatment recommendations. Test results are consistent with an ADHD diagnosis. Symptoms are not better explained by depression and anxiety disorders. Personalized suggestions regarding symptoms were offered. Patient had the opportunity to ask questions; she expressed understanding.        ASSESSMENT: Current Emotional / Mental Status (status of significant symptoms):   Risk status (Self / Other harm or suicidal ideation)   Patient denies current fears or concerns for personal safety.   Patient denies current or recent suicidal ideation or behaviors.   Patient denies current or recent homicidal ideation or behaviors.   Patient denies current or recent self injurious behavior or ideation.   Patient denies other safety concerns.   Patient reports there has been no change in risk factors since their last session.     Patient reports there has been no change in protective factors since their last session.     Recommended that patient call 911 or go to the local ED should there be a change in any of these risk factors.     Appearance:   Appropriate    Eye Contact:   Good    Psychomotor Behavior: Normal    Attitude:   Cooperative    Orientation:   All   Speech    Rate / Production: Normal     Volume:  Normal    Mood:    Anxious  Depressed    Affect:    Appropriate  Tearful   Thought Content:  Clear    Thought Form:  Coherent  Logical    Insight:    Good      Medication Review:   No changes to current psychiatric medication(s)     Medication Compliance:   Yes     Changes in Health Issues:   None  reported     Chemical Use Review:   Substance Use: See report.     Nicotine Use: No current tobacco use.      Diagnosis:  1. Attention deficit hyperactivity disorder, inattentive type, moderate    2. Generalized anxiety disorder    3. Major depressive disorder, recurrent episode, moderate (H)          PLAN:   Recommendations are outlined in full evaluation report (attached to this encounter).   Patient indicated understanding and will contact the clinic if there are further questions.    Parts of this documentation may have been completed using dictation software. Potential errors may result and are unintentional.       Irma Rojas PsyD,   Clinical Psychologist         Psychological Testing Services Summary       Testing Evaluation Services Base: 35104  (first 60 mins) Add-on: 00167  (each addtl 60 mins)   Record Review and Clarify Referral Question   7:50am-8:00am on 2/12/2024 10 minutes   Clinical Decision Making/Battery Modification   9:45am-10:00am on 2/19/2024 15 minutes   Integration/Report Generation   7:30am-8:30am on 4/2/2024 ([place])  2:15pm-2:45pm on 4/1/2024 ([facility] Vital Signs)  8:30am-9:30am on 4/2/2024 (Final Report)   60 minutes  30 minutes  60 minutes   Interactive Feedback Session   3:00pm-3:32pm on 4/3/2024 32 minutes   Post-Service Work   3:32pm-3:47pm on 4/3/2024 15 minutes   Total Time: 222 minutes   Total Units: 1 3       Diagnoses:   F90.0 Attention-Deficit/Hyperactivity Disorder, inattentive presentation, moderate  F41.1 Generalized Anxiety Disorder  F33.1 Major Depressive Disorder, recurrent episode, moderate

## 2024-04-03 NOTE — PATIENT INSTRUCTIONS
Coping with Attention-Deficit/Hyperactivity Disorder (ADHD)    If you have ADHD, it can be hard to sit still, pay attention or control impulsive behavior. ADHD can cause problems at home, at school, at work and in social settings. But you can learn ways to control your symptoms. Below are some ideas for coping with the most common ADHD problems.     Memory, Focus, and Managing Time  Be mindful of time. Use a watch, phone, timer, alarm, PDA or computer--anything that keeps accurate time that you can see and hear at all times. Set more than one alarm to remind you how much time you have left for a task. Give yourself more time than you think you need. For important appointments or deadlines, set reminder alarms hours or days ahead of time.   Use a day planner. A day planner can help you manage time and remember responsibilities. Learning to use a planner is just like learning to use any tool--practice makes perfect.   Use lists. Lists and notes can help you keep track of regular tasks, projects, deadlines and appointments. If you decide to use a daily planner, keep all lists and notes inside of it. Use color codes for tasks so you know which ones are the most important.  Learn to say no and set boundaries. Do not take on too many projects or social engagements. Overbooking yourself can be overwhelming and can lead to missed commitments.  Repeat out loud the instructions you have been given. This will help you remember, as well as let others correct you if needed.    Organization  Create space. Ask yourself what you need on a daily basis. Then, find storage bins or closets for things you don t need every day. Have specific areas for things like keys, bills and other items that are easy to misplace. Throw away or donate things you don t need.   Deal with it now. You can avoid forgetfulness, clutter and procrastination by doing things right away, not some time in the future. This includes filing papers, cleaning up  messes, opening and responding to mail and returning phone calls.  Set up a filing system. Use dividers or separate file folders for different types of documents, such as medical records, receipts and pay stubs. Label and color-code your files so that you can quickly find what you need.   Break larger tasks into small, manageable pieces. Write down the steps needed to complete the task. Follow each step in order until the task is done. If needed, take small, timed breaks and return to finish the task.  Organize your work space. Use lists or planners to organize your day. Remove clutter from your desk. Label any storage bins. Reduce distraction as much as possible. Ideas include sitting facing the wall, closing your door or using a white noise machine.    Sitting Still  Move around as needed. Don t fight the urge to move around. If you need to fidget or stand up for a period of time to help maintain attention, then do so. But take care that you re not interrupting others. You may also find it helpful to squeeze a stress ball.  Be active in a useful way. Exercise can burn off extra energy, relieve stress, boost your mood and calm your mind. Meditation, yoga or adelaide chi can help you better control your attention and impulses.  Stay healthy. Get enough sleep. Avoid caffeine late in the day. Exercise. Create a relaxing bedtime routine. Stick to a schedule or daily routine. Eat small meals throughout the day. Avoid sugar, eat fewer carbohydrates and eat more protein.     Close Relationships  Talk to your loved ones about ADHD. There are many resources available that can help your loved one understand ADHD. See the Resources section below.  Divide daily tasks based on each person s strengths. For example, if you have trouble with organization, you may not want to do financial planning tasks.  If you have trouble with focus, develop a sign that others in your life can use as a gentle reminder to pay attention. The sign  should be small but meaningful to you and the other person.  Improve communication. Use a dry erase board in a common area to help the whole family stay in touch better. Keep regular to-do lists and compare scheduled activities every day. Writing notes to each other is also very helpful.  Be an active listener and try not to interrupt. If you find your mind wandering when others are talking, mentally repeat their words so you can follow the conversation. Ask questions and ask people to repeat what they said if needed. Pay close attention to body language.   Organize your thoughts. If you need to have a serious conversation, write a list of the points you would like to make or the important ideas you want to talk about. This can help you stay focused and remember what you need to say.  Think before speaking. It can be hard to control your impulses when you feel strongly about something. Before saying whatever pops into your head, stop to ask yourself  Will this be helpful?  or  Will this help me get what I want?   Take charge of your life. Work to accept that some things are harder for you. Make a plan to address these troubles and seek the support you need.    Online Resources  ADD [employer]house. http://www.addAbsiohouse.com  Attention Deficit Disorder Association. http://www.add.org  Children and Adults with Attention-Deficit/Hyperactivity Disorder (SARMAD). http://www.sarmad.org/  Tatyana Martínez.  33 Ways to Get Organized with Adult ADHD.  http://www.additudemag.com/adhd/article/729.html  National Resource Center on ADHD. http://www.ajue4yhxx.org/  Olga Killian.  Daily Living Tips for Adult ADHD.  http://www.medicinenet.com/living_tips_adult_adhd_pictures_slideshow/article.htm  Henry Sánchez and Amaris Blankenship.  Help for Adult ADD / ADHD.  http://www.helpguide.org  You can also find many apps for your phone that can help you with common ADHD struggles    Book Resources  Michael Benavides. ADHD in Adults.  (2008).  Michael Benavides. Taking Charge of Adult ADHD. (2010).  Franco Ruiz and Miguel Ángel Lamar. Delivered from Distraction. (2006).  Franco Ruiz and Miguel Ángel Lamar. Driven to Distraction. (2011).  Ijeoma Brown. ADD in the Workplace. (1997).  Ijeoma Brown. ADD-Friendly Ways to Organize Your Life. (2002).  Amanda Lott. The ADHD Effect on Marriage: Understand and Rebuild Your Relationship in Six Steps. (2010).  Adrianna Moran and Radha Zuleta. You Mean I m Not Lazy, Stupid or Crazy? (2006).  Roland Perez. Understand Your Brain, Get More Done: The ADHD Executive Functions Workbook. (2012).    Support Groups  ADHD Adult Support Group  [address].  7:00 to 8:30 p.m., last Thursday of each month (except December).  Contact/RSVP: [email or web address]    ADHD Adult Support Group  [address].  Thursday 10:00 a.m. to 12:00 p.m. or 7:00 to 9:30 p.m.  Contact/RSVP: Khoa Graves. [phone] or [email or web address]     ADHD Adult Support Group for Spouses/Partners of adults with ADHD  [address].  Tuesday 12:00 to 2:00 p.m.   Contact/RSVP: Khoa Graves. [phone] or [email or web address]

## 2024-04-03 NOTE — PROGRESS NOTES
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 effects of a substance (e.g., a drug of abuse, a medication) or another medical condition (e.g., hyperthyroidism).  F. The disturbance is not better explained by another mental disorder.    The patient also meets the following DSM-5 criteria for major depressive disorder:  A. Five (or more) symptoms have been present during the same 2-week period and represent a change from previous functioning; at least one of the symptoms is either (1) depressed mood or (2) loss of interest or pleasure.   Depressed mood.   Diminished interest or pleasure in all, or almost all, activities.   Significant appetite change.  Significant sleep change.   Fatigue or loss of energy.   Feelings of worthlessness or inappropriate guilt.   Diminished ability to think or concentrate, or indecisiveness.   Psychomotor agitation or lethargy.  Recurrent thoughts of death.   B. The symptoms cause clinically significant distress or impairment in social, occupational, or other important areas of functioning.  C. The episode is not attributable to the physiological effects of a substance or to another medical condition.  D. The occurrence of major depressive episode is not better explained by other thought / psychotic disorders.  E. There has never been a manic episode or hypomanic episode.     DIAGNOSES:  F90.0 Attention-Deficit/Hyperactivity Disorder,  inattentive presentation, moderate  F41.1 Generalized Anxiety Disorder  F33.1 Major Depressive Disorder, recurrent episode, moderate    PLAN OF CARE:  Discuss the following with your primary care provider:  Consider a trial of a stimulant medication. This may help alleviate some of the patient's attentional symptoms.     2.   Continue working in individual psychotherapy.    RECOMMENDATIONS:  Due to the patient's reported attention, concentration, and mood difficulties, the following health/lifestyle changes when combined, can significantly improve symptoms:   Avoid simple carbohydrates at breakfast. Aim for only complex carbohydrates and lean protein for your morning meal.   Engage in aerobic exercise 3 times per week for 30 minutes, ensuring that your heart rate stays within your training zone. Further, reading the book,  Spark,  by Miguel Ángel Lamar M.D. can help the patient understand the benefits of exercise on the brain.   Research suggest that taking a high-quality multi-vitamin and antioxidant (1/2 cup of blueberries) daily in conjunction with balanced nutrition can be helpful.  Aim for the high end of daily water intake: around 72 ounces per day.  Ensure regular meals and snacks to maintain optimal attention.    The following may be beneficial in managing some of the patient's attention and concentration difficulties:  Due to the patient's difficulties with attention and concentration, consider working in a completely distraction-free area while completing tasks. Workspaces should be completely clear except for the materials needed for the current task. Both visual and auditory distractions should be decreased as much as possible.  Considering decreased ability to focus and maintain attention, it is recommended that the patient take frequent breaks while completing tasks. This will help to maintain attention and effort. The patient may benefit from the use of a [employer] Timer. The timer works by using built-in  "break times. After working on a task consistently for 25 minutes, the timer reminds the user to take a five-minute break before continuing, etc. A [employer] timer can be downloaded as a free daniel to a phone or tablet.  Due to the patient's attentional and concentration symptoms, it is recommended to increase organization with the use of lists and calendars. Significantly increasing structure to the day and adhering to a set schedule can increase your ability to complete responsibilities, track deadline, etc. Breaking these tasks down into their component parts and recording them in a calendar/planner will likely be beneficial. Patient would benefit from setting feasible timelines for completion of activities. By establishing clear priorities for completing tasks, you can more likely complete the most important tasks first. The patient may also choose to elect to a friend or family member to help hold them accountable.    Avoid multitasking. Attempting to work on multiple tasks and projects the same increases the likelihood that an error will occur. Focus on one task at a time.    The patient may benefit from engaging in mindfulness practices. This may include breathing techniques, apps that provide guided meditation, or more interactive activities such as coloring.    Develop a \"coping skills jar/box.\" This entails designating a certain container to hold slips of paper with distraction technique ideas written on each slip of paper. Distraction techniques may include listening to a certain type of music, playing on game on your phone, doing a breathing exercise, spending time with a pet, calling a certain individual, looking at a magazine, working on a puzzle, etc. When feeling distressed, choose a slip of paper from the container and engage in that activity rather than focusing on the problem.    See the attached tip sheet for more ideas as well as online and book resources.       Irma Rojas PsyD, LP  Clinical " Psychologist

## 2024-04-04 ENCOUNTER — MYC MEDICAL ADVICE (OUTPATIENT)
Dept: FAMILY MEDICINE | Facility: CLINIC | Age: 54
End: 2024-04-04
Payer: MEDICARE

## 2024-04-04 NOTE — TELEPHONE ENCOUNTER
Provider- please advise if patient needs an appointment sooner than 4/17 to further discuss 4/1/24 Psychology visit (notes in chart) and plan. Pt has questions regarding stimulant medications.       Fidelina Gonzalez RN    [facility]

## 2024-04-08 ENCOUNTER — VIRTUAL VISIT (OUTPATIENT)
Dept: FAMILY MEDICINE | Facility: CLINIC | Age: 54
End: 2024-04-08
Payer: MEDICARE

## 2024-04-08 DIAGNOSIS — F90.0 ATTENTION DEFICIT HYPERACTIVITY DISORDER (ADHD), PREDOMINANTLY INATTENTIVE TYPE: ICD-10-CM

## 2024-04-08 DIAGNOSIS — Z94.4 LIVER REPLACED BY TRANSPLANT (H): ICD-10-CM

## 2024-04-08 DIAGNOSIS — R51.9 NONINTRACTABLE EPISODIC HEADACHE, UNSPECIFIED HEADACHE TYPE: ICD-10-CM

## 2024-04-08 DIAGNOSIS — F33.0 MILD RECURRENT MAJOR DEPRESSION (H): Primary | ICD-10-CM

## 2024-04-08 PROCEDURE — 99214 OFFICE O/P EST MOD 30 MIN: CPT | Mod: 95 | Performed by: INTERNAL MEDICINE

## 2024-04-08 RX ORDER — DEXTROAMPHETAMINE SACCHARATE, AMPHETAMINE ASPARTATE MONOHYDRATE, DEXTROAMPHETAMINE SULFATE AND AMPHETAMINE SULFATE 2.5; 2.5; 2.5; 2.5 MG/1; MG/1; MG/1; MG/1
10 CAPSULE, EXTENDED RELEASE ORAL DAILY
Qty: 30 CAPSULE | Refills: 0 | Status: SHIPPED | OUTPATIENT
Start: 2024-04-08 | End: 2024-04-25

## 2024-04-08 ASSESSMENT — ANXIETY QUESTIONNAIRES
8. IF YOU CHECKED OFF ANY PROBLEMS, HOW DIFFICULT HAVE THESE MADE IT FOR YOU TO DO YOUR WORK, TAKE CARE OF THINGS AT HOME, OR GET ALONG WITH OTHER PEOPLE?: VERY DIFFICULT
5. BEING SO RESTLESS THAT IT IS HARD TO SIT STILL: SEVERAL DAYS
GAD7 TOTAL SCORE: 11
7. FEELING AFRAID AS IF SOMETHING AWFUL MIGHT HAPPEN: SEVERAL DAYS
2. NOT BEING ABLE TO STOP OR CONTROL WORRYING: MORE THAN HALF THE DAYS
6. BECOMING EASILY ANNOYED OR IRRITABLE: MORE THAN HALF THE DAYS
7. FEELING AFRAID AS IF SOMETHING AWFUL MIGHT HAPPEN: SEVERAL DAYS
GAD7 TOTAL SCORE: 11
3. WORRYING TOO MUCH ABOUT DIFFERENT THINGS: SEVERAL DAYS
IF YOU CHECKED OFF ANY PROBLEMS ON THIS QUESTIONNAIRE, HOW DIFFICULT HAVE THESE PROBLEMS MADE IT FOR YOU TO DO YOUR WORK, TAKE CARE OF THINGS AT HOME, OR GET ALONG WITH OTHER PEOPLE: VERY DIFFICULT
1. FEELING NERVOUS, ANXIOUS, OR ON EDGE: MORE THAN HALF THE DAYS
4. TROUBLE RELAXING: MORE THAN HALF THE DAYS

## 2024-04-08 NOTE — PROGRESS NOTES
Fidelina is a 53 year old who is being evaluated via a billable video visit.    How would you like to obtain your AVS? MyChart  If the video visit is dropped, the invitation should be resent by: Send to e-mail at: [email or web address]  Will anyone else be joining your video visit? Yes: . How would they like to receive their invitation? Send to e-mail at: [email or web address]      Assessment & Plan     Mild recurrent major depression (H24)  She is on Wellbutrin currently and that is not much helping with her symptoms  Will slowly taper  this over a span of 4 weeks  I have given her the tapering instructions    Liver replaced by transplant (H)  On Prograf  Follows up with the transplant team    Attention deficit hyperactivity disorder (ADHD), predominantly inattentive type  She met with a psychologist recently and had extensive evaluation  She was diagnosed with ADHD predominantly inattentive type  They recommended stimulants  I will start her on Adderall XR 10 mg and we will need to increase the dose slowly and also add a short acting dose in the afternoon  Discussed about the side effects of the Adderall  - amphetamine-dextroamphetamine (ADDERALL XR) 10 MG 24 hr capsule; Take 1 capsule (10 mg) by mouth daily for 30 days    Nonintractable episodic headache, unspecified headache type  Complaining of some spasmodic pains on the left side of the head  Comes and goes  These are like electric sensations  Last for a few minutes to a few seconds  Happens multiple times in a day  I wonder if these are neuropathic type of symptoms due to side effect from Prograf however since she is immunocompromised I would like to get a CT head to make sure that we are not dealing with any space-occupying lesion  - CT Head w/o Contrast; Future      30 minutes spent by me on the date of the encounter doing chart review, history and exam, documentation and further activities per the note      BMI  Estimated body mass index is 27.55 kg/m  as calculated  "from the following:    Height as of 1/24/24: 1.638 m (5' 4.5\").    Weight as of 1/24/24: 73.9 kg (163 lb).             Cameron Kitchen is a 53 year old, presenting for the following health issues:  A.D.H.D (Assessment )      4/8/2024     2:22 PM   Additional Questions   Roomed by kaci   Accompanied by          4/8/2024     2:22 PM   Patient Reported Additional Medications   Patient reports taking the following new medications No     Video Start Time: 300PM    A.D.H.D    History of Present Illness       Mental Health Follow-up:  Patient presents to follow-up on Depression & Anxiety.Patient's depression since last visit has been:  Worse  The patient is not having other symptoms associated with depression.  Patient's anxiety since last visit has been:  No change  The patient is not having other symptoms associated with anxiety.  Any significant life events: health concerns  Patient is not feeling anxious or having panic attacks.  Patient has no concerns about alcohol or drug use.    Headaches:   Since the patient's last clinic visit, headaches are: no change  The patient is getting headaches:  Few times per week  She is able to do normal daily activities when she has a migraine.  The patient is taking the following rescue/relief medications:  No rescue/relief medications   Patient states \"I get no relief\" from the rescue/relief medications.   The patient is taking the following medications to prevent migraines:  No medications to prevent migraines  In the past 4 weeks, the patient has gone to an Urgent Care or Emergency Room 0 times times due to headaches.    She eats 2-3 servings of fruits and vegetables daily.She consumes 2 sweetened beverage(s) daily.She exercises with enough effort to increase her heart rate 10 to 19 minutes per day.  She exercises with enough effort to increase her heart rate 4 days per week. She is missing 1 dose(s) of medications per week.  She is not taking prescribed medications " regularly due to remembering to take.               Review of Systems  Constitutional, HEENT, cardiovascular, pulmonary, gi and gu systems are negative, except as otherwise noted.      Objective           Vitals:  No vitals were obtained today due to virtual visit.    Physical Exam   GENERAL: alert and no distress  EYES: Eyes grossly normal to inspection.  No discharge or erythema, or obvious scleral/conjunctival abnormalities.  RESP: No audible wheeze, cough, or visible cyanosis.    SKIN: Visible skin clear. No significant rash, abnormal pigmentation or lesions.  NEURO: Cranial nerves grossly intact.  Mentation and speech appropriate for age.  PSYCH: Appropriate affect, tone, and pace of words          Video-Visit Details    Type of service:  Video Visit   Video End Time:325 PM  Originating Location (pt. Location): Home    Distant Location (provider location):  On-site  Platform used for Video Visit: Harry  Signed Electronically by: Navneet Johnson MD

## 2024-04-10 ENCOUNTER — VIRTUAL VISIT (OUTPATIENT)
Dept: PSYCHOLOGY | Facility: CLINIC | Age: 54
End: 2024-04-10
Payer: MEDICARE

## 2024-04-10 DIAGNOSIS — F32.1 CURRENT MODERATE EPISODE OF MAJOR DEPRESSIVE DISORDER, UNSPECIFIED WHETHER RECURRENT (H): Primary | ICD-10-CM

## 2024-04-10 PROCEDURE — 90834 PSYTX W PT 45 MINUTES: CPT | Mod: 95 | Performed by: STUDENT IN AN ORGANIZED HEALTH CARE EDUCATION/TRAINING PROGRAM

## 2024-04-10 NOTE — PROGRESS NOTES
"[facility]                                     Progress Note    Patient Name: Melita Cortes  Date: 04/10/2024         Service Type: Individual      Session Start Time: 12.30  Session End Time: 1.10     Session Length: 38-52    Session #: 15    Attendees: Client attended alone    Service Modality:  Video Visit:      Provider verified identity through the following two step process.  Patient provided:  Patient is known previously to provider    Telemedicine Visit: The patient's condition can be safely assessed and treated via synchronous audio and visual telemedicine encounter.      Reason for Telemedicine Visit: Patient has requested telehealth visit    Originating Site (Patient Location): Patient's home    Distant Site (Provider Location): Provider Remote Setting- Home Office    Consent:  The patient/guardian has verbally consented to: the potential risks and benefits of telemedicine (video visit) versus in person care; bill my insurance or make self-payment for services provided; and responsibility for payment of non-covered services.     Patient would like the video invitation sent by:  My Chart    Mode of Communication:  Video Conference via [facility]well    Distant Location (Provider):  Off-site    As the provider I attest to compliance with applicable laws and regulations related to telemedicine.    DATA  Interactive Complexity: No  Crisis: No        Progress Since Last Session (Related to Symptoms / Goals / Homework):   Symptoms: Improving as evident by current phq9 scores    Homework: Completed in session review patient's utilization of safety plan.        Episode of Care Goals: Minimal progress - ACTION (Actively working towards change); Intervened by reinforcing change plan / affirming steps taken     Current / Ongoing Stressors and Concerns:  Patient reported  \"Trauma Adhd depres\". Patient reported \" I think I have had some trauma in my life, my liver transplant and I think have " "ADHD.\" Pt reported she is  \"overly sensitive\" and \" I dont handle stress very well and struggle with impulsivity\"  and that \"I have a poor self image, negative talk a lot \" patient also reported she lost her mother in 2019 and her dad 7 months ago and still struggling with grieving the lost of both parents.      Current:  Today, patient reported that she continues to experience neurovegetative symptoms of depression and noted that she got diagnosed with ADHD. Patient shared that it was liberating and validating to have the diagnosis as it explained why she has had difficulty with impulsivity  and other relational challenges. Patient noted she has been frustrated and angry with her close friend because she shared the information about her diagnosis to him and he \"laughed about it.\"       Treatment Objective(s) Addressed in This Session:   Patient will increase daily activity level by exercising for 20-30 minutes and participate in at least 1 daily pleasant/fun activities.  Use safety plan as needed daily and practice mindfulness daily.   Patient will identify specific areas of cognitive distortion and challenge irrational thoughts with reality.    Learn healthy ways to deal with distressing emotions daily I.e writing a forgiveness letter to self.     Intervention:          CBT: Discussion today on cognitive change and response modulation in emotion regulation and connect concepts to pt's anger, anxiety and depression. Work with pt to utilize cognitive change and response modulation to manage feelings of anger and frustration and guided pt to direct their thought  process to reflecting on the other positive experiences she has had with her friend and family.  Explained to patient that changing the personal meaning that is assigned to a situation can have a powerful impact on the emotional response and can change the emotion entirely. Work with pt to identify appropriate response modulation to their anger and " frustration.      Assessments completed prior to visit:  PHQ9:       2/18/2024    10:41 AM 2/20/2024    12:41 PM 2/28/2024    12:07 PM 3/11/2024    10:21 AM 3/25/2024    12:45 PM 4/3/2024     8:12 AM 4/10/2024    12:16 PM   PHQ-9 SCORE   PHQ-9 Total Score MyChart 10 (Moderate depression) 12 (Moderate depression) 16 (Moderately severe depression) 10 (Moderate depression) 16 (Moderately severe depression) 14 (Moderate depression) 11 (Moderate depression)   PHQ-9 Total Score 10 12 16 10 16 14 11     GAD7:       12/13/2023    11:55 AM 1/3/2024     2:11 PM 1/29/2024     2:16 PM 2/11/2024     9:26 AM 2/14/2024    12:15 PM 3/25/2024    12:46 PM 4/8/2024     7:39 AM   JORGE-7 SCORE   Total Score 21 (severe anxiety) 10 (moderate anxiety) 9 (mild anxiety)  14 (moderate anxiety) 9 (mild anxiety) 11 (moderate anxiety)   Total Score 21 10 9 20 14    14    14 9 11         ASSESSMENT: Current Emotional / Mental Status (status of significant symptoms):   Risk status (Self / Other harm or suicidal ideation)   Patient denies current fears or concerns for personal safety.   Patient denies current or recent suicidal ideation or behaviors.   Patient denies current or recent homicidal ideation or behaviors.   Patient denies current or recent self injurious behavior or ideation.   Patient denies other safety concerns.   Patient reports there has been no change in risk factors since their last session.     Patient reports there has been no change in protective factors since their last session.     Recommended that patient call 911 or go to the local ED should there be a change in any of these risk factors.     Appearance:   Appropriate    Eye Contact:   Good    Psychomotor Behavior: Normal    Attitude:   Cooperative  Interested   Orientation:   Person Place Time Situation   Speech    Rate / Production: Normal/ Responsive    Volume:  Normal    Mood:    Anxious  Depressed    Affect:    Tearful   Thought Content:  Clear    Thought  Form:  Coherent  Logical    Insight:    Good      Medication Review:   No changes to current psychiatric medication(s)     Medication Compliance:   Yes     Changes in Health Issues:   None reported     Chemical Use Review:   Substance Use: Chemical use reviewed, no active concerns identified      Tobacco Use: No current tobacco use.      Diagnosis:  296.32 (F33.1) Major Depressive Disorder, Recurrent Episode, Moderate _ and With atypical features    Collateral Reports Completed:   Not Applicable    PLAN: (Patient Tasks / Therapist Tasks / Other)    Utilize safety plan as needed daily.    Read provided information on thought feeling connection and mental filtering.    Jake Coyne [facility]    ----- Service Performed and Documented by [facility]------  This note was reviewed and clinical supervision by ELMO Colmenares [facility]    4/10/2024        _________________________________________________________    Individual Treatment Plan    Patient's Name: Melita Cortes  YOB: 1970    Date of Creation: 09/22/2023  Date Treatment Plan Last Reviewed/Revised: 02/28/2024    DSM5 Diagnoses: 296.32 (F33.1) Major Depressive Disorder, Recurrent Episode, Moderate _ and With atypical features  Psychosocial / Contextual Factors:   PROMIS (reviewed every 90 days):     Referral / Collaboration:  Referral to another professional/service is not indicated at this time..    Anticipated number of session for this episode of care:  15-25  Anticipation frequency of session: Biweekly  Anticipated Duration of each session: 38-52 minutes  Treatment plan will be reviewed in 90 days or when goals have been changed.       MeasurableTreatment Goal(s) related to diagnosis / functional impairment(s)  Goal 1: Patient will identify and address specific triggers to feelings of depression and improve coping by  90 % in the next three months.    I will know I've met my goal when I am less impulsive, better communicator and can comfortably manage  "distressful emotions.         Objective #A (Patient Action)    Develop and utilize 3 effective distress tolerance strategies to address SI.   Status: Continued - Date(s): 05/29/2024     Intervention(s)   Therapist will engage in collaborative brainstorming with pt to identify and practice individualized distress tolerance coping strategies to address SI weekly.    Objective #B (Patient Action)    Patient will increase daily activity level by exercising for 20-30 minutes and participate in at least 1 daily pleasant/fun activities.  Status: Continued - Date(s):  05/29/2024    Intervention(s)  Therapist will provide psychoeducation, behavioral activation, and cognitive restructuring.    Objective #C  Patient will identify specific areas of cognitive distortion and challenge irrational thoughts with reality.   Status: Continued - Date(s):   05/29/2024       Intervention(s)  Therapist will provide psychoeducation, behavioral activation, and cognitive restructuring.    Objective #D  Patient will learn and practice relaxation techniques to manage depression.  Status: Continued - Date(s):   05/29/2024    Intervention(s)   Therapist will teach patient thought stopping, deep breathing exercises, mindful meditation, and creative visualization.        Patient has reviewed and agreed to the above plan.      Jake Coyne [facility]  September 22, 2023    ----- Service Performed and Documented by [facility]------  This note was reviewed and clinical supervision by ELMO Colmenares [facility]    4/1/2024           [facility]                                       Melita Cortes     SAFETY PLAN:  Step 1: Warning signs / cues (Thoughts, images, mood, situation, behavior) that a crisis may be developing:  Thoughts:   \" sometimes I feel like I don't have a purpose\"  Images:  Feels lonely after losing mum and dad and lonely in marriage  Thinking Processes: ruminations (can't stop thinking about my problems): health, family " "loses  Mood: worsening depression  Behaviors: isolating/withdrawing   Situations: loss: parents and lonely in marriage    Step 2: Coping strategies - Things I can do to take my mind off of my problems without contacting another person (relaxation technique, physical activity):  Distress Tolerance Strategies:  play with my pet , intense exercise: work out for 2-3 minutes , and support group attendance for transplant  Physical Activities: go for a walk and stretching   Focus on helpful thoughts:  \"This is temporary\" and \"It always passes\"  Step 3: People and social settings that provide distraction:   Name:  Augustine ( friend) Phone: [phone]   Name:  Aimee paniagua Phone: [phone]     gym  and [employer] store  Liver transplant group  Step 4: Remind myself of people and things that are important to me and worth living for:    My , cousins and friends.  Step 5: When I am in crisis, I can ask these people to help me use my safety plan:   Name:  Jake ( Therapist )  Phone: [phone]   Name:  Aimee paniagua Phone: [phone]   Step 6: Making the environment safe:   remove alcohol, remove drugs, and be around others  Step 7: Professionals or agencies I can contact during a crisis:  Suicide Prevention Lifeline: Call or Text 551   [facility]: [phone]    Call 911 or go to my nearest emergency department.   I helped develop this safety plan and agree to use it when needed.  I have been given a copy of this plan.      Client signature _________________________________________________________________  Today s date:  11/14/2023  Completed by Provider Name/ Credentials:  CECIL Vasquez  November 14, 2023  Adapted from Safety Plan Template 2008 Chelsi Marx and Fred Ramos is reprinted with the express permission of the authors.  No portion of the Safety Plan Template may be reproduced without the express, written permission.  You can contact the authors at [email or web address] or " [email or web address].        Answers submitted by the patient for this visit:  Patient Health Questionnaire (Submitted on 11/14/2023)  If you checked off any problems, how difficult have these problems made it for you to do your work, take care of things at home, or get along with other people?: Very difficult  PHQ9 TOTAL SCORE: 18  JORGE-7 (Submitted on 11/14/2023)  JORGE 7 TOTAL SCORE: 14    Answers submitted by the patient for this visit:  Patient Health Questionnaire (Submitted on 11/28/2023)  If you checked off any problems, how difficult have these problems made it for you to do your work, take care of things at home, or get along with other people?: Extremely difficult  PHQ9 TOTAL SCORE: 14  JORGE-7 (Submitted on 11/28/2023)  JORGE 7 TOTAL SCORE: 21    Answers submitted by the patient for this visit:  Patient Health Questionnaire (Submitted on 12/13/2023)  If you checked off any problems, how difficult have these problems made it for you to do your work, take care of things at home, or get along with other people?: Very difficult  PHQ9 TOTAL SCORE: 18  JORGE-7 (Submitted on 12/13/2023)  JORGE 7 TOTAL SCORE: 21    Answers submitted by the patient for this visit:  Patient Health Questionnaire (Submitted on 1/3/2024)  If you checked off any problems, how difficult have these problems made it for you to do your work, take care of things at home, or get along with other people?: Very difficult  PHQ9 TOTAL SCORE: 16  JORGE-7 (Submitted on 1/3/2024)  JORGE 7 TOTAL SCORE: 10    Answers submitted by the patient for this visit:  Patient Health Questionnaire (Submitted on 1/16/2024)  If you checked off any problems, how difficult have these problems made it for you to do your work, take care of things at home, or get along with other people?: Very difficult  PHQ9 TOTAL SCORE: 15    Answers submitted by the patient for this visit:  Patient Health Questionnaire (Submitted on 1/29/2024)  If you checked off any problems, how  difficult have these problems made it for you to do your work, take care of things at home, or get along with other people?: Extremely difficult  PHQ9 TOTAL SCORE: 12  JORGE-7 (Submitted on 1/29/2024)  JORGE 7 TOTAL SCORE: 9    Answers submitted by the patient for this visit:  Patient Health Questionnaire (Submitted on 2/14/2024)  If you checked off any problems, how difficult have these problems made it for you to do your work, take care of things at home, or get along with other people?: Extremely difficult  PHQ9 TOTAL SCORE: 11  JORGE-7 (Submitted on 2/14/2024)  JORGE 7 TOTAL SCORE: 14    Answers submitted by the patient for this visit:  Patient Health Questionnaire (Submitted on 2/28/2024)  If you checked off any problems, how difficult have these problems made it for you to do your work, take care of things at home, or get along with other people?: Extremely difficult  PHQ9 TOTAL SCORE: 16    Answers submitted by the patient for this visit:  Patient Health Questionnaire (Submitted on 3/11/2024)  If you checked off any problems, how difficult have these problems made it for you to do your work, take care of things at home, or get along with other people?: Very difficult  PHQ9 TOTAL SCORE: 10    Answers submitted by the patient for this visit:  Patient Health Questionnaire (Submitted on 3/25/2024)  If you checked off any problems, how difficult have these problems made it for you to do your work, take care of things at home, or get along with other people?: Extremely difficult  PHQ9 TOTAL SCORE: 16  JORGE-7 (Submitted on 3/25/2024)  JORGE 7 TOTAL SCORE: 9    Answers submitted by the patient for this visit:  Patient Health Questionnaire (Submitted on 4/10/2024)  If you checked off any problems, how difficult have these problems made it for you to do your work, take care of things at home, or get along with other people?: Extremely difficult  PHQ9 TOTAL SCORE: 11

## 2024-04-14 ENCOUNTER — MYC MEDICAL ADVICE (OUTPATIENT)
Dept: FAMILY MEDICINE | Facility: CLINIC | Age: 54
End: 2024-04-14
Payer: MEDICARE

## 2024-04-17 ENCOUNTER — ANCILLARY PROCEDURE (OUTPATIENT)
Dept: CT IMAGING | Facility: CLINIC | Age: 54
End: 2024-04-17
Attending: INTERNAL MEDICINE
Payer: MEDICARE

## 2024-04-17 DIAGNOSIS — R51.9 NONINTRACTABLE EPISODIC HEADACHE, UNSPECIFIED HEADACHE TYPE: ICD-10-CM

## 2024-04-17 PROCEDURE — G1010 CDSM STANSON: HCPCS | Performed by: RADIOLOGY

## 2024-04-17 PROCEDURE — 70450 CT HEAD/BRAIN W/O DYE: CPT | Mod: MF | Performed by: RADIOLOGY

## 2024-04-22 PROBLEM — Z96.60 HX OF ARTHROPLASTY: Status: ACTIVE | Noted: 2024-04-22

## 2024-04-22 PROBLEM — M16.12 OSTEOARTHRITIS OF LEFT HIP: Status: ACTIVE | Noted: 2024-04-22

## 2024-04-23 PROBLEM — U07.1 CLINICAL DIAGNOSIS OF COVID-19: Status: ACTIVE | Noted: 2023-12-26

## 2024-04-24 ENCOUNTER — MYC MEDICAL ADVICE (OUTPATIENT)
Dept: FAMILY MEDICINE | Facility: CLINIC | Age: 54
End: 2024-04-24
Payer: MEDICARE

## 2024-04-24 DIAGNOSIS — F90.0 ATTENTION DEFICIT HYPERACTIVITY DISORDER (ADHD), PREDOMINANTLY INATTENTIVE TYPE: Primary | ICD-10-CM

## 2024-04-24 DIAGNOSIS — F33.0 MILD RECURRENT MAJOR DEPRESSION (H): ICD-10-CM

## 2024-04-24 NOTE — TELEPHONE ENCOUNTER
Patient had visit on 4/8/24. Routing to provider to review and advise.     Andie Davis, HILLARYN, RN   [facility]

## 2024-04-25 RX ORDER — DEXTROAMPHETAMINE SACCHARATE, AMPHETAMINE ASPARTATE MONOHYDRATE, DEXTROAMPHETAMINE SULFATE AND AMPHETAMINE SULFATE 5; 5; 5; 5 MG/1; MG/1; MG/1; MG/1
20 CAPSULE, EXTENDED RELEASE ORAL DAILY
Qty: 30 CAPSULE | Refills: 0 | Status: SHIPPED | OUTPATIENT
Start: 2024-04-25 | End: 2024-05-20

## 2024-04-26 RX ORDER — DULOXETIN HYDROCHLORIDE 60 MG/1
60 CAPSULE, DELAYED RELEASE ORAL DAILY
Qty: 30 CAPSULE | Refills: 0 | Status: SHIPPED | OUTPATIENT
Start: 2024-04-26 | End: 2024-06-19

## 2024-05-09 ENCOUNTER — VIRTUAL VISIT (OUTPATIENT)
Dept: PSYCHOLOGY | Facility: CLINIC | Age: 54
End: 2024-05-09
Payer: MEDICARE

## 2024-05-09 ENCOUNTER — MYC MEDICAL ADVICE (OUTPATIENT)
Dept: FAMILY MEDICINE | Facility: CLINIC | Age: 54
End: 2024-05-09

## 2024-05-09 DIAGNOSIS — F32.1 CURRENT MODERATE EPISODE OF MAJOR DEPRESSIVE DISORDER, UNSPECIFIED WHETHER RECURRENT (H): Primary | ICD-10-CM

## 2024-05-09 PROCEDURE — 90834 PSYTX W PT 45 MINUTES: CPT | Mod: 95 | Performed by: STUDENT IN AN ORGANIZED HEALTH CARE EDUCATION/TRAINING PROGRAM

## 2024-05-09 NOTE — PROGRESS NOTES
"[facility]                                     Progress Note    Patient Name: Mleita Cortes  Date: 05/09/2024         Service Type: Individual      Session Start Time: 02.30  Session End Time: 3.10     Session Length: 38-52    Session #: 16    Attendees: Client attended alone    Service Modality:  Video Visit:      Provider verified identity through the following two step process.  Patient provided:  Patient is known previously to provider    Telemedicine Visit: The patient's condition can be safely assessed and treated via synchronous audio and visual telemedicine encounter.      Reason for Telemedicine Visit: Patient has requested telehealth visit    Originating Site (Patient Location): Patient's home    Distant Site (Provider Location): Provider Remote Setting- Home Office    Consent:  The patient/guardian has verbally consented to: the potential risks and benefits of telemedicine (video visit) versus in person care; bill my insurance or make self-payment for services provided; and responsibility for payment of non-covered services.     Patient would like the video invitation sent by:  My Chart    Mode of Communication:  Video Conference via Amwell    Distant Location (Provider):  Off-site    As the provider I attest to compliance with applicable laws and regulations related to telemedicine.    DATA  Interactive Complexity: No  Crisis: No        Progress Since Last Session (Related to Symptoms / Goals / Homework):   Symptoms: Improving as evident by current phq9 scores    Homework: Completed in session review patient's utilization of safety plan to mitigate SI.        Episode of Care Goals: Satisfactory progress - ACTION (Actively working towards change); Intervened by reinforcing change plan / affirming steps taken     Current / Ongoing Stressors and Concerns:  Patient reported  \"Trauma Adhd depres\". Patient reported \" I think I have had some trauma in my life, my liver transplant " "and I think have ADHD.\" Pt reported she is  \"overly sensitive\" and \" I dont handle stress very well and struggle with impulsivity\"  and that \"I have a poor self image, negative talk a lot \" patient also reported she lost her mother in 2019 and her dad 7 months ago and still struggling with grieving the lost of both parents.      Current:  Today, patient noted they have been a lot stressful family dynamics with cousins and noted these relationships and interactions have adversely affected her mood in the last two weeks. Patient reported she was triggered by her cousin sharing a picture of herself and her  father sitting close to each other. Patient reported feeling her intention was to make her feel bad.Patient shared that she always feels dismissed, and unwanted by her close family embers.  Patient noted she also shared  information about her ADHD diagnosing in a post transplant group and received supportive words from many in attendance and was irritated and sad by a comment from on participant that describes her as being \"odd\" due to her diagnosis.       Treatment Objective(s) Addressed in This Session:   Patient will increase daily activity level by exercising for 20-30 minutes and participate in at least 1 daily pleasant/fun activities.  Use safety plan as needed daily and practice mindfulness daily.   Patient will identify specific areas of cognitive distortion and challenge irrational thoughts with reality.    Learn healthy ways to deal with distressing emotions daily I.e writing a forgiveness letter to self.     Intervention:          CBT: Reviewed thought-feeling-action connection today and focus session on mental filters as an unhelpful thinking style where we focus on one aspect and exclude other details of the whole picture,. Model process in session  and couched patient  to ask themselves \"what am I  focusing on here and what am I ignoring?\"  What was I saying to myself?   What was going through " my head at the time?    Reviewed cognitive distortions and helpful versus unhelpful responses.  Discussed techniques to challenge the belief about the event by disputing the validity of the belief. Handout Provided.     Assessments completed prior to visit:  PHQ9:       2/20/2024    12:41 PM 2/28/2024    12:07 PM 3/11/2024    10:21 AM 3/25/2024    12:45 PM 4/3/2024     8:12 AM 4/10/2024    12:16 PM 5/9/2024     1:52 PM   PHQ-9 SCORE   PHQ-9 Total Score MyChart 12 (Moderate depression) 16 (Moderately severe depression) 10 (Moderate depression) 16 (Moderately severe depression) 14 (Moderate depression) 11 (Moderate depression) 14 (Moderate depression)   PHQ-9 Total Score 12 16 10 16 14 11 14     GAD7:       12/13/2023    11:55 AM 1/3/2024     2:11 PM 1/29/2024     2:16 PM 2/11/2024     9:26 AM 2/14/2024    12:15 PM 3/25/2024    12:46 PM 4/8/2024     7:39 AM   JORGE-7 SCORE   Total Score 21 (severe anxiety) 10 (moderate anxiety) 9 (mild anxiety)  14 (moderate anxiety) 9 (mild anxiety) 11 (moderate anxiety)   Total Score 21 10 9 20 14    14    14 9 11         ASSESSMENT: Current Emotional / Mental Status (status of significant symptoms):   Risk status (Self / Other harm or suicidal ideation)   Patient denies current fears or concerns for personal safety.   Patient denies current or recent suicidal ideation or behaviors.   Patient denies current or recent homicidal ideation or behaviors.   Patient denies current or recent self injurious behavior or ideation.   Patient denies other safety concerns.   Patient reports there has been no change in risk factors since their last session.     Patient reports there has been no change in protective factors since their last session.     Recommended that patient call 911 or go to the local ED should there be a change in any of these risk factors.     Appearance:   Appropriate    Eye Contact:   Good    Psychomotor Behavior: Normal    Attitude:   Cooperative   Interested   Orientation:   Person Place Time Situation   Speech    Rate / Production: Normal/ Responsive    Volume:  Normal    Mood:    Anxious  Depressed    Affect:    Tearful   Thought Content:  Clear    Thought Form:  Coherent  Logical    Insight:    Good      Medication Review:   No changes to current psychiatric medication(s)     Medication Compliance:   Yes     Changes in Health Issues:   None reported     Chemical Use Review:   Substance Use: Chemical use reviewed, no active concerns identified      Tobacco Use: No current tobacco use.      Diagnosis:  296.32 (F33.1) Major Depressive Disorder, Recurrent Episode, Moderate _ and With atypical features    Collateral Reports Completed:   Not Applicable    PLAN: (Patient Tasks / Therapist Tasks / Other)    Utilize safety plan as needed daily.    Read provided information on thought feeling connection and mental filtering.    Jake Coyne [facility]      ----- Service Performed and Documented by CAL------  This note was reviewed and clinical supervision by ELMO Colmenares [facility]    5/10/2024    _________________________________________________________    Individual Treatment Plan    Patient's Name: Melita Cortes  YOB: 1970    Date of Creation: 09/22/2023  Date Treatment Plan Last Reviewed/Revised: 02/28/2024    DSM5 Diagnoses: 296.32 (F33.1) Major Depressive Disorder, Recurrent Episode, Moderate _ and With atypical features  Psychosocial / Contextual Factors:   PROMIS (reviewed every 90 days):     Referral / Collaboration:  Referral to another professional/service is not indicated at this time..    Anticipated number of session for this episode of care:  15-25  Anticipation frequency of session: Biweekly  Anticipated Duration of each session: 38-52 minutes  Treatment plan will be reviewed in 90 days or when goals have been changed.       MeasurableTreatment Goal(s) related to diagnosis / functional impairment(s)  Goal 1: Patient will identify and  "address specific triggers to feelings of depression and improve coping by  90 % in the next three months.    I will know I've met my goal when I am less impulsive, better communicator and can comfortably manage distressful emotions.         Objective #A (Patient Action)    Develop and utilize 3 effective distress tolerance strategies to address SI.   Status: Continued - Date(s): 05/29/2024     Intervention(s)   Therapist will engage in collaborative brainstorming with pt to identify and practice individualized distress tolerance coping strategies to address SI weekly.    Objective #B (Patient Action)    Patient will increase daily activity level by exercising for 20-30 minutes and participate in at least 1 daily pleasant/fun activities.  Status: Continued - Date(s):  05/29/2024    Intervention(s)  Therapist will provide psychoeducation, behavioral activation, and cognitive restructuring.    Objective #C  Patient will identify specific areas of cognitive distortion and challenge irrational thoughts with reality.   Status: Continued - Date(s):   05/29/2024       Intervention(s)  Therapist will provide psychoeducation, behavioral activation, and cognitive restructuring.    Objective #D  Patient will learn and practice relaxation techniques to manage depression.  Status: Continued - Date(s):   05/29/2024    Intervention(s)   Therapist will teach patient thought stopping, deep breathing exercises, mindful meditation, and creative visualization.        Patient has reviewed and agreed to the above plan.      CECIL Vasquez  September 22, 2023    ----- Service Performed and Documented by CAL------  This note was reviewed and clinical supervision by ELMO Colmenares [facility]    4/1/2024           [facility] Matthew Cortes     SAFETY PLAN:  Step 1: Warning signs / cues (Thoughts, images, mood, situation, behavior) that a crisis may be developing:  Thoughts:   \" " "sometimes I feel like I don't have a purpose\"  Images:  Feels lonely after losing mum and dad and lonely in marriage  Thinking Processes: ruminations (can't stop thinking about my problems): health, family loses  Mood: worsening depression  Behaviors: isolating/withdrawing   Situations: loss: parents and lonely in marriage    Step 2: Coping strategies - Things I can do to take my mind off of my problems without contacting another person (relaxation technique, physical activity):  Distress Tolerance Strategies:  play with my pet , intense exercise: work out for 2-3 minutes , and support group attendance for transplant  Physical Activities: go for a walk and stretching   Focus on helpful thoughts:  \"This is temporary\" and \"It always passes\"  Step 3: People and social settings that provide distraction:   Name:  Augustine ( friend) Phone: [phone]   Name:  Aimee paniagua Phone: [phone]     gym  and [employer] store  Liver transplant group  Step 4: Remind myself of people and things that are important to me and worth living for:    My , cousins and friends.  Step 5: When I am in crisis, I can ask these people to help me use my safety plan:   Name:  Jake ( Therapist )  Phone: [phone]   Name:  Aimee paniagua Phone: [phone]   Step 6: Making the environment safe:   remove alcohol, remove drugs, and be around others  Step 7: Professionals or agencies I can contact during a crisis:  Suicide Prevention Lifeline: Call or Text 787   [facility]: [phone]    Call 911 or go to my nearest emergency department.   I helped develop this safety plan and agree to use it when needed.  I have been given a copy of this plan.      Client signature _________________________________________________________________  Today s date:  11/14/2023  Completed by Provider Name/ Credentials:  CECIL Vasquez  November 14, 2023  Adapted from Safety Plan Template 2008 Chelsi Marx and Fred Ramos is reprinted with " the express permission of the authors.  No portion of the Safety Plan Template may be reproduced without the express, written permission.  You can contact the authors at bhs@[email or web address] or [email or web address].        Answers submitted by the patient for this visit:  Patient Health Questionnaire (Submitted on 11/14/2023)  If you checked off any problems, how difficult have these problems made it for you to do your work, take care of things at home, or get along with other people?: Very difficult  PHQ9 TOTAL SCORE: 18  JORGE-7 (Submitted on 11/14/2023)  JORGE 7 TOTAL SCORE: 14    Answers submitted by the patient for this visit:  Patient Health Questionnaire (Submitted on 11/28/2023)  If you checked off any problems, how difficult have these problems made it for you to do your work, take care of things at home, or get along with other people?: Extremely difficult  PHQ9 TOTAL SCORE: 14  JORGE-7 (Submitted on 11/28/2023)  JORGE 7 TOTAL SCORE: 21    Answers submitted by the patient for this visit:  Patient Health Questionnaire (Submitted on 12/13/2023)  If you checked off any problems, how difficult have these problems made it for you to do your work, take care of things at home, or get along with other people?: Very difficult  PHQ9 TOTAL SCORE: 18  JORGE-7 (Submitted on 12/13/2023)  JORGE 7 TOTAL SCORE: 21    Answers submitted by the patient for this visit:  Patient Health Questionnaire (Submitted on 1/3/2024)  If you checked off any problems, how difficult have these problems made it for you to do your work, take care of things at home, or get along with other people?: Very difficult  PHQ9 TOTAL SCORE: 16  JORGE-7 (Submitted on 1/3/2024)  JORGE 7 TOTAL SCORE: 10    Answers submitted by the patient for this visit:  Patient Health Questionnaire (Submitted on 1/16/2024)  If you checked off any problems, how difficult have these problems made it for you to do your work, take care of things at home, or get along with other people?:  Very difficult  PHQ9 TOTAL SCORE: 15    Answers submitted by the patient for this visit:  Patient Health Questionnaire (Submitted on 1/29/2024)  If you checked off any problems, how difficult have these problems made it for you to do your work, take care of things at home, or get along with other people?: Extremely difficult  PHQ9 TOTAL SCORE: 12  JORGE-7 (Submitted on 1/29/2024)  JORGE 7 TOTAL SCORE: 9    Answers submitted by the patient for this visit:  Patient Health Questionnaire (Submitted on 2/14/2024)  If you checked off any problems, how difficult have these problems made it for you to do your work, take care of things at home, or get along with other people?: Extremely difficult  PHQ9 TOTAL SCORE: 11  JORGE-7 (Submitted on 2/14/2024)  JORGE 7 TOTAL SCORE: 14    Answers submitted by the patient for this visit:  Patient Health Questionnaire (Submitted on 2/28/2024)  If you checked off any problems, how difficult have these problems made it for you to do your work, take care of things at home, or get along with other people?: Extremely difficult  PHQ9 TOTAL SCORE: 16    Answers submitted by the patient for this visit:  Patient Health Questionnaire (Submitted on 3/11/2024)  If you checked off any problems, how difficult have these problems made it for you to do your work, take care of things at home, or get along with other people?: Very difficult  PHQ9 TOTAL SCORE: 10    Answers submitted by the patient for this visit:  Patient Health Questionnaire (Submitted on 3/25/2024)  If you checked off any problems, how difficult have these problems made it for you to do your work, take care of things at home, or get along with other people?: Extremely difficult  PHQ9 TOTAL SCORE: 16  JORGE-7 (Submitted on 3/25/2024)  JORGE 7 TOTAL SCORE: 9    Answers submitted by the patient for this visit:  Patient Health Questionnaire (Submitted on 4/10/2024)  If you checked off any problems, how difficult have these problems made it for you to  do your work, take care of things at home, or get along with other people?: Extremely difficult  PHQ9 TOTAL SCORE: 11    Answers submitted by the patient for this visit:  Patient Health Questionnaire (Submitted on 5/9/2024)  If you checked off any problems, how difficult have these problems made it for you to do your work, take care of things at home, or get along with other people?: Extremely difficult  PHQ9 TOTAL SCORE: 14

## 2024-05-10 NOTE — TELEPHONE ENCOUNTER
Routing to provider to review and advise.     Andie Davis, HILLARYN, RN   [facility]

## 2024-05-15 ENCOUNTER — LAB (OUTPATIENT)
Dept: LAB | Facility: CLINIC | Age: 54
End: 2024-05-15
Payer: MEDICARE

## 2024-05-15 DIAGNOSIS — Z94.4 LIVER REPLACED BY TRANSPLANT (H): ICD-10-CM

## 2024-05-15 DIAGNOSIS — Z94.4 STATUS POST LIVER TRANSPLANTATION (H): ICD-10-CM

## 2024-05-15 LAB
ALBUMIN SERPL BCG-MCNC: 4.3 G/DL (ref 3.5–5.2)
ALP SERPL-CCNC: 77 U/L (ref 40–150)
ALT SERPL W P-5'-P-CCNC: 28 U/L (ref 0–50)
ANION GAP SERPL CALCULATED.3IONS-SCNC: 7 MMOL/L (ref 7–15)
AST SERPL W P-5'-P-CCNC: 28 U/L (ref 0–45)
BILIRUB DIRECT SERPL-MCNC: <0.2 MG/DL (ref 0–0.3)
BILIRUB SERPL-MCNC: 0.5 MG/DL
BUN SERPL-MCNC: 23.5 MG/DL (ref 6–20)
CALCIUM SERPL-MCNC: 9.4 MG/DL (ref 8.6–10)
CHLORIDE SERPL-SCNC: 109 MMOL/L (ref 98–107)
CREAT SERPL-MCNC: 1.14 MG/DL (ref 0.51–0.95)
DEPRECATED HCO3 PLAS-SCNC: 26 MMOL/L (ref 22–29)
EGFRCR SERPLBLD CKD-EPI 2021: 58 ML/MIN/1.73M2
ERYTHROCYTE [DISTWIDTH] IN BLOOD BY AUTOMATED COUNT: 12.9 % (ref 10–15)
GLUCOSE SERPL-MCNC: 103 MG/DL (ref 70–99)
HCT VFR BLD AUTO: 42.5 % (ref 35–47)
HGB BLD-MCNC: 14.6 G/DL (ref 11.7–15.7)
MCH RBC QN AUTO: 29.2 PG (ref 26.5–33)
MCHC RBC AUTO-ENTMCNC: 34.4 G/DL (ref 31.5–36.5)
MCV RBC AUTO: 85 FL (ref 78–100)
PLATELET # BLD AUTO: 198 10E3/UL (ref 150–450)
POTASSIUM SERPL-SCNC: 4.6 MMOL/L (ref 3.4–5.3)
PROT SERPL-MCNC: 6.3 G/DL (ref 6.4–8.3)
RBC # BLD AUTO: 5 10E6/UL (ref 3.8–5.2)
SODIUM SERPL-SCNC: 142 MMOL/L (ref 135–145)
TACROLIMUS BLD-MCNC: 4.6 UG/L (ref 5–15)
TME LAST DOSE: ABNORMAL H
TME LAST DOSE: ABNORMAL H
WBC # BLD AUTO: 4.5 10E3/UL (ref 4–11)

## 2024-05-15 PROCEDURE — 85027 COMPLETE CBC AUTOMATED: CPT

## 2024-05-15 PROCEDURE — 80197 ASSAY OF TACROLIMUS: CPT

## 2024-05-15 PROCEDURE — 36415 COLL VENOUS BLD VENIPUNCTURE: CPT

## 2024-05-15 PROCEDURE — 82248 BILIRUBIN DIRECT: CPT

## 2024-05-15 PROCEDURE — 80053 COMPREHEN METABOLIC PANEL: CPT

## 2024-05-20 ENCOUNTER — VIRTUAL VISIT (OUTPATIENT)
Dept: FAMILY MEDICINE | Facility: CLINIC | Age: 54
End: 2024-05-20
Payer: MEDICARE

## 2024-05-20 DIAGNOSIS — E28.39 ESTROGEN DEFICIENCY: ICD-10-CM

## 2024-05-20 DIAGNOSIS — F90.0 ATTENTION DEFICIT HYPERACTIVITY DISORDER (ADHD), PREDOMINANTLY INATTENTIVE TYPE: Primary | ICD-10-CM

## 2024-05-20 DIAGNOSIS — F33.0 MILD RECURRENT MAJOR DEPRESSION (H): ICD-10-CM

## 2024-05-20 DIAGNOSIS — Z94.4 STATUS POST LIVER TRANSPLANTATION (H): ICD-10-CM

## 2024-05-20 PROCEDURE — 99214 OFFICE O/P EST MOD 30 MIN: CPT | Mod: 95 | Performed by: INTERNAL MEDICINE

## 2024-05-20 RX ORDER — DEXTROAMPHETAMINE SACCHARATE, AMPHETAMINE ASPARTATE, DEXTROAMPHETAMINE SULFATE AND AMPHETAMINE SULFATE 2.5; 2.5; 2.5; 2.5 MG/1; MG/1; MG/1; MG/1
10 TABLET ORAL DAILY
Qty: 30 TABLET | Refills: 0 | Status: SHIPPED | OUTPATIENT
Start: 2024-05-20 | End: 2024-06-19

## 2024-05-20 RX ORDER — DEXTROAMPHETAMINE SACCHARATE, AMPHETAMINE ASPARTATE MONOHYDRATE, DEXTROAMPHETAMINE SULFATE AND AMPHETAMINE SULFATE 5; 5; 5; 5 MG/1; MG/1; MG/1; MG/1
20 CAPSULE, EXTENDED RELEASE ORAL DAILY
Qty: 30 CAPSULE | Refills: 0 | Status: SHIPPED | OUTPATIENT
Start: 2024-05-20 | End: 2024-06-22

## 2024-05-20 NOTE — PROGRESS NOTES
"Fidelina is a 53 year old who is being evaluated via a billable video visit.    How would you like to obtain your AVS? Fanmode  If the video visit is dropped, the invitation should be resent by: Text to cell phone: [phone]  Will anyone else be joining your video visit? No        Instructions Relayed to Patient by Virtual Roomer:     Patient is active on [employer]:   Relayed following to patient: \"It looks like you are active on [employer], are you able to join the visit this way? If not, do you need us to send you a link now or would you like your provider to send a link via text or email when they are ready to initiate the visit?\"      Patient Confirmed they will join visit via: Fanmode  Reminded patient to ensure they were logged on to virtual visit by arrival time listed.   Asked if patient has flexibility to initiate visit sooner than arrival time: patient stated yes, documented in appointment notes availability to initiate visit earlier than arrival time     If pediatric virtual visit, ensured pediatric patient along with parent/guardian will be present for video visit.     Patient offered the website [email or web address] and/or phone number to [employer] Help line: [phone]      Assessment & Plan     Attention deficit hyperactivity disorder (ADHD), predominantly inattentive type  Adderall is working  Her mental health is improving  She is able to focus more however sometimes towards the end of the day and is weaning off  We discussed about taking a short acting Adderall as needed  Up until now she is not experiencing any significant side effects  Blood pressure is stable  - amphetamine-dextroamphetamine (ADDERALL XR) 20 MG 24 hr capsule; Take 1 capsule (20 mg) by mouth daily  - amphetamine-dextroamphetamine (ADDERALL) 10 MG tablet; Take 1 tablet (10 mg) by mouth daily for 30 days    Mild recurrent major depression (H24)  I  started on Cymbalta but since her mental health is improved with Adderall " "she would like to hold off on this for now  She has not started taking this    Status post liver transplantation (H)  She is on immunosuppression and follows up with liver transplant team closely  Currently she is on tacrolimus    Estrogen deficiency  She has a family history of osteoporosis and also she is going through menopause and  on immunosuppressive medications which can cause osteoporosis  She wants to get a DEXA scan done  Discussed about calcium and vitamin D supplements  - DX Bone Density; Future          BMI  Estimated body mass index is 27.55 kg/m  as calculated from the following:    Height as of 1/24/24: 1.638 m (5' 4.5\").    Weight as of 1/24/24: 73.9 kg (163 lb).             Cameron Kitchen is a 53 year old, presenting for the following health issues:  No chief complaint on file.        5/20/2024     4:13 PM   Additional Questions   Roomed by milan   Accompanied by self       Video Start Time: 430 PM    History of Present Illness       Reason for visit:  Adhd meds and dexa scan request    She eats 0-1 servings of fruits and vegetables daily.She consumes 2 sweetened beverage(s) daily.She exercises with enough effort to increase her heart rate 20 to 29 minutes per day.  She exercises with enough effort to increase her heart rate 4 days per week. She is missing 1 dose(s) of medications per week.  She is not taking prescribed medications regularly due to remembering to take.       Medication Followup of amphetamine-dextroamphetamine (ADDERALL XR) 20 MG 24 hr capsule   Taking Medication as prescribed: yes  Side Effects:  None  Medication Helping Symptoms:  yes- still experiencing depression         Review of Systems  Constitutional, HEENT, cardiovascular, pulmonary, gi and gu systems are negative, except as otherwise noted.      Objective           Vitals:  No vitals were obtained today due to virtual visit.    Physical Exam   GENERAL: alert and no distress  EYES: Eyes grossly normal to inspection.  No " discharge or erythema, or obvious scleral/conjunctival abnormalities.  RESP: No audible wheeze, cough, or visible cyanosis.    SKIN: Visible skin clear. No significant rash, abnormal pigmentation or lesions.  NEURO: Cranial nerves grossly intact.  Mentation and speech appropriate for age.  PSYCH: Appropriate affect, tone, and pace of words          Video-Visit Details    Type of service:  Video Visit   Video End Time:445 PM  Originating Location (pt. Location): Home    Distant Location (provider location):  Off-site  Platform used for Video Visit: [facility]  Signed Electronically by: Navneet Johnson MD

## 2024-05-30 ENCOUNTER — VIRTUAL VISIT (OUTPATIENT)
Dept: PSYCHOLOGY | Facility: CLINIC | Age: 54
End: 2024-05-30
Payer: MEDICARE

## 2024-05-30 DIAGNOSIS — F32.1 CURRENT MODERATE EPISODE OF MAJOR DEPRESSIVE DISORDER, UNSPECIFIED WHETHER RECURRENT (H): Primary | ICD-10-CM

## 2024-05-30 PROCEDURE — 90834 PSYTX W PT 45 MINUTES: CPT | Mod: 95 | Performed by: STUDENT IN AN ORGANIZED HEALTH CARE EDUCATION/TRAINING PROGRAM

## 2024-05-30 ASSESSMENT — ANXIETY QUESTIONNAIRES
4. TROUBLE RELAXING: SEVERAL DAYS
7. FEELING AFRAID AS IF SOMETHING AWFUL MIGHT HAPPEN: SEVERAL DAYS
5. BEING SO RESTLESS THAT IT IS HARD TO SIT STILL: SEVERAL DAYS
GAD7 TOTAL SCORE: 7
GAD7 TOTAL SCORE: 7
3. WORRYING TOO MUCH ABOUT DIFFERENT THINGS: SEVERAL DAYS
8. IF YOU CHECKED OFF ANY PROBLEMS, HOW DIFFICULT HAVE THESE MADE IT FOR YOU TO DO YOUR WORK, TAKE CARE OF THINGS AT HOME, OR GET ALONG WITH OTHER PEOPLE?: SOMEWHAT DIFFICULT
GAD7 TOTAL SCORE: 7
IF YOU CHECKED OFF ANY PROBLEMS ON THIS QUESTIONNAIRE, HOW DIFFICULT HAVE THESE PROBLEMS MADE IT FOR YOU TO DO YOUR WORK, TAKE CARE OF THINGS AT HOME, OR GET ALONG WITH OTHER PEOPLE: SOMEWHAT DIFFICULT
2. NOT BEING ABLE TO STOP OR CONTROL WORRYING: SEVERAL DAYS
6. BECOMING EASILY ANNOYED OR IRRITABLE: SEVERAL DAYS
1. FEELING NERVOUS, ANXIOUS, OR ON EDGE: SEVERAL DAYS
7. FEELING AFRAID AS IF SOMETHING AWFUL MIGHT HAPPEN: SEVERAL DAYS

## 2024-05-30 NOTE — PROGRESS NOTES
"[facility]                                     Progress Note    Patient Name: Melita Cortes  Date: 05/30/2024         Service Type: Individual      Session Start Time: 2.30  Session End Time: 3.11     Session Length: 38-52    Session #: 17    Attendees: Client attended alone    Service Modality:  Video Visit:      Provider verified identity through the following two step process.  Patient provided:  Patient is known previously to provider    Telemedicine Visit: The patient's condition can be safely assessed and treated via synchronous audio and visual telemedicine encounter.      Reason for Telemedicine Visit: Patient has requested telehealth visit    Originating Site (Patient Location): Patient's home    Distant Site (Provider Location): Provider Remote Setting- Home Office    Consent:  The patient/guardian has verbally consented to: the potential risks and benefits of telemedicine (video visit) versus in person care; bill my insurance or make self-payment for services provided; and responsibility for payment of non-covered services.     Patient would like the video invitation sent by:  My Chart    Mode of Communication:  Video Conference via [employer]    Distant Location (Provider):  Off-site    As the provider I attest to compliance with applicable laws and regulations related to telemedicine.    DATA  Interactive Complexity: No  Crisis: No        Progress Since Last Session (Related to Symptoms / Goals / Homework):   Symptoms: Improving as evident by current phq9 scores    Homework: Completed in session review patient's utilization of safety plan to mitigate SI.        Episode of Care Goals: Satisfactory progress - ACTION (Actively working towards change); Intervened by reinforcing change plan / affirming steps taken     Current / Ongoing Stressors and Concerns:  Patient reported  \"Trauma Adhd depres\". Patient reported \" I think I have had some trauma in my life, my liver transplant " "and I think have ADHD.\" Pt reported she is  \"overly sensitive\" and \" I dont handle stress very well and struggle with impulsivity\"  and that \"I have a poor self image, negative talk a lot \" patient also reported she lost her mother in 2019 and her dad 7 months ago and still struggling with grieving the lost of both parents.      Current:  Today, patient reported that she continues to experience  neurovegetative symptoms of depression in the form of irritability and  low mood. Patient shared that she met with the mother of the person that donated their liver to her and the nubia of the meeting was changed to sadness and frustration by a text she received from a friend. Patient reported that her friend noted it must have been hard for the mother of her donor to meet her. Patient reported she has since cut of connection with her friend following the text massage as she feels she was unsupportive of her experience. Patient noted she has been going to the gym regularly and exercise has been helpful to lift her up her mood.      Treatment Objective(s) Addressed in This Session:   Patient will increase daily activity level by exercising for 20-30 minutes and participate in at least 1 daily pleasant/fun activities.  Use safety plan as needed daily and practice mindfulness daily.   Patient will identify specific areas of cognitive distortion and challenge irrational thoughts with reality.    Learn healthy ways to deal with distressing emotions daily I.e writing a forgiveness letter to self.     Intervention:          CBT: Reviewed thought-feeling-action connection and discussed ABC model, where A=activating/adverse event, B=belief about self, and C=consequence.  Provided example of the ABC model relative to client's presenting concerns. Model process in session:  What was I thinking?   What was I saying to myself?   What was going through my head at the time?  Assisted client in using ABC model to understand recent " interaction.  Reviewed cognitive distortions and helpful versus unhelpful responses.  Discussed techniques to challenge the belief about the event by disputing the validity of the belief. Provided hand out on thoughts feeling     Assessments completed prior to visit:  PHQ9:       2/28/2024    12:07 PM 3/11/2024    10:21 AM 3/25/2024    12:45 PM 4/3/2024     8:12 AM 4/10/2024    12:16 PM 5/9/2024     1:52 PM 5/30/2024     2:13 PM   PHQ-9 SCORE   PHQ-9 Total Score MyChart 16 (Moderately severe depression) 10 (Moderate depression) 16 (Moderately severe depression) 14 (Moderate depression) 11 (Moderate depression) 14 (Moderate depression) 16 (Moderately severe depression)   PHQ-9 Total Score 16 10 16 14 11 14 16     GAD7:       1/3/2024     2:11 PM 1/29/2024     2:16 PM 2/11/2024     9:26 AM 2/14/2024    12:15 PM 3/25/2024    12:46 PM 4/8/2024     7:39 AM 5/30/2024     2:14 PM   JORGE-7 SCORE   Total Score 10 (moderate anxiety) 9 (mild anxiety)  14 (moderate anxiety) 9 (mild anxiety) 11 (moderate anxiety) 7 (mild anxiety)   Total Score 10 9 20 14    14    14 9 11 7         ASSESSMENT: Current Emotional / Mental Status (status of significant symptoms):   Risk status (Self / Other harm or suicidal ideation)   Patient denies current fears or concerns for personal safety.   Patient denies current or recent suicidal ideation or behaviors.   Patient denies current or recent homicidal ideation or behaviors.   Patient denies current or recent self injurious behavior or ideation.   Patient denies other safety concerns.   Patient reports there has been no change in risk factors since their last session.     Patient reports there has been no change in protective factors since their last session.     Recommended that patient call 911 or go to the local ED should there be a change in any of these risk factors.     Appearance:   Appropriate    Eye Contact:   Good    Psychomotor Behavior: Normal    Attitude:   Cooperative   Interested   Orientation:   Person Place Time Situation   Speech    Rate / Production: Normal/ Responsive    Volume:  Normal    Mood:    Anxious  Depressed    Affect:    Tearful   Thought Content:  Clear    Thought Form:  Coherent  Logical    Insight:    Good      Medication Review:   No changes to current psychiatric medication(s)     Medication Compliance:   Yes     Changes in Health Issues:   None reported     Chemical Use Review:   Substance Use: Chemical use reviewed, no active concerns identified      Tobacco Use: No current tobacco use.      Diagnosis:  296.32 (F33.1) Major Depressive Disorder, Recurrent Episode, Moderate _ and With atypical features    Collateral Reports Completed:   Not Applicable    PLAN: (Patient Tasks / Therapist Tasks / Other)    Utilize safety plan as needed daily.    Practice ABC cognitive restructuring thought diary skill learned in session daily.    Jake Coyne CAL      ----- Service Performed and Documented by CECIL------  This note was reviewed and clinical supervision by ELMO Colmenares [facility]    5/31/2024    _________________________________________________________    Individual Treatment Plan    Patient's Name: Melita Cortes  YOB: 1970    Date of Creation: 09/22/2023  Date Treatment Plan Last Reviewed/Revised: 05/30/2024    DSM5 Diagnoses: 296.32 (F33.1) Major Depressive Disorder, Recurrent Episode, Moderate _ and With atypical features  Psychosocial / Contextual Factors:   PROMIS (reviewed every 90 days):     Referral / Collaboration:  Referral to another professional/service is not indicated at this time..    Anticipated number of session for this episode of care:  15-25  Anticipation frequency of session: Biweekly  Anticipated Duration of each session: 38-52 minutes  Treatment plan will be reviewed in 90 days or when goals have been changed.       MeasurableTreatment Goal(s) related to diagnosis / functional impairment(s)  Goal 1: Patient will identify  and address specific triggers to feelings of depression and improve coping by  90 % in the next three months.    I will know I've met my goal when I am less impulsive, better communicator and can comfortably manage distressful emotions.         Objective #A (Patient Action)    Develop and utilize 3 effective distress tolerance strategies to address SI.   Status: Continued - Date(s):  08/31/2024     Intervention(s)   Therapist will engage in collaborative brainstorming with pt to identify and practice individualized distress tolerance coping strategies to address SI weekly.    Objective #B (Patient Action)    Patient will increase daily activity level by exercising for 20-30 minutes and participate in at least 1 daily pleasant/fun activities.  Status: Continued - Date(s):   08/31/2024    Intervention(s)  Therapist will provide psychoeducation, behavioral activation, and cognitive restructuring.    Objective #C  Patient will identify specific areas of cognitive distortion and challenge irrational thoughts with reality.   Status: Continued - Date(s):    08/31/2024       Intervention(s)  Therapist will provide psychoeducation, behavioral activation, and cognitive restructuring.    Objective #D  Patient will learn and practice relaxation techniques to manage depression.  Status: Continued - Date(s):   08/31/2024    Intervention(s)   Therapist will teach patient thought stopping, deep breathing exercises, mindful meditation, and creative visualization.        Patient has reviewed and agreed to the above plan.      CECIL Vasquez  September 22, 2023    ----- Service Performed and Documented by CECIL------  This note was reviewed and clinical supervision by ELMO Colmenares [facility]    4/1/2024           [facility] Matthew Cortes     SAFETY PLAN:  Step 1: Warning signs / cues (Thoughts, images, mood, situation, behavior) that a crisis may be  "developing:  Thoughts:   \" sometimes I feel like I don't have a purpose\"  Images:  Feels lonely after losing mum and dad and lonely in marriage  Thinking Processes: ruminations (can't stop thinking about my problems): health, family loses  Mood: worsening depression  Behaviors: isolating/withdrawing   Situations: loss: parents and lonely in marriage    Step 2: Coping strategies - Things I can do to take my mind off of my problems without contacting another person (relaxation technique, physical activity):  Distress Tolerance Strategies:  play with my pet , intense exercise: work out for 2-3 minutes , and support group attendance for transplant  Physical Activities: go for a walk and stretching   Focus on helpful thoughts:  \"This is temporary\" and \"It always passes\"  Step 3: People and social settings that provide distraction:   Name:  Augustine ( friend) Phone: [phone]   Name:  Aimee paniagua Phone: [phone]     gym  and [employer] store  Liver transplant group  Step 4: Remind myself of people and things that are important to me and worth living for:    My , cousins and friends.  Step 5: When I am in crisis, I can ask these people to help me use my safety plan:   Name:  Jake ( Therapist )  Phone: [phone]   Name:  Aimee paniagua Phone: [phone]   Step 6: Making the environment safe:   remove alcohol, remove drugs, and be around others  Step 7: Professionals or agencies I can contact during a crisis:  Suicide Prevention Lifeline: Call or Text 349   [facility] Crisis Services: [phone]    Call 911 or go to my nearest emergency department.   I helped develop this safety plan and agree to use it when needed.  I have been given a copy of this plan.      Client signature _________________________________________________________________  Today s date:  11/14/2023  Completed by Provider Name/ Credentials:  CECIL Vasquez  November 14, 2023  Adapted from Safety Plan Template 2008 Chelsi Vazquez" HEATHER Ramos is reprinted with the express permission of the authors.  No portion of the Safety Plan Template may be reproduced without the express, written permission.  You can contact the authors at [email or web address] or [email or web address].        Answers submitted by the patient for this visit:  Patient Health Questionnaire (Submitted on 11/14/2023)  If you checked off any problems, how difficult have these problems made it for you to do your work, take care of things at home, or get along with other people?: Very difficult  PHQ9 TOTAL SCORE: 18  JORGE-7 (Submitted on 11/14/2023)  JORGE 7 TOTAL SCORE: 14    Answers submitted by the patient for this visit:  Patient Health Questionnaire (Submitted on 11/28/2023)  If you checked off any problems, how difficult have these problems made it for you to do your work, take care of things at home, or get along with other people?: Extremely difficult  PHQ9 TOTAL SCORE: 14  JORGE-7 (Submitted on 11/28/2023)  JORGE 7 TOTAL SCORE: 21    Answers submitted by the patient for this visit:  Patient Health Questionnaire (Submitted on 12/13/2023)  If you checked off any problems, how difficult have these problems made it for you to do your work, take care of things at home, or get along with other people?: Very difficult  PHQ9 TOTAL SCORE: 18  JORGE-7 (Submitted on 12/13/2023)  JORGE 7 TOTAL SCORE: 21    Answers submitted by the patient for this visit:  Patient Health Questionnaire (Submitted on 1/3/2024)  If you checked off any problems, how difficult have these problems made it for you to do your work, take care of things at home, or get along with other people?: Very difficult  PHQ9 TOTAL SCORE: 16  JORGE-7 (Submitted on 1/3/2024)  JORGE 7 TOTAL SCORE: 10    Answers submitted by the patient for this visit:  Patient Health Questionnaire (Submitted on 1/16/2024)  If you checked off any problems, how difficult have these problems made it for you to do your work, take care of things at home, or  get along with other people?: Very difficult  PHQ9 TOTAL SCORE: 15    Answers submitted by the patient for this visit:  Patient Health Questionnaire (Submitted on 1/29/2024)  If you checked off any problems, how difficult have these problems made it for you to do your work, take care of things at home, or get along with other people?: Extremely difficult  PHQ9 TOTAL SCORE: 12  JORGE-7 (Submitted on 1/29/2024)  JORGE 7 TOTAL SCORE: 9    Answers submitted by the patient for this visit:  Patient Health Questionnaire (Submitted on 2/14/2024)  If you checked off any problems, how difficult have these problems made it for you to do your work, take care of things at home, or get along with other people?: Extremely difficult  PHQ9 TOTAL SCORE: 11  JORGE-7 (Submitted on 2/14/2024)  JORGE 7 TOTAL SCORE: 14    Answers submitted by the patient for this visit:  Patient Health Questionnaire (Submitted on 2/28/2024)  If you checked off any problems, how difficult have these problems made it for you to do your work, take care of things at home, or get along with other people?: Extremely difficult  PHQ9 TOTAL SCORE: 16    Answers submitted by the patient for this visit:  Patient Health Questionnaire (Submitted on 3/11/2024)  If you checked off any problems, how difficult have these problems made it for you to do your work, take care of things at home, or get along with other people?: Very difficult  PHQ9 TOTAL SCORE: 10    Answers submitted by the patient for this visit:  Patient Health Questionnaire (Submitted on 3/25/2024)  If you checked off any problems, how difficult have these problems made it for you to do your work, take care of things at home, or get along with other people?: Extremely difficult  PHQ9 TOTAL SCORE: 16  JORGE-7 (Submitted on 3/25/2024)  JORGE 7 TOTAL SCORE: 9    Answers submitted by the patient for this visit:  Patient Health Questionnaire (Submitted on 4/10/2024)  If you checked off any problems, how difficult have  these problems made it for you to do your work, take care of things at home, or get along with other people?: Extremely difficult  PHQ9 TOTAL SCORE: 11    Answers submitted by the patient for this visit:  Patient Health Questionnaire (Submitted on 5/9/2024)  If you checked off any problems, how difficult have these problems made it for you to do your work, take care of things at home, or get along with other people?: Extremely difficult  PHQ9 TOTAL SCORE: 14    Answers submitted by the patient for this visit:  Patient Health Questionnaire (Submitted on 5/30/2024)  If you checked off any problems, how difficult have these problems made it for you to do your work, take care of things at home, or get along with other people?: Extremely difficult  PHQ9 TOTAL SCORE: 16  JORGE-7 (Submitted on 5/30/2024)  JORGE 7 TOTAL SCORE: 7

## 2024-06-01 ENCOUNTER — MYC REFILL (OUTPATIENT)
Dept: TRANSPLANT | Facility: CLINIC | Age: 54
End: 2024-06-01
Payer: MEDICARE

## 2024-06-01 DIAGNOSIS — Z94.4 LIVER REPLACED BY TRANSPLANT (H): ICD-10-CM

## 2024-06-02 ENCOUNTER — MYC MEDICAL ADVICE (OUTPATIENT)
Dept: FAMILY MEDICINE | Facility: CLINIC | Age: 54
End: 2024-06-02
Payer: MEDICARE

## 2024-06-03 RX ORDER — TACROLIMUS 1 MG/1
1 CAPSULE ORAL EVERY EVENING
Qty: 90 CAPSULE | Refills: 3 | Status: SHIPPED | OUTPATIENT
Start: 2024-06-03 | End: 2024-09-08

## 2024-06-03 RX ORDER — TACROLIMUS 0.5 MG/1
0.5 CAPSULE ORAL EVERY MORNING
Qty: 90 CAPSULE | Refills: 3 | Status: SHIPPED | OUTPATIENT
Start: 2024-06-03 | End: 2024-09-08

## 2024-06-03 NOTE — TELEPHONE ENCOUNTER
Patient had recent visit on 5/20/24 regarding symptoms. Routing to provider to review and advise.     Andie Davis, HILLARYN, RN   [facility]

## 2024-06-07 ENCOUNTER — LAB (OUTPATIENT)
Dept: LAB | Facility: CLINIC | Age: 54
End: 2024-06-07
Payer: MEDICARE

## 2024-06-07 DIAGNOSIS — Z94.4 LIVER REPLACED BY TRANSPLANT (H): ICD-10-CM

## 2024-06-07 LAB
ERYTHROCYTE [DISTWIDTH] IN BLOOD BY AUTOMATED COUNT: 12.8 % (ref 10–15)
HCT VFR BLD AUTO: 44.8 % (ref 35–47)
HGB BLD-MCNC: 15.5 G/DL (ref 11.7–15.7)
MCH RBC QN AUTO: 29.5 PG (ref 26.5–33)
MCHC RBC AUTO-ENTMCNC: 34.6 G/DL (ref 31.5–36.5)
MCV RBC AUTO: 85 FL (ref 78–100)
PLATELET # BLD AUTO: 204 10E3/UL (ref 150–450)
RBC # BLD AUTO: 5.25 10E6/UL (ref 3.8–5.2)
TACROLIMUS BLD-MCNC: 3.5 UG/L (ref 5–15)
TME LAST DOSE: ABNORMAL H
TME LAST DOSE: ABNORMAL H
WBC # BLD AUTO: 6.1 10E3/UL (ref 4–11)

## 2024-06-07 PROCEDURE — 80197 ASSAY OF TACROLIMUS: CPT

## 2024-06-07 PROCEDURE — 80053 COMPREHEN METABOLIC PANEL: CPT

## 2024-06-07 PROCEDURE — 36415 COLL VENOUS BLD VENIPUNCTURE: CPT

## 2024-06-07 PROCEDURE — 85027 COMPLETE CBC AUTOMATED: CPT

## 2024-06-07 PROCEDURE — 82248 BILIRUBIN DIRECT: CPT

## 2024-06-08 LAB
ALBUMIN SERPL BCG-MCNC: 4.5 G/DL (ref 3.5–5.2)
ALP SERPL-CCNC: 73 U/L (ref 40–150)
ALT SERPL W P-5'-P-CCNC: 32 U/L (ref 0–50)
ANION GAP SERPL CALCULATED.3IONS-SCNC: 9 MMOL/L (ref 7–15)
AST SERPL W P-5'-P-CCNC: 26 U/L (ref 0–45)
BILIRUB DIRECT SERPL-MCNC: <0.2 MG/DL (ref 0–0.3)
BILIRUB SERPL-MCNC: 0.4 MG/DL
BUN SERPL-MCNC: 21.9 MG/DL (ref 6–20)
CALCIUM SERPL-MCNC: 9.6 MG/DL (ref 8.6–10)
CHLORIDE SERPL-SCNC: 107 MMOL/L (ref 98–107)
CREAT SERPL-MCNC: 1.16 MG/DL (ref 0.51–0.95)
DEPRECATED HCO3 PLAS-SCNC: 24 MMOL/L (ref 22–29)
EGFRCR SERPLBLD CKD-EPI 2021: 56 ML/MIN/1.73M2
GLUCOSE SERPL-MCNC: 103 MG/DL (ref 70–99)
POTASSIUM SERPL-SCNC: 4 MMOL/L (ref 3.4–5.3)
PROT SERPL-MCNC: 6.6 G/DL (ref 6.4–8.3)
SODIUM SERPL-SCNC: 140 MMOL/L (ref 135–145)

## 2024-06-10 ENCOUNTER — VIRTUAL VISIT (OUTPATIENT)
Dept: GASTROENTEROLOGY | Facility: CLINIC | Age: 54
End: 2024-06-10
Attending: INTERNAL MEDICINE
Payer: MEDICARE

## 2024-06-10 VITALS — HEIGHT: 65 IN | WEIGHT: 158 LBS | BODY MASS INDEX: 26.33 KG/M2

## 2024-06-10 DIAGNOSIS — D84.9 IMMUNOSUPPRESSED STATUS (H): ICD-10-CM

## 2024-06-10 DIAGNOSIS — Z94.4 LIVER REPLACED BY TRANSPLANT (H): Primary | ICD-10-CM

## 2024-06-10 PROCEDURE — 99214 OFFICE O/P EST MOD 30 MIN: CPT | Mod: 95 | Performed by: INTERNAL MEDICINE

## 2024-06-10 ASSESSMENT — PAIN SCALES - GENERAL: PAINLEVEL: NO PAIN (0)

## 2024-06-10 NOTE — PROGRESS NOTES
"Virtual Visit Details    Type of service:  Video Visit   Video Start Time: {video visit start/end time for provider to select:731386}  Video End Time:{video visit start/end time for provider to select:507673}    Originating Location (pt. Location): {video visit patient location:150427::\"Home\"}  {PROVIDER LOCATION On-site should be selected for visits conducted from your clinic location or adjoining [facility] hospital, academic office, or other nearby [facility] building. Off-site should be selected for all other provider locations, including home:880836}  Distant Location (provider location):  {virtual location provider:109949}  Platform used for Video Visit: {Virtual Visit Platforms:902077::\"[employer] [facility]\"}    "

## 2024-06-10 NOTE — NURSING NOTE
Is the patient currently in the state of [place]? YES    Visit mode:VIDEO    If the visit is dropped, the patient can be reconnected by: VIDEO VISIT: Send to e-mail at: [email or web address]    Will anyone else be joining the visit? NO  (If patient encounters technical issues they should call [phone] :[phone])    How would you like to obtain your AVS? MyChart    Are changes needed to the allergy or medication list? No    Are refills needed on medications prescribed by this physician? NO    Reason for visit: NIKKI PORTILLO
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
## 2024-06-11 ENCOUNTER — ANCILLARY PROCEDURE (OUTPATIENT)
Dept: BONE DENSITY | Facility: CLINIC | Age: 54
End: 2024-06-11
Attending: INTERNAL MEDICINE
Payer: MEDICARE

## 2024-06-11 DIAGNOSIS — E28.39 ESTROGEN DEFICIENCY: ICD-10-CM

## 2024-06-11 PROCEDURE — 77081 DXA BONE DENSITY APPENDICULR: CPT | Mod: XU | Performed by: RADIOLOGY

## 2024-06-11 PROCEDURE — 77080 DXA BONE DENSITY AXIAL: CPT | Performed by: RADIOLOGY

## 2024-06-13 ENCOUNTER — PATIENT OUTREACH (OUTPATIENT)
Dept: OBGYN | Facility: CLINIC | Age: 54
End: 2024-06-13
Payer: MEDICARE

## 2024-06-19 ENCOUNTER — MYC MEDICAL ADVICE (OUTPATIENT)
Dept: FAMILY MEDICINE | Facility: CLINIC | Age: 54
End: 2024-06-19
Payer: MEDICARE

## 2024-06-19 DIAGNOSIS — F33.0 MILD RECURRENT MAJOR DEPRESSION (H): Primary | ICD-10-CM

## 2024-06-19 NOTE — TELEPHONE ENCOUNTER
Forwarding to PCP to review and advise.    Started Cymbalta 4/26.    Steph Church RN, BSN  -[facility]

## 2024-06-21 RX ORDER — VENLAFAXINE HYDROCHLORIDE 37.5 MG/1
37.5 CAPSULE, EXTENDED RELEASE ORAL DAILY
Qty: 30 CAPSULE | Refills: 0 | Status: SHIPPED | OUTPATIENT
Start: 2024-06-21 | End: 2024-07-17

## 2024-06-21 RX ORDER — VENLAFAXINE HYDROCHLORIDE 37.5 MG/1
37.5 CAPSULE, EXTENDED RELEASE ORAL DAILY
Qty: 30 CAPSULE | Refills: 0 | Status: SHIPPED | OUTPATIENT
Start: 2024-06-21 | End: 2024-06-21

## 2024-06-21 NOTE — TELEPHONE ENCOUNTER
Routing to provider to see if med can be sent to pended [facility] pharmacy for patient.    Khoa Strong RN  [facility]

## 2024-06-22 ENCOUNTER — MYC REFILL (OUTPATIENT)
Dept: FAMILY MEDICINE | Facility: CLINIC | Age: 54
End: 2024-06-22
Payer: MEDICARE

## 2024-06-22 DIAGNOSIS — F90.0 ATTENTION DEFICIT HYPERACTIVITY DISORDER (ADHD), PREDOMINANTLY INATTENTIVE TYPE: ICD-10-CM

## 2024-06-24 RX ORDER — DEXTROAMPHETAMINE SACCHARATE, AMPHETAMINE ASPARTATE MONOHYDRATE, DEXTROAMPHETAMINE SULFATE AND AMPHETAMINE SULFATE 5; 5; 5; 5 MG/1; MG/1; MG/1; MG/1
20 CAPSULE, EXTENDED RELEASE ORAL DAILY
Qty: 30 CAPSULE | Refills: 0 | Status: SHIPPED | OUTPATIENT
Start: 2024-06-24 | End: 2024-07-17

## 2024-06-24 NOTE — TELEPHONE ENCOUNTER
Routing to covering providers to review and advise.    Steph Church, RN, BSN  [facility],      When I went to  my medication Effexor XR the pharmacist called me aside and asked if I had had a liver transplant which got me concerned she said the Effexor is process through to the liver where Cymbalta is not ? should I be giving Cymbalta a second chance? It just got me concerned and since I m also on Adderrall .      Thank you  Fidelina

## 2024-06-24 NOTE — TELEPHONE ENCOUNTER
[facility]   Last filled 5/21/24  Last visit 5/20/24  Med refilled

## 2024-06-30 ENCOUNTER — MYC MEDICAL ADVICE (OUTPATIENT)
Dept: FAMILY MEDICINE | Facility: CLINIC | Age: 54
End: 2024-06-30
Payer: MEDICARE

## 2024-07-02 ASSESSMENT — PATIENT HEALTH QUESTIONNAIRE - PHQ9: SUM OF ALL RESPONSES TO PHQ QUESTIONS 1-9: 11

## 2024-07-02 NOTE — TELEPHONE ENCOUNTER
Pt was scheduled on Dr. Darlene umanzor for 7/3. Looks like Pt was meant to schedule with Elizabeth or [facility]. LVM informing pt that we will have to reschedule appt.

## 2024-07-03 ASSESSMENT — PATIENT HEALTH QUESTIONNAIRE - PHQ9
SUM OF ALL RESPONSES TO PHQ QUESTIONS 1-9: 11
10. IF YOU CHECKED OFF ANY PROBLEMS, HOW DIFFICULT HAVE THESE PROBLEMS MADE IT FOR YOU TO DO YOUR WORK, TAKE CARE OF THINGS AT HOME, OR GET ALONG WITH OTHER PEOPLE: VERY DIFFICULT

## 2024-07-06 ASSESSMENT — ANXIETY QUESTIONNAIRES
4. TROUBLE RELAXING: NEARLY EVERY DAY
6. BECOMING EASILY ANNOYED OR IRRITABLE: NEARLY EVERY DAY
IF YOU CHECKED OFF ANY PROBLEMS ON THIS QUESTIONNAIRE, HOW DIFFICULT HAVE THESE PROBLEMS MADE IT FOR YOU TO DO YOUR WORK, TAKE CARE OF THINGS AT HOME, OR GET ALONG WITH OTHER PEOPLE: EXTREMELY DIFFICULT
5. BEING SO RESTLESS THAT IT IS HARD TO SIT STILL: MORE THAN HALF THE DAYS
1. FEELING NERVOUS, ANXIOUS, OR ON EDGE: NEARLY EVERY DAY
GAD7 TOTAL SCORE: 19
2. NOT BEING ABLE TO STOP OR CONTROL WORRYING: NEARLY EVERY DAY
GAD7 TOTAL SCORE: 19
7. FEELING AFRAID AS IF SOMETHING AWFUL MIGHT HAPPEN: MORE THAN HALF THE DAYS
GAD7 TOTAL SCORE: 19
7. FEELING AFRAID AS IF SOMETHING AWFUL MIGHT HAPPEN: MORE THAN HALF THE DAYS
8. IF YOU CHECKED OFF ANY PROBLEMS, HOW DIFFICULT HAVE THESE MADE IT FOR YOU TO DO YOUR WORK, TAKE CARE OF THINGS AT HOME, OR GET ALONG WITH OTHER PEOPLE?: EXTREMELY DIFFICULT
3. WORRYING TOO MUCH ABOUT DIFFERENT THINGS: NEARLY EVERY DAY

## 2024-07-11 ENCOUNTER — VIRTUAL VISIT (OUTPATIENT)
Dept: PSYCHOLOGY | Facility: CLINIC | Age: 54
End: 2024-07-11
Payer: MEDICARE

## 2024-07-11 DIAGNOSIS — F32.1 CURRENT MODERATE EPISODE OF MAJOR DEPRESSIVE DISORDER, UNSPECIFIED WHETHER RECURRENT (H): Primary | ICD-10-CM

## 2024-07-11 PROCEDURE — 90834 PSYTX W PT 45 MINUTES: CPT | Mod: 95 | Performed by: STUDENT IN AN ORGANIZED HEALTH CARE EDUCATION/TRAINING PROGRAM

## 2024-07-11 ASSESSMENT — PATIENT HEALTH QUESTIONNAIRE - PHQ9
10. IF YOU CHECKED OFF ANY PROBLEMS, HOW DIFFICULT HAVE THESE PROBLEMS MADE IT FOR YOU TO DO YOUR WORK, TAKE CARE OF THINGS AT HOME, OR GET ALONG WITH OTHER PEOPLE: VERY DIFFICULT
SUM OF ALL RESPONSES TO PHQ QUESTIONS 1-9: 16
SUM OF ALL RESPONSES TO PHQ QUESTIONS 1-9: 16

## 2024-07-11 NOTE — PROGRESS NOTES
"[facility]                                     Progress Note    Patient Name: Melita Cortes  Date: 07/11/2024         Service Type: Individual      Session Start Time: 3.30  Session End Time: 4.11     Session Length: 38-52    Session #: 18    Attendees: Client attended alone    Service Modality:  Video Visit:      Provider verified identity through the following two step process.  Patient provided:  Patient is known previously to provider    Telemedicine Visit: The patient's condition can be safely assessed and treated via synchronous audio and visual telemedicine encounter.      Reason for Telemedicine Visit: Patient has requested telehealth visit    Originating Site (Patient Location): Patient's home    Distant Site (Provider Location): Provider Remote Setting- Home Office    Consent:  The patient/guardian has verbally consented to: the potential risks and benefits of telemedicine (video visit) versus in person care; bill my insurance or make self-payment for services provided; and responsibility for payment of non-covered services.     Patient would like the video invitation sent by:  My Chart    Mode of Communication:  Video Conference via Amwell    Distant Location (Provider):  Off-site    As the provider I attest to compliance with applicable laws and regulations related to telemedicine.    DATA  Interactive Complexity: No  Crisis: No        Progress Since Last Session (Related to Symptoms / Goals / Homework):   Symptoms: Worsening depression as evident by current phq9 and Ed 7 scores    Homework: Completed in session review patient's utilization of safety plan to mitigate SI.        Episode of Care Goals: Satisfactory progress - ACTION (Actively working towards change); Intervened by reinforcing change plan / affirming steps taken     Current / Ongoing Stressors and Concerns:  Patient reported  \"Trauma Adhd depres\". Patient reported \" I think I have had some trauma in my life, " "my liver transplant and I think have ADHD.\" Pt reported she is  \"overly sensitive\" and \" I dont handle stress very well and struggle with impulsivity\"  and that \"I have a poor self image, negative talk a lot \" patient also reported she lost her mother in 2019 and her dad 7 months ago and still struggling with grieving the lost of both parents.      Current:  Today, patient reported that she continues to experience  neurovegetative symptoms of depression in the form of irritability and  low mood.  Patient shared that she feels overwhelmed with  recurrent negative self worth. She noted that her feelings after a liver transplant and a knee replacement is mixture of  gratitude and sadness. She noted that her parents and her dad in particular was very critical and invalidating of her appearance and feels they may have contributed to her low self worth. Patient shared that she constantly seeks validation from others especially her  and doesn't feel he compliments her enough and reported she is always crying and does not know why she struggles with emotional regulations.      Treatment Objective(s) Addressed in This Session:   Patient will increase daily activity level by exercising for 20-30 minutes and participate in at least 1 daily pleasant/fun activities.  Use safety plan as needed daily and practice mindfulness daily.   Patient will identify specific areas of cognitive distortion and challenge irrational thoughts with reality.    Learn healthy ways to deal with distressing emotions daily I.e writing a forgiveness letter to self.     Intervention:  Transactional Analysis: Discussion today about Jake Mars Transactional Analysis and the three ego states to explore the origin of patient's inner critical voice and negative self-worth. Explained the three-ego states, parent, adult, and child ego states. Explore ways in which patient is responding to their critical parent ego state from the position of an adapted " child's ego state and portray how that could be reenforcing current feelings of worthlessness and defectiveness. Explore ways to strengthened patients' adult ego states by bringing the negative unconscious inner voices into conscious awareness and challenging them using Socratic questioning in session with patient and focusing on the here and now. Provided a thought diary handout.        Assessments completed prior to visit:  PHQ9:       3/25/2024    12:45 PM 4/3/2024     8:12 AM 4/10/2024    12:16 PM 5/9/2024     1:52 PM 5/30/2024     2:13 PM 7/2/2024     8:48 PM 7/11/2024     3:14 PM   PHQ-9 SCORE   PHQ-9 Total Score MyChart 16 (Moderately severe depression) 14 (Moderate depression) 11 (Moderate depression) 14 (Moderate depression) 16 (Moderately severe depression) 11 (Moderate depression) 16 (Moderately severe depression)   PHQ-9 Total Score 16 14 11 14 16 11    11 16     GAD7:       1/29/2024     2:16 PM 2/11/2024     9:26 AM 2/14/2024    12:15 PM 3/25/2024    12:46 PM 4/8/2024     7:39 AM 5/30/2024     2:14 PM 7/6/2024     9:41 AM   JORGE-7 SCORE   Total Score 9 (mild anxiety)  14 (moderate anxiety) 9 (mild anxiety) 11 (moderate anxiety) 7 (mild anxiety) 19 (severe anxiety)   Total Score 9 20 14    14    14 9 11 7 19         ASSESSMENT: Current Emotional / Mental Status (status of significant symptoms):   Risk status (Self / Other harm or suicidal ideation)   Patient denies current fears or concerns for personal safety.   Patient denies current or recent suicidal ideation or behaviors.   Patient denies current or recent homicidal ideation or behaviors.   Patient denies current or recent self injurious behavior or ideation.   Patient denies other safety concerns.   Patient reports there has been no change in risk factors since their last session.     Patient reports there has been no change in protective factors since their last session.     Recommended that patient call 911 or go to the local ED should there be a  change in any of these risk factors.     Appearance:   Appropriate    Eye Contact:   Good    Psychomotor Behavior: Normal    Attitude:   Cooperative  Interested   Orientation:   Person Place Time Situation   Speech    Rate / Production: Normal/ Responsive    Volume:  Normal    Mood:    Anxious  Depressed    Affect:    Tearful   Thought Content:  Clear    Thought Form:  Coherent  Logical    Insight:    Good      Medication Review:   No changes to current psychiatric medication(s)     Medication Compliance:   Yes     Changes in Health Issues:   None reported     Chemical Use Review:   Substance Use: Chemical use reviewed, no active concerns identified      Tobacco Use: No current tobacco use.      Diagnosis:  296.32 (F33.1) Major Depressive Disorder, Recurrent Episode, Moderate _ and With atypical features    Collateral Reports Completed:   Not Applicable    PLAN: (Patient Tasks / Therapist Tasks / Other)    Utilize safety plan as needed daily.    Practice ABC cognitive restructuring thought diary skill learned in session daily.    Jake Coyne [facility]    ----- Service Performed and Documented by [facility]------  This note was reviewed and clinical supervision by ELMO Colmenares [facility]    7/15/2024      _________________________________________________________    Individual Treatment Plan    Patient's Name: Melita Cortes  YOB: 1970    Date of Creation: 09/22/2023  Date Treatment Plan Last Reviewed/Revised: 05/30/2024    DSM5 Diagnoses: 296.32 (F33.1) Major Depressive Disorder, Recurrent Episode, Moderate _ and With atypical features  Psychosocial / Contextual Factors:   PROMIS (reviewed every 90 days):     Referral / Collaboration:  Referral to another professional/service is not indicated at this time..    Anticipated number of session for this episode of care:  15-25  Anticipation frequency of session: Biweekly  Anticipated Duration of each session: 38-52 minutes  Treatment plan will be reviewed in 90  days or when goals have been changed.       MeasurableTreatment Goal(s) related to diagnosis / functional impairment(s)  Goal 1: Patient will identify and address specific triggers to feelings of depression and improve coping by  90 % in the next three months.    I will know I've met my goal when I am less impulsive, better communicator and can comfortably manage distressful emotions.         Objective #A (Patient Action)    Develop and utilize 3 effective distress tolerance strategies to address SI.   Status: Continued - Date(s):  08/31/2024     Intervention(s)   Therapist will engage in collaborative brainstorming with pt to identify and practice individualized distress tolerance coping strategies to address SI weekly.    Objective #B (Patient Action)    Patient will increase daily activity level by exercising for 20-30 minutes and participate in at least 1 daily pleasant/fun activities.  Status: Continued - Date(s):   08/31/2024    Intervention(s)  Therapist will provide psychoeducation, behavioral activation, and cognitive restructuring.    Objective #C  Patient will identify specific areas of cognitive distortion and challenge irrational thoughts with reality.   Status: Continued - Date(s):    08/31/2024       Intervention(s)  Therapist will provide psychoeducation, behavioral activation, and cognitive restructuring.    Objective #D  Patient will learn and practice relaxation techniques to manage depression.  Status: Continued - Date(s):   08/31/2024    Intervention(s)   Therapist will teach patient thought stopping, deep breathing exercises, mindful meditation, and creative visualization.        Patient has reviewed and agreed to the above plan.      CECIL Vasquez  September 22, 2023    ----- Service Performed and Documented by CECIL------  This note was reviewed and clinical supervision [facility]    4/1/2024           [facility]                                    "    Melita Cortes     SAFETY PLAN:  Step 1: Warning signs / cues (Thoughts, images, mood, situation, behavior) that a crisis may be developing:  Thoughts:   \" sometimes I feel like I don't have a purpose\"  Images:  Feels lonely after losing mum and dad and lonely in marriage  Thinking Processes: ruminations (can't stop thinking about my problems): health, family loses  Mood: worsening depression  Behaviors: isolating/withdrawing   Situations: loss: parents and lonely in marriage    Step 2: Coping strategies - Things I can do to take my mind off of my problems without contacting another person (relaxation technique, physical activity):  Distress Tolerance Strategies:  play with my pet , intense exercise: work out for 2-3 minutes , and support group attendance for transplant  Physical Activities: go for a walk and stretching   Focus on helpful thoughts:  \"This is temporary\" and \"It always passes\"  Step 3: People and social settings that provide distraction:   Name:  Augustine ( friend) Phone: [phone]   Name:  Aimee paniagua Phone: [phone]     gym  and [employer] store  Liver transplant group  Step 4: Remind myself of people and things that are important to me and worth living for:    My , cousins and friends.  Step 5: When I am in crisis, I can ask these people to help me use my safety plan:   Name:  Jake ( Therapist )  Phone: [phone]   Name:  Aimee rodriguezletitia Phone: [phone]   Step 6: Making the environment safe:   remove alcohol, remove drugs, and be around others  Step 7: Professionals or agencies I can contact during a crisis:  Suicide Prevention Lifeline: Call or Text 169   [facility]: [phone]    Call 911 or go to my nearest emergency department.   I helped develop this safety plan and agree to use it when needed.  I have been given a copy of this plan.      Client signature _________________________________________________________________  Today s date:  " 11/14/2023  Completed by Provider Name/ Credentials:  CECIL Vasquez  November 14, 2023  Adapted from Safety Plan Template 2008 Chelsi Marx and Fred Ramos is reprinted with the express permission of the authors.  No portion of the Safety Plan Template may be reproduced without the express, written permission.  You can contact the authors at bhs@[facility], located within [facility] or [email or web address].        Answers submitted by the patient for this visit:  Patient Health Questionnaire (Submitted on 11/14/2023)  If you checked off any problems, how difficult have these problems made it for you to do your work, take care of things at home, or get along with other people?: Very difficult  PHQ9 TOTAL SCORE: 18  JORGE-7 (Submitted on 11/14/2023)  JORGE 7 TOTAL SCORE: 14    Answers submitted by the patient for this visit:  Patient Health Questionnaire (Submitted on 11/28/2023)  If you checked off any problems, how difficult have these problems made it for you to do your work, take care of things at home, or get along with other people?: Extremely difficult  PHQ9 TOTAL SCORE: 14  JORGE-7 (Submitted on 11/28/2023)  JORGE 7 TOTAL SCORE: 21    Answers submitted by the patient for this visit:  Patient Health Questionnaire (Submitted on 12/13/2023)  If you checked off any problems, how difficult have these problems made it for you to do your work, take care of things at home, or get along with other people?: Very difficult  PHQ9 TOTAL SCORE: 18  JORGE-7 (Submitted on 12/13/2023)  JORGE 7 TOTAL SCORE: 21    Answers submitted by the patient for this visit:  Patient Health Questionnaire (Submitted on 1/3/2024)  If you checked off any problems, how difficult have these problems made it for you to do your work, take care of things at home, or get along with other people?: Very difficult  PHQ9 TOTAL SCORE: 16  JORGE-7 (Submitted on 1/3/2024)  JORGE 7 TOTAL SCORE: 10    Answers submitted by the patient for this visit:  Patient Health Questionnaire  (Submitted on 1/16/2024)  If you checked off any problems, how difficult have these problems made it for you to do your work, take care of things at home, or get along with other people?: Very difficult  PHQ9 TOTAL SCORE: 15    Answers submitted by the patient for this visit:  Patient Health Questionnaire (Submitted on 1/29/2024)  If you checked off any problems, how difficult have these problems made it for you to do your work, take care of things at home, or get along with other people?: Extremely difficult  PHQ9 TOTAL SCORE: 12  JORGE-7 (Submitted on 1/29/2024)  JORGE 7 TOTAL SCORE: 9    Answers submitted by the patient for this visit:  Patient Health Questionnaire (Submitted on 2/14/2024)  If you checked off any problems, how difficult have these problems made it for you to do your work, take care of things at home, or get along with other people?: Extremely difficult  PHQ9 TOTAL SCORE: 11  JORGE-7 (Submitted on 2/14/2024)  JORGE 7 TOTAL SCORE: 14    Answers submitted by the patient for this visit:  Patient Health Questionnaire (Submitted on 2/28/2024)  If you checked off any problems, how difficult have these problems made it for you to do your work, take care of things at home, or get along with other people?: Extremely difficult  PHQ9 TOTAL SCORE: 16    Answers submitted by the patient for this visit:  Patient Health Questionnaire (Submitted on 3/11/2024)  If you checked off any problems, how difficult have these problems made it for you to do your work, take care of things at home, or get along with other people?: Very difficult  PHQ9 TOTAL SCORE: 10    Answers submitted by the patient for this visit:  Patient Health Questionnaire (Submitted on 3/25/2024)  If you checked off any problems, how difficult have these problems made it for you to do your work, take care of things at home, or get along with other people?: Extremely difficult  PHQ9 TOTAL SCORE: 16  JORGE-7 (Submitted on 3/25/2024)  JORGE 7 TOTAL SCORE:  9    Answers submitted by the patient for this visit:  Patient Health Questionnaire (Submitted on 4/10/2024)  If you checked off any problems, how difficult have these problems made it for you to do your work, take care of things at home, or get along with other people?: Extremely difficult  PHQ9 TOTAL SCORE: 11    Answers submitted by the patient for this visit:  Patient Health Questionnaire (Submitted on 5/9/2024)  If you checked off any problems, how difficult have these problems made it for you to do your work, take care of things at home, or get along with other people?: Extremely difficult  PHQ9 TOTAL SCORE: 14    Answers submitted by the patient for this visit:  Patient Health Questionnaire (Submitted on 5/30/2024)  If you checked off any problems, how difficult have these problems made it for you to do your work, take care of things at home, or get along with other people?: Extremely difficult  PHQ9 TOTAL SCORE: 16  JORGE-7 (Submitted on 5/30/2024)  JORGE 7 TOTAL SCORE: 7    Answers submitted by the patient for this visit:  Patient Health Questionnaire (Submitted on 7/11/2024)  If you checked off any problems, how difficult have these problems made it for you to do your work, take care of things at home, or get along with other people?: Very difficult  PHQ9 TOTAL SCORE: 16  JORGE-7 (Submitted on 7/6/2024)  JORGE 7 TOTAL SCORE: 19

## 2024-07-15 ENCOUNTER — MYC MEDICAL ADVICE (OUTPATIENT)
Dept: FAMILY MEDICINE | Facility: CLINIC | Age: 54
End: 2024-07-15
Payer: MEDICARE

## 2024-07-15 DIAGNOSIS — F33.0 MILD RECURRENT MAJOR DEPRESSION (H): ICD-10-CM

## 2024-07-15 NOTE — TELEPHONE ENCOUNTER
In looking back through previous messages it is unclear whether patient is still on Effexor or whether she is switched to Cymbalta.  Or is she taking both?

## 2024-07-15 NOTE — TELEPHONE ENCOUNTER
Sent MyChart note to patient to clarify what she is and is not taking.    Steph Church RN, BSN  [facility]

## 2024-07-16 RX ORDER — VENLAFAXINE HYDROCHLORIDE 37.5 MG/1
37.5 CAPSULE, EXTENDED RELEASE ORAL DAILY
Qty: 90 CAPSULE | Refills: 1 | OUTPATIENT
Start: 2024-07-16

## 2024-07-16 NOTE — TELEPHONE ENCOUNTER
Please see MyChart encounter from 7/15/24.    Pt will see PCP on 7/17 to discuss medications and refills.    Steph Church RN, BSN  [facility]

## 2024-07-17 ENCOUNTER — VIRTUAL VISIT (OUTPATIENT)
Dept: FAMILY MEDICINE | Facility: CLINIC | Age: 54
End: 2024-07-17
Payer: MEDICARE

## 2024-07-17 DIAGNOSIS — G47.00 INSOMNIA, UNSPECIFIED TYPE: ICD-10-CM

## 2024-07-17 DIAGNOSIS — F33.0 MILD RECURRENT MAJOR DEPRESSION (H): ICD-10-CM

## 2024-07-17 DIAGNOSIS — F90.0 ATTENTION DEFICIT HYPERACTIVITY DISORDER (ADHD), PREDOMINANTLY INATTENTIVE TYPE: ICD-10-CM

## 2024-07-17 DIAGNOSIS — Z94.4 LIVER REPLACED BY TRANSPLANT (H): Primary | ICD-10-CM

## 2024-07-17 PROCEDURE — 99214 OFFICE O/P EST MOD 30 MIN: CPT | Mod: 95 | Performed by: INTERNAL MEDICINE

## 2024-07-17 RX ORDER — DEXTROAMPHETAMINE SACCHARATE, AMPHETAMINE ASPARTATE, DEXTROAMPHETAMINE SULFATE AND AMPHETAMINE SULFATE 2.5; 2.5; 2.5; 2.5 MG/1; MG/1; MG/1; MG/1
10 TABLET ORAL DAILY
Qty: 30 TABLET | Refills: 0 | Status: SHIPPED | OUTPATIENT
Start: 2024-08-16 | End: 2024-09-15

## 2024-07-17 RX ORDER — DEXTROAMPHETAMINE SACCHARATE, AMPHETAMINE ASPARTATE, DEXTROAMPHETAMINE SULFATE AND AMPHETAMINE SULFATE 2.5; 2.5; 2.5; 2.5 MG/1; MG/1; MG/1; MG/1
10 TABLET ORAL DAILY
Qty: 30 TABLET | Refills: 0 | Status: SHIPPED | OUTPATIENT
Start: 2024-09-15 | End: 2024-10-15

## 2024-07-17 RX ORDER — FLUOXETINE 10 MG/1
10 CAPSULE ORAL DAILY
Qty: 30 CAPSULE | Refills: 0 | Status: SHIPPED | OUTPATIENT
Start: 2024-07-17 | End: 2024-07-30

## 2024-07-17 RX ORDER — HYDROXYZINE HYDROCHLORIDE 25 MG/1
TABLET, FILM COATED ORAL
Qty: 180 TABLET | Refills: 3 | Status: SHIPPED | OUTPATIENT
Start: 2024-07-17

## 2024-07-17 RX ORDER — DEXTROAMPHETAMINE SACCHARATE, AMPHETAMINE ASPARTATE MONOHYDRATE, DEXTROAMPHETAMINE SULFATE AND AMPHETAMINE SULFATE 5; 5; 5; 5 MG/1; MG/1; MG/1; MG/1
20 CAPSULE, EXTENDED RELEASE ORAL DAILY
Qty: 30 CAPSULE | Refills: 0 | Status: SHIPPED | OUTPATIENT
Start: 2024-07-17 | End: 2024-07-26

## 2024-07-17 RX ORDER — DEXTROAMPHETAMINE SACCHARATE, AMPHETAMINE ASPARTATE, DEXTROAMPHETAMINE SULFATE AND AMPHETAMINE SULFATE 2.5; 2.5; 2.5; 2.5 MG/1; MG/1; MG/1; MG/1
10 TABLET ORAL DAILY
Qty: 30 TABLET | Refills: 0 | Status: SHIPPED | OUTPATIENT
Start: 2024-07-17 | End: 2024-08-16

## 2024-07-17 NOTE — PROGRESS NOTES
"  If patient has telephone visit, have they been educated on video visit as preferred visit method and offered to change to video visit? NOT APPLICABLE        Instructions Relayed to Patient by Virtual Roomer:     Patient is active on [employer]:   Relayed following to patient: \"It looks like you are active on [employer], are you able to join the visit this way? If not, do you need us to send you a link now or would you like your provider to send a link via text or email when they are ready to initiate the visit?\"      Patient Confirmed they will join visit via: [employer]  Reminded patient to ensure they were logged on to virtual visit by arrival time listed.   Asked if patient has flexibility to initiate visit sooner than arrival time: patient stated yes, documented in appointment notes availability to initiate visit earlier than arrival time     If pediatric virtual visit, ensured pediatric patient along with parent/guardian will be present for video visit.     Patient offered the website [email or web address] and/or phone number to [employer] Help line: [phone]   Fidelina is a 54 year old who is being evaluated via a billable video visit.    How would you like to obtain your AVS? Cool[employer]engero  If the video visit is dropped, the invitation should be resent by: Text to cell phone: [phone]  Will anyone else be joining your video visit? No      Assessment & Plan     Attention deficit hyperactivity disorder (ADHD), predominantly inattentive type  She was recently diagnosed with ADHD  She is currently on Adderall XR 20 mg and taking 10 mg as needed on some days which is really helping her  Today she wanted to know if she can continue to take 10 mg on a daily basis  I told her she can certainly do that as it has improved her quality of life  No side effects from Adderall thus far  - amphetamine-dextroamphetamine (ADDERALL XR) 20 MG 24 hr capsule; Take 1 capsule (20 mg) by mouth daily  - amphetamine-dextroamphetamine " "(ADDERALL) 10 MG tablet; Take 1 tablet (10 mg) by mouth daily for 30 days  - amphetamine-dextroamphetamine (ADDERALL) 10 MG tablet; Take 1 tablet (10 mg) by mouth daily for 30 days  - amphetamine-dextroamphetamine (ADDERALL) 10 MG tablet; Take 1 tablet (10 mg) by mouth daily for 30 days    Insomnia, unspecified type  Takes hydroxyzine as needed at bedtime for sleep  - hydrOXYzine HCl (ATARAX) 25 MG tablet; TAKE 2 TABLETS BY MOUTH NIGHTLY AS NEEDED FOR (INSOMINA)    Mild recurrent major depression (H24)  She was reticent to start venlafaxine because of its potential hepatotoxic side effects and I completely understand with that  We discussed about Prozac and Zoloft  She did not tolerate Cymbalta because of nausea  Her PHQ-9 score is still high at 11  We will start Prozac at 10 mg and in a month increase the dose to 20 if need be  - FLUoxetine (PROZAC) 10 MG capsule; Take 1 capsule (10 mg) by mouth daily    Liver replaced by transplant (H)  She follows up with liver transplant team and is currently on tacrolimus          BMI  Estimated body mass index is 26.29 kg/m  as calculated from the following:    Height as of 6/10/24: 1.651 m (5' 5\").    Weight as of 6/10/24: 71.7 kg (158 lb).             Cameron Kitchen is a 54 year old, presenting for the following health issues:  No chief complaint on file.      Video Start Time:  530    History of Present Illness       Reason for visit:  Depression, recently diagnosed ADhD need to discuss medications for both    She eats 0-1 servings of fruits and vegetables daily.She consumes 3 sweetened beverage(s) daily.She exercises with enough effort to increase her heart rate 10 to 19 minutes per day.  She exercises with enough effort to increase her heart rate 4 days per week. She is missing 1 dose(s) of medications per week.  She is not taking prescribed medications regularly due to remembering to take.               Review of Systems  Constitutional, HEENT, cardiovascular, " pulmonary, gi and gu systems are negative, except as otherwise noted.      Objective           Vitals:  No vitals were obtained today due to virtual visit.    Physical Exam   GENERAL: alert and no distress  EYES: Eyes grossly normal to inspection.  No discharge or erythema, or obvious scleral/conjunctival abnormalities.  RESP: No audible wheeze, cough, or visible cyanosis.    SKIN: Visible skin clear. No significant rash, abnormal pigmentation or lesions.  NEURO: Cranial nerves grossly intact.  Mentation and speech appropriate for age.  PSYCH: Appropriate affect, tone, and pace of words          Video-Visit Details    Type of service:  Video Visit   Video End Time:5:50 PM  Originating Location (pt. Location): Home    Distant Location (provider location):  Off-site  Platform used for Video Visit: Harry  Signed Electronically by: Navneet Johnson MD

## 2024-07-24 DIAGNOSIS — F33.0 MILD RECURRENT MAJOR DEPRESSION (H): ICD-10-CM

## 2024-07-24 RX ORDER — VENLAFAXINE HYDROCHLORIDE 37.5 MG/1
37.5 CAPSULE, EXTENDED RELEASE ORAL DAILY
Qty: 30 CAPSULE | Refills: 0 | OUTPATIENT
Start: 2024-07-24

## 2024-07-25 ENCOUNTER — ANCILLARY PROCEDURE (OUTPATIENT)
Dept: MAMMOGRAPHY | Facility: CLINIC | Age: 54
End: 2024-07-25
Attending: INTERNAL MEDICINE
Payer: MEDICARE

## 2024-07-25 DIAGNOSIS — Z12.31 VISIT FOR SCREENING MAMMOGRAM: ICD-10-CM

## 2024-07-25 PROCEDURE — 77067 SCR MAMMO BI INCL CAD: CPT | Mod: GC | Performed by: RADIOLOGY

## 2024-07-25 PROCEDURE — 77063 BREAST TOMOSYNTHESIS BI: CPT | Mod: GC | Performed by: RADIOLOGY

## 2024-07-26 ENCOUNTER — MYC MEDICAL ADVICE (OUTPATIENT)
Dept: FAMILY MEDICINE | Facility: CLINIC | Age: 54
End: 2024-07-26
Payer: MEDICARE

## 2024-07-26 ENCOUNTER — MYC REFILL (OUTPATIENT)
Dept: FAMILY MEDICINE | Facility: CLINIC | Age: 54
End: 2024-07-26
Payer: MEDICARE

## 2024-07-26 DIAGNOSIS — F90.0 ATTENTION DEFICIT HYPERACTIVITY DISORDER (ADHD), PREDOMINANTLY INATTENTIVE TYPE: ICD-10-CM

## 2024-07-26 DIAGNOSIS — F33.0 MILD RECURRENT MAJOR DEPRESSION (H): ICD-10-CM

## 2024-07-26 NOTE — TELEPHONE ENCOUNTER
Routing to provider to review and advise. Last visit regarding depression was 7/11/24.    Khoa Strong RN  [facility]

## 2024-07-28 ENCOUNTER — MYC REFILL (OUTPATIENT)
Dept: FAMILY MEDICINE | Facility: CLINIC | Age: 54
End: 2024-07-28
Payer: MEDICARE

## 2024-07-28 DIAGNOSIS — G47.00 INSOMNIA, UNSPECIFIED TYPE: ICD-10-CM

## 2024-07-29 RX ORDER — DEXTROAMPHETAMINE SACCHARATE, AMPHETAMINE ASPARTATE MONOHYDRATE, DEXTROAMPHETAMINE SULFATE AND AMPHETAMINE SULFATE 5; 5; 5; 5 MG/1; MG/1; MG/1; MG/1
20 CAPSULE, EXTENDED RELEASE ORAL DAILY
Qty: 30 CAPSULE | Refills: 0 | Status: SHIPPED | OUTPATIENT
Start: 2024-07-29 | End: 2024-08-19

## 2024-07-29 RX ORDER — HYDROXYZINE HYDROCHLORIDE 25 MG/1
TABLET, FILM COATED ORAL
Qty: 180 TABLET | Refills: 3 | OUTPATIENT
Start: 2024-07-29

## 2024-08-03 DIAGNOSIS — F33.0 MILD RECURRENT MAJOR DEPRESSION (H): ICD-10-CM

## 2024-08-08 ENCOUNTER — VIRTUAL VISIT (OUTPATIENT)
Dept: PSYCHOLOGY | Facility: CLINIC | Age: 54
End: 2024-08-08
Payer: MEDICARE

## 2024-08-08 DIAGNOSIS — F32.1 CURRENT MODERATE EPISODE OF MAJOR DEPRESSIVE DISORDER, UNSPECIFIED WHETHER RECURRENT (H): Primary | ICD-10-CM

## 2024-08-08 PROCEDURE — 90834 PSYTX W PT 45 MINUTES: CPT | Mod: 95 | Performed by: STUDENT IN AN ORGANIZED HEALTH CARE EDUCATION/TRAINING PROGRAM

## 2024-08-08 ASSESSMENT — ANXIETY QUESTIONNAIRES
GAD7 TOTAL SCORE: 15
5. BEING SO RESTLESS THAT IT IS HARD TO SIT STILL: SEVERAL DAYS
GAD7 TOTAL SCORE: 15
GAD7 TOTAL SCORE: 15
3. WORRYING TOO MUCH ABOUT DIFFERENT THINGS: NEARLY EVERY DAY
IF YOU CHECKED OFF ANY PROBLEMS ON THIS QUESTIONNAIRE, HOW DIFFICULT HAVE THESE PROBLEMS MADE IT FOR YOU TO DO YOUR WORK, TAKE CARE OF THINGS AT HOME, OR GET ALONG WITH OTHER PEOPLE: VERY DIFFICULT
7. FEELING AFRAID AS IF SOMETHING AWFUL MIGHT HAPPEN: SEVERAL DAYS
6. BECOMING EASILY ANNOYED OR IRRITABLE: NEARLY EVERY DAY
4. TROUBLE RELAXING: SEVERAL DAYS
8. IF YOU CHECKED OFF ANY PROBLEMS, HOW DIFFICULT HAVE THESE MADE IT FOR YOU TO DO YOUR WORK, TAKE CARE OF THINGS AT HOME, OR GET ALONG WITH OTHER PEOPLE?: VERY DIFFICULT
1. FEELING NERVOUS, ANXIOUS, OR ON EDGE: NEARLY EVERY DAY
2. NOT BEING ABLE TO STOP OR CONTROL WORRYING: NEARLY EVERY DAY
7. FEELING AFRAID AS IF SOMETHING AWFUL MIGHT HAPPEN: SEVERAL DAYS

## 2024-08-08 ASSESSMENT — PATIENT HEALTH QUESTIONNAIRE - PHQ9
SUM OF ALL RESPONSES TO PHQ QUESTIONS 1-9: 10
SUM OF ALL RESPONSES TO PHQ QUESTIONS 1-9: 10
10. IF YOU CHECKED OFF ANY PROBLEMS, HOW DIFFICULT HAVE THESE PROBLEMS MADE IT FOR YOU TO DO YOUR WORK, TAKE CARE OF THINGS AT HOME, OR GET ALONG WITH OTHER PEOPLE: EXTREMELY DIFFICULT

## 2024-08-08 NOTE — PROGRESS NOTES
"[facility]                                     Progress Note    Patient Name: Melita Cortes  Date: 08/08/2024         Service Type: Individual      Session Start Time: 3.30  Session End Time: 4.12     Session Length: 38-52    Session #: 19    Attendees: Client attended alone    Service Modality:  Video Visit:      Provider verified identity through the following two step process.  Patient provided:  Patient is known previously to provider    Telemedicine Visit: The patient's condition can be safely assessed and treated via synchronous audio and visual telemedicine encounter.      Reason for Telemedicine Visit: Patient has requested telehealth visit    Originating Site (Patient Location): Patient's home    Distant Site (Provider Location): Provider Remote Setting- Home Office    Consent:  The patient/guardian has verbally consented to: the potential risks and benefits of telemedicine (video visit) versus in person care; bill my insurance or make self-payment for services provided; and responsibility for payment of non-covered services.     Patient would like the video invitation sent by:  My Chart    Mode of Communication:  Video Conference via Amwell    Distant Location (Provider):  Off-site    As the provider I attest to compliance with applicable laws and regulations related to telemedicine.    DATA  Interactive Complexity: No  Crisis: No        Progress Since Last Session (Related to Symptoms / Goals / Homework):   Symptoms: Worsening depression as evident by current phq9 and Ed 7 scores    Homework: Completed in session review patient's utilization of safety plan to mitigate SI.        Episode of Care Goals: Satisfactory progress - ACTION (Actively working towards change); Intervened by reinforcing change plan / affirming steps taken     Current / Ongoing Stressors and Concerns:  Patient reported  \"Trauma Adhd depres\". Patient reported \" I think I have had some trauma in my life, " "my liver transplant and I think have ADHD.\" Pt reported she is  \"overly sensitive\" and \" I dont handle stress very well and struggle with impulsivity\"  and that \"I have a poor self image, negative talk a lot \" patient also reported she lost her mother in 2019 and her dad 7 months ago and still struggling with grieving the lost of both parents.      Current:  Today, patient reported that she continues to experience  neurovegetative symptoms of depression in the form of irritability and  low mood and negative self worth.  Patient shared that she feels overwhelmed with  recurrent negative self worth. She reported that she has decided to spend most of her time alone because friends have not been trustworthy and have not reciprocated what she puts in the friendship. She reported cutting off a long time close friend and shared that she feels stressed and overwhelmed continuously projecting a public image that is very different from how she feels from the inside.        Treatment Objective(s) Addressed in This Session:   Patient will increase daily activity level by exercising for 20-30 minutes and participate in at least 1 daily pleasant/fun activities.  Use safety plan as needed daily and practice mindfulness daily.   Patient will identify specific areas of cognitive distortion and challenge irrational thoughts with reality.         Intervention:  Transactional Analysis: Continue discussion today about Jake Mars Transactional Analysis and the three ego states to explore the origin of patient's inner critical voice and negative self-worth. Explained the three-ego states, parent, adult, and child ego states. Explore ways in which patient is responding to their critical parent ego state from the position of an adapted child's ego state and portray how that could be reenforcing current feelings of worthlessness and defectiveness. Explore ways to strengthened patients' adult ego states by bringing the negative unconscious " inner voices into conscious awareness and challenging them using Socratic questioning in session with patient and focusing on the here and now. Provided a thought diary handout.        Assessments completed prior to visit:  PHQ9:       4/3/2024     8:12 AM 4/10/2024    12:16 PM 5/9/2024     1:52 PM 5/30/2024     2:13 PM 7/2/2024     8:48 PM 7/11/2024     3:14 PM 8/8/2024     2:09 PM   PHQ-9 SCORE   PHQ-9 Total Score MyChart 14 (Moderate depression) 11 (Moderate depression) 14 (Moderate depression) 16 (Moderately severe depression) 11 (Moderate depression) 16 (Moderately severe depression) 10 (Moderate depression)   PHQ-9 Total Score 14 11 14 16 11    11 16 10     GAD7:       2/11/2024     9:26 AM 2/14/2024    12:15 PM 3/25/2024    12:46 PM 4/8/2024     7:39 AM 5/30/2024     2:14 PM 7/6/2024     9:41 AM 8/8/2024     2:10 PM   JORGE-7 SCORE   Total Score  14 (moderate anxiety) 9 (mild anxiety) 11 (moderate anxiety) 7 (mild anxiety) 19 (severe anxiety) 15 (severe anxiety)   Total Score 20 14    14    14 9 11 7 19 15         ASSESSMENT: Current Emotional / Mental Status (status of significant symptoms):   Risk status (Self / Other harm or suicidal ideation)   Patient denies current fears or concerns for personal safety.   Patient denies current or recent suicidal ideation or behaviors.   Patient denies current or recent homicidal ideation or behaviors.   Patient denies current or recent self injurious behavior or ideation.   Patient denies other safety concerns.   Patient reports there has been no change in risk factors since their last session.     Patient reports there has been no change in protective factors since their last session.     Recommended that patient call 911 or go to the local ED should there be a change in any of these risk factors.     Appearance:   Appropriate    Eye Contact:   Good    Psychomotor Behavior: Normal    Attitude:   Cooperative  Interested   Orientation:   Person Place Time  Situation   Speech    Rate / Production: Normal/ Responsive    Volume:  Normal    Mood:    Anxious  Depressed    Affect:    Tearful   Thought Content:  Clear    Thought Form:  Coherent  Logical    Insight:    Good      Medication Review:   No changes to current psychiatric medication(s)     Medication Compliance:   Yes     Changes in Health Issues:   None reported     Chemical Use Review:   Substance Use: Chemical use reviewed, no active concerns identified      Tobacco Use: No current tobacco use.      Diagnosis:  296.32 (F33.1) Major Depressive Disorder, Recurrent Episode, Moderate _ and With atypical features    Collateral Reports Completed:   Not Applicable    PLAN: (Patient Tasks / Therapist Tasks / Other)    Utilize safety plan as needed daily.    Practice ABC cognitive restructuring thought diary skill learned in session daily.    Jake Coyne LICSW           _________________________________________________________    Individual Treatment Plan    Patient's Name: Melita Cortes  YOB: 1970    Date of Creation: 09/22/2023  Date Treatment Plan Last Reviewed/Revised: 05/30/2024    DSM5 Diagnoses: 296.32 (F33.1) Major Depressive Disorder, Recurrent Episode, Moderate _ and With atypical features  Psychosocial / Contextual Factors:   PROMIS (reviewed every 90 days):     Referral / Collaboration:  Referral to another professional/service is not indicated at this time..    Anticipated number of session for this episode of care:  15-25  Anticipation frequency of session: Biweekly  Anticipated Duration of each session: 38-52 minutes  Treatment plan will be reviewed in 90 days or when goals have been changed.       MeasurableTreatment Goal(s) related to diagnosis / functional impairment(s)  Goal 1: Patient will identify and address specific triggers to feelings of depression and improve coping by  90 % in the next three months.    I will know I've met my goal when I am less impulsive, better communicator  "and can comfortably manage distressful emotions.         Objective #A (Patient Action)    Develop and utilize 3 effective distress tolerance strategies to address SI.   Status: Continued - Date(s):  08/31/2024     Intervention(s)   Therapist will engage in collaborative brainstorming with pt to identify and practice individualized distress tolerance coping strategies to address SI weekly.    Objective #B (Patient Action)    Patient will increase daily activity level by exercising for 20-30 minutes and participate in at least 1 daily pleasant/fun activities.  Status: Continued - Date(s):   08/31/2024    Intervention(s)  Therapist will provide psychoeducation, behavioral activation, and cognitive restructuring.    Objective #C  Patient will identify specific areas of cognitive distortion and challenge irrational thoughts with reality.   Status: Continued - Date(s):    08/31/2024       Intervention(s)  Therapist will provide psychoeducation, behavioral activation, and cognitive restructuring.    Objective #D  Patient will learn and practice relaxation techniques to manage depression.  Status: Continued - Date(s):   08/31/2024    Intervention(s)   Therapist will teach patient thought stopping, deep breathing exercises, mindful meditation, and creative visualization.        Patient has reviewed and agreed to the above plan.      Jake Coyne [facility]  September 22, 2023    ----- Service Performed and Documented by [facility]------  This note was reviewed and clinical supervision by ELMO Colmenares [facility]    4/1/2024           [facility]                                       Melita Cortes     SAFETY PLAN:  Step 1: Warning signs / cues (Thoughts, images, mood, situation, behavior) that a crisis may be developing:  Thoughts:   \" sometimes I feel like I don't have a purpose\"  Images:  Feels lonely after losing mum and dad and lonely in marriage  Thinking Processes: ruminations (can't stop thinking about my " "problems): health, family loses  Mood: worsening depression  Behaviors: isolating/withdrawing   Situations: loss: parents and lonely in marriage    Step 2: Coping strategies - Things I can do to take my mind off of my problems without contacting another person (relaxation technique, physical activity):  Distress Tolerance Strategies:  play with my pet , intense exercise: work out for 2-3 minutes , and support group attendance for transplant  Physical Activities: go for a walk and stretching   Focus on helpful thoughts:  \"This is temporary\" and \"It always passes\"  Step 3: People and social settings that provide distraction:   Name:  Augustine ( friend) Phone: [phone]   Name:  Aimee paniagua Phone: [phone]     gym  and [employer] store  Liver transplant group  Step 4: Remind myself of people and things that are important to me and worth living for:    My , cousins and friends.  Step 5: When I am in crisis, I can ask these people to help me use my safety plan:   Name:  Jake ( Therapist )  Phone: [phone]   Name:  Aimee arcelia Phone: [phone]   Step 6: Making the environment safe:   remove alcohol, remove drugs, and be around others  Step 7: Professionals or agencies I can contact during a crisis:  Suicide Prevention Lifeline: Call or Text 024   [facility]: [phone]    Call 911 or go to my nearest emergency department.   I helped develop this safety plan and agree to use it when needed.  I have been given a copy of this plan.      Client signature _________________________________________________________________  Today s date:  11/14/2023  Completed by Provider Name/ Credentials:  CECIL Vasquez  November 14, 2023  Adapted from Safety Plan Template 2008 Chelsi Ramos is reprinted with the express permission of the authors.  No portion of the Safety Plan Template may be reproduced without the express, written permission.  You can contact the authors at " ne@[facility] or ubaldo@mail.[facility].        Answers submitted by the patient for this visit:  Patient Health Questionnaire (Submitted on 11/14/2023)  If you checked off any problems, how difficult have these problems made it for you to do your work, take care of things at home, or get along with other people?: Very difficult  PHQ9 TOTAL SCORE: 18  JORGE-7 (Submitted on 11/14/2023)  JROGE 7 TOTAL SCORE: 14    Answers submitted by the patient for this visit:  Patient Health Questionnaire (Submitted on 11/28/2023)  If you checked off any problems, how difficult have these problems made it for you to do your work, take care of things at home, or get along with other people?: Extremely difficult  PHQ9 TOTAL SCORE: 14  JORGE-7 (Submitted on 11/28/2023)  JORGE 7 TOTAL SCORE: 21    Answers submitted by the patient for this visit:  Patient Health Questionnaire (Submitted on 12/13/2023)  If you checked off any problems, how difficult have these problems made it for you to do your work, take care of things at home, or get along with other people?: Very difficult  PHQ9 TOTAL SCORE: 18  JORGE-7 (Submitted on 12/13/2023)  JORGE 7 TOTAL SCORE: 21    Answers submitted by the patient for this visit:  Patient Health Questionnaire (Submitted on 1/3/2024)  If you checked off any problems, how difficult have these problems made it for you to do your work, take care of things at home, or get along with other people?: Very difficult  PHQ9 TOTAL SCORE: 16  JORGE-7 (Submitted on 1/3/2024)  JORGE 7 TOTAL SCORE: 10    Answers submitted by the patient for this visit:  Patient Health Questionnaire (Submitted on 1/16/2024)  If you checked off any problems, how difficult have these problems made it for you to do your work, take care of things at home, or get along with other people?: Very difficult  PHQ9 TOTAL SCORE: 15    Answers submitted by the patient for this visit:  Patient Health Questionnaire (Submitted on 1/29/2024)  If you checked off any  problems, how difficult have these problems made it for you to do your work, take care of things at home, or get along with other people?: Extremely difficult  PHQ9 TOTAL SCORE: 12  JORGE-7 (Submitted on 1/29/2024)  JORGE 7 TOTAL SCORE: 9    Answers submitted by the patient for this visit:  Patient Health Questionnaire (Submitted on 2/14/2024)  If you checked off any problems, how difficult have these problems made it for you to do your work, take care of things at home, or get along with other people?: Extremely difficult  PHQ9 TOTAL SCORE: 11  JORGE-7 (Submitted on 2/14/2024)  JORGE 7 TOTAL SCORE: 14    Answers submitted by the patient for this visit:  Patient Health Questionnaire (Submitted on 2/28/2024)  If you checked off any problems, how difficult have these problems made it for you to do your work, take care of things at home, or get along with other people?: Extremely difficult  PHQ9 TOTAL SCORE: 16    Answers submitted by the patient for this visit:  Patient Health Questionnaire (Submitted on 3/11/2024)  If you checked off any problems, how difficult have these problems made it for you to do your work, take care of things at home, or get along with other people?: Very difficult  PHQ9 TOTAL SCORE: 10    Answers submitted by the patient for this visit:  Patient Health Questionnaire (Submitted on 3/25/2024)  If you checked off any problems, how difficult have these problems made it for you to do your work, take care of things at home, or get along with other people?: Extremely difficult  PHQ9 TOTAL SCORE: 16  JORGE-7 (Submitted on 3/25/2024)  JORGE 7 TOTAL SCORE: 9    Answers submitted by the patient for this visit:  Patient Health Questionnaire (Submitted on 4/10/2024)  If you checked off any problems, how difficult have these problems made it for you to do your work, take care of things at home, or get along with other people?: Extremely difficult  PHQ9 TOTAL SCORE: 11    Answers submitted by the patient for this  visit:  Patient Health Questionnaire (Submitted on 5/9/2024)  If you checked off any problems, how difficult have these problems made it for you to do your work, take care of things at home, or get along with other people?: Extremely difficult  PHQ9 TOTAL SCORE: 14    Answers submitted by the patient for this visit:  Patient Health Questionnaire (Submitted on 5/30/2024)  If you checked off any problems, how difficult have these problems made it for you to do your work, take care of things at home, or get along with other people?: Extremely difficult  PHQ9 TOTAL SCORE: 16  JORGE-7 (Submitted on 5/30/2024)  JORGE 7 TOTAL SCORE: 7    Answers submitted by the patient for this visit:  Patient Health Questionnaire (Submitted on 7/11/2024)  If you checked off any problems, how difficult have these problems made it for you to do your work, take care of things at home, or get along with other people?: Very difficult  PHQ9 TOTAL SCORE: 16  JORGE-7 (Submitted on 7/6/2024)  JORGE 7 TOTAL SCORE: 19    Answers submitted by the patient for this visit:  Patient Health Questionnaire (Submitted on 8/8/2024)  If you checked off any problems, how difficult have these problems made it for you to do your work, take care of things at home, or get along with other people?: Extremely difficult  PHQ9 TOTAL SCORE: 10  JORGE-7 (Submitted on 8/8/2024)  JORGE 7 TOTAL SCORE: 15

## 2024-08-08 NOTE — TELEPHONE ENCOUNTER
"FYI to provider - Patient is lost to pap tracking follow-up. Attempts to contact pt have been made per reminder process and there has been no reply and/or no appt scheduled. Contact hx listed below.     1/2019 NIL, +HPV 18  3/2019 [place]- Neg  9/14/20 NIL, +HPV 18. Plan [place] bef 12/14/20 12/29/20 [place]- no visible lesions, no Bx done. Plan 1 yr co-test  3/1/22 NIL pap, + HR HPV # 18. Plan: colp   4/27/22 [place] ECC: no WILLI. Plan: Cotest in  1 yr. Per notes from visit, \"Due to the medications that she is taking to prevent rejection of her liver transplant, this puts her at higher risk for the development of cervical disease so if today's pathology results are normal, then I would advise a yearly co-test to closely monitor.\"  6/29/23 ASCUS pap, neg HR HPV. Plan 1 year cotest  06/13/24 Reminder MyChart   07/16/24 Reminder call - left message    8/8/24 Lost to follow-up for pap tracking     Lupe Ridley RN BSN, Pap Tracking   "

## 2024-08-09 ENCOUNTER — LAB (OUTPATIENT)
Dept: LAB | Facility: CLINIC | Age: 54
End: 2024-08-09
Payer: MEDICARE

## 2024-08-09 DIAGNOSIS — Z94.4 STATUS POST LIVER TRANSPLANTATION (H): ICD-10-CM

## 2024-08-09 DIAGNOSIS — Z94.4 LIVER REPLACED BY TRANSPLANT (H): ICD-10-CM

## 2024-08-09 LAB
ALBUMIN SERPL BCG-MCNC: 4.3 G/DL (ref 3.5–5.2)
ALP SERPL-CCNC: 69 U/L (ref 40–150)
ALT SERPL W P-5'-P-CCNC: 21 U/L (ref 0–50)
ANION GAP SERPL CALCULATED.3IONS-SCNC: 9 MMOL/L (ref 7–15)
AST SERPL W P-5'-P-CCNC: 29 U/L (ref 0–45)
BILIRUB DIRECT SERPL-MCNC: <0.2 MG/DL (ref 0–0.3)
BILIRUB SERPL-MCNC: 0.5 MG/DL
BUN SERPL-MCNC: 18.2 MG/DL (ref 6–20)
CALCIUM SERPL-MCNC: 8.8 MG/DL (ref 8.8–10.4)
CHLORIDE SERPL-SCNC: 105 MMOL/L (ref 98–107)
CREAT SERPL-MCNC: 1 MG/DL (ref 0.51–0.95)
EGFRCR SERPLBLD CKD-EPI 2021: 67 ML/MIN/1.73M2
ERYTHROCYTE [DISTWIDTH] IN BLOOD BY AUTOMATED COUNT: 12.4 % (ref 10–15)
GLUCOSE SERPL-MCNC: 105 MG/DL (ref 70–99)
HCO3 SERPL-SCNC: 26 MMOL/L (ref 22–29)
HCT VFR BLD AUTO: 43.8 % (ref 35–47)
HGB BLD-MCNC: 15.1 G/DL (ref 11.7–15.7)
MCH RBC QN AUTO: 30.3 PG (ref 26.5–33)
MCHC RBC AUTO-ENTMCNC: 34.5 G/DL (ref 31.5–36.5)
MCV RBC AUTO: 88 FL (ref 78–100)
PLATELET # BLD AUTO: 197 10E3/UL (ref 150–450)
POTASSIUM SERPL-SCNC: 4.4 MMOL/L (ref 3.4–5.3)
PROT SERPL-MCNC: 6.2 G/DL (ref 6.4–8.3)
RBC # BLD AUTO: 4.98 10E6/UL (ref 3.8–5.2)
SODIUM SERPL-SCNC: 140 MMOL/L (ref 135–145)
TACROLIMUS BLD-MCNC: 2.9 UG/L (ref 5–15)
TME LAST DOSE: ABNORMAL H
TME LAST DOSE: ABNORMAL H
WBC # BLD AUTO: 5.4 10E3/UL (ref 4–11)

## 2024-08-09 PROCEDURE — 80053 COMPREHEN METABOLIC PANEL: CPT

## 2024-08-09 PROCEDURE — 82248 BILIRUBIN DIRECT: CPT

## 2024-08-09 PROCEDURE — 85027 COMPLETE CBC AUTOMATED: CPT

## 2024-08-09 PROCEDURE — 36415 COLL VENOUS BLD VENIPUNCTURE: CPT

## 2024-08-09 PROCEDURE — 80197 ASSAY OF TACROLIMUS: CPT

## 2024-08-19 ENCOUNTER — MYC REFILL (OUTPATIENT)
Dept: FAMILY MEDICINE | Facility: CLINIC | Age: 54
End: 2024-08-19
Payer: MEDICARE

## 2024-08-19 DIAGNOSIS — F90.0 ATTENTION DEFICIT HYPERACTIVITY DISORDER (ADHD), PREDOMINANTLY INATTENTIVE TYPE: ICD-10-CM

## 2024-08-19 DIAGNOSIS — F33.0 MILD RECURRENT MAJOR DEPRESSION (H): ICD-10-CM

## 2024-08-19 RX ORDER — DEXTROAMPHETAMINE SACCHARATE, AMPHETAMINE ASPARTATE MONOHYDRATE, DEXTROAMPHETAMINE SULFATE AND AMPHETAMINE SULFATE 5; 5; 5; 5 MG/1; MG/1; MG/1; MG/1
20 CAPSULE, EXTENDED RELEASE ORAL DAILY
Qty: 30 CAPSULE | Refills: 0 | Status: SHIPPED | OUTPATIENT
Start: 2024-08-19 | End: 2024-08-27

## 2024-08-22 ENCOUNTER — VIRTUAL VISIT (OUTPATIENT)
Dept: PSYCHOLOGY | Facility: CLINIC | Age: 54
End: 2024-08-22
Payer: MEDICARE

## 2024-08-22 DIAGNOSIS — F32.1 CURRENT MODERATE EPISODE OF MAJOR DEPRESSIVE DISORDER, UNSPECIFIED WHETHER RECURRENT (H): Primary | ICD-10-CM

## 2024-08-22 PROCEDURE — 90834 PSYTX W PT 45 MINUTES: CPT | Mod: 95 | Performed by: STUDENT IN AN ORGANIZED HEALTH CARE EDUCATION/TRAINING PROGRAM

## 2024-08-22 ASSESSMENT — PATIENT HEALTH QUESTIONNAIRE - PHQ9
SUM OF ALL RESPONSES TO PHQ QUESTIONS 1-9: 13
10. IF YOU CHECKED OFF ANY PROBLEMS, HOW DIFFICULT HAVE THESE PROBLEMS MADE IT FOR YOU TO DO YOUR WORK, TAKE CARE OF THINGS AT HOME, OR GET ALONG WITH OTHER PEOPLE: SOMEWHAT DIFFICULT
SUM OF ALL RESPONSES TO PHQ QUESTIONS 1-9: 13

## 2024-08-22 NOTE — PROGRESS NOTES
"[facility]                                     Progress Note    Patient Name: Melita Cortes  Date: 08/22/2024         Service Type: Individual      Session Start Time: 2.30  Session End Time: 3.12     Session Length: 38-52    Session #: 20    Attendees: Client attended alone    Service Modality:  Video Visit:      Provider verified identity through the following two step process.  Patient provided:  Patient is known previously to provider    Telemedicine Visit: The patient's condition can be safely assessed and treated via synchronous audio and visual telemedicine encounter.      Reason for Telemedicine Visit: Patient has requested telehealth visit    Originating Site (Patient Location): Patient's home    Distant Site (Provider Location): Provider Remote Setting- Home Office    Consent:  The patient/guardian has verbally consented to: the potential risks and benefits of telemedicine (video visit) versus in person care; bill my insurance or make self-payment for services provided; and responsibility for payment of non-covered services.     Patient would like the video invitation sent by:  My Chart    Mode of Communication:  Video Conference via Amwell    Distant Location (Provider):  Off-site    As the provider I attest to compliance with applicable laws and regulations related to telemedicine.    DATA  Interactive Complexity: No  Crisis: No        Progress Since Last Session (Related to Symptoms / Goals / Homework):   Symptoms: Worsening depression as evident by current phq9 and Ed 7 scores    Homework: Completed in session review patient's utilization of safety plan to mitigate SI.        Episode of Care Goals: Satisfactory progress - ACTION (Actively working towards change); Intervened by reinforcing change plan / affirming steps taken     Current / Ongoing Stressors and Concerns:  Patient reported  \"Trauma Adhd depres\". Patient reported \" I think I have had some trauma in my life, " "my liver transplant and I think have ADHD.\" Pt reported she is  \"overly sensitive\" and \" I dont handle stress very well and struggle with impulsivity\"  and that \"I have a poor self image, negative talk a lot \" patient also reported she lost her mother in 2019 and her dad 7 months ago and still struggling with grieving the lost of both parents.      Current:  Today, patient reported that she continues to experience  neurovegetative symptoms of depression in the form of irritability and  low mood and negative self worth.  Patient shared that she feels overwhelmed with  recurrent negative self worth. She reported that she has decided to spend most of her time alone because friends have not been trustworthy and have not reciprocated what she puts in the friendship. She noted that the relationship with her in-laws have been challenging and she feels angry, hurt and used by them and is struggling with the distress she feels keeping the relationship with them.      Treatment Objective(s) Addressed in This Session:   Patient will increase daily activity level by exercising for 20-30 minutes and participate in at least 1 daily pleasant/fun activities.  Use safety plan as needed daily and practice mindfulness daily.   Patient will identify specific areas of cognitive distortion and challenge irrational thoughts with reality.         Intervention:    DBT: Discussion today on distress intolerance as a perceived inability to fully experience unpleasant, aversive, or uncomfortable emotions, accompanied by a desperate need to escape these uncomfortable emotions. Discuss common examples like sadness, and frustration. Couched pt that negative emotions by themselves are not necessarily distressing but will feel distressing only when we evaluate our emotional experience as evasive/bad. Walk patient through a mindfulness exercise to help them watch their emotions, and describe it non judgementally as in   there is sadness, I can feel " it in the heaviness of my shoulders  or there is anger and I can feel it in there way I feel tense, observe the intensity  of the emotion and its transient nature as it builts up and  dissipates.        Assessments completed prior to visit:  PHQ9:       4/10/2024    12:16 PM 5/9/2024     1:52 PM 5/30/2024     2:13 PM 7/2/2024     8:48 PM 7/11/2024     3:14 PM 8/8/2024     2:09 PM 8/22/2024     1:54 PM   PHQ-9 SCORE   PHQ-9 Total Score MyChart 11 (Moderate depression) 14 (Moderate depression) 16 (Moderately severe depression) 11 (Moderate depression) 16 (Moderately severe depression) 10 (Moderate depression) 13 (Moderate depression)   PHQ-9 Total Score 11 14 16 11    11 16 10 13     GAD7:       2/11/2024     9:26 AM 2/14/2024    12:15 PM 3/25/2024    12:46 PM 4/8/2024     7:39 AM 5/30/2024     2:14 PM 7/6/2024     9:41 AM 8/8/2024     2:10 PM   JORGE-7 SCORE   Total Score  14 (moderate anxiety) 9 (mild anxiety) 11 (moderate anxiety) 7 (mild anxiety) 19 (severe anxiety) 15 (severe anxiety)   Total Score 20 14    14    14 9 11 7 19 15         ASSESSMENT: Current Emotional / Mental Status (status of significant symptoms):   Risk status (Self / Other harm or suicidal ideation)   Patient denies current fears or concerns for personal safety.   Patient denies current or recent suicidal ideation or behaviors.   Patient denies current or recent homicidal ideation or behaviors.   Patient denies current or recent self injurious behavior or ideation.   Patient denies other safety concerns.   Patient reports there has been no change in risk factors since their last session.     Patient reports there has been no change in protective factors since their last session.     Recommended that patient call 911 or go to the local ED should there be a change in any of these risk factors.     Appearance:   Appropriate    Eye Contact:   Good    Psychomotor Behavior: Normal    Attitude:   Cooperative  Interested   Orientation:   Person Place  Time Situation   Speech    Rate / Production: Normal/ Responsive    Volume:  Normal    Mood:    Anxious  Depressed    Affect:    Tearful   Thought Content:  Clear    Thought Form:  Coherent  Logical    Insight:    Good      Medication Review:   No changes to current psychiatric medication(s)     Medication Compliance:   Yes     Changes in Health Issues:   None reported     Chemical Use Review:   Substance Use: Chemical use reviewed, no active concerns identified      Tobacco Use: No current tobacco use.      Diagnosis:  296.32 (F33.1) Major Depressive Disorder, Recurrent Episode, Moderate _ and With atypical features    Collateral Reports Completed:   Not Applicable    PLAN: (Patient Tasks / Therapist Tasks / Other)    Utilize safety plan as needed daily.    Practice  distress intolerance skill learned in session, watching emotions, label and describe them and not avoid them.     ROSEANNE Vasquez           _________________________________________________________    Individual Treatment Plan    Patient's Name: Melita Cortes  YOB: 1970    Date of Creation: 09/22/2023  Date Treatment Plan Last Reviewed/Revised: 05/30/2024    DSM5 Diagnoses: 296.32 (F33.1) Major Depressive Disorder, Recurrent Episode, Moderate _ and With atypical features  Psychosocial / Contextual Factors:   PROMIS (reviewed every 90 days):     Referral / Collaboration:  Referral to another professional/service is not indicated at this time..    Anticipated number of session for this episode of care:  15-25  Anticipation frequency of session: Biweekly  Anticipated Duration of each session: 38-52 minutes  Treatment plan will be reviewed in 90 days or when goals have been changed.       MeasurableTreatment Goal(s) related to diagnosis / functional impairment(s)  Goal 1: Patient will identify and address specific triggers to feelings of depression and improve coping by  90 % in the next three months.    I will know I've met my goal when  "I am less impulsive, better communicator and can comfortably manage distressful emotions.         Objective #A (Patient Action)    Develop and utilize 3 effective distress tolerance strategies to address SI.   Status: Continued - Date(s):  08/31/2024     Intervention(s)   Therapist will engage in collaborative brainstorming with pt to identify and practice individualized distress tolerance coping strategies to address SI weekly.    Objective #B (Patient Action)    Patient will increase daily activity level by exercising for 20-30 minutes and participate in at least 1 daily pleasant/fun activities.  Status: Continued - Date(s):   08/31/2024    Intervention(s)  Therapist will provide psychoeducation, behavioral activation, and cognitive restructuring.    Objective #C  Patient will identify specific areas of cognitive distortion and challenge irrational thoughts with reality.   Status: Continued - Date(s):    08/31/2024       Intervention(s)  Therapist will provide psychoeducation, behavioral activation, and cognitive restructuring.    Objective #D  Patient will learn and practice relaxation techniques to manage depression.  Status: Continued - Date(s):   08/31/2024    Intervention(s)   Therapist will teach patient thought stopping, deep breathing exercises, mindful meditation, and creative visualization.        Patient has reviewed and agreed to the above plan.      Jake Coyne [facility]  September 22, 2023    ----- Service Performed and Documented by [facility]------  This note was reviewed and clinical supervision by ELMO Colmenares [facility]    4/1/2024           [facility]                                       Melita Cortes     SAFETY PLAN:  Step 1: Warning signs / cues (Thoughts, images, mood, situation, behavior) that a crisis may be developing:  Thoughts:   \" sometimes I feel like I don't have a purpose\"  Images:  Feels lonely after losing mum and dad and lonely in marriage  Thinking Processes: " "ruminations (can't stop thinking about my problems): health, family loses  Mood: worsening depression  Behaviors: isolating/withdrawing   Situations: loss: parents and lonely in marriage    Step 2: Coping strategies - Things I can do to take my mind off of my problems without contacting another person (relaxation technique, physical activity):  Distress Tolerance Strategies:  play with my pet , intense exercise: work out for 2-3 minutes , and support group attendance for transplant  Physical Activities: go for a walk and stretching   Focus on helpful thoughts:  \"This is temporary\" and \"It always passes\"  Step 3: People and social settings that provide distraction:   Name:  Augustine ( friend) Phone: [phone]   Name:  Aimee paniagua Phone: [phone]     gym  and [employer] store  Liver transplant group  Step 4: Remind myself of people and things that are important to me and worth living for:    My , cousins and friends.  Step 5: When I am in crisis, I can ask these people to help me use my safety plan:   Name:  Jake ( Therapist )  Phone: [phone]   Name:  Aimee arcelia Phone: [phone]   Step 6: Making the environment safe:   remove alcohol, remove drugs, and be around others  Step 7: Professionals or agencies I can contact during a crisis:  Suicide Prevention Lifeline: Call or Text 026   [facility]: [phone]    Call 911 or go to my nearest emergency department.   I helped develop this safety plan and agree to use it when needed.  I have been given a copy of this plan.      Client signature _________________________________________________________________  Today s date:  11/14/2023  Completed by Provider Name/ Credentials:  CECIL Vasquez  November 14, 2023  Adapted from Safety Plan Template 2008 Chelsi Marx and Fred Ramos is reprinted with the express permission of the authors.  No portion of the Safety Plan Template may be reproduced without the express, written permission.  " You can contact the authors at [email or web address] or [email or web address].        Answers submitted by the patient for this visit:  Patient Health Questionnaire (Submitted on 11/14/2023)  If you checked off any problems, how difficult have these problems made it for you to do your work, take care of things at home, or get along with other people?: Very difficult  PHQ9 TOTAL SCORE: 18  JORGE-7 (Submitted on 11/14/2023)  JORGE 7 TOTAL SCORE: 14    Answers submitted by the patient for this visit:  Patient Health Questionnaire (Submitted on 11/28/2023)  If you checked off any problems, how difficult have these problems made it for you to do your work, take care of things at home, or get along with other people?: Extremely difficult  PHQ9 TOTAL SCORE: 14  JORGE-7 (Submitted on 11/28/2023)  JORGE 7 TOTAL SCORE: 21    Answers submitted by the patient for this visit:  Patient Health Questionnaire (Submitted on 12/13/2023)  If you checked off any problems, how difficult have these problems made it for you to do your work, take care of things at home, or get along with other people?: Very difficult  PHQ9 TOTAL SCORE: 18  JORGE-7 (Submitted on 12/13/2023)  JORGE 7 TOTAL SCORE: 21    Answers submitted by the patient for this visit:  Patient Health Questionnaire (Submitted on 1/3/2024)  If you checked off any problems, how difficult have these problems made it for you to do your work, take care of things at home, or get along with other people?: Very difficult  PHQ9 TOTAL SCORE: 16  JORGE-7 (Submitted on 1/3/2024)  JORGE 7 TOTAL SCORE: 10    Answers submitted by the patient for this visit:  Patient Health Questionnaire (Submitted on 1/16/2024)  If you checked off any problems, how difficult have these problems made it for you to do your work, take care of things at home, or get along with other people?: Very difficult  PHQ9 TOTAL SCORE: 15    Answers submitted by the patient for this visit:  Patient Health Questionnaire (Submitted on  1/29/2024)  If you checked off any problems, how difficult have these problems made it for you to do your work, take care of things at home, or get along with other people?: Extremely difficult  PHQ9 TOTAL SCORE: 12  JORGE-7 (Submitted on 1/29/2024)  JORGE 7 TOTAL SCORE: 9    Answers submitted by the patient for this visit:  Patient Health Questionnaire (Submitted on 2/14/2024)  If you checked off any problems, how difficult have these problems made it for you to do your work, take care of things at home, or get along with other people?: Extremely difficult  PHQ9 TOTAL SCORE: 11  JORGE-7 (Submitted on 2/14/2024)  JORGE 7 TOTAL SCORE: 14    Answers submitted by the patient for this visit:  Patient Health Questionnaire (Submitted on 2/28/2024)  If you checked off any problems, how difficult have these problems made it for you to do your work, take care of things at home, or get along with other people?: Extremely difficult  PHQ9 TOTAL SCORE: 16    Answers submitted by the patient for this visit:  Patient Health Questionnaire (Submitted on 3/11/2024)  If you checked off any problems, how difficult have these problems made it for you to do your work, take care of things at home, or get along with other people?: Very difficult  PHQ9 TOTAL SCORE: 10    Answers submitted by the patient for this visit:  Patient Health Questionnaire (Submitted on 3/25/2024)  If you checked off any problems, how difficult have these problems made it for you to do your work, take care of things at home, or get along with other people?: Extremely difficult  PHQ9 TOTAL SCORE: 16  JORGE-7 (Submitted on 3/25/2024)  JORGE 7 TOTAL SCORE: 9    Answers submitted by the patient for this visit:  Patient Health Questionnaire (Submitted on 4/10/2024)  If you checked off any problems, how difficult have these problems made it for you to do your work, take care of things at home, or get along with other people?: Extremely difficult  PHQ9 TOTAL SCORE: 11    Answers  submitted by the patient for this visit:  Patient Health Questionnaire (Submitted on 5/9/2024)  If you checked off any problems, how difficult have these problems made it for you to do your work, take care of things at home, or get along with other people?: Extremely difficult  PHQ9 TOTAL SCORE: 14    Answers submitted by the patient for this visit:  Patient Health Questionnaire (Submitted on 5/30/2024)  If you checked off any problems, how difficult have these problems made it for you to do your work, take care of things at home, or get along with other people?: Extremely difficult  PHQ9 TOTAL SCORE: 16  JORGE-7 (Submitted on 5/30/2024)  JORGE 7 TOTAL SCORE: 7    Answers submitted by the patient for this visit:  Patient Health Questionnaire (Submitted on 7/11/2024)  If you checked off any problems, how difficult have these problems made it for you to do your work, take care of things at home, or get along with other people?: Very difficult  PHQ9 TOTAL SCORE: 16  JORGE-7 (Submitted on 7/6/2024)  JORGE 7 TOTAL SCORE: 19    Answers submitted by the patient for this visit:  Patient Health Questionnaire (Submitted on 8/8/2024)  If you checked off any problems, how difficult have these problems made it for you to do your work, take care of things at home, or get along with other people?: Extremely difficult  PHQ9 TOTAL SCORE: 10  JORGE-7 (Submitted on 8/8/2024)  JOGRE 7 TOTAL SCORE: 15    Answers submitted by the patient for this visit:  Patient Health Questionnaire (Submitted on 8/22/2024)  If you checked off any problems, how difficult have these problems made it for you to do your work, take care of things at home, or get along with other people?: Somewhat difficult  PHQ9 TOTAL SCORE: 13

## 2024-08-27 ENCOUNTER — MYC MEDICAL ADVICE (OUTPATIENT)
Dept: FAMILY MEDICINE | Facility: CLINIC | Age: 54
End: 2024-08-27
Payer: MEDICARE

## 2024-08-27 ENCOUNTER — MYC REFILL (OUTPATIENT)
Dept: FAMILY MEDICINE | Facility: CLINIC | Age: 54
End: 2024-08-27
Payer: MEDICARE

## 2024-08-27 DIAGNOSIS — F90.0 ATTENTION DEFICIT HYPERACTIVITY DISORDER (ADHD), PREDOMINANTLY INATTENTIVE TYPE: ICD-10-CM

## 2024-08-27 RX ORDER — DEXTROAMPHETAMINE SACCHARATE, AMPHETAMINE ASPARTATE MONOHYDRATE, DEXTROAMPHETAMINE SULFATE AND AMPHETAMINE SULFATE 5; 5; 5; 5 MG/1; MG/1; MG/1; MG/1
20 CAPSULE, EXTENDED RELEASE ORAL DAILY
Qty: 30 CAPSULE | Refills: 0 | Status: SHIPPED | OUTPATIENT
Start: 2024-08-27 | End: 2024-08-28

## 2024-08-28 ENCOUNTER — MYC REFILL (OUTPATIENT)
Dept: FAMILY MEDICINE | Facility: CLINIC | Age: 54
End: 2024-08-28
Payer: MEDICARE

## 2024-08-28 DIAGNOSIS — F90.0 ATTENTION DEFICIT HYPERACTIVITY DISORDER (ADHD), PREDOMINANTLY INATTENTIVE TYPE: ICD-10-CM

## 2024-08-28 RX ORDER — DEXTROAMPHETAMINE SACCHARATE, AMPHETAMINE ASPARTATE MONOHYDRATE, DEXTROAMPHETAMINE SULFATE AND AMPHETAMINE SULFATE 5; 5; 5; 5 MG/1; MG/1; MG/1; MG/1
20 CAPSULE, EXTENDED RELEASE ORAL DAILY
Qty: 30 CAPSULE | Refills: 0 | Status: SHIPPED | OUTPATIENT
Start: 2024-08-28 | End: 2024-09-18

## 2024-08-28 RX ORDER — DEXTROAMPHETAMINE SACCHARATE, AMPHETAMINE ASPARTATE, DEXTROAMPHETAMINE SULFATE AND AMPHETAMINE SULFATE 2.5; 2.5; 2.5; 2.5 MG/1; MG/1; MG/1; MG/1
10 TABLET ORAL DAILY
Qty: 30 TABLET | Refills: 0 | OUTPATIENT
Start: 2024-08-28

## 2024-08-29 ENCOUNTER — MYC MEDICAL ADVICE (OUTPATIENT)
Dept: FAMILY MEDICINE | Facility: CLINIC | Age: 54
End: 2024-08-29
Payer: MEDICARE

## 2024-08-29 DIAGNOSIS — M25.561 CHRONIC PAIN OF RIGHT KNEE: Primary | ICD-10-CM

## 2024-08-29 DIAGNOSIS — G89.29 CHRONIC PAIN OF RIGHT KNEE: Primary | ICD-10-CM

## 2024-09-08 ENCOUNTER — MYC REFILL (OUTPATIENT)
Dept: TRANSPLANT | Facility: CLINIC | Age: 54
End: 2024-09-08
Payer: MEDICARE

## 2024-09-08 DIAGNOSIS — Z94.4 LIVER REPLACED BY TRANSPLANT (H): ICD-10-CM

## 2024-09-09 RX ORDER — TACROLIMUS 1 MG/1
1 CAPSULE ORAL EVERY EVENING
Qty: 90 CAPSULE | Refills: 3 | Status: SHIPPED | OUTPATIENT
Start: 2024-09-09

## 2024-09-09 RX ORDER — TACROLIMUS 0.5 MG/1
0.5 CAPSULE ORAL EVERY MORNING
Qty: 90 CAPSULE | Refills: 3 | Status: SHIPPED | OUTPATIENT
Start: 2024-09-09

## 2024-09-11 ENCOUNTER — ANCILLARY PROCEDURE (OUTPATIENT)
Dept: MRI IMAGING | Facility: CLINIC | Age: 54
End: 2024-09-11
Attending: FAMILY MEDICINE
Payer: MEDICARE

## 2024-09-11 DIAGNOSIS — G89.29 CHRONIC PAIN OF RIGHT KNEE: ICD-10-CM

## 2024-09-11 DIAGNOSIS — M25.561 CHRONIC PAIN OF RIGHT KNEE: ICD-10-CM

## 2024-09-12 ENCOUNTER — MYC MEDICAL ADVICE (OUTPATIENT)
Dept: ORTHOPEDICS | Facility: CLINIC | Age: 54
End: 2024-09-12
Payer: MEDICARE

## 2024-09-12 ENCOUNTER — MYC MEDICAL ADVICE (OUTPATIENT)
Dept: FAMILY MEDICINE | Facility: CLINIC | Age: 54
End: 2024-09-12
Payer: MEDICARE

## 2024-09-12 ENCOUNTER — VIRTUAL VISIT (OUTPATIENT)
Dept: PSYCHOLOGY | Facility: CLINIC | Age: 54
End: 2024-09-12
Payer: MEDICARE

## 2024-09-12 DIAGNOSIS — F32.0 CURRENT MILD EPISODE OF MAJOR DEPRESSIVE DISORDER, UNSPECIFIED WHETHER RECURRENT (H): Primary | ICD-10-CM

## 2024-09-12 DIAGNOSIS — Z96.642 HISTORY OF LEFT HIP REPLACEMENT: Primary | ICD-10-CM

## 2024-09-12 PROCEDURE — 90834 PSYTX W PT 45 MINUTES: CPT | Mod: 95 | Performed by: STUDENT IN AN ORGANIZED HEALTH CARE EDUCATION/TRAINING PROGRAM

## 2024-09-12 ASSESSMENT — PATIENT HEALTH QUESTIONNAIRE - PHQ9
10. IF YOU CHECKED OFF ANY PROBLEMS, HOW DIFFICULT HAVE THESE PROBLEMS MADE IT FOR YOU TO DO YOUR WORK, TAKE CARE OF THINGS AT HOME, OR GET ALONG WITH OTHER PEOPLE: EXTREMELY DIFFICULT
SUM OF ALL RESPONSES TO PHQ QUESTIONS 1-9: 9
SUM OF ALL RESPONSES TO PHQ QUESTIONS 1-9: 9

## 2024-09-12 NOTE — PROGRESS NOTES
"[facility]                                     Progress Note    Patient Name: Melita Cortes  Date: 09/12/2024         Service Type: Individual      Session Start Time: 3.30  Session End Time: 4.13     Session Length: 38-52    Session #: 21    Attendees: Client attended alone    Service Modality:  Video Visit:      Provider verified identity through the following two step process.  Patient provided:  Patient is known previously to provider    Telemedicine Visit: The patient's condition can be safely assessed and treated via synchronous audio and visual telemedicine encounter.      Reason for Telemedicine Visit: Patient has requested telehealth visit    Originating Site (Patient Location): Patient's home    Distant Site (Provider Location): Provider Remote Setting- Home Office    Consent:  The patient/guardian has verbally consented to: the potential risks and benefits of telemedicine (video visit) versus in person care; bill my insurance or make self-payment for services provided; and responsibility for payment of non-covered services.     Patient would like the video invitation sent by:  My Chart    Mode of Communication:  Video Conference via Amwell    Distant Location (Provider):  Off-site    As the provider I attest to compliance with applicable laws and regulations related to telemedicine.    DATA  Interactive Complexity: No  Crisis: No        Progress Since Last Session (Related to Symptoms / Goals / Homework):   Symptoms: Improving as evident by current phq9 scores    Homework: Completed in session review patient's utilization of safety plan to mitigate SI.        Episode of Care Goals: Satisfactory progress - ACTION (Actively working towards change); Intervened by reinforcing change plan / affirming steps taken     Current / Ongoing Stressors and Concerns:  Patient reported  \"Trauma Adhd depres\". Patient reported \" I think I have had some trauma in my life, my liver transplant " "and I think have ADHD.\" Pt reported she is  \"overly sensitive\" and \" I dont handle stress very well and struggle with impulsivity\"  and that \"I have a poor self image, negative talk a lot \" patient also reported she lost her mother in 2019 and her dad 7 months ago and still struggling with grieving the lost of both parents.      Current:  Today, patient reported that she continues to experience  neurovegetative symptoms of depression in the form of irritability and  low mood and negative self worth.  Patient shared that she feels overwhelmed with  recurrent negative self worth. She reported that she has decided to spend most of her time alone because friends have not been trustworthy and have not reciprocated what she puts in the friendship. She reported feeling \"used and abandoned\" and that she always have to explained herself to others.      Treatment Objective(s) Addressed in This Session:   Patient will increase daily activity level by exercising for 20-30 minutes and participate in at least 1 daily pleasant/fun activities.  Use safety plan as needed daily and practice mindfulness daily.   Patient will identify specific areas of cognitive distortion and challenge irrational thoughts with reality.         Intervention:    DBT: Continue discussion  on distress intolerance as a perceived inability to fully experience unpleasant, aversive, or uncomfortable emotions, accompanied by a desperate need to escape these uncomfortable emotions. Discuss common examples like sadness, and frustration. Couched pt that negative emotions by themselves are not necessarily distressing but will feel distressing only when we evaluate our emotional experience as evasive/bad. Walk patient through a mindfulness exercise to help them watch their emotions, and describe it non judgementally as in   there is sadness, I can feel it in the heaviness of my shoulders  or there is anger and I can feel it in there way I feel tense, observe the " intensity  of the emotion and its transient nature as it builts up and  dissipates.        Assessments completed prior to visit:  PHQ9:       5/9/2024     1:52 PM 5/30/2024     2:13 PM 7/2/2024     8:48 PM 7/11/2024     3:14 PM 8/8/2024     2:09 PM 8/22/2024     1:54 PM 9/12/2024     2:24 PM   PHQ-9 SCORE   PHQ-9 Total Score MyChart 14 (Moderate depression) 16 (Moderately severe depression) 11 (Moderate depression) 16 (Moderately severe depression) 10 (Moderate depression) 13 (Moderate depression) 9 (Mild depression)   PHQ-9 Total Score 14 16 11    11 16 10 13 9     GAD7:       2/11/2024     9:26 AM 2/14/2024    12:15 PM 3/25/2024    12:46 PM 4/8/2024     7:39 AM 5/30/2024     2:14 PM 7/6/2024     9:41 AM 8/8/2024     2:10 PM   JORGE-7 SCORE   Total Score  14 (moderate anxiety) 9 (mild anxiety) 11 (moderate anxiety) 7 (mild anxiety) 19 (severe anxiety) 15 (severe anxiety)   Total Score 20 14    14    14 9 11 7 19 15         ASSESSMENT: Current Emotional / Mental Status (status of significant symptoms):   Risk status (Self / Other harm or suicidal ideation)   Patient denies current fears or concerns for personal safety.   Patient denies current or recent suicidal ideation or behaviors.   Patient denies current or recent homicidal ideation or behaviors.   Patient denies current or recent self injurious behavior or ideation.   Patient denies other safety concerns.   Patient reports there has been no change in risk factors since their last session.     Patient reports there has been no change in protective factors since their last session.     Recommended that patient call 911 or go to the local ED should there be a change in any of these risk factors.     Appearance:   Appropriate    Eye Contact:   Good    Psychomotor Behavior: Normal    Attitude:   Cooperative  Interested   Orientation:   Person Place Time Situation   Speech    Rate / Production: Normal/ Responsive    Volume:  Normal    Mood:    Anxious  Depressed     Affect:    Tearful   Thought Content:  Clear    Thought Form:  Coherent  Logical    Insight:    Good      Medication Review:   No changes to current psychiatric medication(s)     Medication Compliance:   Yes     Changes in Health Issues:   None reported     Chemical Use Review:   Substance Use: Chemical use reviewed, no active concerns identified      Tobacco Use: No current tobacco use.      Diagnosis:  296.32 (F33.1) Major Depressive Disorder, Recurrent Episode, Moderate _ and With atypical features    Collateral Reports Completed:   Not Applicable    PLAN: (Patient Tasks / Therapist Tasks / Other)    Utilize safety plan as needed daily.    Practice  distress intolerance skill learned in session, watching emotions, label and describe them and not avoid them.     ROSEANNE Vaqsuez           _________________________________________________________    Individual Treatment Plan    Patient's Name: Melita Cortes  YOB: 1970    Date of Creation: 09/22/2023  Date Treatment Plan Last Reviewed/Revised: 09/12/2024    DSM5 Diagnoses: 296.32 (F33.1) Major Depressive Disorder, Recurrent Episode, Moderate _ and With atypical features  Psychosocial / Contextual Factors:   PROMIS (reviewed every 90 days):     Referral / Collaboration:  Referral to another professional/service is not indicated at this time..    Anticipated number of session for this episode of care:  15-25  Anticipation frequency of session: Biweekly  Anticipated Duration of each session: 38-52 minutes  Treatment plan will be reviewed in 90 days or when goals have been changed.       MeasurableTreatment Goal(s) related to diagnosis / functional impairment(s)  Goal 1: Patient will identify and address specific triggers to feelings of depression and improve coping by  90 % in the next three months.    I will know I've met my goal when I am less impulsive, better communicator and can comfortably manage distressful emotions.         Objective #A  "(Patient Action)    Develop and utilize 3 effective distress tolerance strategies to address SI.   Status: Continued - Date(s):  11/13/2024     Intervention(s)   Therapist will engage in collaborative brainstorming with pt to identify and practice individualized distress tolerance coping strategies to address SI weekly.    Objective #B (Patient Action)    Patient will increase daily activity level by exercising for 20-30 minutes and participate in at least 1 daily pleasant/fun activities.  Status: Continued - Date(s):   11/13/2024    Intervention(s)  Therapist will provide psychoeducation, behavioral activation, and cognitive restructuring.    Objective #C  Patient will identify specific areas of cognitive distortion and challenge irrational thoughts with reality.   Status: Continued - Date(s):   11/13/2024       Intervention(s)  Therapist will provide psychoeducation, behavioral activation, and cognitive restructuring.    Objective #D  Patient will learn and practice relaxation techniques to manage depression.  Status: Continued - Date(s):   11/13/2024    Intervention(s)   Therapist will teach patient thought stopping, deep breathing exercises, mindful meditation, and creative visualization.        Patient has reviewed and agreed to the above plan.      Jake Coyne [facility]  September 22, 2023    ----- Service Performed and Documented by [facility]------  This note was reviewed and clinical supervision by ELMO Colmenares [facility]    4/1/2024           [facility]                                       Melita Cortes     SAFETY PLAN:  Step 1: Warning signs / cues (Thoughts, images, mood, situation, behavior) that a crisis may be developing:  Thoughts:   \" sometimes I feel like I don't have a purpose\"  Images:  Feels lonely after losing mum and dad and lonely in marriage  Thinking Processes: ruminations (can't stop thinking about my problems): health, family loses  Mood: worsening depression  Behaviors: " "isolating/withdrawing   Situations: loss: parents and lonely in marriage    Step 2: Coping strategies - Things I can do to take my mind off of my problems without contacting another person (relaxation technique, physical activity):  Distress Tolerance Strategies:  play with my pet , intense exercise: work out for 2-3 minutes , and support group attendance for transplant  Physical Activities: go for a walk and stretching   Focus on helpful thoughts:  \"This is temporary\" and \"It always passes\"  Step 3: People and social settings that provide distraction:   Name:  Augustine ( friend) Phone: [phone]   Name:  Aimee paniagua Phone: [phone]     gym  and [employer] store  Liver transplant group  Step 4: Remind myself of people and things that are important to me and worth living for:    My , cousins and friends.  Step 5: When I am in crisis, I can ask these people to help me use my safety plan:   Name:  Jake ( Therapist )  Phone: [phone]   Name:  Aimee paniagua Phone: [phone]   Step 6: Making the environment safe:   remove alcohol, remove drugs, and be around others  Step 7: Professionals or agencies I can contact during a crisis:  Suicide Prevention Lifeline: Call or Text 459   [facility] Crisis [facility]: [phone]    Call 911 or go to my nearest emergency department.   I helped develop this safety plan and agree to use it when needed.  I have been given a copy of this plan.      Client signature _________________________________________________________________  Today s date:  11/14/2023  Completed by Provider Name/ Credentials:  CECIL Vasquez  November 14, 2023  Adapted from Safety Plan Template 2008 Chelsi Marx and Fred Ramos is reprinted with the express permission of the authors.  No portion of the Safety Plan Template may be reproduced without the express, written permission.  You can contact the authors at bhs@[email or web address] or [email or web address].        Answers submitted by the " patient for this visit:  Patient Health Questionnaire (Submitted on 11/14/2023)  If you checked off any problems, how difficult have these problems made it for you to do your work, take care of things at home, or get along with other people?: Very difficult  PHQ9 TOTAL SCORE: 18  JORGE-7 (Submitted on 11/14/2023)  JORGE 7 TOTAL SCORE: 14    Answers submitted by the patient for this visit:  Patient Health Questionnaire (Submitted on 11/28/2023)  If you checked off any problems, how difficult have these problems made it for you to do your work, take care of things at home, or get along with other people?: Extremely difficult  PHQ9 TOTAL SCORE: 14  JORGE-7 (Submitted on 11/28/2023)  JORGE 7 TOTAL SCORE: 21    Answers submitted by the patient for this visit:  Patient Health Questionnaire (Submitted on 12/13/2023)  If you checked off any problems, how difficult have these problems made it for you to do your work, take care of things at home, or get along with other people?: Very difficult  PHQ9 TOTAL SCORE: 18  JORGE-7 (Submitted on 12/13/2023)  JORGE 7 TOTAL SCORE: 21    Answers submitted by the patient for this visit:  Patient Health Questionnaire (Submitted on 1/3/2024)  If you checked off any problems, how difficult have these problems made it for you to do your work, take care of things at home, or get along with other people?: Very difficult  PHQ9 TOTAL SCORE: 16  JORGE-7 (Submitted on 1/3/2024)  JORGE 7 TOTAL SCORE: 10    Answers submitted by the patient for this visit:  Patient Health Questionnaire (Submitted on 1/16/2024)  If you checked off any problems, how difficult have these problems made it for you to do your work, take care of things at home, or get along with other people?: Very difficult  PHQ9 TOTAL SCORE: 15    Answers submitted by the patient for this visit:  Patient Health Questionnaire (Submitted on 1/29/2024)  If you checked off any problems, how difficult have these problems made it for you to do your work, take  care of things at home, or get along with other people?: Extremely difficult  PHQ9 TOTAL SCORE: 12  JORGE-7 (Submitted on 1/29/2024)  JORGE 7 TOTAL SCORE: 9    Answers submitted by the patient for this visit:  Patient Health Questionnaire (Submitted on 2/14/2024)  If you checked off any problems, how difficult have these problems made it for you to do your work, take care of things at home, or get along with other people?: Extremely difficult  PHQ9 TOTAL SCORE: 11  JORGE-7 (Submitted on 2/14/2024)  JOGRE 7 TOTAL SCORE: 14    Answers submitted by the patient for this visit:  Patient Health Questionnaire (Submitted on 2/28/2024)  If you checked off any problems, how difficult have these problems made it for you to do your work, take care of things at home, or get along with other people?: Extremely difficult  PHQ9 TOTAL SCORE: 16    Answers submitted by the patient for this visit:  Patient Health Questionnaire (Submitted on 3/11/2024)  If you checked off any problems, how difficult have these problems made it for you to do your work, take care of things at home, or get along with other people?: Very difficult  PHQ9 TOTAL SCORE: 10    Answers submitted by the patient for this visit:  Patient Health Questionnaire (Submitted on 3/25/2024)  If you checked off any problems, how difficult have these problems made it for you to do your work, take care of things at home, or get along with other people?: Extremely difficult  PHQ9 TOTAL SCORE: 16  JORGE-7 (Submitted on 3/25/2024)  JORGE 7 TOTAL SCORE: 9    Answers submitted by the patient for this visit:  Patient Health Questionnaire (Submitted on 4/10/2024)  If you checked off any problems, how difficult have these problems made it for you to do your work, take care of things at home, or get along with other people?: Extremely difficult  PHQ9 TOTAL SCORE: 11    Answers submitted by the patient for this visit:  Patient Health Questionnaire (Submitted on 5/9/2024)  If you checked off any  problems, how difficult have these problems made it for you to do your work, take care of things at home, or get along with other people?: Extremely difficult  PHQ9 TOTAL SCORE: 14    Answers submitted by the patient for this visit:  Patient Health Questionnaire (Submitted on 5/30/2024)  If you checked off any problems, how difficult have these problems made it for you to do your work, take care of things at home, or get along with other people?: Extremely difficult  PHQ9 TOTAL SCORE: 16  JORGE-7 (Submitted on 5/30/2024)  JORGE 7 TOTAL SCORE: 7    Answers submitted by the patient for this visit:  Patient Health Questionnaire (Submitted on 7/11/2024)  If you checked off any problems, how difficult have these problems made it for you to do your work, take care of things at home, or get along with other people?: Very difficult  PHQ9 TOTAL SCORE: 16  JORGE-7 (Submitted on 7/6/2024)  JORGE 7 TOTAL SCORE: 19    Answers submitted by the patient for this visit:  Patient Health Questionnaire (Submitted on 8/8/2024)  If you checked off any problems, how difficult have these problems made it for you to do your work, take care of things at home, or get along with other people?: Extremely difficult  PHQ9 TOTAL SCORE: 10  JORGE-7 (Submitted on 8/8/2024)  JORGE 7 TOTAL SCORE: 15    Answers submitted by the patient for this visit:  Patient Health Questionnaire (Submitted on 8/22/2024)  If you checked off any problems, how difficult have these problems made it for you to do your work, take care of things at home, or get along with other people?: Somewhat difficult  PHQ9 TOTAL SCORE: 13    Answers submitted by the patient for this visit:  Patient Health Questionnaire (Submitted on 9/12/2024)  If you checked off any problems, how difficult have these problems made it for you to do your work, take care of things at home, or get along with other people?: Extremely difficult  PHQ9 TOTAL SCORE: 9

## 2024-09-17 DIAGNOSIS — F33.0 MILD RECURRENT MAJOR DEPRESSION (H): ICD-10-CM

## 2024-09-18 ENCOUNTER — MYC REFILL (OUTPATIENT)
Dept: FAMILY MEDICINE | Facility: CLINIC | Age: 54
End: 2024-09-18
Payer: MEDICARE

## 2024-09-18 DIAGNOSIS — F90.0 ATTENTION DEFICIT HYPERACTIVITY DISORDER (ADHD), PREDOMINANTLY INATTENTIVE TYPE: ICD-10-CM

## 2024-09-19 RX ORDER — DEXTROAMPHETAMINE SACCHARATE, AMPHETAMINE ASPARTATE MONOHYDRATE, DEXTROAMPHETAMINE SULFATE AND AMPHETAMINE SULFATE 5; 5; 5; 5 MG/1; MG/1; MG/1; MG/1
20 CAPSULE, EXTENDED RELEASE ORAL DAILY
Qty: 30 CAPSULE | Refills: 0 | Status: SHIPPED | OUTPATIENT
Start: 2024-09-19

## 2024-09-22 ENCOUNTER — HEALTH MAINTENANCE LETTER (OUTPATIENT)
Age: 54
End: 2024-09-22

## 2024-09-22 ENCOUNTER — MYC REFILL (OUTPATIENT)
Dept: FAMILY MEDICINE | Facility: CLINIC | Age: 54
End: 2024-09-22
Payer: MEDICARE

## 2024-09-22 DIAGNOSIS — F33.0 MILD RECURRENT MAJOR DEPRESSION (H): ICD-10-CM

## 2024-09-23 NOTE — TELEPHONE ENCOUNTER
Pharmacy requested refills that are already active on file. Refused request to pharmacy.   ancef/yes

## 2024-10-01 ENCOUNTER — OFFICE VISIT (OUTPATIENT)
Dept: ORTHOPEDICS | Facility: CLINIC | Age: 54
End: 2024-10-01
Attending: INTERNAL MEDICINE
Payer: MEDICARE

## 2024-10-01 ENCOUNTER — ANCILLARY PROCEDURE (OUTPATIENT)
Dept: GENERAL RADIOLOGY | Facility: CLINIC | Age: 54
End: 2024-10-01
Attending: FAMILY MEDICINE
Payer: MEDICARE

## 2024-10-01 DIAGNOSIS — G89.29 CHRONIC PAIN OF RIGHT KNEE: ICD-10-CM

## 2024-10-01 DIAGNOSIS — M17.11 OSTEOARTHRITIS OF RIGHT PATELLOFEMORAL JOINT: Primary | ICD-10-CM

## 2024-10-01 DIAGNOSIS — M25.531 RIGHT WRIST PAIN: ICD-10-CM

## 2024-10-01 DIAGNOSIS — M25.561 CHRONIC PAIN OF RIGHT KNEE: ICD-10-CM

## 2024-10-01 PROCEDURE — 99214 OFFICE O/P EST MOD 30 MIN: CPT | Performed by: FAMILY MEDICINE

## 2024-10-01 PROCEDURE — 73110 X-RAY EXAM OF WRIST: CPT | Mod: RT | Performed by: RADIOLOGY

## 2024-10-01 RX ORDER — METHYLPREDNISOLONE 4 MG
TABLET, DOSE PACK ORAL
Qty: 21 TABLET | Refills: 0 | Status: SHIPPED | OUTPATIENT
Start: 2024-10-01

## 2024-10-01 NOTE — PROGRESS NOTES
HISTORY OF PRESENT ILLNESS  Ms. Cortes is a pleasant 54 year old female following up with right knee pain.  Fidelina last saw me in November of last year, I injected her knee at that visit.  She has also continued her physical therapy based exercises and kinesiotaping. She continues to experience fairly severe pain in her knee, this is worse with walking and worse with going up or down inclines.  She also has pain in her right wrist that has developed over the course of the past many months.  She points to the ulnar aspect, pain is worse never she moves her wrist in certain directions.     PHYSICAL EXAM  General  - normal appearance, in no obvious distress  Musculoskeletal - right knee  - stance: mildly antalgic gait  - inspection: trace effusion  - palpation: medial PF line tenderness  - ROM: 120 degrees flexion, 0 degrees extension, painful active ROM  - strength: 5/5 in flexion, 5/5 in extension  - special tests:  (-) Hany  (-) varus at 0 and 30 degrees flexion  (-) valgus at 0 and 30 degrees flexion  Neuro  - no sensory or motor deficit, grossly normal coordination, normal muscle tone         ASSESSMENT & PLAN  Ms. Cortes is a 54 year old female following up with right knee pain and a history of patellofemoral joint osteoarthritis.    We revisited treatment options from a global standpoint including offloading, physical therapy, bracing, and the role of injection based therapies.    I am referring her again to physical therapy, I also provided her a brace today.  Lastly, I am prescribing her prednisone for her symptoms.    With regards to her wrist, I ordered & independently reviewed an xray of her right wrist.  This reveals osteoarthritis at the ulnar recess.    I do think that she would do well with a wrist brace and activity modification, she can also try diclofenac gel.    We could consider injection based therapies if these are not helping.    It was a pleasure seeing Melita.        Srinivas Blankenship, DO,  CAQSM      This note was constructed using Dragon dictation software, please excuse any minor errors in spelling, grammar, or syntax.

## 2024-10-01 NOTE — PROGRESS NOTES
DME FITTING    Relevant Diagnosis: Right knee pain  Medium front closure knee brace was fit on patient's Right knee.     Person(s) involved in teaching:   Patient and     Brace was applied in standard Manner:  Yes  Brace fit well:  Yes  Patient reports brace to fit comfortably:  Yes    Education:   Patient shown self application and removal of brace: Yes  Patient shown how to adjust brace fit, if necessary: Yes  Patient educated on billing and return policy: Yes  Patient confirmed understanding when and how to contact clinic with concerns: Yes        Marko Michel M.A., LAT, ATC  Certified Athletic Trainer

## 2024-10-01 NOTE — LETTER
10/1/2024      RE: Melita Cortes  [address]  [place] [address]     Dear Colleague,    Thank you for referring your patient, Melita Cortes, to the [facility] [place]. Please see a copy of my visit note below.    HISTORY OF PRESENT ILLNESS  Ms. Cortes is a pleasant 54 year old female following up with right knee pain.  Fidelina last saw me in November of last year, I injected her knee at that visit.  She has also continued her physical therapy based exercises and kinesiotaping. She continues to experience fairly severe pain in her knee, this is worse with walking and worse with going up or down inclines.  She also has pain in her right wrist that has developed over the course of the past many months.  She points to the ulnar aspect, pain is worse never she moves her wrist in certain directions.     PHYSICAL EXAM  General  - normal appearance, in no obvious distress  Musculoskeletal - right knee  - stance: mildly antalgic gait  - inspection: trace effusion  - palpation: medial PF line tenderness  - ROM: 120 degrees flexion, 0 degrees extension, painful active ROM  - strength: 5/5 in flexion, 5/5 in extension  - special tests:  (-) Hany  (-) varus at 0 and 30 degrees flexion  (-) valgus at 0 and 30 degrees flexion  Neuro  - no sensory or motor deficit, grossly normal coordination, normal muscle tone         ASSESSMENT & PLAN  Ms. Cortes is a 54 year old female following up with right knee pain and a history of patellofemoral joint osteoarthritis.    We revisited treatment options from a global standpoint including offloading, physical therapy, bracing, and the role of injection based therapies.    I am referring her again to physical therapy, I also provided her a brace today.  Lastly, I am prescribing her prednisone for her symptoms.    With regards to her wrist, I ordered & independently reviewed an xray of her right wrist.  This reveals osteoarthritis at  the ulnar recess.    I do think that she would do well with a wrist brace and activity modification, she can also try diclofenac gel.    We could consider injection based therapies if these are not helping.    It was a pleasure seeing Melita.        Srinivas Blankenship DO, CAQSM      This note was constructed using Dragon dictation software, please excuse any minor errors in spelling, grammar, or syntax.      DME FITTING    Relevant Diagnosis: Right knee pain  Medium front closure knee brace was fit on patient's Right knee.     Person(s) involved in teaching:   Patient and     Brace was applied in standard Manner:  Yes  Brace fit well:  Yes  Patient reports brace to fit comfortably:  Yes    Education:   Patient shown self application and removal of brace: Yes  Patient shown how to adjust brace fit, if necessary: Yes  Patient educated on billing and return policy: Yes  Patient confirmed understanding when and how to contact clinic with concerns: Yes        Marko Michel M.A., LAT, ATC  Certified Athletic Trainer        Again, thank you for allowing me to participate in the care of your patient.      Sincerely,    Srinivas Blankenship DO

## 2024-10-10 ENCOUNTER — THERAPY VISIT (OUTPATIENT)
Dept: PHYSICAL THERAPY | Facility: CLINIC | Age: 54
End: 2024-10-10
Attending: FAMILY MEDICINE
Payer: MEDICARE

## 2024-10-10 ENCOUNTER — VIRTUAL VISIT (OUTPATIENT)
Dept: PSYCHOLOGY | Facility: CLINIC | Age: 54
End: 2024-10-10
Payer: MEDICARE

## 2024-10-10 DIAGNOSIS — M17.11 OSTEOARTHRITIS OF RIGHT PATELLOFEMORAL JOINT: ICD-10-CM

## 2024-10-10 DIAGNOSIS — F32.1 CURRENT MODERATE EPISODE OF MAJOR DEPRESSIVE DISORDER, UNSPECIFIED WHETHER RECURRENT (H): Primary | ICD-10-CM

## 2024-10-10 PROCEDURE — 90834 PSYTX W PT 45 MINUTES: CPT | Mod: 95 | Performed by: STUDENT IN AN ORGANIZED HEALTH CARE EDUCATION/TRAINING PROGRAM

## 2024-10-10 PROCEDURE — 97161 PT EVAL LOW COMPLEX 20 MIN: CPT | Mod: GP

## 2024-10-10 PROCEDURE — 97110 THERAPEUTIC EXERCISES: CPT | Mod: GP

## 2024-10-10 ASSESSMENT — ACTIVITIES OF DAILY LIVING (ADL)
AS_A_RESULT_OF_YOUR_KNEE_INJURY,_HOW_WOULD_YOU_RATE_YOUR_CURRENT_LEVEL_OF_DAILY_ACTIVITY?: SEVERELY ABNORMAL
RISE FROM A CHAIR: ACTIVITY IS MINIMALLY DIFFICULT
HOW_WOULD_YOU_RATE_THE_CURRENT_FUNCTION_OF_YOUR_KNEE_DURING_YOUR_USUAL_DAILY_ACTIVITIES_ON_A_SCALE_FROM_0_TO_100_WITH_100_BEING_YOUR_LEVEL_OF_KNEE_FUNCTION_PRIOR_TO_YOUR_INJURY_AND_0_BEING_THE_INABILITY_TO_PERFORM_ANY_OF_YOUR_USUAL_DAILY_ACTIVITIES?: 50
LIMPING: THE SYMPTOM AFFECTS MY ACTIVITY SLIGHTLY
SWELLING: THE SYMPTOM AFFECTS MY ACTIVITY SEVERELY
GO UP STAIRS: ACTIVITY IS SOMEWHAT DIFFICULT
STIFFNESS: THE SYMPTOM AFFECTS MY ACTIVITY SEVERELY
KNEE_ACTIVITY_OF_DAILY_LIVING_SCORE: 40
SQUAT: I AM UNABLE TO DO THE ACTIVITY
WALK: ACTIVITY IS VERY DIFFICULT
GIVING WAY, BUCKLING OR SHIFTING OF KNEE: THE SYMPTOM AFFECTS MY ACTIVITY MODERATELY
KNEE_ACTIVITY_OF_DAILY_LIVING_SUM: 28
SWELLING: THE SYMPTOM AFFECTS MY ACTIVITY SEVERELY
HOW_WOULD_YOU_RATE_THE_OVERALL_FUNCTION_OF_YOUR_KNEE_DURING_YOUR_USUAL_DAILY_ACTIVITIES?: SEVERELY ABNORMAL
AS_A_RESULT_OF_YOUR_KNEE_INJURY,_HOW_WOULD_YOU_RATE_YOUR_CURRENT_LEVEL_OF_DAILY_ACTIVITY?: SEVERELY ABNORMAL
PAIN: THE SYMPTOM AFFECTS MY ACTIVITY SEVERELY
STIFFNESS: THE SYMPTOM AFFECTS MY ACTIVITY SEVERELY
STAND: ACTIVITY IS FAIRLY DIFFICULT
GO DOWN STAIRS: ACTIVITY IS FAIRLY DIFFICULT
SIT WITH YOUR KNEE BENT: ACTIVITY IS MINIMALLY DIFFICULT
RAW_SCORE: 28
GIVING WAY, BUCKLING OR SHIFTING OF KNEE: THE SYMPTOM AFFECTS MY ACTIVITY MODERATELY
KNEEL ON THE FRONT OF YOUR KNEE: ACTIVITY IS SOMEWHAT DIFFICULT
LIMPING: THE SYMPTOM AFFECTS MY ACTIVITY SLIGHTLY
KNEEL ON THE FRONT OF YOUR KNEE: ACTIVITY IS SOMEWHAT DIFFICULT
GO UP STAIRS: ACTIVITY IS SOMEWHAT DIFFICULT
STAND: ACTIVITY IS FAIRLY DIFFICULT
HOW_WOULD_YOU_RATE_THE_OVERALL_FUNCTION_OF_YOUR_KNEE_DURING_YOUR_USUAL_DAILY_ACTIVITIES?: SEVERELY ABNORMAL
PLEASE_INDICATE_YOR_PRIMARY_REASON_FOR_REFERRAL_TO_THERAPY:: KNEE
PAIN: THE SYMPTOM AFFECTS MY ACTIVITY SEVERELY
WEAKNESS: THE SYMPTOM AFFECTS MY ACTIVITY SEVERELY
SIT WITH YOUR KNEE BENT: ACTIVITY IS MINIMALLY DIFFICULT
WEAKNESS: THE SYMPTOM AFFECTS MY ACTIVITY SEVERELY
WALK: ACTIVITY IS VERY DIFFICULT
RISE FROM A CHAIR: ACTIVITY IS MINIMALLY DIFFICULT
GO DOWN STAIRS: ACTIVITY IS FAIRLY DIFFICULT
HOW_WOULD_YOU_RATE_THE_CURRENT_FUNCTION_OF_YOUR_KNEE_DURING_YOUR_USUAL_DAILY_ACTIVITIES_ON_A_SCALE_FROM_0_TO_100_WITH_100_BEING_YOUR_LEVEL_OF_KNEE_FUNCTION_PRIOR_TO_YOUR_INJURY_AND_0_BEING_THE_INABILITY_TO_PERFORM_ANY_OF_YOUR_USUAL_DAILY_ACTIVITIES?: 50
SQUAT: I AM UNABLE TO DO THE ACTIVITY

## 2024-10-10 ASSESSMENT — ANXIETY QUESTIONNAIRES
GAD7 TOTAL SCORE: 14
GAD7 TOTAL SCORE: 14
7. FEELING AFRAID AS IF SOMETHING AWFUL MIGHT HAPPEN: MORE THAN HALF THE DAYS
7. FEELING AFRAID AS IF SOMETHING AWFUL MIGHT HAPPEN: MORE THAN HALF THE DAYS
8. IF YOU CHECKED OFF ANY PROBLEMS, HOW DIFFICULT HAVE THESE MADE IT FOR YOU TO DO YOUR WORK, TAKE CARE OF THINGS AT HOME, OR GET ALONG WITH OTHER PEOPLE?: VERY DIFFICULT
5. BEING SO RESTLESS THAT IT IS HARD TO SIT STILL: MORE THAN HALF THE DAYS
2. NOT BEING ABLE TO STOP OR CONTROL WORRYING: MORE THAN HALF THE DAYS
GAD7 TOTAL SCORE: 14
1. FEELING NERVOUS, ANXIOUS, OR ON EDGE: MORE THAN HALF THE DAYS
4. TROUBLE RELAXING: MORE THAN HALF THE DAYS
IF YOU CHECKED OFF ANY PROBLEMS ON THIS QUESTIONNAIRE, HOW DIFFICULT HAVE THESE PROBLEMS MADE IT FOR YOU TO DO YOUR WORK, TAKE CARE OF THINGS AT HOME, OR GET ALONG WITH OTHER PEOPLE: VERY DIFFICULT
6. BECOMING EASILY ANNOYED OR IRRITABLE: MORE THAN HALF THE DAYS
3. WORRYING TOO MUCH ABOUT DIFFERENT THINGS: MORE THAN HALF THE DAYS

## 2024-10-10 ASSESSMENT — PATIENT HEALTH QUESTIONNAIRE - PHQ9
10. IF YOU CHECKED OFF ANY PROBLEMS, HOW DIFFICULT HAVE THESE PROBLEMS MADE IT FOR YOU TO DO YOUR WORK, TAKE CARE OF THINGS AT HOME, OR GET ALONG WITH OTHER PEOPLE: VERY DIFFICULT
SUM OF ALL RESPONSES TO PHQ QUESTIONS 1-9: 14
SUM OF ALL RESPONSES TO PHQ QUESTIONS 1-9: 14
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
## 2024-10-10 NOTE — PROGRESS NOTES
"[facility]                                     Progress Note    Patient Name: Melita Cortes  Date: 10/10/2024         Service Type: Individual      Session Start Time: 3.30  Session End Time: 4.13     Session Length: 38-52    Session #: 22    Attendees: Client attended alone    Service Modality:  Video Visit:      Provider verified identity through the following two step process.  Patient provided:  Patient is known previously to provider    Telemedicine Visit: The patient's condition can be safely assessed and treated via synchronous audio and visual telemedicine encounter.      Reason for Telemedicine Visit: Patient has requested telehealth visit    Originating Site (Patient Location): Patient's home    Distant Site (Provider Location): Provider Remote Setting- Home Office    Consent:  The patient/guardian has verbally consented to: the potential risks and benefits of telemedicine (video visit) versus in person care; bill my insurance or make self-payment for services provided; and responsibility for payment of non-covered services.     Patient would like the video invitation sent by:  My Chart    Mode of Communication:  Video Conference via [facility]    Distant Location (Provider):  Off-site    As the provider I attest to compliance with applicable laws and regulations related to telemedicine.    DATA  Interactive Complexity: No  Crisis: No        Progress Since Last Session (Related to Symptoms / Goals / Homework):   Symptoms: Improving as evident by current phq9 scores    Homework: Completed in session review patient's utilization of safety plan to mitigate SI.        Episode of Care Goals: Satisfactory progress - ACTION (Actively working towards change); Intervened by reinforcing change plan / affirming steps taken     Current / Ongoing Stressors and Concerns:  Patient reported  \"Trauma Adhd depres\". Patient reported \" I think I have had some trauma in my life, my liver transplant " ".\" Pt reported she is  \"overly sensitive\" and \" I dont handle stress very well and struggle with impulsivity\"  and that \"I have a poor self image, negative talk a lot \" \"used and abandoned\" and that she always have to explained herself to others.      Treatment Objective(s) Addressed in This Session:   Patient will increase daily activity level by exercising for 20-30 minutes and participate in at least 1 daily pleasant/fun activities.  Use safety plan as needed daily and practice mindfulness daily.   Patient will identify specific areas of cognitive distortion and challenge irrational thoughts with reality.         Intervention:    EMDR: Introduced patient to EMDR today. Complete the CLAUDIA-II to access for dissociation and patient had a score of 15.7. Explained the purpose for the assessment to patient and discussed dissociation as a biologically adaptive response in the face of a traumatic event but a maladaptive coping strategy when the trauma is in the past. Complete a bio-psychosocial history of patient and discussed current problems and symptoms and discussed the 8 phases of EMDR therapy.        Assessments completed prior to visit:  PHQ9:       5/30/2024     2:13 PM 7/2/2024     8:48 PM 7/11/2024     3:14 PM 8/8/2024     2:09 PM 8/22/2024     1:54 PM 9/12/2024     2:24 PM 10/10/2024     3:14 PM   PHQ-9 SCORE   PHQ-9 Total Score MyChart 16 (Moderately severe depression) 11 (Moderate depression) 16 (Moderately severe depression) 10 (Moderate depression) 13 (Moderate depression) 9 (Mild depression) 14 (Moderate depression)   PHQ-9 Total Score 16 11    11 16 10 13 9 14     GAD7:       2/14/2024    12:15 PM 3/25/2024    12:46 PM 4/8/2024     7:39 AM 5/30/2024     2:14 PM 7/6/2024     9:41 AM 8/8/2024     2:10 PM 10/10/2024     3:15 PM   JORGE-7 SCORE   Total Score 14 (moderate anxiety) 9 (mild anxiety) 11 (moderate anxiety) 7 (mild anxiety) 19 (severe anxiety) 15 (severe anxiety) 14 (moderate anxiety)   Total Score 14 "    14    14 9 11 7 19 15 14         ASSESSMENT: Current Emotional / Mental Status (status of significant symptoms):   Risk status (Self / Other harm or suicidal ideation)   Patient denies current fears or concerns for personal safety.   Patient denies current or recent suicidal ideation or behaviors.   Patient denies current or recent homicidal ideation or behaviors.   Patient denies current or recent self injurious behavior or ideation.   Patient denies other safety concerns.   Patient reports there has been no change in risk factors since their last session.     Patient reports there has been no change in protective factors since their last session.     Recommended that patient call 911 or go to the local ED should there be a change in any of these risk factors.     Appearance:   Appropriate    Eye Contact:   Good    Psychomotor Behavior: Normal    Attitude:   Cooperative  Interested   Orientation:   Person Place Time Situation   Speech    Rate / Production: Normal/ Responsive    Volume:  Normal    Mood:    Anxious  Depressed    Affect:    Tearful   Thought Content:  Clear    Thought Form:  Coherent  Logical    Insight:    Good      Medication Review:   No changes to current psychiatric medication(s)     Medication Compliance:   Yes     Changes in Health Issues:   None reported     Chemical Use Review:   Substance Use: Chemical use reviewed, no active concerns identified      Tobacco Use: No current tobacco use.      Diagnosis:  296.32 (F33.1) Major Depressive Disorder, Recurrent Episode, Moderate _ and With atypical features    Collateral Reports Completed:   Not Applicable    PLAN: (Patient Tasks / Therapist Tasks / Other)    Utilize safety plan as needed daily.    Practice  distress intolerance skill learned in session, watching emotions, label and describe them and not avoid them.     ROSEANNE Vasquez           _________________________________________________________    Individual Treatment Plan    Patient's  Name: Melita Cortes  YOB: 1970    Date of Creation: 09/22/2023  Date Treatment Plan Last Reviewed/Revised: 09/12/2024    DSM5 Diagnoses: 296.32 (F33.1) Major Depressive Disorder, Recurrent Episode, Moderate _ and With atypical features  Psychosocial / Contextual Factors:   PROMIS (reviewed every 90 days):     Referral / Collaboration:  Referral to another professional/service is not indicated at this time..    Anticipated number of session for this episode of care:  15-25  Anticipation frequency of session: Biweekly  Anticipated Duration of each session: 38-52 minutes  Treatment plan will be reviewed in 90 days or when goals have been changed.       MeasurableTreatment Goal(s) related to diagnosis / functional impairment(s)  Goal 1: Patient will identify and address specific triggers to feelings of depression and improve coping by  90 % in the next three months.    I will know I've met my goal when I am less impulsive, better communicator and can comfortably manage distressful emotions.         Objective #A (Patient Action)    Develop and utilize 3 effective distress tolerance strategies to address SI.   Status: Continued - Date(s):  11/13/2024     Intervention(s)   Therapist will engage in collaborative brainstorming with pt to identify and practice individualized distress tolerance coping strategies to address SI weekly.    Objective #B (Patient Action)    Patient will increase daily activity level by exercising for 20-30 minutes and participate in at least 1 daily pleasant/fun activities.  Status: Continued - Date(s):   11/13/2024    Intervention(s)  Therapist will provide psychoeducation, behavioral activation, and cognitive restructuring.    Objective #C  Patient will identify specific areas of cognitive distortion and challenge irrational thoughts with reality.   Status: Continued - Date(s):   11/13/2024       Intervention(s)  Therapist will provide psychoeducation, behavioral activation,  "and cognitive restructuring.    Objective #D  Patient will learn and practice relaxation techniques to manage depression.  Status: Continued - Date(s):   11/13/2024    Intervention(s)   Therapist will teach patient thought stopping, deep breathing exercises, mindful meditation, and creative visualization.        Patient has reviewed and agreed to the above plan.      Jake Coyne, [facility]  September 22, 2023    ----- Service Performed and Documented by [facility]------  This note was reviewed and clinical supervision by ELMO Colmenares [facility]    4/1/2024           [facility]                                       Melita Cortes     SAFETY PLAN:  Step 1: Warning signs / cues (Thoughts, images, mood, situation, behavior) that a crisis may be developing:  Thoughts:   \" sometimes I feel like I don't have a purpose\"  Images:  Feels lonely after losing mum and dad and lonely in marriage  Thinking Processes: ruminations (can't stop thinking about my problems): health, family loses  Mood: worsening depression  Behaviors: isolating/withdrawing   Situations: loss: parents and lonely in marriage    Step 2: Coping strategies - Things I can do to take my mind off of my problems without contacting another person (relaxation technique, physical activity):  Distress Tolerance Strategies:  play with my pet , intense exercise: work out for 2-3 minutes , and support group attendance for transplant  Physical Activities: go for a walk and stretching   Focus on helpful thoughts:  \"This is temporary\" and \"It always passes\"  Step 3: People and social settings that provide distraction:   Name:  Augustine ( friend) Phone: [phone]   Name:  Aimee paniagua Phone: [phone]     gym  and [employer] store  Liver transplant group  Step 4: Remind myself of people and things that are important to me and worth living for:    My , cousins and friends.  Step 5: When I am in crisis, I can ask these people to help me use my safety " plan:   Name:  Jake ( Therapist )  Phone: [phone]   Name:  Aimee paniagua Phone: [phone]   Step 6: Making the environment safe:   remove alcohol, remove drugs, and be around others  Step 7: Professionals or agencies I can contact during a crisis:  Suicide Prevention Lifeline: Call or Text 983   [facility]: [phone]    Call 911 or go to my nearest emergency department.   I helped develop this safety plan and agree to use it when needed.  I have been given a copy of this plan.      Client signature _________________________________________________________________  Today s date:  11/14/2023  Completed by Provider Name/ Credentials:  CECIL Vasquez  November 14, 2023  Adapted from Safety Plan Template 2008 Chelsi Marx and Fred Ramos is reprinted with the express permission of the authors.  No portion of the Safety Plan Template may be reproduced without the express, written permission.  You can contact the authors at [email or web address] or [email or web address].        Answers submitted by the patient for this visit:  Patient Health Questionnaire (Submitted on 11/14/2023)  If you checked off any problems, how difficult have these problems made it for you to do your work, take care of things at home, or get along with other people?: Very difficult  PHQ9 TOTAL SCORE: 18  JORGE-7 (Submitted on 11/14/2023)  JORGE 7 TOTAL SCORE: 14    Answers submitted by the patient for this visit:  Patient Health Questionnaire (Submitted on 11/28/2023)  If you checked off any problems, how difficult have these problems made it for you to do your work, take care of things at home, or get along with other people?: Extremely difficult  PHQ9 TOTAL SCORE: 14  JORGE-7 (Submitted on 11/28/2023)  JORGE 7 TOTAL SCORE: 21    Answers submitted by the patient for this visit:  Patient Health Questionnaire (Submitted on 12/13/2023)  If you checked off any problems, how difficult have these problems made it for you to do  your work, take care of things at home, or get along with other people?: Very difficult  PHQ9 TOTAL SCORE: 18  JORGE-7 (Submitted on 12/13/2023)  JORGE 7 TOTAL SCORE: 21    Answers submitted by the patient for this visit:  Patient Health Questionnaire (Submitted on 1/3/2024)  If you checked off any problems, how difficult have these problems made it for you to do your work, take care of things at home, or get along with other people?: Very difficult  PHQ9 TOTAL SCORE: 16  JORGE-7 (Submitted on 1/3/2024)  JORGE 7 TOTAL SCORE: 10    Answers submitted by the patient for this visit:  Patient Health Questionnaire (Submitted on 1/16/2024)  If you checked off any problems, how difficult have these problems made it for you to do your work, take care of things at home, or get along with other people?: Very difficult  PHQ9 TOTAL SCORE: 15    Answers submitted by the patient for this visit:  Patient Health Questionnaire (Submitted on 1/29/2024)  If you checked off any problems, how difficult have these problems made it for you to do your work, take care of things at home, or get along with other people?: Extremely difficult  PHQ9 TOTAL SCORE: 12  JORGE-7 (Submitted on 1/29/2024)  JORGE 7 TOTAL SCORE: 9    Answers submitted by the patient for this visit:  Patient Health Questionnaire (Submitted on 2/14/2024)  If you checked off any problems, how difficult have these problems made it for you to do your work, take care of things at home, or get along with other people?: Extremely difficult  PHQ9 TOTAL SCORE: 11  JORGE-7 (Submitted on 2/14/2024)  JORGE 7 TOTAL SCORE: 14    Answers submitted by the patient for this visit:  Patient Health Questionnaire (Submitted on 2/28/2024)  If you checked off any problems, how difficult have these problems made it for you to do your work, take care of things at home, or get along with other people?: Extremely difficult  PHQ9 TOTAL SCORE: 16    Answers submitted by the patient for this visit:  Patient Health  Questionnaire (Submitted on 3/11/2024)  If you checked off any problems, how difficult have these problems made it for you to do your work, take care of things at home, or get along with other people?: Very difficult  PHQ9 TOTAL SCORE: 10    Answers submitted by the patient for this visit:  Patient Health Questionnaire (Submitted on 3/25/2024)  If you checked off any problems, how difficult have these problems made it for you to do your work, take care of things at home, or get along with other people?: Extremely difficult  PHQ9 TOTAL SCORE: 16  JORGE-7 (Submitted on 3/25/2024)  JORGE 7 TOTAL SCORE: 9    Answers submitted by the patient for this visit:  Patient Health Questionnaire (Submitted on 4/10/2024)  If you checked off any problems, how difficult have these problems made it for you to do your work, take care of things at home, or get along with other people?: Extremely difficult  PHQ9 TOTAL SCORE: 11    Answers submitted by the patient for this visit:  Patient Health Questionnaire (Submitted on 5/9/2024)  If you checked off any problems, how difficult have these problems made it for you to do your work, take care of things at home, or get along with other people?: Extremely difficult  PHQ9 TOTAL SCORE: 14    Answers submitted by the patient for this visit:  Patient Health Questionnaire (Submitted on 5/30/2024)  If you checked off any problems, how difficult have these problems made it for you to do your work, take care of things at home, or get along with other people?: Extremely difficult  PHQ9 TOTAL SCORE: 16  JORGE-7 (Submitted on 5/30/2024)  JORGE 7 TOTAL SCORE: 7    Answers submitted by the patient for this visit:  Patient Health Questionnaire (Submitted on 7/11/2024)  If you checked off any problems, how difficult have these problems made it for you to do your work, take care of things at home, or get along with other people?: Very difficult  PHQ9 TOTAL SCORE: 16  JORGE-7 (Submitted on 7/6/2024)  JORGE 7 TOTAL  SCORE: 19    Answers submitted by the patient for this visit:  Patient Health Questionnaire (Submitted on 8/8/2024)  If you checked off any problems, how difficult have these problems made it for you to do your work, take care of things at home, or get along with other people?: Extremely difficult  PHQ9 TOTAL SCORE: 10  JORGE-7 (Submitted on 8/8/2024)  JORGE 7 TOTAL SCORE: 15    Answers submitted by the patient for this visit:  Patient Health Questionnaire (Submitted on 8/22/2024)  If you checked off any problems, how difficult have these problems made it for you to do your work, take care of things at home, or get along with other people?: Somewhat difficult  PHQ9 TOTAL SCORE: 13    Answers submitted by the patient for this visit:  Patient Health Questionnaire (Submitted on 9/12/2024)  If you checked off any problems, how difficult have these problems made it for you to do your work, take care of things at home, or get along with other people?: Extremely difficult  PHQ9 TOTAL SCORE: 9    Answers submitted by the patient for this visit:  Patient Health Questionnaire (Submitted on 10/10/2024)  If you checked off any problems, how difficult have these problems made it for you to do your work, take care of things at home, or get along with other people?: Very difficult  PHQ9 TOTAL SCORE: 14  Patient Health Questionnaire (G7) (Submitted on 10/10/2024)  JORGE 7 TOTAL SCORE: 14

## 2024-10-11 PROBLEM — M17.11 OSTEOARTHRITIS OF RIGHT PATELLOFEMORAL JOINT: Status: ACTIVE | Noted: 2024-10-11

## 2024-10-15 ENCOUNTER — THERAPY VISIT (OUTPATIENT)
Dept: PHYSICAL THERAPY | Facility: CLINIC | Age: 54
End: 2024-10-15
Payer: MEDICARE

## 2024-10-15 DIAGNOSIS — M17.11 OSTEOARTHRITIS OF RIGHT PATELLOFEMORAL JOINT: Primary | ICD-10-CM

## 2024-10-15 PROCEDURE — 97110 THERAPEUTIC EXERCISES: CPT | Mod: GP

## 2024-10-15 PROCEDURE — 97140 MANUAL THERAPY 1/> REGIONS: CPT | Mod: GP

## 2024-10-22 ENCOUNTER — MYC REFILL (OUTPATIENT)
Dept: FAMILY MEDICINE | Facility: CLINIC | Age: 54
End: 2024-10-22
Payer: MEDICARE

## 2024-10-22 DIAGNOSIS — F90.0 ATTENTION DEFICIT HYPERACTIVITY DISORDER (ADHD), PREDOMINANTLY INATTENTIVE TYPE: ICD-10-CM

## 2024-10-22 RX ORDER — DEXTROAMPHETAMINE SACCHARATE, AMPHETAMINE ASPARTATE MONOHYDRATE, DEXTROAMPHETAMINE SULFATE AND AMPHETAMINE SULFATE 5; 5; 5; 5 MG/1; MG/1; MG/1; MG/1
20 CAPSULE, EXTENDED RELEASE ORAL DAILY
Qty: 30 CAPSULE | Refills: 0 | Status: SHIPPED | OUTPATIENT
Start: 2024-10-22

## 2024-10-23 ENCOUNTER — LAB (OUTPATIENT)
Dept: LAB | Facility: CLINIC | Age: 54
End: 2024-10-23
Payer: MEDICARE

## 2024-10-23 DIAGNOSIS — Z94.4 LIVER REPLACED BY TRANSPLANT (H): ICD-10-CM

## 2024-10-23 LAB
ALBUMIN SERPL BCG-MCNC: 4.2 G/DL (ref 3.5–5.2)
ALP SERPL-CCNC: 64 U/L (ref 40–150)
ALT SERPL W P-5'-P-CCNC: 21 U/L (ref 0–50)
ANION GAP SERPL CALCULATED.3IONS-SCNC: 8 MMOL/L (ref 7–15)
AST SERPL W P-5'-P-CCNC: 29 U/L (ref 0–45)
BILIRUB DIRECT SERPL-MCNC: <0.2 MG/DL (ref 0–0.3)
BILIRUB SERPL-MCNC: 0.5 MG/DL
BUN SERPL-MCNC: 17.2 MG/DL (ref 6–20)
CALCIUM SERPL-MCNC: 9.2 MG/DL (ref 8.8–10.4)
CHLORIDE SERPL-SCNC: 104 MMOL/L (ref 98–107)
CREAT SERPL-MCNC: 1.09 MG/DL (ref 0.51–0.95)
EGFRCR SERPLBLD CKD-EPI 2021: 60 ML/MIN/1.73M2
ERYTHROCYTE [DISTWIDTH] IN BLOOD BY AUTOMATED COUNT: 12.1 % (ref 10–15)
GLUCOSE SERPL-MCNC: 110 MG/DL (ref 70–99)
HCO3 SERPL-SCNC: 26 MMOL/L (ref 22–29)
HCT VFR BLD AUTO: 43 % (ref 35–47)
HGB BLD-MCNC: 14.8 G/DL (ref 11.7–15.7)
MCH RBC QN AUTO: 30.9 PG (ref 26.5–33)
MCHC RBC AUTO-ENTMCNC: 34.4 G/DL (ref 31.5–36.5)
MCV RBC AUTO: 90 FL (ref 78–100)
PLATELET # BLD AUTO: 182 10E3/UL (ref 150–450)
POTASSIUM SERPL-SCNC: 4.5 MMOL/L (ref 3.4–5.3)
PROT SERPL-MCNC: 6.1 G/DL (ref 6.4–8.3)
RBC # BLD AUTO: 4.79 10E6/UL (ref 3.8–5.2)
SODIUM SERPL-SCNC: 138 MMOL/L (ref 135–145)
TACROLIMUS BLD-MCNC: 3.2 UG/L (ref 5–15)
TME LAST DOSE: ABNORMAL H
TME LAST DOSE: ABNORMAL H
WBC # BLD AUTO: 6 10E3/UL (ref 4–11)

## 2024-10-23 PROCEDURE — 36415 COLL VENOUS BLD VENIPUNCTURE: CPT

## 2024-10-23 PROCEDURE — 82248 BILIRUBIN DIRECT: CPT

## 2024-10-23 PROCEDURE — 80053 COMPREHEN METABOLIC PANEL: CPT

## 2024-10-23 PROCEDURE — 85027 COMPLETE CBC AUTOMATED: CPT

## 2024-10-23 PROCEDURE — 80197 ASSAY OF TACROLIMUS: CPT

## 2024-10-24 ENCOUNTER — VIRTUAL VISIT (OUTPATIENT)
Dept: PSYCHOLOGY | Facility: CLINIC | Age: 54
End: 2024-10-24
Payer: MEDICARE

## 2024-10-24 DIAGNOSIS — F32.1 CURRENT MODERATE EPISODE OF MAJOR DEPRESSIVE DISORDER, UNSPECIFIED WHETHER RECURRENT (H): Primary | ICD-10-CM

## 2024-10-24 PROCEDURE — 90834 PSYTX W PT 45 MINUTES: CPT | Mod: 95 | Performed by: STUDENT IN AN ORGANIZED HEALTH CARE EDUCATION/TRAINING PROGRAM

## 2024-10-24 ASSESSMENT — ANXIETY QUESTIONNAIRES
1. FEELING NERVOUS, ANXIOUS, OR ON EDGE: SEVERAL DAYS
7. FEELING AFRAID AS IF SOMETHING AWFUL MIGHT HAPPEN: SEVERAL DAYS
5. BEING SO RESTLESS THAT IT IS HARD TO SIT STILL: SEVERAL DAYS
7. FEELING AFRAID AS IF SOMETHING AWFUL MIGHT HAPPEN: SEVERAL DAYS
GAD7 TOTAL SCORE: 7
GAD7 TOTAL SCORE: 7
8. IF YOU CHECKED OFF ANY PROBLEMS, HOW DIFFICULT HAVE THESE MADE IT FOR YOU TO DO YOUR WORK, TAKE CARE OF THINGS AT HOME, OR GET ALONG WITH OTHER PEOPLE?: SOMEWHAT DIFFICULT
6. BECOMING EASILY ANNOYED OR IRRITABLE: SEVERAL DAYS
2. NOT BEING ABLE TO STOP OR CONTROL WORRYING: SEVERAL DAYS
GAD7 TOTAL SCORE: 7
IF YOU CHECKED OFF ANY PROBLEMS ON THIS QUESTIONNAIRE, HOW DIFFICULT HAVE THESE PROBLEMS MADE IT FOR YOU TO DO YOUR WORK, TAKE CARE OF THINGS AT HOME, OR GET ALONG WITH OTHER PEOPLE: SOMEWHAT DIFFICULT
4. TROUBLE RELAXING: SEVERAL DAYS
3. WORRYING TOO MUCH ABOUT DIFFERENT THINGS: SEVERAL DAYS

## 2024-10-24 ASSESSMENT — PATIENT HEALTH QUESTIONNAIRE - PHQ9
SUM OF ALL RESPONSES TO PHQ QUESTIONS 1-9: 12
10. IF YOU CHECKED OFF ANY PROBLEMS, HOW DIFFICULT HAVE THESE PROBLEMS MADE IT FOR YOU TO DO YOUR WORK, TAKE CARE OF THINGS AT HOME, OR GET ALONG WITH OTHER PEOPLE: SOMEWHAT DIFFICULT
SUM OF ALL RESPONSES TO PHQ QUESTIONS 1-9: 12

## 2024-10-24 NOTE — PROGRESS NOTES
"[facility]                                     Progress Note    Patient Name: Melita Cortes  Date: 10/24/2024         Service Type: Individual      Session Start Time: 3.30  Session End Time: 4.13     Session Length: 38-52    Session #: 23    Attendees: Client attended alone    Service Modality:  Video Visit:      Provider verified identity through the following two step process.  Patient provided:  Patient is known previously to provider    Telemedicine Visit: The patient's condition can be safely assessed and treated via synchronous audio and visual telemedicine encounter.      Reason for Telemedicine Visit: Patient has requested telehealth visit    Originating Site (Patient Location): Patient's home    Distant Site (Provider Location): Provider Remote Setting- Home Office    Consent:  The patient/guardian has verbally consented to: the potential risks and benefits of telemedicine (video visit) versus in person care; bill my insurance or make self-payment for services provided; and responsibility for payment of non-covered services.     Patient would like the video invitation sent by:  My Chart    Mode of Communication:  Video Conference via [employer]well    Distant Location (Provider):  Off-site    As the provider I attest to compliance with applicable laws and regulations related to telemedicine.    DATA  Interactive Complexity: No  Crisis: No        Progress Since Last Session (Related to Symptoms / Goals / Homework):   Symptoms: Improving as evident by current phq9 scores    Homework: Completed in session review patient's utilization of safety plan to mitigate SI.        Episode of Care Goals: Satisfactory progress - ACTION (Actively working towards change); Intervened by reinforcing change plan / affirming steps taken     Current / Ongoing Stressors and Concerns:  Patient reported  \"Trauma Adhd depression\". Patient reported \" I think I have had some trauma in my life, my liver " "transplant .\" Pt reported she is  \"overly sensitive\" and \" I dont handle stress very well and struggle with impulsivity\"  and that \"I have a poor self image, negative talk a lot \" \"used and abandoned\" and that she always have to explained herself to others.      Treatment Objective(s) Addressed in This Session:   Patient will increase daily activity level by exercising for 20-30 minutes and participate in at least 1 daily pleasant/fun activities.  Patient will identify specific areas of cognitive distortion and challenge irrational thoughts with reality.    Patient will  learn and use grounding skills to  effectively manage anxiety and distress associated with trauma history daily.     Intervention:    EMDR: Focus today's session on target identification using direct questioning and the float back technique. Identify the presenting problems and issues that client is struggling with currently. Work with client using direct questioning and float back to explore and access early life experiences and memories that contextually informed client's presenting problems and begin to identify targets for future reprocessing.        Assessments completed prior to visit:  PHQ9:       7/2/2024     8:48 PM 7/11/2024     3:14 PM 8/8/2024     2:09 PM 8/22/2024     1:54 PM 9/12/2024     2:24 PM 10/10/2024     3:14 PM 10/24/2024    11:08 AM   PHQ-9 SCORE   PHQ-9 Total Score MyChart 11 (Moderate depression) 16 (Moderately severe depression) 10 (Moderate depression) 13 (Moderate depression) 9 (Mild depression) 14 (Moderate depression) 12 (Moderate depression)   PHQ-9 Total Score 11    11 16 10 13 9 14 12        Patient-reported    Multiple values from one day are sorted in reverse-chronological order     GAD7:       3/25/2024    12:46 PM 4/8/2024     7:39 AM 5/30/2024     2:14 PM 7/6/2024     9:41 AM 8/8/2024     2:10 PM 10/10/2024     3:15 PM 10/24/2024    11:09 AM   JORGE-7 SCORE   Total Score 9 (mild anxiety) 11 (moderate anxiety) 7 " (mild anxiety) 19 (severe anxiety) 15 (severe anxiety) 14 (moderate anxiety) 7 (mild anxiety)   Total Score 9 11 7 19 15 14 7        Patient-reported         ASSESSMENT: Current Emotional / Mental Status (status of significant symptoms):   Risk status (Self / Other harm or suicidal ideation)   Patient denies current fears or concerns for personal safety.   Patient denies current or recent suicidal ideation or behaviors.   Patient denies current or recent homicidal ideation or behaviors.   Patient denies current or recent self injurious behavior or ideation.   Patient denies other safety concerns.   Patient reports there has been no change in risk factors since their last session.     Patient reports there has been no change in protective factors since their last session.     Recommended that patient call 911 or go to the local ED should there be a change in any of these risk factors.     Appearance:   Appropriate    Eye Contact:   Good    Psychomotor Behavior: Normal    Attitude:   Cooperative  Interested   Orientation:   Person Place Time Situation   Speech    Rate / Production: Normal/ Responsive    Volume:  Normal    Mood:    Anxious  Depressed    Affect:    Tearful   Thought Content:  Clear    Thought Form:  Coherent  Logical    Insight:    Good      Medication Review:   No changes to current psychiatric medication(s)     Medication Compliance:   Yes     Changes in Health Issues:   None reported     Chemical Use Review:   Substance Use: Chemical use reviewed, no active concerns identified      Tobacco Use: No current tobacco use.      Diagnosis:  296.32 (F33.1) Major Depressive Disorder, Recurrent Episode, Moderate _ and With atypical features    Collateral Reports Completed:   Not Applicable    PLAN: (Patient Tasks / Therapist Tasks / Other)    Utilize safety plan as needed daily.    Practice  distress intolerance skill learned in session, watching emotions, label and describe them and not avoid them.     Jake  Abu, LICSW           _________________________________________________________    Individual Treatment Plan    Patient's Name: Melita Cortes  YOB: 1970    Date of Creation: 09/22/2023  Date Treatment Plan Last Reviewed/Revised: 09/12/2024    DSM5 Diagnoses: 296.32 (F33.1) Major Depressive Disorder, Recurrent Episode, Moderate _ and With atypical features  Psychosocial / Contextual Factors:   PROMIS (reviewed every 90 days):     Referral / Collaboration:  Referral to another professional/service is not indicated at this time..    Anticipated number of session for this episode of care:  15-25  Anticipation frequency of session: Biweekly  Anticipated Duration of each session: 38-52 minutes  Treatment plan will be reviewed in 90 days or when goals have been changed.       MeasurableTreatment Goal(s) related to diagnosis / functional impairment(s)  Goal 1: Patient will identify and address specific triggers to feelings of depression and improve coping by  90 % in the next three months.    I will know I've met my goal when I am less impulsive, better communicator and can comfortably manage distressful emotions.         Objective #A (Patient Action)    Develop and utilize 3 effective distress tolerance strategies to address SI.   Status: Continued - Date(s):  11/13/2024     Intervention(s)   Therapist will engage in collaborative brainstorming with pt to identify and practice individualized distress tolerance coping strategies to address SI weekly.    Objective #B (Patient Action)    Patient will increase daily activity level by exercising for 20-30 minutes and participate in at least 1 daily pleasant/fun activities.  Status: Continued - Date(s):   11/13/2024    Intervention(s)  Therapist will provide psychoeducation, behavioral activation, and cognitive restructuring.    Objective #C  Patient will identify specific areas of cognitive distortion and challenge irrational thoughts with reality.   Status:  "Continued - Date(s):   11/13/2024       Intervention(s)  Therapist will provide psychoeducation, behavioral activation, and cognitive restructuring.    Objective #D  Patient will learn and practice relaxation techniques to manage depression.  Status: Continued - Date(s):   11/13/2024    Intervention(s)   Therapist will teach patient thought stopping, deep breathing exercises, mindful meditation, and creative visualization.       Goal 2: Patient will identify and address issues underlying trauma and improve coping  by 90% in the next 3 months .    Objective #A (Patient Action)    Patient will develop vocabulary to describe trauma symptoms.  Status: Continued - Date(s): 01/25/2025    Intervention(s)  Therapist will provide psychoeducation, behavioral activation, and cognitive restructuring.    Objective #B  Patient will learn and use grounding skills to  effectively manage anxiety and distress associated with trauma history daily.  Status: Continued - Date(s):01/25/2025    Intervention(s)  Therapist will provide psychoeducation, behavioral activation, and cognitive restructuring.    Objective #C  Patient will gradually approach trauma-related memories, feelings, and situations through reprocessing and desensitization  weekly in session.  Status: Continued - Date(s): 01/25/2025    Intervention(s)  Therapist will provide psychoeducation, behavioral activation, and cognitive restructuring.        Patient has reviewed and agreed to the above plan.      Jake Coyne [facility]  September 22, 2023    ----- Service Performed and Documented by [facility]------  This note was reviewed and clinical supervision by ELOM Colmenares [facility]    4/1/2024           [facility] Matthew Cortes     SAFETY PLAN:  Step 1: Warning signs / cues (Thoughts, images, mood, situation, behavior) that a crisis may be developing:  Thoughts:   \" sometimes I feel like I don't have a purpose\"  Images:  " "Feels lonely after losing mum and dad and lonely in marriage  Thinking Processes: ruminations (can't stop thinking about my problems): health, family loses  Mood: worsening depression  Behaviors: isolating/withdrawing   Situations: loss: parents and lonely in marriage    Step 2: Coping strategies - Things I can do to take my mind off of my problems without contacting another person (relaxation technique, physical activity):  Distress Tolerance Strategies:  play with my pet , intense exercise: work out for 2-3 minutes , and support group attendance for transplant  Physical Activities: go for a walk and stretching   Focus on helpful thoughts:  \"This is temporary\" and \"It always passes\"  Step 3: People and social settings that provide distraction:   Name:  Augustine ( friend) Phone: [phone]   Name:  Aimee paniagua Phone: [phone]     gym  and [employer] store  Liver transplant group  Step 4: Remind myself of people and things that are important to me and worth living for:    My , cousins and friends.  Step 5: When I am in crisis, I can ask these people to help me use my safety plan:   Name:  Jake ( Therapist )  Phone: [phone]   Name:  Aimee paniagua Phone: [phone]   Step 6: Making the environment safe:   remove alcohol, remove drugs, and be around others  Step 7: Professionals or agencies I can contact during a crisis:  Suicide Prevention Lifeline: Call or Text 981   [facility] Services: [phone]    Call 911 or go to my nearest emergency department.   I helped develop this safety plan and agree to use it when needed.  I have been given a copy of this plan.      Client signature _________________________________________________________________  Today s date:  11/14/2023  Completed by Provider Name/ Credentials:  CECIL Vasquez  November 14, 2023  Adapted from Safety Plan Template 2008 Chelsi Marx and Fred Ramos is reprinted with the express permission of the authors.  No portion of the " Safety Plan Template may be reproduced without the express, written permission.  You can contact the authors at [email or web address] or [email or web address].        Answers submitted by the patient for this visit:  Patient Health Questionnaire (Submitted on 11/14/2023)  If you checked off any problems, how difficult have these problems made it for you to do your work, take care of things at home, or get along with other people?: Very difficult  PHQ9 TOTAL SCORE: 18  JORGE-7 (Submitted on 11/14/2023)  JORGE 7 TOTAL SCORE: 14    Answers submitted by the patient for this visit:  Patient Health Questionnaire (Submitted on 11/28/2023)  If you checked off any problems, how difficult have these problems made it for you to do your work, take care of things at home, or get along with other people?: Extremely difficult  PHQ9 TOTAL SCORE: 14  JORGE-7 (Submitted on 11/28/2023)  JORGE 7 TOTAL SCORE: 21    Answers submitted by the patient for this visit:  Patient Health Questionnaire (Submitted on 12/13/2023)  If you checked off any problems, how difficult have these problems made it for you to do your work, take care of things at home, or get along with other people?: Very difficult  PHQ9 TOTAL SCORE: 18  JORGE-7 (Submitted on 12/13/2023)  JORGE 7 TOTAL SCORE: 21    Answers submitted by the patient for this visit:  Patient Health Questionnaire (Submitted on 1/3/2024)  If you checked off any problems, how difficult have these problems made it for you to do your work, take care of things at home, or get along with other people?: Very difficult  PHQ9 TOTAL SCORE: 16  JORGE-7 (Submitted on 1/3/2024)  JORGE 7 TOTAL SCORE: 10    Answers submitted by the patient for this visit:  Patient Health Questionnaire (Submitted on 1/16/2024)  If you checked off any problems, how difficult have these problems made it for you to do your work, take care of things at home, or get along with other people?: Very difficult  PHQ9 TOTAL SCORE: 15    Answers submitted  by the patient for this visit:  Patient Health Questionnaire (Submitted on 1/29/2024)  If you checked off any problems, how difficult have these problems made it for you to do your work, take care of things at home, or get along with other people?: Extremely difficult  PHQ9 TOTAL SCORE: 12  JORGE-7 (Submitted on 1/29/2024)  JORGE 7 TOTAL SCORE: 9    Answers submitted by the patient for this visit:  Patient Health Questionnaire (Submitted on 2/14/2024)  If you checked off any problems, how difficult have these problems made it for you to do your work, take care of things at home, or get along with other people?: Extremely difficult  PHQ9 TOTAL SCORE: 11  JORGE-7 (Submitted on 2/14/2024)  JORGE 7 TOTAL SCORE: 14    Answers submitted by the patient for this visit:  Patient Health Questionnaire (Submitted on 2/28/2024)  If you checked off any problems, how difficult have these problems made it for you to do your work, take care of things at home, or get along with other people?: Extremely difficult  PHQ9 TOTAL SCORE: 16    Answers submitted by the patient for this visit:  Patient Health Questionnaire (Submitted on 3/11/2024)  If you checked off any problems, how difficult have these problems made it for you to do your work, take care of things at home, or get along with other people?: Very difficult  PHQ9 TOTAL SCORE: 10    Answers submitted by the patient for this visit:  Patient Health Questionnaire (Submitted on 3/25/2024)  If you checked off any problems, how difficult have these problems made it for you to do your work, take care of things at home, or get along with other people?: Extremely difficult  PHQ9 TOTAL SCORE: 16  JORGE-7 (Submitted on 3/25/2024)  JORGE 7 TOTAL SCORE: 9    Answers submitted by the patient for this visit:  Patient Health Questionnaire (Submitted on 4/10/2024)  If you checked off any problems, how difficult have these problems made it for you to do your work, take care of things at home, or get along  with other people?: Extremely difficult  PHQ9 TOTAL SCORE: 11    Answers submitted by the patient for this visit:  Patient Health Questionnaire (Submitted on 5/9/2024)  If you checked off any problems, how difficult have these problems made it for you to do your work, take care of things at home, or get along with other people?: Extremely difficult  PHQ9 TOTAL SCORE: 14    Answers submitted by the patient for this visit:  Patient Health Questionnaire (Submitted on 5/30/2024)  If you checked off any problems, how difficult have these problems made it for you to do your work, take care of things at home, or get along with other people?: Extremely difficult  PHQ9 TOTAL SCORE: 16  JORGE-7 (Submitted on 5/30/2024)  JORGE 7 TOTAL SCORE: 7    Answers submitted by the patient for this visit:  Patient Health Questionnaire (Submitted on 7/11/2024)  If you checked off any problems, how difficult have these problems made it for you to do your work, take care of things at home, or get along with other people?: Very difficult  PHQ9 TOTAL SCORE: 16  JORGE-7 (Submitted on 7/6/2024)  JORGE 7 TOTAL SCORE: 19    Answers submitted by the patient for this visit:  Patient Health Questionnaire (Submitted on 8/8/2024)  If you checked off any problems, how difficult have these problems made it for you to do your work, take care of things at home, or get along with other people?: Extremely difficult  PHQ9 TOTAL SCORE: 10  JORGE-7 (Submitted on 8/8/2024)  JORGE 7 TOTAL SCORE: 15    Answers submitted by the patient for this visit:  Patient Health Questionnaire (Submitted on 8/22/2024)  If you checked off any problems, how difficult have these problems made it for you to do your work, take care of things at home, or get along with other people?: Somewhat difficult  PHQ9 TOTAL SCORE: 13    Answers submitted by the patient for this visit:  Patient Health Questionnaire (Submitted on 9/12/2024)  If you checked off any problems, how difficult have these  problems made it for you to do your work, take care of things at home, or get along with other people?: Extremely difficult  PHQ9 TOTAL SCORE: 9    Answers submitted by the patient for this visit:  Patient Health Questionnaire (Submitted on 10/10/2024)  If you checked off any problems, how difficult have these problems made it for you to do your work, take care of things at home, or get along with other people?: Very difficult  PHQ9 TOTAL SCORE: 14  Patient Health Questionnaire (G7) (Submitted on 10/10/2024)  JORGE 7 TOTAL SCORE: 14    Answers submitted by the patient for this visit:  Patient Health Questionnaire (Submitted on 10/24/2024)  If you checked off any problems, how difficult have these problems made it for you to do your work, take care of things at home, or get along with other people?: Somewhat difficult  PHQ9 TOTAL SCORE: 12  Patient Health Questionnaire (G7) (Submitted on 10/24/2024)  JORGE 7 TOTAL SCORE: 7

## 2024-10-25 ENCOUNTER — THERAPY VISIT (OUTPATIENT)
Dept: PHYSICAL THERAPY | Facility: CLINIC | Age: 54
End: 2024-10-25
Payer: MEDICARE

## 2024-10-25 DIAGNOSIS — M17.11 OSTEOARTHRITIS OF RIGHT PATELLOFEMORAL JOINT: Primary | ICD-10-CM

## 2024-10-25 PROCEDURE — 97140 MANUAL THERAPY 1/> REGIONS: CPT | Mod: GP | Performed by: PHYSICAL THERAPIST

## 2024-10-25 PROCEDURE — 97110 THERAPEUTIC EXERCISES: CPT | Mod: GP | Performed by: PHYSICAL THERAPIST

## 2024-10-25 PROCEDURE — 97112 NEUROMUSCULAR REEDUCATION: CPT | Mod: GP | Performed by: PHYSICAL THERAPIST

## 2024-11-04 ENCOUNTER — THERAPY VISIT (OUTPATIENT)
Dept: PHYSICAL THERAPY | Facility: CLINIC | Age: 54
End: 2024-11-04
Payer: MEDICARE

## 2024-11-04 DIAGNOSIS — M17.11 OSTEOARTHRITIS OF RIGHT PATELLOFEMORAL JOINT: Primary | ICD-10-CM

## 2024-11-04 PROCEDURE — 97110 THERAPEUTIC EXERCISES: CPT | Mod: GP

## 2024-11-07 ENCOUNTER — VIRTUAL VISIT (OUTPATIENT)
Dept: PSYCHOLOGY | Facility: CLINIC | Age: 54
End: 2024-11-07
Payer: MEDICARE

## 2024-11-07 DIAGNOSIS — F32.1 CURRENT MODERATE EPISODE OF MAJOR DEPRESSIVE DISORDER, UNSPECIFIED WHETHER RECURRENT (H): Primary | ICD-10-CM

## 2024-11-07 PROCEDURE — 90834 PSYTX W PT 45 MINUTES: CPT | Mod: 95 | Performed by: STUDENT IN AN ORGANIZED HEALTH CARE EDUCATION/TRAINING PROGRAM

## 2024-11-07 ASSESSMENT — PATIENT HEALTH QUESTIONNAIRE - PHQ9
SUM OF ALL RESPONSES TO PHQ QUESTIONS 1-9: 14
SUM OF ALL RESPONSES TO PHQ QUESTIONS 1-9: 16
10. IF YOU CHECKED OFF ANY PROBLEMS, HOW DIFFICULT HAVE THESE PROBLEMS MADE IT FOR YOU TO DO YOUR WORK, TAKE CARE OF THINGS AT HOME, OR GET ALONG WITH OTHER PEOPLE: EXTREMELY DIFFICULT
SUM OF ALL RESPONSES TO PHQ QUESTIONS 1-9: 16

## 2024-11-07 ASSESSMENT — ANXIETY QUESTIONNAIRES
GAD7 TOTAL SCORE: 17
8. IF YOU CHECKED OFF ANY PROBLEMS, HOW DIFFICULT HAVE THESE MADE IT FOR YOU TO DO YOUR WORK, TAKE CARE OF THINGS AT HOME, OR GET ALONG WITH OTHER PEOPLE?: VERY DIFFICULT
7. FEELING AFRAID AS IF SOMETHING AWFUL MIGHT HAPPEN: SEVERAL DAYS
3. WORRYING TOO MUCH ABOUT DIFFERENT THINGS: NEARLY EVERY DAY
7. FEELING AFRAID AS IF SOMETHING AWFUL MIGHT HAPPEN: SEVERAL DAYS
5. BEING SO RESTLESS THAT IT IS HARD TO SIT STILL: MORE THAN HALF THE DAYS
IF YOU CHECKED OFF ANY PROBLEMS ON THIS QUESTIONNAIRE, HOW DIFFICULT HAVE THESE PROBLEMS MADE IT FOR YOU TO DO YOUR WORK, TAKE CARE OF THINGS AT HOME, OR GET ALONG WITH OTHER PEOPLE: VERY DIFFICULT
4. TROUBLE RELAXING: NEARLY EVERY DAY
1. FEELING NERVOUS, ANXIOUS, OR ON EDGE: NEARLY EVERY DAY
GAD7 TOTAL SCORE: 17
6. BECOMING EASILY ANNOYED OR IRRITABLE: MORE THAN HALF THE DAYS
2. NOT BEING ABLE TO STOP OR CONTROL WORRYING: NEARLY EVERY DAY
GAD7 TOTAL SCORE: 17

## 2024-11-07 NOTE — PROGRESS NOTES
"[facility]                                     Progress Note    Patient Name: Melita Cortes  Date: 11/07/2024         Service Type: Individual      Session Start Time: 2.30  Session End Time: 3.13     Session Length: 38-52    Session #: 24    Attendees: Client attended alone    Service Modality:  Video Visit:      Provider verified identity through the following two step process.  Patient provided:  Patient is known previously to provider    Telemedicine Visit: The patient's condition can be safely assessed and treated via synchronous audio and visual telemedicine encounter.      Reason for Telemedicine Visit: Patient has requested telehealth visit    Originating Site (Patient Location): Patient's home    Distant Site (Provider Location): Provider Remote Setting- Home Office    Consent:  The patient/guardian has verbally consented to: the potential risks and benefits of telemedicine (video visit) versus in person care; bill my insurance or make self-payment for services provided; and responsibility for payment of non-covered services.     Patient would like the video invitation sent by:  My Chart    Mode of Communication:  Video Conference via Amwell    Distant Location (Provider):  Off-site    As the provider I attest to compliance with applicable laws and regulations related to telemedicine.    DATA  Interactive Complexity: No  Crisis: No        Progress Since Last Session (Related to Symptoms / Goals / Homework):   Symptoms: Worsening as evident by current phq9 and gad7 scores    Homework: Completed in session review patient's utilization of safety plan to mitigate SI.        Episode of Care Goals: Minimal progress - ACTION (Actively working towards change); Intervened by reinforcing change plan / affirming steps taken     Current / Ongoing Stressors and Concerns:  Patient reported  \"Trauma Adhd depression\". Patient reported \" I think I have had some trauma in my life, my liver " "transplant .\" Pt reported she is  \"overly sensitive\" and \" I dont handle stress very well and struggle with impulsivity\"  and that \"I have a poor self image, negative talk a lot \" \"used and abandoned\" and that she always have to explained herself to others.       Current: Today, patient reported she put her cat down as it was very sick and that she has been \"depressed, frustrated and overwhelmed.\" She reported  she has had her cat with her for 13 years now and is struggling  with the loss. She reported that recurrent knee pain continue unabated and is affecting her mood and mobility. She noted she also just returned  to work  starting a new part time job after years of being out of work.      Treatment Objective(s) Addressed in This Session:   Patient will increase daily activity level by exercising for 20-30 minutes and participate in at least 1 daily pleasant/fun activities.  Patient will identify specific areas of cognitive distortion and challenge irrational thoughts with reality.    Patient will  learn and use grounding skills to  effectively manage anxiety and distress associated with trauma history daily.     Intervention:    Work with patient today to normalize current emotional state and explore what patient can control and what they cannot control with regards to current loss. Couched patient that they cannot control what has happened but they can control how they respond to the experience.  Explore what patient is willing to start surrendering in this transformation process and provide validation and positive reinforcement.        Assessments completed prior to visit:  PHQ9:       7/11/2024     3:14 PM 8/8/2024     2:09 PM 8/22/2024     1:54 PM 9/12/2024     2:24 PM 10/10/2024     3:14 PM 10/24/2024    11:08 AM 11/7/2024    12:34 PM   PHQ-9 SCORE   PHQ-9 Total Score MyChart 16 (Moderately severe depression) 10 (Moderate depression) 13 (Moderate depression) 9 (Mild depression) 14 (Moderate depression) 12 " (Moderate depression) 16 (Moderately severe depression)   PHQ-9 Total Score 16 10 13 9 14 12  16        Patient-reported     GAD7:       4/8/2024     7:39 AM 5/30/2024     2:14 PM 7/6/2024     9:41 AM 8/8/2024     2:10 PM 10/10/2024     3:15 PM 10/24/2024    11:09 AM 11/7/2024    12:35 PM   JORGE-7 SCORE   Total Score 11 (moderate anxiety) 7 (mild anxiety) 19 (severe anxiety) 15 (severe anxiety) 14 (moderate anxiety) 7 (mild anxiety) 17 (severe anxiety)   Total Score 11 7 19 15 14 7  17        Patient-reported         ASSESSMENT: Current Emotional / Mental Status (status of significant symptoms):   Risk status (Self / Other harm or suicidal ideation)   Patient denies current fears or concerns for personal safety.   Patient denies current or recent suicidal ideation or behaviors.   Patient denies current or recent homicidal ideation or behaviors.   Patient denies current or recent self injurious behavior or ideation.   Patient denies other safety concerns.   Patient reports there has been no change in risk factors since their last session.     Patient reports there has been no change in protective factors since their last session.     Recommended that patient call 911 or go to the local ED should there be a change in any of these risk factors.     Appearance:   Appropriate    Eye Contact:   Good    Psychomotor Behavior: Normal    Attitude:   Cooperative  Interested   Orientation:   Person Place Time Situation   Speech    Rate / Production: Normal/ Responsive    Volume:  Normal    Mood:    Anxious  Depressed    Affect:    Tearful   Thought Content:  Clear    Thought Form:  Coherent  Logical    Insight:    Good      Medication Review:   No changes to current psychiatric medication(s)     Medication Compliance:   Yes     Changes in Health Issues:   None reported     Chemical Use Review:   Substance Use: Chemical use reviewed, no active concerns identified      Tobacco Use: No current tobacco use.      Diagnosis:  296.32  (F33.1) Major Depressive Disorder, Recurrent Episode, Moderate _ and With atypical features    Collateral Reports Completed:   Not Applicable    PLAN: (Patient Tasks / Therapist Tasks / Other)    Utilize safety plan as needed daily.    Practice  distress intolerance skill learned in session, watching emotions, label and describe them and not avoid them.     Use Gautam Edmond's RAIN ( Recognize, Accept, Investigate , non identification) skill  learned in session daily to deal with difficult emotions.     Jake Coyne, [facility]           _________________________________________________________    Individual Treatment Plan    Patient's Name: Melita Cortes  YOB: 1970    Date of Creation: 09/22/2023  Date Treatment Plan Last Reviewed/Revised: 09/12/2024    DSM5 Diagnoses: 296.32 (F33.1) Major Depressive Disorder, Recurrent Episode, Moderate _ and With atypical features  Psychosocial / Contextual Factors:   PROMIS (reviewed every 90 days):     Referral / Collaboration:  Referral to another professional/service is not indicated at this time..    Anticipated number of session for this episode of care:  15-25  Anticipation frequency of session: Biweekly  Anticipated Duration of each session: 38-52 minutes  Treatment plan will be reviewed in 90 days or when goals have been changed.       MeasurableTreatment Goal(s) related to diagnosis / functional impairment(s)  Goal 1: Patient will identify and address specific triggers to feelings of depression and improve coping by  90 % in the next three months.    I will know I've met my goal when I am less impulsive, better communicator and can comfortably manage distressful emotions.         Objective #A (Patient Action)    Develop and utilize 3 effective distress tolerance strategies to address SI.   Status: Continued - Date(s):  11/13/2024     Intervention(s)   Therapist will engage in collaborative brainstorming with pt to identify and practice individualized  distress tolerance coping strategies to address SI weekly.    Objective #B (Patient Action)    Patient will increase daily activity level by exercising for 20-30 minutes and participate in at least 1 daily pleasant/fun activities.  Status: Continued - Date(s):   11/13/2024    Intervention(s)  Therapist will provide psychoeducation, behavioral activation, and cognitive restructuring.    Objective #C  Patient will identify specific areas of cognitive distortion and challenge irrational thoughts with reality.   Status: Continued - Date(s):   11/13/2024       Intervention(s)  Therapist will provide psychoeducation, behavioral activation, and cognitive restructuring.    Objective #D  Patient will learn and practice relaxation techniques to manage depression.  Status: Continued - Date(s):   11/13/2024    Intervention(s)   Therapist will teach patient thought stopping, deep breathing exercises, mindful meditation, and creative visualization.       Goal 2: Patient will identify and address issues underlying trauma and improve coping  by 90% in the next 3 months .    Objective #A (Patient Action)    Patient will develop vocabulary to describe trauma symptoms.  Status: Continued - Date(s): 01/25/2025    Intervention(s)  Therapist will provide psychoeducation, behavioral activation, and cognitive restructuring.    Objective #B  Patient will learn and use grounding skills to  effectively manage anxiety and distress associated with trauma history daily.  Status: Continued - Date(s):01/25/2025    Intervention(s)  Therapist will provide psychoeducation, behavioral activation, and cognitive restructuring.    Objective #C  Patient will gradually approach trauma-related memories, feelings, and situations through reprocessing and desensitization  weekly in session.  Status: Continued - Date(s): 01/25/2025    Intervention(s)  Therapist will provide psychoeducation, behavioral activation, and cognitive restructuring.        Patient has  "reviewed and agreed to the above plan.      Jake Coyne, [facility]  September 22, 2023    ----- Service Performed and Documented by [facility]------  This note was reviewed and clinical supervision by ELMO Colmenares [facility]    4/1/2024           [facility]                                       Melita Cortes     SAFETY PLAN:  Step 1: Warning signs / cues (Thoughts, images, mood, situation, behavior) that a crisis may be developing:  Thoughts:   \" sometimes I feel like I don't have a purpose\"  Images:  Feels lonely after losing mum and dad and lonely in marriage  Thinking Processes: ruminations (can't stop thinking about my problems): health, family loses  Mood: worsening depression  Behaviors: isolating/withdrawing   Situations: loss: parents and lonely in marriage    Step 2: Coping strategies - Things I can do to take my mind off of my problems without contacting another person (relaxation technique, physical activity):  Distress Tolerance Strategies:  play with my pet , intense exercise: work out for 2-3 minutes , and support group attendance for transplant  Physical Activities: go for a walk and stretching   Focus on helpful thoughts:  \"This is temporary\" and \"It always passes\"  Step 3: People and social settings that provide distraction:   Name:  Augustine ( friend) Phone: [phone]   Name:  Aimee paniagua Phone: [phone]     gym  and [employer] store  Liver transplant group  Step 4: Remind myself of people and things that are important to me and worth living for:    My , cousins and friends.  Step 5: When I am in crisis, I can ask these people to help me use my safety plan:   Name:  Jake ( Therapist )  Phone: [phone]   Name:  Aimee paniagua Phone: [phone]   Step 6: Making the environment safe:   remove alcohol, remove drugs, and be around others  Step 7: Professionals or agencies I can contact during a crisis:  Suicide Prevention Lifeline: Call or Text 988   Local Crisis " Services: [phone]    Call 911 or go to my nearest emergency department.   I helped develop this safety plan and agree to use it when needed.  I have been given a copy of this plan.      Client signature _________________________________________________________________  Today s date:  11/14/2023  Completed by Provider Name/ Credentials:  CECIL Vasquez  November 14, 2023  Adapted from Safety Plan Template 2008 Chelsi Marx and Fred Ramos is reprinted with the express permission of the authors.  No portion of the Safety Plan Template may be reproduced without the express, written permission.  You can contact the authors at bhs@[email or web address] or ubaldo@[email or web address] [email or web address].        Answers submitted by the patient for this visit:  Patient Health Questionnaire (Submitted on 11/14/2023)  If you checked off any problems, how difficult have these problems made it for you to do your work, take care of things at home, or get along with other people?: Very difficult  PHQ9 TOTAL SCORE: 18  JORGE-7 (Submitted on 11/14/2023)  JORGE 7 TOTAL SCORE: 14    Answers submitted by the patient for this visit:  Patient Health Questionnaire (Submitted on 11/28/2023)  If you checked off any problems, how difficult have these problems made it for you to do your work, take care of things at home, or get along with other people?: Extremely difficult  PHQ9 TOTAL SCORE: 14  JORGE-7 (Submitted on 11/28/2023)  JORGE 7 TOTAL SCORE: 21    Answers submitted by the patient for this visit:  Patient Health Questionnaire (Submitted on 12/13/2023)  If you checked off any problems, how difficult have these problems made it for you to do your work, take care of things at home, or get along with other people?: Very difficult  PHQ9 TOTAL SCORE: 18  JORGE-7 (Submitted on 12/13/2023)  JORGE 7 TOTAL SCORE: 21    Answers submitted by the patient for this visit:  Patient Health Questionnaire (Submitted on 1/3/2024)  If you checked off any problems, how difficult  have these problems made it for you to do your work, take care of things at home, or get along with other people?: Very difficult  PHQ9 TOTAL SCORE: 16  JORGE-7 (Submitted on 1/3/2024)  JORGE 7 TOTAL SCORE: 10    Answers submitted by the patient for this visit:  Patient Health Questionnaire (Submitted on 1/16/2024)  If you checked off any problems, how difficult have these problems made it for you to do your work, take care of things at home, or get along with other people?: Very difficult  PHQ9 TOTAL SCORE: 15    Answers submitted by the patient for this visit:  Patient Health Questionnaire (Submitted on 1/29/2024)  If you checked off any problems, how difficult have these problems made it for you to do your work, take care of things at home, or get along with other people?: Extremely difficult  PHQ9 TOTAL SCORE: 12  JORGE-7 (Submitted on 1/29/2024)  JORGE 7 TOTAL SCORE: 9    Answers submitted by the patient for this visit:  Patient Health Questionnaire (Submitted on 2/14/2024)  If you checked off any problems, how difficult have these problems made it for you to do your work, take care of things at home, or get along with other people?: Extremely difficult  PHQ9 TOTAL SCORE: 11  JORGE-7 (Submitted on 2/14/2024)  JORGE 7 TOTAL SCORE: 14    Answers submitted by the patient for this visit:  Patient Health Questionnaire (Submitted on 2/28/2024)  If you checked off any problems, how difficult have these problems made it for you to do your work, take care of things at home, or get along with other people?: Extremely difficult  PHQ9 TOTAL SCORE: 16    Answers submitted by the patient for this visit:  Patient Health Questionnaire (Submitted on 3/11/2024)  If you checked off any problems, how difficult have these problems made it for you to do your work, take care of things at home, or get along with other people?: Very difficult  PHQ9 TOTAL SCORE: 10    Answers submitted by the patient for this visit:  Patient Health Questionnaire  (Submitted on 3/25/2024)  If you checked off any problems, how difficult have these problems made it for you to do your work, take care of things at home, or get along with other people?: Extremely difficult  PHQ9 TOTAL SCORE: 16  JORGE-7 (Submitted on 3/25/2024)  JORGE 7 TOTAL SCORE: 9    Answers submitted by the patient for this visit:  Patient Health Questionnaire (Submitted on 4/10/2024)  If you checked off any problems, how difficult have these problems made it for you to do your work, take care of things at home, or get along with other people?: Extremely difficult  PHQ9 TOTAL SCORE: 11    Answers submitted by the patient for this visit:  Patient Health Questionnaire (Submitted on 5/9/2024)  If you checked off any problems, how difficult have these problems made it for you to do your work, take care of things at home, or get along with other people?: Extremely difficult  PHQ9 TOTAL SCORE: 14    Answers submitted by the patient for this visit:  Patient Health Questionnaire (Submitted on 5/30/2024)  If you checked off any problems, how difficult have these problems made it for you to do your work, take care of things at home, or get along with other people?: Extremely difficult  PHQ9 TOTAL SCORE: 16  JORGE-7 (Submitted on 5/30/2024)  JORGE 7 TOTAL SCORE: 7    Answers submitted by the patient for this visit:  Patient Health Questionnaire (Submitted on 7/11/2024)  If you checked off any problems, how difficult have these problems made it for you to do your work, take care of things at home, or get along with other people?: Very difficult  PHQ9 TOTAL SCORE: 16  JORGE-7 (Submitted on 7/6/2024)  JORGE 7 TOTAL SCORE: 19    Answers submitted by the patient for this visit:  Patient Health Questionnaire (Submitted on 8/8/2024)  If you checked off any problems, how difficult have these problems made it for you to do your work, take care of things at home, or get along with other people?: Extremely difficult  PHQ9 TOTAL SCORE:  10  JORGE-7 (Submitted on 8/8/2024)  JORGE 7 TOTAL SCORE: 15    Answers submitted by the patient for this visit:  Patient Health Questionnaire (Submitted on 8/22/2024)  If you checked off any problems, how difficult have these problems made it for you to do your work, take care of things at home, or get along with other people?: Somewhat difficult  PHQ9 TOTAL SCORE: 13    Answers submitted by the patient for this visit:  Patient Health Questionnaire (Submitted on 9/12/2024)  If you checked off any problems, how difficult have these problems made it for you to do your work, take care of things at home, or get along with other people?: Extremely difficult  PHQ9 TOTAL SCORE: 9    Answers submitted by the patient for this visit:  Patient Health Questionnaire (Submitted on 10/10/2024)  If you checked off any problems, how difficult have these problems made it for you to do your work, take care of things at home, or get along with other people?: Very difficult  PHQ9 TOTAL SCORE: 14  Patient Health Questionnaire (G7) (Submitted on 10/10/2024)  JORGE 7 TOTAL SCORE: 14    Answers submitted by the patient for this visit:  Patient Health Questionnaire (Submitted on 10/24/2024)  If you checked off any problems, how difficult have these problems made it for you to do your work, take care of things at home, or get along with other people?: Somewhat difficult  PHQ9 TOTAL SCORE: 12  Patient Health Questionnaire (G7) (Submitted on 10/24/2024)  JORGE 7 TOTAL SCORE: 7    Answers submitted by the patient for this visit:  Patient Health Questionnaire (Submitted on 11/7/2024)  If you checked off any problems, how difficult have these problems made it for you to do your work, take care of things at home, or get along with other people?: Extremely difficult  PHQ9 TOTAL SCORE: 16  Patient Health Questionnaire (G7) (Submitted on 11/7/2024)  JORGE 7 TOTAL SCORE: 17

## 2024-11-12 ENCOUNTER — THERAPY VISIT (OUTPATIENT)
Dept: PHYSICAL THERAPY | Facility: CLINIC | Age: 54
End: 2024-11-12
Payer: MEDICARE

## 2024-11-12 DIAGNOSIS — M17.11 OSTEOARTHRITIS OF RIGHT PATELLOFEMORAL JOINT: Primary | ICD-10-CM

## 2024-11-12 PROCEDURE — 97110 THERAPEUTIC EXERCISES: CPT | Mod: GP | Performed by: PHYSICAL THERAPY ASSISTANT

## 2024-11-17 ENCOUNTER — MYC REFILL (OUTPATIENT)
Dept: TRANSPLANT | Facility: CLINIC | Age: 54
End: 2024-11-17
Payer: MEDICARE

## 2024-11-17 ENCOUNTER — MYC REFILL (OUTPATIENT)
Dept: FAMILY MEDICINE | Facility: CLINIC | Age: 54
End: 2024-11-17
Payer: MEDICARE

## 2024-11-17 DIAGNOSIS — Z94.4 LIVER REPLACED BY TRANSPLANT (H): ICD-10-CM

## 2024-11-17 DIAGNOSIS — F90.0 ATTENTION DEFICIT HYPERACTIVITY DISORDER (ADHD), PREDOMINANTLY INATTENTIVE TYPE: ICD-10-CM

## 2024-11-18 ENCOUNTER — TELEPHONE (OUTPATIENT)
Dept: TRANSPLANT | Facility: CLINIC | Age: 54
End: 2024-11-18
Payer: MEDICARE

## 2024-11-18 RX ORDER — TACROLIMUS 0.5 MG/1
0.5 CAPSULE ORAL EVERY MORNING
Qty: 90 CAPSULE | Refills: 3 | Status: SHIPPED | OUTPATIENT
Start: 2024-11-18

## 2024-11-18 RX ORDER — TACROLIMUS 1 MG/1
1 CAPSULE ORAL EVERY EVENING
Qty: 90 CAPSULE | Refills: 3 | Status: SHIPPED | OUTPATIENT
Start: 2024-11-18

## 2024-11-18 RX ORDER — DEXTROAMPHETAMINE SACCHARATE, AMPHETAMINE ASPARTATE MONOHYDRATE, DEXTROAMPHETAMINE SULFATE AND AMPHETAMINE SULFATE 5; 5; 5; 5 MG/1; MG/1; MG/1; MG/1
20 CAPSULE, EXTENDED RELEASE ORAL DAILY
Qty: 30 CAPSULE | Refills: 0 | Status: SHIPPED | OUTPATIENT
Start: 2024-11-18 | End: 2024-11-20

## 2024-11-19 ENCOUNTER — OFFICE VISIT (OUTPATIENT)
Dept: ORTHOPEDICS | Facility: CLINIC | Age: 54
End: 2024-11-19
Payer: MEDICARE

## 2024-11-19 DIAGNOSIS — M17.12 PRIMARY OSTEOARTHRITIS OF LEFT KNEE: ICD-10-CM

## 2024-11-19 DIAGNOSIS — M17.11 OSTEOARTHRITIS OF RIGHT PATELLOFEMORAL JOINT: Primary | ICD-10-CM

## 2024-11-19 PROCEDURE — 99213 OFFICE O/P EST LOW 20 MIN: CPT | Mod: 25 | Performed by: FAMILY MEDICINE

## 2024-11-19 PROCEDURE — 20610 DRAIN/INJ JOINT/BURSA W/O US: CPT | Mod: 50 | Performed by: FAMILY MEDICINE

## 2024-11-19 RX ORDER — TRIAMCINOLONE ACETONIDE 40 MG/ML
40 INJECTION, SUSPENSION INTRA-ARTICULAR; INTRAMUSCULAR
Status: COMPLETED | OUTPATIENT
Start: 2024-11-19 | End: 2024-11-19

## 2024-11-19 RX ORDER — LIDOCAINE HYDROCHLORIDE 10 MG/ML
4 INJECTION, SOLUTION EPIDURAL; INFILTRATION; INTRACAUDAL; PERINEURAL
Status: COMPLETED | OUTPATIENT
Start: 2024-11-19 | End: 2024-11-19

## 2024-11-19 RX ADMIN — LIDOCAINE HYDROCHLORIDE 4 ML: 10 INJECTION, SOLUTION EPIDURAL; INFILTRATION; INTRACAUDAL; PERINEURAL at 13:50

## 2024-11-19 RX ADMIN — TRIAMCINOLONE ACETONIDE 40 MG: 40 INJECTION, SUSPENSION INTRA-ARTICULAR; INTRAMUSCULAR at 13:50

## 2024-11-19 NOTE — NURSING NOTE
[address] [address]  Dept: [phone]  ______________________________________________________________________________    Patient: Melita Cortes   : 1970   MRN: [id number]   2024    INVASIVE PROCEDURE SAFETY CHECKLIST    Date: 2024   Procedure: Bilateral knee CSI  Patient Name: Melita Cortes  MRN: [id number]  YOB: 1970    Action: Complete sections as appropriate. Any discrepancy results in a HARD COPY until resolved.     PRE PROCEDURE:  Patient ID verified with 2 identifiers (name and  or MRN): Yes  Procedure and site verified with patient/designee (when able): Yes  Accurate consent documentation in medical record: Yes  H&P (or appropriate assessment) documented in medical record: Yes  H&P must be up to 20 days prior to procedure and updates within 24 hours of procedure as applicable: NA  Relevant diagnostic and radiology test results appropriately labeled and displayed as applicable: Yes  Procedure site(s) marked with provider initials: NA    TIMEOUT:  Time-Out performed immediately prior to starting procedure, including verbal and active participation of all team members addressing the following:Yes  * Correct patient identify  * Confirmed that the correct side and site are marked  * An accurate procedure consent form  * Agreement on the procedure to be done  * Correct patient position  * Relevant images and results are properly labeled and appropriately displayed  * The need to administer antibiotics or fluids for irrigation purposes during the procedure as applicable   * Safety precautions based on patient history or medication use    DURING PROCEDURE: Verification of correct person, site, and procedures any time the responsibility for care of the patient is transferred to another member of the care team.       Prior to injection, verified patient identity using patient's name and date of  birth.  Due to injection administration, patient instructed to remain in clinic for 15 minutes  afterwards, and to report any adverse reaction to me immediately.    Joint injection was performed.      Drug Amount Wasted:  Yes: 2 mg/ml lidocaine   Vial/Syringe: Single dose vial  Expiration Date:  04/28       Jazmyn Mora [facility]  November 19, 2024

## 2024-11-19 NOTE — PROGRESS NOTES
CHIEF COMPLAINT:  Pain of the Right Knee       HISTORY OF PRESENT ILLNESS  Ms. Cortes is a 54 year old female who presents to clinic today for a follow up discussing her right knee.  Melita reports her pain a 4-5/10. She reports pain with going down stairs. Pain is located anterior and posterolateral. She doesn't feel like the brace was helpful last time.  Her left knee has also been bothering her quite a bit as well, she is interested in an injection of her left knee.        Additional history: as documented    MEDICAL HISTORY  Patient Active Problem List   Diagnosis    Abdominal bloating    Family history of pancreatic cancer    Transaminitis    Macrocytosis    Cervical high risk HPV (human papillomavirus) test positive    Anemia, unspecified type    Elevated serum creatinine    Hyponatremia    Malaise and fatigue    At high risk for severe sepsis    Symptomatic anemia    Bandemia    Hyperlipidemia    Anxiety    Liver replaced by transplant (H)    Immunosuppressed status (H)    Steroid-induced hyperglycemia    Hypervolemia    Elevated alkaline phosphatase level    Cervicalgia    Pain of both elbows    Mild recurrent major depression (H)    Chronic kidney disease, stage 3a (H)    Attention deficit hyperactivity disorder (ADHD), predominantly inattentive type    Osteoarthritis of left hip    Hx of arthroplasty    Clinical diagnosis of COVID-19    History of left hip replacement    Osteoarthritis of right patellofemoral joint       Current Outpatient Medications   Medication Sig Dispense Refill    amphetamine-dextroamphetamine (ADDERALL XR) 20 MG 24 hr capsule Take 1 capsule (20 mg) by mouth daily. 30 capsule 0    calcium carbonate (OS-SHAI) 1500 (600 Ca) MG tablet Take 1 tablet (600 mg) by mouth daily 60 tablet 3    FLUoxetine (PROZAC) 20 MG capsule TAKE 1 CAPSULE BY MOUTH EVERY DAY 90 capsule 0    hydrocortisone 2.5 % ointment Apply twice daily for 1 week on itchy, scaly areas on the body.  Apply Vaseline on top.  60 g 0    hydrOXYzine HCl (ATARAX) 25 MG tablet TAKE 2 TABLETS BY MOUTH NIGHTLY AS NEEDED FOR (INSOMINA) 180 tablet 3    magnesium oxide (MAG-OX) 400 MG tablet Take 1 tablet (400 mg) by mouth 2 times daily 90 tablet 3    methylPREDNISolone (MEDROL DOSEPAK) 4 MG tablet therapy pack Follow Package Directions 21 tablet 0    multivitamin (CENTRUM SILVER) tablet Take 1 tablet by mouth daily      pantoprazole (PROTONIX) 40 MG EC tablet Take 1 tablet (40 mg) by mouth daily 90 tablet 3    tacrolimus (GENERIC EQUIVALENT) 0.5 MG capsule Take 1 capsule (0.5 mg) by mouth every morning. Total dose 0.5mg am, 1 mg pm 90 capsule 3    tacrolimus (GENERIC EQUIVALENT) 1 MG capsule Take 1 capsule (1 mg) by mouth every evening. 90 capsule 3       Allergies   Allergen Reactions    Adhesive Tape Rash    Latex Rash       Family History   Problem Relation Age of Onset    Cancer Mother     Melanoma Father     Skin Cancer Father     Colon Cancer Maternal Uncle     Colon Cancer Paternal Aunt        Additional medical/Social/Surgical histories reviewed in [facility] and updated as appropriate.        PHYSICAL EXAM  General  - normal appearance, in no obvious distress  Musculoskeletal - right and left knee  - inspection: trace effusion bilaterally  - palpation: medial joint line tenderness  - ROM: 120 degrees flexion, 0 degrees extension, painful active ROM  - strength: 5/5 in flexion, 5/5 in extension  - special tests:  (-) Hany  (-) varus at 0 and 30 degrees flexion  (-) valgus at 0 and 30 degrees flexion  Neuro  - no sensory or motor deficit, grossly normal coordination, normal muscle tone              ASSESSMENT & PLAN  Ms. Cortes is a 54 year old female who presents to clinic today with bilateral knee pain.    We revisited options for her knee with regards to interventions, given her relative failure of physical therapy thus far and her ongoing, worsening pain we did decide to inject both of her knees today.  We also discussed hyaluronic  acid, she is interested in moving forward with this.  I do think that she would be a good candidate given her failure of a corticosteroid injection and PT in the past.    It was a pleasure seeing Melita today.    Greater than 20 minutes were spent on the day of visit reviewing records, in direct face-to-face consultation and exam, and documentation independent of the procedure.       Srinivas Blankenship DO, [facility]  Primary Care Sports Medicine      This note was constructed using Dragon dictation software, please excuse any minor errors in spelling, grammar, or syntax.       Large Joint Injection/Arthocentesis: bilateral knee    Date/Time: 11/19/2024 1:50 PM    Performed by: Srinivas Blankenship DO  Authorized by: Srinivas Blankenship DO    Indications:  Osteoarthritis  Needle Size:  22 G  Guidance: landmark guided    Approach:  Anterolateral  Location:  Knee  Laterality:  Bilateral      Medications (Right):  40 mg triamcinolone 40 MG/ML; 4 mL lidocaine (PF) 1 %  Medications (Left):  40 mg triamcinolone 40 MG/ML; 4 mL lidocaine (PF) 1 %  Outcome:  Tolerated well, no immediate complications  Procedure discussed: discussed risks, benefits, and alternatives    Consent Given by:  Patient  Timeout: timeout called immediately prior to procedure    Prep: patient was prepped and draped in usual sterile fashion         PROCEDURE    Knee Injections - Intraarticular    The patient was informed of the risks and the benefits of the procedure and a written consent was signed.    The patient's left knee was prepped with chlorhexidine in sterile fashion.   40 mg of triamcinolone suspension was drawn up into a 5 mL syringe with 4 mL of 1% lidocaine.  Injection was performed using substerile technique.  A 1.5-inch 22-gauge needle was used to enter the lateral aspect of the left knee.  Injection performed successfully without difficulty.  There were no complications. The patient tolerated the procedure well. There was negligible bleeding.     The  patient's right knee was prepped with chlorhexidine in sterile fashion.   40 mg of triamcinolone suspension was drawn up into a 5 mL syringe with 4 mL of 1% lidocaine.  Injection was performed using substerile technique.  A 1.5-inch 22-gauge needle was used to enter the lateral aspect of the right knee.  Injection performed successfully without difficulty.  There were no complications. The patient tolerated the procedure well. There was negligible bleeding.

## 2024-11-19 NOTE — LETTER
11/19/2024      RE: Melita Cortes  [address]  [place] [address]     Dear Colleague,    Thank you for referring your patient, Melita Cortes, to the [facility] [place]. Please see a copy of my visit note below.    CHIEF COMPLAINT:  Pain of the Right Knee       HISTORY OF PRESENT ILLNESS  Ms. Cortes is a 54 year old female who presents to clinic today for a follow up discussing her right knee.  Melita reports her pain a 4-5/10. She reports pain with going down stairs. Pain is located anterior and posterolateral. She doesn't feel like the brace was helpful last time.  Her left knee has also been bothering her quite a bit as well, she is interested in an injection of her left knee.        Additional history: as documented    MEDICAL HISTORY  Patient Active Problem List   Diagnosis     Abdominal bloating     Family history of pancreatic cancer     Transaminitis     Macrocytosis     Cervical high risk HPV (human papillomavirus) test positive     Anemia, unspecified type     Elevated serum creatinine     Hyponatremia     Malaise and fatigue     At high risk for severe sepsis     Symptomatic anemia     Bandemia     Hyperlipidemia     Anxiety     Liver replaced by transplant (H)     Immunosuppressed status (H)     Steroid-induced hyperglycemia     Hypervolemia     Elevated alkaline phosphatase level     Cervicalgia     Pain of both elbows     Mild recurrent major depression (H)     Chronic kidney disease, stage 3a (H)     Attention deficit hyperactivity disorder (ADHD), predominantly inattentive type     Osteoarthritis of left hip     Hx of arthroplasty     Clinical diagnosis of COVID-19     History of left hip replacement     Osteoarthritis of right patellofemoral joint       Current Outpatient Medications   Medication Sig Dispense Refill     amphetamine-dextroamphetamine (ADDERALL XR) 20 MG 24 hr capsule Take 1 capsule (20 mg) by mouth daily. 30 capsule 0      calcium carbonate (OS-SHAI) 1500 (600 Ca) MG tablet Take 1 tablet (600 mg) by mouth daily 60 tablet 3     FLUoxetine (PROZAC) 20 MG capsule TAKE 1 CAPSULE BY MOUTH EVERY DAY 90 capsule 0     hydrocortisone 2.5 % ointment Apply twice daily for 1 week on itchy, scaly areas on the body.  Apply Vaseline on top. 60 g 0     hydrOXYzine HCl (ATARAX) 25 MG tablet TAKE 2 TABLETS BY MOUTH NIGHTLY AS NEEDED FOR (INSOMINA) 180 tablet 3     magnesium oxide (MAG-OX) 400 MG tablet Take 1 tablet (400 mg) by mouth 2 times daily 90 tablet 3     methylPREDNISolone (MEDROL DOSEPAK) 4 MG tablet therapy pack Follow Package Directions 21 tablet 0     multivitamin (CENTRUM SILVER) tablet Take 1 tablet by mouth daily       pantoprazole (PROTONIX) 40 MG EC tablet Take 1 tablet (40 mg) by mouth daily 90 tablet 3     tacrolimus (GENERIC EQUIVALENT) 0.5 MG capsule Take 1 capsule (0.5 mg) by mouth every morning. Total dose 0.5mg am, 1 mg pm 90 capsule 3     tacrolimus (GENERIC EQUIVALENT) 1 MG capsule Take 1 capsule (1 mg) by mouth every evening. 90 capsule 3       Allergies   Allergen Reactions     Adhesive Tape Rash     Latex Rash       Family History   Problem Relation Age of Onset     Cancer Mother      Melanoma Father      Skin Cancer Father      Colon Cancer Maternal Uncle      Colon Cancer Paternal Aunt        Additional medical/Social/Surgical histories reviewed in [facility] and updated as appropriate.        PHYSICAL EXAM  General  - normal appearance, in no obvious distress  Musculoskeletal - right and left knee  - inspection: trace effusion bilaterally  - palpation: medial joint line tenderness  - ROM: 120 degrees flexion, 0 degrees extension, painful active ROM  - strength: 5/5 in flexion, 5/5 in extension  - special tests:  (-) Hany  (-) varus at 0 and 30 degrees flexion  (-) valgus at 0 and 30 degrees flexion  Neuro  - no sensory or motor deficit, grossly normal coordination, normal muscle tone              ASSESSMENT & PLAN  Ms.  Sebastian is a 54 year old female who presents to clinic today with bilateral knee pain.    We revisited options for her knee with regards to interventions, given her relative failure of physical therapy thus far and her ongoing, worsening pain we did decide to inject both of her knees today.  We also discussed hyaluronic acid, she is interested in moving forward with this.  I do think that she would be a good candidate given her failure of a corticosteroid injection and PT in the past.    It was a pleasure seeing Melita today.    Greater than 20 minutes were spent on the day of visit reviewing records, in direct face-to-face consultation and exam, and documentation independent of the procedure.       Srinivas Blankenship DO, [facility]  Primary Care Sports Medicine      This note was constructed using Dragon dictation software, please excuse any minor errors in spelling, grammar, or syntax.       Large Joint Injection/Arthocentesis: bilateral knee    Date/Time: 11/19/2024 1:50 PM    Performed by: Srinivas Blankenship DO  Authorized by: Srinivas Blankenship DO    Indications:  Osteoarthritis  Needle Size:  22 G  Guidance: landmark guided    Approach:  Anterolateral  Location:  Knee  Laterality:  Bilateral      Medications (Right):  40 mg triamcinolone 40 MG/ML; 4 mL lidocaine (PF) 1 %  Medications (Left):  40 mg triamcinolone 40 MG/ML; 4 mL lidocaine (PF) 1 %  Outcome:  Tolerated well, no immediate complications  Procedure discussed: discussed risks, benefits, and alternatives    Consent Given by:  Patient  Timeout: timeout called immediately prior to procedure    Prep: patient was prepped and draped in usual sterile fashion         PROCEDURE    Knee Injections - Intraarticular    The patient was informed of the risks and the benefits of the procedure and a written consent was signed.    The patient's left knee was prepped with chlorhexidine in sterile fashion.   40 mg of triamcinolone suspension was drawn up into a 5 mL syringe with  4 mL of 1% lidocaine.  Injection was performed using substerile technique.  A 1.5-inch 22-gauge needle was used to enter the lateral aspect of the left knee.  Injection performed successfully without difficulty.  There were no complications. The patient tolerated the procedure well. There was negligible bleeding.     The patient's right knee was prepped with chlorhexidine in sterile fashion.   40 mg of triamcinolone suspension was drawn up into a 5 mL syringe with 4 mL of 1% lidocaine.  Injection was performed using substerile technique.  A 1.5-inch 22-gauge needle was used to enter the lateral aspect of the right knee.  Injection performed successfully without difficulty.  There were no complications. The patient tolerated the procedure well. There was negligible bleeding.                 Again, thank you for allowing me to participate in the care of your patient.      Sincerely,    Srinivas Blankenship DO

## 2024-11-20 ENCOUNTER — VIRTUAL VISIT (OUTPATIENT)
Dept: FAMILY MEDICINE | Facility: CLINIC | Age: 54
End: 2024-11-20
Payer: MEDICARE

## 2024-11-20 ENCOUNTER — TELEPHONE (OUTPATIENT)
Dept: ORTHOPEDICS | Facility: CLINIC | Age: 54
End: 2024-11-20

## 2024-11-20 ENCOUNTER — THERAPY VISIT (OUTPATIENT)
Dept: PHYSICAL THERAPY | Facility: CLINIC | Age: 54
End: 2024-11-20
Payer: MEDICARE

## 2024-11-20 DIAGNOSIS — M17.11 OSTEOARTHRITIS OF RIGHT PATELLOFEMORAL JOINT: Primary | ICD-10-CM

## 2024-11-20 DIAGNOSIS — Z94.4 LIVER REPLACED BY TRANSPLANT (H): ICD-10-CM

## 2024-11-20 DIAGNOSIS — F90.0 ATTENTION DEFICIT HYPERACTIVITY DISORDER (ADHD), PREDOMINANTLY INATTENTIVE TYPE: Primary | ICD-10-CM

## 2024-11-20 DIAGNOSIS — M17.0 PRIMARY OSTEOARTHRITIS OF BOTH KNEES: ICD-10-CM

## 2024-11-20 DIAGNOSIS — F33.0 MILD RECURRENT MAJOR DEPRESSION (H): ICD-10-CM

## 2024-11-20 PROCEDURE — G2211 COMPLEX E/M VISIT ADD ON: HCPCS | Mod: 95 | Performed by: INTERNAL MEDICINE

## 2024-11-20 PROCEDURE — 97110 THERAPEUTIC EXERCISES: CPT | Mod: GP | Performed by: PHYSICAL THERAPY ASSISTANT

## 2024-11-20 PROCEDURE — 97112 NEUROMUSCULAR REEDUCATION: CPT | Mod: GP | Performed by: PHYSICAL THERAPY ASSISTANT

## 2024-11-20 PROCEDURE — 99214 OFFICE O/P EST MOD 30 MIN: CPT | Mod: 95 | Performed by: INTERNAL MEDICINE

## 2024-11-20 RX ORDER — FLUOXETINE 40 MG/1
40 CAPSULE ORAL DAILY
Qty: 90 CAPSULE | Refills: 0 | Status: SHIPPED | OUTPATIENT
Start: 2024-11-20

## 2024-11-20 RX ORDER — DEXTROAMPHETAMINE SACCHARATE, AMPHETAMINE ASPARTATE MONOHYDRATE, DEXTROAMPHETAMINE SULFATE AND AMPHETAMINE SULFATE 7.5; 7.5; 7.5; 7.5 MG/1; MG/1; MG/1; MG/1
30 CAPSULE, EXTENDED RELEASE ORAL DAILY
Qty: 30 CAPSULE | Refills: 0 | Status: SHIPPED | OUTPATIENT
Start: 2024-12-15 | End: 2025-01-14

## 2024-11-20 ASSESSMENT — PATIENT HEALTH QUESTIONNAIRE - PHQ9: SUM OF ALL RESPONSES TO PHQ QUESTIONS 1-9: 13

## 2024-11-20 NOTE — PROGRESS NOTES
"  If patient has telephone visit, have they been educated on video visit as preferred visit method and offered to change to video visit? NOT APPLICABLE        Instructions Relayed to Patient by Virtual Roomer:     Patient is active on [employer]:   Relayed following to patient: \"It looks like you are active on [employer], are you able to join the visit this way? If not, do you need us to send you a link now or would you like your provider to send a link via text or email when they are ready to initiate the visit?\"      Patient Confirmed they will join visit via: [employer]  Reminded patient to ensure they were logged on to virtual visit by arrival time listed.   Asked if patient has flexibility to initiate visit sooner than arrival time: patient is unable to initiate visit earlier than arrival time     If pediatric virtual visit, ensured pediatric patient along with parent/guardian will be present for video visit.     Patient offered the website [email or web address] and/or phone number to [employer] Help line: [phone]     Fidelina is a 54 year old who is being evaluated via a billable video visit.    How would you like to obtain your AVS? [employer]  If the video visit is dropped, the invitation should be resent by: Text to cell phone: [phone]  Will anyone else be joining your video visit? No      Assessment & Plan     Attention deficit hyperactivity disorder (ADHD), predominantly inattentive type  She is currently on Adderall 20 mg XR and on top of it she takes 10 mg short acting Adderall  However she tells me that she only starts seeing the benefit of these medications in the afternoon when she takes the 10 mg on top of her 20 mg but till then she does not see much benefit  Maybe she has some improvement of symptoms in the morning but really the benefit only starts kicking in in the afternoon when she takes the 10 mg  We discussed about the options today  We discussed about taking short acting 15 mg of " "Adderall twice a day versus taking 10 mg 3 times a day  We also discussed about options of taking Adderall XR 30 mg and keeping the short acting 10 mg  She would like to pursue that idea  I will increase the dose of Adderall XR to 30 mg and then keep the short acting 10 mg unchanged  - amphetamine-dextroamphetamine (ADDERALL XR) 30 MG 24 hr capsule; Take 1 capsule (30 mg) by mouth daily.    Mild recurrent major depression (H)  She is currently on Prozac 20 mg  She wants to increase this to 40 mg to help with her symptoms  PHQ-9  score is still high at 16  - FLUoxetine (PROZAC) 40 MG capsule; Take 1 capsule (40 mg) by mouth daily.    Liver replaced by transplant (H)  She is on tacrolimus and followed by liver transplant team    Primary osteoarthritis of both knees  She just saw the orthopedics team yesterday and had bilateral corticosteroid injections to her knee          BMI  Estimated body mass index is 26.29 kg/m  as calculated from the following:    Height as of 6/10/24: 1.651 m (5' 5\").    Weight as of 6/10/24: 71.7 kg (158 lb).             Cameron Kitchen is a 54 year old, presenting for the following health issues:  No chief complaint on file.        11/20/2024     1:49 PM   Additional Questions   Roomed by Stacy   Accompanied by self       Video Start Time:  500 PM    History of Present Illness       Mental Health Follow-up:  Patient presents to follow-up on Depression.Patient's depression since last visit has been:  Worse  The patient is having other symptoms associated with depression.      Any significant life events: other  Patient is feeling anxious or having panic attacks.  Patient has no concerns about alcohol or drug use.    Reason for visit:  ADHD/Depression        Medication Followup of Adderall  Taking Medication as prescribed: yes  Side Effects:  None  Medication Helping Symptoms:  yes        Review of Systems  Constitutional, HEENT, cardiovascular, pulmonary, gi and gu systems are negative, " except as otherwise noted.      Objective           Vitals:  No vitals were obtained today due to virtual visit.    Physical Exam   GENERAL: alert and no distress  EYES: Eyes grossly normal to inspection.  No discharge or erythema, or obvious scleral/conjunctival abnormalities.  RESP: No audible wheeze, cough, or visible cyanosis.    SKIN: Visible skin clear. No significant rash, abnormal pigmentation or lesions.  NEURO: Cranial nerves grossly intact.  Mentation and speech appropriate for age.  PSYCH: Appropriate affect, tone, and pace of words          Video-Visit Details    Type of service:  Video Visit   Video End Time: 515 PM  Originating Location (pt. Location): Home    Distant Location (provider location):  Off-site  Platform used for Video Visit: Harry  Signed Electronically by: Navneet Johnson MD

## 2024-11-20 NOTE — TELEPHONE ENCOUNTER
Left Voicemail (1st Attempt) and Sent Mychart (1st Attempt) for the patient to call back and schedule the following:    Appointment type: [facility] PROCEDURE  Provider: Dr.David Blankenship  Return date: 12/17, 12/18, or 12/31/24

## 2024-11-21 ENCOUNTER — VIRTUAL VISIT (OUTPATIENT)
Dept: PSYCHOLOGY | Facility: CLINIC | Age: 54
End: 2024-11-21
Payer: MEDICARE

## 2024-11-21 DIAGNOSIS — F41.1 GAD (GENERALIZED ANXIETY DISORDER): ICD-10-CM

## 2024-11-21 DIAGNOSIS — F32.1 CURRENT MODERATE EPISODE OF MAJOR DEPRESSIVE DISORDER, UNSPECIFIED WHETHER RECURRENT (H): Primary | ICD-10-CM

## 2024-11-21 NOTE — PROGRESS NOTES
"[facility]                                     Progress Note    Patient Name: Melita Cortes  Date: 11/21/2024         Service Type: Individual      Session Start Time: 12.30  Session End Time: 1.13     Session Length: 38-52    Session #: 25    Attendees: Client attended alone    Service Modality:  Video Visit:      Provider verified identity through the following two step process.  Patient provided:  Patient is known previously to provider    Telemedicine Visit: The patient's condition can be safely assessed and treated via synchronous audio and visual telemedicine encounter.      Reason for Telemedicine Visit: Patient has requested telehealth visit    Originating Site (Patient Location): Patient's home    Distant Site (Provider Location): Provider Remote Setting- Home Office    Consent:  The patient/guardian has verbally consented to: the potential risks and benefits of telemedicine (video visit) versus in person care; bill my insurance or make self-payment for services provided; and responsibility for payment of non-covered services.     Patient would like the video invitation sent by:  My Chart    Mode of Communication:  Video Conference via [facility]well    Distant Location (Provider):  Off-site    As the provider I attest to compliance with applicable laws and regulations related to telemedicine.    DATA  Interactive Complexity: No  Crisis: No        Progress Since Last Session (Related to Symptoms / Goals / Homework):   Symptoms: Improving as evident by current phq9 scores    Homework: Completed in session review patient's utilization of safety plan to mitigate SI.        Episode of Care Goals: Minimal progress - ACTION (Actively working towards change); Intervened by reinforcing change plan / affirming steps taken     Current / Ongoing Stressors and Concerns:  Patient reported  \"Trauma Adhd depression\". Patient reported \" I think I have had some trauma in my life, my liver transplant " ".\" Pt reported she is  \"overly sensitive\" and \" I dont handle stress very well and struggle with impulsivity\"  and that \"I have a poor self image, negative talk a lot \" \"used and abandoned\" and that she always have to explained herself to others.     Current: Today patient reported she feels \"dysregulated, stressed and overwhelmed\" and noted that she gets very sensitive and teary a lot. She reported feeling used by her family and noted she will not be going to spend time with them this Thanksgiving Holiday.      Treatment Objective(s) Addressed in This Session:   Patient will increase daily activity level by exercising for 20-30 minutes and participate in at least 1 daily pleasant/fun activities.  Patient will identify specific areas of cognitive distortion and challenge irrational thoughts with reality.    Patient will  learn and use grounding skills to  effectively manage anxiety and distress associated with trauma history daily.     Intervention:    EMDR: Focus today's session on target identification using direct questioning and the float back technique. Identify the presenting problems and issues that client is struggling with currently. Work with client using direct questioning and float back to explore and access early life experiences and memories that contextually informed client's presenting problems and  identify targets for future reprocessing and collaboratively agree on the label \"Fidelina at 8-11 having asthma and being accused of lying by step mother\" as the first memory to to reprocess.        Assessments completed prior to visit:  PHQ9:       8/8/2024     2:09 PM 8/22/2024     1:54 PM 9/12/2024     2:24 PM 10/10/2024     3:14 PM 10/24/2024    11:08 AM 11/7/2024    12:34 PM 11/20/2024     1:42 PM   PHQ-9 SCORE   PHQ-9 Total Score MyChart 10 (Moderate depression) 13 (Moderate depression) 9 (Mild depression) 14 (Moderate depression) 12 (Moderate depression) 16 (Moderately severe depression)    PHQ-9 Total " Score 10 13 9 14 12  16  13       Patient-reported     GAD7:       4/8/2024     7:39 AM 5/30/2024     2:14 PM 7/6/2024     9:41 AM 8/8/2024     2:10 PM 10/10/2024     3:15 PM 10/24/2024    11:09 AM 11/7/2024    12:35 PM   JORGE-7 SCORE   Total Score 11 (moderate anxiety) 7 (mild anxiety) 19 (severe anxiety) 15 (severe anxiety) 14 (moderate anxiety) 7 (mild anxiety) 17 (severe anxiety)   Total Score 11 7 19 15 14 7  17        Patient-reported         ASSESSMENT: Current Emotional / Mental Status (status of significant symptoms):   Risk status (Self / Other harm or suicidal ideation)   Patient denies current fears or concerns for personal safety.   Patient denies current or recent suicidal ideation or behaviors.   Patient denies current or recent homicidal ideation or behaviors.   Patient denies current or recent self injurious behavior or ideation.   Patient denies other safety concerns.   Patient reports there has been no change in risk factors since their last session.     Patient reports there has been no change in protective factors since their last session.     Recommended that patient call 911 or go to the local ED should there be a change in any of these risk factors.     Appearance:   Appropriate    Eye Contact:   Good    Psychomotor Behavior: Normal    Attitude:   Cooperative  Interested   Orientation:   Person Place Time Situation   Speech    Rate / Production: Normal/ Responsive    Volume:  Normal    Mood:    Anxious  Depressed    Affect:    Tearful   Thought Content:  Clear    Thought Form:  Coherent  Logical    Insight:    Good      Medication Review:   No changes to current psychiatric medication(s)     Medication Compliance:   Yes     Changes in Health Issues:   None reported     Chemical Use Review:   Substance Use: Chemical use reviewed, no active concerns identified      Tobacco Use: No current tobacco use.      Diagnosis:  296.32 (F33.1) Major Depressive Disorder, Recurrent Episode, Moderate _ and  With atypical features    Collateral Reports Completed:   Not Applicable    PLAN: (Patient Tasks / Therapist Tasks / Other)    Utilize safety plan as needed daily.    Practice  distress intolerance skill learned in session, watching emotions, label and describe them and not avoid them.     Jake Coyne LICSW           _________________________________________________________    Individual Treatment Plan    Patient's Name: Melita Cortes  YOB: 1970    Date of Creation: 09/22/2023  Date Treatment Plan Last Reviewed/Revised: 11/21/2024    DSM5 Diagnoses: 296.32 (F33.1) Major Depressive Disorder, Recurrent Episode, Moderate _ and With atypical features  Psychosocial / Contextual Factors:   PROMIS (reviewed every 90 days):     Referral / Collaboration:  Referral to another professional/service is not indicated at this time..    Anticipated number of session for this episode of care:  15-25  Anticipation frequency of session: Biweekly  Anticipated Duration of each session: 38-52 minutes  Treatment plan will be reviewed in 90 days or when goals have been changed.       MeasurableTreatment Goal(s) related to diagnosis / functional impairment(s)  Goal 1: Patient will identify and address specific triggers to feelings of depression and improve coping by  90 % in the next three months.    I will know I've met my goal when I am less impulsive, better communicator and can comfortably manage distressful emotions.         Objective #A (Patient Action)    Develop and utilize 3 effective distress tolerance strategies to address SI.   Status: Continued - Date(s):  02/22/2025     Intervention(s)   Therapist will engage in collaborative brainstorming with pt to identify and practice individualized distress tolerance coping strategies to address SI weekly.    Objective #B (Patient Action)    Patient will increase daily activity level by exercising for 20-30 minutes and participate in at least 1 daily pleasant/fun  activities.  Status: Continued - Date(s):    02/22/2025    Intervention(s)  Therapist will provide psychoeducation, behavioral activation, and cognitive restructuring.    Objective #C  Patient will identify specific areas of cognitive distortion and challenge irrational thoughts with reality.   Status: Continued - Date(s):    02/22/2025       Intervention(s)  Therapist will provide psychoeducation, behavioral activation, and cognitive restructuring.    Objective #D  Patient will learn and practice relaxation techniques to manage depression.  Status: Continued - Date(s):    02/22/2025    Intervention(s)   Therapist will teach patient thought stopping, deep breathing exercises, mindful meditation, and creative visualization.       Goal 2: Patient will identify and address issues underlying trauma and improve coping  by 90% in the next 3 months .    Objective #A (Patient Action)    Patient will develop vocabulary to describe trauma symptoms.  Status: Continued - Date(s):  02/22/2025    Intervention(s)  Therapist will provide psychoeducation, behavioral activation, and cognitive restructuring.    Objective #B  Patient will learn and use grounding skills to  effectively manage anxiety and distress associated with trauma history daily.  Status: Continued - Date(s): 02/22/2025    Intervention(s)  Therapist will provide psychoeducation, behavioral activation, and cognitive restructuring.    Objective #C  Patient will gradually approach trauma-related memories, feelings, and situations through reprocessing and desensitization  weekly in session.  Status: Continued - Date(s):  02/22/2025    Intervention(s)  Therapist will provide psychoeducation, behavioral activation, and cognitive restructuring.        Patient has reviewed and agreed to the above plan.      ROSEANNE Vasquez  September 22, 2023    ----- Service Performed and Documented by [facility]------  This note was reviewed and clinical supervision by ELMO Colmenares  "[facility]    4/1/2024           [facility]                                       Melita Cortes     SAFETY PLAN:  Step 1: Warning signs / cues (Thoughts, images, mood, situation, behavior) that a crisis may be developing:  Thoughts:   \" sometimes I feel like I don't have a purpose\"  Images:  Feels lonely after losing mum and dad and lonely in marriage  Thinking Processes: ruminations (can't stop thinking about my problems): health, family loses  Mood: worsening depression  Behaviors: isolating/withdrawing   Situations: loss: parents and lonely in marriage    Step 2: Coping strategies - Things I can do to take my mind off of my problems without contacting another person (relaxation technique, physical activity):  Distress Tolerance Strategies:  play with my pet , intense exercise: work out for 2-3 minutes , and support group attendance for transplant  Physical Activities: go for a walk and stretching   Focus on helpful thoughts:  \"This is temporary\" and \"It always passes\"  Step 3: People and social settings that provide distraction:   Name:  Augustine ( friend) Phone: [phone]   Name:  Aimee michaelletitia Phone: [phone]     gym  and [employer] store  Liver transplant group  Step 4: Remind myself of people and things that are important to me and worth living for:    My , cousins and friends.  Step 5: When I am in crisis, I can ask these people to help me use my safety plan:   Name:  Jake ( Therapist )  Phone: [phone]   Name:  Aimee arcelia Phone: [phone]   Step 6: Making the environment safe:   remove alcohol, remove drugs, and be around others  Step 7: Professionals or agencies I can contact during a crisis:  Suicide Prevention Lifeline: Call or Text 988   [facility] Crisis [facility]: [phone]    Call 911 or go to my nearest emergency department.   I helped develop this safety plan and agree to use it when needed.  I have been given a copy of this plan.      Client signature " _________________________________________________________________  Today s date:  11/14/2023  Completed by Provider Name/ Credentials:  CECIL Vasquez  November 14, 2023  Adapted from Safety Plan Template 2008 Chelsi Marx and Fred Ramos is reprinted with the express permission of the authors.  No portion of the Safety Plan Template may be reproduced without the express, written permission.  You can contact the authors at [email or web address] or [email or web address].        Answers submitted by the patient for this visit:  Patient Health Questionnaire (Submitted on 11/14/2023)  If you checked off any problems, how difficult have these problems made it for you to do your work, take care of things at home, or get along with other people?: Very difficult  PHQ9 TOTAL SCORE: 18  JORGE-7 (Submitted on 11/14/2023)  JORGE 7 TOTAL SCORE: 14    Answers submitted by the patient for this visit:  Patient Health Questionnaire (Submitted on 11/28/2023)  If you checked off any problems, how difficult have these problems made it for you to do your work, take care of things at home, or get along with other people?: Extremely difficult  PHQ9 TOTAL SCORE: 14  JORGE-7 (Submitted on 11/28/2023)  JORGE 7 TOTAL SCORE: 21    Answers submitted by the patient for this visit:  Patient Health Questionnaire (Submitted on 12/13/2023)  If you checked off any problems, how difficult have these problems made it for you to do your work, take care of things at home, or get along with other people?: Very difficult  PHQ9 TOTAL SCORE: 18  JORGE-7 (Submitted on 12/13/2023)  JORGE 7 TOTAL SCORE: 21    Answers submitted by the patient for this visit:  Patient Health Questionnaire (Submitted on 1/3/2024)  If you checked off any problems, how difficult have these problems made it for you to do your work, take care of things at home, or get along with other people?: Very difficult  PHQ9 TOTAL SCORE: 16  JORGE-7 (Submitted on 1/3/2024)  JORGE 7 TOTAL SCORE:  10    Answers submitted by the patient for this visit:  Patient Health Questionnaire (Submitted on 1/16/2024)  If you checked off any problems, how difficult have these problems made it for you to do your work, take care of things at home, or get along with other people?: Very difficult  PHQ9 TOTAL SCORE: 15    Answers submitted by the patient for this visit:  Patient Health Questionnaire (Submitted on 1/29/2024)  If you checked off any problems, how difficult have these problems made it for you to do your work, take care of things at home, or get along with other people?: Extremely difficult  PHQ9 TOTAL SCORE: 12  JORGE-7 (Submitted on 1/29/2024)  JORGE 7 TOTAL SCORE: 9    Answers submitted by the patient for this visit:  Patient Health Questionnaire (Submitted on 2/14/2024)  If you checked off any problems, how difficult have these problems made it for you to do your work, take care of things at home, or get along with other people?: Extremely difficult  PHQ9 TOTAL SCORE: 11  JORGE-7 (Submitted on 2/14/2024)  JORGE 7 TOTAL SCORE: 14    Answers submitted by the patient for this visit:  Patient Health Questionnaire (Submitted on 2/28/2024)  If you checked off any problems, how difficult have these problems made it for you to do your work, take care of things at home, or get along with other people?: Extremely difficult  PHQ9 TOTAL SCORE: 16    Answers submitted by the patient for this visit:  Patient Health Questionnaire (Submitted on 3/11/2024)  If you checked off any problems, how difficult have these problems made it for you to do your work, take care of things at home, or get along with other people?: Very difficult  PHQ9 TOTAL SCORE: 10    Answers submitted by the patient for this visit:  Patient Health Questionnaire (Submitted on 3/25/2024)  If you checked off any problems, how difficult have these problems made it for you to do your work, take care of things at home, or get along with other people?: Extremely  difficult  PHQ9 TOTAL SCORE: 16  JORGE-7 (Submitted on 3/25/2024)  JORGE 7 TOTAL SCORE: 9    Answers submitted by the patient for this visit:  Patient Health Questionnaire (Submitted on 4/10/2024)  If you checked off any problems, how difficult have these problems made it for you to do your work, take care of things at home, or get along with other people?: Extremely difficult  PHQ9 TOTAL SCORE: 11    Answers submitted by the patient for this visit:  Patient Health Questionnaire (Submitted on 5/9/2024)  If you checked off any problems, how difficult have these problems made it for you to do your work, take care of things at home, or get along with other people?: Extremely difficult  PHQ9 TOTAL SCORE: 14    Answers submitted by the patient for this visit:  Patient Health Questionnaire (Submitted on 5/30/2024)  If you checked off any problems, how difficult have these problems made it for you to do your work, take care of things at home, or get along with other people?: Extremely difficult  PHQ9 TOTAL SCORE: 16  JORGE-7 (Submitted on 5/30/2024)  JORGE 7 TOTAL SCORE: 7    Answers submitted by the patient for this visit:  Patient Health Questionnaire (Submitted on 7/11/2024)  If you checked off any problems, how difficult have these problems made it for you to do your work, take care of things at home, or get along with other people?: Very difficult  PHQ9 TOTAL SCORE: 16  JORGE-7 (Submitted on 7/6/2024)  JORGE 7 TOTAL SCORE: 19    Answers submitted by the patient for this visit:  Patient Health Questionnaire (Submitted on 8/8/2024)  If you checked off any problems, how difficult have these problems made it for you to do your work, take care of things at home, or get along with other people?: Extremely difficult  PHQ9 TOTAL SCORE: 10  JORGE-7 (Submitted on 8/8/2024)  JORGE 7 TOTAL SCORE: 15    Answers submitted by the patient for this visit:  Patient Health Questionnaire (Submitted on 8/22/2024)  If you checked off any problems, how  difficult have these problems made it for you to do your work, take care of things at home, or get along with other people?: Somewhat difficult  PHQ9 TOTAL SCORE: 13    Answers submitted by the patient for this visit:  Patient Health Questionnaire (Submitted on 9/12/2024)  If you checked off any problems, how difficult have these problems made it for you to do your work, take care of things at home, or get along with other people?: Extremely difficult  PHQ9 TOTAL SCORE: 9    Answers submitted by the patient for this visit:  Patient Health Questionnaire (Submitted on 10/10/2024)  If you checked off any problems, how difficult have these problems made it for you to do your work, take care of things at home, or get along with other people?: Very difficult  PHQ9 TOTAL SCORE: 14  Patient Health Questionnaire (G7) (Submitted on 10/10/2024)  JORGE 7 TOTAL SCORE: 14    Answers submitted by the patient for this visit:  Patient Health Questionnaire (Submitted on 10/24/2024)  If you checked off any problems, how difficult have these problems made it for you to do your work, take care of things at home, or get along with other people?: Somewhat difficult  PHQ9 TOTAL SCORE: 12  Patient Health Questionnaire (G7) (Submitted on 10/24/2024)  JORGE 7 TOTAL SCORE: 7

## 2024-12-16 ENCOUNTER — THERAPY VISIT (OUTPATIENT)
Dept: PHYSICAL THERAPY | Facility: CLINIC | Age: 54
End: 2024-12-16
Payer: MEDICARE

## 2024-12-16 DIAGNOSIS — M17.11 OSTEOARTHRITIS OF RIGHT PATELLOFEMORAL JOINT: Primary | ICD-10-CM

## 2024-12-16 PROCEDURE — 97110 THERAPEUTIC EXERCISES: CPT | Mod: GP

## 2024-12-19 ENCOUNTER — OFFICE VISIT (OUTPATIENT)
Dept: ORTHOPEDICS | Facility: CLINIC | Age: 54
End: 2024-12-19
Payer: MEDICARE

## 2024-12-19 DIAGNOSIS — M17.12 PRIMARY OSTEOARTHRITIS OF LEFT KNEE: ICD-10-CM

## 2024-12-19 DIAGNOSIS — M17.11 OSTEOARTHRITIS OF RIGHT PATELLOFEMORAL JOINT: Primary | ICD-10-CM

## 2024-12-19 ASSESSMENT — PAIN SCALES - GENERAL: PAINLEVEL_OUTOF10: SEVERE PAIN (6)

## 2024-12-19 NOTE — NURSING NOTE
[facility]   ORTHOPEDICS & SPORTS MEDICINE  [address] [place] [address]  Dept: [phone]  ______________________________________________________________________________    Patient: Melita Cortes   : 1970   MRN: [id number]   2024    INVASIVE PROCEDURE SAFETY CHECKLIST    Date: 2024   Procedure:Bilateral knee injection  Patient Name: Melita Cortes  MRN: [id number]  YOB: 1970    Action: Complete sections as appropriate. Any discrepancy results in a HARD COPY until resolved.     PRE PROCEDURE:  Patient ID verified with 2 identifiers (name and  or MRN): Yes  Procedure and site verified with patient/designee (when able): Yes  Accurate consent documentation in medical record: Yes  H&P (or appropriate assessment) documented in medical record: Yes  H&P must be up to 20 days prior to procedure and updates within 24 hours of procedure as applicable: NA  Relevant diagnostic and radiology test results appropriately labeled and displayed as applicable: NA  Procedure site(s) marked with provider initials: NA    TIMEOUT:  Time-Out performed immediately prior to starting procedure, including verbal and active participation of all team members addressing the following:Yes  * Correct patient identify  * Confirmed that the correct side and site are marked  * An accurate procedure consent form  * Agreement on the procedure to be done  * Correct patient position  * Relevant images and results are properly labeled and appropriately displayed  * The need to administer antibiotics or fluids for irrigation purposes during the procedure as applicable   * Safety precautions based on patient history or medication use    DURING PROCEDURE: Verification of correct person, site, and procedures any time the responsibility for care of the patient is transferred to another member of the care team.       Prior to injection, verified patient identity using patient's name and  date of birth.  Due to injection administration, patient instructed to remain in clinic for 15 minutes  afterwards, and to report any adverse reaction to me immediately.    Joint injection was performed.      Drug Amount Wasted:  None.  Vial/Syringe: Syringe  Expiration Date:  5/31/2027      Sunny German, EMT  December 19, 2024

## 2024-12-19 NOTE — NURSING NOTE
Chief Complaint   Patient presents with    Right Knee - Pain, Follow Up     Knee pain    Left Knee - Pain, Follow Up     Knee pain       There were no vitals filed for this visit.    There is no height or weight on file to calculate BMI.      ANTONIO Correa NREMT

## 2024-12-19 NOTE — LETTER
12/19/2024      Melita Cortes  [address] [facility] [address]  [facility] [address]      Dear Colleague,    Thank you for referring your patient, Melita Cortes, to the [facility] [place]. Please see a copy of my visit note below.    Fidelina is here for bilateral HA injections in her knees.  She has bilateral knee OA.          Large Joint Injection/Arthocentesis: bilateral knee    Date/Time: 12/19/2024 3:23 PM    Performed by: Srinivas Blankenship DO  Authorized by: Srinivas Blankenship DO    Indications:  Pain  Needle Size:  22 G  Guidance: landmark guided    Approach:  Lateral  Location:  Knee  Laterality:  Bilateral      Medications (Right):  48 mg hylan 48 MG/6ML  Medications (Left):  48 mg hylan 48 MG/6ML  Outcome:  Tolerated well, no immediate complications  Procedure discussed: discussed risks, benefits, and alternatives    Consent Given by:  Patient  Timeout: timeout called immediately prior to procedure    Prep: patient was prepped and draped in usual sterile fashion       Knee Injection with Hyaluronic Acid    The patient was informed of the risks and the benefits of the procedure and a written consent was signed.    The patient's left knee was prepped with chlorhexidine in sterile fashion.   Synvisc One solution removed from packaging and inspected for consistency with regards to volume and packaging standard.  Injection was performed using sterile technique.  A 1.5-inch 22-gauge needle was used to enter the lateral aspect of the knee.  Injection performed without difficulty.  There were no complications. The patient tolerated the procedure well. There was negligible bleeding.   The patient was instructed to ice the knee upon leaving clinic and refrain from overuse over the next 3 days.   The patient was instructed to call or go to the emergency room with any unusual pain, swelling, redness, or if otherwise concerned.          The patient's right knee was prepped with chlorhexidine  in sterile fashion.   Synvisc One solution removed from packaging and inspected for consistency with regards to volume and packaging standard.  Injection was performed using sterile technique.  A 1.5-inch 22-gauge needle was used to enter the lateral aspect of the knee.  Injection performed without difficulty.  There were no complications. The patient tolerated the procedure well. There was negligible bleeding.   The patient was instructed to ice the knee upon leaving clinic and refrain from overuse over the next 3 days.   The patient was instructed to call or go to the emergency room with any unusual pain, swelling, redness, or if otherwise concerned.              Again, thank you for allowing me to participate in the care of your patient.        Sincerely,        Srinivas Blankenship, DO

## 2024-12-19 NOTE — PROGRESS NOTES
Fidelina is here for bilateral HA injections in her knees.  She has bilateral knee OA.          Large Joint Injection/Arthocentesis: bilateral knee    Date/Time: 12/19/2024 3:23 PM    Performed by: Srinivas Blankenship DO  Authorized by: Srinivas Blankenship DO    Indications:  Pain  Needle Size:  22 G  Guidance: landmark guided    Approach:  Lateral  Location:  Knee  Laterality:  Bilateral      Medications (Right):  48 mg hylan 48 MG/6ML  Medications (Left):  48 mg hylan 48 MG/6ML  Outcome:  Tolerated well, no immediate complications  Procedure discussed: discussed risks, benefits, and alternatives    Consent Given by:  Patient  Timeout: timeout called immediately prior to procedure    Prep: patient was prepped and draped in usual sterile fashion       Knee Injection with Hyaluronic Acid    The patient was informed of the risks and the benefits of the procedure and a written consent was signed.    The patient's left knee was prepped with chlorhexidine in sterile fashion.   Synvisc One solution removed from packaging and inspected for consistency with regards to volume and packaging standard.  Injection was performed using sterile technique.  A 1.5-inch 22-gauge needle was used to enter the lateral aspect of the knee.  Injection performed without difficulty.  There were no complications. The patient tolerated the procedure well. There was negligible bleeding.   The patient was instructed to ice the knee upon leaving clinic and refrain from overuse over the next 3 days.   The patient was instructed to call or go to the emergency room with any unusual pain, swelling, redness, or if otherwise concerned.          The patient's right knee was prepped with chlorhexidine in sterile fashion.   Synvisc One solution removed from packaging and inspected for consistency with regards to volume and packaging standard.  Injection was performed using sterile technique.  A 1.5-inch 22-gauge needle was used to enter the lateral aspect of the  knee.  Injection performed without difficulty.  There were no complications. The patient tolerated the procedure well. There was negligible bleeding.   The patient was instructed to ice the knee upon leaving clinic and refrain from overuse over the next 3 days.   The patient was instructed to call or go to the emergency room with any unusual pain, swelling, redness, or if otherwise concerned.

## 2024-12-22 ENCOUNTER — MYC REFILL (OUTPATIENT)
Dept: FAMILY MEDICINE | Facility: CLINIC | Age: 54
End: 2024-12-22
Payer: MEDICARE

## 2024-12-22 DIAGNOSIS — F90.0 ATTENTION DEFICIT HYPERACTIVITY DISORDER (ADHD), PREDOMINANTLY INATTENTIVE TYPE: ICD-10-CM

## 2024-12-23 ENCOUNTER — OFFICE VISIT (OUTPATIENT)
Dept: DERMATOLOGY | Facility: CLINIC | Age: 54
End: 2024-12-23
Payer: MEDICARE

## 2024-12-23 DIAGNOSIS — D18.01 CHERRY ANGIOMA: ICD-10-CM

## 2024-12-23 DIAGNOSIS — Z12.83 ENCOUNTER FOR SCREENING FOR MALIGNANT NEOPLASM OF SKIN: Primary | ICD-10-CM

## 2024-12-23 DIAGNOSIS — L57.0 ACTINIC KERATOSES: ICD-10-CM

## 2024-12-23 DIAGNOSIS — L81.4 LENTIGINES: ICD-10-CM

## 2024-12-23 DIAGNOSIS — D22.9 MULTIPLE NEVI: ICD-10-CM

## 2024-12-23 DIAGNOSIS — L82.1 SEBORRHEIC KERATOSES: ICD-10-CM

## 2024-12-23 DIAGNOSIS — L56.8 ACTINIC CHEILITIS: ICD-10-CM

## 2024-12-23 RX ORDER — DEXTROAMPHETAMINE SACCHARATE, AMPHETAMINE ASPARTATE MONOHYDRATE, DEXTROAMPHETAMINE SULFATE AND AMPHETAMINE SULFATE 7.5; 7.5; 7.5; 7.5 MG/1; MG/1; MG/1; MG/1
30 CAPSULE, EXTENDED RELEASE ORAL DAILY
Qty: 30 CAPSULE | Refills: 0 | OUTPATIENT
Start: 2024-12-23

## 2024-12-23 ASSESSMENT — PAIN SCALES - GENERAL: PAINLEVEL_OUTOF10: NO PAIN (0)

## 2024-12-23 NOTE — PATIENT INSTRUCTIONS
Patient Education        Proper skin care from [facility]:     -Eliminate harsh soaps as they strip the natural oils from the skin, often resulting in dry itchy skin ( i.e. Dial, Zest, Urdu Spring)  -Use mild soaps such as Cetaphil or Dove Sensitive Skin in the shower. You do not need to use soap on arms, legs, and trunk every time you shower unless visibly soiled.   -Avoid hot or cold showers.  -After showering, lightly dry off and apply moisturizing within 2-3 minutes. This will help trap moisture in the skin.   -Aggressive use of a moisturizer at least 1-2 times a day to the entire body (including -Vanicream, Cetaphil, Aquaphor or Cerave) and moisturize hands after every washing.  -We recommend using moisturizers that come in a tub that needs to be scooped out, not a pump. This has more of an oil base. It will hold moisture in your skin much better than a water base moisturizer. The above recommended are non-pore clogging.        Wear a sunscreen with at least SPF 30 on your face, ears, neck and V of the chest daily. Wear sunscreen on other areas of the body if those areas are exposed to the sun throughout the day. Sunscreens can contain physical and/or chemical blockers. Physical blockers are less likely to clog pores, these include zinc oxide and titanium dioxide. Reapply every two hour and after swimming.      Sunscreen examples: https://www.ewg.org/sunscreen/     UV radiation  UVA radiation remains constant throughout the day and throughout the year. It is a longer wavelength than UVB and therefore penetrates deeper into the skin leading to immediate and delayed tanning, photoaging, and skin cancer. 70-80% of UVA and UVB radiation occurs between the hours of 10am-2pm.  UVB radiation  UVB radiation causes the most harmful effects and is more significant during the summer months. However, snow and ice can reflect UVB radiation leading to skin damage during the winter months as well. UVB radiation is  responsible for tanning, burning, inflammation, delayed erythema (pinkness), pigmentation (brown spots), and skin cancer.      I recommend self monthly full body exams and yearly full body exams with a dermatology provider. If you develop a new or changing lesion please follow up for examination. Most skin cancers are pink and scaly or pink and pearly. However, we do see blue/brown/black skin cancers.  Consider the ABCDEs of melanoma when giving yourself your monthly full body exam ( don't forget the groin, buttocks, feet, toes, etc). A-asymmetry, B-borders, C-color, D-diameter, E-elevation or evolving. If you see any of these changes please follow up in clinic. If you cannot see your back I recommend purchasing a hand held mirror to use with a larger wall mirror.       Checking for Skin Cancer  You can find cancer early by checking your skin each month. There are 3 kinds of skin cancer. They are melanoma, basal cell carcinoma, and squamous cell carcinoma. Doing monthly skin checks is the best way to find new marks or skin changes. Follow the instructions below for checking your skin.   The ABCDEs of checking moles for melanoma   Check your moles or growths for signs of melanoma using ABCDE:   Asymmetry: the sides of the mole or growth don t match  Border: the edges are ragged, notched, or blurred  Color: the color within the mole or growth varies  Diameter: the mole or growth is larger than 6 mm (size of a pencil eraser)  Evolving: the size, shape, or color of the mole or growth is changing (evolving is not shown in the images below)    Checking for other types of skin cancer  Basal cell carcinoma or squamous cell carcinoma have symptoms such as:      A spot or mole that looks different from all other marks on your skin  Changes in how an area feels, such as itching, tenderness, or pain  Changes in the skin's surface, such as oozing, bleeding, or scaliness  A sore that does not heal  New swelling or redness beyond  the border of a mole     Who s at risk?  Anyone can get skin cancer. But you are at greater risk if you have:   Fair skin, light-colored hair, or light-colored eyes  Many moles or abnormal moles on your skin  A history of sunburns from sunlight or tanning beds  A family history of skin cancer  A history of exposure to radiation or chemicals  A weakened immune system  If you have had skin cancer in the past, you are at risk for recurring skin cancer.   How to check your skin  Do your monthly skin checkups in front of a full-length mirror. Check all parts of your body, including your:   Head (ears, face, neck, and scalp)  Torso (front, back, and sides)  Arms (tops, undersides, upper, and lower armpits)  Hands (palms, backs, and fingers, including under the nails)  Buttocks and genitals  Legs (front, back, and sides)  Feet (tops, soles, toes, including under the nails, and between toes)  If you have a lot of moles, take digital photos of them each month. Make sure to take photos both up close and from a distance. These can help you see if any moles change over time.   Most skin changes are not cancer. But if you see any changes in your skin, call your doctor right away. Only he or she can diagnose a problem. If you have skin cancer, seeing your doctor can be the first step toward getting the treatment that could save your life.   [employer] last reviewed this educational content on 4/1/2019 2000-2020 [employer]. [address] [place] [address], [place] [address]. All rights reserved. This information is not intended as a substitute for professional medical care. Always follow your healthcare professional's instructions.        When should I call my doctor?  If you are worsening or not improving, please, contact us or seek urgent care as noted below.      Who should I call with questions (adults)?  [facility] (adult and pediatric): [phone]  [facility]  [place] (adult): [phone]  [facility] ([place], [place], [place] and [place]) [phone]  For urgent needs outside of business hours call the [facility] at [phone] and ask for the dermatology resident on call to be paged  If this is a medical emergency and you are unable to reach an ER, Call 911        If you need a prescription refill, please contact your pharmacy. Refills are approved or denied by our Physicians during normal business hours, Monday through Fridays  Per office policy, refills will not be granted if you have not been seen within the past year (or sooner depending on your child's condition)

## 2024-12-23 NOTE — NURSING NOTE
Dermatology Rooming Note    Melita Cortes's goals for this visit include:   Chief Complaint   Patient presents with    Skin Check     Recheck skin post-op transplant. Left check rash newly developed and repeat spot appearing on lower lip that was previously cryo'd.      Sourav Alcala - EMT

## 2024-12-23 NOTE — PROGRESS NOTES
[facility] Dermatology Note  Encounter Date: Dec 23, 2024  Office Visit     Reviewed patients past medical history and pertinent chart review prior to patients visit today.     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     Social:     ____________________________________________    Assessment & Plan:     #Actinic keratosis x1  - We discussed the precancerous nature of the skin lesions.  I recommended liquid nitrogen cryotherapy and the patient was agreeable.      Cryotherapy procedure note, location(s): left cheek x1 After verbal consent and discussion of risks and benefits including, but not limited to, dyspigmentation/scar, blister, and pain, the lesion(s) was(were) treated with 1-2 mm freeze border for 1-2 cycles with liquid nitrogen. Post cryotherapy instructions were provided.    # Actinic cheilitis  - I prescribed topical calcipotriene 0.005% and fluorouracil 5% cream twice daily for 4 days. We discussed side effects including redness, pain, and sun sensitivity.  I recommended diligent avoidance of application to the eyes.  The patient had no further questions.    # Chronic immunosuppression  # Personal history of nonmelanoma skin cancer  - No signs of recurrence. Continued observation recommended.   - Sun protection: Counseled SPF 30+ sunscreen, UPF clothing, sun avoidance, tanning bed avoidance.    # Multiple nevi, trunk and extremities  # Solar lentigines  -  Nevi demonstrate no concerning features on dermoscopy. We discussed the importance of self exams at home.   - ABCDEs: Counseled ABCDEs of melanoma: Asymmetry, Border (irregularity), Color (not uniform, changes in color), Diameter (greater than 6 mm which is about the size of a pencil eraser), and Evolving (any changes in preexisting moles).    # Cherry angiomas  # Seborrheic keratoses  - We discussed the benign nature of the skin lesions. No treatment required. Continued observation recommended. Follow up with any concerns.      Follow-up:  Annual for follow up full body skin exam, as needed for new or changing lesions or new concerns    I attest that I recorded the interview and Dr Saba personally performed the exam.  All risks, benefits and alternatives were discussed with patient.  Patient is in agreement and understands the assessment and plan.  All questions were answered.  Wandy Leija PA-C  [facility]    ____________________________________________    CC: Skin Check (Recheck skin post-op transplant. Left check rash newly developed and repeat spot appearing on lower lip that was previously cryo'd. )    HPI:  Ms. Melita Cortes is a(n) 54 year old female who presents today as a return patient for a full body skin cancer screening. The patient has a history of nonmelanoma skin cancer. The patient has a history of liver transplant, chronically immunosuppressed. Today, the patient reports a rough patch on the left cheek. No pain, itching, or bleeding at the site. Second, she reports a change in the texture of her lower lip. No pain, itching, or bleeding at the site. No other cutaneous concerns. The patient reports trying to be diligent with photoprotection.      Physical Exam:  Vitals: There were no vitals taken for this visit.   SKIN: Total skin excluding the genitalia areas was performed. The exam included the scalp, face, neck, bilateral arms, chest, back, abdomen, bilateral legs,  digits, mons pubis, buttocks, and nails.   - Gerber II.  - Pink macule(s) with gritty scale involving the left cheek, consistent with actinic keratosis.   - The lower lip demonstrates white, lacy discoloration.   - Multiple tan/brown macules and papules scattered throughout exam, consistent with benign nevi. No concerning features on dermoscopy.   - Scattered tan, homogenous macules scattered on sun exposed skin, consistent with solar lentigines.   - Scattered waxy, stuck on appearing papules and patches, consistent with seborrheic keratoses.  - Several 1-2 mm red dome shaped symmetric papules, consistent with cherry angiomas.     - No other lesions of concern on areas examined.     Medications:  Current Outpatient Medications   Medication Sig Dispense Refill    amphetamine-dextroamphetamine (ADDERALL XR) 30 MG 24 hr capsule Take 1 capsule (30 mg) by mouth daily. 30 capsule 0    calcium carbonate (OS-SHAI) 1500 (600 Ca) MG tablet Take 1 tablet (600 mg) by mouth daily 60 tablet 3    FLUoxetine (PROZAC) 40 MG capsule Take 1 capsule (40 mg) by mouth daily. 90 capsule 0    hydrOXYzine HCl (ATARAX) 25 MG tablet TAKE 2 TABLETS BY MOUTH NIGHTLY AS NEEDED FOR (INSOMINA) 180 tablet 3    magnesium oxide (MAG-OX) 400 MG tablet Take 1 tablet (400 mg) by mouth 2 times daily 90 tablet 3    multivitamin (CENTRUM SILVER) tablet Take 1 tablet by mouth daily      tacrolimus (GENERIC EQUIVALENT) 0.5 MG capsule Take 1 capsule (0.5 mg) by mouth every morning. Total dose 0.5mg am, 1 mg pm 90 capsule 3    tacrolimus (GENERIC EQUIVALENT) 1 MG capsule Take 1 capsule (1 mg) by mouth every evening. 90 capsule 3    hydrocortisone 2.5 % ointment Apply twice daily for 1 week on itchy, scaly areas on the body.  Apply Vaseline on top. (Patient not taking: Reported on 12/23/2024) 60 g 0    methylPREDNISolone (MEDROL DOSEPAK) 4 MG tablet therapy pack Follow Package Directions (Patient not taking: Reported on 12/23/2024) 21 tablet 0     pantoprazole (PROTONIX) 40 MG EC tablet Take 1 tablet (40 mg) by mouth daily (Patient not taking: Reported on 12/23/2024) 90 tablet 3     Current Facility-Administered Medications   Medication Dose Route Frequency Provider Last Rate Last Admin    triamcinolone (KENALOG-40) injection 40 mg  40 mg   Srinivas Blankenship, DO   40 mg at 11/22/23 1647      Past Medical History:   Patient Active Problem List   Diagnosis    Abdominal bloating    Family history of pancreatic cancer    Transaminitis    Macrocytosis    Cervical high risk HPV (human papillomavirus) test positive    Anemia, unspecified type    Elevated serum creatinine    Hyponatremia    Malaise and fatigue    At high risk for severe sepsis    Symptomatic anemia    Bandemia    Hyperlipidemia    Anxiety    Liver replaced by transplant (H)    Immunosuppressed status (H)    Steroid-induced hyperglycemia    Hypervolemia    Elevated alkaline phosphatase level    Cervicalgia    Pain of both elbows    Mild recurrent major depression (H)    Chronic kidney disease, stage 3a (H)    Attention deficit hyperactivity disorder (ADHD), predominantly inattentive type    Osteoarthritis of left hip    Hx of arthroplasty    Clinical diagnosis of COVID-19    History of left hip replacement    Osteoarthritis of right patellofemoral joint     Past Medical History:   Diagnosis Date    Alcoholic hepatitis (H)     Asthma 03/10/2006    Cervical high risk HPV (human papillomavirus) test positive 2019, 2020    See problem list    Clinical diagnosis of COVID-19 12/26/2023    Depressive disorder     Gastroesophageal reflux disease without esophagitis     Liver failure (H)        CC Referred Self, MD  No address on file on close of this encounter.

## 2024-12-24 ENCOUNTER — LAB (OUTPATIENT)
Dept: LAB | Facility: CLINIC | Age: 54
End: 2024-12-24
Payer: MEDICARE

## 2024-12-24 DIAGNOSIS — Z94.4 LIVER REPLACED BY TRANSPLANT (H): ICD-10-CM

## 2024-12-24 LAB
ALBUMIN SERPL BCG-MCNC: 4.1 G/DL (ref 3.5–5.2)
ALP SERPL-CCNC: 60 U/L (ref 40–150)
ALT SERPL W P-5'-P-CCNC: 24 U/L (ref 0–50)
ANION GAP SERPL CALCULATED.3IONS-SCNC: 10 MMOL/L (ref 7–15)
AST SERPL W P-5'-P-CCNC: 31 U/L (ref 0–45)
BILIRUB DIRECT SERPL-MCNC: <0.2 MG/DL (ref 0–0.3)
BILIRUB SERPL-MCNC: 0.5 MG/DL
BUN SERPL-MCNC: 19.5 MG/DL (ref 6–20)
CALCIUM SERPL-MCNC: 8.8 MG/DL (ref 8.8–10.4)
CHLORIDE SERPL-SCNC: 103 MMOL/L (ref 98–107)
CREAT SERPL-MCNC: 1.02 MG/DL (ref 0.51–0.95)
EGFRCR SERPLBLD CKD-EPI 2021: 65 ML/MIN/1.73M2
ERYTHROCYTE [DISTWIDTH] IN BLOOD BY AUTOMATED COUNT: 12.1 % (ref 10–15)
GLUCOSE SERPL-MCNC: 100 MG/DL (ref 70–99)
HCO3 SERPL-SCNC: 22 MMOL/L (ref 22–29)
HCT VFR BLD AUTO: 42.8 % (ref 35–47)
HGB BLD-MCNC: 14.6 G/DL (ref 11.7–15.7)
MCH RBC QN AUTO: 30.1 PG (ref 26.5–33)
MCHC RBC AUTO-ENTMCNC: 34.1 G/DL (ref 31.5–36.5)
MCV RBC AUTO: 88 FL (ref 78–100)
PLATELET # BLD AUTO: 228 10E3/UL (ref 150–450)
POTASSIUM SERPL-SCNC: 4.2 MMOL/L (ref 3.4–5.3)
PROT SERPL-MCNC: 6 G/DL (ref 6.4–8.3)
RBC # BLD AUTO: 4.85 10E6/UL (ref 3.8–5.2)
SODIUM SERPL-SCNC: 135 MMOL/L (ref 135–145)
TACROLIMUS BLD-MCNC: 2.8 UG/L (ref 5–15)
TME LAST DOSE: ABNORMAL H
TME LAST DOSE: ABNORMAL H
WBC # BLD AUTO: 5.7 10E3/UL (ref 4–11)

## 2024-12-24 PROCEDURE — 80197 ASSAY OF TACROLIMUS: CPT

## 2024-12-24 PROCEDURE — 82248 BILIRUBIN DIRECT: CPT

## 2024-12-24 PROCEDURE — 80053 COMPREHEN METABOLIC PANEL: CPT

## 2024-12-24 PROCEDURE — 85027 COMPLETE CBC AUTOMATED: CPT

## 2024-12-24 PROCEDURE — 36415 COLL VENOUS BLD VENIPUNCTURE: CPT

## 2024-12-30 ENCOUNTER — THERAPY VISIT (OUTPATIENT)
Dept: PHYSICAL THERAPY | Facility: CLINIC | Age: 54
End: 2024-12-30
Payer: MEDICARE

## 2024-12-30 DIAGNOSIS — M17.11 OSTEOARTHRITIS OF RIGHT PATELLOFEMORAL JOINT: Primary | ICD-10-CM

## 2024-12-30 PROCEDURE — 97110 THERAPEUTIC EXERCISES: CPT | Mod: GP

## 2024-12-30 PROCEDURE — 97140 MANUAL THERAPY 1/> REGIONS: CPT | Mod: GP

## 2024-12-30 PROCEDURE — 97035 APP MDLTY 1+ULTRASOUND EA 15: CPT | Mod: GP

## 2024-12-30 NOTE — PROGRESS NOTES
12/30/24 0500   Appointment Info   Signing clinician's name / credentials Colin Shah, PT, DPT, CSCS, CLT   Total/Authorized Visits E&T   Visits Used 9   Medical Diagnosis R knee OA   PT Tx Diagnosis R>L knee pain with strength and motor control deficits   Precautions/Limitations left hip replacement   Progress Note/Certification   Start of Care Date 10/10/24   Onset of illness/injury or Date of Surgery 10/10/24   Therapy Frequency 1x/week   Predicted Duration 12 weeks   Certification date from 01/02/24   Certification date to 03/27/25   Progress Note Due Date 03/27/24   Progress Note Completed Date 10/10/24   GOALS   PT Goals 2   PT Goal 1   Goal Identifier walking   Goal Description Pt will return to tolerance of walking 3 miles with 2/10 pain or less in knee   Rationale to maximize safety and independence with performance of ADLs and functional tasks   Goal Progress She is able to walk 45 minutes (approximately 2 miles) with 2/10 pain in the knees.   Target Date 01/02/25   Date Met 12/16/24   PT Goal 2   Goal Identifier High Impact   Goal Description Fidelina will be able to navigate 2 flights of stairs, ambulate 3 miles and tolerate light jumping and landing such as her aerobics class without knee pain   Rationale to maximize safety and independence with performance of ADLs and functional tasks;to maximize safety and independence within the home;to maximize safety and independence within the community;to maximize safety and independence with transportation;to maximize safety and independence with self cares   Goal Progress Gets a range of pain from 2/10 to 5/10   Target Date 03/24/25   Subjective Report   Subjective Report A lot has happened in the last few weeks. First she had her injection in her knee, which has taken the edge off in her right knee but not necessarily abolished her pain. She then now developed left knee pain.  Her right knee feels slight better, but her left feels worse with swelling and pain  "and a general ache that doesn't feel sharp, or muscular.  Just a vague pain.  She now also has to start \"Chemo Cream\" for her lip which is concerning her.   Objective Measures   Objective Measures Objective Measure 3   Objective Measure 1   Objective Measure Pain   Details 4/10 on arrival   Objective Measure 2   Objective Measure ROM   Details WNL   Objective Measure 3   Objective Measure Strength   Details Has improved wall sit to 1 minute holds   PT Modalities   PT Modalities Ultrasound   Ultrasound   Ultrasound -Type (does not include 3-5 min prep/cleanup time) Continuous   Ultrasound Minutes (45033) 10   Patient Response/Progress Really enjoyed   Duration (does not include the 3-5 min set up/clean up time) 10 min   Frequency 1 MHz   Location Left knee   Positioning supine   Treatment Interventions (PT)   Interventions Therapeutic Procedure/Exercise   Therapeutic Procedure/Exercise   Therapeutic Procedures: strength, endurance, ROM, flexibility minutes (59142) 11   Ther Proc 1 Bike for Warm Up   Ther Proc 1 - Details 11 minutes at level 1 resistance   Skilled Intervention General Mobility   Patient Response/Progress Arrived with pain and signs of inflammation today. Focused on modalities to calm symptoms today   Manual Therapy   Manual Therapy: Mobilization, MFR, MLD, friction massage minutes (92116) 17   Manual Therapy Manual Therapy 3   Manual Therapy 1 STM   Manual Therapy 1 - Details STM to the left keith-patellar musculature.  MLD for edema management   Manual Therapy 2 Patellar Mobilization   Manual Therapy 2 - Details Patellar mobilization grade 1 in all directions   Skilled Intervention Pain relief   Patient Response/Progress Tolerated well   Manual Therapy 3 KT Taping   Manual Therapy 3 - Details 1 y strip surrounding the left patella   Education   Learner/Method Patient;Demonstration;Pictures/Video;No Barriers to Learning   Plan   Home program PTRx   Plan for next session Arrived rather inflammed in the " left knee. Also diagnosed with lip cancer.  If inflammed, continue pain management techniques like manual, taping and ultrasound.  If doing better, continue more conditioning as we have previously done which has worked well to calm her pain   Total Session Time   Timed Code Treatment Minutes 38   Total Treatment Time (sum of timed and untimed services) 38         [facility]                                                                                   OUTPATIENT PHYSICAL THERAPY    PLAN OF TREATMENT FOR OUTPATIENT REHABILITATION   Patient's Last Name, First Name, Melita Hobbs YOB: 1970   Provider's Name   [facility]   Medical Record No.  [id number]     Onset Date: 10/10/24  Start of Care Date: 10/10/24     Medical Diagnosis:  R knee OA      PT Treatment Diagnosis:  R>L knee pain with strength and motor control deficits Plan of Treatment  Frequency/Duration: 1x/week/ 12 weeks    Certification date from 01/02/24 to 03/27/25         See note for plan of treatment details and functional goals     Colin Shah, PT                         I CERTIFY THE NEED FOR THESE SERVICES FURNISHED UNDER        THIS PLAN OF TREATMENT AND WHILE UNDER MY CARE     (Physician attestation of this document indicates review and certification of the therapy plan).              Referring Provider:  Srinivas Blankenship    Initial Assessment  See Epic Evaluation- Start of Care Date: 10/10/24            PLAN  Continue therapy per current plan of care.  Fidelina overall reports that she is steadily improving and is happy with her progress.  However, she still has bilateral knee pain with swelling and inflammation that comes intermittently.  Since starting therapy, she can now ambulate 2 miles which she previously had significant sharp pain with any prolonged ambulation.  Given she is improving, we want to continue therapy as we have been with pain management when  needed, but a continued focus on conditioning when able.     Beginning/End Dates of Progress Note Reporting Period:  10/10/24 to 12/30/2024    Referring Provider:  Srinivas Blankenship

## 2025-01-13 ASSESSMENT — ANXIETY QUESTIONNAIRES
5. BEING SO RESTLESS THAT IT IS HARD TO SIT STILL: MORE THAN HALF THE DAYS
IF YOU CHECKED OFF ANY PROBLEMS ON THIS QUESTIONNAIRE, HOW DIFFICULT HAVE THESE PROBLEMS MADE IT FOR YOU TO DO YOUR WORK, TAKE CARE OF THINGS AT HOME, OR GET ALONG WITH OTHER PEOPLE: VERY DIFFICULT
3. WORRYING TOO MUCH ABOUT DIFFERENT THINGS: NEARLY EVERY DAY
2. NOT BEING ABLE TO STOP OR CONTROL WORRYING: NEARLY EVERY DAY
1. FEELING NERVOUS, ANXIOUS, OR ON EDGE: NEARLY EVERY DAY
7. FEELING AFRAID AS IF SOMETHING AWFUL MIGHT HAPPEN: SEVERAL DAYS
GAD7 TOTAL SCORE: 17
4. TROUBLE RELAXING: NEARLY EVERY DAY
8. IF YOU CHECKED OFF ANY PROBLEMS, HOW DIFFICULT HAVE THESE MADE IT FOR YOU TO DO YOUR WORK, TAKE CARE OF THINGS AT HOME, OR GET ALONG WITH OTHER PEOPLE?: VERY DIFFICULT
6. BECOMING EASILY ANNOYED OR IRRITABLE: MORE THAN HALF THE DAYS
GAD7 TOTAL SCORE: 17

## 2025-01-16 ENCOUNTER — VIRTUAL VISIT (OUTPATIENT)
Dept: PSYCHOLOGY | Facility: CLINIC | Age: 55
End: 2025-01-16
Payer: MEDICARE

## 2025-01-16 DIAGNOSIS — F41.1 GAD (GENERALIZED ANXIETY DISORDER): ICD-10-CM

## 2025-01-16 DIAGNOSIS — F32.1 CURRENT MODERATE EPISODE OF MAJOR DEPRESSIVE DISORDER, UNSPECIFIED WHETHER RECURRENT (H): Primary | ICD-10-CM

## 2025-01-16 NOTE — PROGRESS NOTES
"[facility]                                     Progress Note    Patient Name: Melita Cortes  Date: 01/16/2025         Service Type: Individual      Session Start Time: 2.30  Session End Time: 3.13     Session Length: 38-52    Session #: 26    Attendees: Client attended alone    Service Modality:  Video Visit:      Provider verified identity through the following two step process.  Patient provided:  Patient is known previously to provider    Telemedicine Visit: The patient's condition can be safely assessed and treated via synchronous audio and visual telemedicine encounter.      Reason for Telemedicine Visit: Patient has requested telehealth visit    Originating Site (Patient Location): Patient's home    Distant Site (Provider Location): Provider Remote Setting- Home Office    Consent:  The patient/guardian has verbally consented to: the potential risks and benefits of telemedicine (video visit) versus in person care; bill my insurance or make self-payment for services provided; and responsibility for payment of non-covered services.     Patient would like the video invitation sent by:  My Chart    Mode of Communication:  Video Conference via Amwell    Distant Location (Provider):  Off-site    As the provider I attest to compliance with applicable laws and regulations related to telemedicine.    DATA  Interactive Complexity: No  Crisis: No        Progress Since Last Session (Related to Symptoms / Goals / Homework):   Symptoms: No change patient reported no change from last session.    Homework: Completed in session review patient's utilization of safety plan to mitigate SI.        Episode of Care Goals: Minimal progress - ACTION (Actively working towards change); Intervened by reinforcing change plan / affirming steps taken     Current / Ongoing Stressors and Concerns:  Patient reported  \"Trauma Adhd depression\". Patient reported \" I think I have had some trauma in my life, my liver " "transplant .\" Pt reported she is  \"overly sensitive\" and \" I dont handle stress very well and struggle with impulsivity\"  and that \"I have a poor self image, negative talk a lot \" \"used and abandoned\" and that she always have to explained herself to others.     Current: Today patient reported that recurrent health problems have been adversely affecting her mood and psychosocial functioning.  She noted \"feeling tired\" and stressed with work and health issues. 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 She noted knee pain has limited her physical activity and she is not happy with the changes she has noticed with her body since having a liver transplant. She reported she hasn't been to the gym in part because of how she looks.      Treatment Objective(s) Addressed in This Session:   Patient will increase daily activity level by exercising for 20-30 minutes and participate in at least 1 daily pleasant/fun activities.  Patient will identify specific areas of cognitive distortion and challenge irrational thoughts with reality.    Patient will  learn and use grounding skills to  effectively manage anxiety and distress associated with trauma history daily.              Patient will learn and practice gratitude and compassion to built a positive self image.     Intervention:    Mindfulness: Focus session today on gratitude and self-compassion to help patient challenge negative perception of themselves and built a positive and compassionate relationship with their body through the following ways:    Be grateful for your body and what it does for you every day which entails moving from an ornamental view to an instrumental view of your body by shifting away from body form or attractiveness to body function.  This shift to a focus on what the body does for you is associated with a more positive evaluation of your body.  Engage in compassion " self-talk. Notice your internal monologue about your body and  shift the conversation to one that is compassionate and kind. Think about what you would say to your best friend in a similar situation.  Be your own best friend by planning activities that would improve your relationship with your body. Some examples are taking yourself on a walk, playing some music and dancing, doing a few yoga poses and smile at yourself every morning.         Assessments completed prior to visit:  PHQ9:       8/8/2024     2:09 PM 8/22/2024     1:54 PM 9/12/2024     2:24 PM 10/10/2024     3:14 PM 10/24/2024    11:08 AM 11/7/2024    12:34 PM 11/20/2024     1:42 PM   PHQ-9 SCORE   PHQ-9 Total Score MyChart 10 (Moderate depression) 13 (Moderate depression) 9 (Mild depression) 14 (Moderate depression) 12 (Moderate depression) 16 (Moderately severe depression)    PHQ-9 Total Score 10 13 9 14 12  16  13       Patient-reported     GAD7:       5/30/2024     2:14 PM 7/6/2024     9:41 AM 8/8/2024     2:10 PM 10/10/2024     3:15 PM 10/24/2024    11:09 AM 11/7/2024    12:35 PM 1/13/2025    12:05 PM   JORGE-7 SCORE   Total Score 7 (mild anxiety) 19 (severe anxiety) 15 (severe anxiety) 14 (moderate anxiety) 7 (mild anxiety) 17 (severe anxiety) 17 (severe anxiety)   Total Score 7 19 15 14 7  17  17        Patient-reported         ASSESSMENT: Current Emotional / Mental Status (status of significant symptoms):   Risk status (Self / Other harm or suicidal ideation)   Patient denies current fears or concerns for personal safety.   Patient denies current or recent suicidal ideation or behaviors.   Patient denies current or recent homicidal ideation or behaviors.   Patient denies current or recent self injurious behavior or ideation.   Patient denies other safety concerns.   Patient reports there has been no change in risk factors since their last session.     Patient reports there has been no change in protective factors since their last session.      Recommended that patient call 911 or go to the local ED should there be a change in any of these risk factors.     Appearance:   Appropriate    Eye Contact:   Good    Psychomotor Behavior: Normal    Attitude:   Cooperative  Interested   Orientation:   Person Place Time Situation   Speech    Rate / Production: Normal/ Responsive    Volume:  Normal    Mood:    Anxious  Depressed    Affect:    Tearful   Thought Content:  Clear    Thought Form:  Coherent  Logical    Insight:    Good      Medication Review:   No changes to current psychiatric medication(s)     Medication Compliance:   Yes     Changes in Health Issues:   None reported     Chemical Use Review:   Substance Use: Chemical use reviewed, no active concerns identified      Tobacco Use: No current tobacco use.      Diagnosis:  296.32 (F33.1) Major Depressive Disorder, Recurrent Episode, Moderate _ and With atypical features    Collateral Reports Completed:   Not Applicable    PLAN: (Patient Tasks / Therapist Tasks / Other)    Utilize safety plan as needed daily.    Practice  distress intolerance skill learned in session, watching emotions, label and describe them and not avoid them.     Each morning when you wake up, start by saying thank you to various parts of your body: your heart, lungs, stomach, legs, arms, eyes, ears etc. Thank your heart more especially for keeping you alive without any effort from you.     Jake Coyne, [facility]           _________________________________________________________    Individual Treatment Plan    Patient's Name: Melita Cortes  YOB: 1970    Date of Creation: 09/22/2023  Date Treatment Plan Last Reviewed/Revised: 11/21/2024    DSM5 Diagnoses: 296.32 (F33.1) Major Depressive Disorder, Recurrent Episode, Moderate _ and With atypical features  Psychosocial / Contextual Factors:   PROMIS (reviewed every 90 days):     Referral / Collaboration:  Referral to another professional/service is not indicated at this  time..    Anticipated number of session for this episode of care:  15-25  Anticipation frequency of session: Biweekly  Anticipated Duration of each session: 38-52 minutes  Treatment plan will be reviewed in 90 days or when goals have been changed.       MeasurableTreatment Goal(s) related to diagnosis / functional impairment(s)  Goal 1: Patient will identify and address specific triggers to feelings of depression and improve coping by  90 % in the next three months.    I will know I've met my goal when I am less impulsive, better communicator and can comfortably manage distressful emotions.         Objective #A (Patient Action)    Develop and utilize 3 effective distress tolerance strategies to address SI.   Status: Continued - Date(s):  02/22/2025     Intervention(s)   Therapist will engage in collaborative brainstorming with pt to identify and practice individualized distress tolerance coping strategies to address SI weekly.    Objective #B (Patient Action)    Patient will increase daily activity level by exercising for 20-30 minutes and participate in at least 1 daily pleasant/fun activities.  Status: Continued - Date(s):    02/22/2025    Intervention(s)  Therapist will provide psychoeducation, behavioral activation, and cognitive restructuring.    Objective #C  Patient will identify specific areas of cognitive distortion and challenge irrational thoughts with reality.   Status: Continued - Date(s):    02/22/2025       Intervention(s)  Therapist will provide psychoeducation, behavioral activation, and cognitive restructuring.    Objective #D  Patient will learn and practice relaxation techniques to manage depression.  Status: Continued - Date(s):    02/22/2025    Intervention(s)   Therapist will teach patient thought stopping, deep breathing exercises, mindful meditation, and creative visualization.       Goal 2: Patient will identify and address issues underlying trauma and improve coping  by 90% in the next 3  "months .    Objective #A (Patient Action)    Patient will develop vocabulary to describe trauma symptoms.  Status: Continued - Date(s):  02/22/2025    Intervention(s)  Therapist will provide psychoeducation, behavioral activation, and cognitive restructuring.    Objective #B  Patient will learn and use grounding skills to  effectively manage anxiety and distress associated with trauma history daily.  Status: Continued - Date(s): 02/22/2025    Intervention(s)  Therapist will provide psychoeducation, behavioral activation, and cognitive restructuring.    Objective #C  Patient will gradually approach trauma-related memories, feelings, and situations through reprocessing and desensitization  weekly in session.  Status: Continued - Date(s):  02/22/2025    Intervention(s)  Therapist will provide psychoeducation, behavioral activation, and cognitive restructuring.        Patient has reviewed and agreed to the above plan.      Jake Coyne [facility]  September 22, 2023    ----- Service Performed and Documented by [facility]------  This note was reviewed and clinical supervision by ELMO Colmenares [facility]    4/1/2024           [facility]                                       Melita Cortes     SAFETY PLAN:  Step 1: Warning signs / cues (Thoughts, images, mood, situation, behavior) that a crisis may be developing:  Thoughts:   \" sometimes I feel like I don't have a purpose\"  Images:  Feels lonely after losing mum and dad and lonely in marriage  Thinking Processes: ruminations (can't stop thinking about my problems): health, family loses  Mood: worsening depression  Behaviors: isolating/withdrawing   Situations: loss: parents and lonely in marriage    Step 2: Coping strategies - Things I can do to take my mind off of my problems without contacting another person (relaxation technique, physical activity):  Distress Tolerance Strategies:  play with my pet , intense exercise: work out for 2-3 minutes , and support " "group attendance for transplant  Physical Activities: go for a walk and stretching   Focus on helpful thoughts:  \"This is temporary\" and \"It always passes\"  Step 3: People and social settings that provide distraction:   Name:  Augustine ( friend) Phone: [phone]   Name:  Aimee paniagua Phone: [phone]     gym  and [employer] store  Liver transplant group  Step 4: Remind myself of people and things that are important to me and worth living for:    My , cousins and friends.  Step 5: When I am in crisis, I can ask these people to help me use my safety plan:   Name:  Jake ( Therapist )  Phone: [phone]   Name:  Aimee paniagua Phone: [phone]   Step 6: Making the environment safe:   remove alcohol, remove drugs, and be around others  Step 7: Professionals or agencies I can contact during a crisis:  Suicide Prevention Lifeline: Call or Text 599   [facility]: [phone]    Call 911 or go to my nearest emergency department.   I helped develop this safety plan and agree to use it when needed.  I have been given a copy of this plan.      Client signature _________________________________________________________________  Today s date:  11/14/2023  Completed by Provider Name/ Credentials:  CECIL Vasquez  November 14, 2023  Adapted from Safety Plan Template 2008 Chelsi Ramos is reprinted with the express permission of the authors.  No portion of the Safety Plan Template may be reproduced without the express, written permission.  You can contact the authors at [email or web address] or [email or web address].        Answers submitted by the patient for this visit:  Patient Health Questionnaire (Submitted on 11/14/2023)  If you checked off any problems, how difficult have these problems made it for you to do your work, take care of things at home, or get along with other people?: Very difficult  PHQ9 TOTAL SCORE: 18  JORGE-7 (Submitted on 11/14/2023)  JORGE 7 TOTAL SCORE: " 14    Answers submitted by the patient for this visit:  Patient Health Questionnaire (Submitted on 11/28/2023)  If you checked off any problems, how difficult have these problems made it for you to do your work, take care of things at home, or get along with other people?: Extremely difficult  PHQ9 TOTAL SCORE: 14  JORGE-7 (Submitted on 11/28/2023)  JORGE 7 TOTAL SCORE: 21    Answers submitted by the patient for this visit:  Patient Health Questionnaire (Submitted on 12/13/2023)  If you checked off any problems, how difficult have these problems made it for you to do your work, take care of things at home, or get along with other people?: Very difficult  PHQ9 TOTAL SCORE: 18  JORGE-7 (Submitted on 12/13/2023)  JORGE 7 TOTAL SCORE: 21    Answers submitted by the patient for this visit:  Patient Health Questionnaire (Submitted on 1/3/2024)  If you checked off any problems, how difficult have these problems made it for you to do your work, take care of things at home, or get along with other people?: Very difficult  PHQ9 TOTAL SCORE: 16  JORGE-7 (Submitted on 1/3/2024)  JORGE 7 TOTAL SCORE: 10    Answers submitted by the patient for this visit:  Patient Health Questionnaire (Submitted on 1/16/2024)  If you checked off any problems, how difficult have these problems made it for you to do your work, take care of things at home, or get along with other people?: Very difficult  PHQ9 TOTAL SCORE: 15    Answers submitted by the patient for this visit:  Patient Health Questionnaire (Submitted on 1/29/2024)  If you checked off any problems, how difficult have these problems made it for you to do your work, take care of things at home, or get along with other people?: Extremely difficult  PHQ9 TOTAL SCORE: 12  JORGE-7 (Submitted on 1/29/2024)  JORGE 7 TOTAL SCORE: 9    Answers submitted by the patient for this visit:  Patient Health Questionnaire (Submitted on 2/14/2024)  If you checked off any problems, how difficult have these problems made  it for you to do your work, take care of things at home, or get along with other people?: Extremely difficult  PHQ9 TOTAL SCORE: 11  JORGE-7 (Submitted on 2/14/2024)  JORGE 7 TOTAL SCORE: 14    Answers submitted by the patient for this visit:  Patient Health Questionnaire (Submitted on 2/28/2024)  If you checked off any problems, how difficult have these problems made it for you to do your work, take care of things at home, or get along with other people?: Extremely difficult  PHQ9 TOTAL SCORE: 16    Answers submitted by the patient for this visit:  Patient Health Questionnaire (Submitted on 3/11/2024)  If you checked off any problems, how difficult have these problems made it for you to do your work, take care of things at home, or get along with other people?: Very difficult  PHQ9 TOTAL SCORE: 10    Answers submitted by the patient for this visit:  Patient Health Questionnaire (Submitted on 3/25/2024)  If you checked off any problems, how difficult have these problems made it for you to do your work, take care of things at home, or get along with other people?: Extremely difficult  PHQ9 TOTAL SCORE: 16  JORGE-7 (Submitted on 3/25/2024)  JORGE 7 TOTAL SCORE: 9    Answers submitted by the patient for this visit:  Patient Health Questionnaire (Submitted on 4/10/2024)  If you checked off any problems, how difficult have these problems made it for you to do your work, take care of things at home, or get along with other people?: Extremely difficult  PHQ9 TOTAL SCORE: 11    Answers submitted by the patient for this visit:  Patient Health Questionnaire (Submitted on 5/9/2024)  If you checked off any problems, how difficult have these problems made it for you to do your work, take care of things at home, or get along with other people?: Extremely difficult  PHQ9 TOTAL SCORE: 14    Answers submitted by the patient for this visit:  Patient Health Questionnaire (Submitted on 5/30/2024)  If you checked off any problems, how difficult  have these problems made it for you to do your work, take care of things at home, or get along with other people?: Extremely difficult  PHQ9 TOTAL SCORE: 16  JORGE-7 (Submitted on 5/30/2024)  JORGE 7 TOTAL SCORE: 7    Answers submitted by the patient for this visit:  Patient Health Questionnaire (Submitted on 7/11/2024)  If you checked off any problems, how difficult have these problems made it for you to do your work, take care of things at home, or get along with other people?: Very difficult  PHQ9 TOTAL SCORE: 16  JORGE-7 (Submitted on 7/6/2024)  JORGE 7 TOTAL SCORE: 19    Answers submitted by the patient for this visit:  Patient Health Questionnaire (Submitted on 8/8/2024)  If you checked off any problems, how difficult have these problems made it for you to do your work, take care of things at home, or get along with other people?: Extremely difficult  PHQ9 TOTAL SCORE: 10  JORGE-7 (Submitted on 8/8/2024)  JORGE 7 TOTAL SCORE: 15    Answers submitted by the patient for this visit:  Patient Health Questionnaire (Submitted on 8/22/2024)  If you checked off any problems, how difficult have these problems made it for you to do your work, take care of things at home, or get along with other people?: Somewhat difficult  PHQ9 TOTAL SCORE: 13    Answers submitted by the patient for this visit:  Patient Health Questionnaire (Submitted on 9/12/2024)  If you checked off any problems, how difficult have these problems made it for you to do your work, take care of things at home, or get along with other people?: Extremely difficult  PHQ9 TOTAL SCORE: 9    Answers submitted by the patient for this visit:  Patient Health Questionnaire (Submitted on 10/10/2024)  If you checked off any problems, how difficult have these problems made it for you to do your work, take care of things at home, or get along with other people?: Very difficult  PHQ9 TOTAL SCORE: 14  Patient Health Questionnaire (G7) (Submitted on 10/10/2024)  JORGE 7 TOTAL SCORE:  14    Answers submitted by the patient for this visit:  Patient Health Questionnaire (Submitted on 10/24/2024)  If you checked off any problems, how difficult have these problems made it for you to do your work, take care of things at home, or get along with other people?: Somewhat difficult  PHQ9 TOTAL SCORE: 12  Patient Health Questionnaire (G7) (Submitted on 10/24/2024)  JORGE 7 TOTAL SCORE: 7    Answers submitted by the patient for this visit:  Patient Health Questionnaire (G7) (Submitted on 1/13/2025)  JORGE 7 TOTAL SCORE: 17

## 2025-01-21 ENCOUNTER — OFFICE VISIT (OUTPATIENT)
Dept: DERMATOLOGY | Facility: CLINIC | Age: 55
End: 2025-01-21
Payer: MEDICARE

## 2025-01-21 DIAGNOSIS — L56.8 ACTINIC CHEILITIS: Primary | ICD-10-CM

## 2025-01-21 DIAGNOSIS — L57.0 ACTINIC KERATOSIS: ICD-10-CM

## 2025-01-21 ASSESSMENT — PAIN SCALES - GENERAL: PAINLEVEL_OUTOF10: NO PAIN (0)

## 2025-01-21 NOTE — NURSING NOTE
Dermatology Rooming Note    Melita Cortes's goals for this visit include:   Chief Complaint   Patient presents with    Derm Problem     PT has been using mupirorcn and steroids to treat the affected area. PT reports there is no improvement and that they have a numb sensation.      Sourav Alcala - EMT

## 2025-01-21 NOTE — PROGRESS NOTES
[facility] Dermatology Note  Encounter Date: Jan 21, 2025  Office Visit     Reviewed patients past medical history and pertinent chart review prior to patients visit today.     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     Social:     ____________________________________________    Assessment & Plan:     # History of actinic cheilitis   - Patient experienced a robust reaction to the Efudex-Dovonex. Appears to he healing compared to prior photos. No evidence of secondary infection today. I encouraged her to continue to cover with Vaseline throughout the day. Protect from wind and sun.     # History of actinic keratosis, left cheek  - Resolved.     All risks, benefits and alternatives were discussed with patient.  Patient is in agreement and understands the assessment and plan.  All questions were answered.  Wandy Leija PA-C  [facility]  _______________________________________    CC: Derm Problem (PT has been using mupirorcn and steroids to treat the affected area. PT reports there is no improvement and that they have a numb sensation. )    HPI:  Ms. Melita Cortes is a(n) 54 year old female who presents today as a return patient for irritation of the lips. The patient completed the course of Efudex-Dovonex. She notes her lips have slowly improved. Second, she reports that the actinic keratosis involving the left cheek resolved. Patient is  otherwise feeling well, without additional skin concerns.      Physical Exam:  SKIN: Focused examination of lips and left cheek was performed.  - Granulation tissue and crust involving the lower lip.     - No other lesions of concern on areas examined.     Medications:  Current Outpatient Medications   Medication Sig Dispense Refill    calcium carbonate (OS-SHAI) 1500 (600 Ca) MG tablet Take 1 tablet (600 mg) by mouth daily 60 tablet 3    COMPOUNDED NON-CONTROLLED SUBSTANCE (CMPD RX) - PHARMACY TO MIX COMPOUNDED MEDICATION Apply to the lower lip twice daily for 4 days, wash hands after application. Avoid sun. 30 g 0    FLUoxetine (PROZAC) 40 MG capsule Take 1 capsule (40 mg) by mouth daily. 90 capsule 0    hydrocortisone 2.5 % ointment Apply to the lips twice daily for 7 days. 30 g 0    hydrocortisone 2.5 % ointment Apply twice daily for 1 week on itchy, scaly areas on the body.  Apply Vaseline on top. 60 g 0    hydrOXYzine HCl (ATARAX) 25 MG tablet TAKE 2 TABLETS BY MOUTH NIGHTLY AS NEEDED FOR (INSOMINA) 180 tablet 3    magnesium oxide (MAG-OX) 400 MG tablet Take 1 tablet (400 mg) by mouth 2 times daily 90 tablet 3    multivitamin (CENTRUM SILVER) tablet Take 1 tablet by mouth daily      tacrolimus (GENERIC EQUIVALENT) 1 MG capsule Take 1 capsule (1 mg) by mouth every 12 hours. 180 capsule 3    methylPREDNISolone (MEDROL DOSEPAK) 4 MG tablet therapy pack Follow Package Directions (Patient not taking: Reported on 1/21/2025) 21 tablet 0    pantoprazole (PROTONIX) 40 MG EC tablet Take 1 tablet (40 mg) by mouth daily (Patient not taking: Reported on 1/21/2025) 90 tablet 3     Current Facility-Administered Medications   Medication Dose Route Frequency Provider Last Rate Last Admin    triamcinolone (KENALOG-40) injection 40 mg  40 mg   Srinivas Blankenship, DO   40 mg at 11/22/23 3620      Past Medical History:   Patient Active Problem List   Diagnosis    Abdominal bloating    Family history of pancreatic cancer     Transaminitis    Macrocytosis    Cervical high risk HPV (human papillomavirus) test positive    Anemia, unspecified type    Elevated serum creatinine    Hyponatremia    Malaise and fatigue    At high risk for severe sepsis    Symptomatic anemia    Bandemia    Hyperlipidemia    Anxiety    Liver replaced by transplant (H)    Immunosuppressed status    Steroid-induced hyperglycemia    Hypervolemia    Elevated alkaline phosphatase level    Cervicalgia    Pain of both elbows    Mild recurrent major depression    Chronic kidney disease, stage 3a (H)    Attention deficit hyperactivity disorder (ADHD), predominantly inattentive type    Osteoarthritis of left hip    Hx of arthroplasty    Clinical diagnosis of COVID-19    History of left hip replacement    Osteoarthritis of right patellofemoral joint     Past Medical History:   Diagnosis Date    Alcoholic hepatitis (H)     Asthma 03/10/2006    Cervical high risk HPV (human papillomavirus) test positive 2019, 2020    See problem list    Clinical diagnosis of COVID-19 12/26/2023    Depressive disorder     Gastroesophageal reflux disease without esophagitis     Liver failure (H)        CC Referred Self, MD  No address on file on close of this encounter.

## 2025-01-21 NOTE — PATIENT INSTRUCTIONS
Patient Education        Proper skin care from [facility]:     -Eliminate harsh soaps as they strip the natural oils from the skin, often resulting in dry itchy skin ( i.e. Dial, Zest, [place] Spring)  -Use mild soaps such as Cetaphil or Dove Sensitive Skin in the shower. You do not need to use soap on arms, legs, and trunk every time you shower unless visibly soiled.   -Avoid hot or cold showers.  -After showering, lightly dry off and apply moisturizing within 2-3 minutes. This will help trap moisture in the skin.   -Aggressive use of a moisturizer at least 1-2 times a day to the entire body (including -Vanicream, Cetaphil, Aquaphor or Cerave) and moisturize hands after every washing.  -We recommend using moisturizers that come in a tub that needs to be scooped out, not a pump. This has more of an oil base. It will hold moisture in your skin much better than a water base moisturizer. The above recommended are non-pore clogging.        Wear a sunscreen with at least SPF 30 on your face, ears, neck and V of the chest daily. Wear sunscreen on other areas of the body if those areas are exposed to the sun throughout the day. Sunscreens can contain physical and/or chemical blockers. Physical blockers are less likely to clog pores, these include zinc oxide and titanium dioxide. Reapply every two hour and after swimming.      Sunscreen examples: https://www.ewg.org/sunscreen/     UV radiation  UVA radiation remains constant throughout the day and throughout the year. It is a longer wavelength than UVB and therefore penetrates deeper into the skin leading to immediate and delayed tanning, photoaging, and skin cancer. 70-80% of UVA and UVB radiation occurs between the hours of 10am-2pm.  UVB radiation  UVB radiation causes the most harmful effects and is more significant during the summer months. However, snow and ice can reflect UVB radiation leading to skin damage during the winter months as well. UVB radiation is  responsible for tanning, burning, inflammation, delayed erythema (pinkness), pigmentation (brown spots), and skin cancer.      I recommend self monthly full body exams and yearly full body exams with a dermatology provider. If you develop a new or changing lesion please follow up for examination. Most skin cancers are pink and scaly or pink and pearly. However, we do see blue/brown/black skin cancers.  Consider the ABCDEs of melanoma when giving yourself your monthly full body exam ( don't forget the groin, buttocks, feet, toes, etc). A-asymmetry, B-borders, C-color, D-diameter, E-elevation or evolving. If you see any of these changes please follow up in clinic. If you cannot see your back I recommend purchasing a hand held mirror to use with a larger wall mirror.       Checking for Skin Cancer  You can find cancer early by checking your skin each month. There are 3 kinds of skin cancer. They are melanoma, basal cell carcinoma, and squamous cell carcinoma. Doing monthly skin checks is the best way to find new marks or skin changes. Follow the instructions below for checking your skin.   The ABCDEs of checking moles for melanoma   Check your moles or growths for signs of melanoma using ABCDE:   Asymmetry: the sides of the mole or growth don t match  Border: the edges are ragged, notched, or blurred  Color: the color within the mole or growth varies  Diameter: the mole or growth is larger than 6 mm (size of a pencil eraser)  Evolving: the size, shape, or color of the mole or growth is changing (evolving is not shown in the images below)    Checking for other types of skin cancer  Basal cell carcinoma or squamous cell carcinoma have symptoms such as:      A spot or mole that looks different from all other marks on your skin  Changes in how an area feels, such as itching, tenderness, or pain  Changes in the skin's surface, such as oozing, bleeding, or scaliness  A sore that does not heal  New swelling or redness beyond  the border of a mole     Who s at risk?  Anyone can get skin cancer. But you are at greater risk if you have:   Fair skin, light-colored hair, or light-colored eyes  Many moles or abnormal moles on your skin  A history of sunburns from sunlight or tanning beds  A family history of skin cancer  A history of exposure to radiation or chemicals  A weakened immune system  If you have had skin cancer in the past, you are at risk for recurring skin cancer.   How to check your skin  Do your monthly skin checkups in front of a full-length mirror. Check all parts of your body, including your:   Head (ears, face, neck, and scalp)  Torso (front, back, and sides)  Arms (tops, undersides, upper, and lower armpits)  Hands (palms, backs, and fingers, including under the nails)  Buttocks and genitals  Legs (front, back, and sides)  Feet (tops, soles, toes, including under the nails, and between toes)  If you have a lot of moles, take digital photos of them each month. Make sure to take photos both up close and from a distance. These can help you see if any moles change over time.   Most skin changes are not cancer. But if you see any changes in your skin, call your doctor right away. Only he or she can diagnose a problem. If you have skin cancer, seeing your doctor can be the first step toward getting the treatment that could save your life.   [employer] last reviewed this educational content on 4/1/2019 2000-2020 [employer]. [address] [place] [address], [place] [address]. All rights reserved. This information is not intended as a substitute for professional medical care. Always follow your healthcare professional's instructions.        When should I call my doctor?  If you are worsening or not improving, please, contact us or seek urgent care as noted below.      Who should I call with questions (adults)?  [facility] (adult and pediatric): [phone]  [facility]  [place] (adult): [phone]  [facility] ([place], [place], [place] and [place]) [phone]  For urgent needs outside of business hours call the [facility] at [phone] and ask for the dermatology resident on call to be paged  If this is a medical emergency and you are unable to reach an ER, Call 911        If you need a prescription refill, please contact your pharmacy. Refills are approved or denied by our Physicians during normal business hours, Monday through Fridays  Per office policy, refills will not be granted if you have not been seen within the past year (or sooner depending on your child's condition)

## 2025-01-21 NOTE — LETTER
1/21/2025       RE: Melita Cortes  [address]  [place] [address]     Dear Colleague,    Thank you for referring your patient, Melita Cortes, to the [facility] [place] [facility]. Please see a copy of my visit note below.    [facility] Dermatology Note  Encounter Date: Jan 21, 2025  Office Visit     Reviewed patients past medical history and pertinent chart review prior to patients visit today.     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     Social:     ____________________________________________    Assessment & Plan:     # History of actinic cheilitis   - Patient experienced a robust reaction to the Efudex-Dovonex. Appears to he healing compared to prior photos. No evidence of secondary infection today. I encouraged her to continue to cover with Vaseline throughout the day. Protect from wind and sun.     # History of actinic keratosis, left cheek  - Resolved.     All risks, benefits and alternatives were discussed with patient.  Patient is in agreement and understands the assessment and plan.  All questions were answered.  Wandy Leija PA-C  [facility]  _______________________________________    CC: Derm Problem (PT has been using mupirorcn and steroids to treat the affected area. PT reports there is no improvement and that they have a numb  sensation. )    HPI:  Ms. Melita Cortes is a(n) 54 year old female who presents today as a return patient for irritation of the lips. The patient completed the course of Efudex-Dovonex. She notes her lips have slowly improved. Second, she reports that the actinic keratosis involving the left cheek resolved. Patient is otherwise feeling well, without additional skin concerns.      Physical Exam:  SKIN: Focused examination of lips and left cheek was performed.  - Granulation tissue and crust involving the lower lip.     - No other lesions of concern on areas examined.     Medications:  Current Outpatient Medications   Medication Sig Dispense Refill     calcium carbonate (OS-SHAI) 1500 (600 Ca) MG tablet Take 1 tablet (600 mg) by mouth daily 60 tablet 3     COMPOUNDED NON-CONTROLLED SUBSTANCE (CMPD RX) - PHARMACY TO MIX COMPOUNDED MEDICATION Apply to the lower lip twice daily for 4 days, wash hands after application. Avoid sun. 30 g 0     FLUoxetine (PROZAC) 40 MG capsule Take 1 capsule (40 mg) by mouth daily. 90 capsule 0     hydrocortisone 2.5 % ointment Apply to the lips twice daily for 7 days. 30 g 0     hydrocortisone 2.5 % ointment Apply twice daily for 1 week on itchy, scaly areas on the body.  Apply Vaseline on top. 60 g 0     hydrOXYzine HCl (ATARAX) 25 MG tablet TAKE 2 TABLETS BY MOUTH NIGHTLY AS NEEDED FOR (INSOMINA) 180 tablet 3     magnesium oxide (MAG-OX) 400 MG tablet Take 1 tablet (400 mg) by mouth 2 times daily 90 tablet 3     multivitamin (CENTRUM SILVER) tablet Take 1 tablet by mouth daily       tacrolimus (GENERIC EQUIVALENT) 1 MG capsule Take 1 capsule (1 mg) by mouth every 12 hours. 180 capsule 3     methylPREDNISolone (MEDROL DOSEPAK) 4 MG tablet therapy pack Follow Package Directions (Patient not taking: Reported on 1/21/2025) 21 tablet 0     pantoprazole (PROTONIX) 40 MG EC tablet Take 1 tablet (40 mg) by mouth daily (Patient not taking: Reported on 1/21/2025) 90 tablet 3     Current  Facility-Administered Medications   Medication Dose Route Frequency Provider Last Rate Last Admin     triamcinolone (KENALOG-40) injection 40 mg  40 mg   Srinivas Blankenship, DO   40 mg at 11/22/23 8353      Past Medical History:   Patient Active Problem List   Diagnosis     Abdominal bloating     Family history of pancreatic cancer     Transaminitis     Macrocytosis     Cervical high risk HPV (human papillomavirus) test positive     Anemia, unspecified type     Elevated serum creatinine     Hyponatremia     Malaise and fatigue     At high risk for severe sepsis     Symptomatic anemia     Bandemia     Hyperlipidemia     Anxiety     Liver replaced by transplant (H)     Immunosuppressed status     Steroid-induced hyperglycemia     Hypervolemia     Elevated alkaline phosphatase level     Cervicalgia     Pain of both elbows     Mild recurrent major depression     Chronic kidney disease, stage 3a (H)     Attention deficit hyperactivity disorder (ADHD), predominantly inattentive type     Osteoarthritis of left hip     Hx of arthroplasty     Clinical diagnosis of COVID-19     History of left hip replacement     Osteoarthritis of right patellofemoral joint     Past Medical History:   Diagnosis Date     Alcoholic hepatitis (H)      Asthma 03/10/2006     Cervical high risk HPV (human papillomavirus) test positive 2019, 2020    See problem list     Clinical diagnosis of COVID-19 12/26/2023     Depressive disorder      Gastroesophageal reflux disease without esophagitis      Liver failure (H)        CC Referred Self, MD  No address on file on close of this encounter.       Again, thank you for allowing me to participate in the care of your patient.      Sincerely,    Wandy Leija PA-C

## 2025-01-23 ENCOUNTER — MYC REFILL (OUTPATIENT)
Dept: FAMILY MEDICINE | Facility: CLINIC | Age: 55
End: 2025-01-23
Payer: MEDICARE

## 2025-01-23 DIAGNOSIS — F90.0 ATTENTION DEFICIT HYPERACTIVITY DISORDER (ADHD), PREDOMINANTLY INATTENTIVE TYPE: ICD-10-CM

## 2025-01-23 RX ORDER — DEXTROAMPHETAMINE SACCHARATE, AMPHETAMINE ASPARTATE MONOHYDRATE, DEXTROAMPHETAMINE SULFATE AND AMPHETAMINE SULFATE 7.5; 7.5; 7.5; 7.5 MG/1; MG/1; MG/1; MG/1
30 CAPSULE, EXTENDED RELEASE ORAL DAILY
Qty: 30 CAPSULE | Refills: 0 | Status: SHIPPED | OUTPATIENT
Start: 2025-01-23

## 2025-01-24 NOTE — PROGRESS NOTES
Called the pt  regarding  liver disease  See the alcohol specialist       Hepatology post hospital discharge RNCC assessment    Post hospital discharge follow up:      1. Admission diagnosis:  Abnormal labs   2. Discharge diagnosis:  Acute decompensated liver cirrhosis, alcoholic cirrhosis with ascites, hyponatremia, leukocytosis, and acute on chronic anemia  3. Admitted on: 12/23/21  4. Discharged on:  12/26/21    Medication Review    1. Medication review completed.    2. Discharge medication changes reviewed.   3. Patient did have new medications prescribed in hospital.  - Furosemide decreased to 20 mg by mouth 2 times daily.   - Spironolactone decreased to 50 mg by mouth daily.    Follow Up Post Discharge    1. Reviewed discharge instructions with patient. Follow up appointments reviewed and transportation to appointments confirmed.   2. Patient able to verbalized understanding of discharge instructions including medications and follow up.      3. Care coordinator role and contact information reviewed, and pt encouraged to call with questions or concerns.     Symptom Review    1.  Ascites:  Pt had paracentesis done on 12/24 while inpatient and 3.85 liters of fluid was removed. Pt reported slight abdominal distension but does not have a future paracentesis appointment scheduled. Encouraged pt to schedule an appointment if abdominal distension increases.   2.  Edema:  Denied lower extremity edema. Pt has been compliant with decrease doses of diuretics.   3.  Encephalopathy: Denied confusion and had 3 BMs on 12/26 with current regimen.   4. Abnormal labs: Pt has a lab appointment on 12/28.   5. Anemia: Pt's hemoglobin 8.7 on discharge. Denied hematemesis, hematochezia, and melena. Also denied feeling lightheaded and dizzy. Pt endorsed increased fatigue but stated that this is due to lack of sleep during hospitalization.       Pt had hepatology appointment on 12/27 with Dr. Steele.     Plan    1. Repeat labs on 12/28.      Patient was given an opportunity to ask questions and have those  questions answered to her satisfaction.  Patient verbalized understanding of instructions provided and agreed to plan of care.

## 2025-02-03 ENCOUNTER — MYC REFILL (OUTPATIENT)
Dept: TRANSPLANT | Facility: CLINIC | Age: 55
End: 2025-02-03

## 2025-02-03 DIAGNOSIS — Z94.4 LIVER REPLACED BY TRANSPLANT (H): ICD-10-CM

## 2025-02-03 RX ORDER — TACROLIMUS 1 MG/1
1 CAPSULE ORAL EVERY 12 HOURS
Qty: 180 CAPSULE | Refills: 3 | OUTPATIENT
Start: 2025-02-03

## 2025-02-10 ENCOUNTER — TELEPHONE (OUTPATIENT)
Dept: TRANSPLANT | Facility: CLINIC | Age: 55
End: 2025-02-10
Payer: MEDICARE

## 2025-02-13 ENCOUNTER — VIRTUAL VISIT (OUTPATIENT)
Dept: PSYCHOLOGY | Facility: CLINIC | Age: 55
End: 2025-02-13
Payer: MEDICARE

## 2025-02-13 DIAGNOSIS — F32.1 CURRENT MODERATE EPISODE OF MAJOR DEPRESSIVE DISORDER, UNSPECIFIED WHETHER RECURRENT (H): Primary | ICD-10-CM

## 2025-02-13 ASSESSMENT — PATIENT HEALTH QUESTIONNAIRE - PHQ9
SUM OF ALL RESPONSES TO PHQ QUESTIONS 1-9: 13
SUM OF ALL RESPONSES TO PHQ QUESTIONS 1-9: 13
10. IF YOU CHECKED OFF ANY PROBLEMS, HOW DIFFICULT HAVE THESE PROBLEMS MADE IT FOR YOU TO DO YOUR WORK, TAKE CARE OF THINGS AT HOME, OR GET ALONG WITH OTHER PEOPLE: VERY DIFFICULT

## 2025-02-13 ASSESSMENT — ANXIETY QUESTIONNAIRES
7. FEELING AFRAID AS IF SOMETHING AWFUL MIGHT HAPPEN: MORE THAN HALF THE DAYS
GAD7 TOTAL SCORE: 11
8. IF YOU CHECKED OFF ANY PROBLEMS, HOW DIFFICULT HAVE THESE MADE IT FOR YOU TO DO YOUR WORK, TAKE CARE OF THINGS AT HOME, OR GET ALONG WITH OTHER PEOPLE?: VERY DIFFICULT
GAD7 TOTAL SCORE: 11
3. WORRYING TOO MUCH ABOUT DIFFERENT THINGS: MORE THAN HALF THE DAYS
1. FEELING NERVOUS, ANXIOUS, OR ON EDGE: MORE THAN HALF THE DAYS
4. TROUBLE RELAXING: SEVERAL DAYS
7. FEELING AFRAID AS IF SOMETHING AWFUL MIGHT HAPPEN: MORE THAN HALF THE DAYS
5. BEING SO RESTLESS THAT IT IS HARD TO SIT STILL: MORE THAN HALF THE DAYS
6. BECOMING EASILY ANNOYED OR IRRITABLE: SEVERAL DAYS
2. NOT BEING ABLE TO STOP OR CONTROL WORRYING: SEVERAL DAYS
IF YOU CHECKED OFF ANY PROBLEMS ON THIS QUESTIONNAIRE, HOW DIFFICULT HAVE THESE PROBLEMS MADE IT FOR YOU TO DO YOUR WORK, TAKE CARE OF THINGS AT HOME, OR GET ALONG WITH OTHER PEOPLE: VERY DIFFICULT
GAD7 TOTAL SCORE: 11

## 2025-02-13 NOTE — PROGRESS NOTES
"[facility]                                     Progress Note    Patient Name: Melita Cortes  Date: 02/13/2025         Service Type: Individual      Session Start Time: 3.30  Session End Time: 4.13     Session Length: 38-52    Session #: 27    Attendees: Client attended alone    Service Modality:  Video Visit:      Provider verified identity through the following two step process.  Patient provided:  Patient is known previously to provider    Telemedicine Visit: The patient's condition can be safely assessed and treated via synchronous audio and visual telemedicine encounter.      Reason for Telemedicine Visit: Patient has requested telehealth visit    Originating Site (Patient Location): Patient's home    Distant Site (Provider Location): Provider Remote Setting- Home Office    Consent:  The patient/guardian has verbally consented to: the potential risks and benefits of telemedicine (video visit) versus in person care; bill my insurance or make self-payment for services provided; and responsibility for payment of non-covered services.     Patient would like the video invitation sent by:  My Chart    Mode of Communication:  Video Conference via [facility]well    Distant Location (Provider):  Off-site    As the provider I attest to compliance with applicable laws and regulations related to telemedicine.    DATA  Interactive Complexity: No  Crisis: No        Progress Since Last Session (Related to Symptoms / Goals / Homework):   Symptoms: Improving as evident by current phq9 scores.     Homework: Completed in session review patient's utilization of safety plan to mitigate SI.        Episode of Care Goals: Minimal progress - ACTION (Actively working towards change); Intervened by reinforcing change plan / affirming steps taken     Current / Ongoing Stressors and Concerns:  Patient reported  \"Trauma Adhd depression\". Patient reported \" I think I have had some trauma in my life, my liver transplant " ".\" Pt reported she is  \"overly sensitive\" and \" I dont handle stress very well and struggle with impulsivity\"  and that \"I have a poor self image, negative talk a lot \" \"used and abandoned\" and that she always have to explained herself to others.     Current: Today patient reported an improvement in mood and noted that recurrent health problems adversely affecting her mood and psychosocial functioning. She noted \"feeling stuck\" and struggle to find happiness. She reported that prior to her liver transplant, she was longing to be where she is now having a better health and going back to work again and reported she continue to feel like \"she is not good enough\" and still not very happy with her body.        Treatment Objective(s) Addressed in This Session:   Patient will increase daily activity level by exercising for 20-30 minutes and participate in at least 1 daily pleasant/fun activities.  Patient will identify specific areas of cognitive distortion and challenge irrational thoughts with reality.    Patient will  learn and use grounding skills to  effectively manage anxiety and distress associated with trauma history daily.              Patient will learn and practice gratitude and compassion to built a positive self image.     Intervention:    Mindfulness: Continue discussion today on gratitude and self-compassion to help patient challenge negative perception of themselves and built a positive and compassionate relationship with their body through the following ways:    Be grateful for your body and what it does for you every day which entails moving from an ornamental view to an instrumental view of your body by shifting away from body form or attractiveness to body function.  This shift to a focus on what the body does for you is associated with a more positive evaluation of your body.  Engage in compassion self-talk. Notice your internal monologue about your body and  shift the conversation to one that is " compassionate and kind. Think about what you would say to your best friend in a similar situation.  Be your own best friend by planning activities that would improve your relationship with your body. Some examples are taking yourself on a walk, playing some music and dancing, doing a few yoga poses and smile at yourself every morning.         Assessments completed prior to visit:  PHQ9:       9/12/2024     2:24 PM 10/10/2024     3:14 PM 10/24/2024    11:08 AM 11/7/2024    12:34 PM 11/20/2024     1:42 PM 1/30/2025    12:52 PM 2/13/2025     9:29 AM   PHQ-9 SCORE   PHQ-9 Total Score MyChart 9 (Mild depression) 14 (Moderate depression) 12 (Moderate depression) 16 (Moderately severe depression)  16 (Moderately severe depression) 13 (Moderate depression)   PHQ-9 Total Score 9 14 12  16  13 16  13        Patient-reported     GAD7:       7/6/2024     9:41 AM 8/8/2024     2:10 PM 10/10/2024     3:15 PM 10/24/2024    11:09 AM 11/7/2024    12:35 PM 1/13/2025    12:05 PM 2/13/2025     9:30 AM   JORGE-7 SCORE   Total Score 19 (severe anxiety) 15 (severe anxiety) 14 (moderate anxiety) 7 (mild anxiety) 17 (severe anxiety) 17 (severe anxiety) 11 (moderate anxiety)   Total Score 19 15 14 7  17  17  11        Patient-reported         ASSESSMENT: Current Emotional / Mental Status (status of significant symptoms):   Risk status (Self / Other harm or suicidal ideation)   Patient denies current fears or concerns for personal safety.   Patient denies current or recent suicidal ideation or behaviors.   Patient denies current or recent homicidal ideation or behaviors.   Patient denies current or recent self injurious behavior or ideation.   Patient denies other safety concerns.   Patient reports there has been no change in risk factors since their last session.     Patient reports there has been no change in protective factors since their last session.     Recommended that patient call 911 or go to the local ED should there be a change in any  of these risk factors.     Appearance:   Appropriate    Eye Contact:   Good    Psychomotor Behavior: Normal    Attitude:   Cooperative  Interested   Orientation:   Person Place Time Situation   Speech    Rate / Production: Normal/ Responsive    Volume:  Normal    Mood:    Anxious  Depressed    Affect:    Tearful   Thought Content:  Clear    Thought Form:  Coherent  Logical    Insight:    Good      Medication Review:   No changes to current psychiatric medication(s)     Medication Compliance:   Yes     Changes in Health Issues:   None reported     Chemical Use Review:   Substance Use: Chemical use reviewed, no active concerns identified      Tobacco Use: No current tobacco use.      Diagnosis:  296.32 (F33.1) Major Depressive Disorder, Recurrent Episode, Moderate _ and With atypical features    Collateral Reports Completed:   Not Applicable    PLAN: (Patient Tasks / Therapist Tasks / Other)    Utilize safety plan as needed daily.    Practice  distress intolerance skill learned in session, watching emotions, label and describe them and not avoid them.     Each morning when you wake up, start by saying thank you to various parts of your body: your heart, lungs, stomach, legs, arms, eyes, ears etc. Thank your heart more especially for keeping you alive without any effort from you.     Jake Coyne, [facility]           _________________________________________________________    Individual Treatment Plan    Patient's Name: Melita Cortes  YOB: 1970    Date of Creation: 09/22/2023  Date Treatment Plan Last Reviewed/Revised: 11/21/2024    DSM5 Diagnoses: 296.32 (F33.1) Major Depressive Disorder, Recurrent Episode, Moderate _ and With atypical features  Psychosocial / Contextual Factors:   PROMIS (reviewed every 90 days):     Referral / Collaboration:  Referral to another professional/service is not indicated at this time..    Anticipated number of session for this episode of care:  15-25  Anticipation frequency  of session: Biweekly  Anticipated Duration of each session: 38-52 minutes  Treatment plan will be reviewed in 90 days or when goals have been changed.       MeasurableTreatment Goal(s) related to diagnosis / functional impairment(s)  Goal 1: Patient will identify and address specific triggers to feelings of depression and improve coping by  90 % in the next three months.    I will know I've met my goal when I am less impulsive, better communicator and can comfortably manage distressful emotions.         Objective #A (Patient Action)    Develop and utilize 3 effective distress tolerance strategies to address SI.   Status: Continued - Date(s):  02/22/2025     Intervention(s)   Therapist will engage in collaborative brainstorming with pt to identify and practice individualized distress tolerance coping strategies to address SI weekly.    Objective #B (Patient Action)    Patient will increase daily activity level by exercising for 20-30 minutes and participate in at least 1 daily pleasant/fun activities.  Status: Continued - Date(s):    02/22/2025    Intervention(s)  Therapist will provide psychoeducation, behavioral activation, and cognitive restructuring.    Objective #C  Patient will identify specific areas of cognitive distortion and challenge irrational thoughts with reality.   Status: Continued - Date(s):    02/22/2025       Intervention(s)  Therapist will provide psychoeducation, behavioral activation, and cognitive restructuring.    Objective #D  Patient will learn and practice relaxation techniques to manage depression.  Status: Continued - Date(s):    02/22/2025    Intervention(s)   Therapist will teach patient thought stopping, deep breathing exercises, mindful meditation, and creative visualization.       Goal 2: Patient will identify and address issues underlying trauma and improve coping  by 90% in the next 3 months .    Objective #A (Patient Action)    Patient will develop vocabulary to describe trauma  "symptoms.  Status: Continued - Date(s):  02/22/2025    Intervention(s)  Therapist will provide psychoeducation, behavioral activation, and cognitive restructuring.    Objective #B  Patient will learn and use grounding skills to  effectively manage anxiety and distress associated with trauma history daily.  Status: Continued - Date(s): 02/22/2025    Intervention(s)  Therapist will provide psychoeducation, behavioral activation, and cognitive restructuring.    Objective #C  Patient will gradually approach trauma-related memories, feelings, and situations through reprocessing and desensitization  weekly in session.  Status: Continued - Date(s):  02/22/2025    Intervention(s)  Therapist will provide psychoeducation, behavioral activation, and cognitive restructuring.        Patient has reviewed and agreed to the above plan.      Jake Coyne [facility]  September 22, 2023    ----- Service Performed and Documented by [facility]------  This note was reviewed and clinical supervision by ELMO Colmenares [facility]    4/1/2024           [facility]                                       Melita Cortes     SAFETY PLAN:  Step 1: Warning signs / cues (Thoughts, images, mood, situation, behavior) that a crisis may be developing:  Thoughts:   \" sometimes I feel like I don't have a purpose\"  Images:  Feels lonely after losing mum and dad and lonely in marriage  Thinking Processes: ruminations (can't stop thinking about my problems): health, family loses  Mood: worsening depression  Behaviors: isolating/withdrawing   Situations: loss: parents and lonely in marriage    Step 2: Coping strategies - Things I can do to take my mind off of my problems without contacting another person (relaxation technique, physical activity):  Distress Tolerance Strategies:  play with my pet , intense exercise: work out for 2-3 minutes , and support group attendance for transplant  Physical Activities: go for a walk and stretching   Focus on " "helpful thoughts:  \"This is temporary\" and \"It always passes\"  Step 3: People and social settings that provide distraction:   Name:  Augustine ( friend) Phone: [phone]   Name:  Aimee paniagua Phone: [phone]     gym  and [employer] store  Liver transplant group  Step 4: Remind myself of people and things that are important to me and worth living for:    My , cousins and friends.  Step 5: When I am in crisis, I can ask these people to help me use my safety plan:   Name:  Jake ( Therapist )  Phone: [phone]   Name:  Aimee paniagua Phone: [phone]   Step 6: Making the environment safe:   remove alcohol, remove drugs, and be around others  Step 7: Professionals or agencies I can contact during a crisis:  Suicide Prevention Lifeline: Call or Text 154   [facility]: [phone]    Call 911 or go to my nearest emergency department.   I helped develop this safety plan and agree to use it when needed.  I have been given a copy of this plan.      Client signature _________________________________________________________________  Today s date:  11/14/2023  Completed by Provider Name/ Credentials:  CECIL Vasquez  November 14, 2023  Adapted from Safety Plan Template 2008 Chelsi Marx and Fred Ramos is reprinted with the express permission of the authors.  No portion of the Safety Plan Template may be reproduced without the express, written permission.  You can contact the authors at [email or web address] or [email or web address].        Answers submitted by the patient for this visit:  Patient Health Questionnaire (Submitted on 11/14/2023)  If you checked off any problems, how difficult have these problems made it for you to do your work, take care of things at home, or get along with other people?: Very difficult  PHQ9 TOTAL SCORE: 18  JORGE-7 (Submitted on 11/14/2023)  JORGE 7 TOTAL SCORE: 14    Answers submitted by the patient for this visit:  Patient Health Questionnaire (Submitted on " 11/28/2023)  If you checked off any problems, how difficult have these problems made it for you to do your work, take care of things at home, or get along with other people?: Extremely difficult  PHQ9 TOTAL SCORE: 14  JORGE-7 (Submitted on 11/28/2023)  JORGE 7 TOTAL SCORE: 21    Answers submitted by the patient for this visit:  Patient Health Questionnaire (Submitted on 12/13/2023)  If you checked off any problems, how difficult have these problems made it for you to do your work, take care of things at home, or get along with other people?: Very difficult  PHQ9 TOTAL SCORE: 18  JORGE-7 (Submitted on 12/13/2023)  JORGE 7 TOTAL SCORE: 21    Answers submitted by the patient for this visit:  Patient Health Questionnaire (Submitted on 1/3/2024)  If you checked off any problems, how difficult have these problems made it for you to do your work, take care of things at home, or get along with other people?: Very difficult  PHQ9 TOTAL SCORE: 16  JORGE-7 (Submitted on 1/3/2024)  JORGE 7 TOTAL SCORE: 10    Answers submitted by the patient for this visit:  Patient Health Questionnaire (Submitted on 1/16/2024)  If you checked off any problems, how difficult have these problems made it for you to do your work, take care of things at home, or get along with other people?: Very difficult  PHQ9 TOTAL SCORE: 15    Answers submitted by the patient for this visit:  Patient Health Questionnaire (Submitted on 1/29/2024)  If you checked off any problems, how difficult have these problems made it for you to do your work, take care of things at home, or get along with other people?: Extremely difficult  PHQ9 TOTAL SCORE: 12  JORGE-7 (Submitted on 1/29/2024)  JORGE 7 TOTAL SCORE: 9    Answers submitted by the patient for this visit:  Patient Health Questionnaire (Submitted on 2/14/2024)  If you checked off any problems, how difficult have these problems made it for you to do your work, take care of things at home, or get along with other people?: Extremely  difficult  PHQ9 TOTAL SCORE: 11  JORGE-7 (Submitted on 2/14/2024)  JORGE 7 TOTAL SCORE: 14    Answers submitted by the patient for this visit:  Patient Health Questionnaire (Submitted on 2/28/2024)  If you checked off any problems, how difficult have these problems made it for you to do your work, take care of things at home, or get along with other people?: Extremely difficult  PHQ9 TOTAL SCORE: 16    Answers submitted by the patient for this visit:  Patient Health Questionnaire (Submitted on 3/11/2024)  If you checked off any problems, how difficult have these problems made it for you to do your work, take care of things at home, or get along with other people?: Very difficult  PHQ9 TOTAL SCORE: 10    Answers submitted by the patient for this visit:  Patient Health Questionnaire (Submitted on 3/25/2024)  If you checked off any problems, how difficult have these problems made it for you to do your work, take care of things at home, or get along with other people?: Extremely difficult  PHQ9 TOTAL SCORE: 16  JORGE-7 (Submitted on 3/25/2024)  JORGE 7 TOTAL SCORE: 9    Answers submitted by the patient for this visit:  Patient Health Questionnaire (Submitted on 4/10/2024)  If you checked off any problems, how difficult have these problems made it for you to do your work, take care of things at home, or get along with other people?: Extremely difficult  PHQ9 TOTAL SCORE: 11    Answers submitted by the patient for this visit:  Patient Health Questionnaire (Submitted on 5/9/2024)  If you checked off any problems, how difficult have these problems made it for you to do your work, take care of things at home, or get along with other people?: Extremely difficult  PHQ9 TOTAL SCORE: 14    Answers submitted by the patient for this visit:  Patient Health Questionnaire (Submitted on 5/30/2024)  If you checked off any problems, how difficult have these problems made it for you to do your work, take care of things at home, or get along with  other people?: Extremely difficult  PHQ9 TOTAL SCORE: 16  JORGE-7 (Submitted on 5/30/2024)  JORGE 7 TOTAL SCORE: 7    Answers submitted by the patient for this visit:  Patient Health Questionnaire (Submitted on 7/11/2024)  If you checked off any problems, how difficult have these problems made it for you to do your work, take care of things at home, or get along with other people?: Very difficult  PHQ9 TOTAL SCORE: 16  JORGE-7 (Submitted on 7/6/2024)  JORGE 7 TOTAL SCORE: 19    Answers submitted by the patient for this visit:  Patient Health Questionnaire (Submitted on 8/8/2024)  If you checked off any problems, how difficult have these problems made it for you to do your work, take care of things at home, or get along with other people?: Extremely difficult  PHQ9 TOTAL SCORE: 10  JORGE-7 (Submitted on 8/8/2024)  JORGE 7 TOTAL SCORE: 15    Answers submitted by the patient for this visit:  Patient Health Questionnaire (Submitted on 8/22/2024)  If you checked off any problems, how difficult have these problems made it for you to do your work, take care of things at home, or get along with other people?: Somewhat difficult  PHQ9 TOTAL SCORE: 13    Answers submitted by the patient for this visit:  Patient Health Questionnaire (Submitted on 9/12/2024)  If you checked off any problems, how difficult have these problems made it for you to do your work, take care of things at home, or get along with other people?: Extremely difficult  PHQ9 TOTAL SCORE: 9    Answers submitted by the patient for this visit:  Patient Health Questionnaire (Submitted on 10/10/2024)  If you checked off any problems, how difficult have these problems made it for you to do your work, take care of things at home, or get along with other people?: Very difficult  PHQ9 TOTAL SCORE: 14  Patient Health Questionnaire (G7) (Submitted on 10/10/2024)  JORGE 7 TOTAL SCORE: 14    Answers submitted by the patient for this visit:  Patient Health Questionnaire (Submitted on  10/24/2024)  If you checked off any problems, how difficult have these problems made it for you to do your work, take care of things at home, or get along with other people?: Somewhat difficult  PHQ9 TOTAL SCORE: 12  Patient Health Questionnaire (G7) (Submitted on 10/24/2024)  JORGE 7 TOTAL SCORE: 7    Answers submitted by the patient for this visit:  Patient Health Questionnaire (G7) (Submitted on 1/13/2025)  JORGE 7 TOTAL SCORE: 17    Answers submitted by the patient for this visit:  Patient Health Questionnaire (Submitted on 2/13/2025)  If you checked off any problems, how difficult have these problems made it for you to do your work, take care of things at home, or get along with other people?: Very difficult  PHQ9 TOTAL SCORE: 13  Patient Health Questionnaire (G7) (Submitted on 2/13/2025)  JORGE 7 TOTAL SCORE: 11

## 2025-02-15 DIAGNOSIS — F33.0 MILD RECURRENT MAJOR DEPRESSION: ICD-10-CM

## 2025-02-17 ENCOUNTER — THERAPY VISIT (OUTPATIENT)
Dept: PHYSICAL THERAPY | Facility: CLINIC | Age: 55
End: 2025-02-17
Payer: MEDICARE

## 2025-02-17 DIAGNOSIS — M17.11 OSTEOARTHRITIS OF RIGHT PATELLOFEMORAL JOINT: Primary | ICD-10-CM

## 2025-02-17 PROCEDURE — 97110 THERAPEUTIC EXERCISES: CPT | Mod: GP

## 2025-02-17 RX ORDER — FLUOXETINE HYDROCHLORIDE 40 MG/1
40 CAPSULE ORAL DAILY
Qty: 90 CAPSULE | Refills: 0 | Status: SHIPPED | OUTPATIENT
Start: 2025-02-17

## 2025-02-18 ENCOUNTER — LAB (OUTPATIENT)
Dept: LAB | Facility: CLINIC | Age: 55
End: 2025-02-18
Payer: MEDICARE

## 2025-02-18 ENCOUNTER — MYC REFILL (OUTPATIENT)
Dept: FAMILY MEDICINE | Facility: CLINIC | Age: 55
End: 2025-02-18

## 2025-02-18 DIAGNOSIS — F90.0 ATTENTION DEFICIT HYPERACTIVITY DISORDER (ADHD), PREDOMINANTLY INATTENTIVE TYPE: ICD-10-CM

## 2025-02-18 DIAGNOSIS — Z94.4 LIVER REPLACED BY TRANSPLANT (H): ICD-10-CM

## 2025-02-18 LAB
ALBUMIN SERPL BCG-MCNC: 4.1 G/DL (ref 3.5–5.2)
ALP SERPL-CCNC: 63 U/L (ref 40–150)
ALT SERPL W P-5'-P-CCNC: 23 U/L (ref 0–50)
ANION GAP SERPL CALCULATED.3IONS-SCNC: 11 MMOL/L (ref 7–15)
AST SERPL W P-5'-P-CCNC: 25 U/L (ref 0–45)
BILIRUB DIRECT SERPL-MCNC: 0.15 MG/DL (ref 0–0.3)
BILIRUB SERPL-MCNC: 0.4 MG/DL
BUN SERPL-MCNC: 18.1 MG/DL (ref 6–20)
CALCIUM SERPL-MCNC: 8.8 MG/DL (ref 8.8–10.4)
CHLORIDE SERPL-SCNC: 104 MMOL/L (ref 98–107)
CREAT SERPL-MCNC: 1.07 MG/DL (ref 0.51–0.95)
EGFRCR SERPLBLD CKD-EPI 2021: 62 ML/MIN/1.73M2
ERYTHROCYTE [DISTWIDTH] IN BLOOD BY AUTOMATED COUNT: 12 % (ref 10–15)
GLUCOSE SERPL-MCNC: 111 MG/DL (ref 70–99)
HCO3 SERPL-SCNC: 23 MMOL/L (ref 22–29)
HCT VFR BLD AUTO: 42.7 % (ref 35–47)
HGB BLD-MCNC: 14.7 G/DL (ref 11.7–15.7)
MAGNESIUM SERPL-MCNC: 1.8 MG/DL (ref 1.7–2.3)
MCH RBC QN AUTO: 30.8 PG (ref 26.5–33)
MCHC RBC AUTO-ENTMCNC: 34.4 G/DL (ref 31.5–36.5)
MCV RBC AUTO: 90 FL (ref 78–100)
PLATELET # BLD AUTO: 204 10E3/UL (ref 150–450)
POTASSIUM SERPL-SCNC: 4.9 MMOL/L (ref 3.4–5.3)
PROT SERPL-MCNC: 6.1 G/DL (ref 6.4–8.3)
RBC # BLD AUTO: 4.77 10E6/UL (ref 3.8–5.2)
SODIUM SERPL-SCNC: 138 MMOL/L (ref 135–145)
TACROLIMUS BLD-MCNC: 3.8 UG/L (ref 5–15)
TME LAST DOSE: ABNORMAL H
TME LAST DOSE: ABNORMAL H
WBC # BLD AUTO: 5.5 10E3/UL (ref 4–11)

## 2025-02-18 PROCEDURE — 83735 ASSAY OF MAGNESIUM: CPT

## 2025-02-18 PROCEDURE — 36415 COLL VENOUS BLD VENIPUNCTURE: CPT

## 2025-02-18 PROCEDURE — 80053 COMPREHEN METABOLIC PANEL: CPT

## 2025-02-18 PROCEDURE — 85027 COMPLETE CBC AUTOMATED: CPT

## 2025-02-18 PROCEDURE — 82248 BILIRUBIN DIRECT: CPT

## 2025-02-18 PROCEDURE — 80197 ASSAY OF TACROLIMUS: CPT

## 2025-02-18 RX ORDER — DEXTROAMPHETAMINE SACCHARATE, AMPHETAMINE ASPARTATE MONOHYDRATE, DEXTROAMPHETAMINE SULFATE AND AMPHETAMINE SULFATE 7.5; 7.5; 7.5; 7.5 MG/1; MG/1; MG/1; MG/1
30 CAPSULE, EXTENDED RELEASE ORAL DAILY
Qty: 30 CAPSULE | Refills: 0 | Status: SHIPPED | OUTPATIENT
Start: 2025-02-18

## 2025-03-06 ENCOUNTER — THERAPY VISIT (OUTPATIENT)
Dept: PHYSICAL THERAPY | Facility: CLINIC | Age: 55
End: 2025-03-06
Payer: MEDICARE

## 2025-03-06 DIAGNOSIS — M17.11 OSTEOARTHRITIS OF RIGHT PATELLOFEMORAL JOINT: Primary | ICD-10-CM

## 2025-03-24 ENCOUNTER — THERAPY VISIT (OUTPATIENT)
Dept: PHYSICAL THERAPY | Facility: CLINIC | Age: 55
End: 2025-03-24
Payer: MEDICARE

## 2025-03-24 DIAGNOSIS — M17.11 OSTEOARTHRITIS OF RIGHT PATELLOFEMORAL JOINT: Primary | ICD-10-CM

## 2025-03-24 PROCEDURE — 97110 THERAPEUTIC EXERCISES: CPT | Mod: GP

## 2025-03-24 ASSESSMENT — ACTIVITIES OF DAILY LIVING (ADL)
STAND: ACTIVITY IS NOT DIFFICULT
WALK: ACTIVITY IS NOT DIFFICULT
RISE FROM A CHAIR: ACTIVITY IS NOT DIFFICULT
GO UP STAIRS: ACTIVITY IS MINIMALLY DIFFICULT
PAIN: I HAVE THE SYMPTOM BUT IT DOES NOT AFFECT MY ACTIVITY
HOW_WOULD_YOU_RATE_THE_CURRENT_FUNCTION_OF_YOUR_KNEE_DURING_YOUR_USUAL_DAILY_ACTIVITIES_ON_A_SCALE_FROM_0_TO_100_WITH_100_BEING_YOUR_LEVEL_OF_KNEE_FUNCTION_PRIOR_TO_YOUR_INJURY_AND_0_BEING_THE_INABILITY_TO_PERFORM_ANY_OF_YOUR_USUAL_DAILY_ACTIVITIES?: 65
KNEE_ACTIVITY_OF_DAILY_LIVING_SCORE: 78.57
KNEE_ACTIVITY_OF_DAILY_LIVING_SUM: 55
STIFFNESS: THE SYMPTOM AFFECTS MY ACTIVITY MODERATELY
SIT WITH YOUR KNEE BENT: ACTIVITY IS NOT DIFFICULT
RAW_SCORE: 55
SQUAT: ACTIVITY IS MINIMALLY DIFFICULT
GIVING WAY, BUCKLING OR SHIFTING OF KNEE: I DO NOT HAVE THE SYMPTOM
AS_A_RESULT_OF_YOUR_KNEE_INJURY,_HOW_WOULD_YOU_RATE_YOUR_CURRENT_LEVEL_OF_DAILY_ACTIVITY?: NEARLY NORMAL
HOW_WOULD_YOU_RATE_THE_OVERALL_FUNCTION_OF_YOUR_KNEE_DURING_YOUR_USUAL_DAILY_ACTIVITIES?: NEARLY NORMAL
SWELLING: I HAVE THE SYMPTOM BUT IT DOES NOT AFFECT MY ACTIVITY
WEAKNESS: THE SYMPTOM AFFECTS MY ACTIVITY MODERATELY
KNEEL ON THE FRONT OF YOUR KNEE: ACTIVITY IS FAIRLY DIFFICULT
LIMPING: I DO NOT HAVE THE SYMPTOM
GO DOWN STAIRS: ACTIVITY IS SOMEWHAT DIFFICULT

## 2025-03-24 NOTE — PROGRESS NOTES
03/24/25 0500   Appointment Info   Signing clinician's name / credentials Colin Shah, PT, DPT, CSCS, CLT and Mitchell Silvestre, SPT   Total/Authorized Visits E&T   Visits Used 12   Medical Diagnosis R knee OA   PT Tx Diagnosis R>L knee pain with strength and motor control deficits   Precautions/Limitations left hip replacement   Progress Note/Certification   Start of Care Date 10/10/24   Onset of illness/injury or Date of Surgery 10/10/24   Therapy Frequency 1x/week   Predicted Duration 12 weeks   Certification date from 01/02/24   Certification date to 03/27/25   Progress Note Due Date 03/27/24   Progress Note Completed Date 10/10/24   GOALS   PT Goals 2   PT Goal 1   Goal Identifier walking   Goal Description Pt will return to tolerance of walking 3 miles with 2/10 pain or less in knee   Rationale to maximize safety and independence with performance of ADLs and functional tasks   Goal Progress She is able to walk 45 minutes (approximately 2 miles) with 2/10 pain in the knees.   Target Date 01/02/25   Date Met 12/16/24   PT Goal 2   Goal Identifier High Impact   Goal Description Fidelina will be able to navigate 2 flights of stairs, ambulate 3 miles and tolerate light jumping and landing such as her aerobics class without knee pain   Rationale to maximize safety and independence with performance of ADLs and functional tasks;to maximize safety and independence within the home;to maximize safety and independence within the community;to maximize safety and independence with transportation;to maximize safety and independence with self cares   Goal Progress Fidelina still has pain. She no longer has pain with walking but descending stairs is still painful. At this time, Fidelina is improving and ready to transition to independent management.   Target Date 03/24/25   Subjective Report   Subjective Report She reports she does not feel like her knees are where she wants to be so she feels like she will be back to therapy eventually.  "Has not been very diligent about doing her exercises so she feels like she needs to do better with that as well.   Objective Measures   Objective Measures Objective Measure 3;Objective Measure 4;Objective Measure 5;Objective Measure 6   Objective Measure 1   Objective Measure Pain   Details 0/10 on arrival, can stay on treadmill longer and less pain during exercise classes   Objective Measure 2   Objective Measure Double leg squat   Details no pain and no impairments   Objective Measure 3   Objective Measure Strength/ MMT   Details BLE global strength 5/5   Objective Measure 4   Objective Measure Gait   Details no visibile deviations or pain, no longer demonstrate trendelenberg gait   Objective Measure 5   Objective Measure Flexibility   Details Otilio, Leanna's and Ely all negative bilateral   Objective Measure 6   Objective Measure Knee ROM   Details 0-140 on LLE, 0-140 RLE   Treatment Interventions (PT)   Interventions Therapeutic Procedure/Exercise   Therapeutic Procedure/Exercise   Therapeutic Procedures: strength, endurance, ROM, flexibility minutes (88352) 44   Ther Proc 1 Bike   Ther Proc 1 - Details 6 minutes at level 4 resistance   Ther Proc 3 Progress Note   PTRx Ther Proc 1 Hungarian Squats   PTRx Ther Proc 1 - Details 1x10 with black band cues for abduction to reduce valgus   PTRx Ther Proc 2 Stepdown Forward   PTRx Ther Proc 2 - Details x10 with 6\" step   PTRx Ther Proc 3 Squat   PTRx Ther Proc 3 - Details 1x30 with 50 lbs Leg Press Squats   Skilled Intervention Review of hep strengthening exercises for self management upon discharge   Patient Response/Progress Good tolerance to hep with progression of reps   Education   Learner/Method Patient;Demonstration;Pictures/Video;No Barriers to Learning   Plan   Home program PTRx   Plan for next session Discharged   Total Session Time   Timed Code Treatment Minutes 44   Total Treatment Time (sum of timed and untimed services) 44           DISCHARGE  Reason for " Discharge: Patient is ready to transition to independent management.    Equipment Issued: None    Discharge Plan: Patient to continue home program.    Referring Provider:  Srinivas Blankenship

## 2025-03-26 ENCOUNTER — OFFICE VISIT (OUTPATIENT)
Dept: FAMILY MEDICINE | Facility: CLINIC | Age: 55
End: 2025-03-26
Payer: MEDICARE

## 2025-03-26 VITALS
WEIGHT: 154.8 LBS | SYSTOLIC BLOOD PRESSURE: 116 MMHG | RESPIRATION RATE: 17 BRPM | OXYGEN SATURATION: 100 % | DIASTOLIC BLOOD PRESSURE: 82 MMHG | HEART RATE: 83 BPM | HEIGHT: 65 IN | TEMPERATURE: 98.6 F | BODY MASS INDEX: 25.79 KG/M2

## 2025-03-26 DIAGNOSIS — D84.9 IMMUNOSUPPRESSED STATUS: ICD-10-CM

## 2025-03-26 DIAGNOSIS — Z94.4 LIVER REPLACED BY TRANSPLANT (H): Primary | ICD-10-CM

## 2025-03-26 DIAGNOSIS — N18.31 CHRONIC KIDNEY DISEASE, STAGE 3A (H): ICD-10-CM

## 2025-03-26 DIAGNOSIS — C43.9 MALIGNANT MELANOMA, UNSPECIFIED SITE (H): ICD-10-CM

## 2025-03-26 DIAGNOSIS — N62 MACROMASTIA: ICD-10-CM

## 2025-03-26 DIAGNOSIS — M54.2 CERVICAL PAIN: ICD-10-CM

## 2025-03-26 DIAGNOSIS — Z00.00 MEDICARE ANNUAL WELLNESS VISIT, SUBSEQUENT: ICD-10-CM

## 2025-03-26 DIAGNOSIS — F90.0 ATTENTION DEFICIT HYPERACTIVITY DISORDER (ADHD), PREDOMINANTLY INATTENTIVE TYPE: ICD-10-CM

## 2025-03-26 SDOH — HEALTH STABILITY: PHYSICAL HEALTH: ON AVERAGE, HOW MANY MINUTES DO YOU ENGAGE IN EXERCISE AT THIS LEVEL?: 30 MIN

## 2025-03-26 SDOH — HEALTH STABILITY: PHYSICAL HEALTH: ON AVERAGE, HOW MANY DAYS PER WEEK DO YOU ENGAGE IN MODERATE TO STRENUOUS EXERCISE (LIKE A BRISK WALK)?: 3 DAYS

## 2025-03-26 ASSESSMENT — ANXIETY QUESTIONNAIRES
5. BEING SO RESTLESS THAT IT IS HARD TO SIT STILL: SEVERAL DAYS
IF YOU CHECKED OFF ANY PROBLEMS ON THIS QUESTIONNAIRE, HOW DIFFICULT HAVE THESE PROBLEMS MADE IT FOR YOU TO DO YOUR WORK, TAKE CARE OF THINGS AT HOME, OR GET ALONG WITH OTHER PEOPLE: VERY DIFFICULT
7. FEELING AFRAID AS IF SOMETHING AWFUL MIGHT HAPPEN: SEVERAL DAYS
GAD7 TOTAL SCORE: 11
3. WORRYING TOO MUCH ABOUT DIFFERENT THINGS: MORE THAN HALF THE DAYS
GAD7 TOTAL SCORE: 11
2. NOT BEING ABLE TO STOP OR CONTROL WORRYING: MORE THAN HALF THE DAYS
GAD7 TOTAL SCORE: 11
6. BECOMING EASILY ANNOYED OR IRRITABLE: MORE THAN HALF THE DAYS
1. FEELING NERVOUS, ANXIOUS, OR ON EDGE: MORE THAN HALF THE DAYS
8. IF YOU CHECKED OFF ANY PROBLEMS, HOW DIFFICULT HAVE THESE MADE IT FOR YOU TO DO YOUR WORK, TAKE CARE OF THINGS AT HOME, OR GET ALONG WITH OTHER PEOPLE?: VERY DIFFICULT
4. TROUBLE RELAXING: SEVERAL DAYS
7. FEELING AFRAID AS IF SOMETHING AWFUL MIGHT HAPPEN: SEVERAL DAYS

## 2025-03-26 ASSESSMENT — PATIENT HEALTH QUESTIONNAIRE - PHQ9
SUM OF ALL RESPONSES TO PHQ QUESTIONS 1-9: 10
10. IF YOU CHECKED OFF ANY PROBLEMS, HOW DIFFICULT HAVE THESE PROBLEMS MADE IT FOR YOU TO DO YOUR WORK, TAKE CARE OF THINGS AT HOME, OR GET ALONG WITH OTHER PEOPLE: VERY DIFFICULT
10. IF YOU CHECKED OFF ANY PROBLEMS, HOW DIFFICULT HAVE THESE PROBLEMS MADE IT FOR YOU TO DO YOUR WORK, TAKE CARE OF THINGS AT HOME, OR GET ALONG WITH OTHER PEOPLE: VERY DIFFICULT
SUM OF ALL RESPONSES TO PHQ QUESTIONS 1-9: 10

## 2025-03-26 ASSESSMENT — SOCIAL DETERMINANTS OF HEALTH (SDOH): HOW OFTEN DO YOU GET TOGETHER WITH FRIENDS OR RELATIVES?: NEVER

## 2025-03-26 NOTE — PROGRESS NOTES
Preventive Care Visit  [facility]  Navneet Johnson MD, Internal Medicine  Mar 26, 2025      Assessment & Plan     Liver replaced by transplant (H)  She follows up with hepatology  She is on tacrolimus    Attention deficit hyperactivity disorder (ADHD), predominantly inattentive type  She is on Adderall XR 30 mg and takes on top of it 10 mg as needed  This is getting her ADHD symptoms under control    Immunosuppressed status  She is on tacrolimus    Malignant melanoma, unspecified site (H)  She has this excised and follows up with dermatology on a regular basis    Chronic kidney disease, stage 3a (H)  Her blood pressure is at goal  Baseline creatinine is around 1.07-1.09  Macromastia  She has macromastia   because of the macromastia she is having a lot of pain in her back and in the epigastric area  Is also exacerbating her cervical pain  She wants to get the breast reduction surgery for this  - Adult Plastic Surgery  Referral; Future    Cervical pain  She wants to get physical therapy for her cervical pain  - Physical Therapy  Referral; Future    Medicare annual wellness visit, subsequent  Discussed about vaccinations  Discussed about shingles/pneumococcal vaccination  She is going to get pneumonia vaccine today  Discussed about COVID and flu vaccines  She is up-to-date with colonoscopy  She had a Pap smear which was abnormal in 2023  She followed up with gynecology at an outside facility and they repeated the Pap smear in 2024  She told me this was good and she is due for a Pap smear again this year  She is going to follow-up with them  We also discussed about diet and exercise  She also is due for a mammogram this year  She gets these done by her gynecologist    Patient has been advised of split billing requirements and indicates understanding: No        BMI  Estimated body mass index is 25.76 kg/m  as calculated from the following:    Height as of this encounter: 1.651 m  "(5' 5\").    Weight as of this encounter: 70.2 kg (154 lb 12.8 oz).   Weight management plan: Discussed healthy diet and exercise guidelines    Counseling  Appropriate preventive services were addressed with this patient via screening, questionnaire, or discussion as appropriate for fall prevention, nutrition, physical activity, Tobacco-use cessation, social engagement, weight loss and cognition.  Checklist reviewing preventive services available has been given to the patient.  Reviewed patient's diet, addressing concerns and/or questions.   She is at risk for lack of exercise and has been provided with information to increase physical activity for the benefit of her well-being.   Patient is at risk for social isolation and has been provided with information about the benefit of social connection.   She is at risk for psychosocial distress and has been provided with information to reduce risk.   Discussed possible causes of fatigue. The patient was provided with written information regarding signs of hearing loss.   Information on urinary incontinence and treatment options given to patient.   The patient's PHQ-9 score is consistent with moderate depression. She was provided with information regarding depression.           Cameron Kitchen is a 54 year old, presenting for the following:  Physical        3/26/2025     2:21 PM   Additional Questions   Roomed by Jody GRANADOS  Here for Medicare wellness visit         Advance Care Planning  Patient has a Health Care Directive on file  Advance care planning document is on file and is current.      3/26/2025   General Health   How would you rate your overall physical health? (!) FAIR   Feel stress (tense, anxious, or unable to sleep) To some extent   (!) STRESS CONCERN      3/26/2025   Nutrition   Diet: Regular (no restrictions)         3/26/2025   Exercise   Days per week of moderate/strenous exercise 3 days   Average minutes spent exercising at this level 30 min "         3/26/2025   Social Factors   Frequency of gathering with friends or relatives Never   Worry food won't last until get money to buy more No   Food not last or not have enough money for food? No   Do you have housing? (Housing is defined as stable permanent housing and does not include staying ouside in a car, in a tent, in an abandoned building, in an overnight shelter, or couch-surfing.) Yes   Are you worried about losing your housing? No   Lack of transportation? No   Unable to get utilities (heat,electricity)? No   (!) SOCIAL CONNECTIONS CONCERN      3/26/2025   Fall Risk   Fallen 2 or more times in the past year? No   Trouble with walking or balance? No          3/26/2025   Activities of Daily Living- Home Safety   Needs help with the following daily activites None of the above   Safety concerns in the home None of the above         3/26/2025   Dental   Dentist two times every year? Yes         3/26/2025   Hearing Screening   Hearing concerns? (!) I FEEL THAT PEOPLE ARE MUMBLING OR NOT SPEAKING CLEARLY.    (!) I NEED TO ASK PEOPLE TO SPEAK UP OR REPEAT THEMSELVES.    (!) IT'S HARD TO FOLLOW A CONVERSATION IN A NOISY RESTAURANT OR CROWDED ROOM.    (!) TROUBLE UNDERSTANDING SOFT OR WHISPERED SPEECH.    (!) TROUBLE UNDERSTANDING SPEECH ON THE TELEPHONE       Multiple values from one day are sorted in reverse-chronological order         3/26/2025   Driving Risk Screening   Patient/family members have concerns about driving No         3/26/2025   General Alertness/Fatigue Screening   Have you been more tired than usual lately? (!) YES         3/26/2025   Urinary Incontinence Screening   Bothered by leaking urine in past 6 months Yes         Today's PHQ-9 Score:       3/26/2025     1:03 PM   PHQ-9 SCORE   PHQ-9 Total Score MyChart 10 (Moderate depression)   PHQ-9 Total Score 10        Patient-reported         3/26/2025   Substance Use   Alcohol more than 3/day or more than 7/wk Not Applicable   Do you have a  current opioid prescription? No   How severe/bad is pain from 1 to 10? 6/10   Do you use any other substances recreationally? No     Social History     Tobacco Use    Smoking status: Former     Current packs/day: 0.00     Types: Cigarettes     Quit date: 2020     Years since quittin.9     Passive exposure: Never    Smokeless tobacco: Never   Vaping Use    Vaping status: Never Used   Substance Use Topics    Alcohol use: Not Currently     Comment: Last drink 2021    Drug use: Never     Comment: OCASSIONAL CBC GUMMIES, THC DRINKS- LAST TAKEN LASTWEEK           2024   LAST FHS-7 RESULTS   1st degree relative breast or ovarian cancer No   Any relative bilateral breast cancer No   Any male have breast cancer No   Any ONE woman have BOTH breast AND ovarian cancer No   Any woman with breast cancer before 50yrs No   2 or more relatives with breast AND/OR ovarian cancer No   2 or more relatives with breast AND/OR bowel cancer Yes    No       Multiple values from one day are sorted in reverse-chronological order        Mammogram Screening - Mammogram every 1-2 years updated in Health Maintenance based on mutual decision making      History of abnormal Pap smear: No - age 30-64 HPV with reflex Pap every 5 years recommended        Latest Ref Rng & Units 2023     1:25 PM 3/1/2022     2:09 PM 2020     4:49 PM   PAP / HPV   PAP  Atypical squamous cells of undetermined significance (ASC-US)  Negative for Intraepithelial Lesion or Malignancy (NILM)     HPV 16 DNA Negative Negative  Negative  Negative    HPV 18 DNA Negative Negative  Positive  Positive    Other HR HPV Negative Negative  Negative  Negative      ASCVD Risk   The 10-year ASCVD risk score (Jesse MEYER, et al., 2019) is: 1.7%    Values used to calculate the score:      Age: 54 years      Sex: Female      Is Non- : No      Diabetic: No      Tobacco smoker: No      Systolic Blood Pressure: 116 mmHg      Is BP treated:  No      HDL Cholesterol: 34 mg/dL      Total Cholesterol: 156 mg/dL    Fracture Risk Assessment Tool  Link to Frax Calculator  Use the information below to complete the Frax calculator  : 1970  Sex: female  Weight (kg): 70.2 kg (actual weight)  Height (cm): 165.1 cm  Previous Fragility Fracture:  No  History of parent with fractured hip:  No  Current Smoking:  No  Patient has been on glucocorticoids for more than 3 months (5mg/day or more): No  Rheumatoid Arthritis on Problem List:  No  Secondary Osteoporosis on Problem List:  No  Consumes 3 or more units of alcohol per day: No  Femoral Neck BMD (g/cm2)            Reviewed and updated as needed this visit by Provider                      Current providers sharing in care for this patient include:  Patient Care Team:  Navneet Johnson MD as PCP - General (Internal Medicine)  Eduardo Parry PA-C as Physician Assistant (Gastroenterology)  Navneet Johnson MD as Referring Physician (Internal Medicine)  Brett Guan [facility] as Pharmacist (Pharmacist)  Navneet Johnson MD as Assigned PCP  Donovan Breen MD as MD (Dermatology)  Di Alberto MD as MD (Dermatology)  Salud Arnold MD as Physician (OB/Gyn)  Zita Steele MD as MD (Gastroenterology)  Jake Coyne LICSW as Assigned Behavioral Health Provider  Wandy Leija PA-C as Assigned Dermatology Provider    The following health maintenance items are reviewed in Epic and correct as of today:  Health Maintenance   Topic Date Due    DEPRESSION ACTION PLAN  Never done    ZOSTER IMMUNIZATION (1 of 2) Never done    HEPATITIS B IMMUNIZATION (1 of 3 - 19+ 3-dose series) Never done    Pneumococcal Vaccine: 50+ Years (2 of 2 - PCV) 2020    LUNG CANCER SCREENING  Never done    COVID-19 Vaccine (3 - Pfizer risk series) 2021    MEDICARE ANNUAL WELLNESS VISIT  2024    PAP FOLLOW-UP  2024    HPV FOLLOW-UP  2024    INFLUENZA VACCINE (1) 2024     "LIPID  03/15/2025    MICROALBUMIN  03/15/2025    PHQ-9  09/27/2025    ANNUAL REVIEW OF HM ORDERS  11/20/2025    BMP  02/18/2026    HEMOGLOBIN  02/18/2026    MAMMO SCREENING  07/25/2026    ADVANCE CARE PLANNING  01/07/2027    DIABETES SCREENING  02/18/2028    DTAP/TDAP/TD IMMUNIZATION (3 - Td or Tdap) 01/25/2029    COLORECTAL CANCER SCREENING  01/04/2032    HEPATITIS C SCREENING  Completed    HIV SCREENING  Completed    URINALYSIS  Completed    HEPATITIS A IMMUNIZATION  Completed    HPV IMMUNIZATION  Aged Out    MENINGITIS IMMUNIZATION  Aged Out    PAP  Discontinued         Review of Systems  Constitutional, HEENT, cardiovascular, pulmonary, gi and gu systems are negative, except as otherwise noted.     Objective    Exam  /82 (BP Location: Right arm, Patient Position: Sitting, Cuff Size: Adult Regular)   Pulse 83   Temp 98.6  F (37  C) (Oral)   Resp 17   Ht 1.651 m (5' 5\")   Wt 70.2 kg (154 lb 12.8 oz)   SpO2 100%   BMI 25.76 kg/m     Estimated body mass index is 25.76 kg/m  as calculated from the following:    Height as of this encounter: 1.651 m (5' 5\").    Weight as of this encounter: 70.2 kg (154 lb 12.8 oz).    Physical Exam  GENERAL: alert and no distress  EYES: Eyes grossly normal to inspection, PERRL and conjunctivae and sclerae normal  HENT: ear canals and TM's normal, nose and mouth without ulcers or lesions  NECK: no adenopathy, no asymmetry, masses, or scars  RESP: lungs clear to auscultation - no rales, rhonchi or wheezes  CV: regular rate and rhythm, normal S1 S2, no S3 or S4, no murmur, click or rub, no peripheral edema  ABDOMEN: Well-healed scar  MS: Tender on palpation of the cervical spine  Some pain on flexion and extension  SKIN: no suspicious lesions or rashes  NEURO: Normal strength and tone, mentation intact and speech normal  PSYCH: mentation appears normal, affect normal/bright         3/26/2025   Mini Cog   Clock Draw Score 2 Normal   3 Item Recall 3 objects recalled   Mini Cog " Total Score 5              Signed Electronically by: Navneet Johnson MD    Answers submitted by the patient for this visit:  Patient Health Questionnaire (Submitted on 3/26/2025)  If you checked off any problems, how difficult have these problems made it for you to do your work, take care of things at home, or get along with other people?: Very difficult  PHQ9 TOTAL SCORE: 10

## 2025-03-26 NOTE — PROGRESS NOTES
Prior to immunization administration, verified patients identity using patient s name and date of birth. Please see Immunization Activity for additional information.     Screening Questionnaire for Adult Immunization    Are you sick today?   No   Do you have allergies to medications, food, a vaccine component or latex?   No   Have you ever had a serious reaction after receiving a vaccination?   No   Do you have a long-term health problem with heart, lung, kidney, or metabolic disease (e.g., diabetes), asthma, a blood disorder, no spleen, complement component deficiency, a cochlear implant, or a spinal fluid leak?  Are you on long-term aspirin therapy?   No   Do you have cancer, leukemia, HIV/AIDS, or any other immune system problem?   No   Do you have a parent, brother, or sister with an immune system problem?   No   In the past 3 months, have you taken medications that affect  your immune system, such as prednisone, other steroids, or anticancer drugs; drugs for the treatment of rheumatoid arthritis, Crohn s disease, or psoriasis; or have you had radiation treatments?   No   Have you had a seizure, or a brain or other nervous system problem?   No   During the past year, have you received a transfusion of blood or blood    products, or been given immune (gamma) globulin or antiviral drug?   No   For women: Are you pregnant or is there a chance you could become       pregnant during the next month?   No   Have you received any vaccinations in the past 4 weeks?   No     Immunization questionnaire answers were all negative.      Patient instructed to remain in clinic for 15 minutes afterwards, and to report any adverse reactions.     Screening performed by Khoa Strong RN on 3/26/2025 at 3:24 PM.

## 2025-03-27 ENCOUNTER — VIRTUAL VISIT (OUTPATIENT)
Dept: PSYCHOLOGY | Facility: CLINIC | Age: 55
End: 2025-03-27
Payer: MEDICARE

## 2025-03-27 DIAGNOSIS — F32.1 CURRENT MODERATE EPISODE OF MAJOR DEPRESSIVE DISORDER, UNSPECIFIED WHETHER RECURRENT (H): Primary | ICD-10-CM

## 2025-03-27 NOTE — PROGRESS NOTES
"[facility]                                     Progress Note    Patient Name: Melita Cortes  Date: 03/27/2025         Service Type: Individual      Session Start Time: 3.30  Session End Time: 4.13     Session Length: 38-52    Session #: 28    Attendees: Client attended alone    Service Modality:  Video Visit:      Provider verified identity through the following two step process.  Patient provided:  Patient is known previously to provider    Telemedicine Visit: The patient's condition can be safely assessed and treated via synchronous audio and visual telemedicine encounter.      Reason for Telemedicine Visit: Patient has requested telehealth visit    Originating Site (Patient Location): Patient's home    Distant Site (Provider Location): Provider Remote Setting- Home Office    Consent:  The patient/guardian has verbally consented to: the potential risks and benefits of telemedicine (video visit) versus in person care; bill my insurance or make self-payment for services provided; and responsibility for payment of non-covered services.     Patient would like the video invitation sent by:  My Chart    Mode of Communication:  Video Conference via Amwell    Distant Location (Provider):  Off-site    As the provider I attest to compliance with applicable laws and regulations related to telemedicine.    DATA  Interactive Complexity: No  Crisis: No        Progress Since Last Session (Related to Symptoms / Goals / Homework):   Symptoms: Improving as evident by current phq9 scores.     Homework: Completed in session review patient's utilization of safety plan to mitigate SI.        Episode of Care Goals: Minimal progress - ACTION (Actively working towards change); Intervened by reinforcing change plan / affirming steps taken     Current / Ongoing Stressors and Concerns:  Patient reported  \"Trauma Adhd depression\". Patient reported \" I think I have had some trauma in my life, my liver transplant " ".\" Pt reported she is  \"overly sensitive\" and \" I dont handle stress very well and struggle with impulsivity\"  and that \"I have a poor self image, negative talk a lot \" \"used and abandoned\" and that she always have to explained herself to others.     Current: Today patient reported worsening mood. She noted that she just submitted a two weeks quit  notice  at her current job as she has been overwhelmed by the work and feels she has been neglecting her health to meet up with the demands of the job. Feels frustrated and sad that she can't keep a part time job. Feels like a failure as she has been longing to get back to work after years of being unemployed in the hope that she will feel whole again. Reported dealing with recurrent health concerns, feels she is always working on what body part after another that has a problem.        Treatment Objective(s) Addressed in This Session:   Patient will increase daily activity level by exercising for 20-30 minutes and participate in at least 1 daily pleasant/fun activities.  Patient will identify specific areas of cognitive distortion and challenge irrational thoughts with reality.    Patient will  learn and use grounding skills to  effectively manage anxiety and distress associated with trauma history daily.              Patient will learn and practice gratitude and compassion to built a positive self image.     Intervention:    Mindfulness: Continue discussion today on gratitude and self-compassion to help patient challenge negative perception of themselves and built a positive and compassionate relationship with their body through the following ways:    Be grateful for your body and what it does for you every day which entails moving from an ornamental view to an instrumental view of your body by shifting away from body form or attractiveness to body function.  This shift to a focus on what the body does for you is associated with a more positive evaluation of your " body.  Engage in compassion self-talk. Notice your internal monologue about your body and  shift the conversation to one that is compassionate and kind. Think about what you would say to your best friend in a similar situation.  Be your own best friend by planning activities that would improve your relationship with your body. Some examples are taking yourself on a walk, playing some music and dancing, doing a few yoga poses and smile at yourself every morning.         Assessments completed prior to visit:  PHQ9:       10/10/2024     3:14 PM 10/24/2024    11:08 AM 11/7/2024    12:34 PM 11/20/2024     1:42 PM 1/30/2025    12:52 PM 2/13/2025     9:29 AM 3/26/2025     1:03 PM   PHQ-9 SCORE   PHQ-9 Total Score MyChart 14 (Moderate depression) 12 (Moderate depression) 16 (Moderately severe depression)  16 (Moderately severe depression) 13 (Moderate depression) 10 (Moderate depression)   PHQ-9 Total Score 14 12  16  13 16  13  10        Patient-reported     GAD7:       8/8/2024     2:10 PM 10/10/2024     3:15 PM 10/24/2024    11:09 AM 11/7/2024    12:35 PM 1/13/2025    12:05 PM 2/13/2025     9:30 AM 3/26/2025     6:15 PM   JORGE-7 SCORE   Total Score 15 (severe anxiety) 14 (moderate anxiety) 7 (mild anxiety) 17 (severe anxiety) 17 (severe anxiety) 11 (moderate anxiety) 11 (moderate anxiety)   Total Score 15 14 7  17  17  11  11        Patient-reported         ASSESSMENT: Current Emotional / Mental Status (status of significant symptoms):   Risk status (Self / Other harm or suicidal ideation)   Patient denies current fears or concerns for personal safety.   Patient denies current or recent suicidal ideation or behaviors.   Patient denies current or recent homicidal ideation or behaviors.   Patient denies current or recent self injurious behavior or ideation.   Patient denies other safety concerns.   Patient reports there has been no change in risk factors since their last session.     Patient reports there has been no change  in protective factors since their last session.     Recommended that patient call 911 or go to the local ED should there be a change in any of these risk factors.     Appearance:   Appropriate    Eye Contact:   Good    Psychomotor Behavior: Normal    Attitude:   Cooperative  Interested   Orientation:   Person Place Time Situation   Speech    Rate / Production: Normal/ Responsive    Volume:  Normal    Mood:    Anxious  Depressed    Affect:    Tearful   Thought Content:  Clear    Thought Form:  Coherent  Logical    Insight:    Good      Medication Review:   No changes to current psychiatric medication(s)     Medication Compliance:   Yes     Changes in Health Issues:   None reported     Chemical Use Review:   Substance Use: Chemical use reviewed, no active concerns identified      Tobacco Use: No current tobacco use.      Diagnosis:  296.32 (F33.1) Major Depressive Disorder, Recurrent Episode, Moderate _ and With atypical features    Collateral Reports Completed:   Not Applicable    PLAN: (Patient Tasks / Therapist Tasks / Other)    Utilize safety plan as needed daily.    Practice  distress intolerance skill learned in session, watching emotions, label and describe them and not avoid them.     Each morning when you wake up, start by saying thank you to various parts of your body: your heart, lungs, stomach, legs, arms, eyes, ears etc. Thank your heart more especially for keeping you alive without any effort from you.     Jake Coyne, [facility]           _________________________________________________________    Individual Treatment Plan    Patient's Name: Melita Cortes  YOB: 1970    Date of Creation: 09/22/2023  Date Treatment Plan Last Reviewed/Revised: 11/21/2024    DSM5 Diagnoses: 296.32 (F33.1) Major Depressive Disorder, Recurrent Episode, Moderate _ and With atypical features  Psychosocial / Contextual Factors:   PROMIS (reviewed every 90 days):     Referral / Collaboration:  Referral to another  professional/service is not indicated at this time..    Anticipated number of session for this episode of care:  15-25  Anticipation frequency of session: Biweekly  Anticipated Duration of each session: 38-52 minutes  Treatment plan will be reviewed in 90 days or when goals have been changed.       MeasurableTreatment Goal(s) related to diagnosis / functional impairment(s)  Goal 1: Patient will identify and address specific triggers to feelings of depression and improve coping by  90 % in the next three months.    I will know I've met my goal when I am less impulsive, better communicator and can comfortably manage distressful emotions.         Objective #A (Patient Action)    Develop and utilize 3 effective distress tolerance strategies to address SI.   Status: Continued - Date(s):  03/28/2025     Intervention(s)   Therapist will engage in collaborative brainstorming with pt to identify and practice individualized distress tolerance coping strategies to address SI weekly.    Objective #B (Patient Action)    Patient will increase daily activity level by exercising for 20-30 minutes and participate in at least 1 daily pleasant/fun activities.  Status: Continued - Date(s):   03/28/2025    Intervention(s)  Therapist will provide psychoeducation, behavioral activation, and cognitive restructuring.    Objective #C  Patient will identify specific areas of cognitive distortion and challenge irrational thoughts with reality.   Status: Continued - Date(s):  03/28/2025       Intervention(s)  Therapist will provide psychoeducation, behavioral activation, and cognitive restructuring.    Objective #D  Patient will learn and practice relaxation techniques to manage depression.  Status: Continued - Date(s):   03/28/2025    Intervention(s)   Therapist will teach patient thought stopping, deep breathing exercises, mindful meditation, and creative visualization.       Goal 2: Patient will identify and address issues underlying trauma  "and improve coping  by 90% in the next 3 months .    Objective #A (Patient Action)    Patient will develop vocabulary to describe trauma symptoms.  Status: Continued - Date(s):  03/28/2025    Intervention(s)  Therapist will provide psychoeducation, behavioral activation, and cognitive restructuring.    Objective #B  Patient will learn and use grounding skills to  effectively manage anxiety and distress associated with trauma history daily.  Status: Continued - Date(s): 03/28/2025    Intervention(s)  Therapist will provide psychoeducation, behavioral activation, and cognitive restructuring.    Objective #C  Patient will gradually approach trauma-related memories, feelings, and situations through reprocessing and desensitization  weekly in session.  Status: Continued - Date(s):  03/28/2025    Intervention(s)  Therapist will provide psychoeducation, behavioral activation, and cognitive restructuring.        Patient has reviewed and agreed to the above plan.      Jake Coyne [facility]  September 22, 2023    ----- Service Performed and Documented by [facility]------  This note was reviewed and clinical supervision by ELMO Colmenares [facility]    4/1/2024           [facility]                                       Melita Cortes     SAFETY PLAN:  Step 1: Warning signs / cues (Thoughts, images, mood, situation, behavior) that a crisis may be developing:  Thoughts:   \" sometimes I feel like I don't have a purpose\"  Images:  Feels lonely after losing mum and dad and lonely in marriage  Thinking Processes: ruminations (can't stop thinking about my problems): health, family loses  Mood: worsening depression  Behaviors: isolating/withdrawing   Situations: loss: parents and lonely in marriage    Step 2: Coping strategies - Things I can do to take my mind off of my problems without contacting another person (relaxation technique, physical activity):  Distress Tolerance Strategies:  play with my pet , intense exercise: " "work out for 2-3 minutes , and support group attendance for transplant  Physical Activities: go for a walk and stretching   Focus on helpful thoughts:  \"This is temporary\" and \"It always passes\"  Step 3: People and social settings that provide distraction:   Name:  Augustine ( friend) Phone: [phone]   Name:  Aimee paniagua Phone: [phone]     gym  and [employer] store  Liver transplant group  Step 4: Remind myself of people and things that are important to me and worth living for:    My , cousins and friends.  Step 5: When I am in crisis, I can ask these people to help me use my safety plan:   Name:  Jake ( Therapist )  Phone: [phone]   Name:  Aimee paniagua Phone: [phone]   Step 6: Making the environment safe:   remove alcohol, remove drugs, and be around others  Step 7: Professionals or agencies I can contact during a crisis:  Suicide Prevention Lifeline: Call or Text 970   [facility]: [phone]    Call 911 or go to my nearest emergency department.   I helped develop this safety plan and agree to use it when needed.  I have been given a copy of this plan.      Client signature _________________________________________________________________  Today s date:  11/14/2023  Completed by Provider Name/ Credentials:  CECIL Vasquez  November 14, 2023  Adapted from Safety Plan Template 2008 Chelsi Ramos is reprinted with the express permission of the authors.  No portion of the Safety Plan Template may be reproduced without the express, written permission.  You can contact the authors at [email or web address] or [email or web address].        Answers submitted by the patient for this visit:  Patient Health Questionnaire (Submitted on 11/14/2023)  If you checked off any problems, how difficult have these problems made it for you to do your work, take care of things at home, or get along with other people?: Very difficult  PHQ9 TOTAL SCORE: 18  OJRGE-7 (Submitted on " 11/14/2023)  JORGE 7 TOTAL SCORE: 14    Answers submitted by the patient for this visit:  Patient Health Questionnaire (Submitted on 11/28/2023)  If you checked off any problems, how difficult have these problems made it for you to do your work, take care of things at home, or get along with other people?: Extremely difficult  PHQ9 TOTAL SCORE: 14  JORGE-7 (Submitted on 11/28/2023)  JORGE 7 TOTAL SCORE: 21    Answers submitted by the patient for this visit:  Patient Health Questionnaire (Submitted on 12/13/2023)  If you checked off any problems, how difficult have these problems made it for you to do your work, take care of things at home, or get along with other people?: Very difficult  PHQ9 TOTAL SCORE: 18  JORGE-7 (Submitted on 12/13/2023)  JORGE 7 TOTAL SCORE: 21    Answers submitted by the patient for this visit:  Patient Health Questionnaire (Submitted on 1/3/2024)  If you checked off any problems, how difficult have these problems made it for you to do your work, take care of things at home, or get along with other people?: Very difficult  PHQ9 TOTAL SCORE: 16  JORGE-7 (Submitted on 1/3/2024)  JORGE 7 TOTAL SCORE: 10    Answers submitted by the patient for this visit:  Patient Health Questionnaire (Submitted on 1/16/2024)  If you checked off any problems, how difficult have these problems made it for you to do your work, take care of things at home, or get along with other people?: Very difficult  PHQ9 TOTAL SCORE: 15    Answers submitted by the patient for this visit:  Patient Health Questionnaire (Submitted on 1/29/2024)  If you checked off any problems, how difficult have these problems made it for you to do your work, take care of things at home, or get along with other people?: Extremely difficult  PHQ9 TOTAL SCORE: 12  JORGE-7 (Submitted on 1/29/2024)  JORGE 7 TOTAL SCORE: 9    Answers submitted by the patient for this visit:  Patient Health Questionnaire (Submitted on 2/14/2024)  If you checked off any problems, how  difficult have these problems made it for you to do your work, take care of things at home, or get along with other people?: Extremely difficult  PHQ9 TOTAL SCORE: 11  JORGE-7 (Submitted on 2/14/2024)  JORGE 7 TOTAL SCORE: 14    Answers submitted by the patient for this visit:  Patient Health Questionnaire (Submitted on 2/28/2024)  If you checked off any problems, how difficult have these problems made it for you to do your work, take care of things at home, or get along with other people?: Extremely difficult  PHQ9 TOTAL SCORE: 16    Answers submitted by the patient for this visit:  Patient Health Questionnaire (Submitted on 3/11/2024)  If you checked off any problems, how difficult have these problems made it for you to do your work, take care of things at home, or get along with other people?: Very difficult  PHQ9 TOTAL SCORE: 10    Answers submitted by the patient for this visit:  Patient Health Questionnaire (Submitted on 3/25/2024)  If you checked off any problems, how difficult have these problems made it for you to do your work, take care of things at home, or get along with other people?: Extremely difficult  PHQ9 TOTAL SCORE: 16  JORGE-7 (Submitted on 3/25/2024)  JORGE 7 TOTAL SCORE: 9    Answers submitted by the patient for this visit:  Patient Health Questionnaire (Submitted on 4/10/2024)  If you checked off any problems, how difficult have these problems made it for you to do your work, take care of things at home, or get along with other people?: Extremely difficult  PHQ9 TOTAL SCORE: 11    Answers submitted by the patient for this visit:  Patient Health Questionnaire (Submitted on 5/9/2024)  If you checked off any problems, how difficult have these problems made it for you to do your work, take care of things at home, or get along with other people?: Extremely difficult  PHQ9 TOTAL SCORE: 14    Answers submitted by the patient for this visit:  Patient Health Questionnaire (Submitted on 5/30/2024)  If you  checked off any problems, how difficult have these problems made it for you to do your work, take care of things at home, or get along with other people?: Extremely difficult  PHQ9 TOTAL SCORE: 16  JORGE-7 (Submitted on 5/30/2024)  JORGE 7 TOTAL SCORE: 7    Answers submitted by the patient for this visit:  Patient Health Questionnaire (Submitted on 7/11/2024)  If you checked off any problems, how difficult have these problems made it for you to do your work, take care of things at home, or get along with other people?: Very difficult  PHQ9 TOTAL SCORE: 16  JORGE-7 (Submitted on 7/6/2024)  JORGE 7 TOTAL SCORE: 19    Answers submitted by the patient for this visit:  Patient Health Questionnaire (Submitted on 8/8/2024)  If you checked off any problems, how difficult have these problems made it for you to do your work, take care of things at home, or get along with other people?: Extremely difficult  PHQ9 TOTAL SCORE: 10  JORGE-7 (Submitted on 8/8/2024)  JORGE 7 TOTAL SCORE: 15    Answers submitted by the patient for this visit:  Patient Health Questionnaire (Submitted on 8/22/2024)  If you checked off any problems, how difficult have these problems made it for you to do your work, take care of things at home, or get along with other people?: Somewhat difficult  PHQ9 TOTAL SCORE: 13    Answers submitted by the patient for this visit:  Patient Health Questionnaire (Submitted on 9/12/2024)  If you checked off any problems, how difficult have these problems made it for you to do your work, take care of things at home, or get along with other people?: Extremely difficult  PHQ9 TOTAL SCORE: 9    Answers submitted by the patient for this visit:  Patient Health Questionnaire (Submitted on 10/10/2024)  If you checked off any problems, how difficult have these problems made it for you to do your work, take care of things at home, or get along with other people?: Very difficult  PHQ9 TOTAL SCORE: 14  Patient Health Questionnaire (G7)  (Submitted on 10/10/2024)  JORGE 7 TOTAL SCORE: 14    Answers submitted by the patient for this visit:  Patient Health Questionnaire (Submitted on 10/24/2024)  If you checked off any problems, how difficult have these problems made it for you to do your work, take care of things at home, or get along with other people?: Somewhat difficult  PHQ9 TOTAL SCORE: 12  Patient Health Questionnaire (G7) (Submitted on 10/24/2024)  JORGE 7 TOTAL SCORE: 7    Answers submitted by the patient for this visit:  Patient Health Questionnaire (G7) (Submitted on 1/13/2025)  JORGE 7 TOTAL SCORE: 17    Answers submitted by the patient for this visit:  Patient Health Questionnaire (Submitted on 2/13/2025)  If you checked off any problems, how difficult have these problems made it for you to do your work, take care of things at home, or get along with other people?: Very difficult  PHQ9 TOTAL SCORE: 13  Patient Health Questionnaire (G7) (Submitted on 2/13/2025)  JORGE 7 TOTAL SCORE: 11    Answers submitted by the patient for this visit:  Patient Health Questionnaire (Submitted on 3/26/2025)  If you checked off any problems, how difficult have these problems made it for you to do your work, take care of things at home, or get along with other people?: Very difficult  PHQ9 TOTAL SCORE: 10  Patient Health Questionnaire (G7) (Submitted on 3/26/2025)  JORGE 7 TOTAL SCORE: 11

## 2025-04-02 ENCOUNTER — THERAPY VISIT (OUTPATIENT)
Dept: PHYSICAL THERAPY | Facility: CLINIC | Age: 55
End: 2025-04-02
Attending: INTERNAL MEDICINE
Payer: MEDICARE

## 2025-04-02 DIAGNOSIS — M54.2 CERVICAL PAIN: ICD-10-CM

## 2025-04-02 PROCEDURE — 97161 PT EVAL LOW COMPLEX 20 MIN: CPT | Mod: GP | Performed by: PHYSICAL THERAPIST

## 2025-04-02 PROCEDURE — 97110 THERAPEUTIC EXERCISES: CPT | Mod: GP | Performed by: PHYSICAL THERAPIST

## 2025-04-02 PROCEDURE — 97014 ELECTRIC STIMULATION THERAPY: CPT | Mod: GP | Performed by: PHYSICAL THERAPIST

## 2025-04-02 NOTE — PROGRESS NOTES
PHYSICAL THERAPY EVALUATION  Type of Visit: Evaluation       Fall Risk Screen:  Fall screen completed by: PT  Have you fallen 2 or more times in the past year?: No  Have you fallen and had an injury in the past year?: No  Is patient a fall risk?: No    Subjective         Presenting condition or subjective complaint: Neck  Date of onset: 03/26/25 (monica daigle)    Relevant medical history:     Dates & types of surgery: Left hip replacement - LiverTransplant - Appendectomy- Mohs Surgery    Prior diagnostic imaging/testing results: CT scan; X-ray; Bone scan     Prior therapy history for the same diagnosis, illness or injury: Yes Dr marin had neck injection    Prior Level of Function  Transfers: Independent  Ambulation: Independent  ADL: Independent  IADL:     Living Environment  Social support: With a significant other or spouse   Type of home: [facility]   Stairs to enter the home: Yes 2 Is there a railing: No     Ramp: No   Stairs inside the home: Yes 16 Is there a railing: Yes     Help at home: None  Equipment owned: Straight Cane; Walker with wheels     Employment: No    Hobbies/Interests: Excercise    Patient goals for therapy: Sleep   Excercise    Pain assessment: Location: Neck/head; bilateral shoulders/Rating: 3-5/10     Objective   CERVICAL SPINE EVALUATION  PAIN: Pain Level at Rest: 3/10  Pain Level with Use: 5/10  Pain Location: cervical spine  Pain is Exacerbated By: driving; moving  Pain is Relieved By: Massage; Chiro; self massage  INTEGUMENTARY (edema, incisions):   POSTURE:   GAIT:   Weightbearing Status:   Assistive Device(s):   Gait Deviations:   BALANCE/PROPRIOCEPTION:   WEIGHTBEARING ALIGNMENT:   ROM:   (Degrees) Left AROM Right AROM    Cervical Flexion Min loss strain at end-range     Cervical Extension Min loss loss    Cervical Side bend Nil loss Nil loss    Cervical Rotation Min-Mod loss PDM Min loss PDM    Cervical Protrusion     Cervical Retraction Moderate loss pain during motion    Thoracic Flexion      Thoracic Extension     Thoracic Rotation       Left AROM Left PROM Right AROM Right PROM   Shoulder Flexion       Shoulder Extension       Shoulder Abduction       Shoulder Adduction       Shoulder IR       Shoulder ER       Shoulder Horiz Abduction       Shoulder Horiz Adduction       Pain:   End Feel:     MYOTOMES:    Left Right   C1-2 (Neck Flexion)     C3 (Neck Side Bend)      C4 (Shrug) 5 5   C5 (Deltoid) 5 5   C6 (Biceps) 5 5   C7 (Triceps) 5 5   C8 (Thumb Ext)     T1 (Intrinsics)       DTR S:   CORD SIGNS:   DERMATOMES:   NEURAL TENSION:   FLEXIBILITY:    SPECIAL TESTS:   PALPATION:   + Tenderness At Location Left Right   Sternocleidomastoid     Scalenes     Rhomboids     Facet     Upper Trap + +   Levator     Erector Spinae     Suboccipitals + +     SPINAL SEGMENTAL CONCLUSIONS:       Assessment & Plan   CLINICAL IMPRESSIONS  Medical Diagnosis: Neck    Treatment Diagnosis:     Impression/Assessment: Patient is a 54 year old female with Neck pain complaints.  The following significant findings have been identified: Pain, Decreased ROM/flexibility, Impaired muscle performance, and Decreased activity tolerance. These impairments interfere with their ability to perform self care tasks, recreational activities, and driving  as compared to previous level of function.     Clinical Decision Making (Complexity):  Clinical Presentation: Stable/Uncomplicated  Clinical Presentation Rationale: based on medical and personal factors listed in PT evaluation  Clinical Decision Making (Complexity): Low complexity    PLAN OF CARE  Treatment Interventions:  Modalities: E-stim  Interventions: Manual Therapy, Neuromuscular Re-education, Therapeutic Activity, Therapeutic Exercise    Long Term Goals     PT Goal 1  Goal Identifier: Driving  Goal Description: Able to drive painfree  Rationale: to maximize safety and independence with performance of ADLs and functional tasks  Goal Progress: 3-5/10 PL needing to turn body  Target  Date: 05/28/25      Frequency of Treatment: 1x week  Duration of Treatment: 8 weeks    Recommended Referrals to Other Professionals:   Education Assessment:        Risks and benefits of evaluation/treatment have been explained.   Patient/Family/caregiver agrees with Plan of Care.     Evaluation Time:     PT Eval, Low Complexity Minutes (65059): 15       Signing Clinician: JUVE Pham [facility]                                                                                   OUTPATIENT PHYSICAL THERAPY      PLAN OF TREATMENT FOR OUTPATIENT REHABILITATION   Patient's Last Name, First Name, Melita Hobbs YOB: 1970   Provider's Name   [facility]   Medical Record No.  [id number]     Onset Date: 03/26/25 (saw md)  Start of Care Date: 04/02/25     Medical Diagnosis:  Neck      PT Treatment Diagnosis:    Plan of Treatment  Frequency/Duration: 1x week/ 8 weeks    Certification date from 04/02/25 to 05/28/25         See note for plan of treatment details and functional goals     Amanda Arauz PT                         I CERTIFY THE NEED FOR THESE SERVICES FURNISHED UNDER        THIS PLAN OF TREATMENT AND WHILE UNDER MY CARE     (Physician attestation of this document indicates review and certification of the therapy plan).              Referring Provider:  Navneet Johnson    Initial Assessment  See Epic Evaluation- Start of Care Date: 04/02/25

## 2025-04-09 ENCOUNTER — THERAPY VISIT (OUTPATIENT)
Dept: PHYSICAL THERAPY | Facility: CLINIC | Age: 55
End: 2025-04-09
Payer: MEDICARE

## 2025-04-09 DIAGNOSIS — M54.2 NECK PAIN: Primary | ICD-10-CM

## 2025-04-09 PROCEDURE — 97140 MANUAL THERAPY 1/> REGIONS: CPT | Mod: GP | Performed by: PHYSICAL THERAPIST

## 2025-04-09 PROCEDURE — 97110 THERAPEUTIC EXERCISES: CPT | Mod: GP | Performed by: PHYSICAL THERAPIST

## 2025-04-15 ENCOUNTER — TELEPHONE (OUTPATIENT)
Dept: TRANSPLANT | Facility: CLINIC | Age: 55
End: 2025-04-15
Payer: MEDICARE

## 2025-04-16 ENCOUNTER — LAB (OUTPATIENT)
Dept: LAB | Facility: CLINIC | Age: 55
End: 2025-04-16
Payer: MEDICARE

## 2025-04-16 DIAGNOSIS — F10.11 ALCOHOL ABUSE, IN REMISSION: ICD-10-CM

## 2025-04-16 DIAGNOSIS — K70.30 ALCOHOLIC CIRRHOSIS (H): ICD-10-CM

## 2025-04-16 DIAGNOSIS — Z94.4 LIVER REPLACED BY TRANSPLANT (H): ICD-10-CM

## 2025-04-16 DIAGNOSIS — N18.31 CHRONIC KIDNEY DISEASE, STAGE 3A (H): Primary | ICD-10-CM

## 2025-04-16 LAB
ALBUMIN MFR UR ELPH: 10.7 MG/DL
ALBUMIN SERPL BCG-MCNC: 4.3 G/DL (ref 3.5–5.2)
ALBUMIN UR-MCNC: NEGATIVE MG/DL
ALP SERPL-CCNC: 47 U/L (ref 40–150)
ALT SERPL W P-5'-P-CCNC: 21 U/L (ref 0–50)
ANION GAP SERPL CALCULATED.3IONS-SCNC: 8 MMOL/L (ref 7–15)
APPEARANCE UR: CLEAR
AST SERPL W P-5'-P-CCNC: 26 U/L (ref 0–45)
BILIRUB DIRECT SERPL-MCNC: 0.24 MG/DL (ref 0–0.3)
BILIRUB SERPL-MCNC: 0.6 MG/DL
BILIRUB UR QL STRIP: NEGATIVE
BUN SERPL-MCNC: 24.8 MG/DL (ref 6–20)
CALCIUM SERPL-MCNC: 8.9 MG/DL (ref 8.8–10.4)
CHLORIDE SERPL-SCNC: 107 MMOL/L (ref 98–107)
CHOLEST SERPL-MCNC: 183 MG/DL
COLOR UR AUTO: YELLOW
CREAT SERPL-MCNC: 1.03 MG/DL (ref 0.51–0.95)
CREAT UR-MCNC: 142 MG/DL
CREAT UR-MCNC: 142 MG/DL
EGFRCR SERPLBLD CKD-EPI 2021: 65 ML/MIN/1.73M2
ERYTHROCYTE [DISTWIDTH] IN BLOOD BY AUTOMATED COUNT: 12.1 % (ref 10–15)
FASTING STATUS PATIENT QL REPORTED: YES
FASTING STATUS PATIENT QL REPORTED: YES
GLUCOSE SERPL-MCNC: 102 MG/DL (ref 70–99)
GLUCOSE UR STRIP-MCNC: NEGATIVE MG/DL
HCO3 SERPL-SCNC: 24 MMOL/L (ref 22–29)
HCT VFR BLD AUTO: 42.4 % (ref 35–47)
HDLC SERPL-MCNC: 46 MG/DL
HGB BLD-MCNC: 14.6 G/DL (ref 11.7–15.7)
HGB UR QL STRIP: NEGATIVE
KETONES UR STRIP-MCNC: NEGATIVE MG/DL
LDLC SERPL CALC-MCNC: 127 MG/DL
LEUKOCYTE ESTERASE UR QL STRIP: NEGATIVE
MAGNESIUM SERPL-MCNC: 1.8 MG/DL (ref 1.7–2.3)
MCH RBC QN AUTO: 30.3 PG (ref 26.5–33)
MCHC RBC AUTO-ENTMCNC: 34.4 G/DL (ref 31.5–36.5)
MCV RBC AUTO: 88 FL (ref 78–100)
MICROALBUMIN UR-MCNC: <12 MG/L
MICROALBUMIN/CREAT UR: NORMAL MG/G{CREAT}
NITRATE UR QL: NEGATIVE
NONHDLC SERPL-MCNC: 137 MG/DL
PH UR STRIP: 6.5 [PH] (ref 5–7)
PHOSPHATE SERPL-MCNC: 2.6 MG/DL (ref 2.5–4.5)
PLATELET # BLD AUTO: 202 10E3/UL (ref 150–450)
POTASSIUM SERPL-SCNC: 4.1 MMOL/L (ref 3.4–5.3)
PROT SERPL-MCNC: 6.1 G/DL (ref 6.4–8.3)
PROT/CREAT 24H UR: 0.08 MG/MG CR (ref 0–0.2)
RBC # BLD AUTO: 4.82 10E6/UL (ref 3.8–5.2)
SODIUM SERPL-SCNC: 139 MMOL/L (ref 135–145)
SP GR UR STRIP: 1.01 (ref 1–1.03)
TACROLIMUS BLD-MCNC: 3.1 UG/L (ref 5–15)
TME LAST DOSE: ABNORMAL H
TME LAST DOSE: ABNORMAL H
TRIGL SERPL-MCNC: 48 MG/DL
UROBILINOGEN UR STRIP-ACNC: 0.2 E.U./DL
WBC # BLD AUTO: 5.4 10E3/UL (ref 4–11)

## 2025-04-16 PROCEDURE — 80197 ASSAY OF TACROLIMUS: CPT

## 2025-04-16 PROCEDURE — 82043 UR ALBUMIN QUANTITATIVE: CPT

## 2025-04-16 PROCEDURE — 84100 ASSAY OF PHOSPHORUS: CPT

## 2025-04-16 PROCEDURE — 84156 ASSAY OF PROTEIN URINE: CPT | Mod: 59

## 2025-04-16 PROCEDURE — 36415 COLL VENOUS BLD VENIPUNCTURE: CPT

## 2025-04-16 PROCEDURE — 85027 COMPLETE CBC AUTOMATED: CPT

## 2025-04-16 PROCEDURE — G0480 DRUG TEST DEF 1-7 CLASSES: HCPCS | Mod: 90

## 2025-04-16 PROCEDURE — 81003 URINALYSIS AUTO W/O SCOPE: CPT | Mod: 59

## 2025-04-16 PROCEDURE — 82570 ASSAY OF URINE CREATININE: CPT

## 2025-04-16 PROCEDURE — 83735 ASSAY OF MAGNESIUM: CPT

## 2025-04-16 PROCEDURE — 82248 BILIRUBIN DIRECT: CPT

## 2025-04-16 PROCEDURE — 80053 COMPREHEN METABOLIC PANEL: CPT

## 2025-04-16 PROCEDURE — 80061 LIPID PANEL: CPT

## 2025-04-17 ENCOUNTER — THERAPY VISIT (OUTPATIENT)
Dept: PHYSICAL THERAPY | Facility: CLINIC | Age: 55
End: 2025-04-17
Payer: MEDICARE

## 2025-04-17 DIAGNOSIS — M54.2 NECK PAIN: Primary | ICD-10-CM

## 2025-04-17 PROCEDURE — 97140 MANUAL THERAPY 1/> REGIONS: CPT | Mod: GP | Performed by: PHYSICAL THERAPIST

## 2025-04-17 PROCEDURE — 97110 THERAPEUTIC EXERCISES: CPT | Mod: GP | Performed by: PHYSICAL THERAPIST

## 2025-04-18 ENCOUNTER — TRANSFERRED RECORDS (OUTPATIENT)
Dept: HEALTH INFORMATION MANAGEMENT | Facility: CLINIC | Age: 55
End: 2025-04-18
Payer: MEDICARE

## 2025-04-20 ENCOUNTER — MYC REFILL (OUTPATIENT)
Dept: FAMILY MEDICINE | Facility: CLINIC | Age: 55
End: 2025-04-20
Payer: MEDICARE

## 2025-04-20 DIAGNOSIS — F90.0 ATTENTION DEFICIT HYPERACTIVITY DISORDER (ADHD), PREDOMINANTLY INATTENTIVE TYPE: ICD-10-CM

## 2025-04-21 RX ORDER — DEXTROAMPHETAMINE SACCHARATE, AMPHETAMINE ASPARTATE MONOHYDRATE, DEXTROAMPHETAMINE SULFATE AND AMPHETAMINE SULFATE 7.5; 7.5; 7.5; 7.5 MG/1; MG/1; MG/1; MG/1
30 CAPSULE, EXTENDED RELEASE ORAL DAILY
Qty: 30 CAPSULE | Refills: 0 | Status: SHIPPED | OUTPATIENT
Start: 2025-04-21

## 2025-04-24 ENCOUNTER — MYC MEDICAL ADVICE (OUTPATIENT)
Dept: FAMILY MEDICINE | Facility: CLINIC | Age: 55
End: 2025-04-24

## 2025-04-24 ENCOUNTER — THERAPY VISIT (OUTPATIENT)
Dept: PHYSICAL THERAPY | Facility: CLINIC | Age: 55
End: 2025-04-24
Payer: MEDICARE

## 2025-04-24 ENCOUNTER — VIRTUAL VISIT (OUTPATIENT)
Dept: PSYCHOLOGY | Facility: CLINIC | Age: 55
End: 2025-04-24
Payer: MEDICARE

## 2025-04-24 DIAGNOSIS — M54.2 NECK PAIN: Primary | ICD-10-CM

## 2025-04-24 DIAGNOSIS — F32.1 CURRENT MODERATE EPISODE OF MAJOR DEPRESSIVE DISORDER, UNSPECIFIED WHETHER RECURRENT (H): Primary | ICD-10-CM

## 2025-04-24 NOTE — PROGRESS NOTES
"[facility]                                     Progress Note    Patient Name: Melita Cortes  Date: 04/24/2025         Service Type: Individual      Session Start Time: 3.30  Session End Time: 4.12     Session Length: 38-52    Session #: 29    Attendees: Client attended alone    Service Modality:  Video Visit:      Provider verified identity through the following two step process.  Patient provided:  Patient is known previously to provider    Telemedicine Visit: The patient's condition can be safely assessed and treated via synchronous audio and visual telemedicine encounter.      Reason for Telemedicine Visit: Patient has requested telehealth visit    Originating Site (Patient Location): Patient's home    Distant Site (Provider Location): Provider Remote Setting- Home Office    Consent:  The patient/guardian has verbally consented to: the potential risks and benefits of telemedicine (video visit) versus in person care; bill my insurance or make self-payment for services provided; and responsibility for payment of non-covered services.     Patient would like the video invitation sent by:  My Chart    Mode of Communication:  Video Conference via Amwell    Distant Location (Provider):  Off-site    As the provider I attest to compliance with applicable laws and regulations related to telemedicine.    DATA  Interactive Complexity: No  Crisis: No        Progress Since Last Session (Related to Symptoms / Goals / Homework):   Symptoms: Improving as evident by current phq9 scores.     Homework: Completed in session         Episode of Care Goals: Satisfactory progress - ACTION (Actively working towards change); Intervened by reinforcing change plan / affirming steps taken     Current / Ongoing Stressors and Concerns:  Patient reported  \"Trauma Adhd depression\". Patient reported \" I think I have had some trauma in my life, my liver transplant .\" Pt reported she is  \"overly sensitive\" and \" I dont " "handle stress very well and struggle with impulsivity\"  and that \"I have a poor self image, negative talk a lot \" \"used and abandoned\" and that she always have to explained herself to others.     Current: Today patient reported an improvement in mood. She reported that since quitting her job she has been able to have more time to focus on her mental health, been  exercising more and is feeling better. She noted having some physical health problem with her spine and worried a bit about the outcome as she has been asked to see a spine specialist by primary care. Still having body pain, and noted yoga has been helpful. Struggles with concentration daily, noted her mind is always wondering.        Treatment Objective(s) Addressed in This Session:   Patient will increase daily activity level by exercising for 20-30 minutes and participate in at least 1 daily pleasant/fun activities.  Patient will identify specific areas of cognitive distortion and challenge irrational thoughts with reality.    Patient will  learn and use grounding skills to  effectively manage anxiety and distress associated with trauma history daily.              Patient will learn and practice gratitude and compassion to built a positive self image.     Intervention:    DBT: Discussion today on mindfulness as practicing how to notice when your attention has wondered off and skillfully redirecting your thoughts and attention back to the present, to the here and now. Walked patient through an exercise to help them learn how to build their mindfulness muscles, through a four-step process, starting with finding a comfortable place and doing deep breathing exercise to get them grounded, relaxed and comfortable. Work with patient to observe an object using the five senses mindfully and nonjudgmentally: touch, smell, sight, hearing and smell and couched patient to notice their thoughts, emotions, and sensations through the process. Provided handout and " encourage patient to practice skill leaned daily.       Assessments completed prior to visit:  PHQ9:       10/10/2024     3:14 PM 10/24/2024    11:08 AM 11/7/2024    12:34 PM 11/20/2024     1:42 PM 1/30/2025    12:52 PM 2/13/2025     9:29 AM 3/26/2025     1:03 PM   PHQ-9 SCORE   PHQ-9 Total Score MyChart 14 (Moderate depression) 12 (Moderate depression) 16 (Moderately severe depression)  16 (Moderately severe depression) 13 (Moderate depression) 10 (Moderate depression)   PHQ-9 Total Score 14 12  16  13 16  13  10        Patient-reported     GAD7:       8/8/2024     2:10 PM 10/10/2024     3:15 PM 10/24/2024    11:09 AM 11/7/2024    12:35 PM 1/13/2025    12:05 PM 2/13/2025     9:30 AM 3/26/2025     6:15 PM   JORGE-7 SCORE   Total Score 15 (severe anxiety) 14 (moderate anxiety) 7 (mild anxiety) 17 (severe anxiety) 17 (severe anxiety) 11 (moderate anxiety) 11 (moderate anxiety)   Total Score 15 14 7  17  17  11  11        Patient-reported         ASSESSMENT: Current Emotional / Mental Status (status of significant symptoms):   Risk status (Self / Other harm or suicidal ideation)   Patient denies current fears or concerns for personal safety.   Patient denies current or recent suicidal ideation or behaviors.   Patient denies current or recent homicidal ideation or behaviors.   Patient denies current or recent self injurious behavior or ideation.   Patient denies other safety concerns.   Patient reports there has been no change in risk factors since their last session.     Patient reports there has been no change in protective factors since their last session.     Recommended that patient call 911 or go to the local ED should there be a change in any of these risk factors.     Appearance:   Appropriate    Eye Contact:   Good    Psychomotor Behavior: Normal    Attitude:   Cooperative  Interested   Orientation:   Person Place Time Situation   Speech    Rate / Production: Normal/ Responsive    Volume:  Normal  "   Mood:    Anxious  Depressed    Affect:    Tearful   Thought Content:  Clear    Thought Form:  Coherent  Logical    Insight:    Good      Medication Review:   No changes to current psychiatric medication(s)     Medication Compliance:   Yes     Changes in Health Issues:   None reported     Chemical Use Review:   Substance Use: Chemical use reviewed, no active concerns identified      Tobacco Use: No current tobacco use.      Diagnosis:  296.32 (F33.1) Major Depressive Disorder, Recurrent Episode, Moderate _ and With atypical features    Collateral Reports Completed:   Not Applicable    PLAN: (Patient Tasks / Therapist Tasks / Other)    Practice mindfulness of \"a single object\" daily beginning with 4-2-6 breathing, using the senses, noticing your thoughts and sensation.    Each morning when you wake up, start by saying thank you to various parts of your body: your heart, lungs, stomach, legs, arms, eyes, ears etc. Thank your heart more especially for keeping you alive without any effort from you.     Jake Coyne, ROSEANNE           _________________________________________________________    Individual Treatment Plan    Patient's Name: Melita Cortes  YOB: 1970    Date of Creation: 09/22/2023  Date Treatment Plan Last Reviewed/Revised: 04/24/2025    DSM5 Diagnoses: 296.32 (F33.1) Major Depressive Disorder, Recurrent Episode, Moderate _ and With atypical features  Psychosocial / Contextual Factors:   PROMIS (reviewed every 90 days):     Referral / Collaboration:  Referral to another professional/service is not indicated at this time..    Anticipated number of session for this episode of care:  15-25  Anticipation frequency of session: Biweekly  Anticipated Duration of each session: 38-52 minutes  Treatment plan will be reviewed in 90 days or when goals have been changed.       MeasurableTreatment Goal(s) related to diagnosis / functional impairment(s)  Goal 1: Patient will identify and address specific " triggers to feelings of depression and improve coping by  90 % in the next three months.    I will know I've met my goal when I am less impulsive, better communicator and can comfortably manage distressful emotions.         Objective #A (Patient Action)    Develop and utilize 3 effective distress tolerance strategies to address SI.   Status: Continued - Date(s):  07/24/2025     Intervention(s)   Therapist will engage in collaborative brainstorming with pt to identify and practice individualized distress tolerance coping strategies to address SI weekly.    Objective #B (Patient Action)    Patient will increase daily activity level by exercising for 20-30 minutes and participate in at least 1 daily pleasant/fun activities.  Status: Continued - Date(s):   07/24/2025    Intervention(s)  Therapist will provide psychoeducation, behavioral activation, and cognitive restructuring.    Objective #C  Patient will identify specific areas of cognitive distortion and challenge irrational thoughts with reality.   Status: Continued - Date(s):  07/24/2025       Intervention(s)  Therapist will provide psychoeducation, behavioral activation, and cognitive restructuring.    Objective #D  Patient will learn and practice relaxation techniques to manage depression.  Status: Continued - Date(s):   07/24/2025    Intervention(s)   Therapist will teach patient thought stopping, deep breathing exercises, mindful meditation, and creative visualization.       Goal 2: Patient will identify and address issues underlying trauma and improve coping  by 90% in the next 3 months .    Objective #A (Patient Action)    Patient will develop vocabulary to describe trauma symptoms.  Status: Continued - Date(s):  07/24/2025    Intervention(s)  Therapist will provide psychoeducation, behavioral activation, and cognitive restructuring.    Objective #B  Patient will learn and use grounding skills to  effectively manage anxiety and distress associated with trauma  "history daily.  Status: Continued - Date(s): 07/24/2025    Intervention(s)  Therapist will provide psychoeducation, behavioral activation, and cognitive restructuring.    Objective #C  Patient will gradually approach trauma-related memories, feelings, and situations through reprocessing and desensitization  weekly in session.  Status: Continued - Date(s):  07/24/2025    Intervention(s)  Therapist will provide psychoeducation, behavioral activation, and cognitive restructuring.        Patient has reviewed and agreed to the above plan.      Jake Coyne [facility]  September 22, 2023    ----- Service Performed and Documented by [facility]------  This note was reviewed and clinical supervision by ELMO Colmenares [facility]    4/1/2024           [facility]                                       Melita Cortes     SAFETY PLAN:  Step 1: Warning signs / cues (Thoughts, images, mood, situation, behavior) that a crisis may be developing:  Thoughts:   \" sometimes I feel like I don't have a purpose\"  Images:  Feels lonely after losing mum and dad and lonely in marriage  Thinking Processes: ruminations (can't stop thinking about my problems): health, family loses  Mood: worsening depression  Behaviors: isolating/withdrawing   Situations: loss: parents and lonely in marriage    Step 2: Coping strategies - Things I can do to take my mind off of my problems without contacting another person (relaxation technique, physical activity):  Distress Tolerance Strategies:  play with my pet , intense exercise: work out for 2-3 minutes , and support group attendance for transplant  Physical Activities: go for a walk and stretching   Focus on helpful thoughts:  \"This is temporary\" and \"It always passes\"  Step 3: People and social settings that provide distraction:   Name:  Augustine ( friend) Phone: [phone]   Name:  Aimee paniagua Phone: [phone]     gym  and [employer] store  Liver transplant group  Step 4: Remind myself of people " and things that are important to me and worth living for:    My , cousins and friends.  Step 5: When I am in crisis, I can ask these people to help me use my safety plan:   Name:  Jake ( Therapist )  Phone: [phone]   Name:  Aimee paniagua Phone: [phone]   Step 6: Making the environment safe:   remove alcohol, remove drugs, and be around others  Step 7: Professionals or agencies I can contact during a crisis:  Suicide Prevention Lifeline: Call or Text 831   [facility]: [phone]    Call 911 or go to my nearest emergency department.   I helped develop this safety plan and agree to use it when needed.  I have been given a copy of this plan.      Client signature _________________________________________________________________  Today s date:  11/14/2023  Completed by Provider Name/ Credentials:  Jake Coyne LGSW  November 14, 2023  Adapted from Safety Plan Template 2008 Chelsi Marx and Fred Ramos is reprinted with the express permission of the authors.  No portion of the Safety Plan Template may be reproduced without the express, written permission.  You can contact the authors at [email or web address] or [email or web address].        Answers submitted by the patient for this visit:  Patient Health Questionnaire (Submitted on 11/14/2023)  If you checked off any problems, how difficult have these problems made it for you to do your work, take care of things at home, or get along with other people?: Very difficult  PHQ9 TOTAL SCORE: 18  JORGE-7 (Submitted on 11/14/2023)  JORGE 7 TOTAL SCORE: 14    Answers submitted by the patient for this visit:  Patient Health Questionnaire (Submitted on 11/28/2023)  If you checked off any problems, how difficult have these problems made it for you to do your work, take care of things at home, or get along with other people?: Extremely difficult  PHQ9 TOTAL SCORE: 14  JORGE-7 (Submitted on 11/28/2023)  JORGE 7 TOTAL SCORE: 21    Answers submitted by the  patient for this visit:  Patient Health Questionnaire (Submitted on 12/13/2023)  If you checked off any problems, how difficult have these problems made it for you to do your work, take care of things at home, or get along with other people?: Very difficult  PHQ9 TOTAL SCORE: 18  JORGE-7 (Submitted on 12/13/2023)  JORGE 7 TOTAL SCORE: 21    Answers submitted by the patient for this visit:  Patient Health Questionnaire (Submitted on 1/3/2024)  If you checked off any problems, how difficult have these problems made it for you to do your work, take care of things at home, or get along with other people?: Very difficult  PHQ9 TOTAL SCORE: 16  JORGE-7 (Submitted on 1/3/2024)  JORGE 7 TOTAL SCORE: 10    Answers submitted by the patient for this visit:  Patient Health Questionnaire (Submitted on 1/16/2024)  If you checked off any problems, how difficult have these problems made it for you to do your work, take care of things at home, or get along with other people?: Very difficult  PHQ9 TOTAL SCORE: 15    Answers submitted by the patient for this visit:  Patient Health Questionnaire (Submitted on 1/29/2024)  If you checked off any problems, how difficult have these problems made it for you to do your work, take care of things at home, or get along with other people?: Extremely difficult  PHQ9 TOTAL SCORE: 12  JORGE-7 (Submitted on 1/29/2024)  JORGE 7 TOTAL SCORE: 9    Answers submitted by the patient for this visit:  Patient Health Questionnaire (Submitted on 2/14/2024)  If you checked off any problems, how difficult have these problems made it for you to do your work, take care of things at home, or get along with other people?: Extremely difficult  PHQ9 TOTAL SCORE: 11  JORGE-7 (Submitted on 2/14/2024)  JORGE 7 TOTAL SCORE: 14    Answers submitted by the patient for this visit:  Patient Health Questionnaire (Submitted on 2/28/2024)  If you checked off any problems, how difficult have these problems made it for you to do your work, take  care of things at home, or get along with other people?: Extremely difficult  PHQ9 TOTAL SCORE: 16    Answers submitted by the patient for this visit:  Patient Health Questionnaire (Submitted on 3/11/2024)  If you checked off any problems, how difficult have these problems made it for you to do your work, take care of things at home, or get along with other people?: Very difficult  PHQ9 TOTAL SCORE: 10    Answers submitted by the patient for this visit:  Patient Health Questionnaire (Submitted on 3/25/2024)  If you checked off any problems, how difficult have these problems made it for you to do your work, take care of things at home, or get along with other people?: Extremely difficult  PHQ9 TOTAL SCORE: 16  JORGE-7 (Submitted on 3/25/2024)  JORGE 7 TOTAL SCORE: 9    Answers submitted by the patient for this visit:  Patient Health Questionnaire (Submitted on 4/10/2024)  If you checked off any problems, how difficult have these problems made it for you to do your work, take care of things at home, or get along with other people?: Extremely difficult  PHQ9 TOTAL SCORE: 11    Answers submitted by the patient for this visit:  Patient Health Questionnaire (Submitted on 5/9/2024)  If you checked off any problems, how difficult have these problems made it for you to do your work, take care of things at home, or get along with other people?: Extremely difficult  PHQ9 TOTAL SCORE: 14    Answers submitted by the patient for this visit:  Patient Health Questionnaire (Submitted on 5/30/2024)  If you checked off any problems, how difficult have these problems made it for you to do your work, take care of things at home, or get along with other people?: Extremely difficult  PHQ9 TOTAL SCORE: 16  JORGE-7 (Submitted on 5/30/2024)  JORGE 7 TOTAL SCORE: 7    Answers submitted by the patient for this visit:  Patient Health Questionnaire (Submitted on 7/11/2024)  If you checked off any problems, how difficult have these problems made it for  you to do your work, take care of things at home, or get along with other people?: Very difficult  PHQ9 TOTAL SCORE: 16  JORGE-7 (Submitted on 7/6/2024)  JORGE 7 TOTAL SCORE: 19    Answers submitted by the patient for this visit:  Patient Health Questionnaire (Submitted on 8/8/2024)  If you checked off any problems, how difficult have these problems made it for you to do your work, take care of things at home, or get along with other people?: Extremely difficult  PHQ9 TOTAL SCORE: 10  JORGE-7 (Submitted on 8/8/2024)  JORGE 7 TOTAL SCORE: 15    Answers submitted by the patient for this visit:  Patient Health Questionnaire (Submitted on 8/22/2024)  If you checked off any problems, how difficult have these problems made it for you to do your work, take care of things at home, or get along with other people?: Somewhat difficult  PHQ9 TOTAL SCORE: 13    Answers submitted by the patient for this visit:  Patient Health Questionnaire (Submitted on 9/12/2024)  If you checked off any problems, how difficult have these problems made it for you to do your work, take care of things at home, or get along with other people?: Extremely difficult  PHQ9 TOTAL SCORE: 9    Answers submitted by the patient for this visit:  Patient Health Questionnaire (Submitted on 10/10/2024)  If you checked off any problems, how difficult have these problems made it for you to do your work, take care of things at home, or get along with other people?: Very difficult  PHQ9 TOTAL SCORE: 14  Patient Health Questionnaire (G7) (Submitted on 10/10/2024)  JORGE 7 TOTAL SCORE: 14    Answers submitted by the patient for this visit:  Patient Health Questionnaire (Submitted on 10/24/2024)  If you checked off any problems, how difficult have these problems made it for you to do your work, take care of things at home, or get along with other people?: Somewhat difficult  PHQ9 TOTAL SCORE: 12  Patient Health Questionnaire (G7) (Submitted on 10/24/2024)  JORGE 7 TOTAL SCORE:  7    Answers submitted by the patient for this visit:  Patient Health Questionnaire (G7) (Submitted on 1/13/2025)  JORGE 7 TOTAL SCORE: 17    Answers submitted by the patient for this visit:  Patient Health Questionnaire (Submitted on 2/13/2025)  If you checked off any problems, how difficult have these problems made it for you to do your work, take care of things at home, or get along with other people?: Very difficult  PHQ9 TOTAL SCORE: 13  Patient Health Questionnaire (G7) (Submitted on 2/13/2025)  JORGE 7 TOTAL SCORE: 11    Answers submitted by the patient for this visit:  Patient Health Questionnaire (Submitted on 3/26/2025)  If you checked off any problems, how difficult have these problems made it for you to do your work, take care of things at home, or get along with other people?: Very difficult  PHQ9 TOTAL SCORE: 10  Patient Health Questionnaire (G7) (Submitted on 3/26/2025)  JORGE 7 TOTAL SCORE: 11

## 2025-04-24 NOTE — TELEPHONE ENCOUNTER
Patient was last seen by provider on 3/26/25, physical therapy referral placed at that time. Patient requesting MRI order and further advisement if patient should repeat DEXA scan.     Routing to provider to review and advise.     CRICKET Rai  [facility]

## 2025-05-01 ENCOUNTER — THERAPY VISIT (OUTPATIENT)
Dept: PHYSICAL THERAPY | Facility: CLINIC | Age: 55
End: 2025-05-01
Payer: MEDICARE

## 2025-05-01 DIAGNOSIS — M54.2 NECK PAIN: Primary | ICD-10-CM

## 2025-05-06 NOTE — TELEPHONE ENCOUNTER
FUTURE VISIT INFORMATION      FUTURE VISIT INFORMATION:  Date: 7/29/25  Time: 8:45am  Location: [facility]  REFERRAL INFORMATION:  Referring provider: Navneet Johnson MD   Referring providers clinic: [facility]  Reason for visit/diagnosis: Breast Reduction- Macromastia     RECORDS REQUESTED FROM:       Clinic name Comments Records Status Imaging Status   [facility] 3/26/25 - OV FP w/Navneet Johnson MD  EPIC     7/25/24, 7/20/23, 5/23/22, 9/30/20 - MA SCREENING BILATERAL W/QUIANA    1/9/22, 1/8/22, 12/30/21, 11/4/21, 9/26/21 - XR CHEST PORT 1 VIEW    1/7/22, 12/23/21, 11/3/21, 9/26/21 - XR CHEST 2 VW  EPIC  AND CE

## 2025-05-08 ENCOUNTER — OFFICE VISIT (OUTPATIENT)
Dept: PHYSICAL MEDICINE AND REHAB | Facility: CLINIC | Age: 55
End: 2025-05-08
Attending: INTERNAL MEDICINE
Payer: MEDICARE

## 2025-05-08 VITALS
WEIGHT: 158 LBS | BODY MASS INDEX: 26.33 KG/M2 | HEART RATE: 87 BPM | HEIGHT: 65 IN | SYSTOLIC BLOOD PRESSURE: 143 MMHG | DIASTOLIC BLOOD PRESSURE: 87 MMHG

## 2025-05-08 DIAGNOSIS — M47.812 CERVICAL SPONDYLOSIS WITHOUT MYELOPATHY: ICD-10-CM

## 2025-05-08 PROCEDURE — 99204 OFFICE O/P NEW MOD 45 MIN: CPT | Performed by: NURSE PRACTITIONER

## 2025-05-08 PROCEDURE — 1125F AMNT PAIN NOTED PAIN PRSNT: CPT | Performed by: NURSE PRACTITIONER

## 2025-05-08 PROCEDURE — 3079F DIAST BP 80-89 MM HG: CPT | Performed by: NURSE PRACTITIONER

## 2025-05-08 PROCEDURE — 3077F SYST BP >= 140 MM HG: CPT | Performed by: NURSE PRACTITIONER

## 2025-05-08 ASSESSMENT — PAIN SCALES - GENERAL: PAINLEVEL_OUTOF10: MODERATE PAIN (5)

## 2025-05-08 NOTE — PROGRESS NOTES
ASSESSMENT: Melita Cortes is a 54 year old female presents for consultation at the request of PCP Navneet Johnson, who presents today for new patient evaluation of :     -Cervical spondylosis without myelopathy     Decreased reflexes mary lower ext, Otherwise normal exam  With painful range of motion and tenderness of the upper paraspinal musculature. Point tenderness to palp upper and mid cspine. Positive mary occipital tenderness. We reviewed her cspine MRI from 2022 at which point she had a left sided disc bulge at C6-7 with moderate narrowing around the left nerve. She is not having any arm symptoms at this time. Mostly neck pain and stiffness. I expect sx related to facet arthropathy. I did recommend updated cervical MRI to compare as she is already doing PT and has not experienced relief. Recommended ongoing strengthening with PT, prn use of topical voltaren gel.  Could consider muscle relaxer depending on PCP opinion given hx transplant.          5/8/2025     9:38 AM   OSWESTRY DISABILITY INDEX   Count 10    Sum 25    Oswestry Score (%) 50 %        Patient-reported            Diagnoses and all orders for this visit:  Cervical spondylosis without myelopathy  -     Spine  Referral  -     MR Cervical Spine w/o Contrast; Future       PLAN:  Reviewed spine anatomy and disease process. Discussed diagnosis and treatment options with the patient today. A shared decision making model was used. The patient's values and choices were respected. The following represents what was discussed and decided upon by the provider and the patient.     -DIAGNOSTIC TESTS:  Images were personally reviewed and interpreted and explained to patient today using spine model.   -MRI cervical spine without contrast ordered today    -PHYSICAL THERAPY:    -continue PT  Discussed the importance of core strengthening, ROM, stretching exercises with the patient and how each of these entities is important in decreasing pain.  Explained  to the patient that the purpose of physical therapy is to teach the patient a home exercise program.  These exercises need to be performed every day in order to decrease pain and prevent future occurrences of pain.    -MEDICATIONS:    -diclofenac gel 1%    Discussed multiple medication options today with patient. Discussed risks, side effects, and proper use of medications. Patient verbalized understanding.    -INTERVENTIONS:    -Discussed the role of injections with patient today. Patient would be a good candidate in the future for either epidural steroid injections or medial branch blocks if indicated based on symptoms and supported by imaging results.  -Risks and benefits of injections were discussed with patient today.    -PATIENT EDUCATION: Total time of 40 minutes, on the day of service, spent with the patient, reviewing the chart, placing orders, and documenting.   -Today we also discussed the issues related to the pros and cons of the current treatment plan.    -FOLLOW-UP:  in person to review imaging once available    Advised patient to call the Spine Center if symptoms worsen or if they develop red flag symptoms such as numbness, weakness, severe pain uncontrolled by current pain med regimen, or any new or worsening problems controlling bladder and bowel function.   ______________________________________________________________________    SUBJECTIVE:   Melita Cortes  is a 54 year old female hx melanoma, liver transplant, CKD stage 3, anxiety, depression, ADHD, acid reflux, neuropathy, osteoarthritis, asthma, left hip replacement, HPV who presents today for new patient evaluation of Cervical spondylosis without myelopathy -    Liver tx 2022 tacrolimus related joint pain.  Had an MRI in 2022 the pain has worsened since then.    In the last 6 mos, neck pain has been worse. It is constant. Tight diffusely through the back of the scalp with pain radiating into the back of her skull. It comes and goes in  severity but is constant. Her whole head hurts, like she has a headache and radiates above her right eye. She has noticed numbness and tingling in her hands and fingers, toes which comes and goes. Pain level today 5/10 at worst 8, at best 3.    She denies arm pain, imbalance,   She has noticed some weakness in the legs a little bit. She is relating this to being out of shape.  She notes generalized fatigue  She does yoga and this helps with the tension - it relieves the pain and it has improved her balance as well. She goes twice per week.    She does not take any pain medications. She has used a CBD stick.   Ice pack and heat, she does not note any significant relief. At first the heat feels good. But the pain is back the next day.    She has tried massage and chiropractic care. She felt she was getting relief for a while but then she changed chiropractors and he was cracking the neck more aggressively than she had done before and she stopped going a few weeks ago (march 27, 31, apr 7, 21st). It did not feel like it lasted.     Had a steroid injection a few years ago  Has been going to PT this spring    -Treatment to Date:     -Medications:    Current Outpatient Medications   Medication Sig Dispense Refill    amphetamine-dextroamphetamine (ADDERALL XR) 30 MG 24 hr capsule Take 1 capsule (30 mg) by mouth daily. 30 capsule 0    calcium carbonate (OS-SHAI) 1500 (600 Ca) MG tablet Take 1 tablet (600 mg) by mouth daily 60 tablet 3    COMPOUNDED NON-CONTROLLED SUBSTANCE (CMPD RX) - PHARMACY TO MIX COMPOUNDED MEDICATION Apply to the lower lip twice daily for 4 days, wash hands after application. Avoid sun. 30 g 0    FLUoxetine (PROZAC) 40 MG capsule TAKE 1 CAPSULE BY MOUTH EVERY DAY 90 capsule 0    hydrocortisone 2.5 % ointment Apply to the lips twice daily for 7 days. 30 g 0    hydrocortisone 2.5 % ointment Apply twice daily for 1 week on itchy, scaly areas on the body.  Apply Vaseline on top. 60 g 0    hydrOXYzine HCl  (ATARAX) 25 MG tablet TAKE 2 TABLETS BY MOUTH NIGHTLY AS NEEDED FOR (INSOMINA) 180 tablet 3    magnesium oxide (MAG-OX) 400 MG tablet Take 1 tablet (400 mg) by mouth 2 times daily 90 tablet 3    multivitamin (CENTRUM SILVER) tablet Take 1 tablet by mouth daily      pantoprazole (PROTONIX) 40 MG EC tablet Take 1 tablet (40 mg) by mouth daily 90 tablet 3    tacrolimus (GENERIC EQUIVALENT) 1 MG capsule Take 1 capsule (1 mg) by mouth every 12 hours. 180 capsule 3     No current facility-administered medications for this visit.       Allergies   Allergen Reactions    Adhesive Tape Rash    Latex Rash       Past Medical History:   Diagnosis Date    Alcoholic hepatitis (H)     Asthma 03/10/2006    Cervical high risk HPV (human papillomavirus) test positive 2019, 2020    See problem list    Clinical diagnosis of COVID-19 12/26/2023    Depressive disorder     Gastroesophageal reflux disease without esophagitis     Liver failure (H)         Patient Active Problem List   Diagnosis    Abdominal bloating    Family history of pancreatic cancer    Transaminitis    Macrocytosis    Cervical high risk HPV (human papillomavirus) test positive    Anemia, unspecified type    Elevated serum creatinine    Hyponatremia    Malaise and fatigue    At high risk for severe sepsis    Symptomatic anemia    Bandemia    Hyperlipidemia    Anxiety    Liver replaced by transplant (H)    Immunosuppressed status    Steroid-induced hyperglycemia    Hypervolemia    Elevated alkaline phosphatase level    Neck pain    Pain of both elbows    Mild recurrent major depression    Chronic kidney disease, stage 3a (H)    Attention deficit hyperactivity disorder (ADHD), predominantly inattentive type    Osteoarthritis of left hip    Hx of arthroplasty    Clinical diagnosis of COVID-19    History of left hip replacement    Osteoarthritis of right patellofemoral joint    Malignant melanoma, unspecified site (H)       Past Surgical History:   Procedure Laterality Date     APPENDECTOMY      COLONOSCOPY N/A 2022    Procedure: COLONOSCOPY;  Surgeon: Zita Steele MD;  Location: [facility]    ENDOSCOPIC RETROGRADE CHOLANGIOPANCREATOGRAM N/A 2022    Procedure: ENDOSCOPIC RETROGRADE CHOLANGIOPANCREATOGRAPHY WITH BILIARY SPHINCTEROTOMY, SLUDGE REMOVAL, DILATION  AND STENT PLACEMENT;  Surgeon: Guru Gardenia Escobar MD;  Location: [facility]    ENDOSCOPIC RETROGRADE CHOLANGIOPANCREATOGRAM N/A 2022    Procedure: ENDOSCOPIC RETROGRADE CHOLANGIOPANCREATOGRAPHY WITH BILE DUCT STENT REMOVAL;  Surgeon: Guru Gardenia Escobar MD;  Location: [facility]    ENDOSCOPIC RETROGRADE CHOLANGIOPANCREATOGRAPHY, EXCHANGE TUBE/STENT N/A 2022    Procedure: ENDOSCOPIC RETROGRADE CHOLANGIOPANCREATOGRAPHY, bile duct stents exchanged, balloon dilation and sweep of bile ducts for sludge;  Surgeon: Guru Gardenia Escobar MD;  Location: [facility]    ESOPHAGOGASTRODUODENOSCOPY, WITH BRUSHINGS N/A 10/29/2021    Procedure: ESOPHAGOGASTRODUODENOSCOPY, WITH BRUSHINGS;  Surgeon: Torey Ruiz MD;  Location: [facility]    IR LIVER BIOPSY PERCUTANEOUS  2022    OPERATIVE HYSTEROSCOPY WITH MORCELLATOR N/A 2023    Procedure: Operative hysteroscopy with Myosure morcellator;  Surgeon: Salud Arnold MD;  Location:  OR    ORTHOPEDIC SURGERY Left 2023    hip replacement    TRANSPLANT LIVER RECIPIENT  DONOR N/A 2022    Procedure: TRANSPLANT, LIVER, RECIPIENT,  DONOR;  Surgeon: Pradeep Browne MD;  Location: [facility]       Family History   Problem Relation Age of Onset    Cancer Mother     Melanoma Father     Skin Cancer Father     Colon Cancer Maternal Uncle     Colon Cancer Paternal Aunt        Reviewed past medical, surgical, and family history with patient found on new patient intake packet located in EMR Media tab.     SOCIAL HX: prev heavy drinking, no current alcohol use, nonsmoker, no rec drug use    Oswestry (EVA)  "Questionnaire:        5/8/2025     9:38 AM   OSWESTRY DISABILITY INDEX   Count 10    Sum 25    Oswestry Score (%) 50 %        Patient-reported       Neck Disability Index:      5/8/2025     9:40 AM   Neck Disability Index (  Donald VALENCIA. and Kinga CODY. 1991. All rights reserved.; used with permission)   SECTION 1 - PAIN INTENSITY 2   SECTION 2 - PERSONAL CARE 2   SECTION 3 - LIFTING 3   SECTION 4 - READING 5   SECTION 5 - HEADACHES 3   SECTION 6 - CONCENTRATION 4   SECTION 7 - WORK 3   SECTION 8 - DRIVING 3   SECTION 9 - SLEEPING 3   SECTION 10 - RECREATION 3   Count 10    Sum 31    Raw Score: /50 31    Neck Disability Index Score: (%) 62 %        Patient-reported          PHQ-2 Score:       6/10/2024     8:54 AM 10/23/2023     8:59 AM   PHQ-2 ( 1999 [employer])   Q1: Little interest or pleasure in doing things 0 2   Q2: Feeling down, depressed or hopeless 1 2   PHQ-2 Score 1 4          ROS: positive for weight gain, headache, eye pain, feet/leg swelling, color changes in hands and feet, joint pain, muscle pain, muscle fatigue, excessive tiredness, anxiety, depression. Specifically negative for bowel/bladder dysfunction, balance changes, dizziness, foot drop, fevers, chills, appetite changes, nausea/vomiting, unexplained weight loss. Otherwise 13 systems reviewed are negative. Please see the patient's intake questionnaire from today for details.    OBJECTIVE:  BP (!) 143/87   Pulse 87   Ht 5' 5\" (1.651 m)   Wt 158 lb (71.7 kg)   BMI 26.29 kg/m      PHYSICAL EXAMINATION:  --CONSTITUTIONAL: Vital signs as above. No acute distress. The patient is well nourished and well groomed.  --PSYCHIATRIC: The patient is awake, alert, oriented to person, place, and time, and answering questions appropriately with clear speech. Appropriate mood and affect   --HEENT: Sclera are non-injected. Extraocular muscles are intact. Moist oral mucosa.  --SKIN: Skin over the face, bilateral upper extremities, and posterior torso is clean, dry, " intact without rashes.  --RESPIRATORY: Normal rhythm and effort. No abnormal accessory muscle breathing patterns noted.     --NEUROLOGIC: CN III-XII are grossly intact.   --GROSS MOTOR: Easily arises from a seated position. Toe walking, heel walking, and tandem gait are normal.      --UPPER EXTREMITY MOTOR TESTING:  Shoulder abduction: right 5/5, left 5/5  Triceps: right 5/5 left 5/5  Biceps:right 5/5  left 5/5,   Hand :  right 5/5  left 5/5,   Intrinsics: right 5/5  left 5/5,   Extensors: right 5/5  left 5/5,     --LOWER EXTREMITY MOTOR TESTING  Hip flexion: right 5/5  left 5/5,   Quads:right 5/5  left 5/5,   Hamstrings: right 5/5  left 5/5,   Dorsiflexion: right 5/5  left 5/5,   Plantarflexion: right 5/5  left 5/5,   EHL: right 5/5  left 5/5,     REFLEXES: 2/4 symmetric triceps, biceps, brachioradialis bilaterally. 0/4 symmetric patellar, achilles reflex bilaterally.  Negative Clonus, Babinski, and Little's bilaterally.      SENSATION: of the upper and lower extremities is intact to light touch.     --VASCULAR: Warm upper and lower limbs bilaterally. No swelling or color change noted.    --CERVICAL SPINE: Inspection reveals no evidence of deformity or swelling. Range of motion is painful and limited in cervical flexion, extension, rotation, head tilt.Tender to palp upper and mid point tenderness. Some upper paraspinal musculature tenderness and mary occipital tenderness    --SHOULDERS: Full range of motion bilaterally: abduction, flexion, cross chest movement internal/external rotation. Negative empty can. No tenderness to palpation or swelling noted of AC joint.        RESULTS:   Prior medical records from [facility] and [facility]where were reviewed today.         IMAGING:  Spine imaging was personally reviewed and interpreted today. The images were shown to the patient and the findings were explained using a spine model.  R CERVICAL SPINE W/O CONTRAST 5/31/2022 7:56 AM     Provided History: Neck  pain, chronic, degenerative changes on xray;  Cervical radiculopathy, no red flags; DDD (degenerative disc disease),  cervical  ICD-10: DDD (degenerative disc disease), cervical     Comparison: None available     Technique: Sagittal T1-weighted, sagittal T2-weighted, sagittal STIR,  sagittal gradient echo, sagittal diffusion-weighted (with ADC map),  axial T2-weighted, and axial T2* gradient echo images of the cervical  spine were obtained without intravenous contrast.     Findings:  The cervical vertebrae are normally aligned.  There is mild disc  height narrowing at T6-7.  There is normal signal within and normal  contour of the cervical spinal cord.  The findings on a level by level  basis are as follows:     C2-3:  No spinal canal or neural foraminal stenosis.     C3-4:  No spinal canal or neural foraminal stenosis.     C4-5:  Minimal disc bulge, left uncinate spur and facet arthropathy  causing mild left foraminal stenosis. The right neural foramen and  spinal canal are patent.     C5-6:  Disc bulge with uncinate spur and facet arthropathy causing  mild spinal canal stenosis and moderate left and mild right foraminal  narrowing.     C6-7:  Disc bulge with infolding of the ligamentum flavum causing mild  spinal canal stenosis. Facet arthropathy and uncinate spur causing  moderate left and mild right foraminal stenosis.     C7-T1:  Central disc protrusion without significant spinal canal  stenosis. The left neural foramen is moderately narrowed and the right  neural foramen is mildly narrowed.     No abnormality of the paraspinous soft tissues.                                                                      Impression:   Multilevel cervical spondylosis greatest at C5-6 and C6-7 where there  is mild spinal canal stenosis and moderate left foraminal stenosis.  Multilevel neural foraminal narrowings as detailed above.        HUGO AU MD         SYSTEM ID:  [id number]      3 views cervical spine radiographs  5/7/2022 10:56 AM     History: DDD (degenerative disc disease), cervical     Additional History from EMR:     Comparison: None     Findings:     AP, lateral and odontoid views of the cervical spine were obtained.     Down to the level of C7 is well visualized on the lateral view(s).   There is no acute osseous abnormality. No prevertebral soft tissue  swelling. Normal cervical lordosis is maintained.       There is multilevel degenerative changes of the cervical spine with  multilevel disc height loss at C4-5, C5-6 and C6-7 with uncovertebral  joint hypertrophy.   Tip of dens is obscured by overlying maxilla on odontoid view. The  lateral mass of C1 is well aligned on C2.  The visualized lung apices are clear.                                                                      Impression: Degenerative changes at C4-C7.     FELIPE MARTINEZ MD         SYSTEM ID:  [id number]    2 views lumbar spine radiographs 5/7/2022 11:02 AM     History: Lumbar radicular pain     Comparison: Abdominal radiograph 2/7/2022     Findings:     AP and lateral  views of the lumbar spine were obtained.     5  lumbar type vertebral bodies are assumed for the purpose of this  dictation.     There is no acute osseous abnormality.  Slight leftward curvature of  the thoracolumbar/lumbar spine with apex at L3-L4.     Moderate disc space loss at L5-S1. Mild degenerative changes of  bilateral sacroiliac joints.     Vascular calcifications. The visualized bowel gas pattern is  non-obstructive. Multiple surgical clips and presumed biliary drains  are in place.                                                                      Impression:  1.  No acute osseous abnormality.  2.  Moderate disc space loss at L5-S1.      DIDI RAYMOND         SYSTEM ID:  [id number]    This note was dictated using voice recognition software. Any grammatical or context distortions are unintentional and inherent to the software.       Bria Castrejon [facility]  Spine Center  O. [phone]

## 2025-05-08 NOTE — PATIENT INSTRUCTIONS
Topical voltaren gel 1%      Imaging (MRI) has been ordered today.   Radiology will call you to schedule. Please call below if you do not hear from them in the next couple of days.     [facility] Radiology Scheduling:  Please call [phone] to schedule your image(s) (select option #1).    There are 3 different locations you may go as a walk-in to have same-day Xrays done:    [facility]  [address] [place] [address] Imaging - [place] [id number] [facility], [address]   [place] [id number]    [place] [id number] [place] [phone]       ~Please call our [facility] Nurse Navigation line [phone] with any questions or concerns about your treatment plan, if symptoms worsen and you would like to be seen urgently, or if you have any new or worsening numbness, weakness, or problems controlling bladder and bowel function.  ~You are also welcome to contact Bria Castrejon via [employer], but please be aware that responses to [employer] message may take 2-3 days due to the high volume of patients seen in clinic.

## 2025-05-08 NOTE — LETTER
5/8/2025      Melita Cortes  [id number] [address]  [place] [address]      Dear Colleague,    Thank you for referring your patient, Melita Cortes, to the [facility]. Please see a copy of my visit note below.    ASSESSMENT: Melita Cortes is a 54 year old female presents for consultation at the request of PCP Navneet Johnson, who presents today for new patient evaluation of :     -Cervical spondylosis without myelopathy     Decreased reflexes mary lower ext, Otherwise normal exam  With painful range of motion and tenderness of the upper paraspinal musculature. Point tenderness to palp upper and mid cspine. Positive mary occipital tenderness. We reviewed her cspine MRI from 2022 at which point she had a left sided disc bulge at C6-7 with moderate narrowing around the left nerve. She is not having any arm symptoms at this time. Mostly neck pain and stiffness. I expect sx related to facet arthropathy. I did recommend updated cervical MRI to compare as she is already doing PT and has not experienced relief. Recommended ongoing strengthening with PT, prn use of topical voltaren gel.  Could consider muscle relaxer depending on PCP opinion given hx transplant.          5/8/2025     9:38 AM   OSWESTRY DISABILITY INDEX   Count 10    Sum 25    Oswestry Score (%) 50 %        Patient-reported            Diagnoses and all orders for this visit:  Cervical spondylosis without myelopathy  -     Spine  Referral  -     MR Cervical Spine w/o Contrast; Future       PLAN:  Reviewed spine anatomy and disease process. Discussed diagnosis and treatment options with the patient today. A shared decision making model was used. The patient's values and choices were respected. The following represents what was discussed and decided upon by the provider and the patient.     -DIAGNOSTIC TESTS:  Images were personally reviewed and interpreted and explained to patient today using spine model.    -MRI cervical spine without contrast ordered today    -PHYSICAL THERAPY:    -continue PT  Discussed the importance of core strengthening, ROM, stretching exercises with the patient and how each of these entities is important in decreasing pain.  Explained to the patient that the purpose of physical therapy is to teach the patient a home exercise program.  These exercises need to be performed every day in order to decrease pain and prevent future occurrences of pain.    -MEDICATIONS:    -diclofenac gel 1%    Discussed multiple medication options today with patient. Discussed risks, side effects, and proper use of medications. Patient verbalized understanding.    -INTERVENTIONS:    -Discussed the role of injections with patient today. Patient would be a good candidate in the future for either epidural steroid injections or medial branch blocks if indicated based on symptoms and supported by imaging results.  -Risks and benefits of injections were discussed with patient today.    -PATIENT EDUCATION: Total time of 40 minutes, on the day of service, spent with the patient, reviewing the chart, placing orders, and documenting.   -Today we also discussed the issues related to the pros and cons of the current treatment plan.    -FOLLOW-UP:  in person to review imaging once available    Advised patient to call the Spine Center if symptoms worsen or if they develop red flag symptoms such as numbness, weakness, severe pain uncontrolled by current pain med regimen, or any new or worsening problems controlling bladder and bowel function.   ______________________________________________________________________    SUBJECTIVE:   Melita Cortes  is a 54 year old female hx melanoma, liver transplant, CKD stage 3, anxiety, depression, ADHD, acid reflux, neuropathy, osteoarthritis, asthma, left hip replacement, HPV who presents today for new patient evaluation of Cervical spondylosis without myelopathy -    Liver tx 2022  tacrolimus related joint pain.  Had an MRI in 2022 the pain has worsened since then.    In the last 6 mos, neck pain has been worse. It is constant. Tight diffusely through the back of the scalp with pain radiating into the back of her skull. It comes and goes in severity but is constant. Her whole head hurts, like she has a headache and radiates above her right eye. She has noticed numbness and tingling in her hands and fingers, toes which comes and goes. Pain level today 5/10 at worst 8, at best 3.    She denies arm pain, imbalance,   She has noticed some weakness in the legs a little bit. She is relating this to being out of shape.  She notes generalized fatigue  She does yoga and this helps with the tension - it relieves the pain and it has improved her balance as well. She goes twice per week.    She does not take any pain medications. She has used a CBD stick.   Ice pack and heat, she does not note any significant relief. At first the heat feels good. But the pain is back the next day.    She has tried massage and chiropractic care. She felt she was getting relief for a while but then she changed chiropractors and he was cracking the neck more aggressively than she had done before and she stopped going a few weeks ago (march 27, 31, apr 7, 21st). It did not feel like it lasted.     Had a steroid injection a few years ago  Has been going to PT this spring    -Treatment to Date:     -Medications:    Current Outpatient Medications   Medication Sig Dispense Refill     amphetamine-dextroamphetamine (ADDERALL XR) 30 MG 24 hr capsule Take 1 capsule (30 mg) by mouth daily. 30 capsule 0     calcium carbonate (OS-SHAI) 1500 (600 Ca) MG tablet Take 1 tablet (600 mg) by mouth daily 60 tablet 3     COMPOUNDED NON-CONTROLLED SUBSTANCE (CMPD RX) - PHARMACY TO MIX COMPOUNDED MEDICATION Apply to the lower lip twice daily for 4 days, wash hands after application. Avoid sun. 30 g 0     FLUoxetine (PROZAC) 40 MG capsule TAKE 1  CAPSULE BY MOUTH EVERY DAY 90 capsule 0     hydrocortisone 2.5 % ointment Apply to the lips twice daily for 7 days. 30 g 0     hydrocortisone 2.5 % ointment Apply twice daily for 1 week on itchy, scaly areas on the body.  Apply Vaseline on top. 60 g 0     hydrOXYzine HCl (ATARAX) 25 MG tablet TAKE 2 TABLETS BY MOUTH NIGHTLY AS NEEDED FOR (INSOMINA) 180 tablet 3     magnesium oxide (MAG-OX) 400 MG tablet Take 1 tablet (400 mg) by mouth 2 times daily 90 tablet 3     multivitamin (CENTRUM SILVER) tablet Take 1 tablet by mouth daily       pantoprazole (PROTONIX) 40 MG EC tablet Take 1 tablet (40 mg) by mouth daily 90 tablet 3     tacrolimus (GENERIC EQUIVALENT) 1 MG capsule Take 1 capsule (1 mg) by mouth every 12 hours. 180 capsule 3     No current facility-administered medications for this visit.       Allergies   Allergen Reactions     Adhesive Tape Rash     Latex Rash       Past Medical History:   Diagnosis Date     Alcoholic hepatitis (H)      Asthma 03/10/2006     Cervical high risk HPV (human papillomavirus) test positive 2019, 2020    See problem list     Clinical diagnosis of COVID-19 12/26/2023     Depressive disorder      Gastroesophageal reflux disease without esophagitis      Liver failure (H)         Patient Active Problem List   Diagnosis     Abdominal bloating     Family history of pancreatic cancer     Transaminitis     Macrocytosis     Cervical high risk HPV (human papillomavirus) test positive     Anemia, unspecified type     Elevated serum creatinine     Hyponatremia     Malaise and fatigue     At high risk for severe sepsis     Symptomatic anemia     Bandemia     Hyperlipidemia     Anxiety     Liver replaced by transplant (H)     Immunosuppressed status     Steroid-induced hyperglycemia     Hypervolemia     Elevated alkaline phosphatase level     Neck pain     Pain of both elbows     Mild recurrent major depression     Chronic kidney disease, stage 3a (H)     Attention deficit hyperactivity disorder  (ADHD), predominantly inattentive type     Osteoarthritis of left hip     Hx of arthroplasty     Clinical diagnosis of COVID-19     History of left hip replacement     Osteoarthritis of right patellofemoral joint     Malignant melanoma, unspecified site (H)       Past Surgical History:   Procedure Laterality Date     APPENDECTOMY       COLONOSCOPY N/A 2022    Procedure: COLONOSCOPY;  Surgeon: Zita Steele MD;  Location:  GI     ENDOSCOPIC RETROGRADE CHOLANGIOPANCREATOGRAM N/A 2022    Procedure: ENDOSCOPIC RETROGRADE CHOLANGIOPANCREATOGRAPHY WITH BILIARY SPHINCTEROTOMY, SLUDGE REMOVAL, DILATION  AND STENT PLACEMENT;  Surgeon: Guru Gardenia Escobar MD;  Location:  OR     ENDOSCOPIC RETROGRADE CHOLANGIOPANCREATOGRAM N/A 2022    Procedure: ENDOSCOPIC RETROGRADE CHOLANGIOPANCREATOGRAPHY WITH BILE DUCT STENT REMOVAL;  Surgeon: Guru Gardenia Escobar MD;  Location:  OR     ENDOSCOPIC RETROGRADE CHOLANGIOPANCREATOGRAPHY, EXCHANGE TUBE/STENT N/A 2022    Procedure: ENDOSCOPIC RETROGRADE CHOLANGIOPANCREATOGRAPHY, bile duct stents exchanged, balloon dilation and sweep of bile ducts for sludge;  Surgeon: Guru Gardenia Escobar MD;  Location:  OR     ESOPHAGOGASTRODUODENOSCOPY, WITH BRUSHINGS N/A 10/29/2021    Procedure: ESOPHAGOGASTRODUODENOSCOPY, WITH BRUSHINGS;  Surgeon: Torey Ruiz MD;  Location:  GI     IR LIVER BIOPSY PERCUTANEOUS  2022     OPERATIVE HYSTEROSCOPY WITH MORCELLATOR N/A 2023    Procedure: Operative hysteroscopy with Myosure morcellator;  Surgeon: Salud Arnold MD;  Location:  OR     ORTHOPEDIC SURGERY Left 2023    hip replacement     TRANSPLANT LIVER RECIPIENT  DONOR N/A 2022    Procedure: TRANSPLANT, LIVER, RECIPIENT,  DONOR;  Surgeon: Pradeep Browne MD;  Location:  OR       Family History   Problem Relation Age of Onset     Cancer Mother      Melanoma  "Father      Skin Cancer Father      Colon Cancer Maternal Uncle      Colon Cancer Paternal Aunt        Reviewed past medical, surgical, and family history with patient found on new patient intake packet located in EMR Media tab.     SOCIAL HX: prev heavy drinking, no current alcohol use, nonsmoker, no rec drug use    Oswestry (EVA) Questionnaire:        5/8/2025     9:38 AM   OSWESTRY DISABILITY INDEX   Count 10    Sum 25    Oswestry Score (%) 50 %        Patient-reported       Neck Disability Index:      5/8/2025     9:40 AM   Neck Disability Index (  Donald VALENCIA. and Kinga CODY. 1991. All rights reserved.; used with permission)   SECTION 1 - PAIN INTENSITY 2   SECTION 2 - PERSONAL CARE 2   SECTION 3 - LIFTING 3   SECTION 4 - READING 5   SECTION 5 - HEADACHES 3   SECTION 6 - CONCENTRATION 4   SECTION 7 - WORK 3   SECTION 8 - DRIVING 3   SECTION 9 - SLEEPING 3   SECTION 10 - RECREATION 3   Count 10    Sum 31    Raw Score: /50 31    Neck Disability Index Score: (%) 62 %        Patient-reported          PHQ-2 Score:       6/10/2024     8:54 AM 10/23/2023     8:59 AM   PHQ-2 ( 1999 [employer])   Q1: Little interest or pleasure in doing things 0 2   Q2: Feeling down, depressed or hopeless 1 2   PHQ-2 Score 1 4          ROS: positive for weight gain, headache, eye pain, feet/leg swelling, color changes in hands and feet, joint pain, muscle pain, muscle fatigue, excessive tiredness, anxiety, depression. Specifically negative for bowel/bladder dysfunction, balance changes, dizziness, foot drop, fevers, chills, appetite changes, nausea/vomiting, unexplained weight loss. Otherwise 13 systems reviewed are negative. Please see the patient's intake questionnaire from today for details.    OBJECTIVE:  BP (!) 143/87   Pulse 87   Ht 5' 5\" (1.651 m)   Wt 158 lb (71.7 kg)   BMI 26.29 kg/m      PHYSICAL EXAMINATION:  --CONSTITUTIONAL: Vital signs as above. No acute distress. The patient is well nourished and well groomed.  --PSYCHIATRIC: " The patient is awake, alert, oriented to person, place, and time, and answering questions appropriately with clear speech. Appropriate mood and affect   --HEENT: Sclera are non-injected. Extraocular muscles are intact. Moist oral mucosa.  --SKIN: Skin over the face, bilateral upper extremities, and posterior torso is clean, dry, intact without rashes.  --RESPIRATORY: Normal rhythm and effort. No abnormal accessory muscle breathing patterns noted.     --NEUROLOGIC: CN III-XII are grossly intact.   --GROSS MOTOR: Easily arises from a seated position. Toe walking, heel walking, and tandem gait are normal.      --UPPER EXTREMITY MOTOR TESTING:  Shoulder abduction: right 5/5, left 5/5  Triceps: right 5/5 left 5/5  Biceps:right 5/5  left 5/5,   Hand :  right 5/5  left 5/5,   Intrinsics: right 5/5  left 5/5,   Extensors: right 5/5  left 5/5,     --LOWER EXTREMITY MOTOR TESTING  Hip flexion: right 5/5  left 5/5,   Quads:right 5/5  left 5/5,   Hamstrings: right 5/5  left 5/5,   Dorsiflexion: right 5/5  left 5/5,   Plantarflexion: right 5/5  left 5/5,   EHL: right 5/5  left 5/5,     REFLEXES: 2/4 symmetric triceps, biceps, brachioradialis bilaterally. 0/4 symmetric patellar, achilles reflex bilaterally.  Negative Clonus, Babinski, and Little's bilaterally.      SENSATION: of the upper and lower extremities is intact to light touch.     --VASCULAR: Warm upper and lower limbs bilaterally. No swelling or color change noted.    --CERVICAL SPINE: Inspection reveals no evidence of deformity or swelling. Range of motion is painful and limited in cervical flexion, extension, rotation, head tilt.Tender to palp upper and mid point tenderness. Some upper paraspinal musculature tenderness and mary occipital tenderness    --SHOULDERS: Full range of motion bilaterally: abduction, flexion, cross chest movement internal/external rotation. Negative empty can. No tenderness to palpation or swelling noted of AC joint.        RESULTS:    Prior medical records from [facility] were reviewed today.         IMAGING:  Spine imaging was personally reviewed and interpreted today. The images were shown to the patient and the findings were explained using a spine model.  R CERVICAL SPINE W/O CONTRAST 5/31/2022 7:56 AM     Provided History: Neck pain, chronic, degenerative changes on xray;  Cervical radiculopathy, no red flags; DDD (degenerative disc disease),  cervical  ICD-10: DDD (degenerative disc disease), cervical     Comparison: None available     Technique: Sagittal T1-weighted, sagittal T2-weighted, sagittal STIR,  sagittal gradient echo, sagittal diffusion-weighted (with ADC map),  axial T2-weighted, and axial T2* gradient echo images of the cervical  spine were obtained without intravenous contrast.     Findings:  The cervical vertebrae are normally aligned.  There is mild disc  height narrowing at T6-7.  There is normal signal within and normal  contour of the cervical spinal cord.  The findings on a level by level  basis are as follows:     C2-3:  No spinal canal or neural foraminal stenosis.     C3-4:  No spinal canal or neural foraminal stenosis.     C4-5:  Minimal disc bulge, left uncinate spur and facet arthropathy  causing mild left foraminal stenosis. The right neural foramen and  spinal canal are patent.     C5-6:  Disc bulge with uncinate spur and facet arthropathy causing  mild spinal canal stenosis and moderate left and mild right foraminal  narrowing.     C6-7:  Disc bulge with infolding of the ligamentum flavum causing mild  spinal canal stenosis. Facet arthropathy and uncinate spur causing  moderate left and mild right foraminal stenosis.     C7-T1:  Central disc protrusion without significant spinal canal  stenosis. The left neural foramen is moderately narrowed and the right  neural foramen is mildly narrowed.     No abnormality of the paraspinous soft tissues.                                                                       Impression:   Multilevel cervical spondylosis greatest at C5-6 and C6-7 where there  is mild spinal canal stenosis and moderate left foraminal stenosis.  Multilevel neural foraminal narrowings as detailed above.        HUGO AU MD         SYSTEM ID:  [id number]      3 views cervical spine radiographs 5/7/2022 10:56 AM     History: DDD (degenerative disc disease), cervical     Additional History from EMR:     Comparison: None     Findings:     AP, lateral and odontoid views of the cervical spine were obtained.     Down to the level of C7 is well visualized on the lateral view(s).   There is no acute osseous abnormality. No prevertebral soft tissue  swelling. Normal cervical lordosis is maintained.       There is multilevel degenerative changes of the cervical spine with  multilevel disc height loss at C4-5, C5-6 and C6-7 with uncovertebral  joint hypertrophy.   Tip of dens is obscured by overlying maxilla on odontoid view. The  lateral mass of C1 is well aligned on C2.  The visualized lung apices are clear.                                                                      Impression: Degenerative changes at C4-C7.     FELIPE MARTINEZ MD         SYSTEM ID:  [id number]    2 views lumbar spine radiographs 5/7/2022 11:02 AM     History: Lumbar radicular pain     Comparison: Abdominal radiograph 2/7/2022     Findings:     AP and lateral  views of the lumbar spine were obtained.     5  lumbar type vertebral bodies are assumed for the purpose of this  dictation.     There is no acute osseous abnormality.  Slight leftward curvature of  the thoracolumbar/lumbar spine with apex at L3-L4.     Moderate disc space loss at L5-S1. Mild degenerative changes of  bilateral sacroiliac joints.     Vascular calcifications. The visualized bowel gas pattern is  non-obstructive. Multiple surgical clips and presumed biliary drains  are in place.                                                                       Impression:  1.  No acute osseous abnormality.  2.  Moderate disc space loss at L5-S1.      GLOG         SYSTEM ID:  [id number]    This note was dictated using voice recognition software. Any grammatical or context distortions are unintentional and inherent to the software.       Bria BACONP-C  [facility]  O. [phone]      Again, thank you for allowing me to participate in the care of your patient.        Sincerely,        HAKAN Lilly CNP    Electronically signed

## 2025-05-11 ENCOUNTER — MYC REFILL (OUTPATIENT)
Dept: FAMILY MEDICINE | Facility: CLINIC | Age: 55
End: 2025-05-11
Payer: MEDICARE

## 2025-05-11 DIAGNOSIS — F90.0 ATTENTION DEFICIT HYPERACTIVITY DISORDER (ADHD), PREDOMINANTLY INATTENTIVE TYPE: ICD-10-CM

## 2025-05-12 RX ORDER — DEXTROAMPHETAMINE SACCHARATE, AMPHETAMINE ASPARTATE MONOHYDRATE, DEXTROAMPHETAMINE SULFATE AND AMPHETAMINE SULFATE 7.5; 7.5; 7.5; 7.5 MG/1; MG/1; MG/1; MG/1
30 CAPSULE, EXTENDED RELEASE ORAL DAILY
Qty: 30 CAPSULE | Refills: 0 | Status: SHIPPED | OUTPATIENT
Start: 2025-05-12

## 2025-05-18 DIAGNOSIS — G47.00 INSOMNIA, UNSPECIFIED TYPE: ICD-10-CM

## 2025-05-19 ENCOUNTER — OFFICE VISIT (OUTPATIENT)
Dept: DERMATOLOGY | Facility: CLINIC | Age: 55
End: 2025-05-19
Payer: MEDICARE

## 2025-05-19 DIAGNOSIS — L57.0 ACTINIC KERATOSES: ICD-10-CM

## 2025-05-19 DIAGNOSIS — N64.9 BREAST LESION: ICD-10-CM

## 2025-05-19 DIAGNOSIS — D24.2 BENIGN NEOPLASM OF LEFT BREAST: ICD-10-CM

## 2025-05-19 DIAGNOSIS — R22.9 SUBCUTANEOUS NODULE: Primary | ICD-10-CM

## 2025-05-19 RX ORDER — HYDROXYZINE HYDROCHLORIDE 25 MG/1
TABLET, FILM COATED ORAL
Qty: 180 TABLET | Refills: 3 | OUTPATIENT
Start: 2025-05-19

## 2025-05-19 ASSESSMENT — PAIN SCALES - GENERAL: PAINLEVEL_OUTOF10: NO PAIN (0)

## 2025-05-19 NOTE — PROGRESS NOTES
[facility] Dermatology Note  Encounter Date: May 19, 2025  Office Visit     Reviewed patients past medical history and pertinent chart review prior to patients visit today.     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     Social:     ____________________________________________    Assessment & Plan:     #Actinic keratosis x4  - We discussed the precancerous nature of the skin lesions.  I recommended liquid nitrogen cryotherapy and the patient was agreeable.      Cryotherapy procedure note, location(s): left cheek x3, left lateral lower lip x1 After verbal consent and discussion of risks and benefits including, but not limited to, dyspigmentation/scar, blister, and pain, the lesion(s) was(were) treated with 1-2 mm freeze border for 1-2 cycles with liquid nitrogen. Post cryotherapy instructions were provided.    # Subcutaneous nodule, left upper inner quadrant breast  - I suspect a benign epidermal inclusion cyst, recommend ultrasound to confirm. Ultrasound order placed.     All risks, benefits and alternatives were discussed with patient.  Patient is in agreement and understands the assessment and plan.  All questions were answered.  Wandy Leija PA-C  [facility]  _______________________________________    CC: Derm Problem (Spot check L breast, denies bleeding, crusting, or changes recently. AK L cheek)    HPI:  Ms. Melita Cortes is a(n) 54  year old female who presents today as a return patient for two concerns.  She notes new rough skin on the left cheek and left lower lip.  No pain, itching, or bleeding at the sites.  Second, months ago she noticed a lesion on the left breast.  She denies any growth or changes over time.  The lesion is asymptomatic.  Patient is otherwise feeling well, without additional skin concerns.      Physical Exam:  SKIN: Focused examination of face and left medial breast performed.  - Pink macule(s) with gritty scale involving the left cheek x3, left lateral lower lip x1, consistent with actinic keratosis.   - The left inner upper breast demonstrates a 0.5 x 0.5 cm well demarcated, mobile subcutaneous nodule.     - No other lesions of concern on areas examined.     Medications:  Current Outpatient Medications   Medication Sig Dispense Refill    amphetamine-dextroamphetamine (ADDERALL XR) 30 MG 24 hr capsule Take 1 capsule (30 mg) by mouth daily. 30 capsule 0    calcium carbonate (OS-SHAI) 1500 (600 Ca) MG tablet Take 1 tablet (600 mg) by mouth daily 60 tablet 3    FLUoxetine (PROZAC) 40 MG capsule TAKE 1 CAPSULE BY MOUTH EVERY DAY 90 capsule 0    hydrocortisone 2.5 % ointment Apply to the lips twice daily for 7 days. 30 g 0    hydrocortisone 2.5 % ointment Apply twice daily for 1 week on itchy, scaly areas on the body.  Apply Vaseline on top. 60 g 0    hydrOXYzine HCl (ATARAX) 25 MG tablet TAKE 2 TABLETS BY MOUTH NIGHTLY AS NEEDED FOR (INSOMINA) 180 tablet 3    magnesium oxide (MAG-OX) 400 MG tablet Take 1 tablet (400 mg) by mouth 2 times daily 90 tablet 3    multivitamin (CENTRUM SILVER) tablet Take 1 tablet by mouth daily      tacrolimus (GENERIC EQUIVALENT) 1 MG capsule Take 1 capsule (1 mg) by mouth every 12 hours. 180 capsule 3    COMPOUNDED NON-CONTROLLED SUBSTANCE (CMPD RX) - PHARMACY TO MIX COMPOUNDED MEDICATION Apply to the lower lip twice daily for 4 days, wash hands after application. Avoid sun. (Patient not taking:  Reported on 5/19/2025) 30 g 0    pantoprazole (PROTONIX) 40 MG EC tablet Take 1 tablet (40 mg) by mouth daily (Patient not taking: Reported on 5/19/2025) 90 tablet 3     No current facility-administered medications for this visit.      Past Medical History:   Patient Active Problem List   Diagnosis    Abdominal bloating    Family history of pancreatic cancer    Transaminitis    Macrocytosis    Cervical high risk HPV (human papillomavirus) test positive    Anemia, unspecified type    Elevated serum creatinine    Hyponatremia    Malaise and fatigue    At high risk for severe sepsis    Symptomatic anemia    Bandemia    Hyperlipidemia    Anxiety    Liver replaced by transplant (H)    Immunosuppressed status    Steroid-induced hyperglycemia    Hypervolemia    Elevated alkaline phosphatase level    Neck pain    Pain of both elbows    Mild recurrent major depression    Chronic kidney disease, stage 3a (H)    Attention deficit hyperactivity disorder (ADHD), predominantly inattentive type    Osteoarthritis of left hip    Hx of arthroplasty    Clinical diagnosis of COVID-19    History of left hip replacement    Osteoarthritis of right patellofemoral joint    Malignant melanoma, unspecified site (H)     Past Medical History:   Diagnosis Date    Alcoholic hepatitis (H)     Asthma 03/10/2006    Cervical high risk HPV (human papillomavirus) test positive 2019, 2020    See problem list    Clinical diagnosis of COVID-19 12/26/2023    Depressive disorder     Gastroesophageal reflux disease without esophagitis     Liver failure (H)        CC Referred Self, MD  No address on file on close of this encounter.

## 2025-05-19 NOTE — NURSING NOTE
Dermatology Rooming Note    Melita Cortes's goals for this visit include:   Chief Complaint   Patient presents with    Derm Problem     Spot check L breast, denies bleeding, crusting, or changes recently. MOLLY Alcala - EMT

## 2025-05-19 NOTE — PATIENT INSTRUCTIONS
Patient Education        Proper skin care from [facility]:     -Eliminate harsh soaps as they strip the natural oils from the skin, often resulting in dry itchy skin ( i.e. Dial, Zest, [place])  -Use mild soaps such as Cetaphil or Dove Sensitive Skin in the shower. You do not need to use soap on arms, legs, and trunk every time you shower unless visibly soiled.   -Avoid hot or cold showers.  -After showering, lightly dry off and apply moisturizing within 2-3 minutes. This will help trap moisture in the skin.   -Aggressive use of a moisturizer at least 1-2 times a day to the entire body (including -Vanicream, Cetaphil, Aquaphor or Cerave) and moisturize hands after every washing.  -We recommend using moisturizers that come in a tub that needs to be scooped out, not a pump. This has more of an oil base. It will hold moisture in your skin much better than a water base moisturizer. The above recommended are non-pore clogging.        Wear a sunscreen with at least SPF 30 on your face, ears, neck and V of the chest daily. Wear sunscreen on other areas of the body if those areas are exposed to the sun throughout the day. Sunscreens can contain physical and/or chemical blockers. Physical blockers are less likely to clog pores, these include zinc oxide and titanium dioxide. Reapply every two hour and after swimming.      Sunscreen examples: https://www.ewg.org/sunscreen/     UV radiation  UVA radiation remains constant throughout the day and throughout the year. It is a longer wavelength than UVB and therefore penetrates deeper into the skin leading to immediate and delayed tanning, photoaging, and skin cancer. 70-80% of UVA and UVB radiation occurs between the hours of 10am-2pm.  UVB radiation  UVB radiation causes the most harmful effects and is more significant during the summer months. However, snow and ice can reflect UVB radiation leading to skin damage during the winter months as well. UVB radiation is  responsible for tanning, burning, inflammation, delayed erythema (pinkness), pigmentation (brown spots), and skin cancer.      I recommend self monthly full body exams and yearly full body exams with a dermatology provider. If you develop a new or changing lesion please follow up for examination. Most skin cancers are pink and scaly or pink and pearly. However, we do see blue/brown/black skin cancers.  Consider the ABCDEs of melanoma when giving yourself your monthly full body exam ( don't forget the groin, buttocks, feet, toes, etc). A-asymmetry, B-borders, C-color, D-diameter, E-elevation or evolving. If you see any of these changes please follow up in clinic. If you cannot see your back I recommend purchasing a hand held mirror to use with a larger wall mirror.       Checking for Skin Cancer  You can find cancer early by checking your skin each month. There are 3 kinds of skin cancer. They are melanoma, basal cell carcinoma, and squamous cell carcinoma. Doing monthly skin checks is the best way to find new marks or skin changes. Follow the instructions below for checking your skin.   The ABCDEs of checking moles for melanoma   Check your moles or growths for signs of melanoma using ABCDE:   Asymmetry: the sides of the mole or growth don t match  Border: the edges are ragged, notched, or blurred  Color: the color within the mole or growth varies  Diameter: the mole or growth is larger than 6 mm (size of a pencil eraser)  Evolving: the size, shape, or color of the mole or growth is changing (evolving is not shown in the images below)    Checking for other types of skin cancer  Basal cell carcinoma or squamous cell carcinoma have symptoms such as:      A spot or mole that looks different from all other marks on your skin  Changes in how an area feels, such as itching, tenderness, or pain  Changes in the skin's surface, such as oozing, bleeding, or scaliness  A sore that does not heal  New swelling or redness beyond  the border of a mole     Who s at risk?  Anyone can get skin cancer. But you are at greater risk if you have:   Fair skin, light-colored hair, or light-colored eyes  Many moles or abnormal moles on your skin  A history of sunburns from sunlight or tanning beds  A family history of skin cancer  A history of exposure to radiation or chemicals  A weakened immune system  If you have had skin cancer in the past, you are at risk for recurring skin cancer.   How to check your skin  Do your monthly skin checkups in front of a full-length mirror. Check all parts of your body, including your:   Head (ears, face, neck, and scalp)  Torso (front, back, and sides)  Arms (tops, undersides, upper, and lower armpits)  Hands (palms, backs, and fingers, including under the nails)  Buttocks and genitals  Legs (front, back, and sides)  Feet (tops, soles, toes, including under the nails, and between toes)  If you have a lot of moles, take digital photos of them each month. Make sure to take photos both up close and from a distance. These can help you see if any moles change over time.   Most skin changes are not cancer. But if you see any changes in your skin, call your doctor right away. Only he or she can diagnose a problem. If you have skin cancer, seeing your doctor can be the first step toward getting the treatment that could save your life.   [employer] last reviewed this educational content on 4/1/2019 2000-2020 [employer]. [address] [place] [address], [place] [address]. All rights reserved. This information is not intended as a substitute for professional medical care. Always follow your healthcare professional's instructions.        When should I call my doctor?  If you are worsening or not improving, please, contact us or seek urgent care as noted below.      Who should I call with questions (adults)?  [facility] (adult and pediatric): [phone]  [facility]  [place] (adult): [phone]  [facility] ([place], [place], [place] and [place]) [phone]  For urgent needs outside of business hours call the [facility] at [phone] and ask for the dermatology resident on call to be paged  If this is a medical emergency and you are unable to reach an ER, Call 911        If you need a prescription refill, please contact your pharmacy. Refills are approved or denied by our Physicians during normal business hours, Monday through Fridays  Per office policy, refills will not be granted if you have not been seen within the past year (or sooner depending on your child's condition)

## 2025-05-19 NOTE — LETTER
5/19/2025       RE: Melita Cortes  [id number]  [place] [address]     Dear Colleague,    Thank you for referring your patient, Melita Cortes, to the [facility] [place] at [facility]. Please see a copy of my visit note below.    [facility] Dermatology Note  Encounter Date: May 19, 2025  Office Visit     Reviewed patients past medical history and pertinent chart review prior to patients visit today.     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     Social:     ____________________________________________    Assessment & Plan:     #Actinic keratosis x4  - We discussed the precancerous nature of the skin lesions.  I recommended liquid nitrogen cryotherapy and the patient was agreeable.      Cryotherapy procedure note, location(s): left cheek x3, left lateral lower lip x1 After verbal consent and discussion of risks and benefits including, but not limited to, dyspigmentation/scar, blister, and pain, the lesion(s) was(were) treated with 1-2 mm freeze border for 1-2 cycles with liquid nitrogen. Post cryotherapy instructions were provided.    # Subcutaneous nodule, left upper inner quadrant breast  - I suspect a benign epidermal inclusion cyst, recommend ultrasound to confirm. Ultrasound order placed.     All risks, benefits and alternatives were discussed with patient.  Patient  is in agreement and understands the assessment and plan.  All questions were answered.  Wandy Leija PA-C  [facility]  _______________________________________    CC: Derm Problem (Spot check L breast, denies bleeding, crusting, or changes recently. AK L cheek)    HPI:  Ms. Melita Cortes is a(n) 54 year old female who presents today as a return patient for two concerns.  She notes new rough skin on the left cheek and left lower lip.  No pain, itching, or bleeding at the sites.  Second, months ago she noticed a lesion on the left breast.  She denies any growth or changes over time.  The lesion is asymptomatic.  Patient is otherwise feeling well, without additional skin concerns.      Physical Exam:  SKIN: Focused examination of face and left medial breast performed.  - Pink macule(s) with gritty scale involving the left cheek x3, left lateral lower lip x1, consistent with actinic keratosis.   - The left inner upper breast demonstrates a 0.5 x 0.5 cm well demarcated, mobile subcutaneous nodule.     - No other lesions of concern on areas examined.     Medications:  Current Outpatient Medications   Medication Sig Dispense Refill     amphetamine-dextroamphetamine (ADDERALL XR) 30 MG 24 hr capsule Take 1 capsule (30 mg) by mouth daily. 30 capsule 0     calcium carbonate (OS-SHAI) 1500 (600 Ca) MG tablet Take 1 tablet (600 mg) by mouth daily 60 tablet 3     FLUoxetine (PROZAC) 40 MG capsule TAKE 1 CAPSULE BY MOUTH EVERY DAY 90 capsule 0     hydrocortisone 2.5 % ointment Apply to the lips twice daily for 7 days. 30 g 0     hydrocortisone 2.5 % ointment Apply twice daily for 1 week on itchy, scaly areas on the body.  Apply Vaseline on top. 60 g 0     hydrOXYzine HCl (ATARAX) 25 MG tablet TAKE 2 TABLETS BY MOUTH NIGHTLY AS NEEDED FOR (INSOMINA) 180 tablet 3     magnesium oxide (MAG-OX) 400 MG tablet Take 1 tablet (400 mg) by mouth 2 times daily 90 tablet 3     multivitamin (CENTRUM SILVER) tablet Take  1 tablet by mouth daily       tacrolimus (GENERIC EQUIVALENT) 1 MG capsule Take 1 capsule (1 mg) by mouth every 12 hours. 180 capsule 3     COMPOUNDED NON-CONTROLLED SUBSTANCE (CMPD RX) - PHARMACY TO MIX COMPOUNDED MEDICATION Apply to the lower lip twice daily for 4 days, wash hands after application. Avoid sun. (Patient not taking: Reported on 5/19/2025) 30 g 0     pantoprazole (PROTONIX) 40 MG EC tablet Take 1 tablet (40 mg) by mouth daily (Patient not taking: Reported on 5/19/2025) 90 tablet 3     No current facility-administered medications for this visit.      Past Medical History:   Patient Active Problem List   Diagnosis     Abdominal bloating     Family history of pancreatic cancer     Transaminitis     Macrocytosis     Cervical high risk HPV (human papillomavirus) test positive     Anemia, unspecified type     Elevated serum creatinine     Hyponatremia     Malaise and fatigue     At high risk for severe sepsis     Symptomatic anemia     Bandemia     Hyperlipidemia     Anxiety     Liver replaced by transplant (H)     Immunosuppressed status     Steroid-induced hyperglycemia     Hypervolemia     Elevated alkaline phosphatase level     Neck pain     Pain of both elbows     Mild recurrent major depression     Chronic kidney disease, stage 3a (H)     Attention deficit hyperactivity disorder (ADHD), predominantly inattentive type     Osteoarthritis of left hip     Hx of arthroplasty     Clinical diagnosis of COVID-19     History of left hip replacement     Osteoarthritis of right patellofemoral joint     Malignant melanoma, unspecified site (H)     Past Medical History:   Diagnosis Date     Alcoholic hepatitis (H)      Asthma 03/10/2006     Cervical high risk HPV (human papillomavirus) test positive 2019, 2020    See problem list     Clinical diagnosis of COVID-19 12/26/2023     Depressive disorder      Gastroesophageal reflux disease without esophagitis      Liver failure (H)        CC Referred Self, MD  No  address on file on close of this encounter.     Again, thank you for allowing me to participate in the care of your patient.      Sincerely,    Wandy Leija PA-C

## 2025-05-22 ENCOUNTER — ANCILLARY PROCEDURE (OUTPATIENT)
Dept: MAMMOGRAPHY | Facility: CLINIC | Age: 55
End: 2025-05-22
Attending: PHYSICIAN ASSISTANT
Payer: MEDICARE

## 2025-05-22 DIAGNOSIS — R22.9 SUBCUTANEOUS NODULE: ICD-10-CM

## 2025-05-22 DIAGNOSIS — D24.2 BENIGN NEOPLASM OF LEFT BREAST: ICD-10-CM

## 2025-05-22 DIAGNOSIS — N64.9 BREAST LESION: ICD-10-CM

## 2025-05-22 PROCEDURE — 77066 DX MAMMO INCL CAD BI: CPT | Performed by: RADIOLOGY

## 2025-05-22 PROCEDURE — G0279 TOMOSYNTHESIS, MAMMO: HCPCS | Performed by: RADIOLOGY

## 2025-05-27 ENCOUNTER — RESULTS FOLLOW-UP (OUTPATIENT)
Dept: DERMATOLOGY | Facility: CLINIC | Age: 55
End: 2025-05-27

## 2025-05-29 ENCOUNTER — OFFICE VISIT (OUTPATIENT)
Dept: PHYSICAL MEDICINE AND REHAB | Facility: CLINIC | Age: 55
End: 2025-05-29
Payer: MEDICARE

## 2025-05-29 VITALS — DIASTOLIC BLOOD PRESSURE: 86 MMHG | HEART RATE: 88 BPM | SYSTOLIC BLOOD PRESSURE: 132 MMHG

## 2025-05-29 DIAGNOSIS — M47.812 CERVICAL SPONDYLOSIS WITHOUT MYELOPATHY: Primary | ICD-10-CM

## 2025-05-29 DIAGNOSIS — M54.16 LUMBAR RADICULOPATHY: ICD-10-CM

## 2025-05-29 PROCEDURE — 3079F DIAST BP 80-89 MM HG: CPT | Performed by: NURSE PRACTITIONER

## 2025-05-29 PROCEDURE — 99214 OFFICE O/P EST MOD 30 MIN: CPT | Performed by: NURSE PRACTITIONER

## 2025-05-29 PROCEDURE — 3075F SYST BP GE 130 - 139MM HG: CPT | Performed by: NURSE PRACTITIONER

## 2025-05-29 PROCEDURE — 1125F AMNT PAIN NOTED PAIN PRSNT: CPT | Performed by: NURSE PRACTITIONER

## 2025-05-29 ASSESSMENT — PAIN SCALES - GENERAL: PAINLEVEL_OUTOF10: MODERATE PAIN (6)

## 2025-05-29 NOTE — PATIENT INSTRUCTIONS
~You have been referred for [facility] to [facility]. They will call you to schedule an appointment.        Discussed the importance of core strengthening, ROM, stretching exercises and how each of these entities is important in decreasing pain and improving long term spine health.  The purpose of physical therapy is to teach you an individualized home exercise program.  These exercises need to be performed every day in order to decrease pain and prevent future occurrences of pain.         Trial acupuncture, massage therapy    We discussed the option of additional cervical epidural injections, medial branch blocks as a workup to radiofrequency ablation, or occipital nerve blocks    Could consider other medications for pain in future such as gabapentin but would require clearance from transplant team     ~Please call our [facility] Nurse Navigation line [phone] with any questions or concerns about your treatment plan, if symptoms worsen and you would like to be seen urgently, or if you have any new or worsening numbness, weakness, or problems controlling bladder and bowel function.  ~You are also welcome to contact Bria Castrejon via [employer], but please be aware that responses to [employer] message may take 2-3 days due to the high volume of patients seen in clinic.

## 2025-05-29 NOTE — LETTER
5/29/2025      Melita Cortes  [address] [facility] [address]  [place] [address]      Dear Colleague,    Thank you for referring your patient, Melita Cortes, to the [facility]. Please see a copy of my visit note below.    ASSESSMENT: Melita Cortes is a 54 year old female presents for consultation at the request of PCP Navneet Johnson, who presents today for a follow up patient evaluation of :     -Cervical spondylosis without myelopathy     We reviewed cspine MRI together (details below) and discussed options at length including PT, medX, cervical BISHNU, cervical MBB as a workup to RFA, occipital nerve blocks, neuropathic agents or muscle relaxers (with clearance from trx team) red flag s/s or failure to respond to any interventions with subsequent neurosurgery referral. We agreed to trial MedX, acupuncture, massage therapy, traction. Consider pursuing additional interventions depending on response. Given left leg sensory loss, loss of lower ext reflexes, and left leg symptoms, I will also add a lumbar MRI for further evaluation. We will call with results otherwise plan on next follow up appt after med X    40 min follow up appt for Fidelina          5/8/2025     9:38 AM   OSWESTRY DISABILITY INDEX   Count 10    Sum 25    Oswestry Score (%) 50 %        Patient-reported            Diagnoses and all orders for this visit:  Cervical spondylosis without myelopathy  -     Physical Therapy  Referral; Future  -     Cervial Traction Order for DME - ONLY FOR DME  -     Massage Therapy Referral; Future  Lumbar radiculopathy  -     MR Lumbar Spine w/o Contrast; Future         PLAN:  Reviewed spine anatomy and disease process. Discussed diagnosis and treatment options with the patient today. A shared decision making model was used. The patient's values and choices were respected. The following represents what was discussed and decided upon by the provider and the patient.     -DIAGNOSTIC  TESTS:  Images were personally reviewed and interpreted and explained to patient today using spine model.   -MRI lumbar spine without contrast ordered today    -PHYSICAL THERAPY:    -continue PT exercises  -medX PT referral placed  Discussed the importance of core strengthening, ROM, stretching exercises with the patient and how each of these entities is important in decreasing pain.  Explained to the patient that the purpose of physical therapy is to teach the patient a home exercise program.  These exercises need to be performed every day in order to decrease pain and prevent future occurrences of pain.    -MEDICATIONS:    -diclofenac gel 1%    Discussed multiple medication options today with patient. Discussed risks, side effects, and proper use of medications. Patient verbalized understanding.    -INTERVENTIONS:    -Discussed the role of injections with patient today. Patient would be a good candidate in the future for either epidural steroid injections or medial branch blocks if indicated based on symptoms and supported by imaging results.  -Risks and benefits of injections were discussed with patient today.    -PATIENT EDUCATION: Total time of 40 minutes, on the day of service, spent with the patient, reviewing the chart, placing orders, and documenting.   -Today we also discussed the issues related to the pros and cons of the current treatment plan.    -FOLLOW-UP:  we will call with MRI results    Advised patient to call the [facility] if symptoms worsen or if they develop red flag symptoms such as numbness, weakness, severe pain uncontrolled by current pain med regimen, or any new or worsening problems controlling bladder and bowel function.   ______________________________________________________________________    SUBJECTIVE:     Fidelina returns for MRI imaging review appt today. No change in neck symptoms, does report lower back pain with pain into the left leg and numbness. Has continued to do yoga,  stretching, massage therapy and PT but does not feel like neck is making significant progress.      Per original visit with updated meds/inj list:    Melita Cortes  is a 54 year old female hx melanoma, liver transplant, CKD stage 3, anxiety, depression, ADHD, acid reflux, neuropathy, osteoarthritis, asthma, left hip replacement, HPV who presents today for new patient evaluation of Cervical spondylosis without myelopathy -    Liver tx 2022 tacrolimus related joint pain.  Had an MRI in 2022 the pain has worsened since then.    In the last 6 mos, neck pain has been worse. It is constant. Tight diffusely through the back of the scalp with pain radiating into the back of her skull. It comes and goes in severity but is constant. Her whole head hurts, like she has a headache and radiates above her right eye. She has noticed numbness and tingling in her hands and fingers, toes which comes and goes. Pain level today 5/10 at worst 8, at best 3.    She denies arm pain, imbalance,   She has noticed some weakness in the legs a little bit. She is relating this to being out of shape.  She notes generalized fatigue  She does yoga and this helps with the tension - it relieves the pain and it has improved her balance as well. She goes twice per week.    She does not take any pain medications. She has used a CBD stick.   Ice pack and heat, she does not note any significant relief. At first the heat feels good. But the pain is back the next day.    She has tried massage and chiropractic care. She felt she was getting relief for a while but then she changed chiropractors and he was cracking the neck more aggressively than she had done before and she stopped going a few weeks ago (march 27, 31, apr 7, 21st). It did not feel like it lasted.     Had a left C7-T1 ILESI at [facility] in June 2022  Has been going to PT this spring    -Treatment to Date:     -Medications:    Current Outpatient Medications   Medication Sig Dispense Refill      amphetamine-dextroamphetamine (ADDERALL XR) 30 MG 24 hr capsule Take 1 capsule (30 mg) by mouth daily. 30 capsule 0     calcium carbonate (OS-SHAI) 1500 (600 Ca) MG tablet Take 1 tablet (600 mg) by mouth daily 60 tablet 3     COMPOUNDED NON-CONTROLLED SUBSTANCE (CMPD RX) - PHARMACY TO MIX COMPOUNDED MEDICATION Apply to the lower lip twice daily for 4 days, wash hands after application. Avoid sun. (Patient not taking: Reported on 5/19/2025) 30 g 0     FLUoxetine (PROZAC) 40 MG capsule TAKE 1 CAPSULE BY MOUTH EVERY DAY 90 capsule 0     hydrocortisone 2.5 % ointment Apply to the lips twice daily for 7 days. 30 g 0     hydrocortisone 2.5 % ointment Apply twice daily for 1 week on itchy, scaly areas on the body.  Apply Vaseline on top. 60 g 0     hydrOXYzine HCl (ATARAX) 25 MG tablet TAKE 2 TABLETS BY MOUTH NIGHTLY AS NEEDED FOR (INSOMINA) 180 tablet 3     magnesium oxide (MAG-OX) 400 MG tablet Take 1 tablet (400 mg) by mouth 2 times daily 90 tablet 3     multivitamin (CENTRUM SILVER) tablet Take 1 tablet by mouth daily       pantoprazole (PROTONIX) 40 MG EC tablet Take 1 tablet (40 mg) by mouth daily (Patient not taking: Reported on 5/19/2025) 90 tablet 3     tacrolimus (GENERIC EQUIVALENT) 1 MG capsule Take 1 capsule (1 mg) by mouth every 12 hours. 180 capsule 3     No current facility-administered medications for this visit.       Allergies   Allergen Reactions     Adhesive Tape Rash     Latex Rash       Past Medical History:   Diagnosis Date     Alcoholic hepatitis (H)      Asthma 03/10/2006     Cervical high risk HPV (human papillomavirus) test positive 2019, 2020    See problem list     Clinical diagnosis of COVID-19 12/26/2023     Depressive disorder      Gastroesophageal reflux disease without esophagitis      Liver failure (H)         Patient Active Problem List   Diagnosis     Abdominal bloating     Family history of pancreatic cancer     Transaminitis     Macrocytosis     Cervical high risk HPV (human  papillomavirus) test positive     Anemia, unspecified type     Elevated serum creatinine     Hyponatremia     Malaise and fatigue     At high risk for severe sepsis     Symptomatic anemia     Bandemia     Hyperlipidemia     Anxiety     Liver replaced by transplant (H)     Immunosuppressed status     Steroid-induced hyperglycemia     Hypervolemia     Elevated alkaline phosphatase level     Neck pain     Pain of both elbows     Mild recurrent major depression     Chronic kidney disease, stage 3a (H)     Attention deficit hyperactivity disorder (ADHD), predominantly inattentive type     Osteoarthritis of left hip     Hx of arthroplasty     Clinical diagnosis of COVID-19     History of left hip replacement     Osteoarthritis of right patellofemoral joint     Malignant melanoma, unspecified site (H)       Past Surgical History:   Procedure Laterality Date     APPENDECTOMY       COLONOSCOPY N/A 01/04/2022    Procedure: COLONOSCOPY;  Surgeon: Zita Steele MD;  Location: [facility]     ENDOSCOPIC RETROGRADE CHOLANGIOPANCREATOGRAM N/A 02/14/2022    Procedure: ENDOSCOPIC RETROGRADE CHOLANGIOPANCREATOGRAPHY WITH BILIARY SPHINCTEROTOMY, SLUDGE REMOVAL, DILATION  AND STENT PLACEMENT;  Surgeon: Guru Gardenia Escobar MD;  Location: [facility]     ENDOSCOPIC RETROGRADE CHOLANGIOPANCREATOGRAM N/A 05/16/2022    Procedure: ENDOSCOPIC RETROGRADE CHOLANGIOPANCREATOGRAPHY WITH BILE DUCT STENT REMOVAL;  Surgeon: Guru Gardenia Escobar MD;  Location: [facility]     ENDOSCOPIC RETROGRADE CHOLANGIOPANCREATOGRAPHY, EXCHANGE TUBE/STENT N/A 03/16/2022    Procedure: ENDOSCOPIC RETROGRADE CHOLANGIOPANCREATOGRAPHY, bile duct stents exchanged, balloon dilation and sweep of bile ducts for sludge;  Surgeon: Guru Gardenia Escobar MD;  Location: [facility]     ESOPHAGOGASTRODUODENOSCOPY, WITH BRUSHINGS N/A 10/29/2021    Procedure: ESOPHAGOGASTRODUODENOSCOPY, WITH BRUSHINGS;  Surgeon: Torey Ruiz MD;   Location: [facility]     IR LIVER BIOPSY PERCUTANEOUS  2022     OPERATIVE HYSTEROSCOPY WITH MORCELLATOR N/A 2023    Procedure: Operative hysteroscopy with Myosure morcellator;  Surgeon: Salud Arnold MD;  Location:  OR     ORTHOPEDIC SURGERY Left 2023    hip replacement     TRANSPLANT LIVER RECIPIENT  DONOR N/A 2022    Procedure: TRANSPLANT, LIVER, RECIPIENT,  DONOR;  Surgeon: Pradeep Browne MD;  Location:  OR       Family History   Problem Relation Age of Onset     Cancer Mother      Melanoma Father      Skin Cancer Father      Colon Cancer Maternal Uncle      Colon Cancer Paternal Aunt        Reviewed past medical, surgical, and family history with patient found on new patient intake packet located in EMR Media tab.     SOCIAL HX: prev heavy drinking, no current alcohol use, nonsmoker, no rec drug use    Oswestry (EVA) Questionnaire:        2025     9:38 AM   OSWESTRY DISABILITY INDEX   Count 10    Sum 25    Oswestry Score (%) 50 %        Patient-reported       Neck Disability Index:      2025     9:40 AM   Neck Disability Index (  Donald H. and Kinga C. 1991. All rights reserved.; used with permission)   SECTION 1 - PAIN INTENSITY 2   SECTION 2 - PERSONAL CARE 2   SECTION 3 - LIFTING 3   SECTION 4 - READING 5   SECTION 5 - HEADACHES 3   SECTION 6 - CONCENTRATION 4   SECTION 7 - WORK 3   SECTION 8 - DRIVING 3   SECTION 9 - SLEEPING 3   SECTION 10 - RECREATION 3   Count 10    Sum 31    Raw Score: /50 31    Neck Disability Index Score: (%) 62 %        Patient-reported          PHQ-2 Score:       6/10/2024     8:54 AM 10/23/2023     8:59 AM   PHQ-2 (  [employer])   Q1: Little interest or pleasure in doing things 0 2   Q2: Feeling down, depressed or hopeless 1 2   PHQ-2 Score 1 4          ROS: positive for weight gain, headache, eye pain, feet/leg swelling, color changes in hands and feet, joint pain, muscle pain, muscle fatigue, excessive tiredness, anxiety,  depression. Specifically negative for bowel/bladder dysfunction, balance changes, dizziness, foot drop, fevers, chills, appetite changes, nausea/vomiting, unexplained weight loss. Otherwise 13 systems reviewed are negative. Please see the patient's intake questionnaire from today for details.    OBJECTIVE:  /86   Pulse 88     PHYSICAL EXAMINATION:  --CONSTITUTIONAL: Vital signs as above. No acute distress. The patient is well nourished and well groomed.  --PSYCHIATRIC: The patient is awake, alert, oriented to person, place, and time, and answering questions appropriately with clear speech. Appropriate mood and affect   --HEENT: Sclera are non-injected. Extraocular muscles are intact. Moist oral mucosa.  --SKIN: Skin over the face, bilateral upper extremities, and posterior torso is clean, dry, intact without rashes.  --RESPIRATORY: Normal rhythm and effort. No abnormal accessory muscle breathing patterns noted.     --NEUROLOGIC: CN III-XII are grossly intact.   --GROSS MOTOR: Easily arises from a seated position. Toe walking, heel walking, and tandem gait are normal.      --UPPER EXTREMITY MOTOR TESTING:  Shoulder abduction: right 5/5, left 5/5  Triceps: right 5/5 left 5/5  Biceps:right 5/5  left 5/5,   Hand :  right 5/5  left 5/5,   Intrinsics: right 5/5  left 5/5,   Extensors: right 5/5  left 5/5,     --LOWER EXTREMITY MOTOR TESTING  Hip flexion: right 5/5  left 5/5,   Quads:right 5/5  left 5/5,   Hamstrings: right 5/5  left 5/5,   Dorsiflexion: right 5/5  left 5/5,   Plantarflexion: right 5/5  left 5/5,   EHL: right 5/5  left 5/5,     REFLEXES: 2/4 symmetric triceps, biceps, brachioradialis bilaterally. 0/4 symmetric patellar, achilles reflex bilaterally.  Negative Clonus, Babinski, and Little's bilaterally.      SENSATION: sensory loss left lateral lower leg, otherwise sensation of the upper and lower extremities is intact to light touch.     --VASCULAR: Warm upper and lower limbs bilaterally. No  swelling or color change noted.    --CERVICAL SPINE: Inspection reveals no evidence of deformity or swelling. Range of motion is painful and limited in cervical flexion, extension, rotation, head tilt.Tender to palp upper and mid point tenderness. Some upper paraspinal musculature tenderness and mary occipital tenderness    --SHOULDERS: Full range of motion bilaterally: abduction, flexion, cross chest movement internal/external rotation. Negative empty can. No tenderness to palpation or swelling noted of AC joint.        RESULTS:   Prior medical records from [facility] and Care Everywhere were reviewed today.         IMAGING:  Spine imaging was personally reviewed and interpreted today. The images were shown to the patient and the findings were explained using a spine model     EXAM: MR CERVICAL SPINE W/O CONTRAST  LOCATION: [facility]  DATE: 5/13/2025     INDICATION: neck pain no improvement with PT  COMPARISON: Cervical spine radiograph 9/21/2023  TECHNIQUE: MRI Cervical Spine without IV contrast.     FINDINGS:   Normal vertebral body heights. Mild reversal of the normal cervical lordosis. No abnormal cord signal. No extraspinal abnormality.     Craniovertebral junction and C1-C2: Normal.     C2-C3: Normal disc height. Mild to moderate right and mild left facet arthropathy. No spinal canal or neural foraminal stenosis.     C3-C4: Mild bilateral facet arthropathy. No spinal canal or neural foraminal stenosis.      C4-C5: Mild loss of disc height. Mild bilateral facet arthropathy. No spinal canal or neural foraminal stenosis.      C5-C6: Mild loss of disc height. Mild bilateral facet arthropathy. No spinal canal cyst. No right and moderate to severe left neural foraminal stenosis.      C6-C7: Mild to moderate loss of disc height. Mild bilateral facet arthropathy. No spinal canal stenosis. No right neural foraminal stenosis. Severe left neural foraminal stenosis.      C7-T1: Normal  disc height. No right and moderate left facet arthropathy. No spinal canal or neural foraminal stenosis.                                                                      IMPRESSION:  1.  At C5-C6, there is moderate to severe left neural foraminal stenosis.  2.  At C6-C7, there is severe left neural foraminal stenosis.          This note was dictated using voice recognition software. Any grammatical or context distortions are unintentional and inherent to the software.       Bria BACONP-C  [facility]  O. [phone]      Again, thank you for allowing me to participate in the care of your patient.        Sincerely,        HAKAN Lilly CNP    Electronically signed

## 2025-05-29 NOTE — PROGRESS NOTES
ASSESSMENT: Melita Cortes is a 54 year old female presents for consultation at the request of PCP Navneet Johnson, who presents today for a follow up patient evaluation of :     -Cervical spondylosis without myelopathy     We reviewed cspine MRI together (details below) and discussed options at length including PT, medX, cervical BISHNU, cervical MBB as a workup to RFA, occipital nerve blocks, neuropathic agents or muscle relaxers (with clearance from trx team) red flag s/s or failure to respond to any interventions with subsequent neurosurgery referral. We agreed to trial MedX, acupuncture, massage therapy, traction. Consider pursuing additional interventions depending on response. Given left leg sensory loss, loss of lower ext reflexes, and left leg symptoms, I will also add a lumbar MRI for further evaluation. We will call with results otherwise plan on next follow up appt after med X    40 min follow up appt for Fidelina          5/8/2025     9:38 AM   OSWESTRY DISABILITY INDEX   Count 10    Sum 25    Oswestry Score (%) 50 %        Patient-reported            Diagnoses and all orders for this visit:  Cervical spondylosis without myelopathy  -     Physical Therapy  Referral; Future  -     Cervial Traction Order for DME - ONLY FOR DME  -     Massage Therapy Referral; Future  Lumbar radiculopathy  -     MR Lumbar Spine w/o Contrast; Future         PLAN:  Reviewed spine anatomy and disease process. Discussed diagnosis and treatment options with the patient today. A shared decision making model was used. The patient's values and choices were respected. The following represents what was discussed and decided upon by the provider and the patient.     -DIAGNOSTIC TESTS:  Images were personally reviewed and interpreted and explained to patient today using spine model.   -MRI lumbar spine without contrast ordered today    -PHYSICAL THERAPY:    -continue PT exercises  -medX PT referral placed  Discussed the  importance of core strengthening, ROM, stretching exercises with the patient and how each of these entities is important in decreasing pain.  Explained to the patient that the purpose of physical therapy is to teach the patient a home exercise program.  These exercises need to be performed every day in order to decrease pain and prevent future occurrences of pain.    -MEDICATIONS:    -diclofenac gel 1%    Discussed multiple medication options today with patient. Discussed risks, side effects, and proper use of medications. Patient verbalized understanding.    -INTERVENTIONS:    -Discussed the role of injections with patient today. Patient would be a good candidate in the future for either epidural steroid injections or medial branch blocks if indicated based on symptoms and supported by imaging results.  -Risks and benefits of injections were discussed with patient today.    -PATIENT EDUCATION: Total time of 40 minutes, on the day of service, spent with the patient, reviewing the chart, placing orders, and documenting.   -Today we also discussed the issues related to the pros and cons of the current treatment plan.    -FOLLOW-UP:  we will call with MRI results    Advised patient to call the [facility] if symptoms worsen or if they develop red flag symptoms such as numbness, weakness, severe pain uncontrolled by current pain med regimen, or any new or worsening problems controlling bladder and bowel function.   ______________________________________________________________________    SUBJECTIVE:     Fidelina returns for MRI imaging review appt today. No change in neck symptoms, does report lower back pain with pain into the left leg and numbness. Has continued to do yoga, stretching, massage therapy and PT but does not feel like neck is making significant progress.      Per original visit with updated meds/inj list:    Melita Cortes  is a 54 year old female hx melanoma, liver transplant, CKD stage 3, anxiety,  depression, ADHD, acid reflux, neuropathy, osteoarthritis, asthma, left hip replacement, HPV who presents today for new patient evaluation of Cervical spondylosis without myelopathy -    Liver tx 2022 tacrolimus related joint pain.  Had an MRI in 2022 the pain has worsened since then.    In the last 6 mos, neck pain has been worse. It is constant. Tight diffusely through the back of the scalp with pain radiating into the back of her skull. It comes and goes in severity but is constant. Her whole head hurts, like she has a headache and radiates above her right eye. She has noticed numbness and tingling in her hands and fingers, toes which comes and goes. Pain level today 5/10 at worst 8, at best 3.    She denies arm pain, imbalance,   She has noticed some weakness in the legs a little bit. She is relating this to being out of shape.  She notes generalized fatigue  She does yoga and this helps with the tension - it relieves the pain and it has improved her balance as well. She goes twice per week.    She does not take any pain medications. She has used a CBD stick.   Ice pack and heat, she does not note any significant relief. At first the heat feels good. But the pain is back the next day.    She has tried massage and chiropractic care. She felt she was getting relief for a while but then she changed chiropractors and he was cracking the neck more aggressively than she had done before and she stopped going a few weeks ago (march 27, 31, apr 7, 21st). It did not feel like it lasted.     Had a left C7-T1 ILESI at [facility] in June 2022  Has been going to PT this spring    -Treatment to Date:     -Medications:    Current Outpatient Medications   Medication Sig Dispense Refill    amphetamine-dextroamphetamine (ADDERALL XR) 30 MG 24 hr capsule Take 1 capsule (30 mg) by mouth daily. 30 capsule 0    calcium carbonate (OS-SHAI) 1500 (600 Ca) MG tablet Take 1 tablet (600 mg) by mouth daily 60 tablet 3    COMPOUNDED NON-CONTROLLED  SUBSTANCE (CMPD RX) - PHARMACY TO MIX COMPOUNDED MEDICATION Apply to the lower lip twice daily for 4 days, wash hands after application. Avoid sun. (Patient not taking: Reported on 5/19/2025) 30 g 0    FLUoxetine (PROZAC) 40 MG capsule TAKE 1 CAPSULE BY MOUTH EVERY DAY 90 capsule 0    hydrocortisone 2.5 % ointment Apply to the lips twice daily for 7 days. 30 g 0    hydrocortisone 2.5 % ointment Apply twice daily for 1 week on itchy, scaly areas on the body.  Apply Vaseline on top. 60 g 0    hydrOXYzine HCl (ATARAX) 25 MG tablet TAKE 2 TABLETS BY MOUTH NIGHTLY AS NEEDED FOR (INSOMINA) 180 tablet 3    magnesium oxide (MAG-OX) 400 MG tablet Take 1 tablet (400 mg) by mouth 2 times daily 90 tablet 3    multivitamin (CENTRUM SILVER) tablet Take 1 tablet by mouth daily      pantoprazole (PROTONIX) 40 MG EC tablet Take 1 tablet (40 mg) by mouth daily (Patient not taking: Reported on 5/19/2025) 90 tablet 3    tacrolimus (GENERIC EQUIVALENT) 1 MG capsule Take 1 capsule (1 mg) by mouth every 12 hours. 180 capsule 3     No current facility-administered medications for this visit.       Allergies   Allergen Reactions    Adhesive Tape Rash    Latex Rash       Past Medical History:   Diagnosis Date    Alcoholic hepatitis (H)     Asthma 03/10/2006    Cervical high risk HPV (human papillomavirus) test positive 2019, 2020    See problem list    Clinical diagnosis of COVID-19 12/26/2023    Depressive disorder     Gastroesophageal reflux disease without esophagitis     Liver failure (H)         Patient Active Problem List   Diagnosis    Abdominal bloating    Family history of pancreatic cancer    Transaminitis    Macrocytosis    Cervical high risk HPV (human papillomavirus) test positive    Anemia, unspecified type    Elevated serum creatinine    Hyponatremia    Malaise and fatigue    At high risk for severe sepsis    Symptomatic anemia    Bandemia    Hyperlipidemia    Anxiety    Liver replaced by transplant (H)    Immunosuppressed  status    Steroid-induced hyperglycemia    Hypervolemia    Elevated alkaline phosphatase level    Neck pain    Pain of both elbows    Mild recurrent major depression    Chronic kidney disease, stage 3a (H)    Attention deficit hyperactivity disorder (ADHD), predominantly inattentive type    Osteoarthritis of left hip    Hx of arthroplasty    Clinical diagnosis of COVID-19    History of left hip replacement    Osteoarthritis of right patellofemoral joint    Malignant melanoma, unspecified site (H)       Past Surgical History:   Procedure Laterality Date    APPENDECTOMY      COLONOSCOPY N/A 01/04/2022    Procedure: COLONOSCOPY;  Surgeon: Zita Steele MD;  Location:  GI    ENDOSCOPIC RETROGRADE CHOLANGIOPANCREATOGRAM N/A 02/14/2022    Procedure: ENDOSCOPIC RETROGRADE CHOLANGIOPANCREATOGRAPHY WITH BILIARY SPHINCTEROTOMY, SLUDGE REMOVAL, DILATION  AND STENT PLACEMENT;  Surgeon: Guru Gardenia Escobar MD;  Location:  OR    ENDOSCOPIC RETROGRADE CHOLANGIOPANCREATOGRAM N/A 05/16/2022    Procedure: ENDOSCOPIC RETROGRADE CHOLANGIOPANCREATOGRAPHY WITH BILE DUCT STENT REMOVAL;  Surgeon: Guru Gardenia Escobar MD;  Location:  OR    ENDOSCOPIC RETROGRADE CHOLANGIOPANCREATOGRAPHY, EXCHANGE TUBE/STENT N/A 03/16/2022    Procedure: ENDOSCOPIC RETROGRADE CHOLANGIOPANCREATOGRAPHY, bile duct stents exchanged, balloon dilation and sweep of bile ducts for sludge;  Surgeon: Guru Gardenia Escobar MD;  Location:  OR    ESOPHAGOGASTRODUODENOSCOPY, WITH BRUSHINGS N/A 10/29/2021    Procedure: ESOPHAGOGASTRODUODENOSCOPY, WITH BRUSHINGS;  Surgeon: Torey Ruiz MD;  Location:  GI    IR LIVER BIOPSY PERCUTANEOUS  02/07/2022    OPERATIVE HYSTEROSCOPY WITH MORCELLATOR N/A 8/31/2023    Procedure: Operative hysteroscopy with Myosure morcellator;  Surgeon: Salud Arnold MD;  Location:  OR    ORTHOPEDIC SURGERY Left 03/07/2023    hip replacement    TRANSPLANT LIVER  RECIPIENT  DONOR N/A 2022    Procedure: TRANSPLANT, LIVER, RECIPIENT,  DONOR;  Surgeon: Pradeep Browne MD;  Location: [facility] OR       Family History   Problem Relation Age of Onset    Cancer Mother     Melanoma Father     Skin Cancer Father     Colon Cancer Maternal Uncle     Colon Cancer Paternal Aunt        Reviewed past medical, surgical, and family history with patient found on new patient intake packet located in EMR Media tab.     SOCIAL HX: prev heavy drinking, no current alcohol use, nonsmoker, no rec drug use    Oswestry (EVA) Questionnaire:        2025     9:38 AM   OSWESTRY DISABILITY INDEX   Count 10    Sum 25    Oswestry Score (%) 50 %        Patient-reported       Neck Disability Index:      2025     9:40 AM   Neck Disability Index (  Donald H. and Kinga C. 1991. All rights reserved.; used with permission)   SECTION 1 - PAIN INTENSITY 2   SECTION 2 - PERSONAL CARE 2   SECTION 3 - LIFTING 3   SECTION 4 - READING 5   SECTION 5 - HEADACHES 3   SECTION 6 - CONCENTRATION 4   SECTION 7 - WORK 3   SECTION 8 - DRIVING 3   SECTION 9 - SLEEPING 3   SECTION 10 - RECREATION 3   Count 10    Sum 31    Raw Score: /50 31    Neck Disability Index Score: (%) 62 %        Patient-reported          PHQ-2 Score:       6/10/2024     8:54 AM 10/23/2023     8:59 AM   PHQ-2 ( 1999 [employer])   Q1: Little interest or pleasure in doing things 0 2   Q2: Feeling down, depressed or hopeless 1 2   PHQ-2 Score 1 4          ROS: positive for weight gain, headache, eye pain, feet/leg swelling, color changes in hands and feet, joint pain, muscle pain, muscle fatigue, excessive tiredness, anxiety, depression. Specifically negative for bowel/bladder dysfunction, balance changes, dizziness, foot drop, fevers, chills, appetite changes, nausea/vomiting, unexplained weight loss. Otherwise 13 systems reviewed are negative. Please see the patient's intake questionnaire from today for details.    OBJECTIVE:  BP  132/86   Pulse 88     PHYSICAL EXAMINATION:  --CONSTITUTIONAL: Vital signs as above. No acute distress. The patient is well nourished and well groomed.  --PSYCHIATRIC: The patient is awake, alert, oriented to person, place, and time, and answering questions appropriately with clear speech. Appropriate mood and affect   --HEENT: Sclera are non-injected. Extraocular muscles are intact. Moist oral mucosa.  --SKIN: Skin over the face, bilateral upper extremities, and posterior torso is clean, dry, intact without rashes.  --RESPIRATORY: Normal rhythm and effort. No abnormal accessory muscle breathing patterns noted.     --NEUROLOGIC: CN III-XII are grossly intact.   --GROSS MOTOR: Easily arises from a seated position. Toe walking, heel walking, and tandem gait are normal.      --UPPER EXTREMITY MOTOR TESTING:  Shoulder abduction: right 5/5, left 5/5  Triceps: right 5/5 left 5/5  Biceps:right 5/5  left 5/5,   Hand :  right 5/5  left 5/5,   Intrinsics: right 5/5  left 5/5,   Extensors: right 5/5  left 5/5,     --LOWER EXTREMITY MOTOR TESTING  Hip flexion: right 5/5  left 5/5,   Quads:right 5/5  left 5/5,   Hamstrings: right 5/5  left 5/5,   Dorsiflexion: right 5/5  left 5/5,   Plantarflexion: right 5/5  left 5/5,   EHL: right 5/5  left 5/5,     REFLEXES: 2/4 symmetric triceps, biceps, brachioradialis bilaterally. 0/4 symmetric patellar, achilles reflex bilaterally.  Negative Clonus, Babinski, and Little's bilaterally.      SENSATION: sensory loss left lateral lower leg, otherwise sensation of the upper and lower extremities is intact to light touch.     --VASCULAR: Warm upper and lower limbs bilaterally. No swelling or color change noted.    --CERVICAL SPINE: Inspection reveals no evidence of deformity or swelling. Range of motion is painful and limited in cervical flexion, extension, rotation, head tilt.Tender to palp upper and mid point tenderness. Some upper paraspinal musculature tenderness and mary occipital  tenderness    --SHOULDERS: Full range of motion bilaterally: abduction, flexion, cross chest movement internal/external rotation. Negative empty can. No tenderness to palpation or swelling noted of AC joint.        RESULTS:   Prior medical records from [facility] Everywhere were reviewed today.         IMAGING:  Spine imaging was personally reviewed and interpreted today. The images were shown to the patient and the findings were explained using a spine model     EXAM: MR CERVICAL SPINE W/O CONTRAST  LOCATION: [facility]  DATE: 5/13/2025     INDICATION: neck pain no improvement with PT  COMPARISON: Cervical spine radiograph 9/21/2023  TECHNIQUE: MRI Cervical Spine without IV contrast.     FINDINGS:   Normal vertebral body heights. Mild reversal of the normal cervical lordosis. No abnormal cord signal. No extraspinal abnormality.     Craniovertebral junction and C1-C2: Normal.     C2-C3: Normal disc height. Mild to moderate right and mild left facet arthropathy. No spinal canal or neural foraminal stenosis.     C3-C4: Mild bilateral facet arthropathy. No spinal canal or neural foraminal stenosis.      C4-C5: Mild loss of disc height. Mild bilateral facet arthropathy. No spinal canal or neural foraminal stenosis.      C5-C6: Mild loss of disc height. Mild bilateral facet arthropathy. No spinal canal cyst. No right and moderate to severe left neural foraminal stenosis.      C6-C7: Mild to moderate loss of disc height. Mild bilateral facet arthropathy. No spinal canal stenosis. No right neural foraminal stenosis. Severe left neural foraminal stenosis.      C7-T1: Normal disc height. No right and moderate left facet arthropathy. No spinal canal or neural foraminal stenosis.                                                                      IMPRESSION:  1.  At C5-C6, there is moderate to severe left neural foraminal stenosis.  2.  At C6-C7, there is severe left neural foraminal  stenosis.          This note was dictated using voice recognition software. Any grammatical or context distortions are unintentional and inherent to the software.       Bria BACONP-C  [facility]  O. [phone]

## 2025-06-03 ENCOUNTER — HOSPITAL ENCOUNTER (OUTPATIENT)
Dept: MRI IMAGING | Facility: CLINIC | Age: 55
Discharge: HOME | End: 2025-06-03
Attending: NURSE PRACTITIONER
Payer: MEDICARE

## 2025-06-03 DIAGNOSIS — M54.16 LUMBAR RADICULOPATHY: ICD-10-CM

## 2025-06-03 PROCEDURE — 72148 MRI LUMBAR SPINE W/O DYE: CPT

## 2025-06-04 ENCOUNTER — MYC REFILL (OUTPATIENT)
Dept: FAMILY MEDICINE | Facility: CLINIC | Age: 55
End: 2025-06-04
Payer: MEDICARE

## 2025-06-04 DIAGNOSIS — F33.0 MILD RECURRENT MAJOR DEPRESSION: ICD-10-CM

## 2025-06-04 RX ORDER — FLUOXETINE HYDROCHLORIDE 40 MG/1
40 CAPSULE ORAL DAILY
Qty: 90 CAPSULE | Refills: 0 | OUTPATIENT
Start: 2025-06-04

## 2025-06-05 ENCOUNTER — RESULTS FOLLOW-UP (OUTPATIENT)
Dept: PHYSICAL MEDICINE AND REHAB | Facility: CLINIC | Age: 55
End: 2025-06-05

## 2025-06-10 ENCOUNTER — OFFICE VISIT (OUTPATIENT)
Dept: GASTROENTEROLOGY | Facility: CLINIC | Age: 55
End: 2025-06-10
Attending: INTERNAL MEDICINE
Payer: MEDICARE

## 2025-06-10 VITALS
SYSTOLIC BLOOD PRESSURE: 126 MMHG | HEART RATE: 85 BPM | WEIGHT: 157 LBS | DIASTOLIC BLOOD PRESSURE: 87 MMHG | BODY MASS INDEX: 26.16 KG/M2 | OXYGEN SATURATION: 99 % | HEIGHT: 65 IN

## 2025-06-10 DIAGNOSIS — Z94.4 LIVER REPLACED BY TRANSPLANT (H): Primary | ICD-10-CM

## 2025-06-10 PROCEDURE — G0463 HOSPITAL OUTPT CLINIC VISIT: HCPCS | Performed by: INTERNAL MEDICINE

## 2025-06-10 ASSESSMENT — PAIN SCALES - GENERAL: PAINLEVEL_OUTOF10: MODERATE PAIN (5)
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
## 2025-06-10 NOTE — PROGRESS NOTES
"Chief Complaint   Patient presents with    RECHECK     Annual Follow up Post Liver Txp       /87   Pulse 85   Ht 1.651 m (5' 5\")   Wt 71.2 kg (157 lb)   SpO2 99%   BMI 26.13 kg/m    Freya Lee MA on 6/10/2025 at 8:36 AM    "

## 2025-06-29 DIAGNOSIS — G47.00 INSOMNIA, UNSPECIFIED TYPE: ICD-10-CM

## 2025-06-30 RX ORDER — HYDROXYZINE HYDROCHLORIDE 25 MG/1
TABLET, FILM COATED ORAL
Qty: 180 TABLET | Refills: 1 | Status: SHIPPED | OUTPATIENT
Start: 2025-06-30

## 2025-07-05 ASSESSMENT — ANXIETY QUESTIONNAIRES
3. WORRYING TOO MUCH ABOUT DIFFERENT THINGS: NEARLY EVERY DAY
2. NOT BEING ABLE TO STOP OR CONTROL WORRYING: NEARLY EVERY DAY
1. FEELING NERVOUS, ANXIOUS, OR ON EDGE: NEARLY EVERY DAY
6. BECOMING EASILY ANNOYED OR IRRITABLE: MORE THAN HALF THE DAYS
7. FEELING AFRAID AS IF SOMETHING AWFUL MIGHT HAPPEN: MORE THAN HALF THE DAYS
GAD7 TOTAL SCORE: 16
5. BEING SO RESTLESS THAT IT IS HARD TO SIT STILL: SEVERAL DAYS
GAD7 TOTAL SCORE: 16
IF YOU CHECKED OFF ANY PROBLEMS ON THIS QUESTIONNAIRE, HOW DIFFICULT HAVE THESE PROBLEMS MADE IT FOR YOU TO DO YOUR WORK, TAKE CARE OF THINGS AT HOME, OR GET ALONG WITH OTHER PEOPLE: EXTREMELY DIFFICULT
4. TROUBLE RELAXING: MORE THAN HALF THE DAYS
8. IF YOU CHECKED OFF ANY PROBLEMS, HOW DIFFICULT HAVE THESE MADE IT FOR YOU TO DO YOUR WORK, TAKE CARE OF THINGS AT HOME, OR GET ALONG WITH OTHER PEOPLE?: EXTREMELY DIFFICULT

## 2025-07-09 ASSESSMENT — PATIENT HEALTH QUESTIONNAIRE - PHQ9
SUM OF ALL RESPONSES TO PHQ QUESTIONS 1-9: 18
10. IF YOU CHECKED OFF ANY PROBLEMS, HOW DIFFICULT HAVE THESE PROBLEMS MADE IT FOR YOU TO DO YOUR WORK, TAKE CARE OF THINGS AT HOME, OR GET ALONG WITH OTHER PEOPLE: VERY DIFFICULT
SUM OF ALL RESPONSES TO PHQ QUESTIONS 1-9: 18

## 2025-07-10 ENCOUNTER — VIRTUAL VISIT (OUTPATIENT)
Dept: PSYCHOLOGY | Facility: CLINIC | Age: 55
End: 2025-07-10
Payer: MEDICARE

## 2025-07-10 DIAGNOSIS — F32.1 CURRENT MODERATE EPISODE OF MAJOR DEPRESSIVE DISORDER, UNSPECIFIED WHETHER RECURRENT (H): Primary | ICD-10-CM

## 2025-07-10 NOTE — PROGRESS NOTES
"[facility]                                     Progress Note    Patient Name: Melita Cortes  Date: 07/10/2025         Service Type: Individual      Session Start Time: 3.30  Session End Time: 4.12     Session Length: 38-52    Session #: 29    Attendees: Client attended alone    Service Modality:  Video Visit:      Provider verified identity through the following two step process.  Patient provided:  Patient is known previously to provider    Telemedicine Visit: The patient's condition can be safely assessed and treated via synchronous audio and visual telemedicine encounter.      Reason for Telemedicine Visit: Patient has requested telehealth visit    Originating Site (Patient Location): Patient's home    Distant Site (Provider Location): Provider Remote Setting- Home Office    Consent:  The patient/guardian has verbally consented to: the potential risks and benefits of telemedicine (video visit) versus in person care; bill my insurance or make self-payment for services provided; and responsibility for payment of non-covered services.     Patient would like the video invitation sent by:  My Chart    Mode of Communication:  Video Conference via [facility]well    Distant Location (Provider):  Off-site    As the provider I attest to compliance with applicable laws and regulations related to telemedicine.    DATA  Interactive Complexity: No  Crisis: No        Progress Since Last Session (Related to Symptoms / Goals / Homework):   Symptoms: Improving as evident by current phq9 scores.     Homework: Completed in session         Episode of Care Goals: Satisfactory progress - ACTION (Actively working towards change); Intervened by reinforcing change plan / affirming steps taken     Current / Ongoing Stressors and Concerns:  Patient reported  \"Trauma Adhd depression\". Patient reported \" I think I have had some trauma in my life, my liver transplant .\" Pt reported she is  \"overly sensitive\" and \" I dont " "handle stress very well and struggle with impulsivity\"  and that \"I have a poor self image, negative talk a lot \" \"used and abandoned\" and that she always have to explained herself to others.     Current: Today, patient reported that she continues to experience increased neurovegetative symptoms of depression in the form of low motivation, low energy, low mood and SI. She noted that \" a lot has been going on \" with her health, and that she \"feels overwhelmed\" and having \"no zest for life\" and she is dealing with chronic pain and recurrent health problems.         Treatment Objective(s) Addressed in This Session:   Patient will increase daily activity level by exercising for 20-30 minutes and participate in at least 1 daily pleasant/fun activities.  Patient will identify specific areas of cognitive distortion and challenge irrational thoughts with reality.    Patient will  learn and use grounding skills to  effectively manage anxiety and distress associated with trauma history daily.              Patient will learn and practice gratitude and compassion to built a positive self image.     Intervention:    MI Intervention: Expressed Empathy/Understanding, Supported Autonomy, Collaboration, Evocation, Open-ended questions, and Reflections: simple and complex  Brief Cognitive-Behavioral Therapy for Suicide Prevention (BCBT-SP). Focused today on recent suicidal thoughts, patient-specific suicide triggers and psychosocial factors that triggered suicidal ideation, and collaboratively developed review safety  plan.     Mindfulness:  Work today with patient using Freya Murrieta's RAIN mindfulness (Recognized, Acknowledged, Investigate and Non-judgmental) tool to help them regulate current distressful feelings. Discussed the process beginning with recognizing when the emotion is present by pausing and asking yourself,  What am I experiencing right now in my body, thoughts, emotions, and situation?  then acknowledge and " allow what is already here to be here. Investigate the internal experience with curiosity and compassion and observe current feelings not as an experience unique to you but rather as an emotion that is experienced by others         Assessments completed prior to visit:  PHQ9:       10/24/2024    11:08 AM 11/7/2024    12:34 PM 11/20/2024     1:42 PM 1/30/2025    12:52 PM 2/13/2025     9:29 AM 3/26/2025     1:03 PM 7/9/2025     6:07 PM   PHQ-9 SCORE   PHQ-9 Total Score MyChart 12 (Moderate depression) 16 (Moderately severe depression)  16 (Moderately severe depression) 13 (Moderate depression) 10 (Moderate depression) 18 (Moderately severe depression)   PHQ-9 Total Score 12  16  13 16  13  10  18        Patient-reported     GAD7:       10/10/2024     3:15 PM 10/24/2024    11:09 AM 11/7/2024    12:35 PM 1/13/2025    12:05 PM 2/13/2025     9:30 AM 3/26/2025     6:15 PM 7/5/2025     9:18 AM   JORGE-7 SCORE   Total Score 14 (moderate anxiety) 7 (mild anxiety) 17 (severe anxiety) 17 (severe anxiety) 11 (moderate anxiety) 11 (moderate anxiety) 16 (severe anxiety)   Total Score 14 7  17  17  11  11  16        Patient-reported         ASSESSMENT: Current Emotional / Mental Status (status of significant symptoms):   Risk status (Self / Other harm or suicidal ideation)   Patient denies current fears or concerns for personal safety.   Patient denies current or recent suicidal ideation or behaviors.   Patient denies current or recent homicidal ideation or behaviors.   Patient denies current or recent self injurious behavior or ideation.   Patient denies other safety concerns.   Patient reports there has been no change in risk factors since their last session.     Patient reports there has been no change in protective factors since their last session.     Recommended that patient call 911 or go to the local ED should there be a change in any of these risk factors.     Appearance:   Appropriate    Eye Contact:   Good    Psychomotor  Behavior: Normal    Attitude:   Cooperative  Interested   Orientation:   Person Place Time Situation   Speech    Rate / Production: Normal/ Responsive    Volume:  Normal    Mood:    Anxious  Depressed    Affect:    Tearful   Thought Content:  Clear    Thought Form:  Coherent  Logical    Insight:    Good      Medication Review:   No changes to current psychiatric medication(s)     Medication Compliance:   Yes     Changes in Health Issues:   None reported     Chemical Use Review:   Substance Use: Chemical use reviewed, no active concerns identified      Tobacco Use: No current tobacco use.      Diagnosis:  296.32 (F33.1) Major Depressive Disorder, Recurrent Episode, Moderate _ and With atypical features    Collateral Reports Completed:   Not Applicable    PLAN: (Patient Tasks / Therapist Tasks / Other)  Use Safety Plan as needed    Each morning when you wake up, start by saying thank you to various parts of your body: your heart, lungs, stomach, legs, arms, eyes, ears etc. Thank your heart more especially for keeping you alive without any effort from you.     Jake Coyne, [facility]           _________________________________________________________    Individual Treatment Plan    Patient's Name: Melita Cortes  YOB: 1970    Date of Creation: 09/22/2023  Date Treatment Plan Last Reviewed/Revised: 04/24/2025    DSM5 Diagnoses: 296.32 (F33.1) Major Depressive Disorder, Recurrent Episode, Moderate _ and With atypical features  Psychosocial / Contextual Factors:   PROMIS (reviewed every 90 days):     Referral / Collaboration:  Referral to another professional/service is not indicated at this time..    Anticipated number of session for this episode of care: 15-25  Anticipation frequency of session: Biweekly  Anticipated Duration of each session: 38-52 minutes  Treatment plan will be reviewed in 90 days or when goals have been changed.       MeasurableTreatment Goal(s) related to diagnosis / functional  impairment(s)  Goal 1: Patient will identify and address specific triggers to feelings of depression and improve coping by  90 % in the next three months.    I will know I've met my goal when I am less impulsive, better communicator and can comfortably manage distressful emotions.         Objective #A (Patient Action)    Develop and utilize 3 effective distress tolerance strategies to address SI.   Status: Continued - Date(s):  07/24/2025     Intervention(s)   Therapist will engage in collaborative brainstorming with pt to identify and practice individualized distress tolerance coping strategies to address SI weekly.    Objective #B (Patient Action)    Patient will increase daily activity level by exercising for 20-30 minutes and participate in at least 1 daily pleasant/fun activities.  Status: Continued - Date(s):   07/24/2025    Intervention(s)  Therapist will provide psychoeducation, behavioral activation, and cognitive restructuring.    Objective #C  Patient will identify specific areas of cognitive distortion and challenge irrational thoughts with reality.   Status: Continued - Date(s):  07/24/2025       Intervention(s)  Therapist will provide psychoeducation, behavioral activation, and cognitive restructuring.    Objective #D  Patient will learn and practice relaxation techniques to manage depression.  Status: Continued - Date(s):   07/24/2025    Intervention(s)   Therapist will teach patient thought stopping, deep breathing exercises, mindful meditation, and creative visualization.       Goal 2: Patient will identify and address issues underlying trauma and improve coping  by 90% in the next 3 months .    Objective #A (Patient Action)    Patient will develop vocabulary to describe trauma symptoms.  Status: Continued - Date(s):  07/24/2025    Intervention(s)  Therapist will provide psychoeducation, behavioral activation, and cognitive restructuring.    Objective #B  Patient will learn and use grounding skills  "to  effectively manage anxiety and distress associated with trauma history daily.  Status: Continued - Date(s): 07/24/2025    Intervention(s)  Therapist will provide psychoeducation, behavioral activation, and cognitive restructuring.    Objective #C  Patient will gradually approach trauma-related memories, feelings, and situations through reprocessing and desensitization  weekly in session.  Status: Continued - Date(s):  07/24/2025    Intervention(s)  Therapist will provide psychoeducation, behavioral activation, and cognitive restructuring.        Patient has reviewed and agreed to the above plan.      Jake Coyne, [facility]  September 22, 2023    ----- Service Performed and Documented by [facility]------  This note was reviewed and clinical supervision by ELMO Colmenares [facility]    4/1/2024           [facility] Matthew Cortes     SAFETY PLAN:  Step 1: Warning signs / cues (Thoughts, images, mood, situation, behavior) that a crisis may be developing:  Thoughts:  \" sometimes I feel like I don't have a purpose\"  Images: Feels lonely after losing mum and dad and lonely in marriage  Thinking Processes: ruminations (can't stop thinking about my problems): health, family loses  Mood: worsening depression  Behaviors: isolating/withdrawing   Situations: loss: parents and lonely in marriage   Step 2: Coping strategies - Things I can do to take my mind off of my problems without contacting another person (relaxation technique, physical activity):  Distress Tolerance Strategies:  play with my pet , intense exercise: work out for 2-3 minutes , and support group attendance for transplant  Physical Activities: go for a walk and stretching   Focus on helpful thoughts:  \"This is temporary\" and \"It always passes\"  Step 3: People and social settings that provide distraction:   Name:  Augustine ( friend) Phone: [phone]   Name:  Aimee cousin Phone: [phone]     gym  and " [employer] Liver transplant group  Step 4: Remind myself of people and things that are important to me and worth living for:    My , cousins and friends.  Step 5: When I am in crisis, I can ask these people to help me use my safety plan:   Name:  Jake ( Therapist )  Phone: [phone]   Name:  Aimee paniagua Phone: [phone]   Step 6: Making the environment safe:   remove alcohol, remove drugs, and be around others  Step 7: Professionals or agencies I can contact during a crisis:  Suicide Prevention Lifeline: Call or Text 888   [facility] Services: [phone]    Call 911 or go to my nearest emergency department.   I helped develop this safety plan and agree to use it when needed.  I have been given a copy of this plan.      Client signature _________________________________________________________________  Today s date:  11/14/2023  Completed by Provider Name/ Credentials:  CECIL Vasquez  November 14, 2023  Adapted from Safety Plan Template 2008 Chelsi Marx and rFed Ramos is reprinted with the express permission of the authors.  No portion of the Safety Plan Template may be reproduced without the express, written permission.  You can contact the authors at [email or web address] or [email or web address].        Answers submitted by the patient for this visit:  Patient Health Questionnaire (Submitted on 11/14/2023)  If you checked off any problems, how difficult have these problems made it for you to do your work, take care of things at home, or get along with other people?: Very difficult  PHQ9 TOTAL SCORE: 18  JORGE-7 (Submitted on 11/14/2023)  JORGE 7 TOTAL SCORE: 14    Answers submitted by the patient for this visit:  Patient Health Questionnaire (Submitted on 11/28/2023)  If you checked off any problems, how difficult have these problems made it for you to do your work, take care of things at home, or get along with other people?: Extremely difficult  PHQ9 TOTAL SCORE: 14  JORGE-7  (Submitted on 11/28/2023)  JORGE 7 TOTAL SCORE: 21    Answers submitted by the patient for this visit:  Patient Health Questionnaire (Submitted on 12/13/2023)  If you checked off any problems, how difficult have these problems made it for you to do your work, take care of things at home, or get along with other people?: Very difficult  PHQ9 TOTAL SCORE: 18  JORGE-7 (Submitted on 12/13/2023)  JORGE 7 TOTAL SCORE: 21    Answers submitted by the patient for this visit:  Patient Health Questionnaire (Submitted on 1/3/2024)  If you checked off any problems, how difficult have these problems made it for you to do your work, take care of things at home, or get along with other people?: Very difficult  PHQ9 TOTAL SCORE: 16  JORGE-7 (Submitted on 1/3/2024)  JORGE 7 TOTAL SCORE: 10    Answers submitted by the patient for this visit:  Patient Health Questionnaire (Submitted on 1/16/2024)  If you checked off any problems, how difficult have these problems made it for you to do your work, take care of things at home, or get along with other people?: Very difficult  PHQ9 TOTAL SCORE: 15    Answers submitted by the patient for this visit:  Patient Health Questionnaire (Submitted on 1/29/2024)  If you checked off any problems, how difficult have these problems made it for you to do your work, take care of things at home, or get along with other people?: Extremely difficult  PHQ9 TOTAL SCORE: 12  JORGE-7 (Submitted on 1/29/2024)  JORGE 7 TOTAL SCORE: 9    Answers submitted by the patient for this visit:  Patient Health Questionnaire (Submitted on 2/14/2024)  If you checked off any problems, how difficult have these problems made it for you to do your work, take care of things at home, or get along with other people?: Extremely difficult  PHQ9 TOTAL SCORE: 11  JORGE-7 (Submitted on 2/14/2024)  JORGE 7 TOTAL SCORE: 14    Answers submitted by the patient for this visit:  Patient Health Questionnaire (Submitted on 2/28/2024)  If you checked off any  problems, how difficult have these problems made it for you to do your work, take care of things at home, or get along with other people?: Extremely difficult  PHQ9 TOTAL SCORE: 16    Answers submitted by the patient for this visit:  Patient Health Questionnaire (Submitted on 3/11/2024)  If you checked off any problems, how difficult have these problems made it for you to do your work, take care of things at home, or get along with other people?: Very difficult  PHQ9 TOTAL SCORE: 10    Answers submitted by the patient for this visit:  Patient Health Questionnaire (Submitted on 3/25/2024)  If you checked off any problems, how difficult have these problems made it for you to do your work, take care of things at home, or get along with other people?: Extremely difficult  PHQ9 TOTAL SCORE: 16  JORGE-7 (Submitted on 3/25/2024)  JORGE 7 TOTAL SCORE: 9    Answers submitted by the patient for this visit:  Patient Health Questionnaire (Submitted on 4/10/2024)  If you checked off any problems, how difficult have these problems made it for you to do your work, take care of things at home, or get along with other people?: Extremely difficult  PHQ9 TOTAL SCORE: 11    Answers submitted by the patient for this visit:  Patient Health Questionnaire (Submitted on 5/9/2024)  If you checked off any problems, how difficult have these problems made it for you to do your work, take care of things at home, or get along with other people?: Extremely difficult  PHQ9 TOTAL SCORE: 14    Answers submitted by the patient for this visit:  Patient Health Questionnaire (Submitted on 5/30/2024)  If you checked off any problems, how difficult have these problems made it for you to do your work, take care of things at home, or get along with other people?: Extremely difficult  PHQ9 TOTAL SCORE: 16  JORGE-7 (Submitted on 5/30/2024)  JORGE 7 TOTAL SCORE: 7    Answers submitted by the patient for this visit:  Patient Health Questionnaire (Submitted on  7/11/2024)  If you checked off any problems, how difficult have these problems made it for you to do your work, take care of things at home, or get along with other people?: Very difficult  PHQ9 TOTAL SCORE: 16  JORGE-7 (Submitted on 7/6/2024)  JORGE 7 TOTAL SCORE: 19    Answers submitted by the patient for this visit:  Patient Health Questionnaire (Submitted on 8/8/2024)  If you checked off any problems, how difficult have these problems made it for you to do your work, take care of things at home, or get along with other people?: Extremely difficult  PHQ9 TOTAL SCORE: 10  JORGE-7 (Submitted on 8/8/2024)  JORGE 7 TOTAL SCORE: 15    Answers submitted by the patient for this visit:  Patient Health Questionnaire (Submitted on 8/22/2024)  If you checked off any problems, how difficult have these problems made it for you to do your work, take care of things at home, or get along with other people?: Somewhat difficult  PHQ9 TOTAL SCORE: 13    Answers submitted by the patient for this visit:  Patient Health Questionnaire (Submitted on 9/12/2024)  If you checked off any problems, how difficult have these problems made it for you to do your work, take care of things at home, or get along with other people?: Extremely difficult  PHQ9 TOTAL SCORE: 9    Answers submitted by the patient for this visit:  Patient Health Questionnaire (Submitted on 10/10/2024)  If you checked off any problems, how difficult have these problems made it for you to do your work, take care of things at home, or get along with other people?: Very difficult  PHQ9 TOTAL SCORE: 14  Patient Health Questionnaire (G7) (Submitted on 10/10/2024)  JORGE 7 TOTAL SCORE: 14    Answers submitted by the patient for this visit:  Patient Health Questionnaire (Submitted on 10/24/2024)  If you checked off any problems, how difficult have these problems made it for you to do your work, take care of things at home, or get along with other people?: Somewhat difficult  PHQ9 TOTAL  SCORE: 12  Patient Health Questionnaire (G7) (Submitted on 10/24/2024)  JORGE 7 TOTAL SCORE: 7    Answers submitted by the patient for this visit:  Patient Health Questionnaire (G7) (Submitted on 1/13/2025)  JORGE 7 TOTAL SCORE: 17    Answers submitted by the patient for this visit:  Patient Health Questionnaire (Submitted on 2/13/2025)  If you checked off any problems, how difficult have these problems made it for you to do your work, take care of things at home, or get along with other people?: Very difficult  PHQ9 TOTAL SCORE: 13  Patient Health Questionnaire (G7) (Submitted on 2/13/2025)  JORGE 7 TOTAL SCORE: 11    Answers submitted by the patient for this visit:  Patient Health Questionnaire (Submitted on 3/26/2025)  If you checked off any problems, how difficult have these problems made it for you to do your work, take care of things at home, or get along with other people?: Very difficult  PHQ9 TOTAL SCORE: 10  Patient Health Questionnaire (G7) (Submitted on 3/26/2025)  JORGE 7 TOTAL SCORE: 11    Answers submitted by the patient for this visit:  Patient Health Questionnaire (Submitted on 7/9/2025)  If you checked off any problems, how difficult have these problems made it for you to do your work, take care of things at home, or get along with other people?: Very difficult  PHQ9 TOTAL SCORE: 18  Patient Health Questionnaire (G7) (Submitted on 7/5/2025)  JORGE 7 TOTAL SCORE: 16

## 2025-07-19 ENCOUNTER — MYC MEDICAL ADVICE (OUTPATIENT)
Dept: PHYSICAL MEDICINE AND REHAB | Facility: CLINIC | Age: 55
End: 2025-07-19
Payer: MEDICARE

## 2025-07-21 ENCOUNTER — MYC REFILL (OUTPATIENT)
Dept: FAMILY MEDICINE | Facility: CLINIC | Age: 55
End: 2025-07-21
Payer: MEDICARE

## 2025-07-21 DIAGNOSIS — F90.0 ATTENTION DEFICIT HYPERACTIVITY DISORDER (ADHD), PREDOMINANTLY INATTENTIVE TYPE: ICD-10-CM

## 2025-07-21 RX ORDER — DEXTROAMPHETAMINE SACCHARATE, AMPHETAMINE ASPARTATE MONOHYDRATE, DEXTROAMPHETAMINE SULFATE AND AMPHETAMINE SULFATE 7.5; 7.5; 7.5; 7.5 MG/1; MG/1; MG/1; MG/1
30 CAPSULE, EXTENDED RELEASE ORAL DAILY
Qty: 30 CAPSULE | Refills: 0 | Status: SHIPPED | OUTPATIENT
Start: 2025-07-21

## 2025-07-24 ENCOUNTER — TELEPHONE (OUTPATIENT)
Dept: TRANSPLANT | Facility: CLINIC | Age: 55
End: 2025-07-24
Payer: MEDICARE

## 2025-07-24 ENCOUNTER — VIRTUAL VISIT (OUTPATIENT)
Dept: PSYCHOLOGY | Facility: CLINIC | Age: 55
End: 2025-07-24
Payer: MEDICARE

## 2025-07-24 DIAGNOSIS — F32.0 CURRENT MILD EPISODE OF MAJOR DEPRESSIVE DISORDER, UNSPECIFIED WHETHER RECURRENT: Primary | ICD-10-CM

## 2025-07-24 ASSESSMENT — PATIENT HEALTH QUESTIONNAIRE - PHQ9
SUM OF ALL RESPONSES TO PHQ QUESTIONS 1-9: 10
10. IF YOU CHECKED OFF ANY PROBLEMS, HOW DIFFICULT HAVE THESE PROBLEMS MADE IT FOR YOU TO DO YOUR WORK, TAKE CARE OF THINGS AT HOME, OR GET ALONG WITH OTHER PEOPLE: VERY DIFFICULT
SUM OF ALL RESPONSES TO PHQ QUESTIONS 1-9: 10

## 2025-07-24 ASSESSMENT — ANXIETY QUESTIONNAIRES
4. TROUBLE RELAXING: SEVERAL DAYS
8. IF YOU CHECKED OFF ANY PROBLEMS, HOW DIFFICULT HAVE THESE MADE IT FOR YOU TO DO YOUR WORK, TAKE CARE OF THINGS AT HOME, OR GET ALONG WITH OTHER PEOPLE?: VERY DIFFICULT
GAD7 TOTAL SCORE: 7
6. BECOMING EASILY ANNOYED OR IRRITABLE: SEVERAL DAYS
3. WORRYING TOO MUCH ABOUT DIFFERENT THINGS: SEVERAL DAYS
GAD7 TOTAL SCORE: 7
IF YOU CHECKED OFF ANY PROBLEMS ON THIS QUESTIONNAIRE, HOW DIFFICULT HAVE THESE PROBLEMS MADE IT FOR YOU TO DO YOUR WORK, TAKE CARE OF THINGS AT HOME, OR GET ALONG WITH OTHER PEOPLE: VERY DIFFICULT
GAD7 TOTAL SCORE: 7
2. NOT BEING ABLE TO STOP OR CONTROL WORRYING: SEVERAL DAYS
5. BEING SO RESTLESS THAT IT IS HARD TO SIT STILL: SEVERAL DAYS
1. FEELING NERVOUS, ANXIOUS, OR ON EDGE: SEVERAL DAYS
7. FEELING AFRAID AS IF SOMETHING AWFUL MIGHT HAPPEN: SEVERAL DAYS
7. FEELING AFRAID AS IF SOMETHING AWFUL MIGHT HAPPEN: SEVERAL DAYS

## 2025-07-24 ASSESSMENT — ENCOUNTER SYMPTOMS: NEW SYMPTOMS OF CORONARY ARTERY DISEASE: 0

## 2025-07-24 NOTE — PROGRESS NOTES
"[facility]                                     Progress Note    Patient Name: Melita Cortes  Date: 07/24/2025         Service Type: Individual      Session Start Time: 3.30  Session End Time: 4.12     Session Length: 38-52    Session #: 30    Attendees: Client attended alone    Service Modality:  Video Visit:      Provider verified identity through the following two step process.  Patient provided:  Patient is known previously to provider    Telemedicine Visit: The patient's condition can be safely assessed and treated via synchronous audio and visual telemedicine encounter.      Reason for Telemedicine Visit: Patient has requested telehealth visit    Originating Site (Patient Location): Patient's home    Distant Site (Provider Location): Provider Remote Setting- Home Office    Consent:  The patient/guardian has verbally consented to: the potential risks and benefits of telemedicine (video visit) versus in person care; bill my insurance or make self-payment for services provided; and responsibility for payment of non-covered services.     Patient would like the video invitation sent by:  My Chart    Mode of Communication:  Video Conference via Amwell    Distant Location (Provider):  Off-site    As the provider I attest to compliance with applicable laws and regulations related to telemedicine.    DATA  Interactive Complexity: No  Crisis: No        Progress Since Last Session (Related to Symptoms / Goals / Homework):   Symptoms: Improving as evident by current phq9 scores.     Homework: Achieved / completed to satisfaction         Episode of Care Goals: Satisfactory progress - ACTION (Actively working towards change); Intervened by reinforcing change plan / affirming steps taken     Current / Ongoing Stressors and Concerns:  Patient reported  \"Trauma Adhd depression\". Patient reported \" I think I have had some trauma in my life, my liver transplant .\" Pt reported she is  \"overly " "sensitive\" and \" I dont handle stress very well and struggle with impulsivity\"  and that \"I have a poor self image, negative talk a lot \" \"used and abandoned\" and that she always have to explained herself to others.     Current: Today, patient reported an improvement in mood, \"feels she has been doing much better since last session\". Been attending yoga, visiting with neighbors and being on top of house hold chores. Noted she noticed she is holding a lot of tension on her shoulder and jaws.      Treatment Objective(s) Addressed in This Session:   Patient will increase daily activity level by exercising for 20-30 minutes and participate in at least 1 daily pleasant/fun activities.  Patient will identify specific areas of cognitive distortion and challenge irrational thoughts with reality.    Patient will  learn and use grounding skills to  effectively manage anxiety and distress associated with trauma history daily.              Patient will learn and practice gratitude and compassion to built a positive self image.     Intervention:     Discussion on assertive communication and focus session on learning how to identify and reduce physical tension to mitigate stress  Walk patient through progressive muscle relaxation (PMR), slow breathing  self-regulatory skills in session and encourage them to practice at home daily.          Assessments completed prior to visit:  PHQ9:       11/7/2024    12:34 PM 11/20/2024     1:42 PM 1/30/2025    12:52 PM 2/13/2025     9:29 AM 3/26/2025     1:03 PM 7/9/2025     6:07 PM 7/24/2025     8:37 AM   PHQ-9 SCORE   PHQ-9 Total Score MyChart 16 (Moderately severe depression)  16 (Moderately severe depression) 13 (Moderate depression) 10 (Moderate depression) 18 (Moderately severe depression) 10 (Moderate depression)   PHQ-9 Total Score 16  13 16  13  10  18  10        Patient-reported     GAD7:       10/24/2024    11:09 AM 11/7/2024    12:35 PM 1/13/2025    12:05 PM 2/13/2025     9:30 AM " 3/26/2025     6:15 PM 7/5/2025     9:18 AM 7/24/2025     8:37 AM   JORGE-7 SCORE   Total Score 7 (mild anxiety) 17 (severe anxiety) 17 (severe anxiety) 11 (moderate anxiety) 11 (moderate anxiety) 16 (severe anxiety) 7 (mild anxiety)   Total Score 7  17  17  11  11  16  7        Patient-reported         ASSESSMENT: Current Emotional / Mental Status (status of significant symptoms):   Risk status (Self / Other harm or suicidal ideation)   Patient denies current fears or concerns for personal safety.   Patient denies current or recent suicidal ideation or behaviors.   Patient denies current or recent homicidal ideation or behaviors.   Patient denies current or recent self injurious behavior or ideation.   Patient denies other safety concerns.   Patient reports there has been no change in risk factors since their last session.     Patient reports there has been no change in protective factors since their last session.     Recommended that patient call 911 or go to the local ED should there be a change in any of these risk factors.     Appearance:   Appropriate    Eye Contact:   Good    Psychomotor Behavior: Normal    Attitude:   Cooperative  Interested   Orientation:   Person Place Time Situation   Speech    Rate / Production: Normal/ Responsive    Volume:  Normal    Mood:    Anxious  Depressed    Affect:    Tearful   Thought Content:  Clear    Thought Form:  Coherent  Logical    Insight:    Good      Medication Review:   No changes to current psychiatric medication(s)     Medication Compliance:   Yes     Changes in Health Issues:   None reported     Chemical Use Review:   Substance Use: Chemical use reviewed, no active concerns identified      Tobacco Use: No current tobacco use.      Diagnosis:  296.32 (F33.1) Major Depressive Disorder, Recurrent Episode, Moderate _ and With atypical features    Collateral Reports Completed:   Not Applicable    PLAN: (Patient Tasks / Therapist Tasks / Other)  Use Safety Plan as  needed    Practice PMR skill learned in session for 10-15 minutes daily.     Jake Coyne, LICSW           _________________________________________________________    Individual Treatment Plan    Patient's Name: Melita Cortes  YOB: 1970    Date of Creation: 09/22/2023  Date Treatment Plan Last Reviewed/Revised: 07/24/2025    DSM5 Diagnoses: 296.32 (F33.1) Major Depressive Disorder, Recurrent Episode, Moderate _ and With atypical features  Psychosocial / Contextual Factors:   PROMIS (reviewed every 90 days):     Referral / Collaboration:  Referral to another professional/service is not indicated at this time..    Anticipated number of session for this episode of care: 15-25  Anticipation frequency of session: Biweekly  Anticipated Duration of each session: 38-52 minutes  Treatment plan will be reviewed in 90 days or when goals have been changed.       MeasurableTreatment Goal(s) related to diagnosis / functional impairment(s)  Goal 1: Patient will identify and address specific triggers to feelings of depression and improve coping by  90 % in the next three months.    I will know I've met my goal when I am less impulsive, better communicator and can comfortably manage distressful emotions.         Objective #A (Patient Action)    Develop and utilize 3 effective distress tolerance strategies to address SI.   Status: Continued - Date(s):  07/24/2025     Intervention(s)   Therapist will engage in collaborative brainstorming with pt to identify and practice individualized distress tolerance coping strategies to address SI weekly.    Objective #B (Patient Action)    Patient will increase daily activity level by exercising for 20-30 minutes and participate in at least 1 daily pleasant/fun activities.  Status: Continued - Date(s):   10/26/2025    Intervention(s)  Therapist will provide psychoeducation, behavioral activation, and cognitive restructuring.    Objective #C  Patient will identify specific areas of  "cognitive distortion and challenge irrational thoughts with reality.   Status: Continued - Date(s):  10/26/2025       Intervention(s)  Therapist will provide psychoeducation, behavioral activation, and cognitive restructuring.    Objective #D  Patient will learn and practice relaxation techniques to manage depression.  Status: Continued - Date(s):   10/26/2025    Intervention(s)   Therapist will teach patient thought stopping, deep breathing exercises, mindful meditation, and creative visualization.               Patient has reviewed and agreed to the above plan.      Jake Coyne, [facility]  September 22, 2023    ----- Service Performed and Documented by [facility]------  This note was reviewed and clinical supervision by ELMO Colmenares [facility]    4/1/2024           [facility]                                       Melita Cortes     SAFETY PLAN:  Step 1: Warning signs / cues (Thoughts, images, mood, situation, behavior) that a crisis may be developing:  Thoughts:  \" sometimes I feel like I don't have a purpose\"  Images: Feels lonely after losing mum and dad and lonely in marriage  Thinking Processes: ruminations (can't stop thinking about my problems): health, family loses  Mood: worsening depression  Behaviors: isolating/withdrawing   Situations: loss: parents and lonely in marriage   Step 2: Coping strategies - Things I can do to take my mind off of my problems without contacting another person (relaxation technique, physical activity):  Distress Tolerance Strategies:  play with my pet , intense exercise: work out for 2-3 minutes , and support group attendance for transplant  Physical Activities: go for a walk and stretching   Focus on helpful thoughts:  \"This is temporary\" and \"It always passes\"  Step 3: People and social settings that provide distraction:   Name:  Augustine ( friend) Phone: [phone]   Name:  Aimee paniagua Phone: [phone]     gym  and [employer] Liver transplant group  Step " 4: Remind myself of people and things that are important to me and worth living for:    My , cousins and friends.  Step 5: When I am in crisis, I can ask these people to help me use my safety plan:   Name:  Jake ( Therapist )  Phone: [phone]   Name:  Aimee paniagua Phone: [phone]   Step 6: Making the environment safe:   remove alcohol, remove drugs, and be around others  Step 7: Professionals or agencies I can contact during a crisis:  Suicide Prevention Lifeline: Call or Text 007   [facility]: [phone]    Call 911 or go to my nearest emergency department.   I helped develop this safety plan and agree to use it when needed.  I have been given a copy of this plan.      Client signature _________________________________________________________________  Today s date:  11/14/2023  Completed by Provider Name/ Credentials:  Jake Coyne LGSW  November 14, 2023  Adapted from Safety Plan Template 2008 Chelsi Marx and Fred Ramos is reprinted with the express permission of the authors.  No portion of the Safety Plan Template may be reproduced without the express, written permission.  You can contact the authors at [email or web address] or [email or web address].        Answers submitted by the patient for this visit:  Patient Health Questionnaire (Submitted on 11/14/2023)  If you checked off any problems, how difficult have these problems made it for you to do your work, take care of things at home, or get along with other people?: Very difficult  PHQ9 TOTAL SCORE: 18  JORGE-7 (Submitted on 11/14/2023)  JORGE 7 TOTAL SCORE: 14    Answers submitted by the patient for this visit:  Patient Health Questionnaire (Submitted on 11/28/2023)  If you checked off any problems, how difficult have these problems made it for you to do your work, take care of things at home, or get along with other people?: Extremely difficult  PHQ9 TOTAL SCORE: 14  JORGE-7 (Submitted on 11/28/2023)  JORGE 7 TOTAL SCORE:  21    Answers submitted by the patient for this visit:  Patient Health Questionnaire (Submitted on 12/13/2023)  If you checked off any problems, how difficult have these problems made it for you to do your work, take care of things at home, or get along with other people?: Very difficult  PHQ9 TOTAL SCORE: 18  JORGE-7 (Submitted on 12/13/2023)  JORGE 7 TOTAL SCORE: 21    Answers submitted by the patient for this visit:  Patient Health Questionnaire (Submitted on 1/3/2024)  If you checked off any problems, how difficult have these problems made it for you to do your work, take care of things at home, or get along with other people?: Very difficult  PHQ9 TOTAL SCORE: 16  JORGE-7 (Submitted on 1/3/2024)  JORGE 7 TOTAL SCORE: 10    Answers submitted by the patient for this visit:  Patient Health Questionnaire (Submitted on 1/16/2024)  If you checked off any problems, how difficult have these problems made it for you to do your work, take care of things at home, or get along with other people?: Very difficult  PHQ9 TOTAL SCORE: 15    Answers submitted by the patient for this visit:  Patient Health Questionnaire (Submitted on 1/29/2024)  If you checked off any problems, how difficult have these problems made it for you to do your work, take care of things at home, or get along with other people?: Extremely difficult  PHQ9 TOTAL SCORE: 12  JORGE-7 (Submitted on 1/29/2024)  JORGE 7 TOTAL SCORE: 9    Answers submitted by the patient for this visit:  Patient Health Questionnaire (Submitted on 2/14/2024)  If you checked off any problems, how difficult have these problems made it for you to do your work, take care of things at home, or get along with other people?: Extremely difficult  PHQ9 TOTAL SCORE: 11  JORGE-7 (Submitted on 2/14/2024)  JORGE 7 TOTAL SCORE: 14    Answers submitted by the patient for this visit:  Patient Health Questionnaire (Submitted on 2/28/2024)  If you checked off any problems, how difficult have these problems made it  for you to do your work, take care of things at home, or get along with other people?: Extremely difficult  PHQ9 TOTAL SCORE: 16    Answers submitted by the patient for this visit:  Patient Health Questionnaire (Submitted on 3/11/2024)  If you checked off any problems, how difficult have these problems made it for you to do your work, take care of things at home, or get along with other people?: Very difficult  PHQ9 TOTAL SCORE: 10    Answers submitted by the patient for this visit:  Patient Health Questionnaire (Submitted on 3/25/2024)  If you checked off any problems, how difficult have these problems made it for you to do your work, take care of things at home, or get along with other people?: Extremely difficult  PHQ9 TOTAL SCORE: 16  JORGE-7 (Submitted on 3/25/2024)  JORGE 7 TOTAL SCORE: 9    Answers submitted by the patient for this visit:  Patient Health Questionnaire (Submitted on 4/10/2024)  If you checked off any problems, how difficult have these problems made it for you to do your work, take care of things at home, or get along with other people?: Extremely difficult  PHQ9 TOTAL SCORE: 11    Answers submitted by the patient for this visit:  Patient Health Questionnaire (Submitted on 5/9/2024)  If you checked off any problems, how difficult have these problems made it for you to do your work, take care of things at home, or get along with other people?: Extremely difficult  PHQ9 TOTAL SCORE: 14    Answers submitted by the patient for this visit:  Patient Health Questionnaire (Submitted on 5/30/2024)  If you checked off any problems, how difficult have these problems made it for you to do your work, take care of things at home, or get along with other people?: Extremely difficult  PHQ9 TOTAL SCORE: 16  JORGE-7 (Submitted on 5/30/2024)  JORGE 7 TOTAL SCORE: 7    Answers submitted by the patient for this visit:  Patient Health Questionnaire (Submitted on 7/11/2024)  If you checked off any problems, how difficult  have these problems made it for you to do your work, take care of things at home, or get along with other people?: Very difficult  PHQ9 TOTAL SCORE: 16  JORGE-7 (Submitted on 7/6/2024)  JORGE 7 TOTAL SCORE: 19    Answers submitted by the patient for this visit:  Patient Health Questionnaire (Submitted on 8/8/2024)  If you checked off any problems, how difficult have these problems made it for you to do your work, take care of things at home, or get along with other people?: Extremely difficult  PHQ9 TOTAL SCORE: 10  JORGE-7 (Submitted on 8/8/2024)  JORGE 7 TOTAL SCORE: 15    Answers submitted by the patient for this visit:  Patient Health Questionnaire (Submitted on 8/22/2024)  If you checked off any problems, how difficult have these problems made it for you to do your work, take care of things at home, or get along with other people?: Somewhat difficult  PHQ9 TOTAL SCORE: 13    Answers submitted by the patient for this visit:  Patient Health Questionnaire (Submitted on 9/12/2024)  If you checked off any problems, how difficult have these problems made it for you to do your work, take care of things at home, or get along with other people?: Extremely difficult  PHQ9 TOTAL SCORE: 9    Answers submitted by the patient for this visit:  Patient Health Questionnaire (Submitted on 10/10/2024)  If you checked off any problems, how difficult have these problems made it for you to do your work, take care of things at home, or get along with other people?: Very difficult  PHQ9 TOTAL SCORE: 14  Patient Health Questionnaire (G7) (Submitted on 10/10/2024)  JORGE 7 TOTAL SCORE: 14    Answers submitted by the patient for this visit:  Patient Health Questionnaire (Submitted on 10/24/2024)  If you checked off any problems, how difficult have these problems made it for you to do your work, take care of things at home, or get along with other people?: Somewhat difficult  PHQ9 TOTAL SCORE: 12  Patient Health Questionnaire (G7) (Submitted on  10/24/2024)  JORGE 7 TOTAL SCORE: 7    Answers submitted by the patient for this visit:  Patient Health Questionnaire (G7) (Submitted on 1/13/2025)  JORGE 7 TOTAL SCORE: 17    Answers submitted by the patient for this visit:  Patient Health Questionnaire (Submitted on 2/13/2025)  If you checked off any problems, how difficult have these problems made it for you to do your work, take care of things at home, or get along with other people?: Very difficult  PHQ9 TOTAL SCORE: 13  Patient Health Questionnaire (G7) (Submitted on 2/13/2025)  JORGE 7 TOTAL SCORE: 11    Answers submitted by the patient for this visit:  Patient Health Questionnaire (Submitted on 3/26/2025)  If you checked off any problems, how difficult have these problems made it for you to do your work, take care of things at home, or get along with other people?: Very difficult  PHQ9 TOTAL SCORE: 10  Patient Health Questionnaire (G7) (Submitted on 3/26/2025)  JORGE 7 TOTAL SCORE: 11    Answers submitted by the patient for this visit:  Patient Health Questionnaire (Submitted on 7/9/2025)  If you checked off any problems, how difficult have these problems made it for you to do your work, take care of things at home, or get along with other people?: Very difficult  PHQ9 TOTAL SCORE: 18  Patient Health Questionnaire (G7) (Submitted on 7/5/2025)  JORGE 7 TOTAL SCORE: 16    Answers submitted by the patient for this visit:  Patient Health Questionnaire (Submitted on 7/24/2025)  If you checked off any problems, how difficult have these problems made it for you to do your work, take care of things at home, or get along with other people?: Very difficult  PHQ9 TOTAL SCORE: 10  Patient Health Questionnaire (G7) (Submitted on 7/24/2025)  JORGE 7 TOTAL SCORE: 7

## 2025-07-29 ENCOUNTER — PRE VISIT (OUTPATIENT)
Dept: PLASTIC SURGERY | Facility: CLINIC | Age: 55
End: 2025-07-29
Payer: MEDICARE

## 2025-07-29 ENCOUNTER — OFFICE VISIT (OUTPATIENT)
Dept: PLASTIC SURGERY | Facility: CLINIC | Age: 55
End: 2025-07-29
Attending: INTERNAL MEDICINE
Payer: MEDICARE

## 2025-07-29 VITALS
SYSTOLIC BLOOD PRESSURE: 129 MMHG | OXYGEN SATURATION: 100 % | BODY MASS INDEX: 26.16 KG/M2 | DIASTOLIC BLOOD PRESSURE: 88 MMHG | HEART RATE: 86 BPM | WEIGHT: 157.2 LBS | RESPIRATION RATE: 16 BRPM | TEMPERATURE: 98.1 F

## 2025-07-29 DIAGNOSIS — N62 MACROMASTIA: ICD-10-CM

## 2025-07-29 PROCEDURE — 99204 OFFICE O/P NEW MOD 45 MIN: CPT | Performed by: PLASTIC SURGERY

## 2025-07-29 PROCEDURE — 1125F AMNT PAIN NOTED PAIN PRSNT: CPT | Performed by: PLASTIC SURGERY

## 2025-07-29 PROCEDURE — G0463 HOSPITAL OUTPT CLINIC VISIT: HCPCS | Performed by: PLASTIC SURGERY

## 2025-07-29 PROCEDURE — 3079F DIAST BP 80-89 MM HG: CPT | Performed by: PLASTIC SURGERY

## 2025-07-29 PROCEDURE — 3074F SYST BP LT 130 MM HG: CPT | Performed by: PLASTIC SURGERY

## 2025-07-29 RX ORDER — ESTRADIOL 0.1 MG/D
FILM, EXTENDED RELEASE TRANSDERMAL
COMMUNITY
Start: 2025-06-09

## 2025-07-29 RX ORDER — PROGESTERONE 200 MG/1
200 CAPSULE ORAL AT BEDTIME
COMMUNITY
Start: 2025-06-06

## 2025-07-29 ASSESSMENT — PAIN SCALES - GENERAL: PAINLEVEL_OUTOF10: MODERATE PAIN (5)

## 2025-07-29 NOTE — LETTER
2025      Melita Cortes  [address] [facility] [address] [address]  [facility] [address]      Dear Colleague,    Thank you for referring your patient, Melita Cortes, to the [facility]. Please see a copy of my visit note below.    Referring Provider:  Navneet Johnson MD  [address] [place] [address]     Primary Care Provider:  Navneet Johnson      RE: Melita Cortes.  : 1970.  DANG: 2025.    Reason for visit: Potential breast reduction    HPI: The patient is interested in a breast reduction.  She feels that she is large and has pain in her neck back and shoulders from the large breasts.  She has inframammary fold rashes.  She has tried conservative therapy and it has not helped.  According to the patient she wears a double D bra and would like to be around a B-C cup.  She has no family history of breast cancer.  Her last mammogram was in May 2025 BI-RADS 2.    It is important to note that the patient had a liver transplant in  and is on immunosuppression.    Medical history:  Past Medical History:   Diagnosis Date     Alcoholic hepatitis (H)      Asthma 03/10/2006     Cervical high risk HPV (human papillomavirus) test positive 2020    See problem list     Clinical diagnosis of COVID-19 2023     Depressive disorder      Gastroesophageal reflux disease without esophagitis      Liver failure (H)        Surgical history:  Past Surgical History:   Procedure Laterality Date     APPENDECTOMY       COLONOSCOPY N/A 2022    Procedure: COLONOSCOPY;  Surgeon: Zita Steele MD;  Location:  GI     ENDOSCOPIC RETROGRADE CHOLANGIOPANCREATOGRAM N/A 2022    Procedure: ENDOSCOPIC RETROGRADE CHOLANGIOPANCREATOGRAPHY WITH BILIARY SPHINCTEROTOMY, SLUDGE REMOVAL, DILATION  AND STENT PLACEMENT;  Surgeon: Guru Gardenia Escobar MD;  Location:  OR     ENDOSCOPIC RETROGRADE CHOLANGIOPANCREATOGRAM N/A 2022    Procedure:  ENDOSCOPIC RETROGRADE CHOLANGIOPANCREATOGRAPHY WITH BILE DUCT STENT REMOVAL;  Surgeon: Guru Gardenia Escobar MD;  Location: [facility]     ENDOSCOPIC RETROGRADE CHOLANGIOPANCREATOGRAPHY, EXCHANGE TUBE/STENT N/A 2022    Procedure: ENDOSCOPIC RETROGRADE CHOLANGIOPANCREATOGRAPHY, bile duct stents exchanged, balloon dilation and sweep of bile ducts for sludge;  Surgeon: Guru Gardenia Escobar MD;  Location: [facility] OR     ESOPHAGOGASTRODUODENOSCOPY, WITH BRUSHINGS N/A 10/29/2021    Procedure: ESOPHAGOGASTRODUODENOSCOPY, WITH BRUSHINGS;  Surgeon: Torey Ruiz MD;  Location: [facility] GI     IR LIVER BIOPSY PERCUTANEOUS  2022     OPERATIVE HYSTEROSCOPY WITH MORCELLATOR N/A 2023    Procedure: Operative hysteroscopy with Myosure morcellator;  Surgeon: Salud Arnold MD;  Location:  OR     ORTHOPEDIC SURGERY Left 2023    hip replacement     TRANSPLANT LIVER RECIPIENT  DONOR N/A 2022    Procedure: TRANSPLANT, LIVER, RECIPIENT,  DONOR;  Surgeon: Pradeep Browne MD;  Location: [facility] OR       Family history:  Family History   Problem Relation Age of Onset     Cancer Mother      Melanoma Father      Skin Cancer Father      Colon Cancer Maternal Uncle      Colon Cancer Paternal Aunt        Medications:  Current Outpatient Medications   Medication Sig Dispense Refill     amphetamine-dextroamphetamine (ADDERALL XR) 30 MG 24 hr capsule Take 1 capsule (30 mg) by mouth daily. 30 capsule 0     calcium carbonate (OS-SHAI) 1500 (600 Ca) MG tablet Take 1 tablet (600 mg) by mouth daily 60 tablet 3     COMPOUNDED NON-CONTROLLED SUBSTANCE (CMPD RX) - PHARMACY TO MIX COMPOUNDED MEDICATION Apply to the lower lip twice daily for 4 days, wash hands after application. Avoid sun. 30 g 0     estradiol (VIVELLE-DOT) 0.1 MG/24HR bi-weekly patch APPLY 1 PATCH TO SKIN 2 TIMES EVERY WEEK AS DIRECTED       ESTRADIOL 0.1MG/GM CREAM Insert 1 gram vaginally 2-3 times per week        FLUoxetine (PROZAC) 40 MG capsule TAKE 1 CAPSULE BY MOUTH EVERY DAY 90 capsule 0     hydrocortisone 2.5 % ointment Apply to the lips twice daily for 7 days. 30 g 0     hydrocortisone 2.5 % ointment Apply twice daily for 1 week on itchy, scaly areas on the body.  Apply Vaseline on top. 60 g 0     hydrOXYzine HCl (ATARAX) 25 MG tablet TAKE 2 TABLETS BY MOUTH NIGHTLY AS NEEDED FOR (INSOMINA) 180 tablet 1     magnesium oxide (MAG-OX) 400 MG tablet Take 1 tablet (400 mg) by mouth 2 times daily 90 tablet 3     multivitamin (CENTRUM SILVER) tablet Take 1 tablet by mouth daily       pantoprazole (PROTONIX) 40 MG EC tablet Take 1 tablet (40 mg) by mouth daily 90 tablet 3     progesterone (PROMETRIUM) 200 MG capsule Take 200 mg by mouth at bedtime.       tacrolimus (GENERIC EQUIVALENT) 1 MG capsule Take 1 capsule (1 mg) by mouth every 12 hours. 180 capsule 3       Allergies:  Allergies   Allergen Reactions     Adhesive Tape Rash     Latex Rash       Social history:   Social History     Tobacco Use     Smoking status: Former     Current packs/day: 0.00     Types: Cigarettes     Quit date: 2020     Years since quittin.2     Passive exposure: Never     Smokeless tobacco: Never   Substance Use Topics     Alcohol use: Not Currently     Comment: Last drink 2021         Physical Examination:  /88 (BP Location: Right arm, Patient Position: Sitting, Cuff Size: Adult Regular)   Pulse 86   Temp 98.1  F (36.7  C) (Oral)   Resp 16   Wt 71.3 kg (157 lb 3.2 oz)   LMP 2021 (Exact Date)   SpO2 100%   BMI 26.16 kg/m    Body mass index is 26.16 kg/m .  BSA: 1.81 m   Schnur Scale: 450 g    General: No acute distress.    Breasts: Bilateral grade 3 ptosis.  Left breast is slightly longer than the right side.  Sternal notch to nipple distance under 30 cm.        ASSESMENT and PLAN:    Based upon the above findings, a diagnosis of symptomatic bilateral breast hypertrophy was made. I had a neel, detailed discussion with  the patient in the presence of my nurse (who was present from beginning to end) about breast reduction and the proposed surgery. I was very clear and detailed about overview of surgery, perioperative plans, and expectations. I went over the facts that this surgery would lead to scars that are permanent (showed her where they would be on pictures and her body), potential loss of nipple and breast sensation, potential inability to breastfeed, that asymmetries are guaranteed, the inability to promise cup sizes or exact sizes post-operatively, and potential rehypertrophy in the future. All risks, benefits and alternatives of the surgery including but not limited to pain, infection, bleeding, scarring, asymmetry, seromas, hematomas, wound breakdown, wound dehiscence, prominent scar, hypertrophic scar, keloid scar, requirement of further surgeries, skin necrosis, fat necrosis, nipple necrosis, T-junction site necrosis, requirement of free nipple grafts, DVT, PE, MI, CVA, pneumonia, renal failure and death, were explained in detail. She agreed she understood them all and had all her questions answered to her satisfaction. The realities of insurance companies and requirement of prior authorization were also explained. Based on her Schnur scale, 450 g needs to be removed from each breast.  If we remove this amount the patient would be flat and I cannot give her a B to C cup.    Additionally I have severe reservations proceeding with an elective breast reduction surgery given her immunosuppression and liver transplant history.  This was discussed frankly but empathetically with the patient in detail.    I will touch base with her transplant team and primary care physician and see if proceeding is in her best interest or not.  Additionally I was very clear that this may not be covered by insurance given the amount that has to be removed per the Schnurr scale and therefore this may be a private pay situation as well.    All her  questions were answered. She was happy with the visit. I look forward to helping her out in the near future as indicated.       Total time spent in the encounter today including chart review, visit itself, and post-visit paperwork was 45 minutes.       Viviana Carr MD    Chief, Division of Plastic Surgery  Department of Surgery  [facility]      CC: Navneet Johnson MD  [address] [place] [address]  CC: Navneet Johnson      Again, thank you for allowing me to participate in the care of your patient.        Sincerely,        SHANITA Carr MD    Electronically signed

## 2025-07-29 NOTE — NURSING NOTE
"Oncology Rooming Note    July 29, 2025 9:02 AM   Melita Cortes is a 55 year old female who presents for:    Chief Complaint   Patient presents with    Oncology Clinic Visit     Breast Reduction- Macromastia     Initial Vitals: /88 (BP Location: Right arm, Patient Position: Sitting, Cuff Size: Adult Regular)   Pulse 86   Temp 98.1  F (36.7  C) (Oral)   Resp 16   Wt 71.3 kg (157 lb 3.2 oz)   LMP 03/30/2021 (Exact Date)   SpO2 100%   BMI 26.16 kg/m   Estimated body mass index is 26.16 kg/m  as calculated from the following:    Height as of 6/10/25: 1.651 m (5' 5\").    Weight as of this encounter: 71.3 kg (157 lb 3.2 oz). Body surface area is 1.81 meters squared.  Moderate Pain (5) Comment: Data Unavailable   Patient's last menstrual period was 03/30/2021 (exact date).  Allergies reviewed: Yes  Medications reviewed: Yes    Medications: MEDICATION REFILLS NEEDED TODAY. Provider was notified.  Pharmacy name entered into [facility]:    [id number] - [place], [place] - [address] [address] [facility] [place], [place] - [address] [place] [address] [facility] [place] [address]  [facility] [place] - [address] [place] [id number] - [place], [place] - [address] [facility]  [facility] - [place], [place] - [address] [place] [address]    PHQ9:  Did this patient require a PHQ9?: No      Clinical concerns: Refill: Estradiol      Irma Haakenson            "

## 2025-07-30 ENCOUNTER — MEDICAL CORRESPONDENCE (OUTPATIENT)
Dept: HEALTH INFORMATION MANAGEMENT | Facility: CLINIC | Age: 55
End: 2025-07-30
Payer: MEDICARE

## 2025-07-30 NOTE — PROGRESS NOTES
Referring Provider:  Navneet Johnson MD  [address] [place] [address]     Primary Care Provider:  Navneet Johnson      RE: Melita Russellstuartgrayson.  : 1970.  DANG: 2025.    Reason for visit: Potential breast reduction    HPI: The patient is interested in a breast reduction.  She feels that she is large and has pain in her neck back and shoulders from the large breasts.  She has inframammary fold rashes.  She has tried conservative therapy and it has not helped.  According to the patient she wears a double D bra and would like to be around a B-C cup.  She has no family history of breast cancer.  Her last mammogram was in May 2025 BI-RADS 2.    It is important to note that the patient had a liver transplant in  and is on immunosuppression.    Medical history:  Past Medical History:   Diagnosis Date    Alcoholic hepatitis (H)     Asthma 03/10/2006    Cervical high risk HPV (human papillomavirus) test positive 2020    See problem list    Clinical diagnosis of COVID-19 2023    Depressive disorder     Gastroesophageal reflux disease without esophagitis     Liver failure (H)        Surgical history:  Past Surgical History:   Procedure Laterality Date    APPENDECTOMY      COLONOSCOPY N/A 2022    Procedure: COLONOSCOPY;  Surgeon: Zita Steele MD;  Location:  GI    ENDOSCOPIC RETROGRADE CHOLANGIOPANCREATOGRAM N/A 2022    Procedure: ENDOSCOPIC RETROGRADE CHOLANGIOPANCREATOGRAPHY WITH BILIARY SPHINCTEROTOMY, SLUDGE REMOVAL, DILATION  AND STENT PLACEMENT;  Surgeon: Guru Gardenia Escobar MD;  Location: [facility]    ENDOSCOPIC RETROGRADE CHOLANGIOPANCREATOGRAM N/A 2022    Procedure: ENDOSCOPIC RETROGRADE CHOLANGIOPANCREATOGRAPHY WITH BILE DUCT STENT REMOVAL;  Surgeon: Guru Gardenia Escobar MD;  Location[facility]    ENDOSCOPIC RETROGRADE CHOLANGIOPANCREATOGRAPHY, EXCHANGE TUBE/STENT N/A 2022    Procedure: ENDOSCOPIC RETROGRADE  CHOLANGIOPANCREATOGRAPHY, bile duct stents exchanged, balloon dilation and sweep of bile ducts for sludge;  Surgeon: Guru Gardenia Escobar MD;  Location: [facility]    ESOPHAGOGASTRODUODENOSCOPY, WITH BRUSHINGS N/A 10/29/2021    Procedure: ESOPHAGOGASTRODUODENOSCOPY, WITH BRUSHINGS;  Surgeon: Torey Ruiz MD;  Location: [facility] GI    IR LIVER BIOPSY PERCUTANEOUS  2022    OPERATIVE HYSTEROSCOPY WITH MORCELLATOR N/A 2023    Procedure: Operative hysteroscopy with Myosure morcellator;  Surgeon: Salud Arnold MD;  Location: [facility] OR    ORTHOPEDIC SURGERY Left 2023    hip replacement    TRANSPLANT LIVER RECIPIENT  DONOR N/A 2022    Procedure: TRANSPLANT, LIVER, RECIPIENT,  DONOR;  Surgeon: Pradeep Browne MD;  Location: [facility] OR       Family history:  Family History   Problem Relation Age of Onset    Cancer Mother     Melanoma Father     Skin Cancer Father     Colon Cancer Maternal Uncle     Colon Cancer Paternal Aunt        Medications:  Current Outpatient Medications   Medication Sig Dispense Refill    amphetamine-dextroamphetamine (ADDERALL XR) 30 MG 24 hr capsule Take 1 capsule (30 mg) by mouth daily. 30 capsule 0    calcium carbonate (OS-SHAI) 1500 (600 Ca) MG tablet Take 1 tablet (600 mg) by mouth daily 60 tablet 3    COMPOUNDED NON-CONTROLLED SUBSTANCE (CMPD RX) - PHARMACY TO MIX COMPOUNDED MEDICATION Apply to the lower lip twice daily for 4 days, wash hands after application. Avoid sun. 30 g 0    estradiol (VIVELLE-DOT) 0.1 MG/24HR bi-weekly patch APPLY 1 PATCH TO SKIN 2 TIMES EVERY WEEK AS DIRECTED      ESTRADIOL 0.1MG/GM CREAM Insert 1 gram vaginally 2-3 times per week      FLUoxetine (PROZAC) 40 MG capsule TAKE 1 CAPSULE BY MOUTH EVERY DAY 90 capsule 0    hydrocortisone 2.5 % ointment Apply to the lips twice daily for 7 days. 30 g 0    hydrocortisone 2.5 % ointment Apply twice daily for 1 week on itchy, scaly areas on the body.  Apply Vaseline on top. 60 g 0     hydrOXYzine HCl (ATARAX) 25 MG tablet TAKE 2 TABLETS BY MOUTH NIGHTLY AS NEEDED FOR (INSOMINA) 180 tablet 1    magnesium oxide (MAG-OX) 400 MG tablet Take 1 tablet (400 mg) by mouth 2 times daily 90 tablet 3    multivitamin (CENTRUM SILVER) tablet Take 1 tablet by mouth daily      pantoprazole (PROTONIX) 40 MG EC tablet Take 1 tablet (40 mg) by mouth daily 90 tablet 3    progesterone (PROMETRIUM) 200 MG capsule Take 200 mg by mouth at bedtime.      tacrolimus (GENERIC EQUIVALENT) 1 MG capsule Take 1 capsule (1 mg) by mouth every 12 hours. 180 capsule 3       Allergies:  Allergies   Allergen Reactions    Adhesive Tape Rash    Latex Rash       Social history:   Social History     Tobacco Use    Smoking status: Former     Current packs/day: 0.00     Types: Cigarettes     Quit date: 2020     Years since quittin.2     Passive exposure: Never    Smokeless tobacco: Never   Substance Use Topics    Alcohol use: Not Currently     Comment: Last drink 2021         Physical Examination:  /88 (BP Location: Right arm, Patient Position: Sitting, Cuff Size: Adult Regular)   Pulse 86   Temp 98.1  F (36.7  C) (Oral)   Resp 16   Wt 71.3 kg (157 lb 3.2 oz)   LMP 2021 (Exact Date)   SpO2 100%   BMI 26.16 kg/m    Body mass index is 26.16 kg/m .  BSA: 1.81 m   Schnur Scale: 450 g    General: No acute distress.    Breasts: Bilateral grade 3 ptosis.  Left breast is slightly longer than the right side.  Sternal notch to nipple distance under 30 cm.        ASSESMENT and PLAN:    Based upon the above findings, a diagnosis of symptomatic bilateral breast hypertrophy was made. I had a neel, detailed discussion with the patient in the presence of my nurse (who was present from beginning to end) about breast reduction and the proposed surgery. I was very clear and detailed about overview of surgery, perioperative plans, and expectations. I went over the facts that this surgery would lead to scars that are permanent  (showed her where they would be on pictures and her body), potential loss of nipple and breast sensation, potential inability to breastfeed, that asymmetries are guaranteed, the inability to promise cup sizes or exact sizes post-operatively, and potential rehypertrophy in the future. All risks, benefits and alternatives of the surgery including but not limited to pain, infection, bleeding, scarring, asymmetry, seromas, hematomas, wound breakdown, wound dehiscence, prominent scar, hypertrophic scar, keloid scar, requirement of further surgeries, skin necrosis, fat necrosis, nipple necrosis, T-junction site necrosis, requirement of free nipple grafts, DVT, PE, MI, CVA, pneumonia, renal failure and death, were explained in detail. She agreed she understood them all and had all her questions answered to her satisfaction. The realities of insurance companies and requirement of prior authorization were also explained. Based on her Schnur scale, 450 g needs to be removed from each breast.  If we remove this amount the patient would be flat and I cannot give her a B to C cup.    Additionally I have severe reservations proceeding with an elective breast reduction surgery given her immunosuppression and liver transplant history.  This was discussed frankly but empathetically with the patient in detail.    I will touch base with her transplant team and primary care physician and see if proceeding is in her best interest or not.  Additionally I was very clear that this may not be covered by insurance given the amount that has to be removed per the Schnurr scale and therefore this may be a private pay situation as well.    All her questions were answered. She was happy with the visit. I look forward to helping her out in the near future as indicated.       Total time spent in the encounter today including chart review, visit itself, and post-visit paperwork was 45 minutes.       Viviana Carr MD    Chief,  Division of Plastic Surgery  Department of Surgery  [facility]      CC: Navneet Johnson MD  [address] [place] [address]  CC: Navneet Johnson

## 2025-08-12 ENCOUNTER — MYC MEDICAL ADVICE (OUTPATIENT)
Dept: FAMILY MEDICINE | Facility: CLINIC | Age: 55
End: 2025-08-12
Payer: MEDICARE

## 2025-08-12 DIAGNOSIS — F90.0 ATTENTION DEFICIT HYPERACTIVITY DISORDER (ADHD), PREDOMINANTLY INATTENTIVE TYPE: ICD-10-CM

## 2025-08-13 RX ORDER — DEXTROAMPHETAMINE SACCHARATE, AMPHETAMINE ASPARTATE, DEXTROAMPHETAMINE SULFATE AND AMPHETAMINE SULFATE 2.5; 2.5; 2.5; 2.5 MG/1; MG/1; MG/1; MG/1
10 TABLET ORAL DAILY
Qty: 30 TABLET | Refills: 0 | Status: SHIPPED | OUTPATIENT
Start: 2025-08-13

## 2025-08-14 ENCOUNTER — DOCUMENTATION ONLY (OUTPATIENT)
Dept: NEUROSURGERY | Facility: CLINIC | Age: 55
End: 2025-08-14
Payer: MEDICARE

## 2025-08-16 ENCOUNTER — MYC REFILL (OUTPATIENT)
Dept: FAMILY MEDICINE | Facility: CLINIC | Age: 55
End: 2025-08-16
Payer: MEDICARE

## 2025-08-16 ENCOUNTER — MYC REFILL (OUTPATIENT)
Dept: TRANSPLANT | Facility: CLINIC | Age: 55
End: 2025-08-16
Payer: MEDICARE

## 2025-08-16 DIAGNOSIS — N95.2 ATROPHIC VAGINITIS: Primary | ICD-10-CM

## 2025-08-16 DIAGNOSIS — Z94.4 LIVER REPLACED BY TRANSPLANT (H): ICD-10-CM

## 2025-08-16 DIAGNOSIS — F90.0 ATTENTION DEFICIT HYPERACTIVITY DISORDER (ADHD), PREDOMINANTLY INATTENTIVE TYPE: ICD-10-CM

## 2025-08-18 ENCOUNTER — MYC REFILL (OUTPATIENT)
Dept: FAMILY MEDICINE | Facility: CLINIC | Age: 55
End: 2025-08-18
Payer: MEDICARE

## 2025-08-18 DIAGNOSIS — F33.0 MILD RECURRENT MAJOR DEPRESSION: ICD-10-CM

## 2025-08-18 RX ORDER — FLUOXETINE HYDROCHLORIDE 40 MG/1
40 CAPSULE ORAL DAILY
Qty: 90 CAPSULE | Refills: 0 | Status: SHIPPED | OUTPATIENT
Start: 2025-08-18

## 2025-08-18 RX ORDER — TACROLIMUS 1 MG/1
1 CAPSULE ORAL EVERY 12 HOURS
Qty: 180 CAPSULE | Refills: 3 | Status: SHIPPED | OUTPATIENT
Start: 2025-08-18

## 2025-08-18 RX ORDER — DEXTROAMPHETAMINE SACCHARATE, AMPHETAMINE ASPARTATE MONOHYDRATE, DEXTROAMPHETAMINE SULFATE AND AMPHETAMINE SULFATE 7.5; 7.5; 7.5; 7.5 MG/1; MG/1; MG/1; MG/1
30 CAPSULE, EXTENDED RELEASE ORAL DAILY
Qty: 30 CAPSULE | Refills: 0 | Status: SHIPPED | OUTPATIENT
Start: 2025-08-18

## 2025-08-19 ENCOUNTER — MYC MEDICAL ADVICE (OUTPATIENT)
Dept: FAMILY MEDICINE | Facility: CLINIC | Age: 55
End: 2025-08-19
Payer: MEDICARE

## 2025-08-19 PROBLEM — M54.2 NECK PAIN: Status: RESOLVED | Noted: 2022-08-19 | Resolved: 2025-08-19

## 2025-08-21 ENCOUNTER — VIRTUAL VISIT (OUTPATIENT)
Dept: PSYCHOLOGY | Facility: CLINIC | Age: 55
End: 2025-08-21
Payer: MEDICARE

## 2025-08-21 DIAGNOSIS — F32.0 CURRENT MILD EPISODE OF MAJOR DEPRESSIVE DISORDER, UNSPECIFIED WHETHER RECURRENT: Primary | ICD-10-CM

## 2025-08-21 ASSESSMENT — PATIENT HEALTH QUESTIONNAIRE - PHQ9
SUM OF ALL RESPONSES TO PHQ QUESTIONS 1-9: 9
10. IF YOU CHECKED OFF ANY PROBLEMS, HOW DIFFICULT HAVE THESE PROBLEMS MADE IT FOR YOU TO DO YOUR WORK, TAKE CARE OF THINGS AT HOME, OR GET ALONG WITH OTHER PEOPLE: VERY DIFFICULT
SUM OF ALL RESPONSES TO PHQ QUESTIONS 1-9: 9

## (undated) DEVICE — PACK ENDOSCOPY GI CUSTOM UMMC

## (undated) DEVICE — SU PROLENE 7-0 BV-1DA 30" 8703H

## (undated) DEVICE — TUBE FEEDING NEOCONNECT POLY ENFIT 8FR 24" PFTM8.0P-NC

## (undated) DEVICE — ESU LIGASURE IMPACT OPEN SEALER/DVDR CVD LG JAW LF4418

## (undated) DEVICE — SU SILK 2-0 TIE 12X30" A305H

## (undated) DEVICE — WIPES FOLEY CARE SURESTEP PROVON DFC100

## (undated) DEVICE — ESU GROUND PAD THERMOGUARD PLUS ABC PEDS 7-382

## (undated) DEVICE — DRSG TEGADERM 8X12" 1629

## (undated) DEVICE — SOL WATER IRRIG 1000ML BOTTLE 2F7114

## (undated) DEVICE — KIT CONNECTOR FOR OLYMPUS ENDOSCOPES DEFENDO 100310

## (undated) DEVICE — SU PROLENE 3-0 RB-1DA 36"  8558H

## (undated) DEVICE — SUCTION TIP YANKAUER VIAGUARD BULBOUS HANDLE SMK200

## (undated) DEVICE — LINEN TOWEL PACK X30 5481

## (undated) DEVICE — SUCTION MANIFOLD NEPTUNE 2 SYS 4 PORT 0702-020-000

## (undated) DEVICE — ENDO CATH BALLOON DILATION HURRICANE 06MMX4X180CM M00545920

## (undated) DEVICE — SU SILK 1 TIE 6X30" A307H

## (undated) DEVICE — SYR 20ML LL W/O NDL 302830

## (undated) DEVICE — PACK TVT HYSTEROSCOPY SMA15HYFSE

## (undated) DEVICE — INFLATION DEVICE BIG 60 ENDO-AN6012

## (undated) DEVICE — ENDO TUBING CO2 SMARTCAP STERILE DISP 100145CO2EXT

## (undated) DEVICE — KIT ENDO FIRST STEP DISINFECTANT 200ML W/POUCH EP-4

## (undated) DEVICE — CATH RETRIEVAL BALLOON EXTRACTOR PRO RX-S INJ ABOVE 9-12MM

## (undated) DEVICE — DEFIB PRO-PADZ LVP LQD GEL ADULT 8900-2105-01

## (undated) DEVICE — SYR 01ML 27GA 0.5" NDL TBC 309623

## (undated) DEVICE — CLIP ENDO HEMO-LOC GREEN MED/LG 544230

## (undated) DEVICE — SU PROLENE 4-0 SHDA 36" 8521H

## (undated) DEVICE — SUCTION TIP YANKAUER VIAGUARD W/SLEEVE SMP-2006-001SS

## (undated) DEVICE — WIRE GUIDE 0.025"X270CM ANG VISIGLIDE G-240-2527A

## (undated) DEVICE — NDL SPINAL 22GA 3.5" QUINCKE 405181

## (undated) DEVICE — SU SILK 0 TIE 6X30" A306H

## (undated) DEVICE — SEAL SET MYOSURE ROD LENS SCOPE SINGLE USE 40-902

## (undated) DEVICE — NDL COUNTER 20CT 31142493

## (undated) DEVICE — SPONGE LAP 18X18" X8435

## (undated) DEVICE — SU PDS II 6-0 RB-2DA 30" Z149H

## (undated) DEVICE — RETR VISCERA FISH MED 3204

## (undated) DEVICE — ESU HANDPIECE ABC BEND-A-BEAM 6" 134006

## (undated) DEVICE — SURGICEL ABSORBABLE HEMOSTAT SNOW 4"X4" 2083

## (undated) DEVICE — ENDO FUSION OMNI-TOME G31903

## (undated) DEVICE — NDL COUNTER 40CT  31142311

## (undated) DEVICE — DRAPE SLUSH/WARMER 66X44" ORS-320

## (undated) DEVICE — ENDO SNARE POLYPECTOMY OVAL 15MM LOOP SD-240U-15

## (undated) DEVICE — SU PROLENE 1 CTX 30" 8455H

## (undated) DEVICE — CLIP APPLIER 13" LG LIGACLIP MCL20

## (undated) DEVICE — ESU GROUND PAD ADULT W/CORD E7507

## (undated) DEVICE — LABEL MEDICATION SYSTEM 3303-P

## (undated) DEVICE — DRSG MEDIPORE 3 1/2X13 3/4" 3573

## (undated) DEVICE — BIOPSY VALVE BIOSHIELD 00711135

## (undated) DEVICE — Device

## (undated) DEVICE — TEST TUBE W/SCREW CAP 17361

## (undated) DEVICE — STPL LINEAR 30X2.5MM VASC TX30V

## (undated) DEVICE — SU VICRYL 0 CT-1 CR 8X27" UND JJ41G

## (undated) DEVICE — SU VICRYL 3-0 SH 27" UND J416H

## (undated) DEVICE — ENDO BITE BLOCK ADULT OMNI-BLOC

## (undated) DEVICE — SPONGE RAY-TEC 4X8" 7318

## (undated) DEVICE — ESU ELEC BLADE 6" COATED E1450-6

## (undated) DEVICE — DRAIN JACKSON PRATT RESERVOIR 400ML SU130-1000

## (undated) DEVICE — SYR 10ML LL W/O NDL 302995

## (undated) DEVICE — SU ETHILON 3-0 PS-1 18" 1663H

## (undated) DEVICE — CATH TRAY FOLEY SURESTEP 16FR W/TMP PRB STLK LATEX A319416AM

## (undated) DEVICE — DRAPE ISOLATION BAG 1003

## (undated) DEVICE — WIRE GUIDE 0.025"X270CM STR VISIGLIDE G-240-2527S

## (undated) DEVICE — ENDO DEVICE LOCKING AND BIOPSY CAP M00545261

## (undated) DEVICE — SU PROLENE 4-0 RB-1DA 36" 8557H

## (undated) DEVICE — LINEN TOWEL PACK X6 WHITE 5487

## (undated) DEVICE — SUCTION TIP YANKAUER W/O VENT K86

## (undated) DEVICE — STPL SKIN 35W ROTATING HEAD PRW35

## (undated) DEVICE — RX VISTASEAL FIBRIN SEALANT W/THROMBIN 10ML VST10

## (undated) DEVICE — CLIP APPLIER 11" MED LIGACLIP MCM30

## (undated) DEVICE — GLOVE BIOGEL PI MICRO INDICATOR UNDERGLOVE SZ 6.5 48965

## (undated) DEVICE — SU PROLENE 5-0 RB-1DA 36"  8556H

## (undated) DEVICE — DRAPE IOBAN C-SECTION W/POUCH 30X35" 6657

## (undated) DEVICE — LINEN TOWEL PACK X5 5464

## (undated) DEVICE — SURGICEL FIBRILLAR HEMOSTAT 4"X4" 1963

## (undated) DEVICE — SU SILK 3-0 SH CR 8X18" C013D

## (undated) DEVICE — DRSG PRIMAPORE 02X3" 7133

## (undated) DEVICE — GLIDEWIRE TERUMO .035X180 ANG STIFF GS3508

## (undated) DEVICE — DRAIN JACKSON PRATT ROUND SIL 19FR W/TROCAR LF JP-2232

## (undated) DEVICE — GLOVE BIOGEL PI MICRO SZ 6.0 48560

## (undated) DEVICE — TUBING IRRIG CYSTO/BLADDER SET 81" LF 2C4040

## (undated) DEVICE — SYR 10ML FINGER CONTROL W/O NDL 309695

## (undated) DEVICE — SU SILK 4-0 TIE 12X30" A303H

## (undated) DEVICE — TUBE FEEDING NEOCONNECT SIL ENFIT 5FR 40CM PFTS5.0S-NC

## (undated) DEVICE — SU PDS II 1 CTX 36" Z371T

## (undated) DEVICE — SU PROLENE 6-0 RB-2DA 30" 8711H

## (undated) DEVICE — SOL NACL 0.9% IRRIG 3000ML BAG 2B7477

## (undated) DEVICE — GUIDEWIRE NOVAGOLD .018X260CM STR TIP M00552000

## (undated) DEVICE — BASIN SET SINGLE STERILE 13752-624

## (undated) DEVICE — SOL NACL 0.9% IRRIG 1000ML BOTTLE 2F7124

## (undated) DEVICE — SU SILK 3-0 TIE 12X30" A304H

## (undated) DEVICE — ESU PENCIL W/COATED BLADE E2450H

## (undated) DEVICE — BOWL STERILE 32OZ DYND50320

## (undated) DEVICE — CLIP APPLIER 09 3/8" SM LIGACLIP MCS20

## (undated) DEVICE — ENDO FUSION OMNI-TOME 21 FS-OMNI-21 G48675

## (undated) DEVICE — CLIP ENDO HEMO-LOC PURPLE LG 544240

## (undated) DEVICE — KIT PROCEDURE FLUENT IN/OUT FLOWPAK TISS TRAP FLT-112S

## (undated) DEVICE — NDL BLUNT 18GA 1" W/O FILTER 305181

## (undated) DEVICE — SU PROLENE 3-0 SH-1 30" 8762H

## (undated) RX ORDER — SODIUM CHLORIDE, SODIUM LACTATE, POTASSIUM CHLORIDE, CALCIUM CHLORIDE 600; 310; 30; 20 MG/100ML; MG/100ML; MG/100ML; MG/100ML
INJECTION, SOLUTION INTRAVENOUS
Status: DISPENSED
Start: 2022-02-14

## (undated) RX ORDER — LIDOCAINE HYDROCHLORIDE AND EPINEPHRINE 10; 10 MG/ML; UG/ML
INJECTION, SOLUTION INFILTRATION; PERINEURAL
Status: DISPENSED
Start: 2022-11-30

## (undated) RX ORDER — TRIAMCINOLONE ACETONIDE 40 MG/ML
INJECTION, SUSPENSION INTRA-ARTICULAR; INTRAMUSCULAR
Status: DISPENSED
Start: 2024-11-19

## (undated) RX ORDER — LEVOFLOXACIN 5 MG/ML
INJECTION, SOLUTION INTRAVENOUS
Status: DISPENSED
Start: 2022-03-16

## (undated) RX ORDER — DIPHENHYDRAMINE HYDROCHLORIDE 50 MG/ML
INJECTION INTRAMUSCULAR; INTRAVENOUS
Status: DISPENSED
Start: 2023-08-31

## (undated) RX ORDER — FENTANYL CITRATE-0.9 % NACL/PF 10 MCG/ML
PLASTIC BAG, INJECTION (ML) INTRAVENOUS
Status: DISPENSED
Start: 2022-05-16

## (undated) RX ORDER — LIDOCAINE HYDROCHLORIDE 10 MG/ML
INJECTION, SOLUTION EPIDURAL; INFILTRATION; INTRACAUDAL; PERINEURAL
Status: DISPENSED
Start: 2024-11-19

## (undated) RX ORDER — ALBUMIN (HUMAN) 12.5 G/50ML
SOLUTION INTRAVENOUS
Status: DISPENSED
Start: 2021-11-26

## (undated) RX ORDER — DEXAMETHASONE SODIUM PHOSPHATE 4 MG/ML
INJECTION, SOLUTION INTRA-ARTICULAR; INTRALESIONAL; INTRAMUSCULAR; INTRAVENOUS; SOFT TISSUE
Status: DISPENSED
Start: 2022-03-16

## (undated) RX ORDER — LIDOCAINE HYDROCHLORIDE 10 MG/ML
INJECTION, SOLUTION INFILTRATION; PERINEURAL
Status: DISPENSED
Start: 2023-08-31

## (undated) RX ORDER — ROCURONIUM BROMIDE 50 MG/5 ML
SYRINGE (ML) INTRAVENOUS
Status: DISPENSED
Start: 2022-03-16

## (undated) RX ORDER — ROCURONIUM BROMIDE 50 MG/5 ML
SYRINGE (ML) INTRAVENOUS
Status: DISPENSED
Start: 2022-05-16

## (undated) RX ORDER — FENTANYL CITRATE 50 UG/ML
INJECTION, SOLUTION INTRAMUSCULAR; INTRAVENOUS
Status: DISPENSED
Start: 2022-03-16

## (undated) RX ORDER — EPHEDRINE SULFATE 50 MG/ML
INJECTION, SOLUTION INTRAMUSCULAR; INTRAVENOUS; SUBCUTANEOUS
Status: DISPENSED
Start: 2022-03-16

## (undated) RX ORDER — EPHEDRINE SULFATE 50 MG/ML
INJECTION, SOLUTION INTRAMUSCULAR; INTRAVENOUS; SUBCUTANEOUS
Status: DISPENSED
Start: 2022-05-16

## (undated) RX ORDER — LIDOCAINE HYDROCHLORIDE 20 MG/ML
INJECTION, SOLUTION EPIDURAL; INFILTRATION; INTRACAUDAL; PERINEURAL
Status: DISPENSED
Start: 2022-03-16

## (undated) RX ORDER — PROPOFOL 10 MG/ML
INJECTION, EMULSION INTRAVENOUS
Status: DISPENSED
Start: 2022-03-16

## (undated) RX ORDER — ONDANSETRON 2 MG/ML
INJECTION INTRAMUSCULAR; INTRAVENOUS
Status: DISPENSED
Start: 2022-03-16

## (undated) RX ORDER — PROPOFOL 10 MG/ML
INJECTION, EMULSION INTRAVENOUS
Status: DISPENSED
Start: 2023-08-31

## (undated) RX ORDER — ONDANSETRON 2 MG/ML
INJECTION INTRAMUSCULAR; INTRAVENOUS
Status: DISPENSED
Start: 2023-08-31

## (undated) RX ORDER — FENTANYL CITRATE 50 UG/ML
INJECTION, SOLUTION INTRAMUSCULAR; INTRAVENOUS
Status: DISPENSED
Start: 2022-05-16

## (undated) RX ORDER — LIDOCAINE HYDROCHLORIDE 20 MG/ML
INJECTION, SOLUTION EPIDURAL; INFILTRATION; INTRACAUDAL; PERINEURAL
Status: DISPENSED
Start: 2022-05-16

## (undated) RX ORDER — ALBUTEROL SULFATE 90 UG/1
AEROSOL, METERED RESPIRATORY (INHALATION)
Status: DISPENSED
Start: 2022-01-07

## (undated) RX ORDER — PROPOFOL 10 MG/ML
INJECTION, EMULSION INTRAVENOUS
Status: DISPENSED
Start: 2022-05-16

## (undated) RX ORDER — DEXAMETHASONE SODIUM PHOSPHATE 4 MG/ML
INJECTION, SOLUTION INTRA-ARTICULAR; INTRALESIONAL; INTRAMUSCULAR; INTRAVENOUS; SOFT TISSUE
Status: DISPENSED
Start: 2022-05-16

## (undated) RX ORDER — VASOPRESSIN 20 U/ML
INJECTION PARENTERAL
Status: DISPENSED
Start: 2023-08-31

## (undated) RX ORDER — FENTANYL CITRATE 50 UG/ML
INJECTION, SOLUTION INTRAMUSCULAR; INTRAVENOUS
Status: DISPENSED
Start: 2021-10-29

## (undated) RX ORDER — FENTANYL CITRATE-0.9 % NACL/PF 10 MCG/ML
PLASTIC BAG, INJECTION (ML) INTRAVENOUS
Status: DISPENSED
Start: 2022-03-16

## (undated) RX ORDER — INDOMETHACIN 50 MG/1
SUPPOSITORY RECTAL
Status: DISPENSED
Start: 2022-05-16

## (undated) RX ORDER — SODIUM CHLORIDE 9 MG/ML
INJECTION, SOLUTION INTRAVENOUS
Status: DISPENSED
Start: 2022-02-07

## (undated) RX ORDER — OXYCODONE HYDROCHLORIDE 5 MG/1
TABLET ORAL
Status: DISPENSED
Start: 2023-08-31

## (undated) RX ORDER — FENTANYL CITRATE 50 UG/ML
INJECTION, SOLUTION INTRAMUSCULAR; INTRAVENOUS
Status: DISPENSED
Start: 2023-08-31

## (undated) RX ORDER — ALBUMIN, HUMAN INJ 5% 5 %
SOLUTION INTRAVENOUS
Status: DISPENSED
Start: 2022-01-07

## (undated) RX ORDER — ALBUMIN (HUMAN) 12.5 G/50ML
SOLUTION INTRAVENOUS
Status: DISPENSED
Start: 2021-10-22

## (undated) RX ORDER — FENTANYL CITRATE 50 UG/ML
INJECTION, SOLUTION INTRAMUSCULAR; INTRAVENOUS
Status: DISPENSED
Start: 2022-01-04

## (undated) RX ORDER — ONDANSETRON 2 MG/ML
INJECTION INTRAMUSCULAR; INTRAVENOUS
Status: DISPENSED
Start: 2022-05-16

## (undated) RX ORDER — EPINEPHRINE IN SOD CHLOR,ISO 1 MG/10 ML
SYRINGE (ML) INTRAVENOUS
Status: DISPENSED
Start: 2022-05-16

## (undated) RX ORDER — LIDOCAINE HYDROCHLORIDE 10 MG/ML
INJECTION, SOLUTION EPIDURAL; INFILTRATION; INTRACAUDAL; PERINEURAL
Status: DISPENSED
Start: 2022-02-07

## (undated) RX ORDER — FENTANYL CITRATE 50 UG/ML
INJECTION, SOLUTION INTRAMUSCULAR; INTRAVENOUS
Status: DISPENSED
Start: 2022-02-14

## (undated) RX ORDER — EPHEDRINE SULFATE 50 MG/ML
INJECTION, SOLUTION INTRAMUSCULAR; INTRAVENOUS; SUBCUTANEOUS
Status: DISPENSED
Start: 2022-02-14

## (undated) RX ORDER — DEXAMETHASONE SODIUM PHOSPHATE 4 MG/ML
INJECTION, SOLUTION INTRA-ARTICULAR; INTRALESIONAL; INTRAMUSCULAR; INTRAVENOUS; SOFT TISSUE
Status: DISPENSED
Start: 2023-08-31

## (undated) RX ORDER — FENTANYL CITRATE 50 UG/ML
INJECTION, SOLUTION INTRAMUSCULAR; INTRAVENOUS
Status: DISPENSED
Start: 2022-02-07